# Patient Record
Sex: FEMALE | Race: BLACK OR AFRICAN AMERICAN | NOT HISPANIC OR LATINO | Employment: OTHER | URBAN - METROPOLITAN AREA
[De-identification: names, ages, dates, MRNs, and addresses within clinical notes are randomized per-mention and may not be internally consistent; named-entity substitution may affect disease eponyms.]

---

## 2017-01-03 ENCOUNTER — PATIENT OUTREACH (OUTPATIENT)
Dept: ADMINISTRATIVE | Facility: OTHER | Age: 82
End: 2017-01-03
Payer: MEDICARE

## 2017-01-03 DIAGNOSIS — J45.20 ASTHMA, INTERMITTENT, UNCOMPLICATED: ICD-10-CM

## 2017-01-03 DIAGNOSIS — M51.36 DDD (DEGENERATIVE DISC DISEASE), LUMBAR: ICD-10-CM

## 2017-01-03 DIAGNOSIS — G89.29 CHRONIC BILATERAL LOW BACK PAIN WITHOUT SCIATICA: ICD-10-CM

## 2017-01-03 DIAGNOSIS — M54.50 CHRONIC BILATERAL LOW BACK PAIN WITHOUT SCIATICA: ICD-10-CM

## 2017-01-03 PROCEDURE — 99490 CHRNC CARE MGMT STAFF 1ST 20: CPT | Mod: ,,, | Performed by: INTERNAL MEDICINE

## 2017-01-03 NOTE — PROGRESS NOTES
Patient called to review future appts. She states that she will need to speak with Dr Woo for recommendation for good pulmonologist within [facility] since insurance no longer cover cost to see outside pulmonary.  She also reports increased reflux symptoms. Discussed diet, avoiding heavy meal at end of day, avoid laying down at least 1-2 hours after completion of meal. And request to md to restart Nexium.    She states that she has not had worsening in asthma symptoms but confirmed taking Symbicort as ordered and not controlling her increased shortness of breath and cough    Message to md for update and request Nexium order to pharmacy.  Provided patient with current appt info and  Called Dr. Gillette's office on patient behalf to report symptoms- left message to contact patient to discuss.

## 2017-01-04 ENCOUNTER — TELEPHONE (OUTPATIENT)
Dept: INTERNAL MEDICINE | Facility: CLINIC | Age: 82
End: 2017-01-04

## 2017-01-04 RX ORDER — HYDROGEN PEROXIDE 3 %
20 SOLUTION, NON-ORAL MISCELLANEOUS
Qty: 30 CAPSULE | Refills: 5 | Status: SHIPPED | OUTPATIENT
Start: 2017-01-04 | End: 2017-01-19

## 2017-01-04 NOTE — TELEPHONE ENCOUNTER
----- Message from Anitra Casey RN sent at 1/3/2017  1:57 PM CST -----  Hi Dr. Woo,    Spoke with Ms. Ding today and she advised that is having increased indigestion and reflux symptoms and request to restart Nexium 40mg daily to pharmacy of record.  She also states that due to insurance she will have to change to [facility] and requests your opinion for md choice to manage asthma.     At this time she reports no improvement in her asthma symptoms, denies worsening. Called Dr. Gillette's office on her behalf to report for advice- Left message to call patient.    Sincerely,    Anitra Casey, RN  Outpatient Case Management  [facility]  [phone]

## 2017-01-05 ENCOUNTER — OFFICE VISIT (OUTPATIENT)
Dept: SPINE | Facility: CLINIC | Age: 82
End: 2017-01-05
Attending: PHYSICAL MEDICINE & REHABILITATION
Payer: MEDICARE

## 2017-01-05 VITALS
BODY MASS INDEX: 23.32 KG/M2 | WEIGHT: 140 LBS | HEIGHT: 65 IN | DIASTOLIC BLOOD PRESSURE: 66 MMHG | SYSTOLIC BLOOD PRESSURE: 149 MMHG | HEART RATE: 70 BPM

## 2017-01-05 DIAGNOSIS — M79.642 PAIN IN BOTH HANDS: ICD-10-CM

## 2017-01-05 DIAGNOSIS — M54.50 ACUTE BILATERAL LOW BACK PAIN WITHOUT SCIATICA: ICD-10-CM

## 2017-01-05 DIAGNOSIS — M53.3 SI (SACROILIAC) JOINT DYSFUNCTION: ICD-10-CM

## 2017-01-05 DIAGNOSIS — G89.29 CHRONIC BILATERAL LOW BACK PAIN WITH LEFT-SIDED SCIATICA: Primary | ICD-10-CM

## 2017-01-05 DIAGNOSIS — M51.36 DDD (DEGENERATIVE DISC DISEASE), LUMBAR: ICD-10-CM

## 2017-01-05 DIAGNOSIS — M54.42 CHRONIC BILATERAL LOW BACK PAIN WITH LEFT-SIDED SCIATICA: Primary | ICD-10-CM

## 2017-01-05 DIAGNOSIS — M79.641 PAIN IN BOTH HANDS: ICD-10-CM

## 2017-01-05 PROCEDURE — 99214 OFFICE O/P EST MOD 30 MIN: CPT | Mod: S$PBB,,, | Performed by: PHYSICAL MEDICINE & REHABILITATION

## 2017-01-05 PROCEDURE — 99999 PR PBB SHADOW E&M-EST. PATIENT-LVL V: CPT | Mod: PBBFAC,,, | Performed by: PHYSICAL MEDICINE & REHABILITATION

## 2017-01-05 PROCEDURE — 99215 OFFICE O/P EST HI 40 MIN: CPT | Mod: PBBFAC | Performed by: PHYSICAL MEDICINE & REHABILITATION

## 2017-01-05 RX ORDER — LEVOTHYROXINE SODIUM 50 UG/1
TABLET ORAL
COMMUNITY
Start: 2016-11-29 | End: 2017-03-13 | Stop reason: SDUPTHER

## 2017-01-05 NOTE — PROGRESS NOTES
Subjective:      Patient ID: Debbie Ding is a 84 y.o. female.    Chief Complaint: Hand Pain (bilateral) and Wrist Pain (bilateral)    Referred by: Referral, Self     HPI Comments: Ms Ding is an 85 yo female here for follow up of her low back pain that she has had for years.  She went to the healthy back program and was last seen by me on 11/11/2016 when having a flare of pain we did bilateral iliolumbar injections.  She then continued in therapy.  Today she is having hand and wrist pain.  She feels like her back is doing well.  She missed the last session.  She has not continued the strengthening, but she is continuing the stretches.  She feels like her back is doing great.  We discussed the importance of following up and continuing the strengthening.  There is no low back pain.  She has not had back pain for the last 6 weeks.  She feels like her back pain feels great.  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  The pain is in the thumb.  It is hard to use both hands.  She feels like she is doing well    Past Medical History:    Arthritis                                                     Asthma                                                        Cataract                                                      Depression                                                    Diabetes mellitus                                             Fibromyalgia                                    7/2/2012      Fibromyalgia                                                  GERD (gastroesophageal reflux disease)                        Hypertension                                    7/2/2012      Thoracic or lumbosacral neuritis or radiculiti*               Thyroid disease                                               Ulcer                                                         Past Surgical History:    EYE SURGERY                                                    HYSTERECTOMY                                                    FOOT NEUROMA SURGERY                             1985          CATARACT EXTRACTION                             Bilateral               Review of patient's family history indicates:    Diabetes                       Mother                    Diabetes                       Brother                   Melanoma                       Neg Hx                      Social History    Marital status:              Spouse name:                       Years of education:                 Number of children:               Social History Main Topics    Smoking status: Never Smoker                                                                Alcohol use: No              Drug use: No                Current Outpatient Prescriptions:  albuterol (PROVENTIL HFA/VENTOLIN HFA) 200 puff inhaler, Inhale 2 puffs into the lungs every 4 (four) hours as needed for Wheezing., Disp: 3 Inhaler, Rfl: 3  ammonium lactate (LAC-HYDRIN) 12 % lotion, , Disp: , Rfl:   atorvastatin (LIPITOR) 10 MG tablet, Take 1 tablet (10 mg total) by mouth once daily., Disp: 90 tablet, Rfl: 3  BUDESONIDE/FORMOTEROL FUMARATE (SYMBICORT INHL), Inhale into the lungs., Disp: , Rfl:   cycloSPORINE (RESTASIS) 0.05 % ophthalmic emulsion, , Disp: , Rfl:   desloratadine (CLARINEX) 5 mg tablet, Take 5 mg by mouth once daily., Disp: , Rfl:   desonide (DESOWEN) 0.05 % cream, , Disp: , Rfl:   desoximetasone 0.25 % ointment, APPLY TO THE AFFECTED AREA(S) TWICE A DAY, Disp: , Rfl: 1  donepezil (ARICEPT) 10 MG tablet, Take 1 tablet (10 mg total) by mouth every morning., Disp: 90 tablet, Rfl: 3  DULERA 100-5 mcg/actuation HFAA, , Disp: , Rfl:   DULERA 200-5 mcg/actuation inhaler, 2 puffs 2 (two) times daily., Disp: , Rfl: 3  esomeprazole (NEXIUM) 20 MG capsule, Take 1 capsule (20 mg total) by mouth before breakfast., Disp: 30 capsule, Rfl: 5  ESTRACE 0.01 % (0.1 mg/gram) vaginal cream, , Disp: , Rfl:   estradiol 0.05 mg/24 hr td ptsw (VIVELLE-DOT) 0.05 mg/24 hr, ,  Disp: , Rfl:    fluconazole (DIFLUCAN) 150 MG Tab, TAKE 1 TABLET BY MOUTH EVERY 3 DAYS., Disp: , Rfl: 12  fluocinolone-shower cap 0.01 % Oil, , Disp: , Rfl:   fluocinonide (LIDEX) 0.05 % external solution, USE AS DIRECTED TWICE A DAY EXTERNALLY 1MONTH, Disp: , Rfl: 2  fluocinonide (LIDEX) 0.05 % external solution, AAA scalp qd - bid prn pruritus, Disp: 60 mL, Rfl: 3  fluticasone (FLONASE) 50 mcg/actuation nasal spray, , Disp: , Rfl:   glycopyrrolate (ROBINUL) 2 MG Tab, Take 1 to 1.5 pills po daily as needed for excess sweating, Disp: 150 tablet, Rfl: 0  ketoconazole (NIZORAL) 2 % shampoo, , Disp: , Rfl:   levocetirizine (XYZAL) 5 MG tablet, TAKE 1 TABLET BY MOUTH EVERY EVEINING, Disp: , Rfl: 1  lisinopril (PRINIVIL,ZESTRIL) 5 MG tablet, Take 1 tablet (5 mg total) by mouth once daily., Disp: 90 tablet, Rfl: 3  lisinopril (PRINIVIL,ZESTRIL) 5 MG tablet, TAKE 1 TABLET (5 MG TOTAL) BY MOUTH ONCE DAILY., Disp: , Rfl: 3  meclizine (ANTIVERT) 25 mg tablet, Take 25 mg by mouth 3 (three) times daily as needed., Disp: , Rfl:   MYRBETRIQ 50 mg Tb24, Take 1 tablet by mouth once daily., Disp: , Rfl: 12  neomycin-polymyxin-hydrocortisone (CORTISPORIN) otic solution, , Disp: , Rfl:   olopatadine (PATADAY) 0.2 % Drop, Place 1 drop into both eyes once daily., Disp: , Rfl:   salsalate (DISALCID) 750 MG Tab, Take 750 mg by mouth 2 (two) times daily., Disp: , Rfl:   SYNTHROID 75 mcg tablet, , Disp: , Rfl:   tramadol (ULTRAM) 50 mg tablet, Take 1 tablet (50 mg total) by mouth every 8 (eight) hours as needed for Pain., Disp: 30 tablet, Rfl: 0  trospium (SANCTURA XR) 60 mg Cp24 capsule, Take 1 capsule (60 mg total) by mouth once daily. Take in the morning 1 hour before meal., Disp: 30 capsule, Rfl: 11  pregabalin (LYRICA) 25 MG capsule, Take 1 capsule (25 mg total) by mouth 3 (three) times daily. Pt may increase to 100mg BID, Disp: 90 capsule, Rfl: 5    No current facility-administered medications for this visit.       Review of patient's  allergies indicates:   -- Trazodone -- Other (See Comments)    --  Nightmares/sleep walk   -- Talwin compound     --  Other reaction(s): Hives   -- Talwin (pentazocine lactate) -- Other (See Comments)   -- Bentyl (dicyclomine) -- Anxiety   -- Tessalon (benzonatate) -- Anxiety           Review of Systems   Constitution: Negative for weight gain and weight loss.   Cardiovascular: Negative for chest pain.   Respiratory: Negative for shortness of breath.    Musculoskeletal: Positive for back pain (left leg) and joint pain. Negative for joint swelling.   Gastrointestinal: Negative for abdominal pain and bowel incontinence.   Genitourinary: Negative for bladder incontinence.   Neurological: Negative for numbness.           Objective:          General    Vitals reviewed.  Constitutional: She is oriented to person, place, and time. She appears well-developed and well-nourished.   HENT:   Head: Normocephalic and atraumatic.   Pulmonary/Chest: Effort normal.   Neurological: She is alert and oriented to person, place, and time.   Psychiatric: She has a normal mood and affect. Her behavior is normal. Judgment and thought content normal.     General Musculoskeletal Exam   Gait: normal     Right Ankle/Foot Exam     Tests   Heel Walk: able to perform  Tiptoe Walk: able to perform    Left Ankle/Foot Exam     Tests   Heel Walk: able to perform  Tiptoe Walk: able to perform  Back (L-Spine & T-Spine) / Neck (C-Spine) Exam     Back (L-Spine & T-Spine) Range of Motion   Extension: 10   Flexion: 80     Spinal Sensation   Right Side Sensation  C-Spine Level: normal   L-Spine Level: normal  S-Spine Level: normal  Left Side Sensation  C-Spine Level: normal  L-Spine Level: normal  S-Spine Level: normal    Back (L-Spine & T-Spine) Tests   Right Side Tests  Straight leg raise:      Sitting SLR: > 70 degrees      Left Side Tests  Straight leg raise:     Sitting SLR: > 70 degrees          Other She has no scoliosis .  Spinal Kyphosis:   Absent      Right Hand/Wrist Exam     Pain   Wrist - The patient exhibits pain of the CMC.    Tests     Atrophy   Thenar:  positive      Left Hand/Wrist Exam     Pain   Wrist - The patient exhibits pain of the CMC.    Tests     Atrophy  Thenar:  positive          Muscle Strength   Right Upper Extremity   Biceps: 5/5/5   Deltoid:  5/5  Triceps:  5/5  Wrist Extension: 5/5/5   Finger Flexors:  5/5  Left Upper Extremity  Biceps: 5/5/5   Deltoid:  5/5  Triceps:  5/5  Wrist Extension: 5/5/5   Finger Flexors:  5/5  Right Lower Extremity   Hip Flexion: 5/5   Quadriceps:  5/5   Anterior tibial:  5/5/5  EHL:  5/5  Left Lower Extremity   Hip Flexion: 5/5   Quadriceps:  5/5   Anterior tibial:  5/5/5   EHL:  5/5    Reflexes     Left Side  Biceps:  2+  Triceps:  2+  Brachioradialis:  2+  Quadriceps:  2+  Achilles:  2+  Left García's Sign:  Absent  Babinski Sign:  absent    Right Side   Biceps:  2+  Triceps:  2+  Brachioradialis:  2+  Quadriceps:  2+  Achilles:  2+  Right García's Sign:  absent  Babinski Sign:  absent    Vascular Exam     Right Pulses        Carotid:                  2+    Left Pulses        Carotid:                  2+        Assessment:       Encounter Diagnoses   Name Primary?    Chronic bilateral low back pain with left-sided sciatica Yes    SI (sacroiliac) joint dysfunction     DDD (degenerative disc disease), lumbar     Acute bilateral low back pain without sciatica      Pain in both hands          Plan:       Debbie was seen today for hand pain and wrist pain.    Diagnoses and all orders for this visit:    Chronic bilateral low back pain with left-sided sciatica    SI (sacroiliac) joint dysfunction    DDD (degenerative disc disease), lumbar    Acute bilateral low back pain without sciatica     Pain in both hands  -     Ambulatory consult to Orthopedics       1.  Si joint/iliolumbar  Injection helped her back pain  2.  Continue meds  3.  Continue PT, she missed her last appointment, i encourage her to  reschedule it and then get set up with wellness to continue the strengthening.  She is not having any low back pain  4.  Bilateral first CMC pain, will get her to hand for evaluation  5.   rtc 6 months

## 2017-01-05 NOTE — TELEPHONE ENCOUNTER
Rx for nexium 20mg sent in to see if symptoms controllable on lower dose. Rec pt come to clinic to be seen for urgent visit to evaluate current symptoms and review chronic management.

## 2017-01-05 NOTE — MR AVS SNAPSHOT
[facility]  [address]  [address]  [facility] [address]  Phone: [phone]  Fax: [phone]                  Debbie Ding   2017 10:00 AM   Office Visit    Description:  Female : 10/23/1932   Provider:  Fariba Sterling MD   Department:  [facility]           Reason for Visit     Hand Pain     Wrist Pain           Diagnoses this Visit        Comments    Chronic bilateral low back pain with left-sided sciatica    -  Primary     SI (sacroiliac) joint dysfunction         DDD (degenerative disc disease), lumbar         Acute bilateral low back pain without sciatica         Pain in both hands                To Do List           Future Appointments        Provider Department Dept Phone    2017 2:15 PM Neil Nuñez Jr., MD [facility] [place] [facility] [phone]    2017 8:00 AM Dayo Woo MD [facility] [place] [facility] [phone]    2017 8:00 AM Gilles Dunne MD [facility] [phone]      Goals (5 Years of Data)     Patient/caregiver will accept life style changes to manage and improve GERD prior to discharge from [facility].     Notes - Note created  10/19/2016  3:21 PM by Yvonne Bansal RN    Overall Time to Completion  6 months from .16    Short Term Goals  Patient/caregiver will replace spicy with non spice, decaffeinated for 3 meals per day within 6 months.  Interventions   · Recognize and provide educational material (JESSENIA).   Status  · Partially met          Patient/caregiver will be able to name cardiac, respiratory, pain, purpose of medication and will take medications as ordered by Physician prior to discharge from [facility].     Notes - Note created  10/19/2016  3:28 PM by Yvonne Bansal RN    Overall Time to Completion  6 months from .16    Short Term Goals  Patient/caregiver will utilize a pill box organizer to dispense medications daily within 6 months.  Interventions   · Complete medication  reconciliation.  · Encourage Medication Compliance.   Status  · Partially met    Patient/caregiver will verbalize dosage/route/frequency/indication of cardiac, respiratory, pain within 6 months.  Interventions   · Complete medication reconciliation.  · Encourage Medication Compliance.   Status  · Partially met          Patient/Caregiver will check and record blood pressure daily. If > 140/90 for 3 days, patient/caregiver will know to notify Physician, prior to discharge from [facility].     Notes - Note created  10/19/2016  3:25 PM by Yvonne Bansal RN    Overall Time to Completion  6 months from 10/19/2016    Short Term Goals  Patient/caregiver will measure and record the blood pressure 1 times per day for 6 months.  Interventions   · Assess for availability of working blood pressure cuff in home setting.  · Mail Blood pressure logs for home use.   Status  · Partially met          Patient/caregiver will have an action plan in place to manage and prevent complications of pain prior to discharge from [facility].     Notes - Note created  10/19/2016  3:19 PM by Yvonne Bansal RN    Overall Time to Completion  6 months from 08.01.16    Short Term Goals  Patient/caregiver will verbalize 3 interventions to decrease pain within 6 months.  Interventions   · Recognize and provide educational material (KRAMES).  · Facilitate to referral to Outpatient Rehab.  · Facilitate to referral to Pain Management Specialist.    · Facilitate referral to Healthy [facility]   Status  · Partially met    Patient/caregiver will verbalize 3 noninvasive pain relief measures to help manage the pain within 6 months.  Interventions   · Recognize and provide educational material (KRAMES).   Status  · Partially met          Patient/caregiver will verbalize agreement to attend all scheduled appointments with Physicians prior to discharge from [facility].     Notes - Note created  10/19/2016  3:24 PM by Yvonne Bansal RN    Overall Time to Completion  6 months from  08.01.16    Short Term Goals  Patient/caregiver will establish appointment with Physician within 6 months.  Interventions   · Facilitate to Medical appointments.  · Encourage compliance with preventive screenings.   Status  · Partially met            [facility]     [facility] Nurse Care Line - 24/7 Assistance  Registered nurses in the [facility] provide clinical advisement, health education, appointment booking, and other advisory services.  Call for this free service at [phone].             Medications           Message regarding Medications     Verify the changes and/or additions to your medication regime listed below are the same as discussed with your clinician today.  If any of these changes or additions are incorrect, please notify your healthcare provider.             Verify that the below list of medications is an accurate representation of the medications you are currently taking.  If none reported, the list may be blank. If incorrect, please contact your healthcare provider. Carry this list with you in case of emergency.           Current Medications     albuterol (PROVENTIL HFA/VENTOLIN HFA) 200 puff inhaler Inhale 2 puffs into the lungs every 4 (four) hours as needed for Wheezing.    ammonium lactate (LAC-HYDRIN) 12 % lotion     atorvastatin (LIPITOR) 10 MG tablet Take 1 tablet (10 mg total) by mouth once daily.    BUDESONIDE/FORMOTEROL FUMARATE (SYMBICORT INHL) Inhale into the lungs.    cycloSPORINE (RESTASIS) 0.05 % ophthalmic emulsion     desloratadine (CLARINEX) 5 mg tablet Take 5 mg by mouth once daily.    desonide (DESOWEN) 0.05 % cream     desoximetasone 0.25 % ointment APPLY TO THE AFFECTED AREA(S) TWICE A DAY    donepezil (ARICEPT) 10 MG tablet Take 1 tablet (10 mg total) by mouth every morning.    DULERA 100-5 mcg/actuation HFAA     DULERA 200-5 mcg/actuation inhaler 2 puffs 2 (two) times daily.    esomeprazole (NEXIUM) 20 MG capsule Take 1 capsule (20 mg total) by  "mouth before breakfast.    ESTRACE 0.01 % (0.1 mg/gram) vaginal cream     estradiol 0.05 mg/24 hr td ptsw (VIVELLE-DOT) 0.05 mg/24 hr     fluconazole (DIFLUCAN) 150 MG Tab TAKE 1 TABLET BY MOUTH EVERY 3 DAYS.    fluocinolone-shower cap 0.01 % Oil     fluocinonide (LIDEX) 0.05 % external solution USE AS DIRECTED TWICE A DAY EXTERNALLY 1MONTH    fluocinonide (LIDEX) 0.05 % external solution AAA scalp qd - bid prn pruritus    fluticasone (FLONASE) 50 mcg/actuation nasal spray     glycopyrrolate (ROBINUL) 2 MG Tab Take 1 to 1.5 pills po daily as needed for excess sweating    ketoconazole (NIZORAL) 2 % shampoo     levocetirizine (XYZAL) 5 MG tablet TAKE 1 TABLET BY MOUTH EVERY EVEINING    lisinopril (PRINIVIL,ZESTRIL) 5 MG tablet Take 1 tablet (5 mg total) by mouth once daily.    lisinopril (PRINIVIL,ZESTRIL) 5 MG tablet TAKE 1 TABLET (5 MG TOTAL) BY MOUTH ONCE DAILY.    meclizine (ANTIVERT) 25 mg tablet Take 25 mg by mouth 3 (three) times daily as needed.    MYRBETRIQ 50 mg Tb24 Take 1 tablet by mouth once daily.    neomycin-polymyxin-hydrocortisone (CORTISPORIN) otic solution     olopatadine (PATADAY) 0.2 % Drop Place 1 drop into both eyes once daily.    salsalate (DISALCID) 750 MG Tab Take 750 mg by mouth 2 (two) times daily.    tramadol (ULTRAM) 50 mg tablet Take 1 tablet (50 mg total) by mouth every 8 (eight) hours as needed for Pain.    trospium (SANCTURA XR) 60 mg Cp24 capsule Take 1 capsule (60 mg total) by mouth once daily. Take in the morning 1 hour before meal.    pregabalin (LYRICA) 25 MG capsule Take 1 capsule (25 mg total) by mouth 3 (three) times daily. Pt may increase to 100mg BID    SYNTHROID 50 mcg tablet            Clinical Reference Information           Vital Signs - Last Recorded  Most recent update: 1/5/2017 10:16 AM by Deanne Childress MA    BP Pulse Ht Wt BMI    (!) 149/66 70 5' 5" (1.651 m) 63.5 kg (140 lb) 23.3 kg/m2      Blood Pressure          Most Recent Value    BP  (!)  149/66    "   Allergies as of 1/5/2017     Trazodone    Talwin Compound    Talwin [Pentazocine Lactate]    Bentyl [Dicyclomine]    Tessalon [Benzonatate]      Immunizations Administered on Date of Encounter - 1/5/2017     None      Orders Placed During Today's Visit      Normal Orders This Visit    Ambulatory consult to Orthopedics       [facility] Sign-Up     Activating your [facility] account is as easy as 1-2-3!     1) Visit [email or web address], select Sign Up Now, enter this activation code and your date of birth, then select Next.  [email or web address]  Expires: 2/19/2017 10:43 AM      2) Create a username and password to use when you visit [facility] in the future and select a security question in case you lose your password and select Next.    3) Enter your e-mail address and click Sign Up!    Additional Information  If you have questions, please e-mail [email or web address] or call [phone] to talk to our [facility] staff. Remember, [facility] is NOT to be used for urgent needs. For medical emergencies, dial 911.

## 2017-01-06 DIAGNOSIS — R52 PAIN: Primary | ICD-10-CM

## 2017-01-10 ENCOUNTER — CLINICAL SUPPORT (OUTPATIENT)
Dept: REHABILITATION | Facility: OTHER | Age: 82
End: 2017-01-10
Attending: INTERNAL MEDICINE
Payer: MEDICARE

## 2017-01-10 DIAGNOSIS — R29.898 DECREASED STRENGTH OF TRUNK AND BACK: ICD-10-CM

## 2017-01-10 DIAGNOSIS — M54.50 CHRONIC BILATERAL LOW BACK PAIN WITHOUT SCIATICA: ICD-10-CM

## 2017-01-10 DIAGNOSIS — M51.36 DDD (DEGENERATIVE DISC DISEASE), LUMBAR: Primary | ICD-10-CM

## 2017-01-10 DIAGNOSIS — G89.29 CHRONIC BILATERAL LOW BACK PAIN WITHOUT SCIATICA: ICD-10-CM

## 2017-01-10 PROCEDURE — G8979 MOBILITY GOAL STATUS: HCPCS | Mod: CK | Performed by: PHYSICAL MEDICINE & REHABILITATION

## 2017-01-10 PROCEDURE — G8980 MOBILITY D/C STATUS: HCPCS | Mod: CK | Performed by: PHYSICAL MEDICINE & REHABILITATION

## 2017-01-10 PROCEDURE — 97110 THERAPEUTIC EXERCISES: CPT | Performed by: PHYSICAL MEDICINE & REHABILITATION

## 2017-01-10 NOTE — PLAN OF CARE
Outpatient Physical Therapy Lumbar Visit 15 and discharge note    Patient has attended 15 visits of the [facility] program for aerobic work, med ex isometric testing with dynamic strengthening on med ex equipment for spine, whole body strengthening on med ex equipment, HEP, education.  Patient has completed [facility] Program and is ready to be transitioned into wellness program.  Importance of wellness program, and attending 2/month to maintain strength stressed.  Importance of continuing there ex and body mech and ergonomics stressed.   Patient demonstrates improvement in ability to reduce symptoms, improved posture, improved ROM and improved strength.   Reviewed HEP, and importance of maintaining a healthy spine through continued stretching and performance of HEP, good posture, good ergonomics, good body mech with ADL, leisure, and work.  Patient expressed understanding information given.  Patient plans to attend wellness and is ready to transition to wellness.      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  She states that she is using her ball for exercise at home.  She also began pelvic floor rehabilitation at [facility].  She feels the trigger point injections from Dr Sterling really helped.  Pt encouraged to gently performed HEP if she has any increases in pain throughout the day. She states that she is feeling better overall.  She understands that the exercises help decrease her tightness. Pt requested to omit torso rotation,  biceps and leg press today, stating that she believes the machines may be aggravating her symptoms.  She notices improvement in ability to walk, grocery shop, do ADL, shower, sleep etc.  She has intermittent continued symptoms but much better and feels ready for wellness.  HEP and back care reviewed.  She bought bike and is using it 3-4/week, doing her stretching, indep with lifestyle changes.      VAS location: Low back  VAS: 0/10      1.  Where is your pain the worst? back  2.  Is your pain constant or intermittent? Constant - intermittent at discharge  3. Does bending forward make your typical pain worse? yes  4. Since the start of your back pain, has there been a change in your bowel or bladder? no  5.  What can't you do now that you use to? Walking, standing with HHCs  Improved at discharge        OBJECTIVE EXAMINATION    No environmental, cultural, spiritual, developmental or education needs expressed or noted    POSTURE at eval:  improved    MOVEMENT LOSS at 1/10/17  Flexion full, able to fully flex for ADL and tying shoes  Ext min loss and no pain  Side bend full bilat      Baseline IM Testing Results: 10/4/16  ROM: 9-33  Max Peak Torque: 55  Min Peak Torque: 21  Flex/ext ratio: 2.62:1    Midpoint IM Test Results:  12/1/16  ROM:  6- 36 degrees  Max Peak Torque:  86 ft lbs  Min Peak Torque:  55 ft lbs  Flex/ext ratio:  1.56:1  Average change sum of forces:  99%    Gait good, stable and upright  indep with all transfers   Stairs with ease    Treatment:  Pt was instructed in and performed the following:  Cardiovascular exercise and therapeutic exercise to improve posture, lumbar spine and supporting musculature ROM, strength, and muscular endurance as follows:      [facility] Therapy 1/10/2017   Visit Number 15   VAS Pain Rating 0   Treadmill Time (in min.) 10   Speed 1   Recumbent Bike Seat Pos. -   Time -   Flexion in Lying 10   Lumbar Flexion -   Lumbar Extension -   Lumbar Peak Torque -   Lumbar Weight 44    Repetitions 20   Rating of Perceived Exertion 4   Ice - Z Lie (in min.) 10           Peripheral muscle strengthening which included 1 set of 15-20 repetitions at a slow, controlled 7 second per rep pace focused on strengthening supporting musculature for improved body mechanics and functional mobility.  Pt and therapist focused on proper form during treatment to ensure optimal strengthening of each targeted muscle group.  Machines were utilized including, , leg extension, leg curl, chest press, upright row, tricep and     and hip abd.    HEP  as follows:   DKTC 10x ea set, 2-3 sets/day to reduce c/o pain and improve overall ROM, function. Pt educated on HEP and activity modifications to reduce c/o pain and improve overall function. Pt also educated on use of modalities prn to reduce c/o pain and dysfunction. Patient demo good understanding of the education provided. Patient demo good return demo of skill of exercises.   (patient has handouts and demonstrated and expressed understanding of)    Discharge handouts given  1/10/17  She plans to bike 3-4/week or walk  She has ball and is flexion responder  -reviewed stretching  Reviewed back care    ASSESSMENT       Patient has attended 15 visits of the [facility] program for aerobic work, med ex isometric testing with dynamic strengthening on med ex equipment for spine, whole body strengthening on med ex equipment, HEP, education.  Patient has completed [facility] Program and is ready to be transitioned into wellness program.  Importance of wellness program, and attending 2/month to maintain strength stressed.  Importance of continuing there ex and body mech and ergonomics stressed.   Patient demonstrates improvement in ability to reduce symptoms, improved posture, improved ROM and improved strength.   Reviewed HEP, and importance of maintaining a healthy spine through continued stretching and performance of HEP, good posture, good ergonomics, good body mech with  ADL, leisure, and work.  Patient expressed understanding information given.  Patient plans to attend wellness and is ready to transition to wellness.      Patient retested at visit 10 and reassessed at visit 15   and shows improvement in:  Improved posture, using lumbar roll  Improved lumbar  ROM,  Functionally full and able to dress and perform ADL with greater ease  Improved strength at each test point on lumbar med ex IM test with 99%  average improvement noted with Reduced pain  Noted by patient  Initial outcome tool score 69% limited  and current outcome tool score 41%   indicating reduced pain and improved function        Short term goals: 5 weeks or 10 visits  1.  Pt will demonstrate increased lumbar ROM as measured by med ex by at least 3 degrees from the initial ROM value with improvements noted in functional ROM and ability to perform ADL MET for functional improvement  MET  2.  Pt will demonstrate increased maximum isometric torque value by 5% when compared to the initial value  MET  3.  Pt will tolerate regular 5% increases in dynamic weight loads on all machines  MET  4.  Patient report a reduction in worst pain score by 1-2 points for improved tolerance during work and recreational activitiesMET  5.  Pt able to perform HEP correctly with minimal cueing or supervision for therapist MET  6. Pt will demonstrate improvement in flexion/extension strength ratio compared in initial value  MET    Long term goals: 10 weeks or 20 visits  1. Pt will demonstrate increased lumbar ROM by at least 6 degrees from initial ROM value, resulting in improved ability to perform functional fwd bending while standing and sitting.  MET  2. Pt will demonstrate increased maximum isometric torque value by 10% when compared to the initial value, resulting in improved ability to perform bending, lifting, and carrying activities safely, confidently, and 2/10 pain or less. MET  3. Pt will be able to ambulate community distances  safely, confidently, and 2/10 pain or less.MET  4. Pt to demonstrate ability to independently control and reduce their pain through posture positioning and mechanical movements throughout typical work day.MET  5. Pt able to sleep through the night without waking with c/o pain. MET  6. Pt able to perform household cooking/cleaning ADLS safely, confidently, and 2/10 pain or less.  PROGRESS MADE  7. Pt to be able to perform self care and grooming ADLs safely, confidently, independently, and 2/10 pain or less. MET  8. Pt able to resume their preferred exercise regimen safely, confidently, and 2/10 pain or less.    MET BOUGHT BIKE AND IS USING  9. Pt will be able to ascend/descend 1 flight of stairs reciprocally with use of unilateral handrail for safety, confidently and 2/10 pain or less.    MET        OUTCOMES:  Functional Outcome Measure: FOTO limitation = 69% Disability  G-codes + Modifier + % Impairment: (Mobility):   Initial =  (60% - 80% disability), Goal =  (40% - 60% disability)  Mid Point Visit 10: FOTO limitation= 48% improved from 69%  FOTO visit 15 41% limited with strength 99% improved and function improved met goal of 40-60%      PLAN   Discharge to wellness      Please open encounter, addend and return plan of care if in agreement with plan.  Thank you

## 2017-01-10 NOTE — PROGRESS NOTES
Outpatient Physical Therapy Lumbar Visit 15 and discharge note    Patient has attended 15 visits of the [facility] program for aerobic work, med ex isometric testing with dynamic strengthening on med ex equipment for spine, whole body strengthening on med ex equipment, HEP, education.  Patient has completed [facility] Program and is ready to be transitioned into wellness program.  Importance of wellness program, and attending 2/month to maintain strength stressed.  Importance of continuing there ex and body mech and ergonomics stressed.   Patient demonstrates improvement in ability to reduce symptoms, improved posture, improved ROM and improved strength.   Reviewed HEP, and importance of maintaining a healthy spine through continued stretching and performance of HEP, good posture, good ergonomics, good body mech with ADL, leisure, and work.  Patient expressed understanding information given.  Patient plans to attend wellness and is ready to transition to wellness.      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  She states that she is using her ball for exercise at home.  She also began pelvic floor rehabilitation at [facility].  She feels the trigger point injections from Dr Sterling really helped.  Pt encouraged to gently performed HEP if she has any increases in pain throughout the day. She states that she is feeling better overall.  She understands that the exercises help decrease her tightness. Pt requested to omit torso rotation,  biceps and leg press today, stating that she believes the machines may be aggravating her symptoms.  She notices improvement in ability to walk, grocery shop, do ADL, shower, sleep etc.  She has intermittent continued symptoms but much better and feels ready for wellness.  HEP and back care reviewed.  She bought bike and is using it 3-4/week, doing her stretching, indep with lifestyle changes.      VAS location: Low back  VAS: 0/10      1.  Where is your pain the worst? back  2.  Is your pain constant or intermittent? Constant - intermittent at discharge  3. Does bending forward make your typical pain worse? yes  4. Since the start of your back pain, has there been a change in your bowel or bladder? no  5.  What can't you do now that you use to? Walking, standing with HHCs  Improved at discharge        OBJECTIVE EXAMINATION    No environmental, cultural, spiritual, developmental or education needs expressed or noted    POSTURE at eval:  improved    MOVEMENT LOSS at 1/10/17  Flexion full, able to fully flex for ADL and tying shoes  Ext min loss and no pain  Side bend full bilat      Baseline IM Testing Results: 10/4/16  ROM: 9-33  Max Peak Torque: 55  Min Peak Torque: 21  Flex/ext ratio: 2.62:1    Midpoint IM Test Results:  12/1/16  ROM:  6- 36 degrees  Max Peak Torque:  86 ft lbs  Min Peak Torque:  55 ft lbs  Flex/ext ratio:  1.56:1  Average change sum of forces:  99%    Gait good, stable and upright  indep with all transfers   Stairs with ease    Treatment:  Pt was instructed in and performed the following:  Cardiovascular exercise and therapeutic exercise to improve posture, lumbar spine and supporting musculature ROM, strength, and muscular endurance as follows:      [facility] Therapy 1/10/2017   Visit Number 15   VAS Pain Rating 0   Treadmill Time (in min.) 10   Speed 1   Recumbent Bike Seat Pos. -   Time -   Flexion in Lying 10   Lumbar Flexion -   Lumbar Extension -   Lumbar Peak Torque -   Lumbar Weight 44    Repetitions 20   Rating of Perceived Exertion 4   Ice - Z Lie (in min.) 10           Peripheral muscle strengthening which included 1 set of 15-20 repetitions at a slow, controlled 7 second per rep pace focused on strengthening supporting musculature for improved body mechanics and functional mobility.  Pt and therapist focused on proper form during treatment to ensure optimal strengthening of each targeted muscle group.  Machines were utilized including, , leg extension, leg curl, chest press, upright row, tricep and     and hip abd.    HEP  as follows:   DKTC 10x ea set, 2-3 sets/day to reduce c/o pain and improve overall ROM, function. Pt educated on HEP and activity modifications to reduce c/o pain and improve overall function. Pt also educated on use of modalities prn to reduce c/o pain and dysfunction. Patient demo good understanding of the education provided. Patient demo good return demo of skill of exercises.   (patient has handouts and demonstrated and expressed understanding of)    Discharge handouts given  1/10/17  She plans to bike 3-4/week or walk  She has ball and is flexion responder  -reviewed stretching  Reviewed back care    ASSESSMENT       Patient has attended 15 visits of the [facility] program for aerobic work, med ex isometric testing with dynamic strengthening on med ex equipment for spine, whole body strengthening on med ex equipment, HEP, education.  Patient has completed Healthy Back Program and is ready to be transitioned into wellness program.  Importance of wellness program, and attending 2/month to maintain strength stressed.  Importance of continuing there ex and body mech and ergonomics stressed.   Patient demonstrates improvement in ability to reduce symptoms, improved posture, improved ROM and improved strength.   Reviewed HEP, and importance of maintaining a healthy spine through continued stretching and performance of HEP, good posture, good ergonomics, good body mech with  ADL, leisure, and work.  Patient expressed understanding information given.  Patient plans to attend wellness and is ready to transition to wellness.      Patient retested at visit 10 and reassessed at visit 15   and shows improvement in:  Improved posture, using lumbar roll  Improved lumbar  ROM,  Functionally full and able to dress and perform ADL with greater ease  Improved strength at each test point on lumbar med ex IM test with 99%  average improvement noted with Reduced pain  Noted by patient  Initial outcome tool score 69% limited  and current outcome tool score 41%   indicating reduced pain and improved function        Short term goals: 5 weeks or 10 visits  1.  Pt will demonstrate increased lumbar ROM as measured by med ex by at least 3 degrees from the initial ROM value with improvements noted in functional ROM and ability to perform ADL MET for functional improvement  MET  2.  Pt will demonstrate increased maximum isometric torque value by 5% when compared to the initial value  MET  3.  Pt will tolerate regular 5% increases in dynamic weight loads on all machines  MET  4.  Patient report a reduction in worst pain score by 1-2 points for improved tolerance during work and recreational activitiesMET  5.  Pt able to perform HEP correctly with minimal cueing or supervision for therapist MET  6. Pt will demonstrate improvement in flexion/extension strength ratio compared in initial value  MET    Long term goals: 10 weeks or 20 visits  1. Pt will demonstrate increased lumbar ROM by at least 6 degrees from initial ROM value, resulting in improved ability to perform functional fwd bending while standing and sitting.  MET  2. Pt will demonstrate increased maximum isometric torque value by 10% when compared to the initial value, resulting in improved ability to perform bending, lifting, and carrying activities safely, confidently, and 2/10 pain or less. MET  3. Pt will be able to ambulate community distances  safely, confidently, and 2/10 pain or less.MET  4. Pt to demonstrate ability to independently control and reduce their pain through posture positioning and mechanical movements throughout typical work day.MET  5. Pt able to sleep through the night without waking with c/o pain. MET  6. Pt able to perform household cooking/cleaning ADLS safely, confidently, and 2/10 pain or less.  PROGRESS MADE  7. Pt to be able to perform self care and grooming ADLs safely, confidently, independently, and 2/10 pain or less. MET  8. Pt able to resume their preferred exercise regimen safely, confidently, and 2/10 pain or less.    MET BOUGHT BIKE AND IS USING  9. Pt will be able to ascend/descend 1 flight of stairs reciprocally with use of unilateral handrail for safety, confidently and 2/10 pain or less.    MET        OUTCOMES:  Functional Outcome Measure: FOTO limitation = 69% Disability  G-codes + Modifier + % Impairment: (Mobility):   Initial =  (60% - 80% disability), Goal =  (40% - 60% disability)  Mid Point Visit 10: FOTO limitation= 48% improved from 69%  FOTO visit 15 41% limited with strength 99% improved and function improved met goal of 40-60%      PLAN   Discharge to wellness      Please open encounter, addend and return plan of care if in agreement with plan.  Thank you

## 2017-01-11 ENCOUNTER — TELEPHONE (OUTPATIENT)
Dept: ORTHOPEDICS | Facility: CLINIC | Age: 82
End: 2017-01-11

## 2017-01-11 NOTE — TELEPHONE ENCOUNTER
Notified pt of per Dr Woo request, pt verbalized understanding and states she has an appt on 01/19/17

## 2017-01-13 ENCOUNTER — INITIAL CONSULT (OUTPATIENT)
Dept: ORTHOPEDICS | Facility: CLINIC | Age: 82
End: 2017-01-13
Attending: PHYSICAL MEDICINE & REHABILITATION
Payer: MEDICARE

## 2017-01-13 ENCOUNTER — HOSPITAL ENCOUNTER (OUTPATIENT)
Dept: RADIOLOGY | Facility: OTHER | Age: 82
Discharge: HOME | End: 2017-01-13
Attending: PLASTIC SURGERY
Payer: MEDICARE

## 2017-01-13 VITALS
HEART RATE: 75 BPM | BODY MASS INDEX: 23.32 KG/M2 | SYSTOLIC BLOOD PRESSURE: 155 MMHG | WEIGHT: 140 LBS | RESPIRATION RATE: 18 BRPM | HEIGHT: 65 IN | DIASTOLIC BLOOD PRESSURE: 82 MMHG

## 2017-01-13 DIAGNOSIS — M18.0 PRIMARY OSTEOARTHRITIS OF BOTH FIRST CARPOMETACARPAL JOINTS: Primary | ICD-10-CM

## 2017-01-13 DIAGNOSIS — R52 PAIN: ICD-10-CM

## 2017-01-13 PROCEDURE — 73130 X-RAY EXAM OF HAND: CPT | Mod: 26,50,, | Performed by: RADIOLOGY

## 2017-01-13 PROCEDURE — 20600 DRAIN/INJ JOINT/BURSA W/O US: CPT | Mod: PBBFAC | Performed by: PLASTIC SURGERY

## 2017-01-13 PROCEDURE — 99213 OFFICE O/P EST LOW 20 MIN: CPT | Mod: PBBFAC | Performed by: PLASTIC SURGERY

## 2017-01-13 PROCEDURE — 99203 OFFICE O/P NEW LOW 30 MIN: CPT | Mod: S$PBB,25,, | Performed by: PLASTIC SURGERY

## 2017-01-13 PROCEDURE — 99999 PR PBB SHADOW E&M-EST. PATIENT-LVL III: CPT | Mod: PBBFAC,,, | Performed by: PLASTIC SURGERY

## 2017-01-13 PROCEDURE — 20600 DRAIN/INJ JOINT/BURSA W/O US: CPT | Mod: S$PBB,50,, | Performed by: PLASTIC SURGERY

## 2017-01-13 RX ORDER — LIDOCAINE HYDROCHLORIDE 10 MG/ML
1 INJECTION INFILTRATION; PERINEURAL
Status: COMPLETED | OUTPATIENT
Start: 2017-01-13 | End: 2017-01-13

## 2017-01-13 RX ORDER — TRIAMCINOLONE ACETONIDE 40 MG/ML
80 INJECTION, SUSPENSION INTRA-ARTICULAR; INTRAMUSCULAR
Status: COMPLETED | OUTPATIENT
Start: 2017-01-13 | End: 2017-01-13

## 2017-01-13 RX ADMIN — LIDOCAINE HYDROCHLORIDE 1 ML: 10 INJECTION INFILTRATION; PERINEURAL at 03:01

## 2017-01-13 RX ADMIN — TRIAMCINOLONE ACETONIDE 80 MG: 40 INJECTION, SUSPENSION INTRA-ARTICULAR; INTRAMUSCULAR at 03:01

## 2017-01-13 NOTE — PROGRESS NOTES
HISTORY OF PRESENT ILLNESS:  Mrs. Ding is an 84-year-old right-hand dominant   female who presents today with a several year history of abnormal appearing   thumbs with a recent occurrence of bilateral thumb pain.  The patient states   that she began to notice loss of  as well as pain at the base of her thumbs   with carrying objects or twisting objects such as taking off the lid off of a   jar.  The patient denies any previous history of trauma.  She has noticed that   she has had some deformity in her thumbs over the past several years.  She has   not sought any medical attention.  She denies any numbness or tingling   throughout the hands.  She has no other complaints today.    Past Medical History   Diagnosis Date    Arthritis     Asthma     Cataract     Depression     Diabetes mellitus     Fibromyalgia 7/2/2012    Fibromyalgia     GERD (gastroesophageal reflux disease)     Hypertension 7/2/2012    Thoracic or lumbosacral neuritis or radiculitis, unspecified     Thyroid disease     Ulcer        Past Surgical History   Procedure Laterality Date    Eye surgery      Hysterectomy      Foot neuroma surgery  1985    Cataract extraction Bilateral        Social History     Social History    Marital status:      Spouse name: N/A    Number of children: N/A    Years of education: N/A     Occupational History    Not on file.     Social History Main Topics    Smoking status: Never Smoker    Smokeless tobacco: Not on file    Alcohol use No    Drug use: No    Sexual activity: Not on file     Other Topics Concern    Not on file     Social History Narrative       Current Outpatient Prescriptions on File Prior to Visit   Medication Sig Dispense Refill    albuterol (PROVENTIL HFA/VENTOLIN HFA) 200 puff inhaler Inhale 2 puffs into the lungs every 4 (four) hours as needed for Wheezing. 3 Inhaler 3    ammonium lactate (LAC-HYDRIN) 12 % lotion       atorvastatin (LIPITOR) 10 MG tablet Take 1  tablet (10 mg total) by mouth once daily. 90 tablet 3    BUDESONIDE/FORMOTEROL FUMARATE (SYMBICORT INHL) Inhale into the lungs.      cycloSPORINE (RESTASIS) 0.05 % ophthalmic emulsion       desloratadine (CLARINEX) 5 mg tablet Take 5 mg by mouth once daily.      desonide (DESOWEN) 0.05 % cream       desoximetasone 0.25 % ointment APPLY TO THE AFFECTED AREA(S) TWICE A DAY  1    donepezil (ARICEPT) 10 MG tablet Take 1 tablet (10 mg total) by mouth every morning. 90 tablet 3    DULERA 100-5 mcg/actuation HFAA       DULERA 200-5 mcg/actuation inhaler 2 puffs 2 (two) times daily.  3    esomeprazole (NEXIUM) 20 MG capsule Take 1 capsule (20 mg total) by mouth before breakfast. 30 capsule 5    ESTRACE 0.01 % (0.1 mg/gram) vaginal cream       estradiol 0.05 mg/24 hr td ptsw (VIVELLE-DOT) 0.05 mg/24 hr       fluconazole (DIFLUCAN) 150 MG Tab TAKE 1 TABLET BY MOUTH EVERY 3 DAYS.  12    fluocinolone-shower cap 0.01 % Oil       fluocinonide (LIDEX) 0.05 % external solution USE AS DIRECTED TWICE A DAY EXTERNALLY 1MONTH  2    fluocinonide (LIDEX) 0.05 % external solution AAA scalp qd - bid prn pruritus 60 mL 3    fluticasone (FLONASE) 50 mcg/actuation nasal spray       glycopyrrolate (ROBINUL) 2 MG Tab Take 1 to 1.5 pills po daily as needed for excess sweating 150 tablet 0    ketoconazole (NIZORAL) 2 % shampoo       levocetirizine (XYZAL) 5 MG tablet TAKE 1 TABLET BY MOUTH EVERY EVEINING  1    lisinopril (PRINIVIL,ZESTRIL) 5 MG tablet Take 1 tablet (5 mg total) by mouth once daily. 90 tablet 3    lisinopril (PRINIVIL,ZESTRIL) 5 MG tablet TAKE 1 TABLET (5 MG TOTAL) BY MOUTH ONCE DAILY.  3    meclizine (ANTIVERT) 25 mg tablet Take 25 mg by mouth 3 (three) times daily as needed.      MYRBETRIQ 50 mg Tb24 Take 1 tablet by mouth once daily.  12    neomycin-polymyxin-hydrocortisone (CORTISPORIN) otic solution       olopatadine (PATADAY) 0.2 % Drop Place 1 drop into both eyes once daily.      salsalate  "(DISALCID) 750 MG Tab Take 750 mg by mouth 2 (two) times daily.      SYNTHROID 50 mcg tablet       tramadol (ULTRAM) 50 mg tablet Take 1 tablet (50 mg total) by mouth every 8 (eight) hours as needed for Pain. 30 tablet 0    trospium (SANCTURA XR) 60 mg Cp24 capsule Take 1 capsule (60 mg total) by mouth once daily. Take in the morning 1 hour before meal. 30 capsule 11    pregabalin (LYRICA) 25 MG capsule Take 1 capsule (25 mg total) by mouth 3 (three) times daily. Pt may increase to 100mg BID 90 capsule 5     No current facility-administered medications on file prior to visit.        Review of patient's allergies indicates:   Allergen Reactions    Trazodone Other (See Comments)     Nightmares/sleep walk    Talwin compound      Other reaction(s): Hives    Talwin [pentazocine lactate] Other (See Comments)    Bentyl [dicyclomine] Anxiety    Tessalon [benzonatate] Anxiety       Review of Systems:  Constitutional: no fever or chills  ENT: no nasal congestion or sore throat  Respiratory: no cough or shortness of breath  Cardiovascular: no chest pain or palpitations  Gastrointestinal: no nausea or vomiting  Genitourinary: no hematuria or dysuria  Integument/Breast: no rash or pruritis  Hematologic/Lymphatic: no easy bruising or lymphadenopathy  Musculoskeletal: see HPI  Neurological: no seizures or tremors  Behavioral/Psych: no auditory or visual hallucinations      PHYSICAL EXAM    Vitals:    01/13/17 1403   BP: (!) 155/82   Pulse: 75   Resp: 18   Weight: 63.5 kg (140 lb)   Height: 5' 5" (1.651 m)   PainSc:   7   PainLoc: Hand         PHYSICAL EXAMINATION:  GENERAL:  No acute distress, alert and oriented x3, cooperative, well nourished.  LUNGS:  Nonlabored on room air.  CARDIOVASCULAR:  Distal pulses are intact.  Good capillary refill.  No clubbing,   cyanosis or edema.  EXTREMITIES:  Both hands demonstrate a deformity located at the CMC joint of the   thumb.  The base of the metacarpal of the thumb appears to be " subluxed as well   as adducted.  Both thumbs appear to be hyperextended at the MP joint.  She has   full flexion and extension of the thumb at the IP joint.  She is neurovascularly   intact in the median, radial and ulnar distributions.  There is tenderness to   deep palpation over the basal joint of both thumbs.  There is no evidence of   Tinel's and Durkan's sign over the carpal tunnels.  She has full active range of   motion of digits 2 through 5 of both hands.    RADIOLOGY IMPRESSION:    PA, lateral, and oblique radiographs of both hands were obtained.  The bones are osteoporotic but appear intact.  There is no evidence for acute fracture or bone destruction.  There is hyperextension of the 1st metacarpal phalangeal joints bilaterally.  There are advanced degenerative changes of the 1st carpal metacarpal joints bilaterally.  There is no evidence for dislocation.  Soft tissues are unremarkable.   Impression     Marked osteoporosis.  Advanced degenerative changes involving the 1st carpometacarpal joints bilaterally.  Hyperextension of the 1st metacarpal phalangeal joints bilaterally.           PROCEDURE:  I have explained the risks, benefits, and alternatives of the procedure in detail.  The patient voices understanding and all questions have been answered.  The patient agrees to proceed as planned. After a sterile prep of the skin the bilateral CMC joints of the thumbs were injected from the dorsal approach using a 25 gauge needle with a combination of 1cc 1% plain xylocaine and 40 mg of Kenalog.  The patient is cautioned and immediate relief of pain is secondary to the local anesthetic and will be temporary.  After the anesthetic wears off there may be a increase in pain that may last for a few hours or a few days and they should use ice to help alleviate this flair up of pain.       ASSESSMENT:  Severe primary degenerative osteoarthritis of the carpometacarpal   joints of bilateral thumbs.    PLAN:  Based on  the exam findings, Mrs. Ding has severe basal joint arthritis   with subluxation of the joint in a Z-like deformity due to hyperextension at   the MP joint.  I discussed that this has been a chronic process that has been   going on for many years.  I further discussed that in order to correct the   abnormalities, the patient would require surgery, extensive immobilization and   rehabilitation.  This would likely take 3 months for each hand for a total of 6   months.  I further discussed that the patient may pursue conservative management   at this time consisting of a corticosteroid injection to both joints today to   help alleviate with her pain.  I discussed that this may last several weeks to   several months and it may temporize the patient for an extended period of time.    The patient is not interested in surgery at this time; therefore, she elected   to undergo the corticosteroid injections.  These were given to the patient in   clinic today.  She tolerated the procedure well without any complications.  She   will follow up with me if her symptoms worsen or return.  All questions and   concerns were addressed prior to discharge.      TRISTON/YUNIOR  dd: 01/13/2017 15:26:44 (CST)  td: 01/14/2017 13:16:48 (CST)  Doc ID   #[id number]  Job ID #[id number]    CC:       Dictation Confirmation Code: [id number]  Neil Nuñez Jr. MD  01/13/2017  3:19 PM

## 2017-01-13 NOTE — LETTER
January 13, 2017      Fariba Sterling MD  [address]  [address]  [facility]  [facility] [address]           [facility]  [address]  [address]  [facility] [address]  Phone: [phone]          Patient: Debbie Ding   MR Number: [id number]   YOB: 1932   Date of Visit: 1/13/2017       Dear Dr. Fariba Sterling:    Thank you for referring Debbie Ding to me for evaluation. Attached you will find relevant portions of my assessment and plan of care.    If you have questions, please do not hesitate to call me. I look forward to following Debbie Ding along with you.    Sincerely,    Neil Nuñez Jr., MD    Enclosure  CC:  No Recipients    If you would like to receive this communication electronically, please contact [email or web address] or [phone] to request more information on [email or web address] Link access.    For providers and/or their staff who would like to refer a patient to [facility], please contact us through our one-stop-shop provider referral line, [facility], at [phone].    If you feel you have received this communication in error or would no longer like to receive these types of communications, please e-mail [email or web address]

## 2017-01-19 ENCOUNTER — LAB VISIT (OUTPATIENT)
Dept: LAB | Facility: OTHER | Age: 82
End: 2017-01-19
Attending: INTERNAL MEDICINE
Payer: MEDICARE

## 2017-01-19 ENCOUNTER — OFFICE VISIT (OUTPATIENT)
Dept: INTERNAL MEDICINE | Facility: CLINIC | Age: 82
End: 2017-01-19
Attending: INTERNAL MEDICINE
Payer: MEDICARE

## 2017-01-19 VITALS
HEIGHT: 65 IN | OXYGEN SATURATION: 97 % | SYSTOLIC BLOOD PRESSURE: 152 MMHG | DIASTOLIC BLOOD PRESSURE: 76 MMHG | BODY MASS INDEX: 23.47 KG/M2 | HEART RATE: 68 BPM | WEIGHT: 140.88 LBS

## 2017-01-19 DIAGNOSIS — E03.9 HYPOTHYROIDISM, UNSPECIFIED TYPE: ICD-10-CM

## 2017-01-19 DIAGNOSIS — J45.20 ASTHMA, INTERMITTENT, UNCOMPLICATED: ICD-10-CM

## 2017-01-19 DIAGNOSIS — F32.A ANXIETY AND DEPRESSION: ICD-10-CM

## 2017-01-19 DIAGNOSIS — R73.03 PRE-DIABETES: ICD-10-CM

## 2017-01-19 DIAGNOSIS — R79.89 CREATININE ELEVATION: ICD-10-CM

## 2017-01-19 DIAGNOSIS — I10 ESSENTIAL HYPERTENSION: ICD-10-CM

## 2017-01-19 DIAGNOSIS — E03.9 HYPOTHYROIDISM, UNSPECIFIED TYPE: Primary | ICD-10-CM

## 2017-01-19 DIAGNOSIS — K21.9 GASTROESOPHAGEAL REFLUX DISEASE, ESOPHAGITIS PRESENCE NOT SPECIFIED: ICD-10-CM

## 2017-01-19 DIAGNOSIS — R79.9 ABNORMAL FINDING OF BLOOD CHEMISTRY: ICD-10-CM

## 2017-01-19 DIAGNOSIS — F41.9 ANXIETY AND DEPRESSION: ICD-10-CM

## 2017-01-19 LAB
ANION GAP SERPL CALC-SCNC: 7 MMOL/L
BUN SERPL-MCNC: 20 MG/DL
CALCIUM SERPL-MCNC: 9.5 MG/DL
CHLORIDE SERPL-SCNC: 108 MMOL/L
CO2 SERPL-SCNC: 25 MMOL/L
CREAT SERPL-MCNC: 1.2 MG/DL
EST. GFR  (AFRICAN AMERICAN): 48 ML/MIN/1.73 M^2
EST. GFR  (NON AFRICAN AMERICAN): 41.6 ML/MIN/1.73 M^2
ESTIMATED AVG GLUCOSE: 140 MG/DL
GLUCOSE SERPL-MCNC: 110 MG/DL
HBA1C MFR BLD HPLC: 6.5 %
POTASSIUM SERPL-SCNC: 3.7 MMOL/L
SODIUM SERPL-SCNC: 140 MMOL/L
TSH SERPL DL<=0.005 MIU/L-ACNC: 0.43 UIU/ML

## 2017-01-19 PROCEDURE — 80048 BASIC METABOLIC PNL TOTAL CA: CPT

## 2017-01-19 PROCEDURE — 36415 COLL VENOUS BLD VENIPUNCTURE: CPT

## 2017-01-19 PROCEDURE — 99999 PR PBB SHADOW E&M-EST. PATIENT-LVL III: CPT | Mod: PBBFAC,,, | Performed by: INTERNAL MEDICINE

## 2017-01-19 PROCEDURE — 83036 HEMOGLOBIN GLYCOSYLATED A1C: CPT

## 2017-01-19 PROCEDURE — 99214 OFFICE O/P EST MOD 30 MIN: CPT | Mod: S$PBB,,, | Performed by: INTERNAL MEDICINE

## 2017-01-19 PROCEDURE — 84443 ASSAY THYROID STIM HORMONE: CPT

## 2017-01-19 RX ORDER — ATORVASTATIN CALCIUM 10 MG/1
10 TABLET, FILM COATED ORAL DAILY
Qty: 90 TABLET | Refills: 3 | Status: SHIPPED | OUTPATIENT
Start: 2017-01-19 | End: 2018-01-03 | Stop reason: SDUPTHER

## 2017-01-19 RX ORDER — OMEPRAZOLE 40 MG/1
40 CAPSULE, DELAYED RELEASE ORAL DAILY
Qty: 90 CAPSULE | Refills: 1 | Status: SHIPPED | OUTPATIENT
Start: 2017-01-19 | End: 2017-07-07 | Stop reason: SDUPTHER

## 2017-01-19 RX ORDER — ALBUTEROL SULFATE 90 UG/1
2 AEROSOL, METERED RESPIRATORY (INHALATION) EVERY 4 HOURS PRN
Qty: 3 INHALER | Refills: 3 | Status: SHIPPED | OUTPATIENT
Start: 2017-01-19 | End: 2017-01-20 | Stop reason: SDUPTHER

## 2017-01-19 RX ORDER — LISINOPRIL 10 MG/1
10 TABLET ORAL DAILY
Qty: 90 TABLET | Refills: 3 | Status: SHIPPED | OUTPATIENT
Start: 2017-01-19 | End: 2018-01-03 | Stop reason: SDUPTHER

## 2017-01-19 NOTE — PROGRESS NOTES
"Subjective:       Patient ID: Debbie Ding is a 84 y.o. female.    Chief Complaint: Hypertension    HPI Comments: Here for routine f/u    Chronic fatigue for several years. She reports signficant fatigue. Wakes up tired. Goes to bed tired. She has "no energy to do anything". Symptoms have worsened over the last 4 months, first noticed 7-8 months ago. She does not sleep well through the night. She has a tendicany to sweat and change her clothes. TSH undetectable when last checked but levothyroxine had just been changed by previous provider and plan was to repeat labs in 6-8 weeks. This has not yet bene done. She states her urinary urgency has improved on myrbetriq and only voids once nightly now. She reports she is very depressed as well as very anxious at times and has difficulty keeping calm. She is interested in medication for this. Uses her proair 5-6 times a day. She does not use her dulera inhaler daily because it causes her to be shaky. She did not have these problems on symbicort as well as her respiratory symptoms were well controlled.       Review of Systems   Constitutional: Positive for activity change, diaphoresis and fatigue.       Objective:      Vitals:    01/19/17 0806   BP: (!) 152/76   Pulse: 68   SpO2: 97%   Weight: 63.9 kg (140 lb 14 oz)   Height: 5' 5" (1.651 m)      Physical Exam   Constitutional: She is oriented to person, place, and time. She appears well-developed and well-nourished. No distress.   HENT:   Head: Normocephalic and atraumatic.   Mouth/Throat: Oropharynx is clear and moist. No oropharyngeal exudate.   Eyes: Conjunctivae and EOM are normal. Pupils are equal, round, and reactive to light. No scleral icterus.   Neck: No thyromegaly present.   Cardiovascular: Normal rate, regular rhythm and normal heart sounds.    No murmur heard.  Pulmonary/Chest: Effort normal and breath sounds normal. She has no wheezes. She has no rales.   Abdominal: Soft. She exhibits no distension. There " is no tenderness.   Musculoskeletal: She exhibits no edema or tenderness.   Lymphadenopathy:     She has no cervical adenopathy.   Neurological: She is alert and oriented to person, place, and time.   Skin: Skin is warm and dry.   Psychiatric: She has a normal mood and affect. Her behavior is normal.       Assessment:       1. Hypothyroidism, unspecified type    2. Asthma, intermittent, uncomplicated    3. Pre-diabetes    4. Abnormal finding of blood chemistry     5. Gastroesophageal reflux disease, esophagitis presence not specified    6. Essential hypertension    7. Anxiety and depression        Plan:       Debbie was seen today for hypertension.    Diagnoses and all orders for this visit:    Hypothyroidism, unspecified type  -     TSH; Future    Asthma, intermittent, uncomplicated  -will do PA for symbicort  -     albuterol 90 mcg/actuation inhaler; Inhale 2 puffs into the lungs every 4 (four) hours as needed for Wheezing.    Pre-diabetes  -     Hemoglobin A1c; Future    Abnormal finding of blood chemistry   -     Hemoglobin A1c; Future    Gastroesophageal reflux disease, esophagitis presence not specified  -     omeprazole (PRILOSEC) 40 MG capsule; Take 1 capsule (40 mg total) by mouth once daily.    Essential hypertension  -increase lisinopril to 10mg.     Anxiety and Depression  -will stabilize thyroid Tx first and reassess   -     lisinopril 10 MG tablet; Take 1 tablet (10 mg total) by mouth once daily.  -     atorvastatin (LIPITOR) 10 MG tablet; Take 1 tablet (10 mg total) by mouth once daily.             Side effects of medication(s) were discussed in detail and patient voiced understanding.  Patient will call back for any issues or complications.

## 2017-01-19 NOTE — PATIENT INSTRUCTIONS
Avoid over the counter pain medications such as aspirin, ibuprofen, motrin, Goody's, advil, or prescribed medications such as meloxicam, diclofenac,      Check on dose of omperazole for me  Tips to Control Acid Reflux  To control acid reflux, youll need to make some basic diet and lifestyle changes. The simple steps outlined below may be all youll need to ease discomfort.  Watch what you eat    · Avoid fatty foods and spicy foods.  · Eat fewer acidic foods, such as citrus and tomato-based foods. These can increase symptoms.  · Limit drinking alcohol, caffeine, and fizzy beverages. All increase acid reflux.  · Try limiting chocolate, peppermint, and spearmint. These can worsen acid reflux in some people.  Watch when you eat  · Avoid lying down for 3 hours after eating.  · Do not snack before going to bed.  Raise your head    Raising your head and upper body by 4 to 6 inches helps limit reflux when youre lying down. Put blocks under the head of your bed frame to raise it.  Other changes  · Lose weight, if you need to  · Dont exercise near bedtime  · Avoid tight-fitting clothes  · Limit aspirin and ibuprofen  · Stop smoking   © 4982-4941 "[employer] [address] [place] [address], [place] [address]. All rights reserved. This information is not intended as a substitute for professional medical care. Always follow your healthcare professional's instructions.

## 2017-01-19 NOTE — MR AVS SNAPSHOT
[facility]  [address]  [place] [address]  Phone: [phone]  Fax: [phone]                  Debbie Ding   2017 8:00 AM   Office Visit    Description:  Female : 10/23/1932   Provider:  Dayo Woo MD   Department:  [facility]           Reason for Visit     Hypertension           Diagnoses this Visit        Comments    Hypothyroidism, unspecified type    -  Primary     Asthma, intermittent, uncomplicated         Pre-diabetes         Abnormal finding of blood chemistry                To Do List           Future Appointments        Provider Department Dept Phone    2017 9:00 AM LAB, [facility] [phone]    2017 8:00 AM Gilles Dunne MD [facility] - Rheumatology [phone]    3/21/2017 8:00 AM Dayo Woo MD [facility] [phone]      Goals (5 Years of Data)     Patient/caregiver will accept life style changes to manage and improve GERD prior to discharge from O[facility].     Notes - Note created  10/19/2016  3:21 PM by Yvonne Bansal RN    Overall Time to Completion  6 months from .16    Short Term Goals  Patient/caregiver will replace spicy with non spice, decaffeinated for 3 meals per day within 6 months.  Interventions   · Recognize and provide educational material (KRALESLI).   Status  · Partially met          Patient/caregiver will be able to name cardiac, respiratory, pain, purpose of medication and will take medications as ordered by Physician prior to discharge from OPCM.     Notes - Note created  10/19/2016  3:28 PM by Yvonne Bansal RN    Overall Time to Completion  6 months from .16    Short Term Goals  Patient/caregiver will utilize a pill box organizer to dispense medications daily within 6 months.  Interventions   · Complete medication reconciliation.  · Encourage Medication Compliance.   Status  · Partially met    Patient/caregiver will verbalize  dosage/route/frequency/indication of cardiac, respiratory, pain within 6 months.  Interventions   · Complete medication reconciliation.  · Encourage Medication Compliance.   Status  · Partially met          Patient/Caregiver will check and record blood pressure daily. If > 140/90 for 3 days, patient/caregiver will know to notify Physician, prior to discharge from OPCM.     Notes - Note created  10/19/2016  3:25 PM by Yvonne Bansal RN    Overall Time to Completion  6 months from 10/19/2016    Short Term Goals  Patient/caregiver will measure and record the blood pressure 1 times per day for 6 months.  Interventions   · Assess for availability of working blood pressure cuff in home setting.  · Mail Blood pressure logs for home use.   Status  · Partially met          Patient/caregiver will have an action plan in place to manage and prevent complications of pain prior to discharge from OPCM.     Notes - Note created  10/19/2016  3:19 PM by Yvonne Bansal RN    Overall Time to Completion  6 months from 08.01.16    Short Term Goals  Patient/caregiver will verbalize 3 interventions to decrease pain within 6 months.  Interventions   · Recognize and provide educational material (JESSENIA).  · Facilitate to referral to Outpatient Rehab.  · Facilitate to referral to Pain Management Specialist.    · Facilitate referral to Healthy [facility]   Status  · Partially met    Patient/caregiver will verbalize 3 noninvasive pain relief measures to help manage the pain within 6 months.  Interventions   · Recognize and provide educational material (JESSENIA).   Status  · Partially met          Patient/caregiver will verbalize agreement to attend all scheduled appointments with Physicians prior to discharge from OPCM.     Notes - Note created  10/19/2016  3:24 PM by Yvonne Bansal RN    Overall Time to Completion  6 months from 08.01.16    Short Term Goals  Patient/caregiver will establish appointment with Physician within 6  months.  Interventions   · Facilitate to Medical appointments.  · Encourage compliance with preventive screenings.   Status  · Partially met             These Medications        Disp Refills Start End    omeprazole (PRILOSEC) 40 MG capsule 90 capsule 1 1/19/2017 2/18/2017    Take 1 capsule (40 mg total) by mouth once daily. - Oral    Pharmacy: [address] AT Portal to [facility] Ph #: [phone]       lisinopril 10 MG tablet 90 tablet 3 1/19/2017 1/19/2018    Take 1 tablet (10 mg total) by mouth once daily. - Oral    Pharmacy: [address] AT Portal to [facility] Ph #: [phone]       albuterol 90 mcg/actuation inhaler 3 Inhaler 3 1/19/2017     Inhale 2 puffs into the lungs every 4 (four) hours as needed for Wheezing. - Inhalation    [facility] [address] [facility] Ph #: [phone]       atorvastatin (LIPITOR) 10 MG tablet 90 tablet 3 1/19/2017 1/19/2018    Take 1 tablet (10 mg total) by mouth once daily. - Oral    Pharmacy: [address] AT Portal to [facility] Ph #: [phone]         [facility]     [facility] On Call Nurse Care Line - 24/7 Assistance  Registered nurses in the [facility] provide clinical advisement, health education, appointment booking, and other advisory services.  Call for this free service at [phone].             Medications           Message regarding Medications     Verify the changes and/or additions to your medication regime listed below are the same as discussed with your clinician today.  If any of these changes or additions are incorrect, please notify your healthcare provider.        START taking these NEW medications        Refills    omeprazole  (PRILOSEC) 40 MG capsule 1    Sig: Take 1 capsule (40 mg total) by mouth once daily.    Class: Normal    Route: Oral      CHANGE how you are taking these medications     Start Taking Instead of    lisinopril 10 MG tablet lisinopril (PRINIVIL,ZESTRIL) 5 MG tablet    Dosage:  Take 1 tablet (10 mg total) by mouth once daily. Dosage:  Take 1 tablet (5 mg total) by mouth once daily.    Reason for Change:  Reorder     albuterol 90 mcg/actuation inhaler albuterol (PROVENTIL HFA/VENTOLIN HFA) 200 puff inhaler    Dosage:  Inhale 2 puffs into the lungs every 4 (four) hours as needed for Wheezing. Dosage:  Inhale 2 puffs into the lungs every 4 (four) hours as needed for Wheezing.      STOP taking these medications     neomycin-polymyxin-hydrocortisone (CORTISPORIN) otic solution     fluticasone (FLONASE) 50 mcg/actuation nasal spray     fluconazole (DIFLUCAN) 150 MG Tab TAKE 1 TABLET BY MOUTH EVERY 3 DAYS.    DULERA 200-5 mcg/actuation inhaler 2 puffs 2 (two) times daily.    DULERA 100-5 mcg/actuation HFAA     desoximetasone 0.25 % ointment APPLY TO THE AFFECTED AREA(S) TWICE A DAY    desonide (DESOWEN) 0.05 % cream     trospium (SANCTURA XR) 60 mg Cp24 capsule Take 1 capsule (60 mg total) by mouth once daily. Take in the morning 1 hour before meal.    esomeprazole (NEXIUM) 20 MG capsule Take 1 capsule (20 mg total) by mouth before breakfast.    ammonium lactate (LAC-HYDRIN) 12 % lotion     salsalate (DISALCID) 750 MG Tab Take 750 mg by mouth 2 (two) times daily.           Verify that the below list of medications is an accurate representation of the medications you are currently taking.  If none reported, the list may be blank. If incorrect, please contact your healthcare provider. Carry this list with you in case of emergency.           Current Medications     albuterol 90 mcg/actuation inhaler Inhale 2 puffs into the lungs every 4 (four) hours as needed for Wheezing.    atorvastatin (LIPITOR) 10 MG tablet Take 1 tablet (10  "mg total) by mouth once daily.    BUDESONIDE/FORMOTEROL FUMARATE (SYMBICORT INHL) Inhale into the lungs.    cycloSPORINE (RESTASIS) 0.05 % ophthalmic emulsion     desloratadine (CLARINEX) 5 mg tablet Take 5 mg by mouth once daily.    donepezil (ARICEPT) 10 MG tablet Take 1 tablet (10 mg total) by mouth every morning.    ESTRACE 0.01 % (0.1 mg/gram) vaginal cream     estradiol 0.05 mg/24 hr td ptsw (VIVELLE-DOT) 0.05 mg/24 hr     fluocinolone-shower cap 0.01 % Oil     fluocinonide (LIDEX) 0.05 % external solution USE AS DIRECTED TWICE A DAY EXTERNALLY 1MONTH    glycopyrrolate (ROBINUL) 2 MG Tab Take 1 to 1.5 pills po daily as needed for excess sweating    ketoconazole (NIZORAL) 2 % shampoo     levocetirizine (XYZAL) 5 MG tablet TAKE 1 TABLET BY MOUTH EVERY EVEINING    lisinopril 10 MG tablet Take 1 tablet (10 mg total) by mouth once daily.    meclizine (ANTIVERT) 25 mg tablet Take 25 mg by mouth 3 (three) times daily as needed.    MYRBETRIQ 50 mg Tb24 Take 1 tablet by mouth once daily.    olopatadine (PATADAY) 0.2 % Drop Place 1 drop into both eyes once daily.    SYNTHROID 50 mcg tablet     tramadol (ULTRAM) 50 mg tablet Take 1 tablet (50 mg total) by mouth every 8 (eight) hours as needed for Pain.    omeprazole (PRILOSEC) 40 MG capsule Take 1 capsule (40 mg total) by mouth once daily.    pregabalin (LYRICA) 25 MG capsule Take 1 capsule (25 mg total) by mouth 3 (three) times daily. Pt may increase to 100mg BID           Clinical Reference Information           Vital Signs - Last Recorded  Most recent update: 1/19/2017  8:12 AM by Lilian Tilley MA    BP Pulse Ht Wt SpO2 BMI    (!) 152/76 68 5' 5" (1.651 m) 63.9 kg (140 lb 14 oz) 97% 23.44 kg/m2      Blood Pressure          Most Recent Value    BP  (!)  152/76      Allergies as of 1/19/2017     Trazodone    Talwin Compound    Talwin [Pentazocine Lactate]    Bentyl [Dicyclomine]    Tessalon [Benzonatate]      Immunizations Administered on Date of Encounter - " 1/19/2017     None      Orders Placed During Today's Visit     Future Labs/Procedures Expected by Expires    Hemoglobin A1c  1/19/2017 3/20/2018    TSH  1/19/2017 1/19/2018      [facility] Sign-Up     Activating your [facility] account is as easy as 1-2-3!     1) Visit [email or web address], select Sign Up Now, enter this activation code and your date of birth, then select Next.  [email or web address]  Expires: 2/19/2017 10:43 AM      2) Create a username and password to use when you visit [facility] in the future and select a security question in case you lose your password and select Next.    3) Enter your e-mail address and click Sign Up!    Additional Information  If you have questions, please e-mail [email or web address] or call [phone] to talk to our [facility] staff. Remember, [facility] is NOT to be used for urgent needs. For medical emergencies, dial 911.         Instructions    Avoid over the counter pain medications such as aspirin, ibuprofen, motrin, Goody's, advil, or prescribed medications such as meloxicam, diclofenac,      Check on dose of omperazole for me  Tips to Control Acid Reflux  To control acid reflux, youll need to make some basic diet and lifestyle changes. The simple steps outlined below may be all youll need to ease discomfort.  Watch what you eat    · Avoid fatty foods and spicy foods.  · Eat fewer acidic foods, such as citrus and tomato-based foods. These can increase symptoms.  · Limit drinking alcohol, caffeine, and fizzy beverages. All increase acid reflux.  · Try limiting chocolate, peppermint, and spearmint. These can worsen acid reflux in some people.  Watch when you eat  · Avoid lying down for 3 hours after eating.  · Do not snack before going to bed.  Raise your head    Raising your head and upper body by 4 to 6 inches helps limit reflux when youre lying down. Put blocks under the head of your bed frame to raise it.  Other changes  · Lose weight, if you need to  · Dont exercise near  bedtime  · Avoid tight-fitting clothes  · Limit aspirin and ibuprofen  · Stop smoking   © 6807-1659 [employer]. [address] [place] [address], [place] [address]. All rights reserved. This information is not intended as a substitute for professional medical care. Always follow your healthcare professional's instructions.

## 2017-01-24 ENCOUNTER — OFFICE VISIT (OUTPATIENT)
Dept: RHEUMATOLOGY | Facility: CLINIC | Age: 82
End: 2017-01-24
Payer: MEDICARE

## 2017-01-24 VITALS
HEART RATE: 69 BPM | WEIGHT: 143.13 LBS | DIASTOLIC BLOOD PRESSURE: 75 MMHG | SYSTOLIC BLOOD PRESSURE: 166 MMHG | BODY MASS INDEX: 23.85 KG/M2 | HEIGHT: 65 IN

## 2017-01-24 DIAGNOSIS — G89.29 CHRONIC LEFT-SIDED LOW BACK PAIN WITH LEFT-SIDED SCIATICA: ICD-10-CM

## 2017-01-24 DIAGNOSIS — N18.30 CHRONIC RENAL INSUFFICIENCY, STAGE 3 (MODERATE): ICD-10-CM

## 2017-01-24 DIAGNOSIS — M15.9 PRIMARY OSTEOARTHRITIS INVOLVING MULTIPLE JOINTS: Primary | ICD-10-CM

## 2017-01-24 DIAGNOSIS — M54.42 CHRONIC LEFT-SIDED LOW BACK PAIN WITH LEFT-SIDED SCIATICA: ICD-10-CM

## 2017-01-24 DIAGNOSIS — M79.7 FIBROMYALGIA: ICD-10-CM

## 2017-01-24 PROCEDURE — 99213 OFFICE O/P EST LOW 20 MIN: CPT | Mod: S$PBB,,, | Performed by: INTERNAL MEDICINE

## 2017-01-24 PROCEDURE — 99999 PR PBB SHADOW E&M-EST. PATIENT-LVL III: CPT | Mod: PBBFAC,,, | Performed by: INTERNAL MEDICINE

## 2017-01-24 PROCEDURE — 99213 OFFICE O/P EST LOW 20 MIN: CPT | Mod: PBBFAC | Performed by: INTERNAL MEDICINE

## 2017-01-24 RX ORDER — TRAMADOL HYDROCHLORIDE 50 MG/1
50 TABLET ORAL EVERY 6 HOURS PRN
Qty: 60 TABLET | Refills: 2 | Status: SHIPPED | OUTPATIENT
Start: 2017-01-24 | End: 2017-02-23

## 2017-01-24 RX ORDER — SALSALATE 750 MG
750 TABLET ORAL 2 TIMES DAILY
Qty: 180 TABLET | Refills: 3 | Status: SHIPPED | OUTPATIENT
Start: 2017-01-24 | End: 2017-07-31 | Stop reason: SDUPTHER

## 2017-01-24 ASSESSMENT — ROUTINE ASSESSMENT OF PATIENT INDEX DATA (RAPID3)
FATIGUE SCORE: 10
MDHAQ FUNCTION SCORE: 2.4
PATIENT GLOBAL ASSESSMENT SCORE: 10
PAIN SCORE: 10
AM STIFFNESS SCORE: 1, YES
TOTAL RAPID3 SCORE: 9.33
PSYCHOLOGICAL DISTRESS SCORE: 7.7

## 2017-01-24 NOTE — PROGRESS NOTES
History of present illness: 84-year-old female I have been following for osteoarthritis.  She also has a long history of fibromyalgia.  She has lumbar spinal stenosis.  She has had intolerance of multiple medications.  I saw her last in September.  Since her last visit she has been going to the healthy back clinic and had some trigger point injections.  This has helped.  She also had an injection in her thumb which helped.  She still is complaining of some stiffness in her hands.    Lyrica was making her drowsy and she stopped it.  She is just taking it as needed.  Tramadol has been helping but her prescription ran out.  She remains on Flexeril at bedtime which has helped her some.  She stopped taking salsalate.  Her hands are doing worse since she ran out of the salsalate.    Physical examination was not performed, the entire time was counseling.  Laboratory: Most recent creatinine was 1.2    Assessment: Osteoarthritis and fibromyalgia    Plans:  1.  Salsalate is less nephrotoxic than any other NSAID because it is not a cyclooxygenase inhibitor.  I therefore recommend resuming salsalate 750 mg twice daily.  I will repeat her renal function in one month.  2.  I told her to avoid Lyrica since it makes her drowsy  3.  I refilled her prescription for tramadol  4.  Continue Flexeril at bedtime  5.  Return to see me in 6 months

## 2017-01-26 ENCOUNTER — PATIENT OUTREACH (OUTPATIENT)
Dept: ADMINISTRATIVE | Facility: OTHER | Age: 82
End: 2017-01-26

## 2017-01-26 ENCOUNTER — TELEPHONE (OUTPATIENT)
Dept: INTERNAL MEDICINE | Facility: CLINIC | Age: 82
End: 2017-01-26

## 2017-01-26 NOTE — PROGRESS NOTES
Patient called to discuss concerns regarding her ongoing feelings of fatigue, lack of energy intermittent shortness of breath. In recent hx patient has been having intermittent complaints of increased asthma symptoms, increased shortness of breath and cough. Had some concerns regarding inhaled meds-not covered per insurance and need for steroids. She has not been to pulmonolgist as no longer on her insurance coverage. She states has been in to see PCP and informed that her labs wnl.    Advised patient will discuss ongoing concerns symptoms and request for pulmonologist with pcp.  And her ongoing issues with asthma control to review lung function and meds. Will assist to facilitate appt and provide message to md for additional reccomendations.  Advised will discusss with md and call back.  She agrees to this advice.    Discussed patient's concerns and symptoms with Dr. Woo as well her inability to obtain Symbicort as previously ordered due to insurance denial. Md advised working on obtaining PA for patient and will follow up with patient.    Returned patient's call and advised of the same.

## 2017-01-26 NOTE — TELEPHONE ENCOUNTER
----- Message from Anitra Casey RN sent at 1/26/2017 12:58 PM CST -----  Contact: Patient  Hi Dr. Woo    Already spoke to you regarding note below. Wanted to send it to remind you that patient is waiting to hear back with advice.    Patient called to discuss concerns regarding her ongoing feelings of fatigue, lack of energy intermittent shortness of breath. In recent hx patient has been having intermittent complaints of increased asthma symptoms, increased shortness of breath and cough. Had some concerns regarding inhaled meds-not covered per insurance and need for steroids. She has not been to pulmonolgist as no longer on her insurance coverage. She states has been in to see PCP and informed that her labs wnl.    Advised patient will discuss ongoing concerns symptoms and her request for pulmonologist referral with pcp.  Will assist to facilitate appt and provide message to md for additional reccomendations.  Advised will discusss with md and call back.  She agrees to this advice.    Discussed patient's concerns and symptoms with Dr. Woo as well her inability to obtain Symbicort as previously ordered due to insurance denial. Md advised working on obtaining PA for patient and will follow up with patient.    Returned patient's call and advised of the same.

## 2017-02-07 ENCOUNTER — TELEPHONE (OUTPATIENT)
Dept: INTERNAL MEDICINE | Facility: CLINIC | Age: 82
End: 2017-02-07

## 2017-02-07 ENCOUNTER — PATIENT OUTREACH (OUTPATIENT)
Dept: ADMINISTRATIVE | Facility: OTHER | Age: 82
End: 2017-02-07
Payer: MEDICARE

## 2017-02-07 DIAGNOSIS — J44.9 CHRONIC OBSTRUCTIVE PULMONARY DISEASE, UNSPECIFIED COPD TYPE: Primary | ICD-10-CM

## 2017-02-07 DIAGNOSIS — J45.20 ASTHMA, INTERMITTENT, UNCOMPLICATED: ICD-10-CM

## 2017-02-07 DIAGNOSIS — F33.9 DEPRESSION, RECURRENT: ICD-10-CM

## 2017-02-07 DIAGNOSIS — R29.898 DECREASED STRENGTH OF TRUNK AND BACK: ICD-10-CM

## 2017-02-07 DIAGNOSIS — K21.9 GASTROESOPHAGEAL REFLUX DISEASE WITHOUT ESOPHAGITIS: ICD-10-CM

## 2017-02-07 PROCEDURE — 99490 CHRNC CARE MGMT STAFF 1ST 20: CPT | Mod: ,,, | Performed by: INTERNAL MEDICINE

## 2017-02-07 NOTE — PROGRESS NOTES
Ms Ding phoned to provide update and seek advice.    Patient states that approximately 2 weeks ago she received an unused cannister of Symbicort 160 from good friend. Because she did not have any controller med at time, she took medicine to feel better. States that she later got call to start  Breo, for controller. Took medicine caused extreme vertigo, and can not take it.  She recalls- that she had taken this med in past and this is why she previously stopped medication.    She is very anxious about her increasing debility and increased use of rescue inhaler use to function. Not able to walk to front door without becoming short of breath.    Advised patient will send message to MD for advice.

## 2017-02-07 NOTE — TELEPHONE ENCOUNTER
----- Message from Anitra Casey RN sent at 2/7/2017 12:57 PM CST -----  Contact: Debbie Ch,    Please see note from below:    Ms Ding phoned to provide update and seek advice regarding her continued C/O of Shortness of breath.  Patient states that approximately 2 weeks ago she received an unused cannister of Symbicort 160 from good friend. Because, she did not have any controller med at time, she took medicine to feel better. States that she later advised breo approved per insurance and Directed to start this med as controller. States med caused extreme vertigo, and can not take it.  She recalls- that she had taken this med in past and this is why she previously stopped medication.  States that became short of breath with short walk from bedroom to front door yesterday.  Very anxious regarding increased debility, and extreme fatigue.    I advised patient message forwarded to MD for review of complaints and advice.

## 2017-02-09 RX ORDER — BUDESONIDE AND FORMOTEROL FUMARATE DIHYDRATE 160; 4.5 UG/1; UG/1
2 AEROSOL RESPIRATORY (INHALATION) EVERY 12 HOURS
Qty: 10.2 G | Refills: 11 | Status: SHIPPED | OUTPATIENT
Start: 2017-02-09 | End: 2017-09-06

## 2017-02-13 RX ORDER — FLUTICASONE PROPIONATE AND SALMETEROL 250; 50 UG/1; UG/1
1 POWDER RESPIRATORY (INHALATION) 2 TIMES DAILY
Qty: 60 EACH | Refills: 5 | Status: SHIPPED | OUTPATIENT
Start: 2017-02-13 | End: 2017-09-06

## 2017-02-13 NOTE — TELEPHONE ENCOUNTER
Contacted the pharmacy who states it is not even prompting a PA for the symbicort. Just saying it is not covered due to nonformulary. Only Advair, Brio, and Dulera. Please advise.

## 2017-02-13 NOTE — TELEPHONE ENCOUNTER
Unfortunately patient has had either sub therapeutic response or SE with all alternative medications. Rec resume advair and f/u with pulmonology

## 2017-02-14 DIAGNOSIS — R41.3 MEMORY LOSS: ICD-10-CM

## 2017-02-14 RX ORDER — DONEPEZIL HYDROCHLORIDE 10 MG/1
10 TABLET, FILM COATED ORAL EVERY MORNING
Qty: 90 TABLET | Refills: 3 | Status: SHIPPED | OUTPATIENT
Start: 2017-02-14 | End: 2017-03-09 | Stop reason: SDUPTHER

## 2017-02-14 NOTE — TELEPHONE ENCOUNTER
----- Message from Mackenzie Frost sent at 2/14/2017  8:38 AM CST -----  Contact: Patient  Patient called and said she is going to need a refill of her donepezil (ARICEPT) 10 MG tablet    [facility] - [place], [place] - [address] AT PORTAL TO REGISTERED [facility]

## 2017-02-16 ENCOUNTER — PATIENT OUTREACH (OUTPATIENT)
Dept: ADMINISTRATIVE | Facility: OTHER | Age: 82
End: 2017-02-16

## 2017-02-16 NOTE — PROGRESS NOTES
Patient phoned to confirm her upcoming appt information for pulmonary appt.    Advised her and mailed out appt info for both Pulmonary and neurology appts.     Pulmonary appt Tu 03/07 0800. [facility] 9th floor  Neuro appt: Thur 03/9 @ 0840. [facility] 8th floor    She verbalized her understanding

## 2017-03-07 ENCOUNTER — OFFICE VISIT (OUTPATIENT)
Dept: PULMONOLOGY | Facility: CLINIC | Age: 82
End: 2017-03-07
Payer: MEDICARE

## 2017-03-07 VITALS
HEART RATE: 70 BPM | DIASTOLIC BLOOD PRESSURE: 56 MMHG | BODY MASS INDEX: 22.81 KG/M2 | OXYGEN SATURATION: 98 % | HEIGHT: 65 IN | WEIGHT: 136.88 LBS | SYSTOLIC BLOOD PRESSURE: 100 MMHG | RESPIRATION RATE: 14 BRPM

## 2017-03-07 DIAGNOSIS — M79.7 FIBROMYALGIA: ICD-10-CM

## 2017-03-07 DIAGNOSIS — K21.9 GASTROESOPHAGEAL REFLUX DISEASE WITHOUT ESOPHAGITIS: Primary | ICD-10-CM

## 2017-03-07 DIAGNOSIS — J45.909 CHRONIC ASTHMA, UNSPECIFIED ASTHMA SEVERITY, UNCOMPLICATED: ICD-10-CM

## 2017-03-07 DIAGNOSIS — J31.0 CHRONIC RHINITIS: ICD-10-CM

## 2017-03-07 DIAGNOSIS — M15.9 PRIMARY OSTEOARTHRITIS INVOLVING MULTIPLE JOINTS: ICD-10-CM

## 2017-03-07 DIAGNOSIS — R06.09 DYSPNEA ON EXERTION: ICD-10-CM

## 2017-03-07 PROBLEM — R06.00 DYSPNEA: Status: ACTIVE | Noted: 2017-03-07

## 2017-03-07 PROCEDURE — 99999 PR PBB SHADOW E&M-EST. PATIENT-LVL III: CPT | Mod: PBBFAC,,, | Performed by: INTERNAL MEDICINE

## 2017-03-07 PROCEDURE — 99204 OFFICE O/P NEW MOD 45 MIN: CPT | Mod: S$PBB,,, | Performed by: INTERNAL MEDICINE

## 2017-03-07 PROCEDURE — 99213 OFFICE O/P EST LOW 20 MIN: CPT | Mod: PBBFAC | Performed by: INTERNAL MEDICINE

## 2017-03-07 NOTE — LETTER
March 7, 2017      Dayo Woo MD  [address]  [address]  [facility] [address]           [facility] - Pulmonary Services  [address]  [place] [address]  Phone: [phone]          Patient: Debbie Ding   MR Number: [id number]   YOB: 1932   Date of Visit: 3/7/2017       Dear Dr. Dayo Woo:    Thank you for referring Debbie Ding to me for evaluation. Attached you will find relevant portions of my assessment and plan of care.    If you have questions, please do not hesitate to call me. I look forward to following Debbie Ding along with you.    Sincerely,    MATTHEW Celaya MD    Enclosure  CC:  No Recipients    If you would like to receive this communication electronically, please contact [email or web address] or [phone] to request more information on [facility] Link access.    For providers and/or their staff who would like to refer a patient to [facility], please contact us through our one-stop-shop provider referral line, [facility], at [phone].    If you feel you have received this communication in error or would no longer like to receive these types of communications, please e-mail [email or web address]

## 2017-03-07 NOTE — PROGRESS NOTES
"Subjective:       Patient ID: Debbie Ding is a 84 y.o. female.    Chief Complaint: Shortness of Breath    HPI Ms. Ding is an 84-year-old nonsmoker who comes for evaluation of chronic   shortness of breath.  She describes being out of breath with modest daily   activities.  She has been diagnosed with asthma in the past and has been   maintained on bronchodilator medications.  She estimates that this initial   diagnosis was in 1991, during the time she was evaluated at [facility].    She also has been a suspect for cardiac dysfunction in the past.  Although her   shortness of breath is longstanding, she believes that it has worsened during   the past 6-12 months.  She is not generally aware of wheezing.  She has an   occasional cough, but no regular sputum production.  She does have chronic   rhinitis.    Ms. Ding also has had a great deal of upper GI symptoms   consistent with reflux.  She currently takes omeprazole 40 mg once daily, but   despite this has breakthrough upper GI symptoms.  Finally, she has been given   the diagnosis of fibromyalgia and generalized osteoarthritis.    Ms. Ding has had difficulty with maintenance medications for asthma.  For   many years, she used Advair 500/50, and then this "stopped working."  More   recently, she used Symbicort with success, but unfortunately this is not   currently covered by her insurance.  She had a trial with Dulera, but was unable   to tolerate this due to tremor.  Currently, she is again taking Advair, but at   the lower 250/50 strength.  She also uses a ProAir HFA inhaler, when needed, for   control of day-to-day respiratory symptoms.    Ms. Ding is a lifelong nonsmoker.  Her family history is of note in that she   has one aunt who had chronic obstructive lung disease.  She was a longterm   smoker.  Otherwise, she believes her family history is negative for lung   disease.  She worked in the past as a .  She does not know of " any   important past occupational exposures.    DATA:  I have reviewed images from a chest x-ray and CT study, both done in   December 2014.  The cardiac silhouette does not appear enlarged.  There are no   acute vascular abnormalities.  The CT was a PE protocol scan, which did not show   any evidence for pulmonary emboli.  Incidental note is made of localized streak   in the right middle lobe.  Otherwise, the lungs appear clear.      CB/HN  dd: 03/07/2017 20:24:48 (CST)  td: 03/08/2017 19:00:21 (CST)  Doc ID   #[id number]  Job ID #[id number]    CC:       Review of Systems   Constitutional: Negative for fever and fatigue.   HENT: Positive for postnasal drip and congestion. Negative for sinus pressure and voice change.    Respiratory: Positive for cough, shortness of breath and dyspnea on extertion. Negative for sputum production and wheezing.    Cardiovascular: Negative for chest pain and leg swelling.   Genitourinary: Negative for difficulty urinating.   Musculoskeletal: Positive for arthralgias and myalgias. Negative for back pain.   Skin: Negative for rash.   Gastrointestinal: Positive for acid reflux. Negative for abdominal pain.   Neurological: Negative for dizziness and weakness.   Hematological: Negative for adenopathy.       Objective:      Physical Exam   Constitutional: She is oriented to person, place, and time. She appears well-developed and well-nourished.   HENT:   Head: Normocephalic.   Nose: Nose normal.   Mouth/Throat: No oropharyngeal exudate.   Neck: Normal range of motion. No JVD present. No tracheal deviation present. No thyromegaly present.   Cardiovascular: Normal rate, regular rhythm and normal heart sounds.    No murmur heard.  Pulmonary/Chest: Symmetric chest wall expansion. No stridor. She has no wheezes (upper airway sounds mildly accentuated on inspratory efforts,but no definite stridor). She has no rhonchi. She has no rales. She exhibits no tenderness.   Abdominal: Soft. There is no  tenderness.   Musculoskeletal: She exhibits no edema.   Lymphadenopathy:     She has no cervical adenopathy.   Neurological: She is alert and oriented to person, place, and time.   Skin: Skin is warm and dry. No rash noted. No erythema. Nails show no clubbing.   Psychiatric: She has a normal mood and affect.   Vitals reviewed.    Personal Diagnostic Review    No flowsheet data found.      Assessment:       1. Gastroesophageal reflux disease without esophagitis    2. Chronic asthma, unspecified asthma severity, uncomplicated    3. Dyspnea on exertion    4. Fibromyalgia    5. Primary osteoarthritis involving multiple joints    6. Chronic rhinitis        Outpatient Encounter Prescriptions as of 3/7/2017   Medication Sig Dispense Refill    albuterol 90 mcg/actuation inhaler Inhale 2 puffs into the lungs every 4 (four) hours as needed for Wheezing. 3 Inhaler 11    atorvastatin (LIPITOR) 10 MG tablet Take 1 tablet (10 mg total) by mouth once daily. 90 tablet 3    budesonide-formoterol 160-4.5 mcg (SYMBICORT) 160-4.5 mcg/actuation HFAA Inhale 2 puffs into the lungs every 12 (twelve) hours. Controller 10.2 g 11    cycloSPORINE (RESTASIS) 0.05 % ophthalmic emulsion       desloratadine (CLARINEX) 5 mg tablet Take 5 mg by mouth once daily.      donepezil (ARICEPT) 10 MG tablet Take 1 tablet (10 mg total) by mouth every morning. 90 tablet 3    ESTRACE 0.01 % (0.1 mg/gram) vaginal cream       estradiol 0.05 mg/24 hr td ptsw (VIVELLE-DOT) 0.05 mg/24 hr       fluocinolone-shower cap 0.01 % Oil       fluocinonide (LIDEX) 0.05 % external solution USE AS DIRECTED TWICE A DAY EXTERNALLY 1MONTH  2    fluticasone-salmeterol 250-50 mcg/dose (ADVAIR) 250-50 mcg/dose diskus inhaler Inhale 1 puff into the lungs 2 (two) times daily. Controller 60 each 5    glycopyrrolate (ROBINUL) 2 MG Tab Take 1 to 1.5 pills po daily as needed for excess sweating 150 tablet 0    ketoconazole (NIZORAL) 2 % shampoo       levocetirizine (XYZAL) 5  MG tablet TAKE 1 TABLET BY MOUTH EVERY EVEINING  1    lisinopril 10 MG tablet Take 1 tablet (10 mg total) by mouth once daily. 90 tablet 3    meclizine (ANTIVERT) 25 mg tablet Take 25 mg by mouth 3 (three) times daily as needed.      MYRBETRIQ 50 mg Tb24 Take 1 tablet by mouth once daily.  12    olopatadine (PATADAY) 0.2 % Drop Place 1 drop into both eyes once daily.      omeprazole (PRILOSEC) 40 MG capsule Take 1 capsule (40 mg total) by mouth once daily. 90 capsule 1    salsalate (DISALCID) 750 MG Tab Take 1 tablet (750 mg total) by mouth 2 (two) times daily. 180 tablet 3    SYNTHROID 50 mcg tablet        No facility-administered encounter medications on file as of 3/7/2017.      Orders Placed This Encounter   Procedures    X-Ray Chest PA And Lateral     Standing Status:   Future     Standing Expiration Date:   3/7/2018    CBC auto differential     Standing Status:   Future     Standing Expiration Date:   3/7/2018    Brain natriuretic peptide     Standing Status:   Future     Standing Expiration Date:   3/7/2018    Comprehensive metabolic panel     Standing Status:   Future     Standing Expiration Date:   3/7/2018    2D echo with color flow doppler     Standing Status:   Future     Standing Expiration Date:   3/7/2018    Spirometry with/without bronchodilator     Standing Status:   Future     Standing Expiration Date:   3/7/2018    DLCO-Carbon Monoxide Diffusing Capacity     Standing Status:   Future     Standing Expiration Date:   3/7/2018     Plan:     CBC, BNP, CMP, CXR, Pre/Post Spirometry, DLCO, and 2-D Echo.  Review visit and decide on therapy for resp symptoms.

## 2017-03-07 NOTE — PATIENT INSTRUCTIONS
CBC, BNP, CMP, CXR, Pre/Post Spirometry, DLCO, and 2-D Echo.  Review visit and decide on therapy for resp symptoms.

## 2017-03-07 NOTE — MR AVS SNAPSHOT
[facility]  [address]  [facility] [address]  Phone: [phone]                  Debbie Ding   3/7/2017 8:00 AM   Office Visit    Description:  Female : 10/23/1932   Provider:  MATTHEW Celaya MD   Department:  [facility]           Reason for Visit     Shortness of Breath           Diagnoses this Visit        Comments    Gastroesophageal reflux disease without esophagitis    -  Primary     Chronic asthma, unspecified asthma severity, uncomplicated         Dyspnea on exertion         Fibromyalgia         Primary osteoarthritis involving multiple joints         Chronic rhinitis                To Do List           Future Appointments        Provider Department Dept Phone    3/9/2017 8:40 AM Sridhar Gamble MD [facility] [phone]    3/9/2017 10:00 AM LAB, APPOINTMENT [place] [facility] [phone]    3/9/2017 10:45 AM NOM XROP3 485 LB LIMIT [facility] [phone]    3/15/2017 8:00 AM ECHO, [facility] [phone]    3/15/2017 9:30 AM PULMONARY FUNCTION [facility] [phone]      Goals (5 Years of Data)     Patient/caregiver will accept life style changes to manage and improve GERD prior to discharge from [facility].     Notes - Note created  10/19/2016  3:21 PM by Yvonne Bansal RN    Overall Time to Completion  6 months from 08.01.16    Short Term Goals  Patient/caregiver will replace spicy with non spice, decaffeinated for 3 meals per day within 6 months.  Interventions   · Recognize and provide educational material (JESSENIA).   Status  · Partially met          Patient/caregiver will be able to name cardiac, respiratory, pain, purpose of medication and will take medications as ordered by Physician prior to discharge from [facility].     Notes - Note created  10/19/2016  3:28 PM by Yvonne Bansal RN    Overall Time to Completion  6 months from  08.01.16    Short Term Goals  Patient/caregiver will utilize a pill box organizer to dispense medications daily within 6 months.  Interventions   · Complete medication reconciliation.  · Encourage Medication Compliance.   Status  · Partially met    Patient/caregiver will verbalize dosage/route/frequency/indication of cardiac, respiratory, pain within 6 months.  Interventions   · Complete medication reconciliation.  · Encourage Medication Compliance.   Status  · Partially met          Patient/Caregiver will check and record blood pressure daily. If > 140/90 for 3 days, patient/caregiver will know to notify Physician, prior to discharge from [facility].     Notes - Note created  10/19/2016  3:25 PM by Yvonne Bansal RN    Overall Time to Completion  6 months from 10/19/2016    Short Term Goals  Patient/caregiver will measure and record the blood pressure 1 times per day for 6 months.  Interventions   · Assess for availability of working blood pressure cuff in home setting.  · Mail Blood pressure logs for home use.   Status  · Partially met          Patient/caregiver will have an action plan in place to manage and prevent complications of pain prior to discharge from [facility].     Notes - Note created  10/19/2016  3:19 PM by Yvonne Bansal RN    Overall Time to Completion  6 months from 08.01.16    Short Term Goals  Patient/caregiver will verbalize 3 interventions to decrease pain within 6 months.  Interventions   · Recognize and provide educational material (JESSENIA).  · Facilitate to referral to Outpatient Rehab.  · Facilitate to referral to Pain Management Specialist.    · Facilitate referral to Healthy Back   Status  · Partially met    Patient/caregiver will verbalize 3 noninvasive pain relief measures to help manage the pain within 6 months.  Interventions   · Recognize and provide educational material (JESSENIA).   Status  · Partially met          Patient/caregiver will verbalize agreement to attend all scheduled  appointments with Physicians prior to discharge from [facility].     Notes - Note created  10/19/2016  3:24 PM by Yvonne Bansal RN    Overall Time to Completion  6 months from 08.01.16    Short Term Goals  Patient/caregiver will establish appointment with Physician within 6 months.  Interventions   · Facilitate to Medical appointments.  · Encourage compliance with preventive screenings.   Status  · Partially met            [facility] On Call     [facility] On Call Nurse Care Line - 24/7 Assistance  Registered nurses in the [facility] On Call Center provide clinical advisement, health education, appointment booking, and other advisory services.  Call for this free service at [phone].             Medications           Message regarding Medications     Verify the changes and/or additions to your medication regime listed below are the same as discussed with your clinician today.  If any of these changes or additions are incorrect, please notify your healthcare provider.             Verify that the below list of medications is an accurate representation of the medications you are currently taking.  If none reported, the list may be blank. If incorrect, please contact your healthcare provider. Carry this list with you in case of emergency.           Current Medications     albuterol 90 mcg/actuation inhaler Inhale 2 puffs into the lungs every 4 (four) hours as needed for Wheezing.    atorvastatin (LIPITOR) 10 MG tablet Take 1 tablet (10 mg total) by mouth once daily.    budesonide-formoterol 160-4.5 mcg (SYMBICORT) 160-4.5 mcg/actuation HFAA Inhale 2 puffs into the lungs every 12 (twelve) hours. Controller    cycloSPORINE (RESTASIS) 0.05 % ophthalmic emulsion     desloratadine (CLARINEX) 5 mg tablet Take 5 mg by mouth once daily.    donepezil (ARICEPT) 10 MG tablet Take 1 tablet (10 mg total) by mouth every morning.    ESTRACE 0.01 % (0.1 mg/gram) vaginal cream     estradiol 0.05 mg/24 hr td ptsw (VIVELLE-DOT) 0.05 mg/24 hr      "fluocinolone-shower cap 0.01 % Oil     fluocinonide (LIDEX) 0.05 % external solution USE AS DIRECTED TWICE A DAY EXTERNALLY 1MONTH    fluticasone-salmeterol 250-50 mcg/dose (ADVAIR) 250-50 mcg/dose diskus inhaler Inhale 1 puff into the lungs 2 (two) times daily. Controller    glycopyrrolate (ROBINUL) 2 MG Tab Take 1 to 1.5 pills po daily as needed for excess sweating    ketoconazole (NIZORAL) 2 % shampoo     levocetirizine (XYZAL) 5 MG tablet TAKE 1 TABLET BY MOUTH EVERY EVEINING    lisinopril 10 MG tablet Take 1 tablet (10 mg total) by mouth once daily.    meclizine (ANTIVERT) 25 mg tablet Take 25 mg by mouth 3 (three) times daily as needed.    MYRBETRIQ 50 mg Tb24 Take 1 tablet by mouth once daily.    olopatadine (PATADAY) 0.2 % Drop Place 1 drop into both eyes once daily.    omeprazole (PRILOSEC) 40 MG capsule Take 1 capsule (40 mg total) by mouth once daily.    salsalate (DISALCID) 750 MG Tab Take 1 tablet (750 mg total) by mouth 2 (two) times daily.    SYNTHROID 50 mcg tablet            Clinical Reference Information           Your Vitals Were     BP Pulse Resp Height Weight SpO2    100/56 70 14 5' 5" (1.651 m) 62.1 kg (136 lb 14.5 oz) 98%    BMI                22.78 kg/m2          Blood Pressure          Most Recent Value    BP  (!)  100/56      Allergies as of 3/7/2017     Trazodone    Talwin Compound    Talwin [Pentazocine Lactate]    Transzone    Bentyl [Dicyclomine]    Tessalon [Benzonatate]      Immunizations Administered on Date of Encounter - 3/7/2017     None      Orders Placed During Today's Visit     Future Labs/Procedures Expected by Expires    Brain natriuretic peptide  3/7/2017 3/7/2018    CBC auto differential  3/7/2017 3/7/2018    Comprehensive metabolic panel  3/7/2017 3/7/2018    X-Ray Chest PA And Lateral  3/7/2017 3/7/2018    2D echo with color flow doppler  As directed 3/7/2018    DLCO-Carbon Monoxide Diffusing Capacity  As directed 3/7/2018    Spirometry with/without bronchodilator  As " directed 3/7/2018      [facility] Sign-Up     Activating your [facility] account is as easy as 1-2-3!     1) Visit [email or web address], select Sign Up Now, enter this activation code and your date of birth, then select Next.  [email or web address]  Expires: 4/21/2017  4:55 PM      2) Create a username and password to use when you visit [facility] in the future and select a security question in case you lose your password and select Next.    3) Enter your e-mail address and click Sign Up!    Additional Information  If you have questions, please e-mail [email or web address] or call [phone] to talk to our [facility] staff. Remember, [facility] is NOT to be used for urgent needs. For medical emergencies, dial 911.         Instructions    CBC, BNP, CMP, CXR, Pre/Post Spirometry, DLCO, and 2-D Echo.  Review visit and decide on therapy for resp symptoms.       Language Assistance Services     ATTENTION: Language assistance services are available, free of charge. Please call [phone].      ATENCIÓN: Si habla español, tiene a hills disposición servicios gratuitos de asistencia lingüística. Llame al [phone].     TIFFANIE Ý: N?u b?n nói Ti?ng Vi?t, có các d?ch v? h? tr? ngôn ng? mi?n phí dành cho b?n. G?i s? [phone].         [facility] complies with applicable Federal civil rights laws and does not discriminate on the basis of race, color, national origin, age, disability, or sex.

## 2017-03-08 ENCOUNTER — PATIENT OUTREACH (OUTPATIENT)
Dept: ADMINISTRATIVE | Facility: OTHER | Age: 82
End: 2017-03-08
Payer: MEDICARE

## 2017-03-08 DIAGNOSIS — K21.9 GASTROESOPHAGEAL REFLUX DISEASE, ESOPHAGITIS PRESENCE NOT SPECIFIED: ICD-10-CM

## 2017-03-08 DIAGNOSIS — J45.909 CHRONIC ASTHMA, UNSPECIFIED ASTHMA SEVERITY, UNCOMPLICATED: ICD-10-CM

## 2017-03-08 DIAGNOSIS — I10 ESSENTIAL HYPERTENSION: ICD-10-CM

## 2017-03-08 PROCEDURE — 99490 CHRNC CARE MGMT STAFF 1ST 20: CPT | Mod: ,,, | Performed by: INTERNAL MEDICINE

## 2017-03-08 NOTE — PROGRESS NOTES
Spoke to patient for routine CCM follow up. She reports very happy with pulmonary visit. States feels like physician was caring and seemed thourough. Discussed management of reflux and patient agree taking daily omeprazole and taking mesasures through diet to reduce reflux such as not eating heavy meals close to bedtime, decaf, reduce spicy foods.   States test ordered and follow up scheduled. Requests help to facilitate scheduling. Advised patient of PCP follow up scheduled tomorrow and may have labs done at that time. I advised that echo and pulmonary testing already scheduled. Those tests scheduled next week at main campus. Dates times and locations provided. Brief description of tests and lab work. Patient verbalized her understanding and appreciation.

## 2017-03-09 ENCOUNTER — OFFICE VISIT (OUTPATIENT)
Dept: NEUROLOGY | Facility: CLINIC | Age: 82
End: 2017-03-09
Payer: MEDICARE

## 2017-03-09 ENCOUNTER — HOSPITAL ENCOUNTER (OUTPATIENT)
Dept: RADIOLOGY | Facility: OTHER | Age: 82
Discharge: HOME | End: 2017-03-09
Attending: INTERNAL MEDICINE
Payer: MEDICARE

## 2017-03-09 VITALS
SYSTOLIC BLOOD PRESSURE: 132 MMHG | DIASTOLIC BLOOD PRESSURE: 77 MMHG | WEIGHT: 136.44 LBS | HEART RATE: 68 BPM | BODY MASS INDEX: 22.73 KG/M2 | HEIGHT: 65 IN

## 2017-03-09 DIAGNOSIS — R41.3 MEMORY LOSS: ICD-10-CM

## 2017-03-09 DIAGNOSIS — F33.9 DEPRESSION, RECURRENT: ICD-10-CM

## 2017-03-09 DIAGNOSIS — R06.09 DYSPNEA ON EXERTION: ICD-10-CM

## 2017-03-09 DIAGNOSIS — I10 ESSENTIAL HYPERTENSION: ICD-10-CM

## 2017-03-09 DIAGNOSIS — G31.84 MILD COGNITIVE IMPAIRMENT: Primary | ICD-10-CM

## 2017-03-09 PROCEDURE — 99999 PR PBB SHADOW E&M-EST. PATIENT-LVL III: CPT | Mod: PBBFAC,,, | Performed by: PSYCHIATRY & NEUROLOGY

## 2017-03-09 PROCEDURE — 71020 XR CHEST PA AND LATERAL: CPT | Mod: TC

## 2017-03-09 PROCEDURE — 99214 OFFICE O/P EST MOD 30 MIN: CPT | Mod: S$PBB,,, | Performed by: PSYCHIATRY & NEUROLOGY

## 2017-03-09 PROCEDURE — 71020 XR CHEST PA AND LATERAL: CPT | Mod: 26,,, | Performed by: RADIOLOGY

## 2017-03-09 RX ORDER — DONEPEZIL HYDROCHLORIDE 10 MG/1
10 TABLET, FILM COATED ORAL EVERY MORNING
Qty: 90 TABLET | Refills: 3 | Status: SHIPPED | OUTPATIENT
Start: 2017-03-09 | End: 2017-07-10 | Stop reason: SDUPTHER

## 2017-03-09 NOTE — PATIENT INSTRUCTIONS
Discussed with patient.  Her memory difficulties are minimal and have not significantly progressed over the years.  She continues to be fairly independent.  Contributing factors would include the history of chronic pain, depressive symptoms and occasional sleep difficulties.  She does report improvement with Aricept which will be continued.

## 2017-03-09 NOTE — PROGRESS NOTES
Subjective:       Patient ID: Debbie Ding is a 84 y.o. female.    Chief Complaint:  Memory Loss      History of Present Illness  HPI   This is an 84-year-old -American female who had been seen with complaints of memory difficulties since 2009.  She was last seen by me in June 2016.  She comes to the clinic alone. The patient reports that ever since the death of her  in 2012, she been having a difficult time with retention of recent information and had difficulty keeping track of conversations and if she reads a book she loses track of what she read earlier. However she does report that symptoms have significantly improved since she started the Aricept and she feels that her mind is much clearer.      She lives alone however her daughter lives below her apartment and keeps an eye on her.  She is fairly independent and reports that she has started cooking in without any problems. She is able to take care of her day-to-day needs and personal hygiene and manages her medications and finances.  She is now driving to familiar places without any problems.  She also has a history of headaches and neck pain and has been seen by the headache clinic in September 2016.  An MRI scan of the brain done in 2014 showed evidence of small vessel ischemic changes but was otherwise unremarkable.        Review of Systems  Review of Systems   Constitutional: Negative.    HENT: Positive for postnasal drip. Negative for hearing loss.    Eyes: Negative.  Negative for visual disturbance.   Respiratory: Negative.  Negative for shortness of breath.    Cardiovascular: Negative.  Negative for chest pain and palpitations.   Gastrointestinal: Negative.    Genitourinary: Negative.    Musculoskeletal: Positive for arthralgias, back pain and neck pain. Negative for gait problem.   Skin: Negative.    Neurological: Negative.  Negative for tremors, seizures, syncope, speech difficulty, weakness, numbness and headaches.    Psychiatric/Behavioral: Positive for decreased concentration. Negative for confusion. The patient is nervous/anxious.        Objective:      Neurologic Exam    Physical Exam   Constitutional: She appears well-developed and well-nourished.   HENT:   Head: Normocephalic and atraumatic.   Right Ear: Hearing normal.   Left Ear: Hearing normal.   Eyes:   Fundus examination showed sharp disc margins.  Pupils are equal and reactive with EOM full.   Neck: Normal range of motion. Neck supple. Muscular tenderness (mild paraspinal muscle tenderness predominantly in the upper cervical region) present. Carotid bruit is not present.   Neurological: She is alert. She has normal reflexes. She displays no atrophy (Normal and symmetrical strength). No cranial nerve deficit (Visual fields at bedside testing essentially normal.  No facial asymmetry noted with facial movements and sensory exam being normal/symmetrical.  Corneals/gag reflexes normal.  Tongue & palate movements normal.  Shoulder shrug was normal.) or sensory deficit. She exhibits normal muscle tone. She displays a negative Romberg sign. Coordination and gait normal.   Mental status examination: The patient is oriented to place, person and time. Able to give an adequate history without any difficulty. Recall of recent information was called and immediate recall was fair (2/3 after 1 minute). Attention span and concentration was slightly impaired. Fund of knowledge was fair. Affect was appropriate, mood was slightly anxious. No thought disorder noted. Judgment and insight was normal. Speech was of normal flow and content. Comprehension was unimpaired.    Vitals reviewed.        Assessment:        1. Mild cognitive impairment    2. Depression, recurrent    3. Essential hypertension             Plan:       Discussed with patient.  Her memory difficulties are minimal and have not significantly progressed over the years.  She continues to be fairly independent.  Contributing  factors would include the history of chronic pain, depressive symptoms and occasional sleep difficulties.  She does report improvement with Aricept which will be continued.  Control of vascular risk factors especially hypertension.  Continue present level of activities and follow-up in one year.

## 2017-03-10 ENCOUNTER — PATIENT OUTREACH (OUTPATIENT)
Dept: ADMINISTRATIVE | Facility: OTHER | Age: 82
End: 2017-03-10

## 2017-03-10 NOTE — PROGRESS NOTES
Mrs. Ding called to request refill assistance.    Need refill on synthroid and Glycopryolate to [facility] pharmacy.    Message to PCP.

## 2017-03-13 DIAGNOSIS — R61 HYPERHIDROSIS: ICD-10-CM

## 2017-03-13 RX ORDER — LEVOTHYROXINE SODIUM 50 UG/1
50 TABLET ORAL
Qty: 90 TABLET | Refills: 2 | Status: SHIPPED | OUTPATIENT
Start: 2017-03-13 | End: 2017-03-15 | Stop reason: SDUPTHER

## 2017-03-13 RX ORDER — GLYCOPYRROLATE 2 MG/1
TABLET ORAL
Qty: 150 TABLET | Refills: 0 | OUTPATIENT
Start: 2017-03-13

## 2017-03-13 NOTE — TELEPHONE ENCOUNTER
----- Message from Anitra Casey RN sent at 3/10/2017  3:14 PM CST -----  Hi Dr. Cherry,    MsCalin Ding called to request refill on Synthroid and Glycopyrrolate to [facility] mail order pharmacy.    Thank you,      Addie Casey RN, [facility]

## 2017-03-15 ENCOUNTER — HOSPITAL ENCOUNTER (OUTPATIENT)
Dept: PULMONOLOGY | Facility: CLINIC | Age: 82
Discharge: HOME | End: 2017-03-15
Payer: MEDICARE

## 2017-03-15 ENCOUNTER — TELEPHONE (OUTPATIENT)
Dept: INTERNAL MEDICINE | Facility: CLINIC | Age: 82
End: 2017-03-15

## 2017-03-15 ENCOUNTER — HOSPITAL ENCOUNTER (OUTPATIENT)
Dept: CARDIOLOGY | Facility: CLINIC | Age: 82
Discharge: HOME | End: 2017-03-15
Payer: MEDICARE

## 2017-03-15 DIAGNOSIS — R06.09 DYSPNEA ON EXERTION: ICD-10-CM

## 2017-03-15 DIAGNOSIS — J45.909 CHRONIC ASTHMA, UNSPECIFIED ASTHMA SEVERITY, UNCOMPLICATED: ICD-10-CM

## 2017-03-15 LAB
AORTIC VALVE REGURGITATION: NORMAL
DIASTOLIC DYSFUNCTION: NO
ESTIMATED PA SYSTOLIC PRESSURE: 36
POST FEV1 FVC: 0.8
POST FEV1: 2.22
POST FVC: 2.78
PRE FEV1 FVC: 77
PRE FEV1: 2.11
PRE FVC: 2.75
PREDICTED FEV1 FVC: 75
PREDICTED FEV1: 2.02
PREDICTED FVC: 2.72
RETIRED EF AND QEF - SEE NOTES: 65 (ref 55–65)
TRICUSPID VALVE REGURGITATION: NORMAL

## 2017-03-15 PROCEDURE — 94729 DIFFUSING CAPACITY: CPT | Mod: 26,S$PBB,, | Performed by: INTERNAL MEDICINE

## 2017-03-15 PROCEDURE — 93306 TTE W/DOPPLER COMPLETE: CPT | Mod: 26,S$PBB,, | Performed by: INTERNAL MEDICINE

## 2017-03-15 PROCEDURE — 94060 EVALUATION OF WHEEZING: CPT | Mod: 26,S$PBB,, | Performed by: INTERNAL MEDICINE

## 2017-03-15 RX ORDER — LEVOTHYROXINE SODIUM 50 UG/1
50 TABLET ORAL
Qty: 30 TABLET | Refills: 5 | Status: SHIPPED | OUTPATIENT
Start: 2017-03-15 | End: 2017-03-31 | Stop reason: SDUPTHER

## 2017-03-15 NOTE — TELEPHONE ENCOUNTER
Left pt vm that synthroid was sent to  mail delivery 90 day supply and would she like the 30 day sent to [facility] local or mail, also notified pt that the Glycopyyolate(Robinul) has to come from her Derm

## 2017-03-15 NOTE — TELEPHONE ENCOUNTER
See today's msg by Lilian regarding advice left for pt on v/m. Pt requesting 30 day synthroid sent to [facility], please advise.

## 2017-03-15 NOTE — TELEPHONE ENCOUNTER
----- Message from Anitra Casey RN sent at 3/15/2017  2:17 PM CDT -----  Hi Dr. Woo,    Ms. Ding called back because she states that she did not receive refills as requested below. Since not yet sent now need. 30 day script to [facility] on record and 90 day scripts for both Synthroid and Glycopyrulate to [facility].    Please notify patient if questions or problems.    Thank you  ----- Message -----     From: Anitra Casey RN     Sent: 3/10/2017   3:14 PM       To: Amna Oshea Staff    Hi Dr. Cherry,    Ms. Ding called to request refill on Synthroid and Glycopyrrolate to [facility] mail order pharmacy.    Thank you,      Addie Casey RN, [facility]

## 2017-03-21 ENCOUNTER — OFFICE VISIT (OUTPATIENT)
Dept: INTERNAL MEDICINE | Facility: CLINIC | Age: 82
End: 2017-03-21
Attending: INTERNAL MEDICINE
Payer: MEDICARE

## 2017-03-21 ENCOUNTER — LAB VISIT (OUTPATIENT)
Dept: LAB | Facility: OTHER | Age: 82
End: 2017-03-21
Attending: INTERNAL MEDICINE
Payer: MEDICARE

## 2017-03-21 VITALS
BODY MASS INDEX: 23.07 KG/M2 | SYSTOLIC BLOOD PRESSURE: 120 MMHG | DIASTOLIC BLOOD PRESSURE: 60 MMHG | HEART RATE: 68 BPM | HEIGHT: 65 IN | OXYGEN SATURATION: 96 % | WEIGHT: 138.44 LBS

## 2017-03-21 DIAGNOSIS — R61 HYPERHIDROSIS: ICD-10-CM

## 2017-03-21 DIAGNOSIS — E11.9 DIABETES MELLITUS TYPE 2, DIET-CONTROLLED: ICD-10-CM

## 2017-03-21 DIAGNOSIS — E11.9 DIABETES MELLITUS TYPE 2, DIET-CONTROLLED: Primary | ICD-10-CM

## 2017-03-21 DIAGNOSIS — E03.9 HYPOTHYROIDISM, UNSPECIFIED TYPE: ICD-10-CM

## 2017-03-21 LAB
ESTIMATED AVG GLUCOSE: 131 MG/DL
HBA1C MFR BLD HPLC: 6.2 %
TSH SERPL DL<=0.005 MIU/L-ACNC: 1.34 UIU/ML

## 2017-03-21 PROCEDURE — 83036 HEMOGLOBIN GLYCOSYLATED A1C: CPT

## 2017-03-21 PROCEDURE — 84443 ASSAY THYROID STIM HORMONE: CPT

## 2017-03-21 PROCEDURE — 99214 OFFICE O/P EST MOD 30 MIN: CPT | Mod: S$PBB,,, | Performed by: INTERNAL MEDICINE

## 2017-03-21 PROCEDURE — 36415 COLL VENOUS BLD VENIPUNCTURE: CPT

## 2017-03-21 PROCEDURE — 99999 PR PBB SHADOW E&M-EST. PATIENT-LVL III: CPT | Mod: PBBFAC,,, | Performed by: INTERNAL MEDICINE

## 2017-03-21 RX ORDER — GLYCOPYRROLATE 2 MG/1
TABLET ORAL
Qty: 150 TABLET | Refills: 1 | Status: SHIPPED | OUTPATIENT
Start: 2017-03-21 | End: 2017-06-21 | Stop reason: SDUPTHER

## 2017-03-21 NOTE — MR AVS SNAPSHOT
[facility]  [address]  [place] [address]  Phone: [phone]  Fax: [phone]                  Debbie Ding   3/21/2017 8:00 AM   Office Visit    Description:  Female : 10/23/1932   Provider:  Dayo Woo MD   Department:  [facility]           Reason for Visit     Thyroid Problem           Diagnoses this Visit        Comments    Diabetes mellitus type 2, diet-controlled    -  Primary     Hyperhidrosis         Hypothyroidism, unspecified type                To Do List           Future Appointments        Provider Department Dept Phone    3/21/2017 9:30 AM LAB, SAME DAY [facility] [phone]    2017 9:00 AM Christianity, DIABETES [facility] [phone]    2017 8:00 AM LAB, BAP [facility] [phone]      Goals (5 Years of Data)     Patient/caregiver will accept life style changes to manage and improve GERD prior to discharge from [facility].     Notes - Note edited  3/8/2017  3:18 PM by Anitra Casey RN    Overall Time to Completion  6 months from .16    Short Term Goals  Patient/caregiver will replace spicy with non spice, decaffeinated for 3 meals per day within 6 months.  Interventions   · Recognize and provide educational material (KRALESLI).   Status  · Met          Patient/caregiver will be able to name cardiac, respiratory, pain, purpose of medication and will take medications as ordered by Physician prior to discharge from O[facility].     Notes - Note edited  3/8/2017  3:27 PM by Anitra Casey RN    Overall Time to Completion  6 months from .16    Short Term Goals  Patient/caregiver will utilize a pill box organizer to dispense medications daily within 6 months.  Interventions   · Complete medication reconciliation.  · Encourage Medication Compliance.   Status  · Met    Patient/caregiver will verbalize dosage/route/frequency/indication of cardiac, respiratory,  pain within 6 months.  Interventions   · Complete medication reconciliation.  · Encourage Medication Compliance.   Status  · Partially met          Patient/Caregiver will check and record blood pressure daily. If > 140/90 for 3 days, patient/caregiver will know to notify Physician, prior to discharge from [facility].     Notes - Note created  10/19/2016  3:25 PM by Yvonne Bansal RN    Overall Time to Completion  6 months from 10/19/2016    Short Term Goals  Patient/caregiver will measure and record the blood pressure 1 times per day for 6 months.  Interventions   · Assess for availability of working blood pressure cuff in home setting.  · Mail Blood pressure logs for home use.   Status  · Partially met          Patient/caregiver will have an action plan in place to manage and prevent complications of pain prior to discharge from O[facility].     Notes - Note created  10/19/2016  3:19 PM by Yvonne Bansal RN    Overall Time to Completion  6 months from 08.01.16    Short Term Goals  Patient/caregiver will verbalize 3 interventions to decrease pain within 6 months.  Interventions   · Recognize and provide educational material (JESSENIA).  · Facilitate to referral to Outpatient Rehab.  · Facilitate to referral to Pain Management Specialist.    · Facilitate referral to Healthy Back   Status  · Partially met    Patient/caregiver will verbalize 3 noninvasive pain relief measures to help manage the pain within 6 months.  Interventions   · Recognize and provide educational material (JESSENIA).   Status  · Partially met          Patient/caregiver will verbalize agreement to attend all scheduled appointments with Physicians prior to discharge from O[facility].     Notes - Note edited  3/8/2017  3:26 PM by Anitra Casey RN    Overall Time to Completion  6 months from 08.01.16    Short Term Goals  Patient/caregiver will establish appointment with Physician within 6 months.  Interventions   · Facilitate to Medical appointments.  · Encourage  compliance with preventive screenings.   · Pulmonary evaluation-complete  · Pulmonary work-up -ordered       Status  · Partially met             These Medications        Disp Refills Start End    glycopyrrolate (ROBINUL) 2 MG Tab 150 tablet 1 3/21/2017     Take 1 to 1.5 pills po daily as needed for excess sweating    Pharmacy: [facility] - [place], [place] - [address] AT [address] [facility]  #: [phone]         [facility]     [facility] On Call Nurse Care Line - 24/7 Assistance  Registered nurses in the [facility] On Call Center provide clinical advisement, health education, appointment booking, and other advisory services.  Call for this free service at [phone].             Medications           Message regarding Medications     Verify the changes and/or additions to your medication regime listed below are the same as discussed with your clinician today.  If any of these changes or additions are incorrect, please notify your healthcare provider.             Verify that the below list of medications is an accurate representation of the medications you are currently taking.  If none reported, the list may be blank. If incorrect, please contact your healthcare provider. Carry this list with you in case of emergency.           Current Medications     albuterol 90 mcg/actuation inhaler Inhale 2 puffs into the lungs every 4 (four) hours as needed for Wheezing.    atorvastatin (LIPITOR) 10 MG tablet Take 1 tablet (10 mg total) by mouth once daily.    budesonide-formoterol 160-4.5 mcg (SYMBICORT) 160-4.5 mcg/actuation HFAA Inhale 2 puffs into the lungs every 12 (twelve) hours. Controller    cycloSPORINE (RESTASIS) 0.05 % ophthalmic emulsion     desloratadine (CLARINEX) 5 mg tablet Take 5 mg by mouth once daily.    donepezil (ARICEPT) 10 MG tablet Take 1 tablet (10 mg total) by mouth every morning.    ESTRACE 0.01 % (0.1 mg/gram) vaginal cream     estradiol 0.05  "mg/24 hr td ptsw (VIVELLE-DOT) 0.05 mg/24 hr     fluticasone-salmeterol 250-50 mcg/dose (ADVAIR) 250-50 mcg/dose diskus inhaler Inhale 1 puff into the lungs 2 (two) times daily. Controller    ketoconazole (NIZORAL) 2 % shampoo     lisinopril 10 MG tablet Take 1 tablet (10 mg total) by mouth once daily.    meclizine (ANTIVERT) 25 mg tablet Take 25 mg by mouth 3 (three) times daily as needed.    MYRBETRIQ 50 mg Tb24 Take 1 tablet by mouth once daily.    olopatadine (PATADAY) 0.2 % Drop Place 1 drop into both eyes once daily.    salsalate (DISALCID) 750 MG Tab Take 1 tablet (750 mg total) by mouth 2 (two) times daily.    SYNTHROID 50 mcg tablet Take 1 tablet (50 mcg total) by mouth before breakfast.    fluocinolone-shower cap 0.01 % Oil     fluocinonide (LIDEX) 0.05 % external solution USE AS DIRECTED TWICE A DAY EXTERNALLY 1MONTH    glycopyrrolate (ROBINUL) 2 MG Tab Take 1 to 1.5 pills po daily as needed for excess sweating    levocetirizine (XYZAL) 5 MG tablet TAKE 1 TABLET BY MOUTH EVERY EVEINING    omeprazole (PRILOSEC) 40 MG capsule Take 1 capsule (40 mg total) by mouth once daily.           Clinical Reference Information           Your Vitals Were     BP Pulse Height Weight SpO2 BMI    120/60 68 5' 5" (1.651 m) 62.8 kg (138 lb 7.2 oz) 96% 23.04 kg/m2      Blood Pressure          Most Recent Value    BP  120/60      Allergies as of 3/21/2017     Trazodone    Talwin Compound    Talwin [Pentazocine Lactate]    Transzone    Bentyl [Dicyclomine]    Tessalon [Benzonatate]      Immunizations Administered on Date of Encounter - 3/21/2017     None      Orders Placed During Today's Visit      Normal Orders This Visit    Ambulatory Referral to Diabetes Education     Future Labs/Procedures Expected by Expires    Hemoglobin A1c  3/21/2017 5/20/2018    TSH  3/21/2017 3/21/2018      [facility] Sign-Up     Activating your [facility] account is as easy as 1-2-3!     1) Visit [email or web address], select Sign Up Now, enter this activation " code and your date of birth, then select Next.  [email or web address]  Expires: 4/21/2017  5:55 PM      2) Create a username and password to use when you visit [facility] in the future and select a security question in case you lose your password and select Next.    3) Enter your e-mail address and click Sign Up!    Additional Information  If you have questions, please e-mail [email or web address] or call [phone] to talk to our [facility] staff. Remember, [facility] is NOT to be used for urgent needs. For medical emergencies, dial 911.         Language Assistance Services     ATTENTION: Language assistance services are available, free of charge. Please call [phone].      ATENCIÓN: Si habla nandoañol, tiene a hills disposición servicios gratuitos de asistencia lingüística. Llame al [phone].     CHÚ Ý: N?u b?n nói Ti?ng Vi?t, có các d?ch v? h? tr? ngôn ng? mi?n phí dành cho b?n. G?i s? [phone].         [facility] complies with applicable Federal civil rights laws and does not discriminate on the basis of race, color, national origin, age, disability, or sex.

## 2017-03-21 NOTE — PROGRESS NOTES
"Subjective:       Patient ID: Debbie Ding is a 84 y.o. female.    Chief Complaint: Thyroid Problem    HPI Comments: Here for urgent visit    Breathing improved. Currently on Symbicort that she is getting from a friend. Recently seen by pulmonary. ECHO with PAP 36, no systolic or diastolic dysfunction, valves ultimately normal. No regimen changes at this time.    She continues to endorse profuse sweating controlled by glycopyrrolate.     She continues to endorse sensation of solids sticking in the throat, typically sandwiches and meats. She states she drinks water with meals           Review of Systems    Objective:      Vitals:    03/21/17 0825   BP: 120/60   Pulse: 68   SpO2: 96%   Weight: 62.8 kg (138 lb 7.2 oz)   Height: 5' 5" (1.651 m)      Physical Exam   Constitutional: She is oriented to person, place, and time. She appears well-developed and well-nourished. She does not have a sickly appearance. No distress.   HENT:   Head: Normocephalic and atraumatic.   Eyes: Conjunctivae and EOM are normal. Right eye exhibits no discharge. Left eye exhibits no discharge. No scleral icterus.   Pulmonary/Chest: Effort normal. No respiratory distress.   Abdominal: Normal appearance. She exhibits no distension.   Neurological: She is alert and oriented to person, place, and time.   Skin: Skin is warm and dry. She is not diaphoretic.   Psychiatric: She has a normal mood and affect. Her speech is normal.       Assessment:       1. Diabetes mellitus type 2, diet-controlled    2. Hyperhidrosis    3. Hypothyroidism, unspecified type        Plan:       Debbie was seen today for thyroid problem.    Diagnoses and all orders for this visit:    Diabetes mellitus type 2, diet-controlled  -     Ambulatory Referral to Diabetes Education  -     Hemoglobin A1c; Future    Hyperhidrosis  -     glycopyrrolate (ROBINUL) 2 MG Tab; Take 1 to 1.5 pills po daily as needed for excess sweating  -     TSH; Future    Hypothyroidism, unspecified " type  -     TSH; Future       Side effects of medication(s) were discussed in detail and patient voiced understanding.  Patient will call back for any issues or complications.

## 2017-03-31 ENCOUNTER — OUTPATIENT CASE MANAGEMENT (OUTPATIENT)
Dept: ADMINISTRATIVE | Facility: OTHER | Age: 82
End: 2017-03-31

## 2017-03-31 RX ORDER — LEVOTHYROXINE SODIUM 50 UG/1
50 TABLET ORAL
Qty: 30 TABLET | Refills: 5 | Status: SHIPPED | OUTPATIENT
Start: 2017-03-31 | End: 2017-06-21 | Stop reason: SDUPTHER

## 2017-03-31 NOTE — TELEPHONE ENCOUNTER
Please let patient know her diabetes is controlled and her thyroid levels are normal Refill has been sent in

## 2017-03-31 NOTE — TELEPHONE ENCOUNTER
----- Message from Anitra Casey RN sent at 3/31/2017  3:01 PM CDT -----  Hi Dr. Woo,    Just spoke to Mrs. Ding. She is waiting to hear from you regarding thyroid level. States that she will be out of synthroid next week and will need updated 90 day script to pharmacy if to continue with medication.     Thank you Dr. Woo.    Sincerely,    Anitra Casey, RN  Outpatient Case Management  [facility]  [phone]

## 2017-03-31 NOTE — PROGRESS NOTES
Called patient to provide follow up and to advise of termination of CCM program/case closure.    Spoke to patient and she confirmed pulmonary work up was complete and all tests seem to be wnl. She agrees taking her advair as prescribed but that she continues to have shortness of breath, not relieved with rescue inhaler. Confirms still taking reflux meds every day, been on for a long time. Discussed reflux tips. Advised to discuss these symptoms with GI doctor with next appt  ( scheduled next week)    Reviewed patient recent labs. - she advised not given refill on thyroid meds yet. Will run out next week.- message to pcp  Reviewd diabetic diet- avoidance of free sugars ( drinks, fruit juice etc) Diabetes management guide sent  [employer] info sent.      Patient verbalized her understanding and appreciation for assistance.

## 2017-03-31 NOTE — TELEPHONE ENCOUNTER
----- Message from Anitra Casey RN sent at 3/31/2017  3:01 PM CDT -----  Hi Dr. Woo,    Just spoke to Mrs. Ding. She is waiting to hear from you regarding thyroid level. States that she will be out of synthroid next week and will need updated 90 day script to pharmacy if to continue with medication.     Thank you Dr. Woo.    Sincerely,    Anitra Casey, RN  Outpatient Case Management  [facility]  [phone]

## 2017-04-06 ENCOUNTER — DOCUMENTATION ONLY (OUTPATIENT)
Dept: INTERNAL MEDICINE | Facility: CLINIC | Age: 82
End: 2017-04-06

## 2017-04-06 ENCOUNTER — HOSPITAL ENCOUNTER (OUTPATIENT)
Dept: DIABETES | Facility: OTHER | Age: 82
Discharge: HOME | End: 2017-04-06
Attending: INTERNAL MEDICINE
Payer: MEDICARE

## 2017-04-06 VITALS — HEIGHT: 64 IN | BODY MASS INDEX: 23.37 KG/M2 | WEIGHT: 136.88 LBS

## 2017-04-06 DIAGNOSIS — E11.9 TYPE 2 DIABETES, DIET CONTROLLED: Primary | ICD-10-CM

## 2017-04-06 NOTE — PROGRESS NOTES
04/06/17 0000   Diabetes Education Visit   Diabetes Education Record Assessment/Progress Initial   Diabetes Type   Diabetes Type  Type II   Diabetes History   Diabetes Diagnosis 0-1 year   Nutrition   Meal Planning 3 meals per day;eats out seldom  (drinks juice and water, likes vegetables, eas chicken, fish & ground meat about once a week, fruit cups)   Meal Plan 24 Hour Recall - Breakfast 2 eggs 4 oz of orange juice, usually has 1 egg and 1 slice of toast and coffee with sugar   Meal Plan 24 Hour Recall - Lunch red rice with apple juice and 5 oz of apple juice   Meal Plan 24 Hour Recall - Dinner broiled fish, 1 slice of bread and water   Meal Plan 24 Hour Recall - Snack 1 bag of potato chips with cranberry juice   Exercise    Exercise Type walking  (walks home from doctors appointments, and around neighborhood)   Intensity Low   Current Diabetes Treatment    Current Treatment Diet   Social History   Preferred Learning Method Face to Face   Primary Support Daughter   Educational Level High School   Occupation retired bus drivera and  provider   Smoking Status Never a Smoker   Alcohol Use Never   Barriers to Change   Barriers to Change None   Learning Challenges  None   Readiness to Learn    Readiness to Learn  Acceptance   Cultural Influences   Cultural Influences Yes   If yes, please list:    Diabetes Education Assessment/Progress   Acute Complications (preventing, detecting, and treating acute complications) DC;D;IS;4;W;5  (states gets a weak spell, not sure what it is, ed on causes s/s and tx for hypo and hyperglycemia)   Chronic Complications (preventing, detecting, and treating chronic complications) DC;IS;4;5;W  (ed on ADA SOC, upt on eye exam, dental, flu shot, not sure if upt on foot exam. reviewed A1c results and target A1c, stressed A1c control to limit LT complications of  DM)   Diabetes Disease Process (diabetes disease process and treatment options) DC;IS;4;W;5  (ed on risk  "factors for T2DM, ed on diff b/t preDM and type 2 DM, how diagnosed)   Nutrition (Incorporating nutritional management into one's lifestyle) DC;D;R;IS;4;W;5  (pt eats 3 small meals per day with fruit for snacks, diet high in juice, ed on plate method, limit juice ot 4 oz/day(omit oJ), cont to eat small portions of carbs with lean meats & nonstarchy vegs, stressed limit juice to 4 oz in am)   Physical Activity (incorporating physical activity into one's lifestyle) DC;IS;1;5;W  (pt walks around neighborhood for exercise, ed on benefits and precautions with PA)   Medications (states correct name, dose, onset, peak, duration, side effects & timing of meds) N/A   Monitoring (monitoring blood glucose/other parameters & using results) DC;D;R;IS;4;W;5  (Provided True Metrix meter, ed on SMBG with teach back, pt demoed ability to SMBG, BG was 87 PP, suggested she take BG when she has a "funny feeling" to rule out hypoglycemia, ed on target BG ranges)   Goal Setting and Problem Solving (verbalizes behavior change strategies & sets realistic goals) DC;IS;3;W;5   Behavior Change (developing personal strategies to health & behavior change) DC;IS;2;5   Psychosocial Issues (developing personal srategies to address psychosocial concerns) DC;IS;1;5  (Pt has a very supportive family, she is accecpting of DM diagnosis and eagar to learn)   Goals   Healthy Eating Set  (limit juice to 4 oz with breakfast)   Start Date 04/06/17   Diabetes Care Plan/Intervention   Education Plan/Intervention In F/U DSMT   Diabetes Meal Plan   Restrictions Restricted Carbohydrate;Low Potassium;Low Sodium   Calories 1500   Carbohydrate Per Meal 30-45g   Carbohydrate Per Snack  15-30g   Fat 60-66g   Protein 60-68g   Education Units of Time    Time Spent 60 min     Pt verbalized and demonstrated understanding of education/training.  Provided True Metrix meter and Diabetes Management Guide.  F/U DMST 6/22/17  "

## 2017-06-21 DIAGNOSIS — R61 HYPERHIDROSIS: ICD-10-CM

## 2017-06-21 RX ORDER — LEVOCETIRIZINE DIHYDROCHLORIDE 5 MG/1
TABLET, FILM COATED ORAL
Qty: 90 TABLET | Refills: 1 | Status: SHIPPED | OUTPATIENT
Start: 2017-06-21 | End: 2017-09-06

## 2017-06-21 RX ORDER — GLYCOPYRROLATE 2 MG/1
TABLET ORAL
Qty: 150 TABLET | Refills: 1 | Status: SHIPPED | OUTPATIENT
Start: 2017-06-21 | End: 2017-09-19 | Stop reason: SDUPTHER

## 2017-06-21 RX ORDER — LEVOTHYROXINE SODIUM 50 UG/1
50 TABLET ORAL
Qty: 30 TABLET | Refills: 5 | Status: SHIPPED | OUTPATIENT
Start: 2017-06-21 | End: 2018-03-28

## 2017-06-21 NOTE — TELEPHONE ENCOUNTER
----- Message from Marija Soliz sent at 6/19/2017 10:25 AM CDT -----  Contact: DEJAH ROLDAN [[id number]]  _  1st Request  _  2nd Request  _  3rd Request    Please refill the medication(s) listed below.     Medication #1 glycopyrrolate (ROBINUL) 2 MG Tab     Medication #2 SYNTHROID 50 mcg tablet    Medication #3 levocetirizine (XYZAL) 5 MG tablet     Preferred Pharmacy:  [facility] - [place] - [address] AT [address] [place] [address]  [facility] [place] [address]  Phone: [phone] Fax: [phone]

## 2017-07-07 RX ORDER — OMEPRAZOLE 40 MG/1
CAPSULE, DELAYED RELEASE ORAL
Qty: 90 CAPSULE | Refills: 1 | Status: SHIPPED | OUTPATIENT
Start: 2017-07-07 | End: 2018-01-03 | Stop reason: SDUPTHER

## 2017-07-10 ENCOUNTER — TELEPHONE (OUTPATIENT)
Dept: NEUROLOGY | Facility: CLINIC | Age: 82
End: 2017-07-10

## 2017-07-10 DIAGNOSIS — R41.3 MEMORY LOSS: ICD-10-CM

## 2017-07-10 DIAGNOSIS — G31.84 MILD COGNITIVE IMPAIRMENT: ICD-10-CM

## 2017-07-10 RX ORDER — DONEPEZIL HYDROCHLORIDE 10 MG/1
10 TABLET, FILM COATED ORAL EVERY MORNING
Qty: 90 TABLET | Refills: 3 | Status: SHIPPED | OUTPATIENT
Start: 2017-07-10 | End: 2017-09-27 | Stop reason: SDUPTHER

## 2017-07-10 NOTE — TELEPHONE ENCOUNTER
----- Message from Ines Mac sent at 7/10/2017  8:50 AM CDT -----  Contact: Patient [phone]  Patient is requesting a refill on her donepezil (ARICEPT) 10 MG tablet.        [facility]/pharmacy #[id number] - [facility] [address] [address]  Phone: [phone] Fax: [phone]

## 2017-07-31 ENCOUNTER — TELEPHONE (OUTPATIENT)
Dept: RHEUMATOLOGY | Facility: CLINIC | Age: 82
End: 2017-07-31

## 2017-07-31 ENCOUNTER — OFFICE VISIT (OUTPATIENT)
Dept: PODIATRY | Facility: CLINIC | Age: 82
End: 2017-07-31
Payer: MEDICARE

## 2017-07-31 ENCOUNTER — HOSPITAL ENCOUNTER (OUTPATIENT)
Dept: RADIOLOGY | Facility: HOSPITAL | Age: 82
Discharge: HOME | End: 2017-07-31
Attending: PODIATRIST
Payer: MEDICARE

## 2017-07-31 ENCOUNTER — OFFICE VISIT (OUTPATIENT)
Dept: RHEUMATOLOGY | Facility: CLINIC | Age: 82
End: 2017-07-31
Payer: MEDICARE

## 2017-07-31 VITALS
HEIGHT: 64 IN | DIASTOLIC BLOOD PRESSURE: 76 MMHG | DIASTOLIC BLOOD PRESSURE: 71 MMHG | SYSTOLIC BLOOD PRESSURE: 161 MMHG | HEART RATE: 61 BPM | BODY MASS INDEX: 22.56 KG/M2 | BODY MASS INDEX: 22.56 KG/M2 | SYSTOLIC BLOOD PRESSURE: 161 MMHG | WEIGHT: 132.13 LBS | HEART RATE: 59 BPM | WEIGHT: 132.13 LBS | HEIGHT: 64 IN

## 2017-07-31 DIAGNOSIS — M15.9 PRIMARY OSTEOARTHRITIS INVOLVING MULTIPLE JOINTS: Primary | ICD-10-CM

## 2017-07-31 DIAGNOSIS — M77.42 METATARSALGIA OF LEFT FOOT: ICD-10-CM

## 2017-07-31 DIAGNOSIS — M20.5X9 HALLUX LIMITUS, UNSPECIFIED LATERALITY: ICD-10-CM

## 2017-07-31 DIAGNOSIS — M79.7 FIBROMYALGIA: ICD-10-CM

## 2017-07-31 DIAGNOSIS — Z79.1 NSAID LONG-TERM USE: ICD-10-CM

## 2017-07-31 DIAGNOSIS — R13.11 ORAL PHASE DYSPHAGIA: ICD-10-CM

## 2017-07-31 DIAGNOSIS — B35.1 ONYCHOMYCOSIS DUE TO DERMATOPHYTE: ICD-10-CM

## 2017-07-31 DIAGNOSIS — M20.42 HAMMER TOE OF LEFT FOOT: Primary | ICD-10-CM

## 2017-07-31 DIAGNOSIS — L84 CORN OR CALLUS: ICD-10-CM

## 2017-07-31 DIAGNOSIS — M20.42 HAMMER TOE OF LEFT FOOT: ICD-10-CM

## 2017-07-31 PROCEDURE — 99999 PR PBB SHADOW E&M-EST. PATIENT-LVL III: CPT | Mod: PBBFAC,,, | Performed by: INTERNAL MEDICINE

## 2017-07-31 PROCEDURE — 73630 X-RAY EXAM OF FOOT: CPT | Mod: 26,LT,, | Performed by: RADIOLOGY

## 2017-07-31 PROCEDURE — 99213 OFFICE O/P EST LOW 20 MIN: CPT | Mod: S$PBB,,, | Performed by: INTERNAL MEDICINE

## 2017-07-31 PROCEDURE — 99203 OFFICE O/P NEW LOW 30 MIN: CPT | Mod: S$PBB,25,, | Performed by: PODIATRIST

## 2017-07-31 PROCEDURE — 1125F AMNT PAIN NOTED PAIN PRSNT: CPT | Mod: ,,, | Performed by: INTERNAL MEDICINE

## 2017-07-31 PROCEDURE — 1159F MED LIST DOCD IN RCRD: CPT | Mod: ,,, | Performed by: PODIATRIST

## 2017-07-31 PROCEDURE — 99999 PR PBB SHADOW E&M-EST. PATIENT-LVL V: CPT | Mod: PBBFAC,,, | Performed by: PODIATRIST

## 2017-07-31 PROCEDURE — 73630 X-RAY EXAM OF FOOT: CPT | Mod: TC,LT

## 2017-07-31 PROCEDURE — 99213 OFFICE O/P EST LOW 20 MIN: CPT | Mod: PBBFAC,27 | Performed by: INTERNAL MEDICINE

## 2017-07-31 PROCEDURE — 1125F AMNT PAIN NOTED PAIN PRSNT: CPT | Mod: ,,, | Performed by: PODIATRIST

## 2017-07-31 PROCEDURE — 1159F MED LIST DOCD IN RCRD: CPT | Mod: ,,, | Performed by: INTERNAL MEDICINE

## 2017-07-31 PROCEDURE — 11057 PARNG/CUTG B9 HYPRKR LES >4: CPT | Mod: S$PBB,Q8,, | Performed by: PODIATRIST

## 2017-07-31 RX ORDER — SALSALATE 750 MG
750 TABLET ORAL 3 TIMES DAILY
Qty: 270 TABLET | Refills: 2 | Status: SHIPPED | OUTPATIENT
Start: 2017-07-31 | End: 2018-01-31

## 2017-07-31 RX ORDER — TIZANIDINE 4 MG/1
4 TABLET ORAL NIGHTLY
Qty: 90 TABLET | Refills: 2 | Status: SHIPPED | OUTPATIENT
Start: 2017-07-31 | End: 2017-09-06

## 2017-07-31 RX ORDER — KETOCONAZOLE 20 MG/G
CREAM TOPICAL DAILY
Qty: 30 G | Refills: 6 | Status: ON HOLD | OUTPATIENT
Start: 2017-07-31 | End: 2019-04-30 | Stop reason: HOSPADM

## 2017-07-31 NOTE — PATIENT INSTRUCTIONS
- Pedifix metatarsal pads, Gel ball of foot pad/protector sleeve, Budin splints for 2nd and 3rd toes (PediFix Double-Toe  #P57)    - Gel toe sleeves for corns    (found at [employer], [facility], [facility]....)    - Supportive shoes with stiff or rocker sole (HOKA,  Shape-up, Dansko)    - Full length orthotic brands to consider: spenco, superfeet, sof sole fit series, Powerstep    ([employer], [employer] fitness, [employer], [place] [employer] company, Therapeutic shoes, Goodfeet store, Cantile[employer])    What is Metatarsalgia?  Metatarsalgia is pain in the ball of your foot. It is often caused by wearing shoes with thin soles and high heels. This puts extra pressure on the bones in the ball of the foot. Standing or walking on a hard surface for long periods also puts added pressure on the bones, causing pain. The pain can occur under any of the five metatarsal bones, although it's most common in the second. Bent or twisted toes and bunions can make the problem worse. So can being overweight. Sometimes high arches or arthritis can also cause metatarsalgia.    Inside the ball of your foot  The long bones in the middle of your foot are called the metatarsal bones. Each metatarsal bone ends in the ball of the foot. When you walk, these bones bear the weight of your body as your foot pushes off the ground. If there is more pressure on the end of one bone, it presses on the skin beneath it. This causes pain and inflammation in the ball of the foot (metatarsalgia). A callus (a hard growth of skin) may also form on the ball of the foot.    Symptoms  The most common symptom of metatarsalgia is pain in the ball of the foot. It may feel as if you have a stone in your shoe. The ball of the foot may also become red and inflamed, and a callus may form under the end of the metatarsal bone.   Date Last Reviewed: 9/29/2015  © 6712-4445 [employer]. [address] [place] [address], [place] [address]. All  "rights reserved. This information is not intended as a substitute for professional medical care. Always follow your healthcare professional's instructions.        Treating Metatarsalgia  Metatarsalgia is pain in the "ball of your foot," the area between your arch and your toes. The pain usually starts in one or more of the five bones in this area under the toes (these bones are called the metatarsals). Often, a callus builds up in this area.In most cases, wearing low-heeled, well-cushioned shoes and filing down the callus will relieve the pain. If these steps dont work, your healthcare provider may suggest surgery to remove part of the bone. To take pressure off the ball of your foot and relieve the pain, your healthcare provider may suggest that you do one or more of the following.  Wear shoes with padded soles  Wear shoes with thick padding in the soles. Keep heels low, and make sure the shoe is wide across the ball of your foot and your toes.    Using a metatarsal pad  Put a metatarsal pad in your shoe. This lifts and takes pressure off the painful area. To insert the pad:  · Put a small lipstick nam on the callus.  · Step into the shoe to leave a nam on the insole.  · Peel the backing off the pad. Then put the pad in the shoe just behind the lipstick nam.  Inserts with built-in metatarsal pads may also be available.  Filing the callus  · Soak your foot in warm water for a few minutes to soften the skin.  · Dry the foot.  · Then gently rub the callus with a pumice stone or nail file to remove the hard skin. Stop if bleeding or pain occurs.  · Diabetes should get foot care from healthcare providers familiar with diabetes care.  Date Last Reviewed: 9/29/2015  © 1151-3813 [employer]. [address] [place] [address], [place] [address]. All rights reserved. This information is not intended as a substitute for professional medical care. Always follow your healthcare professional's " instructions.        Treating Mallet, Hammer, and Claw Toes  Definitions  A hammer toe has an abnormal bend in the middle joint of your toe (toe is bent upward at joint).  Mallet toe affects the joint nearest your toenail (toe is bent downward at joint).  Claw toe affects the joint at the ball of your foot (toe is bent upward at joint), as well as both toe joints (toe is bent downward at both joints).  Hammer toe and mallet toe are most likely to occur in the toe next to your big toe.  Causes  The most common cause for all 3 deformities is poorly fitting shoes and tight shoes, especially high heels for women. Trauma and nerve damage from various diseases like diabetes may also cause these deformities.  Treatment  Buying shoes with more room in the toes, filing down corns and calluses, and padding, taping, or strapping the toe most often relieve the pain. Toe stretching and exercises may also be helpful. If these steps dont work, you may need surgery to straighten your toes.  Shoes  Buy low-heeled shoes with plenty of room in the front. This keeps your toes from being jammed against the end of your shoe. It also keeps your shoe from rubbing the tops of your toes.  Corns and calluses    To file down a corn or callus, soak your foot in warm water. This softens the hard skin. Dry your foot. Then gently rub the corn or callus with a pumice stone or nail file.  Pads and splints  If you still have pain, you may need to put a pad or splint on your toe. This helps take pressure off the painful corn or callus.  · For a mallet toe, you can put a gel pad on the toe. This keeps the tip of the toe from rubbing against the bottom of the shoe.  · For a hammer or claw toe, you can put a felt or foam pad over the bent joint. This keeps the toe from rubbing on the top of the shoe.  · For a hammer or claw toe that is still flexible, you can put a splint on the toe. This keeps it straight so it doesn't rub on the top of the  shoe.    Date Last Reviewed: 9/29/2015  © 9681-9049 [employer]. [address] [place] [address], [place] [address]. All rights reserved. This information is not intended as a substitute for professional medical care. Always follow your healthcare professional's instructions.

## 2017-07-31 NOTE — PROGRESS NOTES
History of present illness: 84-year-old female with chronic pain due to osteoarthritis and fibromyalgia.  She was last seen in January.  Her pain is doing worse.  She remains on salsalate twice daily.  It is giving her some relief.  She appears to be tolerating it.  She is also taking Tylenol several times daily.  This gives her some relief.  Tramadol stopped working.  She ran out of her Flexeril and did not get it refilled.  It had help but it made her very drowsy next day.  She had actually responded to Zanaflex in the past but I changed it when it seemed to stop working.  She has been using heat without any response.  She had injections in the first CMC but these did not help.    She also has some swallowing difficulty.  She describes problems with initiating the swallowing mechanism.  She had not seen anybody for this previously.  Food does not seem to get stuck in the esophagus.  In addition she has been having problems with her nerves.    Physical examination:  Musculoskeletal: She has bony hypertrophy of the first CMC bilaterally and Z thumb deformities.  Wrists, elbows, shoulders are unremarkable.  She has tenderness to palpation of the lower lumbar spine, left buttock, and left greater trochanter.  She has decreased range of motion lumbar spine.  She has tenderness in the left knee but no effusion.    Assessment: Osteoarthritis and fibromyalgia    Plans:  1.  Increase salsalate to 750 mg 3 times daily  2.  I told her to take arthritis Tylenol 2 twice daily  3.  I am resuming Zanaflex 4 mg at bedtime in place of the Flexeril  4.  Laboratory studies are obtained  5.  I recommend use of lidocaine patch to her buttock  6.  I have referred her to ENT for her swallowing problem  7.  I recommend calling her primary doctor about her anxiety  8.  Return to see me in 6 months

## 2017-07-31 NOTE — PROGRESS NOTES
Subjective:      Patient ID: Debbie Ding is a 84 y.o. female.    Chief Complaint: Foot Problem (bilateral 3/21/17 PCP Dr. Woo) and Mathieu Lewis is a 84 y.o. female who presents to the podiatry clinic  with complaint of  left foot pain. Onset of the symptoms was several years ago. Precipitating event: none known. Current symptoms include: ability to bear weight, but with some pain, stiffness and worsening symptoms after a period of activity. Aggravating factors: any weight bearing. Symptoms have waxed and waned but are worse overall. Patient has had prior foot problems. Evaluation to date: several community podiatrist. Treatment to date: left foot mortons neuroma removal about 10 yrs ago. She has arch support but not using at this time. She was trimming calluses herself until she was diagnosed pre-diabetic. Patients rates pain 4/10 on pain scale.        Review of Systems   Constitution: Negative for chills, fever, weakness, malaise/fatigue and night sweats.   Cardiovascular: Negative for chest pain, leg swelling, orthopnea and palpitations.   Respiratory: Negative for cough, shortness of breath and wheezing.    Skin: Positive for color change, dry skin, nail changes and suspicious lesions. Negative for itching, poor wound healing and rash.   Musculoskeletal: Positive for back pain, joint pain and myalgias. Negative for arthritis, gout, joint swelling and muscle weakness.   Gastrointestinal: Negative for abdominal pain, constipation and nausea.   Neurological: Positive for paresthesias. Negative for disturbances in coordination, dizziness, focal weakness, numbness and tremors.           Objective:      Physical Exam   Constitutional: She is oriented to person, place, and time. Vital signs are normal. She appears well-developed. She is cooperative. No distress.   Cardiovascular: Intact distal pulses.    Pulses:       Dorsalis pedis pulses are 2+ on the right side, and 2+ on the left side.         Posterior tibial pulses are 2+ on the right side, and 2+ on the left side.   Musculoskeletal:        Right ankle: Normal.        Left ankle: Normal.        Right foot: There is normal range of motion, no bony tenderness, normal capillary refill, no crepitus and no deformity.        Left foot: There is normal range of motion, no bony tenderness, normal capillary refill, no crepitus and no deformity.   Tenderness at plantar 3rd and 4th MTPJ and 4th PIPJ, left. Mild pain at plantar hallux IPJ, bilat.     Rigid hammertoe, left 4th. Flexible hammertoe at remaining lesser toes, bilat.    Decreased, but nonpainful 1st MtPJ DF, bilat.        Neurological: She is alert and oriented to person, place, and time. She has normal strength. No sensory deficit. She exhibits normal muscle tone. Gait normal.   Reflex Scores:       Achilles reflexes are 2+ on the right side and 2+ on the left side.  Negative Tinels sign and Vj's click, bilat.   Skin: Skin is intact. Lesion noted. No ecchymosis noted. No erythema. Nails show no clubbing.   Large callus at plantar 3rd/4th MTPJ, left and plantar hallux IPJ, bilat    Toenails 1-5 bilaterally are elongated, thickened by 2-3 mm, discolored/yellowed, dystrophic, brittle with subungual debris.               Assessment:       Encounter Diagnoses   Name Primary?    Hammer toe of left foot Yes    Metatarsalgia of left foot     Hallux limitus, unspecified laterality     Corn or callus     Onychomycosis due to dermatophyte          Plan:       Debbie was seen today for foot problem and callouses.    Diagnoses and all orders for this visit:    Hammer toe of left foot  -     X-Ray Foot Complete Left; Future    Metatarsalgia of left foot  -     X-Ray Foot Complete Left; Future    Hallux limitus, unspecified laterality    Corn or callus    Onychomycosis due to dermatophyte  -     ketoconazole (NIZORAL) 2 % cream; Apply topically once daily. Toe nails      I counseled the patient on her  conditions, their implications and medical management.    Shoe gear modification, orthotics, metatarsal pad options reviewed.    Metatarsal pads added to shoe and instructions reviewed for addition to inserts.    Xray to rule out metatarsal exostosis, DJD.    Bring arch support to next visit in 2-3 months or sooner, prn.    Discussed all treatment options such as topical, oral, laser treatments vs surgical removal of nail permanent vs non-permanent in detail pertaining to nail fungus. She would like to try topical antifungal.     After cleansing the  area w/ alcohol prep pad the above mentioned hyperkeratosis was trimmed utilizing No 15 scapel, to a smooth base with out incident. Patient tolerated this  well and reported comfort to the area of plantar 3rd/4th MTPJ, left and plantar hallux IPJ, bilat

## 2017-08-01 ENCOUNTER — TELEPHONE (OUTPATIENT)
Dept: RHEUMATOLOGY | Facility: CLINIC | Age: 82
End: 2017-08-01

## 2017-08-08 ENCOUNTER — TELEPHONE (OUTPATIENT)
Dept: RHEUMATOLOGY | Facility: CLINIC | Age: 82
End: 2017-08-08

## 2017-08-08 NOTE — TELEPHONE ENCOUNTER
Pt. Wanted to let Dr Dunne know that she cannot take her Zanaflex because it was gave her bad dreams last night, and  caused her to fall out of her bed. Pt. Reported that her left side of body has been hurting since fall. I verbalized to pt. That she may want to call her PCP to get checked out, or if pain persist go to ER, because she may need x-rays. Pt. Verbalized understanding.

## 2017-08-09 ENCOUNTER — TELEPHONE (OUTPATIENT)
Dept: RHEUMATOLOGY | Facility: CLINIC | Age: 82
End: 2017-08-09

## 2017-08-09 NOTE — TELEPHONE ENCOUNTER
"Verbalized to pt. Per Dr. Dunne "Since she has had a reaction to her last to muscle relaxants I will not start her on a different muscle relaxant at the present time.  Continue her other medications as before. " Pt. Verbalized understanding and reported that since her fall her left foot has become swollen . I verbalized to the pt. That she should try to get an appointment with her PCP or go to ER to have it checked out. Pt. Reported that she has transportation issues at this time, so I advised her to elevate foot, ice it for 20 minutes, a few times today and to wrap it with ace bandage. I also verbalized that she should still call her PCP for advise if it continues to get worse. Pt. Verbalized understanding.  "

## 2017-09-01 DIAGNOSIS — E11.9 TYPE 2 DIABETES MELLITUS WITHOUT COMPLICATION: ICD-10-CM

## 2017-09-01 DIAGNOSIS — R61 HYPERHIDROSIS: ICD-10-CM

## 2017-09-01 RX ORDER — GLYCOPYRROLATE 2 MG/1
TABLET ORAL
Qty: 135 TABLET | Refills: 1 | Status: SHIPPED | OUTPATIENT
Start: 2017-09-01 | End: 2018-08-16 | Stop reason: SDUPTHER

## 2017-09-06 ENCOUNTER — LAB VISIT (OUTPATIENT)
Dept: LAB | Facility: OTHER | Age: 82
End: 2017-09-06
Attending: INTERNAL MEDICINE
Payer: MEDICARE

## 2017-09-06 ENCOUNTER — OFFICE VISIT (OUTPATIENT)
Dept: INTERNAL MEDICINE | Facility: CLINIC | Age: 82
End: 2017-09-06
Attending: INTERNAL MEDICINE
Payer: MEDICARE

## 2017-09-06 VITALS
HEART RATE: 70 BPM | DIASTOLIC BLOOD PRESSURE: 68 MMHG | BODY MASS INDEX: 22.77 KG/M2 | SYSTOLIC BLOOD PRESSURE: 124 MMHG | HEIGHT: 64 IN | WEIGHT: 133.38 LBS

## 2017-09-06 DIAGNOSIS — R61 NIGHT SWEATS: ICD-10-CM

## 2017-09-06 DIAGNOSIS — R61 NIGHT SWEATS: Primary | ICD-10-CM

## 2017-09-06 DIAGNOSIS — R06.09 DOE (DYSPNEA ON EXERTION): ICD-10-CM

## 2017-09-06 DIAGNOSIS — E11.9 TYPE 2 DIABETES MELLITUS WITHOUT COMPLICATION: ICD-10-CM

## 2017-09-06 DIAGNOSIS — F32.A ANXIETY AND DEPRESSION: ICD-10-CM

## 2017-09-06 DIAGNOSIS — J45.909 CHRONIC ASTHMA, UNSPECIFIED ASTHMA SEVERITY, UNCOMPLICATED: ICD-10-CM

## 2017-09-06 DIAGNOSIS — F41.9 ANXIETY AND DEPRESSION: ICD-10-CM

## 2017-09-06 DIAGNOSIS — Z11.4 ENCOUNTER FOR SCREENING FOR HIV: ICD-10-CM

## 2017-09-06 DIAGNOSIS — N18.30 CHRONIC RENAL INSUFFICIENCY, STAGE 3 (MODERATE): ICD-10-CM

## 2017-09-06 PROCEDURE — 1126F AMNT PAIN NOTED NONE PRSNT: CPT | Mod: ,,, | Performed by: INTERNAL MEDICINE

## 2017-09-06 PROCEDURE — 3078F DIAST BP <80 MM HG: CPT | Mod: ,,, | Performed by: INTERNAL MEDICINE

## 2017-09-06 PROCEDURE — 99999 PR PBB SHADOW E&M-EST. PATIENT-LVL III: CPT | Mod: PBBFAC,,, | Performed by: INTERNAL MEDICINE

## 2017-09-06 PROCEDURE — 99213 OFFICE O/P EST LOW 20 MIN: CPT | Mod: PBBFAC | Performed by: INTERNAL MEDICINE

## 2017-09-06 PROCEDURE — 3074F SYST BP LT 130 MM HG: CPT | Mod: ,,, | Performed by: INTERNAL MEDICINE

## 2017-09-06 PROCEDURE — 99214 OFFICE O/P EST MOD 30 MIN: CPT | Mod: S$PBB,,, | Performed by: INTERNAL MEDICINE

## 2017-09-06 PROCEDURE — 1159F MED LIST DOCD IN RCRD: CPT | Mod: ,,, | Performed by: INTERNAL MEDICINE

## 2017-09-06 RX ORDER — MONTELUKAST SODIUM 10 MG/1
10 TABLET ORAL NIGHTLY
Qty: 30 TABLET | Refills: 0 | Status: SHIPPED | OUTPATIENT
Start: 2017-09-06 | End: 2017-10-06

## 2017-09-06 RX ORDER — DULOXETIN HYDROCHLORIDE 20 MG/1
CAPSULE, DELAYED RELEASE ORAL
Qty: 90 CAPSULE | Refills: 1 | Status: SHIPPED | OUTPATIENT
Start: 2017-09-06 | End: 2018-03-26 | Stop reason: SDUPTHER

## 2017-09-06 RX ORDER — FLUTICASONE PROPIONATE AND SALMETEROL 500; 50 UG/1; UG/1
1 POWDER RESPIRATORY (INHALATION) 2 TIMES DAILY
Qty: 180 EACH | Refills: 5 | Status: SHIPPED | OUTPATIENT
Start: 2017-09-06 | End: 2018-09-06

## 2017-09-06 NOTE — PROGRESS NOTES
"Subjective:       Patient ID: Debbie Ding is a 84 y.o. female.    Chief Complaint: Shortness of Breath and Fatigue    Here for follow up    83 yo F PMHx of chronic asthma, hypothyroidism, GERD with dysphagia, HTN, fibromyalgia, MCI, OAB, depression presents with:  Continued SOB and fatigue. She gets short of breath with minimal exertion of 15-20ft, She has been without her advair for past 1-2 months due to being out of er her advair. She was previously on 500/50 so she had been doubling her advair 250/50. She is mostly concerned about her fatigue. She reports her fatigue has been progressively worsening and begins to cry. She goes to bed at 10:30 and is not sure what time she falls asleep and then awakens at 0500. She wakes without any energy. She states she "wakes to sit back down ". She states her "nerves are shot" and "needs to scream". She reports frequent uncontrollable crying spells. She reports continued weight loss despite eating 3 meals a day. She has a Hx of GERD and dysphagia that is currently being worked up by Dr. Finch at [facility]. She states she was sent to a GI MD at [facility] by Dr Finch who then sent her back to Dr. Finch. She has not seen Dr Finch again nor has she had planned MBSS. She is able to eat and drinks despite symptoms. Weight is down 10lbs in 9 months. She had previously reported a several year Hx of sweating that start on her scalp. She was being seen by dermatology and started on glycopyrrolate. She now reports full body night sweats for the past 2 months. She walks on her treadmill twice a day for 10 minutes at a time.            Review of Systems   Constitutional: Positive for activity change, diaphoresis, fatigue and unexpected weight change. Negative for appetite change.   Respiratory: Positive for chest tightness and shortness of breath.    Cardiovascular: Negative for palpitations.   Gastrointestinal: Positive for constipation. Negative for blood in stool and diarrhea. " "  Genitourinary: Positive for frequency.   Musculoskeletal: Positive for arthralgias.   Neurological: Positive for headaches.   Hematological: Negative for adenopathy. Does not bruise/bleed easily.   Psychiatric/Behavioral: Positive for dysphoric mood. The patient is nervous/anxious.        Objective:      Vitals:    09/06/17 1349   BP: 124/68   Pulse: 70   Weight: 60.5 kg (133 lb 6.1 oz)   Height: 5' 4" (1.626 m)      Physical Exam   Constitutional: She is oriented to person, place, and time. She appears well-developed and well-nourished. No distress.   HENT:   Head: Normocephalic and atraumatic.   Mouth/Throat: Oropharynx is clear and moist. No oropharyngeal exudate.   Eyes: Conjunctivae and EOM are normal. Pupils are equal, round, and reactive to light. No scleral icterus.   Neck: No thyromegaly present.   Cardiovascular: Normal rate, regular rhythm and normal heart sounds.    No murmur heard.  Pulmonary/Chest: Effort normal and breath sounds normal. She has no wheezes. She has no rales.   Abdominal: Soft. She exhibits no distension. There is no tenderness.   Musculoskeletal: She exhibits no edema or tenderness.   Lymphadenopathy:        Head (right side): No submandibular, no tonsillar, no posterior auricular and no occipital adenopathy present.        Head (left side): No submandibular, no tonsillar, no preauricular, no posterior auricular and no occipital adenopathy present.     She has no cervical adenopathy.        Right cervical: No deep cervical adenopathy present.       Left cervical: No deep cervical adenopathy present.     She has no axillary adenopathy.        Right: No supraclavicular adenopathy present.        Left: No supraclavicular adenopathy present.   Neurological: She is alert and oriented to person, place, and time.   Skin: Skin is warm and dry.   Psychiatric: She has a normal mood and affect. Her behavior is normal.       Assessment:       1. Night sweats    2. MELISSA (dyspnea on exertion)    3. " Chronic asthma, unspecified asthma severity, uncomplicated    4. Encounter for screening for HIV     5. Anxiety and depression        Plan:       Debbie was seen today for shortness of breath and fatigue.    Diagnoses and all orders for this visit:    Night sweats  -     CBC auto differential; Future  -     TSH; Future  -     T4, free; Future  -     Comprehensive metabolic panel; Future  -     Protein electrophoresis, serum; Future  -     HIV-1 and HIV-2 antibodies; Future  -     Hepatitis B surface antigen; Future  -     Hepatitis C antibody; Future  -     QUANTIFERON GOLD TB; Future    MELISSA (dyspnea on exertion)/Chronic asthma, unspecified asthma severity, uncomplicated  -restart Advair and start Singulair. Once started complete 6 minute walk test.  -     X-Ray Chest PA And Lateral; Future  -     Brain natriuretic peptide; Future  -     Six Minute Walk Test to qualify for Home Oxygen; Future  -     montelukast (SINGULAIR) 10 mg tablet; Take 1 tablet (10 mg total) by mouth every evening.  -     fluticasone-salmeterol 500-50 mcg/dose (ADVAIR) 500-50 mcg/dose DsDv diskus inhaler; Inhale 1 puff into the lungs 2 (two) times daily. Controller    Encounter for screening for HIV   -     HIV-1 and HIV-2 antibodies; Future    Anxiety and depression  -     START duloxetine (CYMBALTA) 20 MG capsule; Take every other day for 2 weeks then take daily.    RTC in 6 weeks           Side effects of medication(s) were discussed in detail and patient voiced understanding.  Patient will call back for any issues or complications.

## 2017-09-07 ENCOUNTER — APPOINTMENT (OUTPATIENT)
Dept: LAB | Facility: OTHER | Age: 82
End: 2017-09-07
Attending: INTERNAL MEDICINE
Payer: MEDICARE

## 2017-09-07 ENCOUNTER — TELEPHONE (OUTPATIENT)
Dept: NEUROLOGY | Facility: CLINIC | Age: 82
End: 2017-09-07

## 2017-09-07 LAB
ALBUMIN SERPL BCP-MCNC: 3.9 G/DL
ALP SERPL-CCNC: 53 U/L
ALT SERPL W/O P-5'-P-CCNC: 7 U/L
ANION GAP SERPL CALC-SCNC: 12 MMOL/L
ANION GAP SERPL CALC-SCNC: 12 MMOL/L
AST SERPL-CCNC: 17 U/L
BASOPHILS # BLD AUTO: 0.02 K/UL
BASOPHILS NFR BLD: 0.5 %
BILIRUB SERPL-MCNC: 1.1 MG/DL
BNP SERPL-MCNC: 49 PG/ML
BUN SERPL-MCNC: 14 MG/DL
BUN SERPL-MCNC: 14 MG/DL
CALCIUM SERPL-MCNC: 10.1 MG/DL
CALCIUM SERPL-MCNC: 10.1 MG/DL
CHLORIDE SERPL-SCNC: 106 MMOL/L
CHLORIDE SERPL-SCNC: 106 MMOL/L
CHOLEST SERPL-MCNC: 249 MG/DL
CHOLEST/HDLC SERPL: 2.6 {RATIO}
CO2 SERPL-SCNC: 23 MMOL/L
CO2 SERPL-SCNC: 23 MMOL/L
CREAT SERPL-MCNC: 1.5 MG/DL
CREAT SERPL-MCNC: 1.5 MG/DL
DIFFERENTIAL METHOD: ABNORMAL
EOSINOPHIL # BLD AUTO: 0.1 K/UL
EOSINOPHIL NFR BLD: 3.5 %
ERYTHROCYTE [DISTWIDTH] IN BLOOD BY AUTOMATED COUNT: 15.1 %
EST. GFR  (AFRICAN AMERICAN): 37 ML/MIN/1.73 M^2
EST. GFR  (AFRICAN AMERICAN): 37 ML/MIN/1.73 M^2
EST. GFR  (NON AFRICAN AMERICAN): 32 ML/MIN/1.73 M^2
EST. GFR  (NON AFRICAN AMERICAN): 32 ML/MIN/1.73 M^2
GLUCOSE SERPL-MCNC: 110 MG/DL
GLUCOSE SERPL-MCNC: 110 MG/DL
HCT VFR BLD AUTO: 38.9 %
HDLC SERPL-MCNC: 95 MG/DL
HDLC SERPL: 38.2 %
HGB BLD-MCNC: 13 G/DL
LDLC SERPL CALC-MCNC: 125.6 MG/DL
LYMPHOCYTES # BLD AUTO: 1.7 K/UL
LYMPHOCYTES NFR BLD: 41.6 %
MCH RBC QN AUTO: 29.7 PG
MCHC RBC AUTO-ENTMCNC: 33.4 G/DL
MCV RBC AUTO: 89 FL
MONOCYTES # BLD AUTO: 0.2 K/UL
MONOCYTES NFR BLD: 6 %
NEUTROPHILS # BLD AUTO: 1.9 K/UL
NEUTROPHILS NFR BLD: 48.4 %
NONHDLC SERPL-MCNC: 154 MG/DL
PLATELET # BLD AUTO: 187 K/UL
PMV BLD AUTO: 11.6 FL
POTASSIUM SERPL-SCNC: 4.4 MMOL/L
POTASSIUM SERPL-SCNC: 4.4 MMOL/L
PROT SERPL-MCNC: 7.7 G/DL
RBC # BLD AUTO: 4.37 M/UL
SODIUM SERPL-SCNC: 141 MMOL/L
SODIUM SERPL-SCNC: 141 MMOL/L
T4 FREE SERPL-MCNC: 1.08 NG/DL
TRIGL SERPL-MCNC: 142 MG/DL
TSH SERPL DL<=0.005 MIU/L-ACNC: 1.97 UIU/ML
WBC # BLD AUTO: 3.97 K/UL

## 2017-09-07 PROCEDURE — 86803 HEPATITIS C AB TEST: CPT

## 2017-09-07 PROCEDURE — 84165 PROTEIN E-PHORESIS SERUM: CPT

## 2017-09-07 PROCEDURE — 84165 PROTEIN E-PHORESIS SERUM: CPT | Mod: 26,,, | Performed by: PATHOLOGY

## 2017-09-07 PROCEDURE — 80061 LIPID PANEL: CPT

## 2017-09-07 PROCEDURE — 87340 HEPATITIS B SURFACE AG IA: CPT

## 2017-09-07 PROCEDURE — 84439 ASSAY OF FREE THYROXINE: CPT

## 2017-09-07 PROCEDURE — 85025 COMPLETE CBC W/AUTO DIFF WBC: CPT

## 2017-09-07 PROCEDURE — 83880 ASSAY OF NATRIURETIC PEPTIDE: CPT

## 2017-09-07 PROCEDURE — 80053 COMPREHEN METABOLIC PANEL: CPT

## 2017-09-07 PROCEDURE — 36415 COLL VENOUS BLD VENIPUNCTURE: CPT

## 2017-09-07 PROCEDURE — 86703 HIV-1/HIV-2 1 RESULT ANTBDY: CPT

## 2017-09-07 PROCEDURE — 84443 ASSAY THYROID STIM HORMONE: CPT

## 2017-09-07 NOTE — TELEPHONE ENCOUNTER
Patient has no showed for appointment today, and left message to contact out office if she would like to reschedule.

## 2017-09-08 LAB
ALBUMIN SERPL ELPH-MCNC: 4.46 G/DL
ALPHA1 GLOB SERPL ELPH-MCNC: 0.29 G/DL
ALPHA2 GLOB SERPL ELPH-MCNC: 0.74 G/DL
B-GLOBULIN SERPL ELPH-MCNC: 0.77 G/DL
GAMMA GLOB SERPL ELPH-MCNC: 1.25 G/DL
HBV SURFACE AG SERPL QL IA: NEGATIVE
HCV AB SERPL QL IA: NEGATIVE
HIV 1+2 AB+HIV1 P24 AG SERPL QL IA: NEGATIVE
PATHOLOGIST INTERPRETATION SPE: NORMAL
PROT SERPL-MCNC: 7.5 G/DL

## 2017-09-11 ENCOUNTER — TELEPHONE (OUTPATIENT)
Dept: INTERNAL MEDICINE | Facility: CLINIC | Age: 82
End: 2017-09-11

## 2017-09-11 NOTE — TELEPHONE ENCOUNTER
Please let patient know all of her recent blood work ws normal aside from her kidneys have taken a small hit. I would like her to hold her lisinopril along with her salsalate as this can cause kidney trouble. It is also important for you to avoid over the counter non-steroidal anti-inflammatory (aka NSAIDS) pain medications such as ibuprofen, naproxen, naprosyn, Advil, Motrin, Aleve, Goody's powder.  It may be simply due to dehydration so I recommend holding meds, hydrating and repeating labs later this week or early next week.

## 2017-09-18 ENCOUNTER — HOSPITAL ENCOUNTER (OUTPATIENT)
Dept: RADIOLOGY | Facility: OTHER | Age: 82
Discharge: HOME | End: 2017-09-18
Attending: INTERNAL MEDICINE
Payer: MEDICARE

## 2017-09-18 DIAGNOSIS — R06.09 DOE (DYSPNEA ON EXERTION): ICD-10-CM

## 2017-09-18 DIAGNOSIS — R61 HYPERHIDROSIS: ICD-10-CM

## 2017-09-18 PROCEDURE — 71020 XR CHEST PA AND LATERAL: CPT | Mod: 26,,, | Performed by: RADIOLOGY

## 2017-09-18 PROCEDURE — 71020 XR CHEST PA AND LATERAL: CPT | Mod: TC

## 2017-09-18 NOTE — TELEPHONE ENCOUNTER
----- Message from Marija Baxter sent at 9/18/2017  8:31 AM CDT -----  Contact: DEJAH ROLDAN [[id number]]  x_  1st Request  _  2nd Request  _  3rd Request        Who: DEJAH ROLDAN [[id number]]    Why: patient states she was sleep walking and fell and hit her head. Patient states it is due to the prescriptions she was prescribed. Patient would like to a call back to discuss further.     What Number to Call Back: [phone]    When to Expect a call back: (Before the end of the day)   -- if call after 3:00 call back will be tomorrow.

## 2017-09-18 NOTE — TELEPHONE ENCOUNTER
Recommend pt be evaluated in clinic if she is having any symptoms at all related to fall. I recommend she continue cymbalta for now as this may be an isolated episode. Notify clinic of reoccurs

## 2017-09-19 RX ORDER — GLYCOPYRROLATE 2 MG/1
TABLET ORAL
Qty: 150 TABLET | Refills: 1 | Status: SHIPPED | OUTPATIENT
Start: 2017-09-19 | End: 2018-03-28

## 2017-09-19 NOTE — TELEPHONE ENCOUNTER
----- Message from Marija Baxter sent at 9/19/2017  8:04 AM CDT -----  Contact: DEJAH ROLDAN [[id number]]  x_  1st Request  _  2nd Request  _  3rd Request        Who: DEJAH ROLDAN [[id number]]    Why: Patient states she would like a call back in regards to getting a refill on Rxglycopyrrolate (ROBINUL) 2 MG Tab sent to [facility]    What Number to Call Back: [phone]    When to Expect a call back: (Before the end of the day)   -- if call after 3:00 call back will be tomorrow.

## 2017-09-27 ENCOUNTER — OFFICE VISIT (OUTPATIENT)
Dept: NEUROLOGY | Facility: CLINIC | Age: 82
End: 2017-09-27
Payer: MEDICARE

## 2017-09-27 VITALS
DIASTOLIC BLOOD PRESSURE: 78 MMHG | HEART RATE: 77 BPM | SYSTOLIC BLOOD PRESSURE: 137 MMHG | WEIGHT: 132.06 LBS | BODY MASS INDEX: 22.55 KG/M2 | HEIGHT: 64 IN

## 2017-09-27 DIAGNOSIS — I10 ESSENTIAL HYPERTENSION: ICD-10-CM

## 2017-09-27 DIAGNOSIS — M79.7 FIBROMYALGIA: ICD-10-CM

## 2017-09-27 DIAGNOSIS — G31.84 MILD COGNITIVE IMPAIRMENT: Primary | ICD-10-CM

## 2017-09-27 DIAGNOSIS — R41.3 MEMORY LOSS: ICD-10-CM

## 2017-09-27 DIAGNOSIS — F33.9 DEPRESSION, RECURRENT: ICD-10-CM

## 2017-09-27 DIAGNOSIS — H81.90 VESTIBULAR DIZZINESS: ICD-10-CM

## 2017-09-27 PROCEDURE — 1126F AMNT PAIN NOTED NONE PRSNT: CPT | Mod: ,,, | Performed by: PSYCHIATRY & NEUROLOGY

## 2017-09-27 PROCEDURE — 3078F DIAST BP <80 MM HG: CPT | Mod: ,,, | Performed by: PSYCHIATRY & NEUROLOGY

## 2017-09-27 PROCEDURE — 99214 OFFICE O/P EST MOD 30 MIN: CPT | Mod: S$PBB,,, | Performed by: PSYCHIATRY & NEUROLOGY

## 2017-09-27 PROCEDURE — 99213 OFFICE O/P EST LOW 20 MIN: CPT | Mod: PBBFAC | Performed by: PSYCHIATRY & NEUROLOGY

## 2017-09-27 PROCEDURE — 3075F SYST BP GE 130 - 139MM HG: CPT | Mod: ,,, | Performed by: PSYCHIATRY & NEUROLOGY

## 2017-09-27 PROCEDURE — 99999 PR PBB SHADOW E&M-EST. PATIENT-LVL III: CPT | Mod: PBBFAC,,, | Performed by: PSYCHIATRY & NEUROLOGY

## 2017-09-27 PROCEDURE — 1159F MED LIST DOCD IN RCRD: CPT | Mod: ,,, | Performed by: PSYCHIATRY & NEUROLOGY

## 2017-09-27 RX ORDER — DONEPEZIL HYDROCHLORIDE 10 MG/1
10 TABLET, FILM COATED ORAL EVERY MORNING
Qty: 90 TABLET | Refills: 3 | Status: SHIPPED | OUTPATIENT
Start: 2017-09-27 | End: 2018-09-27

## 2017-09-27 RX ORDER — OLOPATADINE HYDROCHLORIDE 2 MG/ML
1 SOLUTION/ DROPS OPHTHALMIC DAILY PRN
COMMUNITY
Start: 2017-08-19 | End: 2021-08-10

## 2017-09-27 RX ORDER — MECLIZINE HYDROCHLORIDE 25 MG/1
25 TABLET ORAL 2 TIMES DAILY PRN
Qty: 90 TABLET | Refills: 0 | Status: ON HOLD | OUTPATIENT
Start: 2017-09-27 | End: 2019-04-30 | Stop reason: HOSPADM

## 2017-09-27 NOTE — PATIENT INSTRUCTIONS
Discussed with patient.  Her memory difficulties are minimal and have not significantly progressed over the years.  She continues to be fairly independent.  Contributing factors would include the history of chronic pain, depressive symptoms and occasional sleep difficulties.  She does report improvement with Aricept which will be continued.  Control of vascular risk factors especially hypertension.  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  Continue present level of activities

## 2017-09-27 NOTE — PROGRESS NOTES
Subjective:       Patient ID: Debbie Ding is a 84 y.o. female.    Chief Complaint:  Memory Loss      History of Present Illness  HPI  This is an 84-year-old -American female who had been seen with complaints of memory difficulties since 2009.  She was last seen by me 6 months ago.  She comes to the clinic alone. The patient reports that ever since the death of her  in 2012, she been having a difficult time with retention of recent information and had difficulty keeping track of conversations and if she reads a book she loses track of what she read earlier. However she does report that symptoms have significantly improved since she started the Aricept and she feels that her mind is much clearer.  She has occasional dizziness related to ear problems for which she uses.  She has hearing impairment and is supposed to use hearing aids which she does not use all the time.    She lives alone however her daughter lives below her apartment and keeps an eye on her.  She is fairly independent and needs to drive in the neighborhood without any problems.  In addition she reports that she has no difficulty taking care of her house including cooking and doing the groceries. She is able to take care of her day-to-day needs and personal hygiene and manages her medications and finances.  An MRI scan of the brain done in 2014 showed evidence of small vessel ischemic changes but was otherwise unremarkable.       Review of Systems  Review of Systems   Constitutional: Negative.    HENT: Positive for hearing loss (Uses hearing aids).    Eyes: Negative.  Negative for visual disturbance.   Respiratory: Negative.  Negative for shortness of breath.    Cardiovascular: Negative.  Negative for chest pain and palpitations.   Gastrointestinal: Negative.    Genitourinary: Negative.    Musculoskeletal: Positive for arthralgias, back pain and neck pain. Negative for gait problem.   Skin: Negative.    Neurological: Negative.   Negative for tremors, seizures, syncope, speech difficulty, weakness, numbness and headaches.   Psychiatric/Behavioral: Positive for decreased concentration. Negative for confusion. The patient is nervous/anxious.        Objective:      Neurologic Exam    Physical Exam   Constitutional: She appears well-developed and well-nourished.   HENT:   Head: Normocephalic and atraumatic.   Right Ear: Hearing normal.   Left Ear: Hearing normal.   Eyes:   Fundus examination showed sharp disc margins.  Pupils are equal and reactive with EOM full.   Neck: Normal range of motion. Neck supple. Muscular tenderness (mild paraspinal muscle tenderness predominantly in the upper cervical region) present. Carotid bruit is not present.   Neurological: She is alert. She has normal reflexes. She displays no atrophy (Normal and symmetrical strength). No cranial nerve deficit (Visual fields at bedside testing essentially normal.  No facial asymmetry noted with facial movements and sensory exam being normal/symmetrical.  Corneals/gag reflexes normal.  Tongue & palate movements normal.  Shoulder shrug was normal.) or sensory deficit. She exhibits normal muscle tone. She displays a negative Romberg sign. Coordination and gait normal.   Mental status examination: The patient is oriented to place, person and time. Able to give an adequate history without any difficulty. Recall of recent information was called and immediate recall was fair (2/3 after 1 minute). Attention span and concentration was slightly impaired. Fund of knowledge was fair. Affect was appropriate, mood was slightly anxious. No thought disorder noted. Judgment and insight was normal. Speech was of normal flow and content. Comprehension was unimpaired.    Vitals reviewed.        Assessment:        1. Mild cognitive impairment    2. Essential hypertension    3. Depression, recurrent    4. Fibromyalgia    5. Vestibular dizziness             Plan:       Discussed with patient.  Her  memory difficulties are minimal and have not significantly progressed over the years.  She continues to be fairly independent.  Contributing factors would include the history of chronic pain, depressive symptoms and occasional sleep difficulties.  She does report improvement with Aricept which will be continued.  Control of vascular risk factors especially hypertension.  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  Continue present level of activities and follow-up in 6 months.

## 2017-09-28 ENCOUNTER — HOSPITAL ENCOUNTER (OUTPATIENT)
Dept: PULMONOLOGY | Facility: OTHER | Age: 82
Discharge: HOME | End: 2017-09-28
Attending: INTERNAL MEDICINE
Payer: MEDICARE

## 2017-09-28 VITALS — WEIGHT: 130 LBS | HEIGHT: 65 IN | BODY MASS INDEX: 21.66 KG/M2

## 2017-09-28 DIAGNOSIS — R06.09 DOE (DYSPNEA ON EXERTION): ICD-10-CM

## 2017-09-28 PROCEDURE — 94620 *HC PUL STRESS; SIMPLE (6MIN WALK): CPT

## 2017-09-29 DIAGNOSIS — E11.9 TYPE 2 DIABETES MELLITUS WITHOUT COMPLICATION: ICD-10-CM

## 2017-10-04 ENCOUNTER — OFFICE VISIT (OUTPATIENT)
Dept: OTOLARYNGOLOGY | Facility: CLINIC | Age: 82
End: 2017-10-04
Payer: MEDICARE

## 2017-10-04 VITALS
SYSTOLIC BLOOD PRESSURE: 182 MMHG | HEART RATE: 72 BPM | WEIGHT: 131.63 LBS | BODY MASS INDEX: 21.93 KG/M2 | DIASTOLIC BLOOD PRESSURE: 90 MMHG | HEIGHT: 65 IN | TEMPERATURE: 95 F

## 2017-10-04 DIAGNOSIS — L29.9 ITCHING OF EAR: ICD-10-CM

## 2017-10-04 DIAGNOSIS — R49.0 HOARSENESS: ICD-10-CM

## 2017-10-04 DIAGNOSIS — R09.82 PND (POST-NASAL DRIP): ICD-10-CM

## 2017-10-04 DIAGNOSIS — K22.89 PRESBYESOPHAGUS: ICD-10-CM

## 2017-10-04 DIAGNOSIS — Z97.4 WEARS HEARING AID: ICD-10-CM

## 2017-10-04 DIAGNOSIS — R13.14 PHARYNGOESOPHAGEAL DYSPHAGIA: Primary | ICD-10-CM

## 2017-10-04 DIAGNOSIS — J38.3 POLYPOID DEGENERATION OF VOCAL CORDS: ICD-10-CM

## 2017-10-04 PROCEDURE — 99215 OFFICE O/P EST HI 40 MIN: CPT | Mod: PBBFAC | Performed by: OTOLARYNGOLOGY

## 2017-10-04 PROCEDURE — 99999 PR PBB SHADOW E&M-EST. PATIENT-LVL V: CPT | Mod: PBBFAC,,, | Performed by: OTOLARYNGOLOGY

## 2017-10-04 PROCEDURE — 99203 OFFICE O/P NEW LOW 30 MIN: CPT | Mod: S$PBB,,, | Performed by: OTOLARYNGOLOGY

## 2017-10-04 RX ORDER — IPRATROPIUM BROMIDE 21 UG/1
1-2 SPRAY, METERED NASAL 2 TIMES DAILY
Qty: 30 ML | Refills: 1 | Status: ON HOLD | OUTPATIENT
Start: 2017-10-04 | End: 2019-04-30 | Stop reason: HOSPADM

## 2017-10-04 RX ORDER — FLUTICASONE PROPIONATE AND SALMETEROL 50; 250 UG/1; UG/1
POWDER RESPIRATORY (INHALATION)
COMMUNITY
Start: 2017-09-27 | End: 2020-01-20 | Stop reason: SDUPTHER

## 2017-10-04 NOTE — PROGRESS NOTES
"Subjective:       Patient ID: Debbie Ding is a 84 y.o. female.    Chief Complaint: No chief complaint on file.    HPI: Ms. Ding is an 84 year old AAF with fibromyalgia and memory loss ( Aricept use) who has myriad of ENT related complaints.   Among  them are weight loss probably related to difficulty swallowing solid foods which tend to get hung  in her throat: she points to her mid chest region.   She can no longer swallow Rice or pork chops as she used to.  She can no longer eat nuts or grits; " they gets hung up".  She can swallow water and liquids well.  She drank a cup of water in front of me today without difficulty.  She completed an upper GI endoscopy study 9/21/15 which indicated a small hiatal hernia and a normal esophagus with chronic gastritis.  A single submucosal palate he was found in the stomach i.e. Lipoma.  She is followed by Dr. Mayur ortiz of the gastroenterology service for severe GERD.    A fluoroscopic upper GI with KUB was completed in March 2016 which indicated a small sliding hiatal hernia without GE reflux, presbyesophagus and stomach duodenum and small bowel abnormality.    She indicates postnasal drip and rhinorrhea sx. the mucus is clear most of the time.  She does not utilize nasal sprays presently.   She indiicats liquid draining from her left ear canal due to itching and scratching ;  she can no longer wear her left ear hearing as a result.    She was recently evaluated by the neurologist 9/27/17 for mild cognitive impairment.  The notes indicate her essential hypertension, recurrent depression, fibromyalgia and vestibular dizziness.  Meclizine was prescribed on a when necessary basis.  A CT scan of the head without contrast was completed in June 2016.  No acute intracranial process was identified.  There was no evidence for acute infarction or hemorrhage.  The paranasal sinuses and mastoid air cells were well aerated.  There was prominent supratentorial white matter " hypoattenuation likely related to chronic microvascular ischemic changes.  Dr. Woo evaluated her 9/6/17 for shortness of breath and fatigue.    PMH: Asthma with inhaler use when necessary, thyroid disease, arthritis, hearing loss, vertigo  Family history: High blood pressure, asthma, diabetes  ALLERGIES: Trazodone, Talwin, Bentyl, Tessalon  Habits: Patient denies tobacco or alcohol use  Occupation: Retired ; she used to work in a  for years as well  Review of Systems   Ears: Positive for hearing loss, ear pain, ear pressure, ringing in ear, ear discharge, dizziness and head trauma.    Nose:  Positive for nasal obstruction, loss of smell, postnasal drip and snoring.    Mouth/Throat: Positive for pain swallowing, impaired swallowing, hoarse voice and oral ulcers.   Constitutional: Positive for recent unexplained weight loss and night sweats.    Cardiovascular:  Positive for chest pain.   Respiratory:  Positive for asthma.    Gastrointestinal:  Positive for history of stomach ulcers or pain and acid reflux.   Other:  Positive for kidney problem, bladder problem, arthritis, rash, weakness, depression, anxiety, heat intolerance, cold intolerance and anemia.    her medical problem list includes chronic rhinitis, vestibular dizziness, dyspnea, chronic asthma, GERD, neck pain, lumbar degenerative disc disease, acquired spondylolisthesis, hypertension, recurrent depression, fibromyalgia, mild cognitive impairment, primary osteoarthritis of multiple joints among others        Objective:     Blood pressure 182/90 pulse 72 temperature 95.3 height 5 feet 5 inches weight 131 pounds  Gen.: Alert and oriented lady in no acute distress; she speaks with a week hoarse voice.  A mirror exam of the larynx is performed after Cetacaine application to the soft palate.  The epiglottis appears within normal limits.  I make note of polypoid ( white edematous) vocal cords bilaterally.  There is no evidence of vocal  cord paresis or paralysis.   The patient has an excellent glottic airway.  The arytenoids are not significantly swollen.    Physical Exam   HENT:   Ears:    Neurological: She displays weakness.   Skin: Rash noted.       Assessment:       1. Pharyngoesophageal dysphagia    2. Itching of ear    3. Wears hearing aid    4. Polypoid degeneration of vocal cords    5. Hoarseness    6. PND (post-nasal drip)    7. Presbyesophagus        Plan:     Mirror exam of larynx performed  Trial of Atrovent 0.03% nasl spray may help control drip; 1 sprays @ lateral nostril BID prn  Modified barium swallow study ordered; pt. will be notified about study date  Alter consistency of foodstuffs; soft non chew diet for now  Written Rx for Valisone lotion; 3 drops to left ear BID prn itching  Hearing aid use encouraged  Office endoscopy on return prn  GI re-consultation may be helpful pending course  Nutritional supplementation may help( consult with PCP)

## 2017-10-04 NOTE — PATIENT INSTRUCTIONS
Mirror exam of larynx performed  Trial of Atrovent 0.03% nasl spray may help control drip; 1 sprays @ lateral nostril BID prn  Modified barium swallow study ordered; pt. will be notified about study date  Alter consistency of foodstuffs; soft non chew diet for now  Written Rx for Valisone lotion; 3 drops to left ear BID prn itching  Hearing aid use encouraged  Office endoscopy on return prn  GI re-consultation may be helpful pending course  Nutritional supplementation may help ( consult with PCP)

## 2017-10-04 NOTE — LETTER
October 5, 2017      Gilles Dunne MD  [address]  [facility] [address]           [facility] - Otorhinolaryngology  [address]  [facility] [address]  Phone: [phone]  Fax: [phone]          Patient: Debbie Ding   MR Number: [id number]   YOB: 1932   Date of Visit: 10/4/2017       Dear Dr. Gilles Dunne:    Thank you for referring Debbie Ding to me for evaluation. Attached you will find relevant portions of my assessment and plan of care.    If you have questions, please do not hesitate to call me. I look forward to following Debbie Ding along with you.    Sincerely,    Roberto Griffin III, MD    Enclosure  CC:  No Recipients    If you would like to receive this communication electronically, please contact [email or web address] or [phone] to request more information on [facility] Link access.    For providers and/or their staff who would like to refer a patient to [facility], please contact us through our one-stop-shop provider referral line, [facility], at [phone].    If you feel you have received this communication in error or would no longer like to receive these types of communications, please e-mail [email or web address]

## 2017-10-06 ENCOUNTER — TELEPHONE (OUTPATIENT)
Dept: INTERNAL MEDICINE | Facility: CLINIC | Age: 82
End: 2017-10-06

## 2017-10-06 NOTE — TELEPHONE ENCOUNTER
----- Message from Joshua Carr sent at 10/6/2017 10:16 AM CDT -----  Contact: Pt  X_ 1st Request  _ 2nd Request  _ 3rd Request    Who: DEJAH ROLDAN [[id number]]    Why: Patient would like to speak with doctor or staff in regards to her visit with the ENT    What Number to Call Back: [phone]    When to Expect a call back: (Before the end of the day)  -- if call after 3:00 call back will be tomorrow.

## 2017-10-09 ENCOUNTER — TELEPHONE (OUTPATIENT)
Dept: INTERNAL MEDICINE | Facility: CLINIC | Age: 82
End: 2017-10-09

## 2017-10-09 NOTE — TELEPHONE ENCOUNTER
----- Message from Cara Díaz sent at 10/6/2017 12:05 PM CDT -----  x 1st Request  _ 2nd Request  _ 3rd Request    Who: pt     Why: pt is requesting to speak to nurse in regards to her asthma. Please call and advise.     What Number to Call Back: [phone]     When to Expect a call back: (Before the end of the day)  -- if call after 3:00 call back will be tomorrow.

## 2017-10-09 NOTE — TELEPHONE ENCOUNTER
Spoke with pt regarding her concerns. Pt stated that she would like a different ENT b/c Dr. Soto was rude and kept calling her ' old '.

## 2017-10-11 NOTE — TELEPHONE ENCOUNTER
Informed pt of pcp advice. Pt demonstrated verbal understanding of information and had no further questions or concerns at this time.

## 2017-10-11 NOTE — TELEPHONE ENCOUNTER
Pt is not on oxygen therapy and therefore no medical indication for recommendation to [employer]

## 2017-10-25 ENCOUNTER — LAB VISIT (OUTPATIENT)
Dept: LAB | Facility: OTHER | Age: 82
End: 2017-10-25
Attending: INTERNAL MEDICINE
Payer: MEDICARE

## 2017-10-25 DIAGNOSIS — R10.32 ABDOMINAL PAIN, LEFT LOWER QUADRANT: Primary | ICD-10-CM

## 2017-10-25 DIAGNOSIS — R63.4 LOSS OF WEIGHT: ICD-10-CM

## 2017-10-25 LAB
ANION GAP SERPL CALC-SCNC: 5 MMOL/L
BUN SERPL-MCNC: 14 MG/DL
CALCIUM SERPL-MCNC: 9.2 MG/DL
CHLORIDE SERPL-SCNC: 106 MMOL/L
CO2 SERPL-SCNC: 27 MMOL/L
CREAT SERPL-MCNC: 1.3 MG/DL
EST. GFR  (AFRICAN AMERICAN): 43 ML/MIN/1.73 M^2
EST. GFR  (NON AFRICAN AMERICAN): 37 ML/MIN/1.73 M^2
GLUCOSE SERPL-MCNC: 101 MG/DL
POTASSIUM SERPL-SCNC: 3.8 MMOL/L
SODIUM SERPL-SCNC: 138 MMOL/L

## 2017-10-25 PROCEDURE — 80048 BASIC METABOLIC PNL TOTAL CA: CPT

## 2017-10-25 PROCEDURE — 36415 COLL VENOUS BLD VENIPUNCTURE: CPT

## 2017-10-31 ENCOUNTER — HOSPITAL ENCOUNTER (OUTPATIENT)
Dept: RADIOLOGY | Facility: OTHER | Age: 82
Discharge: HOME | End: 2017-10-31
Attending: INTERNAL MEDICINE
Payer: MEDICARE

## 2017-10-31 DIAGNOSIS — R63.4 WEIGHT LOSS: ICD-10-CM

## 2017-10-31 DIAGNOSIS — R10.32 ABDOMINAL PAIN, LEFT LOWER QUADRANT: ICD-10-CM

## 2017-10-31 DIAGNOSIS — R13.10 DYSPHAGIA: ICD-10-CM

## 2017-10-31 PROCEDURE — 25500020 PHARM REV CODE 255: Performed by: INTERNAL MEDICINE

## 2017-10-31 PROCEDURE — 74177 CT ABD & PELVIS W/CONTRAST: CPT | Mod: 26,,, | Performed by: RADIOLOGY

## 2017-10-31 PROCEDURE — 74177 CT ABD & PELVIS W/CONTRAST: CPT | Mod: TC

## 2017-10-31 RX ADMIN — IOHEXOL 25 ML: 350 INJECTION, SOLUTION INTRAVENOUS at 09:10

## 2017-10-31 RX ADMIN — IOHEXOL 75 ML: 350 INJECTION, SOLUTION INTRAVENOUS at 09:10

## 2017-12-13 RX ORDER — LEVOTHYROXINE SODIUM 50 UG/1
TABLET ORAL
Qty: 90 TABLET | Refills: 2 | Status: SHIPPED | OUTPATIENT
Start: 2017-12-13 | End: 2018-03-28

## 2018-01-02 RX ORDER — PREGABALIN 25 MG/1
25 CAPSULE ORAL 2 TIMES DAILY
Qty: 60 CAPSULE | Refills: 1 | Status: SHIPPED | OUTPATIENT
Start: 2018-01-02 | End: 2018-01-31 | Stop reason: SDUPTHER

## 2018-01-03 RX ORDER — LISINOPRIL 10 MG/1
TABLET ORAL
Qty: 90 TABLET | Refills: 3 | Status: SHIPPED | OUTPATIENT
Start: 2018-01-03 | End: 2018-12-18 | Stop reason: SDUPTHER

## 2018-01-03 RX ORDER — OMEPRAZOLE 40 MG/1
CAPSULE, DELAYED RELEASE ORAL
Qty: 90 CAPSULE | Refills: 3 | Status: SHIPPED | OUTPATIENT
Start: 2018-01-03 | End: 2018-12-18 | Stop reason: SDUPTHER

## 2018-01-03 RX ORDER — ATORVASTATIN CALCIUM 10 MG/1
TABLET, FILM COATED ORAL
Qty: 90 TABLET | Refills: 3 | Status: SHIPPED | OUTPATIENT
Start: 2018-01-03 | End: 2018-12-18 | Stop reason: SDUPTHER

## 2018-01-31 ENCOUNTER — OFFICE VISIT (OUTPATIENT)
Dept: RHEUMATOLOGY | Facility: CLINIC | Age: 83
End: 2018-01-31
Payer: MEDICARE

## 2018-01-31 VITALS
WEIGHT: 130 LBS | DIASTOLIC BLOOD PRESSURE: 79 MMHG | HEART RATE: 63 BPM | BODY MASS INDEX: 21.63 KG/M2 | SYSTOLIC BLOOD PRESSURE: 178 MMHG

## 2018-01-31 DIAGNOSIS — M79.7 FIBROMYALGIA: ICD-10-CM

## 2018-01-31 DIAGNOSIS — M15.9 PRIMARY OSTEOARTHRITIS INVOLVING MULTIPLE JOINTS: Primary | ICD-10-CM

## 2018-01-31 PROCEDURE — 1159F MED LIST DOCD IN RCRD: CPT | Mod: ,,, | Performed by: INTERNAL MEDICINE

## 2018-01-31 PROCEDURE — 1125F AMNT PAIN NOTED PAIN PRSNT: CPT | Mod: ,,, | Performed by: INTERNAL MEDICINE

## 2018-01-31 PROCEDURE — 99213 OFFICE O/P EST LOW 20 MIN: CPT | Mod: PBBFAC | Performed by: INTERNAL MEDICINE

## 2018-01-31 PROCEDURE — 99999 PR PBB SHADOW E&M-EST. PATIENT-LVL III: CPT | Mod: PBBFAC,,, | Performed by: INTERNAL MEDICINE

## 2018-01-31 PROCEDURE — 99213 OFFICE O/P EST LOW 20 MIN: CPT | Mod: S$PBB,,, | Performed by: INTERNAL MEDICINE

## 2018-01-31 RX ORDER — PREGABALIN 50 MG/1
50 CAPSULE ORAL 2 TIMES DAILY
Qty: 60 CAPSULE | Refills: 1 | Status: SHIPPED | OUTPATIENT
Start: 2018-01-31 | End: 2018-03-28

## 2018-01-31 NOTE — PROGRESS NOTES
History of present illness: 85-year-old female I follow for chronic pain due to osteoarthritis and fibromyalgia.  She had previously been on salsalate.  She saw her GI specialist for swallowing difficulty.  He recommended she stop the salsalate.  She has noted no difference in her pain was stopping the salsalate.  She has been taking Tylenol for the pain.  This does help but she takes an excessive amount.  I stopped her Zanaflex because of side effects.  I did not place her back on another muscle relaxant.  She has been having some increasing anxiety.  I had placed her on Lyrica.  I had started her on 25 mg twice daily.  She is now taking 50 mg twice daily.  She tolerates the Lyrica and it has been helping.  She was started on Cymbalta by her primary doctor in September.  She stopped taking it.  She states somebody told her to stop taking it, there is no record of this.  She was evaluated by ENT who found no ENT problems.  That is when she went back to see GI.    Physical examination was not performed, the entire time was counseling.    Assessment: Osteoarthritis and fibromyalgia    Plans:  1.  Increase Lyrica to 50 mg 3 times daily  2.  I recommend she take arthritis Tylenol 2 twice daily.  She can supplement it with 2 more extra strength Tylenol if necessary  3.  Resume Cymbalta  4.  Return to see me in 6 months

## 2018-03-26 ENCOUNTER — OFFICE VISIT (OUTPATIENT)
Dept: PHYSICAL MEDICINE AND REHAB | Facility: CLINIC | Age: 83
End: 2018-03-26
Payer: MEDICARE

## 2018-03-26 VITALS
WEIGHT: 130.13 LBS | HEIGHT: 65 IN | DIASTOLIC BLOOD PRESSURE: 76 MMHG | HEART RATE: 64 BPM | SYSTOLIC BLOOD PRESSURE: 158 MMHG | BODY MASS INDEX: 21.68 KG/M2

## 2018-03-26 DIAGNOSIS — M47.812 DJD (DEGENERATIVE JOINT DISEASE), CERVICAL: ICD-10-CM

## 2018-03-26 DIAGNOSIS — G89.29 CHRONIC MIDLINE LOW BACK PAIN WITH LEFT-SIDED SCIATICA: Primary | ICD-10-CM

## 2018-03-26 DIAGNOSIS — M51.36 DDD (DEGENERATIVE DISC DISEASE), LUMBAR: ICD-10-CM

## 2018-03-26 DIAGNOSIS — M15.9 PRIMARY OSTEOARTHRITIS INVOLVING MULTIPLE JOINTS: ICD-10-CM

## 2018-03-26 DIAGNOSIS — M43.16 SPONDYLOLISTHESIS AT L4-L5 LEVEL: ICD-10-CM

## 2018-03-26 DIAGNOSIS — G47.00 INSOMNIA, UNSPECIFIED TYPE: ICD-10-CM

## 2018-03-26 DIAGNOSIS — G89.29 CHRONIC NECK PAIN: ICD-10-CM

## 2018-03-26 DIAGNOSIS — M79.7 FIBROMYALGIA: ICD-10-CM

## 2018-03-26 DIAGNOSIS — M47.816 LUMBAR FACET ARTHROPATHY: ICD-10-CM

## 2018-03-26 DIAGNOSIS — M54.2 CHRONIC NECK PAIN: ICD-10-CM

## 2018-03-26 DIAGNOSIS — M54.42 CHRONIC MIDLINE LOW BACK PAIN WITH LEFT-SIDED SCIATICA: Primary | ICD-10-CM

## 2018-03-26 PROCEDURE — 99999 PR PBB SHADOW E&M-EST. PATIENT-LVL III: CPT | Mod: PBBFAC,,, | Performed by: PHYSICAL MEDICINE & REHABILITATION

## 2018-03-26 PROCEDURE — 99214 OFFICE O/P EST MOD 30 MIN: CPT | Mod: S$PBB,,, | Performed by: PHYSICAL MEDICINE & REHABILITATION

## 2018-03-26 PROCEDURE — 99213 OFFICE O/P EST LOW 20 MIN: CPT | Mod: PBBFAC | Performed by: PHYSICAL MEDICINE & REHABILITATION

## 2018-03-26 RX ORDER — ACETAMINOPHEN 500 MG
500-1000 TABLET ORAL 3 TIMES DAILY PRN
Refills: 0 | Status: ON HOLD | COMMUNITY
Start: 2018-03-26 | End: 2019-12-04 | Stop reason: CLARIF

## 2018-03-26 RX ORDER — DULOXETIN HYDROCHLORIDE 20 MG/1
20 CAPSULE, DELAYED RELEASE ORAL DAILY
Start: 2018-03-26 | End: 2018-04-19

## 2018-03-26 NOTE — PROGRESS NOTES
Subjective:       Patient ID: Debbie Ding is a 85 y.o. female.    Chief Complaint: No chief complaint on file.    HPI     HISTORY OF PRESENT ILLNESS:  Mrs. Ding is an 85-year-old black female who is   presenting to the Physical Medicine Clinic for neck pain, back pain and   fibromyalgia syndrome.  Her past medical history is significant for   hypertension, diabetes mellitus, GERD, chronic gastritis, asthma, depression,   osteoarthritis, fibromyalgia syndrome (followed up by Rheumatology) and memory   loss.    The patient started complaining of back pain about 20 years ago.  She denies any   preceding trauma.  It has been progressively worse.  She was diagnosed with DJD   of the lumbar spine.  She has been treated conservatively in the past.  Review   of the chart shows that she had an MRI of the lumbar spine done in 2014.  It was   positive for disc extrusion at L4-L5 with facet arthropathy and grade I   anterolisthesis of L4 over L5.  The patient was followed up by the Pain Medicine   Clinic.  She underwent in 05/2014 a right L4 transforaminal AILEEN.  The patient   reports that her pain got worse and she was not interested in getting any more   injections.    Currently, her back pain is a constant stabbing and throbbing sensation in the   lumbar spine and across her back.  She has occasional shooting pain to the left   foot with numbness.  Her pain is aggravated when she wakes up in the morning.    It is better with moving around and stretching.  Her maximum pain is 10/10 and   minimum 3-4/10.  Today, it is 7/10.  The patient denies any significant lower   extremity weakness.  She denies any bowel or bladder incontinence.    She has been complaining of neck pain and upper back pain for many years.  It   has been slowly getting worse.  Review of the chart shows that she had x-rays of   the cervical spine done in 12/2012 and positive for multilevel degenerative   changes with disc narrowing and disc osteophyte  complexes.  The patient has been   treated conservatively.  Her pain is currently a constant aching sensation in   the whole cervical spine and in the upper thoracic spine.  She denies any   radiation to her arms.  It is worse when she wakes up in the morning.  It is   better as the day goes on.  Her maximum pain is 10/10 and minimum 3-4/10.    Today, it is 4/10.  The patient complains of mild left upper extremity weakness.    She denies any upper extremity numbness.  She continues to also complain of   poor sleep/easy fatigue.  Her sleep has been significantly impaired.  She has   not had any Sleep Medicine evaluation, but is interested in getting one.    The patient is currently taking Lyrica 50 mg p.o. twice per day.  She reports   fatigue and sleepiness with higher doses.  She takes over-the-counter Tylenol   500 mg couple of times per day.  She was previously on Cymbalta, but it was   stopped.  She thinks it was discontinued due to kidney impairment; (however,   review of the chart shows that her recent BUN/creatinine were within normal   limits).      MS/HN  dd: 03/26/2018 09:13:38 (CDT)  td: 03/27/2018 05:40:47 (CDT)  Doc ID   #[id number]  Job ID #[id number]    CC:       Review of Systems   Constitutional: Positive for fatigue. Negative for chills and fever.   Eyes: Negative for visual disturbance.   Respiratory: Positive for shortness of breath.    Cardiovascular: Negative for chest pain.   Gastrointestinal: Positive for constipation and nausea. Negative for vomiting.        Acid reflux   Genitourinary: Positive for difficulty urinating.   Musculoskeletal: Positive for arthralgias, back pain and neck pain. Negative for gait problem.   Neurological: Positive for headaches. Negative for dizziness.   Psychiatric/Behavioral: Positive for sleep disturbance. Negative for behavioral problems.       Objective:      Physical Exam   Constitutional: She is oriented to person, place, and time. She appears well-developed and  well-nourished.   HENT:   Head: Normocephalic and atraumatic.   Neck:   Decreased ROM all directions.  Pain at end range.  +ve tenderness C-spine.   Cardiovascular: Normal rate, regular rhythm and normal heart sounds.    Pulmonary/Chest: Effort normal and breath sounds normal.   Abdominal: Soft.   Musculoskeletal:   BUE:  ROM:   RUE: full.   LUE: full.  Strength:    RUE: 4/5 at shoulder abduction, 5- elbow flexion, 5- elbow extension, 5- hand .   LUE: 4/5 at shoulder abduction, 5- elbow flexion, 5- elbow extension, 5- hand .  Sensation to pinprick:   RUE: intact.   LUE: intact.  DTR:    RUE: +1 biceps, +1 triceps.   LUE:  +1 biceps, +1 triceps.  García:   RUE: -ve.   LUE: -ve.    BLE:  ROM:   RLE: full.   LLE: full.  Knee crepitus:   RLE: +ve.   LLE: +ve.   Strength:    RLE: 4/5 at hip flexion, 5- knee extension, 5- ankle DF, 5- PF.   LLE: 4/5 at hip flexion, 5- knee extension, 5- ankle DF, 5- PF.  Sensation to pinprick:     RLE: intact.      LLE: intact.   DTR:     RLE: +1 knee, +1 ankle.    LLE: +1 knee, +1 ankle.  Clonus:    Rt ankle: -ve.    Lt ankle: -ve.  SLR:      RLE: -ve at 60 degrees.      LLE: +ve at 30 degrees.     +ve modertate tenderness over lumbar spine.  +ve diffuse tender points over the upper & lower back, anterior chest wall, hips.  No leg length discrepancy.    Directional Preference:  Spine flexion: 70 degrees , mild pain in back.  Spine extension: 20 degrees, mod pain in back.  Lateral bending: mod pain to Right, mod pain to Left.      Heel walking: WNL.  Toe walking: WNL.  Gait: slow ann.     Neurological: She is alert and oriented to person, place, and time.   Skin: Skin is warm.   Psychiatric: She has a normal mood and affect. Her behavior is normal.   Vitals reviewed.      Assessment:       1. Chronic midline low back pain with left-sided sciatica    2. DDD (degenerative disc disease), lumbar    3. Lumbar facet arthropathy    4. Spondylolisthesis at L4-L5 level (grade 1,  L4/l5)    5. Chronic neck pain    6. DJD (degenerative joint disease), cervical    7. Fibromyalgia    8. Primary osteoarthritis involving multiple joints    9. Insomnia, unspecified type        Plan:       - X-Ray Lumbar Spine Ap Lateral w/Flex Ext; Future  - X-Ray Cervical Spine AP Lat with Flexion  Extension; Future  - Restart DULoxetine (CYMBALTA) 20 MG capsule; Take 1 capsule (20 mg total) by mouth once daily. Take every other day for 2 weeks then take daily.  - Continue Lyrica 50 mg po bid.  - Continue acetaminophen (TYLENOL) 500 MG tablet; Take 1-2 tablets (500-1,000 mg total) by mouth 3 (three) times daily as needed for Pain.  - Referral to Physical terapy may be done after X-ray reports are out.  - Ambulatory consult to Sleep Disorders  - Follow-up in about 2 months (around 5/26/2018).     This was a 45 minute visit, 50% of which was spent educating the patient about the diagnosis and the treatment plan.

## 2018-03-27 ENCOUNTER — TELEPHONE (OUTPATIENT)
Dept: NEUROLOGY | Facility: CLINIC | Age: 83
End: 2018-03-27

## 2018-03-27 NOTE — TELEPHONE ENCOUNTER
Patient no showed for appointment today. LM for her to contact our office to discuss rescheduling.

## 2018-03-28 ENCOUNTER — HOSPITAL ENCOUNTER (OUTPATIENT)
Dept: RADIOLOGY | Facility: HOSPITAL | Age: 83
Discharge: HOME | End: 2018-03-28
Attending: PHYSICAL MEDICINE & REHABILITATION
Payer: MEDICARE

## 2018-03-28 ENCOUNTER — TELEPHONE (OUTPATIENT)
Dept: NEUROLOGY | Facility: CLINIC | Age: 83
End: 2018-03-28

## 2018-03-28 ENCOUNTER — TELEPHONE (OUTPATIENT)
Dept: PHYSICAL MEDICINE AND REHAB | Facility: CLINIC | Age: 83
End: 2018-03-28

## 2018-03-28 ENCOUNTER — OFFICE VISIT (OUTPATIENT)
Dept: NEUROLOGY | Facility: CLINIC | Age: 83
End: 2018-03-28
Payer: MEDICARE

## 2018-03-28 VITALS
HEART RATE: 59 BPM | BODY MASS INDEX: 21.74 KG/M2 | WEIGHT: 130.5 LBS | DIASTOLIC BLOOD PRESSURE: 72 MMHG | SYSTOLIC BLOOD PRESSURE: 125 MMHG | HEIGHT: 65 IN

## 2018-03-28 DIAGNOSIS — G89.29 CHRONIC NECK PAIN: ICD-10-CM

## 2018-03-28 DIAGNOSIS — M54.2 CHRONIC NECK PAIN: ICD-10-CM

## 2018-03-28 DIAGNOSIS — F33.9 DEPRESSION, RECURRENT: ICD-10-CM

## 2018-03-28 DIAGNOSIS — G89.29 CHRONIC MIDLINE LOW BACK PAIN WITH LEFT-SIDED SCIATICA: Primary | ICD-10-CM

## 2018-03-28 DIAGNOSIS — M47.812 DJD (DEGENERATIVE JOINT DISEASE), CERVICAL: ICD-10-CM

## 2018-03-28 DIAGNOSIS — M54.42 CHRONIC MIDLINE LOW BACK PAIN WITH LEFT-SIDED SCIATICA: Primary | ICD-10-CM

## 2018-03-28 DIAGNOSIS — M47.816 LUMBAR FACET ARTHROPATHY: ICD-10-CM

## 2018-03-28 DIAGNOSIS — M15.9 PRIMARY OSTEOARTHRITIS INVOLVING MULTIPLE JOINTS: ICD-10-CM

## 2018-03-28 DIAGNOSIS — M79.7 FIBROMYALGIA: ICD-10-CM

## 2018-03-28 DIAGNOSIS — H81.90 VESTIBULAR DIZZINESS: ICD-10-CM

## 2018-03-28 DIAGNOSIS — M43.16 SPONDYLOLISTHESIS AT L4-L5 LEVEL: ICD-10-CM

## 2018-03-28 DIAGNOSIS — G89.29 CHRONIC MIDLINE LOW BACK PAIN WITH LEFT-SIDED SCIATICA: ICD-10-CM

## 2018-03-28 DIAGNOSIS — M54.42 CHRONIC MIDLINE LOW BACK PAIN WITH LEFT-SIDED SCIATICA: ICD-10-CM

## 2018-03-28 DIAGNOSIS — G31.84 MILD COGNITIVE IMPAIRMENT: Primary | ICD-10-CM

## 2018-03-28 DIAGNOSIS — I10 ESSENTIAL HYPERTENSION: ICD-10-CM

## 2018-03-28 DIAGNOSIS — M51.36 DDD (DEGENERATIVE DISC DISEASE), LUMBAR: ICD-10-CM

## 2018-03-28 PROCEDURE — 72050 X-RAY EXAM NECK SPINE 4/5VWS: CPT | Mod: 26,,, | Performed by: RADIOLOGY

## 2018-03-28 PROCEDURE — 99213 OFFICE O/P EST LOW 20 MIN: CPT | Mod: PBBFAC,25 | Performed by: PSYCHIATRY & NEUROLOGY

## 2018-03-28 PROCEDURE — 72100 X-RAY EXAM L-S SPINE 2/3 VWS: CPT | Mod: 26,,, | Performed by: RADIOLOGY

## 2018-03-28 PROCEDURE — 99214 OFFICE O/P EST MOD 30 MIN: CPT | Mod: S$PBB,,, | Performed by: PSYCHIATRY & NEUROLOGY

## 2018-03-28 PROCEDURE — 72050 X-RAY EXAM NECK SPINE 4/5VWS: CPT | Mod: TC

## 2018-03-28 PROCEDURE — 72120 X-RAY BEND ONLY L-S SPINE: CPT | Mod: 26,,, | Performed by: RADIOLOGY

## 2018-03-28 PROCEDURE — 72120 X-RAY BEND ONLY L-S SPINE: CPT | Mod: TC

## 2018-03-28 PROCEDURE — 99999 PR PBB SHADOW E&M-EST. PATIENT-LVL III: CPT | Mod: PBBFAC,,, | Performed by: PSYCHIATRY & NEUROLOGY

## 2018-03-28 RX ORDER — PREGABALIN 25 MG/1
CAPSULE ORAL
COMMUNITY
Start: 2018-02-03 | End: 2019-03-27

## 2018-03-28 RX ORDER — LEVOTHYROXINE SODIUM 25 UG/1
TABLET ORAL
COMMUNITY
Start: 2018-03-05 | End: 2018-04-18

## 2018-03-28 RX ORDER — DICYCLOMINE HYDROCHLORIDE 10 MG/1
CAPSULE ORAL
COMMUNITY
Start: 2018-03-20 | End: 2019-03-27

## 2018-03-28 RX ORDER — TALC
POWDER (GRAM) TOPICAL ONCE
Status: ON HOLD | COMMUNITY
End: 2019-04-30 | Stop reason: HOSPADM

## 2018-03-28 RX ORDER — AMMONIUM LACTATE 12 G/100G
CREAM TOPICAL
Status: ON HOLD | COMMUNITY
Start: 2017-12-27 | End: 2019-04-30 | Stop reason: HOSPADM

## 2018-03-28 RX ORDER — CITALOPRAM 10 MG/1
10 TABLET ORAL DAILY
Qty: 30 TABLET | Refills: 5 | Status: SHIPPED | OUTPATIENT
Start: 2018-03-28 | End: 2018-04-18 | Stop reason: SDUPTHER

## 2018-03-28 RX ORDER — CITALOPRAM 10 MG/1
10 TABLET ORAL DAILY
Qty: 30 TABLET | Refills: 5 | Status: SHIPPED | OUTPATIENT
Start: 2018-03-28 | End: 2018-03-28 | Stop reason: SDUPTHER

## 2018-03-28 NOTE — TELEPHONE ENCOUNTER
Patient advised that this is sold OTC, and can be obtained at any pharmacy. Patient will check [facility], [facility], and another [facility]. To call back with any further problems.

## 2018-03-28 NOTE — TELEPHONE ENCOUNTER
----- Message from Marija Baxter sent at 3/28/2018 10:43 AM CDT -----  Contact: DEJAH ROLDAN [[id number]]  _  1st Request  _  2nd Request  _  3rd Request        Who: DEJAH ROLDAN [[id number]]    Why: Patient states the pharmacy does not carry magnesium oxide supplementation 250 mg OTC and is asking for the medication to be written and sent to [id number] - [place], [place] - [address]    What Number to Call Back: [phone]    When to Expect a call back: (Before the end of the day)   -- if call after 3:00 call back will be tomorrow.

## 2018-03-28 NOTE — TELEPHONE ENCOUNTER
----- Message from Sofía Baig MA sent at 3/28/2018  2:08 PM CDT -----  Contact: self @ [phone]      ----- Message -----  From: Ghislaine Durham  Sent: 3/28/2018   1:10 PM  To: Jeni HUFF Staff    Pt is calling for her xray results from today and status of physical therapy.  Pls call.

## 2018-03-28 NOTE — PATIENT INSTRUCTIONS
Discussed with patient.  Her memory difficulties are minimal and have not significantly progressed over the years.  She continues to be fairly independent.  Contributing factors would include the history of chronic pain, depressive symptoms and occasional sleep difficulties.  She does report improvement with Aricept which will be continued.  Control of vascular risk factors especially hypertension.  We will add citalopram 10 mg daily for the depressive symptoms and also advised patient to start magnesium oxide supplementation  mg at bedtime daily.  If her memory continues to decline may consider the addition of Namenda in the future. Reviewed immunization history.  Patient counseled about getting her immunization shots and is given a prescription for this.

## 2018-03-28 NOTE — TELEPHONE ENCOUNTER
I called w/o answer.  I left a voice mail that X-rays showed moderate DJD of neck and back.  I sent a referral to Physical therapy at [facility].

## 2018-03-28 NOTE — PROGRESS NOTES
Subjective:       Patient ID: Debbie Ding is a 85 y.o. female.    Chief Complaint:  Memory Loss      History of Present Illness  HPI  This is an 85-year-old -American female who had been seen with complaints of memory difficulties since 2009.  She was last seen by me 6 months ago.  She comes to the clinic alone. The patient reports that ever since the death of her  in 2012, she been having a difficult time with retention of recent information and had difficulty keeping track of conversations and if she reads a book she loses track of what she read earlier. However she does report that symptoms have significantly improved since she started the Aricept and she feels that her mind is much clearer.  She has occasional dizziness related to ear problems for which she uses.  She has hearing impairment and is supposed to use hearing aids which she does not use all the time.  She also has a history of depression reporting the Remeron hasn't helped in the past however she had to stop it because of side effects.  It is to be noted that she is presently on Cymbalta 20 mg daily prescribed by her PMR physician for fibromyalgia.  However this has not helped her depression very much.    She lives alone however her daughter lives below her apartment and keeps an eye on her.  She is fairly independent and needs to drive in the neighborhood without any problems.  In addition she reports that she has no difficulty taking care of her house including cooking and doing the groceries. She is able to take care of her day-to-day needs and personal hygiene and manages her medications and finances.  An MRI scan of the brain done in 2014 showed evidence of small vessel ischemic changes but was otherwise unremarkable.       Review of Systems  Review of Systems   Constitutional: Negative.    HENT: Positive for hearing loss (Uses hearing aids).    Eyes: Negative.  Negative for visual disturbance.   Respiratory: Negative.   Negative for shortness of breath.    Cardiovascular: Negative.  Negative for chest pain and palpitations.   Gastrointestinal: Negative.    Genitourinary: Negative.    Musculoskeletal: Positive for arthralgias, back pain and neck pain. Negative for gait problem.   Skin: Negative.    Neurological: Negative.  Negative for tremors, seizures, syncope, speech difficulty, weakness, numbness and headaches.   Psychiatric/Behavioral: Positive for decreased concentration. Negative for confusion. The patient is nervous/anxious.        Objective:      Neurologic Exam    Physical Exam   Constitutional: She appears well-developed and well-nourished.   HENT:   Head: Normocephalic and atraumatic.   Right Ear: Hearing normal.   Left Ear: Hearing normal.   Eyes:   Fundus examination showed sharp disc margins.  Pupils are equal and reactive with EOM full.   Neck: Normal range of motion. Neck supple. Muscular tenderness (mild paraspinal muscle tenderness predominantly in the upper cervical region) present. Carotid bruit is not present.   Neurological: She is alert. She has normal reflexes. She displays no atrophy (Normal and symmetrical strength). No cranial nerve deficit (Visual fields at bedside testing essentially normal.  No facial asymmetry noted with facial movements and sensory exam being normal/symmetrical.  Corneals/gag reflexes normal.  Tongue & palate movements normal.  Shoulder shrug was normal.) or sensory deficit. She exhibits normal muscle tone. She displays a negative Romberg sign. Coordination and gait normal.   Mental status examination: The patient is oriented to place, person and time. Able to give an adequate history without any difficulty. Recall of recent information was called and immediate recall was fair (2/3 after 1 minute). Attention span and concentration was slightly impaired. Fund of knowledge was fair. Affect was appropriate, mood was slightly anxious. No thought disorder noted. Judgment and insight was  normal. Speech was of normal flow and content. Comprehension was unimpaired.    Vitals reviewed.        Assessment:        1. Mild cognitive impairment    2. Vestibular dizziness    3. Essential hypertension    4. Depression, recurrent    5. DDD (degenerative disc disease), lumbar             Plan:       Discussed with patient.  Her memory difficulties are minimal and have not significantly progressed over the years.  She continues to be fairly independent.  Contributing factors would include the history of chronic pain, depressive symptoms and occasional sleep difficulties.  She does report improvement with Aricept which will be continued.  Control of vascular risk factors especially hypertension.  We will add citalopram 10 mg daily for the depressive symptoms and also advised patient to start magnesium oxide supplementation  mg at bedtime daily.  If her memory continues to decline may consider the addition of Namenda in the future.  Reviewed immunization history.  Patient counseled about getting her immunization shots and is given a prescription for this. Continue present level of activities and follow-up in 6 months.

## 2018-04-02 ENCOUNTER — TELEPHONE (OUTPATIENT)
Dept: NEUROLOGY | Facility: CLINIC | Age: 83
End: 2018-04-02

## 2018-04-02 NOTE — TELEPHONE ENCOUNTER
----- Message from Concetta Moise sent at 4/2/2018 11:19 AM CDT -----  Contact: DEJAH ROLDAN [[id number]]        Name of Who is Calling: DEJAH ROLDAN [[id number]]      What is the request in detail: pt calling to discuss changing medication.. Please advise      Can the clinic reply by [facility]: no      What Number to Call Back [facility] not [facility]: [phone]

## 2018-04-03 ENCOUNTER — TELEPHONE (OUTPATIENT)
Dept: NEUROLOGY | Facility: CLINIC | Age: 83
End: 2018-04-03

## 2018-04-03 NOTE — TELEPHONE ENCOUNTER
Patient does not want to take Citalopram. Makes her to sleepy, and she has stopped it. She would like for you to give her Remeron instead.

## 2018-04-03 NOTE — TELEPHONE ENCOUNTER
----- Message from Denisse Jean-Baptiste sent at 4/3/2018 11:11 AM CDT -----  Contact: Self        Name of Who is Calling:  DEJAH ROLDAN [[id number]]      What is the request in detail: Pt is calling to discuss if  the Remron medication will be sent to the [facility] pharmacy. Pt received a call from [facility] and states her insurance only covers [facility].      Can the clinic reply by [facility]: No      What Number to Call Back if not [facility]: [phone]                                \

## 2018-04-09 ENCOUNTER — TELEPHONE (OUTPATIENT)
Dept: NEUROLOGY | Facility: CLINIC | Age: 83
End: 2018-04-09

## 2018-04-09 RX ORDER — PNEUMOCOCCAL VACCINE POLYVALENT 25; 25; 25; 25; 25; 25; 25; 25; 25; 25; 25; 25; 25; 25; 25; 25; 25; 25; 25; 25; 25; 25; 25 UG/.5ML; UG/.5ML; UG/.5ML; UG/.5ML; UG/.5ML; UG/.5ML; UG/.5ML; UG/.5ML; UG/.5ML; UG/.5ML; UG/.5ML; UG/.5ML; UG/.5ML; UG/.5ML; UG/.5ML; UG/.5ML; UG/.5ML; UG/.5ML; UG/.5ML; UG/.5ML; UG/.5ML; UG/.5ML; UG/.5ML
INJECTION, SOLUTION INTRAMUSCULAR; SUBCUTANEOUS
Status: ON HOLD | COMMUNITY
Start: 2018-03-28 | End: 2019-04-30 | Stop reason: HOSPADM

## 2018-04-09 RX ORDER — TETANUS TOXOID, REDUCED DIPHTHERIA TOXOID AND ACELLULAR PERTUSSIS VACCINE, ADSORBED 5; 2.5; 8; 8; 2.5 [IU]/.5ML; [IU]/.5ML; UG/.5ML; UG/.5ML; UG/.5ML
SUSPENSION INTRAMUSCULAR
Status: ON HOLD | COMMUNITY
Start: 2018-03-28 | End: 2019-04-30 | Stop reason: HOSPADM

## 2018-04-09 NOTE — TELEPHONE ENCOUNTER
Called patient several times.  Finally left message for her to contact me regarding the Remeron especially the dosage.

## 2018-04-18 ENCOUNTER — TELEPHONE (OUTPATIENT)
Dept: NEUROLOGY | Facility: CLINIC | Age: 83
End: 2018-04-18

## 2018-04-18 DIAGNOSIS — F33.9 DEPRESSION, RECURRENT: ICD-10-CM

## 2018-04-18 RX ORDER — LEVOTHYROXINE SODIUM 50 UG/1
TABLET ORAL
COMMUNITY
Start: 2018-04-04 | End: 2018-09-24 | Stop reason: SDUPTHER

## 2018-04-18 RX ORDER — CITALOPRAM 10 MG/1
10 TABLET ORAL DAILY
Qty: 90 TABLET | Refills: 3 | Status: SHIPPED | OUTPATIENT
Start: 2018-04-18 | End: 2018-04-19

## 2018-04-18 NOTE — TELEPHONE ENCOUNTER
----- Message from Gina Mendez sent at 4/18/2018 12:50 PM CDT -----  Contact: fausto            Name of Who is Calling: fausto      What is the request in detail: pt wants to speak to nurse regarding remron prescription. Please call pt      Can the clinic reply by [facility]: no      What Number to [facility] Back [facility] not [facility]: [phone]

## 2018-04-19 ENCOUNTER — TELEPHONE (OUTPATIENT)
Dept: NEUROLOGY | Facility: CLINIC | Age: 83
End: 2018-04-19

## 2018-04-19 RX ORDER — MIRTAZAPINE 15 MG/1
15 TABLET, FILM COATED ORAL NIGHTLY
Qty: 90 TABLET | Refills: 1 | Status: ON HOLD | OUTPATIENT
Start: 2018-04-19 | End: 2019-04-30 | Stop reason: HOSPADM

## 2018-04-19 NOTE — TELEPHONE ENCOUNTER
----- Message from Alexis Celaya sent at 4/19/2018  9:02 AM CDT -----  Contact: Self @ [phone]  Pt declined the doctor's first available on 06/06/18 and asked to been seen earlier. Pt says she needs to see the doctor to discuss problem they spoke about during appt on 09/27/17 (pt would not give me further details about that, and she said the doctor would know). I added to wait list. Pls call if pt can be seen.

## 2018-04-19 NOTE — TELEPHONE ENCOUNTER
LM for patient to return my call as Citalopram has been discontinued with Danny fernandez, and Remeron prescription has been sent in today at 1135 am.

## 2018-04-19 NOTE — TELEPHONE ENCOUNTER
----- Message from [place] Kenny sent at 4/19/2018  1:02 PM CDT -----  Contact: pt @ [phone]  Calling to speak with someone in Dr. Maria's office regarding a medication citalopram (CELEXA) 10 MG tablet (discontinued, patient says Danny told her that they were preparing to ship to her, but says she discussed with Dr. Maria the discontinuing of the medication. Please call.

## 2018-05-02 NOTE — PROGRESS NOTES
[facility]   PATIENT EVALUATION    Name: Debbie Ding  Clinic Number: [id number]    Diagnosis:   Encounter Diagnoses   Name Primary?    Chronic bilateral low back pain without sciatica     Decreased strength of trunk and back     Neck pain     Fibromyalgia Yes     Physician: Kadie Ngo MD  Treatment Orders: PT Eval and Treat    History     Past Medical History:   Diagnosis Date    Arthritis     Asthma     Cataract     Depression     Diabetes mellitus     Fibromyalgia 7/2/2012    Fibromyalgia     GERD (gastroesophageal reflux disease)     Hypertension 7/2/2012    Thoracic or lumbosacral neuritis or radiculitis, unspecified     Thyroid disease     Ulcer      Current Outpatient Prescriptions   Medication Sig    acetaminophen (TYLENOL) 500 MG tablet Take 1-2 tablets (500-1,000 mg total) by mouth 3 (three) times daily as needed for Pain.    ADVAIR DISKUS 250-50 mcg/dose diskus inhaler     albuterol 90 mcg/actuation inhaler Inhale 2 puffs into the lungs every 4 (four) hours as needed for Wheezing.    ammonium lactate 12 % Crea     atorvastatin (LIPITOR) 10 MG tablet TAKE 1 TABLET ONCE DAILY    BOOSTRIX TDAP 2.5-8-5 Lf-mcg-Lf/0.5mL Syrg injection     cycloSPORINE (RESTASIS) 0.05 % ophthalmic emulsion     dicyclomine (BENTYL) 10 MG capsule     donepezil (ARICEPT) 10 MG tablet Take 1 tablet (10 mg total) by mouth every morning.    ESTRACE 0.01 % (0.1 mg/gram) vaginal cream     estradiol 0.05 mg/24 hr td ptsw (VIVELLE-DOT) 0.05 mg/24 hr     fluocinolone-shower cap 0.01 % Oil     fluocinonide (LIDEX) 0.05 % external solution USE AS DIRECTED TWICE A DAY EXTERNALLY 1MONTH    fluticasone-salmeterol 500-50 mcg/dose (ADVAIR) 500-50 mcg/dose DsDv diskus inhaler Inhale 1 puff into the lungs 2 (two) times daily. Controller    FLUZONE HIGH-DOSE 2017-18, PF, 180 mcg/0.5 mL vaccine     glycopyrrolate (ROBINUL) 2 MG Tab TAKE 1 TO 1 AND 1/2 TABLETSDAILY AS  "NEEDED FOR EXCESS SWEATING    ipratropium (ATROVENT) 0.03 % nasal spray 1-2 sprays by Nasal route 2 (two) times daily.    ketoconazole (NIZORAL) 2 % cream Apply topically once daily. Toe nails    ketoconazole (NIZORAL) 2 % shampoo     lisinopril 10 MG tablet TAKE 1 TABLET ONCE DAILY    LYRICA 25 mg capsule     magnesium oxide (MAG-OX) 250 mg Tab Take by mouth once.    meclizine (ANTIVERT) 25 mg tablet Take 1 tablet (25 mg total) by mouth 2 (two) times daily as needed.    mirtazapine (REMERON) 15 MG tablet Take 1 tablet (15 mg total) by mouth every evening.    MYRBETRIQ 50 mg Tb24 Take 1 tablet by mouth once daily.    olopatadine (PATADAY) 0.2 % Drop     omeprazole (PRILOSEC) 40 MG capsule TAKE 1 CAPSULE ONCE DAILY    PNEUMOVAX 23 25 mcg/0.5 mL Syrg     ranitidine (ZANTAC) 300 MG tablet     SYNTHROID 50 mcg tablet     ZOSTAVAX, PF, 19,400 unit/0.65 mL injection      No current facility-administered medications for this visit.      Review of patient's allergies indicates:   Allergen Reactions    Trazodone Other (See Comments)     Nightmares/sleep walk    Talwin compound      Other reaction(s): Hives    Talwin [pentazocine lactate] Other (See Comments)     Hallucinations^    Transzone Other (See Comments)     Sleep Walks and has unnatural dreams    Bentyl [dicyclomine] Anxiety    Tessalon [benzonatate] Anxiety       Precautions: standard    Evaluation Date: 5/3/18  Start Time: 915  Stop Time: 950  Visit # authorized: 1/20  Authorization period: 12/31/18  Plan of care expiration: 7/2/18  MD referral: Dr. Jeni Bejarano of present illness: Pt has fibromyalgia. Her pain began insidiously and is constant. She has been to PT in the past for the same condition.    X-ray lumbar spine 3/26/18: "FINDINGS:  Mild-to-moderate DJD.  Grade 1 L4/L5 spondylolisthesis.  The disc spaces are narrowed between L4 and S1 vertebral segments.  No fracture or bone destruction identified"    X-ray c/s 3/26/18: " ""FINDINGS:  The EGD with bridging osteophytosis.  The disc spaces are significantly narrowed between C3 and the C7 vertebral segments.  No fracture or dislocation.  No bone destruction identified"      Subjective     Debbie Ding states her shoulders and low back are her chief complaints with pain. She reports she has fibromyalgia and "everything hurts." Pt reports difficulty with everything from washing dishes to sleeping, to holding a book. Everything increases her pain. Pt reports she lives alone and has stairs within her home but those are okay. Pt reports she is careful to move when she sleeps because she doesn't want to have more pain; she sleeps with a pillow under her head and low back while on her back. Pt reports a typical day looks like this: she wakes up and pulls her knees into her chest because she is in excruciating pain; then she gets up and washes her face and sits in the chair all day.    Pt reports she has been to PT in the past. She feels like the heat and massage helped but the exercises didn't help at all. When PT informed the patient about aquatic therapy, pt reports "I don't go in the pool."    Pain:  Location: shoulder pain/upper back/neck  Description: Variable  Activities Which Increase Pain: any physical movement with hands/arms; holding a book  Activities Which Decrease Pain: was on Lyrica but stopped because it makes her sleepy  Pain Scale: 7/10 now 10/10 at worst 0/10 at best    Pain:  Location: low back  Description: Variable  Activities Which Increase Pain: I don't know, bad in the morning, knees into chest  Activities Which Decrease Pain: nothing  Pain Scale: 5/10 now 10/10 at worst 0/10 at best    Physical Therapy Goals: to put fibromyalgia and pain in remission      Red flags:  Pt. denies bowel/bladder symptoms (urinary retention/fecal incontinence).   Recent weight loss? 25 pounds 8-9 months   Constant/Night pain that is unchanging with change of position? denies   PMH of CA? " "denies    Vertebral artery symptoms   Confirms dizziness-takes Meclazene for vertigo   Sometimes problems swallowing    Objective     Observation: Pt ambulated into clinic without AD; requires BUE support to stand up and sit down. Pt frail and lacking substantial muscle mass    Posture: FHP, increased thoracic kyphosis    Pt unable/refused to attempt sit to stand without UE support    Shoulder Active/ Passive ROM: (measured in degrees)   WNL B    Upper Extremity Strength: (grading 1-5 out of 5)    Grossly 3+/5 B   *unable to fully assessed secondary to tenderness to palpation    Lumbar ROM: (measured in degrees) NT secondary to pt's complaints of pain    Active/Passive Hip ROM: (measured in degrees)   WFL but not fully assessed secondary to tenderness to palpation      Lower Extremity Strength (graded 0-5 out of 5)   Grossly 3+/5 B   *unable to fully assessed secondary to tenderness to palpation          PT reviewed FOTO scores for Debbie Ding on 5/3/18  FOTO score neck: 25, 75% limited  FOTO score lumbar: 31, 69% limited    Functional Limitations Reports - G Codes  Category: mobility  Tool: FOTO      Current ():  CL  Goal (): CK        TREATMENT:  Therapeutic exercise: Debbie received therapeutic exercises to develop strength and endurance, flexibility for 8 minutes including:     scap squeezes 10x5"  Pelvic tilts x10  Mini squats x8    *Pt unable to perform more exercise secondary to complaints of pain with all of the above exercises    Pt. Education: Instructed pt. regarding: HEP including proper form/technique; body mechanics, and posture re-education. Pt educated in the importance of mobility in decreasing pain and improving functional mobility. Pt educated in the role of the transversus abdominus in LBP and stabilization. Pt demonstrated and verbalized fair understanding of the education provided. Debbie demonstrated good return demonstration of activities.  *Pt with mild cognitive delay and " fibromyalgia, which may impede progress and learning.    HEP2Go Code: [id number]    Assessment   Patient is a 85 y.o. female referred to outpatient physical therapy who presents with a PT diagnosis of fibromyalgia/lbp/neck pain demonstrating joint dysfunction and functional limitation as described below. Level of complexity is moderate;  based on patient's past medical history including the below co-morbidities and personal factors; functional limitations, and clinical presentation directly impacting his/her plan of care. Pt demonstrates fair rehab potential. Pt will benefit from physcial therapy services in order to maximize pain free functional mobility. The following goals were discussed with the patient and patient is in agreement with them as to be addressed in the treatment plan. Pt was given a HEP consisting of scap squeezes, mini squats, and pelvic tilts. Pt verbally understood the instructions as they were given and demonstrated proper form and technique during therapy. Pt was advised to perform these exercises free of pain, and to stop performing them if pain occurs.       History  Co-morbidities and personal factors that may impact the plan of care Examination  Body Structures and Functions, activity limitations and participation restrictions that may impact the plan of care Clinical Presentation   Decision Making/ Complexity Score   Co-morbidities:    diabetes mellitus, GERD, chronic gastritis, asthma,   osteoarthritis, fibromyalgia syndrome, and memory   loss.            Personal Factors:   Depression, age Body Regions: neck, low back, shoulders    Body Systems: musculoskeletal      Activity limitations: washing dishes, standing, sitting, sleeping      Participation Restrictions: exercise, chores, recreational activities       evolving   moderate     Medical necessity is demonstrated by the following IMPAIRMENTS/PROBLEM LIST:   1) Pain limiting function   2) Postural dysfunction   3) Longus  colli/suboccipital tightness   4) Upper trapezius/levator scapula tightness   5) Longus colli/scapula stabilizer/core/lumbar/hip weakness    6) Decreased UT/levator scapula/scalenes soft tissue extensibility   7) Decreased BUE/BLE strength   8) Lack of HEP   9) Fibromyalgia    GOALS:   Short Term Goals: 3 weeks  1. Patient will be proficient and compliant with HEP.  2. Pt will be able to hold up a book without complaints of pain.  3. Pt will be able to perform 5 minutes of chores without complaints of pain.    Long Term Goals: 6 weeks  1. Pt will be able to perform 10 minutes of chores without complaints of pain.  2. Pt will be able to tolerate full PT tx session without an increase in pain.  3. Pt will be </= 56% limited in mobility according to neck FOTO.  4. Pt will be </= 51% limited in mobility according to lumbar FOTO.    Plan     Pt will be treated by physical therapy 1-3 times a week for 6 weeks for pt. education, HEP, therapeutic exercises, neuromuscular re-education, joint/soft tissue mobilizations, and modalities prn to achieve established goals. Debbie may at times be seen by a PTA as part of the Rehab Team.     I certify the need for these services furnished under this plan of treatment and while under my care.  ______________________________ Physician/Referring Practitioner  Date of Signature      Rivka Perdue, PT, DPT

## 2018-05-03 ENCOUNTER — CLINICAL SUPPORT (OUTPATIENT)
Dept: REHABILITATION | Facility: HOSPITAL | Age: 83
End: 2018-05-03
Attending: PHYSICAL MEDICINE & REHABILITATION
Payer: MEDICARE

## 2018-05-03 DIAGNOSIS — M79.7 FIBROMYALGIA: Primary | ICD-10-CM

## 2018-05-03 DIAGNOSIS — G89.29 CHRONIC BILATERAL LOW BACK PAIN WITHOUT SCIATICA: ICD-10-CM

## 2018-05-03 DIAGNOSIS — M54.50 CHRONIC BILATERAL LOW BACK PAIN WITHOUT SCIATICA: ICD-10-CM

## 2018-05-03 DIAGNOSIS — M54.2 NECK PAIN: ICD-10-CM

## 2018-05-03 DIAGNOSIS — R29.898 DECREASED STRENGTH OF TRUNK AND BACK: ICD-10-CM

## 2018-05-03 PROCEDURE — 97110 THERAPEUTIC EXERCISES: CPT

## 2018-05-03 PROCEDURE — G8979 MOBILITY GOAL STATUS: HCPCS | Mod: CK

## 2018-05-03 PROCEDURE — 97162 PT EVAL MOD COMPLEX 30 MIN: CPT

## 2018-05-03 PROCEDURE — G8978 MOBILITY CURRENT STATUS: HCPCS | Mod: CL

## 2018-05-03 NOTE — PLAN OF CARE
[facility]   PATIENT EVALUATION    Name: Debbie Ding  Clinic Number: [id number]    Diagnosis:   Encounter Diagnoses   Name Primary?    Chronic bilateral low back pain without sciatica     Decreased strength of trunk and back     Neck pain     Fibromyalgia Yes     Physician: Kadie Ngo MD  Treatment Orders: PT Eval and Treat    History     Past Medical History:   Diagnosis Date    Arthritis     Asthma     Cataract     Depression     Diabetes mellitus     Fibromyalgia 7/2/2012    Fibromyalgia     GERD (gastroesophageal reflux disease)     Hypertension 7/2/2012    Thoracic or lumbosacral neuritis or radiculitis, unspecified     Thyroid disease     Ulcer      Current Outpatient Prescriptions   Medication Sig    acetaminophen (TYLENOL) 500 MG tablet Take 1-2 tablets (500-1,000 mg total) by mouth 3 (three) times daily as needed for Pain.    ADVAIR DISKUS 250-50 mcg/dose diskus inhaler     albuterol 90 mcg/actuation inhaler Inhale 2 puffs into the lungs every 4 (four) hours as needed for Wheezing.    ammonium lactate 12 % Crea     atorvastatin (LIPITOR) 10 MG tablet TAKE 1 TABLET ONCE DAILY    BOOSTRIX TDAP 2.5-8-5 Lf-mcg-Lf/0.5mL Syrg injection     cycloSPORINE (RESTASIS) 0.05 % ophthalmic emulsion     dicyclomine (BENTYL) 10 MG capsule     donepezil (ARICEPT) 10 MG tablet Take 1 tablet (10 mg total) by mouth every morning.    ESTRACE 0.01 % (0.1 mg/gram) vaginal cream     estradiol 0.05 mg/24 hr td ptsw (VIVELLE-DOT) 0.05 mg/24 hr     fluocinolone-shower cap 0.01 % Oil     fluocinonide (LIDEX) 0.05 % external solution USE AS DIRECTED TWICE A DAY EXTERNALLY 1MONTH    fluticasone-salmeterol 500-50 mcg/dose (ADVAIR) 500-50 mcg/dose DsDv diskus inhaler Inhale 1 puff into the lungs 2 (two) times daily. Controller    FLUZONE HIGH-DOSE 2017-18, PF, 180 mcg/0.5 mL vaccine     glycopyrrolate (ROBINUL) 2 MG Tab TAKE 1 TO 1 AND 1/2 TABLETSDAILY AS  "NEEDED FOR EXCESS SWEATING    ipratropium (ATROVENT) 0.03 % nasal spray 1-2 sprays by Nasal route 2 (two) times daily.    ketoconazole (NIZORAL) 2 % cream Apply topically once daily. Toe nails    ketoconazole (NIZORAL) 2 % shampoo     lisinopril 10 MG tablet TAKE 1 TABLET ONCE DAILY    LYRICA 25 mg capsule     magnesium oxide (MAG-OX) 250 mg Tab Take by mouth once.    meclizine (ANTIVERT) 25 mg tablet Take 1 tablet (25 mg total) by mouth 2 (two) times daily as needed.    mirtazapine (REMERON) 15 MG tablet Take 1 tablet (15 mg total) by mouth every evening.    MYRBETRIQ 50 mg Tb24 Take 1 tablet by mouth once daily.    olopatadine (PATADAY) 0.2 % Drop     omeprazole (PRILOSEC) 40 MG capsule TAKE 1 CAPSULE ONCE DAILY    PNEUMOVAX 23 25 mcg/0.5 mL Syrg     ranitidine (ZANTAC) 300 MG tablet     SYNTHROID 50 mcg tablet     ZOSTAVAX, PF, 19,400 unit/0.65 mL injection      No current facility-administered medications for this visit.      Review of patient's allergies indicates:   Allergen Reactions    Trazodone Other (See Comments)     Nightmares/sleep walk    Talwin compound      Other reaction(s): Hives    Talwin [pentazocine lactate] Other (See Comments)     Hallucinations^    Transzone Other (See Comments)     Sleep Walks and has unnatural dreams    Bentyl [dicyclomine] Anxiety    Tessalon [benzonatate] Anxiety       Precautions: standard    Evaluation Date: 5/3/18  Start Time: 915  Stop Time: 950  Visit # authorized: 1/20  Authorization period: 12/31/18  Plan of care expiration: 7/2/18  MD referral: Dr. Jeni Bejarano of present illness: Pt has fibromyalgia. Her pain began insidiously and is constant. She has been to PT in the past for the same condition.    X-ray lumbar spine 3/26/18: "FINDINGS:  Mild-to-moderate DJD.  Grade 1 L4/L5 spondylolisthesis.  The disc spaces are narrowed between L4 and S1 vertebral segments.  No fracture or bone destruction identified"    X-ray c/s 3/26/18: " ""FINDINGS:  The EGD with bridging osteophytosis.  The disc spaces are significantly narrowed between C3 and the C7 vertebral segments.  No fracture or dislocation.  No bone destruction identified"      Subjective     Debbie Ding states her shoulders and low back are her chief complaints with pain. She reports she has fibromyalgia and "everything hurts." Pt reports difficulty with everything from washing dishes to sleeping, to holding a book. Everything increases her pain. Pt reports she lives alone and has stairs within her home but those are okay. Pt reports she is careful to move when she sleeps because she doesn't want to have more pain; she sleeps with a pillow under her head and low back while on her back. Pt reports a typical day looks like this: she wakes up and pulls her knees into her chest because she is in excruciating pain; then she gets up and washes her face and sits in the chair all day.    Pt reports she has been to PT in the past. She feels like the heat and massage helped but the exercises didn't help at all. When PT informed the patient about aquatic therapy, pt reports "I don't go in the pool."    Pain:  Location: shoulder pain/upper back/neck  Description: Variable  Activities Which Increase Pain: any physical movement with hands/arms; holding a book  Activities Which Decrease Pain: was on Lyrica but stopped because it makes her sleepy  Pain Scale: 7/10 now 10/10 at worst 0/10 at best    Pain:  Location: low back  Description: Variable  Activities Which Increase Pain: I don't know, bad in the morning, knees into chest  Activities Which Decrease Pain: nothing  Pain Scale: 5/10 now 10/10 at worst 0/10 at best    Physical Therapy Goals: to put fibromyalgia and pain in remission      Red flags:  Pt. denies bowel/bladder symptoms (urinary retention/fecal incontinence).   Recent weight loss? 25 pounds 8-9 months   Constant/Night pain that is unchanging with change of position? denies   PMH of CA? " "denies    Vertebral artery symptoms   Confirms dizziness-takes Meclazene for vertigo   Sometimes problems swallowing    Objective     Observation: Pt ambulated into clinic without AD; requires BUE support to stand up and sit down. Pt frail and lacking substantial muscle mass    Posture: FHP, increased thoracic kyphosis    Pt unable/refused to attempt sit to stand without UE support    Shoulder Active/ Passive ROM: (measured in degrees)   WNL B    Upper Extremity Strength: (grading 1-5 out of 5)    Grossly 3+/5 B   *unable to fully assessed secondary to tenderness to palpation    Lumbar ROM: (measured in degrees) NT secondary to pt's complaints of pain    Active/Passive Hip ROM: (measured in degrees)   WFL but not fully assessed secondary to tenderness to palpation      Lower Extremity Strength (graded 0-5 out of 5)   Grossly 3+/5 B   *unable to fully assessed secondary to tenderness to palpation          PT reviewed FOTO scores for Debbie Ding on 5/3/18  FOTO score neck: 25, 75% limited  FOTO score lumbar: 31, 69% limited    Functional Limitations Reports - G Codes  Category: mobility  Tool: FOTO      Current ():  CL  Goal (): CK        TREATMENT:  Therapeutic exercise: Debbie received therapeutic exercises to develop strength and endurance, flexibility for 8 minutes including:     scap squeezes 10x5"  Pelvic tilts x10  Mini squats x8    *Pt unable to perform more exercise secondary to complaints of pain with all of the above exercises    Pt. Education: Instructed pt. regarding: HEP including proper form/technique; body mechanics, and posture re-education. Pt educated in the importance of mobility in decreasing pain and improving functional mobility. Pt educated in the role of the transversus abdominus in LBP and stabilization. Pt demonstrated and verbalized fair understanding of the education provided. Debbie demonstrated good return demonstration of activities.  *Pt with mild cognitive delay and " fibromyalgia, which may impede progress and learning.    HEP2Go Code: [id number]    Assessment   Patient is a 85 y.o. female referred to outpatient physical therapy who presents with a PT diagnosis of fibromyalgia/lbp/neck pain demonstrating joint dysfunction and functional limitation as described below. Level of complexity is moderate;  based on patient's past medical history including the below co-morbidities and personal factors; functional limitations, and clinical presentation directly impacting his/her plan of care. Pt demonstrates fair rehab potential. Pt will benefit from physcial therapy services in order to maximize pain free functional mobility. The following goals were discussed with the patient and patient is in agreement with them as to be addressed in the treatment plan. Pt was given a HEP consisting of scap squeezes, mini squats, and pelvic tilts. Pt verbally understood the instructions as they were given and demonstrated proper form and technique during therapy. Pt was advised to perform these exercises free of pain, and to stop performing them if pain occurs.       History  Co-morbidities and personal factors that may impact the plan of care Examination  Body Structures and Functions, activity limitations and participation restrictions that may impact the plan of care Clinical Presentation   Decision Making/ Complexity Score   Co-morbidities:    diabetes mellitus, GERD, chronic gastritis, asthma,   osteoarthritis, fibromyalgia syndrome, and memory   loss.            Personal Factors:   Depression, age Body Regions: neck, low back, shoulders    Body Systems: musculoskeletal      Activity limitations: washing dishes, standing, sitting, sleeping      Participation Restrictions: exercise, chores, recreational activities       evolving   moderate     Medical necessity is demonstrated by the following IMPAIRMENTS/PROBLEM LIST:   1) Pain limiting function   2) Postural dysfunction   3) Longus  colli/suboccipital tightness   4) Upper trapezius/levator scapula tightness   5) Longus colli/scapula stabilizer/core/lumbar/hip weakness    6) Decreased UT/levator scapula/scalenes soft tissue extensibility   7) Decreased BUE/BLE strength   8) Lack of HEP   9) Fibromyalgia    GOALS:   Short Term Goals: 3 weeks  1. Patient will be proficient and compliant with HEP.  2. Pt will be able to hold up a book without complaints of pain.  3. Pt will be able to perform 5 minutes of chores without complaints of pain.    Long Term Goals: 6 weeks  1. Pt will be able to perform 10 minutes of chores without complaints of pain.  2. Pt will be able to tolerate full PT tx session without an increase in pain.  3. Pt will be </= 56% limited in mobility according to neck FOTO.  4. Pt will be </= 51% limited in mobility according to lumbar FOTO.    Plan     Pt will be treated by physical therapy 1-3 times a week for 6 weeks for pt. education, HEP, therapeutic exercises, neuromuscular re-education, joint/soft tissue mobilizations, and modalities prn to achieve established goals. Debbie may at times be seen by a PTA as part of the Rehab Team.       Rivka Perdue, PT, DPT      I certify the need for these services furnished under this plan of treatment and while under my care.    Kadie Ngo MD    Physician/Referring Practitioner     05/03/2018  Date of Signature

## 2018-05-17 DIAGNOSIS — R41.3 MEMORY LOSS: ICD-10-CM

## 2018-05-18 RX ORDER — DONEPEZIL HYDROCHLORIDE 10 MG/1
TABLET, FILM COATED ORAL
Qty: 90 TABLET | Refills: 3 | Status: SHIPPED | OUTPATIENT
Start: 2018-05-18 | End: 2019-04-22 | Stop reason: SDUPTHER

## 2018-07-30 ENCOUNTER — OFFICE VISIT (OUTPATIENT)
Dept: RHEUMATOLOGY | Facility: CLINIC | Age: 83
End: 2018-07-30
Payer: MEDICARE

## 2018-07-30 VITALS
DIASTOLIC BLOOD PRESSURE: 87 MMHG | BODY MASS INDEX: 21.97 KG/M2 | HEART RATE: 69 BPM | WEIGHT: 132 LBS | SYSTOLIC BLOOD PRESSURE: 160 MMHG

## 2018-07-30 DIAGNOSIS — M15.9 PRIMARY OSTEOARTHRITIS INVOLVING MULTIPLE JOINTS: ICD-10-CM

## 2018-07-30 DIAGNOSIS — M79.7 FIBROMYALGIA: Primary | ICD-10-CM

## 2018-07-30 PROCEDURE — 99213 OFFICE O/P EST LOW 20 MIN: CPT | Mod: PBBFAC | Performed by: INTERNAL MEDICINE

## 2018-07-30 PROCEDURE — 99999 PR PBB SHADOW E&M-EST. PATIENT-LVL III: CPT | Mod: PBBFAC,,, | Performed by: INTERNAL MEDICINE

## 2018-07-30 PROCEDURE — 99213 OFFICE O/P EST LOW 20 MIN: CPT | Mod: S$PBB,,, | Performed by: INTERNAL MEDICINE

## 2018-07-30 NOTE — PROGRESS NOTES
History of present illness:  85-year-old female I have been following since 2012.  She has a history of chronic pain dating back to the 1980s.  She has been diagnosed in the past as having osteoarthritis, especially of the spine, and fibromyalgia.  She has been tried on multiple medications in the past but had stopped most of them because of side effects.  She was last seen in January.  She was on Lyrica and Cymbalta at that time.  She was also taking Tylenol.  Since then she has stopped all her medications.  She states most of the medications made her drowsy.  Overall her pain is feeling better.  She does still have some sciatic pain.  She had used heat in the past with some response.  She has not been using it recently.  She was seen by physical therapy 1 time.  She actually felt worst after the session a did not go back.  She has tried several topical medications.  Most recently, they have not helped.  She has had no other recent medical problems.    Physical examination was not performed, the entire time was counseling.    Assessment:  Osteoarthritis and fibromyalgia    Plans:  As long as her disease is stable off medications there is no need to resume medications at this time.  I did recommend she start using heat again to the back.  I did not give her regular return appointment but would be happy to see her in follow-up if necessary.

## 2018-08-16 DIAGNOSIS — R61 HYPERHIDROSIS: ICD-10-CM

## 2018-08-16 RX ORDER — GLYCOPYRROLATE 2 MG/1
TABLET ORAL
Qty: 135 TABLET | Refills: 1 | Status: SHIPPED | OUTPATIENT
Start: 2018-08-16 | End: 2019-01-15 | Stop reason: SDUPTHER

## 2018-08-28 ENCOUNTER — DOCUMENTATION ONLY (OUTPATIENT)
Dept: REHABILITATION | Facility: HOSPITAL | Age: 83
End: 2018-08-28

## 2018-08-28 NOTE — PROGRESS NOTES
Name: Debbie Ding  Referring Provider: Dr. Ngo  PT Order: PT evaluate and treat  Clinical #: [id number]  Discharge Summary Date: 8/28/2018  Diagnosis: fibromyalgia, LBP, neck pain    Patient was seen for 1 OP PT visit on 5/3/18. Treatment included: evaluation, HEP, pt education, ther ex, and stretching. PT unable to fully assess goal achievement as pt did not return for follow up sessions/did not reschedule follow up visits. This patient is discharged from OP PT Services.      Rivka Perdue, PT, DPT

## 2018-09-24 DIAGNOSIS — M79.7 FIBROMYALGIA: Primary | ICD-10-CM

## 2018-09-24 RX ORDER — LEVOTHYROXINE SODIUM 50 UG/1
50 TABLET ORAL
Qty: 30 TABLET | Refills: 6 | Status: SHIPPED | OUTPATIENT
Start: 2018-09-24 | End: 2019-06-18

## 2018-09-24 NOTE — TELEPHONE ENCOUNTER
----- Message from Gina Mendez sent at 9/24/2018  4:02 PM CDT -----  Contact: [facility] [phone]  Can the clinic reply in [facility]: no      Please refill the medication(s) listed below. The patient can be reached at this phone number (____) once it is called into the pharmacy.      Medication #1SYNTHROID 50 mcg tablet - duplicate call pharmacy. Ref [id number]      Medication #2      Preferred Pharmacy:

## 2018-12-18 RX ORDER — LISINOPRIL 10 MG/1
TABLET ORAL
Qty: 90 TABLET | Refills: 3 | Status: SHIPPED | OUTPATIENT
Start: 2018-12-18 | End: 2019-03-07

## 2018-12-18 RX ORDER — OMEPRAZOLE 40 MG/1
CAPSULE, DELAYED RELEASE ORAL
Qty: 90 CAPSULE | Refills: 3 | Status: SHIPPED | OUTPATIENT
Start: 2018-12-18 | End: 2019-03-07 | Stop reason: SDUPTHER

## 2018-12-18 RX ORDER — ATORVASTATIN CALCIUM 10 MG/1
TABLET, FILM COATED ORAL
Qty: 90 TABLET | Refills: 3 | Status: SHIPPED | OUTPATIENT
Start: 2018-12-18 | End: 2019-03-27

## 2019-01-15 DIAGNOSIS — R61 HYPERHIDROSIS: ICD-10-CM

## 2019-01-15 RX ORDER — GLYCOPYRROLATE 2 MG/1
TABLET ORAL
Qty: 135 TABLET | Refills: 1 | Status: ON HOLD | OUTPATIENT
Start: 2019-01-15 | End: 2019-04-30 | Stop reason: HOSPADM

## 2019-01-31 ENCOUNTER — EXTERNAL CHRONIC CARE MANAGEMENT (OUTPATIENT)
Dept: PRIMARY CARE CLINIC | Facility: CLINIC | Age: 84
End: 2019-01-31
Payer: MEDICARE

## 2019-01-31 PROCEDURE — 99490 CHRNC CARE MGMT STAFF 1ST 20: CPT | Mod: S$PBB,,, | Performed by: INTERNAL MEDICINE

## 2019-01-31 PROCEDURE — 99490 PR CHRONIC CARE MGMT, 1ST 20 MIN: ICD-10-PCS | Mod: S$PBB,,, | Performed by: INTERNAL MEDICINE

## 2019-01-31 PROCEDURE — 99490 CHRNC CARE MGMT STAFF 1ST 20: CPT | Mod: PBBFAC | Performed by: INTERNAL MEDICINE

## 2019-02-26 ENCOUNTER — HOSPITAL ENCOUNTER (OUTPATIENT)
Dept: RADIOLOGY | Facility: HOSPITAL | Age: 84
Discharge: HOME | End: 2019-02-26
Attending: PHYSICAL MEDICINE & REHABILITATION
Payer: MEDICARE

## 2019-02-26 ENCOUNTER — OFFICE VISIT (OUTPATIENT)
Dept: PHYSICAL MEDICINE AND REHAB | Facility: CLINIC | Age: 84
End: 2019-02-26
Payer: MEDICARE

## 2019-02-26 VITALS
SYSTOLIC BLOOD PRESSURE: 195 MMHG | HEART RATE: 73 BPM | WEIGHT: 155.56 LBS | HEIGHT: 65 IN | DIASTOLIC BLOOD PRESSURE: 94 MMHG | BODY MASS INDEX: 25.92 KG/M2

## 2019-02-26 DIAGNOSIS — M25.562 ACUTE PAIN OF LEFT KNEE: ICD-10-CM

## 2019-02-26 DIAGNOSIS — G89.29 CHRONIC MIDLINE LOW BACK PAIN WITH LEFT-SIDED SCIATICA: Primary | ICD-10-CM

## 2019-02-26 DIAGNOSIS — M54.2 CHRONIC NECK PAIN: ICD-10-CM

## 2019-02-26 DIAGNOSIS — G89.29 CHRONIC NECK PAIN: ICD-10-CM

## 2019-02-26 DIAGNOSIS — M15.9 PRIMARY OSTEOARTHRITIS INVOLVING MULTIPLE JOINTS: ICD-10-CM

## 2019-02-26 DIAGNOSIS — M79.7 FIBROMYALGIA: ICD-10-CM

## 2019-02-26 DIAGNOSIS — M54.42 CHRONIC MIDLINE LOW BACK PAIN WITH LEFT-SIDED SCIATICA: Primary | ICD-10-CM

## 2019-02-26 DIAGNOSIS — M51.36 DDD (DEGENERATIVE DISC DISEASE), LUMBAR: ICD-10-CM

## 2019-02-26 DIAGNOSIS — M47.816 LUMBAR FACET ARTHROPATHY: ICD-10-CM

## 2019-02-26 DIAGNOSIS — M43.16 SPONDYLOLISTHESIS AT L4-L5 LEVEL: ICD-10-CM

## 2019-02-26 DIAGNOSIS — M47.22 OSTEOARTHRITIS OF SPINE WITH RADICULOPATHY, CERVICAL REGION: ICD-10-CM

## 2019-02-26 PROCEDURE — 99999 PR PBB SHADOW E&M-EST. PATIENT-LVL III: ICD-10-PCS | Mod: PBBFAC,,, | Performed by: PHYSICAL MEDICINE & REHABILITATION

## 2019-02-26 PROCEDURE — 99214 OFFICE O/P EST MOD 30 MIN: CPT | Mod: S$PBB,,, | Performed by: PHYSICAL MEDICINE & REHABILITATION

## 2019-02-26 PROCEDURE — 99214 PR OFFICE/OUTPT VISIT, EST, LEVL IV, 30-39 MIN: ICD-10-PCS | Mod: S$PBB,,, | Performed by: PHYSICAL MEDICINE & REHABILITATION

## 2019-02-26 PROCEDURE — 99213 OFFICE O/P EST LOW 20 MIN: CPT | Mod: PBBFAC,25 | Performed by: PHYSICAL MEDICINE & REHABILITATION

## 2019-02-26 PROCEDURE — 73562 X-RAY EXAM OF KNEE 3: CPT | Mod: 26,LT,, | Performed by: RADIOLOGY

## 2019-02-26 PROCEDURE — 73562 XR KNEE ORTHO LEFT: ICD-10-PCS | Mod: 26,LT,, | Performed by: RADIOLOGY

## 2019-02-26 PROCEDURE — 73562 X-RAY EXAM OF KNEE 3: CPT | Mod: TC,LT

## 2019-02-26 PROCEDURE — 73560 X-RAY EXAM OF KNEE 1 OR 2: CPT | Mod: TC,LT

## 2019-02-26 PROCEDURE — 73560 X-RAY EXAM OF KNEE 1 OR 2: CPT | Mod: 26,XS,LT, | Performed by: RADIOLOGY

## 2019-02-26 PROCEDURE — 73560 XR KNEE ORTHO LEFT: ICD-10-PCS | Mod: 26,XS,LT, | Performed by: RADIOLOGY

## 2019-02-26 PROCEDURE — 99999 PR PBB SHADOW E&M-EST. PATIENT-LVL III: CPT | Mod: PBBFAC,,, | Performed by: PHYSICAL MEDICINE & REHABILITATION

## 2019-02-26 RX ORDER — DULOXETIN HYDROCHLORIDE 30 MG/1
30 CAPSULE, DELAYED RELEASE ORAL DAILY
Qty: 90 CAPSULE | Refills: 1 | Status: SHIPPED | OUTPATIENT
Start: 2019-02-26 | End: 2019-06-18

## 2019-02-26 NOTE — PROGRESS NOTES
"Subjective:       Patient ID: Debbie Ding is a 86 y.o. female.    Chief Complaint: No chief complaint on file.    HPI     HISTORY OF PRESENT ILLNESS:  Ms. Ding is an 86-year-old black female with   HTN, DM, GERD, chronic gastritis, asthma, depression, OA, fibromyalgia syndrome   and memory impairment.  She presented to the Physical Medicine Clinic on   03/26/2018 for chronic low back pain, chronic neck pain and generalized aching.    Repeat x-rays of the lumbar spine and cervical spine were ordered.  She was   started on Cymbalta and maintained on Lyrica and p.r.n. Tylenol.    The patient was lost to follow up.  She is coming today to the clinic to   reestablish care.  Her low back pain has been stable.  It is a constant aching   pain in the lumbar spine and across her back.  She has occasional sensations of   "scratching and itching" in the lumbar region.  The pain radiates to the left   foot with numbness and tingling.  Her maximum pain is 7-8/10 and minimum 3-4/10.    Today, it is 7-8/10.  The patient complains of bilateral lower extremity   weakness.  She has chronic bladder leakage.  She denies any bowel incontinence.    Her neck pain has been stable.  It is an intermittent aching pain in the   cervical spine.  She has infrequent radiation to the right hand.  Her maximum   pain is 7-8/10 and minimum 1-2/10.  Today, it is 1-2/10.  The patient complains   of mild right upper extremity weakness.    She has been complaining for the last three weeks of left knee pain following a   minor fall.  It is mild-to-moderate pain aggravated by movement.    She is currently taking Tylenol 500 mg p.r.n., but infrequently.  She was on   Lyrica, but it was stopped by Rheumatology.  She has been out of Cymbalta for a   few months.      MS/HN  dd: 02/26/2019 12:15:42 (CST)  td: 02/27/2019 06:27:58 (CST)  Doc ID   #[id number]  Job ID #[id number]    CC:           Review of Systems   Constitutional: Positive for fatigue. Negative " for chills and fever.   Eyes: Negative for visual disturbance.   Respiratory: Negative for shortness of breath.    Cardiovascular: Negative for chest pain.   Gastrointestinal: Positive for constipation. Negative for blood in stool, nausea and vomiting.        Acid reflux   Genitourinary: Positive for difficulty urinating.   Musculoskeletal: Positive for arthralgias, back pain and neck pain. Negative for gait problem.   Neurological: Positive for headaches. Negative for dizziness.   Psychiatric/Behavioral: Negative for behavioral problems and sleep disturbance.       Objective:      Physical Exam   Constitutional: She is oriented to person, place, and time. She appears well-developed and well-nourished.   HENT:   Head: Normocephalic and atraumatic.   Neck:   Decreased ROM all directions.  Pain at end range.  +ve tenderness C-spine.   Abdominal: Soft.   Musculoskeletal:   BUE:  ROM:   RUE: full.   LUE: full.  Strength:    RUE: 4/5 at shoulder abduction, 5- elbow flexion, 5- elbow extension, 5- hand .   LUE: 4/5 at shoulder abduction, 5- elbow flexion, 5- elbow extension, 5- hand .  Sensation to pinprick:   RUE: intact.   LUE: intact.      BLE:  ROM:   RLE: full.   LLE: full. Pain in knee with passive knee movement.  Knee crepitus:   RLE: +ve.   LLE: +ve.   Strength:    RLE: 4/5 at hip flexion, 5- knee extension, 5- ankle DF, 5- PF.   LLE: 4-/5 at hip flexion, 5- knee extension, 4 ankle DF, 4 PF.  Sensation to pinprick:     RLE: intact.      LLE: intact.       +ve modertate tenderness over lumbar spine.  +ve diffuse tender points over the upper & lower back, anterior chest wall, hips.    Gait: slow ann.     Neurological: She is alert and oriented to person, place, and time.   Skin: Skin is warm.   Psychiatric: Her behavior is normal.   Vitals reviewed.      Assessment:       1. Chronic midline low back pain with left-sided sciatica    2. DDD (degenerative disc disease), lumbar    3. Lumbar facet arthropathy     4. Spondylolisthesis at L4-L5 level (grade 1, L4/l5)    5. Chronic neck pain    6. Osteoarthritis of spine with radiculopathy, cervical region    7. Fibromyalgia    8. Primary osteoarthritis involving multiple joints        Plan:         - X-ray Knee Ortho Left; Future  - Start DULoxetine (CYMBALTA) 30 MG capsule; Take 1 capsule (30 mg total) by mouth once daily.  - Start Tylenol 500 mg, 1-2 po tid prn for mild pain.  - Ambulatory Referral to Physical/Occupational Therapy  - Follow-up in about 3 months (around 5/26/2019).    This was a 25 minute visit, more than 50% of which was spent counseling the patient about the diagnosis and the treatment plan.

## 2019-03-07 ENCOUNTER — HOSPITAL ENCOUNTER (EMERGENCY)
Facility: OTHER | Age: 84
Discharge: HOME | End: 2019-03-07
Attending: EMERGENCY MEDICINE
Payer: MEDICARE

## 2019-03-07 VITALS
OXYGEN SATURATION: 98 % | HEART RATE: 60 BPM | SYSTOLIC BLOOD PRESSURE: 198 MMHG | TEMPERATURE: 99 F | BODY MASS INDEX: 23.32 KG/M2 | DIASTOLIC BLOOD PRESSURE: 84 MMHG | WEIGHT: 140 LBS | HEIGHT: 65 IN | RESPIRATION RATE: 14 BRPM

## 2019-03-07 DIAGNOSIS — I10 HYPERTENSION, UNSPECIFIED TYPE: ICD-10-CM

## 2019-03-07 DIAGNOSIS — R07.9 CHEST PAIN: ICD-10-CM

## 2019-03-07 DIAGNOSIS — R07.89 ATYPICAL CHEST PAIN: Primary | ICD-10-CM

## 2019-03-07 DIAGNOSIS — N18.9 CHRONIC RENAL IMPAIRMENT, UNSPECIFIED CKD STAGE: ICD-10-CM

## 2019-03-07 DIAGNOSIS — R10.13 EPIGASTRIC ABDOMINAL PAIN: ICD-10-CM

## 2019-03-07 DIAGNOSIS — K21.9 GASTROESOPHAGEAL REFLUX DISEASE, ESOPHAGITIS PRESENCE NOT SPECIFIED: ICD-10-CM

## 2019-03-07 LAB
ALBUMIN SERPL BCP-MCNC: 3.6 G/DL
ALP SERPL-CCNC: 51 U/L
ALT SERPL W/O P-5'-P-CCNC: 15 U/L
ANION GAP SERPL CALC-SCNC: 10 MMOL/L
ANISOCYTOSIS BLD QL SMEAR: SLIGHT
AST SERPL-CCNC: 22 U/L
BASOPHILS NFR BLD: 1 %
BILIRUB SERPL-MCNC: 0.8 MG/DL
BNP SERPL-MCNC: 47 PG/ML
BUN SERPL-MCNC: 25 MG/DL
CALCIUM SERPL-MCNC: 9.9 MG/DL
CHLORIDE SERPL-SCNC: 111 MMOL/L
CO2 SERPL-SCNC: 22 MMOL/L
CREAT SERPL-MCNC: 1.6 MG/DL
DIFFERENTIAL METHOD: ABNORMAL
EOSINOPHIL NFR BLD: 0 %
ERYTHROCYTE [DISTWIDTH] IN BLOOD BY AUTOMATED COUNT: 16.2 %
EST. GFR  (AFRICAN AMERICAN): 33 ML/MIN/1.73 M^2
EST. GFR  (NON AFRICAN AMERICAN): 29 ML/MIN/1.73 M^2
GLUCOSE SERPL-MCNC: 89 MG/DL
HCT VFR BLD AUTO: 40.8 %
HGB BLD-MCNC: 14 G/DL
LIPASE SERPL-CCNC: 5 U/L
LYMPHOCYTES NFR BLD: 25 %
MCH RBC QN AUTO: 30.7 PG
MCHC RBC AUTO-ENTMCNC: 34.3 G/DL
MCV RBC AUTO: 90 FL
MONOCYTES NFR BLD: 1 %
NEUTROPHILS NFR BLD: 72 %
NEUTS BAND NFR BLD MANUAL: 1 %
PLATELET # BLD AUTO: 175 K/UL
PLATELET BLD QL SMEAR: ABNORMAL
PMV BLD AUTO: 10.2 FL
POLYCHROMASIA BLD QL SMEAR: ABNORMAL
POTASSIUM SERPL-SCNC: 4.3 MMOL/L
PROT SERPL-MCNC: 7.8 G/DL
RBC # BLD AUTO: 4.56 M/UL
SODIUM SERPL-SCNC: 143 MMOL/L
TROPONIN I SERPL DL<=0.01 NG/ML-MCNC: 0.02 NG/ML
WBC # BLD AUTO: 9.32 K/UL

## 2019-03-07 PROCEDURE — 25000003 PHARM REV CODE 250: Performed by: EMERGENCY MEDICINE

## 2019-03-07 PROCEDURE — 83690 ASSAY OF LIPASE: CPT

## 2019-03-07 PROCEDURE — 80053 COMPREHEN METABOLIC PANEL: CPT

## 2019-03-07 PROCEDURE — 93010 ELECTROCARDIOGRAM REPORT: CPT | Mod: ,,, | Performed by: INTERNAL MEDICINE

## 2019-03-07 PROCEDURE — 84484 ASSAY OF TROPONIN QUANT: CPT

## 2019-03-07 PROCEDURE — 83880 ASSAY OF NATRIURETIC PEPTIDE: CPT

## 2019-03-07 PROCEDURE — 93005 ELECTROCARDIOGRAM TRACING: CPT

## 2019-03-07 PROCEDURE — 85027 COMPLETE CBC AUTOMATED: CPT

## 2019-03-07 PROCEDURE — 99283 EMERGENCY DEPT VISIT LOW MDM: CPT

## 2019-03-07 PROCEDURE — 85007 BL SMEAR W/DIFF WBC COUNT: CPT

## 2019-03-07 PROCEDURE — 93010 EKG 12-LEAD: ICD-10-PCS | Mod: ,,, | Performed by: INTERNAL MEDICINE

## 2019-03-07 RX ORDER — AMLODIPINE AND OLMESARTAN MEDOXOMIL 5; 20 MG/1; MG/1
1 TABLET ORAL DAILY
Qty: 30 TABLET | Refills: 0 | Status: ON HOLD | OUTPATIENT
Start: 2019-03-07 | End: 2019-04-30 | Stop reason: HOSPADM

## 2019-03-07 RX ORDER — ASPIRIN 325 MG
325 TABLET ORAL
Status: COMPLETED | OUTPATIENT
Start: 2019-03-07 | End: 2019-03-07

## 2019-03-07 RX ORDER — OMEPRAZOLE 40 MG/1
40 CAPSULE, DELAYED RELEASE ORAL DAILY
Qty: 30 CAPSULE | Refills: 0 | Status: SHIPPED | OUTPATIENT
Start: 2019-03-07 | End: 2019-07-22

## 2019-03-07 RX ADMIN — ASPIRIN 325 MG ORAL TABLET 325 MG: 325 PILL ORAL at 11:03

## 2019-03-07 NOTE — ED NOTES
Appearance: Pt awake, alert & oriented to person, place. Pt in no acute distress at present time. Pt is clean and well groomed with clothes appropriately fastened.   Skin: Skin warm, dry & intact. Color consistent with ethnicity. Mucous membranes moist. No breakdown or brusing noted.   Musculoskeletal: Patient moving all extremities well, no obvious swelling or deformities noted.   Respiratory: Respirations spontaneous, even, and non-labored. Visible chest rise noted. Airway is open and patent. No accessory muscle use noted. Anterior and posterior lung sounds are clear.   Neurologic: Sensation is intact. Speech is clear and appropriate. Eyes open spontaneously, behavior appropriate to situation, follows commands, facial expression symmetrical, bilateral hand grasp equal and even, purposeful motor response noted.  Cardiac: All peripheral pulses present. No Bilateral lower extremity edema. Cap refill is <3 seconds. Pt denies SOB, dizziness, blurred vision, weakness or fatigue at this time. Pt reporting midsternal CP x several months, non reproducible, denies radiation of pain.   Abdomen: Pt denies active abd pains, cramping or discomfort, No N/V/D at this time.   : Pt reports no dysuria or hematuria.

## 2019-03-07 NOTE — ED NOTES
"Pt to ED with family, family and pt reporting pt with mid sternal CP x several months. Reporting "I take tums and it goes away for a little bit then it comes right back." Denies N/V/D, SOB. Pt awake, alert, oriented to person and place which is pts baseline. Respirations even and unlabored. No acute distress noted. Awaiting further orders. Pt updated on POC. Bed is locked and in lowest position with side rails up x2. Call bell within reach and pt oriented to use of call bell. Pt placed on continuous cardiac monitoring, continuous pulse ox, and continuous BP cuff. Will continue to monitor.     "

## 2019-03-07 NOTE — ED NOTES
MD aware or BP of 197/83. Pt awake, alert, oriented per baseline, person, place. Respirations even and unlabored. No acute distress noted. No new orders at this time.

## 2019-03-07 NOTE — ED PROVIDER NOTES
"Encounter Date: 3/7/2019    SCRIBE #1 NOTE: I, Debora Tom, am scribing for, and in the presence of, Dr. Mancini.       History     Chief Complaint   Patient presents with    Chest Pain     Pt reports mid sternal chest pain for the last several months. Pt states she came to the ED today because her children made her.      Time seen by provider: 11:22 AM    This is a 86 y.o. female who presents with complaint of chest pain for months. She states pain is "almost constant" and occurs daily. She reports that pain is unchanged with deep inspiration or certain foods, but exacerbated with ambulation. She reports associated SOB. She reports some relief with Tums, stating that it is effective "after a long time." Pt does report "a lot" of acid reflux. Pt states that she only take omeprazole and cymbalta daily, but family members at bedside believe she is taking more than two medications. She reports no HTN or DM. She denies any hx of smoking and does not drink. Pt has no PCP at the moment, but her granddaughter is working on having her see Dr. Sincere vincent. Pt reports no fever, chills, diaphoresis, N/V, abdominal pain, leg swelling, palpitations, cough, back pain, HA, dizziness, weakness, or urinary sx.      The history is provided by the patient and a relative (daughter and granddaughter).     Review of patient's allergies indicates:   Allergen Reactions    Trazodone Other (See Comments)     Nightmares/sleep walk    Talwin compound      Other reaction(s): Hives    Talwin [pentazocine lactate] Other (See Comments)     Hallucinations^    Transzone Other (See Comments)     Sleep Walks and has unnatural dreams    Bentyl [dicyclomine] Anxiety    Tessalon [benzonatate] Anxiety     Past Medical History:   Diagnosis Date    Arthritis     Asthma     Cataract     Depression     Diabetes mellitus     Fibromyalgia 7/2/2012    Fibromyalgia     GERD (gastroesophageal reflux disease)     Hypertension 7/2/2012    " Thoracic or lumbosacral neuritis or radiculitis, unspecified     Thyroid disease     Ulcer      Past Surgical History:   Procedure Laterality Date    CATARACT EXTRACTION Bilateral     AILEEN-TRANSFORAMINAL Right 5/13/2014    Performed [facility] Robb [facility] at [facility] PAIN MGT    ESOPHAGOGASTRODUODENOSCOPY (EGD) W/Endomicroscopy N/A 9/21/2015    Performed by Eddie Martinez MD at [facility] ENDO    EYE SURGERY      FOOT NEUROMA SURGERY  1985    HYSTERECTOMY       Family History   Problem Relation Age of Onset    Diabetes Mother     Diabetes Brother     Emphysema Maternal Aunt     Melanoma Neg Hx     Asthma Neg Hx      Social History     Tobacco Use    Smoking status: Never Smoker    Smokeless tobacco: Never Used   Substance Use Topics    Alcohol use: No    Drug use: No     Review of Systems   Constitutional: Negative for chills, diaphoresis and fever.   HENT: Negative for congestion, rhinorrhea and sore throat.    Respiratory: Positive for shortness of breath. Negative for cough.    Cardiovascular: Positive for chest pain. Negative for palpitations and leg swelling.   Gastrointestinal: Negative for abdominal pain, diarrhea, nausea and vomiting.   Endocrine: Negative for polyuria.   Genitourinary: Negative for decreased urine volume and dysuria.   Musculoskeletal: Negative for back pain.   Skin: Negative for rash.   Allergic/Immunologic: Negative for immunocompromised state.   Neurological: Negative for dizziness, weakness, light-headedness and headaches.   Hematological: Does not bruise/bleed easily.   Psychiatric/Behavioral: Negative for confusion.       Physical Exam     Initial Vitals [03/07/19 1040]   BP Pulse Resp Temp SpO2   (!) 212/91 66 18 98.6 °F (37 °C) 99 %      MAP       --         Physical Exam    Nursing note and vitals reviewed.  Constitutional: She appears well-developed and well-nourished. No distress.   Frail, elderly female.   HENT:   Head: Normocephalic and atraumatic.   Right Ear:  External ear normal.   Left Ear: External ear normal.   Eyes: Right eye exhibits no discharge. Left eye exhibits no discharge.   Neck: Normal range of motion. Neck supple.   Cardiovascular: Normal rate, regular rhythm and normal heart sounds.   Pulmonary/Chest: Breath sounds normal. No respiratory distress.   Abdominal: Soft. Bowel sounds are normal. She exhibits no distension. There is tenderness (minimal) in the epigastric area. There is no rebound and no guarding.   Musculoskeletal: Normal range of motion.   Reproducible tenderness along the lower sternal border.   Lymphadenopathy:     She has no cervical adenopathy.   Neurological: She is alert and oriented to person, place, and time. She has normal strength. No cranial nerve deficit or sensory deficit.   Skin: Skin is warm and dry. No rash noted. No erythema.   Psychiatric: She has a normal mood and affect. Her behavior is normal.         ED Course   Procedures  Labs Reviewed   CBC W/ AUTO DIFFERENTIAL - Abnormal; Notable for the following components:       Result Value    RDW 16.2 (*)     Mono% 1.0 (*)     All other components within normal limits   COMPREHENSIVE METABOLIC PANEL - Abnormal; Notable for the following components:    Chloride 111 (*)     CO2 22 (*)     BUN, Bld 25 (*)     Creatinine 1.6 (*)     Alkaline Phosphatase 51 (*)     eGFR if  33 (*)     eGFR if non  29 (*)     All other components within normal limits   TROPONIN I   B-TYPE NATRIURETIC PEPTIDE   LIPASE   LIPASE     EKG Readings: (Independently Interpreted)   NSR at a rate of 70. Diffusely inverted T waves. Non specific ST changes. no acute ischemia. No old EKG available for comparison.     ECG Results          EKG 12-lead (Final result)  Result time 03/09/19 16:47:29    Final result by Interface, Lab In [facility] (03/09/19 16:47:29)                 Narrative:    Test Reason : R07.9,    Vent. Rate : 068 BPM     Atrial Rate : 068 BPM     P-R Int : 118 ms           QRS Dur : 070 ms      QT Int : 388 ms       P-R-T Axes : 069 069 239 degrees     QTc Int : 412 ms    Normal sinus rhythm  Possible Left atrial enlargement  Right ventricular conduction delay  T wave abnormality, consider inferolateral ischemia  Abnormal ECG    Confirmed by Magdy Davis MD ([id number]) on 3/9/2019 4:47:19 PM    Referred By: KIKE   SELF           Confirmed By:Magdy Davis MD                             EKG 12-LEAD (Final result)  Result time 03/08/19 07:37:44               EKG 12-LEAD (Final result)  Result time 03/08/19 07:37:44                 Medical Decision Making:   Independently Interpreted Test(s):   I have ordered and independently interpreted EKG Reading(s) - see prior notes  Clinical Tests:   Lab Tests: Ordered and Reviewed  Medical Tests: Ordered and Reviewed            Scribe Attestation:   Scribe #1: I performed the above scribed service and the documentation accurately describes the services I performed. I attest to the accuracy of the note.    Attending Attestation:           Physician Attestation for Scribe:  Physician Attestation Statement for Scribe #1: I, Dr. Mancini, reviewed documentation, as scribed by Debora Tom in my presence, and it is both accurate and complete.           Elderly female, history of hypertension, diabetes but no known cardiac disease, presents due to chest pain which has been present for months, daily.  Somewhat worse with ambulation.  Also reports GERD like symptoms. She is accompanied by her daughter, granddaughter.  She presents today as a just found out about the chest pain today and insisted that she come.  It is also become apparent that the patient is quite confused about which medicines she is taking, many of them she is taking etc the family has scheduled an appointment with a new primary care physician is the patient has not seen 1 in quite some time they will bring medicine with them to that visit.  On exam patient is  well-appearing.  EKG is unremarkable. Systolic hypertension but vital signs otherwise unremarkable. Laboratory workup shows negative troponin.  Elevated BUN and creatinine however chart review shows prior renal insufficiency.   At this time I do not think there is acute cardiac etiology for the chest pain.  Return precautions discussed.  Encouraged follow-up with primary care, especially if symptoms persist.               Clinical Impression:     1. Atypical chest pain    2. Chest pain    3. Epigastric abdominal pain    4. Gastroesophageal reflux disease, esophagitis presence not specified    5. Hypertension, unspecified type    6. Chronic renal impairment, unspecified CKD stage                                 Sridhar Mancini II, MD  03/10/19 [id number]

## 2019-03-25 ENCOUNTER — TELEPHONE (OUTPATIENT)
Dept: NEUROLOGY | Facility: CLINIC | Age: 84
End: 2019-03-25

## 2019-03-25 NOTE — TELEPHONE ENCOUNTER
----- Message from Lorrie Dupont sent at 3/25/2019 11:30 AM CDT -----  Contact: Pt's daughter Kiersten Valle [phone]  Pt's daughter is requesting a call back from the nurse to discuss if there is a way to coordinate her mother's treatment and medication.  Kiersten had called looking for a geriatrics specialist as her mom has dementia and they are receiving prescriptions for things they are not certain are interacting with other medications.  They are also having medications filled for things she is no longer taking and they are trying to find a way to coordinate all the various treatment recommendations.    Normally the other sister Ezequiel would be calling but she is at work.  Kiersten may be reached at [phone].    Thank you.  LC

## 2019-03-25 NOTE — TELEPHONE ENCOUNTER
Appointment scheduled for March 27 th to see , and will discuss all this at the time of her visit.

## 2019-03-27 ENCOUNTER — OFFICE VISIT (OUTPATIENT)
Dept: NEUROLOGY | Facility: CLINIC | Age: 84
End: 2019-03-27
Payer: MEDICARE

## 2019-03-27 VITALS
HEART RATE: 78 BPM | DIASTOLIC BLOOD PRESSURE: 73 MMHG | HEIGHT: 65 IN | WEIGHT: 156.75 LBS | SYSTOLIC BLOOD PRESSURE: 139 MMHG | BODY MASS INDEX: 26.12 KG/M2

## 2019-03-27 DIAGNOSIS — F33.9 DEPRESSION, RECURRENT: ICD-10-CM

## 2019-03-27 DIAGNOSIS — I10 ESSENTIAL HYPERTENSION: ICD-10-CM

## 2019-03-27 DIAGNOSIS — M51.36 DDD (DEGENERATIVE DISC DISEASE), LUMBAR: ICD-10-CM

## 2019-03-27 DIAGNOSIS — H81.90 VESTIBULAR DIZZINESS: ICD-10-CM

## 2019-03-27 DIAGNOSIS — G31.84 MILD COGNITIVE IMPAIRMENT: Primary | ICD-10-CM

## 2019-03-27 PROBLEM — Z78.0 ASYMPTOMATIC MENOPAUSAL STATE: Status: ACTIVE | Noted: 2019-03-06

## 2019-03-27 PROBLEM — J30.9 ALLERGIC RHINITIS: Status: ACTIVE | Noted: 2017-03-07

## 2019-03-27 PROBLEM — E03.9 HYPOTHYROIDISM, ADULT: Status: ACTIVE | Noted: 2019-03-06

## 2019-03-27 PROBLEM — N39.3 URINARY, INCONTINENCE, STRESS FEMALE: Status: ACTIVE | Noted: 2018-04-25

## 2019-03-27 PROBLEM — K27.9 PUD (PEPTIC ULCER DISEASE): Status: ACTIVE | Noted: 2018-04-25

## 2019-03-27 PROBLEM — F03.90 DEMENTIA: Status: ACTIVE | Noted: 2018-04-25

## 2019-03-27 PROBLEM — M79.7 FIBROMYALGIA: Status: ACTIVE | Noted: 2018-04-25

## 2019-03-27 PROBLEM — H91.90 HEARING LOSS: Status: ACTIVE | Noted: 2018-04-25

## 2019-03-27 PROBLEM — Z96.1 PSEUDOPHAKIA OF BOTH EYES: Status: ACTIVE | Noted: 2018-04-25

## 2019-03-27 PROCEDURE — 99213 OFFICE O/P EST LOW 20 MIN: CPT | Mod: PBBFAC | Performed by: PSYCHIATRY & NEUROLOGY

## 2019-03-27 PROCEDURE — 99214 OFFICE O/P EST MOD 30 MIN: CPT | Mod: S$PBB,,, | Performed by: PSYCHIATRY & NEUROLOGY

## 2019-03-27 PROCEDURE — 99999 PR PBB SHADOW E&M-EST. PATIENT-LVL III: CPT | Mod: PBBFAC,,, | Performed by: PSYCHIATRY & NEUROLOGY

## 2019-03-27 PROCEDURE — 99214 PR OFFICE/OUTPT VISIT, EST, LEVL IV, 30-39 MIN: ICD-10-PCS | Mod: S$PBB,,, | Performed by: PSYCHIATRY & NEUROLOGY

## 2019-03-27 PROCEDURE — 99999 PR PBB SHADOW E&M-EST. PATIENT-LVL III: ICD-10-PCS | Mod: PBBFAC,,, | Performed by: PSYCHIATRY & NEUROLOGY

## 2019-03-27 RX ORDER — DOXEPIN HYDROCHLORIDE 10 MG/1
10 CAPSULE ORAL
Status: ON HOLD | COMMUNITY
End: 2019-04-30 | Stop reason: HOSPADM

## 2019-03-27 RX ORDER — FLUTICASONE PROPIONATE 50 MCG
1 SPRAY, SUSPENSION (ML) NASAL
Status: ON HOLD | COMMUNITY
End: 2019-04-30 | Stop reason: HOSPADM

## 2019-03-27 RX ORDER — METOPROLOL SUCCINATE 25 MG/1
25 TABLET, EXTENDED RELEASE ORAL
Status: ON HOLD | COMMUNITY
End: 2019-04-30 | Stop reason: HOSPADM

## 2019-03-27 RX ORDER — MELOXICAM 15 MG/1
15 TABLET ORAL
COMMUNITY
End: 2019-03-27

## 2019-03-27 RX ORDER — TIZANIDINE 2 MG/1
2 TABLET ORAL DAILY PRN
Status: ON HOLD | COMMUNITY
End: 2019-04-30 | Stop reason: HOSPADM

## 2019-03-27 RX ORDER — AZELASTINE 1 MG/ML
SPRAY, METERED NASAL
COMMUNITY
Start: 2018-03-20 | End: 2019-03-27

## 2019-03-27 RX ORDER — ASPIRIN 81 MG/1
81 TABLET ORAL
COMMUNITY
End: 2019-06-18

## 2019-03-27 RX ORDER — MEMANTINE HYDROCHLORIDE 5 MG/1
5 TABLET ORAL EVERY MORNING
Qty: 30 TABLET | Refills: 5 | Status: SHIPPED | OUTPATIENT
Start: 2019-03-27 | End: 2020-07-08

## 2019-03-27 RX ORDER — TRIAMCINOLONE ACETONIDE 1 MG/G
OINTMENT TOPICAL
Status: ON HOLD | COMMUNITY
End: 2019-04-30 | Stop reason: HOSPADM

## 2019-03-27 RX ORDER — HYOSCYAMINE SULFATE 0.12 MG/1
TABLET SUBLINGUAL
Refills: 1 | Status: ON HOLD | COMMUNITY
Start: 2019-03-20 | End: 2019-04-30 | Stop reason: HOSPADM

## 2019-03-27 RX ORDER — LOSARTAN POTASSIUM 50 MG/1
50 TABLET ORAL
Status: ON HOLD | COMMUNITY
End: 2019-04-30 | Stop reason: HOSPADM

## 2019-03-27 RX ORDER — LIOTHYRONINE SODIUM 5 UG/1
TABLET ORAL
Status: ON HOLD | COMMUNITY
Start: 2019-02-02 | End: 2019-04-30 | Stop reason: HOSPADM

## 2019-03-27 RX ORDER — TIOTROPIUM BROMIDE 18 UG/1
18 CAPSULE ORAL; RESPIRATORY (INHALATION)
COMMUNITY
End: 2019-07-22

## 2019-03-27 RX ORDER — PANTOPRAZOLE SODIUM 40 MG/1
TABLET, DELAYED RELEASE ORAL
Status: ON HOLD | COMMUNITY
Start: 2019-01-30 | End: 2019-04-30 | Stop reason: HOSPADM

## 2019-03-27 RX ORDER — LISINOPRIL 10 MG/1
TABLET ORAL
Status: ON HOLD | COMMUNITY
Start: 2019-03-20 | End: 2019-04-30 | Stop reason: HOSPADM

## 2019-03-27 RX ORDER — CITALOPRAM 10 MG/1
TABLET ORAL
Status: ON HOLD | COMMUNITY
Start: 2018-04-15 | End: 2019-04-30 | Stop reason: HOSPADM

## 2019-03-27 RX ORDER — AMLODIPINE BESYLATE 5 MG/1
5 TABLET ORAL
COMMUNITY
Start: 2018-05-30 | End: 2019-03-27

## 2019-03-27 NOTE — PATIENT INSTRUCTIONS
Discussed with patient.  There has been a gradual progression in her memory difficulties primarily with retaining recent information.  She continues to be fairly independent.  Contributing factors would include the history of chronic pain, depressive symptoms and occasional sleep difficulties.  Control of vascular risk factors especially hypertension.  Continue Aricept and add Namenda 5 mg daily in the morning.

## 2019-03-27 NOTE — PROGRESS NOTES
Subjective:       Patient ID: Debbie Ding is a 86 y.o. female.    Chief Complaint:  Memory Loss      History of Present Illness  HPI  This is an 86-year-old -American female who had been seen with complaints of memory difficulties since 2009.  She was last seen by me 1 year ago.  She comes to the clinic accompanied by family member. The patient reports that ever since the death of her  in 2012, she been having a difficult time with retention of recent information and had difficulty keeping track of conversations and if she reads a book she loses track of what she read earlier.  She was started Aricept which had initially improved however family members stated there has been a gradual decline in her ability to retaining recent information and tends to repeat herself or ask the same questions.  She has hearing impairment and is supposed to use hearing aids which she does not use all the time.  She also has a history of depression and is presently on Cymbalta 20 mg daily prescribed by her PMR physician for fibromyalgia.  However this has not helped her depression very much.    She lives alone however her daughter lives below her apartment and keeps an eye on her.  She does not drive.  In addition she reports that she has no difficulty taking care of her house including cooking and doing the groceries. She is able to take care of her day-to-day needs and personal hygiene and manages her medications and finances.  An MRI scan of the brain done in 2014 showed evidence of small vessel ischemic changes but was otherwise unremarkable.       Review of Systems  Review of Systems   Constitutional: Negative.    HENT: Positive for hearing loss (Uses hearing aids).    Eyes: Negative.  Negative for visual disturbance.   Respiratory: Negative.  Negative for shortness of breath.    Cardiovascular: Negative.  Negative for chest pain and palpitations.   Gastrointestinal: Negative.    Genitourinary: Negative.     Musculoskeletal: Positive for arthralgias, back pain and neck pain. Negative for gait problem.   Skin: Negative.    Neurological: Negative.  Negative for tremors, seizures, syncope, speech difficulty, weakness, numbness and headaches.   Psychiatric/Behavioral: Positive for decreased concentration. Negative for confusion. The patient is nervous/anxious.        Objective:      Neurologic Exam    Physical Exam   Constitutional: She appears well-developed and well-nourished.   HENT:   Head: Normocephalic and atraumatic.   Right Ear: Hearing normal.   Left Ear: Hearing normal.   Eyes:   Fundus examination showed sharp disc margins.  Pupils are equal and reactive with EOM full.   Neck: Normal range of motion. Neck supple. Muscular tenderness (mild paraspinal muscle tenderness predominantly in the upper cervical region) present. Carotid bruit is not present.   Neurological: She is alert. She has normal reflexes. She displays no atrophy (Normal and symmetrical strength). No cranial nerve deficit (Visual fields at bedside testing essentially normal.  No facial asymmetry noted with facial movements and sensory exam being normal/symmetrical.  Corneals/gag reflexes normal.  Tongue & palate movements normal.  Shoulder shrug was normal.) or sensory deficit. She exhibits normal muscle tone. She displays a negative Romberg sign. Coordination and gait normal.   Mental status examination: The patient is oriented to place, person and time. Able to give an adequate history without any difficulty. Recall of recent information was called and immediate recall was fair (2/3 after 1 minute). Attention span and concentration was slightly impaired. Fund of knowledge was fair. Affect was appropriate, mood was slightly anxious. No thought disorder noted. Judgment and insight was normal. Speech was of normal flow and content. Comprehension was unimpaired.    Vitals reviewed.        Assessment:        1. Mild cognitive impairment    2.  Vestibular dizziness    3. DDD (degenerative disc disease), lumbar    4. Depression, recurrent    5. Essential hypertension             Plan:       Discussed with patient.  There has been a gradual progression in her memory difficulties primarily with retaining recent information.  She continues to be fairly independent.  Contributing factors would include the history of chronic pain, depressive symptoms and occasional sleep difficulties.  Control of vascular risk factors especially hypertension.  Continue Aricept and add Namenda 5 mg daily in the morning.  Continue present level of activities and follow-up in 6 months.

## 2019-03-31 ENCOUNTER — EXTERNAL CHRONIC CARE MANAGEMENT (OUTPATIENT)
Dept: PRIMARY CARE CLINIC | Facility: CLINIC | Age: 84
End: 2019-03-31
Payer: MEDICARE

## 2019-03-31 PROCEDURE — 99490 CHRNC CARE MGMT STAFF 1ST 20: CPT | Mod: PBBFAC | Performed by: INTERNAL MEDICINE

## 2019-03-31 PROCEDURE — 99490 PR CHRONIC CARE MGMT, 1ST 20 MIN: ICD-10-PCS | Mod: S$PBB,,, | Performed by: INTERNAL MEDICINE

## 2019-03-31 PROCEDURE — 99490 CHRNC CARE MGMT STAFF 1ST 20: CPT | Mod: S$PBB,,, | Performed by: INTERNAL MEDICINE

## 2019-04-02 ENCOUNTER — CLINICAL SUPPORT (OUTPATIENT)
Dept: REHABILITATION | Facility: OTHER | Age: 84
End: 2019-04-02
Payer: MEDICARE

## 2019-04-02 DIAGNOSIS — M25.562 ACUTE PAIN OF BOTH KNEES: ICD-10-CM

## 2019-04-02 DIAGNOSIS — M25.561 ACUTE PAIN OF BOTH KNEES: ICD-10-CM

## 2019-04-02 DIAGNOSIS — Z74.09 IMPAIRED FUNCTIONAL MOBILITY, BALANCE, GAIT, AND ENDURANCE: ICD-10-CM

## 2019-04-02 PROCEDURE — 97161 PT EVAL LOW COMPLEX 20 MIN: CPT | Mod: PN | Performed by: PHYSICAL THERAPIST

## 2019-04-02 NOTE — PLAN OF CARE
[facility]  Physical Therapy Initial Evaluation    Name: Debbie Ding  Clinic Number: [id number]    Therapy Diagnosis:   1. Impaired functional mobility, balance, gait, and endurance     2. Acute pain of both knees       Physician: Kadie Ngo MD    Physician Orders: PT Eval and Treat neck, back, knees 2 x's wk/ 6 wks  Medical Diagnosis from Referral: M54.42,G89.29 (ICD-10-CM) - Chronic midline low back pain with left-sided sciatica M51.36 (ICD-10-CM) - DDD (degenerative disc disease), lumbar M47.816 (ICD-10-CM) - Lumbar facet arthropathy M43.16 (ICD-10-CM) - Spondylolisthesis at L4-L5 level M54.2,G89.29 (ICD-10-CM) - Chronic neck pain M47.22 (ICD-10-CM) - Osteoarthritis of spine with radiculopathy, cervical region M79.7 (ICD-10-CM) - Fibromyalgia M15.0 (ICD-10-CM) - Primary osteoarthritis involving multiple joints M25.562 (ICD-10-CM) - Acute pain of left knee   Evaluation Date: 4/2/2019  Authorization Period Expiration: 2/26/2020  Plan of Care Expiration: 5/31/2019  Visit # / Visits authorized: 1/ 1    Time In: 0830am (30 min late for appointment)  Time Out: 0900am  Total Billable Time: 30 minutes    Precautions: Standard and Fall    Subjective   Date of onset: 2 months  History of current condition - Debbie reports: having therapy a few times in the past with no benefit. She denies much pain in her neck or back. She was told to come back to therapy after a fall 2 months ago. She fell directly onto her knees and having some pain there, but improving over time. She is here today with her daughter and son-in law. Her daughter encouraged her to come to today's visit.      Medical History:   Past Medical History:   Diagnosis Date    Arthritis     Asthma     Cataract     Depression     Diabetes mellitus     Fibromyalgia 7/2/2012    Fibromyalgia     GERD (gastroesophageal reflux disease)     Hypertension 7/2/2012    Thoracic or lumbosacral neuritis or radiculitis,  unspecified     Thyroid disease     Ulcer        Surgical History:   Debbie Ding  has a past surgical history that includes Eye surgery; Hysterectomy; Foot neuroma surgery (1985); and Cataract extraction (Bilateral).    Medications:   Debbie has a current medication list which includes the following prescription(s): acetaminophen, advair diskus, albuterol, amlodipine-olmesartan, ammonium lactate, aspirin, boostrix tdap, citalopram, donepezil, doxepin, duloxetine, estrace, estradiol 0.05 mg/24 hr td ptsw, fluocinolone and shower cap, fluocinonide, fluticasone, fluzone high-dose 2017-18 (pf), glycopyrrolate, hyoscyamine, ipratropium, ketoconazole, liothyronine, lisinopril, losartan, magnesium oxide, meclizine, memantine, metoprolol succinate, mirtazapine, myrbetriq, olopatadine, omeprazole, pantoprazole, pneumovax 23, ranitidine, synthroid, tiotropium, tizanidine, triamcinolone acetonide 0.1%, and zostavax (pf).    Allergies:   Review of patient's allergies indicates:   Allergen Reactions    Trazodone Other (See Comments)     Nightmares/sleep walk  Other reaction(s): Other (See Comments)  Nightmares/sleep walk    Melatonin      Other reaction(s): Other (See Comments)  Sleep Walks and has unnatural dreams    Talwin compound      Other reaction(s): Hives    Talwin [pentazocine lactate] Other (See Comments)     Hallucinations^    Transzone Other (See Comments)     Sleep Walks and has unnatural dreams    Bentyl [dicyclomine] Anxiety    Tessalon [benzonatate] Anxiety        Imaging, bone scan films:   Knee: Mild DJD.  No fracture or dislocation.  No bone destruction identified     Cervical spine: The EGD with bridging osteophytosis.  The disc spaces are significantly narrowed between C3 and the C7 vertebral segments.  No fracture or dislocation.  No bone destruction identified     Lumbar: Mild-to-moderate DJD.  Grade 1 L4/L5 spondylolisthesis.  The disc spaces are narrowed between L4 and S1 vertebral  segments.  No fracture or bone destruction identified       Prior Therapy: yes, [facility] and at [facility]  Social History: few steps to enter single family home with railing. lives with their family (daughter and grandson)   Prior Level of Function: independent, makes her own bed, can prep her meals  Current Level of Function: Extreme difficulty with carrying and lifting    Pain:  Current 5/10, worst 10/10, best 0/10   Location: B anterior-medial knees, central low back    Description: Aching and Tight  Aggravating Factors: Touching and Walking  Easing Factors: rest    Pts goals: Pt would like to have better balance    Objective     Observation: calm and pleasant mood    Posture:  Genu valgum B    Cervical Spine Active ROM, measured in degrees with inclinometer, * Indicates pain with movement    Flexion: 50  Extension: 40  Left Side Bend: 20  Right Side Bend:20    Lumbar Spine Active ROM, measured in percentage of normal, * Indicates pain with movement    Flexion: 75%  Extension:0%  Left Side Bend:50%  Right Side Bend:75%    R Knee AROM: WFL  L Knee AROM: WFL     MMT:    Left  Right    Shoulder:       Flexion:   4-/5  4-/5   Abduction:   4/5  4/5  External Rotation:  4-/5  4-/5    Internal Rotation:  4/5  4/5    Elbow:  Flexion:    4+/5  4+/5    MMT    Left  Right    Hip:  Flexion    4-/5   4-/5  Abduction   4/5   3+/5  External Rotation  4-/5   4-/5  Internal rotation  4/5   4/5    Knee:  Flexion    4/5   4/5  Extension   4/5   4/5    Ankle:  Dorsiflexion   4/5   4/5    Special Tests:    -Tu sec  -Sit to stand : 3 x 30 sec  -tandem: unable to perform  -staggered stance: 8 sec L, 3 sec R  -Romberg: negative    Palpation: TTP medial joint line B knees    Sensation: BLE light touch grossly intact    Flexibility:    SLR: R = 60* degrees ; L = 70 degrees      CMS Impairment/Limitation/Restriction for FOTO neck Survey    Therapist reviewed FOTO scores for Debbie Ding on 2019.   FOTO documents entered into EP[employer] -  see Media section.    Limitation Score: 46%  Category: Mobility    Current : CK = at least 40% but < 60% impaired, limited or restricted  Goal: CJ = at least 20% but < 40% impaired, limited or restricted  Discharge: na         TREATMENT     Home Exercises and Patient Education Provided: deferred 2/2 patient tardiness     Education provided:   - Educated pt on evaluation findings, the role of PT, and set goals    Written Home Exercises Provided: no.    Assessment   Debbie is a 86 y.o. female referred to outpatient Physical Therapy with a medical diagnosis of chronic neck, back pain, fibromyalgia, and OA B knees. Pt presents with main complaint balance impairment with recent fall. Pt demonstrating decreased static/dynamic balance, slow gait speed, and BLE weakness. Pt is at high risk for falls as noted by recent TUG.     Pt prognosis is Good.   Pt will benefit from skilled outpatient Physical Therapy to address the deficits stated above and in the chart below, provide pt/family education, and to maximize pt's level of independence.     Plan of care discussed with patient: Yes  Pt's spiritual, cultural and educational needs considered and patient is agreeable to the plan of care and goals as stated below:     Anticipated Barriers for therapy: none    Medical Necessity is demonstrated by the following  History  Co-morbidities and personal factors that may impact the plan of care Co-morbidities:   advanced age and fibromyalgia    Personal Factors:   no deficits     moderate   Examination  Body Structures and Functions, activity limitations and participation restrictions that may impact the plan of care Body Regions:   neck  back  lower extremities  upper extremities  trunk    Body Systems:    ROM  strength  balance  gait  transfers  motor control  motor learning    Participation Restrictions:   Lifting, carrying    Activity limitations:   Learning and applying knowledge  no deficits    General Tasks and Commands  no  deficits    Communication  no deficits    Mobility  lifting and carrying objects  walking    Self care  no deficits    Domestic Life  doing house work (cleaning house, washing dishes, laundry)    Interactions/Relationships  no deficits    Life Areas  no deficits    Community and Social Life  no deficits         high   Clinical Presentation stable and uncomplicated low   Decision Making/ Complexity Score: low     Goals:  Short Term Goals: 6 weeks   1. Patient to have improved functional strength in LE's as noted by sit to stand x 5 or greater in 30 sec  2. Patient to increase gait speed as noted by TUG 20 sec or less for decreased risk for falls    Long Term Goals: 12 weeks   1. Patient to be independent with home exercise program for improved self management of condition  2. Patient to have decreased subjective report of disability as noted by a score of 40% or less on the FOTO Neck questionnaire   3. Patient to have improved static standing balance as noted by 20 sec or greater staggered stance      Plan   Plan of care Certification: 4/2/2019 to 5/31/2019.    Outpatient Physical Therapy 1 times weekly for 8 weeks to include the following interventions: Gait Training, Manual Therapy, Moist Heat/ Ice, Neuromuscular Re-ed, Patient Education, Therapeutic Activites and Therapeutic Exercise.     Karma Mason, PT

## 2019-04-11 ENCOUNTER — CLINICAL SUPPORT (OUTPATIENT)
Dept: REHABILITATION | Facility: OTHER | Age: 84
End: 2019-04-11
Payer: MEDICARE

## 2019-04-11 DIAGNOSIS — M25.562 ACUTE PAIN OF BOTH KNEES: ICD-10-CM

## 2019-04-11 DIAGNOSIS — Z74.09 IMPAIRED FUNCTIONAL MOBILITY, BALANCE, GAIT, AND ENDURANCE: ICD-10-CM

## 2019-04-11 DIAGNOSIS — M25.561 ACUTE PAIN OF BOTH KNEES: ICD-10-CM

## 2019-04-11 PROCEDURE — 97530 THERAPEUTIC ACTIVITIES: CPT | Mod: PN | Performed by: PHYSICAL THERAPIST

## 2019-04-11 PROCEDURE — 97110 THERAPEUTIC EXERCISES: CPT | Mod: PN | Performed by: PHYSICAL THERAPIST

## 2019-04-11 NOTE — PROGRESS NOTES
Physical Therapy Daily Treatment Note     Name: Debbie Ding  Clinic Number: [id number]    Therapy Diagnosis:   Encounter Diagnoses   Name Primary?    Impaired functional mobility, balance, gait, and endurance     Acute pain of both knees      Physician: Kadie Ngo MD    Visit Date: 4/11/2019    Physician Orders: PT Eval and Treat neck, back, knees 2 x's wk/ 6 wks  Medical Diagnosis from Referral: M54.42,G89.29 (ICD-10-CM) - Chronic midline low back pain with left-sided sciatica M51.36 (ICD-10-CM) - DDD (degenerative disc disease), lumbar M47.816 (ICD-10-CM) - Lumbar facet arthropathy M43.16 (ICD-10-CM) - Spondylolisthesis at L4-L5 level M54.2,G89.29 (ICD-10-CM) - Chronic neck pain M47.22 (ICD-10-CM) - Osteoarthritis of spine with radiculopathy, cervical region M79.7 (ICD-10-CM) - Fibromyalgia M15.0 (ICD-10-CM) - Primary osteoarthritis involving multiple joints M25.562 (ICD-10-CM) - Acute pain of left knee   Evaluation Date: 4/2/2019  Authorization Period Expiration: 2/26/2020  Plan of Care Expiration: 5/31/2019  Visit # / Visits authorized: 1/ 1    Time In: 0850am  Time Out: 0930am  Total Billable Time: 40 minutes    Precautions: Standard and Fall    Subjective     Pt reports: Pt presents to clinic with her daughter. She reports having a lift chair at home and can no longer get out of the kitchen table or porch chairs. She eats her meals and sleeps in the chair  She was compliant with home exercise program.  Response to previous treatment: none  Functional change: none    Pain: 4/10  Location: left knee      Objective     O2: 95%    Debbie received therapeutic exercises to develop strength, endurance and core stabilization for 25 minutes including:    Seated:  -hip add ball x 10  -clams OTB x 10  -LAQ x 10  -Heel raises x 20    Supine glute sets x 10    Debbie participated in dynamic functional therapeutic activities to improve functional performance for 5  minutes, including:  Supine<>sit min A  Sit  to stand high mat x 5    Debbie participated in gait training to improve functional mobility and safety for 10  minutes, including:  Gait training RW 3 trials, 20 ft, 30 ft, 50 ft SBA to Min A; VC's for heel strike, posture, and RW management    Home Exercises Provided and Patient Education Provided     Education provided:   - HEP and proper foot wear for fall prevention     Written Home Exercises Provided: yes.  Exercises were reviewed and Debbie was able to demonstrate them prior to the end of the session.  Debbie demonstrated good  understanding of the education provided.     See EMR under Patient Instructions for exercises provided 4/11/2019.    Assessment     Ms Lewis tolerated treatment well with fair endurance. Pt required seated rest break walking from waiting room to first available table in gym. Pt with good participation in today's treatment session and appears motivated to improve condition    Debbie is progressing well towards her goals.   Pt prognosis is Good.     Pt will continue to benefit from skilled outpatient physical therapy to address the deficits listed in the problem list box on initial evaluation, provide pt/family education and to maximize pt's level of independence in the home and community environment.     Pt's spiritual, cultural and educational needs considered and pt agreeable to plan of care and goals.     Anticipated barriers to physical therapy: none    Goals:   Short Term Goals: 6 weeks   1. Patient to have improved functional strength in LE's as noted by sit to stand x 5 or greater in 30 sec  2. Patient to increase gait speed as noted by TUG 20 sec or less for decreased risk for falls     Long Term Goals: 12 weeks   1. Patient to be independent with home exercise program for improved self management of condition  2. Patient to have decreased subjective report of disability as noted by a score of 40% or less on the FOTO Neck questionnaire   3. Patient to have improved static  standing balance as noted by 20 sec or greater staggered stance         Plan     Plan to progress gait training and standing balance as tolerated     Karma Mason, PT

## 2019-04-16 ENCOUNTER — CLINICAL SUPPORT (OUTPATIENT)
Dept: REHABILITATION | Facility: OTHER | Age: 84
End: 2019-04-16
Payer: MEDICARE

## 2019-04-16 DIAGNOSIS — Z74.09 IMPAIRED FUNCTIONAL MOBILITY, BALANCE, GAIT, AND ENDURANCE: ICD-10-CM

## 2019-04-16 DIAGNOSIS — M25.562 ACUTE PAIN OF BOTH KNEES: ICD-10-CM

## 2019-04-16 DIAGNOSIS — M25.561 ACUTE PAIN OF BOTH KNEES: ICD-10-CM

## 2019-04-16 PROCEDURE — 97112 NEUROMUSCULAR REEDUCATION: CPT | Mod: PN | Performed by: PHYSICAL THERAPIST

## 2019-04-16 PROCEDURE — 97110 THERAPEUTIC EXERCISES: CPT | Mod: PN | Performed by: PHYSICAL THERAPIST

## 2019-04-16 NOTE — PROGRESS NOTES
Physical Therapy Daily Treatment Note     Name: Debbie Ding  Clinic Number: [id number]    Therapy Diagnosis:   Encounter Diagnoses   Name Primary?    Impaired functional mobility, balance, gait, and endurance     Acute pain of both knees      Physician: Kadie Ngo MD    Visit Date: 4/16/2019    Physician Orders: PT Eval and Treat neck, back, knees 2 x's wk/ 6 wks  Medical Diagnosis from Referral: M54.42,G89.29 (ICD-10-CM) - Chronic midline low back pain with left-sided sciatica M51.36 (ICD-10-CM) - DDD (degenerative disc disease), lumbar M47.816 (ICD-10-CM) - Lumbar facet arthropathy M43.16 (ICD-10-CM) - Spondylolisthesis at L4-L5 level M54.2,G89.29 (ICD-10-CM) - Chronic neck pain M47.22 (ICD-10-CM) - Osteoarthritis of spine with radiculopathy, cervical region M79.7 (ICD-10-CM) - Fibromyalgia M15.0 (ICD-10-CM) - Primary osteoarthritis involving multiple joints M25.562 (ICD-10-CM) - Acute pain of left knee   Evaluation Date: 4/2/2019  Authorization Period Expiration: 2/26/2020  Plan of Care Expiration: 5/31/2019  Visit # / Visits authorized: 3/20    Time In: 1:00PM  Time Out: 1:40PM  Total Billable Time: 25 minutes    Precautions: Standard and Fall    Subjective     Pt reports: Pt's daughter reports that she is doing better at home with walking and doing the exercises  She was compliant with home exercise program.  Response to previous treatment: none  Functional change: none    Pain: 4/10  Location: left knee      Objective     O2: 95%    Debbie received therapeutic exercises to develop strength, endurance and core stabilization for 30 minutes including:    Seated:  -hip add ball x 10  -clams OTB x 10  -LAQ x 10  -Heel raises x 20  -glute sets x 10  -hamstring curls OTB x 20  -Standing hip abduction x 10 ea    Debbie participated in dynamic functional therapeutic activities to improve functional performance for 10  minutes, including:  Supine<>sit min A- NT  Sit to stand chair with foam pad x 5  Alt  arm with stepping x 10 ea  Lateral stepping with arms x 10 ea  Gait training RW 40 ft x 2 SBA  VC's for heel strike, posture    Home Exercises Provided and Patient Education Provided     Education provided:   - HEP and proper foot wear for fall prevention     Written Home Exercises Provided: yes.  Exercises were reviewed and Debbie was able to demonstrate them prior to the end of the session.  Debbie demonstrated good  understanding of the education provided.     See EMR under Patient Instructions for exercises provided 4/11/2019.    Assessment     Ms Debbie progressing with improved independence ambulating without AD. Good tolerance to progression of standing balance and LE strengthening without provocation of pain    Debbie is progressing well towards her goals.   Pt prognosis is Good.     Pt will continue to benefit from skilled outpatient physical therapy to address the deficits listed in the problem list box on initial evaluation, provide pt/family education and to maximize pt's level of independence in the home and community environment.     Pt's spiritual, cultural and educational needs considered and pt agreeable to plan of care and goals.     Anticipated barriers to physical therapy: none    Goals:   Short Term Goals: 6 weeks   1. Patient to have improved functional strength in LE's as noted by sit to stand x 5 or greater in 30 sec  2. Patient to increase gait speed as noted by TUG 20 sec or less for decreased risk for falls     Long Term Goals: 12 weeks   1. Patient to be independent with home exercise program for improved self management of condition  2. Patient to have decreased subjective report of disability as noted by a score of 40% or less on the FOTO Neck questionnaire   3. Patient to have improved static standing balance as noted by 20 sec or greater staggered stance         Plan     Plan to progress gait training and standing balance as tolerated     Karma Mason, PT

## 2019-04-22 ENCOUNTER — CLINICAL SUPPORT (OUTPATIENT)
Dept: REHABILITATION | Facility: OTHER | Age: 84
End: 2019-04-22
Payer: MEDICARE

## 2019-04-22 DIAGNOSIS — R41.3 MEMORY LOSS: ICD-10-CM

## 2019-04-22 DIAGNOSIS — M25.562 ACUTE PAIN OF BOTH KNEES: ICD-10-CM

## 2019-04-22 DIAGNOSIS — M25.561 ACUTE PAIN OF BOTH KNEES: ICD-10-CM

## 2019-04-22 DIAGNOSIS — Z74.09 IMPAIRED FUNCTIONAL MOBILITY, BALANCE, GAIT, AND ENDURANCE: ICD-10-CM

## 2019-04-22 PROCEDURE — 97112 NEUROMUSCULAR REEDUCATION: CPT | Mod: PN | Performed by: PHYSICAL THERAPIST

## 2019-04-22 PROCEDURE — 97110 THERAPEUTIC EXERCISES: CPT | Mod: PN | Performed by: PHYSICAL THERAPIST

## 2019-04-22 RX ORDER — DONEPEZIL HYDROCHLORIDE 10 MG/1
TABLET, FILM COATED ORAL
Qty: 90 TABLET | Refills: 3 | Status: SHIPPED | OUTPATIENT
Start: 2019-04-22 | End: 2020-07-08 | Stop reason: SDUPTHER

## 2019-04-22 NOTE — PROGRESS NOTES
Physical Therapy Daily Treatment Note     Name: Debbie Ding  Clinic Number: [id number]    Therapy Diagnosis:   Encounter Diagnoses   Name Primary?    Impaired functional mobility, balance, gait, and endurance     Acute pain of both knees      Physician: Kadie Ngo MD    Visit Date: 4/22/2019    Physician Orders: PT Eval and Treat neck, back, knees 2 x's wk/ 6 wks  Medical Diagnosis from Referral: M54.42,G89.29 (ICD-10-CM) - Chronic midline low back pain with left-sided sciatica M51.36 (ICD-10-CM) - DDD (degenerative disc disease), lumbar M47.816 (ICD-10-CM) - Lumbar facet arthropathy M43.16 (ICD-10-CM) - Spondylolisthesis at L4-L5 level M54.2,G89.29 (ICD-10-CM) - Chronic neck pain M47.22 (ICD-10-CM) - Osteoarthritis of spine with radiculopathy, cervical region M79.7 (ICD-10-CM) - Fibromyalgia M15.0 (ICD-10-CM) - Primary osteoarthritis involving multiple joints M25.562 (ICD-10-CM) - Acute pain of left knee   Evaluation Date: 4/2/2019  Authorization Period Expiration: 2/26/2020  Plan of Care Expiration: 5/31/2019  Visit # / Visits authorized: 4/20    Time In: 9:00AM  Time Out: 9:40am  Total Billable Time: 25 minutes    Precautions: Standard and Fall    Subjective     Pt reports: Pt reports her Left leg is hurting more today with standing/ walking. She reports getting winded walking to her front door and difficulty getting up from the swing on the porch  She was compliant with home exercise program.  Response to previous treatment: none  Functional change: none    Pain: 4/10  Location: left leg (thigh to calf)     Objective     O2:98%    Debbie received therapeutic exercises to develop strength, endurance and core stabilization for 30 minutes including:    Seated:  -hip add ball x 10  -clams OTB x 10  -LAQ x 10  -Heel raises x 20  -glute sets x 10  -hamstring curls OTB x 20  -Standing hip abduction x 10 ea    Debbie participated in dynamic functional therapeutic activities to improve functional  performance for 10  minutes, including:  Supine<>sit min A- NT  Sit to stand chair with foam pad x 5  Alt arm with stepping x 10 ea  Lateral stepping with arms x 10 ea  Gait training RW 40 ft x 2 SBA  VC's for heel strike, posture    Home Exercises Provided and Patient Education Provided     Education provided:   - HEP and proper foot wear for fall prevention     Written Home Exercises Provided: yes.  Exercises were reviewed and Debbie was able to demonstrate them prior to the end of the session.  Debbie demonstrated good  understanding of the education provided.     See EMR under Patient Instructions for exercises provided 4/11/2019.    Assessment     Mild antalgic gait L and unsteadiness requiring SBA in clinic. Decreased heel strike and shuffled gait pattern. Recommended shoes with back and good sole for fall prevention. Pt with good tolerance to weight bearing exercises without exacerbation of symptoms.     Debbie is progressing well towards her goals.   Pt prognosis is Good.     Pt will continue to benefit from skilled outpatient physical therapy to address the deficits listed in the problem list box on initial evaluation, provide pt/family education and to maximize pt's level of independence in the home and community environment.     Pt's spiritual, cultural and educational needs considered and pt agreeable to plan of care and goals.     Anticipated barriers to physical therapy: none    Goals:   Short Term Goals: 6 weeks   1. Patient to have improved functional strength in LE's as noted by sit to stand x 5 or greater in 30 sec  2. Patient to increase gait speed as noted by TUG 20 sec or less for decreased risk for falls     Long Term Goals: 12 weeks   1. Patient to be independent with home exercise program for improved self management of condition  2. Patient to have decreased subjective report of disability as noted by a score of 40% or less on the FOTO Neck questionnaire   3. Patient to have improved static  standing balance as noted by 20 sec or greater staggered stance         Plan     Plan to progress gait training and standing balance as tolerated     Karma Mason, PT

## 2019-04-24 ENCOUNTER — PES CALL (OUTPATIENT)
Dept: ADMINISTRATIVE | Facility: CLINIC | Age: 84
End: 2019-04-24

## 2019-04-27 ENCOUNTER — HOSPITAL ENCOUNTER (INPATIENT)
Facility: OTHER | Age: 84
LOS: 5 days | Discharge: NURSING, REHAB OR LONG-TERM CARE | DRG: 176 | End: 2019-05-02
Attending: EMERGENCY MEDICINE | Admitting: EMERGENCY MEDICINE
Payer: MEDICARE

## 2019-04-27 DIAGNOSIS — I26.99 BILATERAL PULMONARY EMBOLISM: Primary | ICD-10-CM

## 2019-04-27 DIAGNOSIS — R79.89 ELEVATED LACTIC ACID LEVEL: ICD-10-CM

## 2019-04-27 DIAGNOSIS — R09.02 HYPOXIA: ICD-10-CM

## 2019-04-27 DIAGNOSIS — I26.99 PULMONARY EMBOLISM: ICD-10-CM

## 2019-04-27 DIAGNOSIS — N28.9 RENAL INSUFFICIENCY: ICD-10-CM

## 2019-04-27 DIAGNOSIS — R00.0 TACHYCARDIA: ICD-10-CM

## 2019-04-27 DIAGNOSIS — E87.6 HYPOKALEMIA: ICD-10-CM

## 2019-04-27 DIAGNOSIS — R31.9 HEMATURIA, UNSPECIFIED TYPE: ICD-10-CM

## 2019-04-27 DIAGNOSIS — R80.9 PROTEINURIA, UNSPECIFIED TYPE: ICD-10-CM

## 2019-04-27 LAB
ALBUMIN SERPL BCP-MCNC: 3.4 G/DL (ref 3.5–5.2)
ALP SERPL-CCNC: 84 U/L (ref 55–135)
ALT SERPL W/O P-5'-P-CCNC: 37 U/L (ref 10–44)
ANION GAP SERPL CALC-SCNC: 15 MMOL/L (ref 8–16)
AST SERPL-CCNC: 53 U/L (ref 10–40)
B-OH-BUTYR BLD STRIP-SCNC: 0.4 MMOL/L (ref 0–0.5)
BACTERIA #/AREA URNS HPF: ABNORMAL /HPF
BASOPHILS # BLD AUTO: 0.02 K/UL (ref 0–0.2)
BASOPHILS NFR BLD: 0.2 % (ref 0–1.9)
BILIRUB SERPL-MCNC: 1 MG/DL (ref 0.1–1)
BILIRUB UR QL STRIP: NEGATIVE
BNP SERPL-MCNC: 42 PG/ML (ref 0–99)
BUN SERPL-MCNC: 15 MG/DL (ref 8–23)
CALCIUM SERPL-MCNC: 9.7 MG/DL (ref 8.7–10.5)
CHLORIDE SERPL-SCNC: 111 MMOL/L (ref 95–110)
CLARITY UR: CLEAR
CO2 SERPL-SCNC: 16 MMOL/L (ref 23–29)
COLOR UR: YELLOW
CREAT SERPL-MCNC: 1.5 MG/DL (ref 0.5–1.4)
D DIMER PPP IA.FEU-MCNC: 18.59 MG/L FEU
DIFFERENTIAL METHOD: ABNORMAL
EOSINOPHIL # BLD AUTO: 0.1 K/UL (ref 0–0.5)
EOSINOPHIL NFR BLD: 1.3 % (ref 0–8)
ERYTHROCYTE [DISTWIDTH] IN BLOOD BY AUTOMATED COUNT: 14.8 % (ref 11.5–14.5)
EST. GFR  (AFRICAN AMERICAN): 36 ML/MIN/1.73 M^2
EST. GFR  (NON AFRICAN AMERICAN): 31 ML/MIN/1.73 M^2
GLUCOSE SERPL-MCNC: 212 MG/DL (ref 70–110)
GLUCOSE UR QL STRIP: ABNORMAL
GRAN CASTS #/AREA URNS LPF: 1 /LPF
HCT VFR BLD AUTO: 38.4 % (ref 37–48.5)
HGB BLD-MCNC: 12.7 G/DL (ref 12–16)
HGB UR QL STRIP: ABNORMAL
HYALINE CASTS #/AREA URNS LPF: 1 /LPF
INFLUENZA A, MOLECULAR: NEGATIVE
INFLUENZA B, MOLECULAR: NEGATIVE
INR PPP: 0.9 (ref 0.8–1.2)
KETONES UR QL STRIP: NEGATIVE
LACTATE SERPL-SCNC: 2.3 MMOL/L (ref 0.5–2.2)
LACTATE SERPL-SCNC: 3.1 MMOL/L (ref 0.5–2.2)
LACTATE SERPL-SCNC: 5.3 MMOL/L (ref 0.5–2.2)
LEUKOCYTE ESTERASE UR QL STRIP: NEGATIVE
LYMPHOCYTES # BLD AUTO: 6.4 K/UL (ref 1–4.8)
LYMPHOCYTES NFR BLD: 57.8 % (ref 18–48)
MCH RBC QN AUTO: 30.1 PG (ref 27–31)
MCHC RBC AUTO-ENTMCNC: 33.1 G/DL (ref 32–36)
MCV RBC AUTO: 91 FL (ref 82–98)
MICROSCOPIC COMMENT: ABNORMAL
MONOCYTES # BLD AUTO: 0.8 K/UL (ref 0.3–1)
MONOCYTES NFR BLD: 7.1 % (ref 4–15)
NEUTROPHILS # BLD AUTO: 3.6 K/UL (ref 1.8–7.7)
NEUTROPHILS NFR BLD: 32.9 % (ref 38–73)
NITRITE UR QL STRIP: NEGATIVE
PH UR STRIP: 6 [PH] (ref 5–8)
PLATELET # BLD AUTO: 197 K/UL (ref 150–350)
PMV BLD AUTO: 10.5 FL (ref 9.2–12.9)
POCT GLUCOSE: 108 MG/DL (ref 70–110)
POCT GLUCOSE: 210 MG/DL (ref 70–110)
POTASSIUM SERPL-SCNC: 3.3 MMOL/L (ref 3.5–5.1)
PROT SERPL-MCNC: 7.9 G/DL (ref 6–8.4)
PROT UR QL STRIP: ABNORMAL
PROTHROMBIN TIME: 10.5 SEC (ref 9–12.5)
RBC # BLD AUTO: 4.22 M/UL (ref 4–5.4)
RBC #/AREA URNS HPF: 7 /HPF (ref 0–4)
SODIUM SERPL-SCNC: 142 MMOL/L (ref 136–145)
SP GR UR STRIP: 1.02 (ref 1–1.03)
SPECIMEN SOURCE: NORMAL
SQUAMOUS #/AREA URNS HPF: 1 /HPF
TROPONIN I SERPL DL<=0.01 NG/ML-MCNC: 0.01 NG/ML (ref 0–0.03)
URN SPEC COLLECT METH UR: ABNORMAL
UROBILINOGEN UR STRIP-ACNC: ABNORMAL EU/DL
WBC # BLD AUTO: 11.04 K/UL (ref 3.9–12.7)
WBC #/AREA URNS HPF: 5 /HPF (ref 0–5)

## 2019-04-27 PROCEDURE — 99900035 HC TECH TIME PER 15 MIN (STAT)

## 2019-04-27 PROCEDURE — 96365 THER/PROPH/DIAG IV INF INIT: CPT

## 2019-04-27 PROCEDURE — 82010 KETONE BODYS QUAN: CPT

## 2019-04-27 PROCEDURE — 93010 EKG 12-LEAD: ICD-10-PCS | Mod: ,,, | Performed by: INTERNAL MEDICINE

## 2019-04-27 PROCEDURE — 83880 ASSAY OF NATRIURETIC PEPTIDE: CPT

## 2019-04-27 PROCEDURE — 96372 THER/PROPH/DIAG INJ SC/IM: CPT | Mod: 59

## 2019-04-27 PROCEDURE — 84484 ASSAY OF TROPONIN QUANT: CPT

## 2019-04-27 PROCEDURE — 25500020 PHARM REV CODE 255: Performed by: EMERGENCY MEDICINE

## 2019-04-27 PROCEDURE — 93005 ELECTROCARDIOGRAM TRACING: CPT

## 2019-04-27 PROCEDURE — 94761 N-INVAS EAR/PLS OXIMETRY MLT: CPT

## 2019-04-27 PROCEDURE — 83605 ASSAY OF LACTIC ACID: CPT | Mod: 91

## 2019-04-27 PROCEDURE — 85379 FIBRIN DEGRADATION QUANT: CPT

## 2019-04-27 PROCEDURE — 85610 PROTHROMBIN TIME: CPT

## 2019-04-27 PROCEDURE — 96366 THER/PROPH/DIAG IV INF ADDON: CPT

## 2019-04-27 PROCEDURE — 82962 GLUCOSE BLOOD TEST: CPT

## 2019-04-27 PROCEDURE — 80053 COMPREHEN METABOLIC PANEL: CPT

## 2019-04-27 PROCEDURE — 63600175 PHARM REV CODE 636 W HCPCS: Performed by: EMERGENCY MEDICINE

## 2019-04-27 PROCEDURE — 87502 INFLUENZA DNA AMP PROBE: CPT

## 2019-04-27 PROCEDURE — 25000003 PHARM REV CODE 250: Performed by: EMERGENCY MEDICINE

## 2019-04-27 PROCEDURE — 83605 ASSAY OF LACTIC ACID: CPT

## 2019-04-27 PROCEDURE — 99291 CRITICAL CARE FIRST HOUR: CPT | Mod: 25

## 2019-04-27 PROCEDURE — 99223 PR INITIAL HOSPITAL CARE,LEVL III: ICD-10-PCS | Mod: AI,,, | Performed by: NURSE PRACTITIONER

## 2019-04-27 PROCEDURE — 96367 TX/PROPH/DG ADDL SEQ IV INF: CPT

## 2019-04-27 PROCEDURE — 85025 COMPLETE CBC W/AUTO DIFF WBC: CPT

## 2019-04-27 PROCEDURE — 99292 CRITICAL CARE ADDL 30 MIN: CPT | Mod: 25

## 2019-04-27 PROCEDURE — 20000000 HC ICU ROOM

## 2019-04-27 PROCEDURE — 27000221 HC OXYGEN, UP TO 24 HOURS

## 2019-04-27 PROCEDURE — 99223 1ST HOSP IP/OBS HIGH 75: CPT | Mod: AI,,, | Performed by: NURSE PRACTITIONER

## 2019-04-27 PROCEDURE — 36415 COLL VENOUS BLD VENIPUNCTURE: CPT

## 2019-04-27 PROCEDURE — 87040 BLOOD CULTURE FOR BACTERIA: CPT | Mod: 59

## 2019-04-27 PROCEDURE — 81000 URINALYSIS NONAUTO W/SCOPE: CPT

## 2019-04-27 PROCEDURE — 93010 ELECTROCARDIOGRAM REPORT: CPT | Mod: ,,, | Performed by: INTERNAL MEDICINE

## 2019-04-27 PROCEDURE — 25000003 PHARM REV CODE 250: Performed by: NURSE PRACTITIONER

## 2019-04-27 RX ORDER — HYDROGEN PEROXIDE 3 %
SOLUTION, NON-ORAL MISCELLANEOUS
Status: ON HOLD | COMMUNITY
Start: 2019-04-23 | End: 2019-04-30 | Stop reason: HOSPADM

## 2019-04-27 RX ORDER — SODIUM CHLORIDE 0.9 % (FLUSH) 0.9 %
10 SYRINGE (ML) INJECTION
Status: DISCONTINUED | OUTPATIENT
Start: 2019-04-27 | End: 2019-05-02 | Stop reason: HOSPADM

## 2019-04-27 RX ORDER — TIOTROPIUM BROMIDE 18 UG/1
1 CAPSULE ORAL; RESPIRATORY (INHALATION) DAILY
Status: DISCONTINUED | OUTPATIENT
Start: 2019-04-28 | End: 2019-05-02 | Stop reason: HOSPADM

## 2019-04-27 RX ORDER — ENOXAPARIN SODIUM 100 MG/ML
1 INJECTION SUBCUTANEOUS EVERY 24 HOURS
Status: DISCONTINUED | OUTPATIENT
Start: 2019-04-27 | End: 2019-04-27

## 2019-04-27 RX ORDER — IPRATROPIUM BROMIDE AND ALBUTEROL SULFATE 2.5; .5 MG/3ML; MG/3ML
3 SOLUTION RESPIRATORY (INHALATION) EVERY 4 HOURS PRN
Status: DISCONTINUED | OUTPATIENT
Start: 2019-04-27 | End: 2019-05-02 | Stop reason: HOSPADM

## 2019-04-27 RX ORDER — INSULIN ASPART 100 [IU]/ML
0-5 INJECTION, SOLUTION INTRAVENOUS; SUBCUTANEOUS EVERY 6 HOURS PRN
Status: DISCONTINUED | OUTPATIENT
Start: 2019-04-27 | End: 2019-04-28

## 2019-04-27 RX ORDER — FLUTICASONE FUROATE AND VILANTEROL 100; 25 UG/1; UG/1
1 POWDER RESPIRATORY (INHALATION) DAILY
Status: DISCONTINUED | OUTPATIENT
Start: 2019-04-28 | End: 2019-05-02 | Stop reason: HOSPADM

## 2019-04-27 RX ORDER — DONEPEZIL HYDROCHLORIDE 5 MG/1
10 TABLET, FILM COATED ORAL DAILY
Status: DISCONTINUED | OUTPATIENT
Start: 2019-04-28 | End: 2019-05-02 | Stop reason: HOSPADM

## 2019-04-27 RX ORDER — AMLODIPINE BESYLATE 10 MG/1
TABLET ORAL
Status: ON HOLD | COMMUNITY
Start: 2019-04-23 | End: 2019-04-30 | Stop reason: SDUPTHER

## 2019-04-27 RX ORDER — MEMANTINE HYDROCHLORIDE 5 MG/1
5 TABLET ORAL EVERY MORNING
Status: DISCONTINUED | OUTPATIENT
Start: 2019-04-28 | End: 2019-04-29

## 2019-04-27 RX ORDER — ASPIRIN 81 MG/1
81 TABLET ORAL DAILY
Status: DISCONTINUED | OUTPATIENT
Start: 2019-04-28 | End: 2019-05-02 | Stop reason: HOSPADM

## 2019-04-27 RX ORDER — GLUCAGON 1 MG
1 KIT INJECTION
Status: DISCONTINUED | OUTPATIENT
Start: 2019-04-27 | End: 2019-04-29

## 2019-04-27 RX ORDER — ONDANSETRON 8 MG/1
8 TABLET, ORALLY DISINTEGRATING ORAL EVERY 8 HOURS PRN
Status: DISCONTINUED | OUTPATIENT
Start: 2019-04-27 | End: 2019-05-02 | Stop reason: HOSPADM

## 2019-04-27 RX ORDER — LEVOTHYROXINE SODIUM 50 UG/1
50 TABLET ORAL
Status: DISCONTINUED | OUTPATIENT
Start: 2019-04-28 | End: 2019-05-02 | Stop reason: HOSPADM

## 2019-04-27 RX ORDER — AMLODIPINE BESYLATE 5 MG/1
10 TABLET ORAL DAILY
Status: DISCONTINUED | OUTPATIENT
Start: 2019-04-28 | End: 2019-05-02 | Stop reason: HOSPADM

## 2019-04-27 RX ORDER — ACETAMINOPHEN 325 MG/1
650 TABLET ORAL EVERY 4 HOURS PRN
Status: DISCONTINUED | OUTPATIENT
Start: 2019-04-27 | End: 2019-05-02 | Stop reason: HOSPADM

## 2019-04-27 RX ORDER — ENOXAPARIN SODIUM 100 MG/ML
1 INJECTION SUBCUTANEOUS
Status: COMPLETED | OUTPATIENT
Start: 2019-04-27 | End: 2019-04-27

## 2019-04-27 RX ORDER — ENOXAPARIN SODIUM 100 MG/ML
1 INJECTION SUBCUTANEOUS EVERY 24 HOURS
Status: DISCONTINUED | OUTPATIENT
Start: 2019-04-28 | End: 2019-04-28

## 2019-04-27 RX ORDER — VANCOMYCIN HCL IN 5 % DEXTROSE 1G/250ML
15 PLASTIC BAG, INJECTION (ML) INTRAVENOUS
Status: COMPLETED | OUTPATIENT
Start: 2019-04-27 | End: 2019-04-27

## 2019-04-27 RX ORDER — PANTOPRAZOLE SODIUM 40 MG/1
40 TABLET, DELAYED RELEASE ORAL DAILY
Status: DISCONTINUED | OUTPATIENT
Start: 2019-04-28 | End: 2019-05-02 | Stop reason: HOSPADM

## 2019-04-27 RX ADMIN — IOHEXOL 75 ML: 350 INJECTION, SOLUTION INTRAVENOUS at 05:04

## 2019-04-27 RX ADMIN — ENOXAPARIN SODIUM 70 MG: 100 INJECTION SUBCUTANEOUS at 06:04

## 2019-04-27 RX ADMIN — PIPERACILLIN AND TAZOBACTAM 4.5 G: 4; .5 INJECTION, POWDER, LYOPHILIZED, FOR SOLUTION INTRAVENOUS; PARENTERAL at 03:04

## 2019-04-27 RX ADMIN — ACETAMINOPHEN 650 MG: 325 TABLET ORAL at 09:04

## 2019-04-27 RX ADMIN — VANCOMYCIN HYDROCHLORIDE 1000 MG: 1 INJECTION, POWDER, LYOPHILIZED, FOR SOLUTION INTRAVENOUS at 04:04

## 2019-04-27 RX ADMIN — SODIUM CHLORIDE 2178 ML: 0.9 INJECTION, SOLUTION INTRAVENOUS at 03:04

## 2019-04-27 NOTE — ED NOTES
Pt assisted to stand bedside to change soiled brief. Pt became tachypneic with labored breathing and c/o worsening SOB. Pt assisted back to stretcher with clean brief and chux pad in place, placed in high Theodore's with neb mask in place at 6L/min, SpO2 at 94% and above at rest with O2 in place. BBS remain CTA, pt slightly tachycardic at . Bed locked and in lowest position, side rails x 2, call light in reach. MD notified of pt's status.

## 2019-04-27 NOTE — HPI
The patient is a 86 y.o. female, with history of HTN and asthma, who presents to the ED via EMS with a sudden onset of shortness of breath that began today. Per EMS, patient had increased work of breathing and expiratory wheezes. Patient did not complain of fever, chills, congestion, nausea, vomiting, or diarrhea. Patient's CBG was 257. Patient initial room air was in the mid to upper 80's.  On workup, d dimer was found elevated and CT showed multiple embolis

## 2019-04-27 NOTE — ED TRIAGE NOTES
Pt presents to ED via EMS with c/o SOB for approx 2 hours PTA. Pt denies CP, cough, N/V, dizziness, fever, chills. Reports Hx of asthma. Other PMHx DM, HTN, hypothyroidism.

## 2019-04-27 NOTE — ED PROVIDER NOTES
Encounter Date: 4/27/2019    SCRIBE #1 NOTE: IRani, am scribing for, and in the presence of, Dr. Briceno.       History     Chief Complaint   Patient presents with    Shortness of Breath     pt with sob x one day.      Time seen by provider: 2:43 PM    This is a 86 y.o. female, with history of HTN, DM, and asthma, who presents to the ED via EMS with a sudden onset of shortness of breath that began today. Per EMS, patient had increased work of breathing and expiratory wheezes. Patient did not complain of fever, chills, congestion, nausea, vomiting, or diarrhea. Patient's CBG was 257. Patient initial room air was in the mid to upper 80's.    The history is provided by the patient and the EMS personnel.     Review of patient's allergies indicates:   Allergen Reactions    Trazodone Other (See Comments)     Nightmares/sleep walk  Other reaction(s): Other (See Comments)  Nightmares/sleep walk    Melatonin      Other reaction(s): Other (See Comments)  Sleep Walks and has unnatural dreams    Talwin compound      Other reaction(s): Hives    Talwin [pentazocine lactate] Other (See Comments)     Hallucinations^    Transzone Other (See Comments)     Sleep Walks and has unnatural dreams    Bentyl [dicyclomine] Anxiety    Tessalon [benzonatate] Anxiety     Past Medical History:   Diagnosis Date    Arthritis     Asthma     Bilateral pulmonary embolism 4/27/2019    Cataract     Depression     Diabetes mellitus     Fibromyalgia 7/2/2012    Fibromyalgia     GERD (gastroesophageal reflux disease)     Hypertension 7/2/2012    Thoracic or lumbosacral neuritis or radiculitis, unspecified     Thyroid disease     Ulcer      Past Surgical History:   Procedure Laterality Date    CATARACT EXTRACTION Bilateral     AILEEN-TRANSFORAMINAL Right 5/13/2014    Performed by [facility] Robb [facility] at [facility]    ESOPHAGOGASTRODUODENOSCOPY (EGD) W/Endomicroscopy N/A 9/21/2015    Performed by Eddie Martinez MD at [facility]  ENDO    EYE SURGERY      FOOT NEUROMA SURGERY  1985    HYSTERECTOMY       Family History   Problem Relation Age of Onset    Diabetes Mother     Diabetes Brother     Emphysema Maternal Aunt     Melanoma Neg Hx     Asthma Neg Hx      Social History     Tobacco Use    Smoking status: Never Smoker    Smokeless tobacco: Never Used   Substance Use Topics    Alcohol use: No    Drug use: No     Review of Systems   Constitutional: Negative for chills and fever.   HENT: Negative for congestion and sore throat.    Respiratory: Positive for shortness of breath and wheezing.    Cardiovascular: Negative for chest pain.   Gastrointestinal: Negative for diarrhea, nausea and vomiting.   Genitourinary: Negative for dysuria.   Musculoskeletal: Negative for back pain.   Skin: Negative for rash.   Neurological: Negative for weakness.   Hematological: Does not bruise/bleed easily.       Physical Exam     Initial Vitals [04/27/19 1442]   BP Pulse Resp Temp SpO2   136/75 (!) 123 18 96.2 °F (35.7 °C) 97 %      MAP       --         Physical Exam    Nursing note and vitals reviewed.  Constitutional: She appears well-developed and well-nourished. She is diaphoretic. No distress.   HENT:   Head: Normocephalic and atraumatic.   Eyes: Conjunctivae and EOM are normal. Pupils are equal, round, and reactive to light. No scleral icterus.   Neck: Normal range of motion. Neck supple.   Cardiovascular: Normal rate, regular rhythm and normal heart sounds. Exam reveals no gallop and no friction rub.    No murmur heard.  Pulmonary/Chest: Breath sounds normal. She is in respiratory distress. She has no wheezes. She has no rhonchi. She has no rales.   Mild respiratory distress.   Abdominal: Soft. Bowel sounds are normal. She exhibits no distension. There is no tenderness. There is no rebound and no guarding.   Musculoskeletal: Normal range of motion. She exhibits no edema or tenderness.   Lymphadenopathy:     She has no cervical adenopathy.    Neurological: She is alert and oriented to person, place, and time.   Skin: No rash noted. No erythema. There is pallor.   Skin cool.   Psychiatric: She has a normal mood and affect. Her behavior is normal. Judgment and thought content normal.         ED Course   Critical Care  Date/Time: 4/27/2019 6:35 PM  Performed by: Kyle Lux MD  Authorized by: Kyle Lux MD   Direct patient critical care time: 45 minutes  Additional history critical care time: 10 minutes  Ordering / reviewing critical care time: 12 minutes  Documentation critical care time: 10 minutes  Consulting other physicians critical care time: 5 minutes  Total critical care time (exclusive of procedural time) : 82 minutes  Critical care time was exclusive of separately billable procedures and treating other patients and teaching time.  Critical care was necessary to treat or prevent imminent or life-threatening deterioration of the following conditions: respiratory failure (Bilateral pulmonary emboli).  Critical care was time spent personally by me on the following activities: blood draw for specimens, development of treatment plan with patient or surrogate, discussions with consultants, interpretation of cardiac output measurements, evaluation of patient's response to treatment, examination of patient, obtaining history from patient or surrogate, ordering and performing treatments and interventions, ordering and review of laboratory studies, ordering and review of radiographic studies, pulse oximetry, re-evaluation of patient's condition and review of old charts.        Labs Reviewed   CBC W/ AUTO DIFFERENTIAL - Abnormal; Notable for the following components:       Result Value    RDW 14.8 (*)     Lymph # 6.4 (*)     Gran% 32.9 (*)     Lymph% 57.8 (*)     All other components within normal limits   COMPREHENSIVE METABOLIC PANEL - Abnormal; Notable for the following components:    Potassium 3.3 (*)     Chloride 111 (*)     CO2 16 (*)      Glucose 212 (*)     Creatinine 1.5 (*)     Albumin 3.4 (*)     AST 53 (*)     eGFR if  36 (*)     eGFR if non  31 (*)     All other components within normal limits   LACTIC ACID, PLASMA - Abnormal; Notable for the following components:    Lactate (Lactic Acid) 5.3 (*)     All other components within normal limits    Narrative:     LA critical result(s) called and verbal readback obtained from Jl Hill RN , 04/27/2019 16:10   URINALYSIS, REFLEX TO URINE CULTURE - Abnormal; Notable for the following components:    Protein, UA 2+ (*)     Glucose, UA Trace (*)     Occult Blood UA 1+ (*)     Urobilinogen, UA 2.0-3.0 (*)     All other components within normal limits    Narrative:     Preferred Collection Type->Urine, Clean Catch   D DIMER, QUANTITATIVE - Abnormal; Notable for the following components:    D-Dimer 18.59 (*)     All other components within normal limits   URINALYSIS MICROSCOPIC - Abnormal; Notable for the following components:    RBC, UA 7 (*)     Granular Casts, UA 1 (*)     All other components within normal limits    Narrative:     Preferred Collection Type->Urine, Clean Catch   POCT GLUCOSE - Abnormal; Notable for the following components:    POCT Glucose 210 (*)     All other components within normal limits   INFLUENZA A & B BY MOLECULAR   CULTURE, BLOOD   CULTURE, BLOOD   PROTIME-INR   B-TYPE NATRIURETIC PEPTIDE   TROPONIN I   BETA - HYDROXYBUTYRATE, SERUM   LACTIC ACID, PLASMA     EKG Readings: (Independently Interpreted)   Initial Reading: No STEMI.   Sinus tachycardia. Rate of 105 bpm.       Imaging Results          CTA Chest Non-Coronary (PE Study) (Final result)  Result time 04/27/19 17:52:08    Final result by Mj Ojeda MD (04/27/19 17:52:08)                 Impression:      Extensive bilateral pulmonary thromboemboli with bowing of the interventricular septum suggestive for right heart strain.    COMMUNICATION  This critical result was  discovered/received on 4/27/2019 at 17:44.    The critical information above was relayed directly by Mj Ojeda MD by telephone to Dr. Kyle Lux on 4/27/2019 at 17:44.      Electronically signed by: Mj Ojeda MD  Date:    04/27/2019  Time:    17:52             Narrative:    EXAMINATION:  CTA CHEST NON CORONARY    CLINICAL HISTORY:  Chest pain, acute, PE suspected, intermed prob, positive D-dimer;    TECHNIQUE:  Low dose axial images, sagittal and coronal reformations were obtained from the thoracic inlet to the lung bases following the IV administration of 75 mL of Omnipaque 350.  Contrast timing was optimized to evaluate the pulmonary arteries.  MIP images were performed.    COMPARISON:  None    FINDINGS:  Structures at the base of the neck are unremarkable.  Aorta is non-aneurysmal.  Extensive bilateral pulmonary thromboemboli are seen involving the bilateral right and left main pulmonary arteries and lobar branches.  No evidence of saddle embolus.  Heart is normal in size without pericardial effusion.  There is evidence suggestive for right heart strain with leftward bowing of the ventricular septum.  There is no evidence of mediastinal, axillary, or hilar lymph node enlargement.  The esophagus is unremarkable along its course.    The trachea and bronchi are patent.  The lungs are symmetrically expanded.  Mild plate atelectasis seen within the lingula and the right middle lobe.  Lungs are otherwise clear with no evidence of mass, consolidation, or pleural effusion.  No evidence of pulmonary hemorrhage or infarction.    The visualized abdominal structures are unremarkable.  Osseous structures and extrathoracic soft tissues are unremarkable.                               X-Ray Chest AP Portable (Final result)  Result time 04/27/19 15:46:43    Final result by Alfredo Mota MD (04/27/19 15:46:43)                 Impression:      No radiographic acute intrathoracic process seen on this single  view.      Electronically signed by: Alfredo Mota MD  Date:    04/27/2019  Time:    15:46             Narrative:    EXAMINATION:  XR CHEST AP PORTABLE    CLINICAL HISTORY:  Sepsis;    TECHNIQUE:  Single frontal view of the chest was performed.    COMPARISON:  Chest radiograph 09/18/2017 and CT thorax 12/17/2014    FINDINGS:  Monitoring leads overlie the chest.  Patient is slightly rotated.    Cardiomediastinal silhouette is midline and within normal limits.  Symmetric appearing nipple shadows project over both lung bases.  The lungs are otherwise well expanded without focal consolidation, pleural effusion or pneumothorax.  No acute osseous process seen.  PA and lateral views can be obtained.                              X-Rays:   Independently Interpreted Readings:   Chest X-Ray: No enlarged cardiac silhouette. Atelectasis versus pulmonary edema.     Medical Decision Making:   Independently Interpreted Test(s):   I have ordered and independently interpreted X-rays - see prior notes.  I have ordered and independently interpreted EKG Reading(s) - see prior notes  Clinical Tests:   Lab Tests: Reviewed and Ordered  Radiological Study: Ordered and Reviewed  Medical Tests: Ordered and Reviewed  ED Management:  5:51 PM: Discussed case with Jeffrey Vaz NP. Will admit patient to Dr. Bansal.            Scribe Attestation:   Scribe #1: I performed the above scribed service and the documentation accurately describes the services I performed. I attest to the accuracy of the note.    Attending Attestation:           Physician Attestation for Scribe:  Physician Attestation Statement for Scribe #1: I, Dr. Briceno, reviewed documentation, as scribed by Rani Villagran in my presence, and it is both accurate and complete.         Attending ED Notes:   Emergent and critical evaluation of 86-year-old female who presents to the emergency room with acute onset of shortness of breath. Patient is afebrile and tachycardic.  Urinary  analysis reveals 2+ protein trace regular 1+ blood.  Influenza screen is negative. Patient has no elevation white blood cell count.  H&H within normal limits. On CMP potassium is 3.3 chloride 111, CO2 16, blood glucose of 212, creatinine 1.5.  No clinical or diagnostic evidence of DKA.  Beta hydroxybutyrate is within normal limits. No anion gap.  BNP is 42.  D-dimer is 18.59.  CTA of the chest PE study reveals extensive bilateral pulmonary thromboemboli with right heart strain.  The patient is extensively counseled on her diagnosis and treatment including all diagnostic, laboratory and physical exam findings.  The patient is admitted in critical condition.             Clinical Impression:     1. Bilateral pulmonary embolism    2. Tachycardia    3. Hypoxia    4. Renal insufficiency    5. Hypokalemia    6. Elevated lactic acid level    7. Proteinuria, unspecified type    8. Hematuria, unspecified type                                   Kyle Lux MD  04/27/19 [id number]

## 2019-04-28 PROBLEM — E87.6 HYPOKALEMIA: Status: ACTIVE | Noted: 2019-04-28

## 2019-04-28 PROBLEM — E87.20 LACTIC ACIDOSIS: Status: ACTIVE | Noted: 2019-04-28

## 2019-04-28 LAB
ALBUMIN SERPL BCP-MCNC: 3.1 G/DL (ref 3.5–5.2)
ALP SERPL-CCNC: 81 U/L (ref 55–135)
ALT SERPL W/O P-5'-P-CCNC: 47 U/L (ref 10–44)
ANION GAP SERPL CALC-SCNC: 11 MMOL/L (ref 8–16)
AST SERPL-CCNC: 50 U/L (ref 10–40)
BASOPHILS # BLD AUTO: 0.01 K/UL (ref 0–0.2)
BASOPHILS NFR BLD: 0.1 % (ref 0–1.9)
BILIRUB SERPL-MCNC: 0.9 MG/DL (ref 0.1–1)
BUN SERPL-MCNC: 9 MG/DL (ref 8–23)
CALCIUM SERPL-MCNC: 8.7 MG/DL (ref 8.7–10.5)
CHLORIDE SERPL-SCNC: 111 MMOL/L (ref 95–110)
CO2 SERPL-SCNC: 19 MMOL/L (ref 23–29)
CREAT SERPL-MCNC: 1.1 MG/DL (ref 0.5–1.4)
DIFFERENTIAL METHOD: ABNORMAL
EOSINOPHIL # BLD AUTO: 0 K/UL (ref 0–0.5)
EOSINOPHIL NFR BLD: 0.4 % (ref 0–8)
ERYTHROCYTE [DISTWIDTH] IN BLOOD BY AUTOMATED COUNT: 14.8 % (ref 11.5–14.5)
EST. GFR  (AFRICAN AMERICAN): 53 ML/MIN/1.73 M^2
EST. GFR  (NON AFRICAN AMERICAN): 46 ML/MIN/1.73 M^2
GLUCOSE SERPL-MCNC: 99 MG/DL (ref 70–110)
HCT VFR BLD AUTO: 36 % (ref 37–48.5)
HGB BLD-MCNC: 11.8 G/DL (ref 12–16)
LACTATE SERPL-SCNC: 1.5 MMOL/L (ref 0.5–2.2)
LYMPHOCYTES # BLD AUTO: 2.5 K/UL (ref 1–4.8)
LYMPHOCYTES NFR BLD: 34.4 % (ref 18–48)
MAGNESIUM SERPL-MCNC: 1.9 MG/DL (ref 1.6–2.6)
MCH RBC QN AUTO: 29.9 PG (ref 27–31)
MCHC RBC AUTO-ENTMCNC: 32.8 G/DL (ref 32–36)
MCV RBC AUTO: 91 FL (ref 82–98)
MONOCYTES # BLD AUTO: 0.8 K/UL (ref 0.3–1)
MONOCYTES NFR BLD: 10.8 % (ref 4–15)
NEUTROPHILS # BLD AUTO: 3.9 K/UL (ref 1.8–7.7)
NEUTROPHILS NFR BLD: 54.3 % (ref 38–73)
PHOSPHATE SERPL-MCNC: 2.7 MG/DL (ref 2.7–4.5)
PLATELET # BLD AUTO: 185 K/UL (ref 150–350)
PLATELET BLD QL SMEAR: ABNORMAL
PMV BLD AUTO: 10.4 FL (ref 9.2–12.9)
POCT GLUCOSE: 125 MG/DL (ref 70–110)
POCT GLUCOSE: 134 MG/DL (ref 70–110)
POCT GLUCOSE: 135 MG/DL (ref 70–110)
POCT GLUCOSE: 139 MG/DL (ref 70–110)
POTASSIUM SERPL-SCNC: 3.5 MMOL/L (ref 3.5–5.1)
PROT SERPL-MCNC: 6.6 G/DL (ref 6–8.4)
RBC # BLD AUTO: 3.94 M/UL (ref 4–5.4)
SODIUM SERPL-SCNC: 141 MMOL/L (ref 136–145)
T4 FREE SERPL-MCNC: 0.88 NG/DL (ref 0.71–1.51)
TSH SERPL DL<=0.005 MIU/L-ACNC: 6.58 UIU/ML (ref 0.4–4)
WBC # BLD AUTO: 7.13 K/UL (ref 3.9–12.7)

## 2019-04-28 PROCEDURE — 27000221 HC OXYGEN, UP TO 24 HOURS

## 2019-04-28 PROCEDURE — 36415 COLL VENOUS BLD VENIPUNCTURE: CPT

## 2019-04-28 PROCEDURE — 25000003 PHARM REV CODE 250: Performed by: NURSE PRACTITIONER

## 2019-04-28 PROCEDURE — 99291 CRITICAL CARE FIRST HOUR: CPT | Mod: ,,, | Performed by: HOSPITALIST

## 2019-04-28 PROCEDURE — 83605 ASSAY OF LACTIC ACID: CPT

## 2019-04-28 PROCEDURE — 94761 N-INVAS EAR/PLS OXIMETRY MLT: CPT

## 2019-04-28 PROCEDURE — 84443 ASSAY THYROID STIM HORMONE: CPT

## 2019-04-28 PROCEDURE — 99291 PR CRITICAL CARE, E/M 30-74 MINUTES: ICD-10-PCS | Mod: ,,, | Performed by: HOSPITALIST

## 2019-04-28 PROCEDURE — 25000242 PHARM REV CODE 250 ALT 637 W/ HCPCS: Performed by: NURSE PRACTITIONER

## 2019-04-28 PROCEDURE — 63600175 PHARM REV CODE 636 W HCPCS: Performed by: HOSPITALIST

## 2019-04-28 PROCEDURE — 94640 AIRWAY INHALATION TREATMENT: CPT

## 2019-04-28 PROCEDURE — 84100 ASSAY OF PHOSPHORUS: CPT

## 2019-04-28 PROCEDURE — 84439 ASSAY OF FREE THYROXINE: CPT

## 2019-04-28 PROCEDURE — 20000000 HC ICU ROOM

## 2019-04-28 PROCEDURE — 80053 COMPREHEN METABOLIC PANEL: CPT

## 2019-04-28 PROCEDURE — 85025 COMPLETE CBC W/AUTO DIFF WBC: CPT

## 2019-04-28 PROCEDURE — 83735 ASSAY OF MAGNESIUM: CPT

## 2019-04-28 RX ORDER — ENOXAPARIN SODIUM 100 MG/ML
1 INJECTION SUBCUTANEOUS
Status: DISCONTINUED | OUTPATIENT
Start: 2019-04-28 | End: 2019-04-29

## 2019-04-28 RX ORDER — INSULIN ASPART 100 [IU]/ML
0-5 INJECTION, SOLUTION INTRAVENOUS; SUBCUTANEOUS
Status: DISCONTINUED | OUTPATIENT
Start: 2019-04-28 | End: 2019-05-02 | Stop reason: HOSPADM

## 2019-04-28 RX ADMIN — ENOXAPARIN SODIUM 70 MG: 100 INJECTION SUBCUTANEOUS at 09:04

## 2019-04-28 RX ADMIN — ASPIRIN 81 MG: 81 TABLET, COATED ORAL at 09:04

## 2019-04-28 RX ADMIN — DONEPEZIL HYDROCHLORIDE 10 MG: 5 TABLET, FILM COATED ORAL at 09:04

## 2019-04-28 RX ADMIN — PANTOPRAZOLE SODIUM 40 MG: 40 TABLET, DELAYED RELEASE ORAL at 09:04

## 2019-04-28 RX ADMIN — MEMANTINE HYDROCHLORIDE 5 MG: 5 TABLET ORAL at 06:04

## 2019-04-28 RX ADMIN — FLUTICASONE FUROATE AND VILANTEROL TRIFENATATE 1 PUFF: 100; 25 POWDER RESPIRATORY (INHALATION) at 08:04

## 2019-04-28 RX ADMIN — TIOTROPIUM BROMIDE 18 MCG: 18 CAPSULE ORAL; RESPIRATORY (INHALATION) at 08:04

## 2019-04-28 RX ADMIN — AMLODIPINE BESYLATE 10 MG: 5 TABLET ORAL at 09:04

## 2019-04-28 RX ADMIN — LEVOTHYROXINE SODIUM 50 MCG: 25 TABLET ORAL at 06:04

## 2019-04-28 RX ADMIN — ENOXAPARIN SODIUM 70 MG: 100 INJECTION SUBCUTANEOUS at 10:04

## 2019-04-28 NOTE — PROGRESS NOTES
[facility] Hospital Medicine  Progress Note    Patient Name: Debbie Ding  MRN: [id number]  Patient Class: IP- Inpatient   Admission Date: 4/27/2019  Length of Stay: 1 days  Attending Physician: Ismael Hussein MD  Primary Care Provider: Dayo Woo MD        Subjective:     Principal Problem:Bilateral pulmonary embolism    HPI:  The patient is a 86 y.o. female, with history of HTN and asthma, who presents to the ED via EMS with a sudden onset of shortness of breath that began today. Per EMS, patient had increased work of breathing and expiratory wheezes. Patient did not complain of fever, chills, congestion, nausea, vomiting, or diarrhea. Patient's CBG was 257. Patient initial room air was in the mid to upper 80's.  On workup, d dimer was found elevated and CT showed multiple embolis        Hospital Course:  No notes on file    Interval History: No acute events overnight.  Notes recent fall with mild injury to left leg.  Still with shortness of breath.  She is [employer] and would not want blood products if required.  Daughter and grand-daughter at bedside and all questions answered.    Review of Systems   Constitutional: Negative for activity change, chills, diaphoresis and fatigue.   HENT: Negative for congestion, drooling and hearing loss.    Eyes: Negative for discharge.   Respiratory: Positive for shortness of breath. Negative for apnea, cough, chest tightness and wheezing.    Cardiovascular: Negative for palpitations and leg swelling.   Gastrointestinal: Negative for abdominal distention, abdominal pain, constipation and diarrhea.   Musculoskeletal: Negative for arthralgias and gait problem.   Skin: Negative for rash.   Neurological: Negative for seizures, light-headedness and numbness.   Hematological: Negative for adenopathy.   Psychiatric/Behavioral: Negative for agitation and behavioral problems.     Objective:     Vital Signs (Most Recent):  Temp: 98.4 °F (36.9 °C)  (04/28/19 0700)  Pulse: 88 (04/28/19 0900)  Resp: 19 (04/28/19 0900)  BP: 129/62 (04/28/19 0900)  SpO2: 98 % (04/28/19 0900) Vital Signs (24h Range):  Temp:  [96.2 °F (35.7 °C)-99.3 °F (37.4 °C)] 98.4 °F (36.9 °C)  Pulse:  [] 88  Resp:  [14-36] 19  SpO2:  [94 %-100 %] 98 %  BP: ()/() 129/62     Weight: 69.8 kg (153 lb 14.1 oz)  Body mass index is 25.61 kg/m².    Intake/Output Summary (Last 24 hours) at 4/28/2019 1125  Last data filed at 4/28/2019 0900  Gross per 24 hour   Intake 2528 ml   Output 1150 ml   Net 1378 ml      Physical Exam   Constitutional: She is oriented to person, place, and time. She appears well-developed and well-nourished.   HENT:   Head: Normocephalic and atraumatic.   Eyes: Pupils are equal, round, and reactive to light. EOM are normal.   Neck: Normal range of motion. Neck supple.   Cardiovascular: Normal rate, regular rhythm and normal heart sounds.   Pulmonary/Chest: Effort normal and breath sounds normal. No respiratory distress.   Abdominal: Soft. Bowel sounds are normal. She exhibits no distension. There is no tenderness.   Musculoskeletal: Normal range of motion. She exhibits no edema.   Diameter of legs L slightly greater than R.  No tenderness or erythema.   Neurological: She is oriented to person, place, and time. No cranial nerve deficit. Coordination normal.   Skin: Skin is warm and dry.   Psychiatric: She has a normal mood and affect. Her behavior is normal.   Vitals reviewed.      Significant Labs: All pertinent labs within the past 24 hours have been reviewed.    Significant Imaging: I have reviewed and interpreted all pertinent imaging results/findings within the past 24 hours.    Assessment/Plan:      * Bilateral pulmonary embolism  -Mrs. Ding is admitted to inpatient status in our ICU with submassive pulmonary embolism  -CTA shows extensive bilateral pulmonary thromboemboli with bowing of the interventricular septum suggestive for right heart  strain.  -Suspect due to left leg dvt from recent trauma.  Will order doppler us of ble to evaluate for this.  -Troponin negative.  Pulmonary embolism severity index is 116.  -Still sob but not in distress.  Denies chest pain.  -No absolute contraindications to interventional treatments, but given age and inability to treat potential bleeding with blood products due to her Confucianism, we will attempt medical management only at this time.  If she is not improved tomorrow we will revisit this.  Family is in agreement with plan.  -Order echo to further assess right heart strain and right pressures  -Renal function improved so will change to full dose lovenox 1mg/kgq12.  -Continue ICU care today.    Hypokalemia  -K normal today  -Repeat BMP and Mag in AM.      Lactic acidosis  -Likely due to PE.    -No fever or leukocytosis.  Doubt infection  -Repeat in AM.      Hypothyroidism, adult  -TSH ok  -Continue synthroid.    Mild cognitive impairment  -She is oriented x3  -At baseline.    -Continue Namenda/Aricept      Essential hypertension  -Stable.  -Continue amlodipine, patient has not been taking other meds recently          VTE Risk Mitigation (From admission, onward)        Ordered     enoxaparin injection 70 mg  Every 12 hours (non-standard times)      04/28/19 0917     IP VTE HIGH RISK PATIENT  Once      04/27/19 1943        30 min cc time.      Ismael Hussein MD  Department of Hospital Medicine   [facility]

## 2019-04-28 NOTE — ED NOTES
Pt and family updated on POC and results of tests - pt to be admitted to ICU. Pt and family verbalized understanding and all questions answered at this time.

## 2019-04-28 NOTE — ASSESSMENT & PLAN NOTE
CTA- Extensive bilateral pulmonary thromboemboli with bowing of the interventricular septum suggestive for right heart strain.    D-Dimer- 18.59    Lovenox 1mg/kg Q24  Oxygen

## 2019-04-28 NOTE — PROGRESS NOTES
EICU contacted. Hx of DM but no current accu check orders or sliding scale. Two Lactic acids elevated but trending down. Discussed wtih eICU further Lactics.

## 2019-04-28 NOTE — SUBJECTIVE & OBJECTIVE
Interval History: No acute events overnight.  Notes recent fall with mild injury to left leg.  Still with shortness of breath.  She is [employer] and would not want blood products if required.  Daughter and grand-daughter at bedside and all questions answered.    Review of Systems   Constitutional: Negative for activity change, chills, diaphoresis and fatigue.   HENT: Negative for congestion, drooling and hearing loss.    Eyes: Negative for discharge.   Respiratory: Positive for shortness of breath. Negative for apnea, cough, chest tightness and wheezing.    Cardiovascular: Negative for palpitations and leg swelling.   Gastrointestinal: Negative for abdominal distention, abdominal pain, constipation and diarrhea.   Musculoskeletal: Negative for arthralgias and gait problem.   Skin: Negative for rash.   Neurological: Negative for seizures, light-headedness and numbness.   Hematological: Negative for adenopathy.   Psychiatric/Behavioral: Negative for agitation and behavioral problems.     Objective:     Vital Signs (Most Recent):  Temp: 98.4 °F (36.9 °C) (04/28/19 0700)  Pulse: 88 (04/28/19 0900)  Resp: 19 (04/28/19 0900)  BP: 129/62 (04/28/19 0900)  SpO2: 98 % (04/28/19 0900) Vital Signs (24h Range):  Temp:  [96.2 °F (35.7 °C)-99.3 °F (37.4 °C)] 98.4 °F (36.9 °C)  Pulse:  [] 88  Resp:  [14-36] 19  SpO2:  [94 %-100 %] 98 %  BP: ()/() 129/62     Weight: 69.8 kg (153 lb 14.1 oz)  Body mass index is 25.61 kg/m².    Intake/Output Summary (Last 24 hours) at 4/28/2019 1125  Last data filed at 4/28/2019 0900  Gross per 24 hour   Intake 2528 ml   Output 1150 ml   Net 1378 ml      Physical Exam   Constitutional: She is oriented to person, place, and time. She appears well-developed and well-nourished.   HENT:   Head: Normocephalic and atraumatic.   Eyes: Pupils are equal, round, and reactive to light. EOM are normal.   Neck: Normal range of motion. Neck supple.   Cardiovascular: Normal rate, regular  rhythm and normal heart sounds.   Pulmonary/Chest: Effort normal and breath sounds normal. No respiratory distress.   Abdominal: Soft. Bowel sounds are normal. She exhibits no distension. There is no tenderness.   Musculoskeletal: Normal range of motion. She exhibits no edema.   Diameter of legs L slightly greater than R.  No tenderness or erythema.   Neurological: She is oriented to person, place, and time. No cranial nerve deficit. Coordination normal.   Skin: Skin is warm and dry.   Psychiatric: She has a normal mood and affect. Her behavior is normal.   Vitals reviewed.      Significant Labs: All pertinent labs within the past 24 hours have been reviewed.    Significant Imaging: I have reviewed and interpreted all pertinent imaging results/findings within the past 24 hours.

## 2019-04-28 NOTE — PLAN OF CARE
Problem: Adult Inpatient Plan of Care  Goal: Plan of Care Review  Outcome: Ongoing (interventions implemented as appropriate)  Patient in no apparent distress. Sat's 94  % on 3 lpm. PRN treatments not needed . Will continue to monitor.

## 2019-04-28 NOTE — ASSESSMENT & PLAN NOTE
-Mrs. Ding is admitted to inpatient status in our ICU with submassive pulmonary embolism  -CTA shows extensive bilateral pulmonary thromboemboli with bowing of the interventricular septum suggestive for right heart strain.  -Suspect due to left leg dvt from recent trauma.  Will order doppler us of ble to evaluate for this.  -Troponin negative.  Pulmonary embolism severity index is 116.  -Still sob but not in distress.  Denies chest pain.  -No absolute contraindications to interventional treatments, but given age and inability to treat potential bleeding with blood products due to her [employer], we will attempt medical management only at this time.  If she is not improved tomorrow we will revisit this.  Family is in agreement with plan.  -Order echo to further assess right heart strain and right pressures  -Renal function improved so will change to full dose lovenox 1mg/kgq12.  -Continue ICU care today.

## 2019-04-28 NOTE — ED NOTES
Unable to obtain enough blood with IV placement for repeat lactic. Phlebotomy called for blood draw.

## 2019-04-28 NOTE — ASSESSMENT & PLAN NOTE
Hypertensive currently    Continue amlodipine, patient has not been taking other meds recently

## 2019-04-28 NOTE — EICU
Judit banegas admit eICU note:    86 yr old female with hx of Asthma, Depression, GERD, Hypothyroidism, DM, HTN, on aricept admitted for hemodynamically stable bilateral PE.  On Full dose Lovenox.    Data:  CTPA: Rt heart strain, Bilateral extensive PE.  K 3.3, Creat 1.5 ( base line 1.5).  LA trending down to 3.1 from 5.3  Troponin 0.007, BNP low at 42. .wbc 11.  Flu neg . UA nitrite/LE neg. EF normal in 2017.     Camera:  Resting on nasal o2, sats 94%.  150/78, HR 93.      A/P:  1. Hemodynamically stable Bilateral PE.  Rt heart strain/elevated troponin with normal BNP.  - continue Lovenox 1 mg/kg BID.  - watch MAP, and for any hypoxemia    2. DM  - Placed on SSI q 6 hr prn.  Goal GLUC < 180    3. Hypothyroidism  - on Levothyroxine.     4. HTN  - on Norvasc.  Watch BP    5. Trending down LA.  Do not see any evidence ofPNA or UTI.  Could be from PE.  Get LA again.  De escalate abx in AM or stop if wbc resolves.  Has cultures pending.    6. On tiotropium , memantine/aricept for COPD/Cognitive impairment.

## 2019-04-28 NOTE — PLAN OF CARE
"Met with patient at bedside to complete discharge planning assessment. Patient's PCP is Dr Woo but she reports "I haven't seen him in awhile." Pharmacy [facility] choice [facility] [address] [facility] - epic updated. Patient's daughter will provide transportation home. Patient denied any anticipated DC needs      04/28/19 1218   Discharge Assessment   Assessment Type Discharge Planning Assessment   Confirmed/corrected address and phone number on facesheet? Yes   Assessment information obtained from? Patient   Communicated expected length of stay with patient/caregiver no   Prior to hospitilization cognitive status: Alert/Oriented   Prior to hospitalization functional status: Independent   Current cognitive status: Alert/Oriented   Current Functional Status: Independent   Lives With alone   Able to Return to Prior Arrangements yes   Is patient able to care for self after discharge? Yes   Patient's perception of discharge disposition home or selfcare   Readmission Within the Last 30 Days no previous admission in last 30 days   Patient currently being followed by outpatient case management? No   Patient currently receives home health services? No   Patient currently receives any other outside agency services? No   Equipment Currently Used at Home none   Do you have any problems affording any of your prescribed medications? No   Is the patient taking medications as prescribed? yes   Does the patient have transportation home? Yes   Transportation Anticipated family or friend will provide   Does the patient receive services at the [facility]? No   Discharge Plan A Home   DME Needed Upon Discharge  none   Patient/Family in Agreement with Plan yes     "

## 2019-04-28 NOTE — PLAN OF CARE
Problem: Adult Inpatient Plan of Care  Goal: Plan of Care Review  Outcome: Ongoing (interventions implemented as appropriate)  Pt remains on 2LNC. MDI's given and tolerated well. Pt remains stable.

## 2019-04-28 NOTE — SUBJECTIVE & OBJECTIVE
Past Medical History:   Diagnosis Date    Arthritis     Asthma     Bilateral pulmonary embolism 4/27/2019    Cataract     Depression     Diabetes mellitus     Fibromyalgia 7/2/2012    Fibromyalgia     GERD (gastroesophageal reflux disease)     Hypertension 7/2/2012    Thoracic or lumbosacral neuritis or radiculitis, unspecified     Thyroid disease     Ulcer        Past Surgical History:   Procedure Laterality Date    CATARACT EXTRACTION Bilateral     AILEEN-TRANSFORAMINAL Right 5/13/2014    Performed by [facility]b [facility] at [facility] PA[facility]GT    ESOPHAGOGASTRODUODENOSCOPY (EGD) W/Endomicroscopy N/A 9/21/2015    [facility] Eddie [facility] ENDO    EYE SURGERY      FOOT NEUROMA SURGERY  1985    HYSTERECTOMY         Review of patient's allergies indicates:   Allergen Reactions    Trazodone Other (See Comments)     Nightmares/sleep walk  Other reaction(s): Other (See Comments)  Nightmares/sleep walk    Melatonin      Other reaction(s): Other (See Comments)  Sleep Walks and has unnatural dreams    Talwin compound      Other reaction(s): Hives    Talwin [pentazocine lactate] Other (See Comments)     Hallucinations^    Transzone Other (See Comments)     Sleep Walks and has unnatural dreams    Bentyl [dicyclomine] Anxiety    Tessalon [benzonatate] Anxiety       No current facility-administered medications on file prior to encounter.      Current Outpatient Medications on File Prior to Encounter   Medication Sig    albuterol 90 mcg/actuation inhaler Inhale 2 puffs into the lungs every 4 (four) hours as needed for Wheezing.    amLODIPine (NORVASC) 10 MG tablet     donepezil (ARICEPT) 10 MG tablet TAKE 1 TABLET ONCE DAILY INTHE MORNING    esomeprazole (NEXIUM) 20 MG capsule     hyoscyamine (LEVSIN/SL) 0.125 mg Subl DISSOLVE 1 TABLET UNDER TONGUE EVERY SIX HOURS AS NEEDED FOR PAIN    memantine (NAMENDA) 5 MG Tab Take 1 tablet (5 mg total) by mouth every morning.    MYRBETRIQ 50 mg Tb24  Take 1 tablet by mouth once daily.    acetaminophen (TYLENOL) 500 MG tablet Take 1-2 tablets (500-1,000 mg total) by mouth 3 (three) times daily as needed for Pain.    ADVAIR DISKUS 250-50 mcg/dose diskus inhaler     amlodipine-olmesartan (ERMA) 5-20 mg per tablet Take 1 tablet by mouth once daily.    ammonium lactate 12 % Crea     aspirin (ECOTRIN) 81 MG EC tablet Take 81 mg by mouth.    BOOSTRIX TDAP 2.5-8-5 Lf-mcg-Lf/0.5mL Syrg injection     citalopram (CELEXA) 10 MG tablet     doxepin (SINEQUAN) 10 MG capsule Take 10 mg by mouth.    DULoxetine (CYMBALTA) 30 MG capsule Take 1 capsule (30 mg total) by mouth once daily.    ESTRACE 0.01 % (0.1 mg/gram) vaginal cream     estradiol 0.05 mg/24 hr td ptsw (VIVELLE-DOT) 0.05 mg/24 hr     fluocinolone-shower cap 0.01 % Oil     fluocinonide (LIDEX) 0.05 % external solution USE AS DIRECTED TWICE A DAY EXTERNALLY 1MONTH    fluticasone (FLONASE) 50 mcg/actuation nasal spray 1 spray by Nasal route.    FLUZONE HIGH-DOSE 2017-18, PF, 180 mcg/0.5 mL vaccine     glycopyrrolate (ROBINUL) 2 MG Tab TAKE 1 TO 1 AND 1/2 TABLETSDAILY AS NEEDED FOR EXCESS SWEATING    ipratropium (ATROVENT) 0.03 % nasal spray 1-2 sprays by Nasal route 2 (two) times daily.    ketoconazole (NIZORAL) 2 % cream Apply topically once daily. Toe nails    liothyronine (CYTOMEL) 5 MCG Tab     lisinopril 10 MG tablet     losartan (COZAAR) 50 MG tablet Take 50 mg by mouth.    magnesium oxide (MAG-OX) 250 mg Tab Take by mouth once.    meclizine (ANTIVERT) 25 mg tablet Take 1 tablet (25 mg total) by mouth 2 (two) times daily as needed.    metoprolol succinate (TOPROL-XL) 25 MG 24 hr tablet Take 25 mg by mouth.    mirtazapine (REMERON) 15 MG tablet Take 1 tablet (15 mg total) by mouth every evening.    olopatadine (PATADAY) 0.2 % Drop     omeprazole (PRILOSEC) 40 MG capsule Take 1 capsule (40 mg total) by mouth once daily.    pantoprazole (PROTONIX) 40 MG tablet     PNEUMOVAX 23 25 mcg/0.5  mL Syrg     ranitidine (ZANTAC) 300 MG tablet     SYNTHROID 50 mcg tablet Take 1 tablet (50 mcg total) by mouth before breakfast.    tiotropium (SPIRIVA) 18 mcg inhalation capsule Inhale 18 mcg into the lungs.    tiZANidine (ZANAFLEX) 2 MG tablet Take 2 mg by mouth daily as needed.    triamcinolone acetonide 0.1% (KENALOG) 0.1 % ointment triamcinolone acetonide 0.1 % topical ointment    ZOSTAVAX, PF, 19,400 unit/0.65 mL injection      Family History     Problem Relation (Age of Onset)    Diabetes Mother, Brother    Emphysema Maternal Aunt        Tobacco Use    Smoking status: Never Smoker    Smokeless tobacco: Never Used   Substance and Sexual Activity    Alcohol use: No    Drug use: No    Sexual activity: Not on file     Review of Systems   Constitutional: Positive for activity change, appetite change and fatigue. Negative for fever.   HENT: Negative for congestion, ear pain, rhinorrhea and sinus pressure.    Eyes: Negative for pain and discharge.   Respiratory: Positive for cough, chest tightness and shortness of breath. Negative for wheezing.    Cardiovascular: Negative for chest pain and leg swelling.   Gastrointestinal: Negative for abdominal distention, abdominal pain, diarrhea, nausea and vomiting.   Endocrine: Negative for cold intolerance and heat intolerance.   Genitourinary: Negative for difficulty urinating, flank pain, frequency, hematuria and urgency.   Musculoskeletal: Positive for arthralgias and myalgias. Negative for joint swelling.   Allergic/Immunologic: Negative for environmental allergies and food allergies.   Neurological: Positive for dizziness, weakness and light-headedness. Negative for headaches.   Hematological: Does not bruise/bleed easily.   Psychiatric/Behavioral: Negative for agitation, behavioral problems and decreased concentration.     Objective:     Vital Signs (Most Recent):  Temp: 97.3 °F (36.3 °C) (04/27/19 1709)  Pulse: 106 (04/27/19 2004)  Resp: (!) 25 (04/27/19  2004)  BP: (!) 161/72 (04/27/19 2004)  SpO2: (!) 94 % (04/27/19 2004) Vital Signs (24h Range):  Temp:  [96.2 °F (35.7 °C)-99.3 °F (37.4 °C)] 97.3 °F (36.3 °C)  Pulse:  [] 106  Resp:  [18-36] 25  SpO2:  [94 %-100 %] 94 %  BP: (133-161)/(61-75) 161/72     Weight: 72.6 kg (160 lb)  Body mass index is 26.63 kg/m².    Physical Exam   Constitutional: She appears well-developed. She appears lethargic.   HENT:   Head: Normocephalic.   Eyes: Conjunctivae are normal. Right eye exhibits no discharge. Left eye exhibits no discharge.   Neck: Normal range of motion. Neck supple.   Cardiovascular: Regular rhythm, normal heart sounds and intact distal pulses. Tachycardia present.   Pulses:       Radial pulses are 2+ on the right side, and 2+ on the left side.   Pulmonary/Chest: Effort normal. No respiratory distress. She has decreased breath sounds in the right lower field.   Abdominal: Soft. She exhibits no distension. Bowel sounds are decreased. There is no tenderness.   Musculoskeletal: Normal range of motion.   Neurological: She appears lethargic. GCS eye subscore is 4. GCS verbal subscore is 5. GCS motor subscore is 6.   Skin: Skin is warm and dry.   Psychiatric: She has a normal mood and affect. Her speech is normal and behavior is normal.           Significant Labs:   CBC:   Recent Labs   Lab 04/27/19  1459   WBC 11.04   HGB 12.7   HCT 38.4        CMP:   Recent Labs   Lab 04/27/19  1459      K 3.3*   *   CO2 16*   *   BUN 15   CREATININE 1.5*   CALCIUM 9.7   PROT 7.9   ALBUMIN 3.4*   BILITOT 1.0   ALKPHOS 84   AST 53*   ALT 37   ANIONGAP 15   EGFRNONAA 31*       Significant Imaging: I have reviewed all pertinent imaging results/findings within the past 24 hours.

## 2019-04-28 NOTE — H&P
[facility] Hospital Medicine  History & Physical    Patient Name: Debbie Ding  MRN: [id number]  Admission Date: 4/27/2019  Attending Physician: Ismael Hussein MD   Primary Care Provider: Dayo Woo MD         Patient information was obtained from patient, past medical records and ER records.     Subjective:     Principal Problem:Bilateral pulmonary embolism    Chief Complaint:   Chief Complaint   Patient presents with    Shortness of Breath     pt with sob x one day.         HPI: The patient is a 86 y.o. female, with history of HTN and asthma, who presents to the ED via EMS with a sudden onset of shortness of breath that began today. Per EMS, patient had increased work of breathing and expiratory wheezes. Patient did not complain of fever, chills, congestion, nausea, vomiting, or diarrhea. Patient's CBG was 257. Patient initial room air was in the mid to upper 80's.  On workup, d dimer was found elevated and CT showed multiple embolis        Past Medical History:   Diagnosis Date    Arthritis     Asthma     Bilateral pulmonary embolism 4/27/2019    Cataract     Depression     Diabetes mellitus     Fibromyalgia 7/2/2012    Fibromyalgia     GERD (gastroesophageal reflux disease)     Hypertension 7/2/2012    Thoracic or lumbosacral neuritis or radiculitis, unspecified     Thyroid disease     Ulcer        Past Surgical History:   Procedure Laterality Date    CATARACT EXTRACTION Bilateral     AILEEN-TRANSFORAMINAL Right 5/13/2014    [facility] PA[facility] M[facility]    ESOPHAGOGASTRODUODENOSCOPY (EGD) W/Endomicroscopy N/A 9/21/2015    Performed by Eddie Martinez MD at [facility] ENDO    EYE SURGERY      FOOT NEUROMA SURGERY  1985    HYSTERECTOMY         Review of patient's allergies indicates:   Allergen Reactions    Trazodone Other (See Comments)     Nightmares/sleep walk  Other reaction(s): Other (See Comments)  Nightmares/sleep walk    Melatonin       Other reaction(s): Other (See Comments)  Sleep Walks and has unnatural dreams    Talwin compound      Other reaction(s): Hives    Talwin [pentazocine lactate] Other (See Comments)     Hallucinations^    Transzone Other (See Comments)     Sleep Walks and has unnatural dreams    Bentyl [dicyclomine] Anxiety    Tessalon [benzonatate] Anxiety       No current facility-administered medications on file prior to encounter.      Current Outpatient Medications on File Prior to Encounter   Medication Sig    albuterol 90 mcg/actuation inhaler Inhale 2 puffs into the lungs every 4 (four) hours as needed for Wheezing.    amLODIPine (NORVASC) 10 MG tablet     donepezil (ARICEPT) 10 MG tablet TAKE 1 TABLET ONCE DAILY INTHE MORNING    esomeprazole (NEXIUM) 20 MG capsule     hyoscyamine (LEVSIN/SL) 0.125 mg Subl DISSOLVE 1 TABLET UNDER TONGUE EVERY SIX HOURS AS NEEDED FOR PAIN    memantine (NAMENDA) 5 MG Tab Take 1 tablet (5 mg total) by mouth every morning.    MYRBETRIQ 50 mg Tb24 Take 1 tablet by mouth once daily.    acetaminophen (TYLENOL) 500 MG tablet Take 1-2 tablets (500-1,000 mg total) by mouth 3 (three) times daily as needed for Pain.    ADVAIR DISKUS 250-50 mcg/dose diskus inhaler     amlodipine-olmesartan (ERMA) 5-20 mg per tablet Take 1 tablet by mouth once daily.    ammonium lactate 12 % Crea     aspirin (ECOTRIN) 81 MG EC tablet Take 81 mg by mouth.    BOOSTRIX TDAP 2.5-8-5 Lf-mcg-Lf/0.5mL Syrg injection     citalopram (CELEXA) 10 MG tablet     doxepin (SINEQUAN) 10 MG capsule Take 10 mg by mouth.    DULoxetine (CYMBALTA) 30 MG capsule Take 1 capsule (30 mg total) by mouth once daily.    ESTRACE 0.01 % (0.1 mg/gram) vaginal cream     estradiol 0.05 mg/24 hr td ptsw (VIVELLE-DOT) 0.05 mg/24 hr     fluocinolone-shower cap 0.01 % Oil     fluocinonide (LIDEX) 0.05 % external solution USE AS DIRECTED TWICE A DAY EXTERNALLY 1MONTH    fluticasone (FLONASE) 50 mcg/actuation nasal spray 1 spray by  Nasal route.    FLUZONE HIGH-DOSE 2017-18, PF, 180 mcg/0.5 mL vaccine     glycopyrrolate (ROBINUL) 2 MG Tab TAKE 1 TO 1 AND 1/2 TABLETSDAILY AS NEEDED FOR EXCESS SWEATING    ipratropium (ATROVENT) 0.03 % nasal spray 1-2 sprays by Nasal route 2 (two) times daily.    ketoconazole (NIZORAL) 2 % cream Apply topically once daily. Toe nails    liothyronine (CYTOMEL) 5 MCG Tab     lisinopril 10 MG tablet     losartan (COZAAR) 50 MG tablet Take 50 mg by mouth.    magnesium oxide (MAG-OX) 250 mg Tab Take by mouth once.    meclizine (ANTIVERT) 25 mg tablet Take 1 tablet (25 mg total) by mouth 2 (two) times daily as needed.    metoprolol succinate (TOPROL-XL) 25 MG 24 hr tablet Take 25 mg by mouth.    mirtazapine (REMERON) 15 MG tablet Take 1 tablet (15 mg total) by mouth every evening.    olopatadine (PATADAY) 0.2 % Drop     omeprazole (PRILOSEC) 40 MG capsule Take 1 capsule (40 mg total) by mouth once daily.    pantoprazole (PROTONIX) 40 MG tablet     PNEUMOVAX 23 25 mcg/0.5 mL Syrg     ranitidine (ZANTAC) 300 MG tablet     SYNTHROID 50 mcg tablet Take 1 tablet (50 mcg total) by mouth before breakfast.    tiotropium (SPIRIVA) 18 mcg inhalation capsule Inhale 18 mcg into the lungs.    tiZANidine (ZANAFLEX) 2 MG tablet Take 2 mg by mouth daily as needed.    triamcinolone acetonide 0.1% (KENALOG) 0.1 % ointment triamcinolone acetonide 0.1 % topical ointment    ZOSTAVAX, PF, 19,400 unit/0.65 mL injection      Family History     Problem Relation (Age of Onset)    Diabetes Mother, Brother    Emphysema Maternal Aunt        Tobacco Use    Smoking status: Never Smoker    Smokeless tobacco: Never Used   Substance and Sexual Activity    Alcohol use: No    Drug use: No    Sexual activity: Not on file     Review of Systems   Constitutional: Positive for activity change, appetite change and fatigue. Negative for fever.   HENT: Negative for congestion, ear pain, rhinorrhea and sinus pressure.    Eyes: Negative for  pain and discharge.   Respiratory: Positive for cough, chest tightness and shortness of breath. Negative for wheezing.    Cardiovascular: Negative for chest pain and leg swelling.   Gastrointestinal: Negative for abdominal distention, abdominal pain, diarrhea, nausea and vomiting.   Endocrine: Negative for cold intolerance and heat intolerance.   Genitourinary: Negative for difficulty urinating, flank pain, frequency, hematuria and urgency.   Musculoskeletal: Positive for arthralgias and myalgias. Negative for joint swelling.   Allergic/Immunologic: Negative for environmental allergies and food allergies.   Neurological: Positive for dizziness, weakness and light-headedness. Negative for headaches.   Hematological: Does not bruise/bleed easily.   Psychiatric/Behavioral: Negative for agitation, behavioral problems and decreased concentration.     Objective:     Vital Signs (Most Recent):  Temp: 97.3 °F (36.3 °C) (04/27/19 1709)  Pulse: 106 (04/27/19 2004)  Resp: (!) 25 (04/27/19 2004)  BP: (!) 161/72 (04/27/19 2004)  SpO2: (!) 94 % (04/27/19 2004) Vital Signs (24h Range):  Temp:  [96.2 °F (35.7 °C)-99.3 °F (37.4 °C)] 97.3 °F (36.3 °C)  Pulse:  [] 106  Resp:  [18-36] 25  SpO2:  [94 %-100 %] 94 %  BP: (133-161)/(61-75) 161/72     Weight: 72.6 kg (160 lb)  Body mass index is 26.63 kg/m².    Physical Exam   Constitutional: She appears well-developed. She appears lethargic.   HENT:   Head: Normocephalic.   Eyes: Conjunctivae are normal. Right eye exhibits no discharge. Left eye exhibits no discharge.   Neck: Normal range of motion. Neck supple.   Cardiovascular: Regular rhythm, normal heart sounds and intact distal pulses. Tachycardia present.   Pulses:       Radial pulses are 2+ on the right side, and 2+ on the left side.   Pulmonary/Chest: Effort normal. No respiratory distress. She has decreased breath sounds in the right lower field.   Abdominal: Soft. She exhibits no distension. Bowel sounds are decreased.  There is no tenderness.   Musculoskeletal: Normal range of motion.   Neurological: She appears lethargic. GCS eye subscore is 4. GCS verbal subscore is 5. GCS motor subscore is 6.   Skin: Skin is warm and dry.   Psychiatric: She has a normal mood and affect. Her speech is normal and behavior is normal.           Significant Labs:   CBC:   Recent Labs   Lab 04/27/19  1459   WBC 11.04   HGB 12.7   HCT 38.4        CMP:   Recent Labs   Lab 04/27/19  1459      K 3.3*   *   CO2 16*   *   BUN 15   CREATININE 1.5*   CALCIUM 9.7   PROT 7.9   ALBUMIN 3.4*   BILITOT 1.0   ALKPHOS 84   AST 53*   ALT 37   ANIONGAP 15   EGFRNONAA 31*       Significant Imaging: I have reviewed all pertinent imaging results/findings within the past 24 hours.    Assessment/Plan:     * Bilateral pulmonary embolism  CTA- Extensive bilateral pulmonary thromboemboli with bowing of the interventricular septum suggestive for right heart strain.    D-Dimer- 18.59    Lovenox 1mg/kg Q24  Oxygen       Hypothyroidism, adult  TSH pending    Patient has been noncompliant with thyroid meds    Mild cognitive impairment  At baseline    Continue Namenda/Aricept      Essential hypertension  Hypertensive currently    Continue amlodipine, patient has not been taking other meds recently          VTE Risk Mitigation (From admission, onward)        Ordered     enoxaparin injection 70 mg  Daily      04/27/19 1905     IP VTE HIGH RISK PATIENT  Once      04/27/19 1943             Devin Vaz NP  Department of Hospital Medicine   [facility]

## 2019-04-28 NOTE — PROGRESS NOTES
Rested comfortably on 2L NC c VSS. Urinary incontinence; purewick in place. Blood glucose WDL. Enoxaparin 70 mg q 24.

## 2019-04-29 PROBLEM — R53.81 DEBILITY: Status: ACTIVE | Noted: 2019-04-29

## 2019-04-29 LAB
ALBUMIN SERPL BCP-MCNC: 2.9 G/DL (ref 3.5–5.2)
ALP SERPL-CCNC: 75 U/L (ref 55–135)
ALT SERPL W/O P-5'-P-CCNC: 39 U/L (ref 10–44)
ANION GAP SERPL CALC-SCNC: 11 MMOL/L (ref 8–16)
AORTIC ROOT ANNULUS: 2.58 CM
AORTIC VALVE CUSP SEPERATION: 1.47 CM
ASCENDING AORTA: 2.49 CM
AST SERPL-CCNC: 34 U/L (ref 10–40)
AV INDEX (PROSTH): 1.19
AV MEAN GRADIENT: 2.82 MMHG
AV PEAK GRADIENT: 6.25 MMHG
AV VALVE AREA: 3.3 CM2
AV VELOCITY RATIO: 0.92
BASOPHILS # BLD AUTO: 0.04 K/UL (ref 0–0.2)
BASOPHILS NFR BLD: 0.5 % (ref 0–1.9)
BILIRUB SERPL-MCNC: 1 MG/DL (ref 0.1–1)
BSA FOR ECHO PROCEDURE: 1.75 M2
BUN SERPL-MCNC: 13 MG/DL (ref 8–23)
CALCIUM SERPL-MCNC: 9.1 MG/DL (ref 8.7–10.5)
CHLORIDE SERPL-SCNC: 111 MMOL/L (ref 95–110)
CO2 SERPL-SCNC: 18 MMOL/L (ref 23–29)
CREAT SERPL-MCNC: 1.3 MG/DL (ref 0.5–1.4)
CV ECHO LV RWT: 0.61 CM
DIFFERENTIAL METHOD: ABNORMAL
DOP CALC AO PEAK VEL: 1.25 M/S
DOP CALC AO VTI: 18.82 CM
DOP CALC LVOT AREA: 2.77 CM2
DOP CALC LVOT DIAMETER: 1.88 CM
DOP CALC LVOT PEAK VEL: 1.15 M/S
DOP CALC LVOT STROKE VOLUME: 62.09 CM3
DOP CALCLVOT PEAK VEL VTI: 22.38 CM
E WAVE DECELERATION TIME: 218.23 MSEC
E/A RATIO: 0.6
ECHO LV POSTERIOR WALL: 1.06 CM (ref 0.6–1.1)
EOSINOPHIL # BLD AUTO: 0.1 K/UL (ref 0–0.5)
EOSINOPHIL NFR BLD: 1.4 % (ref 0–8)
ERYTHROCYTE [DISTWIDTH] IN BLOOD BY AUTOMATED COUNT: 14.4 % (ref 11.5–14.5)
EST. GFR  (AFRICAN AMERICAN): 43 ML/MIN/1.73 M^2
EST. GFR  (NON AFRICAN AMERICAN): 37 ML/MIN/1.73 M^2
FRACTIONAL SHORTENING: 40 % (ref 28–44)
GLUCOSE SERPL-MCNC: 123 MG/DL (ref 70–110)
HCT VFR BLD AUTO: 36.6 % (ref 37–48.5)
HGB BLD-MCNC: 12.6 G/DL (ref 12–16)
INTERVENTRICULAR SEPTUM: 1.13 CM (ref 0.6–1.1)
IVRT: 0.07 MSEC
LA MAJOR: 4.42 CM
LA WIDTH: 2.62 CM
LACTATE SERPL-SCNC: 1.2 MMOL/L (ref 0.5–2.2)
LEFT ATRIUM SIZE: 2.64 CM
LEFT INTERNAL DIMENSION IN SYSTOLE: 2.07 CM (ref 2.1–4)
LEFT VENTRICLE DIASTOLIC VOLUME INDEX: 28.81 ML/M2
LEFT VENTRICLE DIASTOLIC VOLUME: 50 ML
LEFT VENTRICLE MASS INDEX: 67.2 G/M2
LEFT VENTRICLE SYSTOLIC VOLUME INDEX: 8 ML/M2
LEFT VENTRICLE SYSTOLIC VOLUME: 13.86 ML
LEFT VENTRICULAR INTERNAL DIMENSION IN DIASTOLE: 3.47 CM (ref 3.5–6)
LEFT VENTRICULAR MASS: 116.7 G
LV LATERAL E/E' RATIO: 6.88
LYMPHOCYTES # BLD AUTO: 2.4 K/UL (ref 1–4.8)
LYMPHOCYTES NFR BLD: 32.2 % (ref 18–48)
MAGNESIUM SERPL-MCNC: 2.2 MG/DL (ref 1.6–2.6)
MCH RBC QN AUTO: 30.5 PG (ref 27–31)
MCHC RBC AUTO-ENTMCNC: 34.4 G/DL (ref 32–36)
MCV RBC AUTO: 89 FL (ref 82–98)
MONOCYTES # BLD AUTO: 0.4 K/UL (ref 0.3–1)
MONOCYTES NFR BLD: 5.9 % (ref 4–15)
MV PEAK A VEL: 0.91 M/S
MV PEAK E VEL: 0.55 M/S
NEUTROPHILS # BLD AUTO: 4.4 K/UL (ref 1.8–7.7)
NEUTROPHILS NFR BLD: 59.7 % (ref 38–73)
PISA TR MAX VEL: 2.77 M/S
PLATELET # BLD AUTO: 215 K/UL (ref 150–350)
PMV BLD AUTO: 10.1 FL (ref 9.2–12.9)
POCT GLUCOSE: 135 MG/DL (ref 70–110)
POTASSIUM SERPL-SCNC: 3.3 MMOL/L (ref 3.5–5.1)
PROT SERPL-MCNC: 6.9 G/DL (ref 6–8.4)
PV PEAK VELOCITY: 0.68 CM/S
RA MAJOR: 4.29 CM
RA PRESSURE: 3 MMHG
RA WIDTH: 4.02 CM
RBC # BLD AUTO: 4.13 M/UL (ref 4–5.4)
RIGHT VENTRICULAR END-DIASTOLIC DIMENSION: 3.47 CM
RV TISSUE DOPPLER FREE WALL SYSTOLIC VELOCITY 1 (APICAL 4 CHAMBER VIEW): 9.21 M/S
SINUS: 2.7 CM
SODIUM SERPL-SCNC: 140 MMOL/L (ref 136–145)
STJ: 2.38 CM
TDI LATERAL: 0.08
TR MAX PG: 30.69 MMHG
TV REST PULMONARY ARTERY PRESSURE: 34 MMHG
WBC # BLD AUTO: 7.32 K/UL (ref 3.9–12.7)

## 2019-04-29 PROCEDURE — 97162 PT EVAL MOD COMPLEX 30 MIN: CPT

## 2019-04-29 PROCEDURE — 25000003 PHARM REV CODE 250: Performed by: HOSPITALIST

## 2019-04-29 PROCEDURE — 83735 ASSAY OF MAGNESIUM: CPT

## 2019-04-29 PROCEDURE — 99900035 HC TECH TIME PER 15 MIN (STAT)

## 2019-04-29 PROCEDURE — 97530 THERAPEUTIC ACTIVITIES: CPT

## 2019-04-29 PROCEDURE — 99233 SBSQ HOSP IP/OBS HIGH 50: CPT | Mod: ,,, | Performed by: HOSPITALIST

## 2019-04-29 PROCEDURE — 99233 PR SUBSEQUENT HOSPITAL CARE,LEVL III: ICD-10-PCS | Mod: ,,, | Performed by: HOSPITALIST

## 2019-04-29 PROCEDURE — 11000001 HC ACUTE MED/SURG PRIVATE ROOM

## 2019-04-29 PROCEDURE — 83605 ASSAY OF LACTIC ACID: CPT

## 2019-04-29 PROCEDURE — 25000003 PHARM REV CODE 250: Performed by: NURSE PRACTITIONER

## 2019-04-29 PROCEDURE — 94761 N-INVAS EAR/PLS OXIMETRY MLT: CPT

## 2019-04-29 PROCEDURE — 97166 OT EVAL MOD COMPLEX 45 MIN: CPT

## 2019-04-29 PROCEDURE — 80053 COMPREHEN METABOLIC PANEL: CPT

## 2019-04-29 PROCEDURE — 36415 COLL VENOUS BLD VENIPUNCTURE: CPT

## 2019-04-29 PROCEDURE — 97116 GAIT TRAINING THERAPY: CPT

## 2019-04-29 PROCEDURE — 85025 COMPLETE CBC W/AUTO DIFF WBC: CPT

## 2019-04-29 RX ORDER — MEMANTINE HYDROCHLORIDE 5 MG/1
5 TABLET ORAL DAILY
Status: DISCONTINUED | OUTPATIENT
Start: 2019-04-29 | End: 2019-05-02 | Stop reason: HOSPADM

## 2019-04-29 RX ORDER — POTASSIUM CHLORIDE 750 MG/1
30 TABLET, EXTENDED RELEASE ORAL ONCE
Status: COMPLETED | OUTPATIENT
Start: 2019-04-29 | End: 2019-04-29

## 2019-04-29 RX ORDER — BISACODYL 5 MG
5 TABLET, DELAYED RELEASE (ENTERIC COATED) ORAL DAILY PRN
Status: DISCONTINUED | OUTPATIENT
Start: 2019-04-29 | End: 2019-05-02 | Stop reason: HOSPADM

## 2019-04-29 RX ORDER — DOCUSATE SODIUM 50 MG/5ML
100 LIQUID ORAL 2 TIMES DAILY
Status: DISCONTINUED | OUTPATIENT
Start: 2019-04-29 | End: 2019-05-02

## 2019-04-29 RX ORDER — ENOXAPARIN SODIUM 100 MG/ML
1 INJECTION SUBCUTANEOUS
Status: DISCONTINUED | OUTPATIENT
Start: 2019-04-29 | End: 2019-04-29

## 2019-04-29 RX ADMIN — LEVOTHYROXINE SODIUM 50 MCG: 25 TABLET ORAL at 05:04

## 2019-04-29 RX ADMIN — DOCUSATE SODIUM 100 MG: 50 LIQUID ORAL at 09:04

## 2019-04-29 RX ADMIN — POTASSIUM CHLORIDE 30 MEQ: 750 TABLET, EXTENDED RELEASE ORAL at 08:04

## 2019-04-29 RX ADMIN — TIOTROPIUM BROMIDE 18 MCG: 18 CAPSULE ORAL; RESPIRATORY (INHALATION) at 09:04

## 2019-04-29 RX ADMIN — FLUTICASONE FUROATE AND VILANTEROL TRIFENATATE 1 PUFF: 100; 25 POWDER RESPIRATORY (INHALATION) at 09:04

## 2019-04-29 RX ADMIN — APIXABAN 10 MG: 2.5 TABLET, FILM COATED ORAL at 10:04

## 2019-04-29 RX ADMIN — DONEPEZIL HYDROCHLORIDE 10 MG: 5 TABLET, FILM COATED ORAL at 08:04

## 2019-04-29 RX ADMIN — DOCUSATE SODIUM 100 MG: 50 LIQUID ORAL at 08:04

## 2019-04-29 RX ADMIN — PANTOPRAZOLE SODIUM 40 MG: 40 TABLET, DELAYED RELEASE ORAL at 08:04

## 2019-04-29 RX ADMIN — ASPIRIN 81 MG: 81 TABLET, COATED ORAL at 08:04

## 2019-04-29 RX ADMIN — APIXABAN 10 MG: 2.5 TABLET, FILM COATED ORAL at 09:04

## 2019-04-29 RX ADMIN — MEMANTINE HYDROCHLORIDE 5 MG: 5 TABLET ORAL at 08:04

## 2019-04-29 RX ADMIN — AMLODIPINE BESYLATE 10 MG: 5 TABLET ORAL at 08:04

## 2019-04-29 NOTE — PT/OT/SLP EVAL
Physical Therapy Evaluation and Treatment    Patient Name:  Debbie Ding   MRN:  [id number]    Recommendations:     Discharge Recommendations:  nursing facility, skilled(vs home with 24/7 physical assistance with HH PT/OT)   Discharge Equipment Recommendations: (TBD at next level of care; if d/c to home will require W/C and RW)   Barriers to discharge: Inaccessible home and Decreased caregiver support    Assessment:     Debbie Ding is a 86 y.o. female admitted with a medical diagnosis of Bilateral pulmonary embolism. Pmhx significant for HTN, asthma, DM, fibromyalgia, spinal stenosis, and OA multiple joints. She presents with the following impairments/functional limitations:  weakness, impaired endurance, impaired self care skills, impaired functional mobilty, gait instability, impaired balance, impaired cognition, decreased lower extremity function. Due to the above impairments, the patient is limited in her ability to modified independently ambulate, climb stairs, and participate in her chosen activities.    Patient evaluated by PT and goals established. Patient required increased assistance for all functional mobility, especially notable with ambulation. Pt ambulated 1x8 ft and 1x2 ft with RW and min-modA and had significantly decreased gait and endurance, requiring extended seated rest breaks after each gait bout. SpO2 remained 93-96% during session with HR increasing from 89 BPM at rest to 96 BPM during ambulation. Patient required increased assistance to stand from chairs but pt's family reports she has a lift chair at home. PT will continue to follow and progress as tolerated.     Recommendation for d/c to SNF to maximize pt's independence with functional mobility and reduce risk for falls. If patient d/c to home, she will require W/C and RW and 24/7 assistance with HH PT/OT.    Rehab Prognosis: Good; patient would benefit from acute skilled PT services to address these deficits and reach maximum level  of function.    Recent Surgery: * No surgery found *      Plan:     During this hospitalization, patient to be seen 6 x/week to address the identified rehab impairments via gait training, therapeutic activities, therapeutic exercises, neuromuscular re-education and progress toward the following goals:    · Plan of Care Expires:  19    Subjective     Chief Complaint: Patient complains of feeling tired after limited ambulation  Patient/Family Comments/goals: To get stronger and go home  Pain/Comfort:  · Pain Rating 1: 0/10  · Pain Rating Post-Intervention 1: 0/10    Patients cultural, spiritual, Advent conflicts given the current situation: yes(Zoroastrian)    Living Environment:  Patient resides on the second floor of a house above her daughter. There are 11 steps with R HR to the second floor. She uses a lift chair/recliner that she sleeps in.    Prior to admission, patients level of function was modified independent for ambulation with furniture cruising in the house and requires assistance for sit<>stand (had been attending OP PT to become (I) with sit<>stand). She ascends/descends her stairs sideways for (B) UE support on HR. She relies on her family for assistance with IADLs. Equipment used at home: none.  DME owned (not currently used): single point cane.  Upon discharge, patient will have assistance from her family PRN.    Objective:     Communicated with DONALD Marroquin prior to session.  Patient found HOB elevated with telemetry, peripheral IV, PureWick  upon PT entry to room. Patient agreeable to evaluation.    General Precautions: Standard, fall   Orthopedic Precautions:N/A   Braces: N/A     Patient donned yellow socks and gait belt for OOB mobility.    Exams:  · Cognition:   · Patient is oriented to person, , place, general date.  · Pt follows approximately 80% of one step commands.    · Mood: Pleasant and cooperative.  · Musculoskeletal:  · Posture:    · Rounded shoulders  · Forward  head  · LE ROM/Strength:   · R ROM: WFL  · L ROM: WFL  · R Strength:   · Hip flexion: 3+/5  · Knee extension: 4/5  · Dorsiflexion: 4/5   · L Strength:   · Hip flexion: 3+/5  · Knee extension: 4/5  · Dorsiflexion: 4/5   · Neuromuscular:  · Sensation: Intact to light touch bilateral LEs.  · Tone/Reflexes: No impairments identified with functional mobility. No formal testing performed.   · Coordination:  · Heel to shin: Impaired  · Toe tapping: Impaired  · Balance:   · Sitting: Fair  · Standing: Poor, requires BUE support with hand held assist or on RW  · Visual-vestibular: No impairments identified with functional mobility. No formal testing performed.  · Integument: Visible skin intact  · Cardiopulmonary:  · Vital signs: On room air  · Pre (sitting EOB): HR 89 SpO2 95%  · During (gait): HR up to 96 SpO2 93-96%  · Edema: None noted      Functional Mobility:  · Bed Mobility:     · Supine to Sit: contact guard assistance  · Sit to Supine: minimum assistance  · Transfers:     · Sit to Stand:  moderate assistance from bed surface and maximal assistance from low chair with armrests with rolling walker  · Gait: 1x8 and 1x2 ft with RW and min-modA.   · Pt with sig decreased stride length bilaterally, increased double limb support time, and maintians forward flexion of trunk.   · Pt reports becoming exhausted after 8 ft and unable to return to bed, requiring chair brought to her to sit and have extended rest break.  · Pt and family reports gait sig different from PTA      Therapeutic Activities and Exercises:   Supine<>sit, sit<>stand, gait    PT educated patient and family re: PT plan of care, role of PT, safety with OOB mobility, use of RW, transfer technique, gait, safety with OOB mobility.  Pt and family verbalize understanding.      AM-PAC 6 CLICK MOBILITY  Total Score:14     Patient left HOB elevated with all lines intact, call button in reach, DONALD Marroquin notified and family present. DONALD Marroquin informed of pt unable to  ambulate to bathroom at this time. Bed alarm not on 2/2 foot of bed not working.    GOALS:   Multidisciplinary Problems     Physical Therapy Goals        Problem: Physical Therapy Goal    Goal Priority Disciplines Outcome Goal Variances Interventions   Physical Therapy Goal     PT, PT/OT      Description:  Goals to be met by: 19    Patient will increase functional independence with mobility by performin. Sit<>stand with CGA with RW from bed surface.  2. Gait x 50 feet with RW with supervision.  3. Ascend/descend 11 step(s) with least restrictive assistive device and R HR (sideways).   4. Supine<>sit with supervision without use of hospital bed features.                      History:     Past Medical History:   Diagnosis Date    Arthritis     Asthma     Bilateral pulmonary embolism 2019    Cataract     Depression     Diabetes mellitus     Fibromyalgia 2012    Fibromyalgia     GERD (gastroesophageal reflux disease)     Hypertension 2012    Thoracic or lumbosacral neuritis or radiculitis, unspecified     Thyroid disease     Ulcer        Past Surgical History:   Procedure Laterality Date    CATARACT EXTRACTION Bilateral     AILEEN-TRANSFORAMINAL Right 2014    [facility]formed [facility]    ESOPHAGOGASTRODUODENOSCOPY (EGD) W/Endomicroscopy N/A 2015    [facility] Eddie [facility] ENDO    EYE SURGERY      FOOT NEUROMA SURGERY  1985    HYSTERECTOMY         Time Tracking:     PT Received On: 19  PT Start Time: 1638     PT Stop Time: 1718  PT Total Time (min): 40 min     Co-evaluation with OT.    Billable Minutes: Evaluation 20 and Gait Training 10      Maia Souza, PT  2019

## 2019-04-29 NOTE — SUBJECTIVE & OBJECTIVE
Interval History: No acute events overnight.  States she is breathing more easily, but still not at baseline and has not ambulated.  Chest pain improved.    Review of Systems   Constitutional: Negative for activity change, chills, diaphoresis and fatigue.   HENT: Negative for congestion, drooling and hearing loss.    Eyes: Negative for discharge.   Respiratory: Positive for shortness of breath. Negative for apnea, cough, chest tightness and wheezing.    Cardiovascular: Negative for palpitations and leg swelling.   Gastrointestinal: Negative for abdominal distention, abdominal pain, constipation and diarrhea.   Musculoskeletal: Negative for arthralgias and gait problem.   Skin: Negative for rash.   Neurological: Negative for seizures, light-headedness and numbness.   Hematological: Negative for adenopathy.   Psychiatric/Behavioral: Negative for agitation and behavioral problems.     Objective:     Vital Signs (Most Recent):  Temp: 96.7 °F (35.9 °C) (04/29/19 1053)  Pulse: 88 (04/29/19 1053)  Resp: 20 (04/29/19 1053)  BP: 137/68 (04/29/19 1053)  SpO2: 97 % (04/29/19 1053) Vital Signs (24h Range):  Temp:  [96.7 °F (35.9 °C)-99.1 °F (37.3 °C)] 96.7 °F (35.9 °C)  Pulse:  [78-88] 88  Resp:  [13-23] 20  SpO2:  [92 %-100 %] 97 %  BP: (119-161)/(56-97) 137/68     Weight: 67.1 kg (147 lb 14.9 oz)  Body mass index is 24.62 kg/m².    Intake/Output Summary (Last 24 hours) at 4/29/2019 1154  Last data filed at 4/29/2019 1030  Gross per 24 hour   Intake 980 ml   Output 1050 ml   Net -70 ml      Physical Exam   Constitutional: She is oriented to person, place, and time. She appears well-developed and well-nourished.   HENT:   Head: Normocephalic and atraumatic.   Eyes: Pupils are equal, round, and reactive to light. EOM are normal.   Neck: Normal range of motion. Neck supple.   Cardiovascular: Normal rate, regular rhythm and normal heart sounds.   Pulmonary/Chest: Effort normal and breath sounds normal. No respiratory distress.    Abdominal: Soft. Bowel sounds are normal. She exhibits no distension. There is no tenderness.   Musculoskeletal: Normal range of motion. She exhibits no edema.   Diameter of legs L slightly greater than R.  Right medial upper leg has some mild fullness to it not seen in left leg.  No tenderness or erythema.   Neurological: She is oriented to person, place, and time. No cranial nerve deficit. Coordination normal.   Skin: Skin is warm and dry.   Psychiatric: She has a normal mood and affect. Her behavior is normal.   Vitals reviewed.      Significant Labs: All pertinent labs within the past 24 hours have been reviewed.    Significant Imaging: I have reviewed and interpreted all pertinent imaging results/findings within the past 24 hours.

## 2019-04-29 NOTE — ASSESSMENT & PLAN NOTE
-Mrs. Ding is admitted to inpatient status in our ICU with submassive pulmonary embolism  -CTA shows extensive bilateral pulmonary thromboemboli with bowing of the interventricular septum suggestive for right heart strain.  -Suspect due to left leg dvt from recent trauma.  Will order doppler us of ble to evaluate for this.  -Troponin negative.  Pulmonary embolism severity index is 116.  -Still sob but not in distress and this is improving.    -No absolute contraindications to interventional treatments, but given age and inability to treat potential bleeding with blood products due to her [employer], we will attempt medical management only at this time.  She is improved today so will transition to eliquis.  She has not ambulated yet.  If severe sob with ambulation will still consider evaluation for interventional treatment.  -Await echo to further assess right heart strain and right pressures  -Step down to the floor today.

## 2019-04-29 NOTE — PLAN OF CARE
Problem: Physical Therapy Goal  Goal: Physical Therapy Goal  Goals to be met by: 19    Patient will increase functional independence with mobility by performin. Sit<>stand with CGA with RW from bed surface.  2. Gait x 50 feet with RW with supervision.  3. Ascend/descend 11 step(s) with least restrictive assistive device and R HR (sideways).   4. Supine<>sit with supervision without use of hospital bed features.    Patient evaluated by PT and goals established. Patient required increased assistance for all functional mobility, especially notable with ambulation. Pt ambulated 1x8 ft and 1x2 ft with RW and min-modA and had significantly decreased gait and endurance, requiring extended seated rest breaks after each gait bout. SpO2 remained 93-96% during session with HR increasing from 89 BPM at rest to 96 BPM during ambulation. For sit<>stand, pt required modA with RW from bed surface and maxA from low chair surface. PT will continue to follow and progress as tolerated. Recommendation for d/c to SNF to maximize pt's independence with functional mobility and reduce risk for falls. Please see progress note for detailed plan of care and recommendations.

## 2019-04-29 NOTE — PROGRESS NOTES
[facility] Hospital Medicine  Progress Note    Patient Name: Debbie Ding  MRN: [id number]  Patient Class: IP- Inpatient   Admission Date: 4/27/2019  Length of Stay: 2 days  Attending Physician: Ismael Hussein MD  Primary Care Provider: Dayo Woo MD        Subjective:     Principal Problem:Bilateral pulmonary embolism    HPI:  The patient is a 86 y.o. female, with history of HTN and asthma, who presents to the ED via EMS with a sudden onset of shortness of breath that began today. Per EMS, patient had increased work of breathing and expiratory wheezes. Patient did not complain of fever, chills, congestion, nausea, vomiting, or diarrhea. Patient's CBG was 257. Patient initial room air was in the mid to upper 80's.  On workup, d dimer was found elevated and CT showed multiple embolis        Hospital Course:  No notes on file    Interval History: No acute events overnight.  States she is breathing more easily, but still not at baseline and has not ambulated.  Chest pain improved.    Review of Systems   Constitutional: Negative for activity change, chills, diaphoresis and fatigue.   HENT: Negative for congestion, drooling and hearing loss.    Eyes: Negative for discharge.   Respiratory: Positive for shortness of breath. Negative for apnea, cough, chest tightness and wheezing.    Cardiovascular: Negative for palpitations and leg swelling.   Gastrointestinal: Negative for abdominal distention, abdominal pain, constipation and diarrhea.   Musculoskeletal: Negative for arthralgias and gait problem.   Skin: Negative for rash.   Neurological: Negative for seizures, light-headedness and numbness.   Hematological: Negative for adenopathy.   Psychiatric/Behavioral: Negative for agitation and behavioral problems.     Objective:     Vital Signs (Most Recent):  Temp: 96.7 °F (35.9 °C) (04/29/19 1053)  Pulse: 88 (04/29/19 1053)  Resp: 20 (04/29/19 1053)  BP: 137/68 (04/29/19 1053)  SpO2: 97 %  (04/29/19 1053) Vital Signs (24h Range):  Temp:  [96.7 °F (35.9 °C)-99.1 °F (37.3 °C)] 96.7 °F (35.9 °C)  Pulse:  [78-88] 88  Resp:  [13-23] 20  SpO2:  [92 %-100 %] 97 %  BP: (119-161)/(56-97) 137/68     Weight: 67.1 kg (147 lb 14.9 oz)  Body mass index is 24.62 kg/m².    Intake/Output Summary (Last 24 hours) at 4/29/2019 1154  Last data filed at 4/29/2019 1030  Gross per 24 hour   Intake 980 ml   Output 1050 ml   Net -70 ml      Physical Exam   Constitutional: She is oriented to person, place, and time. She appears well-developed and well-nourished.   HENT:   Head: Normocephalic and atraumatic.   Eyes: Pupils are equal, round, and reactive to light. EOM are normal.   Neck: Normal range of motion. Neck supple.   Cardiovascular: Normal rate, regular rhythm and normal heart sounds.   Pulmonary/Chest: Effort normal and breath sounds normal. No respiratory distress.   Abdominal: Soft. Bowel sounds are normal. She exhibits no distension. There is no tenderness.   Musculoskeletal: Normal range of motion. She exhibits no edema.   Diameter of legs L slightly greater than R.  Right medial upper leg has some mild fullness to it not seen in left leg.  No tenderness or erythema.   Neurological: She is oriented to person, place, and time. No cranial nerve deficit. Coordination normal.   Skin: Skin is warm and dry.   Psychiatric: She has a normal mood and affect. Her behavior is normal.   Vitals reviewed.      Significant Labs: All pertinent labs within the past 24 hours have been reviewed.    Significant Imaging: I have reviewed and interpreted all pertinent imaging results/findings within the past 24 hours.    Assessment/Plan:      * Bilateral pulmonary embolism  -Mrs. Ding is admitted to inpatient status in our ICU with submassive pulmonary embolism  -CTA shows extensive bilateral pulmonary thromboemboli with bowing of the interventricular septum suggestive for right heart strain.  -Suspect due to left leg dvt from recent  trauma.  Will order doppler us of ble to evaluate for this.  -Troponin negative.  Pulmonary embolism severity index is 116.  -Still sob but not in distress and this is improving.    -No absolute contraindications to interventional treatments, but given age and inability to treat potential bleeding with blood products due to her [employer], we will attempt medical management only at this time.  She is improved today so will transition to eliquis.  She has not ambulated yet.  If severe sob with ambulation will still consider evaluation for interventional treatment.  -Await echo to further assess right heart strain and right pressures  -Step down to the floor today.    Debility  -Consult PT and OT      Hypokalemia  -Replace K today  -Repeat BMP and Mag in AM.      Lactic acidosis  -Likely due to PE.    -No fever or leukocytosis.  Doubt infection  -Lactic acid normal now.      Hypothyroidism, adult  -TSH ok  -Continue synthroid.    Mild cognitive impairment  -She is oriented x3  -At baseline.    -Continue Namenda/Aricept      Essential hypertension  -Stable.  -Continue amlodipine, patient has not been taking other meds recently          VTE Risk Mitigation (From admission, onward)        Ordered     apixaban tablet 5 mg  2 times daily      04/29/19 0935     apixaban tablet 10 mg  2 times daily      04/29/19 0935     IP VTE HIGH RISK PATIENT  Once      04/27/19 1943              Ismael Hussein MD  Department of Hospital Medicine   [facility]

## 2019-04-29 NOTE — PLAN OF CARE
Problem: Adult Inpatient Plan of Care  Goal: Plan of Care Review  Outcome: Ongoing (interventions implemented as appropriate)  No changes in respiratory status, will continue to monitor.

## 2019-04-30 ENCOUNTER — EXTERNAL CHRONIC CARE MANAGEMENT (OUTPATIENT)
Dept: PRIMARY CARE CLINIC | Facility: CLINIC | Age: 84
End: 2019-04-30
Payer: MEDICARE

## 2019-04-30 LAB
ALBUMIN SERPL BCP-MCNC: 2.9 G/DL (ref 3.5–5.2)
ALP SERPL-CCNC: 68 U/L (ref 55–135)
ALT SERPL W/O P-5'-P-CCNC: 30 U/L (ref 10–44)
ANION GAP SERPL CALC-SCNC: 7 MMOL/L (ref 8–16)
AST SERPL-CCNC: 26 U/L (ref 10–40)
BASOPHILS # BLD AUTO: 0.01 K/UL (ref 0–0.2)
BASOPHILS NFR BLD: 0.2 % (ref 0–1.9)
BILIRUB SERPL-MCNC: 1.1 MG/DL (ref 0.1–1)
BUN SERPL-MCNC: 18 MG/DL (ref 8–23)
CALCIUM SERPL-MCNC: 8.9 MG/DL (ref 8.7–10.5)
CHLORIDE SERPL-SCNC: 111 MMOL/L (ref 95–110)
CO2 SERPL-SCNC: 21 MMOL/L (ref 23–29)
CREAT SERPL-MCNC: 1.4 MG/DL (ref 0.5–1.4)
DIFFERENTIAL METHOD: ABNORMAL
EOSINOPHIL # BLD AUTO: 0.1 K/UL (ref 0–0.5)
EOSINOPHIL NFR BLD: 1.7 % (ref 0–8)
ERYTHROCYTE [DISTWIDTH] IN BLOOD BY AUTOMATED COUNT: 14.3 % (ref 11.5–14.5)
EST. GFR  (AFRICAN AMERICAN): 39 ML/MIN/1.73 M^2
EST. GFR  (NON AFRICAN AMERICAN): 34 ML/MIN/1.73 M^2
GLUCOSE SERPL-MCNC: 113 MG/DL (ref 70–110)
HCT VFR BLD AUTO: 36.5 % (ref 37–48.5)
HGB BLD-MCNC: 12.3 G/DL (ref 12–16)
LYMPHOCYTES # BLD AUTO: 2 K/UL (ref 1–4.8)
LYMPHOCYTES NFR BLD: 34.6 % (ref 18–48)
MAGNESIUM SERPL-MCNC: 2 MG/DL (ref 1.6–2.6)
MCH RBC QN AUTO: 29.6 PG (ref 27–31)
MCHC RBC AUTO-ENTMCNC: 33.7 G/DL (ref 32–36)
MCV RBC AUTO: 88 FL (ref 82–98)
MONOCYTES # BLD AUTO: 0.4 K/UL (ref 0.3–1)
MONOCYTES NFR BLD: 6.8 % (ref 4–15)
NEUTROPHILS # BLD AUTO: 3.3 K/UL (ref 1.8–7.7)
NEUTROPHILS NFR BLD: 56.5 % (ref 38–73)
PLATELET # BLD AUTO: 214 K/UL (ref 150–350)
PMV BLD AUTO: 10.1 FL (ref 9.2–12.9)
POTASSIUM SERPL-SCNC: 3.3 MMOL/L (ref 3.5–5.1)
PROT SERPL-MCNC: 6.5 G/DL (ref 6–8.4)
RBC # BLD AUTO: 4.15 M/UL (ref 4–5.4)
SODIUM SERPL-SCNC: 139 MMOL/L (ref 136–145)
WBC # BLD AUTO: 5.86 K/UL (ref 3.9–12.7)

## 2019-04-30 PROCEDURE — 63600175 PHARM REV CODE 636 W HCPCS: Performed by: HOSPITALIST

## 2019-04-30 PROCEDURE — 99490 CHRNC CARE MGMT STAFF 1ST 20: CPT | Mod: S$PBB,,, | Performed by: INTERNAL MEDICINE

## 2019-04-30 PROCEDURE — 99900035 HC TECH TIME PER 15 MIN (STAT)

## 2019-04-30 PROCEDURE — 99490 PR CHRONIC CARE MGMT, 1ST 20 MIN: ICD-10-PCS | Mod: S$PBB,,, | Performed by: INTERNAL MEDICINE

## 2019-04-30 PROCEDURE — 85025 COMPLETE CBC W/AUTO DIFF WBC: CPT

## 2019-04-30 PROCEDURE — 99232 PR SUBSEQUENT HOSPITAL CARE,LEVL II: ICD-10-PCS | Mod: ,,, | Performed by: HOSPITALIST

## 2019-04-30 PROCEDURE — 25000003 PHARM REV CODE 250: Performed by: HOSPITALIST

## 2019-04-30 PROCEDURE — 99490 CHRNC CARE MGMT STAFF 1ST 20: CPT | Mod: PBBFAC | Performed by: INTERNAL MEDICINE

## 2019-04-30 PROCEDURE — 11000001 HC ACUTE MED/SURG PRIVATE ROOM

## 2019-04-30 PROCEDURE — 80053 COMPREHEN METABOLIC PANEL: CPT

## 2019-04-30 PROCEDURE — 86580 TB INTRADERMAL TEST: CPT | Performed by: HOSPITALIST

## 2019-04-30 PROCEDURE — 94640 AIRWAY INHALATION TREATMENT: CPT

## 2019-04-30 PROCEDURE — 83735 ASSAY OF MAGNESIUM: CPT

## 2019-04-30 PROCEDURE — 36415 COLL VENOUS BLD VENIPUNCTURE: CPT

## 2019-04-30 PROCEDURE — 99232 SBSQ HOSP IP/OBS MODERATE 35: CPT | Mod: ,,, | Performed by: HOSPITALIST

## 2019-04-30 PROCEDURE — 94761 N-INVAS EAR/PLS OXIMETRY MLT: CPT

## 2019-04-30 RX ORDER — POTASSIUM CHLORIDE 750 MG/1
30 TABLET, EXTENDED RELEASE ORAL ONCE
Status: COMPLETED | OUTPATIENT
Start: 2019-04-30 | End: 2019-04-30

## 2019-04-30 RX ORDER — AMLODIPINE BESYLATE 10 MG/1
10 TABLET ORAL DAILY
Qty: 30 TABLET | Refills: 1
Start: 2019-04-30 | End: 2019-06-18

## 2019-04-30 RX ORDER — IPRATROPIUM BROMIDE AND ALBUTEROL SULFATE 2.5; .5 MG/3ML; MG/3ML
3 SOLUTION RESPIRATORY (INHALATION) EVERY 4 HOURS PRN
Qty: 1 BOX | Refills: 0 | Status: SHIPPED | OUTPATIENT
Start: 2019-04-30 | End: 2020-01-20 | Stop reason: SDUPTHER

## 2019-04-30 RX ADMIN — TIOTROPIUM BROMIDE 18 MCG: 18 CAPSULE ORAL; RESPIRATORY (INHALATION) at 08:04

## 2019-04-30 RX ADMIN — APIXABAN 10 MG: 2.5 TABLET, FILM COATED ORAL at 09:04

## 2019-04-30 RX ADMIN — DOCUSATE SODIUM 100 MG: 50 LIQUID ORAL at 09:04

## 2019-04-30 RX ADMIN — FLUTICASONE FUROATE AND VILANTEROL TRIFENATATE 1 PUFF: 100; 25 POWDER RESPIRATORY (INHALATION) at 08:04

## 2019-04-30 RX ADMIN — POTASSIUM CHLORIDE 30 MEQ: 750 TABLET, EXTENDED RELEASE ORAL at 09:04

## 2019-04-30 RX ADMIN — MEMANTINE HYDROCHLORIDE 5 MG: 5 TABLET ORAL at 09:04

## 2019-04-30 RX ADMIN — ASPIRIN 81 MG: 81 TABLET, COATED ORAL at 09:04

## 2019-04-30 RX ADMIN — LEVOTHYROXINE SODIUM 50 MCG: 25 TABLET ORAL at 06:04

## 2019-04-30 RX ADMIN — DONEPEZIL HYDROCHLORIDE 10 MG: 5 TABLET, FILM COATED ORAL at 09:04

## 2019-04-30 RX ADMIN — PANTOPRAZOLE SODIUM 40 MG: 40 TABLET, DELAYED RELEASE ORAL at 09:04

## 2019-04-30 RX ADMIN — TUBERCULIN PURIFIED PROTEIN DERIVATIVE 5 UNITS: 5 INJECTION, SOLUTION INTRADERMAL at 01:04

## 2019-04-30 RX ADMIN — AMLODIPINE BESYLATE 10 MG: 5 TABLET ORAL at 09:04

## 2019-04-30 NOTE — PT/OT/SLP EVAL
Occupational Therapy   Evaluation and Treatment    Name: Debbie Ding  MRN: [id number]  Admitting Diagnosis:  Bilateral pulmonary embolism      Recommendations:     Discharge Recommendations: nursing facility, skilled, other (see comments)(versus  OT/PT with 24/7 supervision/assist from family)  Discharge Equipment Recommendations:  other (see comments)(defer to next level of care)  Barriers to discharge:  Other (Comment), Decreased caregiver support(current level of function; daughter and granddaughter work)    Assessment:   Initial OT eval/treat complete.  O2 sats remained 93% and greater though requiring prolonged seated rest break after short household ambulation task with deep breathing due to decreased endurance.  BUE strength grossly 3+/5 with generalized weakness noted and light touch and FM coordination intact.  MIN A for bed mobility and short household ambulation; able to don/doff socks seated EOB with SBA via cross leg tech.  Has hand held shower head and lift chair; defer additional DME needs to next level of care.  Recommend post acute therapy in SNF setting.  To benefit from continued acute care OT services to increase independence in self-care/functional transfers.  OT to follow.      Debbie Ding is a 86 y.o. female with a medical diagnosis of Bilateral pulmonary embolism.  She presents with below deficits decreasing independence in self-care/functional transfers. Performance deficits affecting function: weakness, impaired endurance, impaired self care skills, gait instability, impaired functional mobilty, impaired balance, impaired cognition, decreased lower extremity function, decreased upper extremity function, decreased safety awareness.      Rehab Prognosis: Good; patient would benefit from acute skilled OT services to address these deficits and reach maximum level of function.       Plan:     Patient to be seen 5 x/week to address the above listed problems via therapeutic activities,  self-care/home management, therapeutic exercises  · Plan of Care Expires: 19  · Plan of Care Reviewed with: patient, grandchild(nataliia), daughter, family    Subjective     Chief Complaint: No c/o pain.    Patient/Family Comments/goals: Daughter and granddaughter reports that Pt. Typically does not know  and reports that they both work during the day time.      Occupational Profile:  Lives alone in [facility] on 2nd level and daughter living downstairs; has 11 LUIGI home.  Walk-in shower with 2-inch threshold and handheld shower head and jacuzzi tub with walk-in being preferred use; standard toilet.  Pt. And family reports Pt. Being incontinent wearing adult brief.  Independent with ADL, functional transfers, household ambulation.  Daughters grocery shop and cook breakfast and dinner while granddaughter typically brings lunch.  Daughter also brings Pt. To MD appts.    Equipment Used at Home:  other (see comments)(handheld shower head; also has lift chair)  Assistance upon Discharge: Pt. Has family to assist when not at work; both daughters and granddaughter work full-time during the day.      Pain/Comfort:  · Pain Rating 1: 0/10  · Pain Rating Post-Intervention 1: 0/10    Patients cultural, spiritual, Scientology conflicts given the current situation: yes(Restorationism)    Objective:     Communicated with: Nursing prior to session.  Patient found HOB elevated with telemetry, peripheral IV, PureWick upon OT entry to room.    General Precautions: Standard, fall   Orthopedic Precautions:N/A   Braces: N/A     Occupational Performance:    Bed Mobility:    · Patient completed Supine to Sit with contact guard assistance  · Patient completed Sit to Supine with minimum assistance with LLE    Functional Mobility/Transfers:  · Patient completed Sit <> Stand Transfer with minimum assistance  with  rolling walker and verbal cues for safe hand placement  · Functional Mobility: Ambulated very short household distance with RW and  MIN A bed<>bathroom entry with seated rest break X 2 and verbal cues for deep breathing     Activities of Daily Living:  · Lower Body Dressing: stand by assistance to don/doff socks seated EOB via cross leg tech    Cognitive/Visual Perceptual:  Cognitive/Psychosocial Skills:  -       Oriented to: Person and knows  and that the month is April; unable to state month, year, or location though family states this is typical   -       Follows Commands/attention:Follows one-step commands and Follows two-step commands though requiring increased time  -       Communication: weak voice though able to make basic needs known and appropriately answers questions  -       Memory: Deficits noted  -       Safety awareness/insight to disability: impaired   -       Mood/Affect/Coping skills/emotional control: Cooperative  Visual/Perceptual:  -grossly intact    Physical Exam:  Postural examination/scapula alignment: -       Rounded shoulders  -       Forward head  -       Posterior pelvic tilt  Skin integrity: Visible skin intact  Edema:  None noted to BUE  Sensation: -       Intact  light/touch and denies numbness/tingling  Dominant hand: -       Right  Upper Extremity Range of Motion:  -       Right Upper Extremity: WFL  -       Left Upper Extremity: WFL  Upper Extremity Strength: -       Right Upper Extremity: 3+/5 gross   -       Left Upper Extremity: 3+/5 gross    Strength: -       Right Upper Extremity: WFL  -       Left Upper Extremity: WFL  Fine Motor Coordination: -       Intact  Left hand thumb/finger opposition skills and Right hand thumb/finger opposition skills    AMPAC 6 Click ADL:  AMPAC Total Score: 18    Treatment & Education:  Educated on role of OT, POC, functional transfer/ADL safety, and energy conservation-pursed lip breathing/self-pacing.  Education:    Patient left HOB elevated with all lines intact, call button in reach, nursing notified and daughter and granddaughter present    GOALS:    Multidisciplinary Problems     Occupational Therapy Goals        Problem: Occupational Therapy Goal    Goal Priority Disciplines Outcome Interventions   Occupational Therapy Goal     OT, PT/OT Ongoing (interventions implemented as appropriate)    Description:  Goals to be met by: 5/13/19     Patient will increase functional independence with ADLs by performing:    UE Dressing with Stand-by Assistance.  LE Dressing with Supervision.  Grooming while standing at sink with Stand-by Assistance and seated rest breaks as needed.  Toileting from bedside commode with Stand-by Assistance for hygiene and clothing management.   Toilet transfer to bedside commode with Stand-by Assistance.                      History:     Past Medical History:   Diagnosis Date    Arthritis     Asthma     Bilateral pulmonary embolism 4/27/2019    Cataract     Depression     Diabetes mellitus     Fibromyalgia 7/2/2012    Fibromyalgia     GERD (gastroesophageal reflux disease)     Hypertension 7/2/2012    Thoracic or lumbosacral neuritis or radiculitis, unspecified     Thyroid disease     Ulcer        Past Surgical History:   Procedure Laterality Date    CATARACT EXTRACTION Bilateral     AILEEN-TRANSFORAMINAL Right 5/13/2014    [facility]    ESOPHAGOGASTRODUODENOSCOPY (EGD) W/Endomicroscopy N/A 9/21/2015    [facility] Eddie [facility] ENDO    EYE SURGERY      FOOT NEUROMA SURGERY  1985    HYSTERECTOMY         Time Tracking:     OT Date of Treatment: 04/29/19  OT Start Time: 1637  OT Stop Time: 1718  OT Total Time (min): 41 min (overlap with PT)    Billable Minutes:Evaluation 15  Therapeutic Activity 10    Ligia Regan OT  4/29/2019

## 2019-04-30 NOTE — PLAN OF CARE
Problem: Adult Inpatient Plan of Care  Goal: Plan of Care Review  Outcome: Ongoing (interventions implemented as appropriate)  Pt had uneventful night. Denies any c/o of shortness of breath.  Safety maintained.  Plan of care reviewed with patient and verbalizes understanding.  Saline lock benign.. Pure wick in place.  telemonitor on with sinus rhythm.  poc continues.

## 2019-04-30 NOTE — NURSING
Patient complaining of right side, mid/lower abdominal pain that started yesterday but had gotten worse today.  Patient denies nausea, cramping, or constipation.  Dr. Hussein notified, X-ray ordered.

## 2019-04-30 NOTE — ASSESSMENT & PLAN NOTE
-Mrs. Ding is admitted to inpatient status in our ICU with submassive pulmonary embolism  -CTA shows extensive bilateral pulmonary thromboemboli with bowing of the interventricular septum suggestive for right heart strain.  -Suspect due to left leg dvt from recent trauma.  Will order doppler us of ble to evaluate for this.  -Troponin negative.  Pulmonary embolism severity index is 116.  -Echo shows normal EF and normal right heart pressures.  -No absolute contraindications to interventional treatments, but given age and inability to treat potential bleeding with blood products due to her Mormon, we pursued medical management as a first option.  She has done well with this, so do not believe we will need to pursue mechanical intervention.  -Transitioned to eliquis and to the floor on 4/29/19

## 2019-04-30 NOTE — PLAN OF CARE
Problem: Adult Inpatient Plan of Care  Goal: Plan of Care Review  Outcome: Ongoing (interventions implemented as appropriate)  Plan of care reviewed with patient.  VSS.  Purewick in place.  Frequent weight shifts encouraged.  Purposeful rounding completed, call bell within reach, no needs at this time.  Waiting on x-ray for complaints af abdominal pain.  Will continue to monitor.

## 2019-04-30 NOTE — PLAN OF CARE
Problem: Occupational Therapy Goal  Goal: Occupational Therapy Goal  Goals to be met by: 5/13/19     Patient will increase functional independence with ADLs by performing:    UE Dressing with Stand-by Assistance.  LE Dressing with Supervision.  Grooming while standing at sink with Stand-by Assistance and seated rest breaks as needed.  Toileting from bedside commode with Stand-by Assistance for hygiene and clothing management.   Toilet transfer to bedside commode with Stand-by Assistance.    Outcome: Ongoing (interventions implemented as appropriate)  Initial OT eval/treat complete.  O2 sats remained 93% and greater though requiring prolonged seated rest break after short household ambulation task with deep breathing due to decreased endurance.  BUE strength grossly 3+/5 with generalized weakness noted and light touch and FM coordination intact.  MIN A for bed mobility and short household ambulation; able to don/doff socks seated EOB with SBA via cross leg tech.  Has hand held shower head and lift chair; defer additional DME needs to next level of care.  Recommend post acute therapy in SNF setting.  To benefit from continued acute care OT services to increase independence in self-care/functional transfers.  OT to follow.

## 2019-04-30 NOTE — PLAN OF CARE
[facility]   Department of Hospital Medicine  [address] [place] [id number]  [phone] (phone)  [phone] (fax)      Facility Transfer Orders                        05/02/2019    Debbie Ding    Admit to: SNF    Diagnoses:  Active Hospital Problems    Diagnosis  POA    *Bilateral pulmonary embolism [I26.99]  Yes    Debility [R53.81]  Yes    Lactic acidosis [E87.2]  Yes    Hypokalemia [E87.6]  Yes    Hypothyroidism, adult [E03.9]  Yes    Essential hypertension [I10]  Yes    Mild cognitive impairment [G31.84]  Yes      Resolved Hospital Problems   No resolved problems to display.       Allergies:  Review of patient's allergies indicates:   Allergen Reactions    Trazodone Other (See Comments)     Nightmares/sleep walk  Other reaction(s): Other (See Comments)  Nightmares/sleep walk    Melatonin      Other reaction(s): Other (See Comments)  Sleep Walks and has unnatural dreams    Talwin compound      Other reaction(s): Hives    Talwin [pentazocine lactate] Other (See Comments)     Hallucinations^    Transzone Other (See Comments)     Sleep Walks and has unnatural dreams    Bentyl [dicyclomine] Anxiety    Tessalon [benzonatate] Anxiety       Vitals:       Every shift (Skilled Nursing patients)    Diet: Cardiac   Supplement:  1 can every three times a day with meals                         Type:  House        Activity:    - Up in a chair each morning as tolerated   - Ambulate with assistance to bathroom    Nursing Precautions:     - Fall precautions   - Decubitus precautions:   - Pressure reducing foam mattress   - Turn patient every two hours. Use wedge pillows to anchor patient    CONSULTS:      PT to evaluate and treat - three times a week   OT to evaluate and treat - three times a week   Nutrition to evaluate and recommend diet    Medications:   Debbie Ding   Home Medication Instructions GLADYS:[id number]    Printed on:04/30/19 8249   Medication  Information                      acetaminophen (TYLENOL) 500 MG tablet  Take 1 tablet (500mg total) by mouth 3 (three) times daily as needed for Pain level 4-6.             ADVAIR DISKUS 250-50 mcg/dose diskus inhaler  1 puff inhaled q 12hours             albuterol 90 mcg/actuation inhaler  Inhale 2 puffs into the lungs every 4 (four) hours as needed for Wheezing.             albuterol-ipratropium (DUO-NEB) 2.5 mg-0.5 mg/3 mL nebulizer solution  Take 3 mLs by nebulization every 4 (four) hours as needed for Wheezing or Shortness of Breath. Rescue             amLODIPine (NORVASC) 10 MG tablet  Take 1 tablet (10 mg total) by mouth once daily.             apixaban (ELIQUIS) 5 mg Tab  Take 2 tablets (10 mg total) by mouth 2 (two) times daily until 5/5/19 then take 1 tablet po by mouth 2 (two) times daily.                        aspirin (ECOTRIN) 81 MG EC tablet  Take 81 mg by mouth.             donepezil (ARICEPT) 10 MG tablet  TAKE 1 TABLET ONCE DAILY INTHE MORNING             DULoxetine (CYMBALTA) 30 MG capsule  Take 1 capsule (30 mg total) by mouth once daily.             memantine (NAMENDA) 5 MG Tab  Take 1 tablet (5 mg total) by mouth every morning.             MYRBETRIQ 50 mg Tb24  Take 1 tablet by mouth once daily.             olopatadine (PATADAY) 0.2 % Drop  One drop each eye daily             omeprazole (PRILOSEC) 40 MG capsule  Take 1 capsule (40 mg total) by mouth once daily.             SYNTHROID 50 mcg tablet  Take 1 tablet (50 mcg total) by mouth before breakfast.             tiotropium (SPIRIVA) 18 mcg inhalation capsule  Inhale 18 mcg into the lungs.                 _________________________________  Dr. Marker  04/30/2019

## 2019-04-30 NOTE — PLAN OF CARE
Problem: Adult Inpatient Plan of Care  Goal: Plan of Care Review  Outcome: Ongoing (interventions implemented as appropriate)  Pt in no distress on room air, PRN tx not required.

## 2019-04-30 NOTE — PLAN OF CARE
Spoke with pt and daughter,Elvira Mullen (c [phone] /  [phone]) at bedside.      MAVIS is daughter, Benjie Farr [phone].  She wants pt to go home. However pt lives on 2nd floor above daughter and pt and other daughters agree it is unsafe for pt to go home with home health.     Elvira and another daughter, Madina Gilliam [phone], are agreeable to pt going to SNF.  However their choices will only be 5 star facilities: [facility], [facility], [facility] or [facility].  After long discussion, they are not willing to consider any other locations.  Referrals sent to these facilities in [facility].    PPD order placed.  PASSR completed.  Locet called in, 142 received and added to [facility].    Advise PT and OT to continue with evaluations and make recommendations for needs at home as it seems this family will not come to agreement or be reasonable with locations that have open beds.     1600 Spoke with Corina at [facility]. Signed SNF order sent, she will review and cost out pt medications.    CM to continue to follow.       04/30/19 1253   Discharge Reassessment   Assessment Type Discharge Planning Reassessment   Provided patient/caregiver education on the expected discharge date and the discharge plan Yes   Do you have any problems affording any of your prescribed medications? No   Discharge Plan A Skilled Nursing Facility   Discharge Plan B Home Health   Patient choice form signed by patient/caregiver N/A   Anticipated Discharge Disposition SNF   Can the patient answer the patient profile reliably? Yes, cognitively intact   How does the patient rate their overall health at the present time? Fair   Describe the patient's ability to walk at the present time. Minor restrictions or changes   How often would a person be available to care for the patient? Often   Number of comorbid conditions (as recorded on the chart) Four   During the past month, has the patient often been bothered by feeling down, depressed  or hopeless? No   During the past month, has the patient often been bothered by little interest or pleasure in doing things? No   Post-Acute Status   Post-Acute Authorization Placement   Post-Acute Placement Status Referrals Sent

## 2019-04-30 NOTE — PROGRESS NOTES
[facility] Hospital Medicine  Progress Note    Patient Name: Debbie Ding  MRN: [id number]  Patient Class: IP- Inpatient   Admission Date: 4/27/2019  Length of Stay: 3 days  Attending Physician: Ismael Hussein MD  Primary Care Provider: Dayo Woo MD        Subjective:     Principal Problem:Bilateral pulmonary embolism    HPI:  The patient is a 86 y.o. female, with history of HTN and asthma, who presents to the ED via EMS with a sudden onset of shortness of breath that began today. Per EMS, patient had increased work of breathing and expiratory wheezes. Patient did not complain of fever, chills, congestion, nausea, vomiting, or diarrhea. Patient's CBG was 257. Patient initial room air was in the mid to upper 80's.  On workup, d dimer was found elevated and CT showed multiple embolis        Hospital Course:  No notes on file    Interval History: No acute events overnight.  States she is breathing more easily and is stable on room air.  Chest pain improved.  Family at bedside and all questions answered.    Review of Systems   Constitutional: Negative for activity change, chills, diaphoresis and fatigue.   HENT: Negative for congestion, drooling and hearing loss.    Eyes: Negative for discharge.   Respiratory: Positive for shortness of breath. Negative for apnea, cough, chest tightness and wheezing.    Cardiovascular: Negative for palpitations and leg swelling.   Gastrointestinal: Negative for abdominal distention, abdominal pain, constipation and diarrhea.   Musculoskeletal: Negative for arthralgias and gait problem.   Skin: Negative for rash.   Neurological: Negative for seizures, light-headedness and numbness.   Hematological: Negative for adenopathy.   Psychiatric/Behavioral: Negative for agitation and behavioral problems.     Objective:     Vital Signs (Most Recent):  Temp: 97.8 °F (36.6 °C) (04/30/19 1139)  Pulse: 82 (04/30/19 1200)  Resp: 18 (04/30/19 1139)  BP: 136/65 (04/30/19  1139)  SpO2: 97 % (04/30/19 1139) Vital Signs (24h Range):  Temp:  [96.5 °F (35.8 °C)-98.6 °F (37 °C)] 97.8 °F (36.6 °C)  Pulse:  [76-88] 82  Resp:  [16-20] 18  SpO2:  [95 %-97 %] 97 %  BP: (128-151)/(61-82) 136/65     Weight: 67.1 kg (147 lb 14.9 oz)  Body mass index is 24.62 kg/m².    Intake/Output Summary (Last 24 hours) at 4/30/2019 1358  Last data filed at 4/30/2019 1300  Gross per 24 hour   Intake 1210 ml   Output 700 ml   Net 510 ml      Physical Exam   Constitutional: She is oriented to person, place, and time. She appears well-developed and well-nourished.   HENT:   Head: Normocephalic and atraumatic.   Eyes: Pupils are equal, round, and reactive to light. EOM are normal.   Neck: Normal range of motion. Neck supple.   Cardiovascular: Normal rate, regular rhythm and normal heart sounds.   Pulmonary/Chest: Effort normal and breath sounds normal. No respiratory distress.   Abdominal: Soft. Bowel sounds are normal. She exhibits no distension. There is no tenderness.   Musculoskeletal: Normal range of motion. She exhibits no edema.   Diameter of legs L slightly greater than R.  Right medial upper leg has some mild fullness to it not seen in left leg.  No tenderness or erythema.   Neurological: She is oriented to person, place, and time. No cranial nerve deficit. Coordination normal.   Skin: Skin is warm and dry.   Psychiatric: She has a normal mood and affect. Her behavior is normal.   Vitals reviewed.      Significant Labs: All pertinent labs within the past 24 hours have been reviewed.    Significant Imaging: I have reviewed and interpreted all pertinent imaging results/findings within the past 24 hours.    Assessment/Plan:      * Bilateral pulmonary embolism  -Mrs. Ding is admitted to inpatient status in our ICU with submassive pulmonary embolism  -CTA shows extensive bilateral pulmonary thromboemboli with bowing of the interventricular septum suggestive for right heart strain.  -Suspect due to left leg  dvt from recent trauma.  Will order doppler us of ble to evaluate for this.  -Troponin negative.  Pulmonary embolism severity index is 116.  -Echo shows normal EF and normal right heart pressures.  -No absolute contraindications to interventional treatments, but given age and inability to treat potential bleeding with blood products due to her [employer], we pursued medical management as a first option.  She has done well with this, so do not believe we will need to pursue mechanical intervention.  -Transitioned to eliquis and to the floor on 4/29/19     Debility  -Continue PT and OT  -Will require SNF placement at discharge.        Hypokalemia  -Replace K today  -Repeat BMP and Mag in AM.      Lactic acidosis  -Likely due to PE.    -No fever or leukocytosis.  Doubt infection  -Lactic acid normal now.      Hypothyroidism, adult  -TSH ok  -Continue synthroid.    Mild cognitive impairment  -She is oriented x3  -At baseline.    -Continue Namenda/Aricept      Essential hypertension  -Stable.  -Continue amlodipine, patient has not been taking other meds recently          VTE Risk Mitigation (From admission, onward)        Ordered     apixaban tablet 5 mg  2 times daily      04/29/19 0935     apixaban tablet 10 mg  2 times daily      04/29/19 0935     IP VTE HIGH RISK PATIENT  Once      04/27/19 1943              Ismael Hussein MD  Department of Hospital Medicine   [facility]

## 2019-04-30 NOTE — PLAN OF CARE
Problem: Adult Inpatient Plan of Care  Goal: Plan of Care Review  Outcome: Ongoing (interventions implemented as appropriate)  Plan of care reviewed with patient.  Cardiac monitoring maintained.  Purewick in place.  Purposeful rounding completed, call bell within reach, no needs at this time.  Will continue to monitor.

## 2019-04-30 NOTE — SUBJECTIVE & OBJECTIVE
Interval History: No acute events overnight.  States she is breathing more easily and is stable on room air.  Chest pain improved.  Family at bedside and all questions answered.    Review of Systems   Constitutional: Negative for activity change, chills, diaphoresis and fatigue.   HENT: Negative for congestion, drooling and hearing loss.    Eyes: Negative for discharge.   Respiratory: Positive for shortness of breath. Negative for apnea, cough, chest tightness and wheezing.    Cardiovascular: Negative for palpitations and leg swelling.   Gastrointestinal: Negative for abdominal distention, abdominal pain, constipation and diarrhea.   Musculoskeletal: Negative for arthralgias and gait problem.   Skin: Negative for rash.   Neurological: Negative for seizures, light-headedness and numbness.   Hematological: Negative for adenopathy.   Psychiatric/Behavioral: Negative for agitation and behavioral problems.     Objective:     Vital Signs (Most Recent):  Temp: 97.8 °F (36.6 °C) (04/30/19 1139)  Pulse: 82 (04/30/19 1200)  Resp: 18 (04/30/19 1139)  BP: 136/65 (04/30/19 1139)  SpO2: 97 % (04/30/19 1139) Vital Signs (24h Range):  Temp:  [96.5 °F (35.8 °C)-98.6 °F (37 °C)] 97.8 °F (36.6 °C)  Pulse:  [76-88] 82  Resp:  [16-20] 18  SpO2:  [95 %-97 %] 97 %  BP: (128-151)/(61-82) 136/65     Weight: 67.1 kg (147 lb 14.9 oz)  Body mass index is 24.62 kg/m².    Intake/Output Summary (Last 24 hours) at 4/30/2019 1358  Last data filed at 4/30/2019 1300  Gross per 24 hour   Intake 1210 ml   Output 700 ml   Net 510 ml      Physical Exam   Constitutional: She is oriented to person, place, and time. She appears well-developed and well-nourished.   HENT:   Head: Normocephalic and atraumatic.   Eyes: Pupils are equal, round, and reactive to light. EOM are normal.   Neck: Normal range of motion. Neck supple.   Cardiovascular: Normal rate, regular rhythm and normal heart sounds.   Pulmonary/Chest: Effort normal and breath sounds normal. No  respiratory distress.   Abdominal: Soft. Bowel sounds are normal. She exhibits no distension. There is no tenderness.   Musculoskeletal: Normal range of motion. She exhibits no edema.   Diameter of legs L slightly greater than R.  Right medial upper leg has some mild fullness to it not seen in left leg.  No tenderness or erythema.   Neurological: She is oriented to person, place, and time. No cranial nerve deficit. Coordination normal.   Skin: Skin is warm and dry.   Psychiatric: She has a normal mood and affect. Her behavior is normal.   Vitals reviewed.      Significant Labs: All pertinent labs within the past 24 hours have been reviewed.    Significant Imaging: I have reviewed and interpreted all pertinent imaging results/findings within the past 24 hours.

## 2019-05-01 LAB
ALBUMIN SERPL BCP-MCNC: 3 G/DL (ref 3.5–5.2)
ALP SERPL-CCNC: 72 U/L (ref 55–135)
ALT SERPL W/O P-5'-P-CCNC: 35 U/L (ref 10–44)
ANION GAP SERPL CALC-SCNC: 8 MMOL/L (ref 8–16)
AST SERPL-CCNC: 33 U/L (ref 10–40)
BASOPHILS # BLD AUTO: ABNORMAL K/UL (ref 0–0.2)
BASOPHILS NFR BLD: 1 % (ref 0–1.9)
BILIRUB SERPL-MCNC: 0.8 MG/DL (ref 0.1–1)
BUN SERPL-MCNC: 18 MG/DL (ref 8–23)
CALCIUM SERPL-MCNC: 8.9 MG/DL (ref 8.7–10.5)
CHLORIDE SERPL-SCNC: 112 MMOL/L (ref 95–110)
CO2 SERPL-SCNC: 19 MMOL/L (ref 23–29)
CREAT SERPL-MCNC: 1.4 MG/DL (ref 0.5–1.4)
DIFFERENTIAL METHOD: ABNORMAL
EOSINOPHIL # BLD AUTO: ABNORMAL K/UL (ref 0–0.5)
EOSINOPHIL NFR BLD: 2 % (ref 0–8)
ERYTHROCYTE [DISTWIDTH] IN BLOOD BY AUTOMATED COUNT: 14.2 % (ref 11.5–14.5)
EST. GFR  (AFRICAN AMERICAN): 39 ML/MIN/1.73 M^2
EST. GFR  (NON AFRICAN AMERICAN): 34 ML/MIN/1.73 M^2
GIANT PLATELETS BLD QL SMEAR: PRESENT
GLUCOSE SERPL-MCNC: 117 MG/DL (ref 70–110)
HCT VFR BLD AUTO: 35.1 % (ref 37–48.5)
HGB BLD-MCNC: 11.9 G/DL (ref 12–16)
LYMPHOCYTES # BLD AUTO: ABNORMAL K/UL (ref 1–4.8)
LYMPHOCYTES NFR BLD: 32 % (ref 18–48)
MAGNESIUM SERPL-MCNC: 2.1 MG/DL (ref 1.6–2.6)
MCH RBC QN AUTO: 29.9 PG (ref 27–31)
MCHC RBC AUTO-ENTMCNC: 33.9 G/DL (ref 32–36)
MCV RBC AUTO: 88 FL (ref 82–98)
MONOCYTES # BLD AUTO: ABNORMAL K/UL (ref 0.3–1)
MONOCYTES NFR BLD: 10 % (ref 4–15)
NEUTROPHILS NFR BLD: 55 % (ref 38–73)
PLATELET # BLD AUTO: 215 K/UL (ref 150–350)
PLATELET BLD QL SMEAR: ABNORMAL
PMV BLD AUTO: 10.5 FL (ref 9.2–12.9)
POTASSIUM SERPL-SCNC: 3.7 MMOL/L (ref 3.5–5.1)
PROT SERPL-MCNC: 6.6 G/DL (ref 6–8.4)
RBC # BLD AUTO: 3.98 M/UL (ref 4–5.4)
SODIUM SERPL-SCNC: 139 MMOL/L (ref 136–145)
WBC # BLD AUTO: 6.57 K/UL (ref 3.9–12.7)

## 2019-05-01 PROCEDURE — 80053 COMPREHEN METABOLIC PANEL: CPT

## 2019-05-01 PROCEDURE — 99900035 HC TECH TIME PER 15 MIN (STAT)

## 2019-05-01 PROCEDURE — 99232 PR SUBSEQUENT HOSPITAL CARE,LEVL II: ICD-10-PCS | Mod: ,,, | Performed by: HOSPITALIST

## 2019-05-01 PROCEDURE — 94640 AIRWAY INHALATION TREATMENT: CPT

## 2019-05-01 PROCEDURE — 97110 THERAPEUTIC EXERCISES: CPT

## 2019-05-01 PROCEDURE — 97116 GAIT TRAINING THERAPY: CPT

## 2019-05-01 PROCEDURE — 11000001 HC ACUTE MED/SURG PRIVATE ROOM

## 2019-05-01 PROCEDURE — 85027 COMPLETE CBC AUTOMATED: CPT

## 2019-05-01 PROCEDURE — 25000003 PHARM REV CODE 250: Performed by: HOSPITALIST

## 2019-05-01 PROCEDURE — 85007 BL SMEAR W/DIFF WBC COUNT: CPT

## 2019-05-01 PROCEDURE — 94761 N-INVAS EAR/PLS OXIMETRY MLT: CPT

## 2019-05-01 PROCEDURE — 36415 COLL VENOUS BLD VENIPUNCTURE: CPT

## 2019-05-01 PROCEDURE — 99232 SBSQ HOSP IP/OBS MODERATE 35: CPT | Mod: ,,, | Performed by: HOSPITALIST

## 2019-05-01 PROCEDURE — 83735 ASSAY OF MAGNESIUM: CPT

## 2019-05-01 RX ADMIN — APIXABAN 10 MG: 2.5 TABLET, FILM COATED ORAL at 08:05

## 2019-05-01 RX ADMIN — PANTOPRAZOLE SODIUM 40 MG: 40 TABLET, DELAYED RELEASE ORAL at 08:05

## 2019-05-01 RX ADMIN — AMLODIPINE BESYLATE 10 MG: 5 TABLET ORAL at 08:05

## 2019-05-01 RX ADMIN — MEMANTINE HYDROCHLORIDE 5 MG: 5 TABLET ORAL at 08:05

## 2019-05-01 RX ADMIN — ASPIRIN 81 MG: 81 TABLET, COATED ORAL at 08:05

## 2019-05-01 RX ADMIN — LEVOTHYROXINE SODIUM 50 MCG: 25 TABLET ORAL at 06:05

## 2019-05-01 RX ADMIN — TIOTROPIUM BROMIDE 18 MCG: 18 CAPSULE ORAL; RESPIRATORY (INHALATION) at 08:05

## 2019-05-01 RX ADMIN — FLUTICASONE FUROATE AND VILANTEROL TRIFENATATE 1 PUFF: 100; 25 POWDER RESPIRATORY (INHALATION) at 08:05

## 2019-05-01 RX ADMIN — DONEPEZIL HYDROCHLORIDE 10 MG: 5 TABLET, FILM COATED ORAL at 08:05

## 2019-05-01 RX ADMIN — DOCUSATE SODIUM 100 MG: 50 LIQUID ORAL at 08:05

## 2019-05-01 NOTE — SUBJECTIVE & OBJECTIVE
Interval History: No acute events overnight.  Breathing is stable, but not at baseline.  Denies chest pain.  Multiple conversations with her daughters who are not in agreement with each other on discharge plans.  Patient is able to make her own decisions and states she wishes to go to SNF.    Review of Systems   Constitutional: Negative for activity change, chills, diaphoresis and fatigue.   HENT: Negative for congestion, drooling and hearing loss.    Eyes: Negative for discharge.   Respiratory: Positive for shortness of breath. Negative for apnea, cough, chest tightness and wheezing.    Cardiovascular: Negative for palpitations and leg swelling.   Gastrointestinal: Negative for abdominal distention, abdominal pain, constipation and diarrhea.   Musculoskeletal: Negative for arthralgias and gait problem.   Skin: Negative for rash.   Neurological: Negative for seizures, light-headedness and numbness.   Hematological: Negative for adenopathy.   Psychiatric/Behavioral: Negative for agitation and behavioral problems.     Objective:     Vital Signs (Most Recent):  Temp: 98.2 °F (36.8 °C) (05/01/19 1136)  Pulse: 83 (05/01/19 1136)  Resp: 20 (05/01/19 1136)  BP: 131/64 (05/01/19 1136)  SpO2: 96 % (05/01/19 1136) Vital Signs (24h Range):  Temp:  [98.1 °F (36.7 °C)-99 °F (37.2 °C)] 98.2 °F (36.8 °C)  Pulse:  [72-87] 83  Resp:  [14-20] 20  SpO2:  [95 %-98 %] 96 %  BP: (131-153)/(64-78) 131/64     Weight: 67.1 kg (147 lb 14.9 oz)  Body mass index is 24.62 kg/m².    Intake/Output Summary (Last 24 hours) at 5/1/2019 1243  Last data filed at 5/1/2019 0500  Gross per 24 hour   Intake 725 ml   Output 950 ml   Net -225 ml      Physical Exam   Constitutional: She is oriented to person, place, and time. She appears well-developed and well-nourished.   HENT:   Head: Normocephalic and atraumatic.   Eyes: Pupils are equal, round, and reactive to light. EOM are normal.   Neck: Normal range of motion. Neck supple.   Cardiovascular: Normal  rate, regular rhythm and normal heart sounds.   Pulmonary/Chest: Effort normal and breath sounds normal. No respiratory distress.   Abdominal: Soft. Bowel sounds are normal. She exhibits no distension. There is no tenderness.   Musculoskeletal: Normal range of motion. She exhibits no edema.   Diameter of legs L slightly greater than R.  Right medial upper leg has some mild fullness to it not seen in left leg.  No tenderness or erythema.   Neurological: She is oriented to person, place, and time. No cranial nerve deficit. Coordination normal.   Skin: Skin is warm and dry.   Psychiatric: She has a normal mood and affect. Her behavior is normal.   Vitals reviewed.      Significant Labs: All pertinent labs within the past 24 hours have been reviewed.    Significant Imaging: I have reviewed and interpreted all pertinent imaging results/findings within the past 24 hours.

## 2019-05-01 NOTE — PT/OT/SLP PROGRESS
Physical Therapy Treatment    Patient Name:  Debbie Ding   MRN:  [id number]    Recommendations:     Discharge Recommendations:  nursing facility, skilled, other (see comments)(vs HH with 24 hr supervision/assist from family)   Discharge Equipment Recommendations: walker, standard   Barriers to discharge: inaccessible home & decreased caregiver support.    Assessment:     Debbie Ding is a 86 y.o. female admitted with a medical diagnosis of Bilateral pulmonary embolism.  She presents with the following impairments/functional limitations:  weakness, impaired endurance, impaired functional mobilty, impaired balance, decreased coordination, gait instability and issues with SPO2 during exertion. Pt performed all interventions on room air today with SPO2 monitored throughout.    SPO2:  Supine- 95%  Seated- 95%  After gait- 97%  During LE TE in bedside chair- 98%    Rehab Prognosis: Good; patient would benefit from acute skilled PT services to address these deficits and reach maximum level of function.    Recent Surgery: * No surgery found *      Plan:     During this hospitalization, patient to be seen 6 x/week to address the identified rehab impairments via gait training, therapeutic activities, therapeutic exercises, neuromuscular re-education and progress toward the following goals:    · Plan of Care Expires:  05/20/19    Subjective     Chief Complaint: fatigue with exertion  Patient/Family Comments/goals: improved safety & strength. Granddaughter states family just assisted pt with completing supine TE prior to therapist coming in room.    Pain/Comfort:  · Pain Rating 1: 0/10  · Pain Rating Post-Intervention 1: 0/10      Objective:     Communicated with nurse Liann prior to session.  Patient found HOB elevated with PureWick, peripheral IV, telemetry upon PT entry to room.     General Precautions: Standard, fall   Orthopedic Precautions:N/A   Braces: N/A     Functional Mobility:  · Bed mobility: Supine to sit  Pavan at trunk  · Transfers: Pavan for lifting/lowering assist with VCs for technique  · Gait: 15ft with RW & Pavan for RW management & heel strike cues. Pt presents with decreased step length bilaterally & slight shuffling gait. Fwd flexed posture.      AM-PAC 6 CLICK MOBILITY  Turning over in bed (including adjusting bedclothes, sheets and blankets)?: 3  Sitting down on and standing up from a chair with arms (e.g., wheelchair, bedside commode, etc.): 3  Moving from lying on back to sitting on the side of the bed?: 3  Moving to and from a bed to a chair (including a wheelchair)?: 3  Need to walk in hospital room?: 3  Climbing 3-5 steps with a railing?: 3  Basic Mobility Total Score: 18       Therapeutic Activities and Exercises:   Seated TE:  LAQ x10 B LE  Hip flexiion x10 B LE    Patient left up in chair with all lines intact, call button in reach, nurse liann notified and daughter & granddaughters present..    GOALS:   Multidisciplinary Problems     Physical Therapy Goals        Problem: Physical Therapy Goal    Goal Priority Disciplines Outcome Goal Variances Interventions   Physical Therapy Goal     PT, PT/OT Ongoing (interventions implemented as appropriate)     Description:  Goals to be met by: 19    Patient will increase functional independence with mobility by performin. Sit<>stand with CGA with RW from bed surface.  2. Gait x 50 feet with RW with supervision.  3. Ascend/descend 11 step(s) with least restrictive assistive device and R HR (sideways).   4. Supine<>sit with supervision without use of hospital bed features.                      Time Tracking:     PT Received On: 19  PT Start Time: 1430     PT Stop Time: 1455  PT Total Time (min): 25 min     Billable Minutes: Gait Training 15 and Therapeutic Exercise 10    Treatment Type: Treatment  PT/PTA: PTA     PTA Visit Number: 1     Kristy Bernardo PTA  2019

## 2019-05-01 NOTE — PLAN OF CARE
"1510 Spoke with Corina at [facility] [phone].  She still has not heard from DON and pt is too "risky" for her to accept without DON approval, awaiting their medical acceptance.    Updated pt and daughter, Madina at bedside.  They would prefer to wait for [facility].  State [place] is too far to go for SNF, request CM to sent to [facility] and [facility].      [facility]      Madina and pt state if [facility], [facility] and [facility] deny, then they will go to [facility] in [place] tomorrow.     MD updated. Discharge on hold today.    CM to continue to follow.     "

## 2019-05-01 NOTE — PLAN OF CARE
Problem: Adult Inpatient Plan of Care  Goal: Plan of Care Review  Outcome: Ongoing (interventions implemented as appropriate)  Pt received on room air with adequate saturation. Inhalers given, tolerated well.

## 2019-05-01 NOTE — PLAN OF CARE
Problem: Adult Inpatient Plan of Care  Goal: Plan of Care Review  Outcome: Ongoing (interventions implemented as appropriate)  Pt resting in bed. NAD, VSS on RA, AOx4. Pt not complaining of pain. No SOB noted. Ambulatory with walker and assist. Purewick in place for the night. Changed and patent today. Pt worked with PT today. Sat in chair for 2hrs. Heart monitor in place. POC reviewed with pt. Family at bedside. Bed low and locked. Personal items and call light within reach. Will continue to monitor.

## 2019-05-01 NOTE — ASSESSMENT & PLAN NOTE
-Mrs. Ding is admitted to inpatient status in our ICU with submassive pulmonary embolism  -CTA shows extensive bilateral pulmonary thromboemboli with bowing of the interventricular septum suggestive for right heart strain.  -Suspect due to left leg dvt from recent trauma.  Will order doppler us of ble to evaluate for this.  -Troponin negative.  Pulmonary embolism severity index is 116.  -Echo shows normal EF and normal right heart pressures.  -No absolute contraindications to interventional treatments, but given age and inability to treat potential bleeding with blood products due to her [facility], we pursued medical management as a first option.  She has done well with this, so do not believe we will need to pursue mechanical intervention.  -Transitioned to eliquis and to the floor on 4/29/19   -Medically stable for discharge today on eliquis to SNF.

## 2019-05-01 NOTE — PLAN OF CARE
Problem: Adult Inpatient Plan of Care  Goal: Plan of Care Review  Outcome: Ongoing (interventions implemented as appropriate)  Pt turns and positions self.  Safety intact.  Pt has pur wick in place.  Nad noted .  Denies any sob this shift.  Safety maintained.  Saline locks benign.  POC and goals continues and pt v/o understanding.

## 2019-05-01 NOTE — PLAN OF CARE
Problem: Physical Therapy Goal  Goal: Physical Therapy Goal  Goals to be met by: 19    Patient will increase functional independence with mobility by performin. Sit<>stand with CGA with RW from bed surface.  2. Gait x 50 feet with RW with supervision.  3. Ascend/descend 11 step(s) with least restrictive assistive device and R HR (sideways).   4. Supine<>sit with supervision without use of hospital bed features.     Outcome: Ongoing (interventions implemented as appropriate)  Pt progressing toward goals- pt ambulated 15ft in room on room air with RW & Pavan for RW management. Pt performed LE TE in bedside chair.

## 2019-05-01 NOTE — PLAN OF CARE
Spoke with pt daughter, Elvira [phone].    States pt and family have agreed:   [facility] ([facility], spoke with LAWRENCE Martinez).     Second choice is [facility] (spoke with Corina [phone]). They are still reviewing case, have not medically accepted pt yet.    Third choice is [facility] ([phone]). Daughter states because this is a five star facility, if they accept today and [facility] does not,  they are willing to go to [facility].  Message sent to [facility] in [employer].     0864  Spoke with Corina at [facility] [phone]. She is still awaiting determination by their DON. Will keep us posted.     CM to continue to follow.   Pt is straight medicare, no auth necessary.   Expect pt discharge today.

## 2019-05-01 NOTE — ASSESSMENT & PLAN NOTE
-She is oriented x3 and does not appear to have dementia.  -At baseline.    -Continue Namenda/Aricept

## 2019-05-01 NOTE — PROGRESS NOTES
[facility] Hospital Medicine  Progress Note    Patient Name: Debbie Ding  MRN: [id number]  Patient Class: IP- Inpatient   Admission Date: 4/27/2019  Length of Stay: 4 days  Attending Physician: Ismael Hussein MD  Primary Care Provider: Dayo Woo MD        Subjective:     Principal Problem:Bilateral pulmonary embolism    HPI:  The patient is a 86 y.o. female, with history of HTN and asthma, who presents to the ED via EMS with a sudden onset of shortness of breath that began today. Per EMS, patient had increased work of breathing and expiratory wheezes. Patient did not complain of fever, chills, congestion, nausea, vomiting, or diarrhea. Patient's CBG was 257. Patient initial room air was in the mid to upper 80's.  On workup, d dimer was found elevated and CT showed multiple embolis        Hospital Course:  No notes on file    Interval History: No acute events overnight.  Breathing is stable, but not at baseline.  Denies chest pain.  Multiple conversations with her daughters who are not in agreement with each other on discharge plans.  Patient is able to make her own decisions and states she wishes to go to SNF.    Review of Systems   Constitutional: Negative for activity change, chills, diaphoresis and fatigue.   HENT: Negative for congestion, drooling and hearing loss.    Eyes: Negative for discharge.   Respiratory: Positive for shortness of breath. Negative for apnea, cough, chest tightness and wheezing.    Cardiovascular: Negative for palpitations and leg swelling.   Gastrointestinal: Negative for abdominal distention, abdominal pain, constipation and diarrhea.   Musculoskeletal: Negative for arthralgias and gait problem.   Skin: Negative for rash.   Neurological: Negative for seizures, light-headedness and numbness.   Hematological: Negative for adenopathy.   Psychiatric/Behavioral: Negative for agitation and behavioral problems.     Objective:     Vital Signs (Most  Recent):  Temp: 98.2 °F (36.8 °C) (05/01/19 1136)  Pulse: 83 (05/01/19 1136)  Resp: 20 (05/01/19 1136)  BP: 131/64 (05/01/19 1136)  SpO2: 96 % (05/01/19 1136) Vital Signs (24h Range):  Temp:  [98.1 °F (36.7 °C)-99 °F (37.2 °C)] 98.2 °F (36.8 °C)  Pulse:  [72-87] 83  Resp:  [14-20] 20  SpO2:  [95 %-98 %] 96 %  BP: (131-153)/(64-78) 131/64     Weight: 67.1 kg (147 lb 14.9 oz)  Body mass index is 24.62 kg/m².    Intake/Output Summary (Last 24 hours) at 5/1/2019 1243  Last data filed at 5/1/2019 0500  Gross per 24 hour   Intake 725 ml   Output 950 ml   Net -225 ml      Physical Exam   Constitutional: She is oriented to person, place, and time. She appears well-developed and well-nourished.   HENT:   Head: Normocephalic and atraumatic.   Eyes: Pupils are equal, round, and reactive to light. EOM are normal.   Neck: Normal range of motion. Neck supple.   Cardiovascular: Normal rate, regular rhythm and normal heart sounds.   Pulmonary/Chest: Effort normal and breath sounds normal. No respiratory distress.   Abdominal: Soft. Bowel sounds are normal. She exhibits no distension. There is no tenderness.   Musculoskeletal: Normal range of motion. She exhibits no edema.   Diameter of legs L slightly greater than R.  Right medial upper leg has some mild fullness to it not seen in left leg.  No tenderness or erythema.   Neurological: She is oriented to person, place, and time. No cranial nerve deficit. Coordination normal.   Skin: Skin is warm and dry.   Psychiatric: She has a normal mood and affect. Her behavior is normal.   Vitals reviewed.      Significant Labs: All pertinent labs within the past 24 hours have been reviewed.    Significant Imaging: I have reviewed and interpreted all pertinent imaging results/findings within the past 24 hours.    Assessment/Plan:      * Bilateral pulmonary embolism  -Mrs. Ding is admitted to inpatient status in our ICU with submassive pulmonary embolism  -CTA shows extensive bilateral  pulmonary thromboemboli with bowing of the interventricular septum suggestive for right heart strain.  -Suspect due to left leg dvt from recent trauma.  Will order doppler us of ble to evaluate for this.  -Troponin negative.  Pulmonary embolism severity index is 116.  -Echo shows normal EF and normal right heart pressures.  -No absolute contraindications to interventional treatments, but given age and inability to treat potential bleeding with blood products due to her [facility], we pursued medical management as a first option.  She has done well with this, so do not believe we will need to pursue mechanical intervention.  -Transitioned to eliquis and to the floor on 4/29/19   -Medically stable for discharge today on eliquis to SNF.    Debility  -Continue PT and OT  -Will require SNF placement at discharge which patient states she desires.      Hypokalemia  -K normal today    Lactic acidosis  -Likely due to PE.    -No fever or leukocytosis.  Doubt infection  -Lactic acid normal now.      Hypothyroidism, adult  -TSH ok  -Continue synthroid.    Mild cognitive impairment  -She is oriented x3 and does not appear to have dementia.  -At baseline.    -Continue Namenda/Aricept      Essential hypertension  -Stable.  -Continue amlodipine, patient has not been taking other meds recently          VTE Risk Mitigation (From admission, onward)        Ordered     apixaban tablet 5 mg  2 times daily      04/29/19 0935     apixaban tablet 10 mg  2 times daily      04/29/19 0935     IP VTE HIGH RISK PATIENT  Once      04/27/19 1943              Ismael Hussein MD  Department of Hospital Medicine   [facility]

## 2019-05-02 VITALS
OXYGEN SATURATION: 98 % | HEART RATE: 86 BPM | BODY MASS INDEX: 24.65 KG/M2 | DIASTOLIC BLOOD PRESSURE: 60 MMHG | RESPIRATION RATE: 20 BRPM | HEIGHT: 65 IN | TEMPERATURE: 99 F | WEIGHT: 147.94 LBS | SYSTOLIC BLOOD PRESSURE: 130 MMHG

## 2019-05-02 LAB
ALBUMIN SERPL BCP-MCNC: 2.8 G/DL (ref 3.5–5.2)
ALP SERPL-CCNC: 69 U/L (ref 55–135)
ALT SERPL W/O P-5'-P-CCNC: 35 U/L (ref 10–44)
ANION GAP SERPL CALC-SCNC: 8 MMOL/L (ref 8–16)
AST SERPL-CCNC: 29 U/L (ref 10–40)
BACTERIA BLD CULT: NORMAL
BACTERIA BLD CULT: NORMAL
BASOPHILS # BLD AUTO: 0.03 K/UL (ref 0–0.2)
BASOPHILS NFR BLD: 0.5 % (ref 0–1.9)
BILIRUB SERPL-MCNC: 0.8 MG/DL (ref 0.1–1)
BUN SERPL-MCNC: 19 MG/DL (ref 8–23)
CALCIUM SERPL-MCNC: 9.4 MG/DL (ref 8.7–10.5)
CHLORIDE SERPL-SCNC: 113 MMOL/L (ref 95–110)
CO2 SERPL-SCNC: 18 MMOL/L (ref 23–29)
CREAT SERPL-MCNC: 1.4 MG/DL (ref 0.5–1.4)
DIFFERENTIAL METHOD: ABNORMAL
EOSINOPHIL # BLD AUTO: 0.1 K/UL (ref 0–0.5)
EOSINOPHIL NFR BLD: 2.3 % (ref 0–8)
ERYTHROCYTE [DISTWIDTH] IN BLOOD BY AUTOMATED COUNT: 14.2 % (ref 11.5–14.5)
EST. GFR  (AFRICAN AMERICAN): 39 ML/MIN/1.73 M^2
EST. GFR  (NON AFRICAN AMERICAN): 34 ML/MIN/1.73 M^2
GLUCOSE SERPL-MCNC: 114 MG/DL (ref 70–110)
HCT VFR BLD AUTO: 34.9 % (ref 37–48.5)
HGB BLD-MCNC: 11.8 G/DL (ref 12–16)
LYMPHOCYTES # BLD AUTO: 2.3 K/UL (ref 1–4.8)
LYMPHOCYTES NFR BLD: 37.9 % (ref 18–48)
MAGNESIUM SERPL-MCNC: 2.2 MG/DL (ref 1.6–2.6)
MCH RBC QN AUTO: 30 PG (ref 27–31)
MCHC RBC AUTO-ENTMCNC: 33.8 G/DL (ref 32–36)
MCV RBC AUTO: 89 FL (ref 82–98)
MONOCYTES # BLD AUTO: 0.4 K/UL (ref 0.3–1)
MONOCYTES NFR BLD: 6.2 % (ref 4–15)
NEUTROPHILS # BLD AUTO: 3.2 K/UL (ref 1.8–7.7)
NEUTROPHILS NFR BLD: 52.8 % (ref 38–73)
PLATELET # BLD AUTO: 234 K/UL (ref 150–350)
PMV BLD AUTO: 10.5 FL (ref 9.2–12.9)
POTASSIUM SERPL-SCNC: 3.7 MMOL/L (ref 3.5–5.1)
PROT SERPL-MCNC: 6.6 G/DL (ref 6–8.4)
RBC # BLD AUTO: 3.93 M/UL (ref 4–5.4)
SODIUM SERPL-SCNC: 139 MMOL/L (ref 136–145)
TB INDURATION 48 - 72 HR READ: 0 MM
WBC # BLD AUTO: 6.12 K/UL (ref 3.9–12.7)

## 2019-05-02 PROCEDURE — 25000003 PHARM REV CODE 250: Performed by: HOSPITALIST

## 2019-05-02 PROCEDURE — 99900035 HC TECH TIME PER 15 MIN (STAT)

## 2019-05-02 PROCEDURE — 97116 GAIT TRAINING THERAPY: CPT

## 2019-05-02 PROCEDURE — 94640 AIRWAY INHALATION TREATMENT: CPT

## 2019-05-02 PROCEDURE — 83735 ASSAY OF MAGNESIUM: CPT

## 2019-05-02 PROCEDURE — 85025 COMPLETE CBC W/AUTO DIFF WBC: CPT

## 2019-05-02 PROCEDURE — 99239 PR HOSPITAL DISCHARGE DAY,>30 MIN: ICD-10-PCS | Mod: ,,, | Performed by: HOSPITALIST

## 2019-05-02 PROCEDURE — 94761 N-INVAS EAR/PLS OXIMETRY MLT: CPT

## 2019-05-02 PROCEDURE — 97110 THERAPEUTIC EXERCISES: CPT

## 2019-05-02 PROCEDURE — 80053 COMPREHEN METABOLIC PANEL: CPT

## 2019-05-02 PROCEDURE — 99239 HOSP IP/OBS DSCHRG MGMT >30: CPT | Mod: ,,, | Performed by: HOSPITALIST

## 2019-05-02 PROCEDURE — 36415 COLL VENOUS BLD VENIPUNCTURE: CPT

## 2019-05-02 RX ORDER — DOCUSATE SODIUM 100 MG/1
100 CAPSULE, LIQUID FILLED ORAL 2 TIMES DAILY
Status: DISCONTINUED | OUTPATIENT
Start: 2019-05-02 | End: 2019-05-02 | Stop reason: HOSPADM

## 2019-05-02 RX ADMIN — MEMANTINE HYDROCHLORIDE 5 MG: 5 TABLET ORAL at 09:05

## 2019-05-02 RX ADMIN — APIXABAN 10 MG: 2.5 TABLET, FILM COATED ORAL at 09:05

## 2019-05-02 RX ADMIN — TIOTROPIUM BROMIDE 18 MCG: 18 CAPSULE ORAL; RESPIRATORY (INHALATION) at 08:05

## 2019-05-02 RX ADMIN — FLUTICASONE FUROATE AND VILANTEROL TRIFENATATE 1 PUFF: 100; 25 POWDER RESPIRATORY (INHALATION) at 08:05

## 2019-05-02 RX ADMIN — AMLODIPINE BESYLATE 10 MG: 5 TABLET ORAL at 09:05

## 2019-05-02 RX ADMIN — LEVOTHYROXINE SODIUM 50 MCG: 25 TABLET ORAL at 05:05

## 2019-05-02 RX ADMIN — DOCUSATE SODIUM 100 MG: 50 LIQUID ORAL at 09:05

## 2019-05-02 RX ADMIN — DONEPEZIL HYDROCHLORIDE 10 MG: 5 TABLET, FILM COATED ORAL at 09:05

## 2019-05-02 RX ADMIN — PANTOPRAZOLE SODIUM 40 MG: 40 TABLET, DELAYED RELEASE ORAL at 09:05

## 2019-05-02 RX ADMIN — ASPIRIN 81 MG: 81 TABLET, COATED ORAL at 09:05

## 2019-05-02 NOTE — PROGRESS NOTES
Report called to DONALD Murray at [facility], [phone].  IVs removed.  Telemetry box returned.

## 2019-05-02 NOTE — PT/OT/SLP PROGRESS
Physical Therapy Treatment    Patient Name:  Debbie Ding   MRN:  [id number]    Recommendations:     Discharge Recommendations:  nursing facility, skilled   Discharge Equipment Recommendations: walker, standard   Barriers to discharge: Decreased caregiver support    Assessment:     Debbie Ding is a 86 y.o. female admitted with a medical diagnosis of Bilateral pulmonary embolism.  She presents with the following impairments/functional limitations:  weakness, impaired functional mobilty, impaired coordination, impaired endurance, gait instability, impaired balance .    Rehab Prognosis: Good; patient would benefit from acute skilled PT services to address these deficits and reach maximum level of function.    Recent Surgery: * No surgery found *      Plan:     During this hospitalization, patient to be seen 6 x/week to address the identified rehab impairments via gait training, therapeutic activities, therapeutic exercises, neuromuscular re-education and progress toward the following goals:    · Plan of Care Expires:  05/20/19    Subjective     Chief Complaint: weakness  Patient/Family Comments/goals: for pt to get stronger for d/c  Pain/Comfort:  · Pain Rating 1: 0/10  · Pain Rating Post-Intervention 1: 0/10      Objective:     Communicated with nurse  prior to session.  Patient found supine in bed with peripheral IV, PureWick, telemetry upon PT entry to room.     General Precautions: Standard, fall   Orthopedic Precautions:N/A   Braces: N/A     Functional Mobility:  · Bed mobility: supine to sit CGA  · Transfers: sit<>stand with RW CGA  · Gait: 20ft RW, CGA for steadying, VCs for RW management.      AM-PAC 6 CLICK MOBILITY  Turning over in bed (including adjusting bedclothes, sheets and blankets)?: 3  Sitting down on and standing up from a chair with arms (e.g., wheelchair, bedside commode, etc.): 3  Moving from lying on back to sitting on the side of the bed?: 3  Moving to and from a bed to a chair (including  a wheelchair)?: 3  Need to walk in hospital room?: 3  Climbing 3-5 steps with a railing?: 3  Basic Mobility Total Score: 18       Therapeutic Activities and Exercises:   Seated in bedside chair:  LAQ, HF & AP x10 B LE    Patient left up in chair with call button in reach, all lines intact, & daughter present..    GOALS:   Multidisciplinary Problems     Physical Therapy Goals        Problem: Physical Therapy Goal    Goal Priority Disciplines Outcome Goal Variances Interventions   Physical Therapy Goal     PT, PT/OT Ongoing (interventions implemented as appropriate)     Description:  Goals to be met by: 19    Patient will increase functional independence with mobility by performin. Sit<>stand with CGA with RW from bed surface.  2. Gait x 50 feet with RW with supervision.  3. Ascend/descend 11 step(s) with least restrictive assistive device and R HR (sideways).   4. Supine<>sit with supervision without use of hospital bed features.                      Time Tracking:     PT Received On: 19  PT Start Time: 1130     PT Stop Time: 1153  PT Total Time (min): 23 min     Billable Minutes: Gait Training 15 and Therapeutic Exercise 8    Treatment Type: Treatment  PT/PTA: PTA     PTA Visit Number: 2     Kristy Bernardo PTA  2019

## 2019-05-02 NOTE — PLAN OF CARE
Plan of care reviewed with patient, medications explained. Pt currently resting comfortably in bed and appears to be sleeping in between care with Pure Wick external catheter in place for urinary incontinence. No complaints of pain at this time, VSS, bed in lowest, locked position and call light in reach. Hourly rounding performed, will continue to monitor.

## 2019-05-02 NOTE — PLAN OF CARE
[phone] Spoke with Corina, still awaiting determination by DAYANARA Curiel [phone] Spoke with Phuong, she will request acceptance by RN, Marija.  Awaiting their callback.    [facility] [phone].  VM left.    Pt is accepted to [facility], however family is concerned about distance to [facility] and would prefer [facility] or [facility].      ADRIAN to continue to follow.  Pt ready for DC today.

## 2019-05-02 NOTE — ASSESSMENT & PLAN NOTE
-Mrs. Ding is admitted to inpatient status in our ICU with submassive pulmonary embolism  -CTA shows extensive bilateral pulmonary thromboemboli with bowing of the interventricular septum suggestive for right heart strain.  -Suspect due to left leg dvt from recent trauma.  Will order doppler us of ble to evaluate for this.  -Troponin negative.  Pulmonary embolism severity index is 116.  -Echo shows normal EF and normal right heart pressures.  -No absolute contraindications to interventional treatments, but given age and inability to treat potential bleeding with blood products due to her Nondenominational, we pursued medical management as a first option.  She has done well with this, so do not believe we will need to pursue mechanical intervention.  -Transitioned to eliquis and to the floor on 4/29/19   -Medically stable for discharge today on eliquis to SNF.

## 2019-05-02 NOTE — DISCHARGE SUMMARY
[facility] Hospital Medicine  Discharge Summary      Patient Name: Debbie Ding  MRN: [id number]  Admission Date: 4/27/2019  Hospital Length of Stay: 5 days  Discharge Date and Time:  05/02/2019 12:47 PM  Attending Physician: Jory Cyr MD   Discharging Provider: Jory Cyr MD  Primary Care Provider: Dayo Woo MD      HPI:   The patient is a 86 y.o. female, with history of HTN and asthma, who presents to the ED via EMS with a sudden onset of shortness of breath that began today. Per EMS, patient had increased work of breathing and expiratory wheezes. Patient did not complain of fever, chills, congestion, nausea, vomiting, or diarrhea. Patient's CBG was 257. Patient initial room air was in the mid to upper 80's.  On workup, d dimer was found elevated and CT showed multiple embolis      Consults:   Consults (From admission, onward)        Status Ordering Provider     Inpatient consult to SNF  Once     Provider:  (Not yet assigned)    Acknowledged JORY CYR        Hospital Course By Problem:     * Bilateral pulmonary embolism  -Mrs. Ding is admitted to inpatient status in our ICU with submassive pulmonary embolism  -CTA shows extensive bilateral pulmonary thromboemboli with bowing of the interventricular septum suggestive for right heart strain.  -Suspect due to left leg dvt from recent trauma.  Will order doppler us of ble to evaluate for this.  -Troponin negative.  Pulmonary embolism severity index is 116.  -Echo shows normal EF and normal right heart pressures.  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  She has done well with this, so do not believe we will need to pursue mechanical intervention.  -Transitioned to eliquis and to the floor on 4/29/19   -Medically stable for discharge today on eliquis to  SNF.    Hypokalemia  -K normal today    Hypothyroidism, adult  -TSH ok  -Continue synthroid.    Essential hypertension  -Stable.  -Continue amlodipine, patient has not been taking other meds recently          Final Active Diagnoses:    Diagnosis Date Noted POA    PRINCIPAL PROBLEM:  Bilateral pulmonary embolism [I26.99] 04/27/2019 Yes    Debility [R53.81] 04/29/2019 Yes    Lactic acidosis [E87.2] 04/28/2019 Yes    Hypokalemia [E87.6] 04/28/2019 Yes    Hypothyroidism, adult [E03.9] 03/06/2019 Yes    Essential hypertension [I10] 07/02/2012 Yes    Mild cognitive impairment [G31.84] 06/24/2012 Yes      Problems Resolved During this Admission:       Discharged Condition: good    Disposition: Skilled Nursing Facility    Follow Up:  Follow-up Information     Dayo Woo MD In 1 week.    Specialty:  Internal Medicine  Contact information:  [address]  [facility] [address]  [phone]                 Patient Instructions:      Diet Cardiac     Notify your health care provider if you experience any of the following:  increased confusion or weakness     Notify your health care provider if you experience any of the following:  persistent dizziness, light-headedness, or visual disturbances     Notify your health care provider if you experience any of the following:  worsening rash     Notify your health care provider if you experience any of the following:  severe uncontrolled pain     Notify your health care provider if you experience any of the following:  persistent nausea and vomiting or diarrhea     Notify your health care provider if you experience any of the following:  temperature >100.4     Notify your health care provider if you experience any of the following:  severe persistent headache     Notify your health care provider if you experience any of the following:  difficulty breathing or increased cough     Activity as tolerated       Significant Diagnostic Studies: Labs:   BMP:    Recent Labs   Lab 05/01/19  0449 05/02/19  0456   * 114*    139   K 3.7 3.7   * 113*   CO2 19* 18*   BUN 18 19   CREATININE 1.4 1.4   CALCIUM 8.9 9.4   MG 2.1 2.2   , CMP   Recent Labs   Lab 05/01/19 0449 05/02/19  0456    139   K 3.7 3.7   * 113*   CO2 19* 18*   * 114*   BUN 18 19   CREATININE 1.4 1.4   CALCIUM 8.9 9.4   PROT 6.6 6.6   ALBUMIN 3.0* 2.8*   BILITOT 0.8 0.8   ALKPHOS 72 69   AST 33 29   ALT 35 35   ANIONGAP 8 8   ESTGFRAFRICA 39* 39*   EGFRNONAA 34* 34*   , CBC   Recent Labs   Lab 05/01/19 0449 05/02/19  0456   WBC 6.57 6.12   HGB 11.9* 11.8*   HCT 35.1* 34.9*    234    and INR   Lab Results   Component Value Date    INR 0.9 04/27/2019    INR 1.0 12/17/2014       Pending Diagnostic Studies:     None         Medications:  Reconciled Home Medications:      Medication List      START taking these medications    albuterol-ipratropium 2.5 mg-0.5 mg/3 mL nebulizer solution  Commonly known as:  DUO-NEB  Take 3 mLs by nebulization every 4 (four) hours as needed for Wheezing or Shortness of Breath. Rescue     * apixaban 5 mg Tab  Commonly known as:  ELIQUIS  Take 2 tablets (10 mg total) by mouth 2 (two) times daily. Last dose on 5/5/19 for 5 days     * apixaban 5 mg Tab  Commonly known as:  ELIQUIS  Take 1 tablet (5 mg total) by mouth 2 (two) times daily.  Start taking on:  5/6/2019         * This list has 2 medication(s) that are the same as other medications prescribed for you. Read the directions carefully, and ask your doctor or other care provider to review them with you.            CHANGE how you take these medications    amLODIPine 10 MG tablet  Commonly known as:  NORVASC  Take 1 tablet (10 mg total) by mouth once daily.  What changed:    · how much to take  · how to take this  · when to take this        CONTINUE taking these medications    acetaminophen 500 MG tablet  Commonly known as:  TYLENOL  Take 1-2 tablets (500-1,000 mg total) by mouth 3 (three)  times daily as needed for Pain.     ADVAIR DISKUS 250-50 mcg/dose diskus inhaler  Generic drug:  fluticasone-salmeterol 250-50 mcg/dose     albuterol 90 mcg/actuation inhaler  Commonly known as:  PROVENTIL/VENTOLIN HFA  Inhale 2 puffs into the lungs every 4 (four) hours as needed for Wheezing.     aspirin 81 MG EC tablet  Commonly known as:  ECOTRIN  Take 81 mg by mouth.     donepezil 10 MG tablet  Commonly known as:  ARICEPT  TAKE 1 TABLET ONCE DAILY INTHE MORNING     DULoxetine 30 MG capsule  Commonly known as:  CYMBALTA  Take 1 capsule (30 mg total) by mouth once daily.     memantine 5 MG Tab  Commonly known as:  NAMENDA  Take 1 tablet (5 mg total) by mouth every morning.     MYRBETRIQ 50 mg Tb24  Generic drug:  mirabegron  Take 1 tablet by mouth once daily.     olopatadine 0.2 % Drop  Commonly known as:  PATADAY     omeprazole 40 MG capsule  Commonly known as:  PRILOSEC  Take 1 capsule (40 mg total) by mouth once daily.     SYNTHROID 50 MCG tablet  Generic drug:  levothyroxine  Take 1 tablet (50 mcg total) by mouth before breakfast.     tiotropium 18 mcg inhalation capsule  Commonly known as:  SPIRIVA  Inhale 18 mcg into the lungs.        STOP taking these medications    amlodipine-olmesartan 5-20 mg per tablet  Commonly known as:  ERMA     ammonium lactate 12 % Crea     BOOSTRIX TDAP 2.5-8-5 Lf-mcg-Lf/0.5mL Syrg injection  Generic drug:  diphth,pertus(acell),tetanus     citalopram 10 MG tablet  Commonly known as:  CELEXA     doxepin 10 MG capsule  Commonly known as:  SINEQUAN     esomeprazole 20 MG capsule  Commonly known as:  NEXIUM     ESTRACE 0.01 % (0.1 mg/gram) vaginal cream  Generic drug:  estradiol     estradiol 0.05 mg/24 hr td ptsw 0.05 mg/24 hr  Commonly known as:  VIVELLE-DOT     fluocinolone and shower cap 0.01 % Oil     fluocinonide 0.05 % external solution  Commonly known as:  LIDEX     fluticasone propionate 50 mcg/actuation nasal spray  Commonly known as:  FLONASE     FLUZONE HIGH-DOSE 2017-18  (PF) 180 mcg/0.5 mL vaccine  Generic drug:  influenza     glycopyrrolate 2 MG Tab  Commonly known as:  ROBINUL     hyoscyamine 0.125 mg Subl  Commonly known as:  LEVSIN/SL     ipratropium 0.03 % nasal spray  Commonly known as:  ATROVENT     ketoconazole 2 % cream  Commonly known as:  NIZORAL     liothyronine 5 MCG Tab  Commonly known as:  CYTOMEL     lisinopril 10 MG tablet     losartan 50 MG tablet  Commonly known as:  COZAAR     magnesium oxide 250 mg magnesium Tab  Commonly known as:  MAG-OX     meclizine 25 mg tablet  Commonly known as:  ANTIVERT     metoprolol succinate 25 MG 24 hr tablet  Commonly known as:  TOPROL-XL     mirtazapine 15 MG tablet  Commonly known as:  REMERON     pantoprazole 40 MG tablet  Commonly known as:  PROTONIX     PNEUMOVAX 23 25 mcg/0.5 mL  Generic drug:  pneumococcal 23-nino ps vaccine     ranitidine 300 MG tablet  Commonly known as:  ZANTAC     tiZANidine 2 MG tablet  Commonly known as:  ZANAFLEX     triamcinolone acetonide 0.1% 0.1 % ointment  Commonly known as:  KENALOG     ZOSTAVAX (PF) 19,400 unit/0.65 mL injection  Generic drug:  zoster vaccine live (PF)            Indwelling Lines/Drains at time of discharge:   Lines/Drains/Airways     Drain            Female External Urinary Catheter 04/27/19 1759 4 days                Time spent on the discharge of patient: 35 minutes  Patient was seen and examined on the date of discharge and determined to be suitable for discharge.         Ismael Hussein MD  Department of Hospital Medicine  [facility]

## 2019-05-02 NOTE — PLAN OF CARE
Problem: Adult Inpatient Plan of Care  Goal: Plan of Care Review  Outcome: Ongoing (interventions implemented as appropriate)  Patient on room air SpO2 97% with clear BS,Sprivia and Breo given tolerated well.

## 2019-05-02 NOTE — PLAN OF CARE
Problem: Physical Therapy Goal  Goal: Physical Therapy Goal  Goals to be met by: 19    Patient will increase functional independence with mobility by performin. Sit<>stand with CGA with RW from bed surface.  2. Gait x 50 feet with RW with supervision.  3. Ascend/descend 11 step(s) with least restrictive assistive device and R HR (sideways).   4. Supine<>sit with supervision without use of hospital bed features.     Outcome: Ongoing (interventions implemented as appropriate)  Pt progressing toward goals. Pt performed bed mobility with CGA & sit<>stand transfer with RW CGA. Pt ambulated 20ft in room with CGA.

## 2019-05-02 NOTE — PLAN OF CARE
Pt and family agree to go to [facility].  Phuong at [facility] came and signed paperwork with family.    Pt going to room 104, Bedside RN to call report to [phone].  ADT30 request placed.  [facility] van to arrive to pickup pt by 1700.  Packet provided to RN.     No further DC needs from CM perspective.       05/02/19 1619   Final Note   Assessment Type Final Discharge Note   Anticipated Discharge Disposition SNF   Hospital Follow Up  Appt(s) scheduled? Yes   Discharge plans and expectations educations in teach back method with documentation complete? Yes   Right Care Referral Info   Post Acute Recommendation SNF / Sub-Acute Rehab

## 2019-05-02 NOTE — PLAN OF CARE
Problem: Adult Inpatient Plan of Care  Goal: Plan of Care Review  Outcome: Ongoing (interventions implemented as appropriate)  Pt on room air, SpO2 97%. No respiratory distress noted.

## 2019-05-03 NOTE — PT/OT/SLP DISCHARGE
Physical Therapy Discharge Summary    Name: Debbie Ding  MRN: [id number]   Principal Problem: Bilateral pulmonary embolism     Patient Discharged from acute Physical Therapy on 19.  Please refer to prior PT noted date on 19 for functional status.     Assessment:     Patient has not met goals.    Objective:     GOALS:   Multidisciplinary Problems     Physical Therapy Goals        Problem: Physical Therapy Goal    Goal Priority Disciplines Outcome Goal Variances Interventions   Physical Therapy Goal     PT, PT/OT Ongoing (interventions implemented as appropriate)     Description:  Goals to be met by: 19    Patient will increase functional independence with mobility by performin. Sit<>stand with CGA with RW from bed surface.  2. Gait x 50 feet with RW with supervision.  3. Ascend/descend 11 step(s) with least restrictive assistive device and R HR (sideways).   4. Supine<>sit with supervision without use of hospital bed features.                      Reasons for Discontinuation of Therapy Services  Transfer to alternate level of care.      Plan:     Patient Discharged to: Skilled Nursing Facility.    PT of record not available for documentation.      Kiana Garland, PT  5/3/2019

## 2019-05-31 ENCOUNTER — EXTERNAL CHRONIC CARE MANAGEMENT (OUTPATIENT)
Dept: PRIMARY CARE CLINIC | Facility: CLINIC | Age: 84
End: 2019-05-31
Payer: MEDICARE

## 2019-05-31 PROCEDURE — 99490 CHRNC CARE MGMT STAFF 1ST 20: CPT | Mod: PBBFAC | Performed by: INTERNAL MEDICINE

## 2019-05-31 PROCEDURE — 99490 CHRNC CARE MGMT STAFF 1ST 20: CPT | Mod: S$PBB,,, | Performed by: INTERNAL MEDICINE

## 2019-05-31 PROCEDURE — 99490 PR CHRONIC CARE MGMT, 1ST 20 MIN: ICD-10-PCS | Mod: S$PBB,,, | Performed by: INTERNAL MEDICINE

## 2019-06-11 ENCOUNTER — HOSPITAL ENCOUNTER (EMERGENCY)
Facility: OTHER | Age: 84
Discharge: HOME | End: 2019-06-11
Attending: EMERGENCY MEDICINE
Payer: MEDICARE

## 2019-06-11 VITALS
DIASTOLIC BLOOD PRESSURE: 77 MMHG | BODY MASS INDEX: 24.83 KG/M2 | TEMPERATURE: 98 F | OXYGEN SATURATION: 98 % | RESPIRATION RATE: 18 BRPM | WEIGHT: 149 LBS | HEART RATE: 84 BPM | HEIGHT: 65 IN | SYSTOLIC BLOOD PRESSURE: 186 MMHG

## 2019-06-11 DIAGNOSIS — N39.0 URINARY TRACT INFECTION WITHOUT HEMATURIA, SITE UNSPECIFIED: Primary | ICD-10-CM

## 2019-06-11 LAB
ABO + RH BLD: NORMAL
ALBUMIN SERPL BCP-MCNC: 3.8 G/DL (ref 3.5–5.2)
ALP SERPL-CCNC: 57 U/L (ref 55–135)
ALT SERPL W/O P-5'-P-CCNC: 15 U/L (ref 10–44)
ANION GAP SERPL CALC-SCNC: 13 MMOL/L (ref 8–16)
AST SERPL-CCNC: 21 U/L (ref 10–40)
BACTERIA #/AREA URNS HPF: ABNORMAL /HPF
BASOPHILS # BLD AUTO: 0.02 K/UL (ref 0–0.2)
BASOPHILS NFR BLD: 0.3 % (ref 0–1.9)
BILIRUB SERPL-MCNC: 0.4 MG/DL (ref 0.1–1)
BILIRUB UR QL STRIP: NEGATIVE
BLD GP AB SCN CELLS X3 SERPL QL: NORMAL
BUN SERPL-MCNC: 17 MG/DL (ref 8–23)
CALCIUM SERPL-MCNC: 9.8 MG/DL (ref 8.7–10.5)
CHLORIDE SERPL-SCNC: 106 MMOL/L (ref 95–110)
CLARITY UR: ABNORMAL
CO2 SERPL-SCNC: 22 MMOL/L (ref 23–29)
COLOR UR: YELLOW
CREAT SERPL-MCNC: 1.2 MG/DL (ref 0.5–1.4)
DIFFERENTIAL METHOD: ABNORMAL
EOSINOPHIL # BLD AUTO: 0.1 K/UL (ref 0–0.5)
EOSINOPHIL NFR BLD: 1.4 % (ref 0–8)
ERYTHROCYTE [DISTWIDTH] IN BLOOD BY AUTOMATED COUNT: 14.4 % (ref 11.5–14.5)
EST. GFR  (AFRICAN AMERICAN): 47 ML/MIN/1.73 M^2
EST. GFR  (NON AFRICAN AMERICAN): 41 ML/MIN/1.73 M^2
GLUCOSE SERPL-MCNC: 186 MG/DL (ref 70–110)
GLUCOSE UR QL STRIP: NEGATIVE
HCT VFR BLD AUTO: 35 % (ref 37–48.5)
HGB BLD-MCNC: 11.6 G/DL (ref 12–16)
HGB UR QL STRIP: ABNORMAL
KETONES UR QL STRIP: NEGATIVE
LEUKOCYTE ESTERASE UR QL STRIP: ABNORMAL
LYMPHOCYTES # BLD AUTO: 1.8 K/UL (ref 1–4.8)
LYMPHOCYTES NFR BLD: 30.6 % (ref 18–48)
MCH RBC QN AUTO: 29.6 PG (ref 27–31)
MCHC RBC AUTO-ENTMCNC: 33.1 G/DL (ref 32–36)
MCV RBC AUTO: 89 FL (ref 82–98)
MICROSCOPIC COMMENT: ABNORMAL
MONOCYTES # BLD AUTO: 0.5 K/UL (ref 0.3–1)
MONOCYTES NFR BLD: 7.8 % (ref 4–15)
NEUTROPHILS # BLD AUTO: 3.4 K/UL (ref 1.8–7.7)
NEUTROPHILS NFR BLD: 59.7 % (ref 38–73)
NITRITE UR QL STRIP: POSITIVE
PH UR STRIP: 6 [PH] (ref 5–8)
PLATELET # BLD AUTO: 269 K/UL (ref 150–350)
PMV BLD AUTO: 10.5 FL (ref 9.2–12.9)
POTASSIUM SERPL-SCNC: 3.3 MMOL/L (ref 3.5–5.1)
PROT SERPL-MCNC: 7.4 G/DL (ref 6–8.4)
PROT UR QL STRIP: ABNORMAL
RBC # BLD AUTO: 3.92 M/UL (ref 4–5.4)
RBC #/AREA URNS HPF: 1 /HPF (ref 0–4)
SODIUM SERPL-SCNC: 141 MMOL/L (ref 136–145)
SP GR UR STRIP: >=1.03 (ref 1–1.03)
SQUAMOUS #/AREA URNS HPF: 50 /HPF
URN SPEC COLLECT METH UR: ABNORMAL
UROBILINOGEN UR STRIP-ACNC: 1 EU/DL
WBC # BLD AUTO: 5.75 K/UL (ref 3.9–12.7)
WBC #/AREA URNS HPF: 24 /HPF (ref 0–5)

## 2019-06-11 PROCEDURE — 87086 URINE CULTURE/COLONY COUNT: CPT

## 2019-06-11 PROCEDURE — 85025 COMPLETE CBC W/AUTO DIFF WBC: CPT

## 2019-06-11 PROCEDURE — 87088 URINE BACTERIA CULTURE: CPT

## 2019-06-11 PROCEDURE — 81000 URINALYSIS NONAUTO W/SCOPE: CPT

## 2019-06-11 PROCEDURE — 86901 BLOOD TYPING SEROLOGIC RH(D): CPT

## 2019-06-11 PROCEDURE — 99283 EMERGENCY DEPT VISIT LOW MDM: CPT

## 2019-06-11 PROCEDURE — 87186 SC STD MICRODIL/AGAR DIL: CPT

## 2019-06-11 PROCEDURE — 87077 CULTURE AEROBIC IDENTIFY: CPT

## 2019-06-11 PROCEDURE — 80053 COMPREHEN METABOLIC PANEL: CPT

## 2019-06-11 RX ORDER — CEPHALEXIN 500 MG/1
500 CAPSULE ORAL 4 TIMES DAILY
Qty: 12 CAPSULE | Refills: 0 | Status: SHIPPED | OUTPATIENT
Start: 2019-06-11 | End: 2019-06-14

## 2019-06-11 NOTE — ED TRIAGE NOTES
"Pt presents to ED with c/o dark red blood in stool today. Pt states she is not sure how long this has been ongoing because "I have a black toilet at home and I'm staying with my daughter and she has a white toilet." Pt also reports fatigue and dizziness with exertion. Pt on Eliquis for recent hospitalization in April/May of this year for bilateral PE. Pt denies bleeding anywhere else, denies bruising. Denies abdominal pain, hematuria, dysuria, N/V/D. Pt semi-Fowlers in stretcher, AAOx4, calm, cooperative, responding appropriately to questions, speaking in full sentences, respirations even and unlabored, skin warm and dry, pt appears in NAD.  "

## 2019-06-11 NOTE — ED PROVIDER NOTES
"Encounter Date: 6/11/2019    SCRIBE #1 NOTE: I, Debora Tom, am scribing for, and in the presence of, Dr. Mancini.       History     Chief Complaint   Patient presents with    Rectal Bleeding     pt  with blood on stool this am . pt on blood thiners.      Time seen by provider: 3:36 PM    This is a 86 y.o. female who presents with complaint of blood in stool this morning. She states that it was hard stool with bright red blood. Pt is unsure if this has been ongoing for a longer time, she states that she has a black toilet at home but noticed when using her daughter's white toilet. Pt states that she has fatigue and dizziness with activity for the past few weeks, she states "I go sit down before I pass out." Pt was admitted in this ED for PE and left DVT from 4/27 to 5/2, she states that was started on anticoagulant at that time. Pt denies any other bleeding, no gum bleeding, epistaxis, or hematuria. She denies bruising, abdominal pain, chest pain, and SOB. Pt denies any known hemorrhoids. She had colonoscopy 15-20 years ago, normal at that time. She does not smoke, drink, or use illicit drugs. Pt states that she is Tenriism, declines blood transfusion if needed. Her PCP is at [facility].    The history is provided by the patient and a relative.     Review of patient's allergies indicates:   Allergen Reactions    Trazodone Other (See Comments)     Nightmares/sleep walk  Other reaction(s): Other (See Comments)  Nightmares/sleep walk    Melatonin      Other reaction(s): Other (See Comments)  Sleep Walks and has unnatural dreams    Talwin compound      Other reaction(s): Hives    Talwin [pentazocine lactate] Other (See Comments)     Hallucinations^    Transzone Other (See Comments)     Sleep Walks and has unnatural dreams    Bentyl [dicyclomine] Anxiety    Tessalon [benzonatate] Anxiety     Past Medical History:   Diagnosis Date    Arthritis     Asthma     Bilateral pulmonary embolism " 4/27/2019    Cataract     Depression     Diabetes mellitus     Fibromyalgia 7/2/2012    Fibromyalgia     GERD (gastroesophageal reflux disease)     Hypertension 7/2/2012    Thoracic or lumbosacral neuritis or radiculitis, unspecified     Thyroid disease     Ulcer      Past Surgical History:   Procedure Laterality Date    CATARACT EXTRACTION Bilateral     AILEEN-TRANSFORAMINAL Right 5/13/2014    Performed by Saranya Robb MD at [facility] PAIN MGT    ESOPHAGOGASTRODUODENOSCOPY (EGD) W/Endomicroscopy N/A 9/21/2015    [facility] Eddie Martinez [facility] ENDO    EYE SURGERY      FOOT NEUROMA SURGERY  1985    HYSTERECTOMY       Family History   Problem Relation Age of Onset    Diabetes Mother     Diabetes Brother     Emphysema Maternal Aunt     Melanoma Neg Hx     Asthma Neg Hx      Social History     Tobacco Use    Smoking status: Never Smoker    Smokeless tobacco: Never Used   Substance Use Topics    Alcohol use: No    Drug use: No     Review of Systems   Constitutional: Positive for fatigue. Negative for chills and fever.   HENT: Negative for congestion, nosebleeds, rhinorrhea and sore throat.    Eyes: Negative for visual disturbance.   Respiratory: Negative for cough and shortness of breath.    Cardiovascular: Negative for chest pain.   Gastrointestinal: Positive for blood in stool. Negative for abdominal pain, diarrhea, nausea and vomiting.   Endocrine: Negative for polyuria.   Genitourinary: Negative for decreased urine volume, dysuria and hematuria.   Musculoskeletal: Negative for back pain.   Skin: Negative for rash.   Allergic/Immunologic: Negative for immunocompromised state.   Neurological: Positive for dizziness. Negative for syncope, weakness, numbness and headaches.        Positive for near-syncope sensation.   Hematological: Does not bruise/bleed easily.   Psychiatric/Behavioral: Negative for confusion.       Physical Exam     Initial Vitals [06/11/19 1518]   BP Pulse Resp Temp  SpO2   (!) 187/65 89 18 98.1 °F (36.7 °C) 97 %      MAP       --         Physical Exam    Nursing note and vitals reviewed.  Constitutional: She appears well-developed and well-nourished. She is not diaphoretic. No distress.   HENT:   Head: Normocephalic and atraumatic.   Right Ear: External ear normal.   Left Ear: External ear normal.   Eyes: Right eye exhibits no discharge. Left eye exhibits no discharge.   No pallor or icterus.   Neck: Normal range of motion. Neck supple.   Cardiovascular: Normal rate, regular rhythm and normal heart sounds. Exam reveals no gallop and no friction rub.    No murmur heard.  Pulmonary/Chest: Breath sounds normal. No respiratory distress. She has no wheezes. She has no rhonchi. She has no rales.   Abdominal: Soft. Bowel sounds are normal. She exhibits no distension. There is no tenderness. There is no rebound and no guarding.   Genitourinary:   Genitourinary Comments: Rectal exam reveals: No hemorrhoids, fissures, or perirectal masses. Brown stool, guaiac negative.   Musculoskeletal: Normal range of motion. She exhibits no edema or tenderness.   Lymphadenopathy:     She has no cervical adenopathy.   Neurological: She is alert and oriented to person, place, and time. She has normal strength. No sensory deficit.   Skin: Skin is warm and dry. No rash noted. No erythema.   Psychiatric: She has a normal mood and affect. Her behavior is normal.         ED Course   Procedures  Labs Reviewed   CBC W/ AUTO DIFFERENTIAL - Abnormal; Notable for the following components:       Result Value    RBC 3.92 (*)     Hemoglobin 11.6 (*)     Hematocrit 35.0 (*)     All other components within normal limits   COMPREHENSIVE METABOLIC PANEL - Abnormal; Notable for the following components:    Potassium 3.3 (*)     CO2 22 (*)     Glucose 186 (*)     eGFR if  47 (*)     eGFR if non  41 (*)     All other components within normal limits   URINALYSIS, REFLEX TO URINE CULTURE -  Abnormal; Notable for the following components:    Appearance, UA Hazy (*)     Specific Gravity, UA >=1.030 (*)     Protein, UA Trace (*)     Occult Blood UA 1+ (*)     Nitrite, UA Positive (*)     Leukocytes, UA Trace (*)     All other components within normal limits    Narrative:     Preferred Collection Type->Urine, Clean Catch   URINALYSIS MICROSCOPIC - Abnormal; Notable for the following components:    WBC, UA 24 (*)     Bacteria Moderate (*)     All other components within normal limits    Narrative:     Preferred Collection Type->Urine, Clean Catch   CULTURE, URINE   TYPE & SCREEN          Imaging Results    None          Medical Decision Making:   Clinical Tests:   Lab Tests: Ordered and Reviewed            Scribe Attestation:   Scribe #1: I performed the above scribed service and the documentation accurately describes the services I performed. I attest to the accuracy of the note.    Attending Attestation:           Physician Attestation for Scribe:  Physician Attestation Statement for Scribe #1: I, Dr. Mancini, reviewed documentation, as scribed by Debora Tom in my presence, and it is both accurate and complete.          Patient presents with concern for blood in her stool.  Reports she was at her daughter's house, and had a solid stool which appeared to have a lot of maroon-colored blood in it.  She reports that she is not sure how long she may have had blood in her stool because she normally goes to the bathroom in a black colored toilet, however her daughter's toilet is white.  She is on blood thinners due to recent pulmonary embolism.  She is not having any other bleeding or unusual bruising.  Of note she is a Faith and reports that she does not want blood transfusions.  Fortunately she is hemodynamically stable, her hemoglobin is at baseline.  Rectal exam shows brown stool, no evidence of gross blood and guaiac is negative. I doubt that the large amount of maroon-colored blood that  she describes would clear so quickly, so I am somewhat dubious that this represented a lower GI bleed.  Urinalysis was obtained to ensure that this was not gross hematuria.  It shows possible UTI , with white blood cells, bacteria and positive nitrite albeit with numerous squamous cells.  Will place on a 3 day course of antibiotics.  Discussed return precautions.  Encouraged to continue monitor stool.    She reports that she has had a prior colonoscopy greater than 10 years ago which was unremarkable.           Clinical Impression:     1. Urinary tract infection without hematuria, site unspecified                                 Sridhar Mancini II, MD  06/11/19 6434

## 2019-06-12 DIAGNOSIS — I26.99 PULMONARY EMBOLISM, BILATERAL: Primary | ICD-10-CM

## 2019-06-14 LAB — BACTERIA UR CULT: NORMAL

## 2019-06-18 ENCOUNTER — HOSPITAL ENCOUNTER (OUTPATIENT)
Dept: RADIOLOGY | Facility: OTHER | Age: 84
Discharge: HOME | End: 2019-06-18
Attending: PSYCHIATRY & NEUROLOGY
Payer: MEDICARE

## 2019-06-18 ENCOUNTER — OFFICE VISIT (OUTPATIENT)
Dept: NEUROLOGY | Facility: CLINIC | Age: 84
End: 2019-06-18
Payer: MEDICARE

## 2019-06-18 VITALS
HEART RATE: 90 BPM | WEIGHT: 148.13 LBS | BODY MASS INDEX: 24.68 KG/M2 | DIASTOLIC BLOOD PRESSURE: 78 MMHG | SYSTOLIC BLOOD PRESSURE: 139 MMHG | HEIGHT: 65 IN

## 2019-06-18 DIAGNOSIS — H91.90 HEARING LOSS, UNSPECIFIED HEARING LOSS TYPE, UNSPECIFIED LATERALITY: ICD-10-CM

## 2019-06-18 DIAGNOSIS — G31.84 MILD COGNITIVE IMPAIRMENT: ICD-10-CM

## 2019-06-18 DIAGNOSIS — G44.52 NEW DAILY PERSISTENT HEADACHE: ICD-10-CM

## 2019-06-18 DIAGNOSIS — G31.84 MILD COGNITIVE IMPAIRMENT: Primary | ICD-10-CM

## 2019-06-18 DIAGNOSIS — I10 ESSENTIAL HYPERTENSION: ICD-10-CM

## 2019-06-18 DIAGNOSIS — M51.36 DDD (DEGENERATIVE DISC DISEASE), LUMBAR: ICD-10-CM

## 2019-06-18 DIAGNOSIS — H81.90 VESTIBULAR DIZZINESS: ICD-10-CM

## 2019-06-18 DIAGNOSIS — F33.9 DEPRESSION, RECURRENT: ICD-10-CM

## 2019-06-18 DIAGNOSIS — I26.99 BILATERAL PULMONARY EMBOLISM: ICD-10-CM

## 2019-06-18 PROCEDURE — 99999 PR PBB SHADOW E&M-EST. PATIENT-LVL III: CPT | Mod: PBBFAC,,, | Performed by: PSYCHIATRY & NEUROLOGY

## 2019-06-18 PROCEDURE — 99214 PR OFFICE/OUTPT VISIT, EST, LEVL IV, 30-39 MIN: ICD-10-PCS | Mod: S$PBB,,, | Performed by: PSYCHIATRY & NEUROLOGY

## 2019-06-18 PROCEDURE — 70450 CT HEAD/BRAIN W/O DYE: CPT | Mod: TC

## 2019-06-18 PROCEDURE — 70450 CT HEAD WITHOUT CONTRAST: ICD-10-PCS | Mod: 26,,, | Performed by: RADIOLOGY

## 2019-06-18 PROCEDURE — 99213 OFFICE O/P EST LOW 20 MIN: CPT | Mod: PBBFAC,25 | Performed by: PSYCHIATRY & NEUROLOGY

## 2019-06-18 PROCEDURE — 70450 CT HEAD/BRAIN W/O DYE: CPT | Mod: 26,,, | Performed by: RADIOLOGY

## 2019-06-18 PROCEDURE — 99999 PR PBB SHADOW E&M-EST. PATIENT-LVL III: ICD-10-PCS | Mod: PBBFAC,,, | Performed by: PSYCHIATRY & NEUROLOGY

## 2019-06-18 PROCEDURE — 99214 OFFICE O/P EST MOD 30 MIN: CPT | Mod: S$PBB,,, | Performed by: PSYCHIATRY & NEUROLOGY

## 2019-06-18 RX ORDER — LEVOTHYROXINE SODIUM 75 UG/1
TABLET ORAL
COMMUNITY
Start: 2019-05-21 | End: 2019-10-09

## 2019-06-18 RX ORDER — ESTRADIOL 0.05 MG/D
FILM, EXTENDED RELEASE TRANSDERMAL
COMMUNITY
Start: 2019-05-27 | End: 2019-07-28

## 2019-06-18 RX ORDER — AMLODIPINE BESYLATE 5 MG/1
TABLET ORAL
COMMUNITY
Start: 2019-05-21 | End: 2019-07-22

## 2019-06-18 RX ORDER — TALC
1 POWDER (GRAM) TOPICAL DAILY
Refills: 0 | COMMUNITY
Start: 2019-06-18 | End: 2019-12-03

## 2019-06-18 NOTE — PROGRESS NOTES
Subjective:       Patient ID: Debbie Ding is a 86 y.o. female.    Chief Complaint:  Memory Loss      History of Present Illness  HPI  This is an 86-year-old -American female who had been seen with complaints of memory difficulties since 2009.  She was last seen by me 1 year ago.  She comes to the clinic accompanied by family member. The patient reports that ever since the death of her  in 2012, she been having a difficult time with retention of recent information and had difficulty keeping track of conversations and if she reads a book she loses track of what she read earlier.  She was started Aricept which had initially improved however family members stated there has been a gradual decline in her ability to retaining recent information and tends to repeat herself or ask the same questions.  She has hearing impairment and is supposed to use hearing aids which she does not use them all the time.  She was recently admitted to the hospital for pulmonary embolus related to DVT.  She was subsequently in rehab and has gradually recovered all to almost a usual baseline.  She now ambulates without assistance.  She has history of asthma and does report that her pulmonary problems as increased after the pulmonary embolus.  She is presently on Eliquis.    She lives alone however her daughter lives below her apartment and keeps an eye on her.  She does not drive.  She no longer does the.. She is able to take care of her day-to-day needs and personal hygiene and manages her medications and finances.  An MRI scan of the brain done in 2014 showed evidence of small vessel ischemic changes but was otherwise unremarkable.  She also notes that since admission to the hospital she has had generalized pressure-like headache that fluctuations in nature.  It is not associated with any visual or other focal neurological symptoms.  She only takes Tylenol as needed for the headache she cannot take NSAIDs or aspirin as she  is on Eliquis.       Review of Systems  Review of Systems   Constitutional: Negative.    HENT: Positive for hearing loss (Uses hearing aids).    Eyes: Negative.  Negative for visual disturbance.   Respiratory: Negative.  Negative for shortness of breath.    Cardiovascular: Negative.  Negative for chest pain and palpitations.   Gastrointestinal: Negative.    Genitourinary: Negative.    Musculoskeletal: Positive for arthralgias, back pain and neck pain. Negative for gait problem.   Skin: Negative.    Neurological: Positive for headaches. Negative for tremors, seizures, syncope, speech difficulty, weakness and numbness.   Psychiatric/Behavioral: Positive for decreased concentration. Negative for confusion. The patient is nervous/anxious.        Objective:      Neurologic Exam    Physical Exam   Constitutional: She appears well-developed and well-nourished.   HENT:   Head: Normocephalic and atraumatic.   Right Ear: Hearing normal.   Left Ear: Hearing normal.   Eyes:   Fundus examination showed sharp disc margins.  Pupils are equal and reactive with EOM full.   Neck: Normal range of motion. Neck supple. Muscular tenderness (mild paraspinal muscle tenderness predominantly in the upper cervical region) present. Carotid bruit is not present.   Neurological: She is alert. She has normal reflexes. She displays no atrophy (Normal and symmetrical strength). No cranial nerve deficit (Visual fields at bedside testing essentially normal.  No facial asymmetry noted with facial movements and sensory exam being normal/symmetrical.  Corneals/gag reflexes normal.  Tongue & palate movements normal.  Shoulder shrug was normal.) or sensory deficit. She exhibits normal muscle tone. She displays a negative Romberg sign. Coordination and gait normal.   Mental status examination: The patient is oriented to place, person and time. Able to give an adequate history without any difficulty. Recall of recent information was called and immediate  recall was fair (2/3 after 1 minute). Attention span and concentration was slightly impaired. Fund of knowledge was fair. Affect was appropriate, mood was slightly anxious. No thought disorder noted. Judgment and insight was normal. Speech was of normal flow and content. Comprehension was unimpaired.    Vitals reviewed.        Assessment:        1. Mild cognitive impairment    2. Vestibular dizziness    3. Essential hypertension    4. Depression, recurrent    5. DDD (degenerative disc disease), lumbar    6. Hearing loss, unspecified hearing loss type, unspecified laterality    7. Bilateral pulmonary embolism    8. New daily persistent headache             Plan:       Discussed with patient.  Noted recent history of pulmonary embolus related to DVT.  In addition she has been having daily headaches since then which may be related to the medication effect or increase in pulmonary problems with associated mild hypoxemia.  Will get a CT scan of the head, noncontrast.  Advised OTC magnesium oxide 250 mg at bedtime daily for headache prophylaxis.  She will follow up in 3 months..

## 2019-06-20 ENCOUNTER — HOSPITAL ENCOUNTER (EMERGENCY)
Facility: OTHER | Age: 84
Discharge: HOME | End: 2019-06-20
Attending: EMERGENCY MEDICINE
Payer: MEDICARE

## 2019-06-20 ENCOUNTER — TELEPHONE (OUTPATIENT)
Dept: NEUROLOGY | Facility: CLINIC | Age: 84
End: 2019-06-20

## 2019-06-20 VITALS
OXYGEN SATURATION: 100 % | WEIGHT: 149 LBS | BODY MASS INDEX: 24.83 KG/M2 | RESPIRATION RATE: 18 BRPM | HEART RATE: 90 BPM | DIASTOLIC BLOOD PRESSURE: 76 MMHG | TEMPERATURE: 98 F | HEIGHT: 65 IN | SYSTOLIC BLOOD PRESSURE: 179 MMHG

## 2019-06-20 DIAGNOSIS — I82.5Z1 CHRONIC DEEP VEIN THROMBOSIS (DVT) OF DISTAL VEIN OF RIGHT LOWER EXTREMITY: ICD-10-CM

## 2019-06-20 DIAGNOSIS — M79.89 LEG SWELLING: ICD-10-CM

## 2019-06-20 DIAGNOSIS — M71.21 BAKER CYST, RIGHT: Primary | ICD-10-CM

## 2019-06-20 LAB
ALBUMIN SERPL BCP-MCNC: 4.2 G/DL (ref 3.5–5.2)
ALP SERPL-CCNC: 56 U/L (ref 55–135)
ALT SERPL W/O P-5'-P-CCNC: 19 U/L (ref 10–44)
ANION GAP SERPL CALC-SCNC: 9 MMOL/L (ref 8–16)
APTT BLDCRRT: 32 SEC (ref 21–32)
AST SERPL-CCNC: 28 U/L (ref 10–40)
BILIRUB SERPL-MCNC: 0.8 MG/DL (ref 0.1–1)
BUN SERPL-MCNC: 16 MG/DL (ref 8–23)
CALCIUM SERPL-MCNC: 10.6 MG/DL (ref 8.7–10.5)
CHLORIDE SERPL-SCNC: 103 MMOL/L (ref 95–110)
CO2 SERPL-SCNC: 29 MMOL/L (ref 23–29)
CREAT SERPL-MCNC: 1.3 MG/DL (ref 0.5–1.4)
EST. GFR  (AFRICAN AMERICAN): 43 ML/MIN/1.73 M^2
EST. GFR  (NON AFRICAN AMERICAN): 37 ML/MIN/1.73 M^2
GLUCOSE SERPL-MCNC: 120 MG/DL (ref 70–110)
INR PPP: 0.9 (ref 0.8–1.2)
POTASSIUM SERPL-SCNC: 3.6 MMOL/L (ref 3.5–5.1)
PROT SERPL-MCNC: 8 G/DL (ref 6–8.4)
PROTHROMBIN TIME: 10.5 SEC (ref 9–12.5)
SODIUM SERPL-SCNC: 141 MMOL/L (ref 136–145)

## 2019-06-20 PROCEDURE — 36000 PLACE NEEDLE IN VEIN: CPT

## 2019-06-20 PROCEDURE — 99284 EMERGENCY DEPT VISIT MOD MDM: CPT | Mod: 25

## 2019-06-20 PROCEDURE — 85610 PROTHROMBIN TIME: CPT

## 2019-06-20 PROCEDURE — 85730 THROMBOPLASTIN TIME PARTIAL: CPT

## 2019-06-20 PROCEDURE — 80053 COMPREHEN METABOLIC PANEL: CPT

## 2019-06-20 NOTE — TELEPHONE ENCOUNTER
Patient is going to check for MagOxide 250 mg at [facility] or [facility], if they don't have this dose can she take the 400 mg?

## 2019-06-20 NOTE — ED PROVIDER NOTES
Encounter Date: 6/20/2019       History     Chief Complaint   Patient presents with    Leg Pain     hhn sent her in for r/o dvt     Patient is a 86-year-old female with asthma, hypertension, bilateral pulmonary emboli, diabetes and arthritis who presents to the ED with leg pain. Patient reports right knee swelling and calf pain for the past week.  She states the swelling and pain is worsening.  She states it is worse with ambulation.  She denies injury. Patient was evaluated by home health nurse this morning who recommended she come to the emergency department to rule out DVT.  Patient denies numbness or weakness this leg.  She is currently anticoagulated on Eliquis which she began taking on 04/29 (she has been compliant with this and never discontinued medication).  She has history of right lower extremity DVT.  She denies chest pain or shortness of breath.  Of note, patient was evaluated by Dr. Tre curiel for possible rectal bleeding.  Patient had ED visit on 06/11 that revealed stable H&H and negative Hemoccult testing.       The history is provided by the patient.     Review of patient's allergies indicates:   Allergen Reactions    Trazodone Other (See Comments)     Nightmares/sleep walk  Other reaction(s): Other (See Comments)  Nightmares/sleep walk    Melatonin      Other reaction(s): Other (See Comments)  Sleep Walks and has unnatural dreams    Talwin compound      Other reaction(s): Hives    Talwin [pentazocine lactate] Other (See Comments)     Hallucinations^    Transzone Other (See Comments)     Sleep Walks and has unnatural dreams    Bentyl [dicyclomine] Anxiety    Tessalon [benzonatate] Anxiety     Past Medical History:   Diagnosis Date    Arthritis     Asthma     Bilateral pulmonary embolism 4/27/2019    Cataract     Depression     Diabetes mellitus     Fibromyalgia 7/2/2012    Fibromyalgia     GERD (gastroesophageal reflux disease)     Hypertension 7/2/2012    Thoracic or  lumbosacral neuritis or radiculitis, unspecified     Thyroid disease     Ulcer      Past Surgical History:   Procedure Laterality Date    CATARACT EXTRACTION Bilateral     AILEEN-TRANSFORAMINAL Right 5/13/2014    [facility]    ESOPHAGOGASTRODUODENOSCOPY (EGD) W/Endomicroscopy N/A 9/21/2015    [facility] Eddie [facility]    EYE SURGERY      FOOT NEUROMA SURGERY  1985    HYSTERECTOMY       Family History   Problem Relation Age of Onset    Diabetes Mother     Diabetes Brother     Emphysema Maternal Aunt     Melanoma Neg Hx     Asthma Neg Hx      Social History     Tobacco Use    Smoking status: Never Smoker    Smokeless tobacco: Never Used   Substance Use Topics    Alcohol use: No    Drug use: No     Review of Systems   Constitutional: Negative for chills and fever.   Respiratory: Negative for shortness of breath.    Cardiovascular: Positive for leg swelling. Negative for chest pain.   Gastrointestinal: Negative for abdominal pain, nausea and vomiting.   Musculoskeletal: Positive for arthralgias and myalgias.   Skin: Negative for color change, rash and wound.   Neurological: Negative for weakness and numbness.       Physical Exam     Initial Vitals [06/20/19 1210]   BP Pulse Resp Temp SpO2   (!) 140/66 91 18 98.2 °F (36.8 °C) 98 %      MAP       --         Physical Exam    Vitals reviewed.  Constitutional: She is cooperative. No distress.   HENT:   Head: Normocephalic and atraumatic.   Eyes: EOM are normal. Pupils are equal, round, and reactive to light.   Neck: Normal range of motion. Neck supple.   Cardiovascular: Normal rate, regular rhythm and intact distal pulses.   Pulses:       Dorsalis pedis pulses are 2+ on the right side, and 2+ on the left side.   Pulmonary/Chest: She has no rhonchi. She has no rales.   Scant, expiratory wheezing to all lung fields   Musculoskeletal:   Right lower extremity appears slightly more edematous than the left.   "Diffuse edema noted to the right knee with palpable cyst/or cord to posterior right knee.  No pitting edema to right lower extremity.  Positive Homans sign to right lower extremity diffuse tenderness to right calf with palpation.   Neurological: She is alert and oriented to person, place, and time. GCS eye subscore is 4. GCS verbal subscore is 5. GCS motor subscore is 6.   Strength 5/5 to bilateral lower extremities   Skin: Skin is warm and dry. Capillary refill takes less than 2 seconds. No rash noted.   Psychiatric: She has a normal mood and affect. Her behavior is normal.         ED Course   Procedures  Labs Reviewed   COMPREHENSIVE METABOLIC PANEL - Abnormal; Notable for the following components:       Result Value    Glucose 120 (*)     Calcium 10.6 (*)     eGFR if  43 (*)     eGFR if non  37 (*)     All other components within normal limits   APTT   PROTIME-INR          Imaging Results    None          Medical Decision Making:   Initial Assessment:   Urgent evaluation of a 86 y.o. female presenting to the emergency department complaining of progressive right lower extremity pain and swelling. Patient is afebrile, nontoxic appearing and hemodynamically stable.  Patient does have slight edema noted to right lower extremity that is greater than the left.  No pitting edema. She does have callus tenderness, posterior knee tenderness and positive Homans sign.  She has history of DVT to this leg but is worried that her symptoms or worsening after being on anticoagulant therapy for approximately 2 months.  Bilateral lower extremity ultrasound obtained 2 months ago revealed "Near occlusive to occlusive thrombus within the distal right femoral vein extending to the popliteal and peroneal veins in this patient with known pulmonary thromboemboli".   ED Management:  Ultrasound of veins of bilateral lower extremities a positive for thrombus seen within the right popliteal and peroneal veins.  " Thrombus that was previously seen in the femoral vein is gone.  This is reassuring.  Patient also has a complex 4.7 cm Baker's cyst on the right lower extremity.  This is likely contributing patient swelling and pain.  CBC performed by Dr. Finch today reveals stable anemia, H&H was 11.9 and 36.  Case was discussed with attending and nurse practitioner hospitalist who agrees no further intervention is needed at this time.  Patient is encouraged to follow up with her PCP for follow-up regarding DVT and PE.  She does have pulmonology appointment in 6 days.  Patient had no other complaints today and was stable at discharge.  Other:   I have discussed this case with another health care provider.                      Clinical Impression:     1. Baker cyst, right    2. Leg swelling    3. 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

## 2019-06-20 NOTE — ED NOTES
Pt presents to ED via family with c/o possible DVT. Pt sent from [facility] due to increase in R leg swelling and pain. PMH of DVT, PE. R posterior leg appears swollen, warm to touch. Pt ambulatory to ED room. PT denies SOB, CP, numbness/tingling.     Two patient identifiers have been checked and are correct.      Appearance: Pt awake, alert & oriented to person, place & time. Pt in no acute distress at present time. Pt is clean and well groomed with clothes appropriately fastened.   Skin: SEE HPI.   Musculoskeletal: SEE HPI.   Respiratory: Respirations spontaneous, even, and non-labored. Visible chest rise noted. Airway is open and patent. No accessory muscle use noted.   Neurologic: Sensation is intact. Speech is clear and appropriate. Eyes open spontaneously, behavior appropriate to situation, follows commands, facial expression symmetrical, bilateral hand grasp equal and even, purposeful motor response noted.  Cardiac: All peripheral pulses present. No Bilateral lower extremity edema. Cap refill is <3 seconds.  Abdomen: Abdomen soft, non-tender to palpation.   : Pt reports no dysuria or hematuria.

## 2019-06-20 NOTE — TELEPHONE ENCOUNTER
----- Message from Shanti Barnett sent at 6/20/2019  9:44 AM CDT -----  Contact: DEJAH ROLDAN [[id number]]  Name of Who is Calling: DEJAH ROLDAN [[id number]]      What is the request in detail: Pt is requesting a call back from clinical team in regards to Medication.    Please contact to further discuss and advise.       Can the clinic reply by [facility]:       What Number to Call Back if not [facility]: [phone]

## 2019-06-28 ENCOUNTER — TELEPHONE (OUTPATIENT)
Dept: NEUROLOGY | Facility: CLINIC | Age: 84
End: 2019-06-28

## 2019-06-28 NOTE — TELEPHONE ENCOUNTER
Patient advised as per  to increase MAG- mg to take twice daily for headaches. Patient states she is not sleeping well due to the headaches. Advised to contact our office if this should not help, but to give it a couple of weeks.

## 2019-06-28 NOTE — TELEPHONE ENCOUNTER
----- Message from Cristy Orellana sent at 6/28/2019  9:07 AM CDT -----  Contact: DEJAH ROLDAN   Name of Who is Calling: DEJAH ROLDAN       What is the request in detail:Patient is requesting a call back she states the magnesium oxide (MAG-OX) 250 mg magnesium Tab medication is not working and she would like to discuss this issue       Can the clinic reply by [facility]: no      What Number to Call Back if not in [facility]: [phone]

## 2019-06-30 ENCOUNTER — EXTERNAL CHRONIC CARE MANAGEMENT (OUTPATIENT)
Dept: PRIMARY CARE CLINIC | Facility: CLINIC | Age: 84
End: 2019-06-30
Payer: MEDICARE

## 2019-06-30 PROCEDURE — 99490 CHRNC CARE MGMT STAFF 1ST 20: CPT | Mod: S$PBB,,, | Performed by: INTERNAL MEDICINE

## 2019-06-30 PROCEDURE — 99490 CHRNC CARE MGMT STAFF 1ST 20: CPT | Mod: PBBFAC | Performed by: INTERNAL MEDICINE

## 2019-06-30 PROCEDURE — 99490 PR CHRONIC CARE MGMT, 1ST 20 MIN: ICD-10-PCS | Mod: S$PBB,,, | Performed by: INTERNAL MEDICINE

## 2019-07-08 ENCOUNTER — TELEPHONE (OUTPATIENT)
Dept: DERMATOLOGY | Facility: CLINIC | Age: 84
End: 2019-07-08

## 2019-07-08 ENCOUNTER — HOSPITAL ENCOUNTER (OUTPATIENT)
Dept: PULMONOLOGY | Facility: CLINIC | Age: 84
Discharge: HOME | End: 2019-07-08
Payer: MEDICARE

## 2019-07-08 ENCOUNTER — OFFICE VISIT (OUTPATIENT)
Dept: PULMONOLOGY | Facility: CLINIC | Age: 84
End: 2019-07-08
Payer: MEDICARE

## 2019-07-08 VITALS — HEIGHT: 65 IN | BODY MASS INDEX: 24.12 KG/M2 | WEIGHT: 144.81 LBS

## 2019-07-08 VITALS
HEIGHT: 65 IN | BODY MASS INDEX: 24.06 KG/M2 | HEART RATE: 75 BPM | DIASTOLIC BLOOD PRESSURE: 72 MMHG | SYSTOLIC BLOOD PRESSURE: 140 MMHG | WEIGHT: 144.38 LBS | OXYGEN SATURATION: 96 %

## 2019-07-08 DIAGNOSIS — R06.09 DOE (DYSPNEA ON EXERTION): Primary | ICD-10-CM

## 2019-07-08 DIAGNOSIS — I26.99 BILATERAL PULMONARY EMBOLISM: ICD-10-CM

## 2019-07-08 DIAGNOSIS — R06.09 DOE (DYSPNEA ON EXERTION): ICD-10-CM

## 2019-07-08 DIAGNOSIS — J45.909 CHRONIC ASTHMA WITHOUT COMPLICATION, UNSPECIFIED ASTHMA SEVERITY, UNSPECIFIED WHETHER PERSISTENT: ICD-10-CM

## 2019-07-08 LAB
POST FEV1 FVC: 0.77
POST FEV1: 2.02
POST FVC: 2.61
PRE FEV1 FVC: 75
PRE FEV1: 1.91
PRE FVC: 2.56
PREDICTED FEV1 FVC: 74
PREDICTED FEV1: 1.98
PREDICTED FVC: 2.69

## 2019-07-08 PROCEDURE — 94060 EVALUATION OF WHEEZING: CPT | Mod: 26,S$PBB,, | Performed by: INTERNAL MEDICINE

## 2019-07-08 PROCEDURE — 99214 PR OFFICE/OUTPT VISIT, EST, LEVL IV, 30-39 MIN: ICD-10-PCS | Mod: S$PBB,,, | Performed by: NURSE PRACTITIONER

## 2019-07-08 PROCEDURE — 94618 PULMONARY STRESS TESTING: CPT | Mod: PBBFAC | Performed by: INTERNAL MEDICINE

## 2019-07-08 PROCEDURE — 94729 PR C02/MEMBANE DIFFUSE CAPACITY: ICD-10-PCS | Mod: 26,S$PBB,, | Performed by: INTERNAL MEDICINE

## 2019-07-08 PROCEDURE — 94060 PR EVAL OF BRONCHOSPASM: ICD-10-PCS | Mod: 26,S$PBB,, | Performed by: INTERNAL MEDICINE

## 2019-07-08 PROCEDURE — 94618 PULMONARY STRESS TESTING: ICD-10-PCS | Mod: 26,S$PBB,, | Performed by: INTERNAL MEDICINE

## 2019-07-08 PROCEDURE — 94060 EVALUATION OF WHEEZING: CPT | Mod: PBBFAC | Performed by: INTERNAL MEDICINE

## 2019-07-08 PROCEDURE — 99213 OFFICE O/P EST LOW 20 MIN: CPT | Mod: PBBFAC,25 | Performed by: NURSE PRACTITIONER

## 2019-07-08 PROCEDURE — 99999 PR PBB SHADOW E&M-EST. PATIENT-LVL III: CPT | Mod: PBBFAC,,, | Performed by: NURSE PRACTITIONER

## 2019-07-08 PROCEDURE — 94729 DIFFUSING CAPACITY: CPT | Mod: PBBFAC | Performed by: INTERNAL MEDICINE

## 2019-07-08 PROCEDURE — 99214 OFFICE O/P EST MOD 30 MIN: CPT | Mod: S$PBB,,, | Performed by: NURSE PRACTITIONER

## 2019-07-08 PROCEDURE — 94618 PULMONARY STRESS TESTING: CPT | Mod: 26,S$PBB,, | Performed by: INTERNAL MEDICINE

## 2019-07-08 PROCEDURE — 99999 PR PBB SHADOW E&M-EST. PATIENT-LVL III: ICD-10-PCS | Mod: PBBFAC,,, | Performed by: NURSE PRACTITIONER

## 2019-07-08 PROCEDURE — 94729 DIFFUSING CAPACITY: CPT | Mod: 26,S$PBB,, | Performed by: INTERNAL MEDICINE

## 2019-07-08 RX ORDER — POLYETHYLENE GLYCOL-3350 AND ELECTROLYTES WITH FLAVOR PACK 240; 5.84; 2.98; 6.72; 22.72 G/278.26G; G/278.26G; G/278.26G; G/278.26G; G/278.26G
POWDER, FOR SOLUTION ORAL
Refills: 0 | COMMUNITY
Start: 2019-06-20 | End: 2019-07-22

## 2019-07-08 NOTE — ASSESSMENT & PLAN NOTE
Recent venous doppler US showed resolution of lower leg clot.  Patient continues to have some SOB.  Unsure if this is secondary to suboptimal dosing of Advair or to residual clot.  If patient does not improve with increased use of Advair will complete Echo to assess for CTEPH. Encouraged to use albuterol for SOB- she has not tried it to see if symptoms would resolve.

## 2019-07-08 NOTE — PROCEDURES
Debbie Ding is a 86 y.o.  female patient, who presents for a 6 minute walk test ordered by TOMI Davis.  The diagnosis is Shortness of Breath.  The patient's BMI is 24.2 kg/m2.  Predicted distance (lower limit of normal) is 225.61 meters.      Test Results:    The test was completed with stops.  The patient stopped 1 times for a total of 42 seconds.  The total time walked was 318 seconds.  During walking, the patient reported:  Dyspnea, Leg pain, Lightheadedness. The patient used no assistive devices  during testing.     07/08/2019---------Distance: 145.39 meters (477 feet)     O2 Sat % Supplemental Oxygen Heart Rate Blood Pressure Yudith Scale   Pre-exercise  (Resting) 98 % Room Air 82 bpm 173/73 mmHg 1   During Exercise 95 % Room Air 85 bpm 160/72 mmHg 4   Post-exercise  (Recovery) 95 % Room Air  82 bpm   mmHg       Recovery Time: 87 seconds    Performing nurse/tech: Kenny PITTS      PREVIOUS STUDY:   The patient has not had a previous study.      CLINICAL INTERPRETATION:  Six minute walk distance is 145.39 meters (477 feet) with somewhat heavy dyspnea.  During exercise, there was significant desaturation while breathing room air.  Both blood pressure and heart rate remained stable with walking.  Hypertension was present prior to exercise.  The patient reported non-pulmonary symptoms during exercise.  Severe exercise impairment is likely due to cardiovascular causes.  The patient did complete the study, walking 318 seconds of the 360 second test.  No previous study performed.  Based upon age and body mass index, exercise capacity is less than predicted.

## 2019-07-08 NOTE — ASSESSMENT & PLAN NOTE
Will prescribe nebulizer machine.  Instructed to use Advair BID.  Hold Spiriva.  PFTs show no obstruction or diffusion impairment.

## 2019-07-08 NOTE — TELEPHONE ENCOUNTER
----- Message from Phuong Stephen sent at 7/8/2019 11:55 AM CDT -----  Contact: Daughter Madina [phone]  She said she wants to make an appt with the doctor again because she is having excessive sweating. Please call the daughter back to discuss if this is where she needs to come. She states you had treated her a few years ago. Please advise.

## 2019-07-08 NOTE — PROGRESS NOTES
Subjective:       Patient ID: Debbie Ding is a 86 y.o. female.    Chief Complaint: No chief complaint on file.    HPI:   Debbie Ding is a 86 y.o. female who presents with diagnosed with asthma in 1980. Last albuterol use a few months ago, last neb use was in hospital, has no nebs.  Took a medication about 5 years ago for sweating but she can't take it due to her blood thinner.  She is perspiring and urinating constantly and is worried about dehydration. Greatest complaints today are excessive sweating and increased urinary frequency.  Her urologist is at [facility].  Doing PT and OT regularly, two days a week.        Review of Systems   Constitutional: Negative for fever, chills, activity change, fatigue and night sweats.   HENT: Positive for postnasal drip. Negative for rhinorrhea, trouble swallowing and congestion.    Eyes: Negative for itching.   Respiratory: Positive for cough (started 10 days ago, dry and deep hacking), shortness of breath and dyspnea on extertion. Negative for hemoptysis, sputum production, choking, chest tightness, wheezing and use of rescue inhaler.    Cardiovascular: Negative for chest pain, palpitations and leg swelling.   Genitourinary: Negative for difficulty urinating.   Endocrine: Negative for cold intolerance and heat intolerance.    Musculoskeletal: Negative for arthralgias.   Skin: Negative for rash.   Gastrointestinal: Positive for acid reflux. Negative for nausea and vomiting.   Neurological: Positive for headaches (worse when lying on right side). Negative for dizziness and light-headedness.   Hematological: Negative for adenopathy.   Psychiatric/Behavioral: Positive for sleep disturbance.         Social History     Tobacco Use    Smoking status: Never Smoker    Smokeless tobacco: Never Used   Substance Use Topics    Alcohol use: No       Review of patient's allergies indicates:   Allergen Reactions    Trazodone Other (See Comments)     Nightmares/sleep walk  Other  reaction(s): Other (See Comments)  Nightmares/sleep walk    Melatonin      Other reaction(s): Other (See Comments)  Sleep Walks and has unnatural dreams    Talwin compound      Other reaction(s): Hives    Talwin [pentazocine lactate] Other (See Comments)     Hallucinations^    Transzone Other (See Comments)     Sleep Walks and has unnatural dreams    Bentyl [dicyclomine] Anxiety    Tessalon [benzonatate] Anxiety     Past Medical History:   Diagnosis Date    Arthritis     Asthma     Bilateral pulmonary embolism 4/27/2019    Cataract     Depression     Diabetes mellitus     Fibromyalgia 7/2/2012    Fibromyalgia     GERD (gastroesophageal reflux disease)     Hypertension 7/2/2012    Thoracic or lumbosacral neuritis or radiculitis, unspecified     Thyroid disease     Ulcer      Past Surgical History:   Procedure Laterality Date    CATARACT EXTRACTION Bilateral     AILEEN-TRANSFORAMINAL Right 5/13/2014    Performed by Saranya Robb MD at [facility] PAIN MGT    ESOPHAGOGASTRODUODENOSCOPY (EGD) W/Endomicroscopy N/A 9/21/2015    Performed by Eddie Martinez MD at [facility] ENDO    EYE SURGERY      FOOT NEUROMA SURGERY  1985    HYSTERECTOMY       Current Outpatient Medications on File Prior to Visit   Medication Sig    acetaminophen (TYLENOL) 500 MG tablet Take 1-2 tablets (500-1,000 mg total) by mouth 3 (three) times daily as needed for Pain.    ADVAIR DISKUS 250-50 mcg/dose diskus inhaler     albuterol 90 mcg/actuation inhaler Inhale 2 puffs into the lungs every 4 (four) hours as needed for Wheezing.    albuterol-ipratropium (DUO-NEB) 2.5 mg-0.5 mg/3 mL nebulizer solution Take 3 mLs by nebulization every 4 (four) hours as needed for Wheezing or Shortness of Breath. Rescue    amLODIPine (NORVASC) 5 MG tablet     apixaban (ELIQUIS) 5 mg Tab Take 1 tablet (5 mg total) by mouth 2 (two) times daily.    apixaban (ELIQUIS) 5 mg Tab Take by mouth.    donepezil (ARICEPT) 10 MG tablet TAKE 1 TABLET ONCE DAILY  INTHE MORNING    estradiol 0.05 mg/24 hr td ptsw (VIVELLE-DOT) 0.05 mg/24 hr     GAVILYTE-C 240-22.72-6.72 -5.84 gram SolR TAKE AS DIRECTED    levothyroxine (SYNTHROID) 75 MCG tablet     magnesium oxide (MAG-OX) 250 mg magnesium Tab Take 1 tablet (250 mg total) by mouth once daily.    memantine (NAMENDA) 5 MG Tab Take 1 tablet (5 mg total) by mouth every morning.    olopatadine (PATADAY) 0.2 % Drop     tiotropium (SPIRIVA) 18 mcg inhalation capsule Inhale 18 mcg into the lungs.    omeprazole (PRILOSEC) 40 MG capsule Take 1 capsule (40 mg total) by mouth once daily.     No current facility-administered medications on file prior to visit.        Objective:      Vitals:    07/08/19 1040   BP: (!) 140/72   Pulse: 75     Physical Exam   Constitutional: She is oriented to person, place, and time. She appears well-developed and well-nourished. No distress.   HENT:   Head: Normocephalic.   Neck: Normal range of motion. Neck supple.   Cardiovascular: Normal rate and regular rhythm.   No murmur heard.  Pulmonary/Chest: Normal expansion, symmetric chest wall expansion, effort normal and breath sounds normal. No respiratory distress. She has no decreased breath sounds. She has no wheezes. She has no rhonchi. She has no rales.   Abdominal: Soft. She exhibits no distension. There is no hepatosplenomegaly. There is no tenderness.   Musculoskeletal: Normal range of motion. She exhibits no edema.   Lymphadenopathy:     She has no cervical adenopathy.   Neurological: She is alert and oriented to person, place, and time. Gait normal.   Skin: Skin is warm and dry. No cyanosis. Nails show no clubbing.   Psychiatric: She has a normal mood and affect.   Vitals reviewed.    Personal Diagnostic Review    PFTs personally reviewed and discussed with patient  Imaging personally reviewed with patient        Assessment:     Problem List Items Addressed This Visit        Pulmonary    Chronic asthma    Overview     On Advair 250 once daily  "and Spiriva.  Has nebs but no machine.  Last used rescue "months" ago         Current Assessment & Plan     Will prescribe nebulizer machine.  Instructed to use Advair BID.  Hold Spiriva.  PFTs show no obstruction or diffusion impairment.              Hematology    Bilateral pulmonary embolism    Overview     On Eliquis daily         Current Assessment & Plan     Recent venous doppler US showed resolution of lower leg clot.  Patient continues to have some SOB.  Unsure if this is secondary to suboptimal dosing of Advair or to residual clot.  If patient does not improve with increased use of Advair will complete Echo to assess for CTEPH. Encouraged to use albuterol for SOB- she has not tried it to see if symptoms would resolve.             Other    MELISSA (dyspnea on exertion) - Primary    Relevant Orders    Spirometry with/without bronchodilator (Completed)    DLCO-Carbon Monoxide Diffusing Capacity (Completed)    Stress test, pulmonary (Completed)    NEBULIZER KIT (SUPPLIES) FOR HOME USE    NEBULIZER FOR HOME USE          Patient did not take meds this AM- BP a bit high. Discussed with daughter who has scheduled a PCP appt.  Usually good BP control when meds are taken.   "

## 2019-07-08 NOTE — LETTER
July 8, 2019      Guerda Lim, [facility]  [address] [facility] [address]           [facility]  [address]  [place] [address]  Phone: [phone]          Patient: Debbie Ding   MR Number: [id number]   YOB: 1932   Date of Visit: 7/8/2019       Dear Guerda Lim:    Thank you for referring Debbie Ding to me for evaluation. Attached you will find relevant portions of my assessment and plan of care.    If you have questions, please do not hesitate to call me. I look forward to following Debbie Ding along with you.    Sincerely,    Heather Davis, DNP    Enclosure  CC:  No Recipients    If you would like to receive this communication electronically, please contact [email or web address] or [phone] to request more information on [facility] Link access.    For providers and/or their staff who would like to refer a patient to [facility], please contact us through our one-stop-shop provider referral line, [facility] , at [phone].    If you feel you have received this communication in error or would no longer like to receive these types of communications, please e-mail [email or web address]

## 2019-07-08 NOTE — TELEPHONE ENCOUNTER
Called patient to schedule an appointment with Dermatology, per message. No answer. Message was left asking patient to call the office back for an appointment.      Spine appears normal, range of motion is not limited, no muscle or joint tenderness

## 2019-07-22 ENCOUNTER — OFFICE VISIT (OUTPATIENT)
Dept: INTERNAL MEDICINE | Facility: CLINIC | Age: 84
End: 2019-07-22
Payer: MEDICARE

## 2019-07-22 ENCOUNTER — LAB VISIT (OUTPATIENT)
Dept: LAB | Facility: OTHER | Age: 84
End: 2019-07-22
Attending: FAMILY MEDICINE
Payer: MEDICARE

## 2019-07-22 VITALS
DIASTOLIC BLOOD PRESSURE: 66 MMHG | WEIGHT: 148.56 LBS | HEIGHT: 65 IN | SYSTOLIC BLOOD PRESSURE: 104 MMHG | BODY MASS INDEX: 24.75 KG/M2 | HEART RATE: 74 BPM | OXYGEN SATURATION: 98 %

## 2019-07-22 DIAGNOSIS — R09.82 POST-NASAL DRIP: ICD-10-CM

## 2019-07-22 DIAGNOSIS — E03.9 HYPOTHYROIDISM, ADULT: ICD-10-CM

## 2019-07-22 DIAGNOSIS — G31.84 MILD COGNITIVE IMPAIRMENT: ICD-10-CM

## 2019-07-22 DIAGNOSIS — R35.1 NOCTURIA: ICD-10-CM

## 2019-07-22 DIAGNOSIS — I10 ESSENTIAL HYPERTENSION: Primary | ICD-10-CM

## 2019-07-22 DIAGNOSIS — Z12.31 ENCOUNTER FOR SCREENING MAMMOGRAM FOR BREAST CANCER: ICD-10-CM

## 2019-07-22 DIAGNOSIS — M79.7 FIBROMYALGIA: ICD-10-CM

## 2019-07-22 DIAGNOSIS — Z86.711 HISTORY OF PULMONARY EMBOLISM: ICD-10-CM

## 2019-07-22 DIAGNOSIS — J45.909 CHRONIC ASTHMA WITHOUT COMPLICATION, UNSPECIFIED ASTHMA SEVERITY, UNSPECIFIED WHETHER PERSISTENT: ICD-10-CM

## 2019-07-22 DIAGNOSIS — R61 HYPERHIDROSIS: ICD-10-CM

## 2019-07-22 DIAGNOSIS — R73.03 PREDIABETES: ICD-10-CM

## 2019-07-22 LAB
T4 FREE SERPL-MCNC: 0.67 NG/DL (ref 0.71–1.51)
TSH SERPL DL<=0.005 MIU/L-ACNC: 19.1 UIU/ML (ref 0.4–4)

## 2019-07-22 PROCEDURE — 36415 COLL VENOUS BLD VENIPUNCTURE: CPT

## 2019-07-22 PROCEDURE — 99214 OFFICE O/P EST MOD 30 MIN: CPT | Mod: PBBFAC | Performed by: FAMILY MEDICINE

## 2019-07-22 PROCEDURE — 99999 PR PBB SHADOW E&M-EST. PATIENT-LVL IV: ICD-10-PCS | Mod: PBBFAC,,, | Performed by: FAMILY MEDICINE

## 2019-07-22 PROCEDURE — 84439 ASSAY OF FREE THYROXINE: CPT

## 2019-07-22 PROCEDURE — 84443 ASSAY THYROID STIM HORMONE: CPT

## 2019-07-22 PROCEDURE — 99214 OFFICE O/P EST MOD 30 MIN: CPT | Mod: S$PBB,,, | Performed by: FAMILY MEDICINE

## 2019-07-22 PROCEDURE — 99214 PR OFFICE/OUTPT VISIT, EST, LEVL IV, 30-39 MIN: ICD-10-PCS | Mod: S$PBB,,, | Performed by: FAMILY MEDICINE

## 2019-07-22 PROCEDURE — 99999 PR PBB SHADOW E&M-EST. PATIENT-LVL IV: CPT | Mod: PBBFAC,,, | Performed by: FAMILY MEDICINE

## 2019-07-22 RX ORDER — FLUTICASONE PROPIONATE 50 MCG
2 SPRAY, SUSPENSION (ML) NASAL DAILY
Qty: 16 G | Refills: 1 | Status: SHIPPED | OUTPATIENT
Start: 2019-07-22 | End: 2020-02-26 | Stop reason: ALTCHOICE

## 2019-07-22 RX ORDER — GLYCOPYRROLATE 2 MG/1
1 TABLET ORAL 3 TIMES DAILY
Qty: 90 TABLET | Refills: 0 | Status: CANCELLED | OUTPATIENT
Start: 2019-07-22 | End: 2019-08-21

## 2019-07-22 RX ORDER — GLYCOPYRROLATE 2 MG/1
TABLET ORAL
Qty: 135 TABLET | Refills: 0 | Status: SHIPPED | OUTPATIENT
Start: 2019-07-22 | End: 2019-11-08 | Stop reason: SDUPTHER

## 2019-07-22 RX ORDER — HYDROGEN PEROXIDE 3 %
20 SOLUTION, NON-ORAL MISCELLANEOUS
COMMUNITY
End: 2019-09-03 | Stop reason: SDUPTHER

## 2019-07-22 RX ORDER — AMLODIPINE BESYLATE 10 MG/1
TABLET ORAL
COMMUNITY
Start: 2019-07-12 | End: 2019-09-03 | Stop reason: SDUPTHER

## 2019-07-22 RX ORDER — DULOXETIN HYDROCHLORIDE 30 MG/1
30 CAPSULE, DELAYED RELEASE ORAL DAILY
COMMUNITY
End: 2019-11-01 | Stop reason: SDUPTHER

## 2019-07-22 NOTE — PROGRESS NOTES
Subjective:       Patient ID: Debbie Ding is a 86 y.o. female.    Chief Complaint: Establish Care    HPI  This patient is new to me.   Debbie Ding is a 86 y.o. year old female with HTN, asthma, Hx of bilateral PE, mild cognitive impairment, hypothyroid, prediabetes, fibromyalgia who presents today to establish care.    Reports nocturia. Gets up 6-7x/night. Doesn't feel like she empties a full bladder. Began in last 6 months. Saw urology at [facility] and was started on meds (Myrbetriq)    HTN - compliant with amlodipine 10 mg daily.    Asthma - follows with pulm. Takes Advair daily. Albuterol PRN.     Fibromyalgia - compliant with Cymbalta.     Has post nasal drip and nausea.     Prediabetes -   Lab Results   Component Value Date    HGBA1C 6.2 03/21/2017     Complains of excessive sweating. Used to take glycopyrrolate, which helped.     Hx of submassive PE - diagnosed April 2019. Found to have DVT of right leg. Has been compliant with Eliquis. Of note, patient is currently using estrogen patches. Follows with pulm.     Hypothyroid - compliant with levothyroxine 75 mcg daily. Prefers Synthroid. Says she takes her medication with other medications.   Lab Results   Component Value Date    TSH  6.577(H) 04/28/2019     She has Hx of right baker cyst    Saw neurology recently and was told she has MCI. Started on donepezil and memantine.     OB/GYN History   G*P*  Patient is post-menopausal.    Health Maintenance  Pap smear: s/p hysterectomy  Mammogram: 2 years ago - normal   Colon Cancer Screening: colonoscopy 2019 - normal (outside facility)  DEXA: 6/5/18 - normal   Hepatitis C screening: n/a  Flu vaccine:   Tetanus vaccine:  PNA vaccine:   Shingles vaccine:     I personally reviewed Past Medical History, Past Surgical History, Social History, and Family History    Review of Systems   Constitutional: Negative for chills, fatigue, fever and unexpected weight change.   HENT: Negative for congestion, hearing loss,  "rhinorrhea and sore throat.    Eyes: Negative for visual disturbance.   Respiratory: Negative for cough, shortness of breath and wheezing.         + MELISSA   Cardiovascular: Negative for chest pain, palpitations and leg swelling.   Gastrointestinal: Negative for abdominal pain, constipation, diarrhea, nausea and vomiting.   Genitourinary: Negative for dysuria, frequency, menstrual problem and urgency.   Musculoskeletal: Negative for arthralgias and myalgias.   Skin: Negative for rash.   Neurological: Negative for dizziness, syncope and headaches.   Psychiatric/Behavioral: Negative for dysphoric mood and sleep disturbance. The patient is not nervous/anxious.        Objective:      Vitals:    07/22/19 1402   BP: 104/66   Pulse: 74   SpO2: 98%   Weight: 67.4 kg (148 lb 9.4 oz)   Height: 5' 5" (1.651 m)     Physical Exam   Constitutional: She is oriented to person, place, and time. She appears well-developed and well-nourished. No distress.   HENT:   Head: Normocephalic and atraumatic.   Eyes: Conjunctivae and EOM are normal.   Neck: Normal range of motion.   Cardiovascular: Normal rate, regular rhythm and normal heart sounds.   No murmur heard.  Pulmonary/Chest: Effort normal and breath sounds normal. No respiratory distress.   Abdominal: Soft. Bowel sounds are normal. There is no tenderness.   Neurological: She is alert and oriented to person, place, and time.   Skin: Skin is warm and dry. No rash noted.   Psychiatric: She has a normal mood and affect. Her behavior is normal. Thought content normal.   Nursing note and vitals reviewed.      Assessment:       1. Essential hypertension    2. Chronic asthma without complication, unspecified asthma severity, unspecified whether persistent    3. Prediabetes    4. History of pulmonary embolism    5. Nocturia    6. Hypothyroidism, adult    7. Post-nasal drip    8. Hyperhidrosis    9. Fibromyalgia    10. Mild cognitive impairment    11. Encounter for screening mammogram for " breast cancer        Plan:   Debbie was seen today for establish care.    Diagnoses and all orders for this visit:    Essential hypertension  - Controlled. Continue current medication regimen.   -     Comprehensive metabolic panel; Future    Chronic asthma without complication, unspecified asthma severity, unspecified whether persistent  - Continue current regimen. F/u with pulm    Prediabetes  -     Hemoglobin A1c; Future  -     Comprehensive metabolic panel; Future    History of pulmonary embolism   -Continue Eliquis.     Nocturia  -     Ambulatory Referral to Urology    Hypothyroidism, adult  - Discussed proper medication administration. Will check level now.   -     TSH; Future    Post-nasal drip  -     fluticasone propionate (FLONASE) 50 mcg/actuation nasal spray; 2 sprays (100 mcg total) by Each Nare route once daily.    Hyperhidrosis  -     glycopyrrolate (ROBINUL) 2 MG Tab; Take 1.5 tablets daily for sweating    Fibromyalgia  - Controlled. Continue current medication regimen.     Mild cognitive impairment  - Controlled. Continue current medication regimen.     Encounter for screening mammogram for breast cancer  -     Mammo Digital Screening Bilat w/ Alfonso; Future

## 2019-07-23 ENCOUNTER — TELEPHONE (OUTPATIENT)
Dept: INTERNAL MEDICINE | Facility: CLINIC | Age: 84
End: 2019-07-23

## 2019-07-23 DIAGNOSIS — E03.9 HYPOTHYROIDISM, ADULT: Primary | ICD-10-CM

## 2019-07-23 NOTE — TELEPHONE ENCOUNTER
Please call and inform that patient's thyroid is very abnormal and shows that it is under active. This is likely due to her accidentally taking her Synthroid with her other medications. Please remind patient it is very important to take Synthroid by itself first thing in the morning with only a glass of water. Wait at least 30 mins before taking other meds or eating.    We will recheck her thyroid in 2 months after she has made those changes..    Thanks!

## 2019-07-24 NOTE — TELEPHONE ENCOUNTER
Called pt and informed her that her thyroid is very abnormal and shows that it is under active. This is likely due to her accidentally taking her Synthroid with her other medications. Please remind patient it is very important to take Synthroid by itself first thing in the morning with only a glass of water. Wait at least 30 mins before taking other meds or eating.     We will recheck her thyroid in 2 months after she has made those changes.  pt showed verbal understanding

## 2019-07-28 PROBLEM — G44.52 NEW DAILY PERSISTENT HEADACHE: Status: RESOLVED | Noted: 2019-06-18 | Resolved: 2019-07-28

## 2019-07-28 PROBLEM — R73.03 PREDIABETES: Status: ACTIVE | Noted: 2019-07-28

## 2019-07-28 PROBLEM — R61 HYPERHIDROSIS: Status: ACTIVE | Noted: 2019-07-28

## 2019-07-31 ENCOUNTER — EXTERNAL CHRONIC CARE MANAGEMENT (OUTPATIENT)
Dept: PRIMARY CARE CLINIC | Facility: CLINIC | Age: 84
End: 2019-07-31
Payer: MEDICARE

## 2019-07-31 PROCEDURE — 99490 PR CHRONIC CARE MGMT, 1ST 20 MIN: ICD-10-PCS | Mod: S$PBB,,, | Performed by: INTERNAL MEDICINE

## 2019-07-31 PROCEDURE — 99490 CHRNC CARE MGMT STAFF 1ST 20: CPT | Mod: S$PBB,,, | Performed by: INTERNAL MEDICINE

## 2019-07-31 PROCEDURE — 99490 CHRNC CARE MGMT STAFF 1ST 20: CPT | Mod: PBBFAC | Performed by: INTERNAL MEDICINE

## 2019-08-09 ENCOUNTER — OFFICE VISIT (OUTPATIENT)
Dept: DERMATOLOGY | Facility: CLINIC | Age: 84
End: 2019-08-09
Payer: MEDICARE

## 2019-08-09 DIAGNOSIS — R61 HYPERHIDROSIS: Primary | ICD-10-CM

## 2019-08-09 PROCEDURE — 99999 PR PBB SHADOW E&M-EST. PATIENT-LVL II: CPT | Mod: PBBFAC,,, | Performed by: DERMATOLOGY

## 2019-08-09 PROCEDURE — 99213 OFFICE O/P EST LOW 20 MIN: CPT | Mod: S$PBB,,, | Performed by: DERMATOLOGY

## 2019-08-09 PROCEDURE — 99212 OFFICE O/P EST SF 10 MIN: CPT | Mod: PBBFAC | Performed by: DERMATOLOGY

## 2019-08-09 PROCEDURE — 99999 PR PBB SHADOW E&M-EST. PATIENT-LVL II: ICD-10-PCS | Mod: PBBFAC,,, | Performed by: DERMATOLOGY

## 2019-08-09 PROCEDURE — 99213 PR OFFICE/OUTPT VISIT, EST, LEVL III, 20-29 MIN: ICD-10-PCS | Mod: S$PBB,,, | Performed by: DERMATOLOGY

## 2019-08-09 NOTE — PROGRESS NOTES
Subjective:       Patient ID:  Debbie Ding is a 86 y.o. female who presents for   Chief Complaint   Patient presents with    Excessive Sweating     scalp     Hospitalized 2/2019 for blood clots to legs and lungs. PCP requests check for interactions and contraindications.    Excessive Sweating  - Follow-up  Symptom course: improving  Currently using: robinul 3mg daily (1.5 pills) prescribed by PCP.  Affected locations: scalp  SIGNS AND SYMPTOMS F/U: sweating excessive wears cloth cap to catch sweat.  Severity F/U: 7.5/10.        Review of Systems   Constitutional: Negative for fever, chills and weight loss.   HENT:        Mouth dryness   Eyes:        Eye dryness- uses saline eye drops    Gastrointestinal: Positive for constipation (on stool softners).   Genitourinary:        Urinary incontience     Musculoskeletal: Negative for muscle weakness.   Skin: Negative for itching and rash.        + excessive sweating to scalp   Neurological: Negative for focal weakness and numbness.   Endocrine: Positive for cold intolerance.   Allergic/Immunologic: Negative for environmental allergies.        Objective:    Physical Exam   Constitutional: She appears well-developed and well-nourished. No distress.   Neurological: She is alert and oriented to person, place, and time. She is not disoriented.   Psychiatric: She has a normal mood and affect.   Skin:   Areas Examined (abnormalities noted in diagram):   Scalp / Hair Palpated and Inspected  Head / Face Inspection Performed  Gland Inspection Performed              Diagram Legend     Erythematous scaling macule/papule c/w actinic keratosis       Vascular papule c/w angioma      Pigmented verrucoid papule/plaque c/w seborrheic keratosis      Yellow umbilicated papule c/w sebaceous hyperplasia      Irregularly shaped tan macule c/w lentigo     1-2 mm smooth white papules consistent with Milia      Movable subcutaneous cyst with punctum c/w epidermal inclusion cyst       Subcutaneous movable cyst c/w pilar cyst      Firm pink to brown papule c/w dermatofibroma      Pedunculated fleshy papule(s) c/w skin tag(s)      Evenly pigmented macule c/w junctional nevus     Mildly variegated pigmented, slightly irregular-bordered macule c/w mildly atypical nevus      Flesh colored to evenly pigmented papule c/w intradermal nevus       Pink pearly papule/plaque c/w basal cell carcinoma      Erythematous hyperkeratotic cursted plaque c/w SCC      Surgical scar with no sign of skin cancer recurrence      Open and closed comedones      Inflammatory papules and pustules      Verrucoid papule consistent consistent with wart     Erythematous eczematous patches and plaques     Dystrophic onycholytic nail with subungual debris c/w onychomycosis     Umbilicated papule    Erythematous-base heme-crusted tan verrucoid plaque consistent with inflamed seborrheic keratosis     Erythematous Silvery Scaling Plaque c/w Psoriasis     See annotation      Assessment / Plan:        Hyperhidrosis  -stable, well controlled with Robinul 3mg daily. Okay to continue. Pt concerned as recently started eloquis. There are no drug drug interactions b/t eloquis and robinul.   - continue eye drops for eye dryness.  -f/u with PCP             No follow-ups on file.

## 2019-08-16 ENCOUNTER — PATIENT OUTREACH (OUTPATIENT)
Dept: ADMINISTRATIVE | Facility: OTHER | Age: 84
End: 2019-08-16

## 2019-08-16 NOTE — TELEPHONE ENCOUNTER
----- Message from Cherie Kemp sent at 8/16/2019  2:25 PM CDT -----  Contact: DEJAH ROLDAN [[id number]]                                                     MEDICATION  REFILL  REQUEST      Please refill the medication(s) listed below. Please call the patient when the prescription(s) is ready for  at this phone number   [phone]       Medication #1   apixaban (ELIQUIS) 5 mg Tab  /  90 DAY SUPPLY          Preferred Pharmacy: [facility]/pharmacy #[id number] - [place] - [facility] [address]    [phone] (Phone)  [phone] (Fax)

## 2019-08-19 ENCOUNTER — OFFICE VISIT (OUTPATIENT)
Dept: UROLOGY | Facility: CLINIC | Age: 84
End: 2019-08-19
Attending: UROLOGY
Payer: MEDICARE

## 2019-08-19 VITALS
DIASTOLIC BLOOD PRESSURE: 68 MMHG | BODY MASS INDEX: 24.75 KG/M2 | SYSTOLIC BLOOD PRESSURE: 132 MMHG | HEIGHT: 65 IN | HEART RATE: 72 BPM | WEIGHT: 148.56 LBS

## 2019-08-19 DIAGNOSIS — R35.1 NOCTURIA: Primary | ICD-10-CM

## 2019-08-19 PROCEDURE — 81002 POCT URINE DIPSTICK WITHOUT MICROSCOPE: ICD-10-PCS | Mod: S$GLB,,, | Performed by: UROLOGY

## 2019-08-19 PROCEDURE — 99203 PR OFFICE/OUTPT VISIT, NEW, LEVL III, 30-44 MIN: ICD-10-PCS | Mod: 25,S$GLB,, | Performed by: UROLOGY

## 2019-08-19 PROCEDURE — 99203 OFFICE O/P NEW LOW 30 MIN: CPT | Mod: 25,S$GLB,, | Performed by: UROLOGY

## 2019-08-19 PROCEDURE — 81002 URINALYSIS NONAUTO W/O SCOPE: CPT | Mod: S$GLB,,, | Performed by: UROLOGY

## 2019-08-19 NOTE — PROGRESS NOTES
"Subjective:      Debbie Ding is a 86 y.o. female who was referred by Lyla Mathew, [place] for evaluation of urinary frequency.      Her primary complaint is nocturia.  She states that in the daytime she does not have problems with urinary frequency or urgency.  She will get up several times during the night to urinate however, sometimes every 15-20 minutes.  She is on Myrbetriq.  She had a positive urine culture in June that was treated without any change in symptoms.  She denies any dysuria or urinary incontinence.    The following portions of the patient's history were reviewed and updated as appropriate: allergies, current medications, past family history, past medical history, past social history, past surgical history and problem list.    Review of Systems  Constitutional: no fever or chills  ENT: no nasal congestion or sore throat  Respiratory: no cough or shortness of breath  Cardiovascular: no chest pain or palpitations  Gastrointestinal: no nausea or vomiting, tolerating diet  Genitourinary: as per HPI  Hematologic/Lymphatic: no easy bruising or lymphadenopathy  Musculoskeletal: no arthralgias or myalgias  Neurological: no seizures or tremors  Behavioral/Psych: no auditory or visual hallucinations     Objective:   Vital Signs:/68 (BP Location: Left arm, Patient Position: Sitting, BP Method: Large (Automatic))   Pulse 72   Ht 5' 5" (1.651 m)   Wt 67.4 kg (148 lb 9.4 oz)   BMI 24.73 kg/m²     Physical Exam   General: alert and oriented, no acute distress  Head: normocephalic, atraumatic  Neck: supple, no lymphadenopathy, normal ROM, no masses  Respiratory: Symmetric expansion, non-labored breathing  Cardiovascular: regular rate and rhythm, nomal pulses, no peripheral edema  Skin: normal coloration and turgor, no rashes, no suspicious skin lesions noted  Neuro: alert and oriented x3, no gross deficits  Psych: normal judgment and insight, normal mood/affect and non-anxious    Lab Review "   Urinalysis demonstrates positive for nitrites, leukocytes, red blood cells  Lab Results   Component Value Date    WBC 6.38 06/20/2019    HGB 11.9 (L) 06/20/2019    HCT 36.0 (L) 06/20/2019    MCV 89 06/20/2019     06/20/2019     Lab Results   Component Value Date    CREATININE 1.3 06/20/2019    BUN 16 06/20/2019       Assessment:     1. Nocturia        Plan:   1. Suspect nocturnal polyuria based on history  2. Will complete 3 day voiding diary  3. Continue myrbetriq for now  4. Defer urine culture as she otherwise is asymptomatic  5. FU 2 weeks

## 2019-08-19 NOTE — LETTER
August 19, 2019      Lyla Mathew MD  [address]  [address]  [facility] [address]           [facility] ([facility] [address]  [address] [facility] [address]  [facility] [address]  Phone: [phone]  Fax: [phone]          Patient: Debbie Ding   MR Number: [id number]   YOB: 1932   Date of Visit: 8/19/2019       Dear Dr. Lyla Mathew:    Thank you for referring Debbie Ding to me for evaluation. Attached you will find relevant portions of my assessment and plan of care.    If you have questions, please do not hesitate to call me. I look forward to following Debbie Ding along with you.    Sincerely,    Patel Fraga MD    Enclosure  CC:  No Recipients    If you would like to receive this communication electronically, please contact [email or web address] or [phone] to request more information on [facility] Link access.    For providers and/or their staff who would like to refer a patient to [facility], please contact us through our one-stop-shop provider referral line, [facility], at [phone].    If you feel you have received this communication in error or would no longer like to receive these types of communications, please e-mail [email or web address]

## 2019-08-21 LAB
BILIRUB SERPL-MCNC: ABNORMAL MG/DL
BLOOD URINE, POC: 50
COLOR, POC UA: YELLOW
GLUCOSE UR QL STRIP: ABNORMAL
KETONES UR QL STRIP: ABNORMAL
LEUKOCYTE ESTERASE URINE, POC: ABNORMAL
NITRITE, POC UA: ABNORMAL
PH, POC UA: 5
PROTEIN, POC: ABNORMAL
SPECIFIC GRAVITY, POC UA: 1.02
UROBILINOGEN, POC UA: ABNORMAL

## 2019-08-31 ENCOUNTER — EXTERNAL CHRONIC CARE MANAGEMENT (OUTPATIENT)
Dept: PRIMARY CARE CLINIC | Facility: CLINIC | Age: 84
End: 2019-08-31
Payer: MEDICARE

## 2019-08-31 PROCEDURE — 99490 CHRNC CARE MGMT STAFF 1ST 20: CPT | Mod: PBBFAC | Performed by: INTERNAL MEDICINE

## 2019-08-31 PROCEDURE — 99490 CHRNC CARE MGMT STAFF 1ST 20: CPT | Mod: S$PBB,,, | Performed by: INTERNAL MEDICINE

## 2019-08-31 PROCEDURE — 99490 PR CHRONIC CARE MGMT, 1ST 20 MIN: ICD-10-PCS | Mod: S$PBB,,, | Performed by: INTERNAL MEDICINE

## 2019-09-03 ENCOUNTER — PATIENT OUTREACH (OUTPATIENT)
Dept: ADMINISTRATIVE | Facility: OTHER | Age: 84
End: 2019-09-03

## 2019-09-03 DIAGNOSIS — K21.9 GASTROESOPHAGEAL REFLUX DISEASE, ESOPHAGITIS PRESENCE NOT SPECIFIED: Primary | ICD-10-CM

## 2019-09-03 DIAGNOSIS — I10 ESSENTIAL HYPERTENSION: ICD-10-CM

## 2019-09-03 RX ORDER — AMLODIPINE BESYLATE 10 MG/1
10 TABLET ORAL DAILY
Qty: 90 TABLET | Refills: 1 | Status: SHIPPED | OUTPATIENT
Start: 2019-09-03 | End: 2019-09-03 | Stop reason: SDUPTHER

## 2019-09-03 RX ORDER — HYDROGEN PEROXIDE 3 %
20 SOLUTION, NON-ORAL MISCELLANEOUS
Qty: 90 CAPSULE | Refills: 1 | Status: SHIPPED | OUTPATIENT
Start: 2019-09-03 | End: 2019-09-03 | Stop reason: SDUPTHER

## 2019-09-03 NOTE — TELEPHONE ENCOUNTER
Daughter states the [employer] people said it has to be 90 day supply sent to [facility] local pharmacy in order for insurance to pay and once that is up they can then mail the next refill  msg routed to MD

## 2019-09-03 NOTE — TELEPHONE ENCOUNTER
----- Message from Amanda Anaya MA sent at 9/3/2019  3:57 PM CDT -----  Contact: Meeta Painter      ----- Message -----  From: Brittany Escalona  Sent: 9/3/2019   2:59 PM  To: Priyanka VIDALES Staff    Type:  Patient Returning Call    Who Called: Pt Meeta Painter    Does the patient know what this is regarding?:Dr Mathew was suppose send her medication to [facility] mail order. [facility] Does not have amlodipine 10mg, Esomprazole 20mg on file but the Pt is out. [facility] nam states insurance won't pay unless it's 90 days supply.    Would the patient rather a call back or a response via [facility]? Call back    Best Call Back Number:[phone]

## 2019-09-03 NOTE — TELEPHONE ENCOUNTER
Please call and inform that I apologize for the delay in getting her medications to her. I just sent the amlodipine and esomeprazole to her mail order pharmacy.  Would she like for me to send those medications to a local pharmacy so she can fill a short supply today?    thanks

## 2019-09-04 RX ORDER — HYDROGEN PEROXIDE 3 %
20 SOLUTION, NON-ORAL MISCELLANEOUS
Qty: 90 CAPSULE | Refills: 1 | Status: SHIPPED | OUTPATIENT
Start: 2019-09-04 | End: 2020-03-23 | Stop reason: SDUPTHER

## 2019-09-04 RX ORDER — AMLODIPINE BESYLATE 10 MG/1
10 TABLET ORAL DAILY
Qty: 90 TABLET | Refills: 1 | Status: SHIPPED | OUTPATIENT
Start: 2019-09-04 | End: 2020-05-07

## 2019-09-05 ENCOUNTER — OFFICE VISIT (OUTPATIENT)
Dept: UROLOGY | Facility: CLINIC | Age: 84
End: 2019-09-05
Attending: UROLOGY
Payer: MEDICARE

## 2019-09-05 VITALS
SYSTOLIC BLOOD PRESSURE: 124 MMHG | HEIGHT: 65 IN | HEART RATE: 70 BPM | BODY MASS INDEX: 24.66 KG/M2 | WEIGHT: 148 LBS | DIASTOLIC BLOOD PRESSURE: 67 MMHG

## 2019-09-05 DIAGNOSIS — R35.1 NOCTURIA: Primary | ICD-10-CM

## 2019-09-05 PROCEDURE — 99213 PR OFFICE/OUTPT VISIT, EST, LEVL III, 20-29 MIN: ICD-10-PCS | Mod: S$GLB,,, | Performed by: UROLOGY

## 2019-09-05 PROCEDURE — 99213 OFFICE O/P EST LOW 20 MIN: CPT | Mod: S$GLB,,, | Performed by: UROLOGY

## 2019-09-05 RX ORDER — ESTRADIOL 0.05 MG/D
FILM, EXTENDED RELEASE TRANSDERMAL
COMMUNITY
Start: 2019-08-19 | End: 2020-02-26 | Stop reason: ALTCHOICE

## 2019-09-05 RX ORDER — SOLIFENACIN SUCCINATE 5 MG/1
5 TABLET, FILM COATED ORAL DAILY
Qty: 90 TABLET | Refills: 11 | Status: SHIPPED | OUTPATIENT
Start: 2019-09-05 | End: 2020-02-26 | Stop reason: ALTCHOICE

## 2019-09-05 NOTE — PROGRESS NOTES
"Subjective:      Debbie Ding is a 86 y.o. female who returns today regarding her noturia.    Completed voiding diary, however did not fully track UOP so unsure what portion of UOP occurs while sleeping. Her primary c/o remains frequency at night with urgency and UUI. She remains on myrbetriq 50mg daily.    The following portions of the patient's history were reviewed and updated as appropriate: allergies, current medications, past family history, past medical history, past social history, past surgical history and problem list.    Review of Systems  A comprehensive multipoint review of systems was negative except as otherwise stated in the HPI.     Objective:   Vitals: /67 (BP Location: Left arm, Patient Position: Sitting, BP Method: Large (Automatic))   Pulse 70   Ht 5' 5" (1.651 m)   Wt 67.1 kg (148 lb)   BMI 24.63 kg/m²     Physical Exam   General: alert and oriented, no acute distress  Respiratory: Symmetric expansion, non-labored breathing  Neuro: no gross deficits  Psych: normal judgment and insight, normal mood/affect and non-anxious    Lab Review   Lab Results   Component Value Date    WBC 6.38 06/20/2019    HGB 11.9 (L) 06/20/2019    HCT 36.0 (L) 06/20/2019    MCV 89 06/20/2019     06/20/2019     Lab Results   Component Value Date    CREATININE 1.3 06/20/2019    BUN 16 06/20/2019       Assessment and Plan:   1. Nocturia  -- Will defer DDAVP for now since voiding diary incomplete  -- Continue myrbetriq  -- Trial vesicare  -- FU 3 mos  "

## 2019-09-18 NOTE — TELEPHONE ENCOUNTER
----- Message from Amanda Anaya MA sent at 9/17/2019  3:55 PM CDT -----  Contact: Self      ----- Message -----  From: Anna Garcia  Sent: 9/17/2019   3:45 PM  To: Priyanka VIDALES Staff    Can the clinic reply in [facility]: n       Please refill the medication(s) listed below. Please call the patient when the prescription(s) is ready for  at this phone number  [phone]      Medication #1 mirabegron (MYRBETRIQ) 50 mg Tb24    Medication #2       Preferred Pharmacy:  [facility] - [place], [place] - [address] AT PORTAL TO [facility]

## 2019-09-28 ENCOUNTER — TELEPHONE (OUTPATIENT)
Dept: INTERNAL MEDICINE | Facility: CLINIC | Age: 84
End: 2019-09-28

## 2019-09-28 NOTE — TELEPHONE ENCOUNTER
Please contact patient and inform that she is due to have lab work done.    Please make sure the CMP, Hba1c, and TSH are scheduled for the same day.     Thanks!

## 2019-09-30 ENCOUNTER — EXTERNAL CHRONIC CARE MANAGEMENT (OUTPATIENT)
Dept: PRIMARY CARE CLINIC | Facility: CLINIC | Age: 84
End: 2019-09-30
Payer: MEDICARE

## 2019-09-30 PROCEDURE — 99490 CHRNC CARE MGMT STAFF 1ST 20: CPT | Mod: PBBFAC | Performed by: INTERNAL MEDICINE

## 2019-09-30 PROCEDURE — 99490 CHRNC CARE MGMT STAFF 1ST 20: CPT | Mod: S$PBB,,, | Performed by: INTERNAL MEDICINE

## 2019-09-30 PROCEDURE — 99490 PR CHRONIC CARE MGMT, 1ST 20 MIN: ICD-10-PCS | Mod: S$PBB,,, | Performed by: INTERNAL MEDICINE

## 2019-10-08 ENCOUNTER — TELEPHONE (OUTPATIENT)
Dept: INTERNAL MEDICINE | Facility: CLINIC | Age: 84
End: 2019-10-08

## 2019-10-08 NOTE — PROGRESS NOTES
"Subjective:       Patient ID: Debbie Ding is a 86 y.o. female.    Chief Complaint: Follow-up HTN, MCI, prediabetes    HPI  Debbie Ding is a 86 y.o. year old female with HTN, CKD 3, GERD, asthma, Hx of bilateral PE, mild cognitive impairment, hypothyroid, prediabetes, fibromyalgia who presents today to follow-up HTN, MCI, prediabetes.    Patient's daughter is present today and assists with history.     Patient says she is having trouble walking. Says her feet are "dragging". No falls.   Has neuropathy of the feet and has pain.     Takes Nexium for GERD. Helps somewhat.     Seeing urology here at [facility]. Vesicare was added. Having incontinence during the day and night.     HTN - compliant with amlodipine 10 mg daily.    CKD 3  BMP  Lab Results   Component Value Date     10/09/2019    K 4.0 10/09/2019     10/09/2019    CO2 24 10/09/2019    BUN 21 10/09/2019    CREATININE 1.5 (H) 10/09/2019    CALCIUM 10.0 10/09/2019    ANIONGAP 9 10/09/2019    ESTGFRAFRICA 36 (A) 10/09/2019    EGFRNONAA 31 (A) 10/09/2019     Asthma - follows with pulm. Takes Advair daily. Albuterol PRN.     Complains of pain with inspiration recently. Also mentions cough. Denies fever, mucous production.     Fibromyalgia - compliant with Cymbalta.     Has post nasal drip and nausea.     Prediabetes -   Lab Results   Component Value Date    HGBA1C 6.2 03/21/2017     Complains of excessive sweating. Used to take glycopyrrolate, which helped.     Hx of submassive PE - diagnosed April 2019. Found to have DVT of right leg. Has been compliant with Eliquis. Of note, patient is currently using estrogen patches. Follows with pulm.     Hypothyroid - compliant with levothyroxine 50 mcg daily. Prefers Synthroid. Says she takes her medication with other medications.   Lab Results   Component Value Date    TSH  6.577(H) 04/28/2019     She has Hx of right baker cyst    Saw neurology recently and was told she has MCI. Started on donepezil and " "memantine.     OB/GYN History   G*P*  Patient is post-menopausal.    Health Maintenance  Pap smear: s/p hysterectomy  Mammogram: 2 years ago - normal   Colon Cancer Screening: colonoscopy 2019 - normal (outside facility)  DEXA: 6/5/18 - normal   Hepatitis C screening: n/a  Flu vaccine: 9/5/19 per patient   Tetanus vaccine: due  PNA vaccine: due for PNA 23  Shingles vaccine: due    I personally reviewed Past Medical History, Past Surgical History, Social History, and Family History    Review of Systems   Constitutional: Negative for chills, fatigue and fever.   HENT: Negative for congestion, hearing loss and rhinorrhea.    Eyes: Negative for visual disturbance.   Respiratory: Negative for cough, shortness of breath and wheezing.    Cardiovascular: Negative for chest pain.   Gastrointestinal: Negative for abdominal pain, constipation, diarrhea, nausea and vomiting.   Genitourinary:        Urinary incontinence    Musculoskeletal:        Trouble with ambulation    Skin: Negative for rash.   Neurological: Positive for numbness. Negative for dizziness, syncope and headaches.   Psychiatric/Behavioral: Negative for dysphoric mood and sleep disturbance. The patient is not nervous/anxious.        Objective:      Vitals:    10/09/19 1043   BP: (!) 102/56   Pulse: 67   SpO2: 98%   Weight: 65.7 kg (144 lb 13.5 oz)   Height: 5' 5" (1.651 m)     Physical Exam   Constitutional: She is oriented to person, place, and time. She appears well-developed and well-nourished. No distress.   HENT:   Head: Normocephalic and atraumatic.   Eyes: Conjunctivae and EOM are normal.   Neck: Normal range of motion.   Cardiovascular: Normal rate, regular rhythm and normal heart sounds.   No murmur heard.  Pulmonary/Chest: Effort normal and breath sounds normal. No respiratory distress.   Abdominal: Soft. Bowel sounds are normal. There is tenderness in the epigastric area.   Musculoskeletal:   Weakness with hip flexion bilaterally. Knee flexion and " extension strength wnl bilaterally    Neurological: She is alert and oriented to person, place, and time.   Skin: Skin is warm and dry. No rash noted.   Psychiatric: She has a normal mood and affect. Her behavior is normal. Thought content normal.   Nursing note and vitals reviewed.      Assessment:       1. Leg weakness, bilateral    2. Peripheral polyneuropathy    3. Mild cognitive impairment    4. Inspiratory pain    5. Essential hypertension    6. Prediabetes    7. Hypothyroidism, adult    8. CKD (chronic kidney disease) stage 3, GFR 30-59 ml/min    9. History of pulmonary embolus (PE)        Plan:     Leg weakness, bilateral  -     Ambulatory Referral to Physical/Occupational Therapy    Peripheral polyneuropathy  -     Ambulatory Referral to Podiatry    Mild cognitive impairment  Continue Aricept and Namenda   -     Ambulatory Referral to Neurology    Inspiratory pain  -     X-Ray Chest PA And Lateral; Future; Expected date: 10/09/2019    Essential hypertension  Continue amlodipine 10 mg daily    Prediabetes  HbA1c ordered.     Hypothyroidism, adult  TSH ordered to eval.     CKD (chronic kidney disease) stage 3, GFR 30-59 ml/min  Noted. Will monitor.     History of pulmonary embolus (PE)  Continue Eliquis

## 2019-10-08 NOTE — TELEPHONE ENCOUNTER
----- Message from Geovanna Collado sent at 10/8/2019  9:08 AM CDT -----  Contact: Daughter   Pt's daughter/so in law are part of the care eco system research study. During monthly visit we started talking about pt and how she would benefit greatly benefit from the program as well. PT was set up for a baseline visit but refused to come in. Pt's daughter thought maybe if you would bring the care ecosystem up at her next appoint pt may be more accepting and see it would beneficial.     Care Eco-  We work with dementia pts or pts with memory issues and their caregivers providing support and resources. Allowing the cg to concentrate more so on the care of the pt. In return we hope to help provide the best possible care for the pt along with decreasing caregiver burden and decreasing the utilization of ED visits and hospitalizations.    If you have any questions please let me know!

## 2019-10-08 NOTE — TELEPHONE ENCOUNTER
----- Message from Geovanna Collado sent at 10/8/2019  9:42 AM CDT -----  Contact: Daughter   The first visit is the one and only that the pt is  required to participate in. They come in for an office visit and it is just me asking some question getting to know them. We do a MMSE and EPHRAIM-7 with pt and that's about it, the rest of the questions are aimed for the cg.     After the baseline visit it is just checking in with the cg monthly. Pt doesn't have to do anything after coming into the first appt.    ----- Message -----  From: Lyla Mathew MD  Sent: 10/8/2019   9:37 AM CDT  To: Geovanna Merrill,    Yes, I can certainly bring it up at her appointment.  If the patient asks, what exactly does that patient have to do or what is her role at these visits?    Thanks,    Lyla Mathew   ----- Message -----  From: Geovanna Collado  Sent: 10/8/2019   9:08 AM CDT  To: Lyla Mathew MD    Pt's daughter/so in law are part of the care eco system research study. During monthly visit we started talking about pt and how she would benefit greatly benefit from the program as well. PT was set up for a baseline visit but refused to come in. Pt's daughter thought maybe if you would bring the care ecosystem up at her next appoint pt may be more accepting and see it would beneficial.     Care Eco-  We work with dementia pts or pts with memory issues and their caregivers providing support and resources. Allowing the cg to concentrate more so on the care of the pt. In return we hope to help provide the best possible care for the pt along with decreasing caregiver burden and decreasing the utilization of ED visits and hospitalizations.    If you have any questions please let me know!

## 2019-10-09 ENCOUNTER — OFFICE VISIT (OUTPATIENT)
Dept: INTERNAL MEDICINE | Facility: CLINIC | Age: 84
End: 2019-10-09
Payer: MEDICARE

## 2019-10-09 ENCOUNTER — TELEPHONE (OUTPATIENT)
Dept: INTERNAL MEDICINE | Facility: CLINIC | Age: 84
End: 2019-10-09

## 2019-10-09 ENCOUNTER — HOSPITAL ENCOUNTER (OUTPATIENT)
Dept: RADIOLOGY | Facility: OTHER | Age: 84
Discharge: HOME | End: 2019-10-09
Attending: FAMILY MEDICINE
Payer: MEDICARE

## 2019-10-09 VITALS
HEIGHT: 65 IN | BODY MASS INDEX: 24.12 KG/M2 | DIASTOLIC BLOOD PRESSURE: 56 MMHG | OXYGEN SATURATION: 98 % | HEART RATE: 67 BPM | SYSTOLIC BLOOD PRESSURE: 102 MMHG | WEIGHT: 144.81 LBS

## 2019-10-09 DIAGNOSIS — R29.898 LEG WEAKNESS, BILATERAL: Primary | ICD-10-CM

## 2019-10-09 DIAGNOSIS — G62.9 PERIPHERAL POLYNEUROPATHY: ICD-10-CM

## 2019-10-09 DIAGNOSIS — R07.1 INSPIRATORY PAIN: ICD-10-CM

## 2019-10-09 DIAGNOSIS — N18.30 CKD (CHRONIC KIDNEY DISEASE) STAGE 3, GFR 30-59 ML/MIN: ICD-10-CM

## 2019-10-09 DIAGNOSIS — E03.9 HYPOTHYROIDISM, ADULT: ICD-10-CM

## 2019-10-09 DIAGNOSIS — I10 ESSENTIAL HYPERTENSION: ICD-10-CM

## 2019-10-09 DIAGNOSIS — Z86.711 HISTORY OF PULMONARY EMBOLUS (PE): ICD-10-CM

## 2019-10-09 DIAGNOSIS — R73.03 PREDIABETES: ICD-10-CM

## 2019-10-09 DIAGNOSIS — J18.9 COMMUNITY ACQUIRED PNEUMONIA OF RIGHT UPPER LOBE OF LUNG: Primary | ICD-10-CM

## 2019-10-09 DIAGNOSIS — E11.9 TYPE 2 DIABETES MELLITUS WITHOUT COMPLICATION, WITHOUT LONG-TERM CURRENT USE OF INSULIN: ICD-10-CM

## 2019-10-09 DIAGNOSIS — G31.84 MILD COGNITIVE IMPAIRMENT: ICD-10-CM

## 2019-10-09 PROCEDURE — 99999 PR PBB SHADOW E&M-EST. PATIENT-LVL V: ICD-10-PCS | Mod: PBBFAC,,, | Performed by: FAMILY MEDICINE

## 2019-10-09 PROCEDURE — 71046 X-RAY EXAM CHEST 2 VIEWS: CPT | Mod: 26,,, | Performed by: RADIOLOGY

## 2019-10-09 PROCEDURE — 71046 X-RAY EXAM CHEST 2 VIEWS: CPT | Mod: TC,FY

## 2019-10-09 PROCEDURE — 99215 OFFICE O/P EST HI 40 MIN: CPT | Mod: S$PBB,,, | Performed by: FAMILY MEDICINE

## 2019-10-09 PROCEDURE — 71046 XR CHEST PA AND LATERAL: ICD-10-PCS | Mod: 26,,, | Performed by: RADIOLOGY

## 2019-10-09 PROCEDURE — 99215 OFFICE O/P EST HI 40 MIN: CPT | Mod: PBBFAC,25 | Performed by: FAMILY MEDICINE

## 2019-10-09 PROCEDURE — 99215 PR OFFICE/OUTPT VISIT, EST, LEVL V, 40-54 MIN: ICD-10-PCS | Mod: S$PBB,,, | Performed by: FAMILY MEDICINE

## 2019-10-09 PROCEDURE — 99999 PR PBB SHADOW E&M-EST. PATIENT-LVL V: CPT | Mod: PBBFAC,,, | Performed by: FAMILY MEDICINE

## 2019-10-09 RX ORDER — CEFUROXIME AXETIL 500 MG/1
500 TABLET ORAL 2 TIMES DAILY
Qty: 14 TABLET | Refills: 0 | Status: SHIPPED | OUTPATIENT
Start: 2019-10-09 | End: 2019-10-16

## 2019-10-09 RX ORDER — LEVOTHYROXINE SODIUM 75 UG/1
75 TABLET ORAL DAILY
Qty: 30 TABLET | Refills: 1 | Status: SHIPPED | OUTPATIENT
Start: 2019-10-09 | End: 2019-11-08 | Stop reason: SDUPTHER

## 2019-10-09 RX ORDER — DOXYCYCLINE HYCLATE 100 MG
100 TABLET ORAL 2 TIMES DAILY
Qty: 14 TABLET | Refills: 0 | Status: SHIPPED | OUTPATIENT
Start: 2019-10-09 | End: 2019-10-16

## 2019-10-09 NOTE — TELEPHONE ENCOUNTER
Spoke with patient's daughter about abnormal chest x-ray.  Will treat as community-acquired pneumonia and repeat chest x-ray in about 6 weeks.    Daughter reports that patient is taking Synthroid 50 mcg daily correctly.  Will increase to 75 mcg due to elevated TSH.    Also discussed patient technically meets criteria for diabetes diagnosis.  She will help the patient work on diabetic diet.

## 2019-10-09 NOTE — PATIENT INSTRUCTIONS
"GERD (Adult)    The esophagus is a tube that carries food from the mouth to the stomach. A valve at the lower end of the esophagus prevents stomach acid from flowing upward. When this valve doesn't work properly, stomach contents may repeatedly flow back up (reflux) into the esophagus. This is called gastroesophageal reflux disease (GERD). GERD can irritate the esophagus. It can cause problems with swallowing or breathing. In severe cases, GERD can cause recurrent pneumonia or other serious problems.  Symptoms of reflux include burning, pressure or sharp pain in the upper abdomen or mid to lower chest. The pain can spread to the neck, back, or shoulder. There may be belching, an acid taste in the back of the throat, chronic cough, or sore throat or hoarseness. GERD symptoms often occur during the day after a big meal. They can also occur at night when lying down.   Home care  Lifestyle changes can help reduce symptoms. If needed, medicines may be prescribed. Symptoms often improve with treatment, but if treatment is stopped, the symptoms often return after a few months. So most persons with GERD will need to continue treatment.  Lifestyle changes  · Limit or avoid fatty, fried, and spicy foods, as well as coffee, chocolate, mint, and foods with high acid content such as tomatoes and citrus fruit and juices (orange, grapefruit, lemon).  · Dont eat large meals, especially at night. Frequent, smaller meals are best. Do not lie down right after eating. And dont eat anything 3 hours before going to bed.  · Avoid drinking alcohol and smoking. As much as possible, stay away from second hand smoke.  · If you are overweight, losing weight will reduce symptoms.   · Avoid wearing tight clothing around your stomach area.  · If your symptoms occur during sleep, use a foam wedge to elevate your upper body (not just your head.) Or, place 4" blocks under the head of your bed.  Medicines  If needed, medicines can help relieve the " symptoms of GERD and prevent damage to the esophagus. Discuss a medicine plan with your healthcare provider. This may include one or more of the following medicines:  · Antacids to help neutralize the normal acids in your stomach.  · Acid blockers (H2 blockers) to decrease acid production.  · Acid inhibitors (PPIs) to decrease acid production in a different way than the blockers. They may work better, but can take a little longer to take effect.  Take an antacid 30-60 minutes after eating and at bedtime, but not at the same time as an acid blocker.  Try not to take medicines such as ibuprofen and aspirin. If you are taking aspirin for your heart or other medical reasons, talk to your healthcare provider about stopping it.  Follow-up care  Follow up with your healthcare provider or as advised by our staff.  When to seek medical advice  Call your healthcare provider if any of the following occur:  · Stomach pain gets worse or moves to the lower right abdomen (appendix area)  · Chest pain appears or gets worse, or spreads to the back, neck, shoulder, or arm  · Frequent vomiting (cant keep down liquids)  · Blood in the stool or vomit (red or black in color)  · Feeling weak or dizzy  · Fever of 100.4ºF (38ºC) or higher, or as directed by your healthcare provider  Date Last Reviewed: 6/23/2015 © 2000-2017 [employer]. [address] [place] [address], [place] [address]. All rights reserved. This information is not intended as a substitute for professional medical care. Always follow your healthcare professional's instructions.        Lifestyle Changes for Controlling GERD  When you have GERD, stomach acid feels as if its backing up toward your mouth. Whether or not you take medicine to control your GERD, your symptoms can often be improved with lifestyle changes. Talk to your healthcare provider about the following suggestions. These suggestions may help you get relief from your symptoms.      Raise your  head  Reflux is more likely to strike when youre lying down flat, because stomach fluid can flow backward more easily. Raising the head of your bed 4 to 6 inches can help. To do this:  · Slide blocks or books under the legs at the head of your bed. Or, place a wedge under the mattress. Many foam stores can make a suitable wedge for you. The wedge should run from your waist to the top of your head.  · Dont just prop your head on several pillows. This increases pressure on your stomach. It can make GERD worse.  Watch your eating habits  Certain foods may increase the acid in your stomach or relax the lower esophageal sphincter. This makes GERD more likely. Its best to avoid the following if they cause you symptoms:  · Coffee, tea, and carbonated drinks (with and without caffeine)  · Fatty, fried, or spicy food  · Mint, chocolate, onions, and tomatoes  · Peppermint  · Any other foods that seem to irritate your stomach or cause you pain  Relieve the pressure  Tips include the following:  · Eat smaller meals, even if you have to eat more often.  · Dont lie down right after you eat. Wait a few hours for your stomach to empty.  · Avoid tight belts and tight-fitting clothes.  · Lose excess weight.  Tobacco and alcohol  Avoid smoking tobacco and drinking alcohol. They can make GERD symptoms worse.  Date Last Reviewed: 7/1/2016  © 7541-2937 [employer]. [address] [place] [address], [place] [address]. All rights reserved. This information is not intended as a substitute for professional medical care. Always follow your healthcare professional's instructions.

## 2019-10-09 NOTE — TELEPHONE ENCOUNTER
Please assist patient with setting up repeat chest xray in about 6 weeks. Also, please inform that she will need to do repeat lab at work at time in 6 weeks as well.     Thanks!

## 2019-10-20 PROBLEM — Z86.711 HISTORY OF PULMONARY EMBOLUS (PE): Status: ACTIVE | Noted: 2019-10-20

## 2019-10-20 PROBLEM — I26.99 BILATERAL PULMONARY EMBOLISM: Status: RESOLVED | Noted: 2019-04-27 | Resolved: 2019-10-20

## 2019-10-21 ENCOUNTER — PATIENT OUTREACH (OUTPATIENT)
Dept: ADMINISTRATIVE | Facility: OTHER | Age: 84
End: 2019-10-21

## 2019-10-31 ENCOUNTER — EXTERNAL CHRONIC CARE MANAGEMENT (OUTPATIENT)
Dept: PRIMARY CARE CLINIC | Facility: CLINIC | Age: 84
End: 2019-10-31
Payer: MEDICARE

## 2019-10-31 PROCEDURE — 99490 CHRNC CARE MGMT STAFF 1ST 20: CPT | Mod: S$PBB,,, | Performed by: INTERNAL MEDICINE

## 2019-10-31 PROCEDURE — 99490 PR CHRONIC CARE MGMT, 1ST 20 MIN: ICD-10-PCS | Mod: S$PBB,,, | Performed by: INTERNAL MEDICINE

## 2019-10-31 PROCEDURE — 99490 CHRNC CARE MGMT STAFF 1ST 20: CPT | Mod: PBBFAC | Performed by: INTERNAL MEDICINE

## 2019-11-01 RX ORDER — DULOXETIN HYDROCHLORIDE 30 MG/1
30 CAPSULE, DELAYED RELEASE ORAL DAILY
Qty: 90 CAPSULE | Refills: 1 | Status: SHIPPED | OUTPATIENT
Start: 2019-11-01 | End: 2020-05-07

## 2019-11-01 RX ORDER — DULOXETIN HYDROCHLORIDE 30 MG/1
CAPSULE, DELAYED RELEASE ORAL
Qty: 90 CAPSULE | Refills: 1 | Status: SHIPPED | OUTPATIENT
Start: 2019-11-01 | End: 2020-02-26 | Stop reason: SDUPTHER

## 2019-11-01 NOTE — TELEPHONE ENCOUNTER
----- Message from Audra Wright sent at 11/1/2019  3:09 PM CDT -----  Contact: Pt   Can the clinic reply in [facility]: No     Please refill the medication(s) listed below. The patient can be reached at this phone number once it is called into the pharmacy.    Medication #1DULoxetine (CYMBALTA) 30 MG capsule// Pt is requesting a rush orders she needs a new prescription .........    Medication #2    Preferred Pharmacy:[facility] - [place], [place] - [address] AT PORTAL TO [facility] SIT[facility]

## 2019-11-03 ENCOUNTER — PATIENT OUTREACH (OUTPATIENT)
Dept: ADMINISTRATIVE | Facility: OTHER | Age: 84
End: 2019-11-03

## 2019-11-08 DIAGNOSIS — R61 HYPERHIDROSIS: ICD-10-CM

## 2019-11-08 DIAGNOSIS — E03.9 HYPOTHYROIDISM, ADULT: ICD-10-CM

## 2019-11-08 RX ORDER — GLYCOPYRROLATE 2 MG/1
TABLET ORAL
Qty: 135 TABLET | Refills: 1 | Status: SHIPPED | OUTPATIENT
Start: 2019-11-08 | End: 2019-11-08 | Stop reason: SDUPTHER

## 2019-11-08 RX ORDER — LEVOTHYROXINE SODIUM 75 UG/1
75 TABLET ORAL DAILY
Qty: 30 TABLET | Refills: 1 | Status: SHIPPED | OUTPATIENT
Start: 2019-11-08 | End: 2019-12-10

## 2019-11-08 RX ORDER — GLYCOPYRROLATE 2 MG/1
TABLET ORAL
Qty: 135 TABLET | Refills: 1 | Status: SHIPPED | OUTPATIENT
Start: 2019-11-08 | End: 2020-05-07

## 2019-11-08 NOTE — TELEPHONE ENCOUNTER
----- Message from Flor Kim sent at 11/8/2019  4:13 PM CST -----  Contact: DEJAH ROLDAN [[id number]]  Can the clinic reply in [facility]: N       Please refill the medication(s) listed below. Please call the patient when the prescription(s) is ready for  at this phone number [phone]       Medication #1 glycopyrrolate (ROBINUL) 2 MG Tab    Medication #2 levothyroxine (SYNTHROID) 75 MCG tablet      Preferred Pharmacy: [facility] mail order service

## 2019-11-09 ENCOUNTER — PATIENT OUTREACH (OUTPATIENT)
Dept: ADMINISTRATIVE | Facility: OTHER | Age: 84
End: 2019-11-09

## 2019-11-11 ENCOUNTER — OFFICE VISIT (OUTPATIENT)
Dept: PODIATRY | Facility: CLINIC | Age: 84
End: 2019-11-11
Payer: MEDICARE

## 2019-11-11 VITALS
SYSTOLIC BLOOD PRESSURE: 104 MMHG | BODY MASS INDEX: 24.12 KG/M2 | HEIGHT: 65 IN | WEIGHT: 144.81 LBS | DIASTOLIC BLOOD PRESSURE: 60 MMHG

## 2019-11-11 DIAGNOSIS — R20.2 PARESTHESIA: Primary | ICD-10-CM

## 2019-11-11 DIAGNOSIS — M79.671 PAIN IN BOTH FEET: ICD-10-CM

## 2019-11-11 DIAGNOSIS — M79.672 PAIN IN BOTH FEET: ICD-10-CM

## 2019-11-11 PROCEDURE — 99214 OFFICE O/P EST MOD 30 MIN: CPT | Mod: S$PBB,,, | Performed by: PODIATRIST

## 2019-11-11 PROCEDURE — 99214 PR OFFICE/OUTPT VISIT, EST, LEVL IV, 30-39 MIN: ICD-10-PCS | Mod: S$PBB,,, | Performed by: PODIATRIST

## 2019-11-11 PROCEDURE — 99999 PR PBB SHADOW E&M-EST. PATIENT-LVL III: ICD-10-PCS | Mod: PBBFAC,,, | Performed by: PODIATRIST

## 2019-11-11 PROCEDURE — 99999 PR PBB SHADOW E&M-EST. PATIENT-LVL III: CPT | Mod: PBBFAC,,, | Performed by: PODIATRIST

## 2019-11-11 PROCEDURE — 99213 OFFICE O/P EST LOW 20 MIN: CPT | Mod: PBBFAC,PO | Performed by: PODIATRIST

## 2019-11-11 RX ORDER — LIDOCAINE 40 MG/G
CREAM TOPICAL
Qty: 45 G | Refills: 3 | COMMUNITY
Start: 2019-11-11 | End: 2021-08-10

## 2019-11-11 NOTE — LETTER
November 13, 2019      Lyla Mathew MD  [address]  [address]  [place] [address]           [facility]  [address] [place] [address]  Phone: [phone]          Patient: Debbie Ding   MR Number: [id number]   YOB: 1932   Date of Visit: 11/11/2019       Dear Dr. Lyla Mathew:    Thank you for referring Debbie Ding to me for evaluation. Attached you will find relevant portions of my assessment and plan of care.    If you have questions, please do not hesitate to call me. I look forward to following Debbie Ding along with you.    Sincerely,    Junie Friedman DPM    Enclosure  CC:  No Recipients    If you would like to receive this communication electronically, please contact [email or web address] or [phone] to request more information on [employer] Link access.    For providers and/or their staff who would like to refer a patient to [facility], please contact us through our one-stop-shop provider referral line, [facility], at [phone].    If you feel you have received this communication in error or would no longer like to receive these types of communications, please e-mail [email or web address]

## 2019-11-13 NOTE — PROGRESS NOTES
Subjective:      Patient ID: Debbie Ding is a 87 y.o. female.    Chief Complaint: Foot Pain (ov 10/09/19 Dr Mathew PCP)    Debbie is a 87 y.o. female who presents to the podiatry clinic  with complaint of  bilateral foot pain. Onset of the symptoms was several weeks ago. Precipitating event: none known. Current symptoms include: ability to bear weight, but with some pain. Aggravating factors: any weight bearing. Symptoms have progressed to a point and plateaued. Patient has had prior foot problems. Evaluation to date: none. Treatment to date: none. Patients rates pain 5/10 on pain scale.        Review of Systems   Constitution: Negative for chills, diaphoresis and fever.   Cardiovascular: Negative for claudication, cyanosis, leg swelling and syncope.   Respiratory: Negative for cough and shortness of breath.    Skin: Positive for suspicious lesions. Negative for color change and nail changes.   Musculoskeletal: Positive for joint pain and joint swelling. Negative for falls, muscle cramps and muscle weakness.   Gastrointestinal: Negative for diarrhea, nausea and vomiting.   Neurological: Negative for disturbances in coordination, numbness, paresthesias, sensory change, tremors and weakness.   Psychiatric/Behavioral: Negative for altered mental status.           Objective:      Physical Exam   Constitutional: She appears well-developed. She is cooperative.   Oriented to time, place, and person.   Cardiovascular:   DP and PT pulses are palpable bilaterally. 3 sec capillary refill time and toes and feet are warm to touch proximally .  There is  hair growth on the feet and toes b/l. There is no edema b/l. No spider veins or varicosities present b/l.      Musculoskeletal:   Equinus noted b/l ankles with < 10 deg DF noted. MMT 5/5 in DF/PF/Inv/Ev resistance with no reproduction of pain in any direction. Passive range of motion of ankle and pedal joints is painless b/l.     Feet:   Right Foot:   Skin Integrity:  Negative for callus or dry skin.   Left Foot:   Skin Integrity: Negative for callus or dry skin.   Lymphadenopathy:   Negative lymphadenopathy bilateral popliteal fossa and tarsal tunnel.   Neurological: She is alert.   Light touch, proprioception, and sharp/dull sensation are all intact bilaterally. Protective threshold with the [place]-Wienstein monofilament is intact bilaterally.  Subjective paresthesias with no clearly identifiable source or trigger.      Skin:   No open lesions, lacerations or wounds noted.Interdigital spaces clean, dry and intact b/l. No erythema noted to b/l foot.  Nails normal color and trophic qualities.     Psychiatric: She has a normal mood and affect.             Assessment:       Encounter Diagnoses   Name Primary?    Paresthesia Yes    Pain in both feet          Plan:       Debbie was seen today for foot pain.    Diagnoses and all orders for this visit:    Paresthesia    Pain in both feet    Other orders  -     lidocaine (LMX) 4 % cream; Apply topically as needed.      I counseled the patient on her conditions, their implications and medical management.    Discussed conservative treatment with shoes of adequate dimensions, material, and style to alleviate symptoms and delay or prevent surgical intervention.    Rx Lidocaine l to be applied to affected area up to 3-4 x daily as needed for pain    Discussed inserts recommend [employer]enco orthotics.     RTC PRN

## 2019-11-15 ENCOUNTER — PATIENT OUTREACH (OUTPATIENT)
Dept: ADMINISTRATIVE | Facility: OTHER | Age: 84
End: 2019-11-15

## 2019-11-18 ENCOUNTER — OFFICE VISIT (OUTPATIENT)
Dept: UROLOGY | Facility: CLINIC | Age: 84
End: 2019-11-18
Attending: UROLOGY
Payer: MEDICARE

## 2019-11-18 VITALS
WEIGHT: 144 LBS | DIASTOLIC BLOOD PRESSURE: 66 MMHG | HEART RATE: 79 BPM | SYSTOLIC BLOOD PRESSURE: 141 MMHG | BODY MASS INDEX: 23.99 KG/M2 | HEIGHT: 65 IN

## 2019-11-18 DIAGNOSIS — N32.81 OAB (OVERACTIVE BLADDER): ICD-10-CM

## 2019-11-18 DIAGNOSIS — R35.1 NOCTURIA: Primary | ICD-10-CM

## 2019-11-18 DIAGNOSIS — N39.41 URGENCY INCONTINENCE: ICD-10-CM

## 2019-11-18 LAB
BILIRUB SERPL-MCNC: ABNORMAL MG/DL
BLOOD URINE, POC: 50
COLOR, POC UA: YELLOW
GLUCOSE UR QL STRIP: ABNORMAL
KETONES UR QL STRIP: ABNORMAL
LEUKOCYTE ESTERASE URINE, POC: ABNORMAL
NITRITE, POC UA: ABNORMAL
PH, POC UA: 5
POC RESIDUAL URINE VOLUME: 12 ML (ref 0–100)
PROTEIN, POC: ABNORMAL
SPECIFIC GRAVITY, POC UA: 1.02
UROBILINOGEN, POC UA: ABNORMAL

## 2019-11-18 PROCEDURE — 51798 POCT BLADDER SCAN: ICD-10-PCS | Mod: S$GLB,,, | Performed by: UROLOGY

## 2019-11-18 PROCEDURE — 99214 PR OFFICE/OUTPT VISIT, EST, LEVL IV, 30-39 MIN: ICD-10-PCS | Mod: 25,S$GLB,, | Performed by: UROLOGY

## 2019-11-18 PROCEDURE — 51798 US URINE CAPACITY MEASURE: CPT | Mod: S$GLB,,, | Performed by: UROLOGY

## 2019-11-18 PROCEDURE — 87086 URINE CULTURE/COLONY COUNT: CPT

## 2019-11-18 PROCEDURE — 87077 CULTURE AEROBIC IDENTIFY: CPT

## 2019-11-18 PROCEDURE — 81002 POCT URINE DIPSTICK WITHOUT MICROSCOPE: ICD-10-PCS | Mod: S$GLB,,, | Performed by: UROLOGY

## 2019-11-18 PROCEDURE — 99214 OFFICE O/P EST MOD 30 MIN: CPT | Mod: 25,S$GLB,, | Performed by: UROLOGY

## 2019-11-18 PROCEDURE — 87088 URINE BACTERIA CULTURE: CPT

## 2019-11-18 PROCEDURE — 81002 URINALYSIS NONAUTO W/O SCOPE: CPT | Mod: S$GLB,,, | Performed by: UROLOGY

## 2019-11-18 PROCEDURE — 87186 SC STD MICRODIL/AGAR DIL: CPT

## 2019-11-18 NOTE — PROGRESS NOTES
"Subjective:      Debbie Ding is a 87 y.o. female who returns today regarding her noturia.    She remains on myrbetriq 50mg and is also now taking vesicare 10mg. Despite this, she continues to have very bothersome urgency, frequency, and urge incontinence, especially at night. Generally she does not think the medications have made any difference.    We previously attempted to collect a voiding diary, however she only partially completed and no significant information could be gathered.    The following portions of the patient's history were reviewed and updated as appropriate: allergies, current medications, past family history, past medical history, past social history, past surgical history and problem list.    Review of Systems  A comprehensive multipoint review of systems was negative except as otherwise stated in the HPI.     Objective:   Vitals: BP (!) 141/66 (BP Location: Right arm, Patient Position: Sitting)   Pulse 79   Ht 5' 5" (1.651 m)   Wt 65.3 kg (144 lb)   BMI 23.96 kg/m²     Physical Exam   General: alert and oriented, no acute distress  Respiratory: Symmetric expansion, non-labored breathing  Neuro: no gross deficits  Psych: normal judgment and insight, normal mood/affect and non-anxious    Lab Review   Lab Results   Component Value Date    WBC 6.38 06/20/2019    HGB 11.9 (L) 06/20/2019    HCT 36.0 (L) 06/20/2019    MCV 89 06/20/2019     06/20/2019     Lab Results   Component Value Date    CREATININE 1.5 (H) 10/09/2019    BUN 21 10/09/2019       Assessment and Plan:   1. OAB  2. Urge incontinence  3. Nocturia  -- Continue myrbetriq and vesicare  -- Discussed additional possible treatments for OAB, which I suspect is primary etiology, including botox. She is open to this possibility. Will refer to Dr. Rankin.  -- Urine culture today; treat as indicated  "

## 2019-11-21 ENCOUNTER — HOSPITAL ENCOUNTER (OUTPATIENT)
Dept: RADIOLOGY | Facility: OTHER | Age: 84
Discharge: HOME | End: 2019-11-21
Attending: FAMILY MEDICINE
Payer: MEDICARE

## 2019-11-21 ENCOUNTER — PATIENT OUTREACH (OUTPATIENT)
Dept: ADMINISTRATIVE | Facility: OTHER | Age: 84
End: 2019-11-21

## 2019-11-21 DIAGNOSIS — J18.9 COMMUNITY ACQUIRED PNEUMONIA OF RIGHT UPPER LOBE OF LUNG: ICD-10-CM

## 2019-11-21 LAB — BACTERIA UR CULT: ABNORMAL

## 2019-11-21 PROCEDURE — 71046 X-RAY EXAM CHEST 2 VIEWS: CPT | Mod: 26,,, | Performed by: RADIOLOGY

## 2019-11-21 PROCEDURE — 71046 X-RAY EXAM CHEST 2 VIEWS: CPT | Mod: TC,FY

## 2019-11-21 PROCEDURE — 71046 XR CHEST PA AND LATERAL: ICD-10-PCS | Mod: 26,,, | Performed by: RADIOLOGY

## 2019-11-22 ENCOUNTER — TELEPHONE (OUTPATIENT)
Dept: INTERNAL MEDICINE | Facility: CLINIC | Age: 84
End: 2019-11-22

## 2019-11-22 ENCOUNTER — OFFICE VISIT (OUTPATIENT)
Dept: UROLOGY | Facility: CLINIC | Age: 84
End: 2019-11-22
Payer: MEDICARE

## 2019-11-22 VITALS — DIASTOLIC BLOOD PRESSURE: 61 MMHG | HEART RATE: 81 BPM | SYSTOLIC BLOOD PRESSURE: 131 MMHG

## 2019-11-22 DIAGNOSIS — N39.41 URGENCY INCONTINENCE: Primary | ICD-10-CM

## 2019-11-22 PROCEDURE — 1159F PR MEDICATION LIST DOCUMENTED IN MEDICAL RECORD: ICD-10-PCS | Mod: S$GLB,,, | Performed by: UROLOGY

## 2019-11-22 PROCEDURE — 87077 CULTURE AEROBIC IDENTIFY: CPT

## 2019-11-22 PROCEDURE — 99214 PR OFFICE/OUTPT VISIT, EST, LEVL IV, 30-39 MIN: ICD-10-PCS | Mod: S$GLB,,, | Performed by: UROLOGY

## 2019-11-22 PROCEDURE — 99214 OFFICE O/P EST MOD 30 MIN: CPT | Mod: S$GLB,,, | Performed by: UROLOGY

## 2019-11-22 PROCEDURE — 87088 URINE BACTERIA CULTURE: CPT

## 2019-11-22 PROCEDURE — 87086 URINE CULTURE/COLONY COUNT: CPT

## 2019-11-22 PROCEDURE — 87186 SC STD MICRODIL/AGAR DIL: CPT

## 2019-11-22 PROCEDURE — 1159F MED LIST DOCD IN RCRD: CPT | Mod: S$GLB,,, | Performed by: UROLOGY

## 2019-11-22 RX ORDER — SULFAMETHOXAZOLE AND TRIMETHOPRIM 800; 160 MG/1; MG/1
1 TABLET ORAL 2 TIMES DAILY
Qty: 10 TABLET | Refills: 0 | Status: SHIPPED | OUTPATIENT
Start: 2019-11-22 | End: 2019-11-22 | Stop reason: SDUPTHER

## 2019-11-22 RX ORDER — SULFAMETHOXAZOLE AND TRIMETHOPRIM 800; 160 MG/1; MG/1
1 TABLET ORAL 2 TIMES DAILY
Qty: 10 TABLET | Refills: 0 | Status: SHIPPED | OUTPATIENT
Start: 2019-11-22 | End: 2019-11-27

## 2019-11-22 NOTE — PROGRESS NOTES
[facility]      New Overactive Bladder (OAB) Note    11/22/2019    Referred by:  Patel Fraga MD    HPI: Debbie Ding is a very pleasant 87 y.o. female who [facility] not [facility] our department referred for evaluation of urinary incontinence of several years duration. She reports bother is associated with urinary daytime frequency (15x daily), without nocturia (0-1x per night) and with urgency that results in urinary incontinence 7 x daily. She reports no stress urinary incontinence associated with exertion. She requires daily pads (7 pads/day).  She has decreased overall fluid intake.  She reports urinary incontinence is equally bothersome during the day and night. OABq-SF - 34 HRQOL - 66 IIQ-7 - 15 VINOD-6 - 13.    She reports symptoms of irritative voiding including frequency, incontinence and urgency. She denies symptoms of obstructive voiding including decreased stream, hesitancy, intermittency, post void dribbling and sense of incomplete emptying. Bladder scan PVR was 0 mL. Her history includes no notation of urolithiasis, hematuria, prior pelvic surgery, previous prolapse or incontinence procedures or neurological symptoms/diagnoses. She denies all other prior pelvic surgeries or neurologic diagnoses. She does not report symptoms suggestive of advanced POP. She reports a history of constipation. She denies a history suggestive of glaucoma.  She reports a history of hypertension that is controlled with medications. Her medical history is significant for atrial fibrillation and history of bilateral PE last year. She is maintained on Eliquis.     She reports initially having a satisfactory response to Myrbetriq 50 mg daily.  Last year, around the same time as her fall, and hospitalization, she noted increasing incontinence episodes.  She is uncertain whether this is secondary to increased frequency and urgency for the considerable decrease in her  mobility over the last year.  Regardless, even with the addition of VESIcare and combined pharmacologic therapy, she continues to have profound daily urgency incontinence.    Previous incontinence therapies:   Behavioral Therapy: (fluid/dietary modification) -  provided no benefit   Medication: (VESIcare (provided some but insufficient benefit)   Myrbetriq (initially provided benefit but no longer satisfactory.)     A review of 10+ systems was conducted with pertinent positive and negative findings documented in HPI with all other systems reviewed and negative.    Past medical, family, surgical and social history reviewed as documented in chart with pertinent positive medical, family, surgical and social history detailed in HPI.    Exam Findings:    Const: no acute distress, conversant and alert  Eyes: anicteric, extraocular muscles intact  ENMT: normocephalic, Nl oral membranes  Cardio: no cyanosis, nl cap refill  Pulm: no tachypnea; no resp distress  Abd: soft, no tenderness  Musc: no laceration, no tenderness  Neuro: alert; oriented x 3  Skin: warm, dry; no petichiae  Psych: no anxiety; normal speech     Assessment/Plan:    Overactive Bladder with Urgency Incontinence (new, addt'l workup): Her history is suggestive of Overactive Bladder (OAB) with predominantly Urgency Urinary Incontinence (UUI).  She has failed to see adequate response with multiple pharmacologic trials, including current Wiley on combined pharmacologic therapy.  Her worsening incontinence likely come from a combination of worsening underlying symptoms combined with a significant decrease in mobility.    Her reported symptoms today are relatively severe and therefore, I believe it would be appropriate to consider alternatives to pharmacologic therapy in addition to behavioral therapies.  We had a long discussion today regarding her options including continued pharmacologic therapy, sacral neuromodulation, posterior tibial neuromodulation, and  intra detrusor botulinum toxin.  We discussed the risks and benefits of each of these.  It is unclear how safely she could come off of Eliquis to facilitate sacral neuromodulation.  It would be difficult for her to routinely visit the office as necessary for posterior tibial neuromodulation, in its current form.  She is interested in intra detrusor botulinum toxin.  I would be comfortable performing this without discontinuing Eliquis if it is deemed too risky for her to temporarily discontinue this medication.  She would prefer that Botox be injected under mild sedation in the OR, and this is certainly reasonable request.  Urine was sent for culture and sensitivity today and we would plan to treat even asymptomatic bacteriuria in anticipation of Botox injections.  We discussed the specific risks and benefits of Botox including the need for repeat injection on average every 6-9 months in the approximately 3% risk of urinary retention requiring intermittent catheterization.       Clinical tests reviewed/ordered today: urinalysis (11/22/2019) U/S for post void residual (11/22/2019)   Radiology ordered/reviewed: none   Medical tests ordered/reviewed: CBC   Radiology independently reviewed: none   Previous records: reviewed today and showing, in summary - Patient has history of PE and atrial fibrillation as detailed in HPI.

## 2019-11-22 NOTE — LETTER
November 22, 2019      Patel Fraga MD  [address] [facility] [address] [address]           [facility] [address] [address]  Phone: [phone]  Fax: [phone]          Patient: Debbie Ding   MR Number: [id number]   YOB: 1932   Date of Visit: 11/22/2019       Dear Dr. Patel Fraga:    Thank you for referring Debbie Ding to me for evaluation. Attached you will find relevant portions of my assessment and plan of care.    If you have questions, please do not hesitate to call me. I look forward to following Debbie Ding along with you.    Sincerely,    Diogo Rankin MD    Enclosure  CC:  No Recipients    If you would like to receive this communication electronically, please contact [email or web address] or [phone] to request more information on [employer] access.    For providers and/or their staff who would like to refer a patient to [facility], please contact us through our one-stop-shop provider referral line, [facility], at [phone].    If you feel you have received this communication in error or would no longer like to receive these types of communications, please e-mail [email or web address]

## 2019-11-22 NOTE — TELEPHONE ENCOUNTER
Please call patient and ask if she is coughing or having any pain when taking deep breaths. Her chest xray did show improvement from the last time.   Please remind patient to keep her upcoming appointment with me in December so that we can discuss her xray and her lab work.      Thanks!

## 2019-11-22 NOTE — H&P (VIEW-ONLY)
[facility]      New Overactive Bladder (OAB) Note    11/22/2019    Referred by:  Patel Fraga MD    HPI: Debbie Ding is a very pleasant 87 y.o. female who [facility] referred for evaluation of urinary incontinence of several years duration. She reports bother is associated with urinary daytime frequency (15x daily), without nocturia (0-1x per night) and with urgency that results in urinary incontinence 7 x daily. She reports no stress urinary incontinence associated with exertion. She requires daily pads (7 pads/day).  She has decreased overall fluid intake.  She reports urinary incontinence is equally bothersome during the day and night. OABq-SF - 34 HRQOL - 66 IIQ-7 - 15 VINOD-6 - 13.    She reports symptoms of irritative voiding including frequency, incontinence and urgency. She denies symptoms of obstructive voiding including decreased stream, hesitancy, intermittency, post void dribbling and sense of incomplete emptying. Bladder scan PVR was 0 mL. Her history includes no notation of urolithiasis, hematuria, prior pelvic surgery, previous prolapse or incontinence procedures or neurological symptoms/diagnoses. She denies all other prior pelvic surgeries or neurologic diagnoses. She does not report symptoms suggestive of advanced POP. She reports a history of constipation. She denies a history suggestive of glaucoma.  She reports a history of hypertension that is controlled with medications. Her medical history is significant for atrial fibrillation and history of bilateral PE last year. She is maintained on Eliquis.     She reports initially having a satisfactory response to Myrbetriq 50 mg daily.  Last year, around the same time as her fall, and hospitalization, she noted increasing incontinence episodes.  She is uncertain whether this is secondary to increased frequency and urgency for the considerable decrease in her  mobility over the last year.  Regardless, even with the addition of VESIcare and combined pharmacologic therapy, she continues to have profound daily urgency incontinence.    Previous incontinence therapies:   Behavioral Therapy: (fluid/dietary modification) -  provided no benefit   Medication: (VESIcare (provided some but insufficient benefit)   Myrbetriq (initially provided benefit but no longer satisfactory.)     A review of 10+ systems was conducted with pertinent positive and negative findings documented in HPI with all other systems reviewed and negative.    Past medical, family, surgical and social history reviewed as documented in chart with pertinent positive medical, family, surgical and social history detailed in HPI.    Exam Findings:    Const: no acute distress, conversant and alert  Eyes: anicteric, extraocular muscles intact  ENMT: normocephalic, Nl oral membranes  Cardio: no cyanosis, nl cap refill  Pulm: no tachypnea; no resp distress  Abd: soft, no tenderness  Musc: no laceration, no tenderness  Neuro: alert; oriented x 3  Skin: warm, dry; no petichiae  Psych: no anxiety; normal speech     Assessment/Plan:    Overactive Bladder with Urgency Incontinence (new, addt'l workup): Her history is suggestive of Overactive Bladder (OAB) with predominantly Urgency Urinary Incontinence (UUI).  She has failed to see adequate response with multiple pharmacologic trials, including current Wiley on combined pharmacologic therapy.  Her worsening incontinence likely come from a combination of worsening underlying symptoms combined with a significant decrease in mobility.    Her reported symptoms today are relatively severe and therefore, I believe it would be appropriate to consider alternatives to pharmacologic therapy in addition to behavioral therapies.  We had a long discussion today regarding her options including continued pharmacologic therapy, sacral neuromodulation, posterior tibial neuromodulation, and  intra detrusor botulinum toxin.  We discussed the risks and benefits of each of these.  It is unclear how safely she could come off of Eliquis to facilitate sacral neuromodulation.  It would be difficult for her to routinely visit the office as necessary for posterior tibial neuromodulation, in its current form.  She is interested in intra detrusor botulinum toxin.  I would be comfortable performing this without discontinuing Eliquis if it is deemed too risky for her to temporarily discontinue this medication.  She would prefer that Botox be injected under mild sedation in the OR, and this is certainly reasonable request.  Urine was sent for culture and sensitivity today and we would plan to treat even asymptomatic bacteriuria in anticipation of Botox injections.  We discussed the specific risks and benefits of Botox including the need for repeat injection on average every 6-9 months in the approximately 3% risk of urinary retention requiring intermittent catheterization.       Clinical tests reviewed/ordered today: urinalysis (11/22/2019) U/S for post void residual (11/22/2019)   Radiology ordered/reviewed: none   Medical tests ordered/reviewed: CBC   Radiology independently reviewed: none   Previous records: reviewed today and showing, in summary - Patient has history of PE and atrial fibrillation as detailed in HPI.

## 2019-11-22 NOTE — TELEPHONE ENCOUNTER
Spoke to the Pt and she is not coughing or have any pain when taking deep breaths.  Pt states she will come on 12/10 to her appt

## 2019-11-25 LAB — BACTERIA UR CULT: ABNORMAL

## 2019-11-26 ENCOUNTER — TELEPHONE (OUTPATIENT)
Dept: UROLOGY | Facility: CLINIC | Age: 84
End: 2019-11-26

## 2019-11-26 DIAGNOSIS — N39.41 URGENCY INCONTINENCE: Primary | ICD-10-CM

## 2019-11-26 NOTE — TELEPHONE ENCOUNTER
Spoke with the pt to schedule her procedure, she agreed on 12-4. I advised her that I would be mailing out her procedure instructions/location to the address we have on file. She verbally understood.

## 2019-11-30 ENCOUNTER — EXTERNAL CHRONIC CARE MANAGEMENT (OUTPATIENT)
Dept: PRIMARY CARE CLINIC | Facility: CLINIC | Age: 84
End: 2019-11-30
Payer: MEDICARE

## 2019-11-30 PROCEDURE — 99490 PR CHRONIC CARE MGMT, 1ST 20 MIN: ICD-10-PCS | Mod: S$PBB,,, | Performed by: INTERNAL MEDICINE

## 2019-11-30 PROCEDURE — 99490 CHRNC CARE MGMT STAFF 1ST 20: CPT | Mod: PBBFAC | Performed by: INTERNAL MEDICINE

## 2019-11-30 PROCEDURE — 99490 CHRNC CARE MGMT STAFF 1ST 20: CPT | Mod: S$PBB,,, | Performed by: INTERNAL MEDICINE

## 2019-12-03 ENCOUNTER — TELEPHONE (OUTPATIENT)
Dept: UROLOGY | Facility: CLINIC | Age: 84
End: 2019-12-03

## 2019-12-03 PROBLEM — N32.81 OVERACTIVE BLADDER: Status: ACTIVE | Noted: 2019-12-03

## 2019-12-03 NOTE — TELEPHONE ENCOUNTER
----- Message from Kirsty Rivera RN sent at 12/2/2019  4:26 PM CST -----  Pt is scheduled for cystoscopy with botox injection by Dr. Rankin  12/4. Med card reads on Eliquis. Need instructions please to sweetie.      CHRISTELLE Rivera RN BC  Pre-op anesthesia

## 2019-12-03 NOTE — TELEPHONE ENCOUNTER
----- Message from Marin Rogers sent at 12/3/2019 12:49 PM CST -----  Contact: Komal from pre op  Please give Komal a call back at ext [phone] regarding pt who has not stopped eliquis .

## 2019-12-03 NOTE — PRE-PROCEDURE INSTRUCTIONS
Message from Tiny German LPN sent at 12/3/2019 12:38 PM CST -----  June,  Dr. Rankin said he will do the procedure with her on Eliquis.

## 2019-12-03 NOTE — TELEPHONE ENCOUNTER
Called pt and spoke with daughter vita to confirm arrival time of 230pm for procedure on 12-4-19. Gave pt NPO instructions and gave pt opportunity to ask questions. Pt daughter verbalized understanding.

## 2019-12-03 NOTE — PRE-PROCEDURE INSTRUCTIONS
PREOP INSTRUCTIONS:No solid food ,milk or milk products for 8 hours prior to procedure.Clear liquids are allowed up to 2 hours before procedure.Clear liquids are:water,apple juice,pedialyte,gatorade,& jello.Shower instructions as well as directions to the [facility] were given.Patient's daughter encouraged to have patient wear loose fitting,comfortable clothing.Medication instructions for pm prior to and am of procedure reviewed with patient's daughter and she stated an understanding.A call was placed to 's office regarding instructions for the patient's Eliquis which has not been held.Patient's daughter Madina stated that she will accompany her mother to the hospital tomorrow.    Patient's mother  denies patient having any side effects or issues with anesthesia or sedation.    Patient's daughter does not know arrival time.Explained that this information comes from the surgeon's office and if they haven't heard from them by 2 or 3 pm to call the office.Patient's mother stated an understanding.

## 2019-12-03 NOTE — TELEPHONE ENCOUNTER
----- Message from Tiny German LPN sent at 12/3/2019 12:38 PM CST -----  June,  Dr. Rankin said he will do the procedure with her on Eliquis.  ----- Message -----  From: Neli Cain MA  Sent: 12/3/2019   8:22 AM CST  To: Tiny German LPN, Diogo Rankin MD    Do you want me to reschedule this patient? I'm in the process of calling the patients to give the arrival time for tomorrow.  ----- Message -----  From: Kirsty Rivera RN  Sent: 12/2/2019   4:26 PM CST  To: Lyla Mathew MD, Neli Cain MA, #    Pt is scheduled for cystoscopy with botox injection by Dr. Rankin  12/4. Med card reads on Eliquis. Need instructions please to marianne Rivera RN BC  Pre-op anesthesia

## 2019-12-04 ENCOUNTER — ANESTHESIA EVENT (OUTPATIENT)
Dept: SURGERY | Facility: HOSPITAL | Age: 84
End: 2019-12-04
Payer: MEDICARE

## 2019-12-04 ENCOUNTER — ANESTHESIA (OUTPATIENT)
Dept: SURGERY | Facility: HOSPITAL | Age: 84
End: 2019-12-04
Payer: MEDICARE

## 2019-12-04 ENCOUNTER — HOSPITAL ENCOUNTER (OUTPATIENT)
Facility: HOSPITAL | Age: 84
Discharge: HOME | End: 2019-12-04
Attending: UROLOGY | Admitting: UROLOGY
Payer: MEDICARE

## 2019-12-04 DIAGNOSIS — R32 URINARY INCONTINENCE, UNSPECIFIED TYPE: Primary | ICD-10-CM

## 2019-12-04 DIAGNOSIS — N32.81 OVERACTIVE BLADDER: ICD-10-CM

## 2019-12-04 LAB — POCT GLUCOSE: 88 MG/DL (ref 70–110)

## 2019-12-04 PROCEDURE — 63600175 PHARM REV CODE 636 W HCPCS: Performed by: STUDENT IN AN ORGANIZED HEALTH CARE EDUCATION/TRAINING PROGRAM

## 2019-12-04 PROCEDURE — 37000008 HC ANESTHESIA 1ST 15 MINUTES: Performed by: UROLOGY

## 2019-12-04 PROCEDURE — D9220A PRA ANESTHESIA: Mod: CRNA,,, | Performed by: NURSE ANESTHETIST, CERTIFIED REGISTERED

## 2019-12-04 PROCEDURE — 52287 PR CYSTOURETHROSCOPY WITH INJ FOR CHEMODENERVATION: ICD-10-PCS | Mod: ,,, | Performed by: UROLOGY

## 2019-12-04 PROCEDURE — 71000015 HC POSTOP RECOV 1ST HR: Performed by: UROLOGY

## 2019-12-04 PROCEDURE — 37000009 HC ANESTHESIA EA ADD 15 MINS: Performed by: UROLOGY

## 2019-12-04 PROCEDURE — 52287 CYSTOSCOPY CHEMODENERVATION: CPT | Mod: ,,, | Performed by: UROLOGY

## 2019-12-04 PROCEDURE — D9220A PRA ANESTHESIA: Mod: ANES,,, | Performed by: ANESTHESIOLOGY

## 2019-12-04 PROCEDURE — D9220A PRA ANESTHESIA: ICD-10-PCS | Mod: CRNA,,, | Performed by: NURSE ANESTHETIST, CERTIFIED REGISTERED

## 2019-12-04 PROCEDURE — 36000707: Performed by: UROLOGY

## 2019-12-04 PROCEDURE — 63600175 PHARM REV CODE 636 W HCPCS: Mod: JG | Performed by: UROLOGY

## 2019-12-04 PROCEDURE — 00910 ANES TRANSURETHRAL PX NOS: CPT | Performed by: UROLOGY

## 2019-12-04 PROCEDURE — D9220A PRA ANESTHESIA: ICD-10-PCS | Mod: ANES,,, | Performed by: ANESTHESIOLOGY

## 2019-12-04 PROCEDURE — 25000003 PHARM REV CODE 250: Performed by: UROLOGY

## 2019-12-04 PROCEDURE — 71000044 HC DOSC ROUTINE RECOVERY FIRST HOUR: Performed by: UROLOGY

## 2019-12-04 PROCEDURE — 63600175 PHARM REV CODE 636 W HCPCS: Performed by: NURSE ANESTHETIST, CERTIFIED REGISTERED

## 2019-12-04 PROCEDURE — 36000706: Performed by: UROLOGY

## 2019-12-04 PROCEDURE — 82962 GLUCOSE BLOOD TEST: CPT | Performed by: UROLOGY

## 2019-12-04 RX ORDER — LIDOCAINE HCL/PF 100 MG/5ML
SYRINGE (ML) INTRAVENOUS
Status: DISCONTINUED | OUTPATIENT
Start: 2019-12-04 | End: 2019-12-04

## 2019-12-04 RX ORDER — CEPHALEXIN 500 MG/1
500 CAPSULE ORAL EVERY 8 HOURS
Qty: 6 CAPSULE | Refills: 0 | Status: SHIPPED | OUTPATIENT
Start: 2019-12-04 | End: 2019-12-06

## 2019-12-04 RX ORDER — PROPOFOL 10 MG/ML
VIAL (ML) INTRAVENOUS CONTINUOUS PRN
Status: DISCONTINUED | OUTPATIENT
Start: 2019-12-04 | End: 2019-12-04

## 2019-12-04 RX ORDER — PROPOFOL 10 MG/ML
VIAL (ML) INTRAVENOUS
Status: DISCONTINUED | OUTPATIENT
Start: 2019-12-04 | End: 2019-12-04

## 2019-12-04 RX ORDER — SODIUM CHLORIDE 9 MG/ML
INJECTION, SOLUTION INTRAVENOUS CONTINUOUS PRN
Status: DISCONTINUED | OUTPATIENT
Start: 2019-12-04 | End: 2019-12-04

## 2019-12-04 RX ORDER — LIDOCAINE HYDROCHLORIDE 20 MG/ML
JELLY TOPICAL
Status: DISCONTINUED | OUTPATIENT
Start: 2019-12-04 | End: 2019-12-04 | Stop reason: HOSPADM

## 2019-12-04 RX ORDER — CEFAZOLIN SODIUM 1 G/3ML
2 INJECTION, POWDER, FOR SOLUTION INTRAMUSCULAR; INTRAVENOUS
Status: COMPLETED | OUTPATIENT
Start: 2019-12-04 | End: 2019-12-04

## 2019-12-04 RX ORDER — ONDANSETRON 2 MG/ML
4 INJECTION INTRAMUSCULAR; INTRAVENOUS DAILY PRN
Status: DISCONTINUED | OUTPATIENT
Start: 2019-12-04 | End: 2019-12-04 | Stop reason: HOSPADM

## 2019-12-04 RX ORDER — SODIUM CHLORIDE 0.9 % (FLUSH) 0.9 %
10 SYRINGE (ML) INJECTION
Status: DISCONTINUED | OUTPATIENT
Start: 2019-12-04 | End: 2019-12-04 | Stop reason: HOSPADM

## 2019-12-04 RX ORDER — FENTANYL CITRATE 50 UG/ML
25 INJECTION, SOLUTION INTRAMUSCULAR; INTRAVENOUS EVERY 5 MIN PRN
Status: DISCONTINUED | OUTPATIENT
Start: 2019-12-04 | End: 2019-12-04 | Stop reason: HOSPADM

## 2019-12-04 RX ORDER — HYDROCODONE BITARTRATE AND ACETAMINOPHEN 5; 325 MG/1; MG/1
1 TABLET ORAL EVERY 4 HOURS PRN
Status: DISCONTINUED | OUTPATIENT
Start: 2019-12-04 | End: 2019-12-04 | Stop reason: HOSPADM

## 2019-12-04 RX ORDER — TRAMADOL HYDROCHLORIDE 50 MG/1
50 TABLET ORAL EVERY 6 HOURS PRN
Qty: 5 TABLET | Refills: 0 | Status: SHIPPED | OUTPATIENT
Start: 2019-12-04 | End: 2019-12-14

## 2019-12-04 RX ADMIN — CEFAZOLIN 2 G: 330 INJECTION, POWDER, FOR SOLUTION INTRAMUSCULAR; INTRAVENOUS at 04:12

## 2019-12-04 RX ADMIN — PROPOFOL 30 MG: 10 INJECTION, EMULSION INTRAVENOUS at 04:12

## 2019-12-04 RX ADMIN — SODIUM CHLORIDE: 0.9 INJECTION, SOLUTION INTRAVENOUS at 04:12

## 2019-12-04 RX ADMIN — LIDOCAINE HYDROCHLORIDE 50 MG: 20 INJECTION, SOLUTION INTRAVENOUS at 04:12

## 2019-12-04 RX ADMIN — PROPOFOL 70 MCG/KG/MIN: 10 INJECTION, EMULSION INTRAVENOUS at 04:12

## 2019-12-04 NOTE — PRE-PROCEDURE INSTRUCTIONS
From: Lyla Mathew MD   Sent: 12/3/2019   4:13 PM CST   To: KENTRELL Barraganlo,     I would recommend the patient stop Eliquis 24-48 hours prior to the procedure and she can restart once hemostasis is achieved.     Thanks!   ----- Message -----   From: Tiny German LPN   Sent: 12/3/2019  12:18 PM CST   To: Kirsty Rivera RN, Lyla Mathew MD, *     Do you know when she took it last?   ----- Message -----   From: Kirsty Rivera RN   Sent: 12/2/2019   4:26 PM CST   To: Lyla Mathew MD, Neli Cain MA, *     Pt is scheduled for cystoscopy with botox injection by Dr. Rankin  12/4. Med card reads on Eliquis. Need instructions please to marianne Rivera RN BC   Pre-op anesthesia

## 2019-12-04 NOTE — ANESTHESIA POSTPROCEDURE EVALUATION
Anesthesia Post Evaluation    Patient: Debbie Ding    Procedure(s) Performed: Procedure(s) (LRB):  CYSTOSCOPY,WITH BOTULINUM TOXIN INJECTION 100units (N/A)    Final Anesthesia Type: general    Patient location during evaluation: PACU  Patient participation: Yes- Able to Participate  Level of consciousness: awake and alert  Post-procedure vital signs: reviewed and stable  Pain management: adequate  Airway patency: patent    PONV status at discharge: No PONV  Anesthetic complications: no      Cardiovascular status: blood pressure returned to baseline  Respiratory status: unassisted  Hydration status: euvolemic  Follow-up not needed.          Vitals Value Taken Time   /73 12/4/2019  5:18 PM   Temp 36.6 °C (97.9 °F) 12/4/2019  4:41 PM   Pulse 77 12/4/2019  5:27 PM   Resp 22 12/4/2019  5:27 PM   SpO2 100 % 12/4/2019  5:27 PM   Vitals shown include unvalidated device data.      No case tracking events are documented in the log.      Pain/Mellissa Score: No data recorded

## 2019-12-04 NOTE — OP NOTE
Cystoscopy Operative Note    Preoperative Diagnosis:   Refractory Overactive Bladder    Postoperative Diagnosis:  Refractory Overactive Bladder    Procedure:  1. Cystoscopy with injection of Botulinum Toxin (100 units)    Attending Surgeon: Diogo Rankin MD    Anesthesia: Monitored Anesthesia Care    EBL: <5 mL    Complications: None    Findings: Normal bladder and urethra    Drains: None    Reason for procedure: Debbie Ding is a very pleasant 87 y.o. female who presented with a history of refractory urgency incontinence and was scheduled for cystoscopy for evaluation and management.    Procedure in Detail:  Informed consent was obtained by explaining all risks and benefits of the procedure to the patient in detail.  After informed consent, the patient was brought to the suite where Monitored Anesthesia Carewas administered prior to initiation of the invasive procedure. Appropriate perioperative antibiotics were given within 30 minutes of beginning the procedure. A formal timeout was performed prior to the procedure. The patient was gently placed in lithotomy position with all pressure points padded. Bilateral sequential compression devices were applied and activated. The patient was prepped and draped in standard fashion.    She required gentle, sequential urethral dilation to 22 Fr to pass a 19-Malay rigid cystoscope was passed per urethra into the bladder. Inspection of the bladder demonstrated a normal bladder. However, her urethral caliber was about 16 Fr.  We then injected 100 units of onabotulinum toxin A (Botox) using a dedicated needle (InstyBook) set to a depth of 2 mm. A total of 20 injections were used throughout the bladder including the bladder base and trigone.     She tolerated the procedure well and was transferred in stable condition to the recovery room. Note, she would tolerate office Botox poorly.     We will plan to see her back in 2-4 weeks for continued evaluation.

## 2019-12-04 NOTE — INTERVAL H&P NOTE
"The patient has been examined and the H&P has been reviewed:    I concur with the findings and no changes have occurred since H&P was written.   Urine culture from 11/22 growing pan-sensitive Escheria angus.     Anesthesia/Surgery risks, benefits and alternative options discussed and understood by patient/family.          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  Has nebs but no machine.  Last used rescue "months" ago        Resolved Hospital Problems   No resolved problems to display.     "

## 2019-12-04 NOTE — PLAN OF CARE
Discharge instructions reviewed with pt and family. Understanding verbalized. No complaints of pain reported. Pt able to tolerate po intake and urinate prior to discharge. Bedside delivery of medications occurred. To be transported to car by PCT.

## 2019-12-04 NOTE — DISCHARGE INSTRUCTIONS
Cystoscopy    Cystoscopy is a procedure that lets your doctor look directly inside your urethra and bladder. It can be used to:  · Help diagnose a problem with your urethra, bladder, or kidneys.  · Take a sample (biopsy) of bladder or urethral tissue.  · Treat certain problems (such as removing kidney stones).  · Place a stent to bypass an obstruction.  · Take special X-rays of the kidneys.  Based on the findings, your doctor may recommend other tests or treatments.  What is a cystoscope?  A cystoscope is a telescope-like instrument that contains lenses and fiberoptics (small glass wires that make bright light). The cystoscope may be straight and rigid, or flexible to bend around curves in the urethra. The doctor may look directly into the cystoscope, or project the image onto a monitor.  Getting ready  · Ask your doctor if you should stop taking any medicines before the procedure.  · Ask whether you should avoid eating or drinking anything after midnight before the procedure.  · Follow any other instructions your doctor gives you.  Tell your doctor before the exam if you:  · Take any medicines, such as aspirin or blood thinners  · Have allergies to any medicines  · Are pregnant   The procedure  Cystoscopy is done in the doctors office, surgery center, or hospital. The doctor and a nurse are present during the procedure. It takes only a few minutes, longer if a biopsy, X-ray, or treatment needs to be done.  During the procedure:  · You lie on an exam table on your back, knees bent and legs apart. You are covered with a drape.  · Your urethra and the area around it are washed. Anesthetic jelly may be applied to numb the urethra. Other pain medicine is usually not needed. In some cases, you may be offered a mild sedative to help you relax. If a more extensive procedure is to be done, such as a biopsy or kidney stone removal, general anesthesia may be needed.  · The cystoscope is inserted. A sterile fluid is put  into the bladder to expand it. You may feel pressure from this fluid.  · When the procedure is done, the cystoscope is removed.  After the procedure  If you had a sedative, general anesthesia, or spinal anesthesia, you must have someone drive you home. Once youre home:  · Drink plenty of fluids.  · You may have burning or light bleeding when you urinate--this is normal.  · Medicines may be prescribed to ease any discomfort or prevent infection. Take these as directed.  · Call your doctor if you have heavy bleeding or blood clots, burning that lasts more than a day, a fever over 100°F  (38° C), or trouble urinating.  Date Last Reviewed: 1/1/2017 © 2000-2017 [employer] [address] [place] [address], [place] [address]. All rights reserved. This information is not intended as a substitute for professional medical care. Always follow your healthcare professional's instructions.        OnabotulinumtoxinA injection (Medical Use)  What is this medicine?  ONABOTULINUMTOXINA (o na CARISA you bee num tox in eh) is a neuro-muscular blocker. This medicine is used to treat crossed eyes, eyelid spasms, severe neck muscle spasms, ankle and toe muscle spasms, and elbow, wrist, and finger muscle spasms. It is also used to treat excessive underarm sweating, to prevent chronic migraine headaches, and to treat loss of bladder control due to neurologic conditions such as multiple sclerosis or spinal cord injury.  How should I use this medicine?  This medicine is for injection into a muscle. It is given by a health care professional in a hospital or clinic setting.  Talk to your pediatrician regarding the use of this medicine in children. While this drug may be prescribed for children as young as 12 years old for selected conditions, precautions do apply.  What side effects may I notice from receiving this medicine?  Side effects that you should report to your doctor or health care professional as soon as possible:  · allergic  reactions like skin rash, itching or hives, swelling of the face, lips, or tongue  · breathing problems  · changes in vision  · chest pain or tightness  · eye irritation, pain  · fast, irregular heartbeat  · infection  · numbness  · speech problems  · swallowing problems  · unusual weakness  Side effects that usually do not require medical attention (report to your doctor or health care professional if they continue or are bothersome):  · bruising or pain at site where injected  · drooping eyelid  · dry eyes or mouth  · headache  · muscles aches, pains  · sensitivity to light  · tearing  What may interact with this medicine?  · aminoglycoside antibiotics like gentamicin, neomycin, tobramycin  · muscle relaxants  · other botulinum toxin injections  What if I miss a dose?  This does not apply.  Where should I keep my medicine?  This drug is given in a hospital or clinic and will not be stored at home.  What should I tell my health care provider before I take this medicine?  They need to know if you have any of these conditions:  · breathing problems  · cerebral palsy spasms  · difficulty urinating  · heart problems  · history of surgery where this medicine is going to be used  · infection at the site where this medicine is going to be used  · myasthenia gravis or other neurologic disease  · nerve or muscle disease  · surgery plans  · take medicines that treat or prevent blood clots  · thyroid problems  · an unusual or allergic reaction to botulinum toxin, albumin, other medicines, foods, dyes, or preservatives  · pregnant or trying to get pregnant  · breast-feeding  What should I watch for while using this medicine?  Visit your doctor for regular check ups.  This medicine will cause weakness in the muscle where it is injected. Tell your doctor if you feel unusually weak in other muscles. Get medical help right away if you have problems with breathing, swallowing, or talking.  This medicine might make your eyelids  droop or make you see blurry or double. If you have weak muscles or trouble seeing do not drive a car, use machinery, or do other dangerous activities.  This medicine contains albumin from human blood. It may be possible to pass an infection in this medicine, but no cases have been reported. Talk to your doctor about the risks and benefits of this medicine.  If your activities have been limited by your condition, go back to your regular routine slowly after treatment with this medicine.  NOTE:This sheet is a summary. It may not cover all possible information. If you have questions about this medicine, talk to your doctor, pharmacist, or health care provider. Copyright© 2017 [employer]

## 2019-12-04 NOTE — TRANSFER OF CARE
"Anesthesia Transfer of Care Note    Patient: Debbie Ding    Procedure(s) Performed: Procedure(s) (LRB):  CYSTOSCOPY,WITH BOTULINUM TOXIN INJECTION 100units (N/A)    Patient location: PACU    Anesthesia Type: general    Transport from OR: Transported from OR on 100% O2 by closed face mask with adequate spontaneous ventilation    Post pain: adequate analgesia    Post assessment: no apparent anesthetic complications and tolerated procedure well    Post vital signs: stable    Level of consciousness: awake    Nausea/Vomiting: no nausea/vomiting    Complications: none    Transfer of care protocol was followed      Last vitals:   Visit Vitals  /62   Pulse 72   Temp 36.6 °C (97.9 °F) (Skin)   Resp 16   Ht 5' 5" (1.651 m)   Wt 63.5 kg (140 lb)   SpO2 100%   Breastfeeding? No   BMI 23.30 kg/m²     "

## 2019-12-04 NOTE — ANESTHESIA PREPROCEDURE EVALUATION
12/04/2019  Debbie Ding is a 87 y.o., female for cystoscopy with botox injection for urgency incontinence.    Anesthesia Evaluation    I have reviewed the Patient Summary Reports.    I have reviewed the Nursing Notes.   I have reviewed the Medications.     Review of Systems  Anesthesia Hx:  No problems with previous Anesthesia  Denies Family Hx of Anesthesia complications.    Cardiovascular:   Exercise tolerance: good Hypertension MELISSA    Pulmonary:   Asthma Shortness of breath    Renal/:   Chronic Renal Disease    Hepatic/GI:   PUD, GERD    Musculoskeletal:   Arthritis     Neurological:   Neuromuscular Disease, Fibromyalgia   Endocrine:   Diabetes Hypothyroidism    Psych:   Psychiatric History depression        Past Medical History:   Diagnosis Date    Arthritis     Asthma     Bilateral pulmonary embolism 4/27/2019    Cataract     Depression     Diabetes mellitus     Fibromyalgia 7/2/2012    Fibromyalgia     GERD (gastroesophageal reflux disease)     Hypertension 7/2/2012    Thoracic or lumbosacral neuritis or radiculitis, unspecified     Thyroid disease     Ulcer      Past Surgical History:   Procedure Laterality Date    CATARACT EXTRACTION Bilateral     FOOT NEUROMA SURGERY  1985    HYSTERECTOMY       Patient Active Problem List   Diagnosis    Essential hypertension    Incontinence of urine    Depression, recurrent    Mild cognitive impairment    Fibromyalgia    Primary osteoarthritis involving multiple joints    Spondylosis without myelopathy    Spinal stenosis, lumbar region, without neurogenic claudication    Acquired spondylolisthesis    Thoracic or lumbosacral neuritis or radiculitis, unspecified    DDD (degenerative disc disease), lumbar    Neck pain    Atrophy of nasal turbinates    GERD (gastroesophageal reflux disease)    Chronic bilateral low back pain without  sciatica    Decreased strength of trunk and back    Chronic asthma    MELISSA (dyspnea on exertion)    Allergic rhinitis    Vestibular dizziness    Asymptomatic menopausal state    Hearing loss    Hypothyroidism, adult    Pseudophakia of both eyes    PUD (peptic ulcer disease)    Urinary, incontinence, stress female    Impaired functional mobility, balance, gait, and endurance    Acute pain of both knees    Lactic acidosis    Hypokalemia    Debility    Hyperhidrosis    Prediabetes    CKD (chronic kidney disease) stage 3, GFR 30-59 ml/min    History of pulmonary embolus (PE)    Overactive bladder         Physical Exam  General:  Well nourished    Airway/Jaw/Neck:  Airway Findings: Mouth Opening: Normal General Airway Assessment: Adult  Mallampati: II  TM Distance: Normal, at least 6 cm  Jaw/Neck Findings:  Neck ROM: Normal ROM  Neck Findings:     Eyes/Ears/Nose:  Eyes/Ears/Nose Findings:    Dental:  Dental Findings: In tact   Chest/Lungs:  Chest/Lungs Findings: Normal Respiratory Rate     Heart/Vascular:  Heart Findings: Rate: Normal        Mental Status:  Mental Status Findings:  Cooperative, Alert and Oriented       Wt Readings from Last 3 Encounters:   11/18/19 65.3 kg (144 lb)   11/11/19 65.7 kg (144 lb 13.5 oz)   10/09/19 65.7 kg (144 lb 13.5 oz)     Temp Readings from Last 3 Encounters:   06/20/19 36.7 °C (98 °F) (Oral)   06/11/19 36.8 °C (98.2 °F) (Oral)   05/02/19 37.1 °C (98.8 °F) (Oral)     BP Readings from Last 3 Encounters:   11/22/19 131/61   11/18/19 (!) 141/66   11/11/19 104/60     Pulse Readings from Last 3 Encounters:   11/22/19 81   11/18/19 79   10/09/19 67     Lab Results   Component Value Date    WBC 6.38 06/20/2019    HGB 11.9 (L) 06/20/2019    HCT 36.0 (L) 06/20/2019    MCV 89 06/20/2019     06/20/2019         Chemistry        Component Value Date/Time     11/21/2019 0923    K 3.7 11/21/2019 0923     11/21/2019 0923    CO2 22 (L) 11/21/2019 0923    BUN 16  11/21/2019 0923    CREATININE 1.4 11/21/2019 0923    GLU 99 11/21/2019 0923        Component Value Date/Time    CALCIUM 10.0 11/21/2019 0923    ALKPHOS 61 11/21/2019 0923    AST 16 11/21/2019 0923    ALT 12 11/21/2019 0923    BILITOT 1.0 11/21/2019 0923    ESTGFRAFRICA 39 (A) 11/21/2019 0923    EGFRNONAA 34 (A) 11/21/2019 0923        Results for orders placed or performed during the hospital encounter of 04/27/19   EKG 12-lead    Collection Time: 04/27/19  3:04 PM    Narrative    Test Reason : R00.0,    Vent. Rate : 105 BPM     Atrial Rate : 109 BPM     P-R Int : 128 ms          QRS Dur : 086 ms      QT Int : 306 ms       P-R-T Axes : 074 057 -67 degrees     QTc Int : 405 ms    Sinus tachycardia  Right ventricular conduction delay  ST and T wave abnormality, consider inferior ischemia  Abnormal ECG    Confirmed by Poppy Nelson MD ([id number]) on 4/29/2019 7:11:16 PM    Referred By: [email or web address]   SELF           Confirmed By:Poppy Nelson MD      Echo 2019:  Conclusion     · Concentric left ventricular remodeling.  · Normal left ventricular systolic function. The estimated ejection fraction is%  · Grade I (mild) left ventricular diastolic dysfunction consistent with impaired relaxation.  · No wall motion abnormalities.  · Normal left atrial pressure.  · Normal right ventricular systolic function.  · No pulmonary hypertension present.  · Normal central venous pressure (3 mm Hg).  · The estimated PA systolic pressure is 34 mm Hg           Anesthesia Plan  Type of Anesthesia, risks & benefits discussed:  Anesthesia Type:  general  Patient's Preference:   Intra-op Monitoring Plan: standard ASA monitors  Intra-op Monitoring Plan Comments:   Post Op Pain Control Plan: per primary service following discharge from PACU  Post Op Pain Control Plan Comments:   Induction:   IV  Beta Blocker:         Informed Consent: Patient understands risks and agrees with Anesthesia plan.  Questions answered. Anesthesia consent signed with  patient.  ASA Score: 3     Day of Surgery Review of History & Physical:    H&P update referred to the surgeon.         Ready For Surgery From Anesthesia Perspective.

## 2019-12-04 NOTE — DISCHARGE SUMMARY
"[facility]  Discharge Note  Short Stay    Admit Date: 12/4/2019    Discharge Date and Time: 12/04/2019 4:43 PM      Attending Physician: Diogo Rankin MD     Discharge Provider: Clyde Cowart    Diagnoses:  Active Hospital Problems    Diagnosis  POA    *Overactive bladder [N32.81]  Yes    History of pulmonary embolus (PE) [Z86.711]  Yes    CKD (chronic kidney disease) stage 3, GFR 30-59 ml/min [N18.3]  Yes    Prediabetes [R73.03]  Yes    Debility [R53.81]  Yes    Hypothyroidism, adult [E03.9]  Yes    Fibromyalgia [M79.7]  Yes    Urinary, incontinence, stress female [N39.3]  Yes    PUD (peptic ulcer disease) [K27.9]  Yes    GERD (gastroesophageal reflux disease) [K21.9]  Yes    DDD (degenerative disc disease), lumbar [M51.36]  Yes    Primary osteoarthritis involving multiple joints [M15.0]  Yes    Essential hypertension [I10]  Yes    Depression, recurrent [F33.9]  Yes     Overview:   Previously in mental health facility after death of her   Previously seeing psychiatry       Incontinence of urine [R32]  Yes    Mild cognitive impairment [G31.84]  Yes    Chronic asthma [J45.909]  Yes     On Advair 250 once daily and Spiriva.  Has nebs but no machine.  Last used rescue "months" ago        Resolved Hospital Problems   No resolved problems to display.       Discharged Condition: good    Hospital Course: Patient was admitted for bladder botox injections, cystoscopy, urethral dilation and tolerated the procedure well with no complications. The patient was discharged home in good condition on the same day.       Final Diagnoses: Same as principal problem.    Disposition: Home or Self Care    Follow up/Patient Instructions:    Medications:  Reconciled Home Medications:   Current Discharge Medication List      START taking these medications    Details   cephALEXin (KEFLEX) 500 MG capsule Take 1 capsule (500 mg total) by mouth every 8 (eight) hours for 2 days  Qty: 6 capsule, Refills: 0 "      traMADol (ULTRAM) 50 mg tablet Take 1 tablet (50 mg total) by mouth every 6 (six) hours as needed for Pain.  Qty: 5 tablet, Refills: 0         CONTINUE these medications which have NOT CHANGED    Details   ADVAIR DISKUS 250-50 mcg/dose diskus inhaler       albuterol 90 mcg/actuation inhaler Inhale 2 puffs into the lungs every 4 (four) hours as needed for Wheezing.  Qty: 3 Inhaler, Refills: 11    Associated Diagnoses: Asthma, intermittent, uncomplicated      amLODIPine (NORVASC) 10 MG tablet Take 1 tablet (10 mg total) by mouth once daily.  Qty: 90 tablet, Refills: 1    Associated Diagnoses: Essential hypertension      !! apixaban (ELIQUIS) 2.5 mg Tab Take by mouth.      donepezil (ARICEPT) 10 MG tablet TAKE 1 TABLET ONCE DAILY INTHE MORNING  Qty: 90 tablet, Refills: 3    Associated Diagnoses: Memory loss      !! DULoxetine (CYMBALTA) 30 MG capsule Take 1 capsule (30 mg total) by mouth once daily.  Qty: 90 capsule, Refills: 1      esomeprazole (NEXIUM) 20 MG capsule Take 1 capsule (20 mg total) by mouth before breakfast.  Qty: 90 capsule, Refills: 1    Associated Diagnoses: Gastroesophageal reflux disease, esophagitis presence not specified      estradiol 0.05 mg/24 hr td ptsw (VIVELLE-DOT) 0.05 mg/24 hr       fluticasone propionate (FLONASE) 50 mcg/actuation nasal spray 2 sprays (100 mcg total) by Each Nare route once daily.  Qty: 16 g, Refills: 1    Associated Diagnoses: Post-nasal drip      glycopyrrolate (ROBINUL) 2 MG Tab TAKE 1 AND 1/2 TABLETS     DAILY FOR SWEATING  Qty: 135 tablet, Refills: 1    Associated Diagnoses: Hyperhidrosis      levothyroxine (SYNTHROID) 75 MCG tablet Take 1 tablet (75 mcg total) by mouth once daily.  Qty: 30 tablet, Refills: 1    Comments: Brand name Synthroid necessary  Associated Diagnoses: Hypothyroidism, adult      memantine (NAMENDA) 5 MG Tab Take 1 tablet (5 mg total) by mouth every morning.  Qty: 30 tablet, Refills: 5    Associated Diagnoses: Mild cognitive impairment       mirabegron (MYRBETRIQ) 50 mg Tb24 Take 1 tablet (50 mg total) by mouth once daily.  Qty: 90 tablet, Refills: 1      olopatadine (PATADAY) 0.2 % Drop       solifenacin (VESICARE) 5 MG tablet Take 1 tablet (5 mg total) by mouth once daily.  Qty: 90 tablet, Refills: 11      albuterol-ipratropium (DUO-NEB) 2.5 mg-0.5 mg/3 mL nebulizer solution Take 3 mLs by nebulization every 4 (four) hours as needed for Wheezing or Shortness of Breath. Rescue  Qty: 1 Box, Refills: 0      !! apixaban (ELIQUIS) 5 mg Tab Take 1 tablet (5 mg total) by mouth 2 (two) times daily.  Qty: 180 tablet, Refills: 1      !! DULoxetine (CYMBALTA) 30 MG capsule TAKE 1 CAPSULE ONCE DAILY  Qty: 90 capsule, Refills: 1      lidocaine (LMX) 4 % cream Apply topically as needed.  Qty: 45 g, Refills: 3       !! - Potential duplicate medications found. Please discuss with provider.        Discharge Procedure Orders   Diet general     Call MD for:  temperature >100.4     Call MD for:  persistent nausea and vomiting     Call MD for:  severe uncontrolled pain     Call MD for:  difficulty breathing, headache or visual disturbances     Call MD for:  hives     Call MD for:  persistent dizziness or light-headedness     Call MD for:  extreme fatigue     Activity as tolerated     Follow-up Information     Diogo Rankin MD In 4 weeks.    Specialty:  Urology  Contact information:  [address]  [facility] [id number] [phone]

## 2019-12-05 VITALS
OXYGEN SATURATION: 96 % | RESPIRATION RATE: 20 BRPM | BODY MASS INDEX: 23.32 KG/M2 | SYSTOLIC BLOOD PRESSURE: 164 MMHG | WEIGHT: 140 LBS | HEART RATE: 76 BPM | DIASTOLIC BLOOD PRESSURE: 78 MMHG | HEIGHT: 65 IN | TEMPERATURE: 98 F

## 2019-12-09 NOTE — PROGRESS NOTES
Subjective:       Patient ID: Debbie Ding is a 87 y.o. female.    Chief Complaint:  Trouble with sleep    HPI  Debbie Ding is a 87 y.o. year old female with HTN, CKD 3, GERD, asthma, Hx of bilateral PE, mild cognitive impairment, hypothyroid, prediabetes, fibromyalgia who presents today to complaining of sleep trouble.    Patient's daughter is present today and assists with history.     Patient complains of trouble falling asleep at night.  Her daughter reports that she sleeps many times during the day.  The patient denies sleeping during the day.  Patient is quite sedentary and not involved in any activities during the day.  She declines having any hobbies.    Takes Nexium for GERD. Helps somewhat.     Seeing urology here at [facility]. Vesicare was added. Having incontinence during the day and night.     HTN - compliant with amlodipine 10 mg daily.    CKD 3  BMP  Lab Results   Component Value Date     11/21/2019    K 3.7 11/21/2019     11/21/2019    CO2 22 (L) 11/21/2019    BUN 16 11/21/2019    CREATININE 1.4 11/21/2019    CALCIUM 10.0 11/21/2019    ANIONGAP 12 11/21/2019    ESTGFRAFRICA 39 (A) 11/21/2019    EGFRNONAA 34 (A) 11/21/2019     Asthma - follows with pulm. Takes Advair daily. Albuterol PRN.     Fibromyalgia - compliant with Cymbalta.     Prediabetes -    Lab Results   Component Value Date    HGBA1C 6.6 (H) 10/09/2019     Complains of excessive sweating. Used to take glycopyrrolate, which helped.     Hx of submassive PE - diagnosed April 2019. Found to have DVT of right leg. Has been compliant with Eliquis. Of note, patient is currently using estrogen patches. Follows with pulm.     Hypothyroid - compliant with levothyroxine 75 mcg daily. Prefers Synthroid.   Lab Results   Component Value Date    TSH 4.459 (H) 11/21/2019     She has Hx of right baker cyst    Saw neurology recently and was told she has MCI. Started on donepezil and memantine.     OB/GYN History   G*P*  Patient is  "post-menopausal.    Health Maintenance  Pap smear: s/p hysterectomy  Mammogram: 2 years ago - normal   Colon Cancer Screening: colonoscopy 2019 - normal (outside facility)  DEXA: 6/5/18 - normal   Hepatitis C screening: n/a  Flu vaccine: UTD   Tetanus vaccine: UTD  PNA vaccine: UTD  Shingles vaccine: due    I personally reviewed Past Medical History, Past Surgical History, Social History, and Family History    Review of Systems   Constitutional: Negative for chills, fatigue and fever.   HENT: Negative for congestion, hearing loss and rhinorrhea.    Eyes: Negative for visual disturbance.   Respiratory: Negative for cough, shortness of breath and wheezing.    Cardiovascular: Negative for chest pain.   Gastrointestinal: Negative for abdominal pain, constipation, diarrhea, nausea and vomiting.   Skin: Negative for rash.   Neurological: Negative for dizziness, syncope and headaches.   Psychiatric/Behavioral: Positive for sleep disturbance. Negative for dysphoric mood. The patient is not nervous/anxious.        Objective:      Vitals:    12/10/19 0945 12/10/19 1034   BP: (!) 157/77 130/60   Pulse: 75    SpO2: 98%    Weight: 65.8 kg (145 lb 1 oz)    Height: 5' 5" (1.651 m)      Physical Exam   Constitutional: She is oriented to person, place, and time. She appears well-developed and well-nourished. No distress.   HENT:   Head: Normocephalic and atraumatic.   Eyes: Conjunctivae and EOM are normal.   Neck: Normal range of motion.   Cardiovascular: Normal rate, regular rhythm and normal heart sounds.   No murmur heard.  Pulmonary/Chest: Effort normal and breath sounds normal. No respiratory distress.   Neurological: She is alert and oriented to person, place, and time.   Skin: Skin is warm and dry. No rash noted.   Psychiatric: She has a normal mood and affect. Her behavior is normal. Thought content normal.   Nursing note and vitals reviewed.      Assessment:       1. Sleep trouble    2. Essential hypertension    3. " Hypothyroidism, adult    4. Prediabetes    5. CKD (chronic kidney disease) stage 3, GFR 30-59 ml/min    6. History of pulmonary embolus (PE)        Plan:     Sleep trouble  Discussed sleep hygiene with patient and her daughter.  Advised patient to stay busy during the day and find hobbies to occupy her.  Discussed trying not to nap as much as this is likely making her not tired at bedtime.  Also discussed being exposed to sunlight during the daytime, such as sitting on the porch or by a bright window.  Also recommended that she try melatonin at bedtime.    Essential hypertension  Controlled. Continue current medication regimen.     Hypothyroidism, adult  -     levothyroxine (SYNTHROID) 88 MCG tablet; Take 1 tablet (88 mcg total) by mouth once daily.  Dispense: 90 tablet; Refill: 0  -     TSH; Future; Expected date: 12/10/2019    Prediabetes  -     Hemoglobin A1c; Future; Expected date: 12/10/2019  -     Comprehensive metabolic panel; Future; Expected date: 12/10/2019    CKD (chronic kidney disease) stage 3, GFR 30-59 ml/min  -     Comprehensive metabolic panel; Future; Expected date: 12/10/2019  -     CBC auto differential; Future; Expected date: 12/10/2019    History of pulmonary embolus (PE)  Continue Eliquis.

## 2019-12-10 ENCOUNTER — OFFICE VISIT (OUTPATIENT)
Dept: INTERNAL MEDICINE | Facility: CLINIC | Age: 84
End: 2019-12-10
Payer: MEDICARE

## 2019-12-10 VITALS
BODY MASS INDEX: 24.17 KG/M2 | OXYGEN SATURATION: 98 % | DIASTOLIC BLOOD PRESSURE: 60 MMHG | HEART RATE: 75 BPM | WEIGHT: 145.06 LBS | HEIGHT: 65 IN | SYSTOLIC BLOOD PRESSURE: 130 MMHG

## 2019-12-10 DIAGNOSIS — E03.9 HYPOTHYROIDISM, ADULT: ICD-10-CM

## 2019-12-10 DIAGNOSIS — R73.03 PREDIABETES: ICD-10-CM

## 2019-12-10 DIAGNOSIS — N18.30 CKD (CHRONIC KIDNEY DISEASE) STAGE 3, GFR 30-59 ML/MIN: ICD-10-CM

## 2019-12-10 DIAGNOSIS — I10 ESSENTIAL HYPERTENSION: ICD-10-CM

## 2019-12-10 DIAGNOSIS — Z86.711 HISTORY OF PULMONARY EMBOLUS (PE): ICD-10-CM

## 2019-12-10 DIAGNOSIS — G47.9 SLEEP TROUBLE: Primary | ICD-10-CM

## 2019-12-10 PROCEDURE — 99214 OFFICE O/P EST MOD 30 MIN: CPT | Mod: S$PBB,,, | Performed by: FAMILY MEDICINE

## 2019-12-10 PROCEDURE — 1159F MED LIST DOCD IN RCRD: CPT | Mod: ,,, | Performed by: FAMILY MEDICINE

## 2019-12-10 PROCEDURE — 99213 OFFICE O/P EST LOW 20 MIN: CPT | Mod: PBBFAC | Performed by: FAMILY MEDICINE

## 2019-12-10 PROCEDURE — 99999 PR PBB SHADOW E&M-EST. PATIENT-LVL III: CPT | Mod: PBBFAC,,, | Performed by: FAMILY MEDICINE

## 2019-12-10 PROCEDURE — 1126F AMNT PAIN NOTED NONE PRSNT: CPT | Mod: ,,, | Performed by: FAMILY MEDICINE

## 2019-12-10 PROCEDURE — 1126F PR PAIN SEVERITY QUANTIFIED, NO PAIN PRESENT: ICD-10-PCS | Mod: ,,, | Performed by: FAMILY MEDICINE

## 2019-12-10 PROCEDURE — 1159F PR MEDICATION LIST DOCUMENTED IN MEDICAL RECORD: ICD-10-PCS | Mod: ,,, | Performed by: FAMILY MEDICINE

## 2019-12-10 PROCEDURE — 99999 PR PBB SHADOW E&M-EST. PATIENT-LVL III: ICD-10-PCS | Mod: PBBFAC,,, | Performed by: FAMILY MEDICINE

## 2019-12-10 PROCEDURE — 99214 PR OFFICE/OUTPT VISIT, EST, LEVL IV, 30-39 MIN: ICD-10-PCS | Mod: S$PBB,,, | Performed by: FAMILY MEDICINE

## 2019-12-10 RX ORDER — LEVOTHYROXINE SODIUM 88 UG/1
88 TABLET ORAL DAILY
Qty: 90 TABLET | Refills: 0 | Status: SHIPPED | OUTPATIENT
Start: 2019-12-10 | End: 2020-04-10

## 2019-12-17 ENCOUNTER — TELEPHONE (OUTPATIENT)
Dept: UROLOGY | Facility: CLINIC | Age: 84
End: 2019-12-17

## 2019-12-20 ENCOUNTER — OFFICE VISIT (OUTPATIENT)
Dept: UROLOGY | Facility: CLINIC | Age: 84
End: 2019-12-20
Payer: MEDICARE

## 2019-12-20 VITALS — DIASTOLIC BLOOD PRESSURE: 65 MMHG | SYSTOLIC BLOOD PRESSURE: 146 MMHG | HEART RATE: 85 BPM

## 2019-12-20 DIAGNOSIS — N39.41 URGENCY INCONTINENCE: Primary | ICD-10-CM

## 2019-12-20 PROCEDURE — 1159F PR MEDICATION LIST DOCUMENTED IN MEDICAL RECORD: ICD-10-PCS | Mod: S$GLB,,, | Performed by: UROLOGY

## 2019-12-20 PROCEDURE — 99213 OFFICE O/P EST LOW 20 MIN: CPT | Mod: S$GLB,,, | Performed by: UROLOGY

## 2019-12-20 PROCEDURE — 1159F MED LIST DOCD IN RCRD: CPT | Mod: S$GLB,,, | Performed by: UROLOGY

## 2019-12-20 PROCEDURE — 99213 PR OFFICE/OUTPT VISIT, EST, LEVL III, 20-29 MIN: ICD-10-PCS | Mod: S$GLB,,, | Performed by: UROLOGY

## 2019-12-20 NOTE — PROGRESS NOTES
[facility]      Overactive Bladder Return Note    12/20/2019    Patient Name: Debbie Ding  Referred by:No ref. provider found    Previous Therapies     Medication: (VESIcare (provided some but insufficient benefit)   Myrbetriq (provided some but insufficient benefit)    Botox -  resulted in urinary retention and nighttime incontinence     Summary of OAB Findings:    Last Visit  Today   Treatment: solifenacin (VESIcare) 5 mg mirabegron (Myrbetriq) 50 mg Treatment: Botox (100 units) - 2 weeks ago   Side Effects: none Side Effects: none   Daytime Frequency: 5-6x daily Daytime Frequency: 2-3x daily   Noturia: nonex Nocturia: nonex   UUI Episodes: 7 UUI Episodes: 1   Daily Pads: 7/day Daily Pads: none/day   Voiding Difficulty: no Voiding Difficulty: no   PVR: 0ml (scan) PVR: 200 mL (cath)     A review of 10+ systems was conducted with pertinent positive and negative findings documented in HPI with all other systems reviewed and negative.    Past medical, family, surgical and social history reviewed as documented in chart with pertinent positive medical, family, surgical and social history detailed in HPI.    Const: no acute distress, conversant and alert  Eyes: anicteric, extraocular muscles intact  ENMT: normocephalic, Nl oral membranes  Cardio: no cyanosis, nl cap refill  Pulm: no tachypnea; no resp distress  Abd: soft, no tenderness  Musc: no laceration, no tenderness  Neuro: alert; oriented x 3  Skin: warm, dry; no petichiae  Psych: no anxiety; normal speech     Assessment/Plan:    Overactive Bladder with Urgency Incontinence (estab, worsening): Her history is suggestive of Overactive Bladder (OAB) with predominantly Urgency Urinary Incontinence (UUI). She reports no voiding difficulty but PVR is elevated today. Will not plan on beginning CISC but I suspect that retention may be playing a role in her nocturnal incontinence (she has little day incontinence and only reports voiding 2-3x). Will  have her void on a schedule of every 2-3 hours and see if this improves.     1. Return in 2 weeks to recheck flow rate and PVR.

## 2019-12-30 NOTE — TELEPHONE ENCOUNTER
Pt wont get medication threw mail order until Friday need medication sent to local pharmacy until then

## 2019-12-30 NOTE — TELEPHONE ENCOUNTER
MD Priyanka Richardson Staff 57 minutes ago (5:02 PM)      Refilled short course until her mail order kicks in. Be sure that she is careful of the dose since this is 5 mg twice a day instead of 2.5 mg x 2 tabs (5mg) twice a day.      Notified patient's daughter, Madina. The patient verbalized understanding and had no further questions or concerns.

## 2019-12-30 NOTE — TELEPHONE ENCOUNTER
----- Message from Marija Soliz sent at 12/30/2019  1:05 PM CST -----  Contact: DEJAH ROLDAN [[id number]]  Name of Who is Calling: DEJAH ROLDAN [[id number]]      What is the request in detail: patient would like to speak with doctor states she is out of apixaban (ELIQUIS) 5 mg Tab medication and [facility] pharmacy will take a couple of days to deliver. Patient would like to know if it is okay to be without medication for a couple of days. Please call to discuss     Can the clinic reply by [facility]: no    What [facility]: [phone]

## 2019-12-31 ENCOUNTER — TELEPHONE (OUTPATIENT)
Dept: RHEUMATOLOGY | Facility: CLINIC | Age: 84
End: 2019-12-31

## 2019-12-31 ENCOUNTER — EXTERNAL CHRONIC CARE MANAGEMENT (OUTPATIENT)
Dept: PRIMARY CARE CLINIC | Facility: CLINIC | Age: 84
End: 2019-12-31
Payer: MEDICARE

## 2019-12-31 PROCEDURE — 99490 CHRNC CARE MGMT STAFF 1ST 20: CPT | Mod: S$PBB,,, | Performed by: INTERNAL MEDICINE

## 2019-12-31 PROCEDURE — 99490 PR CHRONIC CARE MGMT, 1ST 20 MIN: ICD-10-PCS | Mod: S$PBB,,, | Performed by: INTERNAL MEDICINE

## 2019-12-31 PROCEDURE — 99490 CHRNC CARE MGMT STAFF 1ST 20: CPT | Mod: PBBFAC | Performed by: INTERNAL MEDICINE

## 2020-01-06 ENCOUNTER — TELEPHONE (OUTPATIENT)
Dept: UROLOGY | Facility: CLINIC | Age: 85
End: 2020-01-06

## 2020-01-06 NOTE — TELEPHONE ENCOUNTER
----- Message from Fela López MA sent at 1/3/2020  2:51 PM CST -----  Zuleika    Please call patient to reschedule missed appointment with Dr. Rankin

## 2020-01-20 DIAGNOSIS — J45.20 ASTHMA, INTERMITTENT, UNCOMPLICATED: ICD-10-CM

## 2020-01-20 RX ORDER — IPRATROPIUM BROMIDE AND ALBUTEROL SULFATE 2.5; .5 MG/3ML; MG/3ML
3 SOLUTION RESPIRATORY (INHALATION) EVERY 4 HOURS PRN
Qty: 1 BOX | Refills: 5 | Status: SHIPPED | OUTPATIENT
Start: 2020-01-20 | End: 2020-03-26

## 2020-01-20 RX ORDER — ALBUTEROL SULFATE 90 UG/1
2 AEROSOL, METERED RESPIRATORY (INHALATION) EVERY 4 HOURS PRN
Qty: 18 G | Refills: 5 | Status: SHIPPED | OUTPATIENT
Start: 2020-01-20 | End: 2020-04-02 | Stop reason: ALTCHOICE

## 2020-01-20 RX ORDER — FLUTICASONE PROPIONATE AND SALMETEROL 50; 250 UG/1; UG/1
1 POWDER RESPIRATORY (INHALATION) 2 TIMES DAILY
Qty: 60 EACH | Refills: 5 | Status: SHIPPED | OUTPATIENT
Start: 2020-01-20 | End: 2020-04-02 | Stop reason: DRUGHIGH

## 2020-01-20 NOTE — TELEPHONE ENCOUNTER
----- Message from Sebastien Arriaga sent at 1/20/2020  3:40 PM CST -----  Contact: DEJAH ROLDAN [[id number]]  Can the clinic reply in [facility]:     Please refill the medication(s) listed below. The patient can be reached at this phone number once it is called into the pharmacy.    Medication #1   ADVAIR DISKUS 250-50 mcg/dose diskus inhaler    Medication #2   albuterol 90 mcg/actuation inhaler    Medication #3    albuterol-ipratropium (DUO-NEB) 2.5 mg-0.5 mg/3 mL nebulizer solution  Preferred Pharmacy: [facility] - [place], [place] - [address] AT PORTAL TO [facility]

## 2020-01-27 ENCOUNTER — TELEPHONE (OUTPATIENT)
Dept: INTERNAL MEDICINE | Facility: CLINIC | Age: 85
End: 2020-01-27

## 2020-01-27 NOTE — TELEPHONE ENCOUNTER
Patient wants to know what medication she can take for extreme tiredness. States she is having chronic fatigue. Please advise

## 2020-01-27 NOTE — TELEPHONE ENCOUNTER
Please advise the patient that I recommend she come in to see me for an evaluation of her extreme tiredness.  Please help her make an appointment.    thanks

## 2020-01-27 NOTE — TELEPHONE ENCOUNTER
----- Message from Joshua Carr sent at 1/27/2020 12:12 PM CST -----  Contact: DEJAH ROLDAN [[id number]]  Name of Who is Calling: DEJAH ROLDAN [[id number]]      What is the request in detail: Would like to speak with staff in regards to what medication she can take for extreme tiredness or a physician that can help her. States she is having chronic fatigue. Please advise      Can the clinic reply by [facility]: no      What Number to [facility] Back [facility]: [phone]

## 2020-01-28 NOTE — PROGRESS NOTES
Subjective:       Patient ID: Debbie Ding is a 87 y.o. female.    Chief Complaint: fatigue    HPI  Debbie Ding is a 87 y.o. year old female with HTN, CKD 3, GERD, asthma, Hx of bilateral PE, mild cognitive impairment, hypothyroid, prediabetes, fibromyalgia who presents today to complaining of fatigue.     Patient's daughter is present today and assists with history.     Patient complains of feeling fatigued for 6 months or more. She does not sleep well at night. She lays down to go to bed around 10 pm and gets out of bed at 630 am. She reports not sleeping much during that time. Daughter reports that she lays around in bed most of the day. Patient says she does that because she feels tired and does not want to do anything. She mentions feeling a little depressed as well.     Also mentions worsening shortness of breath and cough that began 2 months ago. Denies wheezing. She reports needing to use albuterol throughout the day now. Compliant with Advair daily.     Takes Nexium for GERD. Helps somewhat.     Seeing urology here at [facility]. Vesicare was added. Having incontinence during the day and night.     HTN - compliant with amlodipine 10 mg daily.    CKD 3  BMP  Lab Results   Component Value Date     01/29/2020    K 3.8 01/29/2020     01/29/2020    CO2 23 01/29/2020    BUN 12 01/29/2020    CREATININE 1.2 01/29/2020    CALCIUM 10.0 01/29/2020    ANIONGAP 12 01/29/2020    ESTGFRAFRICA 47 (A) 01/29/2020    EGFRNONAA 41 (A) 01/29/2020     Fibromyalgia - compliant with Cymbalta.     Prediabetes -    Lab Results   Component Value Date    HGBA1C 6.4 (H) 01/29/2020     Complains of excessive sweating. Used to take glycopyrrolate, which helped.     Hx of submassive PE - diagnosed April 2019. Found to have DVT of right leg. Has been compliant with Eliquis. Of note, patient is currently using estrogen patches. Follows with pulm.     Hypothyroid - compliant with levothyroxine 75 mcg daily. Prefers  "Synthroid.   Lab Results   Component Value Date    TSH 0.513 01/29/2020     She has Hx of right baker cyst    Saw neurology recently and was told she has MCI. Started on donepezil and memantine.     OB/GYN History   G*P*  Patient is post-menopausal.    Health Maintenance  Pap smear: s/p hysterectomy  Mammogram: 2 years ago - normal   Colon Cancer Screening: colonoscopy 2019 - normal (outside facility)  DEXA: 6/5/18 - normal   Hepatitis C screening: n/a  Flu vaccine: UTD   Tetanus vaccine: UTD  PNA vaccine: UTD  Shingles vaccine: due    I personally reviewed Past Medical History, Past Surgical History, Social History, and Family History    Review of Systems   Constitutional: Positive for fatigue. Negative for chills and fever.   HENT: Positive for postnasal drip. Negative for congestion, hearing loss and rhinorrhea.    Eyes: Negative for visual disturbance.   Respiratory: Positive for cough and shortness of breath. Negative for wheezing.    Cardiovascular: Negative for chest pain.   Gastrointestinal: Negative for abdominal pain, constipation, diarrhea, nausea and vomiting.   Genitourinary: Negative for dysuria and hematuria.   Skin: Negative for rash.   Neurological: Negative for dizziness, syncope and headaches.   Psychiatric/Behavioral: Negative for dysphoric mood and sleep disturbance. The patient is not nervous/anxious.        Objective:      Vitals:    01/29/20 1028   BP: 100/70   Pulse: 88   SpO2: 99%   Weight: 65.8 kg (145 lb 1 oz)   Height: 5' 5" (1.651 m)     Physical Exam   Constitutional: She is oriented to person, place, and time. She appears well-developed and well-nourished. No distress.   HENT:   Head: Normocephalic and atraumatic.   Eyes: Conjunctivae and EOM are normal.   Neck: Normal range of motion.   Cardiovascular: Normal rate, regular rhythm and normal heart sounds.   No murmur heard.  Pulmonary/Chest: Effort normal and breath sounds normal. No respiratory distress.   Abdominal: Soft. Bowel " sounds are normal. There is no tenderness.   Neurological: She is alert and oriented to person, place, and time.   Skin: Skin is warm and dry. No rash noted.   Psychiatric: She has a normal mood and affect. Her behavior is normal. Thought content normal.   Nursing note and vitals reviewed.      Assessment:       1. Cough    2. SOB (shortness of breath)    3. Fatigue, unspecified type    4. Essential hypertension    5. Prediabetes    6. Hypothyroidism, adult    7. Other long term (current) drug therapy     8. Normocytic anemia    9. Post-nasal drip        Plan:     Cough  Chest xray ordered to eval. RTC if worsening or not improving.  -     X-Ray Chest PA And Lateral; Future; Expected date: 01/29/2020  -     Brain natriuretic peptide; Future; Expected date: 01/29/2020    SOB (shortness of breath)  -     X-Ray Chest PA And Lateral; Future; Expected date: 01/29/2020  -     Brain natriuretic peptide; Future; Expected date: 01/29/2020  -     D dimer, quantitative; Future; Expected date: 01/29/2020    Fatigue, unspecified type  -     POCT URINE DIPSTICK WITHOUT MICROSCOPE  -     Vitamin D; Future; Expected date: 01/29/2020  -     Vitamin B12; Future; Expected date: 01/29/2020    Essential hypertension  -     POCT URINE DIPSTICK WITHOUT MICROSCOPE  -     CBC auto differential; Future; Expected date: 01/29/2020  -     Comprehensive metabolic panel; Future; Expected date: 01/29/2020  -     Lipid panel; Future; Expected date: 01/29/2020  -     TSH; Future; Expected date: 01/29/2020  -     Vitamin D; Future; Expected date: 01/29/2020  -     Vitamin B12; Future; Expected date: 01/29/2020  -     Hemoglobin A1c; Future; Expected date: 01/29/2020    Prediabetes  -     Comprehensive metabolic panel; Future; Expected date: 01/29/2020  -     Hemoglobin A1c; Future; Expected date: 01/29/2020    Hypothyroidism, adult  -     TSH; Future; Expected date: 01/29/2020    Other long term (current) drug therapy   -     Vitamin D; Future;  Expected date: 01/29/2020  -     Vitamin B12; Future; Expected date: 01/29/2020    Normocytic anemia  -     Ferritin; Future; Expected date: 01/29/2020  -     Iron and TIBC; Future; Expected date: 01/29/2020  -     Reticulocytes; Future; Expected date: 01/29/2020  -     Haptoglobin; Future; Expected date: 01/29/2020    Post-nasal drip  -     cetirizine (ZYRTEC) 10 MG tablet; Take 1 tablet (10 mg total) by mouth once daily. (Patient not taking: Reported on 2/7/2020)  Dispense: 90 tablet; Refill: 1    I personally reviewed the patient's medical record, including physician notes, imaging, and labs that were available to me today.

## 2020-01-29 ENCOUNTER — OFFICE VISIT (OUTPATIENT)
Dept: INTERNAL MEDICINE | Facility: CLINIC | Age: 85
End: 2020-01-29
Payer: MEDICARE

## 2020-01-29 ENCOUNTER — TELEPHONE (OUTPATIENT)
Dept: INTERNAL MEDICINE | Facility: CLINIC | Age: 85
End: 2020-01-29

## 2020-01-29 ENCOUNTER — HOSPITAL ENCOUNTER (OUTPATIENT)
Dept: RADIOLOGY | Facility: OTHER | Age: 85
Discharge: HOME | End: 2020-01-29
Attending: FAMILY MEDICINE
Payer: MEDICARE

## 2020-01-29 VITALS
OXYGEN SATURATION: 99 % | HEIGHT: 65 IN | DIASTOLIC BLOOD PRESSURE: 70 MMHG | SYSTOLIC BLOOD PRESSURE: 100 MMHG | HEART RATE: 88 BPM | BODY MASS INDEX: 24.17 KG/M2 | WEIGHT: 145.06 LBS

## 2020-01-29 DIAGNOSIS — D64.9 NORMOCYTIC ANEMIA: ICD-10-CM

## 2020-01-29 DIAGNOSIS — R05.9 COUGH: Primary | ICD-10-CM

## 2020-01-29 DIAGNOSIS — R06.02 SOB (SHORTNESS OF BREATH): ICD-10-CM

## 2020-01-29 DIAGNOSIS — E03.9 HYPOTHYROIDISM, ADULT: ICD-10-CM

## 2020-01-29 DIAGNOSIS — J45.901 CHRONIC ASTHMA WITH ACUTE EXACERBATION, UNSPECIFIED ASTHMA SEVERITY, UNSPECIFIED WHETHER PERSISTENT: Primary | ICD-10-CM

## 2020-01-29 DIAGNOSIS — R09.82 POST-NASAL DRIP: ICD-10-CM

## 2020-01-29 DIAGNOSIS — Z79.899 OTHER LONG TERM (CURRENT) DRUG THERAPY: ICD-10-CM

## 2020-01-29 DIAGNOSIS — R73.03 PREDIABETES: ICD-10-CM

## 2020-01-29 DIAGNOSIS — N30.00 ACUTE CYSTITIS WITHOUT HEMATURIA: ICD-10-CM

## 2020-01-29 DIAGNOSIS — R05.9 COUGH: ICD-10-CM

## 2020-01-29 DIAGNOSIS — I10 ESSENTIAL HYPERTENSION: ICD-10-CM

## 2020-01-29 DIAGNOSIS — R53.83 FATIGUE, UNSPECIFIED TYPE: ICD-10-CM

## 2020-01-29 LAB
BILIRUB SERPL-MCNC: ABNORMAL MG/DL
BLOOD URINE, POC: ABNORMAL
COLOR, POC UA: YELLOW
GLUCOSE UR QL STRIP: NORMAL
KETONES UR QL STRIP: ABNORMAL
LEUKOCYTE ESTERASE URINE, POC: ABNORMAL
NITRITE, POC UA: ABNORMAL
PH, POC UA: 5
PROTEIN, POC: ABNORMAL
SPECIFIC GRAVITY, POC UA: 1.02
UROBILINOGEN, POC UA: NORMAL

## 2020-01-29 PROCEDURE — 71046 X-RAY EXAM CHEST 2 VIEWS: CPT | Mod: 26,,, | Performed by: RADIOLOGY

## 2020-01-29 PROCEDURE — 99214 PR OFFICE/OUTPT VISIT, EST, LEVL IV, 30-39 MIN: ICD-10-PCS | Mod: S$PBB,,, | Performed by: FAMILY MEDICINE

## 2020-01-29 PROCEDURE — 71046 X-RAY EXAM CHEST 2 VIEWS: CPT | Mod: TC,FY

## 2020-01-29 PROCEDURE — 99213 OFFICE O/P EST LOW 20 MIN: CPT | Mod: PBBFAC,25 | Performed by: FAMILY MEDICINE

## 2020-01-29 PROCEDURE — 99999 PR PBB SHADOW E&M-EST. PATIENT-LVL III: CPT | Mod: PBBFAC,,, | Performed by: FAMILY MEDICINE

## 2020-01-29 PROCEDURE — 81002 URINALYSIS NONAUTO W/O SCOPE: CPT | Mod: PBBFAC | Performed by: FAMILY MEDICINE

## 2020-01-29 PROCEDURE — 99999 PR PBB SHADOW E&M-EST. PATIENT-LVL III: ICD-10-PCS | Mod: PBBFAC,,, | Performed by: FAMILY MEDICINE

## 2020-01-29 PROCEDURE — 71046 XR CHEST PA AND LATERAL: ICD-10-PCS | Mod: 26,,, | Performed by: RADIOLOGY

## 2020-01-29 PROCEDURE — 99214 OFFICE O/P EST MOD 30 MIN: CPT | Mod: S$PBB,,, | Performed by: FAMILY MEDICINE

## 2020-01-29 RX ORDER — AMOXICILLIN AND CLAVULANATE POTASSIUM 875; 125 MG/1; MG/1
1 TABLET, FILM COATED ORAL EVERY 12 HOURS
Qty: 14 TABLET | Refills: 0 | Status: SHIPPED | OUTPATIENT
Start: 2020-01-29 | End: 2020-02-05

## 2020-01-29 RX ORDER — CETIRIZINE HYDROCHLORIDE 10 MG/1
10 TABLET ORAL DAILY
Qty: 90 TABLET | Refills: 1 | Status: SHIPPED | OUTPATIENT
Start: 2020-01-29 | End: 2020-02-26 | Stop reason: ALTCHOICE

## 2020-01-29 RX ORDER — PREDNISONE 20 MG/1
20 TABLET ORAL 2 TIMES DAILY
Qty: 10 TABLET | Refills: 0 | Status: SHIPPED | OUTPATIENT
Start: 2020-01-29 | End: 2020-02-03

## 2020-01-29 NOTE — TELEPHONE ENCOUNTER
Please call patient and inform that her chest x-ray came back normal.  There is no sign that she has pneumonia.  Because of her trouble breathing and her asthma I recommend that treat this with a course of prednisone.  I have sent this to her pharmacy ([facility]) already.    Also it appears that she has a urinary tract infection.  I recommend that we start any antibiotic for this.  This has also been sent to her pharmacy.    Please also let her know that I am waiting on her lab results to come back.    thanks

## 2020-01-30 NOTE — TELEPHONE ENCOUNTER
Called and informed pt daughter  her chest x-ray came back normal.  There is no sign that she has pneumonia.  Because of her trouble breathing and her asthma I recommend that treat this with a course of prednisone.  I have sent this to her pharmacy ([facility]) already.     Also it appears that she has a urinary tract infection.  I recommend that we start any antibiotic for this.  This has also been sent to her pharmacy.     Please also let her know that I am waiting on her lab results to come back.     pt showed verbal understanding

## 2020-01-31 ENCOUNTER — EXTERNAL CHRONIC CARE MANAGEMENT (OUTPATIENT)
Dept: PRIMARY CARE CLINIC | Facility: CLINIC | Age: 85
End: 2020-01-31
Payer: MEDICARE

## 2020-01-31 PROCEDURE — 99490 PR CHRONIC CARE MGMT, 1ST 20 MIN: ICD-10-PCS | Mod: S$PBB,,, | Performed by: INTERNAL MEDICINE

## 2020-01-31 PROCEDURE — 99490 CHRNC CARE MGMT STAFF 1ST 20: CPT | Mod: S$PBB,,, | Performed by: INTERNAL MEDICINE

## 2020-01-31 PROCEDURE — 99490 CHRNC CARE MGMT STAFF 1ST 20: CPT | Mod: PBBFAC | Performed by: INTERNAL MEDICINE

## 2020-02-03 ENCOUNTER — TELEPHONE (OUTPATIENT)
Dept: INTERNAL MEDICINE | Facility: CLINIC | Age: 85
End: 2020-02-03

## 2020-02-03 NOTE — TELEPHONE ENCOUNTER
----- Message from Bernadette Kim, Patient Care Assistant sent at 2/3/2020  3:00 PM CST -----  Contact: (Ene) Daughter  Name of Who is Calling: daughter    What is the request in detail:Requesting a call back in regards of lab orders.  Please contact to further discuss and advise      Can the clinic reply by [facility]: No    What Number to Call Back [facility] not [facility]   [phone]

## 2020-02-05 ENCOUNTER — TELEPHONE (OUTPATIENT)
Dept: INTERNAL MEDICINE | Facility: CLINIC | Age: 85
End: 2020-02-05

## 2020-02-05 DIAGNOSIS — E11.9 TYPE 2 DIABETES MELLITUS WITHOUT COMPLICATION, WITHOUT LONG-TERM CURRENT USE OF INSULIN: ICD-10-CM

## 2020-02-05 PROBLEM — R73.03 PREDIABETES: Status: RESOLVED | Noted: 2019-07-28 | Resolved: 2020-02-05

## 2020-02-05 NOTE — TELEPHONE ENCOUNTER
Please call patient and inform that overall her lab came back normal.    Please ask the patient if she was able to finish the 2 medications that I sent to her pharmacy last week.  One was for her breathing and one was for a urinary infection.  Is she feeling any better now?    thanks

## 2020-02-05 NOTE — TELEPHONE ENCOUNTER
sonia for pt to call office back in regards of     Hello, this is Amanda calling from Dr.Voithofer arechiga at [facility]. I just wanted to let you know that overall her lab came back normal.     Please ask the patient if she was able to finish the 2 medications that I sent to her pharmacy last week.  One was for her breathing and one was for a urinary infection.  Is she feeling any better now?

## 2020-02-07 ENCOUNTER — OFFICE VISIT (OUTPATIENT)
Dept: RHEUMATOLOGY | Facility: CLINIC | Age: 85
End: 2020-02-07
Payer: MEDICARE

## 2020-02-07 VITALS
HEART RATE: 83 BPM | HEIGHT: 65 IN | BODY MASS INDEX: 24.91 KG/M2 | WEIGHT: 149.5 LBS | TEMPERATURE: 98 F | DIASTOLIC BLOOD PRESSURE: 61 MMHG | SYSTOLIC BLOOD PRESSURE: 142 MMHG

## 2020-02-07 DIAGNOSIS — M79.7 FIBROMYALGIA: ICD-10-CM

## 2020-02-07 DIAGNOSIS — M15.9 PRIMARY OSTEOARTHRITIS INVOLVING MULTIPLE JOINTS: Primary | ICD-10-CM

## 2020-02-07 PROCEDURE — 99213 OFFICE O/P EST LOW 20 MIN: CPT | Mod: PBBFAC | Performed by: INTERNAL MEDICINE

## 2020-02-07 PROCEDURE — 99999 PR PBB SHADOW E&M-EST. PATIENT-LVL III: CPT | Mod: PBBFAC,,, | Performed by: INTERNAL MEDICINE

## 2020-02-07 PROCEDURE — 99213 PR OFFICE/OUTPT VISIT, EST, LEVL III, 20-29 MIN: ICD-10-PCS | Mod: S$PBB,,, | Performed by: INTERNAL MEDICINE

## 2020-02-07 PROCEDURE — 99213 OFFICE O/P EST LOW 20 MIN: CPT | Mod: S$PBB,,, | Performed by: INTERNAL MEDICINE

## 2020-02-07 PROCEDURE — 99999 PR PBB SHADOW E&M-EST. PATIENT-LVL III: ICD-10-PCS | Mod: PBBFAC,,, | Performed by: INTERNAL MEDICINE

## 2020-02-07 RX ORDER — PREGABALIN 25 MG/1
25 CAPSULE ORAL 2 TIMES DAILY
Qty: 60 CAPSULE | Refills: 5 | Status: SHIPPED | OUTPATIENT
Start: 2020-02-07 | End: 2020-05-29 | Stop reason: SDUPTHER

## 2020-02-07 ASSESSMENT — ROUTINE ASSESSMENT OF PATIENT INDEX DATA (RAPID3)
PSYCHOLOGICAL DISTRESS SCORE: 9.9
TOTAL RAPID3 SCORE: 8.89
MDHAQ FUNCTION SCORE: 2
PAIN SCORE: 10
PATIENT GLOBAL ASSESSMENT SCORE: 10
FATIGUE SCORE: 10
AM STIFFNESS SCORE: 1, YES

## 2020-02-07 NOTE — PROGRESS NOTES
History of present illness:  85-year-old female I have been following since 2012.  She has a history of chronic pain dating back to the 1980s.  She has been diagnosed in the past as having osteoarthritis, especially of the spine, and fibromyalgia.  She has been tried on multiple medications in the past but had stopped most of them because of side effects.  I had her on Lyrica and Cymbalta.  When I saw her in July, 2018 she told me she had stopped all her medications.  She did not notice any increased pain so I did not put her back on any medications.    She subsequently developed a pulmonary embolus.  She is now on Eliquis.  She was seen by Dr. Ngo 1 year ago who put her back on Cymbalta.  She was having increased aching.  He also referred her to physical therapy.  She states this made her pain worse and she stopped going.    Her aching has been getting worse.  She also has some burning in her feet.  She has had some pain in the legs.  She went back to the emergency room and had an ultrasound that did not show further clots.  She does not like taking her medications because the pills get stuck.  She has not been using heat or ice.  She has not been back to see neurology recently.    Physical examination:  Musculoskeletal:  Shoulders, elbows, wrists are unremarkable.  She has bony hypertrophy of the 1st CMC with tenderness.  She has no swelling in the hands.  Lumbar spine has good range of motion without pain on range of motion.  She has tenderness of the knees, ankles, and feet but no synovitis.  She has good range of motion of these joints.    Assessment:  Osteoarthritis and fibromyalgia    Plans:  1.  Continue Cymbalta 30 mg daily  2.  I placed her back on Lyrica but starting at 25 mg twice daily  3.  Return to see me in 6 months

## 2020-02-12 ENCOUNTER — TELEPHONE (OUTPATIENT)
Dept: NEUROLOGY | Facility: CLINIC | Age: 85
End: 2020-02-12

## 2020-02-12 NOTE — TELEPHONE ENCOUNTER
----- Message from Kiana Camarena sent at 2/12/2020 10:56 AM CST -----  Contact: Mei (daughter)  Name of Who is Calling : Mei (daughter)    Mei (daughter) is requesting a call from staff in regards to scheduling  .....Please contact to further discuss and advise.    Can the clinic reply by [facility] : No    What Number to Call Back :  [phone]

## 2020-02-26 ENCOUNTER — HOSPITAL ENCOUNTER (EMERGENCY)
Facility: OTHER | Age: 85
Discharge: HOME | End: 2020-02-26
Attending: EMERGENCY MEDICINE
Payer: MEDICARE

## 2020-02-26 VITALS
HEART RATE: 90 BPM | RESPIRATION RATE: 18 BRPM | BODY MASS INDEX: 24.83 KG/M2 | DIASTOLIC BLOOD PRESSURE: 62 MMHG | TEMPERATURE: 99 F | WEIGHT: 149 LBS | OXYGEN SATURATION: 97 % | HEIGHT: 65 IN | SYSTOLIC BLOOD PRESSURE: 136 MMHG

## 2020-02-26 DIAGNOSIS — M54.2 NECK PAIN: ICD-10-CM

## 2020-02-26 DIAGNOSIS — M70.21 OLECRANON BURSITIS OF RIGHT ELBOW: Primary | ICD-10-CM

## 2020-02-26 PROCEDURE — 99283 EMERGENCY DEPT VISIT LOW MDM: CPT

## 2020-02-26 RX ORDER — IBUPROFEN 400 MG/1
400 TABLET ORAL EVERY 6 HOURS PRN
Qty: 20 TABLET | Refills: 0 | Status: SHIPPED | OUTPATIENT
Start: 2020-02-26 | End: 2020-08-03

## 2020-02-26 NOTE — ED TRIAGE NOTES
+right elbow pain/swelling from fall last week. Pt reports hitting head but denies loc. Mild swelling/bruising noted. Pt currently on blood thinners. Pt answering questions approprietly

## 2020-02-26 NOTE — ED TRIAGE NOTES
Ground level fall last week, did not seek medical treatment. Noticed painless mass to right elbow today.

## 2020-02-26 NOTE — ED PROVIDER NOTES
Encounter Date: 2/26/2020    SCRIBE #1 NOTE: I, Rivka Baron, am scribing for, and in the presence of, Dr. Colby.       History     Chief Complaint   Patient presents with    Fall     +fall last week. swelling noted to right elbow. pt on blood thinners. pt denies loc      Time seen by provider: 1:22 PM    This is a 87 y.o. female who presents with complaint of swelling to her right elbow after fall from bed approximately 2 weeks ago. She reports mild pain to the area. She also complains of posterior head pain and neck pain since fall. She states that her head, neck, and shoulders broke her fall, though she felt at baseline shortly after fall. She reports frequent falls out of bed during sleep. She has no other complaints at the time.    The history is provided by the patient and a relative.     Review of patient's allergies indicates:   Allergen Reactions    Melatonin      Other reaction(s): Other (See Comments)  Sleep Walks and has unnatural dreams    Talwin compound Hives    Talwin [pentazocine lactate] Hallucinations    Trazodone Other (See Comments)     Nightmares/sleep walk      Bentyl [dicyclomine] Anxiety    Tessalon [benzonatate] Anxiety     Past Medical History:   Diagnosis Date    Arthritis     Asthma     Bilateral pulmonary embolism 4/27/2019    Cataract     Depression     Diabetes mellitus     Fibromyalgia 7/2/2012    Fibromyalgia     GERD (gastroesophageal reflux disease)     Hypertension 7/2/2012    Thoracic or lumbosacral neuritis or radiculitis, unspecified     Thyroid disease     Ulcer      Past Surgical History:   Procedure Laterality Date    CATARACT EXTRACTION Bilateral     FOOT NEUROMA SURGERY  1985    HYSTERECTOMY       Family History   Problem Relation Age of Onset    Diabetes Mother     Diabetes Brother     Emphysema Maternal Aunt     Melanoma Neg Hx     Asthma Neg Hx      Social History     Tobacco Use    Smoking status: Never Smoker    Smokeless tobacco:  Never Used   Substance Use Topics    Alcohol use: No    Drug use: No     Review of Systems   Constitutional: Negative for chills and diaphoresis.   HENT: Negative for congestion.    Eyes: Negative for discharge.   Respiratory: Negative for shortness of breath.    Cardiovascular: Negative for chest pain.   Musculoskeletal: Positive for neck pain.        Positive for right elbow swelling.   Skin: Negative for rash and wound.   Neurological: Positive for headaches. Negative for dizziness.   Hematological: Does not bruise/bleed easily.   Psychiatric/Behavioral: Negative for confusion.   All other systems reviewed and are negative.      Physical Exam     Initial Vitals [02/26/20 1218]   BP Pulse Resp Temp SpO2   136/62 90 18 98.6 °F (37 °C) 97 %      MAP       --         Physical Exam    Nursing note and vitals reviewed.  Constitutional: She appears well-developed and well-nourished.  Non-toxic appearance. She does not have a sickly appearance. No distress.   HENT:   Head: Normocephalic and atraumatic.   Nose: Nose normal.   Mouth/Throat: Oropharynx is clear and moist.   Eyes: Conjunctivae, EOM and lids are normal. Pupils are equal, round, and reactive to light. Right eye exhibits no nystagmus. Left eye exhibits no nystagmus.   Neck: Trachea normal, normal range of motion and phonation normal. Neck supple. No stridor present.   Cardiovascular: Normal rate, regular rhythm, normal heart sounds and normal pulses. Exam reveals no gallop and no friction rub.    No murmur heard.  Pulmonary/Chest: Breath sounds normal. No respiratory distress. She has no wheezes. She has no rhonchi. She has no rales.   Abdominal: Normal appearance.   Musculoskeletal:   Back: Right paraspinal cervical tenderness to palpation.  RUE: Swelling over the olecranon. Non-tender.   Neurological: She is alert and oriented to person, place, and time. She has normal strength. She displays a negative Romberg sign. GCS score is 15. GCS eye subscore is 4.  GCS verbal subscore is 5. GCS motor subscore is 6.   Skin: Skin is warm, dry and intact. Capillary refill takes less than 2 seconds. No rash noted. No pallor.   Psychiatric: Her speech is normal and behavior is normal.         ED Course   Procedures  Labs Reviewed - No data to display          Medical Decision Making:   History:   Old Medical Records: I decided to obtain old medical records.  Initial Assessment:   87-year-old female presents with fall after couple weeks.  Regarding right elbow.  To be swollen bursa.  Not significantly tender to palpation. No signs of infection.  Recommended wrapping and ice.  Also complained of some neck pain.  Is not midline.  Not concerned of fracture.  I suspect paraspinal spasm.  No further workup indicated.  Do not feel any imaging or blood work is indicated at this time.    Patient discharged home in stable condition. Diagnosis and treatment plan explained to patient and/or family member who is at bedside. I have answered all questions and the patient is satisfied with the plan of care. The patient demonstrates understanding of the care plan. This is the extent to the patients complaints today here in the emergency department.            Scribe Attestation:   Scribe #1: I performed the above scribed service and the documentation accurately describes the services I performed. I attest to the accuracy of the note.    Attending Attestation:           Physician Attestation for Scribe:  Physician Attestation Statement for Scribe #1: I, Dr. Colby, reviewed documentation, as scribed by Rivka Baron in my presence, and it is both accurate and complete.                               Clinical Impression:     1. Olecranon bursitis of right elbow    2. Neck pain                ED Disposition Condition    Discharge Stable        ED Prescriptions     Medication Sig Dispense Start Date End Date Auth. Provider    ibuprofen (ADVIL,MOTRIN) 400 MG tablet Take 1 tablet (400 mg total) by  mouth every 6 (six) hours as needed. 20 tablet 2/26/2020  Maurizio Colby, DO        Follow-up Information     Follow up With Specialties Details Why Contact Info    Lyla Mathew MD Internal Medicine Schedule an appointment as soon as possible for a visit   [address] [facility] [address]  [facility] [address]  [phone]                                       Maurizio Colby DO  02/26/20 1700

## 2020-02-29 ENCOUNTER — EXTERNAL CHRONIC CARE MANAGEMENT (OUTPATIENT)
Dept: PRIMARY CARE CLINIC | Facility: CLINIC | Age: 85
End: 2020-02-29
Payer: MEDICARE

## 2020-02-29 PROCEDURE — G2058 CCM ADD 20MIN: HCPCS | Mod: PBBFAC,25,27 | Performed by: INTERNAL MEDICINE

## 2020-02-29 PROCEDURE — G2058 CCM ADD 20MIN: HCPCS | Mod: S$PBB,,, | Performed by: INTERNAL MEDICINE

## 2020-02-29 PROCEDURE — 99490 PR CHRONIC CARE MGMT, 1ST 20 MIN: ICD-10-PCS | Mod: S$PBB,,, | Performed by: INTERNAL MEDICINE

## 2020-02-29 PROCEDURE — 99490 CHRNC CARE MGMT STAFF 1ST 20: CPT | Mod: S$PBB,,, | Performed by: INTERNAL MEDICINE

## 2020-02-29 PROCEDURE — G2058 PR CHRON CARE MGMT, EA ADDTL 20 MINS: ICD-10-PCS | Mod: S$PBB,,, | Performed by: INTERNAL MEDICINE

## 2020-02-29 PROCEDURE — 99490 CHRNC CARE MGMT STAFF 1ST 20: CPT | Mod: PBBFAC,25,27 | Performed by: INTERNAL MEDICINE

## 2020-03-02 ENCOUNTER — TELEPHONE (OUTPATIENT)
Dept: INTERNAL MEDICINE | Facility: CLINIC | Age: 85
End: 2020-03-02

## 2020-03-02 DIAGNOSIS — Z86.711 HISTORY OF PULMONARY EMBOLUS (PE): Primary | ICD-10-CM

## 2020-03-02 NOTE — TELEPHONE ENCOUNTER
----- Message from Indira Cain sent at 3/2/2020  8:05 AM CST -----  Contact: Self   Type: Patient Call Back    Who called: Self     What is the request in detail: patient says she need to be seen from a ED visit . Where she fell and hit her head she has a bad headache. Please call     Can the clinic reply by [facility]? No     Would the patient rather a call back or a response via [facility]?  Call     Best call back number:[phone]

## 2020-03-02 NOTE — TELEPHONE ENCOUNTER
Informed pt daughter the earliest  has for a hospital f/u was 3/24/2020. Informed pt daughter if she would like pt to be seen earlier than 3/24 she can be seen by another provider in the office. Daughter refused and stated pt will take 3/24/2020 with

## 2020-03-02 NOTE — TELEPHONE ENCOUNTER
----- Message from Audra Wright sent at 3/2/2020  2:27 PM CST -----  Contact: Pt   Name of Who is Calling: DEJAH ROLDAN [[id number]]    What is the request in detail: Patient is requesting a call back regards to medication The pt is out of her blood thinners and the Pharmacy she use mail order wouldn't have them to her until 7 more days ..........  Please contact to further discuss and advise      Can the clinic reply by [facility]: No     What [facility]:  [phone]

## 2020-03-21 DIAGNOSIS — Z86.711 HISTORY OF PULMONARY EMBOLUS (PE): ICD-10-CM

## 2020-03-21 RX ORDER — APIXABAN 5 MG/1
TABLET, FILM COATED ORAL
Qty: 60 TABLET | Refills: 11 | Status: SHIPPED | OUTPATIENT
Start: 2020-03-21 | End: 2020-09-28

## 2020-03-23 ENCOUNTER — TELEPHONE (OUTPATIENT)
Dept: INTERNAL MEDICINE | Facility: CLINIC | Age: 85
End: 2020-03-23

## 2020-03-23 DIAGNOSIS — K21.9 GASTROESOPHAGEAL REFLUX DISEASE, ESOPHAGITIS PRESENCE NOT SPECIFIED: ICD-10-CM

## 2020-03-24 RX ORDER — HYDROGEN PEROXIDE 3 %
20 SOLUTION, NON-ORAL MISCELLANEOUS
Qty: 90 CAPSULE | Refills: 1 | Status: SHIPPED | OUTPATIENT
Start: 2020-03-24 | End: 2021-04-28 | Stop reason: SDUPTHER

## 2020-03-26 ENCOUNTER — TELEPHONE (OUTPATIENT)
Dept: UROLOGY | Facility: CLINIC | Age: 85
End: 2020-03-26

## 2020-03-26 DIAGNOSIS — J30.9 ALLERGIC RHINITIS, UNSPECIFIED SEASONALITY, UNSPECIFIED TRIGGER: ICD-10-CM

## 2020-03-26 DIAGNOSIS — J45.901 EXACERBATION OF ASTHMA, UNSPECIFIED ASTHMA SEVERITY, UNSPECIFIED WHETHER PERSISTENT: Primary | ICD-10-CM

## 2020-03-26 RX ORDER — FLUTICASONE PROPIONATE 50 MCG
1 SPRAY, SUSPENSION (ML) NASAL DAILY
Qty: 16 G | Refills: 1 | Status: SHIPPED | OUTPATIENT
Start: 2020-03-26 | End: 2020-04-17

## 2020-03-26 RX ORDER — PREDNISONE 20 MG/1
20 TABLET ORAL 2 TIMES DAILY
Qty: 14 TABLET | Refills: 0 | Status: SHIPPED | OUTPATIENT
Start: 2020-03-26 | End: 2020-04-02

## 2020-03-26 NOTE — TELEPHONE ENCOUNTER
"Spoke with patient on the phone regarding her symptoms.  She complains of chest congestion, feels like there "cold" in her chest, coughing (she reports like an asthma flare-up), and shortness of breath.  Of note, she is speaking in full sentences and is not in respiratory distress.  Patient feels very confident that this is an asthma flare up.  Denies fever, chills, body aches, headache, sore throat.  She does mention associated itchy nose and watery eyes.  She has been homebound and out of the public for several weeks to month.  Her daughter lives with her, but she has been working at home.  I discussed with her the concern about possible COVID 19.  Patient has no fever or signs of systemic illness.  Her symptoms appear to be related to an asthma flare up, likely due to the high pollen counts currently in the area as a trigger (based on the associated watery eyes and itchy nose).  I am. nervous to treat with prednisone due to risk of worsening COVID 19 illness if she does have that.  However, without fever or any other signs of systemic illness and the fact that patient has been isolated at home for several weeks to months, it appears to be less likely that she is suffering from COVID 19.  Will treat with prednisone.  Patient also advised to continue albuterol as needed and Advair.  Will also use Flonase for her rhinitis symptoms.  Advised patient to keep a close eye on her symptoms.  If she develops fever, worsening shortness of breath or any new symptoms she was advised to contact our office or seek emergency care if needed.  She expressed understanding.      "

## 2020-03-26 NOTE — TELEPHONE ENCOUNTER
Patient c/o chest and nasal congestion, itchy eyes/nose and MELISSA for several days. Patient says it is worse today and that she believes it is her asthma. Please advise.     ----- Message from Mayda Pichardo sent at 3/26/2020  1:14 PM CDT -----  Contact: pt  Name of Who is Calling: pt    What is the request in detail: states she is very congested and is needing a Rx called in to her pharmacy. Pt states she is having trouble with her asthma. Please contact to further discuss and advise      Can the clinic reply by [facility]: no    What [facility]: [phone]

## 2020-03-31 ENCOUNTER — EXTERNAL CHRONIC CARE MANAGEMENT (OUTPATIENT)
Dept: PRIMARY CARE CLINIC | Facility: CLINIC | Age: 85
End: 2020-03-31
Payer: MEDICARE

## 2020-03-31 PROCEDURE — 99490 CHRNC CARE MGMT STAFF 1ST 20: CPT | Mod: PBBFAC | Performed by: INTERNAL MEDICINE

## 2020-03-31 PROCEDURE — 99490 PR CHRONIC CARE MGMT, 1ST 20 MIN: ICD-10-PCS | Mod: S$PBB,,, | Performed by: INTERNAL MEDICINE

## 2020-03-31 PROCEDURE — 99490 CHRNC CARE MGMT STAFF 1ST 20: CPT | Mod: S$PBB,,, | Performed by: INTERNAL MEDICINE

## 2020-04-01 ENCOUNTER — TELEPHONE (OUTPATIENT)
Dept: DERMATOLOGY | Facility: CLINIC | Age: 85
End: 2020-04-01

## 2020-04-02 ENCOUNTER — OFFICE VISIT (OUTPATIENT)
Dept: INTERNAL MEDICINE | Facility: CLINIC | Age: 85
End: 2020-04-02
Attending: INTERNAL MEDICINE
Payer: MEDICARE

## 2020-04-02 ENCOUNTER — TELEPHONE (OUTPATIENT)
Dept: INTERNAL MEDICINE | Facility: CLINIC | Age: 85
End: 2020-04-02

## 2020-04-02 DIAGNOSIS — Z87.09 HISTORY OF ASTHMA: ICD-10-CM

## 2020-04-02 DIAGNOSIS — R06.02 SOB (SHORTNESS OF BREATH): ICD-10-CM

## 2020-04-02 DIAGNOSIS — F41.8 SITUATIONAL ANXIETY: Primary | ICD-10-CM

## 2020-04-02 PROCEDURE — 99213 PR OFFICE/OUTPT VISIT, EST, LEVL III, 20-29 MIN: ICD-10-PCS | Mod: 95,,, | Performed by: INTERNAL MEDICINE

## 2020-04-02 PROCEDURE — 99213 OFFICE O/P EST LOW 20 MIN: CPT | Mod: 95,,, | Performed by: INTERNAL MEDICINE

## 2020-04-02 PROCEDURE — G0463 HOSPITAL OUTPT CLINIC VISIT: HCPCS

## 2020-04-02 PROCEDURE — 99211 OFF/OP EST MAY X REQ PHY/QHP: CPT

## 2020-04-02 RX ORDER — FLUTICASONE PROPIONATE AND SALMETEROL 500; 50 UG/1; UG/1
1 POWDER RESPIRATORY (INHALATION) 2 TIMES DAILY
Qty: 60 EACH | Refills: 11 | Status: SHIPPED | OUTPATIENT
Start: 2020-04-02 | End: 2022-12-08

## 2020-04-02 RX ORDER — LEVALBUTEROL TARTRATE 45 UG/1
2 AEROSOL, METERED ORAL EVERY 8 HOURS PRN
Qty: 1 INHALER | Refills: 12 | Status: SHIPPED | OUTPATIENT
Start: 2020-04-02 | End: 2020-12-24

## 2020-04-02 NOTE — TELEPHONE ENCOUNTER
Spoke with pt and daughter, patient is having a hard time breathing but patients daughter is saying that she is having a panic attack. no fever or any symptoms.  She is jittery from albuterol. Her daughter just turned the TV off that was going 24/7 about COVID.    ----- Message from Mayda Pichardo sent at 4/2/2020 11:53 AM CDT -----  Contact: pt  Name of Who is Calling: pt    What is the request in detail: states the medication that she was given to her last week is not helping her issue. Pt states the medication is causing her to be very jittery and causing her breathing issues. Please call number listed at bottom. Please contact to further discuss and advise      Can the clinic reply by [facility]: no    What Number to [facility] Back [facility]: [phone]

## 2020-04-02 NOTE — PROGRESS NOTES
The patient location is:  Patient Home   The chief complaint leading to consultation is:  COVID-19 Concerns    Subjective:          Debbie Ding is a 87 y.o.  female who presents for COVID-19 Concerns  .  Pt concerned about covid.  Has has history of mild intermittent asthma treated with daily advair, pt states she usually takes her albuterol in the morning   Perfumes, dust are triggers.  Feels she needs the inhaler more often now.  Daughter feels this may be anxiety.  REports pt has been awtching the new all day. Pt denies cough, fever, chills. No myalgias. No anosmia or loss of taste. Family states that she has been socially isolated and not in contact with anyone outside the home.  Has history of cognitive decline, taking aricept. PT states that her daughter is not there in the mornings when she takes her albuterol. Says this is normal for her to take this every morning.  C/o of feeling jittery and nervous.     Review of Systems   Respiratory: Positive for shortness of breath. Negative for cough, chest tightness and stridor.    Cardiovascular: Negative for chest pain and palpitations.   Gastrointestinal: Negative for abdominal pain and nausea.   Genitourinary: Negative for dysuria and frequency.   Skin: Negative for rash and wound.   Neurological: Negative for weakness and light-headedness.   Psychiatric/Behavioral: Negative for dysphoric mood. The patient is nervous/anxious.        Patient Active Problem List   Diagnosis    Essential hypertension    Incontinence of urine    Depression, recurrent    Mild cognitive impairment    Fibromyalgia    Primary osteoarthritis involving multiple joints    Spondylosis without myelopathy    Spinal stenosis, lumbar region, without neurogenic claudication    Acquired spondylolisthesis    Thoracic or lumbosacral neuritis or radiculitis, unspecified    DDD (degenerative disc disease), lumbar    Neck pain    Atrophy of nasal turbinates    GERD (gastroesophageal  reflux disease)    Chronic bilateral low back pain without sciatica    Decreased strength of trunk and back    Chronic asthma    MELISSA (dyspnea on exertion)    Allergic rhinitis    Vestibular dizziness    Asymptomatic menopausal state    Hearing loss    Hypothyroidism, adult    Pseudophakia of both eyes    PUD (peptic ulcer disease)    Urinary, incontinence, stress female    Impaired functional mobility, balance, gait, and endurance    Acute pain of both knees    Lactic acidosis    Hypokalemia    Debility    Hyperhidrosis    CKD (chronic kidney disease) stage 3, GFR 30-59 ml/min    History of pulmonary embolus (PE)    Overactive bladder    Type 2 diabetes mellitus without complication, without long-term current use of insulin       Past Medical History:   Diagnosis Date    Arthritis     Asthma     Bilateral pulmonary embolism 4/27/2019    Cataract     Depression     Diabetes mellitus     Fibromyalgia 7/2/2012    Fibromyalgia     GERD (gastroesophageal reflux disease)     Hypertension 7/2/2012    Thoracic or lumbosacral neuritis or radiculitis, unspecified     Thyroid disease     Ulcer        Past Surgical History:   Procedure Laterality Date    CATARACT EXTRACTION Bilateral     FOOT NEUROMA SURGERY  1985    HYSTERECTOMY         Family History   Problem Relation Age of Onset    Diabetes Mother     Diabetes Brother     Emphysema Maternal Aunt     Melanoma Neg Hx     Asthma Neg Hx        Social History     Tobacco Use    Smoking status: Never Smoker    Smokeless tobacco: Never Used   Substance Use Topics    Alcohol use: No    Drug use: No       Objective:   There were no vitals taken for this visit.     Physical Exam   Constitutional: She is oriented to person, place, and time. She appears well-developed and well-nourished. No distress.   HENT:   Head: Normocephalic and atraumatic.   Eyes: Right eye exhibits no discharge. Left eye exhibits no discharge. No scleral icterus.    Pulmonary/Chest: Effort normal. No respiratory distress.   Neurological: She is alert and oriented to person, place, and time.   Skin: She is not diaphoretic. No erythema. No pallor.   Psychiatric: Her behavior is normal. Thought content normal.   anxious         Prior labs reviewed  Assessment/Plan:        Diagnoses and all orders for this visit:    Situational anxiety  -    Change albuterol to  levalbuterol (XOPENEX HFA) 45 mcg/actuation inhaler; Inhale 2 puffs into the lungs every 8 (eight) hours as needed for Wheezing.  -     Ambulatory referral/consult to Outpatient Case Management  Reassurrance: will place orders for screening tests if worsens    SOB (shortness of breath)  -     COVID-19 Respiratory Check; Future  -     levalbuterol (XOPENEX HFA) 45 mcg/actuation inhaler; Inhale 2 puffs into the lungs every 8 (eight) hours as needed for Wheezing.  -     Ambulatory referral/consult to Outpatient Case Management   Increase advair dosing and encourage compliance. Goal xopenex needed less than once a week  History of asthma  -     COVID-19 Respiratory Check; Future  -     levalbuterol (XOPENEX HFA) 45 mcg/actuation inhaler; Inhale 2 puffs into the lungs every 8 (eight) hours as needed for Wheezing.  -     Ambulatory referral/consult to Outpatient Case Management  -     fluticasone-salmeterol diskus inhaler 500-50 mcg; Inhale 1 puff into the lungs 2 (two) times daily. Controller           Visit type: Virtual visit with synchronous audio and video    Total time spent with patient: 20minutes    Each patient to whom he or she provides medical services by telemedicine is:  (1) informed of the relationship between the physician and patient and the respective role of any other health care provider with respect to management of the patient; and (2) notified that he or she may decline to receive medical services by telemedicine and may withdraw from such care at any time.  Medication List with Changes/Refills   New  Medications    FLUTICASONE-SALMETEROL DISKUS INHALER 500-50 MCG    Inhale 1 puff into the lungs 2 (two) times daily. Controller    LEVALBUTEROL (XOPENEX HFA) 45 MCG/ACTUATION INHALER    Inhale 2 puffs into the lungs every 8 (eight) hours as needed for Wheezing.   Current Medications    AMLODIPINE (NORVASC) 10 MG TABLET    Take 1 tablet (10 mg total) by mouth once daily.    DONEPEZIL (ARICEPT) 10 MG TABLET    TAKE 1 TABLET ONCE DAILY INTHE MORNING    DULOXETINE (CYMBALTA) 30 MG CAPSULE    Take 1 capsule (30 mg total) by mouth once daily.    ELIQUIS 5 MG TAB    TAKE 1 TABLET TWICE A DAY    ESOMEPRAZOLE (NEXIUM) 20 MG CAPSULE    Take 1 capsule (20 mg total) by mouth before breakfast.    FLUTICASONE PROPIONATE (FLONASE) 50 MCG/ACTUATION NASAL SPRAY    1 spray (50 mcg total) by Each Nostril route once daily.    GLYCOPYRROLATE (ROBINUL) 2 MG TAB    TAKE 1 AND 1/2 TABLETS     DAILY FOR SWEATING    IBUPROFEN (ADVIL,MOTRIN) 400 MG TABLET    Take 1 tablet (400 mg total) by mouth every 6 (six) hours as needed.    LEVOTHYROXINE (SYNTHROID) 88 MCG TABLET    Take 1 tablet (88 mcg total) by mouth once daily.    LIDOCAINE (LMX) 4 % CREAM    Apply topically as needed.    MEMANTINE (NAMENDA) 5 MG TAB    Take 1 tablet (5 mg total) by mouth every morning.    OLOPATADINE (PATADAY) 0.2 % DROP        PREGABALIN (LYRICA) 25 MG CAPSULE    Take 1 capsule (25 mg total) by mouth 2 (two) times daily.   Discontinued Medications    ADVAIR DISKUS 250-50 MCG/DOSE DISKUS INHALER    Inhale 1 puff into the lungs 2 (two) times daily.    ALBUTEROL (PROVENTIL/VENTOLIN HFA) 90 MCG/ACTUATION INHALER    Inhale 2 puffs into the lungs every 4 (four) hours as needed for Wheezing.    PREDNISONE (DELTASONE) 20 MG TABLET    Take 1 tablet (20 mg total) by mouth 2 (two) times daily. for 7 days

## 2020-04-02 NOTE — TELEPHONE ENCOUNTER
Pt evaluated via phone recently by her pcp.  Deemed low risk fo rcovid exposure and symptoms more consistent with asthma attack.  Please schedule UC video visit with me as soon as they can do it- ok to override my blocked time. Let me know what is decided or if you can't reach them.

## 2020-04-03 ENCOUNTER — OUTPATIENT CASE MANAGEMENT (OUTPATIENT)
Dept: ADMINISTRATIVE | Facility: OTHER | Age: 85
End: 2020-04-03

## 2020-04-03 NOTE — LETTER
April 6, 2020             Dear Mrs. Ding,    Welcome to [facility] Complex Care Management [facility].  It was a pleasure talking with you today.  My name is Jelly Keenan, and I look forward to being your Care Manager.  My goal is to help you function at the healthiest and highest level possible.  You can contact me directly at [phone] or [phone].    As an [facility] patient, some of the services we may be able to provide include:      Development of an individualized care plan with a Registered Nurse    Connection with a    Connection with available resources and services     Coordinate communication among your care team members    Provide coaching and education    Help you understand your doctors treatment plan   Help you obtain information about your insurance coverage.     All services provided by [facility] Complex Care Managers and other care team members are coordinated with and communicated to your primary care team.      As part of your enrollment, you will be receiving education materials and more information about these services in your [facility] account, by phone or through the mail.  If you do not wish to participate or receive information, please contact our office at [phone].      Sincerely,        Jelly Keenan, RN  [facility]   Out-patient RN Complex Care Manager

## 2020-04-04 ENCOUNTER — TELEPHONE (OUTPATIENT)
Dept: INTERNAL MEDICINE | Facility: CLINIC | Age: 85
End: 2020-04-04

## 2020-04-04 NOTE — TELEPHONE ENCOUNTER
Spoke to pt regarding scheduling respiratory check. She is not having SOB, states the other day was having some anxiety but she feels fine now without SOB. Will postpone respiratory drive check but told pt if SOB develops we can schedule her at that time. Verbalized understanding.

## 2020-04-06 ENCOUNTER — PATIENT MESSAGE (OUTPATIENT)
Dept: ADMINISTRATIVE | Facility: OTHER | Age: 85
End: 2020-04-06

## 2020-04-06 NOTE — PATIENT INSTRUCTIONS
Controlling High Blood Pressure  High blood pressure (hypertension) is often called the silent killer. This is because many people who have it dont know it. High blood pressure is defined as 140/90 mm Hg or higher. Know your blood pressure and remember to check it regularly. Doing so can save your life. Here are some things you can do to help control your blood pressure.    Choose heart-healthy foods  · Select low-salt, low-fat foods. Limit sodium intake to 2,400 mg per day or the amount suggested by your healthcare provider.  · Limit canned, dried, cured, packaged, and fast foods. These can contain a lot of salt.  · Eat 8 to 10 servings of fruits and vegetables every day.  · Choose lean meats, fish, or chicken.  · Eat whole-grain pasta, brown rice, and beans.  · Eat 2 to 3 servings of low-fat or fat-free dairy products.  · Ask your doctor about the DASH eating plan. This plan helps reduce blood pressure.  · When you go to a restaurant, ask that your meal be prepared with no added salt.  Maintain a healthy weight  · Ask your healthcare provider how many calories to eat a day. Then stick to that number.  · Ask your healthcare provider what weight range is healthiest for you. If you are overweight, a weight loss of only 3% to 5% of your body weight can help lower blood pressure. Generally, a good weight loss goal is to lose 10% of your body weight in a year.  · Limit snacks and sweets.  · Get regular exercise.  Get up and get active  · Choose activities you enjoy. Find ones you can do with friends or family. This includes bicycling, dancing, walking, and jogging.  · Park farther away from building entrances.  · Use stairs instead of the elevator.  · When you can, walk or bike instead of driving.  · Merigold leaves, garden, or do household repairs.  · Be active at a moderate to vigorous level of physical activity for at least 40 minutes for a minimum of 3 to 4 days a week.   Manage stress  · Make time to relax and enjoy  life. Find time to laugh.  · Communicate your concerns with your loved ones and your healthcare provider.  · Visit with family and friends, and keep up with hobbies.  Limit alcohol and quit smoking  · Men should have no more than 2 drinks per day.  · Women should have no more than 1 drink per day.  · Talk with your healthcare provider about quitting smoking. Smoking significantly increases your risk for heart disease and stroke. Ask your healthcare provider about community smoking cessation programs and other options.  Medicines  If lifestyle changes arent enough, your healthcare provider may prescribe high blood pressure medicine. Take all medicines as prescribed. If you have any questions about your medicines, ask your healthcare provider before stopping or changing them.   Date Last Reviewed: 4/27/2016 © 2000-2017 [employer]. [address] [place] [address], [place] [address]. All rights reserved. This information is not intended as a substitute for professional medical care. Always follow your healthcare professional's instructions.        Eating Heart-Healthy Foods  Eating has a big impact on your heart health. In fact, eating healthier can improve several of your heart risks at once. For instance, it helps you manage weight, cholesterol, and blood pressure. Here are ideas to help you make heart-healthy changes without giving up all the foods and flavors you love.  Getting started  · Talk with your health care provider about eating plans, such as the DASH or Mediterranean diet. You may also be referred to a dietitian.  · Change a few things at a time. Give yourself time to get used to a few eating changes before adding more.  · Work to create a tasty, healthy eating plan that you can stick to for the rest of your life.    Goals for healthy eating  Below are some tips to improve your eating habits:  · Limit saturated fats and trans fats. Saturated fats raise your levels of cholesterol, so keep these fats  to a minimum. They are found in foods such as fatty meats, whole milk, cheese, and palm and coconut oils. Avoid trans fats because they lower good cholesterol as well as raise bad cholesterol. Trans fats are most often found in processed foods.  · Reduce sodium (salt) intake. Eating too much salt may increase your blood pressure. Limit your sodium intake to 2,300 milligrams (mg) per day, or less if your health care provider recommends it. Dining out less often and eating fewer processed foods are two great ways to decrease the amount of salt you consume.  · Managing calories. A calorie is a unit of energy. Your body burns calories for fuel, but if you eat more calories than your body burns, the extras are stored as fat. Your health care provider can help you create a diet plan to manage your calories. This will likely include eating healthier foods as well as exercising regularly. To help you track your progress, keep a diary to record what you eat and how often you exercise.  Choose the right foods  Aim to make these foods staples of your diet. If you have diabetes, you may have different recommendations than what is listed here:  · Fruits and vegetable provide plenty of nutrients without a lot of calories. At meals, fill half your plate with these foods. Split the other half of your plate between whole grains and lean protein.  · Whole grains are high in fiber and rich in vitamins and nutrients. Good choices include whole-wheat bread, pasta, and brown rice.  · Lean proteins give you nutrition with less fat. Good choices include fish, skinless chicken, and beans.  · Low-fat or nonfat dairy provides nutrients without a lot of fat. Try low-fat or nonfat milk, cheese, or yogurt.  · Healthy fats can be good for you in small amounts. These are unsaturated fats, such as olive oil, nuts, and fish. Try to have at least 2 servings per week of fatty fish such as salmon, sardines, mackerel, rainbow trout, and albacore tuna.  These contain omega-3 fatty acids, which are good for your heart. Flaxseed is another source of a heart-healthy fat.  More on heart healthy eating    Read food labels  Healthy eating starts at the grocery store. Be sure to pay attention to food labels on packaged foods. Look for products that are high in fiber and protein, and low in saturated fat, cholesterol, and sodium. Avoid products that contain trans fat. And pay close attention to serving size. For instance, if you plan to eat two servings, double all the numbers on the label.  Prepare food right  A key part of healthy cooking is cutting down on added fat and salt. Look on the internet for lower-fat, lower-sodium recipes. Also, try these tips:  · Remove fat from meat and skin from poultry before cooking.  · Skim fat from the surface of soups and sauces.  · Broil, boil, bake, steam, grill, and microwave food without added fats.  · Choose ingredients that spice up your food without adding calories, fat, or sodium. Try these items: horseradish, hot sauce, lemon, mustard, nonfat salad dressings, and vinegar. For salt-free herbs and spices, try basil, cilantro, cinnamon, pepper, and rosemary.  Date Last Reviewed: 6/25/2015  © 8339-5926 [employer]. [address] [place] [address], [place] [address]. All rights reserved. This information is not intended as a substitute for professional medical care. Always follow your healthcare professional's instructions.        Low-Salt Choices  Eating salt (sodium) can make your body retain too much water. Excess water makes your heart work harder. Canned, packaged, and frozen foods are easy to prepare, but they are often high in sodium. Here are some ideas for low-salt foods you can easily prepare yourself.    For breakfast  · Fruit or 100% fruit juice  · Whole-wheat bread or an English muffin. Compare sodium content on labels.  · Low-fat milk or yogurt  · Unsalted eggs  · Shredded wheat  · Corn tortillas  · Unsalted  steamed rice  · Regular (not instant) hot cereal, made without salt  Stay away from:  · Sausage, diego, and ham  · Flour tortillas  · Packaged muffins, pancakes, and biscuits  · Instant hot cereals  · Cottage cheese  For lunch and dinner  · Fresh fish, chicken, turkey, or meat--baked, broiled, or roasted without salt  · Dry beans, cooked without salt  · Tofu, stir-fried without salt  · Unsalted fresh fruit and vegetables, or frozen or canned fruit and vegetables with no added salt  Stay away from:  · Lunch or deli meat that is cured or smoked  · Cheese  · Tomato juice and catsup  · Canned vegetables, soups, and fish not labeled as no-salt-added or reduced sodium  · Packaged gravies and sauces  · Olives, pickles, and relish  · Bottled salad dressings  For snacks and desserts  · Yogurt  · Unsalted, air popped popcorn  · Unsalted nuts or seeds  Stay away from:  · Pies and cakes  · Packaged dessert mixes  · Pizza  · Canned and packaged puddings  · Pretzels, chips, crackers, and nuts--unless the label says unsalted  Date Last Reviewed: 6/17/2015  © 5350-5554 [employer]. [address] [place] [address], [place] [address]. All rights reserved. This information is not intended as a substitute for professional medical care. Always follow your healthcare professional's instructions.

## 2020-04-06 NOTE — PROGRESS NOTES
Outpatient Care Management  Initial Patient Assessment    Patient: Debbie Ding  MRN: [id number]  Date of Service: 04/03/2020  Completed by: Jelly Keenan RN  Referral Date: 04/02/2020  Program: Case Management (High Risk)    Reason for Visit   Patient presents with    OPCM Chart Review     4/3/20    OPCM RN First Assessment Attempt     4/3/20    OPCM Enrollment Call     4/3/20    Initial Assessment     4/3/20    OPCM Welcome Letter     4/3/20    PHQ-9     4/3/20    Plan Of Care     4/3/20       Brief Summary:    Assessment Documentation     OPCM Initial Assessment    Involvement of Care  Do I have permission to speak with other family members about your care?:  Yes (Comment: Eezquiel Treviño Daughter [phone] )  Assessment completed by:  Patient  Patient Reported Insurance  Verified current insurance plan:  Medicare  Current Health Status  Patient Health Rating  Compared to other people your age, how would you rate your health?:  Fair  Patient Reported Labs & Vitals  Any patient reported labs and/or vitals?:  No  Social Determinants  Advanced Care Planning  Do you have any of the following?:  Living will  If yes, do we have a copy?:  No  If no, would the patient like Advance Directive resources?:  N/A  Advanced Care Planning resources provided?:  No  Is Advanced Care Planning an area of need?:  No  Support  Caregiver presence?:  Yes  Name of primary caregiver:  EZEQUIEL TREVIÑO DAUGHTER  Primary caregiver phone number:  [phone]  Primary caregiver relationship:  son/daughter  Present activity level:  not limited in any of these ways  Who takes you to your medical appointments?:  son/daughter  Is the caregiver supporting a need?:  Yes  Does the current primary caregiver meet the patient's needs?:  Yes  Housing  Living arrangements:  children  Number of people in home:  3  Type of residence:  single family home  Own or rent?:  own  Permanent residence?:  yes  Does the patient's residence have any of the  following?:  more than 1 story (Comment: patient lives on 1st floor)  Is housing an area of need?:  no  Access to [employer]  Does the patient have access to any of the following devices or technologies?:  none  Clinical Assessment  Medication Adherence  How does the patient obtain their medications?:  mail order, family picks up  How many days a week do you miss medications?:  never  Do you use a pill box or medication chart to help you manage your medications?:  pill box  Do you sometimes have difficulty refilling your medications?:  no  Medication reconciliation completed?:  Yes  Is medication adherence an area of need?:  No  *Active medication list was reviewed and reconciled with patient and/or caregiver:    Nutritional Status  Diet:  Regular  Change in appetite?:  no  Recent changes in weight?:  no  Dentition:  wears dentures  Is nutrition an area of need?:  no  Labs  Do you have regular lab work to monitor your medications?:  yes  Type of lab work:  A1c  Where do you get your lab work done?:  [facility]  Is lab adherence an area of need?:  no  PHQ Depression Screen  Does patient's PHQ Depression Screening indicate a barrier to meeting self-care needs?:  No  Cognitive/Behavioral Health  Alert and oriented?:  yes  Difficulty thinking?:  no  Requires prompting?:  no  Requires assistance for routine expression?:  no  Is Cognitive/Behavioral health an area of need?:  no  Culture/[employer]  Does patient have cultural or Presybeterian beliefs that may impact ability to access healthcare?:  no  Communication  Language preference:  English   needed?:  no  Hearing problems?:  yes  Hearing assistance:  hearing aid (Comment: hearing aid is broken)  Decreased vision?:  yes  Legally blind?:  no  Vision assistance:  glasses  Is Communication an area of need?:  no  Health Literacy  Preferred learning method:  face to face, video, reading materials  How often do you need to have someone help you read  instructions, pamphlets, or other written material from your doctor or pharmacy?:  never  Is there a Health Literacy need?:  no  Activities of Daily Living  Ambulation:  independent  Dressing:  independent  Bathing:  independent  Transfers:  independent  Toileting:  independent  Feeding:  independent  Cleaning home/chores:  assistance required  Telephone use:  independent  Shopping/attending doctors' appointments:  dependent  Paying bills:  independent  Taking meds:  assistance required  Climbing stairs:  independent  Fall Risk  Patient mobility status:  Ambulatory  Number of falls in the past 12 months:  1 (Comment: patient passed out due to PE, hit head and R side, went to ER, no major injury)  Fall risk?:  Yes  Equipment/Current Services  Equipment/supplies used in home:  cane, walker, wheelchair (Comment: patient owns DME, but does not use on a regular basis)  Current services:  n/a  Is Equipment/Supplies/Services an area of need?:  no  Community & Government Programs  Support:  none  Government suppot assistance:  N/A  [facility]:  N/A  Community Resource Assessment  Based on the assessment of needs:  No community resources needed at this time  Completion of Initial Assessment  Is the Initial Assessment Complete at this time?:  yes         Problem List and History     Patient Active Problem List   Diagnosis    Essential hypertension    Incontinence of urine    Depression, recurrent    Mild cognitive impairment    Fibromyalgia    Primary osteoarthritis involving multiple joints    Spondylosis without myelopathy    Spinal stenosis, lumbar region, without neurogenic claudication    Acquired spondylolisthesis    Thoracic or lumbosacral neuritis or radiculitis, unspecified    DDD (degenerative disc disease), lumbar    Neck pain    Atrophy of nasal turbinates    GERD (gastroesophageal reflux disease)    Chronic bilateral low back pain without sciatica    Decreased strength  of trunk and back    Chronic asthma    MELISSA (dyspnea on exertion)    Allergic rhinitis    Vestibular dizziness    Asymptomatic menopausal state    Hearing loss    Hypothyroidism, adult    Pseudophakia of both eyes    PUD (peptic ulcer disease)    Urinary, incontinence, stress female    Impaired functional mobility, balance, gait, and endurance    Acute pain of both knees    Lactic acidosis    Hypokalemia    Debility    Hyperhidrosis    CKD (chronic kidney disease) stage 3, GFR 30-59 ml/min    History of pulmonary embolus (PE)    Overactive bladder    Type 2 diabetes mellitus without complication, without long-term current use of insulin       Reviewed Active Problem List with patient and/or Caregiver. The following were identified as areas of need: Hypertension    Medical History:  Reviewed medical history with patient and/or caregiver    Social History:  Reviewed social history with patient and/or caregiver    Complex Care Plan    Care plan was discussed and completed today with input from patient and/or caregiver.    Goals    None         Patient Instructions     Instructions were provided via the [facility] patient resources and are available for the patient to view on the patient portal, if active.      Follow up in about 2 weeks (around 4/17/2020) for OPCM.    Todays OPCM Self-Management Care Plan was developed with the patients/caregivers input and was based on identified barriers from todays assessment.  Goals were written today with the patient/caregiver and the patient has agreed to work towards these goals to improve his/her overall well-being. Patient verbalized understanding of the care plan, goals, and all of today's instructions. Encouraged patient/caregiver to communicate with his/her physician and health care team about health conditions and the treatment plan.  Provided my contact information today and encouraged patient/caregiver to call me with any questions as needed.

## 2020-04-07 ENCOUNTER — TELEPHONE (OUTPATIENT)
Dept: DERMATOLOGY | Facility: CLINIC | Age: 85
End: 2020-04-07

## 2020-04-07 NOTE — TELEPHONE ENCOUNTER
Spoke with pt and explained to her that we have to cancel her appointment for 4/20 due to COVID-19. I offered pt virtual visit and asked if she wanted to wait for a later date. Pt stated she is fine with scheduling for a later date. I let the pt know that a letter will be mailed to her. Pt verbalized understanding.

## 2020-04-10 DIAGNOSIS — E03.9 HYPOTHYROIDISM, ADULT: ICD-10-CM

## 2020-04-10 RX ORDER — LEVOTHYROXINE SODIUM 88 UG/1
TABLET ORAL
Qty: 90 TABLET | Refills: 1 | Status: SHIPPED | OUTPATIENT
Start: 2020-04-10 | End: 2020-11-04

## 2020-04-17 DIAGNOSIS — J30.9 ALLERGIC RHINITIS, UNSPECIFIED SEASONALITY, UNSPECIFIED TRIGGER: ICD-10-CM

## 2020-04-17 RX ORDER — FLUTICASONE PROPIONATE 50 MCG
SPRAY, SUSPENSION (ML) NASAL
Qty: 16 ML | Refills: 1 | Status: SHIPPED | OUTPATIENT
Start: 2020-04-17 | End: 2020-05-17

## 2020-04-24 ENCOUNTER — TELEPHONE (OUTPATIENT)
Dept: INTERNAL MEDICINE | Facility: CLINIC | Age: 85
End: 2020-04-24

## 2020-04-24 NOTE — TELEPHONE ENCOUNTER
----- Message from Flor Kim sent at 4/24/2020 11:11 AM CDT -----  Contact: Eliana([address])  Name of Who is Calling: Eliana([address])      What is the request in detail: Eliana([address]) is calling to speak to staff in regards to requesting clinical notes on the pt .... Please call to further assist .       Can the clinic reply by [facility]: N       What Number to Call Back if not in [facility]: [phone]

## 2020-04-27 ENCOUNTER — TELEPHONE (OUTPATIENT)
Dept: INTERNAL MEDICINE | Facility: CLINIC | Age: 85
End: 2020-04-27

## 2020-04-27 ENCOUNTER — OUTPATIENT CASE MANAGEMENT (OUTPATIENT)
Dept: ADMINISTRATIVE | Facility: OTHER | Age: 85
End: 2020-04-27

## 2020-04-27 NOTE — TELEPHONE ENCOUNTER
----- Message from Marija Soliz sent at 4/27/2020 12:23 PM CDT -----  Contact: DEJAH ROLDAN [[id number]]  Name of Who is Calling: Eliana([address])        What is the request in detail: Eliana([address]) is calling to speak to staff in regards to requesting clinical notes on the pt .... Please call to further assist .         Can the clinic reply by [facility]: N         What Number to Call Back [facility] not [facility]: [phone]

## 2020-04-29 ENCOUNTER — TELEPHONE (OUTPATIENT)
Dept: INTERNAL MEDICINE | Facility: CLINIC | Age: 85
End: 2020-04-29

## 2020-04-29 NOTE — TELEPHONE ENCOUNTER
----- Message from Marija Soliz sent at 4/29/2020 10:46 AM CDT -----  Contact: Eliana([address])  Name of Who is Calling: Eliana([address])        What is the request in detail: Eliana([address]) is calling to speak to staff in regards to requesting clinical notes on the pt ....clinical notes have not been received. Supervisor fax number is [phone]. Please call to further assist .         Can the clinic reply by [facility]: N         What Number to Call Back if not in [facility]: [phone]

## 2020-04-30 ENCOUNTER — TELEPHONE (OUTPATIENT)
Dept: INTERNAL MEDICINE | Facility: CLINIC | Age: 85
End: 2020-04-30

## 2020-04-30 NOTE — TELEPHONE ENCOUNTER
Informed pharmacy didn't see paper work in the faxes. Stated that they will refax paper to office again.

## 2020-04-30 NOTE — TELEPHONE ENCOUNTER
----- Message from Lilian Gamez sent at 4/30/2020  1:53 PM CDT -----  Contact: Rivka    Name of Who is Calling: Rivka     What is the request in detail: [facility] (albuterol sulfate inhalation solution  form was faxed over just needs to be signed, dated and medication list showing albuterol (PROVENTIL/VENTOLIN  Fax :[phone] contact to further discuss and advise    Can the clinic reply by [facility]: No    What Number to Call Back if not in [facility]: [phone]

## 2020-05-01 ENCOUNTER — TELEPHONE (OUTPATIENT)
Dept: INTERNAL MEDICINE | Facility: CLINIC | Age: 85
End: 2020-05-01

## 2020-05-01 NOTE — TELEPHONE ENCOUNTER
----- Message from Kristine Reggie sent at 5/1/2020 11:36 AM CDT -----  Contact: [facility] [phone]  Type: Patient Call Back    Who called: [facility]    What is the request in detail: Calling to check the status of a refill request for the albuterol-ipratropium (DUO-NEB) 2.5 mg-0.5 mg/3 mL nebulizer solution, which was sent on 04-29-20 and than again on yesterday. Please call.     Would the patient rather a call back or a response via [facility]? Call back    Best call back number: [phone]

## 2020-05-05 ENCOUNTER — TELEPHONE (OUTPATIENT)
Dept: INTERNAL MEDICINE | Facility: CLINIC | Age: 85
End: 2020-05-05

## 2020-05-05 NOTE — TELEPHONE ENCOUNTER
----- Message from Flor Kim sent at 5/5/2020  3:44 PM CDT -----  Contact: Vinicio([facility])  Name of Who is Calling: Vinicio([facility])      What is the request in detail: Vinicio([facility])is calling to speak to staff in regards to the status of the fax for a pt script .... Please call to further assist .       Can the clinic reply by [facility]: N      What Number to Call Back if not in [facility]: [phone]

## 2020-05-07 DIAGNOSIS — R61 HYPERHIDROSIS: ICD-10-CM

## 2020-05-07 DIAGNOSIS — I10 ESSENTIAL HYPERTENSION: ICD-10-CM

## 2020-05-07 RX ORDER — DULOXETIN HYDROCHLORIDE 30 MG/1
CAPSULE, DELAYED RELEASE ORAL
Qty: 90 CAPSULE | Refills: 1 | Status: SHIPPED | OUTPATIENT
Start: 2020-05-07 | End: 2020-05-29 | Stop reason: SDUPTHER

## 2020-05-07 RX ORDER — GLYCOPYRROLATE 2 MG/1
TABLET ORAL
Qty: 135 TABLET | Refills: 1 | Status: SHIPPED | OUTPATIENT
Start: 2020-05-07 | End: 2020-06-02

## 2020-05-07 RX ORDER — AMLODIPINE BESYLATE 10 MG/1
TABLET ORAL
Qty: 90 TABLET | Refills: 1 | Status: SHIPPED | OUTPATIENT
Start: 2020-05-07 | End: 2020-10-22

## 2020-05-08 RX ORDER — ALBUTEROL SULFATE 0.83 MG/ML
2.5 SOLUTION RESPIRATORY (INHALATION) EVERY 6 HOURS PRN
COMMUNITY
End: 2023-12-20 | Stop reason: SDUPTHER

## 2020-05-08 NOTE — TELEPHONE ENCOUNTER
----- Message from Jennifer Mich sent at 5/8/2020  9:02 AM CDT -----  Contact: Darrell ([facility])  Name of Who is Calling: Darrell ([facility])      What is the request in detail: Would like to speak to staff in regards to wanting an update on the medication list that was supposed to be faxed over to their office with the albuterol-ipratropium (DUO-NEB) 2.5 mg-0.5 mg/3 mL nebulizer solution listed. Please advise.       Can the clinic reply by [facility]: No      What Number to Call Back [facility] not [facility]: [phone]                                     non-distended

## 2020-05-08 NOTE — TELEPHONE ENCOUNTER
Current medication sheet and nebulizer form from [address] with provider signature faxed to [phone]

## 2020-05-15 ENCOUNTER — TELEPHONE (OUTPATIENT)
Dept: INTERNAL MEDICINE | Facility: CLINIC | Age: 85
End: 2020-05-15

## 2020-05-15 NOTE — TELEPHONE ENCOUNTER
----- Message from Carlyn Pfeiffer MA sent at 5/7/2020 12:17 PM CDT -----  Contact: Eliana ([facility])  Can u fax her her medication list  ----- Message -----  From: Joshua Carr  Sent: 5/7/2020  11:29 AM CDT  To: Priyanka VIDALES Staff    Name of Who is Calling: Eliana ([facility])      What is the request in detail: Would like to speak with staff in regards to obtaining an updated Medication list so show the albuletorl for the nebulaizer. Please advise, fax [phone]      Can the clinic reply by [facility]: no      What Number to Call Back [facility] not [facility]: [phone]

## 2020-05-17 DIAGNOSIS — J30.9 ALLERGIC RHINITIS, UNSPECIFIED SEASONALITY, UNSPECIFIED TRIGGER: ICD-10-CM

## 2020-05-17 RX ORDER — FLUTICASONE PROPIONATE 50 MCG
SPRAY, SUSPENSION (ML) NASAL
Qty: 16 ML | Refills: 1 | Status: SHIPPED | OUTPATIENT
Start: 2020-05-17 | End: 2020-06-02 | Stop reason: SDUPTHER

## 2020-05-26 ENCOUNTER — TELEPHONE (OUTPATIENT)
Dept: DERMATOLOGY | Facility: CLINIC | Age: 85
End: 2020-05-26

## 2020-05-26 NOTE — TELEPHONE ENCOUNTER
----- Message from Marin Rogers sent at 5/26/2020  8:34 AM CDT -----  Pt would like a call back for advice about why her medication for excessive sweating stopped working for her. Please give pt a call back at [phone].

## 2020-05-26 NOTE — TELEPHONE ENCOUNTER
Spoke with patient, notified her that Dr. Monk will talk to her about her medication at her upcoming appointment.  She verbalized understanding.

## 2020-05-29 ENCOUNTER — TELEPHONE (OUTPATIENT)
Dept: RHEUMATOLOGY | Facility: CLINIC | Age: 85
End: 2020-05-29

## 2020-05-29 RX ORDER — PREGABALIN 25 MG/1
25 CAPSULE ORAL 2 TIMES DAILY
Qty: 60 CAPSULE | Refills: 5 | Status: SHIPPED | OUTPATIENT
Start: 2020-05-29 | End: 2020-08-25

## 2020-05-29 RX ORDER — DULOXETIN HYDROCHLORIDE 30 MG/1
30 CAPSULE, DELAYED RELEASE ORAL DAILY
Qty: 90 CAPSULE | Refills: 1 | Status: SHIPPED | OUTPATIENT
Start: 2020-05-29 | End: 2020-12-24

## 2020-05-29 NOTE — TELEPHONE ENCOUNTER
Talked to her daughter.  As far as they know, she has been getting her medication through [facility].  I told them to check and see if she has been getting my medications before I call in any new medications.

## 2020-05-29 NOTE — TELEPHONE ENCOUNTER
Spoke to pt she needed  Refill on 3 meds and didn't know the names of the meds and I sent it into

## 2020-05-29 NOTE — TELEPHONE ENCOUNTER
----- Message from Sharon Cosby MA sent at 5/29/2020  9:05 AM CDT -----  Contact: self  Spoke with her and she said she doesn't know the names of the meds but she [facility] [address]   ----- Message -----  From: Sherlyn Alejandra  Sent: 5/29/2020   8:25 AM CDT  To: Uzair HUFF Staff    Pt is asking that the last three medications she was given [id number] [place] [place] - [address] [phone] (Phone)  [phone] (Fax)    Pt states the pharmacy is not sending the script through the mail anymore. Please call back to further discuss. Pt does not know the name of the medication.    Contact Info [phone] (home)

## 2020-06-02 ENCOUNTER — OFFICE VISIT (OUTPATIENT)
Dept: DERMATOLOGY | Facility: CLINIC | Age: 85
End: 2020-06-02
Payer: MEDICARE

## 2020-06-02 DIAGNOSIS — R61 HYPERHIDROSIS: Primary | ICD-10-CM

## 2020-06-02 DIAGNOSIS — L29.9 PRURITUS: ICD-10-CM

## 2020-06-02 DIAGNOSIS — J30.9 ALLERGIC RHINITIS, UNSPECIFIED SEASONALITY, UNSPECIFIED TRIGGER: ICD-10-CM

## 2020-06-02 PROCEDURE — 99214 PR OFFICE/OUTPT VISIT, EST, LEVL IV, 30-39 MIN: ICD-10-PCS | Mod: S$PBB,,, | Performed by: DERMATOLOGY

## 2020-06-02 PROCEDURE — 99213 OFFICE O/P EST LOW 20 MIN: CPT | Mod: PBBFAC | Performed by: DERMATOLOGY

## 2020-06-02 PROCEDURE — 99999 PR PBB SHADOW E&M-EST. PATIENT-LVL III: CPT | Mod: PBBFAC,,, | Performed by: DERMATOLOGY

## 2020-06-02 PROCEDURE — 99999 PR PBB SHADOW E&M-EST. PATIENT-LVL III: ICD-10-PCS | Mod: PBBFAC,,, | Performed by: DERMATOLOGY

## 2020-06-02 PROCEDURE — 99214 OFFICE O/P EST MOD 30 MIN: CPT | Mod: S$PBB,,, | Performed by: DERMATOLOGY

## 2020-06-02 RX ORDER — CLOBETASOL PROPIONATE 0.46 MG/ML
SOLUTION TOPICAL
Qty: 50 ML | Refills: 3 | Status: ON HOLD | OUTPATIENT
Start: 2020-06-02 | End: 2024-01-03

## 2020-06-02 RX ORDER — FLUTICASONE PROPIONATE 50 MCG
SPRAY, SUSPENSION (ML) NASAL
Qty: 16 ML | Refills: 1 | Status: SHIPPED | OUTPATIENT
Start: 2020-06-02 | End: 2020-06-22

## 2020-06-02 RX ORDER — GLYCOPYRROLATE 2 MG/1
TABLET ORAL
Qty: 180 TABLET | Refills: 1 | Status: SHIPPED | OUTPATIENT
Start: 2020-06-02 | End: 2021-03-15

## 2020-06-02 NOTE — Clinical Note
Pt presents to me for new onset (4 weeks) severe diffuse itching. PE normal. Have recommended good skin care to replenish moisture and help alleviate the dryness that may be leading to itching. Her labs and cxr from 1/29/20 all normal. Consider repeating should itching persist. JAM

## 2020-06-02 NOTE — PROGRESS NOTES
Subjective:       Patient ID:  Debbie Ding is a 87 y.o. female who presents for   Chief Complaint   Patient presents with    Rash     right and left hand, right and left arm, neck, right and left leg    Excessive Sweating     Follow up     Had problems with eczema in past -- 5 years ago seen by derm and treated with dove soap and vanicream and dermasmoothe oil and am lactin lotion and sal acid shampoo    C/o itching severe all over - arms and legs and hands and neck. Using otc hc.    No moisturizers    Rash     Excessive Sweating  - Follow-up  Symptom course: worsening  Currently using: robinul 3mg qday (per PCP) - used to help but not helping.  Affected locations: scalp and neck  Signs / symptoms: itching        Review of Systems   Respiratory:        H/o asthma     Skin: Positive for itching and rash.        H/o eczema 5 years ago     Hematologic/Lymphatic: Bruises/bleeds easily (on eliquis).        Objective:    Physical Exam   Constitutional: She appears well-developed and well-nourished. No distress.   Neurological: She is alert and oriented to person, place, and time. She is not disoriented.   Psychiatric: She has a normal mood and affect.   Skin:   Areas Examined (abnormalities noted in diagram):   Scalp / Hair Palpated and Inspected  Head / Face Inspection Performed  Neck Inspection Performed  Chest / Axilla Inspection Performed  Abdomen Inspection Performed  Genitals / Buttocks / Groin Inspection Performed  Back Inspection Performed  RUE Inspected  LUE Inspection Performed  RLE Inspected  LLE Inspection Performed  Nails and Digits Inspection Performed              Diagram Legend     Erythematous scaling macule/papule c/w actinic keratosis       Vascular papule c/w angioma      Pigmented verrucoid papule/plaque c/w seborrheic keratosis      Yellow umbilicated papule c/w sebaceous hyperplasia      Irregularly shaped tan macule c/w lentigo     1-2 mm smooth white papules consistent with Milia       Movable subcutaneous cyst with punctum c/w epidermal inclusion cyst      Subcutaneous movable cyst c/w pilar cyst      Firm pink to brown papule c/w dermatofibroma      Pedunculated fleshy papule(s) c/w skin tag(s)      Evenly pigmented macule c/w junctional nevus     Mildly variegated pigmented, slightly irregular-bordered macule c/w mildly atypical nevus      Flesh colored to evenly pigmented papule c/w intradermal nevus       Pink pearly papule/plaque c/w basal cell carcinoma      Erythematous hyperkeratotic cursted plaque c/w SCC      Surgical scar with no sign of skin cancer recurrence      Open and closed comedones      Inflammatory papules and pustules      Verrucoid papule consistent consistent with wart     Erythematous eczematous patches and plaques     Dystrophic onycholytic nail with subungual debris c/w onychomycosis     Umbilicated papule    Erythematous-base heme-crusted tan verrucoid plaque consistent with inflamed seborrheic keratosis     Erythematous Silvery Scaling Plaque c/w Psoriasis     See annotation      Assessment / Plan:        Hyperhidrosis  Sent rx for robinul - increase to 2 pills (4mg qday)    Pruritus  -     clobetasoL (TEMOVATE) 0.05 % external solution; Pt to mix in 1 jar of cerave cream and apply to affected areas bid  Dispense: 50 mL; Refill: 3    Good skin care regimen discussed including limiting to one bath or shower/day, using lukewarm water with mild soap and moisturizing cream to skin 1 - 2x/day. Brochure was provided and reviewed with patient.  Recommend apply Sarna lotion or cerave anti itch to skin as often as needed. May store in refrigerator for additional cooling properties.             No follow-ups on file.

## 2020-06-02 NOTE — PATIENT INSTRUCTIONS
XEROSIS (DRY SKIN)        1. Definition    Xerosis is the term for dry skin.  We all have a natural oil coating over our skin produced by the skin oil glands.  If this oil is removed, the skin becomes dry which can lead to cracking, which can lead to inflammation.  Xerosis is usually a long-term problem that recurs often, especially in the winter.    2. Cause     Long hot baths or showers can remove our natural oil and lead to xerosis.  One should never take more than one bath or shower a day and for no longer than ten minutes.   Use of harsh soaps such as Zest, Dial, and Ivory can worsen and cause xerosis.   Cold winter weather worsens xerosis because the amount of moisture contained in cold air is much less than the amount of moisture in warm air.    3. Treatment     Treatment is intended to restore the natural oil to your skin.  Keep the skin lubricated.     Do not take more than one bath or shower a day.  Use lukewarm water, not hot.  Hot water dries out the skin.     Use a gentle moisturizing soap such as Cetaphil soap, Oil of Olay, Dove, Basis, Ivory moisture care, Restoraderm cleanser.     When toweling dry, dont rub.  Blot the skin so there is still some water left on the skin.  You should apply a moisturizing cream to all of the skin such as Cerave cream, Cetaphil cream, Restoraderm or Eucerin Original Formula cream.   Alpha hydroxyacid lotions, i.e., AmLactin, also work very well for preventing dry skin, but may burn when used on inflamed or reddened skin.     If you like to swim during the winter months, you should not use soap when getting out of the pool.  When you have finished swimming, rinse off the chlorine with cool to warm water.  If this will be the only shower of the day, then you may use Cetaphil or another mild soap to cleanse your skin.  After the shower, apply a moisturizing cream to all of the skin as above.        [address] [facility] [place] [address]/ [phone]  [phone] FAX/ [email or web address]    Recommend apply Sarna lotion or cerave anti itch to skin as often as needed. May store in refrigerator for additional cooling properties.

## 2020-06-03 ENCOUNTER — TELEPHONE (OUTPATIENT)
Dept: DERMATOLOGY | Facility: CLINIC | Age: 85
End: 2020-06-03

## 2020-06-03 NOTE — TELEPHONE ENCOUNTER
----- Message from Kady Cross sent at 6/3/2020  8:08 AM CDT -----  Contact: Patient  Reason: Patient states that the Cerave made her itch worse. Head is tender from scratching all night        Contact: [phone]

## 2020-06-03 NOTE — TELEPHONE ENCOUNTER
Spoke with patient, she reports that her itching is not any better.  She used the cerave last night but it did not help much.  Explained that she is to use the clobetasol with the cerave like we talked about yesterday.  Patient did not get the clobetasol yet but will call to get it.  She will also put the cerave itch cream in the refrigerator to get the cream cool to help with the itch as well.

## 2020-06-04 ENCOUNTER — TELEPHONE (OUTPATIENT)
Dept: INTERNAL MEDICINE | Facility: CLINIC | Age: 85
End: 2020-06-04

## 2020-06-04 NOTE — TELEPHONE ENCOUNTER
Informed pt daughter and patient of provider message below. Pt daughter verbalized understanding with no further questions

## 2020-06-04 NOTE — TELEPHONE ENCOUNTER
----- Message from Lilian Marinelli sent at 6/4/2020 11:43 AM CDT -----  Contact: Ezequiel-daughter  Type: Patient Call Back    Who called:Ezequiel    What is the request in detail:Ezequiel called to discuss patient's permission to drive. Please call to advise    Can the clinic reply by [facility]?    Would the patient rather a call back or a response via [facility]? Call    Best call back number:[phone]

## 2020-06-04 NOTE — TELEPHONE ENCOUNTER
Please call patient's family member and inform that I do not recall ever telling the patient that she should drive.  Due to her not driving in a couple years, her mild dementia, and the hand shaking I recommend that she NOT drive.    Thanks!

## 2020-06-04 NOTE — TELEPHONE ENCOUNTER
Spoke to patient's daughter Ezequiel and she wants to know if it's stay that her mother drive. She's concern about her driving because her hand shakes and she haven't drove in two years. She stated that the patient told her that you(Dr. Mathew) said she can drive. Please advise

## 2020-06-06 ENCOUNTER — HOSPITAL ENCOUNTER (EMERGENCY)
Facility: OTHER | Age: 85
Discharge: HOME | End: 2020-06-06
Attending: EMERGENCY MEDICINE
Payer: MEDICARE

## 2020-06-06 VITALS
BODY MASS INDEX: 21.66 KG/M2 | RESPIRATION RATE: 18 BRPM | TEMPERATURE: 98 F | WEIGHT: 130 LBS | OXYGEN SATURATION: 99 % | DIASTOLIC BLOOD PRESSURE: 72 MMHG | SYSTOLIC BLOOD PRESSURE: 181 MMHG | HEART RATE: 75 BPM | HEIGHT: 65 IN

## 2020-06-06 DIAGNOSIS — L30.9 DERMATITIS: ICD-10-CM

## 2020-06-06 DIAGNOSIS — T78.40XA ALLERGIC REACTION, INITIAL ENCOUNTER: Primary | ICD-10-CM

## 2020-06-06 PROCEDURE — 99284 EMERGENCY DEPT VISIT MOD MDM: CPT | Mod: 25

## 2020-06-06 PROCEDURE — 96372 THER/PROPH/DIAG INJ SC/IM: CPT

## 2020-06-06 PROCEDURE — 25000003 PHARM REV CODE 250: Performed by: EMERGENCY MEDICINE

## 2020-06-06 PROCEDURE — 63600175 PHARM REV CODE 636 W HCPCS: Performed by: EMERGENCY MEDICINE

## 2020-06-06 RX ORDER — DIPHENHYDRAMINE HCL 25 MG
25 CAPSULE ORAL
Status: COMPLETED | OUTPATIENT
Start: 2020-06-06 | End: 2020-06-06

## 2020-06-06 RX ORDER — DEXAMETHASONE SODIUM PHOSPHATE 4 MG/ML
4 INJECTION, SOLUTION INTRA-ARTICULAR; INTRALESIONAL; INTRAMUSCULAR; INTRAVENOUS; SOFT TISSUE
Status: COMPLETED | OUTPATIENT
Start: 2020-06-06 | End: 2020-06-06

## 2020-06-06 RX ORDER — HYDROXYZINE PAMOATE 25 MG/1
25 CAPSULE ORAL EVERY 6 HOURS PRN
Qty: 25 CAPSULE | Refills: 0 | Status: ON HOLD | OUTPATIENT
Start: 2020-06-06 | End: 2021-10-08

## 2020-06-06 RX ORDER — PREDNISONE 20 MG/1
60 TABLET ORAL DAILY
Qty: 15 TABLET | Refills: 0 | Status: SHIPPED | OUTPATIENT
Start: 2020-06-06 | End: 2020-06-11

## 2020-06-06 RX ADMIN — DIPHENHYDRAMINE HYDROCHLORIDE 25 MG: 25 CAPSULE ORAL at 10:06

## 2020-06-06 RX ADMIN — DEXAMETHASONE SODIUM PHOSPHATE 4 MG: 4 INJECTION, SOLUTION INTRAMUSCULAR; INTRAVENOUS at 10:06

## 2020-06-06 NOTE — ED PROVIDER NOTES
"Encounter Date: 6/6/2020    SCRIBE #1 NOTE: I, Manisha Dowell, am scribing for, and in the presence of, Dr. Heredia.       History     Chief Complaint   Patient presents with    Itching     pt reports generalized itching Tuesday. pt reports she went to the dermatologist on Tuesday and said that they told her " nothing was wrong with me". pt reports taking benadryl with no relief. pt denies fever      Time seen by provider: 9:44 AM    This is a 87 y.o. female who presents with complaint of asthma, arthritis, fibromyalgia presents complaining of diffuse pruritic urticarial rash since Tuesday.  Patient denies any shortness of breath, feeling lightheaded or weak, difficulty swallowing, abdominal pain or chest pain.  Patient denies any new soaps or chemicals.      The history is provided by the patient.     Review of patient's allergies indicates:   Allergen Reactions    Melatonin      Other reaction(s): Other (See Comments)  Sleep Walks and has unnatural dreams    Talwin compound Hives    Talwin [pentazocine lactate] Hallucinations    Trazodone Other (See Comments)     Nightmares/sleep walk      Bentyl [dicyclomine] Anxiety    Tessalon [benzonatate] Anxiety     Past Medical History:   Diagnosis Date    Arthritis     Asthma     Bilateral pulmonary embolism 4/27/2019    Cataract     Depression     Diabetes mellitus     Fibromyalgia 7/2/2012    Fibromyalgia     GERD (gastroesophageal reflux disease)     Hypertension 7/2/2012    Thoracic or lumbosacral neuritis or radiculitis, unspecified     Thyroid disease     Ulcer      Past Surgical History:   Procedure Laterality Date    CATARACT EXTRACTION Bilateral     FOOT NEUROMA SURGERY  1985    HYSTERECTOMY       Family History   Problem Relation Age of Onset    Diabetes Mother     Diabetes Brother     Emphysema Maternal Aunt     Melanoma Neg Hx     Asthma Neg Hx      Social History     Tobacco Use    Smoking status: Never Smoker    Smokeless " tobacco: Never Used   Substance Use Topics    Alcohol use: No    Drug use: No     Review of Systems   Constitutional: Negative for fever.   HENT: Negative for sore throat.    Respiratory: Negative for shortness of breath.    Cardiovascular: Negative for chest pain.   Gastrointestinal: Negative for nausea.   Genitourinary: Negative for dysuria.   Musculoskeletal: Negative for back pain.   Skin: Negative for rash.   Neurological: Negative for weakness.   Hematological: Does not bruise/bleed easily.   All other systems reviewed and are negative.      Physical Exam     Initial Vitals [06/06/20 0936]   BP Pulse Resp Temp SpO2   (!) 181/72 75 18 98.2 °F (36.8 °C) 99 %      MAP       --         Physical Exam    Nursing note and vitals reviewed.  Constitutional: She appears well-developed and well-nourished. She is not diaphoretic. No distress.   HENT:   Head: Normocephalic and atraumatic.   Nose: Nose normal.   Mouth/Throat: Oropharynx is clear and moist. No oropharyngeal exudate.   Eyes: Conjunctivae and EOM are normal. Pupils are equal, round, and reactive to light.   Neck: Normal range of motion. Neck supple. No JVD present.   Cardiovascular: Normal rate, regular rhythm, normal heart sounds and intact distal pulses. Exam reveals no gallop and no friction rub.    No murmur heard.  Pulmonary/Chest: Breath sounds normal. No respiratory distress. She has no wheezes. She has no rhonchi. She has no rales. She exhibits no tenderness.   Musculoskeletal: Normal range of motion.   Neurological: She is alert and oriented to person, place, and time. She has normal strength.   Skin: Skin is warm. Capillary refill takes less than 2 seconds. Rash noted.   Urticarial rash across bilateral forearms, upper arms, left leg   Psychiatric: She has a normal mood and affect. Thought content normal.         ED Course   Procedures  Labs Reviewed - No data to display       Imaging Results    None          Medical Decision Making:   History:    Old Medical Records: I decided to obtain old medical records.  Differential Diagnosis:   Kawasaki's, chicken pox, measles, rubella, necrotizing. fasciitis, meningitis,  erythema multiforme, Dylan Berry Syndrom, Pemphigus Vulgaris, Toxic Shock Syndrom, Staph Scalded Skin Syndrome, Lyme disease, secondary syphilis, scabies, Disseminated GC, Impetigo, Erysipelas, Candida, Tinea, Herpes zoster, Atopic dermatitis, Psoriasis, Contact dermatitis, Basal Cell Carcinoma, Squamos Cell Carcinoma, Melanoma, Erythema nodosum, Pityriasis, RMSF    ED Management:  Patient's symptoms have improved only urgency department.  Given the spread of the rash, is urticarial in nature, no clear cause of the allergy evidence, impending tropical storm, I feel it is prudent to give the patient steroid medication however follow-up with her PCP.            Scribe Attestation:   Scribe #1: I performed the above scribed service and the documentation accurately describes the services I performed. I attest to the accuracy of the note.    Attending Attestation:           Physician Attestation for Scribe:  Physician Attestation Statement for Scribe #1: I, Dr. Heredia, reviewed documentation, as scribed by Manisha Dowell in my presence, and it is both accurate and complete.                 ED Course as of Jun 06 1426   Sat Jun 06, 2020   1047 Patient reports that her itching has improved.  Emphasized to the patient that until the cause of her itching is found, I recommended she avoid shellfish and nuts.  I instructed her to go home and identify any detergents soaps or other chemicals that might have perfumes or fragrances in on and to make sure 6 which is about 4 hypoallergenic or non scented versions   [MA]      ED Course User Index  [MA] Rene Heredia MD                Clinical Impression:     1. Allergic reaction, initial encounter    2. Dermatitis                ED Disposition Condition    Discharge Stable        ED Prescriptions      Medication Sig Dispense Start Date End Date Auth. Provider    hydrOXYzine pamoate (VISTARIL) 25 MG Cap Take 1 capsule (25 mg total) by mouth every 6 (six) hours as needed (Anxiety). 25 capsule 6/6/2020  Rene Heredia MD    predniSONE (DELTASONE) 20 MG tablet Take 3 tablets (60 mg total) by mouth once daily. for 5 days 15 tablet 6/6/2020 6/11/2020 Rene Heredia MD        Follow-up Information     Follow up With Specialties Details Why Contact Info    Lyla Mathew MD Internal Medicine Schedule an appointment as soon as possible for a visit in 3 days For follow-up and re-evaluation [address]  [address]  [place] [address]  [phone]      [facility]  As needed, for any new or worsening symptoms [address] [id number] [phone]                                     Rene Heredia MD  06/06/20 0200

## 2020-06-08 NOTE — PROGRESS NOTES
Outpatient Care Management  Plan of Care Follow Up Visit    Patient: Debbie Ding  MRN: [id number]  Date of Service: 04/27/2020  Completed by: Jelly Keenan RN  Referral Date: 04/02/2020  Program: Case Management (High Risk)    Reason for Visit   Patient presents with    OPCM RN First Follow-Up Attempt     4/27/20    OPCM RN Second Follow-Up Attempt     6/1/20    Update Plan Of Care     6/8/20       Brief Summary: Contacted patient to complete follow-up call for OPCM.  Patient states that she is doing okay. She reports that she is still having a rash but its not as bad as a few days ago. She is also having acid reflux today but she just took medication to help with it. She reports that her shortness of breath and mobility is better. She has not obtained a BP machine for home use yet. She states that she will try to get one soon. Educated patient on controlling BP and importance of checking daily and keeping a log of results. Patient verbalized understanding. Reminded patient of upcoming appointments, verbalized awareness. Discussed completing follow up call with patient, who is in agreement with call in 2 weeks.        Patient Summary     Involvement of Care:  Do I have permission to speak with other family members about your care? Yes  Ezequiel (daughter) [phone]      Patient Reported Labs & Vitals:  1.  Any Patient Reported Labs & Vitals? No    2.  Patient Reported Blood Pressure:     3.  Patient Reported Pulse:     4.  Patient Reported Weight (Kg):     5.  Patient Reported Blood Glucose (mg/dl):       Medical History:  Reviewed medical history with patient and/or caregiver    Social History:  Reviewed social history with patient and/or caregiver    Clinical Assessment     Reviewed and provided basic information on available community resources for mental health, transportation, wellness resources, and palliative care programs with patient and/or caregiver.    Complex Care Plan     Care plan was discussed  and completed today with input from patient and/or caregiver.    Goals    None         Patient Instructions     Instructions were provided via the [facility] patient resources and are available for the patient to view on the patient portal.    Follow up in about 8 weeks (around 6/22/2020) for OPCM.      Todays OPCM Self-Management Care Plan was developed with the patients/caregivers input and was based on identified barriers from todays assessment.  Goals were written today with the patient/caregiver and the patient has agreed to work towards these goals to improve his/her overall well-being. Patient verbalized understanding of the care plan, goals, and all of today's instructions. Encouraged patient/caregiver to communicate with his/her physician and health care team about health conditions and the treatment plan.  Provided my contact information today and encouraged patient/caregiver to call me with any questions as needed.

## 2020-06-09 ENCOUNTER — TELEPHONE (OUTPATIENT)
Dept: DERMATOLOGY | Facility: CLINIC | Age: 85
End: 2020-06-09

## 2020-06-09 NOTE — TELEPHONE ENCOUNTER
----- Message from Angelica Carlitos sent at 6/9/2020  8:03 AM CDT -----  Contact: catalina Karimi pt- pt is calling to follow up on a prescription that was called in pt was told by her pharmacy they don't make it any more that something else needs to be called in. Pt is waiting on the doctor to call [facility] order [facility] to call in another prescription. Pt is still itching all over and is miserable itching pt is broken out with a rash all over. Can you please call pt at [phone].    TANIA

## 2020-06-09 NOTE — TELEPHONE ENCOUNTER
Spoke with patient and notified her that her mail order pharmacy has already charged her for the clobetasol and that she would have to call them to cancel it.  She verbalized understanding and will call them.  Notified her that I called in the clobetasol per Dr. Monk to her local [facility] and they will get it ready for her.

## 2020-06-09 NOTE — TELEPHONE ENCOUNTER
----- Message from Kim Ruiz sent at 6/9/2020  2:23 PM CDT -----  Contact: [phone]  Calling in regards to getting Rx clobetasoL (TEMOVATE) 0.05 % external solution called in to the correct pharmacy. Medication was sent to mail order [facility] in error, please correct. Patient stated that she has called numerous times with no results for the past three weeks. Please call    [facility]/pharmacy #[id number] - [place], [place] - [address]  [address] [address]  Phone: [phone] Fax: [phone]

## 2020-06-09 NOTE — TELEPHONE ENCOUNTER
Spoke with Wiley at [facility], they have the Clobetasol and will get it ready for patient.  Patient notified.

## 2020-06-10 ENCOUNTER — TELEPHONE (OUTPATIENT)
Dept: INTERNAL MEDICINE | Facility: CLINIC | Age: 85
End: 2020-06-10

## 2020-06-10 DIAGNOSIS — G31.84 MILD COGNITIVE IMPAIRMENT: Primary | ICD-10-CM

## 2020-06-10 NOTE — TELEPHONE ENCOUNTER
----- Message from Gina Mendez sent at 6/10/2020 12:00 PM CDT -----  Contact: pt's daughter marilin [phone]  Type: Patient Call Back    Who called:pt is calling for referral for gastro for upper gi. Call     What is the request in detail:    Can the clinic reply by [facility]?    Would the patient rather a call back or a response via [facility]? call    Best call back number:.pt's daughter marilin [phone]    Additional Information:

## 2020-06-11 NOTE — TELEPHONE ENCOUNTER
[facility] said the patient surgery can't be scheduled until she is cleared by the doctor. Informed the patient that Dr. Mathew will return to the office next week and can review her form.     Notified patient that referral was faxed to geriatrics, and that neurology can also help with memory issues.

## 2020-06-11 NOTE — TELEPHONE ENCOUNTER
Patient quyenther stated that her mother may need to see a geriatric doctor. Not sure which diagnosis to pend. Please advise

## 2020-06-11 NOTE — TELEPHONE ENCOUNTER
----- Message from Michael Ramírez sent at 6/11/2020 11:42 AM CDT -----  Contact: Madina(daughter) [phone]  Type:  Patient Requesting Referral    Who Called:Debbie     Referral to What Specialty:geriatric     Reason for Referral: patient's daughter called and stated a geriatric specialist maybe more suitable for the patient as she is forgetting a lot of things. The patient is scheduled with a neurologist in the future    Does the patient want the referral with a specific physician?:geriatric     [facility]     Would the patient rather a call back or a response via [facility]? Call back     Best Call Back Number:[phone]

## 2020-06-11 NOTE — TELEPHONE ENCOUNTER
Can you find out from [facility] when is her procedure?  Dr. Mathew will return to the office next week and can review her form.    Ordered referral to geriatrics.  Neurology can also help with memory issues.  Please assist the patient with scheduling.  Thank you!

## 2020-06-15 ENCOUNTER — TELEPHONE (OUTPATIENT)
Dept: INTERNAL MEDICINE | Facility: CLINIC | Age: 85
End: 2020-06-15

## 2020-06-15 ENCOUNTER — TELEPHONE (OUTPATIENT)
Dept: RHEUMATOLOGY | Facility: CLINIC | Age: 85
End: 2020-06-15

## 2020-06-15 NOTE — TELEPHONE ENCOUNTER
----- Message from Sharon Cosby MA sent at 6/15/2020 11:13 AM CDT -----  Regarding: FW: NO RELIEF  Contact: SELF    ----- Message -----  From: Ofe Brasher  Sent: 6/15/2020  10:02 AM CDT  To: [facility] Staff  Subject: NO RELIEF                                        Pt states she is not getting any relief w/ the pregabalin (LYRICA) 25 MG capsule as if the dosage need to be up or is there something else that will help ask for a call    Contact info  [phone]

## 2020-06-15 NOTE — TELEPHONE ENCOUNTER
----- Message from Miriam Suarez sent at 6/15/2020  1:03 PM CDT -----  Regarding: Refferal  Who called: Madina (daughter)    What is the request in detail: Patient's daughter is requesting a call back. She would like to remind the staff a referral for the pt is supposed to be sent to Dr. Finch in geriatrics.   Please advise.    Can the clinic reply by [facility]? No    Best call back number: [phone]    Additional Information: N/A

## 2020-06-15 NOTE — TELEPHONE ENCOUNTER
Pt daughter will give the office a call back with the number she will like for GI referral to be faxed to.

## 2020-06-16 ENCOUNTER — TELEPHONE (OUTPATIENT)
Dept: INTERNAL MEDICINE | Facility: CLINIC | Age: 85
End: 2020-06-16

## 2020-06-16 DIAGNOSIS — R10.9 ABDOMINAL PAIN, UNSPECIFIED ABDOMINAL LOCATION: Primary | ICD-10-CM

## 2020-06-16 NOTE — TELEPHONE ENCOUNTER
----- Message from Liv Gabriel sent at 6/16/2020  4:34 PM CDT -----  Regarding: Fax  Shama from [facility] called because no  Signature was on the Clearance document that was faxed     Please resend

## 2020-06-16 NOTE — TELEPHONE ENCOUNTER
----- Message from Jenniferjarod RonquilloMich sent at 6/16/2020  2:09 PM CDT -----  Name of Who is Calling: Madina (Daughter)      What is the request in detail: Wanted to have the referral for GI faxed over to Dr. Jose Finch, Fax: [phone]. Patient also wanted to know if she can get a clearance to stop taking her blood thinners a few days before so she can take a particular test for the GI office. Please advise.       Can the clinic reply by [facility]: No      What Number to Call Back if not in [facility]: [phone]

## 2020-06-16 NOTE — TELEPHONE ENCOUNTER
Please fax referral to [facility].    Please also let the patient know that she is cleared to stop her Eliquis 3 days before her procedure.  I will fax over a clearance form to the gastroenterologist.    Thanks!

## 2020-06-19 DIAGNOSIS — R61 HYPERHIDROSIS: Primary | ICD-10-CM

## 2020-06-20 RX ORDER — TRIAMCINOLONE ACETONIDE 1 MG/G
CREAM TOPICAL
Qty: 454 G | Refills: 3 | Status: SHIPPED | OUTPATIENT
Start: 2020-06-20 | End: 2021-08-10

## 2020-06-23 ENCOUNTER — TELEPHONE (OUTPATIENT)
Dept: DERMATOLOGY | Facility: CLINIC | Age: 85
End: 2020-06-23

## 2020-06-23 NOTE — TELEPHONE ENCOUNTER
Spoke with patient, notified her that Dr. Monk sent her a new RX yesterday.  She verbalized understanding and thanked me.

## 2020-06-23 NOTE — TELEPHONE ENCOUNTER
----- Message from Angelica Urbina sent at 6/23/2020  8:19 AM CDT -----  Regarding: pt  JAM - pt Type:  Needs Medical Advice    Who Called: pt   Symptoms (please be specific): pt is calling to speak with the nurse pt has been itching all over a medication was given to her and isn't working pt needs something else to be called in  How long has patient had these symptoms:  pt has been having this for a month   Pharmacy name and phone #: [facility] pharmacy phone number phone number is in chart   Would the patient rather a call back or a response via [facility]? Call back   Best Call Back Number: can you please call pt at [phone]  Additional Information: none    TANIA

## 2020-06-25 ENCOUNTER — OUTPATIENT CASE MANAGEMENT (OUTPATIENT)
Dept: ADMINISTRATIVE | Facility: OTHER | Age: 85
End: 2020-06-25

## 2020-06-25 NOTE — LETTER
July 9, 2020    Debbie Moonblanc  [address] [place] [address]             [facility]  [address] [place] [address] Dear Ms. Ding,    I work with [facility] Outpatient Case Management Department. I have been unsuccessful at reaching you to follow-up to see how you have been doing. Please call me back at your earliest convenience to discuss your health care needs.      I can be reached at [phone] from 8:00AM to 4:30 PM on Monday thru Friday. [facility] is a program offered through [facility] where a nurse is available 24/7 to answer questions or provide medical advice, their number is [phone].    Thanks,      Jelly Keenan, RN Case Manager  Outpatient Case Management  [phone]

## 2020-07-08 ENCOUNTER — OFFICE VISIT (OUTPATIENT)
Dept: NEUROLOGY | Facility: CLINIC | Age: 85
End: 2020-07-08
Payer: MEDICARE

## 2020-07-08 VITALS
DIASTOLIC BLOOD PRESSURE: 78 MMHG | HEIGHT: 65 IN | HEART RATE: 74 BPM | WEIGHT: 140 LBS | SYSTOLIC BLOOD PRESSURE: 166 MMHG | BODY MASS INDEX: 23.32 KG/M2
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
## 2020-07-08 DIAGNOSIS — R41.3 MEMORY LOSS: ICD-10-CM

## 2020-07-08 DIAGNOSIS — Z51.81 MEDICATION MONITORING ENCOUNTER: ICD-10-CM

## 2020-07-08 DIAGNOSIS — G31.84 MILD COGNITIVE IMPAIRMENT: Primary | ICD-10-CM

## 2020-07-08 PROCEDURE — 99999 PR PBB SHADOW E&M-EST. PATIENT-LVL III: CPT | Mod: PBBFAC,,, | Performed by: PSYCHIATRY & NEUROLOGY

## 2020-07-08 PROCEDURE — 99213 PR OFFICE/OUTPT VISIT, EST, LEVL III, 20-29 MIN: ICD-10-PCS | Mod: S$PBB,,, | Performed by: PSYCHIATRY & NEUROLOGY

## 2020-07-08 PROCEDURE — 99213 OFFICE O/P EST LOW 20 MIN: CPT | Mod: S$PBB,,, | Performed by: PSYCHIATRY & NEUROLOGY

## 2020-07-08 PROCEDURE — 99213 OFFICE O/P EST LOW 20 MIN: CPT | Mod: PBBFAC | Performed by: PSYCHIATRY & NEUROLOGY

## 2020-07-08 PROCEDURE — 99999 PR PBB SHADOW E&M-EST. PATIENT-LVL III: ICD-10-PCS | Mod: PBBFAC,,, | Performed by: PSYCHIATRY & NEUROLOGY

## 2020-07-08 RX ORDER — DONEPEZIL HYDROCHLORIDE 10 MG/1
TABLET, FILM COATED ORAL
Qty: 90 TABLET | Refills: 3 | Status: SHIPPED | OUTPATIENT
Start: 2020-07-08 | End: 2021-06-21

## 2020-07-08 RX ORDER — MEMANTINE HYDROCHLORIDE 10 MG/1
10 TABLET ORAL DAILY
Qty: 90 TABLET | Refills: 3 | Status: ON HOLD | OUTPATIENT
Start: 2020-07-08 | End: 2021-10-04 | Stop reason: CLARIF

## 2020-07-08 NOTE — PROGRESS NOTES
"[facility] - NEUROLOGY EPILEPSY  [facility], [place]    Date: July 8, 2020   Patient Name: Debbie Ding   MRN: [id number]   PCP: Lyla Mathew  Referring Provider: Self, Aaareferral    Assessment:      This is Debbie Ding, 87 y.o. female with DVT/PE on eliquis, CKD, and worsening memory difficulty likely due MCI versus early Alzheimer/Vascular dementia; some prior prolonged grief reaction noted on previous visits.       Plan:      -  Referral to neuropsychology  -  Continue donepezil 10mg/d and namenda 10mg/d           I discussed side effects of the medications. I asked the patient to stop the medication if She notices serious adverse effects as we discussed and to seek immediate medical attention at an ER.     Diogo Guzman MD  [facility]   Department of Neurology    Subjective:        HPI:   Ms. Debbie Ding is a 87 y.o. female who presents with a chief complaint of memory loss, presents alone    -  Patient continues to live in her own apartment with daughter a floor below.  Family has taken over her finances due to increasing forgetfulness although she remains largely independent in ADLs and continues to do some cooking, she has gotten some assistance with cleaning which is due more to mobility.    -  Notes gradually increasing difficulty with short term memory citing difficulty keeping track of reading (avid bible reader), and recent conversation  -  Occasional evening headaches most consistent with tension headaches, relief from OTC analgesia  -  Denies difficulty with sleep or mood, regular phone visits with family  -  One fall this past January when getting out of bed    Per [place] 6/2019:  "-American female who had been seen with complaints of memory difficulties since 2009.  She was last seen by me 1 year ago.  She comes to the clinic accompanied by family member. The patient reports that ever since the death of her  in " "2012, she been having a difficult time with retention of recent information and had difficulty keeping track of conversations and if she reads a book she loses track of what she read earlier.  She was started Aricept which had initially improved however family members stated there has been a gradual decline in her ability to retaining recent information and tends to repeat herself or ask the same questions.  She has hearing impairment and is supposed to use hearing aids which she does not use them all the time.  She was recently admitted to the hospital for pulmonary embolus related to DVT.  She was subsequently in rehab and has gradually recovered all to almost a usual baseline.  She now ambulates without assistance.  She has history of asthma and does report that her pulmonary problems as increased after the pulmonary embolus.  She is presently on Eliquis.     She lives alone however her daughter lives below her apartment and keeps an eye on her.  She does not drive.  She no longer does the. She is able to take care of her day-to-day needs and personal hygiene and manages her medications and finances.  An MRI scan of the brain done in 2014 showed evidence of small vessel ischemic changes but was otherwise unremarkable.  She also notes that since admission to the hospital she has had generalized pressure-like headache that fluctuations in nature.  It is not associated with any visual or other focal neurological symptoms.  She only takes Tylenol as needed for the headache she cannot take NSAIDs or aspirin as she is on Eliquis."    PAST MEDICAL HISTORY:  Past Medical History:   Diagnosis Date    Arthritis     Asthma     Bilateral pulmonary embolism 4/27/2019    Cataract     Depression     Diabetes mellitus     Fibromyalgia 7/2/2012    Fibromyalgia     GERD (gastroesophageal reflux disease)     Hypertension 7/2/2012    Thoracic or lumbosacral neuritis or radiculitis, unspecified     Thyroid disease     " Ulcer        PAST SURGICAL HISTORY:  Past Surgical History:   Procedure Laterality Date    CATARACT EXTRACTION Bilateral     FOOT NEUROMA SURGERY  1985    HYSTERECTOMY         CURRENT MEDS:  Current Outpatient Medications   Medication Sig Dispense Refill    albuterol (PROVENTIL) 2.5 mg /3 mL (0.083 %) nebulizer solution Take 2.5 mg by nebulization every 6 (six) hours as needed. Rescue      amLODIPine (NORVASC) 10 MG tablet TAKE 1 TABLET ONCE DAILY 90 tablet 1    clobetasoL (TEMOVATE) 0.05 % external solution Pt to mix in 1 jar of cerave cream and apply to affected areas bid 50 mL 3    donepezil (ARICEPT) 10 MG tablet TAKE 1 TABLET ONCE DAILY INTHE MORNING 90 tablet 3    DULoxetine (CYMBALTA) 30 MG capsule Take 1 capsule (30 mg total) by mouth once daily. 90 capsule 1    ELIQUIS 5 mg Tab TAKE 1 TABLET TWICE A DAY 60 tablet 11    esomeprazole (NEXIUM) 20 MG capsule Take 1 capsule (20 mg total) by mouth before breakfast. 90 capsule 1    fluticasone propionate (FLONASE) 50 mcg/actuation nasal spray SPRAY ONCE IN EACH NOSTRIL ONCE DAILY 16 mL 1    fluticasone-salmeterol diskus inhaler 500-50 mcg Inhale 1 puff into the lungs 2 (two) times daily. Controller 60 each 11    glycopyrrolate (ROBINUL) 2 MG Tab Increase to 2 pill qday as tolerated 180 tablet 1    hydrOXYzine pamoate (VISTARIL) 25 MG Cap Take 1 capsule (25 mg total) by mouth every 6 (six) hours as needed (Anxiety). 25 capsule 0    ibuprofen (ADVIL,MOTRIN) 400 MG tablet Take 1 tablet (400 mg total) by mouth every 6 (six) hours as needed. 20 tablet 0    levalbuterol (XOPENEX HFA) 45 mcg/actuation inhaler Inhale 2 puffs into the lungs every 8 (eight) hours as needed for Wheezing. 1 Inhaler 12    lidocaine (LMX) 4 % cream Apply topically as needed. 45 g 3    memantine (NAMENDA) 5 MG Tab Take 1 tablet (5 mg total) by mouth every morning. 30 tablet 5    olopatadine (PATADAY) 0.2 % Drop       pregabalin (LYRICA) 25 MG capsule Take 1 capsule (25 mg  "total) by mouth 2 (two) times daily. 60 capsule 5    SYNTHROID 88 mcg tablet TAKE 1 TABLET ONCE DAILY 90 tablet 1    triamcinolone acetonide 0.1% (KENALOG) 0.1 % cream AAA bid 454 g 3     No current facility-administered medications for this visit.        ALLERGIES:  Review of patient's allergies indicates:   Allergen Reactions    Melatonin      Other reaction(s): Other (See Comments)  Sleep Walks and has unnatural dreams    Talwin compound Hives    Talwin [pentazocine lactate] Hallucinations    Trazodone Other (See Comments)     Nightmares/sleep walk      Bentyl [dicyclomine] Anxiety    Tessalon [benzonatate] Anxiety       FAMILY HISTORY:  Family History   Problem Relation Age of Onset    Diabetes Mother     Diabetes Brother     Emphysema Maternal Aunt     Melanoma Neg Hx     Asthma Neg Hx        SOCIAL HISTORY:  Social History     Tobacco Use    Smoking status: Never Smoker    Smokeless tobacco: Never Used   Substance Use Topics    Alcohol use: No    Drug use: No       Review of Systems:  12 review of systems is negative except for the symptoms mentioned in HPI.        Objective:     Vitals:    07/08/20 0848   BP: (!) 166/78   Pulse: 74   Weight: 63.5 kg (139 lb 15.9 oz)   Height: 5' 5" (1.651 m)       General: NAD, well nourished   Eyes: no tearing, discharge, no erythema   ENT: moist mucous membranes of the oral cavity, nares patent    Neck: Supple, full range of motion  Cardiovascular: Warm and well perfused, pulses equal and symmetrical  Lungs: Normal work of breathing, normal chest wall excursions  Skin: No rash, lesions, or breakdown on exposed skin  Psychiatry: Mood and affect are appropriate   Abdomen: soft, non tender, non distended  Extremeties: No cyanosis, clubbing or edema.    Neurological   MENTAL STATUS: MMSE 27/30   CRANIAL NERVES: Visual fields intact. PERRL. EOMI. Facial sensation intact. Face symmetrical. Hearing grossly intact. Full shoulder shrug bilaterally. Tongue protrudes " midline   SENSORY: Sensation is intact to light touch throughout.  Negative Romberg.   MOTOR: Normal bulk and tone. No pronator drift.     CEREBELLAR/COORDINATION/GAIT: Gait stooped and mildly unsteady. Finger to nose intact. Normal rapid alternating movements.

## 2020-07-09 NOTE — PROGRESS NOTES
Outpatient Care Management  Plan of Care Follow Up Visit    Patient: Debbie Ding  MRN: [id number]  Date of Service: 06/25/2020  Completed by: Jelly Keenan RN  Referral Date: 04/02/2020  Program: Case Management (High Risk)    Reason for Visit   Patient presents with    OPCM RN First Follow-Up Attempt     6/26/20    OPCM RN Second Follow-Up Attempt     7/1/20    OPCM RN Third Follow-Up Attempt     7/9/20    Case Closure     7/9/20       Brief Summary: Attempted to reach patient to complete follow-up call for OPCM.  Letter has been sent through the patient portal with OPCM contact information in case any needs arise in the future. Closing case at this time.     Patient Summary     Involvement of Care:  Do I have permission to speak with other family members about your care?       Patient Reported Labs & Vitals:  1.  Any Patient Reported Labs & Vitals?     2.  Patient Reported Blood Pressure:     3.  Patient Reported Pulse:     4.  Patient Reported Weight (Kg):     5.  Patient Reported Blood Glucose (mg/dl):       Medical and social history was reviewed with patient and/or caregiver.     Clinical Assessment     Reviewed and provided basic information on available community resources for mental health, transportation, wellness resources, and palliative care programs with patient and/or caregiver.     Complex Care Plan     Care plan was discussed and completed today with input from patient and/or caregiver.    Patient Instructions     Instructions were provided via the [facility] patient resources and are available for the patient to view on the patient portal.        No follow-ups on file.    [facility] OPCM Self-Management Care Plan was developed with the patients/caregivers input and was based on identified barriers from todays assessment.  Goals were written today with the patient/caregiver and the patient has agreed to work towards these goals to improve his/her overall well-being. Patient verbalized  understanding of the care plan, goals, and all of today's instructions. Encouraged patient/caregiver to communicate with his/her physician and health care team about health conditions and the treatment plan.  Provided my contact information today and encouraged patient/caregiver to call me with any questions as needed.

## 2020-07-13 ENCOUNTER — TELEPHONE (OUTPATIENT)
Dept: NEUROLOGY | Facility: CLINIC | Age: 85
End: 2020-07-13
Payer: MEDICARE

## 2020-07-13 ENCOUNTER — TELEPHONE (OUTPATIENT)
Dept: NEUROLOGY | Facility: CLINIC | Age: 85
End: 2020-07-13

## 2020-07-13 NOTE — TELEPHONE ENCOUNTER
----- Message from Jose Dow sent at 7/13/2020  4:35 PM CDT -----  Contact: Patient @ [phone]  Patient calling to get a medication update, pls call to discuss       [facility] - [facility] [place] - [address] AT [address] [place] [address] [place] [address]  Phone: [phone] Fax: [phone]

## 2020-07-17 DIAGNOSIS — Z71.89 COMPLEX CARE COORDINATION: ICD-10-CM

## 2020-07-31 ENCOUNTER — TELEPHONE (OUTPATIENT)
Dept: INTERNAL MEDICINE | Facility: CLINIC | Age: 85
End: 2020-07-31

## 2020-07-31 ENCOUNTER — EXTERNAL CHRONIC CARE MANAGEMENT (OUTPATIENT)
Dept: PRIMARY CARE CLINIC | Facility: CLINIC | Age: 85
End: 2020-07-31
Payer: MEDICARE

## 2020-07-31 PROCEDURE — 99490 CHRNC CARE MGMT STAFF 1ST 20: CPT | Mod: S$PBB,,, | Performed by: INTERNAL MEDICINE

## 2020-07-31 PROCEDURE — 99490 CHRNC CARE MGMT STAFF 1ST 20: CPT | Mod: PBBFAC | Performed by: INTERNAL MEDICINE

## 2020-07-31 PROCEDURE — 99490 PR CHRONIC CARE MGMT, 1ST 20 MIN: ICD-10-PCS | Mod: S$PBB,,, | Performed by: INTERNAL MEDICINE

## 2020-08-03 ENCOUNTER — OFFICE VISIT (OUTPATIENT)
Dept: RHEUMATOLOGY | Facility: CLINIC | Age: 85
End: 2020-08-03
Payer: MEDICARE

## 2020-08-03 ENCOUNTER — TELEPHONE (OUTPATIENT)
Dept: RHEUMATOLOGY | Facility: CLINIC | Age: 85
End: 2020-08-03

## 2020-08-03 VITALS
HEIGHT: 65 IN | WEIGHT: 145.5 LBS | DIASTOLIC BLOOD PRESSURE: 63 MMHG | BODY MASS INDEX: 24.24 KG/M2 | TEMPERATURE: 99 F | SYSTOLIC BLOOD PRESSURE: 130 MMHG | HEART RATE: 69 BPM

## 2020-08-03 DIAGNOSIS — M79.7 FIBROMYALGIA: ICD-10-CM

## 2020-08-03 DIAGNOSIS — M15.9 PRIMARY OSTEOARTHRITIS INVOLVING MULTIPLE JOINTS: Primary | ICD-10-CM

## 2020-08-03 PROCEDURE — 99999 PR PBB SHADOW E&M-EST. PATIENT-LVL IV: ICD-10-PCS | Mod: PBBFAC,,, | Performed by: INTERNAL MEDICINE

## 2020-08-03 PROCEDURE — 99214 OFFICE O/P EST MOD 30 MIN: CPT | Mod: PBBFAC | Performed by: INTERNAL MEDICINE

## 2020-08-03 PROCEDURE — 99213 OFFICE O/P EST LOW 20 MIN: CPT | Mod: S$PBB,,, | Performed by: INTERNAL MEDICINE

## 2020-08-03 PROCEDURE — 99213 PR OFFICE/OUTPT VISIT, EST, LEVL III, 20-29 MIN: ICD-10-PCS | Mod: S$PBB,,, | Performed by: INTERNAL MEDICINE

## 2020-08-03 PROCEDURE — 99999 PR PBB SHADOW E&M-EST. PATIENT-LVL IV: CPT | Mod: PBBFAC,,, | Performed by: INTERNAL MEDICINE

## 2020-08-03 RX ORDER — ALBUTEROL SULFATE 90 UG/1
AEROSOL, METERED RESPIRATORY (INHALATION)
COMMUNITY
Start: 2020-07-24 | End: 2023-04-18

## 2020-08-03 RX ORDER — MEGESTROL ACETATE 20 MG/1
TABLET ORAL
COMMUNITY
Start: 2020-07-27 | End: 2020-12-24

## 2020-08-03 RX ORDER — FLUOCINOLONE ACETONIDE 0.11 MG/ML
OIL AURICULAR (OTIC)
COMMUNITY
Start: 2020-07-16 | End: 2020-10-30 | Stop reason: CLARIF

## 2020-08-03 RX ORDER — LORATADINE 10 MG/1
10 TABLET ORAL DAILY
COMMUNITY
Start: 2020-07-24 | End: 2021-08-10

## 2020-08-03 RX ORDER — BENZONATATE 100 MG/1
CAPSULE ORAL
COMMUNITY
Start: 2020-07-24 | End: 2021-06-06

## 2020-08-03 ASSESSMENT — ROUTINE ASSESSMENT OF PATIENT INDEX DATA (RAPID3)
PAIN SCORE: 6.5
AM STIFFNESS SCORE: 1, YES
TOTAL RAPID3 SCORE: 7.16
WHEN YOU AWAKENED IN THE MORNING OVER THE LAST WEEK, PLEASE INDICATE THE AMOUNT OF TIME IT TAKES UNTIL YOU ARE AS LIMBER AS YOU WILL BE FOR THE DAY: 10 MINUTES
PATIENT GLOBAL ASSESSMENT SCORE: 5
FATIGUE SCORE: 10
PSYCHOLOGICAL DISTRESS SCORE: 6.6
MDHAQ FUNCTION SCORE: 3

## 2020-08-03 NOTE — PROGRESS NOTES
History of present illness:  87-year-old female I have been following since 2012.  She has a history of chronic pain dating back to the 1980s.  She has been diagnosed in the past as having osteoarthritis, especially of the spine, and fibromyalgia.  She has been tried on multiple medications in the past but had stopped most of them because of side effects.  she was last seen in February.  I continued her on Cymbalta 30 mg daily.  I also placed her back on Lyrica 25 mg twice daily.  She comes back now for follow-up.    At 1st she tells me she is fatigued with the Lyrica she then tells me she is taking it twice daily.  I had actually told her to increase it to 2 twice daily but apparently she is not taking it that way.  She is also taking Cymbalta in the morning.  She does not think Cymbalta is helping as much as it had previously.  She did fall out of bed after her last appointment.  She complains of increased pain in the neck since then.  She has some difficulty in using her hands.  She has been taking Tylenol with some relief.  Heat has been helping.  She has tried Biofreeze but this has not helped.    Physical examination:  Musculoskeletal:  She is diffusely tender to palpation both articular nonarticular areas.  She does have marked bony hypertrophy of the right 1st CMC but other joints look okay.    Assessment:  Chronic pain syndrome    Plans:  1.  I increased her Cymbalta 60 mg in the morning.  If this helps, I will change her to the 60 mg size  2.  I changed her Lyrica to 50 mg at bedtime.  If this helps, I will change her to 50 mg size  3.  She may take Tylenol up to 3 times daily  4.  Use heat to the neck  5.  Return in 6 months

## 2020-08-03 NOTE — PROGRESS NOTES
NEUROBEHAVIORAL STATUS EXAMINATION - CONFIDENTIAL  ASSESSMENT CLINIC    REFERRAL SOURCE: Diogo Guzman MD  MEDICAL NECESSITY:  Evaluate cognitive functioning in the setting of reported decline  DATE CONDUCTED: 2020  BILLIN - 60 minutes, [id number] - 120 minutes, [id number] - 68 minutes  PRESENT AT VISIT: Patient and Lurice    SUMMARY/TREATMENT PLAN   Ms. Ding is a 87 year old female with onset of functional difficulties following her 's death in . She seemed to have complicated bereavement and was psychiatrically hospitalized for medication adjustments, although her daughters feel this was ill advised as she was hospitalized for 6 months and was overmedicated upon discharge. Her functioning hasn't been the same since, characterized by apathy, sadness, and reduced motivation. While complicated bereavement and depression are part of her clinical picture, Ms. Ding and her daughters also report cognitive changes and REM sleep behavior disorder for at least the past several years. Ms. Ding also reported experiencing passage and presence hallucinations. Neuropsychological test results revealed global cognitive decline at the level to warrant a diagnosis of dementia, consistent with the fact that her daughters currently manage all complex activities of daily living. When considering etiology, Ms. Ding does not present with the pattern of forgetting seen in Alzheimer's disease. While Dr. 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 Cerebrovascular disease is another likely etiology, suggesting this is a mixed dementia. Several recommendations are offered.     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          Current Assessment & Plan     Level of Care: Appropriately managed. Continue to provide support in all complex activities of daily living.     Resources: www.lbda.org/ and www.alz.org    Optimize Brain Health: Maintain a heart healthy diet and manage vascular risk factors. Stay physically active.     Follow-up: Virtual visit in assessment clinic in 3 months. In-person visit in 6 months.             Psychiatric    Mild episode of recurrent major depressive disorder    Overview     Overview:   Previously in mental health facility after death of her   Previously seeing psychiatry          Current Assessment & Plan     Mental Health Treatment: Several options to consider, including consulting with geriatric psychiatry to optimize medication, behavioral activation, and counseling.             Cardiac/Vascular    Essential hypertension       Endocrine    Type 2 diabetes mellitus without complication, without long-term current use of insulin         Alfredo Quezada Psy.D., ABPP  Board Certified in Clinical Neuropsychology  Department of Neurology    HISTORY OF PRESENT ILLNESS: Ms. Debbie Ding is a right handed 87 y.o. female with 9 years of education who was referred for dementia work-up. Two of her daughters accompanied her to this appointment (Benjie over the phone and Madina in person), noting cognitive and functional since her 's death in 2009. They feel that changes in functioning were initially due to grief, but then a psychiatrist suggested that she be hospitalized to optimize her medications. She was subsequently hospitalized for 6 months, with her daughters stating that she was overmedicated at discharge. They feel that she has never been the same since that time while her cognition and functioning continue to worsen. She was very sedentary for years (seemingly due to complicated bereavement), which contributed to DVT  "and subsequent pulmonary embolus in 2019. She required extensive rehabilitation due to significant mobility issues after the event. Her walking and strength have improved, but have not completely returned to baseline. She has required more family support over the past year due to physical and cognitive limitations.     Regarding cognition, her daughters denied angi forgetting/amnesia, but do find she is forgetful. They think she has a "selective memory" to some degree. Ms. Ding also endorsed cognitive difficulties and spontaneously recalled seeing Dr. Gamble for years prior to his unexpected passing in 2019. She described easily losing her train of thought, especially in conversation over the phone. She will get up and sit back down several times due to forgetting what she planned to do. She described difficulties with reading retention, which is part of the reason she no longer crochets as she can't retain the patterns that she reads.     DAILY FUNCTIONING:  SUPPORT  Primary Caregiver/Support: Daughter.   Living Situation: Continues to live in her own apartment with daughter (Benjie) a floor below.    BASIC ADLS:  Feeding: independent, she still eats, but not as much as she used to.   Dressing: independent  Bathing: independent  Toileting: independent    IADLS:  Safety Risks:    Falls: Endorsed, most recently in 2/2020. She has a walker, but doesn't feel that she needs it.   Level of Physical Activity: minimal.   Appointment Management: Daughter coordinates her appointments.   Medication Compliance: dependent. Daughter has been managing since the hospitalization, filling a pillbox and managing refills. Ms. Ding stated that she managing prior to that time. She reported strong compliance.   Financial Management: dependent. Family has taken over her finances due to increasing forgetfulness. She manages her insurance payments, but all are set to auto debit.   Cooking: independent. Her daughter does most of the " "cooking, but she will prepare dishes if needed. She burned some bread in the oven recently.   Driving: She completely stopped driving after the hospitalization in 2019, but her daughters noted that she was not driving much prior to then.     Neuropsychiatric Symptoms:  Hallucinations: Endorsed, she sometimes sees a shadow right next to her when in the kitchen or out of the corner of her eye. She doesn't see bugs or movement on the ground. When she is in the kitchen she has presence hallucinations. No visual misperceptions. She sometimes wakes up and see a shadow walking past.   Delusional/Paranoid Thinking: Denied  Apathy: Endorsed, which her daughter attributed to depression/grief. In fact, she has been fairly inactive since her 's death in 2009. Her daughters feel that she tends to make excuses to not engage in many activities, stating that she has a "pity party." This is especially the case since the 2019 hospitalization as she will comment that she is out of breath easily.   Irritability/Agitation: Endorsed. Very argumentative when her daughters told her that she could not return to driving.   Depression/Labile Mood: Endorsed , she wishes she had more independence, such as the ability to get in her car and drive to the grocery store whenever she wants.   Suicidal Ideation: Denied   Anxiety: Denied    MEDICAL HISTORY: Ms. Ding  has a past medical history of Arthritis, Asthma, Bilateral pulmonary embolism (4/27/2019), Cataract, Depression, Diabetes mellitus, Fibromyalgia (7/2/2012), Fibromyalgia, GERD (gastroesophageal reflux disease), Hypertension (7/2/2012), Thoracic or lumbosacral neuritis or radiculitis, unspecified, Thyroid disease, and Ulcer. Poor sleep initiation and maintenance. She used to take Lyrica, with helped her fall asleep. Her rheumatologist recently increased her Lyrica, but she hadn't done it yet since she didn't want to oversleep for a drs appointments. She frequently wakes up due to " "an overactive bladder. She stated that she does not sleep during the day even though she feels sleepy. In contrast, her daughters noted that she can't sleep during the night because she sleeps excessively during the day, stating they have a camera in the house to check on her. Her daughters also described REM sleep behavior disorder for upwards of 8-9 years. Her daughter even stated that she called the police the first time she hear her mother yelling since it was so loud and aggressive. She recently stayed with Madina who was also surprised at how loud she yells and how aggressively she moves when sleeping. She sleeps in a recliner every night and has mostly avoided the bed since her  . She also has a fear of falling out of bed, but she has fallen out of the chair as well. She does not drink alcohol.     NEUROIMAGIN2019 Head CT: "There is no midline shift, hydrocephalus or mass effect.  Age-appropriate generalized cerebral volume loss and moderate chronic microvascular ischemic changes stable from prior.  No evidence of acute intracranial hemorrhage or acute major vascular territory infarct.  No abnormal extra-axial fluid collections.  No evidence of a space-occupying mass.  Calcifications within the basal ganglia and dentate nuclei.  Calvarium is intact without evidence of fracture.  Visualized paranasal sinuses and mastoid air cells are clear."    CURRENT MEDICATIONS:    Current Outpatient Medications:     albuterol (PROVENTIL) 2.5 mg /3 mL (0.083 %) nebulizer solution, Take 2.5 mg by nebulization every 6 (six) hours as needed. Rescue, Disp: , Rfl:     albuterol (PROVENTIL/VENTOLIN HFA) 90 mcg/actuation inhaler, INHALE 2 PUFFS BY MOUTH EVERY 6 HRS AS NEEDED, Disp: , Rfl:     amLODIPine (NORVASC) 10 MG tablet, TAKE 1 TABLET ONCE DAILY, Disp: 90 tablet, Rfl: 1    benzonatate (TESSALON) 100 MG capsule, , Disp: , Rfl:     clobetasoL (TEMOVATE) 0.05 % external solution, Pt to mix in 1 jar of " cerave cream and apply to affected areas bid, Disp: 50 mL, Rfl: 3    donepeziL (ARICEPT) 10 MG tablet, TAKE 1 TABLET ONCE DAILY INTHE MORNING, Disp: 90 tablet, Rfl: 3    DULoxetine (CYMBALTA) 30 MG capsule, Take 1 capsule (30 mg total) by mouth once daily., Disp: 90 capsule, Rfl: 1    ELIQUIS 5 mg Tab, TAKE 1 TABLET TWICE A DAY, Disp: 60 tablet, Rfl: 11    esomeprazole (NEXIUM) 20 MG capsule, Take 1 capsule (20 mg total) by mouth before breakfast., Disp: 90 capsule, Rfl: 1    fluocinolone acetonide oiL 0.01 % Drop, INSTILL 5 DROPS INTO AFFECTED EAR TWICE A DAY OTIC 7 DAY(S), Disp: , Rfl:     fluticasone propionate (FLONASE) 50 mcg/actuation nasal spray, SPRAY ONCE IN EACH NOSTRIL ONCE DAILY, Disp: 16 mL, Rfl: 1    fluticasone-salmeterol diskus inhaler 500-50 mcg, Inhale 1 puff into the lungs 2 (two) times daily. Controller, Disp: 60 each, Rfl: 11    glycopyrrolate (ROBINUL) 2 MG Tab, Increase to 2 pill qday as tolerated, Disp: 180 tablet, Rfl: 1    hydrOXYzine pamoate (VISTARIL) 25 MG Cap, Take 1 capsule (25 mg total) by mouth every 6 (six) hours as needed (Anxiety)., Disp: 25 capsule, Rfl: 0    levalbuterol (XOPENEX HFA) 45 mcg/actuation inhaler, Inhale 2 puffs into the lungs every 8 (eight) hours as needed for Wheezing., Disp: 1 Inhaler, Rfl: 12    lidocaine (LMX) 4 % cream, Apply topically as needed., Disp: 45 g, Rfl: 3    loratadine (CLARITIN) 10 mg tablet, Take 10 mg by mouth once daily., Disp: , Rfl:     megestroL (MEGACE) 20 MG Tab, , Disp: , Rfl:     memantine (NAMENDA) 10 MG Tab, Take 1 tablet (10 mg total) by mouth once daily., Disp: 90 tablet, Rfl: 3    olopatadine (PATADAY) 0.2 % Drop, , Disp: , Rfl:     pregabalin (LYRICA) 25 MG capsule, Take 1 capsule (25 mg total) by mouth 2 (two) times daily., Disp: 60 capsule, Rfl: 5    SYNTHROID 88 mcg tablet, TAKE 1 TABLET ONCE DAILY, Disp: 90 tablet, Rfl: 1    triamcinolone acetonide 0.1% (KENALOG) 0.1 % cream, AAA bid, Disp: 454 g, Rfl: 3  "    MENTAL STATUS AND OBSERVATIONS:  APPEARANCE: Casually dressed and adequate grooming/hygiene.   ALERTNESS/ORIENTATION: Attentive and alert.   GAIT/MOTOR: Unremarkable  SENSORY: Corrective lenses.   SPEECH/LANGUAGE: Normal in rate, rhythm, tone, and volume. No significant word finding difficulty noted. Expressive and receptive language were grossly intact.   STATED MOOD/AFFECT: Mood was euthymic.   INTERPERSONAL BEHAVIOR: Rapport was quickly and easily established   THOUGHT PROCESSES: Thoughts seemed logical and goal-directed. She demonstrated intact episodic memory for recent events.     TEST RESULTS:  Ms. Ding had a delayed response latency during testing, taking several seconds to initiate during every test, which was consistent in her exceptionally low performance on a test of processing speed. She was fully oriented to time. She correctly stated the city she was in, but said "hospital" rather than [facility] for location. She demonstrated intact episodic memory for recent events.     Ms. Ding scored 11/30 on the MoCA. She encoded 3/5 words after 2 trials, freely recalling 0 words following a brief delay. She recalled 1 word with categorical cueing and correctly identified 1/4 words with multiple choice cueing. Poor number placement in her drawing of a clock face. She did not correctly set the clock hands. She was unable to provide any correct responses on a serial 7 subtraction task.     A composite score of Ms. Ding's overall cognitive abilities was in the exceptionally low range. Overall encoding of a supraspan word list was exceptionally low as she could not retain any words during the first two learning trials. She encoded 2 and 3 words on the last two learning trials. She did not freely recall any words following a long delay. Recognition was exceptionally low as she correctly identified only 3/10 words with 4 false positive errors. Overall encoding of a short story was exceptionally low as she " again did not encode any details after the first trial. She encoded 4 details after the second trial. She retained 2 story details following a long delay and her overall recall was below average. She did not recall any details from a previously copied figure.     Below average performance on a test of line orientation/visual acuity. Copy of a complex figure was distorted and indicative of at least a mild to moderate degree of visuospatial dysfunction.     Below average performance on tests of semantic verbal fluency and confrontation naming. Letter verbal fluency was below expectation.     Mild level of depression on a self-report questionnaire. She endorsed cognitive changes and feeling as though her life is empty.     APPENDIX  TESTS ADMINISTERED:  Clinical Interview and Review of Records, [place] Cognitive Assessment (MoCA), Repeatable Battery for the Assessment of Neuropsychological Status (RBANS, Form A), Denis Complex Figure Test, and the Geriatric Depression Scale (GDS).       Raw Score Standardized Score Percentile/CP   RBANS Effort Index Fail -    COGNITIVE SCREENING Raw Score Standardized Score Percentile/CP   MoCA 11 - -   Orientation - Place 1/2 - -   Orientation - Date 4/4 - -   RBANS      Immediate Memory - 49 0.1   VS/Construction - 72 3   Language - 68 2   Attention - 60 0.4   Delayed Memory - 44 0.1   Total Scale - 51 0.1   Subtests      List Learning 5 1 0   Story Memory 4 3 1   Figure Copy 15 6 9   Line Orientation 11 - 3-9   Naming 8 - 3-9   Fluency 9 4 2   Digit Span 6 5 5   Coding 16 3 1   List Recall 0 - 3-9   List Recognition 9 - <2   Story Recall 2 4 2   Figure Recall 0 1 0   Story Recognition   -    Figure Recognition  - -   LANGUAGE FUNCTIONING Raw Score Standardized Score Percentile/CP   RBANS Naming 8 - 3-9   RBANS Semantic Fluency 9 4 2   VISUOSPATIAL FUNCTIONING Raw Score Standardized Score Percentile/CP   RBANS Line Orientation  11 - 3-9   RBANS Figure Copy 15 6 9   RCFT Copy 19 - <1    RCFT Time to Copy 357 - >16   LEARNING & MEMORY Raw Score Standardized Score Percentile/CP   RBANS      Immediate Memory - 49 0.1   Delayed Memory - 44 0.1   List Learning 5 1 0   List Recall 0 - 3-9   List Recognition 9 - <2   Story Memory 4 3 1   Story Recall 2 4 2   Story Recognition   -    Figure Recall 0 1 0.1   Figure Recognition  - -   ATTENTION/WORKING MEMORY Raw Score Standardized Score Percentile/CP   RBANS Digit Span  6 5 5   MENTAL PROCESSING SPEED Raw Score Standardized Score Percentile/CP   RBANS Coding 16 3 1   MOOD & PERSONALITY Raw Score Standardized Score Percentile/CP   GDS-30 9 - -

## 2020-08-04 ENCOUNTER — TELEPHONE (OUTPATIENT)
Dept: NEUROLOGY | Facility: CLINIC | Age: 85
End: 2020-08-04

## 2020-08-05 ENCOUNTER — INITIAL CONSULT (OUTPATIENT)
Dept: NEUROLOGY | Facility: CLINIC | Age: 85
End: 2020-08-05
Payer: MEDICARE

## 2020-08-05 ENCOUNTER — OUTPATIENT CASE MANAGEMENT (OUTPATIENT)
Dept: NEUROLOGY | Facility: CLINIC | Age: 85
End: 2020-08-05

## 2020-08-05 DIAGNOSIS — G31.84 MILD COGNITIVE IMPAIRMENT: ICD-10-CM

## 2020-08-05 DIAGNOSIS — G47.52 RBD (REM BEHAVIORAL DISORDER): ICD-10-CM

## 2020-08-05 DIAGNOSIS — F33.0 MILD EPISODE OF RECURRENT MAJOR DEPRESSIVE DISORDER: ICD-10-CM

## 2020-08-05 DIAGNOSIS — E11.9 TYPE 2 DIABETES MELLITUS WITHOUT COMPLICATION, WITHOUT LONG-TERM CURRENT USE OF INSULIN: ICD-10-CM

## 2020-08-05 DIAGNOSIS — I10 ESSENTIAL HYPERTENSION: ICD-10-CM

## 2020-08-05 DIAGNOSIS — F01.518 MIXED CORTICAL AND SUBCORTICAL VASCULAR DEMENTIA WITH BEHAVIORAL DISTURBANCE: Primary | ICD-10-CM

## 2020-08-05 PROCEDURE — 96139 PSYCL/NRPSYC TST TECH EA: CPT | Mod: ,,, | Performed by: PSYCHIATRY & NEUROLOGY

## 2020-08-05 PROCEDURE — 96116 NUBHVL XM PHYS/QHP 1ST HR: CPT | Mod: S$PBB,,, | Performed by: PSYCHIATRY & NEUROLOGY

## 2020-08-05 PROCEDURE — 99212 OFFICE O/P EST SF 10 MIN: CPT | Mod: PBBFAC

## 2020-08-05 PROCEDURE — 96133 PR NEUROPSYCHOLOGIC TEST EVAL SVCS, EA ADDTL HR: ICD-10-PCS | Mod: ,,, | Performed by: PSYCHIATRY & NEUROLOGY

## 2020-08-05 PROCEDURE — 99215 PR OFFICE/OUTPT VISIT, EST, LEVL V, 40-54 MIN: ICD-10-PCS | Mod: S$PBB,,, | Performed by: NURSE PRACTITIONER

## 2020-08-05 PROCEDURE — 99999 PR PBB SHADOW E&M-EST. PATIENT-LVL II: ICD-10-PCS | Mod: PBBFAC,,,

## 2020-08-05 PROCEDURE — 96138 PSYCL/NRPSYC TECH 1ST: CPT | Mod: ,,, | Performed by: PSYCHIATRY & NEUROLOGY

## 2020-08-05 PROCEDURE — 99215 OFFICE O/P EST HI 40 MIN: CPT | Mod: S$PBB,,, | Performed by: NURSE PRACTITIONER

## 2020-08-05 PROCEDURE — 99999 PR PBB SHADOW E&M-EST. PATIENT-LVL II: CPT | Mod: PBBFAC,,,

## 2020-08-05 PROCEDURE — 96116 NUBHVL XM PHYS/QHP 1ST HR: CPT | Mod: PBBFAC | Performed by: PSYCHIATRY & NEUROLOGY

## 2020-08-05 PROCEDURE — 96138 PR PSYCH/NEUROPSYCH TEST ADMIN/SCORING, BY TECH, 2+ TESTS, 1ST 30 MIN: ICD-10-PCS | Mod: ,,, | Performed by: PSYCHIATRY & NEUROLOGY

## 2020-08-05 PROCEDURE — 99499 NO LOS: ICD-10-PCS | Mod: S$PBB,,, | Performed by: PSYCHIATRY & NEUROLOGY

## 2020-08-05 PROCEDURE — 96132 NRPSYC TST EVAL PHYS/QHP 1ST: CPT | Mod: ,,, | Performed by: PSYCHIATRY & NEUROLOGY

## 2020-08-05 PROCEDURE — 96116 PR NEUROBEHAVIORAL STATUS EXAM BY PSYCH/PHYS: ICD-10-PCS | Mod: S$PBB,,, | Performed by: PSYCHIATRY & NEUROLOGY

## 2020-08-05 PROCEDURE — 96132 PR NEUROPSYCHOLOGIC TEST EVAL SVCS, 1ST HR: ICD-10-PCS | Mod: ,,, | Performed by: PSYCHIATRY & NEUROLOGY

## 2020-08-05 PROCEDURE — 99499 UNLISTED E&M SERVICE: CPT | Mod: S$PBB,,, | Performed by: PSYCHIATRY & NEUROLOGY

## 2020-08-05 PROCEDURE — 96133 NRPSYC TST EVAL PHYS/QHP EA: CPT | Mod: ,,, | Performed by: PSYCHIATRY & NEUROLOGY

## 2020-08-05 PROCEDURE — 96139 PR PSYCH/NEUROPSYCH TEST ADMIN/SCORING, BY TECH, 2+ TESTS, EA ADDTL 30 MIN: ICD-10-PCS | Mod: ,,, | Performed by: PSYCHIATRY & NEUROLOGY

## 2020-08-05 NOTE — PROGRESS NOTES
Name: Debbie Ding  MRN: [id number]   CSN: [id number]      Date: 2020      Referring physician:  Diogo Guzman MD  [address] [place] [address]    Subjective:      Chief Complaint: memory loss      History of Present Illness (HPI):    Debbie Ding is a 87 y.o. RH  female with DM 2,HTN, HLD depression hx of PE, CKD,  who presents for initial evaluation in the ..[facility] for memory loss.and is accompanied by daughter.  Prev followed by Dr. Gamble for memory complaints as early as  with last visit with him . Most recently, she has seen Dr. Guzman.     Ms. Ding reports the following:  Has noted changes in memory. Used to do a lot of crocheting but does not pick it up anymore becausocorro she cant read the instructions to even follow patterns. With anything she reads, she will read one or two lines and then cant remember when what she just read. She recalls even telling Dr. Gamble that (does not recall his name but references the doc that  that I used to see ofr this).    Sees things - more like shadows but some look like people. Waking up in middle of night and sees people.       Daughter provides the following (interviewed separately)  Screaming in sleep for 10 years. Not getting worse.   They are not aware that she has illusions or hallucinations.    Lot of grief after   . Was in fact hospitalized for months after he passed.      Feel her mom remembers what she wants or needs to remember.    Grandson went to see her . He talked to her about her childhood and various things in her life. They could not believe how much she remembered and all the details.        Review of Systems   Constitutional: Negative for appetite change.   HENT: Positive for hearing loss (has aid but can't wear beacuse of allergy in ear) and trouble swallowing (sometimes ). Negative for drooling.    Eyes: Positive for visual disturbance (can't see wo glasses).  "  Gastrointestinal: Positive for constipation.   Genitourinary:        Trouble falling asleep after urinates    Musculoskeletal: Positive for arthralgias.   Neurological: Negative for tremors.   Psychiatric/Behavioral: Positive for dysphoric mood ("probably because feel locked down, even before the virus") and sleep disturbance (not good- trouble falling asleep). The patient is nervous/anxious (sometimes).        Past Medical History: The patient  has a past medical history of Arthritis, Asthma, Bilateral pulmonary embolism (4/27/2019), Cataract, Depression, Diabetes mellitus, Fibromyalgia (7/2/2012), Fibromyalgia, GERD (gastroesophageal reflux disease), Hypertension (7/2/2012), Thoracic or lumbosacral neuritis or radiculitis, unspecified, Thyroid disease, and Ulcer.    Social History: The patient  reports that she has never smoked. She has never used smokeless tobacco. She reports that she does not drink alcohol or use drugs.    Family History: Their family history includes Diabetes in her brother and mother; Emphysema in her maternal aunt.    Allergies: Melatonin, Talwin compound, Talwin [pentazocine lactate], Trazodone, Bentyl [dicyclomine], and Tessalon [benzonatate]     Meds:   Current Outpatient Medications on File Prior to Visit   Medication Sig Dispense Refill    albuterol (PROVENTIL) 2.5 mg /3 mL (0.083 %) nebulizer solution Take 2.5 mg by nebulization every 6 (six) hours as needed. Rescue      albuterol (PROVENTIL/VENTOLIN HFA) 90 mcg/actuation inhaler INHALE 2 PUFFS BY MOUTH EVERY 6 HRS AS NEEDED      amLODIPine (NORVASC) 10 MG tablet TAKE 1 TABLET ONCE DAILY 90 tablet 1    benzonatate (TESSALON) 100 MG capsule       clobetasoL (TEMOVATE) 0.05 % external solution Pt to mix in 1 jar of cerave cream and apply to affected areas bid 50 mL 3    donepeziL (ARICEPT) 10 MG tablet TAKE 1 TABLET ONCE DAILY INTHE MORNING 90 tablet 3    DULoxetine (CYMBALTA) 30 MG capsule Take 1 capsule (30 mg total) by mouth " once daily. 90 capsule 1    ELIQUIS 5 mg Tab TAKE 1 TABLET TWICE A DAY 60 tablet 11    esomeprazole (NEXIUM) 20 MG capsule Take 1 capsule (20 mg total) by mouth before breakfast. 90 capsule 1    fluocinolone acetonide oiL 0.01 % Drop INSTILL 5 DROPS INTO AFFECTED EAR TWICE A DAY OTIC 7 DAY(S)      fluticasone propionate (FLONASE) 50 mcg/actuation nasal spray SPRAY ONCE IN EACH NOSTRIL ONCE DAILY 16 mL 1    fluticasone-salmeterol diskus inhaler 500-50 mcg Inhale 1 puff into the lungs 2 (two) times daily. Controller 60 each 11    glycopyrrolate (ROBINUL) 2 MG Tab Increase to 2 pill qday as tolerated 180 tablet 1    hydrOXYzine pamoate (VISTARIL) 25 MG Cap Take 1 capsule (25 mg total) by mouth every 6 (six) hours as needed (Anxiety). 25 capsule 0    levalbuterol (XOPENEX HFA) 45 mcg/actuation inhaler Inhale 2 puffs into the lungs every 8 (eight) hours as needed for Wheezing. 1 Inhaler 12    lidocaine (LMX) 4 % cream Apply topically as needed. 45 g 3    loratadine (CLARITIN) 10 mg tablet Take 10 mg by mouth once daily.      megestroL (MEGACE) 20 MG Tab       memantine (NAMENDA) 10 MG Tab Take 1 tablet (10 mg total) by mouth once daily. 90 tablet 3    olopatadine (PATADAY) 0.2 % Drop       pregabalin (LYRICA) 25 MG capsule Take 1 capsule (25 mg total) by mouth 2 (two) times daily. 60 capsule 5    SYNTHROID 88 mcg tablet TAKE 1 TABLET ONCE DAILY 90 tablet 1    triamcinolone acetonide 0.1% (KENALOG) 0.1 % cream AAA bid 454 g 3     No current facility-administered medications on file prior to visit.        Objective:     Physical Exam:      Cardiovascular  Radial pulses 2+ and symmetric, no LE edema bilaterally   ..Awake, alert, pleasant and cooperative.   Recites mos of year backwards to June then stops, does not invoke tremor.   RR equal and unlabored.   Face symmetric.   EOMI.  Hearing intact to finger rub and conversation.  Power testing 4/5 BUE and BLE, except L iliopsoas 5/5 and L hamstring 5/5.     Pushes self to stand.       * Specialized movement exam  No hypophonic speech.    Mask on (pandemic), normal blink reflex.    No cogwheel rigidity.    ANGELICA in hands and feet slowed but no decrementing movements (arthritic hands).    No tremor with rest, posture, kinesis, or intention.    No other dystonia, chorea, athetosis, myoclonus, or tics.    Bit slowed pace and slt antalgic but steady. Has slip on slippers on, not sure if this changes gait at all.. Note is also made of left shoulder elevation on ambulation.            Laboratory Results:  No visits with results within 3 Month(s) from this visit.   Latest known visit with results is:   Lab Visit on 01/29/2020   Component Date Value Ref Range Status    WBC 01/29/2020 7.76  3.90 - 12.70 K/uL Final    RBC 01/29/2020 4.40  4.00 - 5.40 M/uL Final    Hemoglobin 01/29/2020 12.7  12.0 - 16.0 g/dL Final    Hematocrit 01/29/2020 39.0  37.0 - 48.5 % Final    Mean Corpuscular Volume 01/29/2020 89  82 - 98 fL Final    Mean Corpuscular Hemoglobin 01/29/2020 28.9  27.0 - 31.0 pg Final    Mean Corpuscular Hemoglobin Conc 01/29/2020 32.6  32.0 - 36.0 g/dL Final    RDW 01/29/2020 14.6* 11.5 - 14.5 % Final    Platelets 01/29/2020 264  150 - 350 K/uL Final    MPV 01/29/2020 10.9  9.2 - 12.9 fL Final    Immature Granulocytes 01/29/2020 0.4  0.0 - 0.5 % Final    Gran # (ANC) 01/29/2020 4.4  1.8 - 7.7 K/uL Final    Immature Grans (Abs) 01/29/2020 0.03  0.00 - 0.04 K/uL Final    Lymph # 01/29/2020 2.7  1.0 - 4.8 K/uL Final    Mono # 01/29/2020 0.6  0.3 - 1.0 K/uL Final    Eos # 01/29/2020 0.1  0.0 - 0.5 K/uL Final    Baso # 01/29/2020 0.03  0.00 - 0.20 K/uL Final    nRBC 01/29/2020 0  0 /100 WBC Final    Gran% 01/29/2020 56.5  38.0 - 73.0 % Final    Lymph% 01/29/2020 34.1  18.0 - 48.0 % Final    Mono% 01/29/2020 7.2  4.0 - 15.0 % Final    Eosinophil% 01/29/2020 1.4  0.0 - 8.0 % Final    Basophil% 01/29/2020 0.4  0.0 - 1.9 % Final    Differential Method  01/29/2020 Automated   Final    Sodium 01/29/2020 142  136 - 145 mmol/L Final    Potassium 01/29/2020 3.8  3.5 - 5.1 mmol/L Final    Chloride 01/29/2020 107  95 - 110 mmol/L Final    CO2 01/29/2020 23  23 - 29 mmol/L Final    Glucose 01/29/2020 125* 70 - 110 mg/dL Final    BUN, Bld 01/29/2020 12  8 - 23 mg/dL Final    Creatinine 01/29/2020 1.2  0.5 - 1.4 mg/dL Final    Calcium 01/29/2020 10.0  8.7 - 10.5 mg/dL Final    Total Protein 01/29/2020 7.7  6.0 - 8.4 g/dL Final    Albumin 01/29/2020 3.9  3.5 - 5.2 g/dL Final    Total Bilirubin 01/29/2020 0.6  0.1 - 1.0 mg/dL Final    Alkaline Phosphatase 01/29/2020 65  55 - 135 U/L Final    AST 01/29/2020 20  10 - 40 U/L Final    ALT 01/29/2020 15  10 - 44 U/L Final    Anion Gap 01/29/2020 12  8 - 16 mmol/L Final    eGFR if African American 01/29/2020 47* >60 mL/min/1.73 m^2 Final    eGFR if non African American 01/29/2020 41* >60 mL/min/1.73 m^2 Final    Cholesterol 01/29/2020 214* 120 - 199 mg/dL Final    Triglycerides 01/29/2020 125  30 - 150 mg/dL Final    HDL 01/29/2020 81* 40 - 75 mg/dL Final    LDL Cholesterol 01/29/2020 108.0  63.0 - 159.0 mg/dL Final    Hdl/Cholesterol Ratio 01/29/2020 37.9  20.0 - 50.0 % Final    Total Cholesterol/HDL Ratio 01/29/2020 2.6  2.0 - 5.0 Final    Non-HDL Cholesterol 01/29/2020 133  mg/dL Final    TSH 01/29/2020 0.513  0.400 - 4.000 uIU/mL Final    Vit D, 25-Hydroxy 01/29/2020 34  30 - 96 ng/mL Final    Vitamin B-12 01/29/2020 584  210 - 950 pg/mL Final    Hemoglobin A1C 01/29/2020 6.4* 4.0 - 5.6 % Final    Estimated Avg Glucose 01/29/2020 137* 68 - 131 mg/dL Final    BNP 01/29/2020 32  0 - 99 pg/mL Final    Ferritin 01/29/2020 15* 20.0 - 300.0 ng/mL Final    Iron 01/29/2020 65  30 - 160 ug/dL Final    Transferrin 01/29/2020 294  200 - 375 mg/dL Final    TIBC 01/29/2020 435  250 - 450 ug/dL Final    Saturated Iron 01/29/2020 15* 20 - 50 % Final    Retic 01/29/2020 1.5  0.5 - 2.5 % Final    Haptoglobin  01/29/2020 195  30 - 250 mg/dL Final    D-Dimer 01/29/2020 0.40  <0.50 mg/L FEU Final       Imaging:   Independent review of images:                         FINDINGS:  There is no midline shift, hydrocephalus or mass effect.  Age-appropriate generalized cerebral volume loss and moderate chronic microvascular ischemic changes stable from prior.  No evidence of acute intracranial hemorrhage or acute major vascular territory infarct.  No abnormal extra-axial fluid collections.  No evidence of a space-occupying mass.  Calcifications within the basal ganglia and dentate nuclei.  Calvarium is intact without evidence of fracture.  Visualized paranasal sinuses and mastoid air cells are clear.     Impression:     No acute intracranial abnormality and no significant detrimental change from prior exam.        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   Orders:  -     Ambulatory consult to Neuropsychology        Medical Decision Making:    There is no evidence of pdism on exam today. Given her hallucinations & RBD,  DLB is included in the differentials.      Recommend Melatonin 5 mg (bilayer tab if can find) one hour before bedtime to promote sleep onset as well as RBD.    Also stressed the importance of vascular RF control.     Follow up 3-6 months in memory clinic.     I spent 50 minutes face-to-face with the patient and daughter with >50% of the time spent with counseling and education regarding:  - results of data, diagnosis, and recommendations stated above  - the  prognosis of a dementia, RBD  - risks and benefits of melatonin   - importance of diet and exercise    Kassi Rodriguez, TOMI, NP-C  Division of Movement and Memory Disorders  [facility]  [phone]

## 2020-08-05 NOTE — PATIENT INSTRUCTIONS
Recommend Melatonin 5 mg (bilayer tablet) if you can find it. This has immediate and extended release components. It may help you fall asleep and stay asleep. More importantly, it may help with the active dreams you have.       To promote good brain health, it is important to control vascular risk factors (high blood pressure, high cholesterol, diabetes).  Recommend taking all medicines as prescribed.   Recommend eating a healthy diet (ex Mediterranean Diet).  Recommend exercise.

## 2020-08-06 NOTE — ASSESSMENT & PLAN NOTE
Level of Care: Appropriately managed. Continue to provide support in all complex activities of daily living.     Resources: www.lbda.org/ and www.alz.org    Optimize Brain Health: Maintain a heart healthy diet and manage vascular risk factors. Stay physically active.     Follow-up: Virtual visit in assessment clinic in 3 months. In-person visit in 6 months.

## 2020-08-06 NOTE — ASSESSMENT & PLAN NOTE
Mental Health Treatment: Several options to consider, including consulting with geriatric psychiatry to optimize medication, behavioral activation, and counseling.

## 2020-08-10 ENCOUNTER — TELEPHONE (OUTPATIENT)
Dept: NEUROLOGY | Facility: CLINIC | Age: 85
End: 2020-08-10

## 2020-08-10 NOTE — TELEPHONE ENCOUNTER
NEUROPSYCHOLOGICAL EVALUATION - CONFIDENTIAL  FEEDBACK NOTE    On 8/10/2020, I provided Ms. Debbie Ding's daughter, Ezequiel, the neuropsychological evaluation results. Please see the full report for a comprehensive overview of the findings. I provided the number for geriatric psychiatry to focus on mood. I also provided my number to call if needed prior to 3 month follow-up. Recommended focusing on behavioral activation/physical activity to help avoid further frailty and falls. Also recommended pushing hydration.     Alfredo Quezada Psy.D., ABPP  Board Certified in Clinical Neuropsychology  [facility] - Department of Neurology

## 2020-08-11 NOTE — PROGRESS NOTES
Social Work, Neuropsychology Clinic:    Met briefly during Assessment Clinic with daughter Madina, with daughter Ezequiel on speakerphone, to address possible needs and questions about patient's diagnosis and symptoms. Madina reported family provides any necessary care to patient, but they had some concerns about boredom and inactivity. We brainstormed for activities that patient might be willing to engage in. Will e-mail daughter with basic info and resources.  Plan: Follow up in one month.

## 2020-08-14 ENCOUNTER — TELEPHONE (OUTPATIENT)
Dept: NEUROLOGY | Facility: CLINIC | Age: 85
End: 2020-08-14

## 2020-08-14 ENCOUNTER — OUTPATIENT CASE MANAGEMENT (OUTPATIENT)
Dept: NEUROLOGY | Facility: CLINIC | Age: 85
End: 2020-08-14

## 2020-08-14 NOTE — TELEPHONE ENCOUNTER
----- Message from Jose Dow sent at 8/14/2020  9:54 AM CDT -----  Contact: Pt @[phone]  Patient requesting a return call regarding from Kassi Rodriguez only about the suggestive OTC medication is not working, pls call to discuss further

## 2020-08-17 ENCOUNTER — TELEPHONE (OUTPATIENT)
Dept: RHEUMATOLOGY | Facility: CLINIC | Age: 85
End: 2020-08-17

## 2020-08-17 NOTE — PROGRESS NOTES
Social Work, Neuropsychology Clinic:    Emailed daughter Ezequiel the following: dementia caregivers support group flier, activity handouts, Family Caregiver [place] site, [employer] site, websites for activities and mental stimulation.

## 2020-08-17 NOTE — TELEPHONE ENCOUNTER
Spoke to patient relayed information per dr ponce about increasing the lyrica to 75 mg a day she still wants to speak wtth dr ponce

## 2020-08-17 NOTE — TELEPHONE ENCOUNTER
----- Message from Sharon Cosby MA sent at 8/17/2020  9:21 AM CDT -----  Patient states the lyrica isn't helping at all   ----- Message -----  From: Olga Gomez  Sent: 8/17/2020   8:57 AM CDT  To: [facility] Staff    Pt would like to receive a call back regarding her medication. Pt states her medication is not helping. Please contact the pt to advise.    Contact info [phone]

## 2020-08-17 NOTE — TELEPHONE ENCOUNTER
Called twice no answer will try again soon to relay message below   Increase LYRICA dosage to 3 DAILY

## 2020-08-19 ENCOUNTER — TELEPHONE (OUTPATIENT)
Dept: NEUROLOGY | Facility: CLINIC | Age: 85
End: 2020-08-19

## 2020-08-19 NOTE — TELEPHONE ENCOUNTER
Correct contact number is [phone]. Spoke with pt and is asking why the NP that she saw in psychiatry on 8/5 did not address her issues? Instructed to call psychiatry and ask to speak with the NP to have these questions answered. Phone number given to pt for psych. No further questions asked.

## 2020-08-19 NOTE — TELEPHONE ENCOUNTER
----- Message from Diogo Guzman MD sent at 8/19/2020  9:56 AM CDT -----  Regarding: RE: Call Back Request  Contact: Patient- [phone]  Brie,  Could you give her a call?  CV  ----- Message -----  From: Kacy Ding  Sent: 8/19/2020   9:42 AM CDT  To: Alfredo Quezada, Ovi, Diogo Guzman MD  Subject: Call Back Request                                Pt is requesting a call from Dr. Guzman. She didn't say why... She originally requested a call back from Dr. Quezada but called back asking for Dr. Guzman instead.

## 2020-08-24 ENCOUNTER — TELEPHONE (OUTPATIENT)
Dept: RHEUMATOLOGY | Facility: CLINIC | Age: 85
End: 2020-08-24

## 2020-08-24 NOTE — TELEPHONE ENCOUNTER
Pt called stating she was in pain dr ponce advised she increase the lyrica to three daily but patient states he told her that already last week.

## 2020-08-25 ENCOUNTER — TELEPHONE (OUTPATIENT)
Dept: RHEUMATOLOGY | Facility: CLINIC | Age: 85
End: 2020-08-25

## 2020-08-25 RX ORDER — NORTRIPTYLINE HYDROCHLORIDE 25 MG/1
25 CAPSULE ORAL NIGHTLY
Qty: 30 CAPSULE | Refills: 11 | Status: SHIPPED | OUTPATIENT
Start: 2020-08-25 | End: 2020-12-04

## 2020-08-25 NOTE — TELEPHONE ENCOUNTER
----- Message from Gilles Dunne MD sent at 8/25/2020  8:46 AM CDT -----  Contact: pt  Increase to 4 at bedtime.  If that does not help, I will change her to a different medication.  ----- Message -----  From: Sharon Cosby MA  Sent: 8/24/2020   4:05 PM CDT  To: Gilles Dunne MD    Spoke to patient she said last week you advised her to take 3 at bedtime so she's already taking 3   ----- Message -----  From: Gilles Dunne MD  Sent: 8/24/2020   3:40 PM CDT  To: Sharon Cosby MA    She is supposed to be taking 2 at bedtime.  If she is, tell her to increase it  ----- Message -----  From: Sharon Cosby MA  Sent: 8/24/2020   9:13 AM CDT  To: Gilles Dunne MD      ----- Message -----  From: Marin Moya  Sent: 8/24/2020   9:11 AM CDT  To: [facility] Staff    Please call pt at [phone]    Patient stated that the pregabalin (LYRICA) 25 MG capsule is not working    Patient is having severe pain and not sleeping at night    Thank you

## 2020-08-25 NOTE — TELEPHONE ENCOUNTER
She has had no response to Lyrica.  She is sure this is because it is generic rather than brand name.  I will change her to Pamelor 25 mg at bedtime.  She is asking about Remeron but I told her I do not prescribe it.  The last prescription she got was for her neurologist, Dr. Gamble.

## 2020-08-25 NOTE — TELEPHONE ENCOUNTER
Spoke to pt to relay below she insisted on speaking with dr ponce so I transferred the call to him

## 2020-08-25 NOTE — TELEPHONE ENCOUNTER
----- Message from Sharon Cosby MA sent at 8/24/2020  4:05 PM CDT -----  Contact: pt  Spoke to patient she said last week you advised her to take 3 at bedtime so she's already taking 3   ----- Message -----  From: Gilles Dunne MD  Sent: 8/24/2020   3:40 PM CDT  To: Sharon Cosby MA    She is supposed to be taking 2 at bedtime.  If she is, tell her to increase it  ----- Message -----  From: Sharon Cosby MA  Sent: 8/24/2020   9:13 AM CDT  To: Gilles Dunne MD      ----- Message -----  From: Marin Moya  Sent: 8/24/2020   9:11 AM CDT  To: [facility] Staff    Please call pt at [phone]    Patient stated that the pregabalin (LYRICA) 25 MG capsule is not working    Patient is having severe pain and not sleeping at night    Thank you

## 2020-08-31 ENCOUNTER — EXTERNAL CHRONIC CARE MANAGEMENT (OUTPATIENT)
Dept: PRIMARY CARE CLINIC | Facility: CLINIC | Age: 85
End: 2020-08-31
Payer: MEDICARE

## 2020-08-31 PROCEDURE — 99490 PR CHRONIC CARE MGMT, 1ST 20 MIN: ICD-10-PCS | Mod: S$PBB,,, | Performed by: INTERNAL MEDICINE

## 2020-08-31 PROCEDURE — 99490 CHRNC CARE MGMT STAFF 1ST 20: CPT | Mod: S$PBB,,, | Performed by: INTERNAL MEDICINE

## 2020-08-31 PROCEDURE — 99490 CHRNC CARE MGMT STAFF 1ST 20: CPT | Mod: PBBFAC | Performed by: INTERNAL MEDICINE

## 2020-09-03 ENCOUNTER — PATIENT OUTREACH (OUTPATIENT)
Dept: ADMINISTRATIVE | Facility: HOSPITAL | Age: 85
End: 2020-09-03

## 2020-09-03 ENCOUNTER — PATIENT OUTREACH (OUTPATIENT)
Dept: ADMINISTRATIVE | Facility: OTHER | Age: 85
End: 2020-09-03

## 2020-09-03 NOTE — PROGRESS NOTES
Chart reviewed.   Immunizations: Triggered Imm Registry     Orders placed: n/a  Upcoming appts to satisfy VINICIUS topics: n/a

## 2020-09-04 ENCOUNTER — OFFICE VISIT (OUTPATIENT)
Dept: UROLOGY | Facility: CLINIC | Age: 85
End: 2020-09-04
Payer: MEDICARE

## 2020-09-04 DIAGNOSIS — R35.1 NOCTURIA: Primary | ICD-10-CM

## 2020-09-04 PROCEDURE — 99213 PR OFFICE/OUTPT VISIT, EST, LEVL III, 20-29 MIN: ICD-10-PCS | Mod: S$GLB,,, | Performed by: UROLOGY

## 2020-09-04 PROCEDURE — 99213 OFFICE O/P EST LOW 20 MIN: CPT | Mod: S$GLB,,, | Performed by: UROLOGY

## 2020-09-04 NOTE — PROGRESS NOTES
[facility]      Overactive Bladder Return Note    9/4/2020    Patient Name: Debbie Ding  Referred by:No ref. provider found    Previous Therapies     Medication: (VESIcare (provided some but insufficient benefit)   Myrbetriq (provided some but insufficient benefit)    Botox -  resulted in urinary retention and nighttime incontinence     Summary of OAB Findings:    Last Visit  Today   Treatment: Botox (100 units) - 9 month ago Treatment: No therapy   Side Effects: none Side Effects: none   Daytime Frequency: 2-3x daily Daytime Frequency: 2-3x daily   Noturia: nonex Nocturia: 3-4x   UUI Episodes: 1 UUI Episodes: 2   Daily Pads: none/day Daily Pads: none/day   Voiding Difficulty: no Voiding Difficulty: no   PVR: 200ml (cath) PVR: 28 mL (scan)     Today she reports no difficulty during the day. He symptoms are mostly at night with occasional UUI. She does not take a diuretic.    A review of 10+ systems was conducted with pertinent positive and negative findings documented in HPI with all other systems reviewed and negative.    Past medical, family, surgical and social history reviewed as documented in chart with pertinent positive medical, family, surgical and social history detailed in HPI.    Const: no acute distress, conversant and alert  Eyes: anicteric, extraocular muscles intact  ENMT: normocephalic, Nl oral membranes  Cardio: no cyanosis, nl cap refill  Pulm: no tachypnea; no resp distress  Abd: soft, no tenderness  Musc: no laceration, no tenderness  Neuro: alert; oriented x 3  Skin: warm, dry; no petichiae  Psych: no anxiety; normal speech     Assessment/Plan:    Nocturia (estab, worsening): Her symptoms of OAB are relatively stable. She is urinating more freely and PVR today is considerably decreased. Of note, she now reports isolated nocturia without daytime frequency. Rather than proceeding to repeat Botox at this time (particularly with her history of retention) I would like for  her to keep a volume-based diary. I suspect that she has nocturnal polyuria. She has no LE edema appreciated today. She takes no diuretic. We will check her nocturnal UOP. Would view her evening fluid intake. Would consider an afternoon diuretic as well as a sleep study if she does have nocturnal polyuria.

## 2020-09-10 ENCOUNTER — TELEPHONE (OUTPATIENT)
Dept: RHEUMATOLOGY | Facility: CLINIC | Age: 85
End: 2020-09-10

## 2020-09-10 RX ORDER — PREGABALIN 50 MG/1
50 CAPSULE ORAL DAILY
Qty: 30 CAPSULE | Refills: 5 | Status: SHIPPED | OUTPATIENT
Start: 2020-09-10 | End: 2020-11-30 | Stop reason: SINTOL

## 2020-09-10 NOTE — TELEPHONE ENCOUNTER
----- Message from Sharon Cosby MA sent at 9/9/2020  3:48 PM CDT -----  Called her she said she wanted to speak to you directly   ----- Message -----  From: Olga Gomez  Sent: 9/9/2020   3:19 PM CDT  To: [facility] Staff    Pt would like to receive a call back regarding her medication.    Contact info [phone]

## 2020-09-10 NOTE — TELEPHONE ENCOUNTER
She is still not sleeping with Pamelor.  I told her to increase it to 2 at bedtime.  I will also put her back on brand-name Lyrica since she feels that her insurance company will pay for the brand-name and the brand-name works better than the generic.

## 2020-09-14 ENCOUNTER — OUTPATIENT CASE MANAGEMENT (OUTPATIENT)
Dept: NEUROLOGY | Facility: CLINIC | Age: 85
End: 2020-09-14

## 2020-09-16 ENCOUNTER — TELEPHONE (OUTPATIENT)
Dept: RHEUMATOLOGY | Facility: CLINIC | Age: 85
End: 2020-09-16

## 2020-09-16 NOTE — TELEPHONE ENCOUNTER
----- Message from Sharon Cosby MA sent at 9/16/2020  9:15 AM CDT -----    ----- Message -----  From: Olga Gomez  Sent: 9/16/2020   9:13 AM CDT  To: [facility] Staff    Pt would like to receive a call back regarding her medication. Pt states the medication is not working. Please contact the pt to advise    Contact info [phone]

## 2020-09-17 ENCOUNTER — TELEPHONE (OUTPATIENT)
Dept: NEUROLOGY | Facility: CLINIC | Age: 85
End: 2020-09-17

## 2020-09-17 NOTE — TELEPHONE ENCOUNTER
Pt stating that her memory issues, that she is very concerned about, has not been address with the neuropsych provider. Will send message to provider to reach out to pt to address issue.

## 2020-09-17 NOTE — TELEPHONE ENCOUNTER
----- Message from Angelica Jean-Baptiste sent at 9/17/2020  1:46 PM CDT -----  Pt calling to to state the Dr. He referred to her didn't address the issue. Asking for a call back.    Contact info [phone]

## 2020-09-17 NOTE — TELEPHONE ENCOUNTER
----- Message from Jose Dow sent at 9/17/2020  9:48 AM CDT -----  Contact: Pt @ [phone]  Patient requesting a return call to the OTC medication that's causing headaches,pls call

## 2020-09-17 NOTE — TELEPHONE ENCOUNTER
"Spoke with MsCalin Timur, she states "Melatonin caused her to have severe headaches, has trouble remembering things, did not address the issues she was being seen for, will like to speak directly with Kassi". MsCalin Timur informed a message will be forward to Kassi for review.   "

## 2020-09-18 ENCOUNTER — OFFICE VISIT (OUTPATIENT)
Dept: UROLOGY | Facility: CLINIC | Age: 85
End: 2020-09-18
Payer: MEDICARE

## 2020-09-18 VITALS — HEART RATE: 78 BPM | SYSTOLIC BLOOD PRESSURE: 148 MMHG | DIASTOLIC BLOOD PRESSURE: 69 MMHG

## 2020-09-18 DIAGNOSIS — R35.1 NOCTURIA: ICD-10-CM

## 2020-09-18 DIAGNOSIS — N32.81 OAB (OVERACTIVE BLADDER): Primary | ICD-10-CM

## 2020-09-18 PROCEDURE — 99214 PR OFFICE/OUTPT VISIT, EST, LEVL IV, 30-39 MIN: ICD-10-PCS | Mod: S$GLB,,, | Performed by: UROLOGY

## 2020-09-18 PROCEDURE — 99214 OFFICE O/P EST MOD 30 MIN: CPT | Mod: S$GLB,,, | Performed by: UROLOGY

## 2020-09-18 NOTE — PROGRESS NOTES
[facility]        Overactive Bladder Return Note     9/18/20     Patient Name: Debbie Ding  Referred by:No ref. provider found     Previous Therapies     · Medication: (VESIcare (provided some but insufficient benefit)  · Myrbetriq (provided some but insufficient benefit)   · Botox -  Improvement but not resolution of symptoms; last injection 3/2020     Summary of OAB Findings:     Last Visit  Today   Treatment: Botox (100 units) Treatment: Botox 100 units (9 months)   Side Effects: none Side Effects: none   Daytime Frequency: 2-3x daily Daytime Frequency: 6 x daily   Noturia: nonex Nocturia: 3-4x   UUI Episodes: 1 UUI Episodes: 2   Daily Pads: none/day Daily Pads: 2/day   Voiding Difficulty: no Voiding Difficulty: no   PVR: 200ml (cath) PVR: 23 mL (scan)      Today she reports no difficulty during the day. Her nocturia and UUI have returned to baseline levels. We did review a repeat diary and she needs to decrease PM fluid intake. She has a slight nocturnal polyuria that appears associated with this.      A review of 10+ systems was conducted with pertinent positive and negative findings documented in HPI with all other systems reviewed and negative.     Past medical, family, surgical and social history reviewed as documented in chart with pertinent positive medical, family, surgical and social history detailed in HPI.     Const: no acute distress, conversant and alert  Eyes: anicteric, extraocular muscles intact  ENMT: normocephalic, Nl oral membranes  Cardio: no cyanosis, nl cap refill  Pulm: no tachypnea; no resp distress  Abd: soft, no tenderness  Musc: no laceration, no tenderness  Neuro: alert; oriented x 3  Skin: warm, dry; no petichiae  Psych: no anxiety; normal speech      Assessment/Plan:     Nocturia (estab, worsening): Her symptoms of OAB are beginning to recur 9 months after last Botox injection. She is urinating more freely and PVR today is considerably decreased. It is  appropriate to repeat Botox now (100 units, not 150 units as I had first considered) but she also need to decrease her PM fluid intake. After review of her chart after visit, although she denies voiding difficulty after last injection, my notes certainly indicate this and I would not want to increase above 100 units.

## 2020-09-21 ENCOUNTER — IMMUNIZATION (OUTPATIENT)
Dept: PHARMACY | Facility: CLINIC | Age: 85
End: 2020-09-21
Payer: MEDICARE

## 2020-09-28 ENCOUNTER — OFFICE VISIT (OUTPATIENT)
Dept: CARDIOLOGY | Facility: CLINIC | Age: 85
End: 2020-09-28
Attending: INTERNAL MEDICINE
Payer: MEDICARE

## 2020-09-28 VITALS
HEART RATE: 67 BPM | BODY MASS INDEX: 24.77 KG/M2 | DIASTOLIC BLOOD PRESSURE: 62 MMHG | WEIGHT: 148.69 LBS | SYSTOLIC BLOOD PRESSURE: 144 MMHG | HEIGHT: 65 IN

## 2020-09-28 DIAGNOSIS — F01.518 MIXED CORTICAL AND SUBCORTICAL VASCULAR DEMENTIA WITH BEHAVIORAL DISTURBANCE: ICD-10-CM

## 2020-09-28 DIAGNOSIS — J45.901 CHRONIC ASTHMA WITH ACUTE EXACERBATION, UNSPECIFIED ASTHMA SEVERITY, UNSPECIFIED WHETHER PERSISTENT: ICD-10-CM

## 2020-09-28 DIAGNOSIS — Z86.711 HISTORY OF PULMONARY EMBOLISM: ICD-10-CM

## 2020-09-28 DIAGNOSIS — N18.30 CKD (CHRONIC KIDNEY DISEASE) STAGE 3, GFR 30-59 ML/MIN: ICD-10-CM

## 2020-09-28 DIAGNOSIS — E03.9 HYPOTHYROIDISM, ADULT: ICD-10-CM

## 2020-09-28 DIAGNOSIS — Z86.718 HISTORY OF DEEP VENOUS THROMBOSIS: ICD-10-CM

## 2020-09-28 DIAGNOSIS — E78.00 HYPERCHOLESTEROLEMIA: ICD-10-CM

## 2020-09-28 DIAGNOSIS — E11.9 TYPE 2 DIABETES MELLITUS WITHOUT COMPLICATION, WITHOUT LONG-TERM CURRENT USE OF INSULIN: ICD-10-CM

## 2020-09-28 DIAGNOSIS — I10 ESSENTIAL HYPERTENSION: ICD-10-CM

## 2020-09-28 DIAGNOSIS — Z79.01 CHRONIC ANTICOAGULATION: ICD-10-CM

## 2020-09-28 PROCEDURE — 93005 ELECTROCARDIOGRAM TRACING: CPT

## 2020-09-28 PROCEDURE — 93010 ELECTROCARDIOGRAM REPORT: CPT | Mod: 76,,, | Performed by: INTERNAL MEDICINE

## 2020-09-28 PROCEDURE — 99999 PR PBB SHADOW E&M-EST. PATIENT-LVL IV: ICD-10-PCS | Mod: PBBFAC,,, | Performed by: INTERNAL MEDICINE

## 2020-09-28 PROCEDURE — 99999 PR PBB SHADOW E&M-EST. PATIENT-LVL IV: CPT | Mod: PBBFAC,,, | Performed by: INTERNAL MEDICINE

## 2020-09-28 PROCEDURE — 93005 ELECTROCARDIOGRAM TRACING: CPT | Mod: PBBFAC | Performed by: INTERNAL MEDICINE

## 2020-09-28 PROCEDURE — 99205 PR OFFICE/OUTPT VISIT, NEW, LEVL V, 60-74 MIN: ICD-10-PCS | Mod: S$PBB,25,, | Performed by: INTERNAL MEDICINE

## 2020-09-28 PROCEDURE — 93010 ELECTROCARDIOGRAM REPORT: CPT | Mod: S$PBB,,, | Performed by: INTERNAL MEDICINE

## 2020-09-28 PROCEDURE — 93010 EKG 12-LEAD: ICD-10-PCS | Mod: 76,,, | Performed by: INTERNAL MEDICINE

## 2020-09-28 PROCEDURE — 99205 OFFICE O/P NEW HI 60 MIN: CPT | Mod: S$PBB,25,, | Performed by: INTERNAL MEDICINE

## 2020-09-28 PROCEDURE — 99214 OFFICE O/P EST MOD 30 MIN: CPT | Mod: PBBFAC,25 | Performed by: INTERNAL MEDICINE

## 2020-09-28 NOTE — PROGRESS NOTES
Subjective:     Debbie Ding is a 87 y.o. female hypertension, hypercholesterolemia and diabetes mellitus type 2. She has a healthy weight. In 4/2020 she presented with extensive pulmonary emboli. She was diagnosed with a deep venous thrombosis of her lower extremity. It appears that the DVT and PE were unprovoked. She began apixiban and has been kept anticoagulated since. In addition she has hypothyroidism, chronic kidney disease, stage 3, asthma and some cognitive issues. No exertional chest pain. No palpitations or weak spells. No bleeding.        Hypertension  This is a chronic problem. The current episode started more than 1 year ago. The problem is controlled. Pertinent negatives include no anxiety, blurred vision, chest pain, headaches, malaise/fatigue, neck pain, orthopnea, palpitations, peripheral edema, PND, shortness of breath or sweats. Identifiable causes of hypertension include chronic renal disease.   Hyperlipidemia  Exacerbating diseases include chronic renal disease, diabetes and hypothyroidism. She has no history of liver disease, obesity or nephrotic syndrome. Pertinent negatives include no chest pain, focal sensory loss, focal weakness, leg pain, myalgias or shortness of breath.       Review of Systems   Constitution: Negative for chills, fever and malaise/fatigue.   HENT: Negative for nosebleeds.    Eyes: Negative for blurred vision, double vision, vision loss in left eye and vision loss in right eye.   Cardiovascular: Negative for chest pain, claudication, dyspnea on exertion, irregular heartbeat, leg swelling, near-syncope, orthopnea, palpitations, paroxysmal nocturnal dyspnea and syncope.   Respiratory: Negative for cough, hemoptysis, shortness of breath and wheezing.    Endocrine: Negative for cold intolerance and heat intolerance.   Hematologic/Lymphatic: Negative for bleeding problem. Does not bruise/bleed easily.   Skin: Negative for color change and rash.   Musculoskeletal: Negative  for back pain, falls, muscle weakness, myalgias and neck pain.   Gastrointestinal: Negative for heartburn, hematemesis, hematochezia, hemorrhoids, jaundice, melena, nausea and vomiting.   Genitourinary: Negative for dysuria and hematuria.   Neurological: Negative for dizziness, focal weakness, headaches, light-headedness, loss of balance, numbness, vertigo and weakness.   Psychiatric/Behavioral: Positive for memory loss. Negative for altered mental status and depression. The patient is not nervous/anxious.    Allergic/Immunologic: Negative for hives and persistent infections.       Current Outpatient Medications on File Prior to Visit   Medication Sig Dispense Refill    albuterol (PROVENTIL) 2.5 mg /3 mL (0.083 %) nebulizer solution Take 2.5 mg by nebulization every 6 (six) hours as needed. Rescue      albuterol (PROVENTIL/VENTOLIN HFA) 90 mcg/actuation inhaler INHALE 2 PUFFS BY MOUTH EVERY 6 HRS AS NEEDED      amLODIPine (NORVASC) 10 MG tablet TAKE 1 TABLET ONCE DAILY 90 tablet 1    benzonatate (TESSALON) 100 MG capsule       clobetasoL (TEMOVATE) 0.05 % external solution Pt to mix in 1 jar of cerave cream and apply to affected areas bid 50 mL 3    donepeziL (ARICEPT) 10 MG tablet TAKE 1 TABLET ONCE DAILY INTHE MORNING 90 tablet 3    DULoxetine (CYMBALTA) 30 MG capsule Take 1 capsule (30 mg total) by mouth once daily. 90 capsule 1    ELIQUIS 5 mg Tab TAKE 1 TABLET TWICE A DAY 60 tablet 11    esomeprazole (NEXIUM) 20 MG capsule Take 1 capsule (20 mg total) by mouth before breakfast. 90 capsule 1    fluticasone-salmeterol diskus inhaler 500-50 mcg Inhale 1 puff into the lungs 2 (two) times daily. Controller 60 each 11    glycopyrrolate (ROBINUL) 2 MG Tab Increase to 2 pill qday as tolerated 180 tablet 1    loratadine (CLARITIN) 10 mg tablet Take 10 mg by mouth once daily.      megestroL (MEGACE) 20 MG Tab       memantine (NAMENDA) 10 MG Tab Take 1 tablet (10 mg total) by mouth once daily. 90 tablet 3  "   nortriptyline (PAMELOR) 25 MG capsule Take 1 capsule (25 mg total) by mouth every evening. 30 capsule 11    olopatadine (PATADAY) 0.2 % Drop       pregabalin (LYRICA) 50 MG capsule Take 1 capsule (50 mg total) by mouth once daily. 30 capsule 5    SYNTHROID 88 mcg tablet TAKE 1 TABLET ONCE DAILY 90 tablet 1    triamcinolone acetonide 0.1% (KENALOG) 0.1 % cream AAA bid 454 g 3    fluocinolone acetonide oiL 0.01 % Drop INSTILL 5 DROPS INTO AFFECTED EAR TWICE A DAY OTIC 7 DAY(S)      fluticasone propionate (FLONASE) 50 mcg/actuation nasal spray SPRAY ONCE IN EACH NOSTRIL ONCE DAILY (Patient not taking: Reported on 9/28/2020) 16 mL 1    hydrOXYzine pamoate (VISTARIL) 25 MG Cap Take 1 capsule (25 mg total) by mouth every 6 (six) hours as needed (Anxiety). (Patient not taking: Reported on 9/28/2020) 25 capsule 0    levalbuterol (XOPENEX HFA) 45 mcg/actuation inhaler Inhale 2 puffs into the lungs every 8 (eight) hours as needed for Wheezing. 1 Inhaler 12    lidocaine (LMX) 4 % cream Apply topically as needed. 45 g 3     No current facility-administered medications on file prior to visit.        BP (!) 144/62 (BP Location: Left arm, Patient Position: Sitting, BP Method: Medium (Automatic))   Pulse 67   Ht 5' 5" (1.651 m)   Wt 67.4 kg (148 lb 11.2 oz)   BMI 24.74 kg/m²       Objective:     Physical Exam   Constitutional: She is oriented to person, place, and time. She appears well-developed and well-nourished.  Non-toxic appearance. No distress.   HENT:   Head: Normocephalic and atraumatic.   Nose: Nose normal.   Eyes: Right eye exhibits no discharge. Left eye exhibits no discharge. Right conjunctiva is not injected. Left conjunctiva is not injected. Right pupil is round. Left pupil is round. Pupils are equal.   Neck: Neck supple. No JVD present. Carotid bruit is not present. No thyromegaly present.   Cardiovascular: Normal rate, regular rhythm, S1 normal and S2 normal.  No extrasystoles are present. PMI is " not displaced. Exam reveals gallop and S4.   No murmur heard.  Pulses:       Radial pulses are 2+ on the right side and 2+ on the left side.        Femoral pulses are 2+ on the right side and 2+ on the left side.       Dorsalis pedis pulses are 2+ on the right side and 2+ on the left side.        Posterior tibial pulses are 2+ on the right side and 2+ on the left side.   Pulmonary/Chest: Effort normal and breath sounds normal.   Abdominal: Soft. Normal appearance. There is no hepatosplenomegaly. There is no abdominal tenderness.   Musculoskeletal:      Right ankle: She exhibits no swelling, no ecchymosis and no deformity.      Left ankle: She exhibits no swelling, no ecchymosis and no deformity.   Lymphadenopathy:        Head (right side): No submandibular adenopathy present.        Head (left side): No submandibular adenopathy present.     She has no cervical adenopathy.   Neurological: She is alert and oriented to person, place, and time. She is not disoriented. No cranial nerve deficit.   Skin: Skin is warm, dry and intact. No rash noted. She is not diaphoretic. No cyanosis. Nails show no clubbing.   Psychiatric: She has a normal mood and affect. Her speech is normal and behavior is normal. Judgment and thought content normal. Cognition and memory are normal.       Assessment:     1. History of pulmonary embolism    2. History of deep venous thrombosis    3. Chronic anticoagulation    4. Essential hypertension    5. Hypercholesterolemia    6. Type 2 diabetes mellitus without complication, without long-term current use of insulin    7. CKD (chronic kidney disease) stage 3, GFR 30-59 ml/min    8. Hypothyroidism, adult    9. 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 Chronic asthma with acute exacerbation, unspecified asthma severity, unspecified whether persistent        Plan:     1. History of Pulmonary Embolism   4/27/2019: CTA: Extensive bilateral pulmonary  thromboemboli.   Appears unprovoked.   Lifelong anticoagulation.    2. History of Deep Venous Trombosis of Lower Extremity   4/2019: DVT.   Appears unprovoked.   Lifelong anticoagulation.    3. Chronic Anticoagulation   3/2019: Began apixiban after unprovoked PE and DVT.   On apixiban 5 mg Q12.   Lifelong anticoagulation.   9/28/2020: Reduce apixiban 5 mg Q12 to apixiban 2.5 mg Q12.   No aspirin or NSAID.    4. Hypertension   2019: Diagnosed.   On amlodipine 10 mg Q24.   Appears well controlled.    5. Hypercholesterolemia   1/29/2020: Diagnosed.   1/29/2020: Chol 214. HDL 81. . .   Favorable lipid panel.    6. Diabetes Mellitus, Type 2   2019: Diagnosed. Complications: CKD3. Medications: Diet.    7. Chronic Kidney Disease, Stage 3   1/29/2020: BUN/crea 12/1.2. CrCl 47.    8. Hypothyroidism   2000: Diagnosed.   On levothyroxine 88 mcg Q24.    9. Cognitive Dysfunction   9/2020: Appears mild for age.    10. Asthma   2000: Diagnosed.    11. Primary Care   In transition.    F/u 4 months.    Poppy Nelson M.D.

## 2020-09-30 ENCOUNTER — TELEPHONE (OUTPATIENT)
Dept: CARDIOLOGY | Facility: CLINIC | Age: 85
End: 2020-09-30

## 2020-09-30 ENCOUNTER — EXTERNAL CHRONIC CARE MANAGEMENT (OUTPATIENT)
Dept: PRIMARY CARE CLINIC | Facility: CLINIC | Age: 85
End: 2020-09-30
Payer: MEDICARE

## 2020-09-30 PROCEDURE — 99490 PR CHRONIC CARE MGMT, 1ST 20 MIN: ICD-10-PCS | Mod: S$PBB,,, | Performed by: INTERNAL MEDICINE

## 2020-09-30 PROCEDURE — G2058 PR CHRON CARE MGMT, EA ADDTL 20 MINS: ICD-10-PCS | Mod: S$PBB,,, | Performed by: INTERNAL MEDICINE

## 2020-09-30 PROCEDURE — G2058 CCM ADD 20MIN: HCPCS | Mod: PBBFAC | Performed by: INTERNAL MEDICINE

## 2020-09-30 PROCEDURE — 99490 CHRNC CARE MGMT STAFF 1ST 20: CPT | Mod: S$PBB,,, | Performed by: INTERNAL MEDICINE

## 2020-09-30 PROCEDURE — G2058 CCM ADD 20MIN: HCPCS | Mod: S$PBB,,, | Performed by: INTERNAL MEDICINE

## 2020-09-30 PROCEDURE — 99490 CHRNC CARE MGMT STAFF 1ST 20: CPT | Mod: PBBFAC,25 | Performed by: INTERNAL MEDICINE

## 2020-09-30 NOTE — TELEPHONE ENCOUNTER
"Patient remembered...earlier this year she fell down getting out of bed and about a month later developed "blood clots".  "

## 2020-10-09 DIAGNOSIS — I10 ESSENTIAL HYPERTENSION: ICD-10-CM

## 2020-10-09 NOTE — LETTER
October 23, 2020    Debbie Ding  [address] [place] [address]             [facility] [address]  [place] [address]  Phone: [phone]  Fax: [phone] Debbie Merrill!    Your refill has been approved. Dr. Lyla Mathew is no longer practicing with [facility]. Please let us know if you are interested in establishing care with another provider and we can assist you with making an appointment. The physicians who are currently accepting new patients are as follows:    [facility] ([facility], [address] [place] [id number]):  - Pancho Engle M.D.  - Clyde Carr M.D. (Monday and Friday at [facility]; Tuesday through Thursday at [facility])  - Komal Canchola M.D.  - Markel Rhoades M.D.  - Dayo Woo M.D.    [facility] ([address] [place] [address]):  - Kady Bundy M.D.  - Mahi Lombardi M.D.  - Timi Samuels M.D.  - Ligia Black M.D.  - Peña Beauchamp M.D. (Monday and Friday only at [facility])    [facility] ([address] [place] [address]):  - Angelica Gibson M.D.  - Valencia Smart M.D.  - Peña Jama M.D.  - HERNAN García M.D.  - Kyle James M.D.  - Sahil Cronin M.D.    We look forward to further assisting you. Please let us know if you have any additional questions or concerns.    Thank you!  [facility]

## 2020-10-13 ENCOUNTER — OUTPATIENT CASE MANAGEMENT (OUTPATIENT)
Dept: NEUROLOGY | Facility: CLINIC | Age: 85
End: 2020-10-13

## 2020-10-18 NOTE — PROGRESS NOTES
Social Work - Neuropsychology Clinic:    Phoned caregiver, daughter Madina, to assess updated care management needs. Dtr reported that, since patient's visit to clinic on 8/5/2020, she has improved significantly. She has been getting up and walking around, getting on her Ipad and Zooming with friends, wearing make-up and got new wigs, reading, cooking, etc. She is much more lively. Daughter is very pleased with patient's current condition. No needs are identified at the time. I encouraged dtr to contact me if needs arise, and will transfer case to prn status.

## 2020-10-22 RX ORDER — AMLODIPINE BESYLATE 10 MG/1
TABLET ORAL
Qty: 90 TABLET | Refills: 1 | Status: SHIPPED | OUTPATIENT
Start: 2020-10-22 | End: 2021-01-28 | Stop reason: SDUPTHER

## 2020-10-27 ENCOUNTER — TELEPHONE (OUTPATIENT)
Dept: UROLOGY | Facility: CLINIC | Age: 85
End: 2020-10-27

## 2020-10-27 DIAGNOSIS — R32 URINARY INCONTINENCE: ICD-10-CM

## 2020-10-27 DIAGNOSIS — N39.41 URGENCY INCONTINENCE: Primary | ICD-10-CM

## 2020-10-31 ENCOUNTER — EXTERNAL CHRONIC CARE MANAGEMENT (OUTPATIENT)
Dept: PRIMARY CARE CLINIC | Facility: CLINIC | Age: 85
End: 2020-10-31
Payer: MEDICARE

## 2020-10-31 PROCEDURE — G2058 CCM ADD 20MIN: HCPCS | Mod: PBBFAC | Performed by: INTERNAL MEDICINE

## 2020-10-31 PROCEDURE — G2058 CCM ADD 20MIN: HCPCS | Mod: S$PBB,,, | Performed by: INTERNAL MEDICINE

## 2020-10-31 PROCEDURE — 99490 CHRNC CARE MGMT STAFF 1ST 20: CPT | Mod: PBBFAC | Performed by: INTERNAL MEDICINE

## 2020-10-31 PROCEDURE — 99490 PR CHRONIC CARE MGMT, 1ST 20 MIN: ICD-10-PCS | Mod: S$PBB,,, | Performed by: INTERNAL MEDICINE

## 2020-10-31 PROCEDURE — 99490 CHRNC CARE MGMT STAFF 1ST 20: CPT | Mod: S$PBB,,, | Performed by: INTERNAL MEDICINE

## 2020-10-31 PROCEDURE — G2058 PR CHRON CARE MGMT, EA ADDTL 20 MINS: ICD-10-PCS | Mod: S$PBB,,, | Performed by: INTERNAL MEDICINE

## 2020-11-02 ENCOUNTER — TELEPHONE (OUTPATIENT)
Dept: CARDIOLOGY | Facility: CLINIC | Age: 85
End: 2020-11-02

## 2020-11-02 NOTE — TELEPHONE ENCOUNTER
Patient notified and verbalized understanding.    ----- Message from Poppy Nelson MD sent at 11/2/2020  8:21 AM CST -----  Hold apixiban for 48 hours.    Poppy Nelson M.D.  ----- Message -----  From: Kirsty Rivera RN  Sent: 10/30/2020   3:32 PM CST  To: Poppy Nelson MD, Leslie Mcwilliams Staff    Pt. Is scheduled for cystoscopy, with botulinum toxin injection by Dr. Rankin 11/11  Request Eliquis instructions please

## 2020-11-03 ENCOUNTER — TELEPHONE (OUTPATIENT)
Dept: UROLOGY | Facility: CLINIC | Age: 85
End: 2020-11-03

## 2020-11-03 NOTE — TELEPHONE ENCOUNTER
Spoke to the patient and she verbally understood.    Isacc  ----- Message from Diogo Rankin MD sent at 11/3/2020  2:33 PM CST -----  Regarding: RE: wants a different covid test  Nope. That is not how they are done.   ----- Message -----  From: Shiloh Sánchez MA  Sent: 11/3/2020   2:27 PM CST  To: Tiny German LPN, Diogo Rankin MD  Subject: FW: wants a different covid test                   ----- Message -----  From: Neli Cain MA  Sent: 11/3/2020   2:19 PM CST  To: Massiel Lind Staff  Subject: wants a different covid test                     Ms Ding called and was wondering if she could have a different covid test. The swab irritates her nose and she would like to know if she could get done it by mouth?

## 2020-11-04 ENCOUNTER — LAB VISIT (OUTPATIENT)
Dept: LAB | Facility: OTHER | Age: 85
End: 2020-11-04
Attending: FAMILY MEDICINE
Payer: MEDICARE

## 2020-11-04 ENCOUNTER — OFFICE VISIT (OUTPATIENT)
Dept: INTERNAL MEDICINE | Facility: CLINIC | Age: 85
End: 2020-11-04
Attending: FAMILY MEDICINE
Payer: MEDICARE

## 2020-11-04 VITALS
HEART RATE: 65 BPM | HEIGHT: 65 IN | OXYGEN SATURATION: 99 % | WEIGHT: 144.19 LBS | BODY MASS INDEX: 24.02 KG/M2 | SYSTOLIC BLOOD PRESSURE: 122 MMHG | DIASTOLIC BLOOD PRESSURE: 58 MMHG

## 2020-11-04 DIAGNOSIS — E11.9 TYPE 2 DIABETES MELLITUS WITHOUT COMPLICATION, WITHOUT LONG-TERM CURRENT USE OF INSULIN: ICD-10-CM

## 2020-11-04 DIAGNOSIS — N32.81 OVERACTIVE BLADDER: ICD-10-CM

## 2020-11-04 DIAGNOSIS — Z86.711 HISTORY OF PULMONARY EMBOLISM: ICD-10-CM

## 2020-11-04 DIAGNOSIS — E03.9 HYPOTHYROIDISM, ADULT: ICD-10-CM

## 2020-11-04 DIAGNOSIS — R53.83 FATIGUE, UNSPECIFIED TYPE: ICD-10-CM

## 2020-11-04 DIAGNOSIS — I10 ESSENTIAL HYPERTENSION: Primary | ICD-10-CM

## 2020-11-04 DIAGNOSIS — I10 ESSENTIAL HYPERTENSION: ICD-10-CM

## 2020-11-04 DIAGNOSIS — N18.31 STAGE 3A CHRONIC KIDNEY DISEASE: ICD-10-CM

## 2020-11-04 LAB
ALBUMIN SERPL BCP-MCNC: 4 G/DL (ref 3.5–5.2)
ALP SERPL-CCNC: 81 U/L (ref 55–135)
ALT SERPL W/O P-5'-P-CCNC: 11 U/L (ref 10–44)
ANION GAP SERPL CALC-SCNC: 12 MMOL/L (ref 8–16)
AST SERPL-CCNC: 16 U/L (ref 10–40)
BASOPHILS # BLD AUTO: 0.03 K/UL (ref 0–0.2)
BASOPHILS NFR BLD: 0.5 % (ref 0–1.9)
BILIRUB SERPL-MCNC: 0.9 MG/DL (ref 0.1–1)
BUN SERPL-MCNC: 15 MG/DL (ref 8–23)
CALCIUM SERPL-MCNC: 10 MG/DL (ref 8.7–10.5)
CHLORIDE SERPL-SCNC: 107 MMOL/L (ref 95–110)
CO2 SERPL-SCNC: 22 MMOL/L (ref 23–29)
CREAT SERPL-MCNC: 1.4 MG/DL (ref 0.5–1.4)
DIFFERENTIAL METHOD: NORMAL
EOSINOPHIL # BLD AUTO: 0.3 K/UL (ref 0–0.5)
EOSINOPHIL NFR BLD: 4.7 % (ref 0–8)
ERYTHROCYTE [DISTWIDTH] IN BLOOD BY AUTOMATED COUNT: 13.9 % (ref 11.5–14.5)
EST. GFR  (AFRICAN AMERICAN): 39 ML/MIN/1.73 M^2
EST. GFR  (NON AFRICAN AMERICAN): 34 ML/MIN/1.73 M^2
GLUCOSE SERPL-MCNC: 97 MG/DL (ref 70–110)
HCT VFR BLD AUTO: 38.9 % (ref 37–48.5)
HGB BLD-MCNC: 12.8 G/DL (ref 12–16)
IMM GRANULOCYTES # BLD AUTO: 0.02 K/UL (ref 0–0.04)
IMM GRANULOCYTES NFR BLD AUTO: 0.3 % (ref 0–0.5)
LYMPHOCYTES # BLD AUTO: 2.5 K/UL (ref 1–4.8)
LYMPHOCYTES NFR BLD: 39.5 % (ref 18–48)
MCH RBC QN AUTO: 28.8 PG (ref 27–31)
MCHC RBC AUTO-ENTMCNC: 32.9 G/DL (ref 32–36)
MCV RBC AUTO: 88 FL (ref 82–98)
MONOCYTES # BLD AUTO: 0.6 K/UL (ref 0.3–1)
MONOCYTES NFR BLD: 9.5 % (ref 4–15)
NEUTROPHILS # BLD AUTO: 2.8 K/UL (ref 1.8–7.7)
NEUTROPHILS NFR BLD: 45.5 % (ref 38–73)
NRBC BLD-RTO: 0 /100 WBC
PLATELET # BLD AUTO: 238 K/UL (ref 150–350)
PMV BLD AUTO: 11.8 FL (ref 9.2–12.9)
POTASSIUM SERPL-SCNC: 4.1 MMOL/L (ref 3.5–5.1)
PROT SERPL-MCNC: 7.6 G/DL (ref 6–8.4)
RBC # BLD AUTO: 4.44 M/UL (ref 4–5.4)
SODIUM SERPL-SCNC: 141 MMOL/L (ref 136–145)
T4 FREE SERPL-MCNC: 1.55 NG/DL (ref 0.71–1.51)
TSH SERPL DL<=0.005 MIU/L-ACNC: 0.01 UIU/ML (ref 0.4–4)
WBC # BLD AUTO: 6.21 K/UL (ref 3.9–12.7)

## 2020-11-04 PROCEDURE — 99999 PR PBB SHADOW E&M-EST. PATIENT-LVL V: CPT | Mod: PBBFAC,,, | Performed by: FAMILY MEDICINE

## 2020-11-04 PROCEDURE — 84439 ASSAY OF FREE THYROXINE: CPT

## 2020-11-04 PROCEDURE — 85025 COMPLETE CBC W/AUTO DIFF WBC: CPT

## 2020-11-04 PROCEDURE — 99214 OFFICE O/P EST MOD 30 MIN: CPT | Mod: S$PBB,,, | Performed by: FAMILY MEDICINE

## 2020-11-04 PROCEDURE — 99214 PR OFFICE/OUTPT VISIT, EST, LEVL IV, 30-39 MIN: ICD-10-PCS | Mod: S$PBB,,, | Performed by: FAMILY MEDICINE

## 2020-11-04 PROCEDURE — 36415 COLL VENOUS BLD VENIPUNCTURE: CPT

## 2020-11-04 PROCEDURE — 84443 ASSAY THYROID STIM HORMONE: CPT

## 2020-11-04 PROCEDURE — 99999 PR PBB SHADOW E&M-EST. PATIENT-LVL V: ICD-10-PCS | Mod: PBBFAC,,, | Performed by: FAMILY MEDICINE

## 2020-11-04 PROCEDURE — 99215 OFFICE O/P EST HI 40 MIN: CPT | Mod: PBBFAC | Performed by: FAMILY MEDICINE

## 2020-11-04 PROCEDURE — 80053 COMPREHEN METABOLIC PANEL: CPT

## 2020-11-04 RX ORDER — LEVOTHYROXINE SODIUM 75 UG/1
75 TABLET ORAL DAILY
Qty: 90 TABLET | Refills: 1 | Status: SHIPPED | OUTPATIENT
Start: 2020-11-04 | End: 2021-01-18 | Stop reason: SDUPTHER

## 2020-11-04 NOTE — PROGRESS NOTES
"CHIEF COMPLAINT:  Preop visit    HISTORY OF PRESENT ILLNESS: The patient is a chronically ill 88-year-old black female.  She is in to get cleared for cystoscopy with injection of Botox in a week.  She has had this done before without problems.  She has an extensive medical history.  She had unprovoked bilateral pulmonary emboli about 18 months ago.  She is on lifelong apixaban.    She takes oral medication for her diabetes.    The patient has a history of stable hypertension on current medications.  Patient denies chest pain or shortness of breath today.    She has chronic fatigue.  This has been worked up numerous times.  The patient would also like to get some basic blood work done.    REVIEW OF SYSTEMS:  GENERAL: No fever, chills, weight loss.  SKIN: No rashes, itching or changes in color or texture of skin.  HEAD: No headaches or recent head trauma.  EYES: Visual acuity fine. No photophobia, ocular pain or diplopia.  EARS: Denies ear pain, discharge.  NOSE: No loss of smell, no epistaxis or postnasal drip.  MOUTH & THROAT: No hoarseness or change in voice. No excessive gum bleeding.  NODES: Denies swollen glands.  CHEST: Denies MELISSA, cyanosis, wheezing, cough and sputum production.  CARDIOVASCULAR: Denies chest pain, PND, orthopnea or reduced exercise tolerance.  ABDOMEN: Appetite fine. No weight loss. Denies diarrhea, abdominal pain, hematemesis or blood in stool.  URINARY: No flank pain, dysuria or hematuria.  PERIPHERAL VASCULAR: No claudication or cyanosis.  MUSCULOSKELETAL:  She has chronic pain.  NEUROLOGIC: No history of seizures, paralysis, alteration of gait or coordination.    SOCIAL HISTORY: The patient does not smoke.  The patient consumes alcohol socially.  The patient is single.    PHYSICAL EXAMINATION:   Blood pressure (!) 122/58, pulse 65, height 5' 5" (1.651 m), weight 65.4 kg (144 lb 2.9 oz), SpO2 99 %.    APPEARANCE: Well nourished, well developed, in no acute distress.    HEAD: Normocephalic, " atraumatic.  EYES: PERRL. EOMI.  Conjunctivae without injection and  anicteric  NOSE: Mucosa pink. Airway clear.  MOUTH & THROAT: No tonsillar enlargement. No pharyngeal erythema or exudate. No stridor.  NECK: Supple.   NODES: No cervical, axillary or inguinal lymph node enlargement.  CHEST: Lungs clear to auscultation.  No retractions are noted.  No rales or rhonchi are present.  CARDIOVASCULAR: Normal S1, S2. No rubs, murmurs or gallops.  ABDOMEN: Bowel sounds normal. Not distended. Soft. No tenderness or masses.  No ascites is noted.  MUSCULOSKELETAL:  There is no clubbing, cyanosis, or edema of the extremities x4.  There is full range of motion of the lumbar spine.  There is full range of motion of the extremities x4.  There is no deformity noted.    NEUROLOGIC:       Normal speech development.      Hearing normal.      Antalgic gait.      Motor and sensory exams grossly normal.  PSYCHIATRIC: Patient is alert and oriented x3.  Thought processes are all normal.  There is no homicidality.  There is no suicidality.  There is no evidence of psychosis.    LABORATORY/RADIOLOGY:   Chart reviewed.  We will update blood work today.    ASSESSMENT:   Overactive bladder  Anticoagulated due to unprovoked bilateral pulmonary emboli  Hypertension   Type 2 diabetes  Hypothyroidism    PLAN:  On review of her lab today her TSH is essentially 0 and her T4 is elevated.  We will adjust her Synthroid  She will hold her apixaban for 48 hr prior to surgery patient is scheduled for new PCP  Cleared for repeat cysto w/botox at minimal risk for perioperative complications

## 2020-11-05 ENCOUNTER — TELEPHONE (OUTPATIENT)
Dept: UROLOGY | Facility: CLINIC | Age: 85
End: 2020-11-05

## 2020-11-05 ENCOUNTER — TELEPHONE (OUTPATIENT)
Dept: INTERNAL MEDICINE | Facility: CLINIC | Age: 85
End: 2020-11-05

## 2020-11-05 NOTE — TELEPHONE ENCOUNTER
----- Message from Markel Rhoades MD sent at 11/4/2020  3:29 PM CST -----  Blood work looks good except Synthroid dose is a little high.  We will send in a lower dose.  No other changes in medications.  Followup as scheduled with new PCP.

## 2020-11-05 NOTE — TELEPHONE ENCOUNTER
----- Message from Diogo Rankin MD sent at 11/3/2020  2:33 PM CST -----  Regarding: RE: wants a different covid test  Nope. That is not how they are done.   ----- Message -----  From: Shiloh Sánchez MA  Sent: 11/3/2020   2:27 PM CST  To: Tiny German LPN, Diogo Rankin MD  Subject: FW: wants a different covid test                   ----- Message -----  From: Neli Cain MA  Sent: 11/3/2020   2:19 PM CST  To: Massiel Lind Staff  Subject: wants a different covid test                     Ms Ding called and was wondering if she could have a different covid test. The swab irritates her nose and she would like to know if she could get done it by mouth?

## 2020-11-06 NOTE — TELEPHONE ENCOUNTER
Spoke to Ms. Ding and informed her that per Dr. Rhoades Blood work looks good except Synthroid dose is a little high.  We will send in a lower dose.  No other changes in medications.  Followup as scheduled with new PCP.  Patient states understanding with no further questions or concerns.

## 2020-11-09 ENCOUNTER — TELEPHONE (OUTPATIENT)
Dept: UROLOGY | Facility: CLINIC | Age: 85
End: 2020-11-09

## 2020-11-09 ENCOUNTER — HOSPITAL ENCOUNTER (OUTPATIENT)
Dept: RADIOLOGY | Facility: OTHER | Age: 85
Discharge: HOME | End: 2020-11-09
Attending: INTERNAL MEDICINE
Payer: MEDICARE

## 2020-11-09 DIAGNOSIS — R10.32 ABDOMINAL PAIN, LEFT LOWER QUADRANT: ICD-10-CM

## 2020-11-09 DIAGNOSIS — R10.31 ABDOMINAL PAIN, RIGHT LOWER QUADRANT: ICD-10-CM

## 2020-11-09 PROCEDURE — A9698 NON-RAD CONTRAST MATERIALNOC: HCPCS | Performed by: INTERNAL MEDICINE

## 2020-11-09 PROCEDURE — 74177 CT ABDOMEN PELVIS WITH CONTRAST: ICD-10-PCS | Mod: 26,,, | Performed by: RADIOLOGY

## 2020-11-09 PROCEDURE — 74177 CT ABD & PELVIS W/CONTRAST: CPT | Mod: 26,,, | Performed by: RADIOLOGY

## 2020-11-09 PROCEDURE — 74177 CT ABD & PELVIS W/CONTRAST: CPT | Mod: TC

## 2020-11-09 PROCEDURE — 25500020 PHARM REV CODE 255: Performed by: INTERNAL MEDICINE

## 2020-11-09 RX ADMIN — IOHEXOL 1000 ML: 9 SOLUTION ORAL at 12:11

## 2020-11-09 RX ADMIN — IOHEXOL 75 ML: 350 INJECTION, SOLUTION INTRAVENOUS at 01:11

## 2020-11-09 NOTE — TELEPHONE ENCOUNTER
Pt called to put her procedure on hold, she would rather wait until her daughter comes back from out of town to reschedule.

## 2020-11-16 ENCOUNTER — TELEPHONE (OUTPATIENT)
Dept: NEUROLOGY | Facility: CLINIC | Age: 85
End: 2020-11-16
Payer: MEDICARE

## 2020-11-30 ENCOUNTER — TELEPHONE (OUTPATIENT)
Dept: RHEUMATOLOGY | Facility: CLINIC | Age: 85
End: 2020-11-30

## 2020-11-30 ENCOUNTER — EXTERNAL CHRONIC CARE MANAGEMENT (OUTPATIENT)
Dept: PRIMARY CARE CLINIC | Facility: CLINIC | Age: 85
End: 2020-11-30
Payer: MEDICARE

## 2020-11-30 DIAGNOSIS — G89.29 CHRONIC LEFT-SIDED LOW BACK PAIN WITH LEFT-SIDED SCIATICA: ICD-10-CM

## 2020-11-30 DIAGNOSIS — M54.42 CHRONIC LEFT-SIDED LOW BACK PAIN WITH LEFT-SIDED SCIATICA: ICD-10-CM

## 2020-11-30 DIAGNOSIS — M79.7 FIBROMYALGIA: Primary | ICD-10-CM

## 2020-11-30 PROCEDURE — 99490 CHRNC CARE MGMT STAFF 1ST 20: CPT | Mod: S$PBB,,, | Performed by: INTERNAL MEDICINE

## 2020-11-30 PROCEDURE — 99490 CHRNC CARE MGMT STAFF 1ST 20: CPT | Mod: PBBFAC | Performed by: INTERNAL MEDICINE

## 2020-11-30 PROCEDURE — 99490 PR CHRONIC CARE MGMT, 1ST 20 MIN: ICD-10-PCS | Mod: S$PBB,,, | Performed by: INTERNAL MEDICINE

## 2020-11-30 PROCEDURE — G2058 CCM ADD 20MIN: HCPCS | Mod: S$PBB,,, | Performed by: INTERNAL MEDICINE

## 2020-11-30 PROCEDURE — G2058 PR CHRON CARE MGMT, EA ADDTL 20 MINS: ICD-10-PCS | Mod: S$PBB,,, | Performed by: INTERNAL MEDICINE

## 2020-11-30 PROCEDURE — G2058 CCM ADD 20MIN: HCPCS | Mod: PBBFAC | Performed by: INTERNAL MEDICINE

## 2020-11-30 RX ORDER — FAMOTIDINE 20 MG/1
20 TABLET, FILM COATED ORAL 2 TIMES DAILY
Status: ON HOLD | COMMUNITY
Start: 2020-11-18 | End: 2021-10-08

## 2020-11-30 RX ORDER — ESTRADIOL 0.1 MG/G
CREAM VAGINAL
COMMUNITY
Start: 2020-11-20 | End: 2021-08-10

## 2020-11-30 RX ORDER — ESTRADIOL 0.05 MG/D
FILM, EXTENDED RELEASE TRANSDERMAL
Status: ON HOLD | COMMUNITY
Start: 2020-10-12 | End: 2021-10-05

## 2020-11-30 RX ORDER — TRAMADOL HYDROCHLORIDE 50 MG/1
50 TABLET ORAL 2 TIMES DAILY PRN
Qty: 60 TABLET | Refills: 2 | Status: SHIPPED | OUTPATIENT
Start: 2020-11-30 | End: 2020-12-04 | Stop reason: SINTOL

## 2020-11-30 RX ORDER — MECLIZINE HYDROCHLORIDE 25 MG/1
TABLET ORAL
COMMUNITY
Start: 2020-11-20 | End: 2022-07-18 | Stop reason: SDUPTHER

## 2020-11-30 RX ORDER — CYCLOSPORINE 0.5 MG/ML
EMULSION OPHTHALMIC
COMMUNITY
Start: 2020-10-12 | End: 2023-12-29 | Stop reason: ALTCHOICE

## 2020-11-30 NOTE — TELEPHONE ENCOUNTER
----- Message from Gilles Dunne MD sent at 11/30/2020  2:09 PM CST -----  Regarding: FW: Call back  Can you take care of this?  ----- Message -----  From: Sharon Cosby MA  Sent: 11/30/2020   8:27 AM CST  To: Gilles Dunne MD  Subject: FW: Call back                                      ----- Message -----  From: Jakob Ramirez  Sent: 11/30/2020   8:09 AM CST  To: Uzair HUFF Staff  Subject: Call back                                            The Pt would like a call back about the Rx Ultram that she is taking and would like some advice please.      Phone # [phone]

## 2020-11-30 NOTE — TELEPHONE ENCOUNTER
She had gotten tramadol in past  Recent Rx here cost $230 for Ultram    Hx trazodone causing hallucinations  Hx gabapentin 300 mg TID that she stopped after started tramadol  Hx pregabalin gave her funny, funny feeling    She is willing to try tramadol generic after I explained that it is not trazodone or pregabalin

## 2020-12-04 ENCOUNTER — TELEPHONE (OUTPATIENT)
Dept: RHEUMATOLOGY | Facility: CLINIC | Age: 85
End: 2020-12-04

## 2020-12-04 DIAGNOSIS — M79.7 FIBROMYALGIA: Primary | ICD-10-CM

## 2020-12-04 DIAGNOSIS — M15.9 PRIMARY OSTEOARTHRITIS INVOLVING MULTIPLE JOINTS: ICD-10-CM

## 2020-12-04 RX ORDER — GABAPENTIN 100 MG/1
100 CAPSULE ORAL NIGHTLY
Qty: 30 CAPSULE | Refills: 2 | Status: SHIPPED | OUTPATIENT
Start: 2020-12-04 | End: 2021-06-09

## 2020-12-04 NOTE — TELEPHONE ENCOUNTER
She got tramadol; took it BID and then slept harder and seems to make her have dreams (nightmares and sleepwalking) and things and she does not want to take it  One of her doctors gave her gabapentin  Used to go to Dr Arturo Mar who prescribed trazodone   Tylenol does not relieve pain  She wants to try gabapentin  I will give her stephanie 100 mg to try hs and then uptitrate if tolerated  WD

## 2020-12-04 NOTE — TELEPHONE ENCOUNTER
----- Message from Sharon Cosby MA sent at 12/4/2020 11:48 AM CST -----  Contact: self    ----- Message -----  From: Nghia Yuen  Sent: 12/4/2020   8:19 AM CST  To: Arvind OWEN Staff    Pt would like to speak with  to explain to him why she can't take the   traMADoL (ULTRAM) 50 mg tablet    Please Call    Contact  [phone]

## 2020-12-21 ENCOUNTER — TELEPHONE (OUTPATIENT)
Dept: CARDIOLOGY | Facility: CLINIC | Age: 85
End: 2020-12-21

## 2020-12-21 NOTE — TELEPHONE ENCOUNTER
Spoke with Geno at [facility] to confirmed that patient is taking Eliquis 2.5mg tablet BID.    ----- Message from Joshua Carr sent at 12/21/2020  3:09 PM CST -----  Regarding: Medication  Contact: Raimundo ([facility])  Name of Who is Calling: Raimundo ([facility])      What is the request in detail: Patient PCP (which is no longer with [facility]) has a refill on file for apixaban (ELIQUIS) 5 mg Tab (1 pill 2 times a day) and Cardio placed a refill for apixaban (ELIQUIS) 2.5 mg Tab (1 pill 2 times a day). Would like to know which prescription to fill and follow. Please advise, refill placed on hold.      Can the clinic reply by [facility]: no      What [facility]: [phone] option 2  ref # [id number]

## 2020-12-24 ENCOUNTER — OFFICE VISIT (OUTPATIENT)
Dept: INTERNAL MEDICINE | Facility: CLINIC | Age: 85
End: 2020-12-24
Payer: MEDICARE

## 2020-12-24 ENCOUNTER — HOSPITAL ENCOUNTER (OUTPATIENT)
Dept: RADIOLOGY | Facility: HOSPITAL | Age: 85
Discharge: HOME | End: 2020-12-24
Attending: INTERNAL MEDICINE
Payer: MEDICARE

## 2020-12-24 ENCOUNTER — PATIENT OUTREACH (OUTPATIENT)
Dept: ADMINISTRATIVE | Facility: OTHER | Age: 85
End: 2020-12-24

## 2020-12-24 VITALS
DIASTOLIC BLOOD PRESSURE: 50 MMHG | BODY MASS INDEX: 24.06 KG/M2 | WEIGHT: 144.38 LBS | SYSTOLIC BLOOD PRESSURE: 130 MMHG | HEIGHT: 65 IN | HEART RATE: 62 BPM | OXYGEN SATURATION: 98 %

## 2020-12-24 DIAGNOSIS — Z86.711 HISTORY OF PULMONARY EMBOLISM: ICD-10-CM

## 2020-12-24 DIAGNOSIS — M54.2 CHRONIC NECK PAIN: ICD-10-CM

## 2020-12-24 DIAGNOSIS — M79.7 FIBROMYALGIA: ICD-10-CM

## 2020-12-24 DIAGNOSIS — E03.9 HYPOTHYROIDISM, ADULT: ICD-10-CM

## 2020-12-24 DIAGNOSIS — F01.518 MIXED CORTICAL AND SUBCORTICAL VASCULAR DEMENTIA WITH BEHAVIORAL DISTURBANCE: ICD-10-CM

## 2020-12-24 DIAGNOSIS — R73.03 PREDIABETES: ICD-10-CM

## 2020-12-24 DIAGNOSIS — E78.00 HYPERCHOLESTEROLEMIA: ICD-10-CM

## 2020-12-24 DIAGNOSIS — M25.569 KNEE PAIN, UNSPECIFIED CHRONICITY, UNSPECIFIED LATERALITY: ICD-10-CM

## 2020-12-24 DIAGNOSIS — F51.3 SLEEP WALKING: Primary | ICD-10-CM

## 2020-12-24 DIAGNOSIS — G89.29 CHRONIC NECK PAIN: ICD-10-CM

## 2020-12-24 DIAGNOSIS — E05.80 IATROGENIC HYPERTHYROIDISM: ICD-10-CM

## 2020-12-24 DIAGNOSIS — G47.52 DREAM ENACTMENT BEHAVIOR: ICD-10-CM

## 2020-12-24 DIAGNOSIS — N18.32 STAGE 3B CHRONIC KIDNEY DISEASE: ICD-10-CM

## 2020-12-24 DIAGNOSIS — I10 ESSENTIAL HYPERTENSION: ICD-10-CM

## 2020-12-24 LAB
ALBUMIN/CREAT UR: 7.6 UG/MG (ref 0–30)
CREAT UR-MCNC: 170 MG/DL (ref 15–325)
MICROALBUMIN UR DL<=1MG/L-MCNC: 13 UG/ML

## 2020-12-24 PROCEDURE — 99215 PR OFFICE/OUTPT VISIT, EST, LEVL V, 40-54 MIN: ICD-10-PCS | Mod: S$PBB,,, | Performed by: INTERNAL MEDICINE

## 2020-12-24 PROCEDURE — 99999 PR PBB SHADOW E&M-EST. PATIENT-LVL V: CPT | Mod: PBBFAC,,, | Performed by: INTERNAL MEDICINE

## 2020-12-24 PROCEDURE — 99215 OFFICE O/P EST HI 40 MIN: CPT | Mod: PBBFAC,25 | Performed by: INTERNAL MEDICINE

## 2020-12-24 PROCEDURE — 99999 PR PBB SHADOW E&M-EST. PATIENT-LVL V: ICD-10-PCS | Mod: PBBFAC,,, | Performed by: INTERNAL MEDICINE

## 2020-12-24 PROCEDURE — 72040 XR CERVICAL SPINE AP LATERAL: ICD-10-PCS | Mod: 26,,, | Performed by: RADIOLOGY

## 2020-12-24 PROCEDURE — 99215 OFFICE O/P EST HI 40 MIN: CPT | Mod: S$PBB,,, | Performed by: INTERNAL MEDICINE

## 2020-12-24 PROCEDURE — 82043 UR ALBUMIN QUANTITATIVE: CPT

## 2020-12-24 PROCEDURE — 72040 X-RAY EXAM NECK SPINE 2-3 VW: CPT | Mod: TC

## 2020-12-24 PROCEDURE — 72040 X-RAY EXAM NECK SPINE 2-3 VW: CPT | Mod: 26,,, | Performed by: RADIOLOGY

## 2020-12-24 RX ORDER — MIRTAZAPINE 7.5 MG/1
7.5 TABLET, FILM COATED ORAL NIGHTLY
Qty: 90 TABLET | Refills: 0 | Status: SHIPPED | OUTPATIENT
Start: 2020-12-24 | End: 2021-03-02

## 2020-12-24 NOTE — PROGRESS NOTES
"Subjective:       Patient ID: Debbie Ding is a 88 y.o. female.    Chief Complaint: Establish Care, Knee Pain, Low-back Pain, and Neck Pain    HPI   She is transferring care from Dr Mathew.  She has many medical problems as listed below.  Recent cardiology note reviewed.    Feb 10 fell out of bed, hitting back, head, neck.   Pain was severe.  She fell from her bed, which she has done repeatedly.  She has vivid dreams which she acts out - sleepwalks. Ends dream with falling down.   So, she sleeps restrained in lift chair.  She feels sleep deprived.  This sleep disturbance is chronic.  She believes trazodone led to sleep walking, which persisted after its discontinuation.    Also with h/o dementia, subjectively improved with aricept and namenda.    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 Neuro psych testing 8/20 :Lewy body dementia suspected.     She subsequently saw a neurologist at [facility].  Those records are unavailable.    Also with neck and knee pain.  Difficult to walk.     Daily neck pain.  Applies Diandra marquez.    Fibromyalgia, followed by Dr Dunne.    Taking gabapentin 100 mg tid.    GERD controlled with nexium.    Asthma, stable with albuterol, advair.    C/o depression, which she attributes to pain, and also anorexia.  She took remeron in past.  Not clear why she stopped.  Cymbalta,  prescribed in past, but she is not taking.    Hypothyroidism.  TSh suppressed in November, and dose reduced.    Past Medical History:   Diagnosis Date    Allergic rhinitis     Anticoagulant long-term use     Arthritis     Asthma     Bilateral pulmonary embolism 4/27/2019    Cataract     Chronic anticoagulation 9/28/2020    Depression     Diabetes mellitus     Fibromyalgia 7/2/2012    Fibromyalgia     GERD (gastroesophageal reflux disease)     " Hypercholesterolemia 9/28/2020    Hypercholesterolemia 9/28/2020    Hypertension 7/2/2012    Thoracic or lumbosacral neuritis or radiculitis, unspecified     Thyroid disease     Ulcer        Review of Systems   Constitutional: Positive for fatigue. Negative for fever and unexpected weight change.   HENT: Negative for nasal congestion and postnasal drip.    Respiratory: Negative for chest tightness, shortness of breath and wheezing.    Cardiovascular: Negative for chest pain and leg swelling.   Gastrointestinal: Negative for abdominal pain, anal bleeding, constipation, diarrhea, nausea and vomiting.   Genitourinary: Negative for dysuria and urgency.   Musculoskeletal: Positive for back pain and neck pain.   Integumentary:  Negative for rash.   Neurological: Negative for headaches.   Psychiatric/Behavioral: Positive for dysphoric mood and sleep disturbance. The patient is not nervous/anxious.          Objective:      Physical Exam  Constitutional:       Appearance: She is well-developed.   Eyes:      General: No scleral icterus.  Neck:      Thyroid: No thyromegaly.      Vascular: No JVD.   Cardiovascular:      Rate and Rhythm: Normal rate and regular rhythm.      Heart sounds: Normal heart sounds.   Pulmonary:      Effort: Pulmonary effort is normal. No respiratory distress.      Breath sounds: Normal breath sounds. No wheezing or rales.   Abdominal:      Palpations: Abdomen is soft. There is no mass.      Tenderness: There is no abdominal tenderness.   Neurological:      Mental Status: She is alert and oriented to person, place, and time.   Psychiatric:         Mood and Affect: Mood normal.         Behavior: Behavior normal.      Comments: No obvious memory impairment during our encounter         Results for orders placed or performed in visit on 12/24/20   Microalbumin/Creatinine Ratio, Urine   Result Value Ref Range    Microalbumin, Urine 13.0 ug/mL    Creatinine, Urine 170.0 15.0 - 325.0 mg/dL     Microalb/Creat Ratio 7.6 0.0 - 30.0 ug/mg       Assessment:       1. Sleep walking    2. Dream enactment behavior    3. Knee pain, unspecified chronicity, unspecified laterality    4. Chronic neck pain    5. Hypothyroidism, adult    6. Essential hypertension    7. Stage 3b chronic kidney disease    8. Fibromyalgia    9. History of pulmonary embolism    10. 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 Prediabetes    12. Hypercholesterolemia    13. Iatrogenic hyperthyroidism        Plan:       .Debbie was seen today for establish care, knee pain, low-back pain and neck pain.    Diagnoses and all orders for this visit:    Sleep walking  -     Ambulatory referral/consult to Sleep Disorders; Future    Dream enactment behavior  -     Ambulatory referral/consult to Sleep Disorders; Future    Knee pain, unspecified chronicity, unspecified laterality  -     Ambulatory referral/consult to Orthopedics; Future    Chronic neck pain  -     X-Ray Cervical Spine AP And Lateral; Future  -     Ambulatory referral/consult to Physical/Occupational Therapy; Future    Hypothyroidism, adult  -     TSH; Future    Essential hypertension    Stage 3b chronic kidney disease    Fibromyalgia    History of pulmonary embolism    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         Review in few weeks  Request outside neuro records.

## 2020-12-25 NOTE — PROGRESS NOTES
Health Maintenance Due   Topic Date Due    Shingles Vaccine (1 of 2) 10/23/1982    Foot Exam  07/31/2018    Urine Microalbumin  04/02/2019    Hemoglobin A1c  07/29/2020     Updates were requested from care everywhere.  Chart was reviewed for overdue Proactive [facility] Encounters (VINICIUS) topics (CRS, Breast Cancer Screening, Eye exam)  Health Maintenance has been updated.  LINKS immunization registry triggered.  Immunizations were reconciled.      
1.99

## 2020-12-28 ENCOUNTER — TELEPHONE (OUTPATIENT)
Dept: INTERNAL MEDICINE | Facility: CLINIC | Age: 85
End: 2020-12-28

## 2020-12-28 ENCOUNTER — OFFICE VISIT (OUTPATIENT)
Dept: SLEEP MEDICINE | Facility: CLINIC | Age: 85
End: 2020-12-28
Payer: MEDICARE

## 2020-12-28 VITALS
DIASTOLIC BLOOD PRESSURE: 65 MMHG | BODY MASS INDEX: 24.02 KG/M2 | SYSTOLIC BLOOD PRESSURE: 128 MMHG | WEIGHT: 144.19 LBS | HEART RATE: 71 BPM | HEIGHT: 65 IN

## 2020-12-28 DIAGNOSIS — E78.00 HYPERCHOLESTEROLEMIA: Primary | ICD-10-CM

## 2020-12-28 DIAGNOSIS — K21.9 GASTROESOPHAGEAL REFLUX DISEASE, UNSPECIFIED WHETHER ESOPHAGITIS PRESENT: ICD-10-CM

## 2020-12-28 DIAGNOSIS — M79.7 FIBROMYALGIA: ICD-10-CM

## 2020-12-28 DIAGNOSIS — F51.3 SLEEP WALKING: ICD-10-CM

## 2020-12-28 DIAGNOSIS — G47.52 DREAM ENACTMENT BEHAVIOR: ICD-10-CM

## 2020-12-28 DIAGNOSIS — E03.9 HYPOTHYROIDISM, ADULT: ICD-10-CM

## 2020-12-28 DIAGNOSIS — E11.9 TYPE 2 DIABETES MELLITUS WITHOUT COMPLICATION, WITHOUT LONG-TERM CURRENT USE OF INSULIN: ICD-10-CM

## 2020-12-28 PROCEDURE — 99203 OFFICE O/P NEW LOW 30 MIN: CPT | Mod: S$PBB,,, | Performed by: INTERNAL MEDICINE

## 2020-12-28 PROCEDURE — 99203 PR OFFICE/OUTPT VISIT, NEW, LEVL III, 30-44 MIN: ICD-10-PCS | Mod: S$PBB,,, | Performed by: INTERNAL MEDICINE

## 2020-12-28 PROCEDURE — 99215 OFFICE O/P EST HI 40 MIN: CPT | Mod: PBBFAC | Performed by: INTERNAL MEDICINE

## 2020-12-28 PROCEDURE — 99999 PR PBB SHADOW E&M-EST. PATIENT-LVL V: CPT | Mod: PBBFAC,,, | Performed by: INTERNAL MEDICINE

## 2020-12-28 PROCEDURE — 99999 PR PBB SHADOW E&M-EST. PATIENT-LVL V: ICD-10-PCS | Mod: PBBFAC,,, | Performed by: INTERNAL MEDICINE

## 2020-12-28 NOTE — TELEPHONE ENCOUNTER
----- Message from Carolyn Mejia MD sent at 12/28/2020  1:46 PM CST -----  pls call - neck xray shows osteoarthritis only.  Urine normal.   Continue with PT for neck, as we discussed.

## 2020-12-28 NOTE — PROGRESS NOTES
Subjective:       Patient ID: Debbie Ding is a 88 y.o. female.    Chief Complaint: Sleeping Problem    I had the pleasure of seeing Debbie Ding today, who is a 88 y.o. female that presents with dream enactment behavior.    Debbie Ding does not have a CDL.    Debbie Ding is not a shift worker.    Debbie Ding presents with acting out dreams that has been going on for 7 years   She has history of multiple falls.   Symptoms started after placed on trazodone in 2013.   She now sleeps tied to a lift chair.      Bedtime when working ranges from NA to NA.   When not working, bedtime ranges from 2230 to 2300.   Sleep latency ranges from 10 to 15 minutes.     Average number of awakenings is 2-3 and return to sleep is quick.   Wake up time when working is NA to NA.   When not working, wake up time is 0530 to 0530.   Patient does not feel rested upon awakening.    Debbie Ding consumes approximately 0-1 beverages with caffeine daily.   An average of 0 beverages with alcohol are consumed    Medications taken for sleep currently: just prescribed mirtazapine  Previous medications taken: trazodone     Debbie Ding does experience daytime sleepiness.   Naps are taken about 0 times weekly, usually lasting NA to NA minutes.  Debbie currently does operate an automobile.  Debbie Ding does not experience drowsiness when driving.   Patient does not doze off when sedentary.   Debbie Ding does not have auxiliary symptoms of narcolepsy including sleep onset paralysis, hypnagogic hallucinations, sleep attacks and cataplexy    ESS 2    Debbie Ding has a history of snoring.   Snoring is described as unknown.   Apneic episodes have not been noticed during sleep.   A witness to sleep is not present.   The patient awakens with mouth dryness.      Debbie Ding does not have symptoms of Restless Legs Syndrome. Nocturnal leg movements have not been noticed.   The patient does not experience sleep  related leg cramps.   There is a history of parasomnia including sleep walking, medication induced walking/eating and dream enactment behavior.      Current Outpatient Medications:     albuterol (PROVENTIL) 2.5 mg /3 mL (0.083 %) nebulizer solution, Take 2.5 mg by nebulization every 6 (six) hours as needed. Rescue, Disp: , Rfl:     albuterol (PROVENTIL/VENTOLIN HFA) 90 mcg/actuation inhaler, INHALE 2 PUFFS BY MOUTH EVERY 6 HRS AS NEEDED, Disp: , Rfl:     amLODIPine (NORVASC) 10 MG tablet, TAKE 1 TABLET ONCE DAILY, Disp: 90 tablet, Rfl: 1    apixaban (ELIQUIS) 2.5 mg Tab, Take 1 tablet (2.5 mg total) by mouth 2 (two) times daily., Disp: 180 tablet, Rfl: 3    benzonatate (TESSALON) 100 MG capsule, , Disp: , Rfl:     clobetasoL (TEMOVATE) 0.05 % external solution, Pt to mix in 1 jar of cerave cream and apply to affected areas bid, Disp: 50 mL, Rfl: 3    donepeziL (ARICEPT) 10 MG tablet, TAKE 1 TABLET ONCE DAILY INTHE MORNING, Disp: 90 tablet, Rfl: 3    esomeprazole (NEXIUM) 20 MG capsule, Take 1 capsule (20 mg total) by mouth before breakfast., Disp: 90 capsule, Rfl: 1    estradioL (ESTRACE) 0.01 % (0.1 mg/gram) vaginal cream, , Disp: , Rfl:     estradiol 0.05 mg/24 hr td ptsw (VIVELLE-DOT) 0.05 mg/24 hr, , Disp: , Rfl:     famotidine (PEPCID) 20 MG tablet, Take 20 mg by mouth 2 (two) times daily., Disp: , Rfl:     fluticasone propionate (FLONASE) 50 mcg/actuation nasal spray, SPRAY ONCE IN EACH NOSTRIL ONCE DAILY, Disp: 16 mL, Rfl: 11    fluticasone-salmeterol diskus inhaler 500-50 mcg, Inhale 1 puff into the lungs 2 (two) times daily. Controller, Disp: 60 each, Rfl: 11    gabapentin (NEURONTIN) 100 MG capsule, Take 1 capsule (100 mg total) by mouth every evening., Disp: 30 capsule, Rfl: 2    glycopyrrolate (ROBINUL) 2 MG Tab, Increase to 2 pill qday as tolerated, Disp: 180 tablet, Rfl: 1    hydrOXYzine pamoate (VISTARIL) 25 MG Cap, Take 1 capsule (25 mg total) by mouth every 6 (six) hours as needed  (Anxiety)., Disp: 25 capsule, Rfl: 0    levothyroxine (SYNTHROID) 75 MCG tablet, Take 1 tablet (75 mcg total) by mouth once daily., Disp: 90 tablet, Rfl: 1    lidocaine (LMX) 4 % cream, Apply topically as needed., Disp: 45 g, Rfl: 3    loratadine (CLARITIN) 10 mg tablet, Take 10 mg by mouth once daily., Disp: , Rfl:     meclizine (ANTIVERT) 25 mg tablet, , Disp: , Rfl:     memantine (NAMENDA) 10 MG Tab, Take 1 tablet (10 mg total) by mouth once daily., Disp: 90 tablet, Rfl: 3    mirtazapine (REMERON) 7.5 MG Tab, Take 1 tablet (7.5 mg total) by mouth every evening., Disp: 90 tablet, Rfl: 0    olopatadine (PATADAY) 0.2 % Drop, Place 1 drop into both eyes daily as needed. , Disp: , Rfl:     RESTASIS 0.05 % ophthalmic emulsion, , Disp: , Rfl:     triamcinolone acetonide 0.1% (KENALOG) 0.1 % cream, AAA bid, Disp: 454 g, Rfl: 3    UNABLE TO FIND, Smart watch with related carl for sleep disturbances, Disp: 1 Units, Rfl: 0     Review of patient's allergies indicates:   Allergen Reactions    Melatonin      Other reaction(s): Other (See Comments)  Sleep Walks and has unnatural dreams    Talwin compound Hives    Talwin [pentazocine lactate] Hallucinations    Trazodone Other (See Comments)     Nightmares/sleep walk      Bentyl [dicyclomine] Anxiety    Tessalon [benzonatate] Anxiety     ? Patient is taking tessalon         Past Medical History:   Diagnosis Date    Allergic rhinitis     Anticoagulant long-term use     Arthritis     Asthma     Bilateral pulmonary embolism 4/27/2019    Cataract     Chronic anticoagulation 9/28/2020    Depression     Diabetes mellitus     Fibromyalgia 7/2/2012    Fibromyalgia     GERD (gastroesophageal reflux disease)     Hypercholesterolemia 9/28/2020    Hypercholesterolemia 9/28/2020    Hypertension 7/2/2012    Thoracic or lumbosacral neuritis or radiculitis, unspecified     Thyroid disease     Ulcer        Past Surgical History:   Procedure Laterality Date     CATARACT EXTRACTION Bilateral     FOOT NEUROMA SURGERY  1985    HYSTERECTOMY         Family History   Problem Relation Age of Onset    Diabetes Mother     Diabetes Brother     Emphysema Maternal Aunt     Melanoma Neg Hx     Asthma Neg Hx        Social History     Socioeconomic History    Marital status:      Spouse name: Not on file    Number of children: Not on file    Years of education: Not on file    Highest education level: Not on file   Occupational History     Comment:  - school    Social Needs    Financial resource strain: Not hard at all    Food insecurity     Worry: Never true     Inability: Never true    Transportation needs     Medical: No     Non-medical: No   Tobacco Use    Smoking status: Never Smoker    Smokeless tobacco: Never Used   Substance and Sexual Activity    Alcohol use: No    Drug use: No    Sexual activity: Not on file   Lifestyle    Physical activity     Days per week: Not on file     Minutes per session: Not on file    Stress: Not on file   Relationships    Social connections     Talks on phone: Not on file     Gets together: Not on file     Attends Mormon service: Not on file     Active member of club or organization: Not on file     Attends meetings of clubs or organizations: Not on file     Relationship status: Not on file   Other Topics Concern    Are you pregnant or think you may be? Not Asked    Breast-feeding Not Asked   Social History Narrative    Lives with daughter, Benjie.        Other daughter, Madina, drives her around.        She lives independently, except for driving.          Stretches in bed.  Walks in neighborhood, and in house several times a day.           Old medical records.    Vitals:    12/28/20 1434   BP: 128/65   Pulse: 71              The patient was given open opportunity to ask questions and/or express concerns about treatment plan.   All questions/concerns were discussed.   Driving precautions were provided.      Two patient identifiers used prior to evaluation.    Thank you for referring Debbie Ding for evaluation.             Past Medical History:   Diagnosis Date    Allergic rhinitis     Anticoagulant long-term use     Arthritis     Asthma     Bilateral pulmonary embolism 4/27/2019    Cataract     Chronic anticoagulation 9/28/2020    Depression     Diabetes mellitus     Fibromyalgia 7/2/2012    Fibromyalgia     GERD (gastroesophageal reflux disease)     Hypercholesterolemia 9/28/2020    Hypercholesterolemia 9/28/2020    Hypertension 7/2/2012    Thoracic or lumbosacral neuritis or radiculitis, unspecified     Thyroid disease     Ulcer      Past Surgical History:   Procedure Laterality Date    CATARACT EXTRACTION Bilateral     FOOT NEUROMA SURGERY  1985    HYSTERECTOMY       Family History   Problem Relation Age of Onset    Diabetes Mother     Diabetes Brother     Emphysema Maternal Aunt     Melanoma Neg Hx     Asthma Neg Hx      Social History     Socioeconomic History    Marital status:      Spouse name: Not on file    Number of children: Not on file    Years of education: Not on file    Highest education level: Not on file   Occupational History     Comment:  - school    Social Needs    Financial resource strain: Not hard at all    Food insecurity     Worry: Never true     Inability: Never true    Transportation needs     Medical: No     Non-medical: No   Tobacco Use    Smoking status: Never Smoker    Smokeless tobacco: Never Used   Substance and Sexual Activity    Alcohol use: No    Drug use: No    Sexual activity: Not on file   Lifestyle    Physical activity     Days per week: Not on file     Minutes per session: Not on file    Stress: Not on file   Relationships    Social connections     Talks on phone: Not on file     Gets together: Not on file     Attends Gnosticist service: Not on file     Active member of club or organization: Not on file     Attends  meetings of clubs or organizations: Not on file     Relationship status: Not on file   Other Topics Concern    Are you pregnant or think you may be? Not Asked    Breast-feeding Not Asked   Social History Narrative    Lives with daughter, Benjie.        Other daughter, Madina, drives her around.        She lives independently, except for driving.          Stretches in bed.  Walks in neighborhood, and in house several times a day.       Current Outpatient Medications   Medication Sig Dispense Refill    albuterol (PROVENTIL) 2.5 mg /3 mL (0.083 %) nebulizer solution Take 2.5 mg by nebulization every 6 (six) hours as needed. Rescue      albuterol (PROVENTIL/VENTOLIN HFA) 90 mcg/actuation inhaler INHALE 2 PUFFS BY MOUTH EVERY 6 HRS AS NEEDED      amLODIPine (NORVASC) 10 MG tablet TAKE 1 TABLET ONCE DAILY 90 tablet 1    apixaban (ELIQUIS) 2.5 mg Tab Take 1 tablet (2.5 mg total) by mouth 2 (two) times daily. 180 tablet 3    benzonatate (TESSALON) 100 MG capsule       clobetasoL (TEMOVATE) 0.05 % external solution Pt to mix in 1 jar of cerave cream and apply to affected areas bid 50 mL 3    donepeziL (ARICEPT) 10 MG tablet TAKE 1 TABLET ONCE DAILY INTHE MORNING 90 tablet 3    esomeprazole (NEXIUM) 20 MG capsule Take 1 capsule (20 mg total) by mouth before breakfast. 90 capsule 1    estradioL (ESTRACE) 0.01 % (0.1 mg/gram) vaginal cream       estradiol 0.05 mg/24 hr td ptsw (VIVELLE-DOT) 0.05 mg/24 hr       famotidine (PEPCID) 20 MG tablet Take 20 mg by mouth 2 (two) times daily.      fluticasone propionate (FLONASE) 50 mcg/actuation nasal spray SPRAY ONCE IN EACH NOSTRIL ONCE DAILY 16 mL 11    fluticasone-salmeterol diskus inhaler 500-50 mcg Inhale 1 puff into the lungs 2 (two) times daily. Controller 60 each 11    gabapentin (NEURONTIN) 100 MG capsule Take 1 capsule (100 mg total) by mouth every evening. 30 capsule 2    glycopyrrolate (ROBINUL) 2 MG Tab Increase to 2 pill qday as tolerated 180 tablet 1     "hydrOXYzine pamoate (VISTARIL) 25 MG Cap Take 1 capsule (25 mg total) by mouth every 6 (six) hours as needed (Anxiety). 25 capsule 0    levothyroxine (SYNTHROID) 75 MCG tablet Take 1 tablet (75 mcg total) by mouth once daily. 90 tablet 1    lidocaine (LMX) 4 % cream Apply topically as needed. 45 g 3    loratadine (CLARITIN) 10 mg tablet Take 10 mg by mouth once daily.      meclizine (ANTIVERT) 25 mg tablet       memantine (NAMENDA) 10 MG Tab Take 1 tablet (10 mg total) by mouth once daily. 90 tablet 3    mirtazapine (REMERON) 7.5 MG Tab Take 1 tablet (7.5 mg total) by mouth every evening. 90 tablet 0    olopatadine (PATADAY) 0.2 % Drop Place 1 drop into both eyes daily as needed.       RESTASIS 0.05 % ophthalmic emulsion       triamcinolone acetonide 0.1% (KENALOG) 0.1 % cream AAA bid 454 g 3    UNABLE TO FIND Smart watch with related carl for sleep disturbances 1 Units 0     No current facility-administered medications for this visit.      Review of patient's allergies indicates:   Allergen Reactions    Melatonin      Other reaction(s): Other (See Comments)  Sleep Walks and has unnatural dreams    Talwin compound Hives    Talwin [pentazocine lactate] Hallucinations    Trazodone Other (See Comments)     Nightmares/sleep walk      Bentyl [dicyclomine] Anxiety    Tessalon [benzonatate] Anxiety     ? Patient is taking tessalon       Review of Systems    Objective:      Vitals:    12/28/20 1434   BP: 128/65   Pulse: 71   Weight: 65.4 kg (144 lb 2.9 oz)   Height: 5' 5" (1.651 m)     Physical Exam    Lab Review:   BMP:   Lab Results   Component Value Date    GLU 97 11/04/2020     11/04/2020    K 4.1 11/04/2020     11/04/2020    CO2 22 (L) 11/04/2020    BUN 15 11/04/2020    CREATININE 1.4 11/04/2020    CALCIUM 10.0 11/04/2020     Diagnostics Review: Echo: Reviewed     Assessment:       1. Hypercholesterolemia    2. Sleep walking    3. Dream enactment behavior    4. Type 2 diabetes mellitus " without complication, without long-term current use of insulin    5. Hypothyroidism, adult    6. Fibromyalgia    7. Gastroesophageal reflux disease, unspecified whether esophagitis present        Plan:       Due to listed symptoms, a polysomnogram with RBD montage is recommended and ordered.   Description of procedure given to patient.   If significant Obstructive Sleep Apnea (JASE) is found during the initial portion of the study, therapy will be initiated with nasal Continuous Positive Airway Pressure (CPAP).   Goals of therapy were discussed, alternative treatments listed and patient agrees to this form of therapy if indicated.   The pathophysiology of JASE was discussed.   The effects of JASE on patient's co-morbid conditions and the increased morbidity and/or mortality associated with this condition were reviewed.   The patient was given open opportunity to ask questions and/or express concerns about treatment plan.   All questions/concerns were discussed.   Driving precautions were provided.   Discussed potential worsening of symptoms with mirtazapine.   Reviewed environmental precautions  Thank you for referring Debbie Ding for evaluation.       31-minute visit. >50% spent counseling patient and coordination of care.

## 2020-12-28 NOTE — LETTER
December 28, 2020      Carolyn Mejia MD  [address]  [facility] [address]           [id number]  [address]  [facility] [address]  Phone: [phone]          Patient: Debbie Ding   MR Number: [id number]   YOB: 1932   Date of Visit: 12/28/2020       Dear Dr. Carolyn Mejia:    Thank you for referring Debbie Ding to me for evaluation. Attached you will find relevant portions of my assessment and plan of care.    If you have questions, please do not hesitate to call me. I look forward to following Debbie Ding along with you.    Sincerely,    Guerda Florian MD    Enclosure  CC:  No Recipients    If you would like to receive this communication electronically, please contact [email or web address] or [phone] to request more information on [address] Link access.    For providers and/or their staff who would like to refer a patient to [facility], please contact us through our one-stop-shop provider referral line, [facility], at [phone].    If you feel you have received this communication in error or would no longer like to receive these types of communications, please e-mail [email or web address]

## 2020-12-29 ENCOUNTER — TELEPHONE (OUTPATIENT)
Dept: SLEEP MEDICINE | Facility: OTHER | Age: 85
End: 2020-12-29

## 2020-12-30 ENCOUNTER — HOSPITAL ENCOUNTER (OUTPATIENT)
Dept: SLEEP MEDICINE | Facility: OTHER | Age: 85
Discharge: HOME | End: 2020-12-30
Attending: INTERNAL MEDICINE
Payer: MEDICARE

## 2020-12-30 DIAGNOSIS — E11.9 TYPE 2 DIABETES MELLITUS WITHOUT COMPLICATION, WITHOUT LONG-TERM CURRENT USE OF INSULIN: ICD-10-CM

## 2020-12-30 DIAGNOSIS — E03.9 HYPOTHYROIDISM, ADULT: ICD-10-CM

## 2020-12-30 DIAGNOSIS — K21.9 GASTROESOPHAGEAL REFLUX DISEASE, UNSPECIFIED WHETHER ESOPHAGITIS PRESENT: ICD-10-CM

## 2020-12-30 DIAGNOSIS — M79.7 FIBROMYALGIA: ICD-10-CM

## 2020-12-30 DIAGNOSIS — G47.33 OSA (OBSTRUCTIVE SLEEP APNEA): Primary | ICD-10-CM

## 2020-12-30 DIAGNOSIS — E78.00 HYPERCHOLESTEROLEMIA: ICD-10-CM

## 2020-12-30 DIAGNOSIS — F51.3 SLEEP WALKING: ICD-10-CM

## 2020-12-30 DIAGNOSIS — G47.52 DREAM ENACTMENT BEHAVIOR: ICD-10-CM

## 2020-12-30 PROCEDURE — 95810 POLYSOM 6/> YRS 4/> PARAM: CPT

## 2020-12-30 PROCEDURE — 95810 POLYSOM 6/> YRS 4/> PARAM: CPT | Mod: 26,,, | Performed by: INTERNAL MEDICINE

## 2020-12-30 PROCEDURE — 95810 PR POLYSOMNOGRAPHY, 4 OR MORE: ICD-10-PCS | Mod: 26,,, | Performed by: INTERNAL MEDICINE

## 2020-12-31 ENCOUNTER — EXTERNAL CHRONIC CARE MANAGEMENT (OUTPATIENT)
Dept: PRIMARY CARE CLINIC | Facility: CLINIC | Age: 85
End: 2020-12-31
Payer: MEDICARE

## 2020-12-31 PROCEDURE — G2058 CCM ADD 20MIN: HCPCS | Mod: PBBFAC,25,27 | Performed by: INTERNAL MEDICINE

## 2020-12-31 PROCEDURE — 99490 CHRNC CARE MGMT STAFF 1ST 20: CPT | Mod: PBBFAC | Performed by: INTERNAL MEDICINE

## 2020-12-31 PROCEDURE — G2058 CCM ADD 20MIN: HCPCS | Mod: S$PBB,,, | Performed by: INTERNAL MEDICINE

## 2020-12-31 PROCEDURE — G2058 PR CHRON CARE MGMT, EA ADDTL 20 MINS: ICD-10-PCS | Mod: S$PBB,,, | Performed by: INTERNAL MEDICINE

## 2020-12-31 PROCEDURE — 99490 CHRNC CARE MGMT STAFF 1ST 20: CPT | Mod: S$PBB,,, | Performed by: INTERNAL MEDICINE

## 2020-12-31 PROCEDURE — 99490 PR CHRONIC CARE MGMT, 1ST 20 MIN: ICD-10-PCS | Mod: S$PBB,,, | Performed by: INTERNAL MEDICINE

## 2021-01-04 ENCOUNTER — TELEPHONE (OUTPATIENT)
Dept: ORTHOPEDICS | Facility: CLINIC | Age: 86
End: 2021-01-04

## 2021-01-04 ENCOUNTER — TELEPHONE (OUTPATIENT)
Dept: RHEUMATOLOGY | Facility: CLINIC | Age: 86
End: 2021-01-04

## 2021-01-04 DIAGNOSIS — G47.33 OSA (OBSTRUCTIVE SLEEP APNEA): Primary | ICD-10-CM

## 2021-01-11 ENCOUNTER — LAB VISIT (OUTPATIENT)
Dept: LAB | Facility: HOSPITAL | Age: 86
End: 2021-01-11
Attending: INTERNAL MEDICINE
Payer: MEDICARE

## 2021-01-11 DIAGNOSIS — E78.00 HYPERCHOLESTEROLEMIA: ICD-10-CM

## 2021-01-11 DIAGNOSIS — E03.9 HYPOTHYROIDISM, ADULT: ICD-10-CM

## 2021-01-11 DIAGNOSIS — R73.03 PREDIABETES: ICD-10-CM

## 2021-01-11 LAB
CHOLEST SERPL-MCNC: 208 MG/DL (ref 120–199)
CHOLEST/HDLC SERPL: 2.7 {RATIO} (ref 2–5)
ESTIMATED AVG GLUCOSE: 143 MG/DL (ref 68–131)
HBA1C MFR BLD HPLC: 6.6 % (ref 4–5.6)
HDLC SERPL-MCNC: 77 MG/DL (ref 40–75)
HDLC SERPL: 37 % (ref 20–50)
LDLC SERPL CALC-MCNC: 110.2 MG/DL (ref 63–159)
NONHDLC SERPL-MCNC: 131 MG/DL
T4 FREE SERPL-MCNC: 1.24 NG/DL (ref 0.71–1.51)
TRIGL SERPL-MCNC: 104 MG/DL (ref 30–150)
TSH SERPL DL<=0.005 MIU/L-ACNC: 0.08 UIU/ML (ref 0.4–4)

## 2021-01-11 PROCEDURE — 84439 ASSAY OF FREE THYROXINE: CPT

## 2021-01-11 PROCEDURE — 36415 COLL VENOUS BLD VENIPUNCTURE: CPT

## 2021-01-11 PROCEDURE — 80061 LIPID PANEL: CPT

## 2021-01-11 PROCEDURE — 84443 ASSAY THYROID STIM HORMONE: CPT

## 2021-01-11 PROCEDURE — 83036 HEMOGLOBIN GLYCOSYLATED A1C: CPT

## 2021-01-13 ENCOUNTER — HOSPITAL ENCOUNTER (OUTPATIENT)
Dept: RADIOLOGY | Facility: HOSPITAL | Age: 86
Discharge: HOME | End: 2021-01-13
Attending: PHYSICIAN ASSISTANT
Payer: MEDICARE

## 2021-01-13 ENCOUNTER — OFFICE VISIT (OUTPATIENT)
Dept: ORTHOPEDICS | Facility: CLINIC | Age: 86
End: 2021-01-13
Payer: MEDICARE

## 2021-01-13 VITALS
SYSTOLIC BLOOD PRESSURE: 96 MMHG | HEIGHT: 65 IN | DIASTOLIC BLOOD PRESSURE: 57 MMHG | WEIGHT: 143.31 LBS | HEART RATE: 67 BPM | BODY MASS INDEX: 23.88 KG/M2

## 2021-01-13 DIAGNOSIS — G89.29 CHRONIC PAIN OF BOTH KNEES: ICD-10-CM

## 2021-01-13 DIAGNOSIS — M25.562 CHRONIC PAIN OF BOTH KNEES: ICD-10-CM

## 2021-01-13 DIAGNOSIS — M25.569 KNEE PAIN, UNSPECIFIED CHRONICITY, UNSPECIFIED LATERALITY: ICD-10-CM

## 2021-01-13 DIAGNOSIS — M17.0 PRIMARY OSTEOARTHRITIS OF BOTH KNEES: Primary | ICD-10-CM

## 2021-01-13 DIAGNOSIS — M25.561 CHRONIC PAIN OF BOTH KNEES: ICD-10-CM

## 2021-01-13 PROCEDURE — 99999 PR PBB SHADOW E&M-EST. PATIENT-LVL V: CPT | Mod: PBBFAC,,, | Performed by: PHYSICIAN ASSISTANT

## 2021-01-13 PROCEDURE — 73562 X-RAY EXAM OF KNEE 3: CPT | Mod: 26,50,, | Performed by: RADIOLOGY

## 2021-01-13 PROCEDURE — 73562 XR KNEE ORTHO BILAT: ICD-10-PCS | Mod: 26,50,, | Performed by: RADIOLOGY

## 2021-01-13 PROCEDURE — 99999 PR PBB SHADOW E&M-EST. PATIENT-LVL V: ICD-10-PCS | Mod: PBBFAC,,, | Performed by: PHYSICIAN ASSISTANT

## 2021-01-13 PROCEDURE — 99203 OFFICE O/P NEW LOW 30 MIN: CPT | Mod: S$PBB,,, | Performed by: PHYSICIAN ASSISTANT

## 2021-01-13 PROCEDURE — 99203 PR OFFICE/OUTPT VISIT, NEW, LEVL III, 30-44 MIN: ICD-10-PCS | Mod: S$PBB,,, | Performed by: PHYSICIAN ASSISTANT

## 2021-01-13 PROCEDURE — 99215 OFFICE O/P EST HI 40 MIN: CPT | Mod: PBBFAC,25 | Performed by: PHYSICIAN ASSISTANT

## 2021-01-13 PROCEDURE — 73562 X-RAY EXAM OF KNEE 3: CPT | Mod: TC,50

## 2021-01-14 ENCOUNTER — CLINICAL SUPPORT (OUTPATIENT)
Dept: REHABILITATION | Facility: OTHER | Age: 86
End: 2021-01-14
Attending: INTERNAL MEDICINE
Payer: MEDICARE

## 2021-01-14 DIAGNOSIS — M54.2 CHRONIC NECK PAIN: ICD-10-CM

## 2021-01-14 DIAGNOSIS — G89.29 CHRONIC NECK PAIN: ICD-10-CM

## 2021-01-14 PROCEDURE — 97161 PT EVAL LOW COMPLEX 20 MIN: CPT | Mod: PN

## 2021-01-14 PROCEDURE — 97110 THERAPEUTIC EXERCISES: CPT | Mod: PN

## 2021-01-14 PROCEDURE — 97140 MANUAL THERAPY 1/> REGIONS: CPT | Mod: PN

## 2021-01-18 DIAGNOSIS — E03.9 HYPOTHYROIDISM, ADULT: ICD-10-CM

## 2021-01-18 RX ORDER — LEVOTHYROXINE SODIUM 50 UG/1
50 TABLET ORAL DAILY
Qty: 90 TABLET | Refills: 0 | Status: SHIPPED | OUTPATIENT
Start: 2021-01-18 | End: 2021-03-03 | Stop reason: SDUPTHER

## 2021-01-19 ENCOUNTER — CLINICAL SUPPORT (OUTPATIENT)
Dept: REHABILITATION | Facility: OTHER | Age: 86
End: 2021-01-19
Attending: INTERNAL MEDICINE
Payer: MEDICARE

## 2021-01-19 DIAGNOSIS — M54.2 CHRONIC NECK PAIN: ICD-10-CM

## 2021-01-19 DIAGNOSIS — G89.29 CHRONIC NECK PAIN: ICD-10-CM

## 2021-01-19 PROCEDURE — 97110 THERAPEUTIC EXERCISES: CPT | Mod: PN,CQ

## 2021-01-21 ENCOUNTER — TELEPHONE (OUTPATIENT)
Dept: INTERNAL MEDICINE | Facility: CLINIC | Age: 86
End: 2021-01-21

## 2021-01-22 ENCOUNTER — OFFICE VISIT (OUTPATIENT)
Dept: ORTHOPEDICS | Facility: CLINIC | Age: 86
End: 2021-01-22
Payer: MEDICARE

## 2021-01-22 VITALS — HEART RATE: 82 BPM | DIASTOLIC BLOOD PRESSURE: 77 MMHG | SYSTOLIC BLOOD PRESSURE: 133 MMHG

## 2021-01-22 DIAGNOSIS — M17.0 PRIMARY OSTEOARTHRITIS OF BOTH KNEES: Primary | ICD-10-CM

## 2021-01-22 PROCEDURE — 99999 PR PBB SHADOW E&M-EST. PATIENT-LVL III: ICD-10-PCS | Mod: PBBFAC,,, | Performed by: PHYSICIAN ASSISTANT

## 2021-01-22 PROCEDURE — 20610 PR DRAIN/INJECT LARGE JOINT/BURSA: ICD-10-PCS | Mod: 50,S$PBB,, | Performed by: PHYSICIAN ASSISTANT

## 2021-01-22 PROCEDURE — 99213 OFFICE O/P EST LOW 20 MIN: CPT | Mod: PBBFAC,25 | Performed by: PHYSICIAN ASSISTANT

## 2021-01-22 PROCEDURE — 20610 DRAIN/INJ JOINT/BURSA W/O US: CPT | Mod: 50,PBBFAC | Performed by: PHYSICIAN ASSISTANT

## 2021-01-22 PROCEDURE — 99999 PR PBB SHADOW E&M-EST. PATIENT-LVL III: CPT | Mod: PBBFAC,,, | Performed by: PHYSICIAN ASSISTANT

## 2021-01-22 PROCEDURE — 99499 NO LOS: ICD-10-PCS | Mod: S$PBB,,, | Performed by: PHYSICIAN ASSISTANT

## 2021-01-22 PROCEDURE — 99499 UNLISTED E&M SERVICE: CPT | Mod: S$PBB,,, | Performed by: PHYSICIAN ASSISTANT

## 2021-01-22 PROCEDURE — 20610 DRAIN/INJ JOINT/BURSA W/O US: CPT | Mod: 50,S$PBB,, | Performed by: PHYSICIAN ASSISTANT

## 2021-01-22 RX ADMIN — Medication 40 MG: at 01:01

## 2021-01-26 ENCOUNTER — CLINICAL SUPPORT (OUTPATIENT)
Dept: REHABILITATION | Facility: OTHER | Age: 86
End: 2021-01-26
Attending: INTERNAL MEDICINE
Payer: MEDICARE

## 2021-01-26 DIAGNOSIS — G89.29 CHRONIC NECK PAIN: ICD-10-CM

## 2021-01-26 DIAGNOSIS — M54.2 CHRONIC NECK PAIN: ICD-10-CM

## 2021-01-26 PROCEDURE — 97140 MANUAL THERAPY 1/> REGIONS: CPT | Mod: PN

## 2021-01-26 PROCEDURE — 97110 THERAPEUTIC EXERCISES: CPT | Mod: PN

## 2021-01-28 ENCOUNTER — OFFICE VISIT (OUTPATIENT)
Dept: CARDIOLOGY | Facility: CLINIC | Age: 86
End: 2021-01-28
Attending: INTERNAL MEDICINE
Payer: MEDICARE

## 2021-01-28 VITALS
HEIGHT: 65 IN | WEIGHT: 143.31 LBS | DIASTOLIC BLOOD PRESSURE: 71 MMHG | HEART RATE: 66 BPM | BODY MASS INDEX: 23.88 KG/M2 | SYSTOLIC BLOOD PRESSURE: 129 MMHG

## 2021-01-28 DIAGNOSIS — Z86.711 HISTORY OF PULMONARY EMBOLISM: ICD-10-CM

## 2021-01-28 DIAGNOSIS — N18.31 STAGE 3A CHRONIC KIDNEY DISEASE: ICD-10-CM

## 2021-01-28 DIAGNOSIS — J45.909 CHRONIC ASTHMA WITHOUT COMPLICATION, UNSPECIFIED ASTHMA SEVERITY, UNSPECIFIED WHETHER PERSISTENT: ICD-10-CM

## 2021-01-28 DIAGNOSIS — Z79.01 CHRONIC ANTICOAGULATION: ICD-10-CM

## 2021-01-28 DIAGNOSIS — E03.4 HYPOTHYROIDISM DUE TO ACQUIRED ATROPHY OF THYROID: ICD-10-CM

## 2021-01-28 DIAGNOSIS — F01.518 MIXED CORTICAL AND SUBCORTICAL VASCULAR DEMENTIA WITH BEHAVIORAL DISTURBANCE: ICD-10-CM

## 2021-01-28 DIAGNOSIS — I10 ESSENTIAL HYPERTENSION: ICD-10-CM

## 2021-01-28 DIAGNOSIS — E78.00 HYPERCHOLESTEROLEMIA: ICD-10-CM

## 2021-01-28 DIAGNOSIS — N18.31 TYPE 2 DIABETES MELLITUS WITH STAGE 3A CHRONIC KIDNEY DISEASE, WITHOUT LONG-TERM CURRENT USE OF INSULIN: ICD-10-CM

## 2021-01-28 DIAGNOSIS — Z86.718 HISTORY OF DEEP VENOUS THROMBOSIS: ICD-10-CM

## 2021-01-28 DIAGNOSIS — E11.22 TYPE 2 DIABETES MELLITUS WITH STAGE 3A CHRONIC KIDNEY DISEASE, WITHOUT LONG-TERM CURRENT USE OF INSULIN: ICD-10-CM

## 2021-01-28 PROCEDURE — 99999 PR PBB SHADOW E&M-EST. PATIENT-LVL IV: CPT | Mod: PBBFAC,,, | Performed by: INTERNAL MEDICINE

## 2021-01-28 PROCEDURE — 99999 PR PBB SHADOW E&M-EST. PATIENT-LVL IV: ICD-10-PCS | Mod: PBBFAC,,, | Performed by: INTERNAL MEDICINE

## 2021-01-28 PROCEDURE — 99214 PR OFFICE/OUTPT VISIT, EST, LEVL IV, 30-39 MIN: ICD-10-PCS | Mod: S$PBB,,, | Performed by: INTERNAL MEDICINE

## 2021-01-28 PROCEDURE — 99214 OFFICE O/P EST MOD 30 MIN: CPT | Mod: S$PBB,,, | Performed by: INTERNAL MEDICINE

## 2021-01-28 PROCEDURE — 99214 OFFICE O/P EST MOD 30 MIN: CPT | Mod: PBBFAC | Performed by: INTERNAL MEDICINE

## 2021-01-28 RX ORDER — AMLODIPINE BESYLATE 10 MG/1
10 TABLET ORAL DAILY
Qty: 90 TABLET | Refills: 3 | Status: SHIPPED | OUTPATIENT
Start: 2021-01-28 | End: 2022-04-23

## 2021-01-29 ENCOUNTER — CLINICAL SUPPORT (OUTPATIENT)
Dept: REHABILITATION | Facility: OTHER | Age: 86
End: 2021-01-29
Payer: MEDICARE

## 2021-01-29 ENCOUNTER — OFFICE VISIT (OUTPATIENT)
Dept: ORTHOPEDICS | Facility: CLINIC | Age: 86
End: 2021-01-29
Payer: MEDICARE

## 2021-01-29 VITALS — DIASTOLIC BLOOD PRESSURE: 66 MMHG | HEART RATE: 69 BPM | SYSTOLIC BLOOD PRESSURE: 129 MMHG

## 2021-01-29 DIAGNOSIS — M17.0 PRIMARY OSTEOARTHRITIS OF BOTH KNEES: Primary | ICD-10-CM

## 2021-01-29 DIAGNOSIS — G89.29 CHRONIC NECK PAIN: ICD-10-CM

## 2021-01-29 DIAGNOSIS — M54.2 CHRONIC NECK PAIN: ICD-10-CM

## 2021-01-29 PROCEDURE — 20610 PR DRAIN/INJECT LARGE JOINT/BURSA: ICD-10-PCS | Mod: 50,S$PBB,, | Performed by: PHYSICIAN ASSISTANT

## 2021-01-29 PROCEDURE — 20610 DRAIN/INJ JOINT/BURSA W/O US: CPT | Mod: 50,PBBFAC | Performed by: PHYSICIAN ASSISTANT

## 2021-01-29 PROCEDURE — 99999 PR PBB SHADOW E&M-EST. PATIENT-LVL IV: ICD-10-PCS | Mod: PBBFAC,,, | Performed by: PHYSICIAN ASSISTANT

## 2021-01-29 PROCEDURE — 99499 UNLISTED E&M SERVICE: CPT | Mod: S$PBB,,, | Performed by: PHYSICIAN ASSISTANT

## 2021-01-29 PROCEDURE — 99999 PR PBB SHADOW E&M-EST. PATIENT-LVL IV: CPT | Mod: PBBFAC,,, | Performed by: PHYSICIAN ASSISTANT

## 2021-01-29 PROCEDURE — 99499 NO LOS: ICD-10-PCS | Mod: S$PBB,,, | Performed by: PHYSICIAN ASSISTANT

## 2021-01-29 PROCEDURE — 20610 DRAIN/INJ JOINT/BURSA W/O US: CPT | Mod: 50,S$PBB,, | Performed by: PHYSICIAN ASSISTANT

## 2021-01-29 PROCEDURE — 99214 OFFICE O/P EST MOD 30 MIN: CPT | Mod: PBBFAC,25 | Performed by: PHYSICIAN ASSISTANT

## 2021-01-29 PROCEDURE — 97110 THERAPEUTIC EXERCISES: CPT | Mod: PN,CQ

## 2021-01-29 RX ADMIN — Medication 40 MG: at 11:01

## 2021-01-31 ENCOUNTER — EXTERNAL CHRONIC CARE MANAGEMENT (OUTPATIENT)
Dept: PRIMARY CARE CLINIC | Facility: CLINIC | Age: 86
End: 2021-01-31
Payer: MEDICARE

## 2021-01-31 PROCEDURE — 99490 CHRNC CARE MGMT STAFF 1ST 20: CPT | Mod: S$PBB,,, | Performed by: INTERNAL MEDICINE

## 2021-01-31 PROCEDURE — 99490 CHRNC CARE MGMT STAFF 1ST 20: CPT | Mod: PBBFAC | Performed by: INTERNAL MEDICINE

## 2021-01-31 PROCEDURE — 99490 PR CHRONIC CARE MGMT, 1ST 20 MIN: ICD-10-PCS | Mod: S$PBB,,, | Performed by: INTERNAL MEDICINE

## 2021-02-02 ENCOUNTER — CLINICAL SUPPORT (OUTPATIENT)
Dept: REHABILITATION | Facility: OTHER | Age: 86
End: 2021-02-02
Attending: INTERNAL MEDICINE
Payer: MEDICARE

## 2021-02-02 DIAGNOSIS — G89.29 CHRONIC NECK PAIN: ICD-10-CM

## 2021-02-02 DIAGNOSIS — M54.2 CHRONIC NECK PAIN: ICD-10-CM

## 2021-02-02 PROCEDURE — 97110 THERAPEUTIC EXERCISES: CPT | Mod: PN,CQ

## 2021-02-02 PROCEDURE — 97140 MANUAL THERAPY 1/> REGIONS: CPT | Mod: PN,CQ

## 2021-02-04 ENCOUNTER — CLINICAL SUPPORT (OUTPATIENT)
Dept: REHABILITATION | Facility: OTHER | Age: 86
End: 2021-02-04
Attending: INTERNAL MEDICINE
Payer: MEDICARE

## 2021-02-04 DIAGNOSIS — G89.29 CHRONIC NECK PAIN: Primary | ICD-10-CM

## 2021-02-04 DIAGNOSIS — M54.2 CHRONIC NECK PAIN: Primary | ICD-10-CM

## 2021-02-04 PROCEDURE — 97530 THERAPEUTIC ACTIVITIES: CPT | Mod: PN

## 2021-02-04 PROCEDURE — 97110 THERAPEUTIC EXERCISES: CPT | Mod: PN

## 2021-02-05 ENCOUNTER — OFFICE VISIT (OUTPATIENT)
Dept: ORTHOPEDICS | Facility: CLINIC | Age: 86
End: 2021-02-05
Payer: MEDICARE

## 2021-02-05 VITALS — SYSTOLIC BLOOD PRESSURE: 126 MMHG | DIASTOLIC BLOOD PRESSURE: 70 MMHG | HEART RATE: 76 BPM

## 2021-02-05 DIAGNOSIS — M17.0 PRIMARY OSTEOARTHRITIS OF BOTH KNEES: Primary | ICD-10-CM

## 2021-02-05 PROCEDURE — 20610 PR DRAIN/INJECT LARGE JOINT/BURSA: ICD-10-PCS | Mod: 50,S$PBB,, | Performed by: PHYSICIAN ASSISTANT

## 2021-02-05 PROCEDURE — 20610 DRAIN/INJ JOINT/BURSA W/O US: CPT | Mod: 50,PBBFAC | Performed by: PHYSICIAN ASSISTANT

## 2021-02-05 PROCEDURE — 99214 OFFICE O/P EST MOD 30 MIN: CPT | Mod: PBBFAC | Performed by: PHYSICIAN ASSISTANT

## 2021-02-05 PROCEDURE — 99999 PR PBB SHADOW E&M-EST. PATIENT-LVL IV: CPT | Mod: PBBFAC,,, | Performed by: PHYSICIAN ASSISTANT

## 2021-02-05 PROCEDURE — 99499 UNLISTED E&M SERVICE: CPT | Mod: S$PBB,,, | Performed by: PHYSICIAN ASSISTANT

## 2021-02-05 PROCEDURE — 99499 NO LOS: ICD-10-PCS | Mod: S$PBB,,, | Performed by: PHYSICIAN ASSISTANT

## 2021-02-05 PROCEDURE — 20610 DRAIN/INJ JOINT/BURSA W/O US: CPT | Mod: 50,S$PBB,, | Performed by: PHYSICIAN ASSISTANT

## 2021-02-05 PROCEDURE — 99999 PR PBB SHADOW E&M-EST. PATIENT-LVL IV: ICD-10-PCS | Mod: PBBFAC,,, | Performed by: PHYSICIAN ASSISTANT

## 2021-02-05 RX ADMIN — Medication 40 MG: at 02:02

## 2021-02-09 ENCOUNTER — TELEPHONE (OUTPATIENT)
Dept: ORTHOPEDICS | Facility: CLINIC | Age: 86
End: 2021-02-09

## 2021-02-11 ENCOUNTER — CLINICAL SUPPORT (OUTPATIENT)
Dept: REHABILITATION | Facility: OTHER | Age: 86
End: 2021-02-11
Attending: INTERNAL MEDICINE
Payer: MEDICARE

## 2021-02-11 DIAGNOSIS — M54.2 CHRONIC NECK PAIN: Primary | ICD-10-CM

## 2021-02-11 DIAGNOSIS — G89.29 CHRONIC NECK PAIN: Primary | ICD-10-CM

## 2021-02-11 PROCEDURE — 97110 THERAPEUTIC EXERCISES: CPT | Mod: PN

## 2021-02-11 PROCEDURE — 97530 THERAPEUTIC ACTIVITIES: CPT | Mod: PN

## 2021-02-23 ENCOUNTER — PATIENT MESSAGE (OUTPATIENT)
Dept: RHEUMATOLOGY | Facility: CLINIC | Age: 86
End: 2021-02-23

## 2021-02-28 ENCOUNTER — EXTERNAL CHRONIC CARE MANAGEMENT (OUTPATIENT)
Dept: PRIMARY CARE CLINIC | Facility: CLINIC | Age: 86
End: 2021-02-28
Payer: MEDICARE

## 2021-02-28 PROCEDURE — 99490 CHRNC CARE MGMT STAFF 1ST 20: CPT | Mod: S$PBB,,, | Performed by: INTERNAL MEDICINE

## 2021-02-28 PROCEDURE — 99490 CHRNC CARE MGMT STAFF 1ST 20: CPT | Mod: PBBFAC | Performed by: INTERNAL MEDICINE

## 2021-02-28 PROCEDURE — 99490 PR CHRONIC CARE MGMT, 1ST 20 MIN: ICD-10-PCS | Mod: S$PBB,,, | Performed by: INTERNAL MEDICINE

## 2021-03-02 ENCOUNTER — LAB VISIT (OUTPATIENT)
Dept: LAB | Facility: HOSPITAL | Age: 86
End: 2021-03-02
Attending: INTERNAL MEDICINE
Payer: MEDICARE

## 2021-03-02 ENCOUNTER — OFFICE VISIT (OUTPATIENT)
Dept: INTERNAL MEDICINE | Facility: CLINIC | Age: 86
End: 2021-03-02
Payer: MEDICARE

## 2021-03-02 VITALS
OXYGEN SATURATION: 98 % | HEART RATE: 68 BPM | BODY MASS INDEX: 23.52 KG/M2 | DIASTOLIC BLOOD PRESSURE: 50 MMHG | WEIGHT: 141.13 LBS | SYSTOLIC BLOOD PRESSURE: 114 MMHG | HEIGHT: 65 IN

## 2021-03-02 DIAGNOSIS — G47.9 SLEEP DISORDER: Primary | ICD-10-CM

## 2021-03-02 DIAGNOSIS — E11.22 TYPE 2 DIABETES MELLITUS WITH STAGE 3A CHRONIC KIDNEY DISEASE, WITHOUT LONG-TERM CURRENT USE OF INSULIN: ICD-10-CM

## 2021-03-02 DIAGNOSIS — J41.1 BRONCHITIS, CHRONIC, MUCOPURULENT: ICD-10-CM

## 2021-03-02 DIAGNOSIS — R53.83 FATIGUE, UNSPECIFIED TYPE: ICD-10-CM

## 2021-03-02 DIAGNOSIS — F33.41 RECURRENT MAJOR DEPRESSIVE DISORDER, IN PARTIAL REMISSION: ICD-10-CM

## 2021-03-02 DIAGNOSIS — N18.31 TYPE 2 DIABETES MELLITUS WITH STAGE 3A CHRONIC KIDNEY DISEASE, WITHOUT LONG-TERM CURRENT USE OF INSULIN: ICD-10-CM

## 2021-03-02 DIAGNOSIS — Z86.711 HISTORY OF PULMONARY EMBOLISM: ICD-10-CM

## 2021-03-02 DIAGNOSIS — F51.3 SLEEP WALKING: ICD-10-CM

## 2021-03-02 DIAGNOSIS — M46.96 UNSPECIFIED INFLAMMATORY SPONDYLOPATHY, LUMBAR REGION: ICD-10-CM

## 2021-03-02 DIAGNOSIS — E03.4 HYPOTHYROIDISM DUE TO ACQUIRED ATROPHY OF THYROID: ICD-10-CM

## 2021-03-02 DIAGNOSIS — J45.909 CHRONIC ASTHMA WITHOUT COMPLICATION, UNSPECIFIED ASTHMA SEVERITY, UNSPECIFIED WHETHER PERSISTENT: ICD-10-CM

## 2021-03-02 DIAGNOSIS — F33.0 MILD EPISODE OF RECURRENT MAJOR DEPRESSIVE DISORDER: ICD-10-CM

## 2021-03-02 DIAGNOSIS — E03.9 HYPOTHYROIDISM, ADULT: ICD-10-CM

## 2021-03-02 PROCEDURE — 84443 ASSAY THYROID STIM HORMONE: CPT

## 2021-03-02 PROCEDURE — 36415 COLL VENOUS BLD VENIPUNCTURE: CPT

## 2021-03-02 PROCEDURE — 99999 PR PBB SHADOW E&M-EST. PATIENT-LVL II: CPT | Mod: PBBFAC,,, | Performed by: INTERNAL MEDICINE

## 2021-03-02 PROCEDURE — 99212 OFFICE O/P EST SF 10 MIN: CPT | Mod: PBBFAC | Performed by: INTERNAL MEDICINE

## 2021-03-02 PROCEDURE — 99999 PR PBB SHADOW E&M-EST. PATIENT-LVL II: ICD-10-PCS | Mod: PBBFAC,,, | Performed by: INTERNAL MEDICINE

## 2021-03-02 PROCEDURE — 99214 OFFICE O/P EST MOD 30 MIN: CPT | Mod: S$PBB,,, | Performed by: INTERNAL MEDICINE

## 2021-03-02 PROCEDURE — 99214 PR OFFICE/OUTPT VISIT, EST, LEVL IV, 30-39 MIN: ICD-10-PCS | Mod: S$PBB,,, | Performed by: INTERNAL MEDICINE

## 2021-03-02 RX ORDER — ESCITALOPRAM OXALATE 10 MG/1
TABLET ORAL
Qty: 45 TABLET | Refills: 0 | Status: SHIPPED | OUTPATIENT
Start: 2021-03-02 | End: 2021-04-13

## 2021-03-03 ENCOUNTER — OFFICE VISIT (OUTPATIENT)
Dept: RHEUMATOLOGY | Facility: CLINIC | Age: 86
End: 2021-03-03
Payer: MEDICARE

## 2021-03-03 ENCOUNTER — OUTPATIENT CASE MANAGEMENT (OUTPATIENT)
Dept: ADMINISTRATIVE | Facility: OTHER | Age: 86
End: 2021-03-03

## 2021-03-03 ENCOUNTER — LAB VISIT (OUTPATIENT)
Dept: LAB | Facility: HOSPITAL | Age: 86
End: 2021-03-03
Attending: INTERNAL MEDICINE
Payer: MEDICARE

## 2021-03-03 ENCOUNTER — TELEPHONE (OUTPATIENT)
Dept: INTERNAL MEDICINE | Facility: CLINIC | Age: 86
End: 2021-03-03

## 2021-03-03 VITALS
TEMPERATURE: 99 F | SYSTOLIC BLOOD PRESSURE: 111 MMHG | DIASTOLIC BLOOD PRESSURE: 62 MMHG | HEART RATE: 60 BPM | HEIGHT: 65 IN | BODY MASS INDEX: 24.79 KG/M2 | WEIGHT: 148.81 LBS

## 2021-03-03 DIAGNOSIS — E03.9 HYPOTHYROIDISM, ADULT: ICD-10-CM

## 2021-03-03 DIAGNOSIS — M79.7 FIBROMYALGIA: Primary | ICD-10-CM

## 2021-03-03 DIAGNOSIS — M79.7 FIBROMYALGIA: ICD-10-CM

## 2021-03-03 DIAGNOSIS — M15.9 PRIMARY OSTEOARTHRITIS INVOLVING MULTIPLE JOINTS: ICD-10-CM

## 2021-03-03 LAB
CK SERPL-CCNC: 100 U/L (ref 20–180)
CRP SERPL-MCNC: 0.9 MG/L (ref 0–8.2)
ERYTHROCYTE [SEDIMENTATION RATE] IN BLOOD BY WESTERGREN METHOD: 21 MM/HR (ref 0–36)
TSH SERPL DL<=0.005 MIU/L-ACNC: 0.72 UIU/ML (ref 0.4–4)

## 2021-03-03 PROCEDURE — 85652 RBC SED RATE AUTOMATED: CPT | Performed by: INTERNAL MEDICINE

## 2021-03-03 PROCEDURE — 82085 ASSAY OF ALDOLASE: CPT | Performed by: INTERNAL MEDICINE

## 2021-03-03 PROCEDURE — 99215 OFFICE O/P EST HI 40 MIN: CPT | Mod: PBBFAC | Performed by: INTERNAL MEDICINE

## 2021-03-03 PROCEDURE — 82550 ASSAY OF CK (CPK): CPT | Performed by: INTERNAL MEDICINE

## 2021-03-03 PROCEDURE — 99213 PR OFFICE/OUTPT VISIT, EST, LEVL III, 20-29 MIN: ICD-10-PCS | Mod: S$PBB,,, | Performed by: INTERNAL MEDICINE

## 2021-03-03 PROCEDURE — 86140 C-REACTIVE PROTEIN: CPT | Performed by: INTERNAL MEDICINE

## 2021-03-03 PROCEDURE — 36415 COLL VENOUS BLD VENIPUNCTURE: CPT | Performed by: INTERNAL MEDICINE

## 2021-03-03 PROCEDURE — 99213 OFFICE O/P EST LOW 20 MIN: CPT | Mod: S$PBB,,, | Performed by: INTERNAL MEDICINE

## 2021-03-03 PROCEDURE — 99999 PR PBB SHADOW E&M-EST. PATIENT-LVL V: ICD-10-PCS | Mod: PBBFAC,,, | Performed by: INTERNAL MEDICINE

## 2021-03-03 PROCEDURE — 99999 PR PBB SHADOW E&M-EST. PATIENT-LVL V: CPT | Mod: PBBFAC,,, | Performed by: INTERNAL MEDICINE

## 2021-03-03 RX ORDER — PANTOPRAZOLE SODIUM 40 MG/1
40 TABLET, DELAYED RELEASE ORAL 2 TIMES DAILY
Status: ON HOLD | COMMUNITY
Start: 2021-01-06 | End: 2021-10-05

## 2021-03-03 RX ORDER — LEVOTHYROXINE SODIUM 50 UG/1
50 TABLET ORAL DAILY
Qty: 90 TABLET | Refills: 3 | Status: SHIPPED | OUTPATIENT
Start: 2021-03-03 | End: 2021-05-27 | Stop reason: SDUPTHER

## 2021-03-04 ENCOUNTER — PATIENT MESSAGE (OUTPATIENT)
Dept: ADMINISTRATIVE | Facility: OTHER | Age: 86
End: 2021-03-04

## 2021-03-04 ENCOUNTER — CLINICAL SUPPORT (OUTPATIENT)
Dept: REHABILITATION | Facility: OTHER | Age: 86
End: 2021-03-04
Attending: INTERNAL MEDICINE
Payer: MEDICARE

## 2021-03-04 DIAGNOSIS — G89.29 CHRONIC NECK PAIN: Primary | ICD-10-CM

## 2021-03-04 DIAGNOSIS — M54.2 CHRONIC NECK PAIN: Primary | ICD-10-CM

## 2021-03-04 PROCEDURE — 97110 THERAPEUTIC EXERCISES: CPT | Mod: PN

## 2021-03-04 PROCEDURE — 97530 THERAPEUTIC ACTIVITIES: CPT | Mod: PN

## 2021-03-04 PROCEDURE — 97140 MANUAL THERAPY 1/> REGIONS: CPT | Mod: PN

## 2021-03-05 ENCOUNTER — TELEPHONE (OUTPATIENT)
Dept: RHEUMATOLOGY | Facility: CLINIC | Age: 86
End: 2021-03-05

## 2021-03-05 DIAGNOSIS — G89.29 CHRONIC LEFT-SIDED LOW BACK PAIN WITH LEFT-SIDED SCIATICA: Primary | ICD-10-CM

## 2021-03-05 DIAGNOSIS — M54.42 CHRONIC LEFT-SIDED LOW BACK PAIN WITH LEFT-SIDED SCIATICA: Primary | ICD-10-CM

## 2021-03-07 LAB — ALDOLASE SERPL-CCNC: 4.1 U/L (ref 1.2–7.6)

## 2021-03-09 ENCOUNTER — CLINICAL SUPPORT (OUTPATIENT)
Dept: REHABILITATION | Facility: OTHER | Age: 86
End: 2021-03-09
Attending: INTERNAL MEDICINE
Payer: MEDICARE

## 2021-03-09 ENCOUNTER — PATIENT OUTREACH (OUTPATIENT)
Dept: ADMINISTRATIVE | Facility: OTHER | Age: 86
End: 2021-03-09

## 2021-03-09 ENCOUNTER — TELEPHONE (OUTPATIENT)
Dept: ORTHOPEDICS | Facility: CLINIC | Age: 86
End: 2021-03-09

## 2021-03-09 ENCOUNTER — TELEPHONE (OUTPATIENT)
Dept: RHEUMATOLOGY | Facility: CLINIC | Age: 86
End: 2021-03-09

## 2021-03-09 DIAGNOSIS — M54.2 CHRONIC NECK PAIN: ICD-10-CM

## 2021-03-09 DIAGNOSIS — G89.29 CHRONIC NECK PAIN: ICD-10-CM

## 2021-03-09 PROCEDURE — 97140 MANUAL THERAPY 1/> REGIONS: CPT | Mod: PN

## 2021-03-09 PROCEDURE — 97110 THERAPEUTIC EXERCISES: CPT | Mod: PN

## 2021-03-09 PROCEDURE — 97530 THERAPEUTIC ACTIVITIES: CPT | Mod: PN

## 2021-03-10 ENCOUNTER — OFFICE VISIT (OUTPATIENT)
Dept: ORTHOPEDICS | Facility: CLINIC | Age: 86
End: 2021-03-10
Payer: MEDICARE

## 2021-03-10 VITALS
DIASTOLIC BLOOD PRESSURE: 57 MMHG | HEIGHT: 65 IN | BODY MASS INDEX: 23.81 KG/M2 | TEMPERATURE: 98 F | HEART RATE: 69 BPM | WEIGHT: 142.88 LBS | SYSTOLIC BLOOD PRESSURE: 114 MMHG

## 2021-03-10 DIAGNOSIS — M25.561 ACUTE PAIN OF RIGHT KNEE: ICD-10-CM

## 2021-03-10 DIAGNOSIS — M17.0 PRIMARY OSTEOARTHRITIS OF BOTH KNEES: Primary | ICD-10-CM

## 2021-03-10 PROCEDURE — 99999 PR PBB SHADOW E&M-EST. PATIENT-LVL V: ICD-10-PCS | Mod: PBBFAC,,, | Performed by: PHYSICIAN ASSISTANT

## 2021-03-10 PROCEDURE — 99213 OFFICE O/P EST LOW 20 MIN: CPT | Mod: S$PBB,,, | Performed by: PHYSICIAN ASSISTANT

## 2021-03-10 PROCEDURE — 99215 OFFICE O/P EST HI 40 MIN: CPT | Mod: PBBFAC | Performed by: PHYSICIAN ASSISTANT

## 2021-03-10 PROCEDURE — 99999 PR PBB SHADOW E&M-EST. PATIENT-LVL V: CPT | Mod: PBBFAC,,, | Performed by: PHYSICIAN ASSISTANT

## 2021-03-10 PROCEDURE — 99213 PR OFFICE/OUTPT VISIT, EST, LEVL III, 20-29 MIN: ICD-10-PCS | Mod: S$PBB,,, | Performed by: PHYSICIAN ASSISTANT

## 2021-03-11 ENCOUNTER — TELEPHONE (OUTPATIENT)
Dept: DERMATOLOGY | Facility: CLINIC | Age: 86
End: 2021-03-11

## 2021-03-11 ENCOUNTER — CLINICAL SUPPORT (OUTPATIENT)
Dept: REHABILITATION | Facility: OTHER | Age: 86
End: 2021-03-11
Attending: INTERNAL MEDICINE
Payer: MEDICARE

## 2021-03-11 DIAGNOSIS — G89.29 CHRONIC NECK PAIN: Primary | ICD-10-CM

## 2021-03-11 DIAGNOSIS — M54.2 CHRONIC NECK PAIN: Primary | ICD-10-CM

## 2021-03-11 PROCEDURE — 97110 THERAPEUTIC EXERCISES: CPT | Mod: PN

## 2021-03-11 PROCEDURE — 97140 MANUAL THERAPY 1/> REGIONS: CPT | Mod: PN

## 2021-03-16 ENCOUNTER — CLINICAL SUPPORT (OUTPATIENT)
Dept: REHABILITATION | Facility: OTHER | Age: 86
End: 2021-03-16
Attending: INTERNAL MEDICINE
Payer: MEDICARE

## 2021-03-16 DIAGNOSIS — M54.2 CHRONIC NECK PAIN: Primary | ICD-10-CM

## 2021-03-16 DIAGNOSIS — G89.29 CHRONIC NECK PAIN: Primary | ICD-10-CM

## 2021-03-16 PROCEDURE — 97140 MANUAL THERAPY 1/> REGIONS: CPT | Mod: PN

## 2021-03-16 PROCEDURE — 97530 THERAPEUTIC ACTIVITIES: CPT | Mod: PN

## 2021-03-16 PROCEDURE — 97110 THERAPEUTIC EXERCISES: CPT | Mod: PN

## 2021-03-17 ENCOUNTER — TELEPHONE (OUTPATIENT)
Dept: RHEUMATOLOGY | Facility: CLINIC | Age: 86
End: 2021-03-17

## 2021-03-17 DIAGNOSIS — M79.7 FIBROMYALGIA: Primary | ICD-10-CM

## 2021-03-18 ENCOUNTER — CLINICAL SUPPORT (OUTPATIENT)
Dept: REHABILITATION | Facility: OTHER | Age: 86
End: 2021-03-18
Attending: INTERNAL MEDICINE
Payer: MEDICARE

## 2021-03-18 ENCOUNTER — TELEPHONE (OUTPATIENT)
Dept: RHEUMATOLOGY | Facility: CLINIC | Age: 86
End: 2021-03-18

## 2021-03-18 ENCOUNTER — TELEPHONE (OUTPATIENT)
Dept: DERMATOLOGY | Facility: CLINIC | Age: 86
End: 2021-03-18

## 2021-03-18 DIAGNOSIS — M54.2 CHRONIC NECK PAIN: ICD-10-CM

## 2021-03-18 DIAGNOSIS — G89.29 CHRONIC NECK PAIN: ICD-10-CM

## 2021-03-18 PROCEDURE — 97530 THERAPEUTIC ACTIVITIES: CPT | Mod: PN,CQ

## 2021-03-18 PROCEDURE — 97110 THERAPEUTIC EXERCISES: CPT | Mod: PN,CQ

## 2021-03-19 ENCOUNTER — PES CALL (OUTPATIENT)
Dept: ADMINISTRATIVE | Facility: CLINIC | Age: 86
End: 2021-03-19

## 2021-03-19 ENCOUNTER — DOCUMENTATION ONLY (OUTPATIENT)
Dept: REHABILITATION | Facility: OTHER | Age: 86
End: 2021-03-19

## 2021-03-19 ENCOUNTER — TELEPHONE (OUTPATIENT)
Dept: CARDIOLOGY | Facility: CLINIC | Age: 86
End: 2021-03-19

## 2021-03-23 ENCOUNTER — HOSPITAL ENCOUNTER (OUTPATIENT)
Dept: RADIOLOGY | Facility: HOSPITAL | Age: 86
Discharge: HOME | End: 2021-03-23
Attending: ORTHOPAEDIC SURGERY
Payer: MEDICARE

## 2021-03-23 ENCOUNTER — OFFICE VISIT (OUTPATIENT)
Dept: ORTHOPEDICS | Facility: CLINIC | Age: 86
End: 2021-03-23
Payer: MEDICARE

## 2021-03-23 VITALS — BODY MASS INDEX: 24.12 KG/M2 | WEIGHT: 144.75 LBS | HEIGHT: 65 IN

## 2021-03-23 DIAGNOSIS — G95.9 MYELOPATHY: ICD-10-CM

## 2021-03-23 DIAGNOSIS — M54.16 LUMBAR RADICULOPATHY: Primary | ICD-10-CM

## 2021-03-23 DIAGNOSIS — M51.36 DDD (DEGENERATIVE DISC DISEASE), LUMBAR: ICD-10-CM

## 2021-03-23 PROCEDURE — 72100 XR LUMBAR SPINE AP AND LAT WITH FLEX/EXT: ICD-10-PCS | Mod: 26,,, | Performed by: RADIOLOGY

## 2021-03-23 PROCEDURE — 99999 PR PBB SHADOW E&M-EST. PATIENT-LVL V: CPT | Mod: PBBFAC,,, | Performed by: ORTHOPAEDIC SURGERY

## 2021-03-23 PROCEDURE — 99999 PR PBB SHADOW E&M-EST. PATIENT-LVL V: ICD-10-PCS | Mod: PBBFAC,,, | Performed by: ORTHOPAEDIC SURGERY

## 2021-03-23 PROCEDURE — 99214 PR OFFICE/OUTPT VISIT, EST, LEVL IV, 30-39 MIN: ICD-10-PCS | Mod: S$PBB,,, | Performed by: ORTHOPAEDIC SURGERY

## 2021-03-23 PROCEDURE — 72120 X-RAY BEND ONLY L-S SPINE: CPT | Mod: 26,,, | Performed by: RADIOLOGY

## 2021-03-23 PROCEDURE — 99214 OFFICE O/P EST MOD 30 MIN: CPT | Mod: S$PBB,,, | Performed by: ORTHOPAEDIC SURGERY

## 2021-03-23 PROCEDURE — 72120 XR LUMBAR SPINE AP AND LAT WITH FLEX/EXT: ICD-10-PCS | Mod: 26,,, | Performed by: RADIOLOGY

## 2021-03-23 PROCEDURE — 99215 OFFICE O/P EST HI 40 MIN: CPT | Mod: PBBFAC,25 | Performed by: ORTHOPAEDIC SURGERY

## 2021-03-23 PROCEDURE — 72100 X-RAY EXAM L-S SPINE 2/3 VWS: CPT | Mod: TC

## 2021-03-23 PROCEDURE — 72100 X-RAY EXAM L-S SPINE 2/3 VWS: CPT | Mod: 26,,, | Performed by: RADIOLOGY

## 2021-03-29 ENCOUNTER — TELEPHONE (OUTPATIENT)
Dept: UROLOGY | Facility: CLINIC | Age: 86
End: 2021-03-29

## 2021-03-29 ENCOUNTER — OUTPATIENT CASE MANAGEMENT (OUTPATIENT)
Dept: ADMINISTRATIVE | Facility: OTHER | Age: 86
End: 2021-03-29

## 2021-03-30 ENCOUNTER — CLINICAL SUPPORT (OUTPATIENT)
Dept: REHABILITATION | Facility: OTHER | Age: 86
End: 2021-03-30
Attending: INTERNAL MEDICINE
Payer: MEDICARE

## 2021-03-30 DIAGNOSIS — G89.29 CHRONIC NECK PAIN: ICD-10-CM

## 2021-03-30 DIAGNOSIS — M54.2 CHRONIC NECK PAIN: ICD-10-CM

## 2021-03-30 PROCEDURE — 97530 THERAPEUTIC ACTIVITIES: CPT | Mod: PN,CQ

## 2021-03-30 PROCEDURE — 97110 THERAPEUTIC EXERCISES: CPT | Mod: PN,CQ

## 2021-03-31 ENCOUNTER — EXTERNAL CHRONIC CARE MANAGEMENT (OUTPATIENT)
Dept: PRIMARY CARE CLINIC | Facility: CLINIC | Age: 86
End: 2021-03-31
Payer: MEDICARE

## 2021-03-31 PROCEDURE — 99439 CHRNC CARE MGMT STAF EA ADDL: CPT | Mod: S$PBB,,, | Performed by: INTERNAL MEDICINE

## 2021-03-31 PROCEDURE — 99490 PR CHRONIC CARE MGMT, 1ST 20 MIN: ICD-10-PCS | Mod: S$PBB,,, | Performed by: INTERNAL MEDICINE

## 2021-03-31 PROCEDURE — 99439 CHRNC CARE MGMT STAF EA ADDL: CPT | Mod: PBBFAC,25,27 | Performed by: INTERNAL MEDICINE

## 2021-03-31 PROCEDURE — 99490 CHRNC CARE MGMT STAFF 1ST 20: CPT | Mod: PBBFAC | Performed by: INTERNAL MEDICINE

## 2021-03-31 PROCEDURE — 99439 PR CHRONIC CARE MGMT, EA ADDTL 20 MIN: ICD-10-PCS | Mod: S$PBB,,, | Performed by: INTERNAL MEDICINE

## 2021-03-31 PROCEDURE — 99490 CHRNC CARE MGMT STAFF 1ST 20: CPT | Mod: S$PBB,,, | Performed by: INTERNAL MEDICINE

## 2021-04-01 ENCOUNTER — CLINICAL SUPPORT (OUTPATIENT)
Dept: REHABILITATION | Facility: OTHER | Age: 86
End: 2021-04-01
Attending: INTERNAL MEDICINE
Payer: MEDICARE

## 2021-04-01 DIAGNOSIS — G89.29 CHRONIC NECK PAIN: Primary | ICD-10-CM

## 2021-04-01 DIAGNOSIS — M54.50 LUMBAR PAIN: ICD-10-CM

## 2021-04-01 DIAGNOSIS — M54.2 CHRONIC NECK PAIN: Primary | ICD-10-CM

## 2021-04-01 DIAGNOSIS — M54.16 LUMBAR RADICULOPATHY: ICD-10-CM

## 2021-04-01 DIAGNOSIS — M62.81 MUSCLE WEAKNESS: ICD-10-CM

## 2021-04-01 DIAGNOSIS — R26.89 POOR BALANCE: ICD-10-CM

## 2021-04-01 PROCEDURE — 97164 PT RE-EVAL EST PLAN CARE: CPT | Mod: PN

## 2021-04-01 PROCEDURE — 97530 THERAPEUTIC ACTIVITIES: CPT | Mod: PN

## 2021-04-04 ENCOUNTER — TELEPHONE (OUTPATIENT)
Dept: INTERNAL MEDICINE | Facility: CLINIC | Age: 86
End: 2021-04-04

## 2021-04-05 ENCOUNTER — HOSPITAL ENCOUNTER (OUTPATIENT)
Dept: RADIOLOGY | Facility: HOSPITAL | Age: 86
Discharge: HOME | End: 2021-04-05
Attending: ORTHOPAEDIC SURGERY
Payer: MEDICARE

## 2021-04-05 ENCOUNTER — OFFICE VISIT (OUTPATIENT)
Dept: ORTHOPEDICS | Facility: CLINIC | Age: 86
End: 2021-04-05
Payer: MEDICARE

## 2021-04-05 VITALS — WEIGHT: 137.13 LBS | HEIGHT: 65 IN | BODY MASS INDEX: 22.85 KG/M2

## 2021-04-05 DIAGNOSIS — G95.9 MYELOPATHY: ICD-10-CM

## 2021-04-05 DIAGNOSIS — R52 PAIN: ICD-10-CM

## 2021-04-05 DIAGNOSIS — M54.16 LUMBAR RADICULOPATHY: ICD-10-CM

## 2021-04-05 DIAGNOSIS — M48.02 CERVICAL STENOSIS OF SPINAL CANAL: Primary | ICD-10-CM

## 2021-04-05 PROCEDURE — 72148 MRI LUMBAR SPINE W/O DYE: CPT | Mod: 26,,, | Performed by: RADIOLOGY

## 2021-04-05 PROCEDURE — 72141 MRI CERVICAL SPINE WITHOUT CONTRAST: ICD-10-PCS | Mod: 26,,, | Performed by: RADIOLOGY

## 2021-04-05 PROCEDURE — 72040 X-RAY EXAM NECK SPINE 2-3 VW: CPT | Mod: 26,,, | Performed by: RADIOLOGY

## 2021-04-05 PROCEDURE — 72040 X-RAY EXAM NECK SPINE 2-3 VW: CPT | Mod: TC

## 2021-04-05 PROCEDURE — 72148 MRI LUMBAR SPINE W/O DYE: CPT | Mod: TC

## 2021-04-05 PROCEDURE — 72148 MRI LUMBAR SPINE WITHOUT CONTRAST: ICD-10-PCS | Mod: 26,,, | Performed by: RADIOLOGY

## 2021-04-05 PROCEDURE — 99999 PR PBB SHADOW E&M-EST. PATIENT-LVL II: CPT | Mod: PBBFAC,,, | Performed by: ORTHOPAEDIC SURGERY

## 2021-04-05 PROCEDURE — 99999 PR PBB SHADOW E&M-EST. PATIENT-LVL II: ICD-10-PCS | Mod: PBBFAC,,, | Performed by: ORTHOPAEDIC SURGERY

## 2021-04-05 PROCEDURE — 72141 MRI NECK SPINE W/O DYE: CPT | Mod: 26,,, | Performed by: RADIOLOGY

## 2021-04-05 PROCEDURE — 72040 XR CERVICAL SPINE FLEXION  AND EXTENSION ONLY: ICD-10-PCS | Mod: 26,,, | Performed by: RADIOLOGY

## 2021-04-05 PROCEDURE — 99213 PR OFFICE/OUTPT VISIT, EST, LEVL III, 20-29 MIN: ICD-10-PCS | Mod: S$PBB,,, | Performed by: ORTHOPAEDIC SURGERY

## 2021-04-05 PROCEDURE — 72070 X-RAY EXAM THORAC SPINE 2VWS: CPT | Mod: TC

## 2021-04-05 PROCEDURE — 72070 X-RAY EXAM THORAC SPINE 2VWS: CPT | Mod: 26,,, | Performed by: RADIOLOGY

## 2021-04-05 PROCEDURE — 99212 OFFICE O/P EST SF 10 MIN: CPT | Mod: PBBFAC,25 | Performed by: ORTHOPAEDIC SURGERY

## 2021-04-05 PROCEDURE — 72070 XR THORACIC SPINE AP LATERAL: ICD-10-PCS | Mod: 26,,, | Performed by: RADIOLOGY

## 2021-04-05 PROCEDURE — 72141 MRI NECK SPINE W/O DYE: CPT | Mod: TC

## 2021-04-05 PROCEDURE — 99213 OFFICE O/P EST LOW 20 MIN: CPT | Mod: S$PBB,,, | Performed by: ORTHOPAEDIC SURGERY

## 2021-04-06 ENCOUNTER — TELEPHONE (OUTPATIENT)
Dept: UROLOGY | Facility: CLINIC | Age: 86
End: 2021-04-06

## 2021-04-06 ENCOUNTER — OFFICE VISIT (OUTPATIENT)
Dept: DERMATOLOGY | Facility: CLINIC | Age: 86
End: 2021-04-06
Payer: MEDICARE

## 2021-04-06 DIAGNOSIS — L98.9 DISEASE OF SKIN AND SUBCUTANEOUS TISSUE: Primary | ICD-10-CM

## 2021-04-06 DIAGNOSIS — L29.9 PRURITUS: ICD-10-CM

## 2021-04-06 PROCEDURE — 88305 TISSUE EXAM BY PATHOLOGIST: ICD-10-PCS | Mod: 26,,, | Performed by: PATHOLOGY

## 2021-04-06 PROCEDURE — 11104 PR PUNCH BIOPSY, SKIN, SINGLE LESION: ICD-10-PCS | Mod: S$PBB,,, | Performed by: DERMATOLOGY

## 2021-04-06 PROCEDURE — 99212 OFFICE O/P EST SF 10 MIN: CPT | Mod: PBBFAC,25 | Performed by: DERMATOLOGY

## 2021-04-06 PROCEDURE — 99214 PR OFFICE/OUTPT VISIT, EST, LEVL IV, 30-39 MIN: ICD-10-PCS | Mod: 25,S$PBB,, | Performed by: DERMATOLOGY

## 2021-04-06 PROCEDURE — 99999 PR PBB SHADOW E&M-EST. PATIENT-LVL II: CPT | Mod: PBBFAC,,, | Performed by: DERMATOLOGY

## 2021-04-06 PROCEDURE — 88305 TISSUE EXAM BY PATHOLOGIST: CPT | Performed by: PATHOLOGY

## 2021-04-06 PROCEDURE — 88305 TISSUE EXAM BY PATHOLOGIST: CPT | Mod: 26,,, | Performed by: PATHOLOGY

## 2021-04-06 PROCEDURE — 99214 OFFICE O/P EST MOD 30 MIN: CPT | Mod: 25,S$PBB,, | Performed by: DERMATOLOGY

## 2021-04-06 PROCEDURE — 11104 PUNCH BX SKIN SINGLE LESION: CPT | Mod: S$PBB,,, | Performed by: DERMATOLOGY

## 2021-04-06 PROCEDURE — 99999 PR PBB SHADOW E&M-EST. PATIENT-LVL II: ICD-10-PCS | Mod: PBBFAC,,, | Performed by: DERMATOLOGY

## 2021-04-06 PROCEDURE — 11104 PUNCH BX SKIN SINGLE LESION: CPT | Mod: PBBFAC | Performed by: DERMATOLOGY

## 2021-04-07 ENCOUNTER — TELEPHONE (OUTPATIENT)
Dept: INTERNAL MEDICINE | Facility: CLINIC | Age: 86
End: 2021-04-07

## 2021-04-08 NOTE — NURSING TRANSFER
Nursing Transfer Note      4/29/2019     Transfer To: Rm [id number]    Transfer via wheelchair    Transfer with cardiac monitoring    Transported by RN    Medicines sent: Yes    Chart send with patient: Yes    Notified: daughter    Patient reassessed at: 1030 4/29/19    Upon arrival to floor: cardiac monitor applied, patient oriented to room, call bell in reach and bed in lowest position    VSS, room air, no c/o SOB. Transported via wheelchair. No acute distress noted.    cyanocobalamin 1000 mcg oral tablet: 1 tab(s) orally once a day  folic acid 1 mg oral tablet: 1 tab(s) orally once a day  Prenatal Multivitamins with Folic Acid 1 mg oral capsule: 1 cap(s) orally once a day  thiamine 100 mg oral tablet: 1 tab(s) orally once a day   cyanocobalamin 1000 mcg oral tablet: 1 tab(s) orally once a day  folic acid 1 mg oral tablet: 1 tab(s) orally once a day  melatonin 3 mg oral tablet: 1 tab(s) orally once a day (at bedtime)  Prenatal Multivitamins with Folic Acid 1 mg oral capsule: 1 cap(s) orally once a day  sertraline 50 mg oral tablet: 1 tab(s) orally once a day   thiamine 100 mg oral tablet: 1 tab(s) orally once a day

## 2021-04-09 ENCOUNTER — DOCUMENTATION ONLY (OUTPATIENT)
Dept: REHABILITATION | Facility: OTHER | Age: 86
End: 2021-04-09

## 2021-04-12 LAB
FINAL PATHOLOGIC DIAGNOSIS: NORMAL
GROSS: NORMAL
Lab: NORMAL
MICROSCOPIC EXAM: NORMAL

## 2021-04-13 ENCOUNTER — OFFICE VISIT (OUTPATIENT)
Dept: INTERNAL MEDICINE | Facility: CLINIC | Age: 86
End: 2021-04-13
Payer: MEDICARE

## 2021-04-13 ENCOUNTER — TELEPHONE (OUTPATIENT)
Dept: DERMATOLOGY | Facility: CLINIC | Age: 86
End: 2021-04-13

## 2021-04-13 VITALS
OXYGEN SATURATION: 98 % | HEIGHT: 65 IN | DIASTOLIC BLOOD PRESSURE: 80 MMHG | WEIGHT: 137.38 LBS | RESPIRATION RATE: 16 BRPM | BODY MASS INDEX: 22.89 KG/M2 | SYSTOLIC BLOOD PRESSURE: 122 MMHG | HEART RATE: 87 BPM

## 2021-04-13 DIAGNOSIS — F43.21 ADJUSTMENT DISORDER WITH DEPRESSED MOOD: ICD-10-CM

## 2021-04-13 DIAGNOSIS — L30.9 ECZEMA, UNSPECIFIED TYPE: Primary | ICD-10-CM

## 2021-04-13 DIAGNOSIS — E03.4 HYPOTHYROIDISM DUE TO ACQUIRED ATROPHY OF THYROID: ICD-10-CM

## 2021-04-13 DIAGNOSIS — G47.9 SLEEP DISTURBANCE: ICD-10-CM

## 2021-04-13 PROCEDURE — 99999 PR PBB SHADOW E&M-EST. PATIENT-LVL V: ICD-10-PCS | Mod: PBBFAC,,, | Performed by: INTERNAL MEDICINE

## 2021-04-13 PROCEDURE — 99214 PR OFFICE/OUTPT VISIT, EST, LEVL IV, 30-39 MIN: ICD-10-PCS | Mod: S$PBB,,, | Performed by: INTERNAL MEDICINE

## 2021-04-13 PROCEDURE — 99215 OFFICE O/P EST HI 40 MIN: CPT | Mod: PBBFAC | Performed by: INTERNAL MEDICINE

## 2021-04-13 PROCEDURE — 99214 OFFICE O/P EST MOD 30 MIN: CPT | Mod: S$PBB,,, | Performed by: INTERNAL MEDICINE

## 2021-04-13 PROCEDURE — 99999 PR PBB SHADOW E&M-EST. PATIENT-LVL V: CPT | Mod: PBBFAC,,, | Performed by: INTERNAL MEDICINE

## 2021-04-13 RX ORDER — MIRTAZAPINE 7.5 MG/1
7.5 TABLET, FILM COATED ORAL NIGHTLY
Qty: 90 TABLET | Refills: 3 | Status: SHIPPED | OUTPATIENT
Start: 2021-04-13 | End: 2021-06-09 | Stop reason: SDUPTHER

## 2021-04-14 ENCOUNTER — PATIENT MESSAGE (OUTPATIENT)
Dept: ORTHOPEDICS | Facility: CLINIC | Age: 86
End: 2021-04-14

## 2021-04-14 ENCOUNTER — TELEPHONE (OUTPATIENT)
Dept: DERMATOLOGY | Facility: CLINIC | Age: 86
End: 2021-04-14

## 2021-04-16 ENCOUNTER — TELEPHONE (OUTPATIENT)
Dept: DERMATOLOGY | Facility: CLINIC | Age: 86
End: 2021-04-16

## 2021-04-16 ENCOUNTER — PATIENT OUTREACH (OUTPATIENT)
Dept: ADMINISTRATIVE | Facility: OTHER | Age: 86
End: 2021-04-16

## 2021-04-20 ENCOUNTER — TELEPHONE (OUTPATIENT)
Dept: INTERNAL MEDICINE | Facility: CLINIC | Age: 86
End: 2021-04-20

## 2021-04-20 ENCOUNTER — OFFICE VISIT (OUTPATIENT)
Dept: DERMATOLOGY | Facility: CLINIC | Age: 86
End: 2021-04-20
Payer: MEDICARE

## 2021-04-20 ENCOUNTER — PATIENT MESSAGE (OUTPATIENT)
Dept: REHABILITATION | Facility: OTHER | Age: 86
End: 2021-04-20

## 2021-04-20 DIAGNOSIS — L29.9 PRURITUS: Primary | ICD-10-CM

## 2021-04-20 DIAGNOSIS — L85.3 XEROSIS CUTIS: ICD-10-CM

## 2021-04-20 PROCEDURE — 99214 OFFICE O/P EST MOD 30 MIN: CPT | Mod: S$PBB,,, | Performed by: DERMATOLOGY

## 2021-04-20 PROCEDURE — 99999 PR PBB SHADOW E&M-EST. PATIENT-LVL II: CPT | Mod: PBBFAC,,, | Performed by: DERMATOLOGY

## 2021-04-20 PROCEDURE — 99214 PR OFFICE/OUTPT VISIT, EST, LEVL IV, 30-39 MIN: ICD-10-PCS | Mod: S$PBB,,, | Performed by: DERMATOLOGY

## 2021-04-20 PROCEDURE — 99999 PR PBB SHADOW E&M-EST. PATIENT-LVL II: ICD-10-PCS | Mod: PBBFAC,,, | Performed by: DERMATOLOGY

## 2021-04-20 PROCEDURE — 99212 OFFICE O/P EST SF 10 MIN: CPT | Mod: PBBFAC | Performed by: DERMATOLOGY

## 2021-04-20 RX ORDER — DOXEPIN HYDROCHLORIDE 10 MG/1
CAPSULE ORAL
Qty: 90 CAPSULE | Refills: 0 | Status: SHIPPED | OUTPATIENT
Start: 2021-04-20 | End: 2021-09-28

## 2021-04-20 RX ORDER — KETOCONAZOLE 20 MG/ML
SHAMPOO, SUSPENSION TOPICAL
Qty: 120 ML | Refills: 5 | Status: SHIPPED | OUTPATIENT
Start: 2021-04-20 | End: 2022-12-21 | Stop reason: SDUPTHER

## 2021-04-20 RX ORDER — FLUOCINONIDE TOPICAL SOLUTION USP, 0.05% 0.5 MG/ML
SOLUTION TOPICAL
Qty: 60 ML | Refills: 3 | Status: SHIPPED | OUTPATIENT
Start: 2021-04-20

## 2021-04-21 ENCOUNTER — TELEPHONE (OUTPATIENT)
Dept: INTERNAL MEDICINE | Facility: CLINIC | Age: 86
End: 2021-04-21

## 2021-04-27 ENCOUNTER — TELEPHONE (OUTPATIENT)
Dept: INTERNAL MEDICINE | Facility: CLINIC | Age: 86
End: 2021-04-27

## 2021-04-28 ENCOUNTER — PATIENT MESSAGE (OUTPATIENT)
Dept: ADMINISTRATIVE | Facility: OTHER | Age: 86
End: 2021-04-28

## 2021-04-28 ENCOUNTER — PATIENT MESSAGE (OUTPATIENT)
Dept: REHABILITATION | Facility: OTHER | Age: 86
End: 2021-04-28

## 2021-04-28 DIAGNOSIS — K21.9 GASTROESOPHAGEAL REFLUX DISEASE: ICD-10-CM

## 2021-04-28 RX ORDER — HYDROGEN PEROXIDE 3 %
20 SOLUTION, NON-ORAL MISCELLANEOUS
Qty: 90 CAPSULE | Refills: 1 | Status: SHIPPED | OUTPATIENT
Start: 2021-04-28 | End: 2021-12-01 | Stop reason: SDUPTHER

## 2021-04-30 ENCOUNTER — EXTERNAL CHRONIC CARE MANAGEMENT (OUTPATIENT)
Dept: PRIMARY CARE CLINIC | Facility: CLINIC | Age: 86
End: 2021-04-30
Payer: MEDICARE

## 2021-04-30 PROCEDURE — 99490 PR CHRONIC CARE MGMT, 1ST 20 MIN: ICD-10-PCS | Mod: S$PBB,,, | Performed by: INTERNAL MEDICINE

## 2021-04-30 PROCEDURE — 99490 CHRNC CARE MGMT STAFF 1ST 20: CPT | Mod: S$PBB,,, | Performed by: INTERNAL MEDICINE

## 2021-04-30 PROCEDURE — 99490 CHRNC CARE MGMT STAFF 1ST 20: CPT | Mod: PBBFAC | Performed by: INTERNAL MEDICINE

## 2021-05-14 ENCOUNTER — HOSPITAL ENCOUNTER (OUTPATIENT)
Dept: RADIOLOGY | Facility: OTHER | Age: 86
Discharge: HOME | End: 2021-05-14
Attending: INTERNAL MEDICINE
Payer: MEDICARE

## 2021-05-14 DIAGNOSIS — R13.10 DYSPHAGIA: ICD-10-CM

## 2021-05-14 DIAGNOSIS — K21.9 ACID REFLUX: ICD-10-CM

## 2021-05-14 PROCEDURE — 74220 X-RAY XM ESOPHAGUS 1CNTRST: CPT | Mod: 26,,, | Performed by: RADIOLOGY

## 2021-05-14 PROCEDURE — 25500020 PHARM REV CODE 255: Performed by: INTERNAL MEDICINE

## 2021-05-14 PROCEDURE — 74220 FL ESOPHAGRAM COMPLETE: ICD-10-PCS | Mod: 26,,, | Performed by: RADIOLOGY

## 2021-05-14 PROCEDURE — A9698 NON-RAD CONTRAST MATERIALNOC: HCPCS | Performed by: INTERNAL MEDICINE

## 2021-05-14 PROCEDURE — 74220 X-RAY XM ESOPHAGUS 1CNTRST: CPT | Mod: TC

## 2021-05-14 RX ADMIN — BARIUM SULFATE 200 ML: 0.6 SUSPENSION ORAL at 10:05

## 2021-05-17 ENCOUNTER — PES CALL (OUTPATIENT)
Dept: ADMINISTRATIVE | Facility: CLINIC | Age: 86
End: 2021-05-17

## 2021-05-25 DIAGNOSIS — E03.9 HYPOTHYROIDISM, ADULT: ICD-10-CM

## 2021-05-25 RX ORDER — LEVOTHYROXINE SODIUM 50 UG/1
50 TABLET ORAL DAILY
Qty: 90 TABLET | Refills: 3 | Status: CANCELLED | OUTPATIENT
Start: 2021-05-25

## 2021-05-27 ENCOUNTER — TELEPHONE (OUTPATIENT)
Dept: INTERNAL MEDICINE | Facility: CLINIC | Age: 86
End: 2021-05-27

## 2021-05-27 DIAGNOSIS — E03.9 HYPOTHYROIDISM, ADULT: ICD-10-CM

## 2021-05-27 RX ORDER — LEVOTHYROXINE SODIUM 50 UG/1
50 TABLET ORAL DAILY
Qty: 90 TABLET | Refills: 3 | OUTPATIENT
Start: 2021-05-27

## 2021-05-27 RX ORDER — LEVOTHYROXINE SODIUM 50 UG/1
50 TABLET ORAL DAILY
Qty: 90 TABLET | Refills: 3 | Status: SHIPPED | OUTPATIENT
Start: 2021-05-27 | End: 2022-04-04 | Stop reason: SDUPTHER

## 2021-05-28 ENCOUNTER — TELEPHONE (OUTPATIENT)
Dept: INTERNAL MEDICINE | Facility: CLINIC | Age: 86
End: 2021-05-28

## 2021-05-28 ENCOUNTER — TELEPHONE (OUTPATIENT)
Dept: CARDIOLOGY | Facility: CLINIC | Age: 86
End: 2021-05-28

## 2021-05-31 ENCOUNTER — EXTERNAL CHRONIC CARE MANAGEMENT (OUTPATIENT)
Dept: PRIMARY CARE CLINIC | Facility: CLINIC | Age: 86
End: 2021-05-31
Payer: MEDICARE

## 2021-05-31 PROCEDURE — 99490 PR CHRONIC CARE MGMT, 1ST 20 MIN: ICD-10-PCS | Mod: S$PBB,,, | Performed by: INTERNAL MEDICINE

## 2021-05-31 PROCEDURE — 99490 CHRNC CARE MGMT STAFF 1ST 20: CPT | Mod: PBBFAC | Performed by: INTERNAL MEDICINE

## 2021-05-31 PROCEDURE — 99490 CHRNC CARE MGMT STAFF 1ST 20: CPT | Mod: S$PBB,,, | Performed by: INTERNAL MEDICINE

## 2021-06-02 ENCOUNTER — TELEPHONE (OUTPATIENT)
Dept: PULMONOLOGY | Facility: CLINIC | Age: 86
End: 2021-06-02

## 2021-06-02 DIAGNOSIS — J45.909 CHRONIC ASTHMA WITHOUT COMPLICATION, UNSPECIFIED ASTHMA SEVERITY, UNSPECIFIED WHETHER PERSISTENT: Primary | ICD-10-CM

## 2021-06-04 ENCOUNTER — LAB VISIT (OUTPATIENT)
Dept: LAB | Facility: HOSPITAL | Age: 86
End: 2021-06-04
Payer: MEDICARE

## 2021-06-04 ENCOUNTER — OFFICE VISIT (OUTPATIENT)
Dept: ORTHOPEDICS | Facility: CLINIC | Age: 86
End: 2021-06-04
Payer: MEDICARE

## 2021-06-04 VITALS
SYSTOLIC BLOOD PRESSURE: 114 MMHG | DIASTOLIC BLOOD PRESSURE: 65 MMHG | HEIGHT: 65 IN | HEART RATE: 69 BPM | BODY MASS INDEX: 23.52 KG/M2 | WEIGHT: 141.19 LBS

## 2021-06-04 DIAGNOSIS — R20.2 PARESTHESIA OF BOTH HANDS: Primary | ICD-10-CM

## 2021-06-04 DIAGNOSIS — R20.2 PARESTHESIA OF BOTH FEET: ICD-10-CM

## 2021-06-04 DIAGNOSIS — R20.2 PARESTHESIA OF BOTH HANDS: ICD-10-CM

## 2021-06-04 LAB
ALBUMIN SERPL BCP-MCNC: 4 G/DL (ref 3.5–5.2)
ALP SERPL-CCNC: 93 U/L (ref 55–135)
ALT SERPL W/O P-5'-P-CCNC: 12 U/L (ref 10–44)
ANION GAP SERPL CALC-SCNC: 12 MMOL/L (ref 8–16)
AST SERPL-CCNC: 21 U/L (ref 10–40)
BILIRUB SERPL-MCNC: 0.6 MG/DL (ref 0.1–1)
BUN SERPL-MCNC: 22 MG/DL (ref 8–23)
CALCIUM SERPL-MCNC: 10 MG/DL (ref 8.7–10.5)
CHLORIDE SERPL-SCNC: 105 MMOL/L (ref 95–110)
CO2 SERPL-SCNC: 24 MMOL/L (ref 23–29)
CREAT SERPL-MCNC: 1.6 MG/DL (ref 0.5–1.4)
EST. GFR  (AFRICAN AMERICAN): 32.9 ML/MIN/1.73 M^2
EST. GFR  (NON AFRICAN AMERICAN): 28.6 ML/MIN/1.73 M^2
GLUCOSE SERPL-MCNC: 106 MG/DL (ref 70–110)
MAGNESIUM SERPL-MCNC: 2.3 MG/DL (ref 1.6–2.6)
POTASSIUM SERPL-SCNC: 4.3 MMOL/L (ref 3.5–5.1)
PROT SERPL-MCNC: 7.9 G/DL (ref 6–8.4)
SODIUM SERPL-SCNC: 141 MMOL/L (ref 136–145)

## 2021-06-04 PROCEDURE — 99213 PR OFFICE/OUTPT VISIT, EST, LEVL III, 20-29 MIN: ICD-10-PCS | Mod: S$PBB,,, | Performed by: PHYSICIAN ASSISTANT

## 2021-06-04 PROCEDURE — 36415 COLL VENOUS BLD VENIPUNCTURE: CPT | Performed by: PHYSICIAN ASSISTANT

## 2021-06-04 PROCEDURE — 99215 OFFICE O/P EST HI 40 MIN: CPT | Mod: PBBFAC | Performed by: PHYSICIAN ASSISTANT

## 2021-06-04 PROCEDURE — 80053 COMPREHEN METABOLIC PANEL: CPT | Performed by: PHYSICIAN ASSISTANT

## 2021-06-04 PROCEDURE — 99213 OFFICE O/P EST LOW 20 MIN: CPT | Mod: S$PBB,,, | Performed by: PHYSICIAN ASSISTANT

## 2021-06-04 PROCEDURE — 99999 PR PBB SHADOW E&M-EST. PATIENT-LVL V: CPT | Mod: PBBFAC,,, | Performed by: PHYSICIAN ASSISTANT

## 2021-06-04 PROCEDURE — 99999 PR PBB SHADOW E&M-EST. PATIENT-LVL V: ICD-10-PCS | Mod: PBBFAC,,, | Performed by: PHYSICIAN ASSISTANT

## 2021-06-04 PROCEDURE — 83735 ASSAY OF MAGNESIUM: CPT | Performed by: PHYSICIAN ASSISTANT

## 2021-06-06 RX ORDER — BENZONATATE 100 MG/1
CAPSULE ORAL
Qty: 90 CAPSULE | Refills: 1 | Status: SHIPPED | OUTPATIENT
Start: 2021-06-06 | End: 2021-08-10

## 2021-06-08 ENCOUNTER — TELEPHONE (OUTPATIENT)
Dept: INTERNAL MEDICINE | Facility: CLINIC | Age: 86
End: 2021-06-08

## 2021-06-09 ENCOUNTER — LAB VISIT (OUTPATIENT)
Dept: LAB | Facility: HOSPITAL | Age: 86
End: 2021-06-09
Attending: INTERNAL MEDICINE
Payer: MEDICARE

## 2021-06-09 ENCOUNTER — OFFICE VISIT (OUTPATIENT)
Dept: INTERNAL MEDICINE | Facility: CLINIC | Age: 86
End: 2021-06-09
Payer: MEDICARE

## 2021-06-09 VITALS
WEIGHT: 136 LBS | HEIGHT: 65 IN | BODY MASS INDEX: 22.66 KG/M2 | SYSTOLIC BLOOD PRESSURE: 114 MMHG | OXYGEN SATURATION: 98 % | HEART RATE: 76 BPM | DIASTOLIC BLOOD PRESSURE: 58 MMHG

## 2021-06-09 DIAGNOSIS — N18.31 TYPE 2 DIABETES MELLITUS WITH STAGE 3A CHRONIC KIDNEY DISEASE, WITHOUT LONG-TERM CURRENT USE OF INSULIN: ICD-10-CM

## 2021-06-09 DIAGNOSIS — E11.22 TYPE 2 DIABETES MELLITUS WITH STAGE 3A CHRONIC KIDNEY DISEASE, WITHOUT LONG-TERM CURRENT USE OF INSULIN: ICD-10-CM

## 2021-06-09 DIAGNOSIS — R20.2 PARESTHESIAS: ICD-10-CM

## 2021-06-09 DIAGNOSIS — F33.41 RECURRENT MAJOR DEPRESSIVE DISORDER, IN PARTIAL REMISSION: ICD-10-CM

## 2021-06-09 DIAGNOSIS — E03.4 HYPOTHYROIDISM DUE TO ACQUIRED ATROPHY OF THYROID: ICD-10-CM

## 2021-06-09 DIAGNOSIS — R20.2 PARESTHESIAS: Primary | ICD-10-CM

## 2021-06-09 DIAGNOSIS — M79.7 FIBROMYALGIA: ICD-10-CM

## 2021-06-09 LAB
ESTIMATED AVG GLUCOSE: 143 MG/DL (ref 68–131)
HBA1C MFR BLD: 6.6 % (ref 4–5.6)
VIT B12 SERPL-MCNC: 1272 PG/ML (ref 210–950)

## 2021-06-09 PROCEDURE — 99214 OFFICE O/P EST MOD 30 MIN: CPT | Mod: S$PBB,,, | Performed by: INTERNAL MEDICINE

## 2021-06-09 PROCEDURE — 99214 OFFICE O/P EST MOD 30 MIN: CPT | Mod: PBBFAC | Performed by: INTERNAL MEDICINE

## 2021-06-09 PROCEDURE — 84165 PATHOLOGIST INTERPRETATION SPE: ICD-10-PCS | Mod: 26,,, | Performed by: PATHOLOGY

## 2021-06-09 PROCEDURE — 99999 PR PBB SHADOW E&M-EST. PATIENT-LVL IV: ICD-10-PCS | Mod: PBBFAC,,, | Performed by: INTERNAL MEDICINE

## 2021-06-09 PROCEDURE — 84165 PROTEIN E-PHORESIS SERUM: CPT | Performed by: INTERNAL MEDICINE

## 2021-06-09 PROCEDURE — 83036 HEMOGLOBIN GLYCOSYLATED A1C: CPT | Performed by: INTERNAL MEDICINE

## 2021-06-09 PROCEDURE — 36415 COLL VENOUS BLD VENIPUNCTURE: CPT | Performed by: INTERNAL MEDICINE

## 2021-06-09 PROCEDURE — 84165 PROTEIN E-PHORESIS SERUM: CPT | Mod: 26,,, | Performed by: PATHOLOGY

## 2021-06-09 PROCEDURE — 82607 VITAMIN B-12: CPT | Performed by: INTERNAL MEDICINE

## 2021-06-09 PROCEDURE — 99214 PR OFFICE/OUTPT VISIT, EST, LEVL IV, 30-39 MIN: ICD-10-PCS | Mod: S$PBB,,, | Performed by: INTERNAL MEDICINE

## 2021-06-09 PROCEDURE — 99999 PR PBB SHADOW E&M-EST. PATIENT-LVL IV: CPT | Mod: PBBFAC,,, | Performed by: INTERNAL MEDICINE

## 2021-06-09 RX ORDER — PREGABALIN 25 MG/1
CAPSULE ORAL
Qty: 180 CAPSULE | Refills: 0 | Status: SHIPPED | OUTPATIENT
Start: 2021-06-09 | End: 2021-08-10 | Stop reason: SDUPTHER

## 2021-06-09 RX ORDER — MIRTAZAPINE 15 MG/1
15 TABLET, FILM COATED ORAL NIGHTLY
Qty: 90 TABLET | Refills: 3 | Status: SHIPPED | OUTPATIENT
Start: 2021-06-09 | End: 2022-05-23

## 2021-06-10 LAB
ALBUMIN SERPL ELPH-MCNC: 4.12 G/DL (ref 3.35–5.55)
ALPHA1 GLOB SERPL ELPH-MCNC: 0.28 G/DL (ref 0.17–0.41)
ALPHA2 GLOB SERPL ELPH-MCNC: 0.79 G/DL (ref 0.43–0.99)
B-GLOBULIN SERPL ELPH-MCNC: 0.93 G/DL (ref 0.5–1.1)
GAMMA GLOB SERPL ELPH-MCNC: 1.18 G/DL (ref 0.67–1.58)
PATHOLOGIST INTERPRETATION SPE: NORMAL
PROT SERPL-MCNC: 7.3 G/DL (ref 6–8.4)

## 2021-06-22 ENCOUNTER — TELEPHONE (OUTPATIENT)
Dept: INTERNAL MEDICINE | Facility: CLINIC | Age: 86
End: 2021-06-22

## 2021-06-22 ENCOUNTER — HOSPITAL ENCOUNTER (OUTPATIENT)
Dept: RADIOLOGY | Facility: HOSPITAL | Age: 86
Discharge: HOME | End: 2021-06-22
Attending: NURSE PRACTITIONER
Payer: MEDICARE

## 2021-06-22 ENCOUNTER — OFFICE VISIT (OUTPATIENT)
Dept: PULMONOLOGY | Facility: CLINIC | Age: 86
End: 2021-06-22
Payer: MEDICARE

## 2021-06-22 VITALS — OXYGEN SATURATION: 99 % | HEIGHT: 65 IN | BODY MASS INDEX: 23.36 KG/M2 | HEART RATE: 69 BPM | WEIGHT: 140.19 LBS

## 2021-06-22 DIAGNOSIS — J45.909 CHRONIC ASTHMA WITHOUT COMPLICATION, UNSPECIFIED ASTHMA SEVERITY, UNSPECIFIED WHETHER PERSISTENT: ICD-10-CM

## 2021-06-22 DIAGNOSIS — R06.09 DOE (DYSPNEA ON EXERTION): Primary | ICD-10-CM

## 2021-06-22 PROCEDURE — 99212 OFFICE O/P EST SF 10 MIN: CPT | Mod: PBBFAC,25 | Performed by: INTERNAL MEDICINE

## 2021-06-22 PROCEDURE — 71046 X-RAY EXAM CHEST 2 VIEWS: CPT | Mod: TC,FY

## 2021-06-22 PROCEDURE — 71046 X-RAY EXAM CHEST 2 VIEWS: CPT | Mod: 26,,, | Performed by: RADIOLOGY

## 2021-06-22 PROCEDURE — 71046 XR CHEST PA AND LATERAL: ICD-10-PCS | Mod: 26,,, | Performed by: RADIOLOGY

## 2021-06-22 PROCEDURE — 99213 PR OFFICE/OUTPT VISIT, EST, LEVL III, 20-29 MIN: ICD-10-PCS | Mod: S$PBB,,, | Performed by: INTERNAL MEDICINE

## 2021-06-22 PROCEDURE — 99999 PR PBB SHADOW E&M-EST. PATIENT-LVL II: CPT | Mod: PBBFAC,,, | Performed by: INTERNAL MEDICINE

## 2021-06-22 PROCEDURE — 99213 OFFICE O/P EST LOW 20 MIN: CPT | Mod: S$PBB,,, | Performed by: INTERNAL MEDICINE

## 2021-06-22 PROCEDURE — 99999 PR PBB SHADOW E&M-EST. PATIENT-LVL II: ICD-10-PCS | Mod: PBBFAC,,, | Performed by: INTERNAL MEDICINE

## 2021-06-24 ENCOUNTER — OUTPATIENT CASE MANAGEMENT (OUTPATIENT)
Dept: ADMINISTRATIVE | Facility: OTHER | Age: 86
End: 2021-06-24

## 2021-06-30 ENCOUNTER — EXTERNAL CHRONIC CARE MANAGEMENT (OUTPATIENT)
Dept: PRIMARY CARE CLINIC | Facility: CLINIC | Age: 86
End: 2021-06-30
Payer: MEDICARE

## 2021-06-30 PROCEDURE — 99487 CPLX CHRNC CARE 1ST 60 MIN: CPT | Mod: PBBFAC | Performed by: INTERNAL MEDICINE

## 2021-06-30 PROCEDURE — 99489 CPLX CHRNC CARE EA ADDL 30: CPT | Mod: PBBFAC | Performed by: INTERNAL MEDICINE

## 2021-06-30 PROCEDURE — 99487 CPLX CHRNC CARE 1ST 60 MIN: CPT | Mod: S$PBB,,, | Performed by: INTERNAL MEDICINE

## 2021-06-30 PROCEDURE — 99489 PR COMPLX CHRON CARE MGMT, EA ADDTL 30 MIN, PER MONTH: ICD-10-PCS | Mod: S$PBB,,, | Performed by: INTERNAL MEDICINE

## 2021-06-30 PROCEDURE — 99489 CPLX CHRNC CARE EA ADDL 30: CPT | Mod: S$PBB,,, | Performed by: INTERNAL MEDICINE

## 2021-06-30 PROCEDURE — 99487 PR COMPLX CHRON CARE MGMT, 1ST HR, PER MONTH: ICD-10-PCS | Mod: S$PBB,,, | Performed by: INTERNAL MEDICINE

## 2021-07-07 ENCOUNTER — PES CALL (OUTPATIENT)
Dept: ADMINISTRATIVE | Facility: CLINIC | Age: 86
End: 2021-07-07

## 2021-07-12 ENCOUNTER — TELEPHONE (OUTPATIENT)
Dept: CARDIOLOGY | Facility: CLINIC | Age: 86
End: 2021-07-12

## 2021-07-12 ENCOUNTER — TELEPHONE (OUTPATIENT)
Dept: INTERNAL MEDICINE | Facility: CLINIC | Age: 86
End: 2021-07-12

## 2021-07-14 ENCOUNTER — TELEPHONE (OUTPATIENT)
Dept: INTERNAL MEDICINE | Facility: CLINIC | Age: 86
End: 2021-07-14

## 2021-07-14 ENCOUNTER — TELEPHONE (OUTPATIENT)
Dept: CARDIOLOGY | Facility: CLINIC | Age: 86
End: 2021-07-14

## 2021-07-15 ENCOUNTER — TELEPHONE (OUTPATIENT)
Dept: CARDIOLOGY | Facility: CLINIC | Age: 86
End: 2021-07-15

## 2021-07-30 ENCOUNTER — TELEPHONE (OUTPATIENT)
Dept: INTERNAL MEDICINE | Facility: CLINIC | Age: 86
End: 2021-07-30

## 2021-07-31 ENCOUNTER — EXTERNAL CHRONIC CARE MANAGEMENT (OUTPATIENT)
Dept: PRIMARY CARE CLINIC | Facility: CLINIC | Age: 86
End: 2021-07-31
Payer: MEDICARE

## 2021-07-31 PROCEDURE — 99490 CHRNC CARE MGMT STAFF 1ST 20: CPT | Mod: PBBFAC | Performed by: INTERNAL MEDICINE

## 2021-07-31 PROCEDURE — 99490 CHRNC CARE MGMT STAFF 1ST 20: CPT | Mod: S$PBB,,, | Performed by: INTERNAL MEDICINE

## 2021-07-31 PROCEDURE — 99490 PR CHRONIC CARE MGMT, 1ST 20 MIN: ICD-10-PCS | Mod: S$PBB,,, | Performed by: INTERNAL MEDICINE

## 2021-08-06 ENCOUNTER — OFFICE VISIT (OUTPATIENT)
Dept: ORTHOPEDICS | Facility: CLINIC | Age: 86
End: 2021-08-06
Payer: MEDICARE

## 2021-08-06 VITALS
SYSTOLIC BLOOD PRESSURE: 140 MMHG | HEART RATE: 67 BPM | DIASTOLIC BLOOD PRESSURE: 62 MMHG | HEIGHT: 65 IN | WEIGHT: 142.75 LBS | BODY MASS INDEX: 23.78 KG/M2

## 2021-08-06 DIAGNOSIS — M17.11 PRIMARY OSTEOARTHRITIS OF RIGHT KNEE: Primary | ICD-10-CM

## 2021-08-06 PROCEDURE — 99214 OFFICE O/P EST MOD 30 MIN: CPT | Mod: PBBFAC | Performed by: PHYSICIAN ASSISTANT

## 2021-08-06 PROCEDURE — 20610 DRAIN/INJ JOINT/BURSA W/O US: CPT | Mod: PBBFAC | Performed by: PHYSICIAN ASSISTANT

## 2021-08-06 PROCEDURE — 99213 OFFICE O/P EST LOW 20 MIN: CPT | Mod: S$PBB,25,, | Performed by: PHYSICIAN ASSISTANT

## 2021-08-06 PROCEDURE — 99213 PR OFFICE/OUTPT VISIT, EST, LEVL III, 20-29 MIN: ICD-10-PCS | Mod: S$PBB,25,, | Performed by: PHYSICIAN ASSISTANT

## 2021-08-06 PROCEDURE — 99999 PR PBB SHADOW E&M-EST. PATIENT-LVL IV: ICD-10-PCS | Mod: PBBFAC,,, | Performed by: PHYSICIAN ASSISTANT

## 2021-08-06 PROCEDURE — 99999 PR PBB SHADOW E&M-EST. PATIENT-LVL IV: CPT | Mod: PBBFAC,,, | Performed by: PHYSICIAN ASSISTANT

## 2021-08-06 PROCEDURE — 20610 PR DRAIN/INJECT LARGE JOINT/BURSA: ICD-10-PCS | Mod: S$PBB,RT,, | Performed by: PHYSICIAN ASSISTANT

## 2021-08-06 PROCEDURE — 20610 DRAIN/INJ JOINT/BURSA W/O US: CPT | Mod: S$PBB,RT,, | Performed by: PHYSICIAN ASSISTANT

## 2021-08-06 RX ORDER — TRIAMCINOLONE ACETONIDE 40 MG/ML
40 INJECTION, SUSPENSION INTRA-ARTICULAR; INTRAMUSCULAR
Status: COMPLETED | OUTPATIENT
Start: 2021-08-06 | End: 2021-08-06

## 2021-08-06 RX ADMIN — TRIAMCINOLONE ACETONIDE 40 MG: 40 INJECTION, SUSPENSION INTRA-ARTICULAR; INTRAMUSCULAR at 11:08

## 2021-08-10 ENCOUNTER — OFFICE VISIT (OUTPATIENT)
Dept: INTERNAL MEDICINE | Facility: CLINIC | Age: 86
End: 2021-08-10
Payer: MEDICARE

## 2021-08-10 VITALS
HEIGHT: 65 IN | WEIGHT: 139.31 LBS | BODY MASS INDEX: 23.21 KG/M2 | HEART RATE: 62 BPM | OXYGEN SATURATION: 99 % | SYSTOLIC BLOOD PRESSURE: 110 MMHG | DIASTOLIC BLOOD PRESSURE: 60 MMHG

## 2021-08-10 DIAGNOSIS — Z86.711 HISTORY OF PULMONARY EMBOLISM: Primary | ICD-10-CM

## 2021-08-10 DIAGNOSIS — M79.7 FIBROMYALGIA: ICD-10-CM

## 2021-08-10 DIAGNOSIS — I10 HYPERTENSION, ESSENTIAL: ICD-10-CM

## 2021-08-10 DIAGNOSIS — K21.9 GASTROESOPHAGEAL REFLUX DISEASE WITHOUT ESOPHAGITIS: ICD-10-CM

## 2021-08-10 DIAGNOSIS — E03.9 HYPOTHYROIDISM, UNSPECIFIED TYPE: ICD-10-CM

## 2021-08-10 DIAGNOSIS — Z86.718 HISTORY OF DEEP VENOUS THROMBOSIS: ICD-10-CM

## 2021-08-10 PROCEDURE — 99214 PR OFFICE/OUTPT VISIT, EST, LEVL IV, 30-39 MIN: ICD-10-PCS | Mod: S$PBB,,, | Performed by: PHYSICIAN ASSISTANT

## 2021-08-10 PROCEDURE — 99999 PR PBB SHADOW E&M-EST. PATIENT-LVL V: ICD-10-PCS | Mod: PBBFAC,,, | Performed by: PHYSICIAN ASSISTANT

## 2021-08-10 PROCEDURE — 99215 OFFICE O/P EST HI 40 MIN: CPT | Mod: PBBFAC | Performed by: PHYSICIAN ASSISTANT

## 2021-08-10 PROCEDURE — 99214 OFFICE O/P EST MOD 30 MIN: CPT | Mod: S$PBB,,, | Performed by: PHYSICIAN ASSISTANT

## 2021-08-10 PROCEDURE — 99999 PR PBB SHADOW E&M-EST. PATIENT-LVL V: CPT | Mod: PBBFAC,,, | Performed by: PHYSICIAN ASSISTANT

## 2021-08-10 RX ORDER — PREGABALIN 25 MG/1
CAPSULE ORAL
Qty: 180 CAPSULE | Refills: 0 | Status: ON HOLD | OUTPATIENT
Start: 2021-08-10 | End: 2021-10-05

## 2021-08-16 ENCOUNTER — TELEPHONE (OUTPATIENT)
Dept: INTERNAL MEDICINE | Facility: CLINIC | Age: 86
End: 2021-08-16

## 2021-08-24 ENCOUNTER — TELEPHONE (OUTPATIENT)
Dept: CARDIOLOGY | Facility: CLINIC | Age: 86
End: 2021-08-24

## 2021-08-26 ENCOUNTER — PATIENT OUTREACH (OUTPATIENT)
Dept: ADMINISTRATIVE | Facility: OTHER | Age: 86
End: 2021-08-26

## 2021-08-31 ENCOUNTER — EXTERNAL CHRONIC CARE MANAGEMENT (OUTPATIENT)
Dept: PRIMARY CARE CLINIC | Facility: CLINIC | Age: 86
End: 2021-08-31
Payer: MEDICARE

## 2021-08-31 PROCEDURE — 99490 CHRNC CARE MGMT STAFF 1ST 20: CPT | Mod: PBBFAC | Performed by: INTERNAL MEDICINE

## 2021-08-31 PROCEDURE — 99490 PR CHRONIC CARE MGMT, 1ST 20 MIN: ICD-10-PCS | Mod: S$PBB,,, | Performed by: INTERNAL MEDICINE

## 2021-08-31 PROCEDURE — 99490 CHRNC CARE MGMT STAFF 1ST 20: CPT | Mod: S$PBB,,, | Performed by: INTERNAL MEDICINE

## 2021-09-07 ENCOUNTER — TELEPHONE (OUTPATIENT)
Dept: ORTHOPEDICS | Facility: CLINIC | Age: 86
End: 2021-09-07

## 2021-09-08 DIAGNOSIS — M17.0 PRIMARY OSTEOARTHRITIS OF BOTH KNEES: Primary | ICD-10-CM

## 2021-09-15 ENCOUNTER — OFFICE VISIT (OUTPATIENT)
Dept: ORTHOPEDICS | Facility: CLINIC | Age: 86
End: 2021-09-15
Payer: MEDICARE

## 2021-09-15 VITALS — DIASTOLIC BLOOD PRESSURE: 64 MMHG | HEART RATE: 59 BPM | SYSTOLIC BLOOD PRESSURE: 114 MMHG

## 2021-09-15 DIAGNOSIS — M17.0 PRIMARY OSTEOARTHRITIS OF BOTH KNEES: Primary | ICD-10-CM

## 2021-09-15 PROCEDURE — 99999 PR PBB SHADOW E&M-EST. PATIENT-LVL IV: CPT | Mod: PBBFAC,,, | Performed by: PHYSICIAN ASSISTANT

## 2021-09-15 PROCEDURE — 99999 PR PBB SHADOW E&M-EST. PATIENT-LVL IV: ICD-10-PCS | Mod: PBBFAC,,, | Performed by: PHYSICIAN ASSISTANT

## 2021-09-15 PROCEDURE — 20610 DRAIN/INJ JOINT/BURSA W/O US: CPT | Mod: 50,S$PBB,, | Performed by: PHYSICIAN ASSISTANT

## 2021-09-15 PROCEDURE — 99499 UNLISTED E&M SERVICE: CPT | Mod: S$PBB,,, | Performed by: PHYSICIAN ASSISTANT

## 2021-09-15 PROCEDURE — 99499 NO LOS: ICD-10-PCS | Mod: S$PBB,,, | Performed by: PHYSICIAN ASSISTANT

## 2021-09-15 PROCEDURE — 20610 PR DRAIN/INJECT LARGE JOINT/BURSA: ICD-10-PCS | Mod: 50,S$PBB,, | Performed by: PHYSICIAN ASSISTANT

## 2021-09-15 PROCEDURE — 20610 DRAIN/INJ JOINT/BURSA W/O US: CPT | Mod: 50,PBBFAC | Performed by: PHYSICIAN ASSISTANT

## 2021-09-15 PROCEDURE — 99214 OFFICE O/P EST MOD 30 MIN: CPT | Mod: PBBFAC | Performed by: PHYSICIAN ASSISTANT

## 2021-09-15 RX ADMIN — Medication 40 MG: at 10:09

## 2021-09-22 ENCOUNTER — OFFICE VISIT (OUTPATIENT)
Dept: ORTHOPEDICS | Facility: CLINIC | Age: 86
End: 2021-09-22
Payer: MEDICARE

## 2021-09-22 VITALS — DIASTOLIC BLOOD PRESSURE: 63 MMHG | HEART RATE: 64 BPM | SYSTOLIC BLOOD PRESSURE: 123 MMHG

## 2021-09-22 DIAGNOSIS — M17.0 PRIMARY OSTEOARTHRITIS OF BOTH KNEES: Primary | ICD-10-CM

## 2021-09-22 PROCEDURE — 99499 UNLISTED E&M SERVICE: CPT | Mod: S$PBB,,, | Performed by: PHYSICIAN ASSISTANT

## 2021-09-22 PROCEDURE — 20610 DRAIN/INJ JOINT/BURSA W/O US: CPT | Mod: 50,PBBFAC | Performed by: PHYSICIAN ASSISTANT

## 2021-09-22 PROCEDURE — 99999 PR PBB SHADOW E&M-EST. PATIENT-LVL III: ICD-10-PCS | Mod: PBBFAC,,, | Performed by: PHYSICIAN ASSISTANT

## 2021-09-22 PROCEDURE — 99499 NO LOS: ICD-10-PCS | Mod: S$PBB,,, | Performed by: PHYSICIAN ASSISTANT

## 2021-09-22 PROCEDURE — 99213 OFFICE O/P EST LOW 20 MIN: CPT | Mod: PBBFAC,25 | Performed by: PHYSICIAN ASSISTANT

## 2021-09-22 PROCEDURE — 20610 PR DRAIN/INJECT LARGE JOINT/BURSA: ICD-10-PCS | Mod: 50,S$PBB,, | Performed by: PHYSICIAN ASSISTANT

## 2021-09-22 PROCEDURE — 20610 DRAIN/INJ JOINT/BURSA W/O US: CPT | Mod: 50,S$PBB,, | Performed by: PHYSICIAN ASSISTANT

## 2021-09-22 PROCEDURE — 99999 PR PBB SHADOW E&M-EST. PATIENT-LVL III: CPT | Mod: PBBFAC,,, | Performed by: PHYSICIAN ASSISTANT

## 2021-09-22 RX ADMIN — Medication 40 MG: at 09:09

## 2021-09-28 RX ORDER — ESCITALOPRAM OXALATE 10 MG/1
TABLET ORAL
Qty: 45 TABLET | Refills: 3 | Status: SHIPPED | OUTPATIENT
Start: 2021-09-28 | End: 2022-04-04

## 2021-09-28 RX ORDER — DOXEPIN HYDROCHLORIDE 10 MG/1
CAPSULE ORAL
Qty: 90 CAPSULE | Refills: 3 | Status: SHIPPED | OUTPATIENT
Start: 2021-09-28 | End: 2022-09-12

## 2021-09-29 ENCOUNTER — OFFICE VISIT (OUTPATIENT)
Dept: ORTHOPEDICS | Facility: CLINIC | Age: 86
End: 2021-09-29
Payer: MEDICARE

## 2021-09-29 VITALS — HEART RATE: 68 BPM | SYSTOLIC BLOOD PRESSURE: 123 MMHG | DIASTOLIC BLOOD PRESSURE: 64 MMHG

## 2021-09-29 DIAGNOSIS — M17.0 PRIMARY OSTEOARTHRITIS OF BOTH KNEES: Primary | ICD-10-CM

## 2021-09-29 PROCEDURE — 99999 PR PBB SHADOW E&M-EST. PATIENT-LVL IV: ICD-10-PCS | Mod: PBBFAC,,, | Performed by: PHYSICIAN ASSISTANT

## 2021-09-29 PROCEDURE — 99999 PR PBB SHADOW E&M-EST. PATIENT-LVL IV: CPT | Mod: PBBFAC,,, | Performed by: PHYSICIAN ASSISTANT

## 2021-09-29 PROCEDURE — 99499 UNLISTED E&M SERVICE: CPT | Mod: S$PBB,,, | Performed by: PHYSICIAN ASSISTANT

## 2021-09-29 PROCEDURE — 99499 NO LOS: ICD-10-PCS | Mod: S$PBB,,, | Performed by: PHYSICIAN ASSISTANT

## 2021-09-29 PROCEDURE — 20610 DRAIN/INJ JOINT/BURSA W/O US: CPT | Mod: 50,PBBFAC | Performed by: PHYSICIAN ASSISTANT

## 2021-09-29 PROCEDURE — 20610 DRAIN/INJ JOINT/BURSA W/O US: CPT | Mod: 50,S$PBB,, | Performed by: PHYSICIAN ASSISTANT

## 2021-09-29 PROCEDURE — 20610 PR DRAIN/INJECT LARGE JOINT/BURSA: ICD-10-PCS | Mod: 50,S$PBB,, | Performed by: PHYSICIAN ASSISTANT

## 2021-09-29 PROCEDURE — 99214 OFFICE O/P EST MOD 30 MIN: CPT | Mod: PBBFAC,25 | Performed by: PHYSICIAN ASSISTANT

## 2021-09-29 RX ADMIN — Medication 40 MG: at 10:09

## 2021-09-30 ENCOUNTER — EXTERNAL CHRONIC CARE MANAGEMENT (OUTPATIENT)
Dept: PRIMARY CARE CLINIC | Facility: CLINIC | Age: 86
End: 2021-09-30
Payer: MEDICARE

## 2021-09-30 PROCEDURE — 99490 PR CHRONIC CARE MGMT, 1ST 20 MIN: ICD-10-PCS | Mod: S$PBB,,, | Performed by: INTERNAL MEDICINE

## 2021-09-30 PROCEDURE — 99490 CHRNC CARE MGMT STAFF 1ST 20: CPT | Mod: PBBFAC | Performed by: INTERNAL MEDICINE

## 2021-09-30 PROCEDURE — 99490 CHRNC CARE MGMT STAFF 1ST 20: CPT | Mod: S$PBB,,, | Performed by: INTERNAL MEDICINE

## 2021-10-01 ENCOUNTER — TELEPHONE (OUTPATIENT)
Dept: ORTHOPEDICS | Facility: CLINIC | Age: 86
End: 2021-10-01

## 2021-10-04 ENCOUNTER — HOSPITAL ENCOUNTER (INPATIENT)
Facility: OTHER | Age: 86
LOS: 2 days | Discharge: HOME HEALTH | DRG: 202 | End: 2021-10-08
Attending: EMERGENCY MEDICINE | Admitting: INTERNAL MEDICINE
Payer: MEDICARE

## 2021-10-04 DIAGNOSIS — J96.01 ACUTE RESPIRATORY FAILURE WITH HYPOXIA: ICD-10-CM

## 2021-10-04 DIAGNOSIS — J45.901 EXACERBATION OF ASTHMA, UNSPECIFIED ASTHMA SEVERITY, UNSPECIFIED WHETHER PERSISTENT: ICD-10-CM

## 2021-10-04 DIAGNOSIS — R26.89 POOR BALANCE: ICD-10-CM

## 2021-10-04 DIAGNOSIS — G47.33 OSA (OBSTRUCTIVE SLEEP APNEA): ICD-10-CM

## 2021-10-04 DIAGNOSIS — R09.02 OXYGEN DESATURATION: ICD-10-CM

## 2021-10-04 DIAGNOSIS — R09.02 HYPOXIA: ICD-10-CM

## 2021-10-04 DIAGNOSIS — J45.901 ASTHMA EXACERBATION: ICD-10-CM

## 2021-10-04 DIAGNOSIS — J18.9 PNEUMONIA DUE TO INFECTIOUS ORGANISM, UNSPECIFIED LATERALITY, UNSPECIFIED PART OF LUNG: ICD-10-CM

## 2021-10-04 DIAGNOSIS — R06.02 SHORTNESS OF BREATH: Primary | ICD-10-CM

## 2021-10-04 LAB
ALBUMIN SERPL BCP-MCNC: 3.7 G/DL (ref 3.5–5.2)
ALP SERPL-CCNC: 82 U/L (ref 55–135)
ALT SERPL W/O P-5'-P-CCNC: 8 U/L (ref 10–44)
ANION GAP SERPL CALC-SCNC: 16 MMOL/L (ref 8–16)
AST SERPL-CCNC: 18 U/L (ref 10–40)
BASOPHILS # BLD AUTO: 0.04 K/UL (ref 0–0.2)
BASOPHILS NFR BLD: 0.3 % (ref 0–1.9)
BILIRUB SERPL-MCNC: 1.1 MG/DL (ref 0.1–1)
BNP SERPL-MCNC: 36 PG/ML (ref 0–99)
BUN SERPL-MCNC: 17 MG/DL (ref 8–23)
CALCIUM SERPL-MCNC: 9.8 MG/DL (ref 8.7–10.5)
CHLORIDE SERPL-SCNC: 103 MMOL/L (ref 95–110)
CO2 SERPL-SCNC: 21 MMOL/L (ref 23–29)
CREAT SERPL-MCNC: 1.6 MG/DL (ref 0.5–1.4)
CTP QC/QA: YES
CTP QC/QA: YES
DIFFERENTIAL METHOD: ABNORMAL
EOSINOPHIL # BLD AUTO: 0 K/UL (ref 0–0.5)
EOSINOPHIL NFR BLD: 0.1 % (ref 0–8)
ERYTHROCYTE [DISTWIDTH] IN BLOOD BY AUTOMATED COUNT: 16.2 % (ref 11.5–14.5)
EST. GFR  (AFRICAN AMERICAN): 33 ML/MIN/1.73 M^2
EST. GFR  (NON AFRICAN AMERICAN): 29 ML/MIN/1.73 M^2
GLUCOSE SERPL-MCNC: 160 MG/DL (ref 70–110)
HCT VFR BLD AUTO: 36.1 % (ref 37–48.5)
HGB BLD-MCNC: 11.8 G/DL (ref 12–16)
IMM GRANULOCYTES # BLD AUTO: 0.06 K/UL (ref 0–0.04)
IMM GRANULOCYTES NFR BLD AUTO: 0.5 % (ref 0–0.5)
LYMPHOCYTES # BLD AUTO: 0.8 K/UL (ref 1–4.8)
LYMPHOCYTES NFR BLD: 7.2 % (ref 18–48)
MCH RBC QN AUTO: 29 PG (ref 27–31)
MCHC RBC AUTO-ENTMCNC: 32.7 G/DL (ref 32–36)
MCV RBC AUTO: 89 FL (ref 82–98)
MONOCYTES # BLD AUTO: 0.5 K/UL (ref 0.3–1)
MONOCYTES NFR BLD: 4.5 % (ref 4–15)
NEUTROPHILS # BLD AUTO: 10.1 K/UL (ref 1.8–7.7)
NEUTROPHILS NFR BLD: 87.4 % (ref 38–73)
NRBC BLD-RTO: 0 /100 WBC
PLATELET # BLD AUTO: 186 K/UL (ref 150–450)
PMV BLD AUTO: 11.5 FL (ref 9.2–12.9)
POC MOLECULAR INFLUENZA A AGN: NEGATIVE
POC MOLECULAR INFLUENZA B AGN: NEGATIVE
POCT GLUCOSE: 187 MG/DL (ref 70–110)
POTASSIUM SERPL-SCNC: 3.5 MMOL/L (ref 3.5–5.1)
PROCALCITONIN SERPL IA-MCNC: 0.19 NG/ML
PROT SERPL-MCNC: 7.9 G/DL (ref 6–8.4)
RBC # BLD AUTO: 4.07 M/UL (ref 4–5.4)
SARS-COV-2 RDRP RESP QL NAA+PROBE: NEGATIVE
SODIUM SERPL-SCNC: 140 MMOL/L (ref 136–145)
TROPONIN I SERPL DL<=0.01 NG/ML-MCNC: <0.006 NG/ML (ref 0–0.03)
WBC # BLD AUTO: 11.58 K/UL (ref 3.9–12.7)

## 2021-10-04 PROCEDURE — 80053 COMPREHEN METABOLIC PANEL: CPT | Performed by: NURSE PRACTITIONER

## 2021-10-04 PROCEDURE — 99900035 HC TECH TIME PER 15 MIN (STAT)

## 2021-10-04 PROCEDURE — 63600175 PHARM REV CODE 636 W HCPCS: Performed by: PHYSICIAN ASSISTANT

## 2021-10-04 PROCEDURE — 27000190 HC CPAP FULL FACE MASK W/VALVE

## 2021-10-04 PROCEDURE — 84484 ASSAY OF TROPONIN QUANT: CPT | Performed by: NURSE PRACTITIONER

## 2021-10-04 PROCEDURE — G0378 HOSPITAL OBSERVATION PER HR: HCPCS

## 2021-10-04 PROCEDURE — 25000242 PHARM REV CODE 250 ALT 637 W/ HCPCS: Performed by: NURSE PRACTITIONER

## 2021-10-04 PROCEDURE — 94640 AIRWAY INHALATION TREATMENT: CPT

## 2021-10-04 PROCEDURE — 99285 EMERGENCY DEPT VISIT HI MDM: CPT | Mod: 25

## 2021-10-04 PROCEDURE — 25000242 PHARM REV CODE 250 ALT 637 W/ HCPCS: Performed by: PHYSICIAN ASSISTANT

## 2021-10-04 PROCEDURE — 83880 ASSAY OF NATRIURETIC PEPTIDE: CPT | Performed by: NURSE PRACTITIONER

## 2021-10-04 PROCEDURE — 93010 ELECTROCARDIOGRAM REPORT: CPT | Mod: ,,, | Performed by: INTERNAL MEDICINE

## 2021-10-04 PROCEDURE — 63600175 PHARM REV CODE 636 W HCPCS: Performed by: NURSE PRACTITIONER

## 2021-10-04 PROCEDURE — 94761 N-INVAS EAR/PLS OXIMETRY MLT: CPT

## 2021-10-04 PROCEDURE — 93005 ELECTROCARDIOGRAM TRACING: CPT

## 2021-10-04 PROCEDURE — 85025 COMPLETE CBC W/AUTO DIFF WBC: CPT | Performed by: NURSE PRACTITIONER

## 2021-10-04 PROCEDURE — 36415 COLL VENOUS BLD VENIPUNCTURE: CPT | Performed by: PHYSICIAN ASSISTANT

## 2021-10-04 PROCEDURE — 27000221 HC OXYGEN, UP TO 24 HOURS

## 2021-10-04 PROCEDURE — 93010 EKG 12-LEAD: ICD-10-PCS | Mod: ,,, | Performed by: INTERNAL MEDICINE

## 2021-10-04 PROCEDURE — 96374 THER/PROPH/DIAG INJ IV PUSH: CPT

## 2021-10-04 PROCEDURE — 84145 PROCALCITONIN (PCT): CPT | Performed by: PHYSICIAN ASSISTANT

## 2021-10-04 PROCEDURE — 25000003 PHARM REV CODE 250: Performed by: NURSE PRACTITIONER

## 2021-10-04 PROCEDURE — U0002 COVID-19 LAB TEST NON-CDC: HCPCS | Performed by: EMERGENCY MEDICINE

## 2021-10-04 PROCEDURE — 96375 TX/PRO/DX INJ NEW DRUG ADDON: CPT

## 2021-10-04 PROCEDURE — 87040 BLOOD CULTURE FOR BACTERIA: CPT | Mod: 59 | Performed by: NURSE PRACTITIONER

## 2021-10-04 PROCEDURE — 94660 CPAP INITIATION&MGMT: CPT

## 2021-10-04 PROCEDURE — 83036 HEMOGLOBIN GLYCOSYLATED A1C: CPT | Performed by: PHYSICIAN ASSISTANT

## 2021-10-04 PROCEDURE — 99220 PR INITIAL OBSERVATION CARE,LEVL III: ICD-10-PCS | Mod: ,,, | Performed by: PHYSICIAN ASSISTANT

## 2021-10-04 PROCEDURE — 99220 PR INITIAL OBSERVATION CARE,LEVL III: CPT | Mod: ,,, | Performed by: PHYSICIAN ASSISTANT

## 2021-10-04 PROCEDURE — 25000003 PHARM REV CODE 250: Performed by: PHYSICIAN ASSISTANT

## 2021-10-04 RX ORDER — ALBUTEROL SULFATE 2.5 MG/.5ML
2.5 SOLUTION RESPIRATORY (INHALATION)
Status: COMPLETED | OUTPATIENT
Start: 2021-10-04 | End: 2021-10-04

## 2021-10-04 RX ORDER — SODIUM CHLORIDE 0.9 % (FLUSH) 0.9 %
10 SYRINGE (ML) INJECTION EVERY 8 HOURS
Status: DISCONTINUED | OUTPATIENT
Start: 2021-10-04 | End: 2021-10-08 | Stop reason: HOSPADM

## 2021-10-04 RX ORDER — AMOXICILLIN 250 MG
1 CAPSULE ORAL 2 TIMES DAILY PRN
Status: DISCONTINUED | OUTPATIENT
Start: 2021-10-04 | End: 2021-10-08 | Stop reason: HOSPADM

## 2021-10-04 RX ORDER — IPRATROPIUM BROMIDE AND ALBUTEROL SULFATE 2.5; .5 MG/3ML; MG/3ML
3 SOLUTION RESPIRATORY (INHALATION) EVERY 4 HOURS
Status: DISCONTINUED | OUTPATIENT
Start: 2021-10-04 | End: 2021-10-07

## 2021-10-04 RX ORDER — PREDNISONE 20 MG/1
40 TABLET ORAL DAILY
Status: DISCONTINUED | OUTPATIENT
Start: 2021-10-05 | End: 2021-10-08 | Stop reason: HOSPADM

## 2021-10-04 RX ORDER — IBUPROFEN 200 MG
24 TABLET ORAL
Status: DISCONTINUED | OUTPATIENT
Start: 2021-10-04 | End: 2021-10-08 | Stop reason: HOSPADM

## 2021-10-04 RX ORDER — GLUCAGON 1 MG
1 KIT INJECTION
Status: DISCONTINUED | OUTPATIENT
Start: 2021-10-04 | End: 2021-10-08 | Stop reason: HOSPADM

## 2021-10-04 RX ORDER — INSULIN ASPART 100 [IU]/ML
0-5 INJECTION, SOLUTION INTRAVENOUS; SUBCUTANEOUS
Status: DISCONTINUED | OUTPATIENT
Start: 2021-10-04 | End: 2021-10-08 | Stop reason: HOSPADM

## 2021-10-04 RX ORDER — DEXAMETHASONE SODIUM PHOSPHATE 4 MG/ML
4 INJECTION, SOLUTION INTRA-ARTICULAR; INTRALESIONAL; INTRAMUSCULAR; INTRAVENOUS; SOFT TISSUE
Status: COMPLETED | OUTPATIENT
Start: 2021-10-04 | End: 2021-10-04

## 2021-10-04 RX ORDER — SODIUM CHLORIDE 9 MG/ML
INJECTION, SOLUTION INTRAVENOUS CONTINUOUS
Status: ACTIVE | OUTPATIENT
Start: 2021-10-04 | End: 2021-10-05

## 2021-10-04 RX ORDER — HEPARIN SODIUM 5000 [USP'U]/ML
5000 INJECTION, SOLUTION INTRAVENOUS; SUBCUTANEOUS EVERY 8 HOURS
Status: DISCONTINUED | OUTPATIENT
Start: 2021-10-04 | End: 2021-10-05

## 2021-10-04 RX ORDER — ACETAMINOPHEN 325 MG/1
650 TABLET ORAL EVERY 4 HOURS PRN
Status: DISCONTINUED | OUTPATIENT
Start: 2021-10-04 | End: 2021-10-08 | Stop reason: HOSPADM

## 2021-10-04 RX ORDER — ACETAMINOPHEN 325 MG/1
650 TABLET ORAL
Status: COMPLETED | OUTPATIENT
Start: 2021-10-04 | End: 2021-10-04

## 2021-10-04 RX ORDER — IBUPROFEN 200 MG
16 TABLET ORAL
Status: DISCONTINUED | OUTPATIENT
Start: 2021-10-04 | End: 2021-10-08 | Stop reason: HOSPADM

## 2021-10-04 RX ORDER — IPRATROPIUM BROMIDE AND ALBUTEROL SULFATE 2.5; .5 MG/3ML; MG/3ML
3 SOLUTION RESPIRATORY (INHALATION) EVERY 4 HOURS PRN
Status: DISCONTINUED | OUTPATIENT
Start: 2021-10-04 | End: 2021-10-07

## 2021-10-04 RX ADMIN — CEFTRIAXONE 1 G: 1 INJECTION, POWDER, FOR SOLUTION INTRAMUSCULAR; INTRAVENOUS at 07:10

## 2021-10-04 RX ADMIN — ACETAMINOPHEN 650 MG: 325 TABLET ORAL at 01:10

## 2021-10-04 RX ADMIN — ALBUTEROL SULFATE 2.5 MG: 2.5 SOLUTION RESPIRATORY (INHALATION) at 03:10

## 2021-10-04 RX ADMIN — HEPARIN SODIUM 5000 UNITS: 5000 INJECTION INTRAVENOUS; SUBCUTANEOUS at 09:10

## 2021-10-04 RX ADMIN — DEXAMETHASONE SODIUM PHOSPHATE 4 MG: 4 INJECTION, SOLUTION INTRAMUSCULAR; INTRAVENOUS at 03:10

## 2021-10-04 RX ADMIN — SODIUM CHLORIDE: 0.9 INJECTION, SOLUTION INTRAVENOUS at 08:10

## 2021-10-04 RX ADMIN — IPRATROPIUM BROMIDE AND ALBUTEROL SULFATE 3 ML: .5; 3 SOLUTION RESPIRATORY (INHALATION) at 08:10

## 2021-10-05 LAB
ANION GAP SERPL CALC-SCNC: 13 MMOL/L (ref 8–16)
BASOPHILS # BLD AUTO: 0.01 K/UL (ref 0–0.2)
BASOPHILS NFR BLD: 0.1 % (ref 0–1.9)
BUN SERPL-MCNC: 19 MG/DL (ref 8–23)
CALCIUM SERPL-MCNC: 9.4 MG/DL (ref 8.7–10.5)
CHLORIDE SERPL-SCNC: 108 MMOL/L (ref 95–110)
CO2 SERPL-SCNC: 20 MMOL/L (ref 23–29)
CREAT SERPL-MCNC: 1.4 MG/DL (ref 0.5–1.4)
DIFFERENTIAL METHOD: ABNORMAL
EOSINOPHIL # BLD AUTO: 0 K/UL (ref 0–0.5)
EOSINOPHIL NFR BLD: 0 % (ref 0–8)
ERYTHROCYTE [DISTWIDTH] IN BLOOD BY AUTOMATED COUNT: 16.3 % (ref 11.5–14.5)
EST. GFR  (AFRICAN AMERICAN): 39 ML/MIN/1.73 M^2
EST. GFR  (NON AFRICAN AMERICAN): 34 ML/MIN/1.73 M^2
ESTIMATED AVG GLUCOSE: 137 MG/DL (ref 68–131)
GLUCOSE SERPL-MCNC: 176 MG/DL (ref 70–110)
HBA1C MFR BLD: 6.4 % (ref 4–5.6)
HCT VFR BLD AUTO: 32.3 % (ref 37–48.5)
HGB BLD-MCNC: 10.7 G/DL (ref 12–16)
IMM GRANULOCYTES # BLD AUTO: 0.04 K/UL (ref 0–0.04)
IMM GRANULOCYTES NFR BLD AUTO: 0.4 % (ref 0–0.5)
LYMPHOCYTES # BLD AUTO: 0.6 K/UL (ref 1–4.8)
LYMPHOCYTES NFR BLD: 6.3 % (ref 18–48)
MAGNESIUM SERPL-MCNC: 2.1 MG/DL (ref 1.6–2.6)
MCH RBC QN AUTO: 28.8 PG (ref 27–31)
MCHC RBC AUTO-ENTMCNC: 33.1 G/DL (ref 32–36)
MCV RBC AUTO: 87 FL (ref 82–98)
MONOCYTES # BLD AUTO: 0.4 K/UL (ref 0.3–1)
MONOCYTES NFR BLD: 3.4 % (ref 4–15)
NEUTROPHILS # BLD AUTO: 9.1 K/UL (ref 1.8–7.7)
NEUTROPHILS NFR BLD: 89.8 % (ref 38–73)
NRBC BLD-RTO: 0 /100 WBC
PLATELET # BLD AUTO: 162 K/UL (ref 150–450)
PMV BLD AUTO: 11.9 FL (ref 9.2–12.9)
POCT GLUCOSE: 136 MG/DL (ref 70–110)
POTASSIUM SERPL-SCNC: 3.9 MMOL/L (ref 3.5–5.1)
RBC # BLD AUTO: 3.72 M/UL (ref 4–5.4)
SODIUM SERPL-SCNC: 141 MMOL/L (ref 136–145)
WBC # BLD AUTO: 10.18 K/UL (ref 3.9–12.7)

## 2021-10-05 PROCEDURE — 25000003 PHARM REV CODE 250: Performed by: PHYSICIAN ASSISTANT

## 2021-10-05 PROCEDURE — 97165 OT EVAL LOW COMPLEX 30 MIN: CPT

## 2021-10-05 PROCEDURE — 94640 AIRWAY INHALATION TREATMENT: CPT

## 2021-10-05 PROCEDURE — 63600175 PHARM REV CODE 636 W HCPCS: Performed by: PHYSICIAN ASSISTANT

## 2021-10-05 PROCEDURE — 87147 CULTURE TYPE IMMUNOLOGIC: CPT | Performed by: PHYSICIAN ASSISTANT

## 2021-10-05 PROCEDURE — 80048 BASIC METABOLIC PNL TOTAL CA: CPT | Performed by: PHYSICIAN ASSISTANT

## 2021-10-05 PROCEDURE — 36415 COLL VENOUS BLD VENIPUNCTURE: CPT | Performed by: PHYSICIAN ASSISTANT

## 2021-10-05 PROCEDURE — A4216 STERILE WATER/SALINE, 10 ML: HCPCS | Performed by: PHYSICIAN ASSISTANT

## 2021-10-05 PROCEDURE — 99900035 HC TECH TIME PER 15 MIN (STAT)

## 2021-10-05 PROCEDURE — 87070 CULTURE OTHR SPECIMN AEROBIC: CPT | Performed by: PHYSICIAN ASSISTANT

## 2021-10-05 PROCEDURE — 83735 ASSAY OF MAGNESIUM: CPT | Performed by: PHYSICIAN ASSISTANT

## 2021-10-05 PROCEDURE — 97161 PT EVAL LOW COMPLEX 20 MIN: CPT

## 2021-10-05 PROCEDURE — 85025 COMPLETE CBC W/AUTO DIFF WBC: CPT | Performed by: PHYSICIAN ASSISTANT

## 2021-10-05 PROCEDURE — 94660 CPAP INITIATION&MGMT: CPT

## 2021-10-05 PROCEDURE — 99226 PR SUBSEQUENT OBSERVATION CARE,LEVEL III: ICD-10-PCS | Mod: ,,, | Performed by: PHYSICIAN ASSISTANT

## 2021-10-05 PROCEDURE — 94799 UNLISTED PULMONARY SVC/PX: CPT

## 2021-10-05 PROCEDURE — 99226 PR SUBSEQUENT OBSERVATION CARE,LEVEL III: CPT | Mod: ,,, | Performed by: PHYSICIAN ASSISTANT

## 2021-10-05 PROCEDURE — 25000242 PHARM REV CODE 250 ALT 637 W/ HCPCS: Performed by: PHYSICIAN ASSISTANT

## 2021-10-05 PROCEDURE — G0378 HOSPITAL OBSERVATION PER HR: HCPCS

## 2021-10-05 PROCEDURE — 94761 N-INVAS EAR/PLS OXIMETRY MLT: CPT

## 2021-10-05 PROCEDURE — 87205 SMEAR GRAM STAIN: CPT | Performed by: PHYSICIAN ASSISTANT

## 2021-10-05 PROCEDURE — 27000221 HC OXYGEN, UP TO 24 HOURS

## 2021-10-05 RX ORDER — HYDROXYZINE PAMOATE 25 MG/1
25 CAPSULE ORAL EVERY 6 HOURS PRN
Status: DISCONTINUED | OUTPATIENT
Start: 2021-10-05 | End: 2021-10-08 | Stop reason: HOSPADM

## 2021-10-05 RX ORDER — GABAPENTIN 100 MG/1
CAPSULE ORAL
COMMUNITY
Start: 2021-09-14 | End: 2022-04-04

## 2021-10-05 RX ORDER — DONEPEZIL HYDROCHLORIDE 5 MG/1
10 TABLET, FILM COATED ORAL DAILY
Status: DISCONTINUED | OUTPATIENT
Start: 2021-10-05 | End: 2021-10-08 | Stop reason: HOSPADM

## 2021-10-05 RX ORDER — LEVOTHYROXINE SODIUM 50 UG/1
50 TABLET ORAL
Status: DISCONTINUED | OUTPATIENT
Start: 2021-10-05 | End: 2021-10-08 | Stop reason: HOSPADM

## 2021-10-05 RX ORDER — GUAIFENESIN 100 MG/5ML
200 SOLUTION ORAL EVERY 4 HOURS PRN
Status: DISCONTINUED | OUTPATIENT
Start: 2021-10-05 | End: 2021-10-08 | Stop reason: HOSPADM

## 2021-10-05 RX ORDER — MIRTAZAPINE 15 MG/1
15 TABLET, FILM COATED ORAL NIGHTLY PRN
Status: DISCONTINUED | OUTPATIENT
Start: 2021-10-05 | End: 2021-10-08 | Stop reason: HOSPADM

## 2021-10-05 RX ORDER — AMLODIPINE BESYLATE 5 MG/1
10 TABLET ORAL DAILY
Status: DISCONTINUED | OUTPATIENT
Start: 2021-10-06 | End: 2021-10-08 | Stop reason: HOSPADM

## 2021-10-05 RX ORDER — FAMOTIDINE 20 MG/1
20 TABLET, FILM COATED ORAL DAILY
Status: DISCONTINUED | OUTPATIENT
Start: 2021-10-05 | End: 2021-10-08 | Stop reason: HOSPADM

## 2021-10-05 RX ORDER — PANTOPRAZOLE SODIUM 40 MG/1
40 TABLET, DELAYED RELEASE ORAL DAILY
Refills: 1 | Status: DISCONTINUED | OUTPATIENT
Start: 2021-10-05 | End: 2021-10-08 | Stop reason: HOSPADM

## 2021-10-05 RX ORDER — ESCITALOPRAM OXALATE 5 MG/1
5 TABLET ORAL DAILY
Status: DISCONTINUED | OUTPATIENT
Start: 2021-10-05 | End: 2021-10-08 | Stop reason: HOSPADM

## 2021-10-05 RX ADMIN — HEPARIN SODIUM 5000 UNITS: 5000 INJECTION INTRAVENOUS; SUBCUTANEOUS at 02:10

## 2021-10-05 RX ADMIN — IPRATROPIUM BROMIDE AND ALBUTEROL SULFATE 3 ML: .5; 3 SOLUTION RESPIRATORY (INHALATION) at 07:10

## 2021-10-05 RX ADMIN — ESCITALOPRAM OXALATE 5 MG: 5 TABLET, FILM COATED ORAL at 03:10

## 2021-10-05 RX ADMIN — IPRATROPIUM BROMIDE AND ALBUTEROL SULFATE 3 ML: .5; 3 SOLUTION RESPIRATORY (INHALATION) at 03:10

## 2021-10-05 RX ADMIN — IPRATROPIUM BROMIDE AND ALBUTEROL SULFATE 3 ML: .5; 3 SOLUTION RESPIRATORY (INHALATION) at 12:10

## 2021-10-05 RX ADMIN — IPRATROPIUM BROMIDE AND ALBUTEROL SULFATE 3 ML: .5; 3 SOLUTION RESPIRATORY (INHALATION) at 11:10

## 2021-10-05 RX ADMIN — FAMOTIDINE 20 MG: 20 TABLET, FILM COATED ORAL at 03:10

## 2021-10-05 RX ADMIN — PANTOPRAZOLE SODIUM 40 MG: 40 TABLET, DELAYED RELEASE ORAL at 03:10

## 2021-10-05 RX ADMIN — Medication 10 ML: at 02:10

## 2021-10-05 RX ADMIN — PREDNISONE 40 MG: 20 TABLET ORAL at 08:10

## 2021-10-05 RX ADMIN — Medication 10 ML: at 06:10

## 2021-10-05 RX ADMIN — LEVOTHYROXINE SODIUM 50 MCG: 0.05 TABLET ORAL at 02:10

## 2021-10-05 RX ADMIN — DONEPEZIL HYDROCHLORIDE 10 MG: 5 TABLET, FILM COATED ORAL at 03:10

## 2021-10-05 RX ADMIN — APIXABAN 2.5 MG: 2.5 TABLET, FILM COATED ORAL at 08:10

## 2021-10-05 RX ADMIN — GUAIFENESIN 200 MG: 100 SOLUTION ORAL at 01:10

## 2021-10-05 RX ADMIN — Medication 10 ML: at 11:10

## 2021-10-05 RX ADMIN — IPRATROPIUM BROMIDE AND ALBUTEROL SULFATE 3 ML: .5; 3 SOLUTION RESPIRATORY (INHALATION) at 04:10

## 2021-10-05 RX ADMIN — HEPARIN SODIUM 5000 UNITS: 5000 INJECTION INTRAVENOUS; SUBCUTANEOUS at 05:10

## 2021-10-06 PROBLEM — J96.01 ACUTE RESPIRATORY FAILURE WITH HYPOXIA: Status: ACTIVE | Noted: 2021-10-06

## 2021-10-06 PROBLEM — R09.02 HYPOXIA: Status: ACTIVE | Noted: 2021-10-06

## 2021-10-06 LAB
ANION GAP SERPL CALC-SCNC: 9 MMOL/L (ref 8–16)
BASOPHILS # BLD AUTO: 0.03 K/UL (ref 0–0.2)
BASOPHILS NFR BLD: 0.2 % (ref 0–1.9)
BUN SERPL-MCNC: 14 MG/DL (ref 8–23)
CALCIUM SERPL-MCNC: 9.5 MG/DL (ref 8.7–10.5)
CHLORIDE SERPL-SCNC: 109 MMOL/L (ref 95–110)
CO2 SERPL-SCNC: 25 MMOL/L (ref 23–29)
CREAT SERPL-MCNC: 1 MG/DL (ref 0.5–1.4)
DIFFERENTIAL METHOD: ABNORMAL
EOSINOPHIL # BLD AUTO: 0 K/UL (ref 0–0.5)
EOSINOPHIL NFR BLD: 0.1 % (ref 0–8)
ERYTHROCYTE [DISTWIDTH] IN BLOOD BY AUTOMATED COUNT: 16.3 % (ref 11.5–14.5)
EST. GFR  (AFRICAN AMERICAN): 58 ML/MIN/1.73 M^2
EST. GFR  (NON AFRICAN AMERICAN): 50 ML/MIN/1.73 M^2
GLUCOSE SERPL-MCNC: 114 MG/DL (ref 70–110)
HCT VFR BLD AUTO: 33.9 % (ref 37–48.5)
HGB BLD-MCNC: 11.2 G/DL (ref 12–16)
IMM GRANULOCYTES # BLD AUTO: 0.08 K/UL (ref 0–0.04)
IMM GRANULOCYTES NFR BLD AUTO: 0.5 % (ref 0–0.5)
LYMPHOCYTES # BLD AUTO: 1.6 K/UL (ref 1–4.8)
LYMPHOCYTES NFR BLD: 10.8 % (ref 18–48)
MAGNESIUM SERPL-MCNC: 2.2 MG/DL (ref 1.6–2.6)
MCH RBC QN AUTO: 29 PG (ref 27–31)
MCHC RBC AUTO-ENTMCNC: 33 G/DL (ref 32–36)
MCV RBC AUTO: 88 FL (ref 82–98)
MONOCYTES # BLD AUTO: 1 K/UL (ref 0.3–1)
MONOCYTES NFR BLD: 6.8 % (ref 4–15)
NEUTROPHILS # BLD AUTO: 11.9 K/UL (ref 1.8–7.7)
NEUTROPHILS NFR BLD: 81.6 % (ref 38–73)
NRBC BLD-RTO: 0 /100 WBC
PLATELET # BLD AUTO: 183 K/UL (ref 150–450)
PMV BLD AUTO: 12.7 FL (ref 9.2–12.9)
POCT GLUCOSE: 105 MG/DL (ref 70–110)
POCT GLUCOSE: 141 MG/DL (ref 70–110)
POCT GLUCOSE: 151 MG/DL (ref 70–110)
POCT GLUCOSE: 151 MG/DL (ref 70–110)
POCT GLUCOSE: 153 MG/DL (ref 70–110)
POCT GLUCOSE: 182 MG/DL (ref 70–110)
POCT GLUCOSE: 99 MG/DL (ref 70–110)
POTASSIUM SERPL-SCNC: 3.7 MMOL/L (ref 3.5–5.1)
RBC # BLD AUTO: 3.86 M/UL (ref 4–5.4)
SODIUM SERPL-SCNC: 143 MMOL/L (ref 136–145)
WBC # BLD AUTO: 14.6 K/UL (ref 3.9–12.7)

## 2021-10-06 PROCEDURE — 25000003 PHARM REV CODE 250: Performed by: PHYSICIAN ASSISTANT

## 2021-10-06 PROCEDURE — 11000001 HC ACUTE MED/SURG PRIVATE ROOM

## 2021-10-06 PROCEDURE — 94799 UNLISTED PULMONARY SVC/PX: CPT

## 2021-10-06 PROCEDURE — 36415 COLL VENOUS BLD VENIPUNCTURE: CPT | Performed by: PHYSICIAN ASSISTANT

## 2021-10-06 PROCEDURE — 99900035 HC TECH TIME PER 15 MIN (STAT)

## 2021-10-06 PROCEDURE — A4216 STERILE WATER/SALINE, 10 ML: HCPCS | Performed by: PHYSICIAN ASSISTANT

## 2021-10-06 PROCEDURE — 63700000 PHARM REV CODE 250 ALT 637 W/O HCPCS: Performed by: PHYSICIAN ASSISTANT

## 2021-10-06 PROCEDURE — 80048 BASIC METABOLIC PNL TOTAL CA: CPT | Performed by: PHYSICIAN ASSISTANT

## 2021-10-06 PROCEDURE — 25000242 PHARM REV CODE 250 ALT 637 W/ HCPCS: Performed by: PHYSICIAN ASSISTANT

## 2021-10-06 PROCEDURE — 94761 N-INVAS EAR/PLS OXIMETRY MLT: CPT

## 2021-10-06 PROCEDURE — 99233 SBSQ HOSP IP/OBS HIGH 50: CPT | Mod: ,,, | Performed by: PHYSICIAN ASSISTANT

## 2021-10-06 PROCEDURE — 94640 AIRWAY INHALATION TREATMENT: CPT

## 2021-10-06 PROCEDURE — 99233 PR SUBSEQUENT HOSPITAL CARE,LEVL III: ICD-10-PCS | Mod: ,,, | Performed by: PHYSICIAN ASSISTANT

## 2021-10-06 PROCEDURE — 63600175 PHARM REV CODE 636 W HCPCS: Performed by: PHYSICIAN ASSISTANT

## 2021-10-06 PROCEDURE — 85025 COMPLETE CBC W/AUTO DIFF WBC: CPT | Performed by: PHYSICIAN ASSISTANT

## 2021-10-06 PROCEDURE — 83735 ASSAY OF MAGNESIUM: CPT | Performed by: PHYSICIAN ASSISTANT

## 2021-10-06 PROCEDURE — 94660 CPAP INITIATION&MGMT: CPT

## 2021-10-06 PROCEDURE — 27000221 HC OXYGEN, UP TO 24 HOURS

## 2021-10-06 RX ORDER — AZITHROMYCIN 250 MG/1
500 TABLET, FILM COATED ORAL DAILY
Status: DISCONTINUED | OUTPATIENT
Start: 2021-10-06 | End: 2021-10-06

## 2021-10-06 RX ORDER — AZITHROMYCIN 250 MG/1
500 TABLET, FILM COATED ORAL DAILY
Status: COMPLETED | OUTPATIENT
Start: 2021-10-06 | End: 2021-10-06

## 2021-10-06 RX ORDER — AZITHROMYCIN 250 MG/1
250 TABLET, FILM COATED ORAL DAILY
Status: DISCONTINUED | OUTPATIENT
Start: 2021-10-07 | End: 2021-10-07

## 2021-10-06 RX ADMIN — PANTOPRAZOLE SODIUM 40 MG: 40 TABLET, DELAYED RELEASE ORAL at 09:10

## 2021-10-06 RX ADMIN — FAMOTIDINE 20 MG: 20 TABLET, FILM COATED ORAL at 09:10

## 2021-10-06 RX ADMIN — IPRATROPIUM BROMIDE AND ALBUTEROL SULFATE 3 ML: .5; 3 SOLUTION RESPIRATORY (INHALATION) at 07:10

## 2021-10-06 RX ADMIN — AMLODIPINE BESYLATE 10 MG: 5 TABLET ORAL at 09:10

## 2021-10-06 RX ADMIN — DONEPEZIL HYDROCHLORIDE 10 MG: 5 TABLET, FILM COATED ORAL at 09:10

## 2021-10-06 RX ADMIN — APIXABAN 2.5 MG: 2.5 TABLET, FILM COATED ORAL at 08:10

## 2021-10-06 RX ADMIN — GUAIFENESIN 200 MG: 100 SOLUTION ORAL at 08:10

## 2021-10-06 RX ADMIN — Medication 10 ML: at 10:10

## 2021-10-06 RX ADMIN — IPRATROPIUM BROMIDE AND ALBUTEROL SULFATE 3 ML: .5; 3 SOLUTION RESPIRATORY (INHALATION) at 11:10

## 2021-10-06 RX ADMIN — FLUTICASONE FUROATE 1 PUFF: 100 POWDER RESPIRATORY (INHALATION) at 07:10

## 2021-10-06 RX ADMIN — METHYLPREDNISOLONE SODIUM SUCCINATE 60 MG: 40 INJECTION, POWDER, FOR SOLUTION INTRAMUSCULAR; INTRAVENOUS at 12:10

## 2021-10-06 RX ADMIN — IPRATROPIUM BROMIDE AND ALBUTEROL SULFATE 3 ML: .5; 3 SOLUTION RESPIRATORY (INHALATION) at 03:10

## 2021-10-06 RX ADMIN — AZITHROMYCIN MONOHYDRATE 500 MG: 250 TABLET ORAL at 12:10

## 2021-10-06 RX ADMIN — LEVOTHYROXINE SODIUM 50 MCG: 0.05 TABLET ORAL at 06:10

## 2021-10-06 RX ADMIN — GUAIFENESIN 200 MG: 100 SOLUTION ORAL at 12:10

## 2021-10-06 RX ADMIN — Medication 10 ML: at 02:10

## 2021-10-06 RX ADMIN — ESCITALOPRAM OXALATE 5 MG: 5 TABLET, FILM COATED ORAL at 09:10

## 2021-10-06 RX ADMIN — Medication 10 ML: at 06:10

## 2021-10-06 RX ADMIN — APIXABAN 2.5 MG: 2.5 TABLET, FILM COATED ORAL at 09:10

## 2021-10-06 RX ADMIN — PREDNISONE 40 MG: 20 TABLET ORAL at 09:10

## 2021-10-06 RX ADMIN — METHYLPREDNISOLONE SODIUM SUCCINATE 60 MG: 40 INJECTION, POWDER, FOR SOLUTION INTRAMUSCULAR; INTRAVENOUS at 08:10

## 2021-10-07 ENCOUNTER — PATIENT OUTREACH (OUTPATIENT)
Dept: ADMINISTRATIVE | Facility: OTHER | Age: 86
End: 2021-10-07

## 2021-10-07 PROBLEM — J18.9 PNEUMONIA DUE TO INFECTIOUS ORGANISM: Status: RESOLVED | Noted: 2021-10-04 | Resolved: 2021-10-07

## 2021-10-07 PROBLEM — R09.02 OXYGEN DESATURATION: Status: RESOLVED | Noted: 2021-10-04 | Resolved: 2021-10-07

## 2021-10-07 PROBLEM — E87.20 LACTIC ACIDOSIS: Status: RESOLVED | Noted: 2019-04-28 | Resolved: 2021-10-07

## 2021-10-07 LAB
ANION GAP SERPL CALC-SCNC: 11 MMOL/L (ref 8–16)
BASOPHILS # BLD AUTO: 0.01 K/UL (ref 0–0.2)
BASOPHILS NFR BLD: 0.2 % (ref 0–1.9)
BUN SERPL-MCNC: 21 MG/DL (ref 8–23)
CALCIUM SERPL-MCNC: 9.3 MG/DL (ref 8.7–10.5)
CHLORIDE SERPL-SCNC: 105 MMOL/L (ref 95–110)
CO2 SERPL-SCNC: 22 MMOL/L (ref 23–29)
CREAT SERPL-MCNC: 1.2 MG/DL (ref 0.5–1.4)
DIFFERENTIAL METHOD: ABNORMAL
EOSINOPHIL # BLD AUTO: 0 K/UL (ref 0–0.5)
EOSINOPHIL NFR BLD: 0 % (ref 0–8)
ERYTHROCYTE [DISTWIDTH] IN BLOOD BY AUTOMATED COUNT: 15.8 % (ref 11.5–14.5)
EST. GFR  (AFRICAN AMERICAN): 47 ML/MIN/1.73 M^2
EST. GFR  (NON AFRICAN AMERICAN): 40 ML/MIN/1.73 M^2
GLUCOSE SERPL-MCNC: 156 MG/DL (ref 70–110)
HCT VFR BLD AUTO: 34.5 % (ref 37–48.5)
HGB BLD-MCNC: 11.5 G/DL (ref 12–16)
IMM GRANULOCYTES # BLD AUTO: 0.04 K/UL (ref 0–0.04)
IMM GRANULOCYTES NFR BLD AUTO: 0.6 % (ref 0–0.5)
LYMPHOCYTES # BLD AUTO: 0.6 K/UL (ref 1–4.8)
LYMPHOCYTES NFR BLD: 10.2 % (ref 18–48)
MAGNESIUM SERPL-MCNC: 2.3 MG/DL (ref 1.6–2.6)
MCH RBC QN AUTO: 29 PG (ref 27–31)
MCHC RBC AUTO-ENTMCNC: 33.3 G/DL (ref 32–36)
MCV RBC AUTO: 87 FL (ref 82–98)
MONOCYTES # BLD AUTO: 0.1 K/UL (ref 0.3–1)
MONOCYTES NFR BLD: 1.8 % (ref 4–15)
NEUTROPHILS # BLD AUTO: 5.4 K/UL (ref 1.8–7.7)
NEUTROPHILS NFR BLD: 87.2 % (ref 38–73)
NRBC BLD-RTO: 0 /100 WBC
PLATELET # BLD AUTO: 194 K/UL (ref 150–450)
PMV BLD AUTO: 12.3 FL (ref 9.2–12.9)
POCT GLUCOSE: 152 MG/DL (ref 70–110)
POCT GLUCOSE: 162 MG/DL (ref 70–110)
POCT GLUCOSE: 163 MG/DL (ref 70–110)
POCT GLUCOSE: 195 MG/DL (ref 70–110)
POTASSIUM SERPL-SCNC: 4 MMOL/L (ref 3.5–5.1)
RBC # BLD AUTO: 3.97 M/UL (ref 4–5.4)
SODIUM SERPL-SCNC: 138 MMOL/L (ref 136–145)
WBC # BLD AUTO: 6.18 K/UL (ref 3.9–12.7)

## 2021-10-07 PROCEDURE — 63600175 PHARM REV CODE 636 W HCPCS: Performed by: PHYSICIAN ASSISTANT

## 2021-10-07 PROCEDURE — 99233 SBSQ HOSP IP/OBS HIGH 50: CPT | Mod: ,,, | Performed by: PHYSICIAN ASSISTANT

## 2021-10-07 PROCEDURE — 99233 PR SUBSEQUENT HOSPITAL CARE,LEVL III: ICD-10-PCS | Mod: ,,, | Performed by: PHYSICIAN ASSISTANT

## 2021-10-07 PROCEDURE — 99900035 HC TECH TIME PER 15 MIN (STAT)

## 2021-10-07 PROCEDURE — 94761 N-INVAS EAR/PLS OXIMETRY MLT: CPT

## 2021-10-07 PROCEDURE — 11000001 HC ACUTE MED/SURG PRIVATE ROOM

## 2021-10-07 PROCEDURE — 27000221 HC OXYGEN, UP TO 24 HOURS

## 2021-10-07 PROCEDURE — 63700000 PHARM REV CODE 250 ALT 637 W/O HCPCS: Performed by: PHYSICIAN ASSISTANT

## 2021-10-07 PROCEDURE — 83735 ASSAY OF MAGNESIUM: CPT | Performed by: PHYSICIAN ASSISTANT

## 2021-10-07 PROCEDURE — 94640 AIRWAY INHALATION TREATMENT: CPT

## 2021-10-07 PROCEDURE — 36415 COLL VENOUS BLD VENIPUNCTURE: CPT | Performed by: PHYSICIAN ASSISTANT

## 2021-10-07 PROCEDURE — 25000003 PHARM REV CODE 250: Performed by: PHYSICIAN ASSISTANT

## 2021-10-07 PROCEDURE — 25000242 PHARM REV CODE 250 ALT 637 W/ HCPCS: Performed by: PHYSICIAN ASSISTANT

## 2021-10-07 PROCEDURE — 94660 CPAP INITIATION&MGMT: CPT

## 2021-10-07 PROCEDURE — 80048 BASIC METABOLIC PNL TOTAL CA: CPT | Performed by: PHYSICIAN ASSISTANT

## 2021-10-07 PROCEDURE — A4216 STERILE WATER/SALINE, 10 ML: HCPCS | Performed by: PHYSICIAN ASSISTANT

## 2021-10-07 PROCEDURE — 94799 UNLISTED PULMONARY SVC/PX: CPT

## 2021-10-07 PROCEDURE — 85025 COMPLETE CBC W/AUTO DIFF WBC: CPT | Performed by: PHYSICIAN ASSISTANT

## 2021-10-07 RX ORDER — IPRATROPIUM BROMIDE AND ALBUTEROL SULFATE 2.5; .5 MG/3ML; MG/3ML
3 SOLUTION RESPIRATORY (INHALATION)
Status: DISCONTINUED | OUTPATIENT
Start: 2021-10-07 | End: 2021-10-08 | Stop reason: HOSPADM

## 2021-10-07 RX ORDER — AZITHROMYCIN 250 MG/1
250 TABLET, FILM COATED ORAL DAILY
Status: DISCONTINUED | OUTPATIENT
Start: 2021-10-08 | End: 2021-10-08 | Stop reason: HOSPADM

## 2021-10-07 RX ORDER — BENZONATATE 100 MG/1
100 CAPSULE ORAL 3 TIMES DAILY PRN
Status: DISCONTINUED | OUTPATIENT
Start: 2021-10-07 | End: 2021-10-08 | Stop reason: HOSPADM

## 2021-10-07 RX ADMIN — AMLODIPINE BESYLATE 10 MG: 5 TABLET ORAL at 09:10

## 2021-10-07 RX ADMIN — FLUTICASONE FUROATE 1 PUFF: 100 POWDER RESPIRATORY (INHALATION) at 07:10

## 2021-10-07 RX ADMIN — BENZONATATE 100 MG: 100 CAPSULE ORAL at 10:10

## 2021-10-07 RX ADMIN — IPRATROPIUM BROMIDE AND ALBUTEROL SULFATE 3 ML: .5; 3 SOLUTION RESPIRATORY (INHALATION) at 12:10

## 2021-10-07 RX ADMIN — PREDNISONE 40 MG: 20 TABLET ORAL at 09:10

## 2021-10-07 RX ADMIN — IPRATROPIUM BROMIDE AND ALBUTEROL SULFATE 3 ML: .5; 3 SOLUTION RESPIRATORY (INHALATION) at 11:10

## 2021-10-07 RX ADMIN — DONEPEZIL HYDROCHLORIDE 10 MG: 5 TABLET, FILM COATED ORAL at 09:10

## 2021-10-07 RX ADMIN — ESCITALOPRAM OXALATE 5 MG: 5 TABLET, FILM COATED ORAL at 09:10

## 2021-10-07 RX ADMIN — Medication 10 ML: at 05:10

## 2021-10-07 RX ADMIN — IPRATROPIUM BROMIDE AND ALBUTEROL SULFATE 3 ML: .5; 3 SOLUTION RESPIRATORY (INHALATION) at 08:10

## 2021-10-07 RX ADMIN — AZITHROMYCIN MONOHYDRATE 250 MG: 250 TABLET ORAL at 09:10

## 2021-10-07 RX ADMIN — APIXABAN 2.5 MG: 2.5 TABLET, FILM COATED ORAL at 09:10

## 2021-10-07 RX ADMIN — IPRATROPIUM BROMIDE AND ALBUTEROL SULFATE 3 ML: .5; 3 SOLUTION RESPIRATORY (INHALATION) at 07:10

## 2021-10-07 RX ADMIN — LEVOTHYROXINE SODIUM 50 MCG: 0.05 TABLET ORAL at 05:10

## 2021-10-07 RX ADMIN — IPRATROPIUM BROMIDE AND ALBUTEROL SULFATE 3 ML: .5; 3 SOLUTION RESPIRATORY (INHALATION) at 03:10

## 2021-10-07 RX ADMIN — IPRATROPIUM BROMIDE AND ALBUTEROL SULFATE 3 ML: .5; 3 SOLUTION RESPIRATORY (INHALATION) at 04:10

## 2021-10-07 RX ADMIN — PANTOPRAZOLE SODIUM 40 MG: 40 TABLET, DELAYED RELEASE ORAL at 09:10

## 2021-10-07 RX ADMIN — FAMOTIDINE 20 MG: 20 TABLET, FILM COATED ORAL at 09:10

## 2021-10-08 VITALS
HEART RATE: 82 BPM | TEMPERATURE: 98 F | HEIGHT: 65 IN | WEIGHT: 145.81 LBS | OXYGEN SATURATION: 94 % | BODY MASS INDEX: 24.29 KG/M2 | RESPIRATION RATE: 16 BRPM | SYSTOLIC BLOOD PRESSURE: 126 MMHG | DIASTOLIC BLOOD PRESSURE: 60 MMHG

## 2021-10-08 PROBLEM — R09.02 HYPOXIA: Status: RESOLVED | Noted: 2021-10-06 | Resolved: 2021-10-08

## 2021-10-08 PROBLEM — J96.01 ACUTE RESPIRATORY FAILURE WITH HYPOXIA: Status: RESOLVED | Noted: 2021-10-06 | Resolved: 2021-10-08

## 2021-10-08 PROBLEM — R26.89 POOR BALANCE: Status: RESOLVED | Noted: 2021-04-01 | Resolved: 2021-10-08

## 2021-10-08 LAB
ANION GAP SERPL CALC-SCNC: 11 MMOL/L (ref 8–16)
BACTERIA SPEC AEROBE CULT: ABNORMAL
BACTERIA SPEC AEROBE CULT: ABNORMAL
BASOPHILS # BLD AUTO: 0.01 K/UL (ref 0–0.2)
BASOPHILS NFR BLD: 0.1 % (ref 0–1.9)
BUN SERPL-MCNC: 22 MG/DL (ref 8–23)
CALCIUM SERPL-MCNC: 9.3 MG/DL (ref 8.7–10.5)
CHLORIDE SERPL-SCNC: 106 MMOL/L (ref 95–110)
CO2 SERPL-SCNC: 24 MMOL/L (ref 23–29)
CREAT SERPL-MCNC: 1.1 MG/DL (ref 0.5–1.4)
DIFFERENTIAL METHOD: ABNORMAL
EOSINOPHIL # BLD AUTO: 0 K/UL (ref 0–0.5)
EOSINOPHIL NFR BLD: 0 % (ref 0–8)
ERYTHROCYTE [DISTWIDTH] IN BLOOD BY AUTOMATED COUNT: 15.9 % (ref 11.5–14.5)
EST. GFR  (AFRICAN AMERICAN): 52 ML/MIN/1.73 M^2
EST. GFR  (NON AFRICAN AMERICAN): 45 ML/MIN/1.73 M^2
GLUCOSE SERPL-MCNC: 110 MG/DL (ref 70–110)
GRAM STN SPEC: ABNORMAL
HCT VFR BLD AUTO: 35.8 % (ref 37–48.5)
HGB BLD-MCNC: 11.9 G/DL (ref 12–16)
IMM GRANULOCYTES # BLD AUTO: 0.05 K/UL (ref 0–0.04)
IMM GRANULOCYTES NFR BLD AUTO: 0.5 % (ref 0–0.5)
LYMPHOCYTES # BLD AUTO: 1.8 K/UL (ref 1–4.8)
LYMPHOCYTES NFR BLD: 17.1 % (ref 18–48)
MAGNESIUM SERPL-MCNC: 2.6 MG/DL (ref 1.6–2.6)
MCH RBC QN AUTO: 28.8 PG (ref 27–31)
MCHC RBC AUTO-ENTMCNC: 33.2 G/DL (ref 32–36)
MCV RBC AUTO: 87 FL (ref 82–98)
MONOCYTES # BLD AUTO: 0.9 K/UL (ref 0.3–1)
MONOCYTES NFR BLD: 8.4 % (ref 4–15)
NEUTROPHILS # BLD AUTO: 7.8 K/UL (ref 1.8–7.7)
NEUTROPHILS NFR BLD: 73.9 % (ref 38–73)
NRBC BLD-RTO: 0 /100 WBC
PLATELET # BLD AUTO: 206 K/UL (ref 150–450)
PMV BLD AUTO: 12.2 FL (ref 9.2–12.9)
POCT GLUCOSE: 137 MG/DL (ref 70–110)
POCT GLUCOSE: 95 MG/DL (ref 70–110)
POTASSIUM SERPL-SCNC: 3.9 MMOL/L (ref 3.5–5.1)
RBC # BLD AUTO: 4.13 M/UL (ref 4–5.4)
SODIUM SERPL-SCNC: 141 MMOL/L (ref 136–145)
WBC # BLD AUTO: 10.52 K/UL (ref 3.9–12.7)

## 2021-10-08 PROCEDURE — 63600175 PHARM REV CODE 636 W HCPCS: Performed by: PHYSICIAN ASSISTANT

## 2021-10-08 PROCEDURE — 99239 HOSP IP/OBS DSCHRG MGMT >30: CPT | Mod: ,,, | Performed by: PHYSICIAN ASSISTANT

## 2021-10-08 PROCEDURE — 36415 COLL VENOUS BLD VENIPUNCTURE: CPT | Performed by: PHYSICIAN ASSISTANT

## 2021-10-08 PROCEDURE — 99239 PR HOSPITAL DISCHARGE DAY,>30 MIN: ICD-10-PCS | Mod: ,,, | Performed by: PHYSICIAN ASSISTANT

## 2021-10-08 PROCEDURE — 27000221 HC OXYGEN, UP TO 24 HOURS

## 2021-10-08 PROCEDURE — 25000003 PHARM REV CODE 250: Performed by: PHYSICIAN ASSISTANT

## 2021-10-08 PROCEDURE — 25000242 PHARM REV CODE 250 ALT 637 W/ HCPCS: Performed by: PHYSICIAN ASSISTANT

## 2021-10-08 PROCEDURE — 94761 N-INVAS EAR/PLS OXIMETRY MLT: CPT

## 2021-10-08 PROCEDURE — 85025 COMPLETE CBC W/AUTO DIFF WBC: CPT | Performed by: PHYSICIAN ASSISTANT

## 2021-10-08 PROCEDURE — 83735 ASSAY OF MAGNESIUM: CPT | Performed by: PHYSICIAN ASSISTANT

## 2021-10-08 PROCEDURE — 94660 CPAP INITIATION&MGMT: CPT

## 2021-10-08 PROCEDURE — 94640 AIRWAY INHALATION TREATMENT: CPT

## 2021-10-08 PROCEDURE — 99900035 HC TECH TIME PER 15 MIN (STAT)

## 2021-10-08 PROCEDURE — 63700000 PHARM REV CODE 250 ALT 637 W/O HCPCS: Performed by: PHYSICIAN ASSISTANT

## 2021-10-08 PROCEDURE — 80048 BASIC METABOLIC PNL TOTAL CA: CPT | Performed by: PHYSICIAN ASSISTANT

## 2021-10-08 PROCEDURE — 94760 N-INVAS EAR/PLS OXIMETRY 1: CPT

## 2021-10-08 PROCEDURE — 94618 PULMONARY STRESS TESTING: CPT

## 2021-10-08 RX ORDER — AZITHROMYCIN 250 MG/1
250 TABLET, FILM COATED ORAL DAILY
Qty: 3 TABLET | Refills: 0 | Status: SHIPPED | OUTPATIENT
Start: 2021-10-09 | End: 2021-10-12

## 2021-10-08 RX ORDER — BENZONATATE 100 MG/1
100 CAPSULE ORAL 3 TIMES DAILY PRN
Qty: 30 CAPSULE | Refills: 0 | Status: SHIPPED | OUTPATIENT
Start: 2021-10-08 | End: 2021-10-18

## 2021-10-08 RX ORDER — PREDNISONE 20 MG/1
40 TABLET ORAL DAILY
Qty: 2 TABLET | Refills: 0 | Status: SHIPPED | OUTPATIENT
Start: 2021-10-09 | End: 2021-10-10

## 2021-10-08 RX ADMIN — PREDNISONE 40 MG: 20 TABLET ORAL at 08:10

## 2021-10-08 RX ADMIN — ESCITALOPRAM OXALATE 5 MG: 5 TABLET, FILM COATED ORAL at 08:10

## 2021-10-08 RX ADMIN — APIXABAN 2.5 MG: 2.5 TABLET, FILM COATED ORAL at 08:10

## 2021-10-08 RX ADMIN — BENZONATATE 100 MG: 100 CAPSULE ORAL at 04:10

## 2021-10-08 RX ADMIN — FLUTICASONE FUROATE 2 PUFF: 100 POWDER RESPIRATORY (INHALATION) at 09:10

## 2021-10-08 RX ADMIN — LEVOTHYROXINE SODIUM 50 MCG: 0.05 TABLET ORAL at 06:10

## 2021-10-08 RX ADMIN — AZITHROMYCIN MONOHYDRATE 250 MG: 250 TABLET ORAL at 08:10

## 2021-10-08 RX ADMIN — IPRATROPIUM BROMIDE AND ALBUTEROL SULFATE 3 ML: .5; 3 SOLUTION RESPIRATORY (INHALATION) at 07:10

## 2021-10-08 RX ADMIN — IPRATROPIUM BROMIDE AND ALBUTEROL SULFATE 3 ML: .5; 3 SOLUTION RESPIRATORY (INHALATION) at 04:10

## 2021-10-08 RX ADMIN — FAMOTIDINE 20 MG: 20 TABLET, FILM COATED ORAL at 08:10

## 2021-10-08 RX ADMIN — AMLODIPINE BESYLATE 10 MG: 5 TABLET ORAL at 08:10

## 2021-10-08 RX ADMIN — IPRATROPIUM BROMIDE AND ALBUTEROL SULFATE 3 ML: .5; 3 SOLUTION RESPIRATORY (INHALATION) at 11:10

## 2021-10-08 RX ADMIN — DONEPEZIL HYDROCHLORIDE 10 MG: 5 TABLET, FILM COATED ORAL at 08:10

## 2021-10-09 LAB
BACTERIA BLD CULT: NORMAL
BACTERIA BLD CULT: NORMAL

## 2021-10-11 ENCOUNTER — PATIENT OUTREACH (OUTPATIENT)
Dept: ADMINISTRATIVE | Facility: OTHER | Age: 86
End: 2021-10-11

## 2021-10-12 ENCOUNTER — PATIENT OUTREACH (OUTPATIENT)
Dept: ADMINISTRATIVE | Facility: OTHER | Age: 86
End: 2021-10-12

## 2021-10-18 ENCOUNTER — TELEPHONE (OUTPATIENT)
Dept: INTERNAL MEDICINE | Facility: CLINIC | Age: 86
End: 2021-10-18

## 2021-10-18 ENCOUNTER — OFFICE VISIT (OUTPATIENT)
Dept: INTERNAL MEDICINE | Facility: CLINIC | Age: 86
End: 2021-10-18
Payer: MEDICARE

## 2021-10-18 VITALS
HEIGHT: 65 IN | WEIGHT: 134 LBS | BODY MASS INDEX: 22.33 KG/M2 | HEART RATE: 89 BPM | SYSTOLIC BLOOD PRESSURE: 122 MMHG | DIASTOLIC BLOOD PRESSURE: 58 MMHG | OXYGEN SATURATION: 100 %

## 2021-10-18 DIAGNOSIS — J45.21 INTERMITTENT ASTHMA WITH ACUTE EXACERBATION, UNSPECIFIED ASTHMA SEVERITY: ICD-10-CM

## 2021-10-18 DIAGNOSIS — M17.9 OSTEOARTHRITIS OF KNEE, UNSPECIFIED LATERALITY, UNSPECIFIED OSTEOARTHRITIS TYPE: ICD-10-CM

## 2021-10-18 DIAGNOSIS — R06.00 DYSPNEA, UNSPECIFIED TYPE: Primary | ICD-10-CM

## 2021-10-18 DIAGNOSIS — M79.7 FIBROMYALGIA: ICD-10-CM

## 2021-10-18 PROCEDURE — 99214 OFFICE O/P EST MOD 30 MIN: CPT | Mod: PBBFAC | Performed by: INTERNAL MEDICINE

## 2021-10-18 PROCEDURE — 99214 OFFICE O/P EST MOD 30 MIN: CPT | Mod: S$PBB,,, | Performed by: INTERNAL MEDICINE

## 2021-10-18 PROCEDURE — 99214 PR OFFICE/OUTPT VISIT, EST, LEVL IV, 30-39 MIN: ICD-10-PCS | Mod: S$PBB,,, | Performed by: INTERNAL MEDICINE

## 2021-10-18 PROCEDURE — 99999 PR PBB SHADOW E&M-EST. PATIENT-LVL IV: CPT | Mod: PBBFAC,,, | Performed by: INTERNAL MEDICINE

## 2021-10-18 PROCEDURE — 94640 AIRWAY INHALATION TREATMENT: CPT | Mod: PBBFAC

## 2021-10-18 PROCEDURE — 99999 PR PBB SHADOW E&M-EST. PATIENT-LVL IV: ICD-10-PCS | Mod: PBBFAC,,, | Performed by: INTERNAL MEDICINE

## 2021-10-18 RX ORDER — PREDNISONE 20 MG/1
TABLET ORAL
Qty: 18 TABLET | Refills: 0 | Status: SHIPPED | OUTPATIENT
Start: 2021-10-18 | End: 2021-10-18 | Stop reason: SDUPTHER

## 2021-10-18 RX ORDER — PREDNISONE 20 MG/1
TABLET ORAL
Qty: 18 TABLET | Refills: 0 | Status: SHIPPED | OUTPATIENT
Start: 2021-10-18 | End: 2022-01-10

## 2021-10-18 RX ORDER — IPRATROPIUM BROMIDE AND ALBUTEROL SULFATE 2.5; .5 MG/3ML; MG/3ML
3 SOLUTION RESPIRATORY (INHALATION)
Status: COMPLETED | OUTPATIENT
Start: 2021-10-18 | End: 2021-10-18

## 2021-10-18 RX ORDER — MONTELUKAST SODIUM 10 MG/1
10 TABLET ORAL NIGHTLY
Qty: 30 TABLET | Refills: 1 | Status: SHIPPED | OUTPATIENT
Start: 2021-10-18 | End: 2021-11-17

## 2021-10-18 RX ADMIN — IPRATROPIUM BROMIDE AND ALBUTEROL SULFATE 3 ML: 2.5; .5 SOLUTION RESPIRATORY (INHALATION) at 03:10

## 2021-10-19 ENCOUNTER — OFFICE VISIT (OUTPATIENT)
Dept: HOME HEALTH SERVICES | Facility: CLINIC | Age: 86
End: 2021-10-19
Payer: MEDICARE

## 2021-10-19 VITALS
TEMPERATURE: 98 F | OXYGEN SATURATION: 99 % | HEART RATE: 81 BPM | WEIGHT: 132 LBS | HEIGHT: 66 IN | BODY MASS INDEX: 21.21 KG/M2 | DIASTOLIC BLOOD PRESSURE: 90 MMHG | SYSTOLIC BLOOD PRESSURE: 146 MMHG | RESPIRATION RATE: 20 BRPM

## 2021-10-19 DIAGNOSIS — Z79.01 CHRONIC ANTICOAGULATION: ICD-10-CM

## 2021-10-19 DIAGNOSIS — J96.01 ACUTE RESPIRATORY FAILURE WITH HYPOXIA: ICD-10-CM

## 2021-10-19 DIAGNOSIS — M46.96 UNSPECIFIED INFLAMMATORY SPONDYLOPATHY, LUMBAR REGION: ICD-10-CM

## 2021-10-19 DIAGNOSIS — F33.0 MILD EPISODE OF RECURRENT MAJOR DEPRESSIVE DISORDER: ICD-10-CM

## 2021-10-19 DIAGNOSIS — N18.31 TYPE 2 DIABETES MELLITUS WITH STAGE 3A CHRONIC KIDNEY DISEASE, WITHOUT LONG-TERM CURRENT USE OF INSULIN: ICD-10-CM

## 2021-10-19 DIAGNOSIS — M48.061 SPINAL STENOSIS, LUMBAR REGION, WITHOUT NEUROGENIC CLAUDICATION: ICD-10-CM

## 2021-10-19 DIAGNOSIS — E11.22 TYPE 2 DIABETES MELLITUS WITH STAGE 3A CHRONIC KIDNEY DISEASE, WITHOUT LONG-TERM CURRENT USE OF INSULIN: ICD-10-CM

## 2021-10-19 DIAGNOSIS — N18.31 STAGE 3A CHRONIC KIDNEY DISEASE: ICD-10-CM

## 2021-10-19 DIAGNOSIS — E78.00 HYPERCHOLESTEROLEMIA: ICD-10-CM

## 2021-10-19 DIAGNOSIS — Z74.09 IMPAIRED FUNCTIONAL MOBILITY, BALANCE, GAIT, AND ENDURANCE: ICD-10-CM

## 2021-10-19 DIAGNOSIS — J45.909 CHRONIC ASTHMA WITHOUT COMPLICATION, UNSPECIFIED ASTHMA SEVERITY, UNSPECIFIED WHETHER PERSISTENT: ICD-10-CM

## 2021-10-19 DIAGNOSIS — R26.9 ABNORMALITY OF GAIT AND MOBILITY: ICD-10-CM

## 2021-10-19 DIAGNOSIS — K21.9 GASTROESOPHAGEAL REFLUX DISEASE WITHOUT ESOPHAGITIS: ICD-10-CM

## 2021-10-19 DIAGNOSIS — M51.36 DDD (DEGENERATIVE DISC DISEASE), LUMBAR: ICD-10-CM

## 2021-10-19 DIAGNOSIS — M15.9 PRIMARY OSTEOARTHRITIS INVOLVING MULTIPLE JOINTS: ICD-10-CM

## 2021-10-19 DIAGNOSIS — F03.90 DEMENTIA WITHOUT BEHAVIORAL DISTURBANCE, UNSPECIFIED DEMENTIA TYPE: ICD-10-CM

## 2021-10-19 DIAGNOSIS — Z74.09 OTHER REDUCED MOBILITY: ICD-10-CM

## 2021-10-19 DIAGNOSIS — G47.33 OSA (OBSTRUCTIVE SLEEP APNEA): ICD-10-CM

## 2021-10-19 DIAGNOSIS — H91.90 HEARING LOSS, UNSPECIFIED HEARING LOSS TYPE, UNSPECIFIED LATERALITY: ICD-10-CM

## 2021-10-19 DIAGNOSIS — N39.3 URINARY, INCONTINENCE, STRESS FEMALE: ICD-10-CM

## 2021-10-19 DIAGNOSIS — Z00.00 ENCOUNTER FOR PREVENTIVE HEALTH EXAMINATION: Primary | ICD-10-CM

## 2021-10-19 DIAGNOSIS — I10 ESSENTIAL HYPERTENSION: ICD-10-CM

## 2021-10-19 PROCEDURE — G0439 PPPS, SUBSEQ VISIT: HCPCS | Mod: S$GLB,,, | Performed by: NURSE PRACTITIONER

## 2021-10-19 PROCEDURE — G0439 PR MEDICARE ANNUAL WELLNESS SUBSEQUENT VISIT: ICD-10-PCS | Mod: S$GLB,,, | Performed by: NURSE PRACTITIONER

## 2021-10-19 RX ORDER — GLYCOPYRROLATE 2 MG/1
1 TABLET ORAL 2 TIMES DAILY
COMMUNITY
End: 2022-01-22 | Stop reason: SDUPTHER

## 2021-10-20 PROBLEM — F03.90 UNSPECIFIED DEMENTIA WITHOUT BEHAVIORAL DISTURBANCE: Status: ACTIVE | Noted: 2021-10-20

## 2021-10-20 PROBLEM — J96.01 ACUTE RESPIRATORY FAILURE WITH HYPOXIA: Status: ACTIVE | Noted: 2021-10-20

## 2021-10-21 ENCOUNTER — EXTERNAL HOME HEALTH (OUTPATIENT)
Dept: HOME HEALTH SERVICES | Facility: HOSPITAL | Age: 86
End: 2021-10-21
Payer: MEDICARE

## 2021-10-25 ENCOUNTER — TELEPHONE (OUTPATIENT)
Dept: INTERNAL MEDICINE | Facility: CLINIC | Age: 86
End: 2021-10-25
Payer: MEDICARE

## 2021-10-25 ENCOUNTER — TELEPHONE (OUTPATIENT)
Dept: CARDIOLOGY | Facility: CLINIC | Age: 86
End: 2021-10-25
Payer: MEDICARE

## 2021-10-25 DIAGNOSIS — J45.21 INTERMITTENT ASTHMA WITH ACUTE EXACERBATION, UNSPECIFIED ASTHMA SEVERITY: Primary | ICD-10-CM

## 2021-10-26 ENCOUNTER — PATIENT MESSAGE (OUTPATIENT)
Dept: INTERNAL MEDICINE | Facility: CLINIC | Age: 86
End: 2021-10-26
Payer: MEDICARE

## 2021-10-28 ENCOUNTER — TELEPHONE (OUTPATIENT)
Dept: INTERNAL MEDICINE | Facility: CLINIC | Age: 86
End: 2021-10-28
Payer: MEDICARE

## 2021-10-31 ENCOUNTER — EXTERNAL CHRONIC CARE MANAGEMENT (OUTPATIENT)
Dept: PRIMARY CARE CLINIC | Facility: CLINIC | Age: 86
End: 2021-10-31
Payer: MEDICARE

## 2021-10-31 PROCEDURE — 99490 CHRNC CARE MGMT STAFF 1ST 20: CPT | Mod: S$PBB,,, | Performed by: INTERNAL MEDICINE

## 2021-10-31 PROCEDURE — 99490 CHRNC CARE MGMT STAFF 1ST 20: CPT | Mod: PBBFAC | Performed by: INTERNAL MEDICINE

## 2021-10-31 PROCEDURE — 99490 PR CHRONIC CARE MGMT, 1ST 20 MIN: ICD-10-PCS | Mod: S$PBB,,, | Performed by: INTERNAL MEDICINE

## 2021-11-03 ENCOUNTER — DOCUMENT SCAN (OUTPATIENT)
Dept: HOME HEALTH SERVICES | Facility: HOSPITAL | Age: 86
End: 2021-11-03
Payer: MEDICARE

## 2021-11-04 ENCOUNTER — TELEPHONE (OUTPATIENT)
Dept: INTERNAL MEDICINE | Facility: CLINIC | Age: 86
End: 2021-11-04
Payer: MEDICARE

## 2021-11-22 ENCOUNTER — TELEPHONE (OUTPATIENT)
Dept: INTERNAL MEDICINE | Facility: CLINIC | Age: 86
End: 2021-11-22
Payer: MEDICARE

## 2021-11-30 ENCOUNTER — EXTERNAL CHRONIC CARE MANAGEMENT (OUTPATIENT)
Dept: PRIMARY CARE CLINIC | Facility: CLINIC | Age: 86
End: 2021-11-30
Payer: MEDICARE

## 2021-11-30 PROCEDURE — 99490 CHRNC CARE MGMT STAFF 1ST 20: CPT | Mod: S$PBB,,, | Performed by: INTERNAL MEDICINE

## 2021-11-30 PROCEDURE — 99490 CHRNC CARE MGMT STAFF 1ST 20: CPT | Mod: PBBFAC | Performed by: INTERNAL MEDICINE

## 2021-11-30 PROCEDURE — 99490 PR CHRONIC CARE MGMT, 1ST 20 MIN: ICD-10-PCS | Mod: S$PBB,,, | Performed by: INTERNAL MEDICINE

## 2021-12-01 ENCOUNTER — OFFICE VISIT (OUTPATIENT)
Dept: INTERNAL MEDICINE | Facility: CLINIC | Age: 86
End: 2021-12-01
Payer: MEDICARE

## 2021-12-01 VITALS
OXYGEN SATURATION: 98 % | SYSTOLIC BLOOD PRESSURE: 114 MMHG | BODY MASS INDEX: 21.16 KG/M2 | DIASTOLIC BLOOD PRESSURE: 62 MMHG | HEART RATE: 86 BPM | HEIGHT: 65 IN | WEIGHT: 127 LBS

## 2021-12-01 DIAGNOSIS — Z12.31 ENCOUNTER FOR SCREENING MAMMOGRAM FOR MALIGNANT NEOPLASM OF BREAST: ICD-10-CM

## 2021-12-01 DIAGNOSIS — K22.2 ESOPHAGEAL RING: ICD-10-CM

## 2021-12-01 DIAGNOSIS — F43.21 GRIEF REACTION: ICD-10-CM

## 2021-12-01 DIAGNOSIS — K21.9 GASTROESOPHAGEAL REFLUX DISEASE WITHOUT ESOPHAGITIS: ICD-10-CM

## 2021-12-01 DIAGNOSIS — K22.4 ESOPHAGEAL DYSMOTILITY: ICD-10-CM

## 2021-12-01 DIAGNOSIS — J45.21 INTERMITTENT ASTHMA WITH ACUTE EXACERBATION, UNSPECIFIED ASTHMA SEVERITY: Primary | ICD-10-CM

## 2021-12-01 DIAGNOSIS — K21.9 GASTROESOPHAGEAL REFLUX DISEASE, UNSPECIFIED WHETHER ESOPHAGITIS PRESENT: ICD-10-CM

## 2021-12-01 PROBLEM — M25.561 ACUTE PAIN OF BOTH KNEES: Status: RESOLVED | Noted: 2019-04-02 | Resolved: 2021-12-01

## 2021-12-01 PROBLEM — Z78.0 ASYMPTOMATIC MENOPAUSAL STATE: Status: RESOLVED | Noted: 2019-03-06 | Resolved: 2021-12-01

## 2021-12-01 PROBLEM — M25.562 ACUTE PAIN OF BOTH KNEES: Status: RESOLVED | Noted: 2019-04-02 | Resolved: 2021-12-01

## 2021-12-01 PROBLEM — F03.90 UNSPECIFIED DEMENTIA WITHOUT BEHAVIORAL DISTURBANCE: Status: RESOLVED | Noted: 2021-10-20 | Resolved: 2021-12-01

## 2021-12-01 PROBLEM — M54.50 LUMBAR PAIN: Status: RESOLVED | Noted: 2021-04-01 | Resolved: 2021-12-01

## 2021-12-01 PROBLEM — J96.01 ACUTE RESPIRATORY FAILURE WITH HYPOXIA: Status: RESOLVED | Noted: 2021-10-20 | Resolved: 2021-12-01

## 2021-12-01 PROCEDURE — 99999 PR PBB SHADOW E&M-EST. PATIENT-LVL V: CPT | Mod: PBBFAC,,, | Performed by: INTERNAL MEDICINE

## 2021-12-01 PROCEDURE — 99999 PR PBB SHADOW E&M-EST. PATIENT-LVL V: ICD-10-PCS | Mod: PBBFAC,,, | Performed by: INTERNAL MEDICINE

## 2021-12-01 PROCEDURE — 99214 OFFICE O/P EST MOD 30 MIN: CPT | Mod: S$PBB,,, | Performed by: INTERNAL MEDICINE

## 2021-12-01 PROCEDURE — 99215 OFFICE O/P EST HI 40 MIN: CPT | Mod: PBBFAC | Performed by: INTERNAL MEDICINE

## 2021-12-01 PROCEDURE — 99214 PR OFFICE/OUTPT VISIT, EST, LEVL IV, 30-39 MIN: ICD-10-PCS | Mod: S$PBB,,, | Performed by: INTERNAL MEDICINE

## 2021-12-01 RX ORDER — LORAZEPAM 0.5 MG/1
0.5 TABLET ORAL EVERY 12 HOURS PRN
Qty: 20 TABLET | Refills: 0 | Status: SHIPPED | OUTPATIENT
Start: 2021-12-01 | End: 2022-04-04

## 2021-12-01 RX ORDER — ESOMEPRAZOLE MAGNESIUM 40 MG/1
40 CAPSULE, DELAYED RELEASE ORAL
Qty: 90 CAPSULE | Refills: 3 | Status: SHIPPED | OUTPATIENT
Start: 2021-12-01 | End: 2022-04-04

## 2021-12-08 ENCOUNTER — TELEPHONE (OUTPATIENT)
Dept: INTERNAL MEDICINE | Facility: CLINIC | Age: 86
End: 2021-12-08
Payer: MEDICARE

## 2021-12-08 DIAGNOSIS — M79.7 FIBROMYALGIA: Primary | ICD-10-CM

## 2021-12-09 ENCOUNTER — TELEPHONE (OUTPATIENT)
Dept: ORTHOPEDICS | Facility: CLINIC | Age: 86
End: 2021-12-09
Payer: MEDICARE

## 2021-12-09 PROCEDURE — G0179 PR HOME HEALTH MD RECERTIFICATION: ICD-10-PCS | Mod: ,,, | Performed by: INTERNAL MEDICINE

## 2021-12-09 PROCEDURE — G0179 MD RECERTIFICATION HHA PT: HCPCS | Mod: ,,, | Performed by: INTERNAL MEDICINE

## 2021-12-10 ENCOUNTER — OFFICE VISIT (OUTPATIENT)
Dept: ORTHOPEDICS | Facility: CLINIC | Age: 86
End: 2021-12-10
Payer: MEDICARE

## 2021-12-10 VITALS — SYSTOLIC BLOOD PRESSURE: 119 MMHG | HEART RATE: 81 BPM | DIASTOLIC BLOOD PRESSURE: 71 MMHG

## 2021-12-10 DIAGNOSIS — M17.11 PRIMARY OSTEOARTHRITIS OF RIGHT KNEE: ICD-10-CM

## 2021-12-10 DIAGNOSIS — M79.662 PAIN OF LEFT CALF: Primary | ICD-10-CM

## 2021-12-10 PROCEDURE — 20610 PR DRAIN/INJECT LARGE JOINT/BURSA: ICD-10-PCS | Mod: S$PBB,RT,, | Performed by: PHYSICIAN ASSISTANT

## 2021-12-10 PROCEDURE — 99999 PR PBB SHADOW E&M-EST. PATIENT-LVL III: ICD-10-PCS | Mod: PBBFAC,,, | Performed by: PHYSICIAN ASSISTANT

## 2021-12-10 PROCEDURE — 99214 PR OFFICE/OUTPT VISIT, EST, LEVL IV, 30-39 MIN: ICD-10-PCS | Mod: S$PBB,25,, | Performed by: PHYSICIAN ASSISTANT

## 2021-12-10 PROCEDURE — 99999 PR PBB SHADOW E&M-EST. PATIENT-LVL III: CPT | Mod: PBBFAC,,, | Performed by: PHYSICIAN ASSISTANT

## 2021-12-10 PROCEDURE — 99214 OFFICE O/P EST MOD 30 MIN: CPT | Mod: S$PBB,25,, | Performed by: PHYSICIAN ASSISTANT

## 2021-12-10 PROCEDURE — 20610 DRAIN/INJ JOINT/BURSA W/O US: CPT | Mod: PBBFAC | Performed by: PHYSICIAN ASSISTANT

## 2021-12-10 PROCEDURE — 20610 DRAIN/INJ JOINT/BURSA W/O US: CPT | Mod: S$PBB,RT,, | Performed by: PHYSICIAN ASSISTANT

## 2021-12-10 PROCEDURE — 99213 OFFICE O/P EST LOW 20 MIN: CPT | Mod: PBBFAC,25 | Performed by: PHYSICIAN ASSISTANT

## 2021-12-10 RX ORDER — BETAMETHASONE SODIUM PHOSPHATE AND BETAMETHASONE ACETATE 3; 3 MG/ML; MG/ML
6 INJECTION, SUSPENSION INTRA-ARTICULAR; INTRALESIONAL; INTRAMUSCULAR; SOFT TISSUE
Status: COMPLETED | OUTPATIENT
Start: 2021-12-10 | End: 2021-12-10

## 2021-12-10 RX ADMIN — BETAMETHASONE SODIUM PHOSPHATE AND BETAMETHASONE ACETATE 6 MG: 3; 3 INJECTION, SUSPENSION INTRA-ARTICULAR; INTRALESIONAL; INTRAMUSCULAR at 10:12

## 2021-12-14 ENCOUNTER — HOSPITAL ENCOUNTER (OUTPATIENT)
Dept: RADIOLOGY | Facility: OTHER | Age: 86
Discharge: HOME | End: 2021-12-14
Attending: PHYSICIAN ASSISTANT
Payer: MEDICARE

## 2021-12-14 DIAGNOSIS — M79.662 PAIN OF LEFT CALF: ICD-10-CM

## 2021-12-14 PROCEDURE — 93971 EXTREMITY STUDY: CPT | Mod: 26,LT,, | Performed by: RADIOLOGY

## 2021-12-14 PROCEDURE — 93971 EXTREMITY STUDY: CPT | Mod: TC,LT

## 2021-12-14 PROCEDURE — 93971 US LOWER EXTREMITY VEINS LEFT: ICD-10-PCS | Mod: 26,LT,, | Performed by: RADIOLOGY

## 2021-12-21 ENCOUNTER — OFFICE VISIT (OUTPATIENT)
Dept: CARDIOLOGY | Facility: CLINIC | Age: 86
End: 2021-12-21
Attending: INTERNAL MEDICINE
Payer: MEDICARE

## 2021-12-21 VITALS
HEART RATE: 82 BPM | HEIGHT: 65 IN | WEIGHT: 128.31 LBS | BODY MASS INDEX: 21.38 KG/M2 | SYSTOLIC BLOOD PRESSURE: 100 MMHG | DIASTOLIC BLOOD PRESSURE: 56 MMHG | OXYGEN SATURATION: 98 %

## 2021-12-21 DIAGNOSIS — Z79.01 CHRONIC ANTICOAGULATION: ICD-10-CM

## 2021-12-21 DIAGNOSIS — Z86.718 HISTORY OF DEEP VENOUS THROMBOSIS: ICD-10-CM

## 2021-12-21 DIAGNOSIS — E11.22 TYPE 2 DIABETES MELLITUS WITH STAGE 3A CHRONIC KIDNEY DISEASE, WITHOUT LONG-TERM CURRENT USE OF INSULIN: ICD-10-CM

## 2021-12-21 DIAGNOSIS — F01.518 MIXED CORTICAL AND SUBCORTICAL VASCULAR DEMENTIA WITH BEHAVIORAL DISTURBANCE: ICD-10-CM

## 2021-12-21 DIAGNOSIS — N18.31 STAGE 3A CHRONIC KIDNEY DISEASE: ICD-10-CM

## 2021-12-21 DIAGNOSIS — I10 PRIMARY HYPERTENSION: ICD-10-CM

## 2021-12-21 DIAGNOSIS — E03.9 HYPOTHYROIDISM, UNSPECIFIED TYPE: ICD-10-CM

## 2021-12-21 DIAGNOSIS — Z86.711 HISTORY OF PULMONARY EMBOLISM: ICD-10-CM

## 2021-12-21 DIAGNOSIS — J45.909 CHRONIC ASTHMA WITHOUT COMPLICATION, UNSPECIFIED ASTHMA SEVERITY, UNSPECIFIED WHETHER PERSISTENT: ICD-10-CM

## 2021-12-21 DIAGNOSIS — N18.31 TYPE 2 DIABETES MELLITUS WITH STAGE 3A CHRONIC KIDNEY DISEASE, WITHOUT LONG-TERM CURRENT USE OF INSULIN: ICD-10-CM

## 2021-12-21 DIAGNOSIS — E78.00 HYPERCHOLESTEROLEMIA: ICD-10-CM

## 2021-12-21 PROCEDURE — 99214 OFFICE O/P EST MOD 30 MIN: CPT | Mod: S$PBB,,, | Performed by: INTERNAL MEDICINE

## 2021-12-21 PROCEDURE — 99999 PR PBB SHADOW E&M-EST. PATIENT-LVL III: ICD-10-PCS | Mod: PBBFAC,,, | Performed by: INTERNAL MEDICINE

## 2021-12-21 PROCEDURE — 99214 PR OFFICE/OUTPT VISIT, EST, LEVL IV, 30-39 MIN: ICD-10-PCS | Mod: S$PBB,,, | Performed by: INTERNAL MEDICINE

## 2021-12-21 PROCEDURE — 99213 OFFICE O/P EST LOW 20 MIN: CPT | Mod: PBBFAC | Performed by: INTERNAL MEDICINE

## 2021-12-21 PROCEDURE — 99999 PR PBB SHADOW E&M-EST. PATIENT-LVL III: CPT | Mod: PBBFAC,,, | Performed by: INTERNAL MEDICINE

## 2021-12-27 ENCOUNTER — TELEPHONE (OUTPATIENT)
Dept: RHEUMATOLOGY | Facility: CLINIC | Age: 86
End: 2021-12-27
Payer: MEDICARE

## 2021-12-27 ENCOUNTER — TELEPHONE (OUTPATIENT)
Dept: INTERNAL MEDICINE | Facility: CLINIC | Age: 86
End: 2021-12-27
Payer: MEDICARE

## 2021-12-30 ENCOUNTER — PATIENT OUTREACH (OUTPATIENT)
Dept: HOME HEALTH SERVICES | Facility: HOSPITAL | Age: 86
End: 2021-12-30
Payer: MEDICARE

## 2021-12-30 ENCOUNTER — EXTERNAL HOME HEALTH (OUTPATIENT)
Dept: HOME HEALTH SERVICES | Facility: HOSPITAL | Age: 86
End: 2021-12-30
Payer: MEDICARE

## 2021-12-31 ENCOUNTER — EXTERNAL CHRONIC CARE MANAGEMENT (OUTPATIENT)
Dept: PRIMARY CARE CLINIC | Facility: CLINIC | Age: 86
End: 2021-12-31
Payer: MEDICARE

## 2021-12-31 PROCEDURE — 99490 CHRNC CARE MGMT STAFF 1ST 20: CPT | Mod: PBBFAC | Performed by: INTERNAL MEDICINE

## 2021-12-31 PROCEDURE — 99490 CHRNC CARE MGMT STAFF 1ST 20: CPT | Mod: S$PBB,,, | Performed by: INTERNAL MEDICINE

## 2021-12-31 PROCEDURE — 99490 PR CHRONIC CARE MGMT, 1ST 20 MIN: ICD-10-PCS | Mod: S$PBB,,, | Performed by: INTERNAL MEDICINE

## 2022-01-04 ENCOUNTER — HOSPITAL ENCOUNTER (OUTPATIENT)
Dept: RADIOLOGY | Facility: HOSPITAL | Age: 87
Discharge: HOME | End: 2022-01-04
Attending: INTERNAL MEDICINE
Payer: MEDICARE

## 2022-01-04 VITALS — WEIGHT: 128 LBS | BODY MASS INDEX: 21.33 KG/M2 | HEIGHT: 65 IN

## 2022-01-04 DIAGNOSIS — Z12.31 ENCOUNTER FOR SCREENING MAMMOGRAM FOR MALIGNANT NEOPLASM OF BREAST: ICD-10-CM

## 2022-01-04 PROCEDURE — 77067 SCR MAMMO BI INCL CAD: CPT | Mod: TC

## 2022-01-04 PROCEDURE — 77063 BREAST TOMOSYNTHESIS BI: CPT | Mod: 26,,, | Performed by: RADIOLOGY

## 2022-01-04 PROCEDURE — 77067 SCR MAMMO BI INCL CAD: CPT | Mod: 26,,, | Performed by: RADIOLOGY

## 2022-01-04 PROCEDURE — 77063 BREAST TOMOSYNTHESIS BI: CPT | Mod: TC

## 2022-01-04 PROCEDURE — 77067 MAMMO DIGITAL SCREENING BILAT WITH TOMO: ICD-10-PCS | Mod: 26,,, | Performed by: RADIOLOGY

## 2022-01-04 PROCEDURE — 77063 MAMMO DIGITAL SCREENING BILAT WITH TOMO: ICD-10-PCS | Mod: 26,,, | Performed by: RADIOLOGY

## 2022-01-10 ENCOUNTER — OFFICE VISIT (OUTPATIENT)
Dept: INTERNAL MEDICINE | Facility: CLINIC | Age: 87
End: 2022-01-10
Payer: MEDICARE

## 2022-01-10 VITALS
WEIGHT: 124.69 LBS | HEIGHT: 65 IN | OXYGEN SATURATION: 98 % | BODY MASS INDEX: 20.78 KG/M2 | DIASTOLIC BLOOD PRESSURE: 64 MMHG | SYSTOLIC BLOOD PRESSURE: 130 MMHG | HEART RATE: 79 BPM

## 2022-01-10 DIAGNOSIS — K21.9 GASTROESOPHAGEAL REFLUX DISEASE WITHOUT ESOPHAGITIS: ICD-10-CM

## 2022-01-10 DIAGNOSIS — I10 HYPERTENSION, UNSPECIFIED TYPE: ICD-10-CM

## 2022-01-10 DIAGNOSIS — Z86.711 HISTORY OF PULMONARY EMBOLISM: ICD-10-CM

## 2022-01-10 DIAGNOSIS — K40.90 RIGHT INGUINAL HERNIA: Primary | ICD-10-CM

## 2022-01-10 DIAGNOSIS — J45.909 CHRONIC ASTHMA WITHOUT COMPLICATION, UNSPECIFIED ASTHMA SEVERITY, UNSPECIFIED WHETHER PERSISTENT: ICD-10-CM

## 2022-01-10 DIAGNOSIS — R63.4 WEIGHT LOSS: ICD-10-CM

## 2022-01-10 DIAGNOSIS — E03.9 HYPOTHYROIDISM, UNSPECIFIED TYPE: ICD-10-CM

## 2022-01-10 PROCEDURE — 99999 PR PBB SHADOW E&M-EST. PATIENT-LVL V: CPT | Mod: PBBFAC,,, | Performed by: PHYSICIAN ASSISTANT

## 2022-01-10 PROCEDURE — 99215 OFFICE O/P EST HI 40 MIN: CPT | Mod: S$PBB,,, | Performed by: PHYSICIAN ASSISTANT

## 2022-01-10 PROCEDURE — 99215 PR OFFICE/OUTPT VISIT, EST, LEVL V, 40-54 MIN: ICD-10-PCS | Mod: S$PBB,,, | Performed by: PHYSICIAN ASSISTANT

## 2022-01-10 PROCEDURE — 99999 PR PBB SHADOW E&M-EST. PATIENT-LVL V: ICD-10-PCS | Mod: PBBFAC,,, | Performed by: PHYSICIAN ASSISTANT

## 2022-01-10 PROCEDURE — 99215 OFFICE O/P EST HI 40 MIN: CPT | Mod: PBBFAC | Performed by: PHYSICIAN ASSISTANT

## 2022-01-10 NOTE — PROGRESS NOTES
Subjective:       Patient ID: Debbie Ding is a 89 y.o. female.        Chief Complaint: hernia    Debbie Ding is an established patient of Carolyn Mejia MD here today for follow up visit.     She has a bulge and soreness in right groin, described as a pulling but not really too much pain, unsure how long it has been there, possibly for a few months but she's not sure, saw general surgery at [facility] who said to return if bothersome, more bothersome of late    Remeron for weight, inc up to 15 mg, initially gaining weight, but now losing again, just no appetite and not eating much at all, struggles to eat once daily, was drinking ensure regularly but has not been of late, baseline weight around 140#  Wt Readings from Last 4 Encounters:  01/10/22 : 56.5 kg (124 lb 10.7 oz)  01/04/22 : 58.1 kg (128 lb)  12/21/21 : 58.2 kg (128 lb 4.9 oz)  12/01/21 : 57.6 kg (127 lb)    Anxiety, grief - lorazepam prn, lexapro 10 mg daily, feels mood has been fine    Amlodipine for hypertension     Hypothyroidism tx with synthroid     H/o left shoulder pain that comes/goes     Nexium for GERD, also with esophageal dysmotility, h/o esophageal ring, EGD ordered 12/2021, GI f/u is tomorrow     Asthma is followed by Dr. Suazo and controlled with advair, albuterol prn     She is on eliquis for h/o PE/DVT          Review of Systems   Constitutional: Positive for appetite change and unexpected weight change. Negative for chills, diaphoresis, fatigue and fever.   HENT: Negative for congestion and sore throat.    Eyes: Negative for visual disturbance.   Respiratory: Negative for cough, chest tightness and shortness of breath.    Cardiovascular: Negative for chest pain, palpitations and leg swelling.   Gastrointestinal: Negative for abdominal pain, blood in stool, constipation, diarrhea, nausea and vomiting.   Genitourinary: Negative for dysuria, frequency, hematuria and urgency.   Musculoskeletal: Negative for arthralgias and back pain.    Skin: Negative for rash.   Neurological: Negative for dizziness, syncope, weakness and headaches.   Psychiatric/Behavioral: Negative for dysphoric mood and sleep disturbance. The patient is not nervous/anxious.        Objective:      Physical Exam  Vitals and nursing note reviewed.   Constitutional:       Appearance: Normal appearance. She is well-developed and well-nourished.   HENT:      Head: Normocephalic.      Right Ear: External ear normal.      Left Ear: External ear normal.      Mouth/Throat:      Mouth: Oropharynx is clear and moist.   Eyes:      Pupils: Pupils are equal, round, and reactive to light.   Cardiovascular:      Rate and Rhythm: Normal rate and regular rhythm.      Heart sounds: Normal heart sounds. No murmur heard.  No friction rub. No gallop.    Pulmonary:      Effort: Pulmonary effort is normal. No respiratory distress.      Breath sounds: Normal breath sounds.   Abdominal:      Palpations: Abdomen is soft.      Tenderness: There is no abdominal tenderness. There is no CVA tenderness.   Musculoskeletal:         General: No edema.   Skin:     General: Skin is warm and dry.          Neurological:      Mental Status: She is alert.   Psychiatric:         Mood and Affect: Mood and affect normal.         Assessment:       1. Right inguinal hernia    2. Weight loss    3. Chronic asthma without complication, unspecified asthma severity, unspecified whether persistent    4. Hypertension, unspecified type    5. History of pulmonary embolism    6. Hypothyroidism, unspecified type    7. Gastroesophageal reflux disease without esophagitis        Plan:       Debbie was seen today for hernia.    Diagnoses and all orders for this visit:    Right inguinal hernia  -     Ambulatory referral/consult to General Surgery; Future    Weight loss  -     CT Chest Abdomen Pelvis W W/O Contrast (XPD); Future  -     Creatinine, Serum; Future    Chronic asthma without complication, unspecified asthma severity,  "unspecified whether persistent - followed by Dr. Suazo    Hypertension, unspecified type - stable and controlled  BP Readings from Last 5 Encounters:   01/10/22 130/64   12/21/21 (!) 100/56   12/10/21 119/71   12/01/21 114/62   10/19/21 (!) 146/90      History of pulmonary embolism - taking eliquis    Hypothyroidism, unspecified type - TSH normal 4/2021    Gastroesophageal reflux disease without esophagitis - seeing GI tomorrow    >45 minutes spent on patient encounter  Discussed with Dr. Mejia and will order CT chest/abdomen/pelvis to eval weight loss - nurse will call patient to inform and schedule    Pt has been given instructions populated from [employer] database and has verbalized understanding of the after visit summary and information contained wherein.    Follow up with a primary care provider. May go to ER for acute shortness of breath, lightheadedness, fever, or any other emergent complaints or changes in condition.    "This note will be shared with the patient"    Future Appointments   Date Time Provider Department Center   1/11/2022 11:00 AM Rebeca Meza MD [facility] GASTRO [facility]   1/18/2022  9:15 AM Dallin Garcia MD [facility] GENSUR [facility]   1/28/2022  8:00 AM Gilles Dunne MD [facility] RHEUM [facility]   3/7/2022  9:00 AM Mandy Winchester PA-C [facility] PCW   3/22/2022 12:15 PM Poppy Nelson MD [id number]                 "

## 2022-01-10 NOTE — PATIENT INSTRUCTIONS
Patient Education       Groin Hernia Discharge Instructions   About this topic   The belly wall covers the center of your body. It keeps your stomach and other body organs in place. Sometimes, your belly wall becomes weak. If this happens in the area near the top of your leg, you may get a groin hernia. The area between your belly and your upper leg is your groin. Part of an organ may bulge or swell through the weak spot in the groin and get stuck. A hernia repair surgery fixes this weakness in the groin. Then, your organs stay in place. You may have this on one side of your body or on both sides.     What care is needed at home?   · Ask your doctor what you need to do when you go home. Make sure you ask questions if you do not understand what the doctor says. This way you will know what you need to do.  · Make sure to treat allergies, long-term hard stools, or cough. This may help lower the chance of hernia recurrence.  · Do not wear tight clothing over your hernia. Ask your doctor about wearing a binder to help keep your hernia in place.  What follow-up care is needed?   Your doctor may ask you to make visits to the office to check on your progress. Be sure to keep these visits.  What drugs may be needed?   The doctor may order drugs to:  · Help with pain  · Fight an infection  · Soften stool  Will physical activity be limited?   You may need to rest for a while. You should not do physical activity that makes your health problem worse. If you run, work out, or play sports, you may not be able to do those things until your health problem gets better. Avoid activities that cause you to strain, like heavy lifting. Talk to your doctor about the right amount of activity for you.  What changes to diet are needed?   · Eat foods high in fiber. These include grains, bran, cereals, and fresh fruits and vegetables. Your doctor can talk with you about changes in your diet.  · Drink 8 to 10 glasses of water each day.  What  problems could happen?   · Infection  · Scarring  · Injury to nearby organs  · Bowel blockage or obstruction  What can be done to prevent this health problem?   · Keep a healthy weight. Lose weight if you are overweight.  · Avoid smoking. Ask for help if you are having problems quitting.  · Avoid straining when having a bowel movement.  When do I need to call the doctor?   · Signs of infection. These include a fever of 100.4°F (38°C) or higher, chills.  · Groin pain gets worse all of a sudden  · Severe abdominal pain  · Throwing up or not able to pass stool  · Trouble passing urine  Teach Back: Helping You Understand   The Teach Back Method helps you understand the information we are giving you. After you talk with the staff, tell them in your own words what you learned. This helps to make sure the staff has described each thing clearly. It also helps to explain things that may have been confusing. Before going home, make sure you can do these:  · I can tell you about my condition.  · I can tell you what activities are best for me.  · I can tell you what I will do if I have a fever, chills, or pain.  Where can I learn more?   [employer]  https://www.facs.org/~/media/files/education/patient%20ed/groin_hernia.ashx   NHS Choices  https://www.nhs.uk/conditions/inguinal-hernia-repair/what-happens/   Last Reviewed Date   2021-03-22  Consumer Information Use and Disclaimer   This information is not specific medical advice and does not replace information you receive from your health care provider. This is only a brief summary of general information. It does NOT include all information about conditions, illnesses, injuries, tests, procedures, treatments, therapies, discharge instructions or life-style choices that may apply to you. You must talk with your health care provider for complete information about your health and treatment options. This information should not be used to decide whether or not to  accept your health care providers advice, instructions or recommendations. Only your health care provider has the knowledge and training to provide advice that is right for you.  Copyright   Copyright © 2021 [employer] and its affiliates and/or licensors. All rights reserved.

## 2022-01-11 ENCOUNTER — TELEPHONE (OUTPATIENT)
Dept: INTERNAL MEDICINE | Facility: CLINIC | Age: 87
End: 2022-01-11
Payer: MEDICARE

## 2022-01-11 ENCOUNTER — TELEPHONE (OUTPATIENT)
Dept: ENDOSCOPY | Facility: HOSPITAL | Age: 87
End: 2022-01-11
Payer: MEDICARE

## 2022-01-11 ENCOUNTER — OFFICE VISIT (OUTPATIENT)
Dept: GASTROENTEROLOGY | Facility: CLINIC | Age: 87
End: 2022-01-11
Payer: MEDICARE

## 2022-01-11 VITALS — WEIGHT: 122.94 LBS | OXYGEN SATURATION: 99 % | HEART RATE: 79 BPM | BODY MASS INDEX: 20.48 KG/M2 | HEIGHT: 65 IN

## 2022-01-11 DIAGNOSIS — R14.0 ABDOMINAL BLOATING: Primary | ICD-10-CM

## 2022-01-11 DIAGNOSIS — R13.10 DYSPHAGIA, UNSPECIFIED TYPE: ICD-10-CM

## 2022-01-11 DIAGNOSIS — K21.9 GASTROESOPHAGEAL REFLUX DISEASE WITHOUT ESOPHAGITIS: ICD-10-CM

## 2022-01-11 PROCEDURE — 99204 OFFICE O/P NEW MOD 45 MIN: CPT | Mod: S$PBB,,, | Performed by: INTERNAL MEDICINE

## 2022-01-11 PROCEDURE — 99204 PR OFFICE/OUTPT VISIT, NEW, LEVL IV, 45-59 MIN: ICD-10-PCS | Mod: S$PBB,,, | Performed by: INTERNAL MEDICINE

## 2022-01-11 PROCEDURE — 99213 OFFICE O/P EST LOW 20 MIN: CPT | Mod: PBBFAC | Performed by: INTERNAL MEDICINE

## 2022-01-11 PROCEDURE — 99999 PR PBB SHADOW E&M-EST. PATIENT-LVL III: CPT | Mod: PBBFAC,,, | Performed by: INTERNAL MEDICINE

## 2022-01-11 PROCEDURE — 99999 PR PBB SHADOW E&M-EST. PATIENT-LVL III: ICD-10-PCS | Mod: PBBFAC,,, | Performed by: INTERNAL MEDICINE

## 2022-01-11 RX ORDER — ESOMEPRAZOLE MAGNESIUM 40 MG/1
40 CAPSULE, DELAYED RELEASE ORAL 2 TIMES DAILY
Qty: 60 CAPSULE | Refills: 11 | Status: SHIPPED | OUTPATIENT
Start: 2022-01-11 | End: 2022-02-01 | Stop reason: SDUPTHER

## 2022-01-11 NOTE — TELEPHONE ENCOUNTER
My Open Encounters     MD Simona Nevarez 10 minutes ago (4:31 PM)         Yes, with very low risk.     Poppy Nelson M.D.    Message text       Simona Cain routed conversation to Poppy Nelson MD 1 hour ago (2:47 PM)      You routed conversation to Poppy Nelson MD; Leslie Mcwilliams Staff 2 hours ago (2:37 PM)     You 2 hours ago (2:37 PM)     TB       Pt has order for EGD.Can we hold eliquis x2 days per protocol?Please advise.         Documentation

## 2022-01-11 NOTE — TELEPHONE ENCOUNTER
Please call patient  Let her know I discussed with Dr. Mejia  Given continued weight loss, will check CT scan of chest/abdomen/pelvis  Please let her know and schedule  Thanks

## 2022-01-11 NOTE — PROGRESS NOTES
[facility] Note    Referral from Jose Finch MD    CC: dysphagia    HPI 89 y.o. female with past medical history of asthma, arthritis, fibromyalgia, pulmonary embolus in 2019 on Eliquis who presents with chronic, daily, solid and liquid food dysphagia with associated chest pain.    The patient feels like when she eats food goes down without an issue but later she will feel the food rise back up in her chest and sit there.  When this occurs she feels chest pain and shortness of breath.  This occurs daily with each meal.  She does not eat anything without sprinkling it with baking soda.  If she does not her food will not go down.    She denies regurgitation.    She does have GERD.  Her main symptom is acid reflux.  Currently she is taking esomeprazole 40mg daily.  She feels that this has helped her symptoms 50%.    She has abdominal bloating after every meal.  No nausea or vomiting.    She has constipation but it is well controlled by eating prunes.  She has a good BM daily with this regimen.    She has had significant weight loss.  She is scheduled for a CT scan to further evaluate this.    Last night she ate meat loaf and she still feels the sensation that something is in her chest.  She did tolerate liquids today without issue and will attempt food after this visit.  If she has further issue and feels that food is stuck she will present to the ED.      She has had a previous EGD with dilation of her Schatzki's ring and she reports improvement in her dysphagia following dilation.  Over time her symptoms did return.  She had an esophageal manometry study that showed incomplete bolus clearance on impedance but was a normal esophageal manometry study otherwise.          Past Medical History  Past Medical History:   Diagnosis Date    Allergic rhinitis     Anticoagulant long-term use     Arthritis     Asthma     Bilateral pulmonary embolism 4/27/2019    Cataract     Chronic anticoagulation 9/28/2020  "   Depression     Diabetes mellitus     Fibromyalgia 7/2/2012    Fibromyalgia     GERD (gastroesophageal reflux disease)     Hypercholesterolemia 9/28/2020    Hypercholesterolemia 9/28/2020    Hypertension 7/2/2012    Thoracic or lumbosacral neuritis or radiculitis, unspecified     Thyroid disease     Ulcer      Past Surgical History  Foot surgery  Partial hysterectomy  Cataract surgery    No pertinent family history of colon cancer    Blood thinners:  Yes, Eliquis for PE    Review of Systems  General ROS: negative for chills, fever .  Positive for weight loss  Cardiovascular ROS: no chest pain or dyspnea on exertion  Gastrointestinal ROS: positive for dysphagia, chest pain, no regurgitation     Physical Examination  Pulse 79   Ht 5' 5" (1.651 m)   Wt 55.7 kg (122 lb 14.5 oz)   SpO2 99%   BMI 20.45 kg/m²   General appearance: alert, cooperative, no distress  HENT: Normocephalic, atraumatic, neck symmetrical, no nasal discharge   Lungs: clear to auscultation bilaterally, no dullness to percussion bilaterally  Heart: regular rate and rhythm without rub; no displacement of the PMI   Abdomen: soft, non-tender; bowel sounds normoactive; no organomegaly  Extremities: extremities symmetric; no clubbing, cyanosis, or edema  Neurologic: Alert and oriented X 3, normal strength, normal coordination and gait    Labs:  Lab Results   Component Value Date    WBC 10.52 10/08/2021    HGB 11.9 (L) 10/08/2021    HCT 35.8 (L) 10/08/2021    MCV 87 10/08/2021     10/08/2021         CMP  Sodium   Date Value Ref Range Status   10/08/2021 141 136 - 145 mmol/L Final     Potassium   Date Value Ref Range Status   10/08/2021 3.9 3.5 - 5.1 mmol/L Final     Chloride   Date Value Ref Range Status   10/08/2021 106 95 - 110 mmol/L Final     CO2   Date Value Ref Range Status   10/08/2021 24 23 - 29 mmol/L Final     Glucose   Date Value Ref Range Status   10/08/2021 110 70 - 110 mg/dL Final     BUN   Date Value Ref Range Status "   10/08/2021 22 8 - 23 mg/dL Final     Creatinine   Date Value Ref Range Status   10/08/2021 1.1 0.5 - 1.4 mg/dL Final     Calcium   Date Value Ref Range Status   10/08/2021 9.3 8.7 - 10.5 mg/dL Final     Total Protein   Date Value Ref Range Status   10/04/2021 7.9 6.0 - 8.4 g/dL Final     Albumin   Date Value Ref Range Status   10/04/2021 3.7 3.5 - 5.2 g/dL Final     Total Bilirubin   Date Value Ref Range Status   10/04/2021 1.1 (H) 0.1 - 1.0 mg/dL Final     Comment:     For infants and newborns, interpretation of results should be based  on gestational age, weight and in agreement with clinical  observations.    Premature Infant recommended reference ranges:  Up to 24 hours.............<8.0 mg/dL  Up to 48 hours............<12.0 mg/dL  3-5 days..................<15.0 mg/dL  6-29 days.................<15.0 mg/dL       Alkaline Phosphatase   Date Value Ref Range Status   10/04/2021 82 55 - 135 U/L Final     AST   Date Value Ref Range Status   10/04/2021 18 10 - 40 U/L Final     ALT   Date Value Ref Range Status   10/04/2021 8 (L) 10 - 44 U/L Final     Anion Gap   Date Value Ref Range Status   10/08/2021 11 8 - 16 mmol/L Final     eGFR if    Date Value Ref Range Status   10/08/2021 52 (A) >60 mL/min/1.73 m^2 Final     eGFR if non    Date Value Ref Range Status   10/08/2021 45 (A) >60 mL/min/1.73 m^2 Final     Comment:     Calculation used to obtain the estimated glomerular filtration  rate (eGFR) is the CKD-EPI equation.          Assessment:   The patient is an 88 yo female with past medical history of a Schatzki's ring with previous dilation with improvement in her symptoms, normal esophageal manometry but there was incomplete bolus clearance who presents with dysphagia, chest pain, GERD, abdominal bloating and weight loss.  Currently she has a poor appetite and is only eating one meal per day.    Plan:  -Schedule EGD with plans for esophageal dilation.    -Can consider a TCA for  possible esophageal sensitivity in the future  -Increase Esomeprazole to 40mg BID for GERD  -Schedule gastric emptying study to evaluate her abdominal bloating and decreased appetite.    Rebeca Meza MD

## 2022-01-12 DIAGNOSIS — Z01.818 PRE-OP TESTING: ICD-10-CM

## 2022-01-14 ENCOUNTER — HOSPITAL ENCOUNTER (OUTPATIENT)
Dept: RADIOLOGY | Facility: OTHER | Age: 87
Discharge: HOME | End: 2022-01-14
Attending: PHYSICIAN ASSISTANT
Payer: MEDICARE

## 2022-01-14 DIAGNOSIS — R63.4 WEIGHT LOSS: ICD-10-CM

## 2022-01-14 PROCEDURE — 71260 CT THORAX DX C+: CPT | Mod: TC

## 2022-01-14 PROCEDURE — A9698 NON-RAD CONTRAST MATERIALNOC: HCPCS | Performed by: PHYSICIAN ASSISTANT

## 2022-01-14 PROCEDURE — 74177 CT CHEST ABDOMEN PELVIS WITH CONTRAST (XPD): ICD-10-PCS | Mod: 26,,, | Performed by: RADIOLOGY

## 2022-01-14 PROCEDURE — 25500020 PHARM REV CODE 255: Performed by: PHYSICIAN ASSISTANT

## 2022-01-14 PROCEDURE — 71260 CT THORAX DX C+: CPT | Mod: 26,,, | Performed by: RADIOLOGY

## 2022-01-14 PROCEDURE — 71260 CT CHEST ABDOMEN PELVIS WITH CONTRAST (XPD): ICD-10-PCS | Mod: 26,,, | Performed by: RADIOLOGY

## 2022-01-14 PROCEDURE — 74177 CT ABD & PELVIS W/CONTRAST: CPT | Mod: 26,,, | Performed by: RADIOLOGY

## 2022-01-14 PROCEDURE — 74177 CT ABD & PELVIS W/CONTRAST: CPT | Mod: TC

## 2022-01-14 RX ADMIN — IOHEXOL 75 ML: 350 INJECTION, SOLUTION INTRAVENOUS at 10:01

## 2022-01-14 RX ADMIN — IOHEXOL 1000 ML: 9 SOLUTION ORAL at 09:01

## 2022-01-18 ENCOUNTER — OFFICE VISIT (OUTPATIENT)
Dept: SURGERY | Facility: CLINIC | Age: 87
End: 2022-01-18
Payer: MEDICARE

## 2022-01-18 VITALS
SYSTOLIC BLOOD PRESSURE: 139 MMHG | WEIGHT: 125.69 LBS | OXYGEN SATURATION: 99 % | DIASTOLIC BLOOD PRESSURE: 64 MMHG | HEIGHT: 65 IN | TEMPERATURE: 98 F | BODY MASS INDEX: 20.94 KG/M2 | HEART RATE: 86 BPM

## 2022-01-18 DIAGNOSIS — K40.90 RIGHT INGUINAL HERNIA: ICD-10-CM

## 2022-01-18 PROCEDURE — 99214 OFFICE O/P EST MOD 30 MIN: CPT | Mod: PBBFAC | Performed by: SURGERY

## 2022-01-18 PROCEDURE — 99203 OFFICE O/P NEW LOW 30 MIN: CPT | Mod: S$PBB,,, | Performed by: SURGERY

## 2022-01-18 PROCEDURE — 99203 PR OFFICE/OUTPT VISIT, NEW, LEVL III, 30-44 MIN: ICD-10-PCS | Mod: S$PBB,,, | Performed by: SURGERY

## 2022-01-18 PROCEDURE — 99999 PR PBB SHADOW E&M-EST. PATIENT-LVL IV: ICD-10-PCS | Mod: PBBFAC,,, | Performed by: SURGERY

## 2022-01-18 PROCEDURE — 99999 PR PBB SHADOW E&M-EST. PATIENT-LVL IV: CPT | Mod: PBBFAC,,, | Performed by: SURGERY

## 2022-01-18 NOTE — PROGRESS NOTES
History & Physical  General Surgery    SUBJECTIVE:     History of Present Illness:  Patient is a 89 y.o. female presents for evaluation of right inguinal hernia.  Has been present for many years.  Was previously evaluated by a surgeon who recommended observation.  She recently had a fall and developed bilateral pelvic and groin pain.  She has been seeing physical therapy for management of this as well as pain and other musculoskeletal areas.  Her primary care provider recommended she be evaluated for inguinal hernia repair as possible cause.  Denies nausea, vomiting, constipation, or other obstructive symptoms.  She has no pain or discomfort to palpation of the hernia, only a sensation to urinate.  Describes pain located higher than the inguinal area bilaterally on the abdomen.        Chief Complaint   Patient presents with    Consult       Review of patient's allergies indicates:   Allergen Reactions    Melatonin      Other reaction(s): Other (See Comments)  Sleep Walks and has unnatural dreams    Talwin compound Hives    Talwin [pentazocine lactate] Hallucinations    Trazodone Other (See Comments)     Nightmares/sleep walk      Bentyl [dicyclomine] Anxiety       Current Outpatient Medications   Medication Sig Dispense Refill    albuterol (PROVENTIL) 2.5 mg /3 mL (0.083 %) nebulizer solution Take 2.5 mg by nebulization every 6 (six) hours as needed. Rescue      albuterol (PROVENTIL/VENTOLIN HFA) 90 mcg/actuation inhaler INHALE 2 PUFFS BY MOUTH EVERY 6 HRS AS NEEDED      amLODIPine (NORVASC) 10 MG tablet Take 1 tablet (10 mg total) by mouth once daily. 90 tablet 3    apixaban (ELIQUIS) 2.5 mg Tab Take 1 tablet (2.5 mg total) by mouth 2 (two) times daily. 180 tablet 3    clobetasoL (TEMOVATE) 0.05 % external solution Pt to mix in 1 jar of cerave cream and apply to affected areas bid 50 mL 3    donepeziL (ARICEPT) 10 MG tablet TAKE 1 TABLET EVERY MORNING.  NO FURTHER REFILL WITHOUT APPOINTMENT 90 tablet 1     doxepin (SINEQUAN) 10 MG capsule TAKE 1 CAPSULE AT BEDTIME  FOR ITCHING (Patient taking differently: TAKE 1 CAPSULE AT BEDTIME  FOR ITCHING) 90 capsule 3    EScitalopram oxalate (LEXAPRO) 10 MG tablet TAKE 1/2 TABLET DAILY 45 tablet 3    esomeprazole (NEXIUM) 40 MG capsule Take 1 capsule (40 mg total) by mouth before breakfast. 90 capsule 3    esomeprazole (NEXIUM) 40 MG capsule Take 1 capsule (40 mg total) by mouth 2 (two) times daily. 60 capsule 11    fluocinonide (LIDEX) 0.05 % external solution AAA scalp qday - bid prn pruritus 60 mL 3    fluticasone-salmeterol diskus inhaler 500-50 mcg Inhale 1 puff into the lungs 2 (two) times daily. Controller 60 each 11    gabapentin (NEURONTIN) 100 MG capsule Take by mouth.      glycopyrrolate (ROBINUL) 2 MG Tab Take 1 mg by mouth 2 (two) times daily.      ketoconazole (NIZORAL) 2 % shampoo Wash hair with medicated shampoo at least 2x/week - let sit on scalp at least 5 minutes prior to rinsing 120 mL 5    levothyroxine (SYNTHROID) 50 MCG tablet Take 1 tablet (50 mcg total) by mouth once daily. 90 tablet 3    LORazepam (ATIVAN) 0.5 MG tablet Take 1 tablet (0.5 mg total) by mouth every 12 (twelve) hours as needed for Anxiety. 20 tablet 0    meclizine (ANTIVERT) 25 mg tablet       mirtazapine (REMERON) 15 MG tablet Take 1 tablet (15 mg total) by mouth every evening. 90 tablet 3    RESTASIS 0.05 % ophthalmic emulsion        No current facility-administered medications for this visit.       Past Medical History:   Diagnosis Date    Allergic rhinitis     Anticoagulant long-term use     Arthritis     Asthma     Bilateral pulmonary embolism 4/27/2019    Cataract     Chronic anticoagulation 9/28/2020    Depression     Diabetes mellitus     Fibromyalgia 7/2/2012    Fibromyalgia     GERD (gastroesophageal reflux disease)     Hypercholesterolemia 9/28/2020    Hypercholesterolemia 9/28/2020    Hypertension 7/2/2012    Thoracic or lumbosacral neuritis or  "radiculitis, unspecified     Thyroid disease     Ulcer      Past Surgical History:   Procedure Laterality Date    CATARACT EXTRACTION Bilateral     FOOT NEUROMA SURGERY  1985    HYSTERECTOMY       Family History   Problem Relation Age of Onset    Diabetes Mother     Diabetes Brother     Emphysema Maternal Aunt     Melanoma Neg Hx     Asthma Neg Hx     Colon cancer Neg Hx     Esophageal cancer Neg Hx      Social History     Tobacco Use    Smoking status: Never Smoker    Smokeless tobacco: Never Used   Substance Use Topics    Alcohol use: No    Drug use: No        Review of Systems:  Review of Systems   Constitutional: Negative for chills and fever.   Respiratory: Negative for cough and shortness of breath.    Cardiovascular: Negative for chest pain and palpitations.   Gastrointestinal: Positive for abdominal pain (bilat lower abd/pelvic). Negative for nausea and vomiting.   Genitourinary: Negative for dysuria and hematuria.   Musculoskeletal: Negative for back pain and gait problem.   Skin: Negative for color change, rash and wound.   Neurological: Negative for weakness and headaches.   Hematological: Negative for adenopathy. Does not bruise/bleed easily.   Psychiatric/Behavioral: Negative for agitation and confusion.       OBJECTIVE:     Vital Signs (Most Recent)  Temp: 98.2 °F (36.8 °C) (01/18/22 0917)  Pulse: 86 (01/18/22 0917)  BP: 139/64 (01/18/22 0917)  SpO2: 99 % (01/18/22 0917)  5' 5" (1.651 m)  57 kg (125 lb 10.6 oz)     Physical Exam:  Physical Exam  Constitutional:       General: She is not in acute distress.     Appearance: Normal appearance. She is not ill-appearing.   HENT:      Head: Normocephalic and atraumatic.   Eyes:      General: No scleral icterus.     Pupils: Pupils are equal, round, and reactive to light.   Neck:      Trachea: No tracheal deviation.   Cardiovascular:      Rate and Rhythm: Normal rate and regular rhythm.   Pulmonary:      Effort: Pulmonary effort is normal. No " respiratory distress.      Breath sounds: No stridor.   Abdominal:      General: There is no distension.      Palpations: Abdomen is soft.      Tenderness: There is no abdominal tenderness.      Hernia: A hernia is present. Hernia is present in the right inguinal area (non-tender, easily reducible). There is no hernia in the left inguinal area.   Musculoskeletal:         General: No deformity or signs of injury.      Cervical back: No edema or erythema.   Skin:     General: Skin is warm and dry.      Coloration: Skin is not jaundiced.   Neurological:      General: No focal deficit present.      Mental Status: She is alert and oriented to person, place, and time.   Psychiatric:         Mood and Affect: Mood normal.         Behavior: Behavior normal.         ASSESSMENT/PLAN:     89-year-old with bilateral lower abdominal/inguinal pain after fall.  Has a known, long-standing right inguinal hernia.    PLAN:Plan   Based on the bilateral symptomatology and no tenderness with palpation of the hernia, my concern is that the hernia is not causing the issues.  Given the pain however, I am willing to perform right inguinal hernia repair.  Had discussion with the patient regarding further conservative management versus surgery.  She would like to continue to watch for now, which is also reasonable.  Educated on the signs and symptoms of obstruction and incarceration and encouraged to call if they developed.  She voiced understanding.  Return to clinic as needed.    Dallin Garcia MD  Acute Care Surgery  [facility] Department of Surgery

## 2022-01-19 ENCOUNTER — LAB VISIT (OUTPATIENT)
Dept: PRIMARY CARE CLINIC | Facility: CLINIC | Age: 87
End: 2022-01-19
Payer: MEDICARE

## 2022-01-19 DIAGNOSIS — Z01.818 PRE-OP TESTING: ICD-10-CM

## 2022-01-19 LAB
SARS-COV-2 RNA RESP QL NAA+PROBE: NOT DETECTED
SARS-COV-2- CYCLE NUMBER: NORMAL

## 2022-01-19 PROCEDURE — U0003 INFECTIOUS AGENT DETECTION BY NUCLEIC ACID (DNA OR RNA); SEVERE ACUTE RESPIRATORY SYNDROME CORONAVIRUS 2 (SARS-COV-2) (CORONAVIRUS DISEASE [COVID-19]), AMPLIFIED PROBE TECHNIQUE, MAKING USE OF HIGH THROUGHPUT TECHNOLOGIES AS DESCRIBED BY CMS-2020-01-R: HCPCS | Performed by: FAMILY MEDICINE

## 2022-01-19 PROCEDURE — U0005 INFEC AGEN DETEC AMPLI PROBE: HCPCS | Performed by: FAMILY MEDICINE

## 2022-01-21 ENCOUNTER — TELEPHONE (OUTPATIENT)
Dept: DERMATOLOGY | Facility: CLINIC | Age: 87
End: 2022-01-21
Payer: MEDICARE

## 2022-01-21 NOTE — TELEPHONE ENCOUNTER
Spoke with patient, notified her that per Dr. Monk patient would need to be seen before she can send RX.  Notified her that her NP [place] filled it for her in October.  She will call her to have it refilled.

## 2022-01-21 NOTE — TELEPHONE ENCOUNTER
----- Message from Jakob Ramirez sent at 1/21/2022 12:46 PM CST -----  Regarding: Pt is very upset about her Rx    The Pt states that she has been trying to get her refill for Rx-glycopyrrolate (ROBINUL) 2 MG Tab.    The Pt states that she should never be without her Rx and she has been out of it for one week now and is very upset.    Pharmacy-[facility] - [place], [place] - [facility] [address] AT [facility]   Phone: [phone]  Fax:  [phone]    The Pt would like a call back after you order her Rx please.     # [phone]

## 2022-01-22 NOTE — TELEPHONE ENCOUNTER
----- Message from Fe Jeffrey sent at 1/21/2022  3:33 PM CST -----  Contact: [phone]  Requesting an RX refill or new RX.  Is this a refill or new RX: refill  RX name and strength (copy/paste from chart):  glycopyrrolate (ROBINUL) 2 MG Tab  Is this a 30 day or 90 day RX: 90  Patient advised that in the future they can use their [facility] account to request a refill?:   Patient advised that RX refills can take up to 72 hours?: yes   Pharmacy name and phone # (copy/paste from chart):  [facility] [place] [address]  [address]  [place] [address]  Phone: [phone] Fax: [phone]  Comments: Pt states she has been out of this Rx for a week.

## 2022-01-23 RX ORDER — GLYCOPYRROLATE 1 MG/1
1 TABLET ORAL 2 TIMES DAILY
Qty: 60 TABLET | Refills: 5 | Status: SHIPPED | OUTPATIENT
Start: 2022-01-23 | End: 2022-02-01 | Stop reason: SDUPTHER

## 2022-01-27 ENCOUNTER — PATIENT OUTREACH (OUTPATIENT)
Dept: ADMINISTRATIVE | Facility: OTHER | Age: 87
End: 2022-01-27
Payer: MEDICARE

## 2022-01-28 ENCOUNTER — OFFICE VISIT (OUTPATIENT)
Dept: RHEUMATOLOGY | Facility: CLINIC | Age: 87
End: 2022-01-28
Payer: MEDICARE

## 2022-01-28 VITALS
SYSTOLIC BLOOD PRESSURE: 126 MMHG | DIASTOLIC BLOOD PRESSURE: 69 MMHG | WEIGHT: 124.13 LBS | BODY MASS INDEX: 20.65 KG/M2 | HEART RATE: 75 BPM

## 2022-01-28 DIAGNOSIS — M79.7 FIBROMYALGIA: ICD-10-CM

## 2022-01-28 DIAGNOSIS — M15.9 PRIMARY OSTEOARTHRITIS INVOLVING MULTIPLE JOINTS: Primary | ICD-10-CM

## 2022-01-28 PROCEDURE — 99213 PR OFFICE/OUTPT VISIT, EST, LEVL III, 20-29 MIN: ICD-10-PCS | Mod: S$PBB,,, | Performed by: INTERNAL MEDICINE

## 2022-01-28 PROCEDURE — 99999 PR PBB SHADOW E&M-EST. PATIENT-LVL V: CPT | Mod: PBBFAC,,, | Performed by: INTERNAL MEDICINE

## 2022-01-28 PROCEDURE — 99999 PR PBB SHADOW E&M-EST. PATIENT-LVL V: ICD-10-PCS | Mod: PBBFAC,,, | Performed by: INTERNAL MEDICINE

## 2022-01-28 PROCEDURE — 99213 OFFICE O/P EST LOW 20 MIN: CPT | Mod: S$PBB,,, | Performed by: INTERNAL MEDICINE

## 2022-01-28 PROCEDURE — 99215 OFFICE O/P EST HI 40 MIN: CPT | Mod: PBBFAC | Performed by: INTERNAL MEDICINE

## 2022-01-28 RX ORDER — PREGABALIN 50 MG/1
50 CAPSULE ORAL NIGHTLY
Qty: 90 CAPSULE | Refills: 1 | Status: SHIPPED | OUTPATIENT
Start: 2022-01-28 | End: 2022-04-04

## 2022-01-28 ASSESSMENT — ROUTINE ASSESSMENT OF PATIENT INDEX DATA (RAPID3)
TOTAL RAPID3 SCORE: 8.22
FATIGUE SCORE: 10
AM STIFFNESS SCORE: 1, YES
MDHAQ FUNCTION SCORE: 1.4
PAIN SCORE: 10
PSYCHOLOGICAL DISTRESS SCORE: 5.5
PATIENT GLOBAL ASSESSMENT SCORE: 10

## 2022-01-28 NOTE — PROGRESS NOTES
History of present illness:  89-year-old female I have been following since 2012.  She has a history of chronic pain dating back to the 1980s.  She has been diagnosed in the past as having osteoarthritis, especially of the spine, and fibromyalgia.  She has been tried on multiple medications in the past but had stopped most of them because of side effects.    She was last seen 1 year ago.  She was hospitalized in October for asthma exacerbation.  She has also developed a swallowing problem.  She is scheduled for an EGD in March.  This has affected her voice.  She also has an enlargement of her right inguinal hernia.  She is considering surgical repair.    She complains of some increased aching.  This is especially bad in the left posterior shoulder and also the lower legs.  She has occasional locking in her fingers.  She had done well in the past with physical therapy.  She did get the TENS which has given her some relief.  She is taking gabapentin only at night.  It makes her dizzy during the day.  She is also only taking Tylenol once a day.  She had an injection in the right knee in December, which helped.  She thinks she did better with Lyrica the gabapentin but it had not been covered by her insurance company    Physical examination:  Musculoskeletal:  She has decreased range of motion of the cervical spine with some pain on range of motion.  She has full range of motion of the shoulders without pain on range of motion.  Elbows and wrists are unremarkable.  She has bony hypertrophy of the 1st CMC bilaterally.  She has no synovitis in the hands.  Lumbar spine has some decreased range of motion but no pain on range of motion.  Knees have no effusion.  She has tenderness of both lower legs.    Assessment:  1. Fibromyalgia  2. Osteoarthritis    Plans:  1. Increase gabapentin to 200 mg at bedtime  2. I did put in a prescription for Lyrica 50 mg at bedtime.  If she does get coverage for her insurance company, she  will discontinue the gabapentin  3. Use heat to the involved areas  4. I have referred her for physical therapy  5. Continue Tylenol as before  6. Return in 6 months

## 2022-01-31 ENCOUNTER — EXTERNAL CHRONIC CARE MANAGEMENT (OUTPATIENT)
Dept: PRIMARY CARE CLINIC | Facility: CLINIC | Age: 87
End: 2022-01-31
Payer: MEDICARE

## 2022-01-31 ENCOUNTER — TELEPHONE (OUTPATIENT)
Dept: GASTROENTEROLOGY | Facility: CLINIC | Age: 87
End: 2022-01-31
Payer: MEDICARE

## 2022-01-31 DIAGNOSIS — R49.0 HOARSENESS: Primary | ICD-10-CM

## 2022-01-31 DIAGNOSIS — Z01.818 PRE-OP TESTING: Primary | ICD-10-CM

## 2022-01-31 PROCEDURE — 99490 CHRNC CARE MGMT STAFF 1ST 20: CPT | Mod: S$PBB,,, | Performed by: INTERNAL MEDICINE

## 2022-01-31 PROCEDURE — 99490 CHRNC CARE MGMT STAFF 1ST 20: CPT | Mod: PBBFAC | Performed by: INTERNAL MEDICINE

## 2022-01-31 PROCEDURE — 99490 PR CHRONIC CARE MGMT, 1ST 20 MIN: ICD-10-PCS | Mod: S$PBB,,, | Performed by: INTERNAL MEDICINE

## 2022-01-31 NOTE — TELEPHONE ENCOUNTER
MA attempted to scheduled recommended appointments. A message was sent to our ENT department to contact patient to schedule a sooner appointment than 5/4.     A message was left for the NM department to return a rasheed to our office to schedule patient's GES test.

## 2022-01-31 NOTE — TELEPHONE ENCOUNTER
MA returned patient's call.     Mrs. Ding is calling to inform Dr. Meza she is experiencing hoarseness, a scratchy sore throat and shortness of breath is getting worse.     Patient missed her scheduled procedure and it is rescheduled for 3/2. Mrs. Ding is requesting a call from Dr. Meza.

## 2022-01-31 NOTE — TELEPHONE ENCOUNTER
----- Message from Eliana Smart, Patient Care Assistant sent at 1/31/2022  8:31 AM CST -----  Regarding: call back  Contact: Pt  Pt is requesting a call back from staff.        Pt @ [phone]

## 2022-02-01 ENCOUNTER — OFFICE VISIT (OUTPATIENT)
Dept: INTERNAL MEDICINE | Facility: CLINIC | Age: 87
End: 2022-02-01
Payer: MEDICARE

## 2022-02-01 VITALS
OXYGEN SATURATION: 98 % | HEART RATE: 85 BPM | WEIGHT: 123 LBS | HEIGHT: 65 IN | BODY MASS INDEX: 20.49 KG/M2 | DIASTOLIC BLOOD PRESSURE: 58 MMHG | SYSTOLIC BLOOD PRESSURE: 122 MMHG

## 2022-02-01 DIAGNOSIS — R49.8: ICD-10-CM

## 2022-02-01 DIAGNOSIS — L98.9 SCALP LESION: ICD-10-CM

## 2022-02-01 DIAGNOSIS — K41.90: ICD-10-CM

## 2022-02-01 DIAGNOSIS — B37.0 THRUSH: Primary | ICD-10-CM

## 2022-02-01 PROCEDURE — 99214 OFFICE O/P EST MOD 30 MIN: CPT | Mod: S$PBB,,, | Performed by: INTERNAL MEDICINE

## 2022-02-01 PROCEDURE — 99999 PR PBB SHADOW E&M-EST. PATIENT-LVL V: ICD-10-PCS | Mod: PBBFAC,,, | Performed by: INTERNAL MEDICINE

## 2022-02-01 PROCEDURE — 99999 PR PBB SHADOW E&M-EST. PATIENT-LVL V: CPT | Mod: PBBFAC,,, | Performed by: INTERNAL MEDICINE

## 2022-02-01 PROCEDURE — 99215 OFFICE O/P EST HI 40 MIN: CPT | Mod: PBBFAC | Performed by: INTERNAL MEDICINE

## 2022-02-01 PROCEDURE — 99214 PR OFFICE/OUTPT VISIT, EST, LEVL IV, 30-39 MIN: ICD-10-PCS | Mod: S$PBB,,, | Performed by: INTERNAL MEDICINE

## 2022-02-01 RX ORDER — GLYCOPYRROLATE 2 MG/1
2 TABLET ORAL 2 TIMES DAILY
Qty: 180 TABLET | Refills: 3 | Status: SHIPPED | OUTPATIENT
Start: 2022-02-01 | End: 2022-04-28 | Stop reason: SDUPTHER

## 2022-02-01 RX ORDER — FLUCONAZOLE 200 MG/1
TABLET ORAL
Qty: 2 TABLET | Refills: 0 | Status: SHIPPED | OUTPATIENT
Start: 2022-02-01 | End: 2022-03-23

## 2022-02-01 NOTE — TELEPHONE ENCOUNTER
MA spoke with patient. Mrs. Ding is aware her GES is scheduled on 2/3 and instructions reviewed with patient.

## 2022-02-01 NOTE — PROGRESS NOTES
"Subjective:       Patient ID: Debbie Ding is a 89 y.o. female.    Chief Complaint: YEAST INFECTION IN MOUTH    HPI   Dr Suazo diagnosed thrush, and she completed 2 bottles of presumed Nystatin swish and swallow, but oral burning persists and taste affected.         1/13 Cardiology note reviewed.       V/Q scan ordered.    12/7 Pulm note reviewed;  " Shortness of breath multifactorial. Cardiac versus asthma versus deconditioning "    GI note 1/11 reviewed.  To have gastric emptying study and EGD.    Voice is quite high pitched, more so than in past.  Never evaluated.    Due to wt loss had CTs - all ok except for r femoral hernia., which is asymptomatic.  Wt is 123, down from 128 in December.    C/o r knee pain.   Would like to f/u with Ortho.    C/o painful lesion of medial anterior scalp.      Review of Systems   Constitutional: Negative for fever and unexpected weight change.   HENT: Negative for nasal congestion and postnasal drip.    Respiratory: Negative for chest tightness, shortness of breath and wheezing.    Cardiovascular: Negative for chest pain and leg swelling.   Gastrointestinal: Negative for abdominal pain, anal bleeding, constipation, diarrhea, nausea and vomiting.   Genitourinary: Negative for dysuria and urgency.   Integumentary:  Negative for rash.   Neurological: Negative for headaches.   Psychiatric/Behavioral: Negative for dysphoric mood and sleep disturbance. The patient is not nervous/anxious.          Objective:      Physical Exam  Constitutional:       Appearance: Normal appearance. She is not ill-appearing or diaphoretic.   HENT:      Head:      Comments: abnormally high pitched voice     Mouth/Throat:      Comments: Scatter small areas of white plaque L buccal mucosa, palate.  Skin:     Comments: Tender, small raised lesion central anterior scalp   Neurological:      Mental Status: She is alert.   Psychiatric:         Mood and Affect: Mood normal.         Behavior: Behavior normal.    "      Thought Content: Thought content normal.         Assessment:       Problem List Items Addressed This Visit    None     Visit Diagnoses     Thrush    -  Primary    High pitched voice        Relevant Orders    Ambulatory referral/consult to ENT    Scalp lesion        Relevant Orders    Ambulatory referral/consult to Dermatology    Non-recurrent femoral hernia without obstruction or gangrene, unspecified laterality              Plan:       Debbie was seen today for yeast infection in mouth.    Diagnoses and all orders for this visit:    Thrush    High pitched voice  -     Ambulatory referral/consult to ENT; Future    Scalp lesion  -     Ambulatory referral/consult to Dermatology; Future    Non-recurrent femoral hernia without obstruction or gangrene, unspecified laterality    Other orders  -     fluconazole (DIFLUCAN) 200 MG Tab; Take 1 tablet by mouth every day  x 2 days for thrush  -     glycopyrrolate (ROBINUL) 2 MG Tab; Take 1 tablet (2 mg total) by mouth 2 (two) times daily.           Do NOT take lexapro or aricept for 2 days that you take Diflucan.

## 2022-02-03 ENCOUNTER — HOSPITAL ENCOUNTER (OUTPATIENT)
Dept: RADIOLOGY | Facility: HOSPITAL | Age: 87
Discharge: HOME | End: 2022-02-03
Attending: INTERNAL MEDICINE
Payer: MEDICARE

## 2022-02-03 DIAGNOSIS — R14.0 ABDOMINAL BLOATING: ICD-10-CM

## 2022-02-03 PROCEDURE — 78264 GASTRIC EMPTYING IMG STUDY: CPT | Mod: 26,,, | Performed by: RADIOLOGY

## 2022-02-03 PROCEDURE — 78264 NM GASTRIC EMPTYING: ICD-10-PCS | Mod: 26,,, | Performed by: RADIOLOGY

## 2022-02-03 PROCEDURE — A9541 TC99M SULFUR COLLOID: HCPCS

## 2022-02-04 ENCOUNTER — TELEPHONE (OUTPATIENT)
Dept: ENDOSCOPY | Facility: HOSPITAL | Age: 87
End: 2022-02-04
Payer: MEDICARE

## 2022-02-04 ENCOUNTER — TELEPHONE (OUTPATIENT)
Dept: GASTROENTEROLOGY | Facility: CLINIC | Age: 87
End: 2022-02-04
Payer: MEDICARE

## 2022-02-04 DIAGNOSIS — K31.84 GASTROPARESIS: Primary | ICD-10-CM

## 2022-02-04 NOTE — TELEPHONE ENCOUNTER
----- Message from Rebeca Meza MD sent at 2/4/2022 12:38 PM CST -----  Regarding: [id number].  [place] only for gastroparesis  Can you have this patient see Ms. Yap to review a gastroparesis diet?    Thanks!    Rebeca

## 2022-02-04 NOTE — TELEPHONE ENCOUNTER
[facility]    Gastric emptying study results discussed with the patient and show gastroparesis.  She does not wish to take Reglan due to the potential  Side effects and erythromycin interacts with her donepezil and es citalopram.  I will have her see our GI dietician to discuss a gastroparesis diet.  I will request that she eat only a full liquid diet leading up to her EGD.    Rebeca Meza MD

## 2022-02-08 ENCOUNTER — TELEPHONE (OUTPATIENT)
Dept: ORTHOPEDICS | Facility: CLINIC | Age: 87
End: 2022-02-08
Payer: MEDICARE

## 2022-02-08 NOTE — TELEPHONE ENCOUNTER
Spoke with patient regarding magdy hand an wrist an finger pain , I stated to patient she will need an appointment  in the hand clinic.Appointment was scheduled for patient

## 2022-02-11 ENCOUNTER — TELEPHONE (OUTPATIENT)
Dept: DERMATOLOGY | Facility: CLINIC | Age: 87
End: 2022-02-11
Payer: MEDICARE

## 2022-02-11 NOTE — TELEPHONE ENCOUNTER
Called patient to schedule an appointment with Dermatology, per message. No answer. Message was left asking patient to call the office back for an sooner appointment.

## 2022-02-14 ENCOUNTER — TELEPHONE (OUTPATIENT)
Dept: ORTHOPEDICS | Facility: CLINIC | Age: 87
End: 2022-02-14
Payer: MEDICARE

## 2022-02-14 DIAGNOSIS — R52 PAIN: Primary | ICD-10-CM

## 2022-02-17 ENCOUNTER — TELEPHONE (OUTPATIENT)
Dept: ORTHOPEDICS | Facility: CLINIC | Age: 87
End: 2022-02-17
Payer: MEDICARE

## 2022-02-17 NOTE — TELEPHONE ENCOUNTER
Spoke with pt and reminded her of her appointment and x-ray. Pt voiced understanding and call ended.

## 2022-02-18 ENCOUNTER — OFFICE VISIT (OUTPATIENT)
Dept: ORTHOPEDICS | Facility: CLINIC | Age: 87
End: 2022-02-18
Payer: MEDICARE

## 2022-02-18 ENCOUNTER — HOSPITAL ENCOUNTER (OUTPATIENT)
Dept: RADIOLOGY | Facility: OTHER | Age: 87
Discharge: HOME | End: 2022-02-18
Attending: PLASTIC SURGERY
Payer: MEDICARE

## 2022-02-18 VITALS — HEIGHT: 65 IN | BODY MASS INDEX: 20.49 KG/M2 | WEIGHT: 123 LBS

## 2022-02-18 DIAGNOSIS — R52 PAIN: ICD-10-CM

## 2022-02-18 DIAGNOSIS — G56.03 BILATERAL CARPAL TUNNEL SYNDROME: Primary | ICD-10-CM

## 2022-02-18 PROCEDURE — 73130 X-RAY EXAM OF HAND: CPT | Mod: 26,50,, | Performed by: RADIOLOGY

## 2022-02-18 PROCEDURE — 99999 PR PBB SHADOW E&M-EST. PATIENT-LVL III: ICD-10-PCS | Mod: PBBFAC,,, | Performed by: PLASTIC SURGERY

## 2022-02-18 PROCEDURE — 73130 X-RAY EXAM OF HAND: CPT | Mod: TC,50,FY

## 2022-02-18 PROCEDURE — 99203 PR OFFICE/OUTPT VISIT, NEW, LEVL III, 30-44 MIN: ICD-10-PCS | Mod: S$PBB,,, | Performed by: PLASTIC SURGERY

## 2022-02-18 PROCEDURE — 99203 OFFICE O/P NEW LOW 30 MIN: CPT | Mod: S$PBB,,, | Performed by: PLASTIC SURGERY

## 2022-02-18 PROCEDURE — 99213 OFFICE O/P EST LOW 20 MIN: CPT | Mod: PBBFAC | Performed by: PLASTIC SURGERY

## 2022-02-18 PROCEDURE — 99999 PR PBB SHADOW E&M-EST. PATIENT-LVL III: CPT | Mod: PBBFAC,,, | Performed by: PLASTIC SURGERY

## 2022-02-18 PROCEDURE — 73130 XR HAND COMPLETE 3 VIEWS BILATERAL: ICD-10-PCS | Mod: 26,50,, | Performed by: RADIOLOGY

## 2022-02-18 NOTE — PROGRESS NOTES
Chief Complaint: Pain of the Right Hand      HPI:    Debbie Ding is a right hand dominant 89 y.o. female presenting today for  Bilateral hand pain with locking digits as well as feelings of numbness and tingling within the index and middle digits of both hands.  She was previously seen in 2017 with complaints of bilateral thumb pain.  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  She presents today with complaints that she is experiencing locking of the index and middle digits of both hands as well as decreased sensation and pain.  She denies any previous history of trauma.    Past Medical History:   Diagnosis Date    Allergic rhinitis     Anticoagulant long-term use     Arthritis     Asthma     Bilateral pulmonary embolism 4/27/2019    Cataract     Chronic anticoagulation 9/28/2020    Depression     Diabetes mellitus     Fibromyalgia 7/2/2012    Fibromyalgia     GERD (gastroesophageal reflux disease)     Hypercholesterolemia 9/28/2020    Hypercholesterolemia 9/28/2020    Hypertension 7/2/2012    Thoracic or lumbosacral neuritis or radiculitis, unspecified     Thyroid disease     Ulcer        Past Surgical History:   Procedure Laterality Date    CATARACT EXTRACTION Bilateral     FOOT NEUROMA SURGERY  1985    HYSTERECTOMY          Family History   Problem Relation Age of Onset    Diabetes Mother     Diabetes Brother     Emphysema Maternal Aunt     Melanoma Neg Hx     Asthma Neg Hx     Colon cancer Neg Hx     Esophageal cancer Neg Hx        Social History     Socioeconomic History    Marital status:    Occupational History     Comment:  - school    Tobacco Use    Smoking status: Never Smoker    Smokeless tobacco: Never Used   Substance and Sexual Activity    Alcohol use: No    Drug use: No    Sexual activity: Not Currently   Social History Narrative    Lives with daughter, Benjie.        Other daughter, Madina,  [place], [place]   Phone Number: [phone]             08/23/19 8:44 AM     As a result of outreach to the external facility it was discovered that the patient did not have the requested CRC: Colonoscopy completed/given  We have spoke with the external facility and the patient did not have the requested service(s)  This message will now be closed  Thank you   Tori Gordilloing    Previous Messages      ----- Message -----   From: Zurdo Waterman MA   Sent: 8/21/2019   1:38 PM EDT   To: [facility]   Subject: [facility]                                     I have reviewed both our old [address] and need your assistance in locating Colorectal Cancer (CRC) Screening  Per Patient, testing was done by Dr Aurelio Weston in [facility] 2010 Thank you  drives her around.        She lives independently, except for driving.          Stretches in bed.  Walks in neighborhood, and in house several times a day.     Social Determinants of Health     Financial Resource Strain: Low Risk     Difficulty of Paying Living Expenses: Not hard at all   Food Insecurity: No Food Insecurity    Worried About Running Out of Food in the Last Year: Never true    Ran Out of Food in the Last Year: Never true   Transportation Needs: No Transportation Needs    Lack of Transportation (Medical): No    Lack of Transportation (Non-Medical): No   Physical Activity: Insufficiently Active    Days of Exercise per Week: 3 days    Minutes of Exercise per Session: 20 min   Stress: No Stress Concern Present    Feeling of Stress : Not at all   Social Connections: Moderately Isolated    Frequency of Communication with Friends and Family: More than three times a week    Frequency of Social Gatherings with Friends and Family: More than three times a week    Attends Scientologist Services: More than 4 times per year    Active Member of Clubs or Organizations: No    Attends Club or Organization Meetings: Never    Marital Status:    Housing Stability: Low Risk     Unable to Pay for Housing in the Last Year: No    Number of Places Lived in the Last Year: 1    Unstable Housing in the Last Year: No       Review of patient's allergies indicates:   Allergen Reactions    Melatonin      Other reaction(s): Other (See Comments)  Sleep Walks and has unnatural dreams    Talwin compound Hives    Talwin [pentazocine lactate] Hallucinations    Trazodone Other (See Comments)     Nightmares/sleep walk      Bentyl [dicyclomine] Anxiety         Current Outpatient Medications:     albuterol (PROVENTIL) 2.5 mg /3 mL (0.083 %) nebulizer solution, Take 2.5 mg by nebulization every 6 (six) hours as needed. Rescue, Disp: , Rfl:     albuterol (PROVENTIL/VENTOLIN HFA) 90 mcg/actuation inhaler, INHALE 2 PUFFS  BY MOUTH EVERY 6 HRS AS NEEDED, Disp: , Rfl:     amLODIPine (NORVASC) 10 MG tablet, Take 1 tablet (10 mg total) by mouth once daily., Disp: 90 tablet, Rfl: 3    apixaban (ELIQUIS) 2.5 mg Tab, Take 1 tablet (2.5 mg total) by mouth 2 (two) times daily., Disp: 180 tablet, Rfl: 3    clobetasoL (TEMOVATE) 0.05 % external solution, Pt to mix in 1 jar of cerave cream and apply to affected areas bid, Disp: 50 mL, Rfl: 3    donepeziL (ARICEPT) 10 MG tablet, TAKE 1 TABLET EVERY MORNING.  NO FURTHER REFILL WITHOUT APPOINTMENT (Patient taking differently: TAKE 1 TABLET EVERY MORNING.  NO FURTHER REFILL WITHOUT APPOINTMENT), Disp: 90 tablet, Rfl: 1    doxepin (SINEQUAN) 10 MG capsule, TAKE 1 CAPSULE AT BEDTIME  FOR ITCHING (Patient taking differently: TAKE 1 CAPSULE AT BEDTIME  FOR ITCHING), Disp: 90 capsule, Rfl: 3    EScitalopram oxalate (LEXAPRO) 10 MG tablet, TAKE 1/2 TABLET DAILY, Disp: 45 tablet, Rfl: 3    esomeprazole (NEXIUM) 40 MG capsule, Take 1 capsule (40 mg total) by mouth before breakfast., Disp: 90 capsule, Rfl: 3    flash glucose scanning reader (FREESTYLE ALEXSANDRA 14 DAY READER) Misc, 1 Device by Misc.(Non-Drug; Combo Route) route 2 (two) times a day., Disp: 1 each, Rfl: 11    flash glucose sensor (FREESTYLE ALEXSANDRA 14 DAY SENSOR) Kit, 1 Device by Misc.(Non-Drug; Combo Route) route 2 (two) times a day., Disp: 1 kit, Rfl: 11    fluconazole (DIFLUCAN) 200 MG Tab, Take 1 tablet by mouth every day for 2 days for thrush (Patient taking differently: Take 1 tablet by mouth every day for 2 days for thrush), Disp: 2 tablet, Rfl: 0    fluocinonide (LIDEX) 0.05 % external solution, AAA scalp qday - bid prn pruritus, Disp: 60 mL, Rfl: 3    gabapentin (NEURONTIN) 100 MG capsule, Take by mouth., Disp: , Rfl:     glycopyrrolate (ROBINUL) 2 MG Tab, Take 1 tablet (2 mg total) by mouth 2 (two) times daily., Disp: 180 tablet, Rfl: 3    ketoconazole (NIZORAL) 2 % shampoo, Wash hair with medicated shampoo at least 2x/week -  "let sit on scalp at least 5 minutes prior to rinsing, Disp: 120 mL, Rfl: 5    levothyroxine (SYNTHROID) 50 MCG tablet, Take 1 tablet (50 mcg total) by mouth once daily., Disp: 90 tablet, Rfl: 3    meclizine (ANTIVERT) 25 mg tablet, , Disp: , Rfl:     mirtazapine (REMERON) 15 MG tablet, Take 1 tablet (15 mg total) by mouth every evening., Disp: 90 tablet, Rfl: 3    pregabalin (LYRICA) 50 MG capsule, Take 1 capsule (50 mg total) by mouth nightly., Disp: 90 capsule, Rfl: 1    RESTASIS 0.05 % ophthalmic emulsion, , Disp: , Rfl:     fluticasone-salmeterol diskus inhaler 500-50 mcg, Inhale 1 puff into the lungs 2 (two) times daily. Controller (Patient not taking: Reported on 2/1/2022), Disp: 60 each, Rfl: 11    LORazepam (ATIVAN) 0.5 MG tablet, Take 1 tablet (0.5 mg total) by mouth every 12 (twelve) hours as needed for Anxiety., Disp: 20 tablet, Rfl: 0        Review of Systems:  Constitutional: no fever or chills  ENT: no nasal congestion or sore throat  Respiratory: no cough or shortness of breath  Cardiovascular: no chest pain or palpitations  Gastrointestinal: no nausea or vomiting, PUD, GERD, NSAID intolerance  Genitourinary: no hematuria or dysuria  Integument/Breast: no rash or pruritis  Hematologic/Lymphatic: no easy bruising or lymphadenopathy  Musculoskeletal: see HPI  Neurological: no seizures or tremors  Behavioral/Psych: no auditory or visual hallucinations      Objective:      PHYSICAL EXAM:  Vitals:    02/18/22 0844   Weight: 55.8 kg (123 lb)   Height: 5' 5" (1.651 m)   PainSc:   8     General Appearance: WDWN, NAD  Neuro/Psych: Mood & affect appropriate  Lungs: Respirations equal and unlabored.   CV: No apparent CV dysfunction, distal pulses intact, RRR  Skin: Intact throughout  Extremities:   Right Hand Exam     Tenderness   Right hand tenderness location: Tenderness to palpation over the A1 pulley at the base of the index and middle digits.    Tests   Phalens sign: positive  Tinel's sign (median " nerve): positive    Comments:    No triggering of digits 2 through 5 with flexion extension of the IP joints.   severe Z deformity of the thumb consistent with chronic CMC joint arthritis      Left Hand Exam     Tenderness   Left hand tenderness location: Tenderness to palpation over the A1 pulley at the base of the index middle and ring digits.     Tests   Phalens sign: positive  Tinel's sign (median nerve): positive    Comments:    No triggering of digits 2 through 5 with flexion extension of the IP joints severe Z deformity of the thumb consistent with chronic CMC joint arthritis              DIAGNOSTICS/IMAGING:    Radiologist's Impression:     EXAMINATION:  XR HAND COMPLETE 3 VIEWS BILATERAL     CLINICAL HISTORY:  . Pain, unspecified     TECHNIQUE:  PA, lateral, and oblique views of both hands were performed.     COMPARISON:  01/13/2017     FINDINGS:  No acute fracture or dislocation seen.  No significant soft tissue edema or radiopaque retained foreign body.     Osteophyte formation, subchondral sclerosis, subchondral cyst formation, and severe narrowing 1st CMC joint bilaterally.  Subchondral sclerosis and narrowing STT joint bilaterally.  Right thumb is hyper extended at the MCP joint..     Impression:     No acute osseous abnormality seen.  Advanced osteoarthritis STT and 1st CMC joints both hands.    Comments: I have personnaly reviewed the imaging and I agree with the above radiologist's report.          Assessment:     Encounter Diagnosis   Name Primary?    Bilateral carpal tunnel syndrome Yes        Plan/Discussion:   Debbie was seen today for pain.    Diagnoses and all orders for this visit:    Bilateral carpal tunnel syndrome       based on her physical exam the patient is a demonstrates underlying bilateral carpal tunnel syndrome with stage I trigger digits of multiple digits of both hands.  I discussed the pathophysiology progression treatment of these conditions in detail.  Due to the degree of  Kameron's found on the physical exam she was offered a corticosteroid injection to the carpal tunnel of each wrist I believe that this would help alleviate her carpal tunnel symptoms as well as possibly help with the developing trigger digits.  Patient was reluctant to undergo the injections today due the fact that she recently had scalp injections for lesion.  We discussed potential splinting particularly at night of the wrist to maintain a neutral position.  She wished to try this and the braces were provided to her today in clinic.  She was advised to return to clinic if she would like to trial the corticosteroid injections.  She understood this and all questions concerns were addressed.

## 2022-02-23 ENCOUNTER — TELEPHONE (OUTPATIENT)
Dept: INTERNAL MEDICINE | Facility: CLINIC | Age: 87
End: 2022-02-23
Payer: MEDICARE

## 2022-02-23 NOTE — TELEPHONE ENCOUNTER
----- Message from Lenora Villagran sent at 2/23/2022  2:37 PM CST -----  Contact: Josephine with [facility]  fax # [phone]  Josephine called in regards to getting last labs results faxed over to there office please advise

## 2022-02-25 ENCOUNTER — LAB VISIT (OUTPATIENT)
Dept: PRIMARY CARE CLINIC | Facility: CLINIC | Age: 87
End: 2022-02-25
Payer: MEDICARE

## 2022-02-25 DIAGNOSIS — Z01.818 PRE-OP TESTING: ICD-10-CM

## 2022-02-25 LAB
SARS-COV-2 RNA RESP QL NAA+PROBE: NOT DETECTED
SARS-COV-2- CYCLE NUMBER: NORMAL

## 2022-02-25 PROCEDURE — U0003 INFECTIOUS AGENT DETECTION BY NUCLEIC ACID (DNA OR RNA); SEVERE ACUTE RESPIRATORY SYNDROME CORONAVIRUS 2 (SARS-COV-2) (CORONAVIRUS DISEASE [COVID-19]), AMPLIFIED PROBE TECHNIQUE, MAKING USE OF HIGH THROUGHPUT TECHNOLOGIES AS DESCRIBED BY CMS-2020-01-R: HCPCS | Performed by: INTERNAL MEDICINE

## 2022-02-25 PROCEDURE — U0005 INFEC AGEN DETEC AMPLI PROBE: HCPCS | Performed by: INTERNAL MEDICINE

## 2022-02-26 ENCOUNTER — NURSE TRIAGE (OUTPATIENT)
Dept: ADMINISTRATIVE | Facility: CLINIC | Age: 87
End: 2022-02-26
Payer: MEDICARE

## 2022-02-26 NOTE — TELEPHONE ENCOUNTER
Reason for Disposition   Health Information question, no triage required and triager able to answer question    Additional Information   Negative: [1] Caller is not with the adult (patient) AND [2] reporting urgent symptoms   Negative: Lab result questions   Negative: Medication questions   Negative: Caller can't be reached by phone   Negative: Caller has already spoken to PCP or another triager   Negative: RN needs further essential information from caller in order to complete triage   Negative: Requesting regular office appointment   Negative: [1] Caller requesting NON-URGENT health information AND [2] PCP's office is the best resource    Protocols used: INFORMATION ONLY CALL - NO TRIAGE-A-  daughter called re diet instructions for EGD Monday. spoke with dr harley IRENE after midnight on Sunday. full liquid diet today and hattie.  Family notified. Call back with questions.

## 2022-02-28 ENCOUNTER — EXTERNAL CHRONIC CARE MANAGEMENT (OUTPATIENT)
Dept: PRIMARY CARE CLINIC | Facility: CLINIC | Age: 87
End: 2022-02-28
Payer: MEDICARE

## 2022-02-28 DIAGNOSIS — R47.02 DYSPHASIA: Primary | ICD-10-CM

## 2022-02-28 DIAGNOSIS — R13.10 DYSPHAGIA, UNSPECIFIED TYPE: ICD-10-CM

## 2022-02-28 PROCEDURE — 99490 CHRNC CARE MGMT STAFF 1ST 20: CPT | Mod: PBBFAC | Performed by: INTERNAL MEDICINE

## 2022-02-28 PROCEDURE — 99490 CHRNC CARE MGMT STAFF 1ST 20: CPT | Mod: S$PBB,,, | Performed by: INTERNAL MEDICINE

## 2022-02-28 PROCEDURE — 99490 PR CHRONIC CARE MGMT, 1ST 20 MIN: ICD-10-PCS | Mod: S$PBB,,, | Performed by: INTERNAL MEDICINE

## 2022-03-07 ENCOUNTER — ANESTHESIA EVENT (OUTPATIENT)
Dept: ENDOSCOPY | Facility: HOSPITAL | Age: 87
End: 2022-03-07
Payer: MEDICARE

## 2022-03-08 ENCOUNTER — ANESTHESIA (OUTPATIENT)
Dept: ENDOSCOPY | Facility: HOSPITAL | Age: 87
End: 2022-03-08
Payer: MEDICARE

## 2022-03-08 ENCOUNTER — HOSPITAL ENCOUNTER (OUTPATIENT)
Facility: HOSPITAL | Age: 87
Discharge: HOME | End: 2022-03-08
Attending: INTERNAL MEDICINE | Admitting: INTERNAL MEDICINE
Payer: MEDICARE

## 2022-03-08 VITALS
DIASTOLIC BLOOD PRESSURE: 81 MMHG | SYSTOLIC BLOOD PRESSURE: 170 MMHG | HEART RATE: 62 BPM | OXYGEN SATURATION: 100 % | TEMPERATURE: 98 F | RESPIRATION RATE: 15 BRPM

## 2022-03-08 DIAGNOSIS — R13.10 DYSPHAGIA: ICD-10-CM

## 2022-03-08 LAB
CTP QC/QA: YES
SARS-COV-2 AG RESP QL IA.RAPID: NEGATIVE

## 2022-03-08 PROCEDURE — 43249 ESOPH EGD DILATION <30 MM: CPT | Performed by: INTERNAL MEDICINE

## 2022-03-08 PROCEDURE — 43249 PR EGD, FLEX, W/BALL DILATION, < 30MM: ICD-10-PCS | Mod: ,,, | Performed by: INTERNAL MEDICINE

## 2022-03-08 PROCEDURE — 37000009 HC ANESTHESIA EA ADD 15 MINS: Performed by: INTERNAL MEDICINE

## 2022-03-08 PROCEDURE — C1769 GUIDE WIRE: HCPCS | Performed by: INTERNAL MEDICINE

## 2022-03-08 PROCEDURE — 88305 TISSUE EXAM BY PATHOLOGIST: CPT | Mod: 26,,, | Performed by: PATHOLOGY

## 2022-03-08 PROCEDURE — 27201012 HC FORCEPS, HOT/COLD, DISP: Performed by: INTERNAL MEDICINE

## 2022-03-08 PROCEDURE — 43249 ESOPH EGD DILATION <30 MM: CPT | Mod: ,,, | Performed by: INTERNAL MEDICINE

## 2022-03-08 PROCEDURE — 88305 TISSUE EXAM BY PATHOLOGIST: ICD-10-PCS | Mod: 26,,, | Performed by: PATHOLOGY

## 2022-03-08 PROCEDURE — 88342 IMHCHEM/IMCYTCHM 1ST ANTB: CPT | Performed by: PATHOLOGY

## 2022-03-08 PROCEDURE — D9220A PRA ANESTHESIA: ICD-10-PCS | Mod: CRNA,,, | Performed by: STUDENT IN AN ORGANIZED HEALTH CARE EDUCATION/TRAINING PROGRAM

## 2022-03-08 PROCEDURE — 27200942: Performed by: INTERNAL MEDICINE

## 2022-03-08 PROCEDURE — 88342 CHG IMMUNOCYTOCHEMISTRY: ICD-10-PCS | Mod: 26,,, | Performed by: PATHOLOGY

## 2022-03-08 PROCEDURE — 88305 TISSUE EXAM BY PATHOLOGIST: CPT | Mod: 59 | Performed by: PATHOLOGY

## 2022-03-08 PROCEDURE — D9220A PRA ANESTHESIA: Mod: ANES,,, | Performed by: ANESTHESIOLOGY

## 2022-03-08 PROCEDURE — D9220A PRA ANESTHESIA: Mod: CRNA,,, | Performed by: STUDENT IN AN ORGANIZED HEALTH CARE EDUCATION/TRAINING PROGRAM

## 2022-03-08 PROCEDURE — 43239 EGD BIOPSY SINGLE/MULTIPLE: CPT | Mod: 59,,, | Performed by: INTERNAL MEDICINE

## 2022-03-08 PROCEDURE — 63600175 PHARM REV CODE 636 W HCPCS: Performed by: STUDENT IN AN ORGANIZED HEALTH CARE EDUCATION/TRAINING PROGRAM

## 2022-03-08 PROCEDURE — 25000242 PHARM REV CODE 250 ALT 637 W/ HCPCS: Performed by: STUDENT IN AN ORGANIZED HEALTH CARE EDUCATION/TRAINING PROGRAM

## 2022-03-08 PROCEDURE — 25000003 PHARM REV CODE 250: Performed by: STUDENT IN AN ORGANIZED HEALTH CARE EDUCATION/TRAINING PROGRAM

## 2022-03-08 PROCEDURE — 37000008 HC ANESTHESIA 1ST 15 MINUTES: Performed by: INTERNAL MEDICINE

## 2022-03-08 PROCEDURE — 43239 EGD BIOPSY SINGLE/MULTIPLE: CPT | Performed by: INTERNAL MEDICINE

## 2022-03-08 PROCEDURE — D9220A PRA ANESTHESIA: ICD-10-PCS | Mod: ANES,,, | Performed by: ANESTHESIOLOGY

## 2022-03-08 PROCEDURE — 88342 IMHCHEM/IMCYTCHM 1ST ANTB: CPT | Mod: 26,,, | Performed by: PATHOLOGY

## 2022-03-08 PROCEDURE — 43239 PR EGD, FLEX, W/BIOPSY, SGL/MULTI: ICD-10-PCS | Mod: 59,,, | Performed by: INTERNAL MEDICINE

## 2022-03-08 RX ORDER — ALBUTEROL SULFATE 90 UG/1
AEROSOL, METERED RESPIRATORY (INHALATION)
Status: COMPLETED
Start: 2022-03-08 | End: 2022-03-08

## 2022-03-08 RX ORDER — LIDOCAINE HYDROCHLORIDE 20 MG/ML
INJECTION, SOLUTION EPIDURAL; INFILTRATION; INTRACAUDAL; PERINEURAL
Status: DISCONTINUED
Start: 2022-03-08 | End: 2022-03-08 | Stop reason: HOSPADM

## 2022-03-08 RX ORDER — LIDOCAINE HYDROCHLORIDE 10 MG/ML
1 INJECTION, SOLUTION EPIDURAL; INFILTRATION; INTRACAUDAL; PERINEURAL ONCE
Status: DISCONTINUED | OUTPATIENT
Start: 2022-03-08 | End: 2022-03-08 | Stop reason: HOSPADM

## 2022-03-08 RX ORDER — ALBUTEROL SULFATE 90 UG/1
AEROSOL, METERED RESPIRATORY (INHALATION)
Status: DISCONTINUED | OUTPATIENT
Start: 2022-03-08 | End: 2022-03-08

## 2022-03-08 RX ORDER — LIDOCAINE HYDROCHLORIDE 20 MG/ML
INJECTION INTRAVENOUS
Status: DISCONTINUED | OUTPATIENT
Start: 2022-03-08 | End: 2022-03-08

## 2022-03-08 RX ORDER — PHENYLEPHRINE HYDROCHLORIDE 10 MG/ML
INJECTION INTRAVENOUS
Status: DISCONTINUED | OUTPATIENT
Start: 2022-03-08 | End: 2022-03-08

## 2022-03-08 RX ORDER — PROPOFOL 10 MG/ML
VIAL (ML) INTRAVENOUS
Status: DISCONTINUED | OUTPATIENT
Start: 2022-03-08 | End: 2022-03-08

## 2022-03-08 RX ORDER — SODIUM CHLORIDE 9 MG/ML
INJECTION, SOLUTION INTRAVENOUS CONTINUOUS
Status: DISCONTINUED | OUTPATIENT
Start: 2022-03-08 | End: 2022-03-08 | Stop reason: HOSPADM

## 2022-03-08 RX ORDER — PHENYLEPHRINE HCL IN 0.9% NACL 1 MG/10 ML
SYRINGE (ML) INTRAVENOUS
Status: DISCONTINUED
Start: 2022-03-08 | End: 2022-03-08 | Stop reason: HOSPADM

## 2022-03-08 RX ORDER — PROPOFOL 10 MG/ML
INJECTION, EMULSION INTRAVENOUS
Status: DISCONTINUED
Start: 2022-03-08 | End: 2022-03-08 | Stop reason: HOSPADM

## 2022-03-08 RX ADMIN — PROPOFOL 20 MG: 10 INJECTION, EMULSION INTRAVENOUS at 12:03

## 2022-03-08 RX ADMIN — ALBUTEROL SULFATE 2 PUFF: 90 AEROSOL, METERED RESPIRATORY (INHALATION) at 12:03

## 2022-03-08 RX ADMIN — PROPOFOL 40 MG: 10 INJECTION, EMULSION INTRAVENOUS at 12:03

## 2022-03-08 RX ADMIN — PROPOFOL 10 MG: 10 INJECTION, EMULSION INTRAVENOUS at 12:03

## 2022-03-08 RX ADMIN — PROPOFOL 30 MG: 10 INJECTION, EMULSION INTRAVENOUS at 12:03

## 2022-03-08 RX ADMIN — PROPOFOL 50 MG: 10 INJECTION, EMULSION INTRAVENOUS at 12:03

## 2022-03-08 RX ADMIN — PHENYLEPHRINE HYDROCHLORIDE 100 MCG: 10 INJECTION INTRAVENOUS at 12:03

## 2022-03-08 RX ADMIN — SODIUM CHLORIDE: 0.9 INJECTION, SOLUTION INTRAVENOUS at 12:03

## 2022-03-08 RX ADMIN — LIDOCAINE HYDROCHLORIDE 100 MG: 20 INJECTION, SOLUTION INTRAVENOUS at 12:03

## 2022-03-08 NOTE — OR NURSING
Dr Meza came in and discussed to the patient and her daughter the procedure findings and plan, and BRAVO.

## 2022-03-08 NOTE — PROVATION PATIENT INSTRUCTIONS
Discharge Summary/Instructions after an Endoscopic Procedure  Patient Name: Debbie Ding  Patient MRN: [id number]  Patient YOB: 1932  Tuesday, March 8, 2022  Rebeca Meza MD  Dear patient,  As a result of recent federal legislation (The Federal Cures Act), you may   receive lab or pathology results from your procedure in your [facility]   account before your physician is able to contact you. Your physician or   their representative will relay the results to you with their   recommendations at their soonest availability.  Thank you,  RESTRICTIONS:  During your procedure today, you received medications for sedation.  These   medications may affect your judgment, balance and coordination.  Therefore,   for 24 hours, you have the following restrictions:   - DO NOT drive a car, operate machinery, make legal/financial decisions,   sign important papers or drink alcohol.    ACTIVITY:  Today: no heavy lifting, straining or running due to procedural   sedation/anesthesia.  The following day: return to full activity including work.  DIET:  Eat and drink normally unless instructed otherwise.     TREATMENT FOR COMMON SIDE EFFECTS:  - Mild abdominal pain, nausea, belching, bloating or excessive gas:  rest,   eat lightly and use a heating pad.  - Sore Throat: treat with throat lozenges and/or gargle with warm salt   water.  - Because air was used during the procedure, expelling large amounts of air   from your rectum or belching is normal.  - If a bowel prep was taken, you may not have a bowel movement for 1-3 days.    This is normal.  SYMPTOMS TO WATCH FOR AND REPORT TO YOUR PHYSICIAN:  1. Abdominal pain or bloating, other than gas cramps.  2. Chest pain.  3. Back pain.  4. Signs of infection such as: chills or fever occurring within 24 hours   after the procedure.  5. Rectal bleeding, which would show as bright red, maroon, or black stools.   (A tablespoon of blood from the rectum is not serious, especially  if   hemorrhoids are present.)  6. Vomiting.  7. Weakness or dizziness.  GO DIRECTLY TO THE NEAREST EMERGENCY ROOM IF YOU HAVE ANY OF THE FOLLOWING:      Difficulty breathing              Chills and/or fever over 101 F   Persistent vomiting and/or vomiting blood   Severe abdominal pain   Severe chest pain   Black, tarry stools   Bleeding- more than one tablespoon   Any other symptom or condition that you feel may need urgent attention  Your doctor recommends these additional instructions:  If any biopsies were taken, your doctors clinic will contact you in 1 to 2   weeks with any results.  - Discharge patient to home.   - Resume previous diet.   - Continue present medications.   - Await pathology results.   - Bravo study is planned for 96 hours off of all acid lowering medications.     For questions, problems or results please call your physician - Rebeca Meza MD at Work:  [phone].  [facility] can be reached at [phone]     IF A COMPLICATION OR EMERGENCY SITUATION ARISES AND YOU ARE UNABLE TO REACH   YOUR PHYSICIAN - GO DIRECTLY TO THE EMERGENCY ROOM.  Rebeca Meza MD  3/8/2022 1:01:19 PM  This report has been verified and signed electronically.  Dear patient,  As a result of recent federal legislation (The Federal Cures Act), you may   receive lab or pathology results from your procedure in your [facility]   account before your physician is able to contact you. Your physician or   their representative will relay the results to you with their   recommendations at their soonest availability.  Thank you,  PROVATION

## 2022-03-08 NOTE — H&P
Short Stay Endoscopy History and Physical    PCP - Caroyln Mejia MD     Procedure - EGD with Bravo and possible dilation  ASA - per anesthesia  Mallampati - per anesthesia  History of Anesthesia problems - no  Family history Anesthesia problems -  no   Plan of anesthesia - General    HPI:  This is a 89 y.o. female here for evaluation of : Dysphagia and GERD        ROS:  Constitutional: No fevers, chills, No weight loss  CV: No chest pain  Pulm: No cough, No shortness of breath  Ophtho: No vision changes  GI: see HPI  Derm: No rash    Medical History:  has a past medical history of Allergic rhinitis, Anticoagulant long-term use, Arthritis, Asthma, Bilateral pulmonary embolism (4/27/2019), Cataract, Chronic anticoagulation (9/28/2020), Depression, Diabetes mellitus, Fibromyalgia (7/2/2012), Fibromyalgia, GERD (gastroesophageal reflux disease), Hypercholesterolemia (9/28/2020), Hypercholesterolemia (9/28/2020), Hypertension (7/2/2012), Thoracic or lumbosacral neuritis or radiculitis, unspecified, Thyroid disease, and Ulcer.    Surgical History:  has a past surgical history that includes Hysterectomy; Foot neuroma surgery (1985); and Cataract extraction (Bilateral).    Family History: family history includes Diabetes in her brother and mother; Emphysema in her maternal aunt.. Otherwise no colon cancer, inflammatory bowel disease, or GI malignancies.    Social History:  reports that she has never smoked. She has never used smokeless tobacco. She reports that she does not drink alcohol and does not use drugs.    Review of patient's allergies indicates:   Allergen Reactions    Melatonin      Other reaction(s): Other (See Comments)  Sleep Walks and has unnatural dreams    Talwin compound Hives    Talwin [pentazocine lactate] Hallucinations    Trazodone Other (See Comments)     Nightmares/sleep walk      Bentyl [dicyclomine] Anxiety       Medications:   Medications Prior to Admission   Medication Sig Dispense  Refill Last Dose    albuterol (PROVENTIL) 2.5 mg /3 mL (0.083 %) nebulizer solution Take 2.5 mg by nebulization every 6 (six) hours as needed. Rescue       albuterol (PROVENTIL/VENTOLIN HFA) 90 mcg/actuation inhaler INHALE 2 PUFFS BY MOUTH EVERY 6 HRS AS NEEDED       amLODIPine (NORVASC) 10 MG tablet Take 1 tablet (10 mg total) by mouth once daily. 90 tablet 3     apixaban (ELIQUIS) 2.5 mg Tab Take 1 tablet (2.5 mg total) by mouth 2 (two) times daily. 180 tablet 3     clobetasoL (TEMOVATE) 0.05 % external solution Pt to mix in 1 jar of cerave cream and apply to affected areas bid 50 mL 3     donepeziL (ARICEPT) 10 MG tablet TAKE 1 TABLET EVERY MORNING.  NO FURTHER REFILL WITHOUT APPOINTMENT (Patient taking differently: TAKE 1 TABLET EVERY MORNING.  NO FURTHER REFILL WITHOUT APPOINTMENT) 90 tablet 1     doxepin (SINEQUAN) 10 MG capsule TAKE 1 CAPSULE AT BEDTIME  FOR ITCHING (Patient taking differently: TAKE 1 CAPSULE AT BEDTIME  FOR ITCHING) 90 capsule 3     EScitalopram oxalate (LEXAPRO) 10 MG tablet TAKE 1/2 TABLET DAILY 45 tablet 3     esomeprazole (NEXIUM) 40 MG capsule Take 1 capsule (40 mg total) by mouth before breakfast. 90 capsule 3     flash glucose scanning reader (FREESTYLE ALEXSANDRA 14 DAY READER) Misc 1 Device by Misc.(Non-Drug; Combo Route) route 2 (two) times a day. 1 each 11     flash glucose sensor (FREESTYLE ALEXSANDRA 14 DAY SENSOR) Kit 1 Device by Misc.(Non-Drug; Combo Route) route 2 (two) times a day. 1 kit 11     fluconazole (DIFLUCAN) 200 MG Tab Take 1 tablet by mouth every day for 2 days for thrush (Patient taking differently: Take 1 tablet by mouth every day for 2 days for thrush) 2 tablet 0     fluocinonide (LIDEX) 0.05 % external solution AAA scalp qday - bid prn pruritus 60 mL 3     fluticasone-salmeterol diskus inhaler 500-50 mcg Inhale 1 puff into the lungs 2 (two) times daily. Controller (Patient not taking: Reported on 2/1/2022) 60 each 11     gabapentin (NEURONTIN) 100 MG  capsule Take by mouth.       glycopyrrolate (ROBINUL) 2 MG Tab Take 1 tablet (2 mg total) by mouth 2 (two) times daily. 180 tablet 3     ketoconazole (NIZORAL) 2 % shampoo Wash hair with medicated shampoo at least 2x/week - let sit on scalp at least 5 minutes prior to rinsing 120 mL 5     levothyroxine (SYNTHROID) 50 MCG tablet Take 1 tablet (50 mcg total) by mouth once daily. 90 tablet 3     LORazepam (ATIVAN) 0.5 MG tablet Take 1 tablet (0.5 mg total) by mouth every 12 (twelve) hours as needed for Anxiety. 20 tablet 0     meclizine (ANTIVERT) 25 mg tablet        mirtazapine (REMERON) 15 MG tablet Take 1 tablet (15 mg total) by mouth every evening. 90 tablet 3     pregabalin (LYRICA) 50 MG capsule Take 1 capsule (50 mg total) by mouth nightly. 90 capsule 1     RESTASIS 0.05 % ophthalmic emulsion           Physical Exam:    Vital Signs: There were no vitals filed for this visit.    Gen: NAD, lying comfortably  HENT: NCAT, oropharynx clear  Eyes: anicteric sclerae, EOMI grossly  Neck: supple, no visible masses/goiter  Cardiac: RRR  Lungs: non-labored breathing  Abd: soft, NT/ND, normoactive BS  Ext: no LE edema, warm, well perfused  Skin: skin intact on exposed body parts, no visible rashes, lesions  Neuro: A&Ox4, neuro exam grossly intact, moves all extremities  Psych: appropriate mood, affect      Labs:  Lab Results   Component Value Date    WBC 10.52 10/08/2021    HGB 11.9 (L) 10/08/2021    HCT 35.8 (L) 10/08/2021     10/08/2021    CHOL 208 (H) 01/11/2021    TRIG 104 01/11/2021    HDL 77 (H) 01/11/2021    ALT 8 (L) 10/04/2021    AST 18 10/04/2021     10/08/2021    K 3.9 10/08/2021     10/08/2021    CREATININE 1.3 01/14/2022    BUN 22 10/08/2021    CO2 24 10/08/2021    TSH 0.721 03/02/2021    INR 0.9 06/20/2019    HGBA1C 6.4 (H) 10/04/2021       Plan:  EGD with Bravo and possible dilation for GERD and dysphagia.    I have explained the risks and benefits of endoscopy procedures to the  patient including but not limited to bleeding, perforation, infection, and death.  The patient was asked if they understand and allowed to ask any further questions to their satisfaction.      Rebeca Meza MD

## 2022-03-08 NOTE — TRANSFER OF CARE
Anesthesia Transfer of Care Note    Patient: Debbie Ding    Procedure(s) Performed: Procedure(s) (LRB):  EGD (ESOPHAGOGASTRODUODENOSCOPY) with dilation-either [facility] with Dr. Meza (N/A)  PH MONITORING, ESOPHAGUS, WIRELESS, (OFF REFLUX MEDS) 96 hour (N/A)    Patient location: GI    Anesthesia Type: general    Transport from OR: Transported from OR on room air with adequate spontaneous ventilation    Post pain: adequate analgesia    Post assessment: no apparent anesthetic complications and tolerated procedure well    Post vital signs: stable    Level of consciousness: sedated and responds to stimulation    Nausea/Vomiting: no nausea/vomiting    Complications: none    Transfer of care protocol was followed      Last vitals:   Visit Vitals  BP (!) 140/65 (Patient Position: Lying)   Pulse 65   Temp 36.4 °C (97.5 °F) (Oral)   Resp 15   SpO2 95%   Breastfeeding No

## 2022-03-08 NOTE — ANESTHESIA POSTPROCEDURE EVALUATION
Anesthesia Post Evaluation    Patient: Debbie Ding    Procedure(s) Performed: Procedure(s) (LRB):  EGD (ESOPHAGOGASTRODUODENOSCOPY) with dilation-either [facility] with Dr. Meza (N/A)  PH MONITORING, ESOPHAGUS, WIRELESS, (OFF REFLUX MEDS) 96 hour (N/A)    Final Anesthesia Type: general      Patient location during evaluation: GI PACU  Patient participation: Yes- Able to Participate  Level of consciousness: awake and alert  Post-procedure vital signs: reviewed and stable  Pain management: adequate  Airway patency: patent    PONV status at discharge: No PONV  Anesthetic complications: no      Cardiovascular status: blood pressure returned to baseline  Respiratory status: unassisted and spontaneous ventilation  Hydration status: euvolemic  Follow-up not needed.          Vitals Value Taken Time   /81 03/08/22 1327   Temp 36.4 °C (97.5 °F) 03/08/22 1257   Pulse 62 03/08/22 1327   Resp 15 03/08/22 1327   SpO2 100 % 03/08/22 1327         Event Time   Out of Recovery 13:40:00         Pain/Mellissa Score: Mellissa Score: 10 (3/8/2022  1:27 PM)

## 2022-03-08 NOTE — ANESTHESIA PREPROCEDURE EVALUATION
03/08/2022  Debbie Ding is a 89 y.o., female.  Pre-operative evaluation for Procedure(s) (LRB):  EGD (ESOPHAGOGASTRODUODENOSCOPY) with dilation-[facility] with Dr. 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    Fibromyalgia    Primary osteoarthritis involving multiple joints    Spondylosis without myelopathy    Spinal stenosis, lumbar region, without neurogenic claudication    Acquired spondylolisthesis    Thoracic or lumbosacral neuritis or radiculitis, unspecified    DDD (degenerative disc disease), lumbar    Neck pain    Atrophy of nasal turbinates    GERD (gastroesophageal reflux disease)    Chronic bilateral low back pain without sciatica    Decreased strength of trunk and back    Chronic asthma    Shortness of breath    Allergic rhinitis    Vestibular dizziness    Hearing loss    Hypothyroidism    Pseudophakia of both eyes    PUD (peptic ulcer disease)    Urinary, incontinence, stress female    Impaired functional mobility, balance, gait, and endurance    Hypokalemia    Debility    Hyperhidrosis    Chronic kidney disease, stage III (moderate)    History of pulmonary embolism    Overactive bladder    Diabetes mellitus, type 2    History of deep venous thrombosis    Chronic anticoagulation    Hypercholesterolemia    Sleep walking    JASE (obstructive sleep apnea)    Chronic neck pain    Unspecified inflammatory spondylopathy, lumbar region    Asthma exacerbation       Review of patient's allergies indicates:   Allergen Reactions    Melatonin      Other  reaction(s): Other (See Comments)  Sleep Walks and has unnatural dreams    Talwin compound Hives    Talwin [pentazocine lactate] Hallucinations    Trazodone Other (See Comments)     Nightmares/sleep walk      Bentyl [dicyclomine] Anxiety       No current facility-administered medications on file prior to encounter.     Current Outpatient Medications on File Prior to Encounter   Medication Sig Dispense Refill    albuterol (PROVENTIL) 2.5 mg /3 mL (0.083 %) nebulizer solution Take 2.5 mg by nebulization every 6 (six) hours as needed. Rescue      albuterol (PROVENTIL/VENTOLIN HFA) 90 mcg/actuation inhaler INHALE 2 PUFFS BY MOUTH EVERY 6 HRS AS NEEDED      amLODIPine (NORVASC) 10 MG tablet Take 1 tablet (10 mg total) by mouth once daily. 90 tablet 3    apixaban (ELIQUIS) 2.5 mg Tab Take 1 tablet (2.5 mg total) by mouth 2 (two) times daily. 180 tablet 3    clobetasoL (TEMOVATE) 0.05 % external solution Pt to mix in 1 jar of cerave cream and apply to affected areas bid 50 mL 3    donepeziL (ARICEPT) 10 MG tablet TAKE 1 TABLET EVERY MORNING.  NO FURTHER REFILL WITHOUT APPOINTMENT (Patient taking differently: TAKE 1 TABLET EVERY MORNING.  NO FURTHER REFILL WITHOUT APPOINTMENT) 90 tablet 1    doxepin (SINEQUAN) 10 MG capsule TAKE 1 CAPSULE AT BEDTIME  FOR ITCHING (Patient taking differently: TAKE 1 CAPSULE AT BEDTIME  FOR ITCHING) 90 capsule 3    EScitalopram oxalate (LEXAPRO) 10 MG tablet TAKE 1/2 TABLET DAILY 45 tablet 3    esomeprazole (NEXIUM) 40 MG capsule Take 1 capsule (40 mg total) by mouth before breakfast. 90 capsule 3    flash glucose scanning reader (FREESTYLE AELXSANDRA 14 DAY READER) Misc 1 Device by Misc.(Non-Drug; Combo Route) route 2 (two) times a day. 1 each 11    flash glucose sensor (FREESTYLE ALEXSANDRA 14 DAY SENSOR) Kit 1 Device by Misc.(Non-Drug; Combo Route) route 2 (two) times a day. 1 kit 11    fluconazole (DIFLUCAN) 200 MG Tab Take 1 tablet by mouth every day for 2 days for thrush (Patient  taking differently: Take 1 tablet by mouth every day for 2 days for thrush) 2 tablet 0    fluocinonide (LIDEX) 0.05 % external solution AAA scalp qday - bid prn pruritus 60 mL 3    fluticasone-salmeterol diskus inhaler 500-50 mcg Inhale 1 puff into the lungs 2 (two) times daily. Controller (Patient not taking: Reported on 2/1/2022) 60 each 11    gabapentin (NEURONTIN) 100 MG capsule Take by mouth.      glycopyrrolate (ROBINUL) 2 MG Tab Take 1 tablet (2 mg total) by mouth 2 (two) times daily. 180 tablet 3    ketoconazole (NIZORAL) 2 % shampoo Wash hair with medicated shampoo at least 2x/week - let sit on scalp at least 5 minutes prior to rinsing 120 mL 5    levothyroxine (SYNTHROID) 50 MCG tablet Take 1 tablet (50 mcg total) by mouth once daily. 90 tablet 3    LORazepam (ATIVAN) 0.5 MG tablet Take 1 tablet (0.5 mg total) by mouth every 12 (twelve) hours as needed for Anxiety. 20 tablet 0    meclizine (ANTIVERT) 25 mg tablet       mirtazapine (REMERON) 15 MG tablet Take 1 tablet (15 mg total) by mouth every evening. 90 tablet 3    pregabalin (LYRICA) 50 MG capsule Take 1 capsule (50 mg total) by mouth nightly. 90 capsule 1    RESTASIS 0.05 % ophthalmic emulsion          Past Surgical History:   Procedure Laterality Date    CATARACT EXTRACTION Bilateral     FOOT NEUROMA SURGERY  1985    HYSTERECTOMY         Social History     Socioeconomic History    Marital status:    Occupational History     Comment:  - school    Tobacco Use    Smoking status: Never Smoker    Smokeless tobacco: Never Used   Substance and Sexual Activity    Alcohol use: No    Drug use: No    Sexual activity: Not Currently   Social History Narrative    Lives with daughter, Benjie.        Other daughter, Madina, drives her around.        She lives independently, except for driving.          Stretches in bed.  Walks in neighborhood, and in house several times a day.     Social Determinants of Health     Financial  Resource Strain: Low Risk     Difficulty of Paying Living Expenses: Not hard at all   Food Insecurity: No Food Insecurity    Worried About Running Out of Food in the Last Year: Never true    Ran Out of Food in the Last Year: Never true   Transportation Needs: No Transportation Needs    Lack of Transportation (Medical): No    Lack of Transportation (Non-Medical): No   Physical Activity: Insufficiently Active    Days of Exercise per Week: 3 days    Minutes of Exercise per Session: 20 min   Stress: No Stress Concern Present    Feeling of Stress : Not at all   Social Connections: Moderately Isolated    Frequency of Communication with Friends and Family: More than three times a week    Frequency of Social Gatherings with Friends and Family: More than three times a week    Attends [employer] Services: More than 4 times per year    Active Member of Clubs or Organizations: No    Attends Club or Organization Meetings: Never    Marital Status:    Housing Stability: Low Risk     Unable to Pay for Housing in the Last Year: No    Number of Places Lived in the Last Year: 1    Unstable Housing in the Last Year: No         Lab Results   Component Value Date    WBC 10.52 10/08/2021    HGB 11.9 (L) 10/08/2021    HCT 35.8 (L) 10/08/2021    MCV 87 10/08/2021     10/08/2021       CMP  Sodium   Date Value Ref Range Status   10/08/2021 141 136 - 145 mmol/L Final     Potassium   Date Value Ref Range Status   10/08/2021 3.9 3.5 - 5.1 mmol/L Final     Chloride   Date Value Ref Range Status   10/08/2021 106 95 - 110 mmol/L Final     CO2   Date Value Ref Range Status   10/08/2021 24 23 - 29 mmol/L Final     Glucose   Date Value Ref Range Status   10/08/2021 110 70 - 110 mg/dL Final     BUN   Date Value Ref Range Status   10/08/2021 22 8 - 23 mg/dL Final     Creatinine   Date Value Ref Range Status   01/14/2022 1.3 0.5 - 1.4 mg/dL Final     Calcium   Date Value Ref Range Status   10/08/2021 9.3 8.7 - 10.5 mg/dL  Final     Total Protein   Date Value Ref Range Status   10/04/2021 7.9 6.0 - 8.4 g/dL Final     Albumin   Date Value Ref Range Status   10/04/2021 3.7 3.5 - 5.2 g/dL Final     Total Bilirubin   Date Value Ref Range Status   10/04/2021 1.1 (H) 0.1 - 1.0 mg/dL Final     Comment:     For infants and newborns, interpretation of results should be based  on gestational age, weight and in agreement with clinical  observations.    Premature Infant recommended reference ranges:  Up to 24 hours.............<8.0 mg/dL  Up to 48 hours............<12.0 mg/dL  3-5 days..................<15.0 mg/dL  6-29 days.................<15.0 mg/dL       Alkaline Phosphatase   Date Value Ref Range Status   10/04/2021 82 55 - 135 U/L Final     AST   Date Value Ref Range Status   10/04/2021 18 10 - 40 U/L Final     ALT   Date Value Ref Range Status   10/04/2021 8 (L) 10 - 44 U/L Final     Anion Gap   Date Value Ref Range Status   10/08/2021 11 8 - 16 mmol/L Final     eGFR if    Date Value Ref Range Status   01/14/2022 42 (A) >60 mL/min/1.73 m^2 Final     eGFR if non    Date Value Ref Range Status   01/14/2022 36 (A) >60 mL/min/1.73 m^2 Final     Comment:     Calculation used to obtain the estimated glomerular filtration  rate (eGFR) is the CKD-EPI equation.            Diagnostic Studies:      EKG:  Vent. Rate : 111 BPM     Atrial Rate : 111 BPM      P-R Int : 126 ms          QRS Dur : 106 ms       QT Int : 380 ms       P-R-T Axes : 076 085 054 degrees      QTc Int : 516 ms     Sinus tachycardia   Incomplete right bundle branch block   Nonspecific ST and T wave abnormality   Abnormal ECG     Confirmed by Poppy Nelson MD ([id number]) on 10/5/2021 4:42:32 PM     2D Echo:  4/2019  · Concentric left ventricular remodeling.  · Normal left ventricular systolic function. The estimated ejection fraction is 40%  · Grade I (mild) left ventricular diastolic dysfunction consistent with impaired relaxation.  · No wall motion  abnormalities.  · Normal left atrial pressure.  · Normal right ventricular systolic function.  · No pulmonary hypertension present.  · Normal central venous pressure (3 mm Hg).  · The estimated PA systolic pressure is 34 mm Hg      Results for orders placed or performed during the hospital encounter of 03/15/17   2D echo with color flow doppler   Result Value Ref Range    EF + QEF 65 55 - 65    Diastolic Dysfunction No     Aortic Valve Regurgitation MILD     Est. PA Systolic Pressure 36     Tricuspid Valve Regurgitation MILD            Pre-op Assessment    I have reviewed the Patient Summary Reports.     I have reviewed the Nursing Notes. I have reviewed the NPO Status.   I have reviewed the Medications.     Review of Systems  Anesthesia Hx:  No problems with previous Anesthesia  History of prior surgery of interest to airway management or planning:  Denies Personal Hx of Anesthesia complications.   Social:  Non-Smoker    Hematology/Oncology:  Hematology Normal   Oncology Normal     EENT/Dental:EENT/Dental Normal   Cardiovascular:   Exercise tolerance: poor Hypertension hyperlipidemia ECG has been reviewed. PE hx on Eliquis   Pulmonary:   Asthma Shortness of breath Sleep Apnea    Education provided regarding risk of obstructive sleep apnea     Renal/:   Chronic Renal Disease    Hepatic/GI:   PUD, GERD    Musculoskeletal:   Arthritis     Neurological:   Neuromuscular Disease,    Endocrine:   Diabetes, type 2 Hypothyroidism    Psych:   Psychiatric History          Physical Exam  General: Well nourished    Airway:  Mallampati: III   Mouth Opening: Normal  TM Distance: Normal  Tongue: Normal  Neck ROM: Normal ROM    Dental:  Dentures        Anesthesia Plan  Type of Anesthesia, risks & benefits discussed:    Anesthesia Type: Gen Natural Airway, MAC  Intra-op Monitoring Plan: Standard ASA Monitors  Post Op Pain Control Plan: multimodal analgesia  Induction:  IV  Informed Consent: Informed consent signed with the Patient  and all parties understand the risks and agree with anesthesia plan.  All questions answered.   ASA Score: 3  Day of Surgery Review of History & Physical: H&P Update referred to the surgeon/provider.  Anesthesia Plan Notes: Albuterol preop    Ready For Surgery From Anesthesia Perspective.     .

## 2022-03-08 NOTE — PLAN OF CARE
Patient alert and oriented, steady gait; discharge criteria met, instructions given to the patient and her daughter. Patient denies any pain nor complaints; has all her valuables/belongings.

## 2022-03-11 LAB
FINAL PATHOLOGIC DIAGNOSIS: NORMAL
GROSS: NORMAL
Lab: NORMAL
SUPPLEMENTAL DIAGNOSIS: NORMAL

## 2022-03-16 ENCOUNTER — TELEPHONE (OUTPATIENT)
Dept: GASTROENTEROLOGY | Facility: CLINIC | Age: 87
End: 2022-03-16
Payer: MEDICARE

## 2022-03-16 PROCEDURE — 91035 PR GERD TST W/ MUCOS PH ELECTROD: ICD-10-PCS | Mod: 26,59,, | Performed by: INTERNAL MEDICINE

## 2022-03-16 PROCEDURE — 91035 G-ESOPH REFLX TST W/ELECTROD: CPT | Mod: 26,59,, | Performed by: INTERNAL MEDICINE

## 2022-03-16 NOTE — TELEPHONE ENCOUNTER
MA returned patient's call.     Mrs. Ding is calling for test results. She would also like to know if the procedure should have helped with her breathing? She is still experiencing shortness of breath.

## 2022-03-16 NOTE — TELEPHONE ENCOUNTER
----- Message from Sandra Mullen sent at 3/16/2022  9:37 AM CDT -----  Regarding: Call back request  Contact: Pt  Pt requesting call back in regards to her procedure.    Pt @ [phone]

## 2022-03-16 NOTE — [employer]ATION PATIENT INSTRUCTIONS
Discharge Summary/Instructions after an Endoscopic Procedure  Patient Name: Debbie Ding  Patient MRN: [id number]  Patient YOB: 1932  Wednesday, March 16, 2022  Rebeca Meza MD  Dear patient,  As a result of recent federal legislation (The Federal Cures Act), you may   receive lab or pathology results from your procedure in your [facility]   account before your physician is able to contact you. Your physician or   their representative will relay the results to you with their   recommendations at their soonest availability.  Thank you,  RESTRICTIONS:  During your procedure today, you received medications for sedation.  These   medications may affect your judgment, balance and coordination.  Therefore,   for 24 hours, you have the following restrictions:   - DO NOT drive a car, operate machinery, make legal/financial decisions,   sign important papers or drink alcohol.    ACTIVITY:  Today: no heavy lifting, straining or running due to procedural   sedation/anesthesia.  The following day: return to full activity including work.  DIET:  Eat and drink normally unless instructed otherwise.     TREATMENT FOR COMMON SIDE EFFECTS:  - Mild abdominal pain, nausea, belching, bloating or excessive gas:  rest,   eat lightly and use a heating pad.  - Sore Throat: treat with throat lozenges and/or gargle with warm salt   water.  - Because air was used during the procedure, expelling large amounts of air   from your rectum or belching is normal.  - If a bowel prep was taken, you may not have a bowel movement for 1-3 days.    This is normal.  SYMPTOMS TO WATCH FOR AND REPORT TO YOUR PHYSICIAN:  1. Abdominal pain or bloating, other than gas cramps.  2. Chest pain.  3. Back pain.  4. Signs of infection such as: chills or fever occurring within 24 hours   after the procedure.  5. Rectal bleeding, which would show as bright red, maroon, or black stools.   (A tablespoon of blood from the rectum is not serious, especially  if   hemorrhoids are present.)  6. Vomiting.  7. Weakness or dizziness.  GO DIRECTLY TO THE NEAREST EMERGENCY ROOM IF YOU HAVE ANY OF THE FOLLOWING:      Difficulty breathing              Chills and/or fever over 101 F   Persistent vomiting and/or vomiting blood   Severe abdominal pain   Severe chest pain   Black, tarry stools   Bleeding- more than one tablespoon   Any other symptom or condition that you feel may need urgent attention  Your doctor recommends these additional instructions:  If any biopsies were taken, your doctors clinic will contact you in 1 to 2   weeks with any results.  Follow up in GI clinic.  For questions, problems or results please call your physician - Rebeca Meza MD at Work:  [phone].  [facility] can be reached at [phone]     IF A COMPLICATION OR EMERGENCY SITUATION ARISES AND YOU ARE UNABLE TO REACH   YOUR PHYSICIAN - GO DIRECTLY TO THE EMERGENCY ROOM.  Rebeca Meza MD  3/16/2022 4:04:16 PM  This report has been verified and signed electronically.  Dear patient,  As a result of recent federal legislation (The Federal Cures Act), you may   receive lab or pathology results from your procedure in your [facility]   account before your physician is able to contact you. Your physician or   their representative will relay the results to you with their   recommendations at their soonest availability.  Thank you,  PROVATION

## 2022-03-17 ENCOUNTER — TELEPHONE (OUTPATIENT)
Dept: INTERNAL MEDICINE | Facility: CLINIC | Age: 87
End: 2022-03-17
Payer: MEDICARE

## 2022-03-17 ENCOUNTER — TELEPHONE (OUTPATIENT)
Dept: GASTROENTEROLOGY | Facility: CLINIC | Age: 87
End: 2022-03-17
Payer: MEDICARE

## 2022-03-17 NOTE — TELEPHONE ENCOUNTER
Pt states that she is loosing weight at a rapid rate. Pt wants to know if she is a diabetic or if she has any form of caner. Pt would like pcp to order test asap.

## 2022-03-17 NOTE — TELEPHONE ENCOUNTER
----- Message from Kirsty Mullen sent at 3/17/2022 10:25 AM CDT -----  Regarding: speak with doctor  Contact: patient  [phone]    please call patient ASAP very upset that no one has reached out to her with results from procedure said someone need to call her ASAP thanks.

## 2022-03-17 NOTE — TELEPHONE ENCOUNTER
----- Message from Phuong Patel sent at 3/17/2022 12:43 PM CDT -----  Contact: [place] [phone]  Patient would like to get medical advice.  Symptoms (please be specific):  loosing weight at rapid speed  How long have you had these symptoms: for a while and gotten worse  Would you like a call back, or a response through your [facility] portal?:   call back  Pharmacy name and phone # (copy from chart):    Comments:

## 2022-03-17 NOTE — TELEPHONE ENCOUNTER
[facility]    Discussed with Ms. Ding that her Bravo study was positive.  She feels that some of her symptoms are improved with Nexium 40mg BID.  Unfortunately her SOB, cough and hoarseness are not better.  She has upcoming follow up with Dr. Suazo with Pulmonary and Dr. Ridley with ENT to further evaluate these symptoms.    Rebeca Meza MD

## 2022-03-18 NOTE — TELEPHONE ENCOUNTER
We did complete eval for wt loss with cat scans, and there was no evidence for cancer.    If wt loss is progressive she should make appt with me or Mandy

## 2022-03-23 ENCOUNTER — TELEPHONE (OUTPATIENT)
Dept: INTERNAL MEDICINE | Facility: CLINIC | Age: 87
End: 2022-03-23

## 2022-03-23 ENCOUNTER — OFFICE VISIT (OUTPATIENT)
Dept: INTERNAL MEDICINE | Facility: CLINIC | Age: 87
End: 2022-03-23
Payer: MEDICARE

## 2022-03-23 VITALS
DIASTOLIC BLOOD PRESSURE: 60 MMHG | HEART RATE: 67 BPM | BODY MASS INDEX: 20.08 KG/M2 | HEIGHT: 65 IN | WEIGHT: 120.5 LBS | SYSTOLIC BLOOD PRESSURE: 114 MMHG | OXYGEN SATURATION: 97 %

## 2022-03-23 DIAGNOSIS — E11.9 CONTROLLED TYPE 2 DIABETES MELLITUS WITHOUT COMPLICATION, WITHOUT LONG-TERM CURRENT USE OF INSULIN: ICD-10-CM

## 2022-03-23 DIAGNOSIS — K31.84 GASTROPARESIS: Primary | ICD-10-CM

## 2022-03-23 DIAGNOSIS — K21.9 GASTROESOPHAGEAL REFLUX DISEASE WITHOUT ESOPHAGITIS: ICD-10-CM

## 2022-03-23 DIAGNOSIS — I10 HYPERTENSION, UNSPECIFIED TYPE: ICD-10-CM

## 2022-03-23 DIAGNOSIS — Z86.711 HISTORY OF PULMONARY EMBOLISM: ICD-10-CM

## 2022-03-23 DIAGNOSIS — E03.9 HYPOTHYROIDISM, UNSPECIFIED TYPE: ICD-10-CM

## 2022-03-23 PROCEDURE — 99999 PR PBB SHADOW E&M-EST. PATIENT-LVL V: CPT | Mod: PBBFAC,,, | Performed by: PHYSICIAN ASSISTANT

## 2022-03-23 PROCEDURE — 99215 PR OFFICE/OUTPT VISIT, EST, LEVL V, 40-54 MIN: ICD-10-PCS | Mod: S$PBB,,, | Performed by: PHYSICIAN ASSISTANT

## 2022-03-23 PROCEDURE — 99999 PR PBB SHADOW E&M-EST. PATIENT-LVL V: ICD-10-PCS | Mod: PBBFAC,,, | Performed by: PHYSICIAN ASSISTANT

## 2022-03-23 PROCEDURE — 99215 OFFICE O/P EST HI 40 MIN: CPT | Mod: PBBFAC | Performed by: PHYSICIAN ASSISTANT

## 2022-03-23 PROCEDURE — 99215 OFFICE O/P EST HI 40 MIN: CPT | Mod: S$PBB,,, | Performed by: PHYSICIAN ASSISTANT

## 2022-03-23 RX ORDER — METOCLOPRAMIDE 5 MG/1
5 TABLET ORAL 2 TIMES DAILY
Qty: 60 TABLET | Refills: 3 | Status: SHIPPED | OUTPATIENT
Start: 2022-03-23 | End: 2022-04-04

## 2022-03-23 NOTE — PROGRESS NOTES
Subjective:       Patient ID: Debbie Ding is a 89 y.o. female.        Chief Complaint: Weight Loss    Debbie Ding is an established patient of Carolyn Mejia MD here today for urgent care visit.    Weight loss since 10/2021, about 20# total, additional 4# over past month, lowest weight is today, 120#  Continues to have little appetite, gets full easily    24 hour recall  1/2 cup grits  1 slice diego, 1 egg  6 plain crackers with cheese    Work up so far  CT chest/abdomen/pelvis 1/2022 with nothing to explain weight loss  Saw GI and gastric emptying study confirmed gastropearesis 2/3/22, she did not want to take reglan due to concern for side effects, so referred to GI dietician  EGD 3/2022 with bravo pH positive, so continued on nexium 40 mg BID    Started on remeron and titrated to 15 mg to help with weight gain around 6/2021, initially helped to regain some weight, cannot take megace due to h/o DVT/PE    Baseline weight around 140#    TSH normal 4/2021, colonoscopy 6/2019, mammogram 1/2022    Past medical history -   1.  Anxiety, grief - lorazepam prn, lexapro 10 mg daily, feels mood has been fine    2.  Amlodipine for hypertension     3.  Hypothyroidism tx with synthroid      4.  H/o left shoulder pain that comes/goes     5.  Asthma is followed by Dr. Suazo and controlled with advair, albuterol prn     6.  She is on eliquis for h/o PE/DVT          Review of Systems   Constitutional: Positive for appetite change and unexpected weight change. Negative for chills, diaphoresis, fatigue and fever.   HENT: Negative for congestion and sore throat.    Eyes: Negative for visual disturbance.   Respiratory: Negative for cough, chest tightness and shortness of breath.    Cardiovascular: Negative for chest pain, palpitations and leg swelling.   Gastrointestinal: Negative for abdominal pain, blood in stool, constipation, diarrhea, nausea and vomiting.   Genitourinary: Negative for dysuria, frequency, hematuria and  urgency.   Musculoskeletal: Negative for arthralgias and back pain.   Skin: Negative for rash.   Neurological: Negative for dizziness, syncope, weakness and headaches.   Psychiatric/Behavioral: Negative for dysphoric mood and sleep disturbance. The patient is not nervous/anxious.        Objective:      Physical Exam  Vitals and nursing note reviewed.   Constitutional:       Appearance: Normal appearance. She is well-developed.   HENT:      Head: Normocephalic.      Right Ear: External ear normal.      Left Ear: External ear normal.   Eyes:      Pupils: Pupils are equal, round, and reactive to light.   Cardiovascular:      Rate and Rhythm: Normal rate and regular rhythm.      Heart sounds: Normal heart sounds. No murmur heard.    No friction rub. No gallop.   Pulmonary:      Effort: Pulmonary effort is normal. No respiratory distress.      Breath sounds: Normal breath sounds.   Abdominal:      Palpations: Abdomen is soft.      Tenderness: There is no abdominal tenderness.   Skin:     General: Skin is warm and dry.   Neurological:      Mental Status: She is alert.         Assessment:       1. Gastroparesis    2. Controlled type 2 diabetes mellitus without complication, without long-term current use of insulin    3. Hypertension, unspecified type    4. History of pulmonary embolism    5. Hypothyroidism, unspecified type    6. Gastroesophageal reflux disease without esophagitis        Plan:       Debbie was seen today for weight loss.    Diagnoses and all orders for this visit:    Gastroparesis  -     START: metoclopramide HCl (REGLAN) 5 MG tablet; Take 1 tablet (5 mg total) by mouth 2 (two) times a day.    Controlled type 2 diabetes mellitus without complication, without long-term current use of insulin  -     Hemoglobin A1C; Future    Hypertension, unspecified type  -     CBC Auto Differential; Future  -     Comprehensive Metabolic Panel; Future  -     Lipid Panel; Future    History of pulmonary  "embolism    Hypothyroidism, unspecified type  -     TSH; Future    Gastroesophageal reflux disease without esophagitis - continue nexium 40 mg BID    Discussed reglan at length with patient and she agrees to short trial, possible side effects reviewed  Given ongoing weight loss, no appetite, and gastroparesis will try reglan and have her f/u in 4 weeks  Labs prior to f/u in 4 weeks    >45 minutes spent with patient today    Pt has been given instructions populated from [employer] database and has verbalized understanding of the after visit summary and information contained wherein.    Follow up with a primary care provider. May go to ER for acute shortness of breath, lightheadedness, fever, or any other emergent complaints or changes in condition.    "This note will be shared with the patient"    Future Appointments   Date Time Provider Department Center   4/6/2022  3:15 PM Arturo Teresa DPM [facility] POD [facility]   4/20/2022 10:45 AM Ruthie Grijalva MD [facility] DERM [facility]   5/2/2022  8:30 AM CHELSEA BradfordC [facility] IM [facility] PCW   5/4/2022  1:00 PM Devin Ridley MD [facility] VOI [facility]                 "

## 2022-03-31 ENCOUNTER — EXTERNAL CHRONIC CARE MANAGEMENT (OUTPATIENT)
Dept: PRIMARY CARE CLINIC | Facility: CLINIC | Age: 87
End: 2022-03-31
Payer: MEDICARE

## 2022-03-31 ENCOUNTER — TELEPHONE (OUTPATIENT)
Dept: GASTROENTEROLOGY | Facility: CLINIC | Age: 87
End: 2022-03-31
Payer: MEDICARE

## 2022-03-31 PROCEDURE — 99490 PR CHRONIC CARE MGMT, 1ST 20 MIN: ICD-10-PCS | Mod: S$PBB,,, | Performed by: INTERNAL MEDICINE

## 2022-03-31 PROCEDURE — 99490 CHRNC CARE MGMT STAFF 1ST 20: CPT | Mod: PBBFAC | Performed by: INTERNAL MEDICINE

## 2022-03-31 PROCEDURE — 99490 CHRNC CARE MGMT STAFF 1ST 20: CPT | Mod: S$PBB,,, | Performed by: INTERNAL MEDICINE

## 2022-03-31 NOTE — TELEPHONE ENCOUNTER
----- Message from Lyssa Quintero sent at 3/31/2022  8:50 AM CDT -----  Contact: @[phone]  Pt requesting a call back regarding wanting to know she is experiencing a cough since her procedure was done a few weeks back.  Please call to discuss further.

## 2022-04-01 ENCOUNTER — TELEPHONE (OUTPATIENT)
Dept: INTERNAL MEDICINE | Facility: CLINIC | Age: 87
End: 2022-04-01
Payer: MEDICARE

## 2022-04-01 ENCOUNTER — OFFICE VISIT (OUTPATIENT)
Dept: ORTHOPEDICS | Facility: CLINIC | Age: 87
End: 2022-04-01
Payer: MEDICARE

## 2022-04-01 VITALS
DIASTOLIC BLOOD PRESSURE: 69 MMHG | HEIGHT: 65 IN | WEIGHT: 119.69 LBS | HEART RATE: 65 BPM | SYSTOLIC BLOOD PRESSURE: 146 MMHG | BODY MASS INDEX: 19.94 KG/M2

## 2022-04-01 DIAGNOSIS — M17.11 PRIMARY OSTEOARTHRITIS OF RIGHT KNEE: Primary | ICD-10-CM

## 2022-04-01 DIAGNOSIS — M25.461 EFFUSION, RIGHT KNEE: ICD-10-CM

## 2022-04-01 PROCEDURE — 99213 PR OFFICE/OUTPT VISIT, EST, LEVL III, 20-29 MIN: ICD-10-PCS | Mod: S$PBB,25,, | Performed by: PHYSICIAN ASSISTANT

## 2022-04-01 PROCEDURE — 20610 PR DRAIN/INJECT LARGE JOINT/BURSA: ICD-10-PCS | Mod: S$PBB,RT,, | Performed by: PHYSICIAN ASSISTANT

## 2022-04-01 PROCEDURE — 99214 OFFICE O/P EST MOD 30 MIN: CPT | Mod: PBBFAC,25 | Performed by: PHYSICIAN ASSISTANT

## 2022-04-01 PROCEDURE — 99999 PR PBB SHADOW E&M-EST. PATIENT-LVL IV: ICD-10-PCS | Mod: PBBFAC,,, | Performed by: PHYSICIAN ASSISTANT

## 2022-04-01 PROCEDURE — 20610 DRAIN/INJ JOINT/BURSA W/O US: CPT | Mod: PBBFAC,RT | Performed by: PHYSICIAN ASSISTANT

## 2022-04-01 PROCEDURE — 20610 DRAIN/INJ JOINT/BURSA W/O US: CPT | Mod: S$PBB,RT,, | Performed by: PHYSICIAN ASSISTANT

## 2022-04-01 PROCEDURE — 99999 PR PBB SHADOW E&M-EST. PATIENT-LVL IV: CPT | Mod: PBBFAC,,, | Performed by: PHYSICIAN ASSISTANT

## 2022-04-01 PROCEDURE — 99213 OFFICE O/P EST LOW 20 MIN: CPT | Mod: S$PBB,25,, | Performed by: PHYSICIAN ASSISTANT

## 2022-04-01 RX ORDER — BETAMETHASONE SODIUM PHOSPHATE AND BETAMETHASONE ACETATE 3; 3 MG/ML; MG/ML
6 INJECTION, SUSPENSION INTRA-ARTICULAR; INTRALESIONAL; INTRAMUSCULAR; SOFT TISSUE
Status: COMPLETED | OUTPATIENT
Start: 2022-04-01 | End: 2022-04-01

## 2022-04-01 RX ADMIN — BETAMETHASONE SODIUM PHOSPHATE AND BETAMETHASONE ACETATE 6 MG: 3; 3 INJECTION, SUSPENSION INTRA-ARTICULAR; INTRALESIONAL; INTRAMUSCULAR at 10:04

## 2022-04-01 NOTE — PROGRESS NOTES
SUBJECTIVE:     Chief Complaint & History of Present Illness:  Debbie Ding is a Established patient 89 y.o. female who is seen here today with a complaint of    Chief Complaint   Patient presents with    Right Knee - Pain    Left Knee - Pain    .  She has patient well-known to me was last seen treated the clinic for this condition 12/10/2029 that time she had undergone cortisone injection of the right knee with very good results.  She has begun to have return of soreness pain in the area difficulty with start-up pain in periods of prolonged standing and ambulation he does have some intermittent episodes of significant swelling.   On a scale of 1-10, with 10 being worst pain imaginable, he rates this pain as 4 on good days and 8 on bad days.  she describes the pain as sore and achy.    Review of patient's allergies indicates:   Allergen Reactions    Melatonin      Other reaction(s): Other (See Comments)  Sleep Walks and has unnatural dreams    Talwin compound Hives    Talwin [pentazocine lactate] Hallucinations    Trazodone Other (See Comments)     Nightmares/sleep walk      Bentyl [dicyclomine] Anxiety         Current Outpatient Medications   Medication Sig Dispense Refill    albuterol (PROVENTIL) 2.5 mg /3 mL (0.083 %) nebulizer solution Take 2.5 mg by nebulization every 6 (six) hours as needed. Rescue      albuterol (PROVENTIL/VENTOLIN HFA) 90 mcg/actuation inhaler INHALE 2 PUFFS BY MOUTH EVERY 6 HRS AS NEEDED      amLODIPine (NORVASC) 10 MG tablet Take 1 tablet (10 mg total) by mouth once daily. 90 tablet 3    apixaban (ELIQUIS) 2.5 mg Tab Take 1 tablet (2.5 mg total) by mouth 2 (two) times daily. 180 tablet 3    clobetasoL (TEMOVATE) 0.05 % external solution Pt to mix in 1 jar of cerave cream and apply to affected areas bid 50 mL 3    donepeziL (ARICEPT) 10 MG tablet TAKE 1 TABLET EVERY MORNING.  NO FURTHER REFILL WITHOUT APPOINTMENT (Patient taking differently: TAKE 1 TABLET EVERY MORNING.   NO FURTHER REFILL WITHOUT APPOINTMENT) 90 tablet 1    doxepin (SINEQUAN) 10 MG capsule TAKE 1 CAPSULE AT BEDTIME  FOR ITCHING (Patient taking differently: TAKE 1 CAPSULE AT BEDTIME  FOR ITCHING) 90 capsule 3    EScitalopram oxalate (LEXAPRO) 10 MG tablet TAKE 1/2 TABLET DAILY 45 tablet 3    esomeprazole (NEXIUM) 40 MG capsule Take 1 capsule (40 mg total) by mouth before breakfast. 90 capsule 3    flash glucose scanning reader (FREESTYLE ALEXSANDRA 14 DAY READER) Misc 1 Device by Misc.(Non-Drug; Combo Route) route 2 (two) times a day. 1 each 11    flash glucose sensor (FREESTYLE ALEXSANDRA 14 DAY SENSOR) Kit 1 Device by Misc.(Non-Drug; Combo Route) route 2 (two) times a day. 1 kit 11    fluocinonide (LIDEX) 0.05 % external solution AAA scalp qday - bid prn pruritus 60 mL 3    gabapentin (NEURONTIN) 100 MG capsule Take by mouth.      glycopyrrolate (ROBINUL) 2 MG Tab Take 1 tablet (2 mg total) by mouth 2 (two) times daily. 180 tablet 3    ketoconazole (NIZORAL) 2 % shampoo Wash hair with medicated shampoo at least 2x/week - let sit on scalp at least 5 minutes prior to rinsing 120 mL 5    levothyroxine (SYNTHROID) 50 MCG tablet Take 1 tablet (50 mcg total) by mouth once daily. 90 tablet 3    meclizine (ANTIVERT) 25 mg tablet       metoclopramide HCl (REGLAN) 5 MG tablet Take 1 tablet (5 mg total) by mouth 2 (two) times a day. 60 tablet 3    mirtazapine (REMERON) 15 MG tablet Take 1 tablet (15 mg total) by mouth every evening. 90 tablet 3    pregabalin (LYRICA) 50 MG capsule Take 1 capsule (50 mg total) by mouth nightly. 90 capsule 1    RESTASIS 0.05 % ophthalmic emulsion       fluticasone-salmeterol diskus inhaler 500-50 mcg Inhale 1 puff into the lungs 2 (two) times daily. Controller (Patient not taking: Reported on 2/1/2022) 60 each 11    LORazepam (ATIVAN) 0.5 MG tablet Take 1 tablet (0.5 mg total) by mouth every 12 (twelve) hours as needed for Anxiety. 20 tablet 0     No current facility-administered  medications for this visit.       Past Medical History:   Diagnosis Date    Allergic rhinitis     Anticoagulant long-term use     Arthritis     Asthma     Bilateral pulmonary embolism 4/27/2019    Cataract     Chronic anticoagulation 9/28/2020    Depression     Diabetes mellitus     Fibromyalgia 7/2/2012    Fibromyalgia     GERD (gastroesophageal reflux disease)     Hypercholesterolemia 9/28/2020    Hypercholesterolemia 9/28/2020    Hypertension 7/2/2012    Thoracic or lumbosacral neuritis or radiculitis, unspecified     Thyroid disease     Ulcer        Past Surgical History:   Procedure Laterality Date    CATARACT EXTRACTION Bilateral     ESOPHAGOGASTRODUODENOSCOPY N/A 3/8/2022    Procedure: EGD (ESOPHAGOGASTRODUODENOSCOPY) with dilation-either hospital ok with Dr. Meza;  Surgeon: Rebeca Meza MD;  Location: [facility];  Service: Endoscopy;  Laterality: N/A;  1/11-eliquis hold ok see te-tb    ESOPHAGOGASTRODUODENOSCOPY N/A 2/28/2022    Procedure: EGD (ESOPHAGOGASTRODUODENOSCOPY) with dilation-either hospital ok with Dr. Meza;  Surgeon: Rebeca Meza MD;  Location: [address]);  Service: Endoscopy;  Laterality: N/A;  1/11-eliquis hold ok see te-tb  COVID [facility] on 2/25/22 at [facility]  2/22-confirmed appt arrival time with pt-Kpvt    FOOT NEUROMA SURGERY  1985    HYSTERECTOMY         Vital Signs (Most Recent)  Vitals:    04/01/22 0829   BP: (!) 146/69   Pulse: 65           Review of Systems:  ROS:  Constitutional: no fever or chills  Eyes: no visual changes  ENT: no nasal congestion or sore throat, Positive  vestibular dizziness  Respiratory: no cough or shortness of breath, Positive for asthma  Cardiovascular: no chest pain or palpitations  Gastrointestinal: no nausea or vomiting, tolerating diet, Positive for GERD, peptic ulcer disease  Genitourinary: no hematuria or dysuria, Positive CKD stage 3,  Integument/Breast: no rash or pruritis  Hematologic/Lymphatic: no easy  "bruising or lymphadenopathy, Positive long-term anticoagulation, history DVT, history of PE  Musculoskeletal: no arthralgias or myalgias, Positive chronic low back pain, fibromyalgia, osteoarthritis of multiple joints  Neurological: no seizures or tremors, Degenerative disc disease lumbar spine, history of memory loss, spinal stenosis without neurological claudication  Behavioral/Psych: no auditory or visual hallucinations, Positive for depression,  Endocrine: no heat or cold intolerance, Positive diabetes type 2, thyroid disease hypothyroidism              OBJECTIVE:     PHYSICAL EXAM:  Height: 5' 5" (165.1 cm) Weight: 54.3 kg (119 lb 11.4 oz), General Appearance: Well nourished, well developed, in no acute distress.  Neurological: Mood & affect are normal.    right  Knee Exam:  Knee Range of Motion:5-100 degrees flexion   Effusion:yes  Condition of skin:intact  Location of tenderness:Medial joint line and globally   Strength:limited by pain and 5 of 5  Stability:  Lachman: stable, LCL: stable, MCL: stable, PCL: stable and posteromedial (dial): stable  Varus /Valgus stress:  normal  Chris:   negative/negative    left  Knee Exam:  Knee Range of Motion:0-120 degrees flexion   Effusion:none  Condition of skin:intact  Location of tenderness:Medial joint line   Strength:limited by pain and 5 of 5  Stability:  Lachman: stable, LCL: stable, MCL: stable, PCL: stable and posteromedial (dial): stable  Varus /Valgus stress:  normal  Chris:   negative/negative      Hip Examination:  normal    RADIOGRAPHS:  X-rays from previous visit reviewed by me today demonstrate mild arthritic changes throughout both knees with significant medial joint space narrowing early sclerotic changes and osteophytic spurring primarily medial compartment no evidence of fracture dislocation or other bony abnormalities       ASSESSMENT/PLAN:       ICD-10-CM ICD-9-CM   1. Primary osteoarthritis of right knee  M17.11 715.16   2. Effusion, right " knee  M25.461 719.06       Plan: We discussed with the patient at length all the different treatment options available for  the knee including anti-inflammatories, acetaminophen, rest, ice, knee strengthening exercise, occasional cortisone injections for temporary relief, Viscosupplimentation injections, arthroscopic debridement osteotomy, and finally knee arthroplasty.   Proceed with aspiration repeat cortisone injection of the right knee     The injection site was identified and the skin was prepared with a betadine solution. The   right  knee was aspirated 40 cc of normal-appearing fluid was withdrawn the knee was then injected with 1 ml of Celestone and 5 ml Lidocaine under sterile technique. Debbie Ding tolerated the procedure well, she was advised to rest the knee today, ice and elevation. she did receive immediate relief of the pain in and about his knee she was told this would be short lived and is secondary to the lidocaine. she may have an increase in his discomfort tonight followed by steady improvement over the next several days. I may take 1-3 weeks following the injection to get the full benefit of the medication.  I will see her back in 4-6 months. Sooner if he has any problems or concerns.    Debbie Ding was advised to monitor her blood sugars closely over the next several days. The steroid may cause a rise in them. If her glucose levels rise to a point she is uncomfortable or he is unable to control them is is to contact his PCP or go immediately to the emergency department.

## 2022-04-01 NOTE — TELEPHONE ENCOUNTER
Spoke to pt and her daughter Madina and explained the lab set for the 4/12 could be rescheduled and it has been reset for 04/02.  Pt will keep ov appt for 04/04 w/ provider.  However, while she was at an appointment today there was some discussion about a hernia and her daughter has concerns. Pt had fluid removed from a kneed today.        ----- Message from Shira Martino sent at 4/1/2022  1:10 PM CDT -----  Contact: Debbie metzger [phone]  Patient would like to get medical advice.  Symptoms (please be specific):    How long have you had these symptoms:   Would you like a call back, or a response through your [facility] portal?:call back  Pharmacy name and phone # (copy from chart):    Comments:   Pt is requesting a call back from the nurse regarding her appt because she stated that was supposed to have an appt on 4/12 for Dr Mejia not 4/4

## 2022-04-02 ENCOUNTER — LAB VISIT (OUTPATIENT)
Dept: LAB | Facility: HOSPITAL | Age: 87
End: 2022-04-02
Payer: MEDICARE

## 2022-04-02 DIAGNOSIS — E03.9 HYPOTHYROIDISM, UNSPECIFIED TYPE: ICD-10-CM

## 2022-04-02 DIAGNOSIS — I10 HYPERTENSION, UNSPECIFIED TYPE: ICD-10-CM

## 2022-04-02 DIAGNOSIS — E11.9 CONTROLLED TYPE 2 DIABETES MELLITUS WITHOUT COMPLICATION, WITHOUT LONG-TERM CURRENT USE OF INSULIN: ICD-10-CM

## 2022-04-02 LAB
ALBUMIN SERPL BCP-MCNC: 3.8 G/DL (ref 3.5–5.2)
ALP SERPL-CCNC: 57 U/L (ref 55–135)
ALT SERPL W/O P-5'-P-CCNC: 16 U/L (ref 10–44)
ANION GAP SERPL CALC-SCNC: 10 MMOL/L (ref 8–16)
AST SERPL-CCNC: 23 U/L (ref 10–40)
BASOPHILS # BLD AUTO: 0.01 K/UL (ref 0–0.2)
BASOPHILS NFR BLD: 0.2 % (ref 0–1.9)
BILIRUB SERPL-MCNC: 0.5 MG/DL (ref 0.1–1)
BUN SERPL-MCNC: 18 MG/DL (ref 8–23)
CALCIUM SERPL-MCNC: 10.2 MG/DL (ref 8.7–10.5)
CHLORIDE SERPL-SCNC: 105 MMOL/L (ref 95–110)
CHOLEST SERPL-MCNC: 277 MG/DL (ref 120–199)
CHOLEST/HDLC SERPL: 2.5 {RATIO} (ref 2–5)
CO2 SERPL-SCNC: 24 MMOL/L (ref 23–29)
CREAT SERPL-MCNC: 1.1 MG/DL (ref 0.5–1.4)
DIFFERENTIAL METHOD: ABNORMAL
EOSINOPHIL # BLD AUTO: 0 K/UL (ref 0–0.5)
EOSINOPHIL NFR BLD: 0 % (ref 0–8)
ERYTHROCYTE [DISTWIDTH] IN BLOOD BY AUTOMATED COUNT: 17.5 % (ref 11.5–14.5)
EST. GFR  (AFRICAN AMERICAN): 51.4 ML/MIN/1.73 M^2
EST. GFR  (NON AFRICAN AMERICAN): 44.6 ML/MIN/1.73 M^2
ESTIMATED AVG GLUCOSE: 128 MG/DL (ref 68–131)
GLUCOSE SERPL-MCNC: 129 MG/DL (ref 70–110)
HBA1C MFR BLD: 6.1 % (ref 4–5.6)
HCT VFR BLD AUTO: 34.6 % (ref 37–48.5)
HDLC SERPL-MCNC: 111 MG/DL (ref 40–75)
HDLC SERPL: 40.1 % (ref 20–50)
HGB BLD-MCNC: 11.4 G/DL (ref 12–16)
IMM GRANULOCYTES # BLD AUTO: 0.04 K/UL (ref 0–0.04)
IMM GRANULOCYTES NFR BLD AUTO: 0.7 % (ref 0–0.5)
LDLC SERPL CALC-MCNC: 155.8 MG/DL (ref 63–159)
LYMPHOCYTES # BLD AUTO: 1.3 K/UL (ref 1–4.8)
LYMPHOCYTES NFR BLD: 22.1 % (ref 18–48)
MCH RBC QN AUTO: 29 PG (ref 27–31)
MCHC RBC AUTO-ENTMCNC: 32.9 G/DL (ref 32–36)
MCV RBC AUTO: 88 FL (ref 82–98)
MONOCYTES # BLD AUTO: 0.3 K/UL (ref 0.3–1)
MONOCYTES NFR BLD: 5.4 % (ref 4–15)
NEUTROPHILS # BLD AUTO: 4.2 K/UL (ref 1.8–7.7)
NEUTROPHILS NFR BLD: 71.6 % (ref 38–73)
NONHDLC SERPL-MCNC: 166 MG/DL
NRBC BLD-RTO: 0 /100 WBC
PLATELET # BLD AUTO: 221 K/UL (ref 150–450)
PMV BLD AUTO: 11.9 FL (ref 9.2–12.9)
POTASSIUM SERPL-SCNC: 4.3 MMOL/L (ref 3.5–5.1)
PROT SERPL-MCNC: 7.3 G/DL (ref 6–8.4)
RBC # BLD AUTO: 3.93 M/UL (ref 4–5.4)
SODIUM SERPL-SCNC: 139 MMOL/L (ref 136–145)
T4 FREE SERPL-MCNC: 1.03 NG/DL (ref 0.71–1.51)
TRIGL SERPL-MCNC: 51 MG/DL (ref 30–150)
TSH SERPL DL<=0.005 MIU/L-ACNC: 0.36 UIU/ML (ref 0.4–4)
WBC # BLD AUTO: 5.92 K/UL (ref 3.9–12.7)

## 2022-04-02 PROCEDURE — 83036 HEMOGLOBIN GLYCOSYLATED A1C: CPT | Performed by: PHYSICIAN ASSISTANT

## 2022-04-02 PROCEDURE — 80061 LIPID PANEL: CPT | Performed by: PHYSICIAN ASSISTANT

## 2022-04-02 PROCEDURE — 80053 COMPREHEN METABOLIC PANEL: CPT | Performed by: PHYSICIAN ASSISTANT

## 2022-04-02 PROCEDURE — 85025 COMPLETE CBC W/AUTO DIFF WBC: CPT | Performed by: PHYSICIAN ASSISTANT

## 2022-04-02 PROCEDURE — 36415 COLL VENOUS BLD VENIPUNCTURE: CPT | Performed by: PHYSICIAN ASSISTANT

## 2022-04-02 PROCEDURE — 84439 ASSAY OF FREE THYROXINE: CPT | Performed by: PHYSICIAN ASSISTANT

## 2022-04-02 PROCEDURE — 84443 ASSAY THYROID STIM HORMONE: CPT | Performed by: PHYSICIAN ASSISTANT

## 2022-04-04 ENCOUNTER — OFFICE VISIT (OUTPATIENT)
Dept: INTERNAL MEDICINE | Facility: CLINIC | Age: 87
End: 2022-04-04
Payer: MEDICARE

## 2022-04-04 VITALS
WEIGHT: 119 LBS | HEART RATE: 77 BPM | HEIGHT: 65 IN | BODY MASS INDEX: 19.83 KG/M2 | SYSTOLIC BLOOD PRESSURE: 110 MMHG | OXYGEN SATURATION: 97 % | DIASTOLIC BLOOD PRESSURE: 82 MMHG

## 2022-04-04 DIAGNOSIS — J41.1 BRONCHITIS, CHRONIC, MUCOPURULENT: ICD-10-CM

## 2022-04-04 DIAGNOSIS — F01.518 MIXED CORTICAL AND SUBCORTICAL VASCULAR DEMENTIA WITH BEHAVIORAL DISTURBANCE: ICD-10-CM

## 2022-04-04 DIAGNOSIS — N18.31 STAGE 3A CHRONIC KIDNEY DISEASE: ICD-10-CM

## 2022-04-04 DIAGNOSIS — N18.31 TYPE 2 DIABETES MELLITUS WITH STAGE 3A CHRONIC KIDNEY DISEASE, WITHOUT LONG-TERM CURRENT USE OF INSULIN: ICD-10-CM

## 2022-04-04 DIAGNOSIS — E05.80 IATROGENIC HYPERTHYROIDISM: ICD-10-CM

## 2022-04-04 DIAGNOSIS — K31.84 GASTROPARESIS: Primary | ICD-10-CM

## 2022-04-04 DIAGNOSIS — F33.0 MILD EPISODE OF RECURRENT MAJOR DEPRESSIVE DISORDER: ICD-10-CM

## 2022-04-04 DIAGNOSIS — E11.22 TYPE 2 DIABETES MELLITUS WITH STAGE 3A CHRONIC KIDNEY DISEASE, WITHOUT LONG-TERM CURRENT USE OF INSULIN: ICD-10-CM

## 2022-04-04 DIAGNOSIS — E03.9 HYPOTHYROIDISM, ADULT: ICD-10-CM

## 2022-04-04 DIAGNOSIS — M46.96 UNSPECIFIED INFLAMMATORY SPONDYLOPATHY, LUMBAR REGION: ICD-10-CM

## 2022-04-04 DIAGNOSIS — E78.00 HYPERCHOLESTEROLEMIA: ICD-10-CM

## 2022-04-04 PROCEDURE — 99999 PR PBB SHADOW E&M-EST. PATIENT-LVL III: CPT | Mod: PBBFAC,,, | Performed by: INTERNAL MEDICINE

## 2022-04-04 PROCEDURE — 99214 OFFICE O/P EST MOD 30 MIN: CPT | Mod: S$PBB,,, | Performed by: INTERNAL MEDICINE

## 2022-04-04 PROCEDURE — 99999 PR PBB SHADOW E&M-EST. PATIENT-LVL III: ICD-10-PCS | Mod: PBBFAC,,, | Performed by: INTERNAL MEDICINE

## 2022-04-04 PROCEDURE — 99214 PR OFFICE/OUTPT VISIT, EST, LEVL IV, 30-39 MIN: ICD-10-PCS | Mod: S$PBB,,, | Performed by: INTERNAL MEDICINE

## 2022-04-04 PROCEDURE — 99213 OFFICE O/P EST LOW 20 MIN: CPT | Mod: PBBFAC | Performed by: INTERNAL MEDICINE

## 2022-04-04 RX ORDER — PANTOPRAZOLE SODIUM 40 MG/1
40 TABLET, DELAYED RELEASE ORAL DAILY
Qty: 90 TABLET | Refills: 3
Start: 2022-04-04 | End: 2022-06-07 | Stop reason: SDUPTHER

## 2022-04-04 RX ORDER — LEVOTHYROXINE SODIUM 25 UG/1
25 TABLET ORAL DAILY
Qty: 90 TABLET | Refills: 0 | Status: SHIPPED | OUTPATIENT
Start: 2022-04-04 | End: 2022-06-07 | Stop reason: SDUPTHER

## 2022-04-04 RX ORDER — CYPROHEPTADINE HYDROCHLORIDE 4 MG/1
4 TABLET ORAL 3 TIMES DAILY PRN
Qty: 90 TABLET | Refills: 1 | Status: SHIPPED | OUTPATIENT
Start: 2022-04-04 | End: 2022-06-09 | Stop reason: SDUPTHER

## 2022-04-04 RX ORDER — LEVOTHYROXINE SODIUM 25 UG/1
50 TABLET ORAL DAILY
Qty: 90 TABLET | Refills: 0 | Status: SHIPPED | OUTPATIENT
Start: 2022-04-04 | End: 2022-04-04 | Stop reason: SDUPTHER

## 2022-04-04 NOTE — PATIENT INSTRUCTIONS
Decrease synthroid to 25 mcg daily (cut 50 mcg tab in half til gone).    Change Ensure to Boost (less sugar).

## 2022-04-04 NOTE — PROGRESS NOTES
Subjective:       Patient ID: Debbie Ding is a 89 y.o. female.    Chief Complaint: Weight Loss    HPI   I interviewed pt 3/23 with Mandy Winchester.  She never started Metoclopramide, as daughter has facial myoclonus, occurring after taking metoclopramide.   We have concluded that gastroparesis is the cause of her wt loss.    She requests periactin for help in gaining wt.  Wt today 119.    After last visit, she changed eating patterns.  Now having more substantial breakfast and lunch, with a lighter dinner.  She is forcing herself to eat and c/o getting full fast.    She would like me to review all meds to see if necessary.    She is taking pantoprazole, NOT nexium.  INsurance not covering lyrica.  She was prescribed gabapenitn, but stopped due to fatigue.     [facility] ortho note reviewed.  Effusion drained from knee and he feels better.      ENT appt upcoming - apparently referred from GI to evaluate dysphagia and high pitched voice.    MDD controlled. Both lexapro and mirtzapine are on list, but only taking the latter.    Review of Systems   Constitutional: Positive for unexpected weight change. Negative for fever.   HENT: Negative for nasal congestion and postnasal drip.    Respiratory: Negative for chest tightness, shortness of breath and wheezing.    Cardiovascular: Negative for chest pain and leg swelling.   Gastrointestinal: Negative for abdominal pain, anal bleeding, constipation, diarrhea, nausea and vomiting.   Genitourinary: Negative for dysuria and urgency.   Integumentary:  Negative for rash.   Neurological: Negative for headaches.   Psychiatric/Behavioral: Negative for dysphoric mood and sleep disturbance. The patient is not nervous/anxious.          Objective:      Physical Exam  Constitutional:       Appearance: Normal appearance. She is not ill-appearing.   Abdominal:      General: There is no distension.      Palpations: There is no mass.      Tenderness: There is no abdominal tenderness. There is  no rebound.   Neurological:      Mental Status: She is alert.   Psychiatric:         Mood and Affect: Mood normal.         Behavior: Behavior normal.         Thought Content: Thought content normal.         Results for orders placed or performed in visit on 04/02/22   CBC Auto Differential   Result Value Ref Range    WBC 5.92 3.90 - 12.70 K/uL    RBC 3.93 (L) 4.00 - 5.40 M/uL    Hemoglobin 11.4 (L) 12.0 - 16.0 g/dL    Hematocrit 34.6 (L) 37.0 - 48.5 %    MCV 88 82 - 98 fL    MCH 29.0 27.0 - 31.0 pg    MCHC 32.9 32.0 - 36.0 g/dL    RDW 17.5 (H) 11.5 - 14.5 %    Platelets 221 150 - 450 K/uL    MPV 11.9 9.2 - 12.9 fL    Immature Granulocytes 0.7 (H) 0.0 - 0.5 %    Gran # (ANC) 4.2 1.8 - 7.7 K/uL    Immature Grans (Abs) 0.04 0.00 - 0.04 K/uL    Lymph # 1.3 1.0 - 4.8 K/uL    Mono # 0.3 0.3 - 1.0 K/uL    Eos # 0.0 0.0 - 0.5 K/uL    Baso # 0.01 0.00 - 0.20 K/uL    nRBC 0 0 /100 WBC    Gran % 71.6 38.0 - 73.0 %    Lymph % 22.1 18.0 - 48.0 %    Mono % 5.4 4.0 - 15.0 %    Eosinophil % 0.0 0.0 - 8.0 %    Basophil % 0.2 0.0 - 1.9 %    Differential Method Automated    Comprehensive Metabolic Panel   Result Value Ref Range    Sodium 139 136 - 145 mmol/L    Potassium 4.3 3.5 - 5.1 mmol/L    Chloride 105 95 - 110 mmol/L    CO2 24 23 - 29 mmol/L    Glucose 129 (H) 70 - 110 mg/dL    BUN 18 8 - 23 mg/dL    Creatinine 1.1 0.5 - 1.4 mg/dL    Calcium 10.2 8.7 - 10.5 mg/dL    Total Protein 7.3 6.0 - 8.4 g/dL    Albumin 3.8 3.5 - 5.2 g/dL    Total Bilirubin 0.5 0.1 - 1.0 mg/dL    Alkaline Phosphatase 57 55 - 135 U/L    AST 23 10 - 40 U/L    ALT 16 10 - 44 U/L    Anion Gap 10 8 - 16 mmol/L    eGFR if African American 51.4 (A) >60 mL/min/1.73 m^2    eGFR if non  44.6 (A) >60 mL/min/1.73 m^2   Hemoglobin A1C   Result Value Ref Range    Hemoglobin A1C 6.1 (H) 4.0 - 5.6 %    Estimated Avg Glucose 128 68 - 131 mg/dL   TSH   Result Value Ref Range    TSH 0.364 (L) 0.400 - 4.000 uIU/mL   Lipid Panel   Result Value Ref Range     Cholesterol 277 (H) 120 - 199 mg/dL    Triglycerides 51 30 - 150 mg/dL     (H) 40 - 75 mg/dL    LDL Cholesterol 155.8 63.0 - 159.0 mg/dL    HDL/Cholesterol Ratio 40.1 20.0 - 50.0 %    Total Cholesterol/HDL Ratio 2.5 2.0 - 5.0    Non-HDL Cholesterol 166 mg/dL   T4, Free   Result Value Ref Range    Free T4 1.03 0.71 - 1.51 ng/dL     *Note: Due to a large number of results and/or encounters for the requested time period, some results have not been displayed. A complete set of results can be found in Results Review.     Assessment:       Problem List Items Addressed This Visit     Unspecified inflammatory spondylopathy, lumbar region    Type 2 diabetes mellitus with stage 3a chronic kidney disease, without long-term current use of insulin     Diet controlled for years.           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  Mild CI only.           Mild episode of recurrent major depressive disorder     Controlled with mirtazapine and lexapro.  Denies depression currently.           Hypercholesterolemia    Chronic kidney disease, stage III (moderate)     STable by lab review.           Bronchitis, chronic, mucopurulent     Managed by DR Suazo at [facility].             Other Visit Diagnoses     Gastroparesis    -  Primary    Hypothyroidism, adult        Relevant Medications    levothyroxine (SYNTHROID) 25 MCG tablet    Iatrogenic hyperthyroidism        Relevant Orders    TSH          Plan:       Debbie was seen today for weight loss.    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  -     levothyroxine (SYNTHROID) 25 MCG tablet; Take 1 tablet (25 mcg total) by mouth once daily.    Iatrogenic hyperthyroidism  -     TSH; Future    Hypercholesterolemia    Other orders  -     cyproheptadine (PERIACTIN) 4 mg tablet; Take 1 tablet (4 mg total) by mouth 3 (three) times daily as needed (appetite).  -     pantoprazole (PROTONIX) 40 MG tablet; Take 1 tablet (40 mg total) by mouth once daily.         decr synthroid.  tsh 2 mo.  Start Periactin.  LExapro, nexium, gabapentin, reglan, lyrca. removed from med list, as not taking  PRotonix added.  Keep appt Mandy next month.  F/U GI  Priority is wt gain, but reduce fried foods, red meat.  Follow lipids.    31 min spent with pt

## 2022-04-17 DIAGNOSIS — J38.5 LARYNGEAL SPASM: ICD-10-CM

## 2022-04-17 DIAGNOSIS — R49.0 DYSPHONIA: Primary | ICD-10-CM

## 2022-04-21 ENCOUNTER — TELEPHONE (OUTPATIENT)
Dept: ORTHOPEDICS | Facility: CLINIC | Age: 87
End: 2022-04-21
Payer: MEDICARE

## 2022-04-21 NOTE — TELEPHONE ENCOUNTER
----- Message from Chey Walter sent at 4/21/2022  8:36 AM CDT -----      Patient request call back to discuss the injections in her knee    Patient can be contacted @# [phone]

## 2022-04-23 RX ORDER — MIRTAZAPINE 7.5 MG/1
TABLET, FILM COATED ORAL
Qty: 90 TABLET | Refills: 3 | OUTPATIENT
Start: 2022-04-23

## 2022-04-23 NOTE — TELEPHONE ENCOUNTER
No new care gaps identified.  Powered by [employer]. Reference number: [id number].   4/23/2022 1:09:47 AM CDT

## 2022-04-24 NOTE — TELEPHONE ENCOUNTER
Quick DC. Inappropriate Request    Refill Authorization Note   Debbie Ding  is requesting a refill authorization.  Brief Assessment and Rationale for Refill:  Quick Discontinue  Medication Therapy Plan:       Medication Reconciliation Completed:  No      Comments:     Note composed:9:30 PM 04/23/2022

## 2022-04-28 ENCOUNTER — OFFICE VISIT (OUTPATIENT)
Dept: GASTROENTEROLOGY | Facility: CLINIC | Age: 87
End: 2022-04-28
Payer: MEDICARE

## 2022-04-28 VITALS
WEIGHT: 125.69 LBS | DIASTOLIC BLOOD PRESSURE: 64 MMHG | HEART RATE: 81 BPM | SYSTOLIC BLOOD PRESSURE: 124 MMHG | BODY MASS INDEX: 20.94 KG/M2 | HEIGHT: 65 IN

## 2022-04-28 DIAGNOSIS — K21.9 GASTROESOPHAGEAL REFLUX DISEASE, UNSPECIFIED WHETHER ESOPHAGITIS PRESENT: Primary | ICD-10-CM

## 2022-04-28 PROCEDURE — 99999 PR PBB SHADOW E&M-EST. PATIENT-LVL IV: ICD-10-PCS | Mod: PBBFAC,,, | Performed by: INTERNAL MEDICINE

## 2022-04-28 PROCEDURE — 99213 PR OFFICE/OUTPT VISIT, EST, LEVL III, 20-29 MIN: ICD-10-PCS | Mod: S$PBB,,, | Performed by: INTERNAL MEDICINE

## 2022-04-28 PROCEDURE — 99214 OFFICE O/P EST MOD 30 MIN: CPT | Mod: PBBFAC | Performed by: INTERNAL MEDICINE

## 2022-04-28 PROCEDURE — 99213 OFFICE O/P EST LOW 20 MIN: CPT | Mod: S$PBB,,, | Performed by: INTERNAL MEDICINE

## 2022-04-28 PROCEDURE — 99999 PR PBB SHADOW E&M-EST. PATIENT-LVL IV: CPT | Mod: PBBFAC,,, | Performed by: INTERNAL MEDICINE

## 2022-04-28 RX ORDER — GLYCOPYRROLATE 2 MG/1
2 TABLET ORAL 2 TIMES DAILY
Qty: 180 TABLET | Refills: 3 | Status: SHIPPED | OUTPATIENT
Start: 2022-04-28 | End: 2022-07-18 | Stop reason: SDUPTHER

## 2022-04-28 NOTE — PROGRESS NOTES
"[facility]    CC: acid reflux    HPI 89 y.o. female who presents for follow up of chronic, intermittent, burning acid reflux without nausea and vomiting.  She is not sure which PPI she is currently taking-Nexium BID which I prescribed or Protonix daily which Dr. Mejia recently prescribed but she feels that her reflux symptoms are improved.    She reports her dysphagia is not currently bothering her.      She continues to have bloating after meals.  She missed her appt with the GI dietician.    She saw Dr. Suazo recently.  She reports that he feels that her PE is the likely cause of her SOB.    She has an upcoming appointment with Dr. Ridley about her vocal changes.        Past Medical History  Past Medical History:   Diagnosis Date    Allergic rhinitis     Anticoagulant long-term use     Arthritis     Asthma     Bilateral pulmonary embolism 4/27/2019    Cataract     Chronic anticoagulation 9/28/2020    Depression     Diabetes mellitus     Fibromyalgia 7/2/2012    Fibromyalgia     GERD (gastroesophageal reflux disease)     Hypercholesterolemia 9/28/2020    Hypercholesterolemia 9/28/2020    Hypertension 7/2/2012    Thoracic or lumbosacral neuritis or radiculitis, unspecified     Thyroid disease     Ulcer          Review of Systems  General ROS: negative for chills, fever or generalized weakness  Cardiovascular ROS: no chest pain or dyspnea on exertion  Gastrointestinal ROS: no abdominal pain, change in bowel habits, or black/ bloody stools    Physical Examination  /64   Pulse 81   Ht 5' 5" (1.651 m)   Wt 57 kg (125 lb 10.6 oz)   BMI 20.91 kg/m²   General appearance: alert, cooperative, no distress  HENT: Normocephalic, atraumatic, neck symmetrical, no nasal discharge   Lungs: clear to auscultation bilaterally, no dullness to percussion bilaterally  Heart: regular rate and rhythm without rub; no displacement of the PMI   Abdomen: soft, non-tender; bowel sounds " normoactive; no organomegaly  Extremities: extremities symmetric; no clubbing, cyanosis, or edema  Neurologic: Alert and oriented X 3, normal strength, normal coordination and gait    Labs:  Lab Results   Component Value Date    WBC 5.92 04/02/2022    HGB 11.4 (L) 04/02/2022    HCT 34.6 (L) 04/02/2022    MCV 88 04/02/2022     04/02/2022         CMP  Sodium   Date Value Ref Range Status   04/02/2022 139 136 - 145 mmol/L Final     Potassium   Date Value Ref Range Status   04/02/2022 4.3 3.5 - 5.1 mmol/L Final     Chloride   Date Value Ref Range Status   04/02/2022 105 95 - 110 mmol/L Final     CO2   Date Value Ref Range Status   04/02/2022 24 23 - 29 mmol/L Final     Glucose   Date Value Ref Range Status   04/02/2022 129 (H) 70 - 110 mg/dL Final     BUN   Date Value Ref Range Status   04/02/2022 18 8 - 23 mg/dL Final     Creatinine   Date Value Ref Range Status   04/02/2022 1.1 0.5 - 1.4 mg/dL Final     Calcium   Date Value Ref Range Status   04/02/2022 10.2 8.7 - 10.5 mg/dL Final     Total Protein   Date Value Ref Range Status   04/02/2022 7.3 6.0 - 8.4 g/dL Final     Albumin   Date Value Ref Range Status   04/02/2022 3.8 3.5 - 5.2 g/dL Final     Total Bilirubin   Date Value Ref Range Status   04/02/2022 0.5 0.1 - 1.0 mg/dL Final     Comment:     For infants and newborns, interpretation of results should be based  on gestational age, weight and in agreement with clinical  observations.    Premature Infant recommended reference ranges:  Up to 24 hours.............<8.0 mg/dL  Up to 48 hours............<12.0 mg/dL  3-5 days..................<15.0 mg/dL  6-29 days.................<15.0 mg/dL       Alkaline Phosphatase   Date Value Ref Range Status   04/02/2022 57 55 - 135 U/L Final     AST   Date Value Ref Range Status   04/02/2022 23 10 - 40 U/L Final     ALT   Date Value Ref Range Status   04/02/2022 16 10 - 44 U/L Final     Anion Gap   Date Value Ref Range Status   04/02/2022 10 8 - 16 mmol/L Final     eGFR if     Date Value Ref Range Status   04/02/2022 51.4 (A) >60 mL/min/1.73 m^2 Final     eGFR if non    Date Value Ref Range Status   04/02/2022 44.6 (A) >60 mL/min/1.73 m^2 Final     Comment:     Calculation used to obtain the estimated glomerular filtration  rate (eGFR) is the CKD-EPI equation.            Assessment:   The patient is an 89 yo female with past medical history of PE, asthma, fibromyalgia,  GERD proven with [facility], gastroparesis with the main symptom of abdominal bloating, and chronic possibly functional dysphagia who presents for follow up.  Overall she has had symptom improvement regarding her GERD and dysphagia.  She continues to have abdominal bloating and hoarseness.      Plan:  -Follow up with our GI dietician to discuss a gastroparesis diet.  -Follow up with Dr. Ridley scheduled for May 4 due to her hoarseness and vocal changes.  -Continue PPI for her GERD proven with [facility].    Rebeca Meza MD

## 2022-04-30 ENCOUNTER — EXTERNAL CHRONIC CARE MANAGEMENT (OUTPATIENT)
Dept: PRIMARY CARE CLINIC | Facility: CLINIC | Age: 87
End: 2022-04-30
Payer: MEDICARE

## 2022-04-30 PROCEDURE — 99490 CHRNC CARE MGMT STAFF 1ST 20: CPT | Mod: PBBFAC | Performed by: INTERNAL MEDICINE

## 2022-04-30 PROCEDURE — 99439 CHRNC CARE MGMT STAF EA ADDL: CPT | Mod: S$PBB,,, | Performed by: INTERNAL MEDICINE

## 2022-04-30 PROCEDURE — 99439 CHRNC CARE MGMT STAF EA ADDL: CPT | Mod: PBBFAC,27 | Performed by: INTERNAL MEDICINE

## 2022-04-30 PROCEDURE — 99490 CHRNC CARE MGMT STAFF 1ST 20: CPT | Mod: S$PBB,,, | Performed by: INTERNAL MEDICINE

## 2022-04-30 PROCEDURE — 99490 PR CHRONIC CARE MGMT, 1ST 20 MIN: ICD-10-PCS | Mod: S$PBB,,, | Performed by: INTERNAL MEDICINE

## 2022-04-30 PROCEDURE — 99439 PR CHRONIC CARE MGMT, EA ADDTL 20 MIN: ICD-10-PCS | Mod: S$PBB,,, | Performed by: INTERNAL MEDICINE

## 2022-05-03 ENCOUNTER — HOSPITAL ENCOUNTER (OUTPATIENT)
Dept: RADIOLOGY | Facility: HOSPITAL | Age: 87
Discharge: HOME | End: 2022-05-03
Attending: PHYSICIAN ASSISTANT
Payer: MEDICARE

## 2022-05-03 ENCOUNTER — OFFICE VISIT (OUTPATIENT)
Dept: ORTHOPEDICS | Facility: CLINIC | Age: 87
End: 2022-05-03
Payer: MEDICARE

## 2022-05-03 ENCOUNTER — NUTRITION (OUTPATIENT)
Dept: GASTROENTEROLOGY | Facility: CLINIC | Age: 87
End: 2022-05-03

## 2022-05-03 VITALS
WEIGHT: 125.69 LBS | HEART RATE: 84 BPM | WEIGHT: 125.88 LBS | DIASTOLIC BLOOD PRESSURE: 71 MMHG | HEIGHT: 65 IN | SYSTOLIC BLOOD PRESSURE: 134 MMHG | BODY MASS INDEX: 20.94 KG/M2 | BODY MASS INDEX: 20.95 KG/M2

## 2022-05-03 DIAGNOSIS — R13.10 DYSPHAGIA, UNSPECIFIED TYPE: Primary | ICD-10-CM

## 2022-05-03 DIAGNOSIS — M54.6 THORACIC SPINE PAIN: Primary | ICD-10-CM

## 2022-05-03 DIAGNOSIS — K31.84 GASTROPARESIS: ICD-10-CM

## 2022-05-03 DIAGNOSIS — M25.512 LEFT SHOULDER PAIN, UNSPECIFIED CHRONICITY: ICD-10-CM

## 2022-05-03 PROCEDURE — 99213 PR OFFICE/OUTPT VISIT, EST, LEVL III, 20-29 MIN: ICD-10-PCS | Mod: S$PBB,,, | Performed by: PHYSICIAN ASSISTANT

## 2022-05-03 PROCEDURE — 99999 PR PBB SHADOW E&M-EST. PATIENT-LVL II: CPT | Mod: PBBFAC,,,

## 2022-05-03 PROCEDURE — 73030 X-RAY EXAM OF SHOULDER: CPT | Mod: TC,LT

## 2022-05-03 PROCEDURE — 99213 OFFICE O/P EST LOW 20 MIN: CPT | Mod: S$PBB,,, | Performed by: PHYSICIAN ASSISTANT

## 2022-05-03 PROCEDURE — 99214 OFFICE O/P EST MOD 30 MIN: CPT | Mod: PBBFAC | Performed by: PHYSICIAN ASSISTANT

## 2022-05-03 PROCEDURE — 99999 PR PBB SHADOW E&M-EST. PATIENT-LVL IV: CPT | Mod: PBBFAC,,, | Performed by: PHYSICIAN ASSISTANT

## 2022-05-03 PROCEDURE — 99999 PR PBB SHADOW E&M-EST. PATIENT-LVL IV: ICD-10-PCS | Mod: PBBFAC,,, | Performed by: PHYSICIAN ASSISTANT

## 2022-05-03 PROCEDURE — 73030 X-RAY EXAM OF SHOULDER: CPT | Mod: 26,LT,, | Performed by: RADIOLOGY

## 2022-05-03 PROCEDURE — 99999 PR PBB SHADOW E&M-EST. PATIENT-LVL II: ICD-10-PCS | Mod: PBBFAC,,,

## 2022-05-03 PROCEDURE — 73030 XR SHOULDER COMPLETE 2 OR MORE VIEWS LEFT: ICD-10-PCS | Mod: 26,LT,, | Performed by: RADIOLOGY

## 2022-05-03 NOTE — PROGRESS NOTES
GI NUTRITION REFERRAL     Referring professional: Dr. Rebeca Meza  Reason for visit Gastroparesis Diet education / weight loss due to early satiety /Hyperlipidemia/Dysphagia      Age: 89 y.o.  Sex: female  Past Medical History:   Diagnosis Date    Allergic rhinitis     Anticoagulant long-term use     Arthritis     Asthma     Bilateral pulmonary embolism 4/27/2019    Cataract     Chronic anticoagulation 9/28/2020    Depression     Diabetes mellitus     Fibromyalgia 7/2/2012    Fibromyalgia     GERD (gastroesophageal reflux disease)     Hypercholesterolemia 9/28/2020    Hypercholesterolemia 9/28/2020    Hypertension 7/2/2012    Thoracic or lumbosacral neuritis or radiculitis, unspecified     Thyroid disease     Ulcer      Past Surgical History:   Procedure Laterality Date    CATARACT EXTRACTION Bilateral     ESOPHAGOGASTRODUODENOSCOPY N/A 3/8/2022    Procedure: EGD (ESOPHAGOGASTRODUODENOSCOPY) with dilation-[facility] with Dr. Meza;  Surgeon: Rebeca Meza MD;  Location: [facility];  Service: Endoscopy;  Laterality: N/A;  1/11-eliquis hold ok see te-tb    ESOPHAGOGASTRODUODENOSCOPY N/A 2/28/2022    Procedure: EGD (ESOPHAGOGASTRODUODENOSCOPY) with dilation-[facility] with Dr. Meza;  Surgeon: Rebeca Meza MD;  Location: [address]);  Service: Endoscopy;  Laterality: N/A;  1/11-eliquis hold ok see te-tb  COVID test on 2/25/22 at [facility]  2/22-confirmed appt arrival time with [id number]    FOOT NEUROMA SURGERY  1985    HYSTERECTOMY       Pertinent Medications:   Current Outpatient Medications on File Prior to Visit   Medication Sig Dispense Refill    albuterol (PROVENTIL) 2.5 mg /3 mL (0.083 %) nebulizer solution Take 2.5 mg by nebulization every 6 (six) hours as needed. Rescue      albuterol (PROVENTIL/VENTOLIN HFA) 90 mcg/actuation inhaler INHALE 2 PUFFS BY MOUTH EVERY 6 HRS AS NEEDED      amLODIPine (NORVASC) 10 MG tablet TAKE 1 TABLET ONCE DAILY 90  tablet 3    apixaban (ELIQUIS) 2.5 mg Tab Take 1 tablet (2.5 mg total) by mouth 2 (two) times daily. 180 tablet 3    clobetasoL (TEMOVATE) 0.05 % external solution Pt to mix in 1 jar of cerave cream and apply to affected areas bid 50 mL 3    cyproheptadine (PERIACTIN) 4 mg tablet Take 1 tablet (4 mg total) by mouth 3 (three) times daily as needed (appetite). 90 tablet 1    donepeziL (ARICEPT) 10 MG tablet TAKE 1 TABLET EVERY MORNING.  NO FURTHER REFILL WITHOUT APPOINTMENT (Patient taking differently: TAKE 1 TABLET EVERY MORNING.  NO FURTHER REFILL WITHOUT APPOINTMENT) 90 tablet 1    doxepin (SINEQUAN) 10 MG capsule TAKE 1 CAPSULE AT BEDTIME  FOR ITCHING (Patient taking differently: TAKE 1 CAPSULE AT BEDTIME  FOR ITCHING) 90 capsule 3    flash glucose scanning reader (FREESTYLE ALEXSANDRA 14 DAY READER) Misc 1 Device by Misc.(Non-Drug; Combo Route) route 2 (two) times a day. 1 each 11    flash glucose sensor (FREESTYLE ALEXSANDRA 14 DAY SENSOR) Kit 1 Device by Misc.(Non-Drug; Combo Route) route 2 (two) times a day. 1 kit 11    fluocinonide (LIDEX) 0.05 % external solution AAA scalp qday - bid prn pruritus 60 mL 3    fluticasone-salmeterol diskus inhaler 500-50 mcg Inhale 1 puff into the lungs 2 (two) times daily. Controller (Patient not taking: Reported on 2/1/2022) 60 each 11    glycopyrrolate (ROBINUL) 2 MG Tab Take 1 tablet (2 mg total) by mouth 2 (two) times daily. 180 tablet 3    ketoconazole (NIZORAL) 2 % shampoo Wash hair with medicated shampoo at least 2x/week - let sit on scalp at least 5 minutes prior to rinsing 120 mL 5    levothyroxine (SYNTHROID) 25 MCG tablet Take 1 tablet (25 mcg total) by mouth once daily. 90 tablet 0    meclizine (ANTIVERT) 25 mg tablet       mirtazapine (REMERON) 15 MG tablet Take 1 tablet (15 mg total) by mouth every evening. 90 tablet 3    pantoprazole (PROTONIX) 40 MG tablet Take 1 tablet (40 mg total) by mouth once daily. 90 tablet 3    RESTASIS 0.05 % ophthalmic emulsion         No current facility-administered medications on file prior to visit.       Vitamins/Supplements/Herbs: Used Boost Glucose control - in the past - not currently   Labs  Lab Results   Component Value Date     (H) 04/02/2022    K 4.3 04/02/2022    PHOS 2.7 04/28/2019    MG 2.6 10/08/2021    CHOL 277 (H) 04/02/2022     (H) 04/02/2022    TRIG 51 04/02/2022    ALBUMIN 3.8 04/02/2022    HGBA1C 6.1 (H) 04/02/2022    CALCIUM 10.2 04/02/2022     Current Outpatient Medications   Medication Sig    albuterol (PROVENTIL) 2.5 mg /3 mL (0.083 %) nebulizer solution Take 2.5 mg by nebulization every 6 (six) hours as needed. Rescue    albuterol (PROVENTIL/VENTOLIN HFA) 90 mcg/actuation inhaler INHALE 2 PUFFS BY MOUTH EVERY 6 HRS AS NEEDED    amLODIPine (NORVASC) 10 MG tablet TAKE 1 TABLET ONCE DAILY    apixaban (ELIQUIS) 2.5 mg Tab Take 1 tablet (2.5 mg total) by mouth 2 (two) times daily.    clobetasoL (TEMOVATE) 0.05 % external solution Pt to mix in 1 jar of cerave cream and apply to affected areas bid    cyproheptadine (PERIACTIN) 4 mg tablet Take 1 tablet (4 mg total) by mouth 3 (three) times daily as needed (appetite).    donepeziL (ARICEPT) 10 MG tablet TAKE 1 TABLET EVERY MORNING.  NO FURTHER REFILL WITHOUT APPOINTMENT (Patient taking differently: TAKE 1 TABLET EVERY MORNING.  NO FURTHER REFILL WITHOUT APPOINTMENT)    doxepin (SINEQUAN) 10 MG capsule TAKE 1 CAPSULE AT BEDTIME  FOR ITCHING (Patient taking differently: TAKE 1 CAPSULE AT BEDTIME  FOR ITCHING)    flash glucose scanning reader (FREESTYLE ALEXSANDRA 14 DAY READER) Misc 1 Device by Misc.(Non-Drug; Combo Route) route 2 (two) times a day.    flash glucose sensor (FREESTYLE ALEXSANDRA 14 DAY SENSOR) Kit 1 Device by Misc.(Non-Drug; Combo Route) route 2 (two) times a day.    fluocinonide (LIDEX) 0.05 % external solution AAA scalp qday - bid prn pruritus    fluticasone-salmeterol diskus inhaler 500-50 mcg Inhale 1 puff into the lungs 2 (two) times  daily. Controller (Patient not taking: Reported on 2/1/2022)    glycopyrrolate (ROBINUL) 2 MG Tab Take 1 tablet (2 mg total) by mouth 2 (two) times daily.    ketoconazole (NIZORAL) 2 % shampoo Wash hair with medicated shampoo at least 2x/week - let sit on scalp at least 5 minutes prior to rinsing    levothyroxine (SYNTHROID) 25 MCG tablet Take 1 tablet (25 mcg total) by mouth once daily.    meclizine (ANTIVERT) 25 mg tablet     mirtazapine (REMERON) 15 MG tablet Take 1 tablet (15 mg total) by mouth every evening.    pantoprazole (PROTONIX) 40 MG tablet Take 1 tablet (40 mg total) by mouth once daily.    RESTASIS 0.05 % ophthalmic emulsion      No current facility-administered medications for this visit.     No components found for: VITD  Glucose   Date Value Ref Range Status   04/02/2022 129 (H) 70 - 110 mg/dL Final   10/08/2021 110 70 - 110 mg/dL Final     BUN   Date Value Ref Range Status   04/02/2022 18 8 - 23 mg/dL Final   10/08/2021 22 8 - 23 mg/dL Final     Creatinine   Date Value Ref Range Status   04/02/2022 1.1 0.5 - 1.4 mg/dL Final   01/14/2022 1.3 0.5 - 1.4 mg/dL Final     Sodium   Date Value Ref Range Status   04/02/2022 139 136 - 145 mmol/L Final   10/08/2021 141 136 - 145 mmol/L Final     Potassium   Date Value Ref Range Status   04/02/2022 4.3 3.5 - 5.1 mmol/L Final   10/08/2021 3.9 3.5 - 5.1 mmol/L Final     Phosphorus   Date Value Ref Range Status   04/28/2019 2.7 2.7 - 4.5 mg/dL Final     Calcium   Date Value Ref Range Status   04/02/2022 10.2 8.7 - 10.5 mg/dL Final   10/08/2021 9.3 8.7 - 10.5 mg/dL Final     No results found for: PREALBUMIN  Total Protein   Date Value Ref Range Status   04/02/2022 7.3 6.0 - 8.4 g/dL Final   10/04/2021 7.9 6.0 - 8.4 g/dL Final     Cholesterol   Date Value Ref Range Status   04/02/2022 277 (H) 120 - 199 mg/dL Final     Comment:     The National Cholesterol Education Program (NCEP) has set the  following guidelines (reference ranges) for  Cholesterol:  Optimal.....................<200 mg/dL  Borderline High.............200-239 mg/dL  High........................> or = 240 mg/dL     01/11/2021 208 (H) 120 - 199 mg/dL Final     Comment:     The National Cholesterol Education Program (NCEP) has set the  following guidelines (reference ranges) for Cholesterol:  Optimal.....................<200 mg/dL  Borderline High.............200-239 mg/dL  High........................> or = 240 mg/dL       Hemoglobin A1C   Date Value Ref Range Status   04/02/2022 6.1 (H) 4.0 - 5.6 % Final     Comment:     ADA Screening Guidelines:  5.7-6.4%  Consistent with prediabetes  >or=6.5%  Consistent with diabetes    High levels of fetal hemoglobin interfere with the HbA1C  assay. Heterozygous hemoglobin variants (HbS, HgC, etc)do  not significantly interfere with this assay.   However, presence of multiple variants may affect accuracy.     10/04/2021 6.4 (H) 4.0 - 5.6 % Final     Comment:     ADA Screening Guidelines:  5.7-6.4%  Consistent with prediabetes  >or=6.5%  Consistent with diabetes    High levels of fetal hemoglobin interfere with the HbA1C  assay. Heterozygous hemoglobin variants (HbS, HgC, etc)do  not significantly interfere with this assay.   However, presence of multiple variants may affect accuracy.       Hemoglobin   Date Value Ref Range Status   04/02/2022 11.4 (L) 12.0 - 16.0 g/dL Final   10/08/2021 11.9 (L) 12.0 - 16.0 g/dL Final     Hematocrit   Date Value Ref Range Status   04/02/2022 34.6 (L) 37.0 - 48.5 % Final   10/08/2021 35.8 (L) 37.0 - 48.5 % Final     Iron   Date Value Ref Range Status   01/29/2020 65 30 - 160 ug/dL Final     No components found for: FROLATE  Vit D, 25-Hydroxy   Date Value Ref Range Status   01/29/2020 34 30 - 96 ng/mL Final     Comment:     Vitamin D deficiency.........<10 ng/mL                              Vitamin D insufficiency......10-29 ng/mL       Vitamin D sufficiency........> or equal to 30 ng/mL  Vitamin D  toxicity............>100 ng/mL     05/10/2012 48 30 - 100 ng/mL Final     Comment:     Vitamin D, 25-Hydroxy:   Vitamin D deficiency <10 ng/mL   Vitamin D insufficiency 10-30 ng/mL   Vitamin D sufficiency >30 ng/mL   Vitamin D potential toxicity >100 ng/mL      WBC   Date Value Ref Range Status   04/02/2022 5.92 3.90 - 12.70 K/uL Final   10/08/2021 10.52 3.90 - 12.70 K/uL Final     WBC   Date Value Ref Range Status   04/02/2022 5.92 3.90 - 12.70 K/uL Final   10/08/2021 10.52 3.90 - 12.70 K/uL Final     Current Outpatient Medications   Medication Sig    albuterol (PROVENTIL) 2.5 mg /3 mL (0.083 %) nebulizer solution Take 2.5 mg by nebulization every 6 (six) hours as needed. Rescue    albuterol (PROVENTIL/VENTOLIN HFA) 90 mcg/actuation inhaler INHALE 2 PUFFS BY MOUTH EVERY 6 HRS AS NEEDED    amLODIPine (NORVASC) 10 MG tablet TAKE 1 TABLET ONCE DAILY    apixaban (ELIQUIS) 2.5 mg Tab Take 1 tablet (2.5 mg total) by mouth 2 (two) times daily.    clobetasoL (TEMOVATE) 0.05 % external solution Pt to mix in 1 jar of cerave cream and apply to affected areas bid    cyproheptadine (PERIACTIN) 4 mg tablet Take 1 tablet (4 mg total) by mouth 3 (three) times daily as needed (appetite).    donepeziL (ARICEPT) 10 MG tablet TAKE 1 TABLET EVERY MORNING.  NO FURTHER REFILL WITHOUT APPOINTMENT (Patient taking differently: TAKE 1 TABLET EVERY MORNING.  NO FURTHER REFILL WITHOUT APPOINTMENT)    doxepin (SINEQUAN) 10 MG capsule TAKE 1 CAPSULE AT BEDTIME  FOR ITCHING (Patient taking differently: TAKE 1 CAPSULE AT BEDTIME  FOR ITCHING)    flash glucose scanning reader (FREESTYLE ALEXSANDRA 14 DAY READER) Misc 1 Device by Misc.(Non-Drug; Combo Route) route 2 (two) times a day.    flash glucose sensor (FREESTYLE ALEXSANDRA 14 DAY SENSOR) Kit 1 Device by Misc.(Non-Drug; Combo Route) route 2 (two) times a day.    fluocinonide (LIDEX) 0.05 % external solution AAA scalp qday - bid prn pruritus    fluticasone-salmeterol diskus inhaler 500-50 mcg  "Inhale 1 puff into the lungs 2 (two) times daily. Controller (Patient not taking: Reported on 2/1/2022)    glycopyrrolate (ROBINUL) 2 MG Tab Take 1 tablet (2 mg total) by mouth 2 (two) times daily.    ketoconazole (NIZORAL) 2 % shampoo Wash hair with medicated shampoo at least 2x/week - let sit on scalp at least 5 minutes prior to rinsing    levothyroxine (SYNTHROID) 25 MCG tablet Take 1 tablet (25 mcg total) by mouth once daily.    meclizine (ANTIVERT) 25 mg tablet     mirtazapine (REMERON) 15 MG tablet Take 1 tablet (15 mg total) by mouth every evening.    pantoprazole (PROTONIX) 40 MG tablet Take 1 tablet (40 mg total) by mouth once daily.    RESTASIS 0.05 % ophthalmic emulsion      No current facility-administered medications for this visit.     Lab Results   Component Value Date    TSH 0.364 (L) 04/02/2022     Lab Results   Component Value Date    FERRITIN 15 (L) 01/29/2020     @RESUFAST(NA,K,CL,CO2,GLU,BUN,CREATININE,  Lab Results   Component Value Date    CRP 0.9 03/03/2021     Lab Results   Component Value Date     (H) 04/02/2022    K 4.3 04/02/2022    PHOS 2.7 04/28/2019    MG 2.6 10/08/2021    CHOL 277 (H) 04/02/2022     (H) 04/02/2022    TRIG 51 04/02/2022    ALBUMIN 3.8 04/02/2022    HGBA1C 6.1 (H) 04/02/2022    CALCIUM 10.2 04/02/2022     Food intolerances or Allergies   Review of patient's allergies indicates:   Allergen Reactions    Melatonin      Other reaction(s): Other (See Comments)  Sleep Walks and has unnatural dreams    Talwin compound Hives    Talwin [pentazocine lactate] Hallucinations    Trazodone Other (See Comments)     Nightmares/sleep walk      Bentyl [dicyclomine] Anxiety       Ht Readings from Last 3 Encounters:   04/28/22 5' 5" (1.651 m)   04/04/22 5' 5" (1.651 m)   04/01/22 5' 5" (1.651 m)     Wt Readings from Last 12 Encounters:   05/03/22 57.1 kg (125 lb 14.1 oz)   04/28/22 57 kg (125 lb 10.6 oz)   04/04/22 54 kg (119 lb)   04/01/22 54.3 kg (119 lb 11.4 " oz)   03/23/22 54.6 kg (120 lb 7.7 oz)   02/18/22 55.8 kg (123 lb)   02/01/22 55.8 kg (123 lb)   01/28/22 56.3 kg (124 lb 1.9 oz)   01/18/22 57 kg (125 lb 10.6 oz)   01/11/22 55.7 kg (122 lb 14.5 oz)   01/10/22 56.5 kg (124 lb 10.7 oz)   01/04/22 58.1 kg (128 lb)      BMI: Body mass index is 20.95 kg/m².     Weight status: 10/5/21 145.1# >10/19/21 132#    Eating Behaviors: Inadequate moisture in foods ( dry bread, meat, rice)  and Consuming largest meal later in the day contributing to Reflux and or delayed gastric emptying     Dysphagia with thin liquids , low moisture foods, mixed textures - choking   Nutrition History   Uses sugar -free items to control BG ( sf Syrup on pancakes , BOOST glucose control)   Meal patterns: 3 meals daily  Breakfast: grits and eggs   Lunch: yogurt with whipped cream   Dinner: beans and rice, greens   Dining Out : rare ; today purchased from cafeteria downstairs : breakfast sausage, [place]go, grits, potatoes and eggs   Bowel Function :   Constipation lifelong   Meal preparation/shopping: self    Nutrition beverages: milk , meal replacement , drinks juices, drinks caffeine   Smoking/alcohol:  Social History     Tobacco Use    Smoking status: Never Smoker    Smokeless tobacco: Never Used   Substance Use Topics    Alcohol use: No       Nutrient Requirements :    Calories Weight gain, 1400 calories  Protein 0.8 gm-1 gram protein/kg  Protein (gm):  45-60 grams   Fluid : 1400cc @25 cc/kg     Nutrition Diagnosis:   Altered GI function related to Gastroparesis  and Esophageal dysmotility /dysphagia   As evidenced by 1/22 GES study and 5/21 esophagram     Motivation to change: high    MNT Recommendations/Interventions/Goals:Hyperlipidemia, Gastroparesis Diet education  and Esophageal Motility Diet Education     Discussion with patient included: Adequate moisture added to foods through use of FF gravy , addition of liquids to foods in cooking , sips of liquid between bites, crock pot cooking ,  soups, Consider liquid meal replacement , Liquids empty quicken than solids - use during flare or at end of day to minimize food sitting in stomach overnight, Low fiber /soluble fiber choices , Use low fiber grains/ cooked fruits and vegetables without seeds and skins  and Use of crock pot/pressure cooker /instapot/immersion  to break down food fibers and add moisture to food enhancing digestibility     Comprehensiongood   Patient demonstrated understanding: stated foods with low moisture which are difficult to swallow without added moisture or lubrication ( gravy, sauces , soaking food ) needed to facilitate swallow , stated liquids empty quicker than solid foods and best tolerated later in the day instead of solids ; high fiber foods - raw vegetables and fruit, skins, seeds , legumes cause delay in emptying , stated understanding of methods to make foods more easily digestible - cooking crock pot or slow cooker: mechanical modification - removal of skins and seeds, chopping,pureeing with  immersion  ( smoothies, bisque soups)  and stated understanding of need for adequate liquid in diet to hydrate fiber for relief of constipation     Nutrition follow-up:  written materials provided: in session , will refer to GI  for follow up   Counseling time: 1 Hour

## 2022-05-03 NOTE — PROGRESS NOTES
SUBJECTIVE:     Chief Complaint & History of Present Illness:  Debbie Ding is a  Established  patient 89 y.o. female who is seen here today with a complaint of left shoulder pain  .  She has patient well-known to me was last seen treated the clinic 04/01/2022 for her arthritic knees from which she is doing very well.  Her concerns today revolve around soreness and pain at the posterior aspect of the left shoulder and scapular region.  She relates she has pain primarily in the trapezius with radiation down the midportion of the scapula to the thoracic spine.  It is only noticeable when she sits in an upright position for an extended.  She has to posteriorly rotate her arm to relieve the discomfort.  She has had no trauma or injury to the area she notices no pain with resisted movement she has no decrease in range of motion or strength  On a scale of 1-10, with 10 being worst pain imaginable, he rates this pain as 0 on good days and 3 on bad days.  she describes the pain as sore.    Review of patient's allergies indicates:   Allergen Reactions    Melatonin      Other reaction(s): Other (See Comments)  Sleep Walks and has unnatural dreams    Talwin compound Hives    Talwin [pentazocine lactate] Hallucinations    Trazodone Other (See Comments)     Nightmares/sleep walk      Bentyl [dicyclomine] Anxiety         Current Outpatient Medications   Medication Sig Dispense Refill    albuterol (PROVENTIL) 2.5 mg /3 mL (0.083 %) nebulizer solution Take 2.5 mg by nebulization every 6 (six) hours as needed. Rescue      albuterol (PROVENTIL/VENTOLIN HFA) 90 mcg/actuation inhaler INHALE 2 PUFFS BY MOUTH EVERY 6 HRS AS NEEDED      amLODIPine (NORVASC) 10 MG tablet TAKE 1 TABLET ONCE DAILY 90 tablet 3    apixaban (ELIQUIS) 2.5 mg Tab Take 1 tablet (2.5 mg total) by mouth 2 (two) times daily. 180 tablet 3    clobetasoL (TEMOVATE) 0.05 % external solution Pt to mix in 1 jar of cerave cream and apply to affected areas  bid 50 mL 3    cyproheptadine (PERIACTIN) 4 mg tablet Take 1 tablet (4 mg total) by mouth 3 (three) times daily as needed (appetite). 90 tablet 1    donepeziL (ARICEPT) 10 MG tablet TAKE 1 TABLET EVERY MORNING.  NO FURTHER REFILL WITHOUT APPOINTMENT (Patient taking differently: TAKE 1 TABLET EVERY MORNING.  NO FURTHER REFILL WITHOUT APPOINTMENT) 90 tablet 1    doxepin (SINEQUAN) 10 MG capsule TAKE 1 CAPSULE AT BEDTIME  FOR ITCHING (Patient taking differently: TAKE 1 CAPSULE AT BEDTIME  FOR ITCHING) 90 capsule 3    flash glucose scanning reader (FREESTYLE ALEXSANDRA 14 DAY READER) Misc 1 Device by Misc.(Non-Drug; Combo Route) route 2 (two) times a day. 1 each 11    flash glucose sensor (FREESTYLE ALEXSANDRA 14 DAY SENSOR) Kit 1 Device by Misc.(Non-Drug; Combo Route) route 2 (two) times a day. 1 kit 11    fluocinonide (LIDEX) 0.05 % external solution AAA scalp qday - bid prn pruritus 60 mL 3    glycopyrrolate (ROBINUL) 2 MG Tab Take 1 tablet (2 mg total) by mouth 2 (two) times daily. 180 tablet 3    ketoconazole (NIZORAL) 2 % shampoo Wash hair with medicated shampoo at least 2x/week - let sit on scalp at least 5 minutes prior to rinsing 120 mL 5    levothyroxine (SYNTHROID) 25 MCG tablet Take 1 tablet (25 mcg total) by mouth once daily. 90 tablet 0    meclizine (ANTIVERT) 25 mg tablet       mirtazapine (REMERON) 15 MG tablet Take 1 tablet (15 mg total) by mouth every evening. 90 tablet 3    pantoprazole (PROTONIX) 40 MG tablet Take 1 tablet (40 mg total) by mouth once daily. 90 tablet 3    RESTASIS 0.05 % ophthalmic emulsion       fluticasone-salmeterol diskus inhaler 500-50 mcg Inhale 1 puff into the lungs 2 (two) times daily. Controller (Patient not taking: Reported on 2/1/2022) 60 each 11     No current facility-administered medications for this visit.       Past Medical History:   Diagnosis Date    Allergic rhinitis     Anticoagulant long-term use     Arthritis     Asthma     Bilateral pulmonary embolism  4/27/2019    Cataract     Chronic anticoagulation 9/28/2020    Depression     Diabetes mellitus     Fibromyalgia 7/2/2012    Fibromyalgia     GERD (gastroesophageal reflux disease)     Hypercholesterolemia 9/28/2020    Hypercholesterolemia 9/28/2020    Hypertension 7/2/2012    Thoracic or lumbosacral neuritis or radiculitis, unspecified     Thyroid disease     Ulcer        Past Surgical History:   Procedure Laterality Date    CATARACT EXTRACTION Bilateral     ESOPHAGOGASTRODUODENOSCOPY N/A 3/8/2022    Procedure: EGD (ESOPHAGOGASTRODUODENOSCOPY) with dilation-either hospital ok with Dr. Meza;  Surgeon: Rebeca Meza MD;  Location: [facility];  Service: Endoscopy;  Laterality: N/A;  1/11-eliquis hold ok see te-tb    ESOPHAGOGASTRODUODENOSCOPY N/A 2/28/2022    Procedure: EGD (ESOPHAGOGASTRODUODENOSCOPY) with dilation-either [facility] with Dr. Meza;  Surgeon: Rebeca Meza MD;  Location: [address]);  Service: Endoscopy;  Laterality: N/A;  1/11-eliquis [facility] ok see te-tb  COVID test on 2/25/22 at [facility]  2/22-confirmed appt arrival time with pt-Kpvt    FOOT NEUROMA SURGERY  1985    HYSTERECTOMY         Vital Signs (Most Recent)  Vitals:    05/03/22 0928   BP: 134/71   Pulse: 84       Review of Systems:  ROS:  Constitutional: no fever or chills  Eyes: no visual changes  ENT: no nasal congestion or sore throat, Positive  vestibular dizziness  Respiratory: no cough or shortness of breath, Positive for asthma  Cardiovascular: no chest pain or palpitations  Gastrointestinal: no nausea or vomiting, tolerating diet, Positive for GERD, peptic ulcer disease  Genitourinary: no hematuria or dysuria, Positive CKD stage 3,  Integument/Breast: no rash or pruritis  Hematologic/Lymphatic: no easy bruising or lymphadenopathy, Positive long-term anticoagulation, history DVT, history of PE  Musculoskeletal: no arthralgias or myalgias, Positive chronic low back pain, fibromyalgia, osteoarthritis  "of multiple joints  Neurological: no seizures or tremors, Degenerative disc disease lumbar spine, history of memory loss, spinal stenosis without neurological claudication  Behavioral/Psych: no auditory or visual hallucinations, Positive for depression,  Endocrine: no heat or cold intolerance, Positive diabetes type 2, thyroid disease hypothyroidism        OBJECTIVE:     PHYSICAL EXAM:  Height: 5' 5" (165.1 cm) Weight: 57 kg (125 lb 10.6 oz), General Appearance: Well nourished, well developed, in no acute distress.  Neurological: Mood & affect are normal.  Shoulder exam: left  Tenderness: scapula, trapezius muscle  ROM: forward flexion 180/180, extension 45/45, full abduction 180/180, abduction-glenohumeral 90/90, external rotation 50/50  Shoulder Strength: biceps 5/5, triceps 5/5, abduction 5/5, adduction 5/5, external rotation 5/5 with shoulder at side, flexion 5/5, and extension 5/5  negative for tenderness about the glenohumeral joint, negative for tenderness over the acromioclavicular joint and negative for impingement sign  Stability tests: anterior apprehension test negative and posterior apprehension test negative  Special Tests:Cross-chest abduction: negative and [place]'s test: negative     Mild tenderness to palpation of the paraspinal muscles surrounding T3-T4 T5 on the left no palpable muscle spasm no decrease in range of motion or strength                RADIOGRAPHS:  X-rays the shoulder taken today films reviewed by me demonstrate no evidence of fracture dislocation joint spaces well maintained with only mild glenohumeral joint space narrowing.    X-rays of the thoracic spine from last year demonstrate mild scoliotic curvature at T1-T2 otherwise normal spine    ASSESSMENT/PLAN:       ICD-10-CM ICD-9-CM   1. Thoracic spine pain  M54.6 724.1   2. Left shoulder pain, unspecified chronicity  M25.512 719.41       Plan: We discussed with the patient at length all the different treatment options available " for her left shoulder and back.  Will consult Physical therapy for stretching range of motion consideration of dry needling follow-up in 4 weeks sooner symptoms dictate if symptoms not resolve may consider MRI of the thoracic spine

## 2022-05-04 ENCOUNTER — OFFICE VISIT (OUTPATIENT)
Dept: OTOLARYNGOLOGY | Facility: CLINIC | Age: 87
End: 2022-05-04
Payer: MEDICARE

## 2022-05-04 ENCOUNTER — OFFICE VISIT (OUTPATIENT)
Dept: INTERNAL MEDICINE | Facility: CLINIC | Age: 87
End: 2022-05-04
Payer: MEDICARE

## 2022-05-04 VITALS — TEMPERATURE: 98 F | HEART RATE: 76 BPM | DIASTOLIC BLOOD PRESSURE: 63 MMHG | SYSTOLIC BLOOD PRESSURE: 120 MMHG

## 2022-05-04 VITALS
OXYGEN SATURATION: 98 % | HEART RATE: 71 BPM | DIASTOLIC BLOOD PRESSURE: 60 MMHG | BODY MASS INDEX: 21.34 KG/M2 | SYSTOLIC BLOOD PRESSURE: 122 MMHG | HEIGHT: 65 IN | WEIGHT: 128.06 LBS

## 2022-05-04 DIAGNOSIS — R49.0 HOARSENESS: ICD-10-CM

## 2022-05-04 DIAGNOSIS — J38.5 LARYNGEAL SPASM: ICD-10-CM

## 2022-05-04 DIAGNOSIS — N18.31 STAGE 3A CHRONIC KIDNEY DISEASE: ICD-10-CM

## 2022-05-04 DIAGNOSIS — I10 HYPERTENSION, UNSPECIFIED TYPE: Primary | ICD-10-CM

## 2022-05-04 DIAGNOSIS — Z86.718 HISTORY OF DEEP VENOUS THROMBOSIS: ICD-10-CM

## 2022-05-04 DIAGNOSIS — Z79.01 CHRONIC ANTICOAGULATION: ICD-10-CM

## 2022-05-04 DIAGNOSIS — K21.9 GASTROESOPHAGEAL REFLUX DISEASE WITHOUT ESOPHAGITIS: ICD-10-CM

## 2022-05-04 DIAGNOSIS — K31.84 GASTROPARESIS: ICD-10-CM

## 2022-05-04 DIAGNOSIS — J38.3 VOCAL FOLD ATROPHY: ICD-10-CM

## 2022-05-04 DIAGNOSIS — E03.9 HYPOTHYROIDISM, UNSPECIFIED TYPE: ICD-10-CM

## 2022-05-04 DIAGNOSIS — R49.8: ICD-10-CM

## 2022-05-04 DIAGNOSIS — R49.0 DYSPHONIA: Primary | ICD-10-CM

## 2022-05-04 DIAGNOSIS — Z86.711 HISTORY OF PULMONARY EMBOLISM: ICD-10-CM

## 2022-05-04 PROCEDURE — 31579 PR LARYNGOSCOPY, FLEX/RIGID TELESCOPIC, W/STROBOSCOPY: ICD-10-PCS | Mod: S$PBB,,, | Performed by: OTOLARYNGOLOGY

## 2022-05-04 PROCEDURE — 99999 PR PBB SHADOW E&M-EST. PATIENT-LVL V: ICD-10-PCS | Mod: PBBFAC,,, | Performed by: OTOLARYNGOLOGY

## 2022-05-04 PROCEDURE — 99999 PR PBB SHADOW E&M-EST. PATIENT-LVL IV: CPT | Mod: PBBFAC,,, | Performed by: PHYSICIAN ASSISTANT

## 2022-05-04 PROCEDURE — 99214 OFFICE O/P EST MOD 30 MIN: CPT | Mod: PBBFAC,27,25 | Performed by: PHYSICIAN ASSISTANT

## 2022-05-04 PROCEDURE — 99999 PR PBB SHADOW E&M-EST. PATIENT-LVL IV: ICD-10-PCS | Mod: PBBFAC,,, | Performed by: PHYSICIAN ASSISTANT

## 2022-05-04 PROCEDURE — 99214 OFFICE O/P EST MOD 30 MIN: CPT | Mod: S$PBB,,, | Performed by: PHYSICIAN ASSISTANT

## 2022-05-04 PROCEDURE — 31579 LARYNGOSCOPY TELESCOPIC: CPT | Mod: PBBFAC | Performed by: OTOLARYNGOLOGY

## 2022-05-04 PROCEDURE — 99204 OFFICE O/P NEW MOD 45 MIN: CPT | Mod: 25,S$PBB,, | Performed by: OTOLARYNGOLOGY

## 2022-05-04 PROCEDURE — 99214 PR OFFICE/OUTPT VISIT, EST, LEVL IV, 30-39 MIN: ICD-10-PCS | Mod: S$PBB,,, | Performed by: PHYSICIAN ASSISTANT

## 2022-05-04 PROCEDURE — 31579 LARYNGOSCOPY TELESCOPIC: CPT | Mod: S$PBB,,, | Performed by: OTOLARYNGOLOGY

## 2022-05-04 PROCEDURE — 99204 PR OFFICE/OUTPT VISIT, NEW, LEVL IV, 45-59 MIN: ICD-10-PCS | Mod: 25,S$PBB,, | Performed by: OTOLARYNGOLOGY

## 2022-05-04 PROCEDURE — 99999 PR PBB SHADOW E&M-EST. PATIENT-LVL V: CPT | Mod: PBBFAC,,, | Performed by: OTOLARYNGOLOGY

## 2022-05-04 PROCEDURE — 99215 OFFICE O/P EST HI 40 MIN: CPT | Mod: PBBFAC,25 | Performed by: OTOLARYNGOLOGY

## 2022-05-04 NOTE — PATIENT INSTRUCTIONS
WHAT TO EXPECT FROM VOICE THERAPY    Purpose  The purpose of voice therapy is to help you find a better, more efficient way to use your voice or to reduce symptoms such as coughing, throat clearing, or difficulty breathing.  Depending on your symptoms, you may learn how to produce clearer voice quality, how to reduce fatigue or pain associated with speaking, how to take care of your voice, and how to eliminate chronic coughing or throat clearing.      Process: Evaluation  First, you may go through some initial testing.  In most cases, a videostroboscopy will be performed in order to allow your speech pathologist and your physician to look at your vocal cords to aid in deciding if you would benefit from voice therapy.  Next, you may work with the speech pathologist to assess the current capabilities of your voice.  Following evaluation, your speech pathologist will design a therapeutic plan to improve your voice as well as other symptoms that may bother you.  At the time of evaluation, your speech pathologist may provide you with exercises to try at home.      Process: Therapy  Most patients benefit from 2-8 sessions over 1-3 months.  Voice therapy involves changing the behavior of your vocal cords and speaking habits, so it is very important that you attend your appointments and do home practices as instructed by your speech pathologist.  Home practice and participation in therapy are critical to meeting your desired voice goals, so of course, we are able to work with you to schedule appointments that are convenient for you.

## 2022-05-04 NOTE — PROGRESS NOTES
[facility]  Department of Otorhinolaryngology and Communication Sciences    Debbie Ding is a 89 y.o. female who [facility] to the [facility] for consultation at the kind request of Dr. Carolyn Mejia for further management of hoarseness.     She complains of a hoarse voice, beginning acutely during an asthma attack in October. She lost her voice entirely until March. Since then it has improved maybe to about 85% of normal but it is at times growly/gravelly. . Duration is almost 7 months. Time course is constant. Symptoms are now stable. Exacerbating factors include voice use. She denies any alleviating factors. She denies any associated symptoms. She uses advair BID.     EGD/Bravo 3/8/2022  - 2 cm hiatal hernia.                          - Non-obstructing Schatzki ring. Dilated without                          disruption of the ring. Disrupted with biopsy                          forceps.   Positive Haq Study for GERD      Past Medical History  She has a past medical history of Allergic rhinitis, Anticoagulant long-term use, Arthritis, Asthma, Bilateral pulmonary embolism, Cataract, Chronic anticoagulation, Depression, Diabetes mellitus, Fibromyalgia, Fibromyalgia, GERD (gastroesophageal reflux disease), Hypercholesterolemia, Hypercholesterolemia, Hypertension, Thoracic or lumbosacral neuritis or radiculitis, unspecified, Thyroid disease, and Ulcer.    Past Surgical History  She has a past surgical history that includes Hysterectomy; Foot neuroma surgery (1985); Cataract extraction (Bilateral); Esophagogastroduodenoscopy (N/A, 3/8/2022); and Esophagogastroduodenoscopy (N/A, 2/28/2022).    Family History  Her family history includes Diabetes in her brother and mother; Emphysema in her maternal aunt.    Social History  She reports that she has never smoked. She has never used smokeless tobacco. She reports that she does not drink alcohol and does not use drugs.    Allergies  She is allergic to  melatonin, talwin compound, talwin [pentazocine lactate], trazodone, and bentyl [dicyclomine].    Medications  She has a current medication list which includes the following prescription(s): albuterol, albuterol, amlodipine, apixaban, clobetasol, cyproheptadine, donepezil, doxepin, freestyle caryn 14 day reader, freestyle caryn 14 day sensor, fluocinonide, glycopyrrolate, ketoconazole, levothyroxine, meclizine, mirtazapine, pantoprazole, restasis, and fluticasone-salmeterol 500-50 mcg/dose.    Review of Systems   Constitutional: Negative for fever.   HENT: Negative for sore throat.    Eyes: Negative for visual disturbance.   Respiratory: Negative for wheezing.    Cardiovascular: Negative for chest pain.   Gastrointestinal: Negative for nausea.   Musculoskeletal: Negative for arthralgias.   Skin: Negative for rash.   Neurological: Negative for tremors.   Hematological: Does not bruise/bleed easily.   Psychiatric/Behavioral: The patient is not nervous/anxious.           Objective:     /63   Pulse 76   Temp 98 °F (36.7 °C)      Physical Exam    Constitutional: comfortable, well dressed  Psychiatric: appropriate affect  Respiratory: comfortably breathing, symmetric chest rise, no stridor  Voice: mild-to-severe variable aperiodic roughness; poor flexibility  Cardiovascular: upper extremities non-edematous  Lymphatic: no cervical lymphadenopathy  Neurologic: alert and oriented to time, place, person, and situation; cranial nerves 3-12 grossly intact  Head: normocephalic  Eyes: conjunctivae and sclerae clear  Ears: normal pinnae, normal external auditory canals, tympanic membranes intact  Nose: mucosa pink and noncongested, no masses, no mucopurulence, no polyps  Oral cavity / oropharynx: no mucosal lesions  Neck: soft, full range of motion, laryngotracheal complex palpable with appropriate landmarks, larynx elevates on swallowing  Indirect laryngoscopy: limited due to gag    Procedure  Flexible Laryngeal  Videostroboscopy (86000): Laryngeal videostroboscopy is indicated to assess laryngeal vibratory biomechanics and vocal fold oscillation, which cannot be assessed with a plain light examination. This was carried out transnasally with a distal chip videoendoscope. After verbal consent was obtained, the patient was positioned and the nose was topically decongested with 1% phenylephrine and topically anesthetized with 4% lidocaine. The endoscope was passed through the most patent nasal cavity and positioned to image the nasopharynx, larynx, and hypopharynx in detail. The following features were examined: nasopharyngeal, laryngeal, hypopharyngeal masses; velopharyngeal strength, closure, and symmetry of motion; vocal fold range and symmetry of motion; laryngeal mucosal edema, erythema, inflammation, and hydration; salivary pooling; and gross laryngeal sensation. During phonation, the vocal folds were assessed for glottal closure; mucosal wave; vocal fold lesions; vibratory periodicity, amplitude, and phase symmetry; and vertical height match. The equipment was removed. The patient tolerated the procedure well without complication. All findings were normal except:  - bilateral vocal fold atrophy, mild-moderate  - posterior/LCA-predominant closure  - truncated phonatory segment with small amplitudes and variable aperiodic vibration  - phonatory tremor on sustained vowels, involving intrinsic larynx and supraglottis  - scattered pharyngeal leukoplakia      Assessment:     Debbie Ding is a 89 y.o. 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 I note findings of vocal fold atrophy, tremor, and muscle tension dysphonia.       Plan:        I had a discussion with the patient regarding her condition and the further workup and management options.      SLP voice evaluation and subsequent therapy sessions are medically necessary for restoration of laryngeal function. We will arrange for this to occur here at the [facility] in the coming  weeks.    She will follow up with me in 6-8 week(s). Depending on her progress of lack thereof, we might consider additional intervention.    All questions were answered, and the patient is in agreement with the above.     Devin Ridley M.D.  [facility]  Department of Otorhinolaryngology and Communication Sciences

## 2022-05-05 PROBLEM — K31.84 GASTROPARESIS: Status: ACTIVE | Noted: 2022-05-05

## 2022-05-05 NOTE — PROGRESS NOTES
Subjective:       Patient ID: Debbie Ding is a 89 y.o. female.        Chief Complaint: Follow-up    Debbie Ding is an established patient of Carolyn Mejia MD here today for follow up visit.    She has finally gained some weight!    She saw Dr. Mejia 1 month ago, who reduced her synthroid from 50 mcg --> 25 mcg with repeat TSH scheduled next month.  She also added periactin.  In the interim, she also saw the GI dietician who was immensely helpful in reviewing gastroparesis diet.  She feels all have helped.  She has gone from 119# --> 128#.  She feels much better at this weight.  She is eating more and appetite is better overall.      Recall she had extensive evaluation for weight loss ultimately due to gastroparesis -  CT chest/abdomen/pelvis 1/2022 with nothing to explain weight loss  Saw GI and gastric emptying study confirmed gastropearesis 2/3/22, she did not want to take reglan due to concern for side effects  EGD 3/2022 with bravo pH positive, so continued on PPI indefinitely      Started on remeron and titrated to 15 mg to help with weight gain around 6/2021, initially helped to regain some weight, cannot take megace due to h/o DVT/PE     Baseline weight around 140#     TSH normal 4/2021, colonoscopy 6/2019, mammogram 1/2022     Past medical history -   1.  Anxiety, grief - lorazepam prn, no longer taking lexparo     2.  HTN tx with amlodipine with /60 today     3.  Hypothyroidism tx with synthroid (dose reduced 50 --> 25 mcg 4/2022)     4.  H/o left shoulder pain that comes/goes     5.  Asthma is followed by Dr. Suazo and controlled with advair, albuterol prn     6.  She is on eliquis for h/o PE/DVT          Review of Systems   Constitutional: Negative for chills, diaphoresis, fatigue and fever.   HENT: Negative for congestion and sore throat.    Eyes: Negative for visual disturbance.   Respiratory: Negative for cough, chest tightness and shortness of breath.    Cardiovascular: Negative  for chest pain, palpitations and leg swelling.   Gastrointestinal: Negative for abdominal pain, blood in stool, constipation, diarrhea, nausea and vomiting.   Genitourinary: Negative for dysuria, frequency, hematuria and urgency.   Musculoskeletal: Negative for arthralgias and back pain.   Skin: Negative for rash.   Neurological: Negative for dizziness, syncope, weakness and headaches.   Psychiatric/Behavioral: Negative for dysphoric mood and sleep disturbance. The patient is not nervous/anxious.        Objective:      Physical Exam  Vitals and nursing note reviewed.   Constitutional:       Appearance: Normal appearance. She is well-developed.   HENT:      Head: Normocephalic.      Right Ear: External ear normal.      Left Ear: External ear normal.   Eyes:      Pupils: Pupils are equal, round, and reactive to light.   Cardiovascular:      Rate and Rhythm: Normal rate and regular rhythm.      Heart sounds: Normal heart sounds. No murmur heard.    No friction rub. No gallop.   Pulmonary:      Effort: Pulmonary effort is normal. No respiratory distress.      Breath sounds: Normal breath sounds.   Abdominal:      Palpations: Abdomen is soft.      Tenderness: There is no abdominal tenderness.   Skin:     General: Skin is warm and dry.   Neurological:      Mental Status: She is alert.         Assessment:       1. Hypertension, unspecified type    2. Stage 3a chronic kidney disease    3. History of pulmonary embolism    4. History of deep venous thrombosis    5. Chronic anticoagulation    6. Gastroesophageal reflux disease without esophagitis    7. Hypothyroidism, unspecified type    8. Gastroparesis        Plan:       Debbie was seen today for follow-up.    Diagnoses and all orders for this visit:    Hypertension, unspecified type - stable and controlled  BP Readings from Last 5 Encounters:   05/04/22 122/60   05/04/22 120/63   05/03/22 134/71   04/28/22 124/64   04/04/22 110/82      Stage 3a chronic kidney  "disease    History of pulmonary embolism - on eliquis    History of deep venous thrombosis    Chronic anticoagulation    Gastroesophageal reflux disease without esophagitis - taking protonix, to continue, EGD with bravo pH probe positive    Hypothyroidism, unspecified type - synthroid reduced, repeat TSH scheduled    Gastroparesis - appetite better with gastroparesis diet    Periactin helping with appetite    Pt has been given instructions populated from [employer] database and has verbalized understanding of the after visit summary and information contained wherein.    Follow up with a primary care provider. May go to ER for acute shortness of breath, lightheadedness, fever, or any other emergent complaints or changes in condition.    "This note will be shared with the patient"    Future Appointments   Date Time Provider Department Center   5/10/2022  8:00 AM Guerda Jackson CCC-SLP [facility] JUANJO Grier   5/17/2022 10:45 AM Arturo Teresa DPM [facility] POD [facility]   5/31/2022  9:00 AM MARAH Jameson ORTHO [facility]   6/4/2022  8:00 AM LAB, APPOINTMENT DEWAYNE QUARLES [facility] LAB IM [facility] Wu PCW                 "

## 2022-05-09 DIAGNOSIS — Z79.01 CHRONIC ANTICOAGULATION: ICD-10-CM

## 2022-05-09 DIAGNOSIS — Z86.711 HISTORY OF PULMONARY EMBOLISM: ICD-10-CM

## 2022-05-09 DIAGNOSIS — Z86.718 HISTORY OF DEEP VENOUS THROMBOSIS: ICD-10-CM

## 2022-05-09 NOTE — PROGRESS NOTES
Referring provider: Dr. Devin Ridley  Reason for visit:  Behavioral and qualitative analysis of voice and resonance (CPT 42981)      Subjective / History    Debbie Ding is a 89 y.o. female referred for voice evaluation (CPT 26246) by Dr. Ridley.  She presents with complaints hoarse voice, beginning acutely during an asthma attack in October. She lost her voice entirely until March. Since then it has improved. She states quality that is most bothersome is growling/gravelly sound. When this occurs, she will then feel short winded and begin to stutter. This happens early into a conversation. She uses advair BID. She has had oral thrush. Her daughter and grandson live with her, 3 great grandchildren often come over. She gets lots of voice use through the day. Medication and problem lists were reviewed.     Swallow: has to take breaths before swallowing foods, sometimes coughs with liquids but this is inconsistent  Breathing: SOB mostly with talking  Smoking: never smoked  Reflux: no  Water: 20 oz/day     Stroboscopy findings per Dr. 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, involving intrinsic larynx and supraglottis  - scattered pharyngeal leukoplakia    Past Medical History:   Diagnosis Date    Allergic rhinitis     Anticoagulant long-term use     Arthritis     Asthma     Bilateral pulmonary embolism 4/27/2019    Cataract     Chronic anticoagulation 9/28/2020    Depression     Diabetes mellitus     Fibromyalgia 7/2/2012    Fibromyalgia     GERD (gastroesophageal reflux disease)     Hypercholesterolemia 9/28/2020    Hypercholesterolemia 9/28/2020    Hypertension 7/2/2012    Thoracic or lumbosacral neuritis or radiculitis, unspecified     Thyroid disease     Ulcer      Current  Outpatient Medications on File Prior to Visit   Medication Sig Dispense Refill    albuterol (PROVENTIL) 2.5 mg /3 mL (0.083 %) nebulizer solution Take 2.5 mg by nebulization every 6 (six) hours as needed. Rescue      albuterol (PROVENTIL/VENTOLIN HFA) 90 mcg/actuation inhaler INHALE 2 PUFFS BY MOUTH EVERY 6 HRS AS NEEDED      amLODIPine (NORVASC) 10 MG tablet TAKE 1 TABLET ONCE DAILY 90 tablet 3    apixaban (ELIQUIS) 2.5 mg Tab Take 1 tablet (2.5 mg total) by mouth 2 (two) times daily. 180 tablet 3    clobetasoL (TEMOVATE) 0.05 % external solution Pt to mix in 1 jar of cerave cream and apply to affected areas bid 50 mL 3    cyproheptadine (PERIACTIN) 4 mg tablet Take 1 tablet (4 mg total) by mouth 3 (three) times daily as needed (appetite). 90 tablet 1    donepeziL (ARICEPT) 10 MG tablet TAKE 1 TABLET EVERY MORNING.  NO FURTHER REFILL WITHOUT APPOINTMENT (Patient taking differently: TAKE 1 TABLET EVERY MORNING.  NO FURTHER REFILL WITHOUT APPOINTMENT) 90 tablet 1    doxepin (SINEQUAN) 10 MG capsule TAKE 1 CAPSULE AT BEDTIME  FOR ITCHING (Patient taking differently: TAKE 1 CAPSULE AT BEDTIME  FOR ITCHING) 90 capsule 3    flash glucose scanning reader (FREESTYLE ALEXSANDRA 14 DAY READER) Misc 1 Device by Misc.(Non-Drug; Combo Route) route 2 (two) times a day. 1 each 11    flash glucose sensor (FREESTYLE ALEXSANDRA 14 DAY SENSOR) Kit 1 Device by Misc.(Non-Drug; Combo Route) route 2 (two) times a day. 1 kit 11    fluocinonide (LIDEX) 0.05 % external solution AAA scalp qday - bid prn pruritus 60 mL 3    fluticasone-salmeterol diskus inhaler 500-50 mcg Inhale 1 puff into the lungs 2 (two) times daily. Controller (Patient not taking: Reported on 2/1/2022) 60 each 11    glycopyrrolate (ROBINUL) 2 MG Tab Take 1 tablet (2 mg total) by mouth 2 (two) times daily. 180 tablet 3    ketoconazole (NIZORAL) 2 % shampoo Wash hair with medicated shampoo at least 2x/week - let sit on scalp at least 5 minutes prior to rinsing 120 mL 5     levothyroxine (SYNTHROID) 25 MCG tablet Take 1 tablet (25 mcg total) by mouth once daily. 90 tablet 0    meclizine (ANTIVERT) 25 mg tablet       mirtazapine (REMERON) 15 MG tablet Take 1 tablet (15 mg total) by mouth every evening. 90 tablet 3    pantoprazole (PROTONIX) 40 MG tablet Take 1 tablet (40 mg total) by mouth once daily. 90 tablet 3    RESTASIS 0.05 % ophthalmic emulsion       [DISCONTINUED] apixaban (ELIQUIS) 2.5 mg Tab Take 1 tablet (2.5 mg total) by mouth 2 (two) times daily. 180 tablet 3     No current facility-administered medications on file prior to visit.       Objective    Perceptual/behavioral assessment  -Quality: strained, short/pulse mode phonation and tremulous  -Volume: decreased projection  -Pitch: low F0  -Flexibility: diminished  -Habitual respiratory pattern: chest/clavicular      Voice Handicap Index-10   0 = Never 1 = Almost Never 2 = Sometimes 3 = Almost Always 4 = Always   My voice makes it difficult for people to hear me. 0 1 2 3 4   People have difficulty understanding me in a noisy room. 0 1 2 3 4   My voice difficulties restrict personal and social life. 0 1 2 3 4   I feel left out of conversations because of my voice. 0 1 2 3 4   My voice problem causes me to lose income. 0 1 2 3 4   I feel as though I have to strain to produce voice. 0 1 2 3 4   The clarity of my voice is unpredictable. 0 1 2 3 4   My voice problem upsets me. 0 1 2 3 4   My voice makes me feel handicapped. 0 1 2 3 4   People ask, Whats wrong with your voice? 0 1 2 3 4  Score: 21      Clinical Evaluation/Treatment: This is a well developed, well nourished female who presents in no acute distress. The patient is fully oriented, affect is WNL. Oral mechanism examination reveals articulators to have range, speed and coordination of movement WFL for speech and swallow tasks. Voice today is moderately dysphonic at times with low pitch, gravelly quality, squeeze and inefficient breath support. Mean phonation  time is 7 seconds, when cued to go longer, she was able to attain approximately 9 seconds.       Education / Stimulability Trials  Reviewed findings of laryngoscopy with videostroboscopy as they pertain to her voice disorder as well as rationale for therapy. Discussed and trained importance of vocal hygiene including: hydration, steam and gargle following inhaler use, reducing caffeine/drying agents and reducing throat clearing, coughing, other phonotraumatic behaviors. Patient was stimulable for improved voice using SOVT exercises with straw phonation, straw with cup bubbles and /u/ vowel. Improvements in forward resonance with clearer vocal quality noted on sustained notes, lesser so with glides, but still noted improvement. She was able to establish optimal pitch at higher F0, focus placed on finding this pitch in effort to train carryover to speech. Some carryover into short words and phrases following cup bubbles. Following voice-therapy training, patient endorses improvements in vocal quality with the exercises. Written instructions for Home Exercise program provided. Encouraged practicing exercises several times daily in isolation and into short phrases to solidify muscle memory patterns and reduce extralaryngeal tension during speech tasks.  She was agreeable to suggestions.     Functional goals  Short Term Goals: (4 weeks)   1. Patient will complete SOVT exercises and/or resonant-focused exercises 3-5x daily to improve vocal cord function.     Initiated   2. Patient will improve the quality, efficiency, and ease of phonation through resonant and/or airflow exercises from the syllable to conversation level with 90% accuracy.     Initiated   3.  Patient will discriminate between easy and strained phonation with 90% accuracy.     Initiated   4. Pt will recall and utilize vocal hygiene strategies ind'ly.  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  Prognosis for continued improvement is good.     Recommendations / POC    -Recommend 6-8 sessions of voice therapy over 12 weeks with a speech-language pathologist to optimize glottal postures for improved vocal function, vocal efficiency, and ease of phonation  -Initiate laryngeal hygiene and voice exercises as trained and discussed in session  -f/u scheduled for 1 week  -Contact clinician with any further questions

## 2022-05-10 ENCOUNTER — CLINICAL SUPPORT (OUTPATIENT)
Dept: SPEECH THERAPY | Facility: HOSPITAL | Age: 87
End: 2022-05-10
Attending: OTOLARYNGOLOGY
Payer: MEDICARE

## 2022-05-10 DIAGNOSIS — J38.3 VOCAL FOLD ATROPHY: ICD-10-CM

## 2022-05-10 DIAGNOSIS — J38.5 LARYNGEAL SPASM: ICD-10-CM

## 2022-05-10 DIAGNOSIS — R49.0 DYSPHONIA: ICD-10-CM

## 2022-05-10 PROCEDURE — 92524 BEHAVRAL QUALIT ANALYS VOICE: CPT | Mod: GN

## 2022-05-11 ENCOUNTER — TELEPHONE (OUTPATIENT)
Dept: CARDIOLOGY | Facility: CLINIC | Age: 87
End: 2022-05-11
Payer: MEDICARE

## 2022-05-11 NOTE — TELEPHONE ENCOUNTER
Patient notified request for PA has been submitted to insurance company.        ----- Message from Simona Cain sent at 5/11/2022  9:19 AM CDT -----  Regarding: FW: Patient call back    ----- Message -----  From: Miriam Suarez  Sent: 5/11/2022   8:43 AM CDT  To: Leslie Mcwilliams Staff  Subject: Patient call back                                Who called:DEJAH ROLDAN [[id number]]    What is the request in detail: Patient is requesting a call back. Pt would like to know if her paperwork was com pelted for her PA for apixaban (ELIQUIS) 2.5 mg Tab  Please advise.    Can the clinic reply by [facility]? No    Best call back number: [phone]    Additional Information: N/A

## 2022-05-16 DIAGNOSIS — N18.30 TYPE 2 DIABETES MELLITUS WITH STAGE 3 CHRONIC KIDNEY DISEASE, WITHOUT LONG-TERM CURRENT USE OF INSULIN, UNSPECIFIED WHETHER STAGE 3A OR 3B CKD: ICD-10-CM

## 2022-05-16 DIAGNOSIS — E11.22 TYPE 2 DIABETES MELLITUS WITH STAGE 3 CHRONIC KIDNEY DISEASE, WITHOUT LONG-TERM CURRENT USE OF INSULIN, UNSPECIFIED WHETHER STAGE 3A OR 3B CKD: ICD-10-CM

## 2022-05-16 NOTE — TELEPHONE ENCOUNTER
----- Message from Shelby Cosby sent at 5/16/2022  8:38 AM CDT -----  Contact: Pt Home [phone]  Patient would like a call back in regards to her wanting to know if you have ordered her a Blood Glucose Monitor? If so, the patient would like for you to tell her how to work the device.

## 2022-05-17 ENCOUNTER — OFFICE VISIT (OUTPATIENT)
Dept: PODIATRY | Facility: CLINIC | Age: 87
End: 2022-05-17
Payer: MEDICARE

## 2022-05-17 VITALS
HEART RATE: 73 BPM | SYSTOLIC BLOOD PRESSURE: 115 MMHG | BODY MASS INDEX: 21.34 KG/M2 | DIASTOLIC BLOOD PRESSURE: 57 MMHG | HEIGHT: 65 IN | WEIGHT: 128.06 LBS

## 2022-05-17 DIAGNOSIS — Z79.01 CHRONIC ANTICOAGULATION: ICD-10-CM

## 2022-05-17 DIAGNOSIS — Z86.718 HISTORY OF DEEP VENOUS THROMBOSIS: ICD-10-CM

## 2022-05-17 DIAGNOSIS — N18.31 TYPE 2 DIABETES MELLITUS WITH STAGE 3A CHRONIC KIDNEY DISEASE, WITHOUT LONG-TERM CURRENT USE OF INSULIN: Primary | ICD-10-CM

## 2022-05-17 DIAGNOSIS — B35.1 ONYCHOMYCOSIS DUE TO DERMATOPHYTE: ICD-10-CM

## 2022-05-17 DIAGNOSIS — E11.22 TYPE 2 DIABETES MELLITUS WITH STAGE 3A CHRONIC KIDNEY DISEASE, WITHOUT LONG-TERM CURRENT USE OF INSULIN: Primary | ICD-10-CM

## 2022-05-17 DIAGNOSIS — Z86.711 HISTORY OF PULMONARY EMBOLISM: ICD-10-CM

## 2022-05-17 DIAGNOSIS — L84 CORN OR CALLUS: ICD-10-CM

## 2022-05-17 PROCEDURE — 11056 PARNG/CUTG B9 HYPRKR LES 2-4: CPT | Mod: Q9,S$PBB,, | Performed by: PODIATRIST

## 2022-05-17 PROCEDURE — 11721 DEBRIDE NAIL 6 OR MORE: CPT | Mod: PBBFAC | Performed by: PODIATRIST

## 2022-05-17 PROCEDURE — 99999 PR PBB SHADOW E&M-EST. PATIENT-LVL III: ICD-10-PCS | Mod: PBBFAC,,, | Performed by: PODIATRIST

## 2022-05-17 PROCEDURE — 11721 PR DEBRIDEMENT OF NAILS, 6 OR MORE: ICD-10-PCS | Mod: 59,Q9,S$PBB, | Performed by: PODIATRIST

## 2022-05-17 PROCEDURE — 11056 PARNG/CUTG B9 HYPRKR LES 2-4: CPT | Mod: Q9,PBBFAC | Performed by: PODIATRIST

## 2022-05-17 PROCEDURE — 11721 DEBRIDE NAIL 6 OR MORE: CPT | Mod: 59,Q9,S$PBB, | Performed by: PODIATRIST

## 2022-05-17 PROCEDURE — 99214 OFFICE O/P EST MOD 30 MIN: CPT | Mod: 25,S$PBB,, | Performed by: PODIATRIST

## 2022-05-17 PROCEDURE — 99999 PR PBB SHADOW E&M-EST. PATIENT-LVL III: CPT | Mod: PBBFAC,,, | Performed by: PODIATRIST

## 2022-05-17 PROCEDURE — 99214 PR OFFICE/OUTPT VISIT, EST, LEVL IV, 30-39 MIN: ICD-10-PCS | Mod: 25,S$PBB,, | Performed by: PODIATRIST

## 2022-05-17 PROCEDURE — 11056 PR TRIM BENIGN HYPERKERATOTIC SKIN LESION,2-4: ICD-10-PCS | Mod: Q9,S$PBB,, | Performed by: PODIATRIST

## 2022-05-17 PROCEDURE — 99213 OFFICE O/P EST LOW 20 MIN: CPT | Mod: PBBFAC | Performed by: PODIATRIST

## 2022-05-17 RX ORDER — KETOCONAZOLE 20 MG/G
CREAM TOPICAL DAILY
Qty: 60 G | Refills: 2 | Status: ON HOLD | OUTPATIENT
Start: 2022-05-17 | End: 2024-01-03

## 2022-05-17 NOTE — TELEPHONE ENCOUNTER
----- Message from Douglas Feliciano MA sent at 5/16/2022  4:20 PM CDT -----    ----- Message -----  From: Roberto Pinto  Sent: 5/16/2022   8:11 AM CDT  To: Leslie Mcwilliams Staff    Name of Who is Calling: DEJAH ROLDAN [[id number]]           What is the request in detail: Patient is requesting a call back.  She is requesting a letter to her insurance company stating that she can only take ELIQUIS.  She states that the insurance company said the letter received wasn't specific enough. Please assist.           Can the clinic reply by [facility]: NO           What Number to Call Back if not in [facility]: [phone]

## 2022-05-18 ENCOUNTER — OFFICE VISIT (OUTPATIENT)
Dept: ORTHOPEDICS | Facility: CLINIC | Age: 87
End: 2022-05-18
Payer: MEDICARE

## 2022-05-18 ENCOUNTER — PATIENT OUTREACH (OUTPATIENT)
Dept: ADMINISTRATIVE | Facility: OTHER | Age: 87
End: 2022-05-18
Payer: MEDICARE

## 2022-05-18 VITALS — BODY MASS INDEX: 21.34 KG/M2 | WEIGHT: 128.06 LBS | HEIGHT: 65 IN

## 2022-05-18 DIAGNOSIS — Z86.711 HISTORY OF PULMONARY EMBOLISM: ICD-10-CM

## 2022-05-18 DIAGNOSIS — G56.03 BILATERAL CARPAL TUNNEL SYNDROME: Primary | ICD-10-CM

## 2022-05-18 DIAGNOSIS — Z86.718 HISTORY OF DEEP VENOUS THROMBOSIS: Primary | ICD-10-CM

## 2022-05-18 PROCEDURE — 20526 PR INJECT CARPAL TUNNEL: ICD-10-PCS | Mod: 50,S$PBB,, | Performed by: PLASTIC SURGERY

## 2022-05-18 PROCEDURE — 99999 PR PBB SHADOW E&M-EST. PATIENT-LVL III: ICD-10-PCS | Mod: PBBFAC,,, | Performed by: PLASTIC SURGERY

## 2022-05-18 PROCEDURE — 99213 OFFICE O/P EST LOW 20 MIN: CPT | Mod: PBBFAC,25 | Performed by: PLASTIC SURGERY

## 2022-05-18 PROCEDURE — 20526 THER INJECTION CARP TUNNEL: CPT | Mod: 50,PBBFAC | Performed by: PLASTIC SURGERY

## 2022-05-18 PROCEDURE — 99212 PR OFFICE/OUTPT VISIT, EST, LEVL II, 10-19 MIN: ICD-10-PCS | Mod: 25,S$PBB,, | Performed by: PLASTIC SURGERY

## 2022-05-18 PROCEDURE — 20526 THER INJECTION CARP TUNNEL: CPT | Mod: 50,S$PBB,, | Performed by: PLASTIC SURGERY

## 2022-05-18 PROCEDURE — 99999 PR PBB SHADOW E&M-EST. PATIENT-LVL III: CPT | Mod: PBBFAC,,, | Performed by: PLASTIC SURGERY

## 2022-05-18 PROCEDURE — 99212 OFFICE O/P EST SF 10 MIN: CPT | Mod: 25,S$PBB,, | Performed by: PLASTIC SURGERY

## 2022-05-18 RX ORDER — FLASH GLUCOSE SENSOR
1 KIT MISCELLANEOUS 2 TIMES DAILY
Qty: 1 KIT | Refills: 11 | Status: SHIPPED | OUTPATIENT
Start: 2022-05-18 | End: 2022-05-20 | Stop reason: SDUPTHER

## 2022-05-18 RX ORDER — FLASH GLUCOSE SCANNING READER
1 EACH MISCELLANEOUS 2 TIMES DAILY
Qty: 1 EACH | Refills: 11 | Status: SHIPPED | OUTPATIENT
Start: 2022-05-18 | End: 2022-05-20 | Stop reason: SDUPTHER

## 2022-05-18 RX ORDER — TRIAMCINOLONE ACETONIDE 40 MG/ML
80 INJECTION, SUSPENSION INTRA-ARTICULAR; INTRAMUSCULAR
Status: COMPLETED | OUTPATIENT
Start: 2022-05-18 | End: 2022-05-18

## 2022-05-18 RX ADMIN — TRIAMCINOLONE ACETONIDE 80 MG: 40 INJECTION, SUSPENSION INTRA-ARTICULAR; INTRAMUSCULAR at 09:05

## 2022-05-18 NOTE — TELEPHONE ENCOUNTER
I sent Freestyle caryn in Jan.  Was it approved?  If not, is another type preferred.  She should see diabetes educ to learn how to use.

## 2022-05-18 NOTE — TELEPHONE ENCOUNTER
No new care gaps identified.  [facility] Embedded Care Gaps. Reference number: [id number]. 5/18/2022   3:41:10 PM CDT

## 2022-05-18 NOTE — PROGRESS NOTES
Subjective:      Patient ID: Debbie Ding is a 89 y.o. female.    Chief Complaint: Pain of the Left Hand and Pain of the Right Hand    Debbie Ding returns to clinic today with bilateral hand pain and numbness and tingling both hands.  She also complains of occasional triggering of the right index finger.  She states she is ready to discuss steroid injections as she declined injections at her last visit in February.  She denies any other changes in medical history or complaints.      Review of Systems   All other systems reviewed and are negative.        Objective:            General    Vitals reviewed.  Constitutional: She is oriented to person, place, and time. She appears well-nourished. No distress.   Neurological: She is alert and oriented to person, place, and time.             Right Hand/Wrist Exam     Inspection   Effusion: Hand -  absent  Deformity: Hand -  deformity    Pain   Hand - The patient exhibits pain of the index MCP.    Tenderness   The patient is tender to palpation of the more area.    Tests   Tinel's sign (median nerve): positive  Carpal Tunnel Compression Test: positive        Left Hand/Wrist Exam     Inspection   Effusion: Hand -  absent  Deformity: Hand -  present    Tests   Tinel's sign (median nerve): positive  Carpal Tunnel Compression Test: positive              I have explained the risks, benefits, and alternatives of the procedure in detail.  The patient voices understanding and all questions have been answered.  The patient agrees to proceed as planned. After a sterile prep of the skin the bilateral carpal tunnel is injected from the volar approach using a 25 gauge needle with a combination of 1cc 1% plain xylocaine and 40 mg of Kenalog each.  The patient is cautioned and immediate relief of pain is secondary to the local anesthetic and will be temporary.  After the anesthetic wears off there may be a increase in pain that may last for a few hours or a few days and they  should use ice to help alleviate this flair up of pain.           Assessment:       Encounter Diagnosis   Name Primary?    Bilateral carpal tunnel syndrome Yes          Plan:       Debbie was seen today for pain and pain.    Diagnoses and all orders for this visit:    Bilateral carpal tunnel syndrome    Other orders  -     triamcinolone acetonide injection 80 mg        She presents today with continued symptoms of bilateral carpal tunnel syndrome.  I discussed the benefits of a corticosteroid injection to each carpal tunnel as this will likely alleviate her symptoms for weeks to months.  Patient wished to pursue the injections today.  Please see the procedure note above.  She will follow up with me in 6-8 weeks if her symptoms fail to improve worsen.

## 2022-05-18 NOTE — TELEPHONE ENCOUNTER
"Called and spoke to the pt regarding her glucometer. She would like to see if she can have the "stickless" glucometer. Pt states that she is really tired of having to be stuck often.   "

## 2022-05-18 NOTE — LETTER
AUTHORIZATION FOR RELEASE OF   CONFIDENTIAL INFORMATION    Dear Noah Muñoz MD,    We are seeing Debbie Ding, date of birth 10/23/1932, in the clinic at [facility]. Carolyn Mejia MD is the patient's PCP. Debbie Ding has an outstanding lab/procedure at the time we reviewed her chart. In order to help keep her health information updated, she has authorized us to request the following medical record(s):        (  )  MAMMOGRAM                                      (  )  COLONOSCOPY      (  )  PAP SMEAR                                          (  )  OUTSIDE LAB RESULTS     (  )  DEXA SCAN                                          ( x )  DIABETIC EYE EXAM-            (  )  FOOT EXAM                                          (  )  ENTIRE RECORD     (  )  OUTSIDE IMMUNIZATIONS                 (  )  _______________         Please fax records to [facility], Nona K Epstein, MD, [phone]     If you have any questions, please contact Henny Varun at [phone].           Patient Name: Debbie Ding  : 10/23/1932  Patient Phone #: [phone]

## 2022-05-18 NOTE — PROGRESS NOTES
Care Everywhere: updated  Immunization: updated  Health Maintenance: updated  Media Review: review for outside eye exam report   Legacy Review:   DIS:  Order placed:   Upcoming appts:  EFAX: sent to Dr. Muñoz office to possibly obtain updated eye exam report   Task Tickets:  Referrals:

## 2022-05-18 NOTE — TELEPHONE ENCOUNTER
Called and informed the pt that the Freestyle meter is stickless.     Can you please resend the Rx for that meter to the pts pharmacy, [facility]?

## 2022-05-19 ENCOUNTER — TELEPHONE (OUTPATIENT)
Dept: CARDIOLOGY | Facility: CLINIC | Age: 87
End: 2022-05-19
Payer: MEDICARE

## 2022-05-19 ENCOUNTER — TELEPHONE (OUTPATIENT)
Dept: INTERNAL MEDICINE | Facility: CLINIC | Age: 87
End: 2022-05-19
Payer: MEDICARE

## 2022-05-19 RX ORDER — ESOMEPRAZOLE MAGNESIUM 40 MG/1
40 CAPSULE, DELAYED RELEASE ORAL 2 TIMES DAILY
COMMUNITY
Start: 2022-04-14 | End: 2022-05-19 | Stop reason: SDUPTHER

## 2022-05-19 RX ORDER — ESOMEPRAZOLE MAGNESIUM 40 MG/1
40 CAPSULE, DELAYED RELEASE ORAL 2 TIMES DAILY
Qty: 60 CAPSULE | Refills: 11 | Status: SHIPPED | OUTPATIENT
Start: 2022-05-19 | End: 2022-06-07

## 2022-05-19 NOTE — TELEPHONE ENCOUNTER
----- Message from Amparo Tillman sent at 5/19/2022  8:05 AM CDT -----  Type: RX Refill Request    Who Called: self     Have you contacted your pharmacy: no     Refill or New Rx: refill    RX Name and Strength: esomeprazole (NEXIUM) 40 MG capsule     Preferred Pharmacy with phone number: [facility] - [place], [place] - [address] AT [address] [facility]   Phone:  [phone]  Fax:  [phone]    Local or Mail Order: Mail    Would the patient rather a call back or a response via [facility]?  call    Best Call Back Number: .[phone]

## 2022-05-19 NOTE — TELEPHONE ENCOUNTER
Notified Anali Aguirre changed to Xarelto due to insurance denial.  A prescription for Xarelto was sent to patient's mail order pharmacy; patient declined a short supply to be sent to local pharmacy.         ----- Message from Lamin Link sent at 5/19/2022  9:05 AM CDT -----  Name of Caller:Anali        Pharmacy Name:[facility]        Prescription Name:apixaban (ELIQUIS) 2.5 mg Tab         What do they need to clarify?:Prior authorization         Best Call Back Number:Phone:  [phone]  Fax:  [phone]            Additional Information:

## 2022-05-19 NOTE — TELEPHONE ENCOUNTER
----- Message from Ghislaine Felton sent at 5/19/2022 11:44 AM CDT -----  Regarding: order  Contact: patient [phone]  Please reseed order for [employer].  Patient does not carry diabetic supplies.  Please call and advise.      [facility] - [place], [place] - [address] AT [address] to [facility]  [facility]  [facility] [address]  Phone: [phone] Fax: [phone]

## 2022-05-20 DIAGNOSIS — N18.30 TYPE 2 DIABETES MELLITUS WITH STAGE 3 CHRONIC KIDNEY DISEASE, WITHOUT LONG-TERM CURRENT USE OF INSULIN, UNSPECIFIED WHETHER STAGE 3A OR 3B CKD: ICD-10-CM

## 2022-05-20 DIAGNOSIS — E11.22 TYPE 2 DIABETES MELLITUS WITH STAGE 3 CHRONIC KIDNEY DISEASE, WITHOUT LONG-TERM CURRENT USE OF INSULIN, UNSPECIFIED WHETHER STAGE 3A OR 3B CKD: ICD-10-CM

## 2022-05-20 RX ORDER — FLASH GLUCOSE SCANNING READER
1 EACH MISCELLANEOUS 2 TIMES DAILY
Qty: 1 EACH | Refills: 11 | Status: SHIPPED | OUTPATIENT
Start: 2022-05-20 | End: 2023-10-30

## 2022-05-20 RX ORDER — FLASH GLUCOSE SENSOR
1 KIT MISCELLANEOUS 2 TIMES DAILY
Qty: 1 KIT | Refills: 11 | Status: SHIPPED | OUTPATIENT
Start: 2022-05-20 | End: 2023-10-30

## 2022-05-20 NOTE — TELEPHONE ENCOUNTER
----- Message from Geno Terrazas sent at 5/20/2022 10:13 AM CDT -----  Contact: DEJAH ROLDAN [[id number]]@ [phone]  Please send her Rx for flash glucose sensor (FREESTYLE ALEXSANDRA 14 DAY SENSOR) Kit  TO [facility] Phone: [phone] Fax: [phone]   and NOT to [facility], they do not j carlos diabetic supplies.

## 2022-05-20 NOTE — TELEPHONE ENCOUNTER
No new care gaps identified.  [facility] Embedded Care Gaps. Reference number: [id number]. 5/20/2022   10:22:20 AM CDT

## 2022-05-20 NOTE — TELEPHONE ENCOUNTER
Refill Authorization Note   Debbie Ding  is requesting a refill authorization.  Brief Assessment and Rationale for Refill:  Approve     Medication Therapy Plan:  Refill sent to alternate pharmacy per patient request    Medication Reconciliation Completed: No   Comments:     No Care Gaps recommended.     Note composed:10:25 AM 05/20/2022

## 2022-05-20 NOTE — TELEPHONE ENCOUNTER
Pt glucose supplies was filled on 05/18/2022 and [facility]. Per pt [facility] does not carry supplies and she is requesting to have sent to mail order [facility] pharmacy. It is a couple days pass original order date.    Refill Request.

## 2022-05-20 NOTE — TELEPHONE ENCOUNTER
----- Message from Geno Terrazas sent at 5/20/2022 10:21 AM CDT -----  Contact: DEJAH ROLDAN [[id number]] @ [phone]  Also the  flash glucose sensor (FREESTYLE ALEXSANDRA 14 DAY SENSOR) Kit. I sent a message about two min ago.[phone]

## 2022-05-21 NOTE — PROGRESS NOTES
Physical Therapy    Facility/Department: [facility] 5SE REHAB  Weekly Progress Note    NAME: Taran Hunt  : 1957  MRN: [id number]    Date of Service: 2020     Evaluating Therapist: Celeste Badillo PT, DPT    ROOM: 4220-Y  DIAGNOSIS: R AKA  PRECAUTIONS: falls, R AKA  HPI: Pt has hx AKA in 2018. Pt admitted to [facility] due to residual limb infection, underwent R Leg I&D 2020,   R Thigh Wound Dressing Change, Debridement, Removal of Muscle 2020,   Excisional debridement of the infected necrotic wound right groin and right thigh 2020. [facility] Social:  Pt lives with alone in a 1 floor plan 1 steps without rails to enter.  Pt reports having HH aide present x 3 hrs daily to assist with homemaking/ADLs  Prior to admission: Pt ambulated short distances with [employer] and R prosthetic, reports having WC and performing majority of ADLs/activities in home at [facility] level     Initial Evaluation  20 Comments Short Term Goals Long Term Goals    Bed Mobility  Rolling: Makenzie  Supine to sit: Makenzie  Sit to supine: Makenzie  Scooting: Makenzie Rolling: cgA  Supine to sit: cgA  Sit to supine: Makenzie  Scooting: cgA  SBA Modified Independent   Transfers Sit to stand: ModA from raised EOM  Stand to sit: ModA  Stand pivot: ModA with Bariatric [employer]    Sliding board transfer Makenzie from Juan Jose Schwab table Sit to stand: cgA  Stand to sit: cgA  Stand pivot: cg/Makenzie with Bariatric [employer]     Sliding board: sba One arm pushing from mat, one arm on walker leading to smoother transitioning     Slide board- A with SB placement SBA Modified Independent   Ambulation    TBA 7' with bariatric ww Makenzie Short distance for transfers 10 feet with bariatric WW with SBA 50 feet with Bariatric WW with Supervision   Wheel Chair Mobility 150 feet with BUE with ' with bilateral UE sba/sup       Car Transfers TBA TBA  Makenzie SBA   Stair negotiation: ascended and descended TBA TBA  1 step with 2 rails with Makenzie 2 steps with 2 Subjective:      Patient ID: Debbie Ding is a 89 y.o. female.    Chief Complaint: Foot Pain, itcting (Venkat foot pain /itchy feet/burning), and burning    Debbie is a 89 y.o. female who presents to the podiatry clinic  with complaint of  bilateral foot pain. Onset of the symptoms was several weeks ago. Precipitating event: none known. Current symptoms include: ability to bear weight, but with some pain. Aggravating factors: any weight bearing. Symptoms have progressed to a point and plateaued. Patient has had prior foot problems. Evaluation to date: none. Treatment to date: none. Patients rates pain 5/10 on pain scale.  We did addition, she has been having issues with a rash to both feet with itchiness.        Review of Systems   Constitutional: Negative for chills, diaphoresis and fever.   Cardiovascular: Negative for claudication, cyanosis, leg swelling and syncope.   Respiratory: Negative for cough and shortness of breath.    Skin: Positive for suspicious lesions. Negative for color change and nail changes.   Musculoskeletal: Positive for joint pain and joint swelling. Negative for falls, muscle cramps and muscle weakness.   Gastrointestinal: Negative for diarrhea, nausea and vomiting.   Neurological: Negative for disturbances in coordination, numbness, paresthesias, sensory change, tremors and weakness.   Psychiatric/Behavioral: Negative for altered mental status.           Objective:      Physical Exam  Vitals reviewed.   Constitutional:       Appearance: She is well-developed.      Comments: Oriented to time, place, and person.   HENT:      Head: Normocephalic and atraumatic.   Cardiovascular:      Pulses:           Dorsalis pedis pulses are 1+ on the right side and 1+ on the left side.        Posterior tibial pulses are 1+ on the right side and 1+ on the left side.      Comments: DP and PT pulses are palpable bilaterally. 3 sec capillary refill time and toes and feet are warm to touch proximally .  There  is  hair growth on the feet and toes b/l. There is no edema b/l. No spider veins or varicosities present b/l.     Pulmonary:      Effort: Pulmonary effort is normal.   Musculoskeletal:         General: Normal range of motion.      Comments: Equinus noted b/l ankles with < 10 deg DF noted. MMT 5/5 in DF/PF/Inv/Ev resistance with no reproduction of pain in any direction. Passive range of motion of ankle and pedal joints is painless b/l.     Feet:      Right foot:      Skin integrity: No callus or dry skin.      Left foot:      Skin integrity: No callus or dry skin.   Lymphadenopathy:      Comments: Negative lymphadenopathy bilateral popliteal fossa and tarsal tunnel.   Skin:     General: Skin is warm and dry.      Capillary Refill: Capillary refill takes 2 to 3 seconds.      Coloration: Skin is pale.      Comments: No open lesions, lacerations or wounds noted.Interdigital spaces clean, dry and intact b/l. No erythema noted to b/l foot.  Long, thickened, discolored  toenails 1-5 with subungual debris  Scaling dryness in a moccasin distribution is noted to the bilateral lower extremities with associated erythema.         Neurological:      Mental Status: She is alert and oriented to person, place, and time.      Sensory: Sensory deficit present.      Motor: Atrophy present.      Deep Tendon Reflexes: Reflexes abnormal.      Reflex Scores:       Patellar reflexes are 1+ on the right side and 1+ on the left side.       Achilles reflexes are 1+ on the right side and 1+ on the left side.     Comments: Light touch, proprioception, and sharp/dull sensation are all intact bilaterally. Protective threshold with the Parthenon-Wienstein monofilament is intact bilaterally.  Subjective paresthesias with no clearly identifiable source or trigger.      Psychiatric:         Behavior: Behavior normal. Behavior is cooperative.               Assessment:       Encounter Diagnoses   Name Primary?    Type 2 diabetes mellitus with stage 3a  chronic kidney disease, without long-term current use of insulin Yes    Corn or callus     Onychomycosis due to dermatophyte          Plan:       Debbie was seen today for foot pain, itcting and burning.    Diagnoses and all orders for this visit:    Type 2 diabetes mellitus with stage 3a chronic kidney disease, without long-term current use of insulin    Corn or callus    Onychomycosis due to dermatophyte    Other orders  -     ketoconazole (NIZORAL) 2 % cream; Apply topically once daily.      I counseled the patient on her conditions, their implications and medical management.    Routine Foot Care    Performed by:  Arturo Teresa. DPM  Authorized by:  Patient     Consent Done?:  Yes (Verbal)     Nail Care Type:  Debride  Location(s): All  (Left 1st Toe, Left 3rd Toe, Left 2nd Toe, Left 4th Toe, Left 5th Toe, Right 1st Toe, Right 2nd Toe, Right 3rd Toe, Right 4th Toe and Right 5th Toe)  Patient tolerance:  Patient tolerated the procedure well with no immediate complications     With patient's permission, the toenails mentioned above were aggressively reduced and debrided using a nail nipper, removing all offending nail and debris. The patient will continue to monitor the areas daily, inspect the feet, wear protective shoe gear when ambulatory, and moisturizer to maintain skin integrity.      Callus Care Type: Debride    With patient's permission, the calluses/hyperkeratotic lesions mentioned above were aggressively reduced and debrided using a number 15 blade. The patient will continue to monitor the areas daily, inspect the feet, wear protective shoe gear when ambulatory, and moisturizer to maintain skin integrity.     Shoe inspection. Diabetic Foot Education. Patient reminded of the importance of good nutrition and blood sugar control to help prevent podiatric complications of diabetes. Patient instructed on proper foot hygeine. We discussed wearing proper shoe gear, daily foot inspections and Diabetic foot  education in detail.    Return to clinic in 3-6 months or sooner if problems arise

## 2022-05-23 RX ORDER — MIRTAZAPINE 15 MG/1
TABLET, FILM COATED ORAL
Qty: 90 TABLET | Refills: 3 | Status: SHIPPED | OUTPATIENT
Start: 2022-05-23 | End: 2022-12-13 | Stop reason: SDUPTHER

## 2022-05-23 NOTE — TELEPHONE ENCOUNTER
No new care gaps identified.  [facility] Embedded Care Gaps. Reference number: [id number]. 5/22/2022   7:14:27 PM CDT

## 2022-05-27 ENCOUNTER — HOSPITAL ENCOUNTER (EMERGENCY)
Facility: OTHER | Age: 87
Discharge: HOME | End: 2022-05-27
Attending: EMERGENCY MEDICINE
Payer: MEDICARE

## 2022-05-27 VITALS
HEART RATE: 79 BPM | HEIGHT: 65 IN | BODY MASS INDEX: 20.99 KG/M2 | OXYGEN SATURATION: 96 % | SYSTOLIC BLOOD PRESSURE: 167 MMHG | TEMPERATURE: 99 F | RESPIRATION RATE: 16 BRPM | WEIGHT: 126 LBS | DIASTOLIC BLOOD PRESSURE: 75 MMHG

## 2022-05-27 DIAGNOSIS — Z87.09 HISTORY OF ASTHMA: ICD-10-CM

## 2022-05-27 DIAGNOSIS — R05.9 COUGH: ICD-10-CM

## 2022-05-27 DIAGNOSIS — J06.9 VIRAL UPPER RESPIRATORY ILLNESS: ICD-10-CM

## 2022-05-27 DIAGNOSIS — U07.1 COVID-19 VIRUS INFECTION: Primary | ICD-10-CM

## 2022-05-27 LAB
CTP QC/QA: YES
SARS-COV-2 RDRP RESP QL NAA+PROBE: POSITIVE

## 2022-05-27 PROCEDURE — 25000242 PHARM REV CODE 250 ALT 637 W/ HCPCS: Performed by: EMERGENCY MEDICINE

## 2022-05-27 PROCEDURE — U0002 COVID-19 LAB TEST NON-CDC: HCPCS | Performed by: EMERGENCY MEDICINE

## 2022-05-27 PROCEDURE — 99283 EMERGENCY DEPT VISIT LOW MDM: CPT | Mod: 25

## 2022-05-27 RX ORDER — ALBUTEROL SULFATE 90 UG/1
2 AEROSOL, METERED RESPIRATORY (INHALATION) EVERY 8 HOURS
Status: DISCONTINUED | OUTPATIENT
Start: 2022-05-27 | End: 2022-05-27

## 2022-05-27 RX ORDER — BENZONATATE 100 MG/1
100 CAPSULE ORAL 3 TIMES DAILY PRN
Qty: 20 CAPSULE | Refills: 0 | OUTPATIENT
Start: 2022-05-27 | End: 2022-06-01

## 2022-05-27 RX ORDER — ALBUTEROL SULFATE 90 UG/1
2 AEROSOL, METERED RESPIRATORY (INHALATION) EVERY 8 HOURS
Status: DISCONTINUED | OUTPATIENT
Start: 2022-05-27 | End: 2022-05-27 | Stop reason: HOSPADM

## 2022-05-27 RX ADMIN — ALBUTEROL SULFATE 2 PUFF: 90 AEROSOL, METERED RESPIRATORY (INHALATION) at 10:05

## 2022-05-27 NOTE — ED NOTES
Ambulated patient up and down hallway with pulse oximeter. Reading remain constant of 96 to 97% on room air while ambulating . Denies any dizziness, S.O.B or light headed episodes.

## 2022-05-27 NOTE — ED PROVIDER NOTES
Source of History:  Patient    Chief complaint:  Cough (Cough and runny nose onset Saturday. Pt has had 3 covid vaccines.Has coughed so much she's having pain in inguinal hernia when she coughs. ) and Abdominal Pain (Inguinal hernia pain with cough)      HPI:  Debbie Ding is a 89 y.o. female presenting with runny nose, nasal congestion and postnasal drip which started last Saturday.  Today is the 7th day of her symptoms.  She is not running fever.  She is complaining of frequent coughing which now is causing increased pain in her longstanding right inguinal hernia.  She has a history of asthma, is diligent about taking her Advair twice a day, but has not felt short of breath and has not needed to use her albuterol rescue inhaler at all during this illness.  She does not feel short of breath when ambulating.  Complains of mildly decreased appetite but no vomiting or diarrhea.  No dysuria.  No other acute complaints.  She is vaccinated for COVID.  She reports she does have a pulse oximeter at home.  No sick contacts with COVID that she is aware of.    This is the extent to the patients complaints today here in the emergency department.    ROS: As per HPI and below:  General: No fever.  No chills.  Eyes: No visual changes.  ENT: No sore throat. No ear pain.  Otherwise, as per HPI  Head: No headache.    Respiratory:  As per HPI  Cardiovascular: No chest pain.  Abdomen: No abdominal pain.  No nausea or vomiting.  Genito-Urinary: No abnormal urination.  Neurologic: No focal weakness.  No numbness.  MSK: no back pain.  Integument: No rashes or lesions.  Hematologic: No easy bruising.  Endocrine: No excessive thirst or urination.      Review of patient's allergies indicates:   Allergen Reactions    Melatonin      Other reaction(s): Other (See Comments)  Sleep Walks and has unnatural dreams    Talwin compound Hives    Talwin [pentazocine lactate] Hallucinations    Trazodone Other (See Comments)      "Nightmares/sleep walk      Bentyl [dicyclomine] Anxiety       PMH:  As per HPI and below:  Past Medical History:   Diagnosis Date    Allergic rhinitis     Anticoagulant long-term use     Arthritis     Asthma     Bilateral pulmonary embolism 4/27/2019    Cataract     Chronic anticoagulation 9/28/2020    Depression     Diabetes mellitus     Fibromyalgia 7/2/2012    Fibromyalgia     GERD (gastroesophageal reflux disease)     Hypercholesterolemia 9/28/2020    Hypercholesterolemia 9/28/2020    Hypertension 7/2/2012    Thoracic or lumbosacral neuritis or radiculitis, unspecified     Thyroid disease     Ulcer      Past Surgical History:   Procedure Laterality Date    CATARACT EXTRACTION Bilateral     ESOPHAGOGASTRODUODENOSCOPY N/A 3/8/2022    Procedure: EGD (ESOPHAGOGASTRODUODENOSCOPY) with dilation-either hospital ok with Dr. Meza;  Surgeon: Rebeca Meza MD;  Location: [facility];  Service: Endoscopy;  Laterality: N/A;  1/11-eliquis hold ok see te-tb    ESOPHAGOGASTRODUODENOSCOPY N/A 2/28/2022    Procedure: EGD (ESOPHAGOGASTRODUODENOSCOPY) with dilation-either [facility] ok with Dr. Meza;  Surgeon: Rebeca Meza MD;  Location: [facility] ([address] [place] [address]);  Service: Endoscopy;  Laterality: N/A;  1/11-eliquis hold ok see te-tb  COVID test on 2/25/22 at [facility]  2/22-confirmed appt arrival time with pt-Kpvt    FOOT NEUROMA SURGERY  1985    HYSTERECTOMY         Social History     Tobacco Use    Smoking status: Never Smoker    Smokeless tobacco: Never Used   Substance Use Topics    Alcohol use: No    Drug use: No       Physical Exam:    BP (S) (!) 167/75 (BP Location: Right arm, Patient Position: Sitting) Comment: Patient denies taking BP meds today.  Pulse 79   Temp 98.5 °F (36.9 °C) (Oral)   Resp 16   Ht 5' 5" (1.651 m)   Wt 57.2 kg (126 lb)   SpO2 96%   BMI 20.97 kg/m²   Nursing note and vital signs reviewed.  Appearance:  Thin elderly female, good health for age.  No cough during " exam.  No acute distress.  Eyes: No conjunctival injection.  No pallor or icterus  ENT: Oropharynx clear.  Moist mucous membranes.  No tonsillar exudate.  Chest/ Respiratory: Clear to auscultation bilaterally.  Good air movement.  No wheezes.  No rhonchi. No rales. No accessory muscle use.  Speaks in full sentences  Cardiovascular: Regular rate and rhythm.  No murmurs. No gallops. No rubs.  Abdomen: Soft.  Not distended.  Nontender.  No guarding.  No rebound. Non-peritoneal.  :  Large 5 x 3 cm right inguinal hernia which is initially protruding but is soft, easily reducible, only mild tenderness.  Musculoskeletal: Good range of motion all joints.  No deformities.    Neck supple.  No meningismus.  No lymphadenopathy.  No JVD.  Skin: No rashes seen.  Good turgor.  No abrasions.  No ecchymoses.  Neurologic: Motor intact.  Sensation intact.  Cerebellar intact.  Cranial nerves intact.  Mental Status:  Alert and oriented x 3.  Appropriate, conversant.    Labs that have been ordered have been independently reviewed and interpreted by myself.    Two view chest x-ray, independently interpreted by myself, shows no focal infiltrate or effusion.  No pneumothorax.  No pulmonary edema.    MDM/ Differential Dx:    89 y.o. female with symptoms of URI for the past week.  She has a history of asthma, however it is very well controlled, she has not been using her inhaler...  On exam she is very well appearing.  In no distress.  Initially had borderline low oxygen saturation, but no wheezes and good air exchange.  Given dose of her albuterol inhaler, oxygenation improved and and is able to ambulate without desaturation.  Duration of symptoms preclude use of Paxlovid, but given risk factors and age will refer for antibody infusion.  Discussed symptomatic  Encouraged follow-up with primary care, especially if symptoms persist.  Return precautions discussed for the interim.                   Diagnostic Impression:    1. COVID-19 virus  infection    2. Cough    3. Viral upper respiratory illness    4. History of asthma         ED Disposition Condition    Discharge Stable          ED Prescriptions     Medication Sig Dispense Start Date End Date Auth. Provider    benzonatate (TESSALON) 100 MG capsule Take 1 capsule (100 mg total) by mouth 3 (three) times daily as needed for Cough. 20 capsule 5/27/2022 6/6/2022 Sridhar Mancini II, MD        Follow-up Information     Follow up With Specialties Details Why Contact Info    Carolyn Mejia MD Internal Medicine In 3 days  [address]  [place] [address]  [phone]             Sridhar Mancini II, MD  05/28/22 0395

## 2022-05-27 NOTE — ED TRIAGE NOTES
Pt reports a cough for the past two days. She denies coughing anything up but states that when she does cough, her hernia is protruding.

## 2022-05-30 ENCOUNTER — INFUSION (OUTPATIENT)
Dept: INFECTIOUS DISEASES | Facility: HOSPITAL | Age: 87
End: 2022-05-30
Attending: EMERGENCY MEDICINE
Payer: MEDICARE

## 2022-05-30 VITALS
HEART RATE: 86 BPM | SYSTOLIC BLOOD PRESSURE: 150 MMHG | HEIGHT: 65 IN | TEMPERATURE: 98 F | DIASTOLIC BLOOD PRESSURE: 64 MMHG | BODY MASS INDEX: 20.83 KG/M2 | OXYGEN SATURATION: 98 % | WEIGHT: 125 LBS | RESPIRATION RATE: 14 BRPM

## 2022-05-30 DIAGNOSIS — U07.1 COVID-19 VIRUS INFECTION: ICD-10-CM

## 2022-05-30 DIAGNOSIS — U07.1 COVID-19: Primary | ICD-10-CM

## 2022-05-30 PROCEDURE — M0222 HC IV INJECTION, BEBTELOVIMAB, INCL POST ADMIN MONIT: HCPCS | Performed by: INTERNAL MEDICINE

## 2022-05-30 PROCEDURE — 63600175 PHARM REV CODE 636 W HCPCS: Performed by: INTERNAL MEDICINE

## 2022-05-30 RX ORDER — DIPHENHYDRAMINE HYDROCHLORIDE 50 MG/ML
25 INJECTION INTRAMUSCULAR; INTRAVENOUS
Status: ACTIVE | OUTPATIENT
Start: 2022-05-30 | End: 2022-05-31

## 2022-05-30 RX ORDER — ALBUTEROL SULFATE 90 UG/1
2 AEROSOL, METERED RESPIRATORY (INHALATION)
Status: ACTIVE | OUTPATIENT
Start: 2022-05-30 | End: 2022-06-02

## 2022-05-30 RX ORDER — EPINEPHRINE 0.3 MG/.3ML
0.3 INJECTION SUBCUTANEOUS
Status: ACTIVE | OUTPATIENT
Start: 2022-05-30 | End: 2022-06-02

## 2022-05-30 RX ORDER — BEBTELOVIMAB 87.5 MG/ML
175 INJECTION, SOLUTION INTRAVENOUS
Status: COMPLETED | OUTPATIENT
Start: 2022-05-30 | End: 2022-05-30

## 2022-05-30 RX ORDER — ONDANSETRON 4 MG/1
4 TABLET, ORALLY DISINTEGRATING ORAL
Status: ACTIVE | OUTPATIENT
Start: 2022-05-30 | End: 2022-05-31

## 2022-05-30 RX ORDER — ACETAMINOPHEN 325 MG/1
650 TABLET ORAL
Status: ACTIVE | OUTPATIENT
Start: 2022-05-30 | End: 2022-05-31

## 2022-05-30 RX ADMIN — BEBTELOVIMAB 175 MG: 87.5 INJECTION, SOLUTION INTRAVENOUS at 09:05

## 2022-05-30 NOTE — PROGRESS NOTES
Patient arrives for Bebtelovimab IV Injection. Ambulatory. Pt AAox3. No distress noted. RR even and unlabored.     Symptoms and onset date:  05/27/22 cough     Tested COVID + on 05/27/22

## 2022-05-30 NOTE — PROGRESS NOTES
Patient remains with no signs of complications noted. Patient received Bebtelovimab IV Injection according to FDA recommendations and [facility] SOP without complications noted and left with mask in place. Drug fact sheet provided. Pt discharged home. Ambulatory. Remains AAox3. No distress noted. RR even and unlabored.

## 2022-05-30 NOTE — PROGRESS NOTES
Bebtelovimab IV Injection administered. Pt tolerated injection well. No S/S of injection reaction noted at present time. One hour observation started.     Patient received injection at 0900.

## 2022-05-31 ENCOUNTER — EXTERNAL CHRONIC CARE MANAGEMENT (OUTPATIENT)
Dept: PRIMARY CARE CLINIC | Facility: CLINIC | Age: 87
End: 2022-05-31
Payer: MEDICARE

## 2022-05-31 PROCEDURE — 99439 CHRNC CARE MGMT STAF EA ADDL: CPT | Mod: S$PBB,,, | Performed by: INTERNAL MEDICINE

## 2022-05-31 PROCEDURE — 99490 CHRNC CARE MGMT STAFF 1ST 20: CPT | Mod: PBBFAC | Performed by: INTERNAL MEDICINE

## 2022-05-31 PROCEDURE — 99439 CHRNC CARE MGMT STAF EA ADDL: CPT | Mod: PBBFAC,27 | Performed by: INTERNAL MEDICINE

## 2022-05-31 PROCEDURE — 99490 PR CHRONIC CARE MGMT, 1ST 20 MIN: ICD-10-PCS | Mod: S$PBB,,, | Performed by: INTERNAL MEDICINE

## 2022-05-31 PROCEDURE — 99439 PR CHRONIC CARE MGMT, EA ADDTL 20 MIN: ICD-10-PCS | Mod: S$PBB,,, | Performed by: INTERNAL MEDICINE

## 2022-05-31 PROCEDURE — 99490 CHRNC CARE MGMT STAFF 1ST 20: CPT | Mod: S$PBB,,, | Performed by: INTERNAL MEDICINE

## 2022-06-01 ENCOUNTER — HOSPITAL ENCOUNTER (EMERGENCY)
Facility: OTHER | Age: 87
Discharge: HOME | End: 2022-06-01
Attending: EMERGENCY MEDICINE
Payer: MEDICARE

## 2022-06-01 VITALS
RESPIRATION RATE: 18 BRPM | HEIGHT: 65 IN | HEART RATE: 70 BPM | SYSTOLIC BLOOD PRESSURE: 160 MMHG | WEIGHT: 126 LBS | TEMPERATURE: 99 F | OXYGEN SATURATION: 99 % | BODY MASS INDEX: 20.99 KG/M2 | DIASTOLIC BLOOD PRESSURE: 70 MMHG

## 2022-06-01 DIAGNOSIS — R05.9 COUGH: ICD-10-CM

## 2022-06-01 DIAGNOSIS — U07.1 COVID: Primary | ICD-10-CM

## 2022-06-01 PROCEDURE — 99283 EMERGENCY DEPT VISIT LOW MDM: CPT | Mod: 25

## 2022-06-01 RX ORDER — PROMETHAZINE HYDROCHLORIDE AND DEXTROMETHORPHAN HYDROBROMIDE 6.25; 15 MG/5ML; MG/5ML
5 SYRUP ORAL EVERY 8 HOURS PRN
Qty: 105 ML | Refills: 0 | Status: SHIPPED | OUTPATIENT
Start: 2022-06-01 | End: 2022-06-08

## 2022-06-01 NOTE — ED TRIAGE NOTES
Pt presents to ED c/o sore throat, cough, and sinus congestion onset x2 weeks. Pt was seen on 5/27 for same symptoms and was Covid +. States that she was prescribed Tessalon, which she has been taking with no improvement of cough. Pt also had Bebtelovimab IV infusion on 5/30. Denies fever, SOB, body aches, chills.

## 2022-06-01 NOTE — ED PROVIDER NOTES
Encounter Date: 6/1/2022       History     Chief Complaint   Patient presents with    COVID-19 Concerns    Sore Throat    Cough     Pt c.o sinus congestion, sore throat and cough onset 2 weeks ago.  Pt denies fever.  AAO x 3 nadn skin w.d.  pt tested positive for covid on 5-     An 89-year-old female who presents for continuation of cough, diagnosed with COVID 5/27/22 s/p Bebtelovimab  infusion 5/30/22; pertinent PMH asthma, history PE on anticoagulation, DM 2, fibromyalgia, HTN, HLD.  Patient reports continuation of dry cough, worse at night, despite Tessalon Rx.  Denies any shortness of breath, chest pain, leg swelling, hemoptysis, fever.  She wonders if she still has COVID.  Albuterol at home helps.  The patients available PMH, PSH, Social History, medications, allergies, and triage vital signs were reviewed just prior to their medical evaluation.  A ten point review of systems was completed and is negative except as documented above.  Patient denies any other acute medical complaint.    Please be advised this text was dictated with [email or web address] software and may contain errors due to translation.           Review of patient's allergies indicates:   Allergen Reactions    Melatonin      Other reaction(s): Other (See Comments)  Sleep Walks and has unnatural dreams    Talwin compound Hives    Talwin [pentazocine lactate] Hallucinations    Trazodone Other (See Comments)     Nightmares/sleep walk      Bentyl [dicyclomine] Anxiety     Past Medical History:   Diagnosis Date    Allergic rhinitis     Anticoagulant long-term use     Arthritis     Asthma     Bilateral pulmonary embolism 4/27/2019    Cataract     Chronic anticoagulation 9/28/2020    Depression     Diabetes mellitus     Fibromyalgia 7/2/2012    Fibromyalgia     GERD (gastroesophageal reflux disease)     Hypercholesterolemia 9/28/2020    Hypercholesterolemia 9/28/2020    Hypertension 7/2/2012    Thoracic or lumbosacral neuritis or  radiculitis, unspecified     Thyroid disease     Ulcer      Past Surgical History:   Procedure Laterality Date    CATARACT EXTRACTION Bilateral     ESOPHAGOGASTRODUODENOSCOPY N/A 3/8/2022    Procedure: EGD (ESOPHAGOGASTRODUODENOSCOPY) with dilation-either hospital ok with Dr. Meza;  Surgeon: Rebeca Meza MD;  Location: [facility] ENDO;  Service: Endoscopy;  Laterality: N/A;  1/11-eliquis hold ok see te-tb    ESOPHAGOGASTRODUODENOSCOPY N/A 2/28/2022    Procedure: EGD (ESOPHAGOGASTRODUODENOSCOPY) with dilation-either [facility] with Dr. Meza;  Surgeon: Rebeca Meza MD;  [facility]  Service: Endoscopy;  Laterality: N/A;  1/11-eliquis hold ok see te-tb  COVID test on 2/25/22 at [facility]  2/22-confirmed appt arrival time with pt-Kpvt    FOOT NEUROMA SURGERY  1985    HYSTERECTOMY       Family History   Problem Relation Age of Onset    Diabetes Mother     Diabetes Brother     Emphysema Maternal Aunt     Melanoma Neg Hx     Asthma Neg Hx     Colon cancer Neg Hx     Esophageal cancer Neg Hx      Social History     Tobacco Use    Smoking status: Never Smoker    Smokeless tobacco: Never Used   Substance Use Topics    Alcohol use: No    Drug use: No     Review of Systems   Constitutional: Negative for chills and fever.   HENT: Negative for sore throat and trouble swallowing.    Respiratory: Positive for cough. Negative for chest tightness and shortness of breath.    Cardiovascular: Negative for chest pain.   Gastrointestinal: Negative for abdominal pain, nausea and vomiting.   Genitourinary: Negative for dysuria.   Musculoskeletal: Negative for back pain.   Skin: Negative for rash.   Neurological: Negative for dizziness and weakness.   Hematological: Does not bruise/bleed easily.       Physical Exam     Initial Vitals [06/01/22 0906]   BP Pulse Resp Temp SpO2   (!) 145/65 75 18 98.1 °F (36.7 °C) 98 %      MAP       --         Physical Exam    Nursing note and vitals  reviewed.  Constitutional: She appears well-developed and well-nourished. She is not diaphoretic. No distress.   HENT:   Head: Normocephalic and atraumatic.   Right Ear: External ear normal.   Left Ear: External ear normal.   Mouth/Throat: Oropharynx is clear and moist.   Eyes: Conjunctivae and EOM are normal. Pupils are equal, round, and reactive to light. No scleral icterus.   Neck: No JVD present.   Cardiovascular: Normal rate, regular rhythm and intact distal pulses.   Pulmonary/Chest: Breath sounds normal. No respiratory distress. She has no wheezes. She has no rhonchi. She has no rales.   Speaks in full sentences without issue   Abdominal: Abdomen is soft. Bowel sounds are normal. There is no abdominal tenderness. There is no rebound and no guarding.   Musculoskeletal:         General: No edema. Normal range of motion.     Neurological: She is alert and oriented to person, place, and time. She has normal strength. No cranial nerve deficit or sensory deficit.   Skin: Skin is warm and dry. Capillary refill takes less than 2 seconds. No rash noted. No erythema. No pallor.   Psychiatric: She has a normal mood and affect. Her behavior is normal. Judgment and thought content normal.         ED Course   Procedures  Labs Reviewed - No data to display       Imaging Results          X-Ray Chest AP Portable (Final result)  Result time 06/01/22 10:56:35    Final result by Bear Delacruz MD (06/01/22 10:56:35)                 Impression:      No acute process.      Electronically signed by: Bear Delacruz MD  Date:    06/01/2022  Time:    10:56             Narrative:    EXAMINATION:  XR CHEST AP PORTABLE    CLINICAL HISTORY:  COVID-19    TECHNIQUE:  Single frontal view of the chest was performed.    COMPARISON:  05/27/2022.    FINDINGS:  The trachea is unremarkable.  There are calcifications of the aortic knob.  The cardiomediastinal silhouette is within normal limits.  The hemidiaphragms are unremarkable.  There are no  pleural.  There is no evidence of a pneumothorax.  There is no evidence of pneumomediastinum.  No airspace opacity is present.  There are degenerative changes in the osseous structures.                                 Medications - No data to display  Medical Decision Making:   History:   Old Medical Records: I decided to obtain old medical records.  Old Records Summarized: records from clinic visits.  Initial Assessment:   Patient recently Dx covid presents for continuation of cough despite tessalon. No resp distress, LTAB, VSS, afebrile, euvolemic  Differential Diagnosis:   Covid, bronchitis, less likely PNA. Physical exam and history taking lower clinical suspicion for resp failure, hypercapnic encephalopathy  Independently Interpreted Test(s):   I have ordered and independently interpreted X-rays - see prior notes.  Clinical Tests:   Radiological Study: Ordered and Reviewed             ED Course as of 06/02/22 1721 Wed Jun 01, 2022   1109 X-Ray Chest AP Portable  No acute findings  [MF]   1109 Pt ambulated down hallway with steady gait. SpO2 maintained 97%-98% on room air. No SOB reported [MF]      ED Course User Index  [MF] Aliyah Amaro PA-C           Stable for home care. Switched cough medications. Patient agreed to plan of care and voiced understanding. Discharged in stable condition with strict ED return precautions.    Aliyah Amaro PA-C  06/02/2022    Clinical Impression:   Final diagnoses:  [U07.1] COVID (Primary)  [R05.9] Cough          ED Disposition Condition    Discharge Stable        ED Prescriptions     Medication Sig Dispense Start Date End Date Auth. Provider    promethazine-dextromethorphan (PROMETHAZINE-DM) 6.25-15 mg/5 mL Syrp Take 5 mLs by mouth every 8 (eight) hours as needed. 105 mL 6/1/2022 6/8/2022 Aliyah Amaro PA-C        Follow-up Information    None          Aliyah Amaro PA-C  06/02/22 1722

## 2022-06-01 NOTE — DISCHARGE INSTRUCTIONS
Take cough medicine as prescribed, do not combine with other cough medicine. Do not take meclizine (Antivert) while using this medicine.    Return to ER for trouble breathing

## 2022-06-04 ENCOUNTER — LAB VISIT (OUTPATIENT)
Dept: LAB | Facility: HOSPITAL | Age: 87
End: 2022-06-04
Attending: INTERNAL MEDICINE
Payer: MEDICARE

## 2022-06-04 DIAGNOSIS — E05.80 IATROGENIC HYPERTHYROIDISM: ICD-10-CM

## 2022-06-04 LAB
T4 FREE SERPL-MCNC: 1.03 NG/DL (ref 0.71–1.51)
TSH SERPL DL<=0.005 MIU/L-ACNC: 0.39 UIU/ML (ref 0.4–4)

## 2022-06-04 PROCEDURE — 36415 COLL VENOUS BLD VENIPUNCTURE: CPT | Performed by: INTERNAL MEDICINE

## 2022-06-04 PROCEDURE — 84443 ASSAY THYROID STIM HORMONE: CPT | Performed by: INTERNAL MEDICINE

## 2022-06-04 PROCEDURE — 84439 ASSAY OF FREE THYROXINE: CPT | Performed by: INTERNAL MEDICINE

## 2022-06-06 ENCOUNTER — CLINICAL SUPPORT (OUTPATIENT)
Dept: REHABILITATION | Facility: OTHER | Age: 87
End: 2022-06-06
Payer: MEDICARE

## 2022-06-06 DIAGNOSIS — M25.512 LEFT SHOULDER PAIN, UNSPECIFIED CHRONICITY: ICD-10-CM

## 2022-06-06 DIAGNOSIS — M54.6 THORACIC SPINE PAIN: ICD-10-CM

## 2022-06-06 PROCEDURE — 97110 THERAPEUTIC EXERCISES: CPT | Mod: PN

## 2022-06-06 PROCEDURE — 97140 MANUAL THERAPY 1/> REGIONS: CPT | Mod: PN

## 2022-06-06 PROCEDURE — 97162 PT EVAL MOD COMPLEX 30 MIN: CPT | Mod: PN

## 2022-06-06 NOTE — PROGRESS NOTES
[facility]   Physical Therapy Initial Evaluation     Date: 6/6/2022   Name: Debbie Ding  Clinic Number: [id number]    Therapy Diagnosis:   Encounter Diagnoses   Name Primary?    Left shoulder pain, unspecified chronicity     Thoracic spine pain      Physician: Peter Joe PA*    Physician Orders: PT Eval and Treat   Medical Diagnosis from Referral: Left shoulder pain, unspecified chronicity [M25.512] Thoracic spine pain [M54.6]   Evaluation Date: 6/6/2022  Authorization Period Expiration: 5/3/2023  Plan of Care Expiration: 8/6/20/22  Visit #: 1/3 on 6/6/2022    Precautions: Standard     Time In: 10:11am  Time Out: 11:08am  Total Appointment Time (timed & untimed codes): 57 minutes    SUBJECTIVE     Date of onset: Feb of 2021    History of current condition - Debbie reports that she was sleeping and fell off of bed about a year ago resulting in L shoulder pain. She states that she went to the MD and was told that the pain is coming from her neck and back.     Falls: 1 in the past year resulting in injury     Imaging, X-ray of shoulder   Impression: No acute abnormality.  DJD of the shoulder.    Prior Therapy: none   Social History: 10 stairs to enter home, has hand rail, lives with daughter   Prior Level of Function: no limitation - independent with cooking, folding clothes and dressing   Current Level of Function: severe limitation - difficulty using L arm for ADLs and household chores, sitting 30-45 minutes at current (was sitting up to 2-4 hours)     Pain:  Current 10/10, worst 10/10, best 10/10   Location: L periscapular region and inferior shoulder   Description: constant pain with intermittent sharp pain and inferior shoulder   Aggravating Factors: prolonged sitting for zoom calls   Easing Factors: holding L arm behind back to relieve some pain     Patients goals: return to cooking      Medical History:   Past Medical History:   Diagnosis Date    Allergic rhinitis   "   Anticoagulant long-term use     Arthritis     Asthma     Bilateral pulmonary embolism 4/27/2019    Cataract     Chronic anticoagulation 9/28/2020    Depression     Diabetes mellitus     Fibromyalgia 7/2/2012    Fibromyalgia     GERD (gastroesophageal reflux disease)     Hypercholesterolemia 9/28/2020    Hypercholesterolemia 9/28/2020    Hypertension 7/2/2012    Thoracic or lumbosacral neuritis or radiculitis, unspecified     Thyroid disease     Ulcer      Surgical History:   Debbie Ding  has a past surgical history that includes Hysterectomy; Foot neuroma surgery (1985); Cataract extraction (Bilateral); Esophagogastroduodenoscopy (N/A, 3/8/2022); and Esophagogastroduodenoscopy (N/A, 2/28/2022).    Medications:   Debbie has a current medication list which includes the following prescription(s): albuterol, albuterol, amlodipine, clobetasol, cyproheptadine, donepezil, doxepin, esomeprazole, freestyle caryn 14 day reader, freestyle caryn 14 day sensor, fluocinonide, fluticasone-salmeterol 500-50 mcg/dose, glycopyrrolate, ketoconazole, ketoconazole, levothyroxine, meclizine, mirtazapine, pantoprazole, promethazine-dextromethorphan, restasis, and rivaroxaban.    Allergies:   Review of patient's allergies indicates:   Allergen Reactions    Melatonin      Other reaction(s): Other (See Comments)  Sleep Walks and has unnatural dreams    Talwin compound Hives    Talwin [pentazocine lactate] Hallucinations    Trazodone Other (See Comments)     Nightmares/sleep walk      Bentyl [dicyclomine] Anxiety      OBJECTIVE   6/6/2022:  Posture: forward head posture     R hand dominant.     Active  Range of Motion:   Cervical   Right  Left    Flexion 50     Extension  37     Side bending   33 30   Rotation   67 54      Active Range of Motion:   Shoulder Right  Left    Flexion 178 148 feels a "long shooting, pain"   Abduction 165 136 sharp pain    ER  84 78   IR 80 80     Upper Extremity Strength   Right  Left " "   Shoulder flexion: 3 3-   Shoulder Abduction: 3 3   Shoulder ER 3+ 3   Shoulder IR 3+ 3+     5Kg 10Kg   Upper traps  4 4-     Special Tests:  AC Joint Right  Left    Empty Can Test Patient experienced a "new" pain  Patient experienced a "new" pain    Subscaputlaris Lift Off Negative  Negative    Spurling's  Negative  Negative      Joint Mobility:   L GLENOHUMERAL JOINT: hypomobile, painful  Cervical: hypomobile, painful     Sensation:   patient reports altered sensation into B hands since her fall.     Limitation/Restriction for FOTO Shoulder Survey    Therapist reviewed FOTO scores for Debbie Ding on 6/6/2022.   FOTO documents entered into [employer] - see Media section.    Limitation Score: 62%  Predicted Score: 41%       TREATMENT     Total Treatment time (time-based codes) separate from Evaluation: 41 minutes     therapeutic exercises for 25 minutes:   L UE table slide: scaption, 10"x10  R SL scap retrac + L shld flex, 3x10  R SL ER, 3x10  Written HOME EXERCISE PROGRAM provided, reviewed, patient demo understanding   Patient education on nature of current condition and PHYSICAL THERAPY POC    manual therapy techniques for 10 minutes:   L GLENOHUMERAL JOINT in supine Gr III/IV-- JM  Cervical L UPAs in R SL Gr III/IV- JM     neuromuscular re-education for 00 minutes:   (initiate) chin tuck, 3x10    therapeutic activities for 6 minutes:   L UE wall slide, 3" hold, x 15    PATIENT EDUCATION AND HOME EXERCISES     Education provided:   - yes    Home Exercises Provided: yes. Exercises were reviewed and Debbie was able to demonstrate them prior to the end of the session.  Debbie demonstrated good  understanding of the education provided. See EMR under Patient Instructions for exercises provided during therapy sessions.    ASSESSMENT     Debbie is a 89 y.o. female referred to outpatient Physical Therapy with a medical diagnosis of Left shoulder pain, unspecified chronicity [M25.512] Thoracic spine pain [M54.6]. " Patient presents with decreased ROM, muscle strength, flexibility, joint mobility. Increased pain, stiffness, soft tissue restriction. Impaired posture, joint mechanics, balance, gait pattern.      Patient prognosis is Good.   Patient will benefit from skilled outpatient Physical Therapy to address the deficits stated above and in the chart below, provide patient /family education, and to maximize patientt's level of independence.     Plan of care discussed with patient: Yes  Patient's spiritual, cultural and educational needs considered and patient is agreeable to the plan of care and goals as stated.    Medical Necessity is demonstrated by the following  History  Co-morbidities and personal factors that may impact the plan of care Co-morbidities:    Allergic rhinitis    Anticoagulant long-term use    Arthritis    Asthma    Bilateral pulmonary embolism    Cataract    Chronic anticoagulation    Depression    Diabetes mellitus    Fibromyalgia    Fibromyalgia    GERD (gastroesophageal reflux disease)    Hypercholesterolemia    Hypercholesterolemia    Hypertension    Thoracic or lumbosacral neuritis or radiculitis, unspecified    Thyroid disease    Ulcer     Personal Factors:   age  lifestyle     moderate   Examination  Body Structures and Functions, activity limitations and participation restrictions that may impact the plan of care Body Regions:   neck  back  lower extremities  upper extremities  trunk    Body Systems:    ROM  strength  balance  gait  transfers  transitions  motor control  motor learning    Participation Restrictions:   Limitation in ADL, self care, social/recreational tasks     Activity limitations:   Learning and applying knowledge  no deficits    General Tasks and Commands  no deficits    Communication  no deficits    Mobility  lifting and carrying objects  fine hand use (grasping/picking up)  walking  driving (bike, car, motorcycle)    Self care  dressing  looking after one's  health    Domestic Life  shopping  cooking  doing house work (cleaning house, washing dishes, laundry)  assisting others    Interactions/Relationships  basic interpersonal interactions  complex interpersonal interactions  relating with strangers  formal relationships  family relationships    Life Areas  basic economic transactions    Community and Social Life  community life  recreation and leisure         moderate   Clinical Presentation evolving clinical presentation with changing clinical characteristics moderate   Decision Making/ Complexity Score: moderate     Anticipated Barriers for therapy: fall risk, osteoporosis     Goals:  Short Term Goals: 3 weeks   (1) patient will be I with HOME EXERCISE PROGRAM (initial, not met)   (2) patient will obtain 45 deg L cervical side bending ACTIVE RANGE OF MOTION to facilitate improved ability to engage in conversation with peers at table (initial, not met)   (3) patient will sit, > 2 hours, for participation in zoom calls according to previous level of function (initial, not met)     Long Term Goals: 6 weeks   (1) patient will stand, > 45 minutes, for participation in cooking tasks according to previous level of function (initial, not met)   (2) patient will patient obtain 4-/5 L shoulder flexion MMT to facilitate improved ability to use UE for folding laundry (initial, not met)   (3) patient will obtain 178 deg L shoulder flex ACTIVE RANGE OF MOTION to facilitate improved ability to access objects in upper cabinets while cooking (initial, not met)    PLAN   Plan of care Certification: 6/6/2022 to 8/6/2022    Outpatient Physical Therapy 2 times weekly for 6 weeks to include the following interventions: Cervical/Lumbar Traction, Electrical Stimulation , re-eval, dry needling, , Gait Training, Manual Therapy, Moist Heat/ Ice, Neuromuscular Re-ed, Patient Education, Self Care, Therapeutic Activities and Therapeutic Exercise.     Physical therapist and physical therapy  assistant(s) met face to face to discuss patient's treatment plan and progress towards established goals. Pt will be seen by a physical therapist minimally every 6th visit or every 30 days.    Potential for KX modifier and additional visits based on subjective report, objective examination, chronic nature of current condition, co morbidities, and nature of current condition.     Ely Beltran, PT      I CERTIFY THE NEED FOR THESE SERVICES FURNISHED UNDER THIS PLAN OF TREATMENT AND WHILE UNDER MY CARE   Physician's comments:     Physician's Signature: ___________________________________________________

## 2022-06-06 NOTE — PLAN OF CARE
[facility]   Physical Therapy Initial Evaluation     Date: 6/6/2022   Name: Debbie Ding  Clinic Number: [id number]    Therapy Diagnosis:   Encounter Diagnoses   Name Primary?    Left shoulder pain, unspecified chronicity     Thoracic spine pain      Physician: Peter Joe PA*    Physician Orders: PT Eval and Treat   Medical Diagnosis from Referral: Left shoulder pain, unspecified chronicity [M25.512] Thoracic spine pain [M54.6]   Evaluation Date: 6/6/2022  Authorization Period Expiration: 5/3/2023  Plan of Care Expiration: 8/6/20/22  Visit #: 1/3 on 6/6/2022    Precautions: Standard     Time In: 10:11am  Time Out: 11:08am  Total Appointment Time (timed & untimed codes): 57 minutes    SUBJECTIVE     Date of onset: Feb of 2021    History of current condition - Debbie reports that she was sleeping and fell off of bed about a year ago resulting in L shoulder pain. She states that she went to the MD and was told that the pain is coming from her neck and back.     Falls: 1 in the past year resulting in injury     Imaging, X-ray of shoulder   Impression: No acute abnormality.  DJD of the shoulder.    Prior Therapy: none   Social History: 10 stairs to enter home, has hand rail, lives with daughter   Prior Level of Function: no limitation - independent with cooking, folding clothes and dressing   Current Level of Function: severe limitation - difficulty using L arm for ADLs and household chores, sitting 30-45 minutes at current (was sitting up to 2-4 hours)     Pain:  Current 10/10, worst 10/10, best 10/10   Location: L periscapular region and inferior shoulder   Description: constant pain with intermittent sharp pain and inferior shoulder   Aggravating Factors: prolonged sitting for zoom calls   Easing Factors: holding L arm behind back to relieve some pain     Patients goals: return to cooking      Medical History:   Past Medical History:   Diagnosis Date    Allergic rhinitis   "   Anticoagulant long-term use     Arthritis     Asthma     Bilateral pulmonary embolism 4/27/2019    Cataract     Chronic anticoagulation 9/28/2020    Depression     Diabetes mellitus     Fibromyalgia 7/2/2012    Fibromyalgia     GERD (gastroesophageal reflux disease)     Hypercholesterolemia 9/28/2020    Hypercholesterolemia 9/28/2020    Hypertension 7/2/2012    Thoracic or lumbosacral neuritis or radiculitis, unspecified     Thyroid disease     Ulcer      Surgical History:   Debbie Ding  has a past surgical history that includes Hysterectomy; Foot neuroma surgery (1985); Cataract extraction (Bilateral); Esophagogastroduodenoscopy (N/A, 3/8/2022); and Esophagogastroduodenoscopy (N/A, 2/28/2022).    Medications:   Debbie has a current medication list which includes the following prescription(s): albuterol, albuterol, amlodipine, clobetasol, cyproheptadine, donepezil, doxepin, esomeprazole, freestyle caryn 14 day reader, freestyle caryn 14 day sensor, fluocinonide, fluticasone-salmeterol 500-50 mcg/dose, glycopyrrolate, ketoconazole, ketoconazole, levothyroxine, meclizine, mirtazapine, pantoprazole, promethazine-dextromethorphan, restasis, and rivaroxaban.    Allergies:   Review of patient's allergies indicates:   Allergen Reactions    Melatonin      Other reaction(s): Other (See Comments)  Sleep Walks and has unnatural dreams    Talwin compound Hives    Talwin [pentazocine lactate] Hallucinations    Trazodone Other (See Comments)     Nightmares/sleep walk      Bentyl [dicyclomine] Anxiety      OBJECTIVE   6/6/2022:  Posture: forward head posture     R hand dominant.     Active  Range of Motion:   Cervical   Right  Left    Flexion 50     Extension  37     Side bending   33 30   Rotation   67 54      Active Range of Motion:   Shoulder Right  Left    Flexion 178 148 feels a "long shooting, pain"   Abduction 165 136 sharp pain    ER  84 78   IR 80 80     Upper Extremity Strength   Right  Left " "   Shoulder flexion: 3 3-   Shoulder Abduction: 3 3   Shoulder ER 3+ 3   Shoulder IR 3+ 3+     5Kg 10Kg   Upper traps  4 4-     Special Tests:  AC Joint Right  Left    Empty Can Test Patient experienced a "new" pain  Patient experienced a "new" pain    Subscaputlaris Lift Off Negative  Negative    Spurling's  Negative  Negative      Joint Mobility:   L GLENOHUMERAL JOINT: hypomobile, painful  Cervical: hypomobile, painful     Sensation:   patient reports altered sensation into B hands since her fall.     Limitation/Restriction for FOTO Shoulder Survey    Therapist reviewed FOTO scores for Debbie Ding on 6/6/2022.   FOTO documents entered into [employer] - see Media section.    Limitation Score: 62%  Predicted Score: 41%       TREATMENT     Total Treatment time (time-based codes) separate from Evaluation: 41 minutes     therapeutic exercises for 25 minutes:   L UE table slide: scaption, 10"x10  R SL scap retrac + L shld flex, 3x10  R SL ER, 3x10  Written HOME EXERCISE PROGRAM provided, reviewed, patient demo understanding   Patient education on nature of current condition and PHYSICAL THERAPY POC    manual therapy techniques for 10 minutes:   L GLENOHUMERAL JOINT in supine Gr III/IV-- JM  Cervical L UPAs in R SL Gr III/IV- JM     neuromuscular re-education for 00 minutes:   (initiate) chin tuck, 3x10    therapeutic activities for 6 minutes:   L UE wall slide, 3" hold, x 15    PATIENT EDUCATION AND HOME EXERCISES     Education provided:   - yes    Home Exercises Provided: yes. Exercises were reviewed and Debbie was able to demonstrate them prior to the end of the session.  Debbie demonstrated good  understanding of the education provided. See EMR under Patient Instructions for exercises provided during therapy sessions.    ASSESSMENT     Debbie is a 89 y.o. female referred to outpatient Physical Therapy with a medical diagnosis of Left shoulder pain, unspecified chronicity [M25.512] Thoracic spine pain [M54.6]. " Patient presents with decreased ROM, muscle strength, flexibility, joint mobility. Increased pain, stiffness, soft tissue restriction. Impaired posture, joint mechanics, balance, gait pattern.      Patient prognosis is Good.   Patient will benefit from skilled outpatient Physical Therapy to address the deficits stated above and in the chart below, provide patient /family education, and to maximize patientt's level of independence.     Plan of care discussed with patient: Yes  Patient's spiritual, cultural and educational needs considered and patient is agreeable to the plan of care and goals as stated.    Medical Necessity is demonstrated by the following  History  Co-morbidities and personal factors that may impact the plan of care Co-morbidities:    Allergic rhinitis    Anticoagulant long-term use    Arthritis    Asthma    Bilateral pulmonary embolism    Cataract    Chronic anticoagulation    Depression    Diabetes mellitus    Fibromyalgia    Fibromyalgia    GERD (gastroesophageal reflux disease)    Hypercholesterolemia    Hypercholesterolemia    Hypertension    Thoracic or lumbosacral neuritis or radiculitis, unspecified    Thyroid disease    Ulcer     Personal Factors:   age  lifestyle     moderate   Examination  Body Structures and Functions, activity limitations and participation restrictions that may impact the plan of care Body Regions:   neck  back  lower extremities  upper extremities  trunk    Body Systems:    ROM  strength  balance  gait  transfers  transitions  motor control  motor learning    Participation Restrictions:   Limitation in ADL, self care, social/recreational tasks     Activity limitations:   Learning and applying knowledge  no deficits    General Tasks and Commands  no deficits    Communication  no deficits    Mobility  lifting and carrying objects  fine hand use (grasping/picking up)  walking  driving (bike, car, motorcycle)    Self care  dressing  looking after one's  health    Domestic Life  shopping  cooking  doing house work (cleaning house, washing dishes, laundry)  assisting others    Interactions/Relationships  basic interpersonal interactions  complex interpersonal interactions  relating with strangers  formal relationships  family relationships    Life Areas  basic economic transactions    Community and Social Life  community life  recreation and leisure         moderate   Clinical Presentation evolving clinical presentation with changing clinical characteristics moderate   Decision Making/ Complexity Score: moderate     Anticipated Barriers for therapy: fall risk, osteoporosis     Goals:  Short Term Goals: 3 weeks   (1) patient will be I with HOME EXERCISE PROGRAM (initial, not met)   (2) patient will obtain 45 deg L cervical side bending ACTIVE RANGE OF MOTION to facilitate improved ability to engage in conversation with peers at table (initial, not met)   (3) patient will sit, > 2 hours, for participation in zoom calls according to previous level of function (initial, not met)     Long Term Goals: 6 weeks   (1) patient will stand, > 45 minutes, for participation in cooking tasks according to previous level of function (initial, not met)   (2) patient will patient obtain 4-/5 L shoulder flexion MMT to facilitate improved ability to use UE for folding laundry (initial, not met)   (3) patient will obtain 178 deg L shoulder flex ACTIVE RANGE OF MOTION to facilitate improved ability to access objects in upper cabinets while cooking (initial, not met)    PLAN   Plan of care Certification: 6/6/2022 to 8/6/2022    Outpatient Physical Therapy 2 times weekly for 6 weeks to include the following interventions: Cervical/Lumbar Traction, Electrical Stimulation , re-eval, dry needling, , Gait Training, Manual Therapy, Moist Heat/ Ice, Neuromuscular Re-ed, Patient Education, Self Care, Therapeutic Activities and Therapeutic Exercise.     Physical therapist and physical therapy  assistant(s) met face to face to discuss patient's treatment plan and progress towards established goals. Pt will be seen by a physical therapist minimally every 6th visit or every 30 days.    Potential for KX modifier and additional visits based on subjective report, objective examination, chronic nature of current condition, co morbidities, and nature of current condition.     Ely Beltran, PT      I CERTIFY THE NEED FOR THESE SERVICES FURNISHED UNDER THIS PLAN OF TREATMENT AND WHILE UNDER MY CARE   Physician's comments:     Physician's Signature: ___________________________________________________

## 2022-06-07 ENCOUNTER — OFFICE VISIT (OUTPATIENT)
Dept: ORTHOPEDICS | Facility: CLINIC | Age: 87
End: 2022-06-07
Payer: MEDICARE

## 2022-06-07 ENCOUNTER — TELEPHONE (OUTPATIENT)
Dept: INTERNAL MEDICINE | Facility: CLINIC | Age: 87
End: 2022-06-07

## 2022-06-07 ENCOUNTER — OFFICE VISIT (OUTPATIENT)
Dept: INTERNAL MEDICINE | Facility: CLINIC | Age: 87
End: 2022-06-07
Payer: MEDICARE

## 2022-06-07 VITALS — HEIGHT: 65 IN | WEIGHT: 122.81 LBS | BODY MASS INDEX: 20.46 KG/M2

## 2022-06-07 VITALS
SYSTOLIC BLOOD PRESSURE: 120 MMHG | BODY MASS INDEX: 20.46 KG/M2 | HEART RATE: 79 BPM | HEIGHT: 65 IN | DIASTOLIC BLOOD PRESSURE: 70 MMHG | WEIGHT: 122.81 LBS | OXYGEN SATURATION: 99 %

## 2022-06-07 DIAGNOSIS — E03.9 HYPOTHYROIDISM, ADULT: ICD-10-CM

## 2022-06-07 DIAGNOSIS — M25.512 LEFT SHOULDER PAIN, UNSPECIFIED CHRONICITY: Primary | ICD-10-CM

## 2022-06-07 DIAGNOSIS — Z79.01 CHRONIC ANTICOAGULATION: ICD-10-CM

## 2022-06-07 DIAGNOSIS — Z86.711 HISTORY OF PULMONARY EMBOLISM: ICD-10-CM

## 2022-06-07 DIAGNOSIS — K31.84 GASTROPARESIS: ICD-10-CM

## 2022-06-07 DIAGNOSIS — I10 HYPERTENSION, UNSPECIFIED TYPE: ICD-10-CM

## 2022-06-07 DIAGNOSIS — Z86.718 HISTORY OF DEEP VENOUS THROMBOSIS: ICD-10-CM

## 2022-06-07 DIAGNOSIS — K21.9 GASTROESOPHAGEAL REFLUX DISEASE WITHOUT ESOPHAGITIS: Primary | ICD-10-CM

## 2022-06-07 DIAGNOSIS — U07.1 COVID-19 VIRUS INFECTION: ICD-10-CM

## 2022-06-07 PROCEDURE — 99999 PR PBB SHADOW E&M-EST. PATIENT-LVL V: CPT | Mod: PBBFAC,CR,, | Performed by: PHYSICIAN ASSISTANT

## 2022-06-07 PROCEDURE — 99213 PR OFFICE/OUTPT VISIT, EST, LEVL III, 20-29 MIN: ICD-10-PCS | Mod: S$PBB,,, | Performed by: PHYSICIAN ASSISTANT

## 2022-06-07 PROCEDURE — 99999 PR PBB SHADOW E&M-EST. PATIENT-LVL IV: CPT | Mod: PBBFAC,,, | Performed by: PHYSICIAN ASSISTANT

## 2022-06-07 PROCEDURE — 99999 PR PBB SHADOW E&M-EST. PATIENT-LVL IV: ICD-10-PCS | Mod: PBBFAC,,, | Performed by: PHYSICIAN ASSISTANT

## 2022-06-07 PROCEDURE — 99215 PR OFFICE/OUTPT VISIT, EST, LEVL V, 40-54 MIN: ICD-10-PCS | Mod: S$PBB,CR,, | Performed by: PHYSICIAN ASSISTANT

## 2022-06-07 PROCEDURE — 99214 OFFICE O/P EST MOD 30 MIN: CPT | Mod: PBBFAC,27 | Performed by: PHYSICIAN ASSISTANT

## 2022-06-07 PROCEDURE — 99999 PR PBB SHADOW E&M-EST. PATIENT-LVL V: ICD-10-PCS | Mod: PBBFAC,CR,, | Performed by: PHYSICIAN ASSISTANT

## 2022-06-07 PROCEDURE — 99215 OFFICE O/P EST HI 40 MIN: CPT | Mod: S$PBB,CR,, | Performed by: PHYSICIAN ASSISTANT

## 2022-06-07 PROCEDURE — 99213 OFFICE O/P EST LOW 20 MIN: CPT | Mod: S$PBB,,, | Performed by: PHYSICIAN ASSISTANT

## 2022-06-07 PROCEDURE — 99215 OFFICE O/P EST HI 40 MIN: CPT | Mod: 25,PBBFAC | Performed by: PHYSICIAN ASSISTANT

## 2022-06-07 RX ORDER — LEVOTHYROXINE SODIUM 25 UG/1
TABLET ORAL
Qty: 90 TABLET | Refills: 0 | Status: SHIPPED | OUTPATIENT
Start: 2022-06-07 | End: 2022-12-13 | Stop reason: SDUPTHER

## 2022-06-07 RX ORDER — BENZONATATE 100 MG/1
100 CAPSULE ORAL 3 TIMES DAILY PRN
Qty: 30 CAPSULE | Refills: 0 | Status: SHIPPED | OUTPATIENT
Start: 2022-06-07 | End: 2022-07-18

## 2022-06-07 RX ORDER — PANTOPRAZOLE SODIUM 40 MG/1
40 TABLET, DELAYED RELEASE ORAL 2 TIMES DAILY
Qty: 180 TABLET | Refills: 3 | Status: SHIPPED | OUTPATIENT
Start: 2022-06-07 | End: 2022-06-15 | Stop reason: SDUPTHER

## 2022-06-07 RX ORDER — PANTOPRAZOLE SODIUM 40 MG/1
40 TABLET, DELAYED RELEASE ORAL 2 TIMES DAILY
Qty: 180 TABLET | Refills: 3 | Status: SHIPPED | OUTPATIENT
Start: 2022-06-07 | End: 2022-06-07 | Stop reason: SDUPTHER

## 2022-06-07 NOTE — PROGRESS NOTES
Dr Kira Ramirez at bedside Subjective:       Patient ID: Debbie Ding is a 89 y.o. female.        Chief Complaint: Follow-up    Debbie Ding is an established patient of Carolyn Mejia MD here today for follow up visit.    Covid 19 positive 5/27 but sx started a few days before  S/p monoclonal antibody infusion 5/30  3/4 covid vaccines  Still coughing but overall improved, no shortness of breath, no chest pain, no fever  She saw Dr. Suazo, who put her on prednisone 2 tabs x 5 days, 1 tab x 5 days, 1/2 tab x 5 days, but she does not know dose of prednisone  She is using albuterol 3-4 times/day  Seen in ED 5/27 and 6/1 with two negative CXR    Hypothyroidism -  Synthroid dec 50 mcg --> 25 mcg 4/2022, repeat TSH 6/2022 with TSH still suppressed    BMI 20 -   Weight has gone down 4# since last visit  She had been on periactin but d/c due to drowsiness, plans to resume if weight goes down too much  Saw GI dietician who helped with gastroparesis diet, recall she declines reglan due to daughter with facial myoclonus     Recall she had extensive evaluation for weight loss ultimately due to gastroparesis  CT chest/abdomen/pelvis 1/2022 with nothing to explain weight loss  Saw GI and gastric emptying study confirmed gastropearesis 2/3/22, she did not want to take reglan due to concern for side effects  EGD 3/2022 with bravo pH positive, so continued on PPI indefinitely      Started on remeron and titrated to 15 mg to help with weight gain around 6/2021, initially helped to regain some weight, cannot take megace due to h/o DVT/PE     Baseline weight around 140#     Past medical history -   1.  Anxiety, grief - lorazepam prn, no longer taking lexparo     2.  HTN tx with amlodipine     3.  Hypothyroidism tx with synthroid (see above)     4.  H/o left shoulder pain that comes/goes     5.  Asthma is followed by Dr. Suazo and controlled with advair, albuterol prn     6.  She is on eliquis for h/o PE/DVT              Review of Systems    Constitutional: Negative for chills, diaphoresis, fatigue and fever.   HENT: Negative for congestion and sore throat.    Eyes: Negative for visual disturbance.   Respiratory: Positive for cough. Negative for chest tightness and shortness of breath.    Cardiovascular: Negative for chest pain, palpitations and leg swelling.   Gastrointestinal: Negative for abdominal pain, blood in stool, constipation, diarrhea, nausea and vomiting.   Genitourinary: Negative for dysuria, frequency, hematuria and urgency.   Musculoskeletal: Negative for arthralgias and back pain.   Skin: Negative for rash.   Neurological: Negative for dizziness, syncope, weakness and headaches.   Psychiatric/Behavioral: Negative for dysphoric mood and sleep disturbance. The patient is not nervous/anxious.        Objective:      Physical Exam  Vitals and nursing note reviewed.   Constitutional:       Appearance: Normal appearance. She is well-developed.   HENT:      Head: Normocephalic.      Right Ear: External ear normal.      Left Ear: External ear normal.   Eyes:      Pupils: Pupils are equal, round, and reactive to light.   Cardiovascular:      Rate and Rhythm: Normal rate and regular rhythm.      Heart sounds: Normal heart sounds. No murmur heard.    No friction rub. No gallop.   Pulmonary:      Effort: Pulmonary effort is normal. No respiratory distress.      Breath sounds: Normal breath sounds.   Abdominal:      Palpations: Abdomen is soft.      Tenderness: There is no abdominal tenderness.   Skin:     General: Skin is warm and dry.   Neurological:      Mental Status: She is alert.         Assessment:       1. Gastroesophageal reflux disease without esophagitis    2. COVID-19 virus infection    3. Hypothyroidism, adult    4. Hypertension, unspecified type    5. History of pulmonary embolism    6. History of deep venous thrombosis    7. Chronic anticoagulation    8. Gastroparesis    9. BMI 20.0-20.9, adult        Plan:       Debbie was seen today  "for follow-up.    Diagnoses and all orders for this visit:    Gastroesophageal reflux disease without esophagitis  -     REFILL: pantoprazole (PROTONIX) 40 MG tablet; Take 1 tablet (40 mg total) by mouth 2 (two) times daily.    COVID-19 virus infection - slowly improving, on prednisone per Dr. Suazo, using albuterol 3-4 times/day, no shortness of breath, pulse ox normal, CXR previously normal x 2  -     REFILL: benzonatate (TESSALON) 100 MG capsule; Take 1 capsule (100 mg total) by mouth 3 (three) times daily as needed for Cough.    Hypothyroidism, adult - reduce dose as below, repeat TSH in 2 months  -     levothyroxine (SYNTHROID) 25 MCG tablet; 1 tablet every day except hold on Sunday  -     TSH; Future    Hypertension, unspecified type - stable and controlled    History of pulmonary embolism  History of deep venous thrombosis  Chronic anticoagulation    Gastroparesis - following gastroparesis diet    BMI 20.0-20.9, adult - she is not currently taking periactin as she was getting drowsy but plans to try again to see if she continues to get drowsy on medication, weight has gone down about 4-5#, monitor    >45 minutes spent on patient encounter    Pt has been given instructions populated from [employer] database and has verbalized understanding of the after visit summary and information contained wherein.    Follow up with a primary care provider. May go to ER for acute shortness of breath, lightheadedness, fever, or any other emergent complaints or changes in condition.    "This note will be shared with the patient"    Future Appointments   Date Time Provider Department Center   6/7/2022  8:30 AM Peter Joe PA-C [facility]   6/13/2022  2:00 PM Ely Beltran, PT [facility] [place]   6/17/2022  7:00 AM Arya Castillo PTA [facility] [place]   6/21/2022  8:00 AM Arya Castillo PTA [facility] [place]   6/24/2022  2:00 PM Breanna Lacey, PT [facility] [place]   6/27/2022  8:00 AM Ely" Devin, PT [facility] [place]   6/29/2022  7:00 AM Breanna Lacey, PT [facility] [place]   7/4/2022  7:00 AM Ely Beltran, PT [facility] [place]   7/6/2022  7:00 AM Breanna Lacey, PT [facility] [place]   7/11/2022  8:00 AM Ely Beltran, PT [facility] [place]   7/13/2022  9:00 AM Ely Beltran, PT [facility] [place]   8/8/2022  9:00 AM LAB, APPOINTMENT [facility] IM Jeffrey Wu [facility]CL[facility]   8/23/2022 10:45 AM KATELYN Bridges POD Jeffrey Wu

## 2022-06-07 NOTE — PATIENT INSTRUCTIONS
Levothyroxine 25 mcg - take 1 tablet every day except DO NOT take levothyroxine on Sunday  Repeat thyroid blood test in 2 months

## 2022-06-07 NOTE — PROGRESS NOTES
SUBJECTIVE:     Chief Complaint & History of Present Illness:  Debbie Ding is a  Established  patient 89 y.o. female who is seen here today with a complaint of    Chief Complaint   Patient presents with    Left Shoulder - Pain    .  She has patient well-known to me was last seen treated the clinic for this condition 05/03/2022.  Since our last visit she has had her 1st visit of physical therapy and feels she is making very good progress her pain has decreased to some degree but is not gone but she is very enthusiastic about her physical therapy visits.  On a scale of 1-10, with 10 being worst pain imaginable, he rates this pain as 2 on good days and 4 on bad days.  she describes the pain as sore.    Review of patient's allergies indicates:   Allergen Reactions    Melatonin      Other reaction(s): Other (See Comments)  Sleep Walks and has unnatural dreams    Talwin compound Hives    Talwin [pentazocine lactate] Hallucinations    Trazodone Other (See Comments)     Nightmares/sleep walk      Bentyl [dicyclomine] Anxiety         Current Outpatient Medications   Medication Sig Dispense Refill    albuterol (PROVENTIL) 2.5 mg /3 mL (0.083 %) nebulizer solution Take 2.5 mg by nebulization every 6 (six) hours as needed. Rescue      albuterol (PROVENTIL/VENTOLIN HFA) 90 mcg/actuation inhaler INHALE 2 PUFFS BY MOUTH EVERY 6 HRS AS NEEDED      amLODIPine (NORVASC) 10 MG tablet TAKE 1 TABLET ONCE DAILY 90 tablet 3    benzonatate (TESSALON) 100 MG capsule Take 1 capsule (100 mg total) by mouth 3 (three) times daily as needed for Cough. 30 capsule 0    clobetasoL (TEMOVATE) 0.05 % external solution Pt to mix in 1 jar of cerave cream and apply to affected areas bid 50 mL 3    cyproheptadine (PERIACTIN) 4 mg tablet Take 1 tablet (4 mg total) by mouth 3 (three) times daily as needed (appetite). 90 tablet 1    donepeziL (ARICEPT) 10 MG tablet TAKE 1 TABLET EVERY MORNING.  NO FURTHER REFILL WITHOUT APPOINTMENT  (Patient taking differently: TAKE 1 TABLET EVERY MORNING.  NO FURTHER REFILL WITHOUT APPOINTMENT) 90 tablet 1    doxepin (SINEQUAN) 10 MG capsule TAKE 1 CAPSULE AT BEDTIME  FOR ITCHING (Patient taking differently: TAKE 1 CAPSULE AT BEDTIME  FOR ITCHING) 90 capsule 3    flash glucose scanning reader (FREESTYLE ALEXSANDRA 14 DAY READER) Misc 1 Device by Misc.(Non-Drug; Combo Route) route 2 (two) times a day. 1 each 11    flash glucose sensor (FREESTYLE ALEXSANDRA 14 DAY SENSOR) Kit 1 Device by Misc.(Non-Drug; Combo Route) route 2 (two) times a day. 1 kit 11    fluocinonide (LIDEX) 0.05 % external solution AAA scalp qday - bid prn pruritus 60 mL 3    fluticasone-salmeterol diskus inhaler 500-50 mcg Inhale 1 puff into the lungs 2 (two) times daily. Controller 60 each 11    glycopyrrolate (ROBINUL) 2 MG Tab Take 1 tablet (2 mg total) by mouth 2 (two) times daily. 180 tablet 3    ketoconazole (NIZORAL) 2 % cream Apply topically once daily. 60 g 2    ketoconazole (NIZORAL) 2 % shampoo Wash hair with medicated shampoo at least 2x/week - let sit on scalp at least 5 minutes prior to rinsing 120 mL 5    levothyroxine (SYNTHROID) 25 MCG tablet Take 1 tablet by mouth every day, except hold on Sundays. 90 tablet 0    meclizine (ANTIVERT) 25 mg tablet       mirtazapine (REMERON) 15 MG tablet TAKE 1 TABLET EVERY EVENING 90 tablet 3    pantoprazole (PROTONIX) 40 MG tablet Take 1 tablet (40 mg total) by mouth 2 (two) times daily. 180 tablet 3    promethazine-dextromethorphan (PROMETHAZINE-DM) 6.25-15 mg/5 mL Syrp Take 5 mLs by mouth every 8 (eight) hours as needed. 105 mL 0    RESTASIS 0.05 % ophthalmic emulsion       rivaroxaban (XARELTO) 10 mg Tab Take 1 tablet (10 mg total) by mouth daily with dinner or evening meal. 90 tablet 3     No current facility-administered medications for this visit.       Past Medical History:   Diagnosis Date    Allergic rhinitis     Anticoagulant long-term use     Arthritis     Asthma      Bilateral pulmonary embolism 4/27/2019    Cataract     Chronic anticoagulation 9/28/2020    Depression     Diabetes mellitus     Fibromyalgia 7/2/2012    Fibromyalgia     GERD (gastroesophageal reflux disease)     Hypercholesterolemia 9/28/2020    Hypercholesterolemia 9/28/2020    Hypertension 7/2/2012    Thoracic or lumbosacral neuritis or radiculitis, unspecified     Thyroid disease     Ulcer        Past Surgical History:   Procedure Laterality Date    CATARACT EXTRACTION Bilateral     ESOPHAGOGASTRODUODENOSCOPY N/A 3/8/2022    Procedure: EGD (ESOPHAGOGASTRODUODENOSCOPY) with dilation-either hospital ok with Dr. Meza;  Surgeon: Rebeca Meza MD;  Location: [facility];  Service: Endoscopy;  Laterality: N/A;  1/11-eliquis hold ok see te-tb    ESOPHAGOGASTRODUODENOSCOPY N/A 2/28/2022    Procedure: EGD (ESOPHAGOGASTRODUODENOSCOPY) with dilation-either [facility] with Dr. Meza;  Surgeon: Rebeca Meza MD;  Location: [facility] ([address] [place] [address]);  Service: Endoscopy;  Laterality: N/A;  1/11-eliquis hold ok see te-tb  COVID test on 2/25/22 at [facility]  2/22-confirmed appt arrival time with pt-Kpvt    FOOT NEUROMA SURGERY  1985    HYSTERECTOMY         Vital Signs (Most Recent)  There were no vitals filed for this visit.    Review of Systems:  ROS:  Constitutional: no fever or chills  Eyes: no visual changes  ENT: no nasal congestion or sore throat, Positive  vestibular dizziness  Respiratory: no cough or shortness of breath, Positive for asthma  Cardiovascular: no chest pain or palpitations  Gastrointestinal: no nausea or vomiting, tolerating diet, Positive for GERD, peptic ulcer disease  Genitourinary: no hematuria or dysuria, Positive CKD stage 3,  Integument/Breast: no rash or pruritis  Hematologic/Lymphatic: no easy bruising or lymphadenopathy, Positive long-term anticoagulation, history DVT, history of PE  Musculoskeletal: no arthralgias or myalgias, Positive chronic low back pain,  "fibromyalgia, osteoarthritis of multiple joints  Neurological: no seizures or tremors, Degenerative disc disease lumbar spine, history of memory loss, spinal stenosis without neurological claudication  Behavioral/Psych: no auditory or visual hallucinations, Positive for depression,  Endocrine: no heat or cold intolerance, Positive diabetes type 2, thyroid disease hypothyroidism           OBJECTIVE:     PHYSICAL EXAM:  Height: 5' 5" (165.1 cm) Weight: 55.7 kg (122 lb 12.7 oz), General Appearance: Well nourished, well developed, in no acute distress.  Neurological: Mood & affect are normal.  Shoulder exam: left  Tenderness: scapula, trapezius muscle  ROM: forward flexion 180/180, extension 45/45, full abduction 180/180, abduction-glenohumeral 90/90, external rotation 50/50  Shoulder Strength: biceps 5/5, triceps 5/5, abduction 5/5, adduction 5/5, external rotation 5/5 with shoulder at side, flexion 5/5, and extension 5/5  negative for tenderness about the glenohumeral joint, negative for tenderness over the acromioclavicular joint and negative for impingement sign  Stability tests: anterior apprehension test negative and posterior apprehension test negative  Special Tests:Cross-chest abduction: negative and [place]'s test: negative      Mild tenderness to palpation of the paraspinal muscles surrounding T3-T4 T5 on the left no palpable muscle spasm no decrease in range of motion or strength                   RADIOGRAPHS:  X-rays from previous visit reviewed by me today demonstrate no evidence of fracture dislocation joints spaces are well maintained no advanced degenerative joint disease    ASSESSMENT/PLAN:       ICD-10-CM ICD-9-CM   1. Left shoulder pain, unspecified chronicity  M25.512 719.41       Plan: We discussed with the patient at length all the different treatment options available for her left shoulder and spine will continue course including anti-inflammatories, acetaminophen, rest, ice, Physical therapy to " include strengthening exercise, occasional cortisone injections for temporary relief, arthroscopic surgical repair, and finally shoulder arthroplasty.   Will continue her physical therapy as directed follow-up in 4-6 weeks is needed

## 2022-06-09 RX ORDER — CYPROHEPTADINE HYDROCHLORIDE 4 MG/1
4 TABLET ORAL 3 TIMES DAILY PRN
Qty: 90 TABLET | Refills: 1 | Status: SHIPPED | OUTPATIENT
Start: 2022-06-09 | End: 2023-04-18

## 2022-06-09 NOTE — TELEPHONE ENCOUNTER
No new care gaps identified.  [facility] Embedded Care Gaps. Reference number: [id number]. 6/09/2022   8:26:23 AM TOMAST

## 2022-06-09 NOTE — TELEPHONE ENCOUNTER
----- Message from Brandy Cates sent at 6/9/2022  8:03 AM CDT -----  Contact: Self/[phone]  Requesting an RX refill or new RX.  Is this a refill or new RX: New  RX name and strength :  cyproheptadine (PERIACTIN) 4 mg tablet  Is this a 30 day or 90 day RX: 30  [facility]  [address] [place] [id number]  Phone: [phone] Fax: [phone]      The doctors have asked that we provide their patients with the following 2 reminders -- prescription refills can take up to 72 hours, and a friendly reminder that in the future you can use your [facility] account to request refills: yes

## 2022-06-15 DIAGNOSIS — K21.9 GASTROESOPHAGEAL REFLUX DISEASE WITHOUT ESOPHAGITIS: ICD-10-CM

## 2022-06-15 RX ORDER — PANTOPRAZOLE SODIUM 40 MG/1
40 TABLET, DELAYED RELEASE ORAL 2 TIMES DAILY
Qty: 180 TABLET | Refills: 3 | Status: SHIPPED | OUTPATIENT
Start: 2022-06-15 | End: 2022-06-22

## 2022-06-15 NOTE — TELEPHONE ENCOUNTER
----- Message from Lenora Villagran sent at 6/15/2022 11:20 AM CDT -----  Contact: Pt [phone]  Requesting an RX refill or new RX.  Is this a refill or new RX: refill   RX name and strength (copy/paste from chart):  pantoprazole (PROTONIX) 40 MG tablet  Is this a 30 day or 90 day RX: 90   Pharmacy name and phone # (copy/paste from chart):  [facility] [place] [place] - [facility] [address] AT [address] [facility]   Phone:  [phone]  Fax:  [phone]        The doctors have asked that we provide their patients with the following 2 reminders -- prescription refills can take up to 72 hours, and a friendly reminder that in the future you can use your [facility] account to request refills: call       Pt needs the script sent to the mail delivery pharmacy

## 2022-06-15 NOTE — TELEPHONE ENCOUNTER
No new care gaps identified.  [facility] Embedded Care Gaps. Reference number: [id number]. 6/15/2022   11:49:08 AM TOMAST

## 2022-06-17 ENCOUNTER — PATIENT MESSAGE (OUTPATIENT)
Dept: REHABILITATION | Facility: OTHER | Age: 87
End: 2022-06-17

## 2022-06-17 ENCOUNTER — DOCUMENTATION ONLY (OUTPATIENT)
Dept: REHABILITATION | Facility: OTHER | Age: 87
End: 2022-06-17
Payer: MEDICARE

## 2022-06-21 ENCOUNTER — CLINICAL SUPPORT (OUTPATIENT)
Dept: REHABILITATION | Facility: OTHER | Age: 87
End: 2022-06-21
Payer: MEDICARE

## 2022-06-21 ENCOUNTER — DOCUMENTATION ONLY (OUTPATIENT)
Dept: REHABILITATION | Facility: OTHER | Age: 87
End: 2022-06-21
Payer: MEDICARE

## 2022-06-21 DIAGNOSIS — M25.512 LEFT SHOULDER PAIN, UNSPECIFIED CHRONICITY: Primary | ICD-10-CM

## 2022-06-21 DIAGNOSIS — M54.6 THORACIC SPINE PAIN: ICD-10-CM

## 2022-06-21 PROCEDURE — 97110 THERAPEUTIC EXERCISES: CPT | Mod: PN,CQ

## 2022-06-21 PROCEDURE — 97112 NEUROMUSCULAR REEDUCATION: CPT | Mod: PN,CQ

## 2022-06-21 NOTE — PROGRESS NOTES
"[facility]   Physical Therapy Treatment Note     Name: Debbie Ding  Clinic Number: [id number]    Therapy Diagnosis:   Encounter Diagnoses   Name Primary?    Left shoulder pain, unspecified chronicity Yes    Thoracic spine pain      Physician: Peter Joe PA*    Visit Date: 6/21/2022    Physician Orders: PT Eval and Treat   Medical Diagnosis from Referral: Left shoulder pain, unspecified chronicity [M25.512] Thoracic spine pain [M54.6]   Evaluation Date: 6/6/2022  Authorization Period Expiration: 5/3/2023  Plan of Care Expiration: 8/6/20/22  Visit #: 2/1 ( auth pending )  FOTO # 1/3 on 6/6/2022     Precautions: Standard     Time In: 0800  Time Out: 0900  Total Billable Time: 30 minutes    SUBJECTIVE     She was compliant with home exercise program.  Response to previous treatment: Feeling a little better however she still has pain. States she has been performing her HEP  Functional change: n/a    Prior Level of Function: no limitation - independent with cooking, folding clothes and dressing   Current Level of Function: severe limitation - difficulty using L arm for ADLs and household chores, sitting 30-45 minutes at current (was sitting up to 2-4 hours)      Pain:  Current 7/10, worst 10/10, best 10/10   Location: L periscapular region and inferior shoulder   Description: constant pain with intermittent sharp pain and inferior shoulder   Aggravating Factors: prolonged sitting for zoom calls   Easing Factors: holding L arm behind back to relieve some pain      Patients goals: return to cooking       OBJECTIVE     Objective Measures updated at progress report unless specified.     Treatment     Debbie received the treatments listed below:    luda based on 1:1 tx:   therapeutic exercises for 17 minutes:     +Supine cervical rotation on towel roll 30x  +Supine wand flex 3x10  +Supine serratus punch with wand 3x10  L UE table slide: scaption, 10"x10  R SL scap retrac + L shld " "flex, 3x10  R SL ER, 3x10  R SL abd 3x10  +Sub -max shld iso Flex, Abd, ER/IR 10x5" ea  +Seated rows YTB 3x10           manual therapy techniques for 00minutes:   L GLENOHUMERAL JOINT in supine Gr III/IV-- JM  Cervical L UPAs in R SL Gr III/IV- JM      neuromuscular re-education for 8 minutes:   Chin tucks, 3x10     therapeutic activities for 5 minutes:   L UE wall slide, 3" hold, x 15           Patient Education and Home Exercises     Home Exercises Provided and Patient Education Provided     Education provided:   - Chin tucks for HEP    Written Home Exercises Provided: Patient instructed to cont prior HEP. Exercises were reviewed and Debbie was able to demonstrate them prior to the end of the session.  Debbie demonstrated fair  understanding of the education provided. See EMR under Patient Instructions for exercises provided during therapy sessions    ASSESSMENT     Pt arrived without an appointment however was still accommodated. Pt presents with muscle weaknes in deltoids and scapular muscles in L UE as well as decreased cervical rotation. Forward head and rounded shoulders in sitting/standing postures is noted. Verbal cuing to increase axial extension/cervical retraction during chin tucks.         Debbie Is progressing well towards her goals.     Patient prognosis is Good.       Pt will continue to benefit from skilled outpatient physical therapy to address the deficits listed in the problem list box on initial evaluation, provide pt/family education and to maximize pt's level of independence in the home and community environment.     Pt's spiritual, cultural and educational needs considered and pt agreeable to plan of care and goals.       Anticipated Barriers for therapy: fall risk, osteoporosis         Goals:  Short Term Goals: 3 weeks   (1) patient will be I with HOME EXERCISE PROGRAM (initial, not met)   (2) patient will obtain 45 deg L cervical side bending ACTIVE RANGE OF MOTION to facilitate improved " ability to engage in conversation with peers at table (initial, not met)   (3) patient will sit, > 2 hours, for participation in zoom calls according to previous level of function (initial, not met)      Long Term Goals: 6 weeks   (1) patient will stand, > 45 minutes, for participation in cooking tasks according to previous level of function (initial, not met)   (2) patient will patient obtain 4-/5 L shoulder flexion MMT to facilitate improved ability to use UE for folding laundry (initial, not met)   (3) patient will obtain 178 deg L shoulder flex ACTIVE RANGE OF MOTION to facilitate improved ability to access objects in upper cabinets while cooking (initial, not met)      PLAN     Plan of care Certification: 6/6/2022 to 8/6/2022    Focus on cervical/shoulder ROM and strength with emphasis on ADL performance.      Outpatient Physical Therapy 2 times weekly for 6 weeks to include the following interventions: Cervical/Lumbar Traction, Electrical Stimulation , re-eval, dry needling, , Gait Training, Manual Therapy, Moist Heat/ Ice, Neuromuscular Re-ed, Patient Education, Self Care, Therapeutic Activities and Therapeutic Exercise.      Physical therapist and physical therapy assistant(s) met face to face to discuss patient's treatment plan and progress towards established goals. Pt will be seen by a physical therapist minimally every 6th visit or every 30 days.     Potential for KX modifier and additional visits based on subjective report, objective examination, chronic nature of current condition, co morbidities, and nature of current condition.       Arya Castillo, PTA

## 2022-06-21 NOTE — PROGRESS NOTES
Physical Therapist and Physical Therapist Assistant met face to face to discuss patient's treatment plan and progress towards established goals. Pt will be seen by a physical therapist minimally every 6th visit or every 30 days.    Arya Castillo, PTA  6/21/2022

## 2022-06-22 ENCOUNTER — TELEPHONE (OUTPATIENT)
Dept: INTERNAL MEDICINE | Facility: CLINIC | Age: 87
End: 2022-06-22
Payer: MEDICARE

## 2022-06-22 RX ORDER — OMEPRAZOLE 20 MG/1
40 CAPSULE, DELAYED RELEASE ORAL
Status: CANCELLED | OUTPATIENT
Start: 2022-06-22 | End: 2022-06-22

## 2022-06-22 RX ORDER — OMEPRAZOLE 40 MG/1
40 CAPSULE, DELAYED RELEASE ORAL DAILY
Qty: 90 CAPSULE | Refills: 3 | Status: SHIPPED | OUTPATIENT
Start: 2022-06-22 | End: 2022-06-23 | Stop reason: SDUPTHER

## 2022-06-22 NOTE — TELEPHONE ENCOUNTER
----- Message from Lenora Villagran sent at 6/22/2022  8:17 AM CDT -----  Contact: Pt [phone]  Requesting an RX refill or new RX.  Is this a refill or new RX:   RX name and strength (copy/paste from chart):  Omeprazole  Is this a 30 day or 90 day RX:   Pharmacy name and phone # (copy/paste from chart):  [facility]   Phone:  [phone]  Fax:  [phone]        The doctors have asked that we provide their patients with the following 2 reminders -- prescription refills can take up to 72 hours, and a friendly reminder that in the future you can use your [facility] account to request refills: call      Patient states she needs a 30 day supply until she receives her mail order

## 2022-06-23 RX ORDER — OMEPRAZOLE 40 MG/1
40 CAPSULE, DELAYED RELEASE ORAL DAILY
Qty: 90 CAPSULE | Refills: 3 | Status: SHIPPED | OUTPATIENT
Start: 2022-06-23 | End: 2022-07-18

## 2022-06-23 NOTE — TELEPHONE ENCOUNTER
Called and spoke to pt she states [facility] hasn't mailed prescription out yet which I confirmed. Pt would like a 30 day supply sent to [facility] states she is out of medication. Pended just incase

## 2022-06-23 NOTE — TELEPHONE ENCOUNTER
----- Message from Ela Bond sent at 6/23/2022  9:15 AM CDT -----  Contact: [phone] Patient  Pt is requesting a call back about her Rx for omeprazole (PRILOSEC) 40 MG capsule being sent to the [facility] for a 30 day supply.

## 2022-06-23 NOTE — TELEPHONE ENCOUNTER
No new care gaps identified.  [facility] Embedded Care Gaps. Reference number: [id number]. 6/23/2022   9:38:51 AM TOMAST

## 2022-06-24 ENCOUNTER — CLINICAL SUPPORT (OUTPATIENT)
Dept: REHABILITATION | Facility: OTHER | Age: 87
End: 2022-06-24
Payer: MEDICARE

## 2022-06-24 DIAGNOSIS — M25.512 CHRONIC LEFT SHOULDER PAIN: ICD-10-CM

## 2022-06-24 DIAGNOSIS — G89.29 CHRONIC LEFT SHOULDER PAIN: ICD-10-CM

## 2022-06-24 PROCEDURE — 97110 THERAPEUTIC EXERCISES: CPT | Mod: PN

## 2022-06-24 PROCEDURE — 97112 NEUROMUSCULAR REEDUCATION: CPT | Mod: PN

## 2022-06-24 PROCEDURE — 97140 MANUAL THERAPY 1/> REGIONS: CPT | Mod: PN

## 2022-06-24 NOTE — PROGRESS NOTES
"[facility]   Physical Therapy Treatment Note     Name: Debbie Ding  Clinic Number: [id number]    Therapy Diagnosis:   Encounter Diagnosis   Name Primary?    Chronic left shoulder pain      Physician: Peter Joe PA*    Visit Date: 6/24/2022    Physician Orders: PT Eval and Treat   Medical Diagnosis from Referral: Left shoulder pain, unspecified chronicity [M25.512] Thoracic spine pain [M54.6]   Evaluation Date: 6/6/2022  Authorization Period Expiration: 5/3/2023  Plan of Care Expiration: 8/6/20/22  Visit #: 2/1 ( auth pending )  FOTO # 1/3 on 6/6/2022     Precautions: Standard     Time In: 1:50pm   Time Out: 2:50pm   Total Billable Time: 60 minutes    SUBJECTIVE     She was compliant with home exercise program.  Response to previous treatment: She reports she responded better with the new exercises last visit.   Functional change: She states she "feelings nothing" during ADL's.     Prior Level of Function: no limitation - independent with cooking, folding clothes and dressing   Current Level of Function: severe limitation - difficulty using L arm for ADLs and household chores, sitting 30-45 minutes at current (was sitting up to 2-4 hours)      Pain:  Current 5/10, worst 10/10, best 10/10   Location: L periscapular region and inferior shoulder   Description: constant pain with intermittent sharp pain and inferior shoulder   Aggravating Factors: prolonged sitting for zoom calls   Easing Factors: holding L arm behind back to relieve some pain      Patients goals: return to cooking       OBJECTIVE     Objective Measures updated at progress report unless specified.     Treatment     Debbie received the treatments listed below:    luda based on 1:1 tx:   therapeutic exercises for 30 minutes:     Supine cervical rotation on towel roll 30x  Supine wand flex 3x10  Supine serratus punch with wand 3x10  L UE table slide: scaption, 10"x10  R SL scap retrac + L shld flex, 3x10  R SL " "ER, 3x10  R SL abd 3x10  Sub -max shld iso Flex, Abd, ER/IR 10x5" ea  Seated rows YTB 3x10  +Scap squeezes 3x10            manual therapy techniques for 8 minutes:   L GLENOHUMERAL JOINT in supine Gr III/IV-- JM  Cervical L UPAs in R SL Gr III/IV- JM      neuromuscular re-education for 8 minutes:   Chin tucks, 3x10     therapeutic activities for 00 minutes:   L UE wall slide, 3" hold, x 15           Patient Education and Home Exercises     Home Exercises Provided and Patient Education Provided     Education provided:   - Chin tucks for HEP    Written Home Exercises Provided: Patient instructed to cont prior HEP. Exercises were reviewed and Debbie was able to demonstrate them prior to the end of the session.  Debbie demonstrated fair  understanding of the education provided. See EMR under Patient Instructions for exercises provided during therapy sessions    ASSESSMENT   Pt arrived feeling well but started to experience pain with the iniation of there ex. Pt's AROM was limited during wand ROM exercises today due to pain. Continued isometric exercises as tolerated. Pt required prolonged rest breaks between exercise in order for pain to subside. She responded well to gentle manual interventions and cervical traction. Continue to progress ROM & shoulder strengthening exercises as tolerated to improve ADL performance.          Debbie Is progressing well towards her goals.     Patient prognosis is Good.       Pt will continue to benefit from skilled outpatient physical therapy to address the deficits listed in the problem list box on initial evaluation, provide pt/family education and to maximize pt's level of independence in the home and community environment.     Pt's spiritual, cultural and educational needs considered and pt agreeable to plan of care and goals.       Anticipated Barriers for therapy: fall risk, osteoporosis         Goals:  Short Term Goals: 3 weeks   (1) patient will be I with HOME EXERCISE PROGRAM " (initial, not met)   (2) patient will obtain 45 deg L cervical side bending ACTIVE RANGE OF MOTION to facilitate improved ability to engage in conversation with peers at table (initial, not met)   (3) patient will sit, > 2 hours, for participation in zoom calls according to previous level of function (initial, not met)      Long Term Goals: 6 weeks   (1) patient will stand, > 45 minutes, for participation in cooking tasks according to previous level of function (initial, not met)   (2) patient will patient obtain 4-/5 L shoulder flexion MMT to facilitate improved ability to use UE for folding laundry (initial, not met)   (3) patient will obtain 178 deg L shoulder flex ACTIVE RANGE OF MOTION to facilitate improved ability to access objects in upper cabinets while cooking (initial, not met)      PLAN     Plan of care Certification: 6/6/2022 to 8/6/2022    Focus on cervical/shoulder ROM and strength with emphasis on ADL performance.      Outpatient Physical Therapy 2 times weekly for 6 weeks to include the following interventions: Cervical/Lumbar Traction, Electrical Stimulation , re-eval, dry needling, , Gait Training, Manual Therapy, Moist Heat/ Ice, Neuromuscular Re-ed, Patient Education, Self Care, Therapeutic Activities and Therapeutic Exercise.      Physical therapist and physical therapy assistant(s) met face to face to discuss patient's treatment plan and progress towards established goals. Pt will be seen by a physical therapist minimally every 6th visit or every 30 days.     Potential for KX modifier and additional visits based on subjective report, objective examination, chronic nature of current condition, co morbidities, and nature of current condition.     Pt was co-treated by CRISTINA Mc Mai under the direct supervision of a Licensed Physical Therapist    Breanna Lacey PT

## 2022-06-29 ENCOUNTER — CLINICAL SUPPORT (OUTPATIENT)
Dept: REHABILITATION | Facility: OTHER | Age: 87
End: 2022-06-29
Payer: MEDICARE

## 2022-06-29 DIAGNOSIS — M25.512 CHRONIC LEFT SHOULDER PAIN: Primary | ICD-10-CM

## 2022-06-29 DIAGNOSIS — G89.29 CHRONIC LEFT SHOULDER PAIN: Primary | ICD-10-CM

## 2022-06-29 PROCEDURE — 97110 THERAPEUTIC EXERCISES: CPT | Mod: PN,CQ

## 2022-06-29 PROCEDURE — 97140 MANUAL THERAPY 1/> REGIONS: CPT | Mod: PN,CQ

## 2022-06-29 NOTE — PROGRESS NOTES
"[facility]   Physical Therapy Treatment Note     Name: Debbie Ding  Clinic Number: [id number]    Therapy Diagnosis:   Encounter Diagnosis   Name Primary?    Chronic left shoulder pain Yes     Physician: Peter Joe PA*    Visit Date: 6/29/2022    Physician Orders: PT Eval and Treat   Medical Diagnosis from Referral: Left shoulder pain, unspecified chronicity [M25.512] Thoracic spine pain [M54.6]   Evaluation Date: 6/6/2022  Authorization Period Expiration: 5/3/2023  Plan of Care Expiration: 8/6/20/22  Visit #: 4/20  FOTO # 1/3 on 6/6/2022     Precautions: Standard     Time In: 0749  Time Out: 0855  Total Billable Time: 60 minutes    SUBJECTIVE     She was compliant with home exercise program.  Response to previous treatment: Pt reports feeling mild soreness/pain after her last session. States she feels it on the L side of her neck and attributes it to the table slide exercise.   Functional change: She states she "feelings nothing" during ADL's.     Prior Level of Function: no limitation - independent with cooking, folding clothes and dressing   Current Level of Function: severe limitation - difficulty using L arm for ADLs and household chores, sitting 30-45 minutes at current (was sitting up to 2-4 hours)      Pain:  Current 6/10, worst 10/10, best 10/10   Location: L periscapular region and inferior shoulder   Description: constant pain with intermittent sharp pain and inferior shoulder   Aggravating Factors: prolonged sitting for zoom calls   Easing Factors: holding L arm behind back to relieve some pain      Patients goals: return to cooking       OBJECTIVE     Objective Measures updated at progress report unless specified.     Treatment     Debbie received the treatments listed below:    luda based on 1:1 tx:   therapeutic exercises for 45 minutes:     Supine cervical rotation on towel roll 30x  Supine wand flex 3x10  Supine serratus punch with wand 3x10  L UE table " "slide: scaption, 10"x10  R SL scap retrac + L shld flex, 3x10  R SL ER, 3x10  R SL abd 3x10  +Seated L UT stretch, 4x20"  Sub -max shld iso Flex, Abd, ER/IR 10x5" ea  Seated rows YTB 3x10  Scap squeezes 3x10     +MHP to cervical region concurrently with supine exercises, 2 pillowcases       manual therapy techniques for 10 minutes:   +MFR to L UT   +STM to sub-occipitals and cervical paraspinals   L GLENOHUMERAL JOINT in supine Gr III/IV-- JM  Cervical L UPAs in R SL Gr III/IV- JM      neuromuscular re-education for 5 minutes:   Supine chin tucks, 3x10     therapeutic activities for 00 minutes:   L UE wall slide, 3" hold, x 15           Patient Education and Home Exercises     Home Exercises Provided and Patient Education Provided     Education provided:   - Use of MHP with proper layers, at home to cervical region for pain.     Written Home Exercises Provided: Patient instructed to cont prior HEP. Exercises were reviewed and Debbie was able to demonstrate them prior to the end of the session.  Debbie demonstrated fair  understanding of the education provided. See EMR under Patient Instructions for exercises provided during therapy sessions    ASSESSMENT   Pt reported pain was localized to the lateral aspect of the cervical /UT regions. Limitation was noted with cervical SB and ROT to R secondary to pain/tightness on the L. Moderate tightness in sub-occipitals/UT was noted during STM/MFR however, appeared to improve post manual. Pt received MHP concurrently with supine exercises in order to decrease muscle tightness. Pt reported feeling better post TX.          Debbie Is progressing well towards her goals.     Patient prognosis is Good.       Pt will continue to benefit from skilled outpatient physical therapy to address the deficits listed in the problem list box on initial evaluation, provide pt/family education and to maximize pt's level of independence in the home and community environment.     Pt's spiritual, " cultural and educational needs considered and pt agreeable to plan of care and goals.       Anticipated Barriers for therapy: fall risk, osteoporosis         Goals:  Short Term Goals: 3 weeks   (1) patient will be I with HOME EXERCISE PROGRAM (initial, not met)   (2) patient will obtain 45 deg L cervical side bending ACTIVE RANGE OF MOTION to facilitate improved ability to engage in conversation with peers at table (initial, not met)   (3) patient will sit, > 2 hours, for participation in zoom calls according to previous level of function (initial, not met)      Long Term Goals: 6 weeks   (1) patient will stand, > 45 minutes, for participation in cooking tasks according to previous level of function (initial, not met)   (2) patient will patient obtain 4-/5 L shoulder flexion MMT to facilitate improved ability to use UE for folding laundry (initial, not met)   (3) patient will obtain 178 deg L shoulder flex ACTIVE RANGE OF MOTION to facilitate improved ability to access objects in upper cabinets while cooking (initial, not met)      PLAN     Plan of care Certification: 6/6/2022 to 8/6/2022    Focus on cervical/shoulder ROM and strength with emphasis on ADL performance.      Outpatient Physical Therapy 2 times weekly for 6 weeks to include the following interventions: Cervical/Lumbar Traction, Electrical Stimulation , re-eval, dry needling, , Gait Training, Manual Therapy, Moist Heat/ Ice, Neuromuscular Re-ed, Patient Education, Self Care, Therapeutic Activities and Therapeutic Exercise.      Physical therapist and physical therapy assistant(s) met face to face to discuss patient's treatment plan and progress towards established goals. Pt will be seen by a physical therapist minimally every 6th visit or every 30 days.     Potential for KX modifier and additional visits based on subjective report, objective examination, chronic nature of current condition, co morbidities, and nature of current condition.         Arya  Jonathan, PTA

## 2022-06-30 ENCOUNTER — EXTERNAL CHRONIC CARE MANAGEMENT (OUTPATIENT)
Dept: PRIMARY CARE CLINIC | Facility: CLINIC | Age: 87
End: 2022-06-30
Payer: MEDICARE

## 2022-06-30 PROCEDURE — 99489 CPLX CHRNC CARE EA ADDL 30: CPT | Mod: S$PBB,,, | Performed by: INTERNAL MEDICINE

## 2022-06-30 PROCEDURE — 99489 PR COMPLX CHRON CARE MGMT, EA ADDTL 30 MIN, PER MONTH: ICD-10-PCS | Mod: S$PBB,,, | Performed by: INTERNAL MEDICINE

## 2022-06-30 PROCEDURE — 99489 CPLX CHRNC CARE EA ADDL 30: CPT | Mod: PBBFAC | Performed by: INTERNAL MEDICINE

## 2022-06-30 PROCEDURE — 99487 PR COMPLX CHRON CARE MGMT, 1ST HR, PER MONTH: ICD-10-PCS | Mod: S$PBB,,, | Performed by: INTERNAL MEDICINE

## 2022-06-30 PROCEDURE — 99487 CPLX CHRNC CARE 1ST 60 MIN: CPT | Mod: S$PBB,,, | Performed by: INTERNAL MEDICINE

## 2022-06-30 PROCEDURE — 99487 CPLX CHRNC CARE 1ST 60 MIN: CPT | Mod: PBBFAC | Performed by: INTERNAL MEDICINE

## 2022-07-04 ENCOUNTER — CLINICAL SUPPORT (OUTPATIENT)
Dept: REHABILITATION | Facility: OTHER | Age: 87
End: 2022-07-04
Payer: MEDICARE

## 2022-07-04 DIAGNOSIS — M25.512 CHRONIC LEFT SHOULDER PAIN: Primary | ICD-10-CM

## 2022-07-04 DIAGNOSIS — G89.29 CHRONIC LEFT SHOULDER PAIN: Primary | ICD-10-CM

## 2022-07-04 PROCEDURE — 97140 MANUAL THERAPY 1/> REGIONS: CPT | Mod: PN

## 2022-07-04 PROCEDURE — 97110 THERAPEUTIC EXERCISES: CPT | Mod: PN

## 2022-07-04 NOTE — PROGRESS NOTES
"[facility]   Physical Therapy Treatment Note     Name: Debbie Ding  Clinic Number: [id number]    Therapy Diagnosis:   Encounter Diagnosis   Name Primary?    Chronic left shoulder pain Yes     Physician: Peter Joe PA*    Visit Date: 7/4/2022    Physician Orders: PT Eval and Treat   Medical Diagnosis from Referral: Left shoulder pain, unspecified chronicity [M25.512] Thoracic spine pain [M54.6]   Evaluation Date: 6/6/2022  Authorization Period Expiration: 5/3/2023  Plan of Care Expiration: 8/6/20/22  Visit #: 4/20  FOTO # 1/3 on 6/6/2022     Precautions: Standard     Time In: 0900am  Time Out: 1000am  Total Billable Time: 60 minutes    SUBJECTIVE     She was compliant with home exercise program.  Response to previous treatment: Pt reports that she felt soreness on the left side of her neck after her previous session. She states that she never experienced this before.  Functional change: Pt reports that she is able to fold her laundry "without even noticing". She reports that her neck feels better.    Prior Level of Function: no limitation - independent with cooking, folding clothes and dressing   Current Level of Function: severe limitation - difficulty using L arm for ADLs and household chores, sitting 30-45 minutes at current (was sitting up to 2-4 hours)      Pain:  Current 6/10, worst 10/10, best 10/10   Location: L periscapular region and inferior shoulder   Description: constant pain with intermittent sharp pain and inferior shoulder   Aggravating Factors: prolonged sitting for zoom calls   Easing Factors: holding L arm behind back to relieve some pain      Patients goals: return to cooking       OBJECTIVE     Objective Measures updated at progress report unless specified.     Treatment     Debbie received the treatments listed below:    chagres based on 1:1 tx:   therapeutic exercises for 45 minutes:     Supine cervical rotation on towel roll 30x  Supine wand flex " "3x10  Supine serratus punch with wand 3x10  L UE table slide: scaption, 10"x10  R SL scap retrac + L shld flex, 3x10  R SL ER, 3x10  R SL abd 3x10  Seated L UT stretch, 4x20"  Sub -max shld iso Flex, Abd, ER/IR 10x5" ea  Seated rows YTB 3x10  Scap squeezes 3x10   +L shoulder ER vs kristin 3x10 repetitions  +R shoulder ER vs kristin 3x10 repetitions    MHP to cervical region concurrently with supine exercises, 2 pillowcases       manual therapy techniques for 10 minutes:   MFR to L UT   STM to sub-occipitals and cervical paraspinals   L GLENOHUMERAL JOINT in supine Gr III/IV-- JM  Cervical L UPAs in R SL Gr III/IV- JM      neuromuscular re-education for 5 minutes:   Supine chin tucks, 3x10     therapeutic activities for 00 minutes:   L UE wall slide, 3" hold, x 15           Patient Education and Home Exercises     Home Exercises Provided and Patient Education Provided     Education provided:   - Use of MHP with proper layers, at home to cervical region for pain.     Written Home Exercises Provided: Patient instructed to cont prior HEP. Exercises were reviewed and Debbie was able to demonstrate them prior to the end of the session.  Debbie demonstrated fair  understanding of the education provided. See EMR under Patient Instructions for exercises provided during therapy sessions    ASSESSMENT   Patient tolerated tx session fairly today. Pt demonstrated improved tolerance to scaption table slides compared to previous tx session. Added R/L shoulder internal/external rotation strengthening exercises this visit. Pt reported "discomfort" with this exercise but not pain. Continue PT POC with emphasis on improving pt's postural endurance. Plan to reassess pt's objective measurements next visit.         Debbie Is progressing well towards her goals.     Patient prognosis is Good.       Pt will continue to benefit from skilled outpatient physical therapy to address the deficits listed in the problem list box on initial evaluation, " provide pt/family education and to maximize pt's level of independence in the home and community environment.     Pt's spiritual, cultural and educational needs considered and pt agreeable to plan of care and goals.       Anticipated Barriers for therapy: fall risk, osteoporosis         Goals:  Short Term Goals: 3 weeks   (1) patient will be I with HOME EXERCISE PROGRAM (initial, not met)   (2) patient will obtain 45 deg L cervical side bending ACTIVE RANGE OF MOTION to facilitate improved ability to engage in conversation with peers at table (initial, not met)   (3) patient will sit, > 2 hours, for participation in zoom calls according to previous level of function (initial, not met)      Long Term Goals: 6 weeks   (1) patient will stand, > 45 minutes, for participation in cooking tasks according to previous level of function (initial, not met)   (2) patient will patient obtain 4-/5 L shoulder flexion MMT to facilitate improved ability to use UE for folding laundry (initial, not met)   (3) patient will obtain 178 deg L shoulder flex ACTIVE RANGE OF MOTION to facilitate improved ability to access objects in upper cabinets while cooking (initial, not met)      PLAN     Plan of care Certification: 6/6/2022 to 8/6/2022    Focus on cervical/shoulder ROM and strength with emphasis on ADL performance.      Outpatient Physical Therapy 2 times weekly for 6 weeks to include the following interventions: Cervical/Lumbar Traction, Electrical Stimulation , re-eval, dry needling, , Gait Training, Manual Therapy, Moist Heat/ Ice, Neuromuscular Re-ed, Patient Education, Self Care, Therapeutic Activities and Therapeutic Exercise.      Physical therapist and physical therapy assistant(s) met face to face to discuss patient's treatment plan and progress towards established goals. Pt will be seen by a physical therapist minimally every 6th visit or every 30 days.     Potential for KX modifier and additional visits based on subjective  report, objective examination, chronic nature of current condition, co morbidities, and nature of current condition.         Breanna Lacey, PT

## 2022-07-05 NOTE — PROGRESS NOTES
"[facility]   Physical Therapy Updated Plan of Care    Name: Debbie Ding  Clinic Number: [id number]    Therapy Diagnosis:   Encounter Diagnosis   Name Primary?    Chronic left shoulder pain Yes     Physician: Peter Joe PA*    Visit Date: 7/6/2022    Physician Orders: PT Eval and Treat   Medical Diagnosis from Referral: Left shoulder pain, unspecified chronicity [M25.512] Thoracic spine pain [M54.6]   Evaluation Date: 6/6/2022  Authorization Period Expiration: 5/3/2023  Plan of Care Expiration: 8/6/20/22  Visit #: 5/20  FOTO # 1/3 on 6/6/2022     Precautions: Standard     Time In: 0900am  Time Out: 1000am  Total Billable Time: 60 minutes    SUBJECTIVE     She was compliant with home exercise program.  Response to previous treatment: Pt reports that she feels pain "most of the time". Pt reports that her shoulder is about the same but her neck feels stronger.  Functional change: Pt reports that she is able to fold her laundry and peform other ADL's with less neck pain.     Prior Level of Function: no limitation - independent with cooking, folding clothes and dressing   Current Level of Function: severe limitation - difficulty using L arm for ADLs and household chores, sitting 30-45 minutes at current (was sitting up to 2-4 hours)      Pain:  Current 5/10, worst 10/10, best 5/10   Location: L periscapular region and inferior shoulder   Description: constant pain with intermittent sharp pain and inferior shoulder   Aggravating Factors: prolonged sitting for zoom calls   Easing Factors: holding L arm behind back to relieve some pain      Patients goals: return to cooking       OBJECTIVE   7/6/22:  Posture: forward head posture      R hand dominant.      Active  Range of Motion:   Cervical    Right  Left    Flexion 52       Extension  37       Side bending    33 31   Rotation    67 56      Active Range of Motion:   Shoulder Right  Left    Flexion 178 145 feels a "long shooting, " "pain"   Abduction 165 136 sharp pain    ER  84 78   IR 80 80      Upper Extremity Strength    Right  Left    Shoulder flexion: 3 3-   Shoulder Abduction: 3 3   Shoulder ER 3+ 3   Shoulder IR 3+ 3+     5Kg 10Kg   Upper traps  4 4-           Limitation/Restriction for FOTO Shoulder Survey     Therapist reviewed FOTO scores for Debbie Ding on 6/6/2022.   FOTO documents entered into [facility] - see Media section.     Limitation Score: 62%  Predicted Score: 41%               Treatment     Debbie received the treatments listed below:    chagres based on 1:1 tx:   therapeutic exercises for 60 minutes:     Supine cervical rotation on towel roll 30x  Supine wand flex 3x10  Supine serratus punch with wand 3x10  L UE table slide: scaption, 10"x10  R SL scap retrac + L shld flex, 3x10  R SL ER, 3x10  R SL abd 3x10  Seated L UT stretch, 4x20"  Sub -max shld iso Flex, Abd, ER/IR 10x5" ea  Seated rows YTB 3x10  Scap squeezes 3x10   L shoulder ER vs kristin 3x10 repetitions  R shoulder ER vs kristin 3x10 repetitions   +Updated POC    MHP to cervical region concurrently with supine exercises, 2 pillowcases       manual therapy techniques for 0 minutes:   MFR to L UT   STM to sub-occipitals and cervical paraspinals   L GLENOHUMERAL JOINT in supine Gr III/IV-- JM  Cervical L UPAs in R SL Gr III/IV- JM      neuromuscular re-education for 0 minutes:   Supine chin tucks, 3x10     therapeutic activities for 00 minutes:   L UE wall slide, 3" hold, x 15           Patient Education and Home Exercises     Home Exercises Provided and Patient Education Provided     Education provided:   - Use of MHP with proper layers, at home to cervical region for pain.     Written Home Exercises Provided: Patient instructed to cont prior HEP. Exercises were reviewed and Debbie was able to demonstrate them prior to the end of the session.  Debbie demonstrated fair  understanding of the education provided. See EMR under Patient Instructions for exercises " provided during therapy sessions    ASSESSMENT   Pt presents to physical therapy treatment with decreased active range of motion, decreased strength, poor posture, and increased pain due to neck and shoulder impairments. These factors are negatively impacting the patient's ability to complete their activities of daily living and work duties. Pt is progressing towards their short and long term goals as evidenced by decreased pain, improved strength and range of motion, and improved FOTO score. These goals remain appropriate for the plan of care. Pt would benefit from continued skilled physical therapy treatment to address these deficits so that they may return to their prior level of function with improved quality of life.          Debbie Is progressing well towards her goals.     Patient prognosis is Good.       Pt will continue to benefit from skilled outpatient physical therapy to address the deficits listed in the problem list box on initial evaluation, provide pt/family education and to maximize pt's level of independence in the home and community environment.     Pt's spiritual, cultural and educational needs considered and pt agreeable to plan of care and goals.       Anticipated Barriers for therapy: fall risk, osteoporosis         Goals:  Short Term Goals: 3 weeks   (1) patient will be I with HOME EXERCISE PROGRAM (MET, 7/6/22)  (2) patient will obtain 45 deg L cervical side bending ACTIVE RANGE OF MOTION to facilitate improved ability to engage in conversation with peers at table (Progressing not met, 7/6/22)  (3) patient will sit, > 2 hours, for participation in zoom calls according to previous level of function(Progressing not met, 7/6/22)     Long Term Goals: 6 weeks   (1) patient will stand, > 45 minutes, for participation in cooking tasks according to previous level of function (Progressing not met, 7/6/22)  (2) patient will patient obtain 4-/5 L shoulder flexion MMT to facilitate improved ability to  use UE for folding laundry (Progressing not met, 7/6/22)  (3) patient will obtain 178 deg L shoulder flex ACTIVE RANGE OF MOTION to facilitate improved ability to access objects in upper cabinets while cooking (Progressing not met, 7/6/22)      PLAN     Plan of care Certification: 7/6/22-9/6/22    Focus on cervical/shoulder ROM and strength with emphasis on ADL performance.      Outpatient Physical Therapy 2 times weekly for 6 weeks to include the following interventions: Cervical/Lumbar Traction, Electrical Stimulation , re-eval, dry needling, , Gait Training, Manual Therapy, Moist Heat/ Ice, Neuromuscular Re-ed, Patient Education, Self Care, Therapeutic Activities and Therapeutic Exercise.      Physical therapist and physical therapy assistant(s) met face to face to discuss patient's treatment plan and progress towards established goals. Pt will be seen by a physical therapist minimally every 6th visit or every 30 days.     Potential for KX modifier and additional visits based on subjective report, objective examination, chronic nature of current condition, co morbidities, and nature of current condition.         Breanna Lacey, PT

## 2022-07-06 ENCOUNTER — CLINICAL SUPPORT (OUTPATIENT)
Dept: REHABILITATION | Facility: OTHER | Age: 87
End: 2022-07-06
Payer: MEDICARE

## 2022-07-06 DIAGNOSIS — M25.512 CHRONIC LEFT SHOULDER PAIN: Primary | ICD-10-CM

## 2022-07-06 DIAGNOSIS — G89.29 CHRONIC LEFT SHOULDER PAIN: Primary | ICD-10-CM

## 2022-07-06 PROCEDURE — 97110 THERAPEUTIC EXERCISES: CPT | Mod: PN

## 2022-07-06 NOTE — PLAN OF CARE
"[facility]   Physical Therapy Updated Plan of Care    Name: Debbie Ding  Clinic Number: [id number]    Therapy Diagnosis:   Encounter Diagnosis   Name Primary?    Chronic left shoulder pain Yes     Physician: Peter Joe PA*    Visit Date: 7/6/2022    Physician Orders: PT Eval and Treat   Medical Diagnosis from Referral: Left shoulder pain, unspecified chronicity [M25.512] Thoracic spine pain [M54.6]   Evaluation Date: 6/6/2022  Authorization Period Expiration: 5/3/2023  Plan of Care Expiration: 8/6/20/22  Visit #: 5/20  FOTO # 1/3 on 6/6/2022     Precautions: Standard     Time In: 0900am  Time Out: 1000am  Total Billable Time: 60 minutes    SUBJECTIVE     She was compliant with home exercise program.  Response to previous treatment: Pt reports that she feels pain "most of the time". Pt reports that her shoulder is about the same but her neck feels stronger.  Functional change: Pt reports that she is able to fold her laundry and peform other ADL's with less neck pain.     Prior Level of Function: no limitation - independent with cooking, folding clothes and dressing   Current Level of Function: severe limitation - difficulty using L arm for ADLs and household chores, sitting 30-45 minutes at current (was sitting up to 2-4 hours)      Pain:  Current 5/10, worst 10/10, best 5/10   Location: L periscapular region and inferior shoulder   Description: constant pain with intermittent sharp pain and inferior shoulder   Aggravating Factors: prolonged sitting for zoom calls   Easing Factors: holding L arm behind back to relieve some pain      Patients goals: return to cooking       OBJECTIVE   7/6/22:  Posture: forward head posture      R hand dominant.      Active  Range of Motion:   Cervical    Right  Left    Flexion 52       Extension  37       Side bending    33 31   Rotation    67 56      Active Range of Motion:   Shoulder Right  Left    Flexion 178 145 feels a "long shooting, " "pain"   Abduction 165 136 sharp pain    ER  84 78   IR 80 80      Upper Extremity Strength    Right  Left    Shoulder flexion: 3 3-   Shoulder Abduction: 3 3   Shoulder ER 3+ 3   Shoulder IR 3+ 3+     5Kg 10Kg   Upper traps  4 4-           Limitation/Restriction for FOTO Shoulder Survey     Therapist reviewed FOTO scores for Debbie Ding on 6/6/2022.   FOTO documents entered into [facility] - see Media section.     Limitation Score: 62%  Predicted Score: 41%               Treatment     Debbie received the treatments listed below:    chagres based on 1:1 tx:   therapeutic exercises for 60 minutes:     Supine cervical rotation on towel roll 30x  Supine wand flex 3x10  Supine serratus punch with wand 3x10  L UE table slide: scaption, 10"x10  R SL scap retrac + L shld flex, 3x10  R SL ER, 3x10  R SL abd 3x10  Seated L UT stretch, 4x20"  Sub -max shld iso Flex, Abd, ER/IR 10x5" ea  Seated rows YTB 3x10  Scap squeezes 3x10   L shoulder ER vs kristin 3x10 repetitions  R shoulder ER vs kristin 3x10 repetitions   +Updated POC    MHP to cervical region concurrently with supine exercises, 2 pillowcases       manual therapy techniques for 0 minutes:   MFR to L UT   STM to sub-occipitals and cervical paraspinals   L GLENOHUMERAL JOINT in supine Gr III/IV-- JM  Cervical L UPAs in R SL Gr III/IV- JM      neuromuscular re-education for 0 minutes:   Supine chin tucks, 3x10     therapeutic activities for 00 minutes:   L UE wall slide, 3" hold, x 15           Patient Education and Home Exercises     Home Exercises Provided and Patient Education Provided     Education provided:   - Use of MHP with proper layers, at home to cervical region for pain.     Written Home Exercises Provided: Patient instructed to cont prior HEP. Exercises were reviewed and Debbie was able to demonstrate them prior to the end of the session.  Debbie demonstrated fair  understanding of the education provided. See EMR under Patient Instructions for exercises " provided during therapy sessions    ASSESSMENT   Pt presents to physical therapy treatment with decreased active range of motion, decreased strength, poor posture, and increased pain due to neck and shoulder impairments. These factors are negatively impacting the patient's ability to complete their activities of daily living and work duties. Pt is progressing towards their short and long term goals as evidenced by decreased pain, improved strength and range of motion, and improved FOTO score. These goals remain appropriate for the plan of care. Pt would benefit from continued skilled physical therapy treatment to address these deficits so that they may return to their prior level of function with improved quality of life.          Debbie Is progressing well towards her goals.     Patient prognosis is Good.       Pt will continue to benefit from skilled outpatient physical therapy to address the deficits listed in the problem list box on initial evaluation, provide pt/family education and to maximize pt's level of independence in the home and community environment.     Pt's spiritual, cultural and educational needs considered and pt agreeable to plan of care and goals.       Anticipated Barriers for therapy: fall risk, osteoporosis         Goals:  Short Term Goals: 3 weeks   (1) patient will be I with HOME EXERCISE PROGRAM (MET, 7/6/22)  (2) patient will obtain 45 deg L cervical side bending ACTIVE RANGE OF MOTION to facilitate improved ability to engage in conversation with peers at table (Progressing not met, 7/6/22)  (3) patient will sit, > 2 hours, for participation in zoom calls according to previous level of function(Progressing not met, 7/6/22)     Long Term Goals: 6 weeks   (1) patient will stand, > 45 minutes, for participation in cooking tasks according to previous level of function (Progressing not met, 7/6/22)  (2) patient will patient obtain 4-/5 L shoulder flexion MMT to facilitate improved ability to  use UE for folding laundry (Progressing not met, 7/6/22)  (3) patient will obtain 178 deg L shoulder flex ACTIVE RANGE OF MOTION to facilitate improved ability to access objects in upper cabinets while cooking (Progressing not met, 7/6/22)      PLAN     Plan of care Certification: 7/6/22-9/6/22    Focus on cervical/shoulder ROM and strength with emphasis on ADL performance.      Outpatient Physical Therapy 2 times weekly for 6 weeks to include the following interventions: Cervical/Lumbar Traction, Electrical Stimulation , re-eval, dry needling, , Gait Training, Manual Therapy, Moist Heat/ Ice, Neuromuscular Re-ed, Patient Education, Self Care, Therapeutic Activities and Therapeutic Exercise.      Physical therapist and physical therapy assistant(s) met face to face to discuss patient's treatment plan and progress towards established goals. Pt will be seen by a physical therapist minimally every 6th visit or every 30 days.     Potential for KX modifier and additional visits based on subjective report, objective examination, chronic nature of current condition, co morbidities, and nature of current condition.         Breanna Lacey, PT

## 2022-07-15 ENCOUNTER — OFFICE VISIT (OUTPATIENT)
Dept: ORTHOPEDICS | Facility: CLINIC | Age: 87
End: 2022-07-15
Payer: MEDICARE

## 2022-07-15 VITALS
DIASTOLIC BLOOD PRESSURE: 76 MMHG | HEIGHT: 65 IN | SYSTOLIC BLOOD PRESSURE: 122 MMHG | HEART RATE: 77 BPM | BODY MASS INDEX: 20.46 KG/M2 | WEIGHT: 122.81 LBS

## 2022-07-15 DIAGNOSIS — M65.312 TRIGGER THUMB OF LEFT HAND: ICD-10-CM

## 2022-07-15 DIAGNOSIS — M65.321 ACQUIRED TRIGGER FINGER OF RIGHT INDEX FINGER: Primary | ICD-10-CM

## 2022-07-15 DIAGNOSIS — M65.322 TRIGGER FINGER, LEFT INDEX FINGER: ICD-10-CM

## 2022-07-15 PROCEDURE — 99213 PR OFFICE/OUTPT VISIT, EST, LEVL III, 20-29 MIN: ICD-10-PCS | Mod: 25,S$PBB,, | Performed by: PLASTIC SURGERY

## 2022-07-15 PROCEDURE — 99213 OFFICE O/P EST LOW 20 MIN: CPT | Mod: PBBFAC,25 | Performed by: PLASTIC SURGERY

## 2022-07-15 PROCEDURE — 99999 PR PBB SHADOW E&M-EST. PATIENT-LVL III: CPT | Mod: PBBFAC,,, | Performed by: PLASTIC SURGERY

## 2022-07-15 PROCEDURE — 20550 NJX 1 TENDON SHEATH/LIGAMENT: CPT | Mod: PBBFAC,LT,51 | Performed by: PLASTIC SURGERY

## 2022-07-15 PROCEDURE — 99213 OFFICE O/P EST LOW 20 MIN: CPT | Mod: 25,S$PBB,, | Performed by: PLASTIC SURGERY

## 2022-07-15 PROCEDURE — 20550 NJX 1 TENDON SHEATH/LIGAMENT: CPT | Mod: S$PBB,50,, | Performed by: PLASTIC SURGERY

## 2022-07-15 PROCEDURE — 20550 PR INJECT TENDON SHEATH/LIGAMENT: ICD-10-PCS | Mod: S$PBB,50,, | Performed by: PLASTIC SURGERY

## 2022-07-15 PROCEDURE — 20550 NJX 1 TENDON SHEATH/LIGAMENT: CPT | Mod: PBBFAC | Performed by: PLASTIC SURGERY

## 2022-07-15 PROCEDURE — 99999 PR PBB SHADOW E&M-EST. PATIENT-LVL III: ICD-10-PCS | Mod: PBBFAC,,, | Performed by: PLASTIC SURGERY

## 2022-07-15 RX ORDER — TRIAMCINOLONE ACETONIDE 40 MG/ML
60 INJECTION, SUSPENSION INTRA-ARTICULAR; INTRAMUSCULAR
Status: COMPLETED | OUTPATIENT
Start: 2022-07-15 | End: 2022-07-15

## 2022-07-15 RX ADMIN — TRIAMCINOLONE ACETONIDE 60 MG: 40 INJECTION, SUSPENSION INTRA-ARTICULAR; INTRAMUSCULAR at 10:07

## 2022-07-15 NOTE — PROGRESS NOTES
Subjective:      Patient ID: Debbie Ding is a 89 y.o. female.    Chief Complaint: Pain of the Left Hand and Pain of the Right Hand    Debbie Ding returns to clinic today with bilateral hand pain  With complains of occasional triggering of the right index finger, left index and thumb.  She denies any numbness or tingling.  She denies any other changes in medical history or complaints.    Pain        Review of Systems   All other systems reviewed and are negative.        Objective:            General    Vitals reviewed.  Constitutional: She is oriented to person, place, and time. She appears well-nourished. No distress.   Neurological: She is alert and oriented to person, place, and time.             Right Hand/Wrist Exam     Inspection   Effusion: Hand -  absent  Deformity: Hand -  deformity    Pain   Hand - The patient exhibits pain of the index MCP.    Tenderness   The patient is tender to palpation of the more area.    Tests   Tinel's sign (median nerve): positive  Carpal Tunnel Compression Test: positive        Left Hand/Wrist Exam     Inspection   Effusion: Hand -  absent  Deformity: Hand -  present    Tests   Tinel's sign (median nerve): positive  Carpal Tunnel Compression Test: positive              PROCEDURE:  I have explained the risks, benefits, and alternatives of the procedure in detail.  The patient voices understanding and all questions have been answered. So after I performed a sterile prep of the skin, the level of there A-1 pulley of the right index finger and left thumb and index finger was injected using a 25 gauge needle from the volar approach with a  combination of 1cc 1% plain Lidocaine and 20mg of Kenalog each.  The patient is cautioned and immediate relief of pain is secondary to the local anesthetic and will be temporary.  After the anesthetic wears off there may be a increase in pain that may last for a few hours or a few days and they should use ice to help alleviate this flair  up of pain.               Assessment:       Encounter Diagnoses   Name Primary?    Acquired trigger finger of right index finger Yes    Trigger thumb of left hand     Trigger finger, left index finger           Plan:       Debbie was seen today for pain and pain.    Diagnoses and all orders for this visit:    Acquired trigger finger of right index finger    Trigger thumb of left hand    Trigger finger, left index finger    Other orders  -     triamcinolone acetonide injection 60 mg        Based on her presenting symptoms today it appears that her carpal tunnel numbness and tingling has improved however she continues to have triggering of the right index finger and of the left thumb and index finger.  We discussed the pathophysiology progression treatment of trigger digits in detail.  She wished to receive a corticosteroid injection into the tendon sheath of each of these digits.  Please see the procedure procedure note above.  She will follow up with me in 6-8 weeks if her symptoms fail to improve worsen.

## 2022-07-18 ENCOUNTER — OFFICE VISIT (OUTPATIENT)
Dept: INTERNAL MEDICINE | Facility: CLINIC | Age: 87
End: 2022-07-18
Payer: MEDICARE

## 2022-07-18 ENCOUNTER — PATIENT MESSAGE (OUTPATIENT)
Dept: REHABILITATION | Facility: OTHER | Age: 87
End: 2022-07-18

## 2022-07-18 ENCOUNTER — HOSPITAL ENCOUNTER (OUTPATIENT)
Dept: CARDIOLOGY | Facility: CLINIC | Age: 87
Discharge: HOME | End: 2022-07-18
Payer: MEDICARE

## 2022-07-18 ENCOUNTER — OFFICE VISIT (OUTPATIENT)
Dept: CARDIOLOGY | Facility: CLINIC | Age: 87
End: 2022-07-18
Payer: MEDICARE

## 2022-07-18 ENCOUNTER — TELEPHONE (OUTPATIENT)
Dept: OTOLARYNGOLOGY | Facility: CLINIC | Age: 87
End: 2022-07-18
Payer: MEDICARE

## 2022-07-18 VITALS
SYSTOLIC BLOOD PRESSURE: 128 MMHG | WEIGHT: 130.75 LBS | HEIGHT: 65 IN | BODY MASS INDEX: 21.79 KG/M2 | OXYGEN SATURATION: 98 % | HEART RATE: 68 BPM | DIASTOLIC BLOOD PRESSURE: 66 MMHG

## 2022-07-18 VITALS
BODY MASS INDEX: 22.34 KG/M2 | SYSTOLIC BLOOD PRESSURE: 147 MMHG | HEIGHT: 65 IN | OXYGEN SATURATION: 100 % | DIASTOLIC BLOOD PRESSURE: 67 MMHG | WEIGHT: 134.06 LBS | HEART RATE: 74 BPM

## 2022-07-18 DIAGNOSIS — R06.02 SHORTNESS OF BREATH: ICD-10-CM

## 2022-07-18 DIAGNOSIS — R06.02 SHORTNESS OF BREATH: Primary | ICD-10-CM

## 2022-07-18 DIAGNOSIS — K31.84 GASTROPARESIS: ICD-10-CM

## 2022-07-18 DIAGNOSIS — G47.33 OSA (OBSTRUCTIVE SLEEP APNEA): ICD-10-CM

## 2022-07-18 DIAGNOSIS — K21.9 GASTROESOPHAGEAL REFLUX DISEASE, UNSPECIFIED WHETHER ESOPHAGITIS PRESENT: ICD-10-CM

## 2022-07-18 DIAGNOSIS — Z86.16 HISTORY OF COVID-19: ICD-10-CM

## 2022-07-18 DIAGNOSIS — N18.31 STAGE 3A CHRONIC KIDNEY DISEASE: ICD-10-CM

## 2022-07-18 DIAGNOSIS — E03.9 HYPOTHYROIDISM, ADULT: ICD-10-CM

## 2022-07-18 DIAGNOSIS — E78.5 HYPERLIPIDEMIA ASSOCIATED WITH TYPE 2 DIABETES MELLITUS: ICD-10-CM

## 2022-07-18 DIAGNOSIS — Z79.01 CHRONIC ANTICOAGULATION: ICD-10-CM

## 2022-07-18 DIAGNOSIS — N18.31 TYPE 2 DIABETES MELLITUS WITH STAGE 3A CHRONIC KIDNEY DISEASE, WITHOUT LONG-TERM CURRENT USE OF INSULIN: ICD-10-CM

## 2022-07-18 DIAGNOSIS — E11.69 HYPERLIPIDEMIA ASSOCIATED WITH TYPE 2 DIABETES MELLITUS: ICD-10-CM

## 2022-07-18 DIAGNOSIS — E11.22 TYPE 2 DIABETES MELLITUS WITH STAGE 3A CHRONIC KIDNEY DISEASE, WITHOUT LONG-TERM CURRENT USE OF INSULIN: ICD-10-CM

## 2022-07-18 DIAGNOSIS — Z86.711 HISTORY OF PULMONARY EMBOLISM: ICD-10-CM

## 2022-07-18 DIAGNOSIS — I10 HYPERTENSION, UNSPECIFIED TYPE: ICD-10-CM

## 2022-07-18 DIAGNOSIS — Z86.718 HISTORY OF DEEP VENOUS THROMBOSIS: ICD-10-CM

## 2022-07-18 DIAGNOSIS — E03.9 HYPOTHYROIDISM, UNSPECIFIED TYPE: ICD-10-CM

## 2022-07-18 PROCEDURE — 99215 PR OFFICE/OUTPT VISIT, EST, LEVL V, 40-54 MIN: ICD-10-PCS | Mod: S$PBB,,, | Performed by: PHYSICIAN ASSISTANT

## 2022-07-18 PROCEDURE — 93010 EKG 12-LEAD: ICD-10-PCS | Mod: S$PBB,,, | Performed by: INTERNAL MEDICINE

## 2022-07-18 PROCEDURE — 93005 ELECTROCARDIOGRAM TRACING: CPT | Mod: PBBFAC | Performed by: INTERNAL MEDICINE

## 2022-07-18 PROCEDURE — 99214 OFFICE O/P EST MOD 30 MIN: CPT | Mod: 25,S$PBB,, | Performed by: INTERNAL MEDICINE

## 2022-07-18 PROCEDURE — 99999 PR PBB SHADOW E&M-EST. PATIENT-LVL V: ICD-10-PCS | Mod: PBBFAC,,, | Performed by: PHYSICIAN ASSISTANT

## 2022-07-18 PROCEDURE — 99215 OFFICE O/P EST HI 40 MIN: CPT | Mod: S$PBB,,, | Performed by: PHYSICIAN ASSISTANT

## 2022-07-18 PROCEDURE — 99214 PR OFFICE/OUTPT VISIT, EST, LEVL IV, 30-39 MIN: ICD-10-PCS | Mod: 25,S$PBB,, | Performed by: INTERNAL MEDICINE

## 2022-07-18 PROCEDURE — 99999 PR PBB SHADOW E&M-EST. PATIENT-LVL V: CPT | Mod: PBBFAC,,, | Performed by: INTERNAL MEDICINE

## 2022-07-18 PROCEDURE — 99215 OFFICE O/P EST HI 40 MIN: CPT | Mod: PBBFAC | Performed by: PHYSICIAN ASSISTANT

## 2022-07-18 PROCEDURE — 99999 PR PBB SHADOW E&M-EST. PATIENT-LVL V: ICD-10-PCS | Mod: PBBFAC,,, | Performed by: INTERNAL MEDICINE

## 2022-07-18 PROCEDURE — 93010 ELECTROCARDIOGRAM REPORT: CPT | Mod: S$PBB,,, | Performed by: INTERNAL MEDICINE

## 2022-07-18 PROCEDURE — 99999 PR PBB SHADOW E&M-EST. PATIENT-LVL V: CPT | Mod: PBBFAC,,, | Performed by: PHYSICIAN ASSISTANT

## 2022-07-18 PROCEDURE — 99215 OFFICE O/P EST HI 40 MIN: CPT | Mod: PBBFAC,27 | Performed by: INTERNAL MEDICINE

## 2022-07-18 RX ORDER — ESOMEPRAZOLE MAGNESIUM 40 MG/1
40 CAPSULE, DELAYED RELEASE ORAL 2 TIMES DAILY
Qty: 180 CAPSULE | Refills: 3 | Status: SHIPPED | OUTPATIENT
Start: 2022-07-18 | End: 2022-12-13 | Stop reason: SDUPTHER

## 2022-07-18 RX ORDER — GLYCOPYRROLATE 2 MG/1
2 TABLET ORAL 2 TIMES DAILY
Qty: 180 TABLET | Refills: 0 | Status: SHIPPED | OUTPATIENT
Start: 2022-07-18 | End: 2022-10-03

## 2022-07-18 RX ORDER — MECLIZINE HYDROCHLORIDE 25 MG/1
25 TABLET ORAL 2 TIMES DAILY PRN
Qty: 60 TABLET | Refills: 1 | Status: SHIPPED | OUTPATIENT
Start: 2022-07-18 | End: 2022-12-13 | Stop reason: SDUPTHER

## 2022-07-18 NOTE — PATIENT INSTRUCTIONS
Schedule a follow up with Dr. Suazo  Go to the ER if shortness of breath comes back  Will refer you to see a cardiologist

## 2022-07-18 NOTE — TELEPHONE ENCOUNTER
----- Message from Kelvin Rausch sent at 7/18/2022  9:26 AM CDT -----  Regarding: Call BAck  Name of Who is Calling:DEJAH ROLDAN [[id number]]              What is the request in detail: Patient requesting a call back about the problem she's having after her procedure 4 months ago. No other details was given Please assist              Can the clinic reply by [facility]: No              What Number to Call Back if not in [facility]:[phone]

## 2022-07-18 NOTE — PROGRESS NOTES
Chart has been dictated using voice recognition software.  It is not been reviewed carefully for any transcriptional errors due to this technology.   Subjective:   Patient ID:  Debbie Ding is a 89 y.o. female who presents for evaluation of Establish Care and Shortness of Breath      HPI:  Patient, who is normally followed by Dr. Nelson at [facility], with asthma, hypertension, hypercholesterolemia, type 2 diabetes, hypothyroidism, CKD 3, asthma, cognitive issues, and history of pulmonary emboli.  Patient was seen in primary care this morning and was complaining of shortness of breath and sent for cardiovascular evaluation.  The patient had COVID-19 infection at the end of May-2022 but did not need to be hospitalized.    Last Monday patient became very short of breath and family thought she was going to die. Used albuterol all day on Monday and Wed and breathing became somewhat better.  Baseline is dyspnea on minimal exertion but not at rest.  Wearing a mask increases her dyspnea. Patient denies any chest discomfort on exertion or at rest.  Patient denies any dyspnea at rest, orthopnea, or PND. No edema. Patient denies any palpitations, lightheadedness, or syncope.     Cardiac risk factors: Borderline diabetes, hyperlipidemia, hypertension, sedentary lifestyle (due to MELISSA)    Past Medical History:   Diagnosis Date    Allergic rhinitis     Anticoagulant long-term use     Arthritis     Asthma     Bilateral pulmonary embolism 4/27/2019    Cataract     Chronic anticoagulation 9/28/2020    Depression     Diabetes mellitus     Fibromyalgia 7/2/2012    Fibromyalgia     GERD (gastroesophageal reflux disease)     Hypercholesterolemia 9/28/2020    Hypercholesterolemia 9/28/2020    Hypertension 7/2/2012    Thoracic or lumbosacral neuritis or radiculitis, unspecified     Thyroid disease     Ulcer        Past Surgical History:   Procedure Laterality Date    CATARACT EXTRACTION Bilateral      ESOPHAGOGASTRODUODENOSCOPY N/A 3/8/2022    Procedure: EGD (ESOPHAGOGASTRODUODENOSCOPY) with dilation-either hospital ok with Dr. Meza;  Surgeon: Rebeca Meza MD;  Location: [facility] ENDO;  Service: Endoscopy;  Laterality: N/A;  1/11-eliquis hold ok see te-tb    ESOPHAGOGASTRODUODENOSCOPY N/A 2/28/2022    Procedure: EGD (ESOPHAGOGASTRODUODENOSCOPY) with dilation-either hospital ok with Dr. Meza;  Surgeon: Rebeca Meza MD;  Location: [facility] (2ND FLR);  Service: Endoscopy;  Laterality: N/A;  1/11-eliquis hold ok see te-tb  COVID test on 2/25/22 at [facility]  2/22-confirmed appt arrival time with pt-Kpvt    FOOT NEUROMA SURGERY  1985    HYSTERECTOMY         Social History     Tobacco Use    Smoking status: Never Smoker    Smokeless tobacco: Never Used   Substance Use Topics    Alcohol use: No    Drug use: No       Outpatient Medications Prior to Visit   Medication Sig Dispense Refill    albuterol (PROVENTIL) 2.5 mg /3 mL (0.083 %) nebulizer solution Take 2.5 mg by nebulization every 6 (six) hours as needed. Rescue      albuterol (PROVENTIL/VENTOLIN HFA) 90 mcg/actuation inhaler INHALE 2 PUFFS BY MOUTH EVERY 6 HRS AS NEEDED      amLODIPine (NORVASC) 10 MG tablet TAKE 1 TABLET ONCE DAILY 90 tablet 3    clobetasoL (TEMOVATE) 0.05 % external solution Pt to mix in 1 jar of cerave cream and apply to affected areas bid 50 mL 3    cyproheptadine (PERIACTIN) 4 mg tablet Take 1 tablet (4 mg total) by mouth 3 (three) times daily as needed (appetite). 90 tablet 1    donepeziL (ARICEPT) 10 MG tablet TAKE 1 TABLET EVERY MORNING.  NO FURTHER REFILL WITHOUT APPOINTMENT (Patient taking differently: TAKE 1 TABLET EVERY MORNING.  NO FURTHER REFILL WITHOUT APPOINTMENT) 90 tablet 1    doxepin (SINEQUAN) 10 MG capsule TAKE 1 CAPSULE AT BEDTIME  FOR ITCHING (Patient taking differently: TAKE 1 CAPSULE AT BEDTIME  FOR ITCHING) 90 capsule 3    esomeprazole (NEXIUM) 40 MG capsule Take 1 capsule (40 mg total) by mouth  2 (two) times daily. 180 capsule 3    flash glucose scanning reader (FREESTYLE ALEXSANDRA 14 DAY READER) Misc 1 Device by Misc.(Non-Drug; Combo Route) route 2 (two) times a day. 1 each 11    flash glucose sensor (FREESTYLE ALEXSANDRA 14 DAY SENSOR) Kit 1 Device by Misc.(Non-Drug; Combo Route) route 2 (two) times a day. 1 kit 11    fluocinonide (LIDEX) 0.05 % external solution AAA scalp qday - bid prn pruritus 60 mL 3    fluticasone-salmeterol diskus inhaler 500-50 mcg Inhale 1 puff into the lungs 2 (two) times daily. Controller 60 each 11    glycopyrrolate (ROBINUL) 2 MG Tab Take 1 tablet (2 mg total) by mouth 2 (two) times daily. 180 tablet 0    ketoconazole (NIZORAL) 2 % cream Apply topically once daily. 60 g 2    ketoconazole (NIZORAL) 2 % shampoo Wash hair with medicated shampoo at least 2x/week - let sit on scalp at least 5 minutes prior to rinsing 120 mL 5    levothyroxine (SYNTHROID) 25 MCG tablet Take 1 tablet by mouth every day, except hold on Sundays. 90 tablet 0    meclizine (ANTIVERT) 25 mg tablet Take 1 tablet (25 mg total) by mouth 2 (two) times daily as needed for Dizziness. 60 tablet 1    mirtazapine (REMERON) 15 MG tablet TAKE 1 TABLET EVERY EVENING 90 tablet 3    RESTASIS 0.05 % ophthalmic emulsion       rivaroxaban (XARELTO) 10 mg Tab Take 1 tablet (10 mg total) by mouth daily with dinner or evening meal. 90 tablet 3     No facility-administered medications prior to visit.       Review of patient's allergies indicates:   Allergen Reactions    Melatonin      Other reaction(s): Other (See Comments)  Sleep Walks and has unnatural dreams    Talwin compound Hives    Talwin [pentazocine lactate] Hallucinations    Trazodone Other (See Comments)     Nightmares/sleep walk      Bentyl [dicyclomine] Anxiety       Review of Systems   HENT: Negative for nosebleeds.    Respiratory: Negative for shortness of breath.    Hematologic/Lymphatic: Negative for bleeding problem. Does not bruise/bleed easily.  "  Musculoskeletal: Positive for joint swelling (knees bilateral).   Gastrointestinal: Positive for constipation. Negative for diarrhea, hematemesis, hematochezia, nausea and vomiting.   Genitourinary: Negative for hematuria.   Neurological: Positive for numbness (fingers on both hand), vertigo and weakness. Negative for focal weakness.      Objective:   Physical Exam  Constitutional:       Appearance: She is well-developed.      Comments: BP (!) 147/67 (BP Location: Left arm, Patient Position: Sitting, BP Method: Pediatric (Automatic))   Pulse 74   Ht 5' 5" (1.651 m)   Wt 60.8 kg (134 lb 0.6 oz)   SpO2 100%   BMI 22.31 kg/m²    Neck:      Thyroid: No thyromegaly.      Vascular: No carotid bruit or JVD.   Cardiovascular:      Rate and Rhythm: Normal rate and regular rhythm.      Pulses: Intact distal pulses.      Heart sounds: S1 normal and S2 normal. No murmur heard.    No friction rub. Gallop present. S4 sounds present.   Pulmonary:      Effort: Pulmonary effort is normal.      Breath sounds: Normal breath sounds. Transmitted upper airway sounds present. No wheezing or rales.      Comments: Patient has mild dyspnea when speaking.  Abdominal:      General: Bowel sounds are normal.      Palpations: Abdomen is soft.      Tenderness: There is no abdominal tenderness.   Musculoskeletal:      Cervical back: Neck supple.   Skin:     Nails: There is no clubbing.   Neurological:      Mental Status: She is alert and oriented to person, place, and time.         Lab Results   Component Value Date    WBC 5.92 04/02/2022    HGB 11.4 (L) 04/02/2022    HCT 34.6 (L) 04/02/2022    MCV 88 04/02/2022     04/02/2022       Lab Results   Component Value Date     04/02/2022    K 4.3 04/02/2022    BUN 18 04/02/2022    CREATININE 1.1 04/02/2022     (H) 04/02/2022    HGBA1C 6.1 (H) 04/02/2022    CHOL 277 (H) 04/02/2022     (H) 04/02/2022    LDLCALC 155.8 04/02/2022    TRIG 51 04/02/2022    CHOLHDL 40.1 " 04/02/2022    HGB 11.4 (L) 04/02/2022    HCT 34.6 (L) 04/02/2022     04/02/2022    INR 0.9 06/20/2019       Assessment:     1. Shortness of breath    2. History of pulmonary embolism    3. Hypertension, unspecified type    4. Type 2 diabetes mellitus with stage 3a chronic kidney disease, without long-term current use of insulin    5. Hyperlipidemia associated with type 2 diabetes mellitus    6. JASE (obstructive sleep apnea)    7. History of COVID-19    8. Hypothyroidism, unspecified type       Patient with history of pulmonary emboli presents with an episode of worsening shortness of breath her lasted a couple of days and now she is back at her baseline.  Patient does appear to get somewhat short of breath speaking to me.  She states that she is now at her baseline.  Physical exam is unrevealing and there is no evidence to her having heart failure at this time.  Her ECG is suggestive of right ventricular hypertrophy and a question of chronic thromboembolic pulmonary hypertension.  Therefore, the patient will be sent for a transthoracic echocardiogram to evaluate RV function and pulmonic pulmonic valve flow.  Her chest x-ray will need to be reviewed.  Is possible the patient will need to have V/Q screening scan done.  If no cardiac etiology can be found for her shortness of breath, the patient should be referred to Pulmonary Medicine.  The patient will follow-up with her usual cardiologist, Dr HAL Nelson, in 1 month.    Plan:     Debbie was seen today for establish care and shortness of breath.    Diagnoses and all orders for this visit:    Shortness of breath  -     Ambulatory referral/consult to Cardiology  -     Echo Saline Bubble? No; Future    History of pulmonary embolism    Hypertension, unspecified type    Type 2 diabetes mellitus with stage 3a chronic kidney disease, without long-term current use of insulin    Hyperlipidemia associated with type 2 diabetes mellitus    JASE (obstructive sleep  apnea)    History of COVID-19    Hypothyroidism, unspecified type          José Miguel Barrow MD  Consultative Cardiology

## 2022-07-18 NOTE — PROGRESS NOTES
"Subjective:       Patient ID: Debbie Ding is a 89 y.o. female.        Chief Complaint: Follow-up    Debbie Ding is an established patient of Carolyn Mejia MD here today for follow up visit.    1 week ago she had feeling of shortness of breath, felt weak.  Her grandson checked on her and called her daughters.  She felt she had nothing specific that could be evaluated in the ED so she decided not to go.  She felt better the next day.  She had no associated chest pain or tightness.  No N/V.  Just feeling of "sick."  No swelling.  Appetite has been better and she has gained weight.  She does not think she was wheezing.  She used her albuterol all day long that day.  After a while she felt like this helped but it took a long time.  She last saw Dr. Suazo 2 1/2 months ago.      She has multiple PPI rx she brings in today - omeprazole, nexium, and protonix, unclear which one she is taking, she thinks nexium.      She also has rx for memantine, which is not on med list but prescribed by Dr. Suazo.  She takes "as needed."  Also on aricept.    She had covid 5/27 and tx with prednisone.  All sx from covid resolved.  Seen in ED 5/27 and 6/1.      Hypothyroidism -  Synthroid dec 50 mcg --> 25 mcg 4/2022, repeat TSH 6/2022 with TSH still suppressed, so now holds on taking on Sunday     BMI up to 21, baseline weight is 140# -  She had been on periactin but d/c due to drowsiness, plans to resume if weight goes down too much  Saw GI dietician who helped with gastroparesis diet, recall she declines reglan due to daughter with facial myoclonus     Recall she had extensive evaluation for weight loss ultimately due to gastroparesis  CT chest/abdomen/pelvis 1/2022 with nothing to explain weight loss  Saw GI and gastric emptying study confirmed gastropearesis 2/3/22, she did not want to take reglan due to concern for side effects  EGD 3/2022 with bravo pH positive, so continued on PPI indefinitely      Started on remeron and " titrated to 15 mg to help with weight gain around 6/2021, initially helped to regain some weight, cannot take megace due to h/o DVT/PE     Past medical history -   1.  Anxiety, grief - lorazepam prn, no longer taking lexparo  2.  HTN tx with amlodipine  3.  Hypothyroidism tx with synthroid (see above)  4.  H/o left shoulder pain that comes/goes  5.  Asthma is followed by Dr. Suazo and controlled with advair, albuterol prn  6.  She is on eliquis for h/o PE/DVT          Review of Systems   Constitutional: Negative for chills, diaphoresis, fatigue and fever.   HENT: Negative for congestion and sore throat.    Eyes: Negative for visual disturbance.   Respiratory: Negative for cough, chest tightness and shortness of breath.    Cardiovascular: Negative for chest pain, palpitations and leg swelling.   Gastrointestinal: Negative for abdominal pain, blood in stool, constipation, diarrhea, nausea and vomiting.   Genitourinary: Negative for dysuria, frequency, hematuria and urgency.   Musculoskeletal: Negative for arthralgias and back pain.   Skin: Negative for rash.   Neurological: Negative for dizziness, syncope, weakness and headaches.   Psychiatric/Behavioral: Negative for dysphoric mood and sleep disturbance. The patient is not nervous/anxious.        Objective:      Physical Exam  Vitals and nursing note reviewed.   Constitutional:       Appearance: Normal appearance. She is well-developed.   HENT:      Head: Normocephalic.      Right Ear: External ear normal.      Left Ear: External ear normal.   Eyes:      Pupils: Pupils are equal, round, and reactive to light.   Cardiovascular:      Rate and Rhythm: Normal rate and regular rhythm.      Heart sounds: Normal heart sounds. No murmur heard.    No friction rub. No gallop.   Pulmonary:      Effort: Pulmonary effort is normal. No respiratory distress.      Breath sounds: Normal breath sounds.   Abdominal:      Palpations: Abdomen is soft.      Tenderness: There is no  abdominal tenderness.   Skin:     General: Skin is warm and dry.   Neurological:      Mental Status: She is alert.         Assessment:       1. Shortness of breath    2. Hypothyroidism, adult    3. History of pulmonary embolism    4. History of deep venous thrombosis    5. Chronic anticoagulation    6. Gastroparesis    7. BMI 20.0-20.9, adult    8. Stage 3a chronic kidney disease    9. Gastroesophageal reflux disease, unspecified whether esophagitis present        Plan:       Debbie was seen today for follow-up.    Diagnoses and all orders for this visit:    Shortness of breath - occurred 1 week ago, advised that she schedule a f/u with Dr. Suazo to review, no wheezing currently and now all sx resolved  -     CBC Auto Differential; Future  -     Comprehensive Metabolic Panel; Future  -     X-Ray Chest PA And Lateral; Future  -     Ambulatory referral/consult to Cardiology; Future  -     SCHEDULED EKG 12-LEAD (to Muse); Future    Hypothyroidism, adult  -     TSH; Future    History of pulmonary embolism  History of deep venous thrombosis  Chronic anticoagulation    Gastroparesis - following gastroparesis diet    BMI up to 21 -   Wt Readings from Last 4 Encounters:   07/18/22 59.3 kg (130 lb 11.7 oz)   07/15/22 55.7 kg (122 lb 12.7 oz)   06/07/22 55.7 kg (122 lb 12.7 oz)   06/07/22 55.7 kg (122 lb 12.7 oz)     Stage 3a chronic kidney disease    Gastroesophageal reflux disease, unspecified whether esophagitis present - multiple PPI rx, discussed all similar so will pick one, nexium has worked the best for her so will refill  -     esomeprazole (NEXIUM) 40 MG capsule; Take 1 capsule (40 mg total) by mouth 2 (two) times daily.    Other orders  -     meclizine (ANTIVERT) 25 mg tablet; Take 1 tablet (25 mg total) by mouth 2 (two) times daily as needed for Dizziness.  -     glycopyrrolate (ROBINUL) 2 MG Tab; Take 1 tablet (2 mg total) by mouth 2 (two) times daily.    >45 minutes spent on patient encounter    Addendum -  "discussed with Dr. Mejia, since she has not been taking memantine regularly will hold on restarting, may continue aricept    Pt has been given instructions populated from [employer] database and has verbalized understanding of the after visit summary and information contained wherein.    Follow up with a primary care provider. May go to ER for acute shortness of breath, lightheadedness, fever, or any other emergent complaints or changes in condition.    "This note will be shared with the patient"    Future Appointments   Date Time Provider Department Center   7/18/2022 12:00 PM EKG, APPT [facility] EKG [facility]   7/18/2022  1:00 PM José Miguel Barrow MD [facility] CARDIO [facility]   7/20/2022  8:00 AM Arya Castillo, PTA [facility] [place]   7/25/2022  9:00 AM Ely Beltran, PT [facility] [place]   7/27/2022  8:00 AM Arya Castillo, PTA [facility] [place]   8/8/2022  9:00 AM LAB, APPOINTMENT [facility] INTMED [facility] LAB IM [facility]   8/18/2022  2:00 PM Poppy Nelson MD [facility] [id number]   8/23/2022 10:45 AM Arturo Teresa DPM [facility] POD Jeffrey dee dee   9/7/2022 10:00 AM Carolyn Mejia MD [facility] IM [facility]                 "

## 2022-07-18 NOTE — Clinical Note
Thank you for referring Debbie Ding for evaluation of shortness of breath. Please see my note for details of this encounter. If you have any questions, please contact me.  Thank you again for the referral.

## 2022-07-19 ENCOUNTER — HOSPITAL ENCOUNTER (OUTPATIENT)
Dept: CARDIOLOGY | Facility: HOSPITAL | Age: 87
Discharge: HOME | End: 2022-07-19
Attending: INTERNAL MEDICINE
Payer: MEDICARE

## 2022-07-19 VITALS
HEART RATE: 70 BPM | SYSTOLIC BLOOD PRESSURE: 147 MMHG | DIASTOLIC BLOOD PRESSURE: 67 MMHG | WEIGHT: 134 LBS | HEIGHT: 65 IN | BODY MASS INDEX: 22.33 KG/M2

## 2022-07-19 DIAGNOSIS — R06.02 SHORTNESS OF BREATH: ICD-10-CM

## 2022-07-19 LAB
ASCENDING AORTA: 3.11 CM
AV INDEX (PROSTH): 1.01
AV MEAN GRADIENT: 4 MMHG
AV PEAK GRADIENT: 8 MMHG
AV VALVE AREA: 2.7 CM2
AV VELOCITY RATIO: 0.92
BSA FOR ECHO PROCEDURE: 1.67 M2
CV ECHO LV RWT: 0.42 CM
DOP CALC AO PEAK VEL: 1.43 M/S
DOP CALC AO VTI: 27.53 CM
DOP CALC LVOT AREA: 2.7 CM2
DOP CALC LVOT DIAMETER: 1.85 CM
DOP CALC LVOT PEAK VEL: 1.32 M/S
DOP CALC LVOT STROKE VOLUME: 74.42 CM3
DOP CALCLVOT PEAK VEL VTI: 27.7 CM
E WAVE DECELERATION TIME: 243.44 MSEC
E/A RATIO: 0.64
E/E' RATIO: 7.47 M/S
ECHO LV POSTERIOR WALL: 0.82 CM (ref 0.6–1.1)
EJECTION FRACTION: 65 %
FRACTIONAL SHORTENING: 35 % (ref 28–44)
INTERVENTRICULAR SEPTUM: 0.88 CM (ref 0.6–1.1)
IVRT: 108.47 MSEC
LA MAJOR: 4.4 CM
LA MINOR: 4.35 CM
LA WIDTH: 4.16 CM
LEFT ATRIUM SIZE: 2.92 CM
LEFT ATRIUM VOLUME INDEX MOD: 31.5 ML/M2
LEFT ATRIUM VOLUME INDEX: 27 ML/M2
LEFT ATRIUM VOLUME MOD: 52.56 CM3
LEFT ATRIUM VOLUME: 45.17 CM3
LEFT INTERNAL DIMENSION IN SYSTOLE: 2.52 CM (ref 2.1–4)
LEFT VENTRICLE DIASTOLIC VOLUME INDEX: 39.01 ML/M2
LEFT VENTRICLE DIASTOLIC VOLUME: 65.14 ML
LEFT VENTRICLE MASS INDEX: 58 G/M2
LEFT VENTRICLE SYSTOLIC VOLUME INDEX: 13.7 ML/M2
LEFT VENTRICLE SYSTOLIC VOLUME: 22.86 ML
LEFT VENTRICULAR INTERNAL DIMENSION IN DIASTOLE: 3.88 CM (ref 3.5–6)
LEFT VENTRICULAR MASS: 96.55 G
LV LATERAL E/E' RATIO: 7 M/S
LV SEPTAL E/E' RATIO: 8 M/S
MV A" WAVE DURATION": 15.7 MSEC
MV PEAK A VEL: 0.87 M/S
MV PEAK E VEL: 0.56 M/S
MV STENOSIS PRESSURE HALF TIME: 70.6 MS
MV VALVE AREA P 1/2 METHOD: 3.12 CM2
PISA TR MAX VEL: 3.06 M/S
PULM VEIN S/D RATIO: 1.16
PV PEAK D VEL: 0.55 M/S
PV PEAK S VEL: 0.64 M/S
RA MAJOR: 3.92 CM
RA PRESSURE: 3 MMHG
RA WIDTH: 2.84 CM
RIGHT VENTRICULAR END-DIASTOLIC DIMENSION: 3.08 CM
RV TISSUE DOPPLER FREE WALL SYSTOLIC VELOCITY 1 (APICAL 4 CHAMBER VIEW): 13.23 CM/S
SINUS: 3.15 CM
STJ: 2.76 CM
TDI LATERAL: 0.08 M/S
TDI SEPTAL: 0.07 M/S
TDI: 0.08 M/S
TR MAX PG: 37 MMHG
TRICUSPID ANNULAR PLANE SYSTOLIC EXCURSION: 2.19 CM
TV REST PULMONARY ARTERY PRESSURE: 40 MMHG

## 2022-07-19 PROCEDURE — 93306 TTE W/DOPPLER COMPLETE: CPT

## 2022-07-19 PROCEDURE — 93306 TTE W/DOPPLER COMPLETE: CPT | Mod: 26,,, | Performed by: INTERNAL MEDICINE

## 2022-07-19 PROCEDURE — 93306 ECHO (CUPID ONLY): ICD-10-PCS | Mod: 26,,, | Performed by: INTERNAL MEDICINE

## 2022-07-20 ENCOUNTER — DOCUMENTATION ONLY (OUTPATIENT)
Dept: REHABILITATION | Facility: OTHER | Age: 87
End: 2022-07-20
Payer: MEDICARE

## 2022-07-20 NOTE — PROGRESS NOTES
Patient was scheduled for a follow up physical therapy appointment at [facility] location on 7/20/2022. Patient failed to appear for the appointment without prior notification today.           Arya Castillo, PTA  7/20/2022

## 2022-07-21 ENCOUNTER — OFFICE VISIT (OUTPATIENT)
Dept: CARDIOLOGY | Facility: CLINIC | Age: 87
End: 2022-07-21
Payer: MEDICARE

## 2022-07-21 ENCOUNTER — PATIENT MESSAGE (OUTPATIENT)
Dept: REHABILITATION | Facility: OTHER | Age: 87
End: 2022-07-21
Payer: MEDICARE

## 2022-07-21 VITALS
BODY MASS INDEX: 22.85 KG/M2 | WEIGHT: 137.13 LBS | HEIGHT: 65 IN | SYSTOLIC BLOOD PRESSURE: 106 MMHG | OXYGEN SATURATION: 98 % | DIASTOLIC BLOOD PRESSURE: 62 MMHG | HEART RATE: 72 BPM

## 2022-07-21 DIAGNOSIS — E11.69 HYPERLIPIDEMIA ASSOCIATED WITH TYPE 2 DIABETES MELLITUS: ICD-10-CM

## 2022-07-21 DIAGNOSIS — G47.33 OSA (OBSTRUCTIVE SLEEP APNEA): ICD-10-CM

## 2022-07-21 DIAGNOSIS — R07.9 ACUTE CHEST PAIN: ICD-10-CM

## 2022-07-21 DIAGNOSIS — Z86.711 HISTORY OF PULMONARY EMBOLISM: ICD-10-CM

## 2022-07-21 DIAGNOSIS — N18.31 TYPE 2 DIABETES MELLITUS WITH STAGE 3A CHRONIC KIDNEY DISEASE, WITHOUT LONG-TERM CURRENT USE OF INSULIN: ICD-10-CM

## 2022-07-21 DIAGNOSIS — R06.02 SOB (SHORTNESS OF BREATH): ICD-10-CM

## 2022-07-21 DIAGNOSIS — E78.5 HYPERLIPIDEMIA ASSOCIATED WITH TYPE 2 DIABETES MELLITUS: ICD-10-CM

## 2022-07-21 DIAGNOSIS — I10 PRIMARY HYPERTENSION: ICD-10-CM

## 2022-07-21 DIAGNOSIS — Z79.01 CHRONIC ANTICOAGULATION: ICD-10-CM

## 2022-07-21 DIAGNOSIS — E11.22 TYPE 2 DIABETES MELLITUS WITH STAGE 3A CHRONIC KIDNEY DISEASE, WITHOUT LONG-TERM CURRENT USE OF INSULIN: ICD-10-CM

## 2022-07-21 DIAGNOSIS — I74.9 EMBOLISM AND THROMBOSIS OF UNSPECIFIED ARTERY: ICD-10-CM

## 2022-07-21 DIAGNOSIS — Z86.718 HISTORY OF DEEP VENOUS THROMBOSIS: Primary | ICD-10-CM

## 2022-07-21 PROCEDURE — 99999 PR PBB SHADOW E&M-EST. PATIENT-LVL V: CPT | Mod: PBBFAC,,, | Performed by: INTERNAL MEDICINE

## 2022-07-21 PROCEDURE — 99215 OFFICE O/P EST HI 40 MIN: CPT | Mod: PBBFAC | Performed by: INTERNAL MEDICINE

## 2022-07-21 PROCEDURE — 99205 OFFICE O/P NEW HI 60 MIN: CPT | Mod: S$PBB,,, | Performed by: INTERNAL MEDICINE

## 2022-07-21 PROCEDURE — 99999 PR PBB SHADOW E&M-EST. PATIENT-LVL V: ICD-10-PCS | Mod: PBBFAC,,, | Performed by: INTERNAL MEDICINE

## 2022-07-21 PROCEDURE — 99205 PR OFFICE/OUTPT VISIT, NEW, LEVL V, 60-74 MIN: ICD-10-PCS | Mod: S$PBB,,, | Performed by: INTERNAL MEDICINE

## 2022-07-21 RX ORDER — ATORVASTATIN CALCIUM 40 MG/1
40 TABLET, FILM COATED ORAL DAILY
Qty: 90 TABLET | Refills: 3 | Status: SHIPPED | OUTPATIENT
Start: 2022-07-21 | End: 2022-12-13 | Stop reason: SDUPTHER

## 2022-07-21 NOTE — PROGRESS NOTES
[facility]      Chief Complaint   Patient presents with    Previous Blood Clot    Shortness of Breath       Patient ID: Debbie Ding is a 89 y.o. female with history of DVT/bilateral PE (on Xarelto), HTN, HLD, DM2, JASE (on CPAP), who presents for an initial appointment.  Pertinent history/events as follows:     HPI:  Mrs. Ding reports being diagnosed with RLE DVT and bilateral PE in 2019.  She was treated with Eliquis 5 mg bid up until 1 month ago, at which time she was switched to Xarelto 10 mg daily because of insurance issues.  She reports no RLE edema or pain.  Her main complaint is SOB, which she states became worse after being diagnosed with bilateral PE, and progressed over time to the point where she has significant difficulty breathing.  She reports no chest pain or chest discomfort. Echo from 7/19/2022 with EF 65%; estimated PASP 40 mmhg.      Past Medical History:   Diagnosis Date    Allergic rhinitis     Anticoagulant long-term use     Arthritis     Asthma     Bilateral pulmonary embolism 4/27/2019    Cataract     Chronic anticoagulation 9/28/2020    Depression     Diabetes mellitus     Fibromyalgia 7/2/2012    Fibromyalgia     GERD (gastroesophageal reflux disease)     Hypercholesterolemia 9/28/2020    Hypercholesterolemia 9/28/2020    Hypertension 7/2/2012    Thoracic or lumbosacral neuritis or radiculitis, unspecified     Thyroid disease     Ulcer      Past Surgical History:   Procedure Laterality Date    CATARACT EXTRACTION Bilateral     ESOPHAGOGASTRODUODENOSCOPY N/A 3/8/2022    Procedure: EGD (ESOPHAGOGASTRODUODENOSCOPY) with dilation-either hospital ok with Dr. Meza;  Surgeon: Rebeca Meza MD;  Location: [facility];  Service: Endoscopy;  Laterality: N/A;  1/11-eliquis hold ok see te-tb    ESOPHAGOGASTRODUODENOSCOPY N/A 2/28/2022    Procedure: EGD (ESOPHAGOGASTRODUODENOSCOPY) with dilation-either hospital ok with Dr. Meza;  Surgeon: Rebeca BARBOZA  MD Derrick;  Location: [facility] ([address] [place] [address]);  Service: Endoscopy;  Laterality: N/A;  1/11-eliquis hold ok see te-tb  COVID test on 2/25/22 at [facility]  2/22-confirmed appt arrival time with pt-Kpvt    FOOT NEUROMA SURGERY  1985    HYSTERECTOMY       Social History     Socioeconomic History    Marital status:    Occupational History     Comment:  - school    Tobacco Use    Smoking status: Never Smoker    Smokeless tobacco: Never Used   Substance and Sexual Activity    Alcohol use: No    Drug use: No    Sexual activity: Not Currently   Social History Narrative    Lives with daughter, Benjie.        Other daughter, Madina, drives her around.        She lives independently, except for driving.          Stretches in bed.  Walks in neighborhood, and in house several times a day.     Social Determinants of Health     Financial Resource Strain: Low Risk     Difficulty of Paying Living Expenses: Not hard at all   Food Insecurity: No Food Insecurity    Worried About Running Out of Food in the Last Year: Never true    Ran Out of Food in the Last Year: Never true   Transportation Needs: No Transportation Needs    Lack of Transportation (Medical): No    Lack of Transportation (Non-Medical): No   Physical Activity: Insufficiently Active    Days of Exercise per Week: 3 days    Minutes of Exercise per Session: 20 min   Stress: No Stress Concern Present    Feeling of Stress : Not at all   Social Connections: Moderately Isolated    Frequency of Communication with Friends and Family: More than three times a week    Frequency of Social Gatherings with Friends and Family: More than three times a week    Attends Catholic Services: More than 4 times per year    Active Member of Clubs or Organizations: No    Attends Club or Organization Meetings: Never    Marital Status:    Housing Stability: Low Risk     Unable to Pay for Housing in the Last Year: No    Number of Places Lived in the Last  Year: 1    Unstable Housing in the Last Year: No     Family History   Problem Relation Age of Onset    Diabetes Mother     Diabetes Brother     Emphysema Maternal Aunt     Melanoma Neg Hx     Asthma Neg Hx     Colon cancer Neg Hx     Esophageal cancer Neg Hx        Review of patient's allergies indicates:   Allergen Reactions    Melatonin      Other reaction(s): Other (See Comments)  Sleep Walks and has unnatural dreams    Talwin compound Hives    Talwin [pentazocine lactate] Hallucinations    Trazodone Other (See Comments)     Nightmares/sleep walk      Bentyl [dicyclomine] Anxiety       Medication List with Changes/Refills   Current Medications    ALBUTEROL (PROVENTIL) 2.5 MG /3 ML (0.083 %) NEBULIZER SOLUTION    Take 2.5 mg by nebulization every 6 (six) hours as needed. Rescue    ALBUTEROL (PROVENTIL/VENTOLIN HFA) 90 MCG/ACTUATION INHALER    INHALE 2 PUFFS BY MOUTH EVERY 6 HRS AS NEEDED    AMLODIPINE (NORVASC) 10 MG TABLET    TAKE 1 TABLET ONCE DAILY    CLOBETASOL (TEMOVATE) 0.05 % EXTERNAL SOLUTION    Pt to mix in 1 jar of cerave cream and apply to affected areas bid    CYPROHEPTADINE (PERIACTIN) 4 MG TABLET    Take 1 tablet (4 mg total) by mouth 3 (three) times daily as needed (appetite).    DONEPEZIL (ARICEPT) 10 MG TABLET    TAKE 1 TABLET EVERY MORNING.  NO FURTHER REFILL WITHOUT APPOINTMENT    DOXEPIN (SINEQUAN) 10 MG CAPSULE    TAKE 1 CAPSULE AT BEDTIME  FOR ITCHING    ESOMEPRAZOLE (NEXIUM) 40 MG CAPSULE    Take 1 capsule (40 mg total) by mouth 2 (two) times daily.    FLASH GLUCOSE SCANNING READER (FREESTYLE ALEXSANDRA 14 DAY READER) MISC    1 Device by Misc.(Non-Drug; Combo Route) route 2 (two) times a day.    FLASH GLUCOSE SENSOR (FREESTYLE ALEXSANDRA 14 DAY SENSOR) KIT    1 Device by Misc.(Non-Drug; Combo Route) route 2 (two) times a day.    FLUOCINONIDE (LIDEX) 0.05 % EXTERNAL SOLUTION    AAA scalp qday - bid prn pruritus    FLUTICASONE-SALMETEROL DISKUS INHALER 500-50 MCG    Inhale 1 puff into the  "lungs 2 (two) times daily. Controller    GLYCOPYRROLATE (ROBINUL) 2 MG TAB    Take 1 tablet (2 mg total) by mouth 2 (two) times daily.    KETOCONAZOLE (NIZORAL) 2 % CREAM    Apply topically once daily.    KETOCONAZOLE (NIZORAL) 2 % SHAMPOO    Wash hair with medicated shampoo at least 2x/week - let sit on scalp at least 5 minutes prior to rinsing    LEVOTHYROXINE (SYNTHROID) 25 MCG TABLET    Take 1 tablet by mouth every day, except hold on Sundays.    MECLIZINE (ANTIVERT) 25 MG TABLET    Take 1 tablet (25 mg total) by mouth 2 (two) times daily as needed for Dizziness.    MIRTAZAPINE (REMERON) 15 MG TABLET    TAKE 1 TABLET EVERY EVENING    RESTASIS 0.05 % OPHTHALMIC EMULSION        RIVAROXABAN (XARELTO) 10 MG TAB    Take 1 tablet (10 mg total) by mouth daily with dinner or evening meal.       Review of Systems  Constitution: Denies chills, fever, and sweats.  HENT: Denies headaches or blurry vision.  Cardiovascular: Denies chest pain or irregular heart beat.  Respiratory: Positive for shortness of breath.  Gastrointestinal: Denies abdominal pain, nausea, or vomiting.  Musculoskeletal: Denies muscle cramps.  Neurological: Denies dizziness or focal weakness.  Psychiatric/Behavioral: Normal mental status.  Hematologic/Lymphatic: Denies bleeding problem or easy bruising/bleeding.  Skin: Denies rash or suspicious lesions    Physical Examination  /62   Pulse 72   Ht 5' 5" (1.651 m)   Wt 62.2 kg (137 lb 2 oz)   SpO2 98%   BMI 22.82 kg/m²     Constitutional: No acute distress, conversant  HEENT: Sclera anicteric, Pupils equal, round and reactive to light, extraocular motions intact, Oropharynx clear  Neck: No JVD, no carotid bruits  Cardiovascular: regular rate and rhythm, no murmur, rubs or gallops, normal S1/S2  Pulmonary: Clear to auscultation bilaterally  Abdominal: Abdomen soft, nontender, nondistended, positive bowel sounds  Extremities: No lower extremity edema,   Pulses:  Carotid pulses are 2+ on the right " side, and 2+ on the left side.  Radial pulses are 2+ on the right side, and 2+ on the left side.   Femoral pulses are 2+ on the right side, and 2+ on the left side.  Popliteal pulses are 2+ on the right side, and 2+ on the left side.   Dorsalis pedis pulses are 2+ on the right side, and 2+ on the left side.   Posterior tibial pulses are 2+ on the right side, and 2+ on the left side.    Skin: No ecchymosis, erythema, or ulcers  Psych: Alert and oriented x 3, appropriate affect  Neuro: CNII-XII intact, no focal deficits    Labs:  Most Recent Data  CBC:   Lab Results   Component Value Date    WBC 5.92 04/02/2022    HGB 11.4 (L) 04/02/2022    HCT 34.6 (L) 04/02/2022     04/02/2022    MCV 88 04/02/2022    RDW 17.5 (H) 04/02/2022     BMP:   Lab Results   Component Value Date     04/02/2022    K 4.3 04/02/2022     04/02/2022    CO2 24 04/02/2022    BUN 18 04/02/2022    CREATININE 1.1 04/02/2022     (H) 04/02/2022    CALCIUM 10.2 04/02/2022    MG 2.6 10/08/2021    PHOS 2.7 04/28/2019     LFTS;   Lab Results   Component Value Date    PROT 7.3 04/02/2022    ALBUMIN 3.8 04/02/2022    BILITOT 0.5 04/02/2022    AST 23 04/02/2022    ALKPHOS 57 04/02/2022    ALT 16 04/02/2022     COAGS:   Lab Results   Component Value Date    INR 0.9 06/20/2019     FLP:   Lab Results   Component Value Date    CHOL 277 (H) 04/02/2022     (H) 04/02/2022    LDLCALC 155.8 04/02/2022    TRIG 51 04/02/2022    CHOLHDL 40.1 04/02/2022     CARDIAC:   Lab Results   Component Value Date    TROPONINI <0.006 10/04/2021    BNP 36 10/04/2021       Imaging:    EKG 7/18/2022:  Normal sinus rhythm  Incomplete right bundle branch block    LLE Venous Ultrasound 12/14/2021:  No evidence of deep venous thrombosis in the left lower extremity.    BLE Venous Ultrasound 6/20/2019:  Positive DVT study with thrombus seen within the right popliteal and peroneal veins    Echo 7/19/2022:  · The left ventricle is normal in size with normal  systolic function. The estimated ejection fraction is 65%.  · Normal right ventricular size with normal right ventricular systolic function.  · Indeterminate left ventricular diastolic function.  · Mild tricuspid regurgitation.  · The estimated PA systolic pressure is 40 mmHg.  · Normal central venous pressure (3 mmHg).    Assessment/Plan:  Debbie Ding is a 89 y.o. female with history of DVT/bilateral PE (on Xarelto), HTN, HLD, DM2, asthma, who presents for an initial appointment.    1. SOB- Etiology unclear.  Given history of bilateral PE, check CTA chest.  Pt has several risk factors for CAD.  If no evidence of residual PE, will proceed with nuclear stress test to evaluate for myocardial ischemia.  Pt also has history of asthma.  Therefore, if cardiac workup is unrevealing, will refer to Pulmonary for evaluation.      2. History of RLE DVT/Bilateral PE- Check CTA chest and BLE venous ultrasound.  Continue Xarelto 10 mg daily.    3. HTN- Continue current medications.    4. HLD-  Start atorvastatin 40 mg daily.    Follow up in 3 months    Total duration of face to face visit time 45 minutes.  Total time spent counseling greater than fifty percent of total visit time.  Counseling included discussion regarding imaging findings, diagnosis, possibilities, treatment options, risks and benefits.  The patient had many questions regarding the options and long-term effects.    Benedicto Love MD, PhD  Interventional Cardiology

## 2022-07-21 NOTE — PATIENT INSTRUCTIONS
Assessment/Plan:  Debbie Ding is a 89 y.o. female with history of DVT/bilateral PE (on Xarelto), HTN, HLD, DM2, asthma, who presents for an initial appointment.    1. SOB- Etiology unclear.  Given history of bilateral PE, check CTA chest.  Pt has several risk factors for CAD.  If no evidence of residual PE, will proceed with nuclear stress test to evaluate for myocardial ischemia.  Pt also has history of asthma.  Therefore, if cardiac workup is unrevealing, will refer to Pulmonary for evaluation.      2. History of RLE DVT/Bilateral PE- Check CTA chest and BLE venous ultrasound.  Continue Xarelto 10 mg daily.    3. HTN- Continue current medications.    4. HLD-  Start atorvastatin 40 mg daily.    Follow up in 3 months

## 2022-07-25 ENCOUNTER — HOSPITAL ENCOUNTER (OUTPATIENT)
Dept: RADIOLOGY | Facility: HOSPITAL | Age: 87
Discharge: HOME | End: 2022-07-25
Attending: INTERNAL MEDICINE
Payer: MEDICARE

## 2022-07-25 DIAGNOSIS — R07.9 ACUTE CHEST PAIN: ICD-10-CM

## 2022-07-25 LAB
CREAT SERPL-MCNC: 1.1 MG/DL (ref 0.5–1.4)
SAMPLE: NORMAL

## 2022-07-25 PROCEDURE — 71275 CT ANGIOGRAPHY CHEST: CPT | Mod: 26,,, | Performed by: RADIOLOGY

## 2022-07-25 PROCEDURE — 71275 CTA CHEST NON CORONARY: ICD-10-PCS | Mod: 26,,, | Performed by: RADIOLOGY

## 2022-07-25 PROCEDURE — 71275 CT ANGIOGRAPHY CHEST: CPT | Mod: TC

## 2022-07-25 PROCEDURE — 25500020 PHARM REV CODE 255: Performed by: INTERNAL MEDICINE

## 2022-07-25 RX ADMIN — IOHEXOL 75 ML: 350 INJECTION, SOLUTION INTRAVENOUS at 07:07

## 2022-07-27 ENCOUNTER — TELEPHONE (OUTPATIENT)
Dept: ORTHOPEDICS | Facility: CLINIC | Age: 87
End: 2022-07-27
Payer: MEDICARE

## 2022-07-27 NOTE — TELEPHONE ENCOUNTER
----- Message from Alaina Parada sent at 7/27/2022  8:41 AM CDT -----  Regarding: SCHEDULE FOR INJECTION  Contact: pt  Pt's requesting a call back regarding scheduling for injection in knee's..     Confirmed contact info below:  Contact Name: Debbie Ding  Phone Number: [phone]

## 2022-07-28 ENCOUNTER — OFFICE VISIT (OUTPATIENT)
Dept: ORTHOPEDICS | Facility: CLINIC | Age: 87
End: 2022-07-28
Payer: MEDICARE

## 2022-07-28 ENCOUNTER — HOSPITAL ENCOUNTER (OUTPATIENT)
Dept: RADIOLOGY | Facility: HOSPITAL | Age: 87
Discharge: HOME | End: 2022-07-28
Attending: PHYSICIAN ASSISTANT
Payer: MEDICARE

## 2022-07-28 VITALS — SYSTOLIC BLOOD PRESSURE: 122 MMHG | HEART RATE: 79 BPM | DIASTOLIC BLOOD PRESSURE: 68 MMHG

## 2022-07-28 DIAGNOSIS — M25.561 CHRONIC PAIN OF BOTH KNEES: ICD-10-CM

## 2022-07-28 DIAGNOSIS — G89.29 CHRONIC PAIN OF BOTH KNEES: ICD-10-CM

## 2022-07-28 DIAGNOSIS — M17.11 PRIMARY OSTEOARTHRITIS OF RIGHT KNEE: Primary | ICD-10-CM

## 2022-07-28 DIAGNOSIS — M25.562 CHRONIC PAIN OF BOTH KNEES: ICD-10-CM

## 2022-07-28 DIAGNOSIS — Z86.718 HISTORY OF DEEP VENOUS THROMBOSIS: ICD-10-CM

## 2022-07-28 DIAGNOSIS — M17.11 PRIMARY OSTEOARTHRITIS OF RIGHT KNEE: ICD-10-CM

## 2022-07-28 DIAGNOSIS — K27.9 PUD (PEPTIC ULCER DISEASE): ICD-10-CM

## 2022-07-28 DIAGNOSIS — E11.22 TYPE 2 DIABETES MELLITUS WITH STAGE 3A CHRONIC KIDNEY DISEASE, WITHOUT LONG-TERM CURRENT USE OF INSULIN: ICD-10-CM

## 2022-07-28 DIAGNOSIS — N18.31 TYPE 2 DIABETES MELLITUS WITH STAGE 3A CHRONIC KIDNEY DISEASE, WITHOUT LONG-TERM CURRENT USE OF INSULIN: ICD-10-CM

## 2022-07-28 DIAGNOSIS — Z79.01 CHRONIC ANTICOAGULATION: ICD-10-CM

## 2022-07-28 PROCEDURE — 99999 PR PBB SHADOW E&M-EST. PATIENT-LVL III: CPT | Mod: PBBFAC,,, | Performed by: PHYSICIAN ASSISTANT

## 2022-07-28 PROCEDURE — 73562 XR KNEE ORTHO BILAT: ICD-10-PCS | Mod: 26,50,, | Performed by: RADIOLOGY

## 2022-07-28 PROCEDURE — 73562 X-RAY EXAM OF KNEE 3: CPT | Mod: TC,50

## 2022-07-28 PROCEDURE — 99213 OFFICE O/P EST LOW 20 MIN: CPT | Mod: PBBFAC | Performed by: PHYSICIAN ASSISTANT

## 2022-07-28 PROCEDURE — 99999 PR PBB SHADOW E&M-EST. PATIENT-LVL III: ICD-10-PCS | Mod: PBBFAC,,, | Performed by: PHYSICIAN ASSISTANT

## 2022-07-28 PROCEDURE — 73562 X-RAY EXAM OF KNEE 3: CPT | Mod: 26,50,, | Performed by: RADIOLOGY

## 2022-07-28 PROCEDURE — 99213 OFFICE O/P EST LOW 20 MIN: CPT | Mod: S$PBB,,, | Performed by: PHYSICIAN ASSISTANT

## 2022-07-28 PROCEDURE — 99213 PR OFFICE/OUTPT VISIT, EST, LEVL III, 20-29 MIN: ICD-10-PCS | Mod: S$PBB,,, | Performed by: PHYSICIAN ASSISTANT

## 2022-07-28 NOTE — PROGRESS NOTES
SUBJECTIVE:     Chief Complaint & History of Present Illness:  Debbie Ding is a Established patient 89 y.o. female who is seen here today with a complaint of    Chief Complaint   Patient presents with    Right Knee - Pain    Left Knee - Pain    .  She has patient well-known to me was last seen treated the clinic by me 06/07/2022 for her left shoulder from which she is doing very well.  She was seen by me for her knees 04/01/2022 at which time she had undergone an aspiration and cortisone injection of the right knee.  She has had return of swelling and pain in the right knee difficulty with start-up pain and prolonged standing.  She has treated conservatively since her initial scheduling of her appointment and the swelling has essentially returned to baseline although she does still have some difficulty with prolonged ambulation and flexion greater than 90°  On a scale of 1-10, with 10 being worst pain imaginable, he rates this pain as 3 on good days and 5 on bad days.  she describes the pain as sore.    Review of patient's allergies indicates:   Allergen Reactions    Melatonin      Other reaction(s): Other (See Comments)  Sleep Walks and has unnatural dreams    Talwin compound Hives    Talwin [pentazocine lactate] Hallucinations    Trazodone Other (See Comments)     Nightmares/sleep walk      Bentyl [dicyclomine] Anxiety         Current Outpatient Medications   Medication Sig Dispense Refill    albuterol (PROVENTIL) 2.5 mg /3 mL (0.083 %) nebulizer solution Take 2.5 mg by nebulization every 6 (six) hours as needed. Rescue      albuterol (PROVENTIL/VENTOLIN HFA) 90 mcg/actuation inhaler INHALE 2 PUFFS BY MOUTH EVERY 6 HRS AS NEEDED      amLODIPine (NORVASC) 10 MG tablet TAKE 1 TABLET ONCE DAILY 90 tablet 3    atorvastatin (LIPITOR) 40 MG tablet Take 1 tablet (40 mg total) by mouth once daily. 90 tablet 3    clobetasoL (TEMOVATE) 0.05 % external solution Pt to mix in 1 jar of cerave cream and apply  to affected areas bid 50 mL 3    cyproheptadine (PERIACTIN) 4 mg tablet Take 1 tablet (4 mg total) by mouth 3 (three) times daily as needed (appetite). 90 tablet 1    donepeziL (ARICEPT) 10 MG tablet TAKE 1 TABLET EVERY MORNING.  NO FURTHER REFILL WITHOUT APPOINTMENT (Patient taking differently: TAKE 1 TABLET EVERY MORNING.  NO FURTHER REFILL WITHOUT APPOINTMENT) 90 tablet 1    doxepin (SINEQUAN) 10 MG capsule TAKE 1 CAPSULE AT BEDTIME  FOR ITCHING (Patient taking differently: TAKE 1 CAPSULE AT BEDTIME  FOR ITCHING) 90 capsule 3    esomeprazole (NEXIUM) 40 MG capsule Take 1 capsule (40 mg total) by mouth 2 (two) times daily. 180 capsule 3    flash glucose scanning reader (Reading RoomSTYLE ALEXSANDRA 14 DAY READER) Misc 1 Device by Misc.(Non-Drug; Combo Route) route 2 (two) times a day. 1 each 11    flash glucose sensor (FREESTYLE ALEXSANDRA 14 DAY SENSOR) Kit 1 Device by Misc.(Non-Drug; Combo Route) route 2 (two) times a day. 1 kit 11    fluocinonide (LIDEX) 0.05 % external solution AAA scalp qday - bid prn pruritus 60 mL 3    glycopyrrolate (ROBINUL) 2 MG Tab Take 1 tablet (2 mg total) by mouth 2 (two) times daily. 180 tablet 0    ketoconazole (NIZORAL) 2 % cream Apply topically once daily. 60 g 2    ketoconazole (NIZORAL) 2 % shampoo Wash hair with medicated shampoo at least 2x/week - let sit on scalp at least 5 minutes prior to rinsing 120 mL 5    levothyroxine (SYNTHROID) 25 MCG tablet Take 1 tablet by mouth every day, except hold on Sundays. 90 tablet 0    meclizine (ANTIVERT) 25 mg tablet Take 1 tablet (25 mg total) by mouth 2 (two) times daily as needed for Dizziness. 60 tablet 1    mirtazapine (REMERON) 15 MG tablet TAKE 1 TABLET EVERY EVENING 90 tablet 3    RESTASIS 0.05 % ophthalmic emulsion       rivaroxaban (XARELTO) 10 mg Tab Take 1 tablet (10 mg total) by mouth daily with dinner or evening meal. 90 tablet 3    fluticasone-salmeterol diskus inhaler 500-50 mcg Inhale 1 puff into the lungs 2 (two) times  daily. Controller 60 each 11     No current facility-administered medications for this visit.       Past Medical History:   Diagnosis Date    Allergic rhinitis     Anticoagulant long-term use     Arthritis     Asthma     Bilateral pulmonary embolism 4/27/2019    Cataract     Chronic anticoagulation 9/28/2020    Depression     Diabetes mellitus     Fibromyalgia 7/2/2012    Fibromyalgia     GERD (gastroesophageal reflux disease)     Hypercholesterolemia 9/28/2020    Hypercholesterolemia 9/28/2020    Hypertension 7/2/2012    Thoracic or lumbosacral neuritis or radiculitis, unspecified     Thyroid disease     Ulcer        Past Surgical History:   Procedure Laterality Date    CATARACT EXTRACTION Bilateral     ESOPHAGOGASTRODUODENOSCOPY N/A 3/8/2022    Procedure: EGD (ESOPHAGOGASTRODUODENOSCOPY) with dilation-either hospital ok with Dr. Meza;  Surgeon: Rebeca Meza MD;  Location: [facility];  Service: Endoscopy;  Laterality: N/A;  1/11-eliquis hold ok see te-tb    ESOPHAGOGASTRODUODENOSCOPY N/A 2/28/2022    Procedure: EGD (ESOPHAGOGASTRODUODENOSCOPY) with dilation-either [facility] with Dr. Meza;  Surgeon: Rebeca Meza MD;  Location: [address]);  Service: Endoscopy;  Laterality: N/A;  1/11-eliquis hold ok see te-tb  COVID test on 2/25/22 at [facility]  2/22-confirmed appt arrival time with pt-Kpvt    FOOT NEUROMA SURGERY  1985    HYSTERECTOMY         Vital Signs (Most Recent)  Vitals:    07/28/22 0852   BP: 122/68   Pulse: 79           Review of Systems:  ROS:  Constitutional: no fever or chills  Eyes: no visual changes  ENT: no nasal congestion or sore throat, Positive  vestibular dizziness  Respiratory: no cough or shortness of breath, Positive for asthma  Cardiovascular: no chest pain or palpitations  Gastrointestinal: no nausea or vomiting, tolerating diet, Positive for GERD, peptic ulcer disease  Genitourinary: no hematuria or dysuria, Positive CKD stage  3,  Integument/Breast: no rash or pruritis  Hematologic/Lymphatic: no easy bruising or lymphadenopathy, Positive long-term anticoagulation, history DVT, history of PE  Musculoskeletal: no arthralgias or myalgias, Positive chronic low back pain, fibromyalgia, osteoarthritis of multiple joints  Neurological: no seizures or tremors, Degenerative disc disease lumbar spine, history of memory loss, spinal stenosis without neurological claudication  Behavioral/Psych: no auditory or visual hallucinations, Positive for depression,  Endocrine: no heat or cold intolerance, Positive diabetes type 2, thyroid disease hypothyroidism                 OBJECTIVE:     PHYSICAL EXAM:     , General Appearance: Well nourished, well developed, in no acute distress.  Neurological: Mood & affect are normal.    right  Knee Exam:  Knee Range of Motion:0-110 degrees flexion   Effusion:  Slight  Condition of skin:intact  Location of tenderness:Medial joint line   Strength:limited by pain and 5 of 5  Stability:  Lachman: stable, LCL: stable, MCL: stable, PCL: stable and posteromedial (dial): stable  Varus /Valgus stress:  normal  Chris:   negative/negative    left  Knee Exam:  Knee Range of Motion:0-120 degrees flexion   Effusion:none  Condition of skin:intact  Location of tenderness:Medial joint line   Strength:5 of 5  Stability:  Lachman: stable, LCL: stable, MCL: stable, PCL: stable and posteromedial (dial): stable  Varus /Valgus stress:  normal  Chris:   negative/negative      Hip Examination:  normal    RADIOGRAPHS:  X-rays taken today films reviewed by me demonstrate mild to moderate arthritic changes throughout both knees right more so than left with medial joint space narrowing and sclerotic changes early osteophytic spurring noted primarily medial compartments and patellofemoral regions.  No other evidence of fracture dislocation or other bony abnormalities    ASSESSMENT/PLAN:       ICD-10-CM ICD-9-CM   1. Chronic pain of both  knees  M25.561 719.46    M25.562 338.29    G89.29    2. Primary osteoarthritis of right knee  M17.11 715.16       Plan: We discussed with the patient at length all the different treatment options available for  the knee including anti-inflammatories, acetaminophen, rest, ice, knee strengthening exercise, occasional cortisone injections for temporary relief, Viscosupplimentation injections, arthroscopic debridement osteotomy, and finally knee arthroplasty.   Patient's symptoms have subsided substantially since her initial scheduling her visit although she still has difficulty with some of her daily activities with this in mind will provide her with a hinged knee brace for support and protection while caring out periods of prolonged standing ambulation and negotiating uneven unsafe surfaces

## 2022-07-29 ENCOUNTER — HOSPITAL ENCOUNTER (OUTPATIENT)
Dept: CARDIOLOGY | Facility: HOSPITAL | Age: 87
Discharge: HOME | End: 2022-07-29
Attending: INTERNAL MEDICINE
Payer: MEDICARE

## 2022-07-29 DIAGNOSIS — Z86.718 HISTORY OF DEEP VENOUS THROMBOSIS: ICD-10-CM

## 2022-07-29 DIAGNOSIS — I74.9 EMBOLISM AND THROMBOSIS OF UNSPECIFIED ARTERY: ICD-10-CM

## 2022-07-29 PROCEDURE — 93970 EXTREMITY STUDY: CPT | Mod: 26,,, | Performed by: INTERNAL MEDICINE

## 2022-07-29 PROCEDURE — 93970 EXTREMITY STUDY: CPT

## 2022-07-29 PROCEDURE — 93970 CV US DOPPLER VENOUS LEGS BILATERAL (CUPID ONLY): ICD-10-PCS | Mod: 26,,, | Performed by: INTERNAL MEDICINE

## 2022-07-31 ENCOUNTER — EXTERNAL CHRONIC CARE MANAGEMENT (OUTPATIENT)
Dept: PRIMARY CARE CLINIC | Facility: CLINIC | Age: 87
End: 2022-07-31
Payer: MEDICARE

## 2022-07-31 PROCEDURE — 99490 PR CHRONIC CARE MGMT, 1ST 20 MIN: ICD-10-PCS | Mod: S$PBB,,, | Performed by: INTERNAL MEDICINE

## 2022-07-31 PROCEDURE — 99490 CHRNC CARE MGMT STAFF 1ST 20: CPT | Mod: S$PBB,,, | Performed by: INTERNAL MEDICINE

## 2022-07-31 PROCEDURE — 99490 CHRNC CARE MGMT STAFF 1ST 20: CPT | Mod: PBBFAC | Performed by: INTERNAL MEDICINE

## 2022-08-09 ENCOUNTER — TELEPHONE (OUTPATIENT)
Dept: CARDIOLOGY | Facility: HOSPITAL | Age: 87
End: 2022-08-09
Payer: MEDICARE

## 2022-08-11 ENCOUNTER — HOSPITAL ENCOUNTER (OUTPATIENT)
Dept: CARDIOLOGY | Facility: HOSPITAL | Age: 87
Discharge: HOME | End: 2022-08-11
Attending: INTERNAL MEDICINE
Payer: MEDICARE

## 2022-08-11 VITALS
WEIGHT: 137 LBS | HEIGHT: 65 IN | HEART RATE: 81 BPM | SYSTOLIC BLOOD PRESSURE: 169 MMHG | BODY MASS INDEX: 22.82 KG/M2 | DIASTOLIC BLOOD PRESSURE: 81 MMHG

## 2022-08-11 DIAGNOSIS — R06.02 SOB (SHORTNESS OF BREATH): ICD-10-CM

## 2022-08-11 LAB
CV PHARM DOSE: 0.4 MG
CV STRESS BASE HR: 81 BPM
DIASTOLIC BLOOD PRESSURE: 81 MMHG
EJECTION FRACTION- HIGH: 65 %
END DIASTOLIC INDEX-HIGH: 153 ML/M2
END DIASTOLIC INDEX-LOW: 93 ML/M2
END SYSTOLIC INDEX-HIGH: 71 ML/M2
END SYSTOLIC INDEX-LOW: 31 ML/M2
NUC REST DIASTOLIC VOLUME INDEX: 25
NUC REST EJECTION FRACTION: 60
NUC REST SYSTOLIC VOLUME INDEX: 10
NUC STRESS DIASTOLIC VOLUME INDEX: 44
NUC STRESS EJECTION FRACTION: 68 %
NUC STRESS SYSTOLIC VOLUME INDEX: 14
OHS CV CPX 1 MINUTE RECOVERY HEART RATE: 96 BPM
OHS CV CPX 85 PERCENT MAX PREDICTED HEART RATE MALE: 109
OHS CV CPX MAX PREDICTED HEART RATE: 128
OHS CV CPX PATIENT IS FEMALE: 1
OHS CV CPX PATIENT IS MALE: 0
OHS CV CPX PEAK DIASTOLIC BLOOD PRESSURE: 76 MMHG
OHS CV CPX PEAK HEAR RATE: 93 BPM
OHS CV CPX PEAK RATE PRESSURE PRODUCT: NORMAL
OHS CV CPX PEAK SYSTOLIC BLOOD PRESSURE: 173 MMHG
OHS CV CPX PERCENT MAX PREDICTED HEART RATE ACHIEVED: 73
OHS CV CPX RATE PRESSURE PRODUCT PRESENTING: NORMAL
RETIRED EF AND QEF - SEE NOTES: 53 %
SYSTOLIC BLOOD PRESSURE: 169 MMHG

## 2022-08-11 PROCEDURE — 93018 CV STRESS TEST I&R ONLY: CPT | Mod: ,,, | Performed by: INTERNAL MEDICINE

## 2022-08-11 PROCEDURE — 78452 STRESS TEST WITH MYOCARDIAL PERFUSION (CUPID ONLY): ICD-10-PCS | Mod: 26,,, | Performed by: INTERNAL MEDICINE

## 2022-08-11 PROCEDURE — 93016 STRESS TEST WITH MYOCARDIAL PERFUSION (CUPID ONLY): ICD-10-PCS | Mod: ,,, | Performed by: INTERNAL MEDICINE

## 2022-08-11 PROCEDURE — 93016 CV STRESS TEST SUPVJ ONLY: CPT | Mod: ,,, | Performed by: INTERNAL MEDICINE

## 2022-08-11 PROCEDURE — A9502 TC99M TETROFOSMIN: HCPCS

## 2022-08-11 PROCEDURE — 93018 STRESS TEST WITH MYOCARDIAL PERFUSION (CUPID ONLY): ICD-10-PCS | Mod: ,,, | Performed by: INTERNAL MEDICINE

## 2022-08-11 PROCEDURE — 78452 HT MUSCLE IMAGE SPECT MULT: CPT | Mod: 26,,, | Performed by: INTERNAL MEDICINE

## 2022-08-11 RX ORDER — REGADENOSON 0.08 MG/ML
0.4 INJECTION, SOLUTION INTRAVENOUS ONCE
Status: DISCONTINUED | OUTPATIENT
Start: 2022-08-11 | End: 2024-01-03 | Stop reason: CLARIF

## 2022-08-23 ENCOUNTER — OFFICE VISIT (OUTPATIENT)
Dept: PODIATRY | Facility: CLINIC | Age: 87
End: 2022-08-23
Payer: MEDICARE

## 2022-08-23 VITALS
DIASTOLIC BLOOD PRESSURE: 73 MMHG | WEIGHT: 127 LBS | BODY MASS INDEX: 21.13 KG/M2 | HEART RATE: 88 BPM | SYSTOLIC BLOOD PRESSURE: 132 MMHG

## 2022-08-23 DIAGNOSIS — N18.31 TYPE 2 DIABETES MELLITUS WITH STAGE 3A CHRONIC KIDNEY DISEASE, WITHOUT LONG-TERM CURRENT USE OF INSULIN: Primary | ICD-10-CM

## 2022-08-23 DIAGNOSIS — B35.1 ONYCHOMYCOSIS DUE TO DERMATOPHYTE: ICD-10-CM

## 2022-08-23 DIAGNOSIS — L84 CORN OR CALLUS: ICD-10-CM

## 2022-08-23 DIAGNOSIS — E11.22 TYPE 2 DIABETES MELLITUS WITH STAGE 3A CHRONIC KIDNEY DISEASE, WITHOUT LONG-TERM CURRENT USE OF INSULIN: Primary | ICD-10-CM

## 2022-08-23 PROCEDURE — 99999 PR PBB SHADOW E&M-EST. PATIENT-LVL III: CPT | Mod: PBBFAC,,, | Performed by: PODIATRIST

## 2022-08-23 PROCEDURE — 99999 PR PBB SHADOW E&M-EST. PATIENT-LVL III: ICD-10-PCS | Mod: PBBFAC,,, | Performed by: PODIATRIST

## 2022-08-23 PROCEDURE — 11056 PARNG/CUTG B9 HYPRKR LES 2-4: CPT | Mod: Q9,PBBFAC | Performed by: PODIATRIST

## 2022-08-23 PROCEDURE — 11721 DEBRIDE NAIL 6 OR MORE: CPT | Mod: 59,Q9,S$PBB, | Performed by: PODIATRIST

## 2022-08-23 PROCEDURE — 11721 DEBRIDE NAIL 6 OR MORE: CPT | Mod: 59,Q9,PBBFAC | Performed by: PODIATRIST

## 2022-08-23 PROCEDURE — 11056 PR TRIM BENIGN HYPERKERATOTIC SKIN LESION,2-4: ICD-10-PCS | Mod: Q9,S$PBB,, | Performed by: PODIATRIST

## 2022-08-23 PROCEDURE — 11721 PR DEBRIDEMENT OF NAILS, 6 OR MORE: ICD-10-PCS | Mod: 59,Q9,S$PBB, | Performed by: PODIATRIST

## 2022-08-23 PROCEDURE — 99213 PR OFFICE/OUTPT VISIT, EST, LEVL III, 20-29 MIN: ICD-10-PCS | Mod: 25,S$PBB,, | Performed by: PODIATRIST

## 2022-08-23 PROCEDURE — 99213 OFFICE O/P EST LOW 20 MIN: CPT | Mod: PBBFAC | Performed by: PODIATRIST

## 2022-08-23 PROCEDURE — 99213 OFFICE O/P EST LOW 20 MIN: CPT | Mod: 25,S$PBB,, | Performed by: PODIATRIST

## 2022-08-23 PROCEDURE — 11056 PARNG/CUTG B9 HYPRKR LES 2-4: CPT | Mod: Q9,S$PBB,, | Performed by: PODIATRIST

## 2022-08-23 RX ORDER — DICLOFENAC SODIUM 10 MG/G
2 GEL TOPICAL 4 TIMES DAILY
Qty: 100 G | Refills: 2 | Status: SHIPPED | OUTPATIENT
Start: 2022-08-23 | End: 2022-12-13

## 2022-08-25 ENCOUNTER — TELEPHONE (OUTPATIENT)
Dept: PODIATRY | Facility: CLINIC | Age: 87
End: 2022-08-25
Payer: MEDICARE

## 2022-08-25 NOTE — TELEPHONE ENCOUNTER
----- Message from Suad Bassett sent at 8/25/2022 12:40 PM CDT -----  Regarding: Insoles and Shoes  Contact: pt @ [phone]  Pt is calling because  was supposed to give her information about where to get her insoles and shoes from. Asking for a call back

## 2022-08-30 NOTE — PROGRESS NOTES
Subjective:      Patient ID: Debbie Ding is a 89 y.o. female.    Chief Complaint: Follow-up, Foot Problem, Foot Pain, Diabetic Foot Exam (Carolyn Mejia MD 04/2022/), and Diabetes Mellitus    Debbie is a 89 y.o. female who presents to the podiatry clinic  with complaint of  bilateral foot pain. Onset of the symptoms was several weeks ago. Precipitating event: none known. Current symptoms include: ability to bear weight, but with some pain. Aggravating factors: any weight bearing. Symptoms have progressed to a point and plateaued. Patient has had prior foot problems. Evaluation to date: none. Treatment to date: none.  She is here for routine diabetic foot evaluation and foot care.  In addition she is here for new diabetic shoe gear prescription.    Review of Systems   Constitutional: Negative for chills, diaphoresis and fever.   Cardiovascular:  Negative for claudication, cyanosis, leg swelling and syncope.   Respiratory:  Negative for cough and shortness of breath.    Skin:  Positive for suspicious lesions. Negative for color change and nail changes.   Musculoskeletal:  Positive for joint pain and joint swelling. Negative for falls, muscle cramps and muscle weakness.   Gastrointestinal:  Negative for diarrhea, nausea and vomiting.   Neurological:  Negative for disturbances in coordination, numbness, paresthesias, sensory change, tremors and weakness.   Psychiatric/Behavioral:  Negative for altered mental status.          Objective:      Physical Exam  Vitals reviewed.   Constitutional:       Appearance: She is well-developed.   HENT:      Head: Normocephalic and atraumatic.   Cardiovascular:      Pulses:           Dorsalis pedis pulses are 1+ on the right side and 1+ on the left side.        Posterior tibial pulses are 1+ on the right side and 1+ on the left side.   Pulmonary:      Effort: Pulmonary effort is normal.   Musculoskeletal:         General: Normal range of motion.      Comments: Anterior, lateral,  and posterior muscle groups bilateral lower extremities show strength 4 over 5 symmetrically. Inspection and palpation of the joints and bones reveal no crepitus or joint effusion. No tenderness upon palpation. Mild plantar flexor contractures noted to digits 2 through 5 bilaterally.  Angle and base of gait are normal.   Feet:      Right foot:      Skin integrity: Callus and dry skin present.      Left foot:      Skin integrity: Callus and dry skin present.   Skin:     General: Skin is warm and dry.      Capillary Refill: Capillary refill takes 2 to 3 seconds.      Coloration: Skin is pale.      Comments: Skin bilateral lower extremities noted to be thin, dry, and atrophic.  Toenails thickened, discolored, with subungual fungal debris and tenderness noted.  Hyperkeratotic lesions noted to both feet plantarly with tenderness.   Neurological:      Mental Status: She is alert and oriented to person, place, and time.      Sensory: Sensory deficit present.      Motor: Atrophy present.      Deep Tendon Reflexes: Reflexes abnormal.      Reflex Scores:       Patellar reflexes are 1+ on the right side and 1+ on the left side.       Achilles reflexes are 1+ on the right side and 1+ on the left side.     Comments: Sharp, dull, light touch, and vibratory sensation are diminished bilaterally. Proprioceptive sensation is intact to both lower extremities. Ventura Rob monofilament exam shows loss of protective sensation to plantar toes 1 through 5 bilaterally. Deep tendon reflexes to the patellar tendons is 1 over 4 bilaterally symmetrical. Deep tendon reflexes to the Achilles tendon is 0 over 4 bilaterally symmetrical. No ankle clonus or Babinski reflex noted bilaterally. Coordination is fair to both lower extremities.  Patient admits to intermittent burning and tingling in the feet.   Psychiatric:         Behavior: Behavior normal.             Assessment:       Encounter Diagnoses   Name Primary?    Type 2 diabetes mellitus  with stage 3a chronic kidney disease, without long-term current use of insulin Yes    Corn or callus     Onychomycosis due to dermatophyte          Plan:       Debbie was seen today for follow-up, foot problem, foot pain, diabetic foot exam and diabetes mellitus.    Diagnoses and all orders for this visit:    Type 2 diabetes mellitus with stage 3a chronic kidney disease, without long-term current use of insulin  -     DIABETIC SHOES FOR HOME USE    Corn or callus  -     DIABETIC SHOES FOR HOME USE    Onychomycosis due to dermatophyte    Other orders  -     diclofenac sodium (VOLTAREN) 1 % Gel; Apply 2 g topically 4 (four) times daily.    I counseled the patient on her conditions, their implications and medical management.    Routine Foot Care    Performed by:  Arturo Teresa. DPM  Authorized by:  Patient     Consent Done?:  Yes (Verbal)     Nail Care Type:  Debride  Location(s): All  (Left 1st Toe, Left 3rd Toe, Left 2nd Toe, Left 4th Toe, Left 5th Toe, Right 1st Toe, Right 2nd Toe, Right 3rd Toe, Right 4th Toe and Right 5th Toe)  Patient tolerance:  Patient tolerated the procedure well with no immediate complications     With patient's permission, the toenails mentioned above were aggressively reduced and debrided using a nail nipper, removing all offending nail and debris. The patient will continue to monitor the areas daily, inspect the feet, wear protective shoe gear when ambulatory, and moisturizer to maintain skin integrity.      Callus Care Type: Debride    With patient's permission, the calluses/hyperkeratotic lesions mentioned above were aggressively reduced and debrided using a number 15 blade. The patient will continue to monitor the areas daily, inspect the feet, wear protective shoe gear when ambulatory, and moisturizer to maintain skin integrity.     Shoe inspection. Diabetic Foot Education. Patient reminded of the importance of good nutrition and blood sugar control to help prevent podiatric  complications of diabetes. Patient instructed on proper foot hygeine. We discussed wearing proper shoe gear, daily foot inspections and Diabetic foot education in detail.  Prescription for diabetic shoe gear dispensed today.  Return to clinic in 3-6 months or sooner if problems arise

## 2022-08-31 ENCOUNTER — EXTERNAL CHRONIC CARE MANAGEMENT (OUTPATIENT)
Dept: PRIMARY CARE CLINIC | Facility: CLINIC | Age: 87
End: 2022-08-31
Payer: MEDICARE

## 2022-08-31 ENCOUNTER — TELEPHONE (OUTPATIENT)
Dept: PODIATRY | Facility: CLINIC | Age: 87
End: 2022-08-31
Payer: MEDICARE

## 2022-08-31 PROCEDURE — 99490 CHRNC CARE MGMT STAFF 1ST 20: CPT | Mod: S$PBB,,, | Performed by: INTERNAL MEDICINE

## 2022-08-31 PROCEDURE — 99490 CHRNC CARE MGMT STAFF 1ST 20: CPT | Mod: PBBFAC | Performed by: INTERNAL MEDICINE

## 2022-08-31 PROCEDURE — 99490 PR CHRONIC CARE MGMT, 1ST 20 MIN: ICD-10-PCS | Mod: S$PBB,,, | Performed by: INTERNAL MEDICINE

## 2022-08-31 NOTE — TELEPHONE ENCOUNTER
Spoke with patient and ask her where she will like the prescription to go to, she stated she didn't any place, so I gave her a few different names of shoes store and she pick [employer] and ask to please fax paperwork to them, did so and advice patient to give them a call also to make an appointment to get measure for shoes

## 2022-08-31 NOTE — TELEPHONE ENCOUNTER
----- Message from Moraima Thomas sent at 8/31/2022  8:13 AM CDT -----  Debbie Ding calling regarding diabetic shoes and insoles.  she have not rec'd information.  call back [phone]

## 2022-09-07 ENCOUNTER — OFFICE VISIT (OUTPATIENT)
Dept: INTERNAL MEDICINE | Facility: CLINIC | Age: 87
End: 2022-09-07
Payer: MEDICARE

## 2022-09-07 VITALS
WEIGHT: 132.94 LBS | DIASTOLIC BLOOD PRESSURE: 58 MMHG | SYSTOLIC BLOOD PRESSURE: 106 MMHG | HEART RATE: 89 BPM | BODY MASS INDEX: 22.15 KG/M2 | HEIGHT: 65 IN | OXYGEN SATURATION: 100 %

## 2022-09-07 DIAGNOSIS — R06.09 CHRONIC DYSPNEA: Primary | ICD-10-CM

## 2022-09-07 DIAGNOSIS — R53.83 FATIGUE, UNSPECIFIED TYPE: ICD-10-CM

## 2022-09-07 DIAGNOSIS — K41.90 UNILATERAL FEMORAL HERNIA WITHOUT OBSTRUCTION OR GANGRENE, RECURRENCE NOT SPECIFIED: ICD-10-CM

## 2022-09-07 DIAGNOSIS — K14.3 BROWN HAIRY TONGUE: ICD-10-CM

## 2022-09-07 DIAGNOSIS — E11.9 CONTROLLED TYPE 2 DIABETES MELLITUS WITHOUT COMPLICATION, WITHOUT LONG-TERM CURRENT USE OF INSULIN: ICD-10-CM

## 2022-09-07 DIAGNOSIS — I27.20 PULMONARY HTN: ICD-10-CM

## 2022-09-07 PROCEDURE — 99214 OFFICE O/P EST MOD 30 MIN: CPT | Mod: S$PBB,,, | Performed by: INTERNAL MEDICINE

## 2022-09-07 PROCEDURE — 99214 PR OFFICE/OUTPT VISIT, EST, LEVL IV, 30-39 MIN: ICD-10-PCS | Mod: S$PBB,,, | Performed by: INTERNAL MEDICINE

## 2022-09-07 PROCEDURE — 99215 OFFICE O/P EST HI 40 MIN: CPT | Mod: PBBFAC | Performed by: INTERNAL MEDICINE

## 2022-09-07 PROCEDURE — 99999 PR PBB SHADOW E&M-EST. PATIENT-LVL V: ICD-10-PCS | Mod: PBBFAC,,, | Performed by: INTERNAL MEDICINE

## 2022-09-07 PROCEDURE — 99999 PR PBB SHADOW E&M-EST. PATIENT-LVL V: CPT | Mod: PBBFAC,,, | Performed by: INTERNAL MEDICINE

## 2022-09-07 NOTE — PROGRESS NOTES
Subjective:       Patient ID: Debbie Ding is a 89 y.o. female.    Chief Complaint: Follow-up    HPI  C/o pain last week lower abdomen.    She has felt a bulge in past, but not when having pain.  Tums, baking soda not helpful.  Pain lasted all day.  She has had identical pain in past.     She thinks her hernia has grown.  She has chronic constipation, tx with prunes.    CT scan 1/22- Increased size of the previously identified right femoral hernia which contains nonobstructed small bowel and mesenteric fat.    She thinks mirtazapine makes her tired.  Hasn't helped appetite and mood, in her opinion.    Continues to itch all over.    BMI 22.  Fairly stable.    Chronic dyspnea.  Saw Dr Suazo, her pulmonologist, last week.    Dr Barrow and Dr Love- -  nuclear stress - neg for ischemia.  LE U/s - neg for DVT.  RLE bakes cyst.  Echo - pasp 40  CTA - PE ruled outl  Review of Systems   Constitutional:  Negative for fever and unexpected weight change.   HENT:  Negative for nasal congestion and postnasal drip.    Eyes:  Negative for pain, discharge and visual disturbance.   Respiratory:  Positive for shortness of breath. Negative for cough, chest tightness and wheezing.    Cardiovascular:  Negative for chest pain and leg swelling.   Gastrointestinal:  Negative for abdominal pain, constipation, diarrhea and nausea.   Genitourinary:  Negative for difficulty urinating, dysuria and hematuria.   Integumentary:  Negative for rash.   Neurological:  Negative for headaches.   Psychiatric/Behavioral:  Negative for dysphoric mood and sleep disturbance. The patient is not nervous/anxious.        Objective:      Physical Exam  Constitutional:       General: She is not in acute distress.     Appearance: She is well-developed. She is not ill-appearing, toxic-appearing or diaphoretic.   HENT:      Mouth/Throat:      Comments: Brown hairy tongue  Eyes:      General: No scleral icterus.  Neck:      Thyroid: No thyromegaly.       Vascular: No JVD.   Cardiovascular:      Rate and Rhythm: Normal rate and regular rhythm.      Heart sounds: Normal heart sounds.   Pulmonary:      Effort: Pulmonary effort is normal. No respiratory distress.      Breath sounds: Normal breath sounds. No stridor. No wheezing, rhonchi or rales.   Abdominal:      General: There is no distension.      Palpations: Abdomen is soft. There is no mass.      Tenderness: There is no abdominal tenderness. There is no guarding or rebound.      Hernia: No hernia is present.   Musculoskeletal:      Right lower leg: No edema.      Left lower leg: No edema.   Neurological:      Mental Status: She is alert and oriented to person, place, and time.      Cranial Nerves: No cranial nerve deficit.   Psychiatric:         Mood and Affect: Mood normal.         Behavior: Behavior normal.         Thought Content: Thought content normal.         Judgment: Judgment normal.       Assessment:       Problem List Items Addressed This Visit    None  Visit Diagnoses       Chronic dyspnea    -  Primary    Pulmonary HTN        Unilateral femoral hernia without obstruction or gangrene, recurrence not specified        Relevant Orders    Ambulatory referral/consult to General Surgery    Fatigue, unspecified type        Controlled type 2 diabetes mellitus without complication, without long-term current use of insulin        Relevant Orders    Ambulatory referral/consult to Optometry    Microalbumin/Creatinine Ratio, Urine    Brown hairy tongue                Plan:       Debbie was seen today for follow-up.    Diagnoses and all orders for this visit:    Chronic dyspnea    Pulmonary HTN    Unilateral femoral hernia without obstruction or gangrene, recurrence not specified  -     Ambulatory referral/consult to General Surgery; Future    Fatigue, unspecified type    Controlled type 2 diabetes mellitus without complication, without long-term current use of insulin  -     Ambulatory referral/consult to Optometry;  Future  -     Microalbumin/Creatinine Ratio, Urine    Brown hairy tongue         Covid vac and flu vax this fall.  Taper Mirtazapine.  I advised against medical MJ for appetite, as BMI is improved.

## 2022-09-07 NOTE — PATIENT INSTRUCTIONS
Taper Mirtazapine.  Decrease dose to half a tab daily for 2 weeks, then half a tab every other day for 2 weeks, then stop.

## 2022-09-09 ENCOUNTER — TELEPHONE (OUTPATIENT)
Dept: PODIATRY | Facility: CLINIC | Age: 87
End: 2022-09-09
Payer: MEDICARE

## 2022-09-09 NOTE — TELEPHONE ENCOUNTER
Spoke with patient.  She would like to get footwear from [employer].  MD note, DPM note, RX for footwear routed to them

## 2022-09-09 NOTE — TELEPHONE ENCOUNTER
----- Message from Moni Villa sent at 9/9/2022 11:27 AM CDT -----  Regarding: appt  Contact: pt  Pt calling in regards to pt getting shoes and pads , has not heard from company yet and its been a week       Confirmed patient's contact info below:  Contact Name: Debbie Ding  Phone Number: [phone]

## 2022-09-12 ENCOUNTER — PATIENT MESSAGE (OUTPATIENT)
Dept: PODIATRY | Facility: CLINIC | Age: 87
End: 2022-09-12
Payer: MEDICARE

## 2022-09-13 ENCOUNTER — OFFICE VISIT (OUTPATIENT)
Dept: ORTHOPEDICS | Facility: CLINIC | Age: 87
End: 2022-09-13
Payer: MEDICARE

## 2022-09-13 VITALS
DIASTOLIC BLOOD PRESSURE: 70 MMHG | SYSTOLIC BLOOD PRESSURE: 114 MMHG | BODY MASS INDEX: 22.15 KG/M2 | HEIGHT: 65 IN | WEIGHT: 132.94 LBS | HEART RATE: 76 BPM

## 2022-09-13 DIAGNOSIS — N18.31 TYPE 2 DIABETES MELLITUS WITH STAGE 3A CHRONIC KIDNEY DISEASE, WITHOUT LONG-TERM CURRENT USE OF INSULIN: ICD-10-CM

## 2022-09-13 DIAGNOSIS — M17.11 PRIMARY OSTEOARTHRITIS OF RIGHT KNEE: Primary | ICD-10-CM

## 2022-09-13 DIAGNOSIS — E11.22 TYPE 2 DIABETES MELLITUS WITH STAGE 3A CHRONIC KIDNEY DISEASE, WITHOUT LONG-TERM CURRENT USE OF INSULIN: ICD-10-CM

## 2022-09-13 DIAGNOSIS — Z86.718 HISTORY OF DEEP VENOUS THROMBOSIS: ICD-10-CM

## 2022-09-13 PROCEDURE — 99213 PR OFFICE/OUTPT VISIT, EST, LEVL III, 20-29 MIN: ICD-10-PCS | Mod: S$PBB,25,, | Performed by: PHYSICIAN ASSISTANT

## 2022-09-13 PROCEDURE — 99999 PR PBB SHADOW E&M-EST. PATIENT-LVL IV: CPT | Mod: PBBFAC,,, | Performed by: PHYSICIAN ASSISTANT

## 2022-09-13 PROCEDURE — 99999 PR PBB SHADOW E&M-EST. PATIENT-LVL IV: ICD-10-PCS | Mod: PBBFAC,,, | Performed by: PHYSICIAN ASSISTANT

## 2022-09-13 PROCEDURE — 20610 DRAIN/INJ JOINT/BURSA W/O US: CPT | Mod: PBBFAC,RT | Performed by: PHYSICIAN ASSISTANT

## 2022-09-13 PROCEDURE — 99214 OFFICE O/P EST MOD 30 MIN: CPT | Mod: PBBFAC,25 | Performed by: PHYSICIAN ASSISTANT

## 2022-09-13 PROCEDURE — 99213 OFFICE O/P EST LOW 20 MIN: CPT | Mod: S$PBB,25,, | Performed by: PHYSICIAN ASSISTANT

## 2022-09-13 PROCEDURE — 20610 DRAIN/INJ JOINT/BURSA W/O US: CPT | Mod: S$PBB,RT,, | Performed by: PHYSICIAN ASSISTANT

## 2022-09-13 PROCEDURE — 20610 PR DRAIN/INJECT LARGE JOINT/BURSA: ICD-10-PCS | Mod: S$PBB,RT,, | Performed by: PHYSICIAN ASSISTANT

## 2022-09-13 RX ORDER — BETAMETHASONE SODIUM PHOSPHATE AND BETAMETHASONE ACETATE 3; 3 MG/ML; MG/ML
6 INJECTION, SUSPENSION INTRA-ARTICULAR; INTRALESIONAL; INTRAMUSCULAR; SOFT TISSUE
Status: COMPLETED | OUTPATIENT
Start: 2022-09-13 | End: 2022-09-13

## 2022-09-13 RX ADMIN — BETAMETHASONE SODIUM PHOSPHATE AND BETAMETHASONE ACETATE 6 MG: 3; 3 INJECTION, SUSPENSION INTRA-ARTICULAR; INTRALESIONAL; INTRAMUSCULAR at 07:09

## 2022-09-13 NOTE — PROGRESS NOTES
SUBJECTIVE:     Chief Complaint & History of Present Illness:  Debbie Ding is a Established patient 89 y.o. female who is seen here today with a complaint of    Chief Complaint   Patient presents with    Right Knee - Pain    .  He has patient well-known to me was last seen treated the clinic 07/28/2022 time we placed in 2  brace she had received some measure of relief from the raise but still continues to struggle with soreness and pain in the knee.  She is requesting repeat cortisone injection the right knee today  On a scale of 1-10, with 10 being worst pain imaginable, he rates this pain as 5 on good days and 8 on bad days.  she describes the pain as sore and achy.    Review of patient's allergies indicates:   Allergen Reactions    Melatonin      Other reaction(s): Other (See Comments)  Sleep Walks and has unnatural dreams    Talwin compound Hives    Talwin [pentazocine lactate] Hallucinations    Trazodone Other (See Comments)     Nightmares/sleep walk      Bentyl [dicyclomine] Anxiety         Current Outpatient Medications   Medication Sig Dispense Refill    albuterol (PROVENTIL) 2.5 mg /3 mL (0.083 %) nebulizer solution Take 2.5 mg by nebulization every 6 (six) hours as needed. Rescue      albuterol (PROVENTIL/VENTOLIN HFA) 90 mcg/actuation inhaler INHALE 2 PUFFS BY MOUTH EVERY 6 HRS AS NEEDED      amLODIPine (NORVASC) 10 MG tablet TAKE 1 TABLET ONCE DAILY 90 tablet 3    atorvastatin (LIPITOR) 40 MG tablet Take 1 tablet (40 mg total) by mouth once daily. 90 tablet 3    clobetasoL (TEMOVATE) 0.05 % external solution Pt to mix in 1 jar of cerave cream and apply to affected areas bid 50 mL 3    cyproheptadine (PERIACTIN) 4 mg tablet Take 1 tablet (4 mg total) by mouth 3 (three) times daily as needed (appetite). 90 tablet 1    diclofenac sodium (VOLTAREN) 1 % Gel Apply 2 g topically 4 (four) times daily. (Patient not taking: Reported on 9/7/2022) 100 g 2    donepeziL (ARICEPT) 10 MG tablet TAKE 1  TABLET EVERY MORNING.  NO FURTHER REFILL WITHOUT APPOINTMENT (Patient taking differently: TAKE 1 TABLET EVERY MORNING.  NO FURTHER REFILL WITHOUT APPOINTMENT) 90 tablet 1    doxepin (SINEQUAN) 10 MG capsule TAKE 1 CAPSULE AT BEDTIME  FOR ITCHING 90 capsule 3    esomeprazole (NEXIUM) 40 MG capsule Take 1 capsule (40 mg total) by mouth 2 (two) times daily. 180 capsule 3    flash glucose scanning reader (FREESTYLE ALEXSANDRA 14 DAY READER) Misc 1 Device by Misc.(Non-Drug; Combo Route) route 2 (two) times a day. (Patient not taking: Reported on 9/7/2022) 1 each 11    flash glucose sensor (FREESTYLE ALEXSANDRA 14 DAY SENSOR) Kit 1 Device by Misc.(Non-Drug; Combo Route) route 2 (two) times a day. (Patient not taking: Reported on 9/7/2022) 1 kit 11    fluocinonide (LIDEX) 0.05 % external solution AAA scalp qday - bid prn pruritus 60 mL 3    fluticasone-salmeterol diskus inhaler 500-50 mcg Inhale 1 puff into the lungs 2 (two) times daily. Controller 60 each 11    glycopyrrolate (ROBINUL) 2 MG Tab Take 1 tablet (2 mg total) by mouth 2 (two) times daily. 180 tablet 0    ketoconazole (NIZORAL) 2 % cream Apply topically once daily. (Patient not taking: Reported on 9/7/2022) 60 g 2    ketoconazole (NIZORAL) 2 % shampoo Wash hair with medicated shampoo at least 2x/week - let sit on scalp at least 5 minutes prior to rinsing 120 mL 5    levothyroxine (SYNTHROID) 25 MCG tablet Take 1 tablet by mouth every day, except hold on Sundays. 90 tablet 0    meclizine (ANTIVERT) 25 mg tablet Take 1 tablet (25 mg total) by mouth 2 (two) times daily as needed for Dizziness. 60 tablet 1    mirtazapine (REMERON) 15 MG tablet TAKE 1 TABLET EVERY EVENING 90 tablet 3    RESTASIS 0.05 % ophthalmic emulsion       rivaroxaban (XARELTO) 10 mg Tab Take 1 tablet (10 mg total) by mouth daily with dinner or evening meal. 90 tablet 3     Current Facility-Administered Medications   Medication Dose Route Frequency Provider Last Rate Last Admin    regadenoson injection  0.4 mg  0.4 mg Intravenous Once Benedicto Love MD PhD           Past Medical History:   Diagnosis Date    Allergic rhinitis     Anticoagulant long-term use     Arthritis     Asthma     Bilateral pulmonary embolism 4/27/2019    Cataract     Chronic anticoagulation 9/28/2020    Depression     Diabetes mellitus     Fibromyalgia 7/2/2012    Fibromyalgia     GERD (gastroesophageal reflux disease)     Hypercholesterolemia 9/28/2020    Hypercholesterolemia 9/28/2020    Hypertension 7/2/2012    Thoracic or lumbosacral neuritis or radiculitis, unspecified     Thyroid disease     Ulcer        Past Surgical History:   Procedure Laterality Date    CATARACT EXTRACTION Bilateral     ESOPHAGOGASTRODUODENOSCOPY N/A 3/8/2022    Procedure: EGD (ESOPHAGOGASTRODUODENOSCOPY) with dilation-either hospital ok with Dr. Meza;  Surgeon: Rebeca Meza MD;  Location: [facility];  Service: Endoscopy;  Laterality: N/A;  1/11-eliquis hold ok see te-tb    ESOPHAGOGASTRODUODENOSCOPY N/A 2/28/2022    Procedure: EGD (ESOPHAGOGASTRODUODENOSCOPY) with dilation-either [facility] with Dr. Meza;  Surgeon: Rebeca Meza MD;  Location: [address]);  Service: Endoscopy;  Laterality: N/A;  1/11-eliquis hold ok see te-tb  COVID test on 2/25/22 at [facility]  2/22-confirmed appt arrival time with pt-Kpvt    FOOT NEUROMA SURGERY  1985    HYSTERECTOMY         Vital Signs (Most Recent)  Vitals:    09/13/22 0731   BP: 114/70   Pulse: 76           Review of Systems:  ROS:  Constitutional: no fever or chills  Eyes: no visual changes  ENT: no nasal congestion or sore throat, Positive  vestibular dizziness  Respiratory: no cough or shortness of breath, Positive for asthma  Cardiovascular: no chest pain or palpitations  Gastrointestinal: no nausea or vomiting, tolerating diet, Positive for GERD, peptic ulcer disease  Genitourinary: no hematuria or dysuria, Positive CKD stage 3,  Integument/Breast: no rash or pruritis  Hematologic/Lymphatic: no  "easy bruising or lymphadenopathy, Positive long-term anticoagulation, history DVT, history of PE  Musculoskeletal: no arthralgias or myalgias, Positive chronic low back pain, fibromyalgia, osteoarthritis of multiple joints  Neurological: no seizures or tremors, Degenerative disc disease lumbar spine, history of memory loss, spinal stenosis without neurological claudication  Behavioral/Psych: no auditory or visual hallucinations, Positive for depression,  Endocrine: no heat or cold intolerance, Positive diabetes type 2, thyroid disease hypothyroidism              OBJECTIVE:     PHYSICAL EXAM:  Height: 5' 5" (165.1 cm) Weight: 60.3 kg (132 lb 15 oz), General Appearance: Well nourished, well developed, in no acute distress.  Neurological: Mood & affect are normal.    right  Knee Exam:  Knee Range of Motion:0-115 degrees flexion   Effusion:none  Condition of skin:intact  Location of tenderness:Medial joint line   Strength:5 of 5  Stability:  Lachman: stable, LCL: stable, MCL: stable, PCL: stable, and posteromedial (dial): stable  Varus /Valgus stress:  normal  Chris:   negative/negative    left  Knee Exam:  Knee Range of Motion:0-120 degrees flexion   Effusion:none  Condition of skin:intact  Location of tenderness:Medial joint line   Strength:5 of 5  Stability:  Lachman: stable, LCL: stable, MCL: stable, PCL: stable, and posteromedial (dial): stable  Varus /Valgus stress:  normal  Chris:   negative/negative      Hip Examination:  full painless range of motion, without tenderness    RADIOGRAPHS:  X-rays from previous visit reviewed by me today demonstrate mild to moderate arthritic changes throughout both right more so than left significant medial joint space narrowing sclerotic changes noted tricompartmentally    ASSESSMENT/PLAN:       ICD-10-CM ICD-9-CM   1. Primary osteoarthritis of right knee  M17.11 715.16   2. Type 2 diabetes mellitus with stage 3a chronic kidney disease, without long-term current use of " insulin  E11.22 250.40    N18.31 585.3   3. History of deep venous thrombosis  Z86.718 V12.51       Plan: We discussed with the patient at length all the different treatment options available for  the knee including anti-inflammatories, acetaminophen, rest, ice, knee strengthening exercise, occasional cortisone injections for temporary relief, Viscosupplimentation injections, arthroscopic debridement osteotomy, and finally knee arthroplasty.   Proceed cortisone injection of the right knee     The injection site was identified and the skin was prepared with a betadine solution. The   right  knee was injected with 1 ml of Celestone and 5 ml Lidocaine under sterile technique. Debbie Ding tolerated the procedure well, she was advised to rest the knee today, ice and elevation. she did receive immediate relief of the pain in and about his knee she was told this would be short lived and is secondary to the lidocaine. she may have an increase in his discomfort tonight followed by steady improvement over the next several days. I may take 1-3 weeks following the injection to get the full benefit of the medication.  I will see her back in 4-6 [facility]. Sooner if he has any problems or concerns.    Debbie Ding was advised to monitor her blood sugars closely over the next several days. The steroid may cause a rise in them. If her glucose levels rise to a point she is uncomfortable or he is unable to control them is is to contact his PCP or go immediately to the emergency department.

## 2022-09-16 ENCOUNTER — TELEPHONE (OUTPATIENT)
Dept: PODIATRY | Facility: CLINIC | Age: 87
End: 2022-09-16
Payer: MEDICARE

## 2022-09-16 NOTE — TELEPHONE ENCOUNTER
----- Message from Gabrielle Pena sent at 9/16/2022 10:34 AM CDT -----  Pt checking of status of shoes and pads for her feet that was sent to [facility].   Was advised that prescription was sent on Monday. States she will check with them.     Shoe store  P: [phone]    Pt cb [phone]      Donita Fontanez MA  to Debbie Ding    BC    8:39 AM  Good morning your shoe prescription was faxed to [facility] [employer] [facility]   (Not read)

## 2022-09-19 ENCOUNTER — TELEPHONE (OUTPATIENT)
Dept: PODIATRY | Facility: CLINIC | Age: 87
End: 2022-09-19
Payer: MEDICARE

## 2022-09-19 NOTE — TELEPHONE ENCOUNTER
----- Message from Moni Villa sent at 9/19/2022 12:50 PM CDT -----  Contact: pt  Pt calling in regards to Rx , store says it cant be filled ,  needs to call them ASAP , pt needs shoes to wear her feet are in pain , burning       Confirmed patient's contact info below:  Contact Name: Debbie Ding  Phone Number: [phone] [facility]

## 2022-09-30 ENCOUNTER — EXTERNAL CHRONIC CARE MANAGEMENT (OUTPATIENT)
Dept: PRIMARY CARE CLINIC | Facility: CLINIC | Age: 87
End: 2022-09-30
Payer: MEDICARE

## 2022-09-30 PROCEDURE — 99439 CHRNC CARE MGMT STAF EA ADDL: CPT | Mod: S$PBB,,, | Performed by: INTERNAL MEDICINE

## 2022-09-30 PROCEDURE — 99490 CHRNC CARE MGMT STAFF 1ST 20: CPT | Mod: PBBFAC | Performed by: INTERNAL MEDICINE

## 2022-09-30 PROCEDURE — 99490 CHRNC CARE MGMT STAFF 1ST 20: CPT | Mod: S$PBB,,, | Performed by: INTERNAL MEDICINE

## 2022-09-30 PROCEDURE — 99439 PR CHRONIC CARE MGMT, EA ADDTL 20 MIN: ICD-10-PCS | Mod: S$PBB,,, | Performed by: INTERNAL MEDICINE

## 2022-09-30 PROCEDURE — 99490 PR CHRONIC CARE MGMT, 1ST 20 MIN: ICD-10-PCS | Mod: S$PBB,,, | Performed by: INTERNAL MEDICINE

## 2022-09-30 PROCEDURE — 99439 CHRNC CARE MGMT STAF EA ADDL: CPT | Mod: PBBFAC | Performed by: INTERNAL MEDICINE

## 2022-10-05 ENCOUNTER — IMMUNIZATION (OUTPATIENT)
Dept: INTERNAL MEDICINE | Facility: CLINIC | Age: 87
End: 2022-10-05
Payer: MEDICARE

## 2022-10-05 PROCEDURE — G0008 ADMIN INFLUENZA VIRUS VAC: HCPCS | Mod: PBBFAC

## 2022-10-11 ENCOUNTER — TELEPHONE (OUTPATIENT)
Dept: GASTROENTEROLOGY | Facility: CLINIC | Age: 87
End: 2022-10-11
Payer: MEDICARE

## 2022-10-11 DIAGNOSIS — K21.9 GASTROESOPHAGEAL REFLUX DISEASE, UNSPECIFIED WHETHER ESOPHAGITIS PRESENT: ICD-10-CM

## 2022-10-11 DIAGNOSIS — R06.02 SHORTNESS OF BREATH: Primary | ICD-10-CM

## 2022-10-11 RX ORDER — FAMOTIDINE 20 MG/1
20 TABLET, FILM COATED ORAL DAILY PRN
Qty: 30 TABLET | Refills: 11 | Status: SHIPPED | OUTPATIENT
Start: 2022-10-11 | End: 2022-12-13 | Stop reason: SDUPTHER

## 2022-10-11 NOTE — PROGRESS NOTES
[facility]    I spoke with Ms. Ding.  We discussed that she had clinic follow up after her procedure and that I have not received any messages that she called until today 10/11/22.  I provided her with the number to the  GI clinic nurses station to call in the future.    I will refer her to pulmonary for a second opinion on her shortness of breath.    She will continue Nexium BID for her GERD and start Pepcid as needed for breakthrough symptoms.    Rebeca Meza MD

## 2022-10-11 NOTE — TELEPHONE ENCOUNTER
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 Provider has not returned any phone calls regarding procedure or questions. I will be contacting patient relations to tell them how she treated me." Please call to discuss with patient.    "

## 2022-10-11 NOTE — TELEPHONE ENCOUNTER
----- Message from Rebeca Meza MD sent at 10/11/2022  3:43 PM CDT -----  Regarding: Pulmonary referral  Can you schedule Ms. Ding in Pulmonary clinic?  Thanks!    Rebeca

## 2022-10-12 ENCOUNTER — OFFICE VISIT (OUTPATIENT)
Dept: INTERNAL MEDICINE | Facility: CLINIC | Age: 87
End: 2022-10-12
Payer: MEDICARE

## 2022-10-12 VITALS
SYSTOLIC BLOOD PRESSURE: 108 MMHG | HEART RATE: 80 BPM | WEIGHT: 128.31 LBS | DIASTOLIC BLOOD PRESSURE: 56 MMHG | OXYGEN SATURATION: 96 % | BODY MASS INDEX: 21.38 KG/M2 | HEIGHT: 65 IN

## 2022-10-12 DIAGNOSIS — R06.09 CHRONIC DYSPNEA: Primary | ICD-10-CM

## 2022-10-12 DIAGNOSIS — Z86.711 HISTORY OF PULMONARY EMBOLISM: ICD-10-CM

## 2022-10-12 DIAGNOSIS — K41.90 UNILATERAL FEMORAL HERNIA WITHOUT OBSTRUCTION OR GANGRENE, RECURRENCE NOT SPECIFIED: ICD-10-CM

## 2022-10-12 DIAGNOSIS — K21.9 GASTROESOPHAGEAL REFLUX DISEASE WITHOUT ESOPHAGITIS: ICD-10-CM

## 2022-10-12 DIAGNOSIS — I10 PRIMARY HYPERTENSION: ICD-10-CM

## 2022-10-12 DIAGNOSIS — I27.20 PULMONARY HYPERTENSION: ICD-10-CM

## 2022-10-12 PROCEDURE — 99999 PR PBB SHADOW E&M-EST. PATIENT-LVL IV: ICD-10-PCS | Mod: PBBFAC,,, | Performed by: INTERNAL MEDICINE

## 2022-10-12 PROCEDURE — 99214 PR OFFICE/OUTPT VISIT, EST, LEVL IV, 30-39 MIN: ICD-10-PCS | Mod: S$PBB,,, | Performed by: INTERNAL MEDICINE

## 2022-10-12 PROCEDURE — 99214 OFFICE O/P EST MOD 30 MIN: CPT | Mod: S$PBB,,, | Performed by: INTERNAL MEDICINE

## 2022-10-12 PROCEDURE — 99214 OFFICE O/P EST MOD 30 MIN: CPT | Mod: PBBFAC | Performed by: INTERNAL MEDICINE

## 2022-10-12 PROCEDURE — 99999 PR PBB SHADOW E&M-EST. PATIENT-LVL IV: CPT | Mod: PBBFAC,,, | Performed by: INTERNAL MEDICINE

## 2022-10-12 RX ORDER — PREDNISONE 10 MG/1
TABLET ORAL
COMMUNITY
Start: 2022-10-10 | End: 2023-04-18

## 2022-10-12 NOTE — PROGRESS NOTES
Subjective:       Patient ID: Debbie Ding is a 89 y.o. female.    Chief Complaint: Follow-up (ER follow up for shortness of breath )    HPI  Ed visit to [facility] for SOB 10/7.  .  Negative evaluation.    Dr Suazo, her pulmonologist, called in a 21 day course of prednisone Monday, which she has started.  Dyspnea thus far unchanged.      She has asthma, pulm htn, h/o dvt and chronic dyspnea.  She had Covid in May, tx with Bebtelovimab.    Dr Meza, her gastroenterologist, referred her to a pulmonologist here.     Her note from yday reviewed:  continue Nexium BID for her GERD and start Pepcid as needed for breakthrough symptoms.      She has GERD and gastroparesis.    Also with long standing htn, controlled today.    She has upcoming appt with Dr Carcamo to discuss enlarging femoral hernia. Not particularly painful.      Review of Systems   Constitutional:  Negative for fever and unexpected weight change.   HENT:  Negative for nasal congestion and postnasal drip.    Respiratory:  Positive for shortness of breath. Negative for chest tightness and wheezing.    Cardiovascular:  Negative for chest pain and leg swelling.   Gastrointestinal:  Negative for abdominal pain, anal bleeding, constipation, diarrhea, nausea and vomiting.   Genitourinary:  Negative for dysuria and urgency.   Integumentary:  Negative for rash.   Neurological:  Negative for headaches.   Psychiatric/Behavioral:  Negative for dysphoric mood and sleep disturbance. The patient is not nervous/anxious.        Objective:      Physical Exam  Constitutional:       General: She is not in acute distress.     Appearance: She is well-developed. She is not ill-appearing, toxic-appearing or diaphoretic.   Eyes:      General: No scleral icterus.  Neck:      Thyroid: No thyromegaly.      Vascular: No JVD.   Cardiovascular:      Rate and Rhythm: Normal rate and regular rhythm.      Heart sounds: Normal heart sounds.   Pulmonary:      Effort: Pulmonary effort is  normal. No respiratory distress.      Breath sounds: Normal breath sounds. No stridor. No wheezing, rhonchi or rales.   Abdominal:      General: There is no distension.      Palpations: Abdomen is soft. There is no mass.      Tenderness: There is no abdominal tenderness. There is no guarding or rebound.      Hernia: A hernia (large, partiallly reduceable R femoral hernia) is present.   Musculoskeletal:      Right lower leg: No edema.      Left lower leg: No edema.   Neurological:      Mental Status: She is alert and oriented to person, place, and time.      Cranial Nerves: No cranial nerve deficit.   Psychiatric:         Mood and Affect: Mood normal.         Behavior: Behavior normal.         Thought Content: Thought content normal.         Judgment: Judgment normal.       Assessment:       Problem List Items Addressed This Visit       Unilateral femoral hernia without obstruction or gangrene    Pulmonary hypertension    Hypertension    History of pulmonary embolism    GERD (gastroesophageal reflux disease)     Other Visit Diagnoses       Chronic dyspnea    -  Primary            Plan:       Debbie was seen today for follow-up.    Diagnoses and all orders for this visit:    Chronic dyspnea    History of pulmonary embolism    Gastroesophageal reflux disease without esophagitis    Pulmonary hypertension    Unilateral femoral hernia without obstruction or gangrene, recurrence not specified    Primary hypertension           Pulm consult  (2nd opinion), as recommended by Dr Meza, upcoming.   Dr Suazo has been managing for years.    Complete prednisone course, prescribed by Dr Suazo.    Upcoming surg appt to assess femoral hernia

## 2022-10-18 ENCOUNTER — OFFICE VISIT (OUTPATIENT)
Dept: SURGERY | Facility: CLINIC | Age: 87
End: 2022-10-18
Payer: MEDICARE

## 2022-10-18 VITALS
DIASTOLIC BLOOD PRESSURE: 61 MMHG | SYSTOLIC BLOOD PRESSURE: 136 MMHG | HEIGHT: 65 IN | HEART RATE: 80 BPM | BODY MASS INDEX: 22.3 KG/M2 | WEIGHT: 133.81 LBS

## 2022-10-18 DIAGNOSIS — K41.90 UNILATERAL FEMORAL HERNIA WITHOUT OBSTRUCTION OR GANGRENE, RECURRENCE NOT SPECIFIED: Primary | ICD-10-CM

## 2022-10-18 PROCEDURE — 99214 OFFICE O/P EST MOD 30 MIN: CPT | Mod: PBBFAC | Performed by: SURGERY

## 2022-10-18 PROCEDURE — 99999 PR PBB SHADOW E&M-EST. PATIENT-LVL IV: CPT | Mod: PBBFAC,,, | Performed by: SURGERY

## 2022-10-18 PROCEDURE — 99999 PR PBB SHADOW E&M-EST. PATIENT-LVL IV: ICD-10-PCS | Mod: PBBFAC,,, | Performed by: SURGERY

## 2022-10-18 PROCEDURE — 99213 PR OFFICE/OUTPT VISIT, EST, LEVL III, 20-29 MIN: ICD-10-PCS | Mod: S$PBB,,, | Performed by: SURGERY

## 2022-10-18 PROCEDURE — 99213 OFFICE O/P EST LOW 20 MIN: CPT | Mod: S$PBB,,, | Performed by: SURGERY

## 2022-10-18 NOTE — PROGRESS NOTES
General Surgery Office Visit   History and Physical    Patient Name: Debbie Ding  YOB: 1932 (89 y.o.)  MRN: [id number]  Today's Date: 10/18/2022    Referring Md:   Carolyn Mejia Md  [address] [place] [address]    SUBJECTIVE:     Chief Complaint: right femoral hernia    History of Present Illness:  Debbie Ding is a 89 y.o. female with PMHx of pulmonary htn, essential htn, tye on cpap, asthma, ckd 3, gastroparesis, VTE,  hld, dm2 (without insulin but with ckd),  here for femoral hernia she first noticed 2 years ago after she fell from her bed and her gynecologist noticed it. She denies pain and she herself cant tell that it has grown but reports her PCP has told her the bulge has enlarged. She denies fever, chills, unintentional weight loss, n/v/d, constipation, hematochezia, dysuria, hematuria, CP, SOB, and all other symptoms. Patient reports being compliant with home medication regimen.     Patient denies personal history of MI, CVA, lung disease.  Previous abdominal surgeries include: none  Denies alcohol, tobacco, and elicit drug use.   Takes Eliquis 2.5 BID      Review of patient's allergies indicates:   Allergen Reactions    Melatonin      Other reaction(s): Other (See Comments)  Sleep Walks and has unnatural dreams    Talwin compound Hives    Talwin [pentazocine lactate] Hallucinations    Trazodone Other (See Comments)     Nightmares/sleep walk      Bentyl [dicyclomine] Anxiety       Past Medical History:   Diagnosis Date    Allergic rhinitis     Anticoagulant long-term use     Arthritis     Asthma     Bilateral pulmonary embolism 4/27/2019    Cataract     Chronic anticoagulation 9/28/2020    Depression     Diabetes mellitus     Fibromyalgia 7/2/2012    Fibromyalgia     GERD (gastroesophageal reflux disease)     Hypercholesterolemia 9/28/2020    Hypercholesterolemia 9/28/2020    Hypertension 7/2/2012    Thoracic or lumbosacral neuritis or radiculitis, unspecified      "Thyroid disease     Ulcer      Past Surgical History:   Procedure Laterality Date    CATARACT EXTRACTION Bilateral     ESOPHAGOGASTRODUODENOSCOPY N/A 3/8/2022    Procedure: EGD (ESOPHAGOGASTRODUODENOSCOPY) with dilation-either hospital ok with Dr. Meza;  Surgeon: Rebeca Meza MD;  Location: [facility];  Service: Endoscopy;  Laterality: N/A;  1/11-eliquis hold ok see te-tb    ESOPHAGOGASTRODUODENOSCOPY N/A 2/28/2022    Procedure: EGD (ESOPHAGOGASTRODUODENOSCOPY) with dilation-either \[facility]\"" with Dr. Meza;  Surgeon: Rebeca Meza MD;  Location: [facility] ([address] [place] [address]);  Service: Endoscopy;  Laterality: N/A;  1/11-eliquis hold ok see te-tb  COVID test on 2/25/22 at [facility]  2/22-confirmed appt arrival time with pt-Kpvt    FOOT NEUROMA SURGERY  1985    HYSTERECTOMY       Family History   Problem Relation Age of Onset    Diabetes Mother     Diabetes Brother     Emphysema Maternal Aunt     Melanoma Neg Hx     Asthma Neg Hx     Colon cancer Neg Hx     Esophageal cancer Neg Hx      Social History     Tobacco Use    Smoking status: Never    Smokeless tobacco: Never   Substance Use Topics    Alcohol use: No    Drug use: No        Review of Systems:  Review of Systems   Constitutional:  Negative for chills, fever, malaise/fatigue and weight loss.   Respiratory:  Negative for cough and shortness of breath.    Cardiovascular:  Negative for chest pain.   Gastrointestinal:  Negative for abdominal pain, blood in stool, constipation, diarrhea, nausea and vomiting.   Musculoskeletal:  Positive for joint pain (right knee) and myalgias.   Skin:  Negative for rash.   Neurological:  Negative for dizziness and tremors.   Endo/Heme/Allergies:  Negative for environmental allergies. Does not bruise/bleed easily.     OBJECTIVE:     Vital Signs (Most Recent)  /61   Pulse 80   Ht 5' 5" (1.651 m)   Wt 60.7 kg (133 lb 13.1 oz)   BMI 22.27 kg/m²     Physical Exam  Constitutional:       General: She is not in acute " distress.     Appearance: Normal appearance.   HENT:      Head: Normocephalic and atraumatic.   Eyes:      General: No scleral icterus.     Conjunctiva/sclera: Conjunctivae normal.      Pupils: Pupils are equal, round, and reactive to light.   Neck:      Trachea: No tracheal deviation.   Cardiovascular:      Rate and Rhythm: Normal rate and regular rhythm.   Pulmonary:      Effort: Pulmonary effort is normal. No respiratory distress.      Breath sounds: No stridor.   Abdominal:      General: Abdomen is flat. There is no distension.      Palpations: Abdomen is soft.      Tenderness: There is no abdominal tenderness. There is no guarding.      Hernia: A hernia (small umbilical hernia; soft) is present.   Musculoskeletal:         General: No deformity or signs of injury. Normal range of motion.      Cervical back: No edema.      Comments: Large right femoral hernia palpable; non reducible   Skin:     General: Skin is warm and dry.      Coloration: Skin is not jaundiced.   Neurological:      General: No focal deficit present.      Mental Status: She is alert and oriented to person, place, and time.   Psychiatric:         Mood and Affect: Mood normal.         Behavior: Behavior normal.         Labs:     Lab Results   Component Value Date    WBC 5.92 04/02/2022    HGB 11.4 (L) 04/02/2022    HCT 34.6 (L) 04/02/2022    MCV 88 04/02/2022     04/02/2022         CMP  Sodium   Date Value Ref Range Status   04/02/2022 139 136 - 145 mmol/L Final     Potassium   Date Value Ref Range Status   04/02/2022 4.3 3.5 - 5.1 mmol/L Final     Chloride   Date Value Ref Range Status   04/02/2022 105 95 - 110 mmol/L Final     CO2   Date Value Ref Range Status   04/02/2022 24 23 - 29 mmol/L Final     Glucose   Date Value Ref Range Status   04/02/2022 129 (H) 70 - 110 mg/dL Final     BUN   Date Value Ref Range Status   04/02/2022 18 8 - 23 mg/dL Final     Creatinine   Date Value Ref Range Status   04/02/2022 1.1 0.5 - 1.4 mg/dL Final      Calcium   Date Value Ref Range Status   04/02/2022 10.2 8.7 - 10.5 mg/dL Final     Total Protein   Date Value Ref Range Status   04/02/2022 7.3 6.0 - 8.4 g/dL Final     Albumin   Date Value Ref Range Status   04/02/2022 3.8 3.5 - 5.2 g/dL Final     Total Bilirubin   Date Value Ref Range Status   04/02/2022 0.5 0.1 - 1.0 mg/dL Final     Comment:     For infants and newborns, interpretation of results should be based  on gestational age, weight and in agreement with clinical  observations.    Premature Infant recommended reference ranges:  Up to 24 hours.............<8.0 mg/dL  Up to 48 hours............<12.0 mg/dL  3-5 days..................<15.0 mg/dL  6-29 days.................<15.0 mg/dL       Alkaline Phosphatase   Date Value Ref Range Status   04/02/2022 57 55 - 135 U/L Final     AST   Date Value Ref Range Status   04/02/2022 23 10 - 40 U/L Final     ALT   Date Value Ref Range Status   04/02/2022 16 10 - 44 U/L Final     Anion Gap   Date Value Ref Range Status   04/02/2022 10 8 - 16 mmol/L Final     eGFR if    Date Value Ref Range Status   04/02/2022 51.4 (A) >60 mL/min/1.73 m^2 Final     eGFR if non    Date Value Ref Range Status   04/02/2022 44.6 (A) >60 mL/min/1.73 m^2 Final     Comment:     Calculation used to obtain the estimated glomerular filtration  rate (eGFR) is the CKD-EPI equation.              Imaging:   CT AP 1/14/22  Impression:  Increased size of the previously identified right femoral hernia which contains nonobstructed small bowel and mesenteric fat.      ASSESSMENT/PLAN:     Diagnoses and all orders for this visit:    Unilateral femoral hernia without obstruction or gangrene, recurrence not specified  -     Ambulatory referral/consult to General Surgery        Debbie Ding is a 89 y.o. female with PMHx of pulmonary HTN, essential HTN, JASE on CPAP (not using), asthma, CKD 3, gastroparesis, VTE,  HLD, DM2 here for femoral hernia she first noticed 2 years  ago after she fell from her bed. Also noted to have small umbilical hernia.      - Schedule for laparoscopic right femoral hernia and umbilical hernia repair   - Patient instructed to call clinic with any questions, concerns, or new symptoms  - Patient understands and in agreement with plan; all questions answered    Case discussed with Dr. Carcamo.      Alayna Oneill M.D.  General Surgery PGY-1  [facility]

## 2022-10-18 NOTE — H&P (VIEW-ONLY)
I have seen the patient, reviewed the Resident's history and physical, assessment and plan. I have personally interviewed and examined the patient at bedside and: agree with the findings.     88y/o with pulmonary htn, essential htn, tye on cpap, sob being worked up, asthma, ckd 3, gastroparesis, VTE,  hld, dm2 (without insulin but with ckd),  and femoral hernia.  Her hernia is not symptomatic but she does notice it is growing.  She has some constipation, no trouble urinating and has some cough due to asthma (has not needed hospitalization in the last year for asthma).    She says her memory is good.  She takes care of herself (does her own shopping, cleaning and drives).  Her daughter lives with her.    Cbc, bmp reviewed, elevated glc c/w dm, ckd 3  Ct reviewed, films viewed, enlarging right femoral hernia  Ges reviewed, c/w gastroparesis  Egd reviewed, small hh  Ph reviewed, positive  Nuclear stress reviewed    Normal myocardial perfusion scan. There is no evidence of myocardial ischemia or infarction.    There is a  mild intensity fixed perfusion abnormality in the inferior wall of the left ventricle secondary to diaphragm attenuation.    The gated perfusion images showed an ejection fraction of 60% at rest. The gated perfusion images showed an ejection fraction of 68% post stress. Normal ejection fraction is greater than 53%.    There is normal wall motion at rest and post stress.    LV cavity size is normal at rest and normal at stress.    The EKG portion of this study is abnormal but not diagnostic.    The patient reported no chest pain during the stress test.    There were no arrhythmias during stress.    There are no prior studies for comparison.  Echo reviewed  The left ventricle is normal in size with normal systolic function. The estimated ejection fraction is 65%.  Normal right ventricular size with normal right ventricular systolic function.  Indeterminate left ventricular diastolic function.  Mild  tricuspid regurgitation.  The estimated PA systolic pressure is 40 mmHg.  Normal central venous pressure (3 mmHg).    For laparoscopic rih with mesh.  23 hour stay.  Cardiology clearance.  She needs to control constipation after surgery (eats prunes).

## 2022-10-19 ENCOUNTER — TELEPHONE (OUTPATIENT)
Dept: SURGERY | Facility: CLINIC | Age: 87
End: 2022-10-19
Payer: MEDICARE

## 2022-10-19 ENCOUNTER — TELEPHONE (OUTPATIENT)
Dept: PULMONOLOGY | Facility: CLINIC | Age: 87
End: 2022-10-19
Payer: MEDICARE

## 2022-10-19 DIAGNOSIS — K41.90 UNILATERAL FEMORAL HERNIA WITHOUT OBSTRUCTION OR GANGRENE, RECURRENCE NOT SPECIFIED: Primary | ICD-10-CM

## 2022-10-19 NOTE — TELEPHONE ENCOUNTER
Tried contacting patient to inform her that I had to cancel he appointment with Dr. Buenrostro on 10/20/22 at 8:30 am due to an emergency. I left a voicemail for patient to call the office back. Office number provided.

## 2022-10-20 ENCOUNTER — TELEPHONE (OUTPATIENT)
Dept: GASTROENTEROLOGY | Facility: CLINIC | Age: 87
End: 2022-10-20
Payer: MEDICARE

## 2022-10-20 NOTE — TELEPHONE ENCOUNTER
----- Message from Kyleigh Marinelli MA sent at 10/20/2022 10:33 AM CDT -----  Contact: [phone]  Patient is requesting to know why her appt was canceled? Please call and advise, states she had a family emergency.

## 2022-10-20 NOTE — TELEPHONE ENCOUNTER
----- Message from Kyleigh Marinelli MA sent at 10/20/2022 10:33 AM CDT -----  Contact: [phone]  Patient is requesting to know why her appt was canceled? Please call and advise, states she had a family emergency.

## 2022-10-21 ENCOUNTER — TELEPHONE (OUTPATIENT)
Dept: ORTHOPEDICS | Facility: CLINIC | Age: 87
End: 2022-10-21
Payer: MEDICARE

## 2022-10-21 DIAGNOSIS — M17.11 PRIMARY OSTEOARTHRITIS OF RIGHT KNEE: Primary | ICD-10-CM

## 2022-10-26 ENCOUNTER — OFFICE VISIT (OUTPATIENT)
Dept: PODIATRY | Facility: CLINIC | Age: 87
End: 2022-10-26
Payer: MEDICARE

## 2022-10-26 VITALS
SYSTOLIC BLOOD PRESSURE: 122 MMHG | WEIGHT: 133.81 LBS | HEART RATE: 74 BPM | BODY MASS INDEX: 22.27 KG/M2 | DIASTOLIC BLOOD PRESSURE: 65 MMHG

## 2022-10-26 DIAGNOSIS — E11.22 TYPE 2 DIABETES MELLITUS WITH STAGE 3A CHRONIC KIDNEY DISEASE, WITHOUT LONG-TERM CURRENT USE OF INSULIN: Primary | ICD-10-CM

## 2022-10-26 DIAGNOSIS — N18.31 TYPE 2 DIABETES MELLITUS WITH STAGE 3A CHRONIC KIDNEY DISEASE, WITHOUT LONG-TERM CURRENT USE OF INSULIN: Primary | ICD-10-CM

## 2022-10-26 DIAGNOSIS — B35.1 ONYCHOMYCOSIS DUE TO DERMATOPHYTE: ICD-10-CM

## 2022-10-26 DIAGNOSIS — L84 CORN OR CALLUS: ICD-10-CM

## 2022-10-26 PROCEDURE — 11721 DEBRIDE NAIL 6 OR MORE: CPT | Mod: 59,Q9,PBBFAC | Performed by: PODIATRIST

## 2022-10-26 PROCEDURE — 11721 PR DEBRIDEMENT OF NAILS, 6 OR MORE: ICD-10-PCS | Mod: 59,Q9,S$PBB, | Performed by: PODIATRIST

## 2022-10-26 PROCEDURE — 99214 OFFICE O/P EST MOD 30 MIN: CPT | Mod: PBBFAC | Performed by: PODIATRIST

## 2022-10-26 PROCEDURE — 11056 PR TRIM BENIGN HYPERKERATOTIC SKIN LESION,2-4: ICD-10-PCS | Mod: Q9,S$PBB,, | Performed by: PODIATRIST

## 2022-10-26 PROCEDURE — 11056 PARNG/CUTG B9 HYPRKR LES 2-4: CPT | Mod: PBBFAC | Performed by: PODIATRIST

## 2022-10-26 PROCEDURE — 11056 PARNG/CUTG B9 HYPRKR LES 2-4: CPT | Mod: Q9,S$PBB,, | Performed by: PODIATRIST

## 2022-10-26 PROCEDURE — 99213 OFFICE O/P EST LOW 20 MIN: CPT | Mod: 25,S$PBB,, | Performed by: PODIATRIST

## 2022-10-26 PROCEDURE — 99999 PR PBB SHADOW E&M-EST. PATIENT-LVL IV: ICD-10-PCS | Mod: PBBFAC,,, | Performed by: PODIATRIST

## 2022-10-26 PROCEDURE — 99999 PR PBB SHADOW E&M-EST. PATIENT-LVL IV: CPT | Mod: PBBFAC,,, | Performed by: PODIATRIST

## 2022-10-26 PROCEDURE — 11721 DEBRIDE NAIL 6 OR MORE: CPT | Mod: 59,Q9,S$PBB, | Performed by: PODIATRIST

## 2022-10-26 PROCEDURE — 99213 PR OFFICE/OUTPT VISIT, EST, LEVL III, 20-29 MIN: ICD-10-PCS | Mod: 25,S$PBB,, | Performed by: PODIATRIST

## 2022-10-26 RX ORDER — NYSTATIN 100000 [USP'U]/ML
SUSPENSION ORAL
Status: ON HOLD | COMMUNITY
Start: 2022-09-09 | End: 2023-11-29 | Stop reason: HOSPADM

## 2022-10-26 RX ORDER — PREDNISOLONE ACETATE 10 MG/ML
3 SUSPENSION/ DROPS OPHTHALMIC 2 TIMES DAILY
Status: ON HOLD | COMMUNITY
Start: 2022-09-09 | End: 2023-11-29 | Stop reason: HOSPADM

## 2022-10-26 RX ORDER — GLYCOPYRROLATE 2 MG/1
2 TABLET ORAL
COMMUNITY
End: 2023-10-19 | Stop reason: SDUPTHER

## 2022-10-26 RX ORDER — ESCITALOPRAM OXALATE 10 MG/1
TABLET ORAL
COMMUNITY
Start: 2022-06-14 | End: 2023-04-18

## 2022-10-26 RX ORDER — CHLORHEXIDINE GLUCONATE ORAL RINSE 1.2 MG/ML
SOLUTION DENTAL
Status: ON HOLD | COMMUNITY
Start: 2022-07-29 | End: 2024-01-03 | Stop reason: CLARIF

## 2022-10-26 RX ORDER — PANTOPRAZOLE SODIUM 40 MG/1
40 TABLET, DELAYED RELEASE ORAL 2 TIMES DAILY
COMMUNITY
Start: 2022-06-25 | End: 2022-12-13

## 2022-10-26 RX ORDER — LEVALBUTEROL TARTRATE 45 UG/1
AEROSOL, METERED ORAL
COMMUNITY
Start: 2022-09-02 | End: 2023-10-30

## 2022-10-26 RX ORDER — LIDOCAINE HYDROCHLORIDE 20 MG/ML
SOLUTION ORAL; TOPICAL
Status: ON HOLD | COMMUNITY
Start: 2022-07-21 | End: 2023-11-29 | Stop reason: HOSPADM

## 2022-10-26 RX ORDER — DICLOFENAC SODIUM 10 MG/G
2 GEL TOPICAL 4 TIMES DAILY
Qty: 100 G | Refills: 2 | Status: SHIPPED | OUTPATIENT
Start: 2022-10-26 | End: 2023-01-31

## 2022-10-26 RX ORDER — ESOMEPRAZOLE MAGNESIUM 40 MG/1
40 CAPSULE, DELAYED RELEASE ORAL
COMMUNITY
Start: 2022-07-18 | End: 2022-11-11 | Stop reason: CLARIF

## 2022-10-26 RX ORDER — MEMANTINE HYDROCHLORIDE 10 MG/1
10 TABLET ORAL DAILY
COMMUNITY
Start: 2022-10-19 | End: 2023-10-30 | Stop reason: SDUPTHER

## 2022-10-26 RX ORDER — FLUCONAZOLE 100 MG/1
100 TABLET ORAL DAILY
COMMUNITY
Start: 2022-09-09 | End: 2022-11-11 | Stop reason: CLARIF

## 2022-10-26 RX ORDER — ESTRADIOL 0.1 MG/G
CREAM VAGINAL
COMMUNITY
Start: 2022-08-04 | End: 2022-12-08

## 2022-10-28 ENCOUNTER — OFFICE VISIT (OUTPATIENT)
Dept: ORTHOPEDICS | Facility: CLINIC | Age: 87
End: 2022-10-28
Payer: MEDICARE

## 2022-10-28 VITALS — HEART RATE: 75 BPM | DIASTOLIC BLOOD PRESSURE: 62 MMHG | SYSTOLIC BLOOD PRESSURE: 114 MMHG

## 2022-10-28 DIAGNOSIS — M17.11 PRIMARY OSTEOARTHRITIS OF RIGHT KNEE: Primary | ICD-10-CM

## 2022-10-28 PROCEDURE — 20610 DRAIN/INJ JOINT/BURSA W/O US: CPT | Mod: S$PBB,RT,, | Performed by: PHYSICIAN ASSISTANT

## 2022-10-28 PROCEDURE — 20610 PR DRAIN/INJECT LARGE JOINT/BURSA: ICD-10-PCS | Mod: S$PBB,RT,, | Performed by: PHYSICIAN ASSISTANT

## 2022-10-28 PROCEDURE — 99999 PR PBB SHADOW E&M-EST. PATIENT-LVL IV: ICD-10-PCS | Mod: PBBFAC,,, | Performed by: PHYSICIAN ASSISTANT

## 2022-10-28 PROCEDURE — 99499 NO LOS: ICD-10-PCS | Mod: S$PBB,,, | Performed by: PHYSICIAN ASSISTANT

## 2022-10-28 PROCEDURE — 99214 OFFICE O/P EST MOD 30 MIN: CPT | Mod: PBBFAC,25 | Performed by: PHYSICIAN ASSISTANT

## 2022-10-28 PROCEDURE — 99999 PR PBB SHADOW E&M-EST. PATIENT-LVL IV: CPT | Mod: PBBFAC,,, | Performed by: PHYSICIAN ASSISTANT

## 2022-10-28 PROCEDURE — 20610 DRAIN/INJ JOINT/BURSA W/O US: CPT | Mod: PBBFAC,RT | Performed by: PHYSICIAN ASSISTANT

## 2022-10-28 PROCEDURE — 99499 UNLISTED E&M SERVICE: CPT | Mod: S$PBB,,, | Performed by: PHYSICIAN ASSISTANT

## 2022-10-28 RX ADMIN — Medication 20 MG: at 09:10

## 2022-10-28 NOTE — PROGRESS NOTES
Debbie Ding is a 89 y.o. year old her here today for her 1st Euflexxa injection for degenerative changes of her right knee . she was last seen and treated in the clinic on 9/13/2022. There has been no significant change in her medical status since her last visit. No Fever, chills, malaise, or unexplained weight change.      Allergies, Medications, past medical and surgical history were reviewed .    Examination of the knee demonstrates  No evidence of edema, erythema , echymosis strength and range of motion are unchanged from previous visit.    The injection site was identified and the skin was prepared with a betadine solution. The  right knee  joint was injected with 2 ml of Euflexxa solution under sterile technique. Debbie Ding tolerated the procedure well, she was advised to rest the knee today, ice and elevation. I may take 3 -6 weeks following the last injection to get the full benefit of the medication.  I will see her back in 1 week. Sooner if he has any problems or concerns.           .     ICD-10-CM ICD-9-CM   1. Primary osteoarthritis of right knee  M17.11 715.16

## 2022-10-31 ENCOUNTER — EXTERNAL CHRONIC CARE MANAGEMENT (OUTPATIENT)
Dept: PRIMARY CARE CLINIC | Facility: CLINIC | Age: 87
End: 2022-10-31
Payer: MEDICARE

## 2022-10-31 PROCEDURE — 99490 CHRNC CARE MGMT STAFF 1ST 20: CPT | Mod: S$PBB,,, | Performed by: INTERNAL MEDICINE

## 2022-10-31 PROCEDURE — 99490 CHRNC CARE MGMT STAFF 1ST 20: CPT | Mod: PBBFAC | Performed by: INTERNAL MEDICINE

## 2022-10-31 PROCEDURE — 99439 CHRNC CARE MGMT STAF EA ADDL: CPT | Mod: PBBFAC | Performed by: INTERNAL MEDICINE

## 2022-10-31 PROCEDURE — 99439 PR CHRONIC CARE MGMT, EA ADDTL 20 MIN: ICD-10-PCS | Mod: S$PBB,,, | Performed by: INTERNAL MEDICINE

## 2022-10-31 PROCEDURE — 99439 CHRNC CARE MGMT STAF EA ADDL: CPT | Mod: S$PBB,,, | Performed by: INTERNAL MEDICINE

## 2022-10-31 PROCEDURE — 99490 PR CHRONIC CARE MGMT, 1ST 20 MIN: ICD-10-PCS | Mod: S$PBB,,, | Performed by: INTERNAL MEDICINE

## 2022-11-04 ENCOUNTER — OFFICE VISIT (OUTPATIENT)
Dept: ORTHOPEDICS | Facility: CLINIC | Age: 87
End: 2022-11-04
Payer: MEDICARE

## 2022-11-04 ENCOUNTER — PATIENT MESSAGE (OUTPATIENT)
Dept: ADMINISTRATIVE | Facility: OTHER | Age: 87
End: 2022-11-04
Payer: MEDICARE

## 2022-11-04 VITALS — DIASTOLIC BLOOD PRESSURE: 65 MMHG | SYSTOLIC BLOOD PRESSURE: 111 MMHG | HEART RATE: 77 BPM

## 2022-11-04 DIAGNOSIS — M17.11 PRIMARY OSTEOARTHRITIS OF RIGHT KNEE: Primary | ICD-10-CM

## 2022-11-04 PROCEDURE — 20610 PR DRAIN/INJECT LARGE JOINT/BURSA: ICD-10-PCS | Mod: S$PBB,RT,, | Performed by: PHYSICIAN ASSISTANT

## 2022-11-04 PROCEDURE — 20610 DRAIN/INJ JOINT/BURSA W/O US: CPT | Mod: PBBFAC,RT | Performed by: PHYSICIAN ASSISTANT

## 2022-11-04 PROCEDURE — 99214 OFFICE O/P EST MOD 30 MIN: CPT | Mod: PBBFAC,25 | Performed by: PHYSICIAN ASSISTANT

## 2022-11-04 PROCEDURE — 99999 PR PBB SHADOW E&M-EST. PATIENT-LVL IV: ICD-10-PCS | Mod: PBBFAC,,, | Performed by: PHYSICIAN ASSISTANT

## 2022-11-04 PROCEDURE — 99499 UNLISTED E&M SERVICE: CPT | Mod: S$PBB,,, | Performed by: PHYSICIAN ASSISTANT

## 2022-11-04 PROCEDURE — 99499 NO LOS: ICD-10-PCS | Mod: S$PBB,,, | Performed by: PHYSICIAN ASSISTANT

## 2022-11-04 PROCEDURE — 99999 PR PBB SHADOW E&M-EST. PATIENT-LVL IV: CPT | Mod: PBBFAC,,, | Performed by: PHYSICIAN ASSISTANT

## 2022-11-04 PROCEDURE — 20610 DRAIN/INJ JOINT/BURSA W/O US: CPT | Mod: S$PBB,RT,, | Performed by: PHYSICIAN ASSISTANT

## 2022-11-04 RX ADMIN — Medication 20 MG: at 08:11

## 2022-11-04 NOTE — PROGRESS NOTES
Debbie Ding is a 89 y.o. year old her here today for her 2nd Euflexxa injection for degenerative changes of her right knee . she was last seen and treated in the clinic on 10/28/2022. There has been no significant change in her medical status since her last visit. No Fever, chills, malaise, or unexplained weight change.      Allergies, Medications, past medical and surgical history were reviewed .    Examination of the knee demonstrates  No evidence of edema, erythema , echymosis strength and range of motion are unchanged from previous visit.    The injection site was identified and the skin was prepared with a betadine solution. The  right knee  joint was injected with 2 ml of Euflexxa solution under sterile technique. Debbie Ding tolerated the procedure well, she was advised to rest the knee today, ice and elevation. I may take 3 -6 weeks following the last injection to get the full benefit of the medication.  I will see her back in 1 week. Sooner if he has any problems or concerns.           .     ICD-10-CM ICD-9-CM   1. Primary osteoarthritis of right knee  M17.11 715.16

## 2022-11-05 NOTE — PROGRESS NOTES
Subjective:      Patient ID: Debbie Ding is a 89 y.o. female.    Chief Complaint: Foot Problem (Right foot big toenail hurts after purchasing a pair of shoes from [employer]  ), Nail Care (Carolyn Mejia MD/PCP - General 10/12/2022/), Diabetes Mellitus, and Diabetic Foot Exam    Debbie is a 89 y.o. female who presents to the podiatry clinic  with complaint of  bilateral foot pain. Onset of the symptoms was several weeks ago. Precipitating event: none known. Current symptoms include: ability to bear weight, but with some pain. Aggravating factors: any weight bearing. Symptoms have progressed to a point and plateaued. Patient has had prior foot problems. Evaluation to date: none. Treatment to date: none.  She is here for routine diabetic foot evaluation and foot care.  In addition she has had some increased discomfort to both great toe joints.  Both joints feel stiff at times.    Review of Systems   Constitutional: Negative for chills, diaphoresis and fever.   Cardiovascular:  Negative for claudication, cyanosis, leg swelling and syncope.   Respiratory:  Negative for cough and shortness of breath.    Skin:  Positive for suspicious lesions. Negative for color change and nail changes.   Musculoskeletal:  Positive for joint pain and joint swelling. Negative for falls, muscle cramps and muscle weakness.   Gastrointestinal:  Negative for diarrhea, nausea and vomiting.   Neurological:  Negative for disturbances in coordination, numbness, paresthesias, sensory change, tremors and weakness.   Psychiatric/Behavioral:  Negative for altered mental status.          Objective:      Physical Exam  Vitals reviewed.   Constitutional:       Appearance: She is well-developed.   HENT:      Head: Normocephalic and atraumatic.   Cardiovascular:      Pulses:           Dorsalis pedis pulses are 1+ on the right side and 1+ on the left side.        Posterior tibial pulses are 1+ on the right side and 1+ on the left side.   Pulmonary:       Effort: Pulmonary effort is normal.   Musculoskeletal:         General: Normal range of motion.      Comments: Anterior, lateral, and posterior muscle groups bilateral lower extremities show strength 4 over 5 symmetrically. Inspection and palpation of the joints and bones reveal no crepitus or joint effusion. No tenderness upon palpation. Mild plantar flexor contractures noted to digits 2 through 5 bilaterally.  Angle and base of gait are normal.    Mild tenderness to bilateral great toe joints dorsally.  Exostosis apparent bilaterally.  Decreased range of motion.   Feet:      Right foot:      Skin integrity: Callus and dry skin present.      Left foot:      Skin integrity: Callus and dry skin present.   Skin:     General: Skin is warm and dry.      Capillary Refill: Capillary refill takes 2 to 3 seconds.      Coloration: Skin is pale.      Comments: Skin bilateral lower extremities noted to be thin, dry, and atrophic.  Toenails thickened, discolored, with subungual fungal debris and tenderness noted.  Hyperkeratotic lesions noted to both feet plantarly with tenderness.   Neurological:      Mental Status: She is alert and oriented to person, place, and time.      Sensory: Sensory deficit present.      Motor: Atrophy present.      Deep Tendon Reflexes: Reflexes abnormal.      Reflex Scores:       Patellar reflexes are 1+ on the right side and 1+ on the left side.       Achilles reflexes are 1+ on the right side and 1+ on the left side.     Comments: Sharp, dull, light touch, and vibratory sensation are diminished bilaterally. Proprioceptive sensation is intact to both lower extremities. [place] monofilament exam shows loss of protective sensation to plantar toes 1 through 5 bilaterally. Deep tendon reflexes to the patellar tendons is 1 over 4 bilaterally symmetrical. Deep tendon reflexes to the Achilles tendon is 0 over 4 bilaterally symmetrical. No ankle clonus or Babinski reflex noted bilaterally.  Coordination is fair to both lower extremities.  Patient admits to intermittent burning and tingling in the feet.   Psychiatric:         Behavior: Behavior normal.             Assessment:       Encounter Diagnoses   Name Primary?    Type 2 diabetes mellitus with stage 3a chronic kidney disease, without long-term current use of insulin Yes    Corn or callus     Onychomycosis due to dermatophyte          Plan:       Debbie was seen today for foot problem, nail care, diabetes mellitus and diabetic foot exam.    Diagnoses and all orders for this visit:    Type 2 diabetes mellitus with stage 3a chronic kidney disease, without long-term current use of insulin    Corn or callus    Onychomycosis due to dermatophyte    Other orders  -     diclofenac sodium (VOLTAREN) 1 % Gel; Apply 2 g topically 4 (four) times daily.    I counseled the patient on her conditions, their implications and medical management.    Routine Foot Care    Performed by:  Arturo Teresa. DPM  Authorized by:  Patient     Consent Done?:  Yes (Verbal)     Nail Care Type:  Debride  Location(s): All  (Left 1st Toe, Left 3rd Toe, Left 2nd Toe, Left 4th Toe, Left 5th Toe, Right 1st Toe, Right 2nd Toe, Right 3rd Toe, Right 4th Toe and Right 5th Toe)  Patient tolerance:  Patient tolerated the procedure well with no immediate complications     With patient's permission, the toenails mentioned above were aggressively reduced and debrided using a nail nipper, removing all offending nail and debris. The patient will continue to monitor the areas daily, inspect the feet, wear protective shoe gear when ambulatory, and moisturizer to maintain skin integrity.      Callus Care Type: Debride    With patient's permission, the calluses/hyperkeratotic lesions mentioned above were aggressively reduced and debrided using a number 15 blade. The patient will continue to monitor the areas daily, inspect the feet, wear protective shoe gear when ambulatory, and moisturizer to  maintain skin integrity.     Shoe inspection. Diabetic Foot Education. Patient reminded of the importance of good nutrition and blood sugar control to help prevent podiatric complications of diabetes. Patient instructed on proper foot hygeine. We discussed wearing proper shoe gear, daily foot inspections and Diabetic foot education in detail.  Begin topical Voltaren gel bilateral great toe joint.  Return to clinic in 3-6 months or sooner if problems arise

## 2022-11-07 ENCOUNTER — TELEPHONE (OUTPATIENT)
Dept: CARDIOLOGY | Facility: CLINIC | Age: 87
End: 2022-11-07
Payer: MEDICARE

## 2022-11-07 NOTE — TELEPHONE ENCOUNTER
----- Message from Anjelica Guzman MA sent at 11/7/2022  8:22 AM CST -----  The patient need her medication to be refill Eliquis 2.5. mg please sent it to [facility]  the patient can be reach at [phone]. Thank you.

## 2022-11-11 ENCOUNTER — OFFICE VISIT (OUTPATIENT)
Dept: ORTHOPEDICS | Facility: CLINIC | Age: 87
End: 2022-11-11
Payer: MEDICARE

## 2022-11-11 ENCOUNTER — TELEPHONE (OUTPATIENT)
Dept: SURGERY | Facility: CLINIC | Age: 87
End: 2022-11-11
Payer: MEDICARE

## 2022-11-11 ENCOUNTER — TELEPHONE (OUTPATIENT)
Dept: CARDIOLOGY | Facility: CLINIC | Age: 87
End: 2022-11-11
Payer: MEDICARE

## 2022-11-11 ENCOUNTER — ANESTHESIA EVENT (OUTPATIENT)
Dept: SURGERY | Facility: HOSPITAL | Age: 87
End: 2022-11-11
Payer: MEDICARE

## 2022-11-11 VITALS
DIASTOLIC BLOOD PRESSURE: 64 MMHG | SYSTOLIC BLOOD PRESSURE: 131 MMHG | WEIGHT: 133 LBS | HEART RATE: 78 BPM | HEIGHT: 65 IN | BODY MASS INDEX: 22.16 KG/M2

## 2022-11-11 DIAGNOSIS — M17.11 PRIMARY OSTEOARTHRITIS OF RIGHT KNEE: Primary | ICD-10-CM

## 2022-11-11 PROCEDURE — 20610 DRAIN/INJ JOINT/BURSA W/O US: CPT | Mod: PBBFAC,RT | Performed by: PHYSICIAN ASSISTANT

## 2022-11-11 PROCEDURE — 99214 OFFICE O/P EST MOD 30 MIN: CPT | Mod: PBBFAC | Performed by: PHYSICIAN ASSISTANT

## 2022-11-11 PROCEDURE — 20610 DRAIN/INJ JOINT/BURSA W/O US: CPT | Mod: S$PBB,RT,, | Performed by: PHYSICIAN ASSISTANT

## 2022-11-11 PROCEDURE — 99999 PR PBB SHADOW E&M-EST. PATIENT-LVL IV: ICD-10-PCS | Mod: PBBFAC,,, | Performed by: PHYSICIAN ASSISTANT

## 2022-11-11 PROCEDURE — 99499 UNLISTED E&M SERVICE: CPT | Mod: S$PBB,,, | Performed by: PHYSICIAN ASSISTANT

## 2022-11-11 PROCEDURE — 99999 PR PBB SHADOW E&M-EST. PATIENT-LVL IV: CPT | Mod: PBBFAC,,, | Performed by: PHYSICIAN ASSISTANT

## 2022-11-11 PROCEDURE — 99499 NO LOS: ICD-10-PCS | Mod: S$PBB,,, | Performed by: PHYSICIAN ASSISTANT

## 2022-11-11 PROCEDURE — 20610 PR DRAIN/INJECT LARGE JOINT/BURSA: ICD-10-PCS | Mod: S$PBB,RT,, | Performed by: PHYSICIAN ASSISTANT

## 2022-11-11 RX ADMIN — Medication 20 MG: at 08:11

## 2022-11-11 NOTE — TELEPHONE ENCOUNTER
Spoke with pt.  She is aware that we can proceed with surgery and to hold her eliquis starting tomorrow.

## 2022-11-11 NOTE — PROGRESS NOTES
Debbie Ding is a 89 y.o. year old her here today for her 3rd Euflexxa injection for degenerative changes of her right knee . she was last seen and treated in the clinic on 11/4/2022. There has been no significant change in her medical status since her last visit. No Fever, chills, malaise, or unexplained weight change.      Allergies, Medications, past medical and surgical history were reviewed .    Examination of the knee demonstrates  No evidence of edema, erythema , echymosis strength and range of motion are unchanged from previous visit.    The injection site was identified and the skin was prepared with a betadine solution. The  right knee  joint was injected with 2 ml of Euflexxa solution under sterile technique. Debbie Ding tolerated the procedure well, she was advised to rest the knee today, ice and elevation. I may take 3 -6 weeks following the last injection to get the full benefit of the medication.  I will see her back in 6 months. Sooner if he has any problems or concerns.           .     ICD-10-CM ICD-9-CM   1. Primary osteoarthritis of right knee  M17.11 715.16

## 2022-11-11 NOTE — TELEPHONE ENCOUNTER
----- Message from Benedicto Love MD PhD sent at 11/11/2022  3:41 PM CST -----  OK to hold eliquis for 48 hrs prior to surgery.  Restart ASAP after surgery when safe from a bleeding perspective.     Thank you,  Dr. Love  ----- Message -----  From: Estela Perez MA  Sent: 11/11/2022  12:24 PM CST  To: Benedicto Love MD PhD      ----- Message -----  From: Marija Avery RN  Sent: 11/11/2022  10:13 AM CST  To: Ivan WILKERSON Staff    Wanted to check on the status of this request.  Pt is scheduled for Monday 12/14.  ----- Message -----  From: Marija Avery RN  Sent: 11/7/2022  11:13 AM CST  To: Benedicto Love MD PhD    Good morning-    Pt is scheduled for a laparoscopic right inguinal hernia repair and umbilical hernia repair under general anesthesia with Dr. Carcamo on 11/14.  Prior to proceeding, he wanted to make sure that pt is cleared from a cardiology standpoint and also recommendations on when pt can hold her eliquis.    Please advise.    Thanks,  Marija

## 2022-11-11 NOTE — TELEPHONE ENCOUNTER
----- Message from Benedicto Love MD PhD sent at 11/11/2022  4:01 PM CST -----  Please instruct pt to hold Eliquis for 48 hours prior to surgery.    Thank you,  Dr. Love

## 2022-11-11 NOTE — PRE-PROCEDURE INSTRUCTIONS
PreOp Instructions given:   - Verbal medication information (what to hold and what to take)   - NPO guidelines -2400-  - Arrival place directions given; time to be given the day before procedure by the   Surgeon's Office [facility]  - Bathing with antibacterial soap   - Don't wear any jewelry or bring any valuables AM of surgery   - No makeup or moisturizer to face   - No perfume/cologne, powder, lotions or aftershave   Pt. verbalized understanding.   Pt denies any h/o Anesthesia/Sedation complications or side effects.

## 2022-11-14 ENCOUNTER — ANESTHESIA (OUTPATIENT)
Dept: SURGERY | Facility: HOSPITAL | Age: 87
End: 2022-11-14
Payer: MEDICARE

## 2022-11-14 ENCOUNTER — HOSPITAL ENCOUNTER (OUTPATIENT)
Facility: HOSPITAL | Age: 87
Discharge: HOME | End: 2022-11-15
Attending: SURGERY | Admitting: SURGERY
Payer: MEDICARE

## 2022-11-14 DIAGNOSIS — K41.90 NON-RECURRENT UNILATERAL FEMORAL HERNIA WITHOUT OBSTRUCTION OR GANGRENE: Primary | ICD-10-CM

## 2022-11-14 DIAGNOSIS — K40.90 INGUINAL HERNIA: ICD-10-CM

## 2022-11-14 LAB
POCT GLUCOSE: 122 MG/DL (ref 70–110)
POCT GLUCOSE: 159 MG/DL (ref 70–110)

## 2022-11-14 PROCEDURE — 96372 THER/PROPH/DIAG INJ SC/IM: CPT | Performed by: STUDENT IN AN ORGANIZED HEALTH CARE EDUCATION/TRAINING PROGRAM

## 2022-11-14 PROCEDURE — 94761 N-INVAS EAR/PLS OXIMETRY MLT: CPT

## 2022-11-14 PROCEDURE — 25000003 PHARM REV CODE 250

## 2022-11-14 PROCEDURE — D9220A PRA ANESTHESIA: Mod: ,,, | Performed by: ANESTHESIOLOGY

## 2022-11-14 PROCEDURE — 36000708 HC OR TIME LEV III 1ST 15 MIN: Performed by: SURGERY

## 2022-11-14 PROCEDURE — 25000003 PHARM REV CODE 250: Performed by: SURGERY

## 2022-11-14 PROCEDURE — C1781 MESH (IMPLANTABLE): HCPCS | Performed by: SURGERY

## 2022-11-14 PROCEDURE — 25000003 PHARM REV CODE 250: Performed by: STUDENT IN AN ORGANIZED HEALTH CARE EDUCATION/TRAINING PROGRAM

## 2022-11-14 PROCEDURE — 37000008 HC ANESTHESIA 1ST 15 MINUTES: Performed by: SURGERY

## 2022-11-14 PROCEDURE — G0378 HOSPITAL OBSERVATION PER HR: HCPCS

## 2022-11-14 PROCEDURE — 63600175 PHARM REV CODE 636 W HCPCS: Performed by: ANESTHESIOLOGY

## 2022-11-14 PROCEDURE — 71000033 HC RECOVERY, INTIAL HOUR: Performed by: SURGERY

## 2022-11-14 PROCEDURE — 63600175 PHARM REV CODE 636 W HCPCS: Performed by: STUDENT IN AN ORGANIZED HEALTH CARE EDUCATION/TRAINING PROGRAM

## 2022-11-14 PROCEDURE — 88302 TISSUE EXAM BY PATHOLOGIST: CPT | Performed by: PATHOLOGY

## 2022-11-14 PROCEDURE — 25000242 PHARM REV CODE 250 ALT 637 W/ HCPCS: Performed by: STUDENT IN AN ORGANIZED HEALTH CARE EDUCATION/TRAINING PROGRAM

## 2022-11-14 PROCEDURE — 71000016 HC POSTOP RECOV ADDL HR: Performed by: SURGERY

## 2022-11-14 PROCEDURE — 27201423 OPTIME MED/SURG SUP & DEVICES STERILE SUPPLY: Performed by: SURGERY

## 2022-11-14 PROCEDURE — 49659 UNLSTD LAPS PX HRNAP HRNRPHY: CPT | Mod: ,,, | Performed by: SURGERY

## 2022-11-14 PROCEDURE — 37000009 HC ANESTHESIA EA ADD 15 MINS: Performed by: SURGERY

## 2022-11-14 PROCEDURE — 27000221 HC OXYGEN, UP TO 24 HOURS

## 2022-11-14 PROCEDURE — 64488 PR TAP BLOCK BILAT; BY INJECTION(S) INCL IMAG GUIDANCE: ICD-10-PCS | Mod: 59,,, | Performed by: ANESTHESIOLOGY

## 2022-11-14 PROCEDURE — 36000709 HC OR TIME LEV III EA ADD 15 MIN: Performed by: SURGERY

## 2022-11-14 PROCEDURE — 64488 TAP BLOCK BI INJECTION: CPT | Mod: 59,,, | Performed by: ANESTHESIOLOGY

## 2022-11-14 PROCEDURE — 88302 PR  SURG PATH,LEVEL II: ICD-10-PCS | Mod: 26,,, | Performed by: PATHOLOGY

## 2022-11-14 PROCEDURE — 82962 GLUCOSE BLOOD TEST: CPT | Performed by: SURGERY

## 2022-11-14 PROCEDURE — 71000015 HC POSTOP RECOV 1ST HR: Performed by: SURGERY

## 2022-11-14 PROCEDURE — D9220A PRA ANESTHESIA: ICD-10-PCS | Mod: ,,, | Performed by: ANESTHESIOLOGY

## 2022-11-14 PROCEDURE — 71000039 HC RECOVERY, EACH ADD'L HOUR: Performed by: SURGERY

## 2022-11-14 PROCEDURE — 49659 PR LAPROSCOPIC FEMORAL HERNIA REPAIR: ICD-10-PCS | Mod: ,,, | Performed by: SURGERY

## 2022-11-14 PROCEDURE — 88302 TISSUE EXAM BY PATHOLOGIST: CPT | Mod: 26,,, | Performed by: PATHOLOGY

## 2022-11-14 PROCEDURE — 64488 TAP BLOCK BI INJECTION: CPT | Performed by: STUDENT IN AN ORGANIZED HEALTH CARE EDUCATION/TRAINING PROGRAM

## 2022-11-14 PROCEDURE — 94640 AIRWAY INHALATION TREATMENT: CPT | Mod: XB

## 2022-11-14 DEVICE — PATCH HERNIA MESH VENTRALEX: Type: IMPLANTABLE DEVICE | Site: UMBILICAL | Status: FUNCTIONAL

## 2022-11-14 DEVICE — MESH 3DMAX ANAT LG RIGHT 4X6IN: Type: IMPLANTABLE DEVICE | Site: GROIN | Status: FUNCTIONAL

## 2022-11-14 RX ORDER — ROPIVACAINE HYDROCHLORIDE 5 MG/ML
INJECTION, SOLUTION EPIDURAL; INFILTRATION; PERINEURAL
Status: DISCONTINUED | OUTPATIENT
Start: 2022-11-14 | End: 2022-11-14

## 2022-11-14 RX ORDER — DEXTROSE MONOHYDRATE, SODIUM CHLORIDE, AND POTASSIUM CHLORIDE 50; 1.49; 4.5 G/1000ML; G/1000ML; G/1000ML
INJECTION, SOLUTION INTRAVENOUS CONTINUOUS
Status: DISCONTINUED | OUTPATIENT
Start: 2022-11-14 | End: 2022-11-15 | Stop reason: HOSPADM

## 2022-11-14 RX ORDER — PANTOPRAZOLE SODIUM 40 MG/1
40 TABLET, DELAYED RELEASE ORAL DAILY
Status: DISCONTINUED | OUTPATIENT
Start: 2022-11-14 | End: 2022-11-15 | Stop reason: HOSPADM

## 2022-11-14 RX ORDER — DEXAMETHASONE SODIUM PHOSPHATE 4 MG/ML
INJECTION, SOLUTION INTRA-ARTICULAR; INTRALESIONAL; INTRAMUSCULAR; INTRAVENOUS; SOFT TISSUE
Status: DISCONTINUED | OUTPATIENT
Start: 2022-11-14 | End: 2022-11-14

## 2022-11-14 RX ORDER — FENTANYL CITRATE 50 UG/ML
INJECTION, SOLUTION INTRAMUSCULAR; INTRAVENOUS
Status: DISCONTINUED | OUTPATIENT
Start: 2022-11-14 | End: 2022-11-14

## 2022-11-14 RX ORDER — SODIUM CHLORIDE 0.9 % (FLUSH) 0.9 %
10 SYRINGE (ML) INJECTION
Status: DISCONTINUED | OUTPATIENT
Start: 2022-11-14 | End: 2022-11-15 | Stop reason: HOSPADM

## 2022-11-14 RX ORDER — BUPIVACAINE HYDROCHLORIDE 2.5 MG/ML
INJECTION, SOLUTION EPIDURAL; INFILTRATION; INTRACAUDAL
Status: DISCONTINUED | OUTPATIENT
Start: 2022-11-14 | End: 2022-11-14

## 2022-11-14 RX ORDER — ONDANSETRON 2 MG/ML
INJECTION INTRAMUSCULAR; INTRAVENOUS
Status: DISCONTINUED | OUTPATIENT
Start: 2022-11-14 | End: 2022-11-14

## 2022-11-14 RX ORDER — ROCURONIUM BROMIDE 10 MG/ML
INJECTION, SOLUTION INTRAVENOUS
Status: DISCONTINUED | OUTPATIENT
Start: 2022-11-14 | End: 2022-11-14

## 2022-11-14 RX ORDER — HYDROMORPHONE HYDROCHLORIDE 1 MG/ML
0.5 INJECTION, SOLUTION INTRAMUSCULAR; INTRAVENOUS; SUBCUTANEOUS EVERY 4 HOURS PRN
Status: DISCONTINUED | OUTPATIENT
Start: 2022-11-14 | End: 2022-11-15 | Stop reason: HOSPADM

## 2022-11-14 RX ORDER — LEVOTHYROXINE SODIUM 25 UG/1
25 TABLET ORAL
Status: DISCONTINUED | OUTPATIENT
Start: 2022-11-15 | End: 2022-11-15 | Stop reason: HOSPADM

## 2022-11-14 RX ORDER — ACETAMINOPHEN 325 MG/1
650 TABLET ORAL EVERY 8 HOURS PRN
Status: DISCONTINUED | OUTPATIENT
Start: 2022-11-14 | End: 2022-11-15 | Stop reason: HOSPADM

## 2022-11-14 RX ORDER — DONEPEZIL HYDROCHLORIDE 10 MG/1
10 TABLET, FILM COATED ORAL DAILY
Status: DISCONTINUED | OUTPATIENT
Start: 2022-11-14 | End: 2022-11-15 | Stop reason: HOSPADM

## 2022-11-14 RX ORDER — FAMOTIDINE 20 MG/1
20 TABLET, FILM COATED ORAL DAILY
Status: DISCONTINUED | OUTPATIENT
Start: 2022-11-14 | End: 2022-11-15 | Stop reason: HOSPADM

## 2022-11-14 RX ORDER — ACETAMINOPHEN 500 MG
1000 TABLET ORAL ONCE
Status: COMPLETED | OUTPATIENT
Start: 2022-11-14 | End: 2022-11-14

## 2022-11-14 RX ORDER — PROCHLORPERAZINE EDISYLATE 5 MG/ML
5 INJECTION INTRAMUSCULAR; INTRAVENOUS EVERY 30 MIN PRN
Status: DISCONTINUED | OUTPATIENT
Start: 2022-11-14 | End: 2022-11-14 | Stop reason: HOSPADM

## 2022-11-14 RX ORDER — ENOXAPARIN SODIUM 100 MG/ML
40 INJECTION SUBCUTANEOUS EVERY 24 HOURS
Status: DISCONTINUED | OUTPATIENT
Start: 2022-11-14 | End: 2022-11-15 | Stop reason: HOSPADM

## 2022-11-14 RX ORDER — CEFAZOLIN SODIUM/WATER 2 G/20 ML
2 SYRINGE (ML) INTRAVENOUS
Status: COMPLETED | OUTPATIENT
Start: 2022-11-14 | End: 2022-11-14

## 2022-11-14 RX ORDER — ONDANSETRON 8 MG/1
8 TABLET, ORALLY DISINTEGRATING ORAL EVERY 8 HOURS PRN
Status: DISCONTINUED | OUTPATIENT
Start: 2022-11-14 | End: 2022-11-15 | Stop reason: HOSPADM

## 2022-11-14 RX ORDER — HYDROMORPHONE HYDROCHLORIDE 1 MG/ML
0.2 INJECTION, SOLUTION INTRAMUSCULAR; INTRAVENOUS; SUBCUTANEOUS EVERY 5 MIN PRN
Status: DISCONTINUED | OUTPATIENT
Start: 2022-11-14 | End: 2022-11-14 | Stop reason: HOSPADM

## 2022-11-14 RX ORDER — AMLODIPINE BESYLATE 10 MG/1
10 TABLET ORAL DAILY
Status: DISCONTINUED | OUTPATIENT
Start: 2022-11-14 | End: 2022-11-15 | Stop reason: HOSPADM

## 2022-11-14 RX ORDER — FENTANYL CITRATE 50 UG/ML
25 INJECTION, SOLUTION INTRAMUSCULAR; INTRAVENOUS EVERY 5 MIN PRN
Status: DISCONTINUED | OUTPATIENT
Start: 2022-11-14 | End: 2023-04-18

## 2022-11-14 RX ORDER — OXYCODONE HYDROCHLORIDE 5 MG/1
5 TABLET ORAL EVERY 4 HOURS PRN
Status: DISCONTINUED | OUTPATIENT
Start: 2022-11-14 | End: 2022-11-15 | Stop reason: HOSPADM

## 2022-11-14 RX ORDER — PROPOFOL 10 MG/ML
VIAL (ML) INTRAVENOUS
Status: DISCONTINUED | OUTPATIENT
Start: 2022-11-14 | End: 2022-11-14

## 2022-11-14 RX ORDER — MIDAZOLAM HYDROCHLORIDE 1 MG/ML
0.5 INJECTION INTRAMUSCULAR; INTRAVENOUS
Status: ACTIVE | OUTPATIENT
Start: 2022-11-14

## 2022-11-14 RX ORDER — PANTOPRAZOLE SODIUM 40 MG/1
40 TABLET, DELAYED RELEASE ORAL 2 TIMES DAILY
Status: DISCONTINUED | OUTPATIENT
Start: 2022-11-14 | End: 2022-11-15 | Stop reason: HOSPADM

## 2022-11-14 RX ORDER — ESCITALOPRAM OXALATE 10 MG/1
10 TABLET ORAL DAILY
Status: DISCONTINUED | OUTPATIENT
Start: 2022-11-14 | End: 2022-11-15 | Stop reason: HOSPADM

## 2022-11-14 RX ORDER — LIDOCAINE HYDROCHLORIDE 20 MG/ML
INJECTION, SOLUTION EPIDURAL; INFILTRATION; INTRACAUDAL; PERINEURAL
Status: DISCONTINUED | OUTPATIENT
Start: 2022-11-14 | End: 2022-11-14

## 2022-11-14 RX ORDER — SODIUM CHLORIDE 9 MG/ML
INJECTION, SOLUTION INTRAVENOUS CONTINUOUS
Status: DISCONTINUED | OUTPATIENT
Start: 2022-11-14 | End: 2022-11-14

## 2022-11-14 RX ORDER — ALBUTEROL SULFATE 2.5 MG/.5ML
2.5 SOLUTION RESPIRATORY (INHALATION) EVERY 4 HOURS
Status: DISCONTINUED | OUTPATIENT
Start: 2022-11-14 | End: 2022-11-15 | Stop reason: HOSPADM

## 2022-11-14 RX ORDER — ATORVASTATIN CALCIUM 40 MG/1
40 TABLET, FILM COATED ORAL DAILY
Status: DISCONTINUED | OUTPATIENT
Start: 2022-11-14 | End: 2022-11-15 | Stop reason: HOSPADM

## 2022-11-14 RX ADMIN — PROPOFOL 150 MG: 10 INJECTION, EMULSION INTRAVENOUS at 11:11

## 2022-11-14 RX ADMIN — ALBUTEROL SULFATE 2.5 MG: 2.5 SOLUTION RESPIRATORY (INHALATION) at 11:11

## 2022-11-14 RX ADMIN — FENTANYL CITRATE 25 MCG: 0.05 INJECTION, SOLUTION INTRAMUSCULAR; INTRAVENOUS at 12:11

## 2022-11-14 RX ADMIN — FENTANYL CITRATE 50 MCG: 0.05 INJECTION, SOLUTION INTRAMUSCULAR; INTRAVENOUS at 11:11

## 2022-11-14 RX ADMIN — ACETAMINOPHEN 1000 MG: 500 TABLET ORAL at 10:11

## 2022-11-14 RX ADMIN — ONDANSETRON 4 MG: 2 INJECTION INTRAMUSCULAR; INTRAVENOUS at 12:11

## 2022-11-14 RX ADMIN — ROPIVACAINE HYDROCHLORIDE 10 ML: 5 INJECTION EPIDURAL; INFILTRATION; PERINEURAL at 11:11

## 2022-11-14 RX ADMIN — PANTOPRAZOLE SODIUM 40 MG: 40 TABLET, DELAYED RELEASE ORAL at 08:11

## 2022-11-14 RX ADMIN — Medication 2 G: at 11:11

## 2022-11-14 RX ADMIN — ENOXAPARIN SODIUM 40 MG: 100 INJECTION SUBCUTANEOUS at 05:11

## 2022-11-14 RX ADMIN — FAMOTIDINE 20 MG: 20 TABLET ORAL at 02:11

## 2022-11-14 RX ADMIN — PANTOPRAZOLE SODIUM 40 MG: 40 TABLET, DELAYED RELEASE ORAL at 02:11

## 2022-11-14 RX ADMIN — DEXAMETHASONE SODIUM PHOSPHATE 4 MG: 4 INJECTION INTRA-ARTICULAR; INTRALESIONAL; INTRAMUSCULAR; INTRAVENOUS; SOFT TISSUE at 11:11

## 2022-11-14 RX ADMIN — SODIUM CHLORIDE, SODIUM GLUCONATE, SODIUM ACETATE, POTASSIUM CHLORIDE, MAGNESIUM CHLORIDE, SODIUM PHOSPHATE, DIBASIC, AND POTASSIUM PHOSPHATE: .53; .5; .37; .037; .03; .012; .00082 INJECTION, SOLUTION INTRAVENOUS at 01:11

## 2022-11-14 RX ADMIN — LIDOCAINE HYDROCHLORIDE 100 MG: 20 INJECTION, SOLUTION EPIDURAL; INFILTRATION; INTRACAUDAL; PERINEURAL at 11:11

## 2022-11-14 RX ADMIN — ATORVASTATIN CALCIUM 40 MG: 40 TABLET, FILM COATED ORAL at 02:11

## 2022-11-14 RX ADMIN — ALBUTEROL SULFATE 2.5 MG: 2.5 SOLUTION RESPIRATORY (INHALATION) at 07:11

## 2022-11-14 RX ADMIN — SODIUM CHLORIDE: 0.9 INJECTION, SOLUTION INTRAVENOUS at 10:11

## 2022-11-14 RX ADMIN — ROCURONIUM BROMIDE 50 MG: 10 INJECTION INTRAVENOUS at 11:11

## 2022-11-14 RX ADMIN — SODIUM CHLORIDE: 0.9 INJECTION, SOLUTION INTRAVENOUS at 11:11

## 2022-11-14 RX ADMIN — ALBUTEROL SULFATE 2.5 MG: 2.5 SOLUTION RESPIRATORY (INHALATION) at 05:11

## 2022-11-14 RX ADMIN — DEXTROSE, SODIUM CHLORIDE, AND POTASSIUM CHLORIDE: 5; .45; .15 INJECTION INTRAVENOUS at 02:11

## 2022-11-14 RX ADMIN — OXYCODONE 5 MG: 5 TABLET ORAL at 06:11

## 2022-11-14 RX ADMIN — PROPOFOL 50 MG: 10 INJECTION, EMULSION INTRAVENOUS at 12:11

## 2022-11-14 RX ADMIN — SUGAMMADEX 200 MG: 100 INJECTION, SOLUTION INTRAVENOUS at 01:11

## 2022-11-14 NOTE — ANESTHESIA PROCEDURE NOTES
Intubation    Date/Time: 11/14/2022 11:46 AM  Performed by: Ifeoma Lew MD  Authorized by: Kwasi Roberto Jr., MD     Intubation:     Induction:  Intravenous    Intubated:  Postinduction    Mask Ventilation:  Easy with oral airway    Attempts:  1    Attempted By:  Resident anesthesiologist    Method of Intubation:  Direct    Blade:  Mg 2    Laryngeal View Grade: Grade I - full view of cords      Difficult Airway Encountered?: No      Complications:  None    Airway Device:  Oral endotracheal tube    Airway Device Size:  7.0    Style/Cuff Inflation:  Cuffed    Secured at:  The lips    Placement Verified By:  Capnometry    Complicating Factors:  None    Findings Post-Intubation:  Atraumatic/condition of teeth unchanged and BS equal bilateral

## 2022-11-14 NOTE — TRANSFER OF CARE
Anesthesia Transfer of Care Note    Patient: Debbie Ding    Procedure(s) Performed: Procedure(s) (LRB):  REPAIR, HERNIA, INGUINAL, LAPAROSCOPIC (FEMORAL REPAIR W/ MESH) (Right)  REPAIR, HERNIA, UMBILICAL (N/A)    Patient location: PACU    Anesthesia Type: general    Transport from OR: Transported from OR on 6-10 L/min O2 by face mask with adequate spontaneous ventilation    Post pain: adequate analgesia    Post assessment: no apparent anesthetic complications    Post vital signs: stable    Level of consciousness: awake    Complications: none    Transfer of care protocol was followed      Last vitals:   Visit Vitals  BP (!) 183/77   Pulse 88   Temp 36.5 °C (97.7 °F) (Temporal)   Resp 14   SpO2 100%   Breastfeeding No

## 2022-11-14 NOTE — ANESTHESIA PROCEDURE NOTES
Bilateral rectus sheat blocks    Patient location during procedure: pre-op   Block not for primary anesthetic.  Reason for block: at surgeon's request and post-op pain management   Post-op Pain Location: bilateral abdominal wall pain   Start time: 11/14/2022 11:50 AM  Timeout: 11/14/2022 11:50 AM   End time: 11/14/2022 11:55 AM    Staffing  Authorizing Provider: Kwasi Roberto Jr., MD  Performing Provider: Ross Hui MD    Preanesthetic Checklist  Completed: patient identified, IV checked, site marked, risks and benefits discussed, surgical consent, monitors and equipment checked, pre-op evaluation and timeout performed  Peripheral Block  Patient position: supine  Prep: ChloraPrep  Patient monitoring: cardiac monitor, heart rate, continuous pulse ox, continuous capnometry and frequent blood pressure checks  Block type: rectus sheath  Laterality: bilateral  Injection technique: single shot  Needle  Needle type: Stimuplex   Needle gauge: 21 G  Needle length: 4 in  Needle localization: ultrasound guidance   -ultrasound image captured on disc.  Assessment  Injection assessment: negative aspiration, negative parasthesia and local visualized surrounding nerve  Paresthesia pain: none  Heart rate change: no  Slow fractionated injection: yes    Medications:    Medications: ropivacaine (NAROPIN) injection 0.5% - Perineural   10 mL - 11/14/2022 11:50:00 AM    Additional Notes  VSS.  [facility] RN monitoring vitals throughout procedure.  Patient tolerated procedure well.

## 2022-11-14 NOTE — BRIEF OP NOTE
Operative Note       Surgery Date: 11/14/2022     Surgeon(s) and Role:     * Benedicto Carcamo MD - Primary     * Martinez García MD - Resident - Assisting     * Landry Zuniga MD - Resident - Assisting    Pre-op Diagnosis:  Unilateral femoral hernia without obstruction or gangrene, recurrence not specified [K41.90]  Incarcerated    Post-op Diagnosis:  Unilateral femoral hernia without obstruction or gangrene, recurrence not specified [K41.90]  Incarcerated    Procedure(s) (LRB):  REPAIR, HERNIA, INGUINAL, LAPAROSCOPIC (FEMORAL REPAIR W/ MESH) (Right)  REPAIR, HERNIA, UMBILICAL (N/A)    Anesthesia: General    Procedure in Detail/Findings:  Small right lipoma of cord, Incarcerated right femoral hernia.  2cm umbilical hernia, repair with mesh.    Estimated Blood Loss: Minimal           Specimens (From admission, onward)       Start     Ordered    11/14/22 1245  Specimen to Pathology, Surgery General Surgery  Once        Comments: Pre-op Diagnosis: Unilateral femoral hernia without obstruction or gangrene, recurrence not specified [K41.90]      Procedure(s):  REPAIR, HERNIA, INGUINAL, LAPAROSCOPIC (FEMORAL REPAIR W/ MESH)  REPAIR, HERNIA, UMBILICAL, LAPAROSCOPIC     Number of specimens: 1    Name of specimens:   1.) Hernia sac and contents (permanent)   References:    Click here for ordering Quick Tip   Question:  Procedure Type:  Answer:  General Surgery    11/14/22 1257                  Implants:   Implant Name Type Inv. Item Serial No.  Lot No. LRB No. Used Action   MESH 3DMAX JACKIE LG RIGHT 4X6IN - [id number]  MESH 3DMAX JACKIE LG RIGHT 4X6IN  [employer] [id number] 1 Implanted   PATCH HERNIA MESH VENTRALEX - [id number]  PATCH HERNIA MESH VENTRALEX  [employer] [id number] N/A 1 Implanted              Disposition: PACU - hemodynamically stable.           Condition: Good    Attestation:  I was present and scrubbed for the entire procedure.

## 2022-11-14 NOTE — BRIEF OP NOTE
[facility] - Surgery ([facility])  Brief Operative Note    SUMMARY     Surgery Date: 11/14/2022     Surgeon(s) and Role:     * Benedicto Carcamo MD - Primary     * Martinez García MD - Resident - Assisting     * Landry Zuniga MD - Resident - Assisting        Pre-op Diagnosis:  Unilateral femoral hernia without obstruction or gangrene, recurrence not specified [K41.90]    Post-op Diagnosis:  Post-Op Diagnosis Codes:     * Unilateral femoral hernia without obstruction or gangrene, recurrence not specified [K41.90]    Procedure(s) (LRB):  REPAIR, HERNIA, INGUINAL, LAPAROSCOPIC (FEMORAL REPAIR W/ MESH) (Right)  REPAIR, HERNIA, UMBILICAL (N/A)    Anesthesia: General    Operative Findings: Laparoscopic right inguinal hernia repair with mesh. Open umbilical hernia repair with mesh.    Estimated Blood Loss: 3mL    Estimated Blood Loss has been documented.         Specimens:   Specimen (24h ago, onward)       Start     Ordered    11/14/22 1245  Specimen to Pathology, Surgery General Surgery  Once        Comments: Pre-op Diagnosis: Unilateral femoral hernia without obstruction or gangrene, recurrence not specified [K41.90]      Procedure(s):  REPAIR, HERNIA, INGUINAL, LAPAROSCOPIC (FEMORAL REPAIR W/ MESH)  REPAIR, HERNIA, UMBILICAL, LAPAROSCOPIC     Number of specimens: 1    Name of specimens:   1.) Hernia sac and contents (permanent)   References:    Click here for ordering Quick Tip   Question:  Procedure Type:  Answer:  General Surgery    11/14/22 1257                    [id number]

## 2022-11-14 NOTE — NURSING
Patient arrived to room from pacu. Aaax4. Skin w/d. Resp even and unlabored. Abd binder intact with a rounded slightly distended abdomen. Dressing to umbilicus dry/intact. Small lower lap site in suprapubic area open to air. No acute distress noted at this time.

## 2022-11-14 NOTE — ANESTHESIA PREPROCEDURE EVALUATION
[facility]  Anesthesia Pre-Operative Evaluation         Patient Name: Debbie Ding  YOB: 1932  MRN: [id number]    SUBJECTIVE:     Pre-operative evaluation for Procedure(s) (LRB):  REPAIR, HERNIA, INGUINAL, LAPAROSCOPIC (FEMORAL REPAIR W/ MESH) (Right)  REPAIR, HERNIA, UMBILICAL, LAPAROSCOPIC (N/A)     11/14/2022    Debbie Ding is a 89 y.o. female w/ a significant PMHx of pulmonary htn, essential htn, tye on cpap, asthma, ckd 3, gastroparesis, VTE on eliquis,  hld, dm2 (without insulin, a1c 6.1) presents with R inguinal and umbilical hernia.    Patient now presents for the above procedure(s).    Stress test 7/22 negative    TTE 7/2022   The left ventricle is normal in size with normal systolic function. The estimated ejection fraction is 65%.   Normal right ventricular size with normal right ventricular systolic function.   Indeterminate left ventricular diastolic function.   Mild tricuspid regurgitation.   The estimated PA systolic pressure is 40 mmHg.   Normal central venous pressure (3 mmHg).     Prev airway: None documented.    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    Fibromyalgia    Primary osteoarthritis involving multiple joints    Spondylosis without myelopathy    Spinal stenosis, lumbar region, without neurogenic claudication    Acquired spondylolisthesis    Thoracic or lumbosacral neuritis or radiculitis, unspecified    DDD (degenerative disc disease), lumbar    Neck pain    Atrophy of nasal turbinates    GERD (gastroesophageal reflux disease)    Chronic bilateral low back pain without sciatica    Decreased strength of trunk and back    Chronic asthma    SOB (shortness of breath)    Allergic rhinitis    Vestibular dizziness    Hearing loss     Hypothyroidism    Pseudophakia of both eyes    PUD (peptic ulcer disease)    Urinary, incontinence, stress female    Impaired functional mobility, balance, gait, and endurance    Hypokalemia    Debility    Hyperhidrosis    Chronic kidney disease, stage III (moderate)    History of pulmonary embolism    Overactive bladder    Type 2 diabetes mellitus with stage 3a chronic kidney disease, without long-term current use of insulin    History of deep venous thrombosis    Chronic anticoagulation    Hyperlipidemia associated with type 2 diabetes mellitus    Sleep walking    JASE (obstructive sleep apnea)    Chronic neck pain    Unspecified inflammatory spondylopathy, lumbar region    Asthma exacerbation    Bronchitis, chronic, mucopurulent    Gastroparesis    History of COVID-19    Chronic left shoulder pain    Pulmonary hypertension    Unilateral femoral hernia without obstruction or gangrene       Review of patient's allergies indicates:   Allergen Reactions    Melatonin      Other reaction(s): Other (See Comments)  Sleep Walks and has unnatural dreams    Talwin compound Hives    Talwin [pentazocine lactate] Hallucinations    Trazodone Other (See Comments)     Nightmares/sleep walk      Bentyl [dicyclomine] Anxiety       Current Inpatient Medications:   regadenoson  0.4 mg Intravenous Once       Current Facility-Administered Medications on File Prior to Encounter   Medication Dose Route Frequency Provider Last Rate Last Admin    regadenoson injection 0.4 mg  0.4 mg Intravenous Once Benedicto Love MD PhD         Current Outpatient Medications on File Prior to Encounter   Medication Sig Dispense Refill    amLODIPine (NORVASC) 10 MG tablet TAKE 1 TABLET ONCE DAILY 90 tablet 3    atorvastatin (LIPITOR) 40 MG tablet Take 1 tablet (40 mg total) by mouth once daily. 90 tablet 3    doxepin (SINEQUAN) 10 MG capsule TAKE 1 CAPSULE AT BEDTIME  FOR ITCHING 90 capsule 3    esomeprazole (NEXIUM)  40 MG capsule Take 1 capsule (40 mg total) by mouth 2 (two) times daily. 180 capsule 3    levothyroxine (SYNTHROID) 25 MCG tablet Take 1 tablet by mouth every day, except hold on Sundays. 90 tablet 0    mirtazapine (REMERON) 15 MG tablet TAKE 1 TABLET EVERY EVENING 90 tablet 3    albuterol (PROVENTIL) 2.5 mg /3 mL (0.083 %) nebulizer solution Take 2.5 mg by nebulization every 6 (six) hours as needed. Rescue      albuterol (PROVENTIL/VENTOLIN HFA) 90 mcg/actuation inhaler INHALE 2 PUFFS BY MOUTH EVERY 6 HRS AS NEEDED      clobetasoL (TEMOVATE) 0.05 % external solution Pt to mix in 1 jar of cerave cream and apply to affected areas bid 50 mL 3    cyproheptadine (PERIACTIN) 4 mg tablet Take 1 tablet (4 mg total) by mouth 3 (three) times daily as needed (appetite). 90 tablet 1    diclofenac sodium (VOLTAREN) 1 % Gel Apply 2 g topically 4 (four) times daily. 100 g 2    donepeziL (ARICEPT) 10 MG tablet TAKE 1 TABLET EVERY MORNING.  NO FURTHER REFILL WITHOUT APPOINTMENT (Patient taking differently: TAKE 1 TABLET EVERY MORNING.  NO FURTHER REFILL WITHOUT APPOINTMENT) 90 tablet 1    famotidine (PEPCID) 20 MG tablet Take 1 tablet (20 mg total) by mouth daily as needed for Heartburn (breakthrough heartburn and acid reflux not controlled with Nexium). 30 tablet 11    flash glucose scanning reader (FREESTYLE ALEXSANDRA 14 DAY READER) Misc 1 Device by Misc.(Non-Drug; Combo Route) route 2 (two) times a day. 1 each 11    flash glucose sensor (FREESTYLE ALEXSANDRA 14 DAY SENSOR) Kit 1 Device by Misc.(Non-Drug; Combo Route) route 2 (two) times a day. 1 kit 11    fluocinonide (LIDEX) 0.05 % external solution AAA scalp qday - bid prn pruritus 60 mL 3    fluticasone-salmeterol diskus inhaler 500-50 mcg Inhale 1 puff into the lungs 2 (two) times daily. Controller 60 each 11    glycopyrrolate (ROBINUL) 2 MG Tab TAKE 1 TABLET TWICE A  tablet 3    ketoconazole (NIZORAL) 2 % cream Apply topically once daily. 60 g 2     ketoconazole (NIZORAL) 2 % shampoo Wash hair with medicated shampoo at least 2x/week - let sit on scalp at least 5 minutes prior to rinsing 120 mL 5    meclizine (ANTIVERT) 25 mg tablet Take 1 tablet (25 mg total) by mouth 2 (two) times daily as needed for Dizziness. 60 tablet 1    predniSONE (DELTASONE) 10 MG tablet Take 2 Tablet po Qam x 7 days, take 1 Tablet po Qam x 7 days, take 1/2 a tablet po Qam x 7 days then stop      RESTASIS 0.05 % ophthalmic emulsion          Past Surgical History:   Procedure Laterality Date    CATARACT EXTRACTION Bilateral     ESOPHAGOGASTRODUODENOSCOPY N/A 3/8/2022    Procedure: EGD (ESOPHAGOGASTRODUODENOSCOPY) with dilation-either [facility] with Dr. Meza;  Surgeon: Rebeca Meza MD;  Location: [facility];  Service: Endoscopy;  Laterality: N/A;  1/11-eliquis hold ok see te-tb    ESOPHAGOGASTRODUODENOSCOPY N/A 2/28/2022    Procedure: EGD (ESOPHAGOGASTRODUODENOSCOPY) with dilation-either [facility] with Dr. Meza;  Surgeon: Rebeca Meza MD;  Location: [facility] ([address] [place] [address]);  Service: Endoscopy;  Laterality: N/A;  1/11-eliquis hold ok see te-tb  COVID test on 2/25/22 at [facility]  2/22-confirmed appt arrival time with pt-[facility]    FOOT NEUROMA SURGERY  1985    HYSTERECTOMY         Social History     Socioeconomic History    Marital status:    Occupational History     Comment:  - school    Tobacco Use    Smoking status: Never    Smokeless tobacco: Never   Substance and Sexual Activity    Alcohol use: No    Drug use: No    Sexual activity: Not Currently   Social History Narrative    Lives with daughter, Benjie.        Other daughter, Madina, drives her around.        She lives independently, except for driving.          Stretches in bed.  Walks in neighborhood, and in house several times a day.       OBJECTIVE:     Vital Signs Range (Last 24H):         Significant Labs:  Lab Results   Component Value Date    WBC 5.92 04/02/2022    HGB 11.4 (L)  04/02/2022    HCT 34.6 (L) 04/02/2022     04/02/2022    CHOL 277 (H) 04/02/2022    TRIG 51 04/02/2022     (H) 04/02/2022    ALT 16 04/02/2022    AST 23 04/02/2022     04/02/2022    K 4.3 04/02/2022     04/02/2022    CREATININE 1.1 04/02/2022    BUN 18 04/02/2022    CO2 24 04/02/2022    TSH 0.394 (L) 06/04/2022    INR 0.9 06/20/2019    HGBA1C 6.1 (H) 04/02/2022       Diagnostic Studies: No relevant studies.    EKG:   Results for orders placed or performed during the hospital encounter of 07/18/22   SCHEDULED EKG 12-LEAD (to Muse)    Collection Time: 07/18/22 12:24 PM    Narrative    Test Reason : R06.02,    Vent. Rate : 073 BPM     Atrial Rate : 073 BPM     P-R Int : 122 ms          QRS Dur : 106 ms      QT Int : 400 ms       P-R-T Axes : 076 075 052 degrees     QTc Int : 440 ms    Normal sinus rhythm  Incomplete right bundle branch block  Abnormal ECG  When compared with ECG of 04-OCT-2021 13:15,  No significant change was found  Confirmed by Landry Garcia MD ([id number]) on 7/18/2022 1:30:06 PM    Referred By: GOPI COREAS           Confirmed By:Landry Garcia MD       2D ECHO:  TTE:  Results for orders placed or performed during the hospital encounter of 07/19/22   Echo Saline Bubble? No   Result Value Ref Range    BSA 1.67 m2    TDI SEPTAL 0.07 m/s    LV LATERAL E/E' RATIO 7.00 m/s    LV SEPTAL E/E' RATIO 8.00 m/s    LA WIDTH 4.16 cm    TDI LATERAL 0.08 m/s    LVIDd 3.88 3.5 - 6.0 cm    IVS 0.88 0.6 - 1.1 cm    Posterior Wall 0.82 0.6 - 1.1 cm    LVIDs 2.52 2.1 - 4.0 cm    FS 35 28 - 44 %    LA volume 45.17 cm3    Sinus 3.15 cm    STJ 2.76 cm    Ascending aorta 3.11 cm    LV mass 96.55 g    LA size 2.92 cm    RVDD 3.08 cm    TAPSE 2.19 cm    RV S' 13.23 cm/s    Left Ventricle Relative Wall Thickness 0.42 cm    AV mean gradient 4 mmHg    AV valve area 2.70 cm2    AV Velocity Ratio 0.92     AV index (prosthetic) 1.01     MV valve area p 1/2 method 3.12 cm2    E/A ratio 0.64     Mean e' 0.08  "m/s    E wave deceleration time 243.44 msec    IVRT 108.47 msec    MV "A" wave duration 15.70 msec    Pulm vein S/D ratio 1.16     LVOT diameter 1.85 cm    LVOT area 2.7 cm2    LVOT peak nigel 1.32 m/s    LVOT peak VTI 27.70 cm    Ao peak nigel 1.43 m/s    Ao VTI 27.53 cm    LVOT stroke volume 74.42 cm3    AV peak gradient 8 mmHg    E/E' ratio 7.47 m/s    MV Peak E Nigel 0.56 m/s    TR Max Nigel 3.06 m/s    MV stenosis pressure 1/2 time 70.60 ms    MV Peak A Nigel 0.87 m/s    PV Peak S Nigel 0.64 m/s    PV Peak D Nigel 0.55 m/s    LV Systolic Volume 22.86 mL    LV Systolic Volume Index 13.7 mL/m2    LV Diastolic Volume 65.14 mL    LV Diastolic Volume Index 39.01 mL/m2    LA Volume Index 27.0 mL/m2    LV Mass Index 58 g/m2    RA Major Axis 3.92 cm    Left Atrium Minor Axis 4.35 cm    Left Atrium Major Axis 4.40 cm    Triscuspid Valve Regurgitation Peak Gradient 37 mmHg    LA Volume Index (Mod) 31.5 mL/m2    LA volume (mod) 52.56 cm3    RA Width 2.84 cm    Right Atrial Pressure (from IVC) 3 mmHg    EF 65 %    TV rest pulmonary artery pressure 40 mmHg    Narrative    · The left ventricle is normal in size with normal systolic function. The   estimated ejection fraction is 65%.  · Normal right ventricular size with normal right ventricular systolic   function.  · Indeterminate left ventricular diastolic function.  · Mild tricuspid regurgitation.  · The estimated PA systolic pressure is 40 mmHg.  · Normal central venous pressure (3 mmHg).          SHELLEY:  No results found. However, due to the size of the patient record, not all encounters were searched. Please check Results Review for a complete set of results.    ASSESSMENT/PLAN:         Pre-op Assessment    I have reviewed the Patient Summary Reports.     I have reviewed the Nursing Notes. I have reviewed the NPO Status.   I have reviewed the Medications.     Review of Systems  Anesthesia Hx:  No problems with previous Anesthesia  History of prior surgery of interest to airway " management or planning: Denies Family Hx of Anesthesia complications.   Denies Personal Hx of Anesthesia complications.   Social:  Non-Smoker, No Alcohol Use    Hematology/Oncology:  Hematology Normal   Oncology Normal     EENT/Dental:EENT/Dental Normal   Cardiovascular:   Exercise tolerance: good Hypertension Denies CAD.    Denies Dysrhythmias.  hyperlipidemia    Pulmonary:   Denies COPD. Asthma  Denies Sleep Apnea.    Renal/:   Chronic Renal Disease, CKD    Hepatic/GI:   GERD    Neurological:   Denies Neuromuscular Disease.    Endocrine:   Diabetes    Psych:   Denies Psychiatric History.          Physical Exam  General: Well nourished, Cooperative, Alert and Oriented    Airway:  Mallampati: II   Mouth Opening: Normal  TM Distance: Normal  Tongue: Normal  Neck ROM: Normal ROM    Dental:  Intact    Chest/Lungs:  Clear to auscultation, Normal Respiratory Rate    Heart:  Rate: Normal  Rhythm: Regular Rhythm  Sounds: Normal    Abdomen:  Nontender, Soft, Normal        Anesthesia Plan  Type of Anesthesia, risks & benefits discussed:    Anesthesia Type: Gen ETT  Intra-op Monitoring Plan: Standard ASA Monitors  Post Op Pain Control Plan: multimodal analgesia  Induction:  IV  Airway Plan: Direct, Post-Induction  Informed Consent: Informed consent signed with the Patient and all parties understand the risks and agree with anesthesia plan.  All questions answered.   ASA Score: 3  Day of Surgery Review of History & Physical: H&P Update referred to the surgeon/provider.    Ready For Surgery From Anesthesia Perspective.     .

## 2022-11-14 NOTE — ANESTHESIA POSTPROCEDURE EVALUATION
Anesthesia Post Evaluation    Patient: Debbie Ding    Procedure(s) Performed: Procedure(s) (LRB):  REPAIR, HERNIA, INGUINAL, LAPAROSCOPIC (FEMORAL REPAIR W/ MESH) (Right)  REPAIR, HERNIA, UMBILICAL (N/A)    Final Anesthesia Type: general      Patient location during evaluation: PACU  Patient participation: Yes- Able to Participate  Level of consciousness: awake and alert  Post-procedure vital signs: reviewed and stable  Pain management: adequate  Airway patency: patent    PONV status at discharge: No PONV  Anesthetic complications: no      Cardiovascular status: blood pressure returned to baseline  Respiratory status: spontaneous ventilation and room air  Hydration status: euvolemic  Follow-up not needed.          Vitals Value Taken Time   /64 11/14/22 1401   Temp 36.5 °C (97.7 °F) 11/14/22 1315   Pulse 84 11/14/22 1412   Resp 12 11/14/22 1412   SpO2 100 % 11/14/22 1412   Vitals shown include unvalidated device data.      No case tracking events are documented in the log.      Pain/Mellissa Score: Pain Rating Prior to Med Admin: 0 (11/14/2022 10:29 AM)  Mellissa Score: 8 (11/14/2022  1:30 PM)

## 2022-11-14 NOTE — PLAN OF CARE
Pt prepared for surgery per orders. Daughter at bedside. Pt to remove dentures prior to surgery and all belongings to be placed in locker per her request. H&P update and anesthesia consent needed for procedure today.

## 2022-11-15 VITALS
RESPIRATION RATE: 17 BRPM | HEART RATE: 73 BPM | DIASTOLIC BLOOD PRESSURE: 68 MMHG | OXYGEN SATURATION: 96 % | SYSTOLIC BLOOD PRESSURE: 156 MMHG | BODY MASS INDEX: 22.12 KG/M2 | WEIGHT: 132.94 LBS | TEMPERATURE: 99 F

## 2022-11-15 LAB
ALBUMIN SERPL BCP-MCNC: 2.9 G/DL (ref 3.5–5.2)
ALP SERPL-CCNC: 54 U/L (ref 55–135)
ALT SERPL W/O P-5'-P-CCNC: 9 U/L (ref 10–44)
ANION GAP SERPL CALC-SCNC: 8 MMOL/L (ref 8–16)
AST SERPL-CCNC: 16 U/L (ref 10–40)
BASOPHILS # BLD AUTO: 0.01 K/UL (ref 0–0.2)
BASOPHILS NFR BLD: 0.1 % (ref 0–1.9)
BILIRUB SERPL-MCNC: 0.6 MG/DL (ref 0.1–1)
BUN SERPL-MCNC: 16 MG/DL (ref 8–23)
CALCIUM SERPL-MCNC: 8.6 MG/DL (ref 8.7–10.5)
CHLORIDE SERPL-SCNC: 105 MMOL/L (ref 95–110)
CO2 SERPL-SCNC: 22 MMOL/L (ref 23–29)
CREAT SERPL-MCNC: 1.1 MG/DL (ref 0.5–1.4)
DIFFERENTIAL METHOD: ABNORMAL
EOSINOPHIL # BLD AUTO: 0 K/UL (ref 0–0.5)
EOSINOPHIL NFR BLD: 0 % (ref 0–8)
ERYTHROCYTE [DISTWIDTH] IN BLOOD BY AUTOMATED COUNT: 16.5 % (ref 11.5–14.5)
EST. GFR  (NO RACE VARIABLE): 47.7 ML/MIN/1.73 M^2
GLUCOSE SERPL-MCNC: 157 MG/DL (ref 70–110)
HCT VFR BLD AUTO: 34.2 % (ref 37–48.5)
HGB BLD-MCNC: 10.9 G/DL (ref 12–16)
IMM GRANULOCYTES # BLD AUTO: 0.07 K/UL (ref 0–0.04)
IMM GRANULOCYTES NFR BLD AUTO: 0.6 % (ref 0–0.5)
LYMPHOCYTES # BLD AUTO: 1.8 K/UL (ref 1–4.8)
LYMPHOCYTES NFR BLD: 16 % (ref 18–48)
MAGNESIUM SERPL-MCNC: 2.1 MG/DL (ref 1.6–2.6)
MCH RBC QN AUTO: 28.3 PG (ref 27–31)
MCHC RBC AUTO-ENTMCNC: 31.9 G/DL (ref 32–36)
MCV RBC AUTO: 89 FL (ref 82–98)
MONOCYTES # BLD AUTO: 0.8 K/UL (ref 0.3–1)
MONOCYTES NFR BLD: 7.2 % (ref 4–15)
NEUTROPHILS # BLD AUTO: 8.8 K/UL (ref 1.8–7.7)
NEUTROPHILS NFR BLD: 76.1 % (ref 38–73)
NRBC BLD-RTO: 0 /100 WBC
PHOSPHATE SERPL-MCNC: 2.8 MG/DL (ref 2.7–4.5)
PLATELET # BLD AUTO: 219 K/UL (ref 150–450)
PMV BLD AUTO: 11 FL (ref 9.2–12.9)
POTASSIUM SERPL-SCNC: 4.3 MMOL/L (ref 3.5–5.1)
PROT SERPL-MCNC: 6 G/DL (ref 6–8.4)
RBC # BLD AUTO: 3.85 M/UL (ref 4–5.4)
SODIUM SERPL-SCNC: 135 MMOL/L (ref 136–145)
WBC # BLD AUTO: 11.52 K/UL (ref 3.9–12.7)

## 2022-11-15 PROCEDURE — 94761 N-INVAS EAR/PLS OXIMETRY MLT: CPT

## 2022-11-15 PROCEDURE — 94640 AIRWAY INHALATION TREATMENT: CPT

## 2022-11-15 PROCEDURE — 25000242 PHARM REV CODE 250 ALT 637 W/ HCPCS: Performed by: STUDENT IN AN ORGANIZED HEALTH CARE EDUCATION/TRAINING PROGRAM

## 2022-11-15 PROCEDURE — 80053 COMPREHEN METABOLIC PANEL: CPT | Performed by: STUDENT IN AN ORGANIZED HEALTH CARE EDUCATION/TRAINING PROGRAM

## 2022-11-15 PROCEDURE — 84100 ASSAY OF PHOSPHORUS: CPT | Performed by: STUDENT IN AN ORGANIZED HEALTH CARE EDUCATION/TRAINING PROGRAM

## 2022-11-15 PROCEDURE — 85025 COMPLETE CBC W/AUTO DIFF WBC: CPT | Performed by: STUDENT IN AN ORGANIZED HEALTH CARE EDUCATION/TRAINING PROGRAM

## 2022-11-15 PROCEDURE — 63600175 PHARM REV CODE 636 W HCPCS: Performed by: STUDENT IN AN ORGANIZED HEALTH CARE EDUCATION/TRAINING PROGRAM

## 2022-11-15 PROCEDURE — 83735 ASSAY OF MAGNESIUM: CPT | Performed by: STUDENT IN AN ORGANIZED HEALTH CARE EDUCATION/TRAINING PROGRAM

## 2022-11-15 PROCEDURE — 36415 COLL VENOUS BLD VENIPUNCTURE: CPT | Performed by: STUDENT IN AN ORGANIZED HEALTH CARE EDUCATION/TRAINING PROGRAM

## 2022-11-15 PROCEDURE — 25000003 PHARM REV CODE 250: Performed by: STUDENT IN AN ORGANIZED HEALTH CARE EDUCATION/TRAINING PROGRAM

## 2022-11-15 PROCEDURE — 94799 UNLISTED PULMONARY SVC/PX: CPT

## 2022-11-15 RX ORDER — OXYCODONE HYDROCHLORIDE 5 MG/1
5 TABLET ORAL EVERY 4 HOURS PRN
Qty: 8 TABLET | Refills: 0 | Status: SHIPPED | OUTPATIENT
Start: 2022-11-15 | End: 2023-04-18

## 2022-11-15 RX ADMIN — ALBUTEROL SULFATE 2.5 MG: 2.5 SOLUTION RESPIRATORY (INHALATION) at 07:11

## 2022-11-15 RX ADMIN — FAMOTIDINE 20 MG: 20 TABLET ORAL at 09:11

## 2022-11-15 RX ADMIN — ATORVASTATIN CALCIUM 40 MG: 40 TABLET, FILM COATED ORAL at 09:11

## 2022-11-15 RX ADMIN — DEXTROSE, SODIUM CHLORIDE, AND POTASSIUM CHLORIDE: 5; .45; .15 INJECTION INTRAVENOUS at 05:11

## 2022-11-15 RX ADMIN — DONEPEZIL HYDROCHLORIDE 10 MG: 10 TABLET ORAL at 09:11

## 2022-11-15 RX ADMIN — OXYCODONE 5 MG: 5 TABLET ORAL at 09:11

## 2022-11-15 RX ADMIN — OXYCODONE 5 MG: 5 TABLET ORAL at 04:11

## 2022-11-15 RX ADMIN — AMLODIPINE BESYLATE 10 MG: 10 TABLET ORAL at 09:11

## 2022-11-15 RX ADMIN — ESCITALOPRAM OXALATE 10 MG: 10 TABLET ORAL at 09:11

## 2022-11-15 RX ADMIN — ALBUTEROL SULFATE 2.5 MG: 2.5 SOLUTION RESPIRATORY (INHALATION) at 03:11

## 2022-11-15 RX ADMIN — LEVOTHYROXINE SODIUM 25 MCG: 25 TABLET ORAL at 05:11

## 2022-11-15 RX ADMIN — PANTOPRAZOLE SODIUM 40 MG: 40 TABLET, DELAYED RELEASE ORAL at 09:11

## 2022-11-15 RX ADMIN — ALBUTEROL SULFATE 2.5 MG: 2.5 SOLUTION RESPIRATORY (INHALATION) at 12:11

## 2022-11-15 NOTE — DISCHARGE SUMMARY
[facility]  General Surgery  Discharge Summary      Patient Name: Debbie Ding  MRN: [id number]  Admission Date: 11/14/2022  Hospital Length of Stay: 0 days  Discharge Date and Time:  11/15/2022 11:08 AM  Attending Physician: Benedicto Carcamo MD   Discharging Provider: Martinez García MD  Primary Care Provider: Carolyn Mejia MD    HPI:   I have seen the patient, reviewed the Resident's history and physical, assessment and plan. I have personally interviewed and examined the patient at bedside and: agree with the findings.      89y/o with pulmonary htn, essential htn, tye on cpap, sob being worked up, asthma, ckd 3, gastroparesis, VTE,  hld, dm2 (without insulin but with ckd),  and femoral hernia.  Her hernia is not symptomatic but she does notice it is growing.  She has some constipation, no trouble urinating and has some cough due to asthma (has not needed hospitalization in the last year for asthma).     She says her memory is good.  She takes care of herself (does her own shopping, cleaning and drives).  Her daughter lives with her.     Cbc, bmp reviewed, elevated glc c/w dm, ckd 3  Ct reviewed, films viewed, enlarging right femoral hernia  Ges reviewed, c/w gastroparesis  Egd reviewed, small hh  Ph reviewed, positive  Nuclear stress reviewed    Normal myocardial perfusion scan. There is no evidence of myocardial ischemia or infarction.    There is a  mild intensity fixed perfusion abnormality in the inferior wall of the left ventricle secondary to diaphragm attenuation.    The gated perfusion images showed an ejection fraction of 60% at rest. The gated perfusion images showed an ejection fraction of 68% post stress. Normal ejection fraction is greater than 53%.    There is normal wall motion at rest and post stress.    LV cavity size is normal at rest and normal at stress.    The EKG portion of this study is abnormal but not diagnostic.    The patient reported no chest pain during the  stress test.    There were no arrhythmias during stress.    There are no prior studies for comparison.  Echo reviewed   The left ventricle is normal in size with normal systolic function. The estimated ejection fraction is 65%.   Normal right ventricular size with normal right ventricular systolic function.   Indeterminate left ventricular diastolic function.   Mild tricuspid regurgitation.   The estimated PA systolic pressure is 40 mmHg.   Normal central venous pressure (3 mmHg).  Procedure(s) (LRB):  REPAIR, HERNIA, INGUINAL, LAPAROSCOPIC (FEMORAL REPAIR W/ MESH) (Right)  REPAIR, HERNIA, UMBILICAL (N/A)      Indwelling Lines/Drains at time of discharge:   Lines/Drains/Airways     Drain  Duration           Female External Urinary Catheter 11/14/22 2345 <1 day              Hospital Course: Patient underwent above procedure, had an uneventful postoperative course and was discharged home on POD#1        Goals of Care Treatment Preferences:  Code Status: Full Code      Consults:     Significant Diagnostic Studies: Labs:   BMP:   Recent Labs   Lab 11/15/22  0443   *   *   K 4.3      CO2 22*   BUN 16   CREATININE 1.1   CALCIUM 8.6*   MG 2.1       Pending Diagnostic Studies:     Procedure Component Value Units Date/Time    Specimen to Pathology, Surgery General Surgery [[id number]] Collected: 11/14/22 1257    Order Status: Sent Lab Status: In process Updated: 11/14/22 1617        Final Active Diagnoses:    Diagnosis Date Noted POA    PRINCIPAL PROBLEM:  Unilateral femoral hernia without obstruction or gangrene [K41.90] 10/12/2022 Yes      Problems Resolved During this Admission:      Discharged Condition: good    Disposition: Home or Self Care    Follow Up:   Follow-up Information     Benedicto Carcamo MD Follow up in 2 week(s).    Specialties: General Surgery, Bariatrics  Why: Post-op  Contact information:  [address]  [facility] [address] [phone]                       Patient  Instructions:      Diet Adult Regular     Lifting restrictions   Order Comments: No lifting greater than 10 pounds for 6 weeks from day of surgery.  No pushing/pulling such as vacuuming or raking.  No straining, avoid constipation and take stool softeners as described and laxatives as needed.  No driving while on narcotics and until you can react quickly without pain.     No dressing needed   Order Comments: WOUND CARE  You have skin glue over your incision(s)  This will slowly flake away in about 10 days  It is okay to shower starting the day after surgery  Can pat your incision dry  Please do not scrub hard over your incisions  Please do not pick the glue off or it will reopen the wound     Notify your health care provider if you experience any of the following:  temperature >100.4     Notify your health care provider if you experience any of the following:  severe uncontrolled pain     Notify your health care provider if you experience any of the following:  redness, tenderness, or signs of infection (pain, swelling, redness, odor or green/yellow discharge around incision site)     Medications:  Reconciled Home Medications:      Medication List      START taking these medications    oxyCODONE 5 MG immediate release tablet  Commonly known as: ROXICODONE  Take 1 tablet (5 mg total) by mouth every 4 (four) hours as needed for Pain.        CONTINUE taking these medications    * albuterol 2.5 mg /3 mL (0.083 %) nebulizer solution  Commonly known as: PROVENTIL  Take 2.5 mg by nebulization every 6 (six) hours as needed. Rescue     * albuterol 90 mcg/actuation inhaler  Commonly known as: PROVENTIL/VENTOLIN HFA  INHALE 2 PUFFS BY MOUTH EVERY 6 HRS AS NEEDED     amLODIPine 10 MG tablet  Commonly known as: NORVASC  TAKE 1 TABLET ONCE DAILY     apixaban 2.5 mg Tab  Commonly known as: ELIQUIS  Take 1 tablet (2.5 mg total) by mouth once daily.     atorvastatin 40 MG tablet  Commonly known as: LIPITOR  Take 1 tablet (40 mg  total) by mouth once daily.     chlorhexidine 0.12 % solution  Commonly known as: PERIDEX  SWISH WITH 10ML FOR 30 seconds THEN EXPECTORATE TWICE DAILY     clobetasoL 0.05 % external solution  Commonly known as: TEMOVATE  Pt to mix in 1 jar of cerave cream and apply to affected areas bid     cyproheptadine 4 mg tablet  Commonly known as: PERIACTIN  Take 1 tablet (4 mg total) by mouth 3 (three) times daily as needed (appetite).     * diclofenac sodium 1 % Gel  Commonly known as: VOLTAREN  Apply 2 g topically 4 (four) times daily.     * diclofenac sodium 1 % Gel  Commonly known as: VOLTAREN  Apply 2 g topically 4 (four) times daily.     donepeziL 10 MG tablet  Commonly known as: ARICEPT  TAKE 1 TABLET EVERY MORNING.  NO FURTHER REFILL WITHOUT APPOINTMENT     doxepin 10 MG capsule  Commonly known as: SINEQUAN  TAKE 1 CAPSULE AT BEDTIME  FOR ITCHING     EScitalopram oxalate 10 MG tablet  Commonly known as: LEXAPRO  Take by mouth.     esomeprazole 40 MG capsule  Commonly known as: NEXIUM  Take 1 capsule (40 mg total) by mouth 2 (two) times daily.     estradioL 0.01 % (0.1 mg/gram) vaginal cream  Commonly known as: ESTRACE  Place vaginally.     famotidine 20 MG tablet  Commonly known as: PEPCID  Take 1 tablet (20 mg total) by mouth daily as needed for Heartburn (breakthrough heartburn and acid reflux not controlled with Nexium).     fluocinonide 0.05 % external solution  Commonly known as: LIDEX  AAA scalp qday - bid prn pruritus     fluticasone-salmeterol 500-50 mcg/dose 500-50 mcg/dose Dsdv diskus inhaler  Commonly known as: ADVAIR DISKUS  Inhale 1 puff into the lungs 2 (two) times daily. Controller     FREESTYLE ALEXSANDRA 14 DAY READER Misc  Generic drug: flash glucose scanning reader  1 Device by Misc.(Non-Drug; Combo Route) route 2 (two) times a day.     FREESTYLE ALEXSANDRA 14 DAY SENSOR Kit  Generic drug: flash glucose sensor  1 Device by Misc.(Non-Drug; Combo Route) route 2 (two) times a day.     * glycopyrrolate 2 MG  Tab  Commonly known as: ROBINUL  TAKE 1 TABLET TWICE A DAY     * glycopyrrolate 2 MG Tab  Commonly known as: ROBINUL  Take 2 mg by mouth.     * ketoconazole 2 % shampoo  Commonly known as: NIZORAL  Wash hair with medicated shampoo at least 2x/week - let sit on scalp at least 5 minutes prior to rinsing     * ketoconazole 2 % cream  Commonly known as: NIZORAL  Apply topically once daily.     levalbuterol 45 mcg/actuation inhaler  Commonly known as: XOPENEX HFA  Inhale into the lungs.     levothyroxine 25 MCG tablet  Commonly known as: SYNTHROID  Take 1 tablet by mouth every day, except hold on Sundays.     LIDOcaine VISCOUS 2 % Soln  Generic drug: LIDOcaine HCl 2%  Take by mouth.     meclizine 25 mg tablet  Commonly known as: ANTIVERT  Take 1 tablet (25 mg total) by mouth 2 (two) times daily as needed for Dizziness.     memantine 10 MG Tab  Commonly known as: NAMENDA  Take 10 mg by mouth once daily.     mirtazapine 15 MG tablet  Commonly known as: REMERON  TAKE 1 TABLET EVERY EVENING     nystatin 100,000 unit/mL suspension  Commonly known as: MYCOSTATIN  SMARTSI Teaspoon By Mouth 4 Times Daily     pantoprazole 40 MG tablet  Commonly known as: PROTONIX  Take 40 mg by mouth 2 (two) times daily.     prednisoLONE acetate 1 % Drps  Commonly known as: PRED FORTE  3 drops 2 (two) times daily.     predniSONE 10 MG tablet  Commonly known as: DELTASONE  Take 2 Tablet po Qam x 7 days, take 1 Tablet po Qam x 7 days, take 1/2 a tablet po Qam x 7 days then stop     RESTASIS 0.05 % ophthalmic emulsion  Generic drug: cycloSPORINE         * This list has 8 medication(s) that are the same as other medications prescribed for you. Read the directions carefully, and ask your doctor or other care provider to review them with you.              Time spent on the discharge of patient: 15 minutes    Martinez García MD  General Surgery  [facility] - Surgery

## 2022-11-15 NOTE — PLAN OF CARE
Patient AAO X 4, VSS. Gave PRN once for pain. Abdominal binder in place with guaze and Tegaderm on the umbilicus. Lap site CELE underneath. D5 1/2 NS with KCL going at 50cc/hr. Bed locked at lowest setting, yellow socks on, call bell in reach. Remains free from falls.

## 2022-11-15 NOTE — OP NOTE
DATE OF PROCEDURE: 11/14/2022    PRE OP DIAGNOSIS: Unilateral femoral hernia without obstruction or gangrene, recurrence not specified [K41.90] and umbilical hernia    POST OP DIAGNOSIS: Unilateral femoral hernia without obstruction or gangrene, recurrence not specified [K41.90] and umbilical hernia    PROCEDURE: Procedure(s) (LRB):  REPAIR, HERNIA, INGUINAL, LAPAROSCOPIC (FEMORAL REPAIR W/ MESH) (Right)  REPAIR, HERNIA, UMBILICAL (N/A)    Surgeon(s) and Role:     * Benedicto Carcamo MD - Primary     * Martinez García MD - Resident - Chief     * Landry Zuniga MD - Resident - Assisting    ANESTHESIA: General.     PROCEDURE IN DETAIL: The patient was placed under general anesthesia. Cuevas   was placed. The abdomen was prepped and draped in the usual manner. Access to   the preperitoneal space was gained by making an incision in the inferior   umbilicus, bluntly dissecting down to the rectus sheath, sharply incising   this and bluntly dissecting the rectus muscle to the posterior sheath and   placing a balloon dilator from this point to the pubic symphysis and dilating   under direct vision to less than 30 puffs. The balloon was removed, the trocar was left   in its place and pneumopreperitoneum to 8 mm of CO2 gas was obtained. Two 5 mm   trocars were placed in the lower midline under direct vision. Pubic symphysis   and ramus were dissected free on both sides. This was taken to the anterior   superior iliac spine on both sides. There were no direct hernias, no lipomas of cord, no indirect hernias and right femoral hernias.  These were brought deeply and freely into the preperitoneal space. 1 Max 3D meshes were soaked in antibiotic solution, placed in the preperitoneal space and placed appropriately in the inguinal region.  Eacy mesh was tacked along the anterior border, to the symphysis, ramis and when two patches were used to each other in the midline The preperitoneum was inspected for hemostasis.  Trocars were removed under direct vision. Prior to removing the last trocar, pneumopreperitoneum was allowed to escape and 20 mL of Marcaine solution instilled.  We got around the umbilical stalk bluntly and released it with cautery.  We then cleared the fascia at the umbilicus and removed the sac containing pre-peritoneal fat.  We placed an appropriate size piece of mesh and anchored this to fascia with Vicryl.  We then closed fascia primarily over the mesh with PDS and also closed our anterior fascia incision with PDS.  The umbilical stalk was tacked back down with vicryl suture and the umbilical wound was closed with Monocryl.  The laparoscopic incisions were closed with Plain gut suture.  Sterile dressings were applied and the patient tolerated the procedure well and was brought to Recovery Room in stable condition. Sponge and needle counts were correct at the end of the case.    Blood loss: min Pathology: none Complications: none

## 2022-11-15 NOTE — SUBJECTIVE & OBJECTIVE
Interval History: Did well overnight.  Pain controlled, toloerating diet.    Medications:  Continuous Infusions:   dextrose 5 % and 0.45 % NaCl with KCl 20 mEq 50 mL/hr at 11/15/22 0545     Scheduled Meds:   albuterol sulfate  2.5 mg Nebulization Q4H    amLODIPine  10 mg Oral Daily    atorvastatin  40 mg Oral Daily    donepeziL  10 mg Oral Daily    enoxaparin  40 mg Subcutaneous Daily    EScitalopram oxalate  10 mg Oral Daily    famotidine  20 mg Oral Daily    levothyroxine  25 mcg Oral Before breakfast    pantoprazole  40 mg Oral Daily    pantoprazole  40 mg Oral BID     PRN Meds:acetaminophen, fentaNYL, HYDROmorphone, midazolam, ondansetron, oxyCODONE, sodium chloride 0.9%, sodium chloride 0.9%     Review of patient's allergies indicates:   Allergen Reactions    Melatonin      Other reaction(s): Other (See Comments)  Sleep Walks and has unnatural dreams    Talwin compound Hives    Talwin [pentazocine lactate] Hallucinations    Trazodone Other (See Comments)     Nightmares/sleep walk      Bentyl [dicyclomine] Anxiety     Objective:     Vital Signs (Most Recent):  Temp: 98 °F (36.7 °C) (11/15/22 0430)  Pulse: 71 (11/15/22 0430)  Resp: 18 (11/15/22 0430)  BP: (!) 147/68 (11/15/22 0430)  SpO2: 96 % (11/15/22 0430)   Vital Signs (24h Range):  Temp:  [96.4 °F (35.8 °C)-98 °F (36.7 °C)] 98 °F (36.7 °C)  Pulse:  [71-88] 71  Resp:  [10-19] 18  SpO2:  [92 %-100 %] 96 %  BP: (121-183)/(58-77) 147/68     Weight: 60.3 kg (132 lb 15 oz)  Body mass index is 22.12 kg/m².    Intake/Output - Last 3 Shifts         11/13 0700  11/14 0659 11/14 0700  11/15 0659 11/15 0700 11/16 0659    P.O.  360     IV Piggyback  1000     Total Intake(mL/kg)  1360 (22.6)     Urine (mL/kg/hr)  1975     Total Output  1975     Net  -615            Urine Occurrence  3 x             Physical Exam  Vitals reviewed.   Constitutional:       Appearance: She is well-developed.   HENT:      Head: Normocephalic and atraumatic.   Eyes:      General:         Right  eye: No discharge.         Left eye: No discharge.   Cardiovascular:      Rate and Rhythm: Normal rate and regular rhythm.   Pulmonary:      Effort: Pulmonary effort is normal. No respiratory distress.   Abdominal:      Comments: Abdomen soft, non-distended, appropriately tender to palpation.  Incisions c/d/i   Musculoskeletal:         General: Normal range of motion.      Cervical back: Normal range of motion and neck supple.   Skin:     General: Skin is warm and dry.   Neurological:      Mental Status: She is alert and oriented to person, place, and time.   Psychiatric:         Behavior: Behavior normal.       Significant Labs:  I have reviewed all pertinent lab results within the past 24 hours.    Significant Diagnostics:  I have reviewed all pertinent imaging results/findings within the past 24 hours.

## 2022-11-15 NOTE — HOSPITAL COURSE
Patient underwent above procedure, had an uneventful postoperative course and was discharged home on POD#1

## 2022-11-15 NOTE — ASSESSMENT & PLAN NOTE
89 yo F s/p uneventful laparoscopic femoral hernia repair on 11/14    -Doing well this AM, will plan to d/c today

## 2022-11-15 NOTE — PROGRESS NOTES
[facility]  General Surgery  Progress Note    Subjective:     History of Present Illness:  No notes on file    Post-Op Info:  Procedure(s) (LRB):  REPAIR, HERNIA, INGUINAL, LAPAROSCOPIC (FEMORAL REPAIR W/ MESH) (Right)  REPAIR, HERNIA, UMBILICAL (N/A)   1 Day Post-Op     Interval History: Did well overnight.  Pain controlled, toloerating diet.    Medications:  Continuous Infusions:   dextrose 5 % and 0.45 % NaCl with KCl 20 mEq 50 mL/hr at 11/15/22 0545     Scheduled Meds:   albuterol sulfate  2.5 mg Nebulization Q4H    amLODIPine  10 mg Oral Daily    atorvastatin  40 mg Oral Daily    donepeziL  10 mg Oral Daily    enoxaparin  40 mg Subcutaneous Daily    EScitalopram oxalate  10 mg Oral Daily    famotidine  20 mg Oral Daily    levothyroxine  25 mcg Oral Before breakfast    pantoprazole  40 mg Oral Daily    pantoprazole  40 mg Oral BID     PRN Meds:acetaminophen, fentaNYL, HYDROmorphone, midazolam, ondansetron, oxyCODONE, sodium chloride 0.9%, sodium chloride 0.9%     Review of patient's allergies indicates:   Allergen Reactions    Melatonin      Other reaction(s): Other (See Comments)  Sleep Walks and has unnatural dreams    Talwin compound Hives    Talwin [pentazocine lactate] Hallucinations    Trazodone Other (See Comments)     Nightmares/sleep walk      Bentyl [dicyclomine] Anxiety     Objective:     Vital Signs (Most Recent):  Temp: 98 °F (36.7 °C) (11/15/22 0430)  Pulse: 71 (11/15/22 0430)  Resp: 18 (11/15/22 0430)  BP: (!) 147/68 (11/15/22 0430)  SpO2: 96 % (11/15/22 0430)   Vital Signs (24h Range):  Temp:  [96.4 °F (35.8 °C)-98 °F (36.7 °C)] 98 °F (36.7 °C)  Pulse:  [71-88] 71  Resp:  [10-19] 18  SpO2:  [92 %-100 %] 96 %  BP: (121-183)/(58-77) 147/68     Weight: 60.3 kg (132 lb 15 oz)  Body mass index is 22.12 kg/m².    Intake/Output - Last 3 Shifts         11/13 0700  11/14 0659 11/14 0700  11/15 0659 11/15 0700  11/16 0659    P.O.  360     IV Piggyback  1000     Total Intake(mL/kg)   1360 (22.6)     Urine (mL/kg/hr)  1975     Total Output  1975     Net  -615            Urine Occurrence  3 x             Physical Exam  Vitals reviewed.   Constitutional:       Appearance: She is well-developed.   HENT:      Head: Normocephalic and atraumatic.   Eyes:      General:         Right eye: No discharge.         Left eye: No discharge.   Cardiovascular:      Rate and Rhythm: Normal rate and regular rhythm.   Pulmonary:      Effort: Pulmonary effort is normal. No respiratory distress.   Abdominal:      Comments: Abdomen soft, non-distended, appropriately tender to palpation.  Incisions c/d/i   Musculoskeletal:         General: Normal range of motion.      Cervical back: Normal range of motion and neck supple.   Skin:     General: Skin is warm and dry.   Neurological:      Mental Status: She is alert and oriented to person, place, and time.   Psychiatric:         Behavior: Behavior normal.       Significant Labs:  I have reviewed all pertinent lab results within the past 24 hours.    Significant Diagnostics:  I have reviewed all pertinent imaging results/findings within the past 24 hours.    Assessment/Plan:     * Unilateral femoral hernia without obstruction or gangrene  89 yo F s/p uneventful laparoscopic femoral hernia repair on 11/14    -Doing well this AM, will plan to d/c today        Martinez García MD  General Surgery  [facility] - Surgery

## 2022-11-18 LAB
FINAL PATHOLOGIC DIAGNOSIS: NORMAL
GROSS: NORMAL
Lab: NORMAL

## 2022-11-18 NOTE — PLAN OF CARE
[facility]  Discharge Final Note    Primary Care Provider: Carolyn Mejia MD    Expected Discharge Date: 11/15/2022    Final Discharge Note (most recent)       Final Note - 11/15/22 1555          Final Note    Assessment Type Final Discharge Note     Anticipated Discharge Disposition Home or Self Care     What phone number can be called within the next 1-3 days to see how you are doing after discharge? [phone]     Hospital Resources/Appts/Education Provided Provided patient/caregiver with written discharge plan information;Appointments scheduled by Navigator/Coordinator                   Future Appointments   Date Time Provider Department Center   11/29/2022 10:30 AM Benedicto Carcamo MD [facility]   12/8/2022  1:30 PM [facility]          Contact Info       Benedicto Carcamo MD   Specialty: General Surgery, Bariatrics    [facility]  [facility] [address]   Phone: [phone]       Next Steps: Follow up in 2 week(s)    Instructions: Post-op

## 2022-11-29 ENCOUNTER — OFFICE VISIT (OUTPATIENT)
Dept: SURGERY | Facility: CLINIC | Age: 87
End: 2022-11-29
Payer: MEDICARE

## 2022-11-29 VITALS
HEIGHT: 65 IN | DIASTOLIC BLOOD PRESSURE: 63 MMHG | BODY MASS INDEX: 21.89 KG/M2 | HEART RATE: 81 BPM | WEIGHT: 131.38 LBS | SYSTOLIC BLOOD PRESSURE: 140 MMHG

## 2022-11-29 DIAGNOSIS — Z09 POSTOP CHECK: Primary | ICD-10-CM

## 2022-11-29 PROBLEM — K41.90 UNILATERAL FEMORAL HERNIA WITHOUT OBSTRUCTION OR GANGRENE: Status: RESOLVED | Noted: 2022-10-12 | Resolved: 2022-11-29

## 2022-11-29 PROCEDURE — 99999 PR PBB SHADOW E&M-EST. PATIENT-LVL IV: ICD-10-PCS | Mod: PBBFAC,,, | Performed by: SURGERY

## 2022-11-29 PROCEDURE — 99214 OFFICE O/P EST MOD 30 MIN: CPT | Mod: PBBFAC | Performed by: SURGERY

## 2022-11-29 PROCEDURE — 99999 PR PBB SHADOW E&M-EST. PATIENT-LVL IV: CPT | Mod: PBBFAC,,, | Performed by: SURGERY

## 2022-11-29 PROCEDURE — 99024 POSTOP FOLLOW-UP VISIT: CPT | Mod: POP,,, | Performed by: SURGERY

## 2022-11-29 PROCEDURE — 99024 PR POST-OP FOLLOW-UP VISIT: ICD-10-PCS | Mod: POP,,, | Performed by: SURGERY

## 2022-11-29 NOTE — PROGRESS NOTES
I have seen the patient, reviewed the Resident's history and physical, assessment and plan. I have personally interviewed and examined the patient at bedside and: agree with the findings.     S/p lap rih with mesh and umbilical hernia repair with mesh 11/14/22.  She is doing well.  Wounds clear, small right groin seroma, no hernias.  Light duty until Dec 26 and rtc prn.

## 2022-11-29 NOTE — LETTER
November 29, 2022        Carolyn Mejia MD  [address]  [facility] [address]             [facility]  [address]  [facility] [address]  Phone: [phone]   Patient: Debbie Ding   MR Number: [id number]   YOB: 1932   Date of Visit: 11/29/2022       Dear Dr. Mejia:    Thank you for referring Debbie Ding to me for evaluation. Attached you will find relevant portions of my assessment and plan of care.    If you have questions, please do not hesitate to call me. I look forward to following Debbie Ding along with you.    Sincerely,      Benedicto Carcamo MD            CC  No Recipients    Enclosure

## 2022-11-29 NOTE — PROGRESS NOTES
[facility]  Post Op Visit    SUBJECTIVE:  Debbie Ding is a 89 y.o. female who presents to clinic today for post op follow up after lap rih (femoral) with mesh and umbilical hernia repair with mesh on 11/14/22. She endorses mild, tolerable pain at umbilicus. She denies fevers, chills, nausea, vomiting, diarrhea and is returning to her usual activity level. She is tolerating diet with normal appetite and bowel function and denies redness around or drainage from incisions.    OBJECTIVE:  Vitals:    11/29/22 1030   BP: (!) 140/63   Pulse: 81     Body mass index is 21.87 kg/m².    General: female in NAD. Not toxic appearing.   HENT: EOM intact.   Pulmonary: No respiratory distress. Effort normal.  Cardiovascular: Regular rate.   Abdomen: soft, non-tender, non-distended  Skin: Incisions are healing well without signs of infection. No erythema, drainage, or increased warmth noted. Umbilical incision with crusting looks normal  : small right groin seroma    ASSESSMENT/PLAN:    Debbie Ding is a 89 y.o. y/o female who presents to clinic today for f/u s/p lap rih (femoral) with mesh and umbilical hernia repair with mesh on 11/14/22. Clinically improving and meeting all post op milestones     - Patient is advised to avoid heavy lifting or strenuous activity for another 4 weeks.  - Follow up w/ PCP for chronic constipation management   - Follow-up PRN. Call clinic with any questions or concerns  - All questions answered; patient is comfortable with the above follow-up plan and verbalized understanding.    GUIDO Pineda-S2  [facility]

## 2022-11-30 ENCOUNTER — EXTERNAL CHRONIC CARE MANAGEMENT (OUTPATIENT)
Dept: PRIMARY CARE CLINIC | Facility: CLINIC | Age: 87
End: 2022-11-30
Payer: MEDICARE

## 2022-11-30 ENCOUNTER — TELEPHONE (OUTPATIENT)
Dept: INTERNAL MEDICINE | Facility: CLINIC | Age: 87
End: 2022-11-30
Payer: MEDICARE

## 2022-11-30 ENCOUNTER — TELEPHONE (OUTPATIENT)
Dept: PULMONOLOGY | Facility: CLINIC | Age: 87
End: 2022-11-30
Payer: MEDICARE

## 2022-11-30 PROCEDURE — 99490 CHRNC CARE MGMT STAFF 1ST 20: CPT | Mod: PBBFAC,25 | Performed by: INTERNAL MEDICINE

## 2022-11-30 PROCEDURE — 99439 CHRNC CARE MGMT STAF EA ADDL: CPT | Mod: S$PBB,,, | Performed by: INTERNAL MEDICINE

## 2022-11-30 PROCEDURE — 99490 PR CHRONIC CARE MGMT, 1ST 20 MIN: ICD-10-PCS | Mod: S$PBB,,, | Performed by: INTERNAL MEDICINE

## 2022-11-30 PROCEDURE — 99439 CHRNC CARE MGMT STAF EA ADDL: CPT | Mod: PBBFAC | Performed by: INTERNAL MEDICINE

## 2022-11-30 PROCEDURE — 99439 PR CHRONIC CARE MGMT, EA ADDTL 20 MIN: ICD-10-PCS | Mod: S$PBB,,, | Performed by: INTERNAL MEDICINE

## 2022-11-30 PROCEDURE — 99490 CHRNC CARE MGMT STAFF 1ST 20: CPT | Mod: S$PBB,,, | Performed by: INTERNAL MEDICINE

## 2022-11-30 NOTE — TELEPHONE ENCOUNTER
----- Message from Benedicto Carcamo MD sent at 11/29/2022 10:58 AM CST -----  She is doing well after hernia surgery.  She is complaining of some constipation though.  Could you please help her with that?  Thanks.

## 2022-11-30 NOTE — TELEPHONE ENCOUNTER
Spoke with patient informing her that I was calling to schedule appointment with pulmonary from referral in the system. Patients previous appointment was cancelled due to DR. Chávez having to book out. I advised patient that the first available was 1/12/2023 with Dr. Mitchell. Patient accepted the appointment.

## 2022-12-02 ENCOUNTER — TELEPHONE (OUTPATIENT)
Dept: INTERNAL MEDICINE | Facility: CLINIC | Age: 87
End: 2022-12-02
Payer: MEDICARE

## 2022-12-02 NOTE — TELEPHONE ENCOUNTER
----- Message from Gordon Mai sent at 12/2/2022  9:00 AM CST -----  Contact: [phone]  Pt wants a call back about she said had an appt n Jan with her PCP but I don't see anything and the next appt is in March. Pt wants a call back.

## 2022-12-08 ENCOUNTER — OFFICE VISIT (OUTPATIENT)
Dept: CARDIOLOGY | Facility: CLINIC | Age: 87
End: 2022-12-08
Payer: MEDICARE

## 2022-12-08 VITALS
HEIGHT: 65 IN | HEART RATE: 76 BPM | SYSTOLIC BLOOD PRESSURE: 128 MMHG | WEIGHT: 132.5 LBS | BODY MASS INDEX: 22.08 KG/M2 | DIASTOLIC BLOOD PRESSURE: 60 MMHG

## 2022-12-08 DIAGNOSIS — Z86.16 HISTORY OF COVID-19: ICD-10-CM

## 2022-12-08 DIAGNOSIS — I26.99 BILATERAL PULMONARY EMBOLISM: ICD-10-CM

## 2022-12-08 DIAGNOSIS — G47.33 OSA (OBSTRUCTIVE SLEEP APNEA): ICD-10-CM

## 2022-12-08 DIAGNOSIS — I10 PRIMARY HYPERTENSION: ICD-10-CM

## 2022-12-08 DIAGNOSIS — E78.5 HYPERLIPIDEMIA ASSOCIATED WITH TYPE 2 DIABETES MELLITUS: ICD-10-CM

## 2022-12-08 DIAGNOSIS — E11.22 TYPE 2 DIABETES MELLITUS WITH STAGE 3A CHRONIC KIDNEY DISEASE, WITHOUT LONG-TERM CURRENT USE OF INSULIN: ICD-10-CM

## 2022-12-08 DIAGNOSIS — Z86.718 HISTORY OF DEEP VENOUS THROMBOSIS: ICD-10-CM

## 2022-12-08 DIAGNOSIS — R06.02 SOB (SHORTNESS OF BREATH): Primary | ICD-10-CM

## 2022-12-08 DIAGNOSIS — Z86.711 HISTORY OF PULMONARY EMBOLISM: ICD-10-CM

## 2022-12-08 DIAGNOSIS — E11.69 HYPERLIPIDEMIA ASSOCIATED WITH TYPE 2 DIABETES MELLITUS: ICD-10-CM

## 2022-12-08 DIAGNOSIS — N18.31 TYPE 2 DIABETES MELLITUS WITH STAGE 3A CHRONIC KIDNEY DISEASE, WITHOUT LONG-TERM CURRENT USE OF INSULIN: ICD-10-CM

## 2022-12-08 DIAGNOSIS — Z79.01 CHRONIC ANTICOAGULATION: ICD-10-CM

## 2022-12-08 PROCEDURE — 99215 PR OFFICE/OUTPT VISIT, EST, LEVL V, 40-54 MIN: ICD-10-PCS | Mod: S$PBB,,, | Performed by: INTERNAL MEDICINE

## 2022-12-08 PROCEDURE — 99999 PR PBB SHADOW E&M-EST. PATIENT-LVL V: ICD-10-PCS | Mod: PBBFAC,,, | Performed by: INTERNAL MEDICINE

## 2022-12-08 PROCEDURE — 99215 OFFICE O/P EST HI 40 MIN: CPT | Mod: S$PBB,,, | Performed by: INTERNAL MEDICINE

## 2022-12-08 PROCEDURE — 99999 PR PBB SHADOW E&M-EST. PATIENT-LVL V: CPT | Mod: PBBFAC,,, | Performed by: INTERNAL MEDICINE

## 2022-12-08 PROCEDURE — 99215 OFFICE O/P EST HI 40 MIN: CPT | Mod: PBBFAC | Performed by: INTERNAL MEDICINE

## 2022-12-08 NOTE — PROGRESS NOTES
[facility]      Chief Complaint   Patient presents with    History of deep venous thrombosis    Follow-up       Patient ID: Debbie Ding is a 89 y.o. 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  Pertinent history/events as follows:     -At our initial clinic visit on 7/21/202, Mrs. Ding reported being diagnosed with RLE DVT and bilateral PE in 2019.  She was treated with Eliquis 5 mg bid up until 1 month ago, at which time she was switched to Xarelto 10 mg daily because of insurance issues.  She reports no RLE edema or pain.  Her main complaint is SOB, which she states became worse after being diagnosed with bilateral PE, and progressed over time to the point where she has significant difficulty breathing.  She reports no chest pain or chest discomfort. Echo from 7/19/2022 with EF 65%; estimated PASP 40 mmhg.  Plan:   SOB- Etiology unclear.  Given history of bilateral PE, check CTA chest.  Pt has several risk factors for CAD.  If no evidence of residual PE, will proceed with nuclear stress test to evaluate for myocardial ischemia.  Pt also has history of asthma.  Therefore, if cardiac workup is unrevealing, will refer to Pulmonary for evaluation.    History of RLE DVT/Bilateral PE- Check CTA chest and BLE venous ultrasound.  Continue Xarelto 10 mg daily.  HTN- Continue current medications.  HLD-  Start atorvastatin 40 mg daily.    HPI:  Mrs. Ding reports continued SOB as described at clinic visit on 7/21/2022.  CTA Chest on 7/25/2022 revealed no CT evidence of pulmonary embolus to the subsegmental branches.  There is dependent small airways disease, consider aspiration versus noninfectious small airways disease; findings appears stable since prior exam.  Mild scarring within the right middle lobe and lingula, possibly related to previous atypical infection.  BLE Venous Ultrasound on 7/29/2022 revealed no  evidence of a right or left lower extremity DVT.  A Baker's cyst measuring 4.55 cm x 0.92 cm was seen in the right extremity.  Nuclear Stress Test on 8/11/2022 demonstrated normal myocardial perfusion with no evidence of myocardial ischemia or infarction.    Past Medical History:   Diagnosis Date    Allergic rhinitis     Anticoagulant long-term use     Arthritis     Asthma     Bilateral pulmonary embolism 4/27/2019    Cataract     Chronic anticoagulation 9/28/2020    Depression     Diabetes mellitus     Fibromyalgia 7/2/2012    Fibromyalgia     GERD (gastroesophageal reflux disease)     Hypercholesterolemia 9/28/2020    Hypercholesterolemia 9/28/2020    Hypertension 7/2/2012    Thoracic or lumbosacral neuritis or radiculitis, unspecified     Thyroid disease     Ulcer      Past Surgical History:   Procedure Laterality Date    CATARACT EXTRACTION Bilateral     ESOPHAGOGASTRODUODENOSCOPY N/A 3/8/2022    Procedure: EGD (ESOPHAGOGASTRODUODENOSCOPY) with dilation-either hospital ok with Dr. Meza;  Surgeon: Rebeca Meza MD;  Location: [facility];  Service: Endoscopy;  Laterality: N/A;  1/11-eliquis hold ok see te-tb    ESOPHAGOGASTRODUODENOSCOPY N/A 2/28/2022    Procedure: EGD (ESOPHAGOGASTRODUODENOSCOPY) with dilation-either [facility] ok with Dr. Meza;  Surgeon: Rebeca Meza MD;  Location: [address]);  Service: Endoscopy;  Laterality: N/A;  1/11-eliquis hold ok see te-tb  COVID test on 2/25/22 at [facility]  2/22-confirmed appt arrival time with pt-Kpvt    FOOT NEUROMA SURGERY  1985    HYSTERECTOMY       Social History     Socioeconomic History    Marital status:    Occupational History     Comment:  - school    Tobacco Use    Smoking status: Never    Smokeless tobacco: Never   Substance and Sexual Activity    Alcohol use: No    Drug use: No    Sexual activity: Not Currently   Social History Narrative    Lives with daughter, Benjie.        Other daughter, Madina, drives her around.         She lives independently, except for driving.          Stretches in bed.  Walks in neighborhood, and in house several times a day.     Family History   Problem Relation Age of Onset    Diabetes Mother     Diabetes Brother     Emphysema Maternal Aunt     Melanoma Neg Hx     Asthma Neg Hx     Colon cancer Neg Hx     Esophageal cancer Neg Hx        Review of patient's allergies indicates:   Allergen Reactions    Melatonin      Other reaction(s): Other (See Comments)  Sleep Walks and has unnatural dreams    Talwin compound Hives    Talwin [pentazocine lactate] Hallucinations    Trazodone Other (See Comments)     Nightmares/sleep walk      Bentyl [dicyclomine] Anxiety       Medication List with Changes/Refills   Current Medications    ALBUTEROL (PROVENTIL) 2.5 MG /3 ML (0.083 %) NEBULIZER SOLUTION    Take 2.5 mg by nebulization every 6 (six) hours as needed. Rescue    ALBUTEROL (PROVENTIL/VENTOLIN HFA) 90 MCG/ACTUATION INHALER    INHALE 2 PUFFS BY MOUTH EVERY 6 HRS AS NEEDED    AMLODIPINE (NORVASC) 10 MG TABLET    TAKE 1 TABLET ONCE DAILY    APIXABAN (ELIQUIS) 2.5 MG TAB    Take 1 tablet (2.5 mg total) by mouth once daily.    ATORVASTATIN (LIPITOR) 40 MG TABLET    Take 1 tablet (40 mg total) by mouth once daily.    CHLORHEXIDINE (PERIDEX) 0.12 % SOLUTION    SWISH WITH 10ML FOR 30 seconds THEN EXPECTORATE TWICE DAILY    CLOBETASOL (TEMOVATE) 0.05 % EXTERNAL SOLUTION    Pt to mix in 1 jar of cerave cream and apply to affected areas bid    CYPROHEPTADINE (PERIACTIN) 4 MG TABLET    Take 1 tablet (4 mg total) by mouth 3 (three) times daily as needed (appetite).    DICLOFENAC SODIUM (VOLTAREN) 1 % GEL    Apply 2 g topically 4 (four) times daily.    DICLOFENAC SODIUM (VOLTAREN) 1 % GEL    Apply 2 g topically 4 (four) times daily.    DONEPEZIL (ARICEPT) 10 MG TABLET    TAKE 1 TABLET EVERY MORNING.  NO FURTHER REFILL WITHOUT APPOINTMENT    DOXEPIN (SINEQUAN) 10 MG CAPSULE    TAKE 1 CAPSULE AT BEDTIME  FOR ITCHING     ESCITALOPRAM OXALATE (LEXAPRO) 10 MG TABLET    Take by mouth.    ESOMEPRAZOLE (NEXIUM) 40 MG CAPSULE    Take 1 capsule (40 mg total) by mouth 2 (two) times daily.    FAMOTIDINE (PEPCID) 20 MG TABLET    Take 1 tablet (20 mg total) by mouth daily as needed for Heartburn (breakthrough heartburn and acid reflux not controlled with Nexium).    FLASH GLUCOSE SCANNING READER (FREESTYLE ALEXSANDRA 14 DAY READER) MISC    1 Device by Misc.(Non-Drug; Combo Route) route 2 (two) times a day.    FLASH GLUCOSE SENSOR (FREESTYLE ALEXSANDRA 14 DAY SENSOR) KIT    1 Device by Misc.(Non-Drug; Combo Route) route 2 (two) times a day.    FLUOCINONIDE (LIDEX) 0.05 % EXTERNAL SOLUTION    AAA scalp qday - bid prn pruritus    FLUTICASONE-SALMETEROL DISKUS INHALER 500-50 MCG    Inhale 1 puff into the lungs 2 (two) times daily. Controller    GLYCOPYRROLATE (ROBINUL) 2 MG TAB    TAKE 1 TABLET TWICE A DAY    GLYCOPYRROLATE (ROBINUL) 2 MG TAB    Take 2 mg by mouth.    KETOCONAZOLE (NIZORAL) 2 % CREAM    Apply topically once daily.    KETOCONAZOLE (NIZORAL) 2 % SHAMPOO    Wash hair with medicated shampoo at least 2x/week - let sit on scalp at least 5 minutes prior to rinsing    LEVALBUTEROL (XOPENEX HFA) 45 MCG/ACTUATION INHALER    Inhale into the lungs.    LEVOTHYROXINE (SYNTHROID) 25 MCG TABLET    Take 1 tablet by mouth every day, except hold on Sundays.    LIDOCAINE VISCOUS 2 % SOLUTION    Take by mouth.    MECLIZINE (ANTIVERT) 25 MG TABLET    Take 1 tablet (25 mg total) by mouth 2 (two) times daily as needed for Dizziness.    MEMANTINE (NAMENDA) 10 MG TAB    Take 10 mg by mouth once daily.    MIRTAZAPINE (REMERON) 15 MG TABLET    TAKE 1 TABLET EVERY EVENING    NYSTATIN (MYCOSTATIN) 100,000 UNIT/ML SUSPENSION    SMARTSI Teaspoon By Mouth 4 Times Daily    OXYCODONE (ROXICODONE) 5 MG IMMEDIATE RELEASE TABLET    Take 1 tablet (5 mg total) by mouth every 4 (four) hours as needed for Pain.    PANTOPRAZOLE (PROTONIX) 40 MG TABLET    Take 40 mg by mouth 2  "(two) times daily.    PREDNISOLONE ACETATE (PRED FORTE) 1 % DRPS    3 drops 2 (two) times daily.    PREDNISONE (DELTASONE) 10 MG TABLET    Take 2 Tablet po Qam x 7 days, take 1 Tablet po Qam x 7 days, take 1/2 a tablet po Qam x 7 days then stop    RESTASIS 0.05 % OPHTHALMIC EMULSION       Discontinued Medications    ESTRADIOL (ESTRACE) 0.01 % (0.1 MG/GRAM) VAGINAL CREAM    Place vaginally.       Review of Systems  Constitution: Denies chills, fever, and sweats.  HENT: Denies headaches or blurry vision.  Cardiovascular: Denies chest pain or irregular heart beat.  Respiratory: Positive for shortness of breath.  Gastrointestinal: Denies abdominal pain, nausea, or vomiting.  Musculoskeletal: Denies muscle cramps.  Neurological: Denies dizziness or focal weakness.  Psychiatric/Behavioral: Normal mental status.  Hematologic/Lymphatic: Denies bleeding problem or easy bruising/bleeding.  Skin: Denies rash or suspicious lesions    Physical Examination  /60   Pulse 76   Ht 5' 5" (1.651 m)   Wt 60.1 kg (132 lb 7.9 oz)   BMI 22.05 kg/m²     Constitutional: No acute distress, conversant  HEENT: Sclera anicteric, Pupils equal, round and reactive to light, extraocular motions intact, Oropharynx clear  Neck: No JVD, no carotid bruits  Cardiovascular: regular rate and rhythm, no murmur, rubs or gallops, normal S1/S2  Pulmonary: Clear to auscultation bilaterally  Abdominal: Abdomen soft, nontender, nondistended, positive bowel sounds  Extremities: No lower extremity edema,   Pulses:  Carotid pulses are 2+ on the right side, and 2+ on the left side.  Radial pulses are 2+ on the right side, and 2+ on the left side.   Femoral pulses are 2+ on the right side, and 2+ on the left side.  Popliteal pulses are 2+ on the right side, and 2+ on the left side.   Dorsalis pedis pulses are 2+ on the right side, and 2+ on the left side.   Posterior tibial pulses are 2+ on the right side, and 2+ on the left side.    Skin: No ecchymosis, " erythema, or ulcers  Psych: Alert and oriented x 3, appropriate affect  Neuro: CNII-XII intact, no focal deficits    Labs:  Most Recent Data  CBC:   Lab Results   Component Value Date    WBC 11.52 11/15/2022    HGB 10.9 (L) 11/15/2022    HCT 34.2 (L) 11/15/2022     11/15/2022    MCV 89 11/15/2022    RDW 16.5 (H) 11/15/2022     BMP:   Lab Results   Component Value Date     (L) 11/15/2022    K 4.3 11/15/2022     11/15/2022    CO2 22 (L) 11/15/2022    BUN 16 11/15/2022    CREATININE 1.1 11/15/2022     (H) 11/15/2022    CALCIUM 8.6 (L) 11/15/2022    MG 2.1 11/15/2022    PHOS 2.8 11/15/2022     LFTS;   Lab Results   Component Value Date    PROT 6.0 11/15/2022    ALBUMIN 2.9 (L) 11/15/2022    BILITOT 0.6 11/15/2022    AST 16 11/15/2022    ALKPHOS 54 (L) 11/15/2022    ALT 9 (L) 11/15/2022     COAGS:   Lab Results   Component Value Date    INR 0.9 06/20/2019     FLP:   Lab Results   Component Value Date    CHOL 277 (H) 04/02/2022     (H) 04/02/2022    LDLCALC 155.8 04/02/2022    TRIG 51 04/02/2022    CHOLHDL 40.1 04/02/2022     CARDIAC:   Lab Results   Component Value Date    TROPONINI <0.006 10/04/2021    BNP 36 10/04/2021       Imaging:    EKG 7/18/2022:  Normal sinus rhythm  Incomplete right bundle branch block    Nuclear Stress Test 8/11/2022:    Normal myocardial perfusion scan. There is no evidence of myocardial ischemia or infarction.    There is a  mild intensity fixed perfusion abnormality in the inferior wall of the left ventricle secondary to diaphragm attenuation.    The gated perfusion images showed an ejection fraction of 60% at rest. The gated perfusion images showed an ejection fraction of 68% post stress. Normal ejection fraction is greater than 53%.    There is normal wall motion at rest and post stress.    LV cavity size is normal at rest and normal at stress.    The EKG portion of this study is abnormal but not diagnostic.    The patient reported no chest pain during the  stress test.    There were no arrhythmias during stress.    There are no prior studies for comparison.    CTA Chest 7/25/2022:  1.  No CT evidence of pulmonary embolus to the subsegmental branches.    2.  Dependent small airways disease, consider aspiration versus noninfectious small airways disease; findings appears stable since prior exam.  Mild scarring within the right middle lobe and lingula, possibly related to previous atypical infection.    BLE Venous Ultrasound 7/29/2022:  There is no evidence of a right lower extremity DVT.  The right superficial femoral middle vein is normal.  A Baker's cyst measuring 4.55 cm x 0.92 cm was seen in the right extremity.  There is no evidence of a left lower extremity DVT.    Echo 7/19/2022:  The left ventricle is normal in size with normal systolic function. The estimated ejection fraction is 65%.  Normal right ventricular size with normal right ventricular systolic function.  Indeterminate left ventricular diastolic function.  Mild tricuspid regurgitation.  The estimated PA systolic pressure is 40 mmHg.  Normal central venous pressure (3 mmHg).    LLE Venous Ultrasound 12/14/2021:  No evidence of deep venous thrombosis in the left lower extremity.    BLE Venous Ultrasound 6/20/2019:  Positive DVT study with thrombus seen within the right popliteal and peroneal veins    Echo 7/19/2022:  The left ventricle is normal in size with normal systolic function. The estimated ejection fraction is 65%.  Normal right ventricular size with normal right ventricular systolic function.  Indeterminate left ventricular diastolic function.  Mild tricuspid regurgitation.  The estimated PA systolic pressure is 40 mmHg.  Normal central venous pressure (3 mmHg).    Assessment/Plan:  Debbie Ding is a 89 y.o. 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    1. SOB- Etiology unclear.  Likely related to  pathology inherent to the underlying lung parenchyma  based of current workup.  CTA Chest on 7/25/2022 revealed no CT evidence of pulmonary embolus to the subsegmental branches.  There is dependent small airways disease, consider aspiration versus noninfectious small airways disease; findings appears stable since prior exam.  Mild scarring within the right middle lobe and lingula, possibly related to previous atypical infection.   A Baker's cyst measuring 4.55 cm x 0.92 cm was seen in the right extremity.  Nuclear Stress Test on 8/11/2022 demonstrated normal myocardial perfusion with no evidence of myocardial ischemia or infarction.  Refer to Pulmonary for evaluation.      2. History of RLE DVT/Bilateral PE- CTA Chest on 7/25/2022 revealed no CT evidence of pulmonary embolus to the subsegmental branches.  BLE Venous Ultrasound on 7/29/2022 revealed no evidence of a right or left lower extremity DVT. Continue Xarelto 10 mg daily.    3. HTN- Continue current medications.    4. HLD-  Continue atorvastatin 40 mg daily.    5. JASE- Encourage CPAP usage.     Follow up in 6 months with lipids prior    Total duration of face to face visit time 45 minutes.  Total time spent counseling greater than fifty percent of total visit time.  Counseling included discussion regarding imaging findings, diagnosis, possibilities, treatment options, risks and benefits.  The patient had many questions regarding the options and long-term effects.    Benedicto Love MD, PhD  Interventional Cardiology

## 2022-12-08 NOTE — PATIENT INSTRUCTIONS
Assessment/Plan:  Debbie Ding is a 89 y.o. 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    1. SOB- Etiology unclear.  Likely related to pathology inherent to the underlying lung parenchyma  based of current workup.  CTA Chest on 7/25/2022 revealed no CT evidence of pulmonary embolus to the subsegmental branches.  There is dependent small airways disease, consider aspiration versus noninfectious small airways disease; findings appears stable since prior exam.  Mild scarring within the right middle lobe and lingula, possibly related to previous atypical infection.   A Baker's cyst measuring 4.55 cm x 0.92 cm was seen in the right extremity.  Nuclear Stress Test on 8/11/2022 demonstrated normal myocardial perfusion with no evidence of myocardial ischemia or infarction.  Refer to Pulmonary for evaluation.      2. History of RLE DVT/Bilateral PE- CTA Chest on 7/25/2022 revealed no CT evidence of pulmonary embolus to the subsegmental branches.  BLE Venous Ultrasound on 7/29/2022 revealed no evidence of a right or left lower extremity DVT. Continue Xarelto 10 mg daily.    3. HTN- Continue current medications.    4. HLD-  Continue atorvastatin 40 mg daily.    5. JASE- Encourage CPAP usage.     Follow up in 6 months with lipids prior

## 2022-12-13 ENCOUNTER — LAB VISIT (OUTPATIENT)
Dept: LAB | Facility: HOSPITAL | Age: 87
End: 2022-12-13
Payer: MEDICARE

## 2022-12-13 ENCOUNTER — OFFICE VISIT (OUTPATIENT)
Dept: INTERNAL MEDICINE | Facility: CLINIC | Age: 87
End: 2022-12-13
Payer: MEDICARE

## 2022-12-13 VITALS
DIASTOLIC BLOOD PRESSURE: 50 MMHG | HEART RATE: 78 BPM | SYSTOLIC BLOOD PRESSURE: 120 MMHG | BODY MASS INDEX: 22.44 KG/M2 | HEIGHT: 65 IN | OXYGEN SATURATION: 98 % | WEIGHT: 134.69 LBS

## 2022-12-13 DIAGNOSIS — Z86.711 HISTORY OF PULMONARY EMBOLISM: ICD-10-CM

## 2022-12-13 DIAGNOSIS — E11.69 HYPERLIPIDEMIA ASSOCIATED WITH TYPE 2 DIABETES MELLITUS: ICD-10-CM

## 2022-12-13 DIAGNOSIS — E78.5 HYPERLIPIDEMIA, UNSPECIFIED HYPERLIPIDEMIA TYPE: Primary | ICD-10-CM

## 2022-12-13 DIAGNOSIS — E03.9 HYPOTHYROIDISM, ADULT: ICD-10-CM

## 2022-12-13 DIAGNOSIS — I10 PRIMARY HYPERTENSION: ICD-10-CM

## 2022-12-13 DIAGNOSIS — N18.30 TYPE 2 DIABETES MELLITUS WITH STAGE 3 CHRONIC KIDNEY DISEASE, WITHOUT LONG-TERM CURRENT USE OF INSULIN, UNSPECIFIED WHETHER STAGE 3A OR 3B CKD: ICD-10-CM

## 2022-12-13 DIAGNOSIS — E03.9 HYPOTHYROIDISM, UNSPECIFIED TYPE: ICD-10-CM

## 2022-12-13 DIAGNOSIS — Z86.718 HISTORY OF DEEP VENOUS THROMBOSIS: ICD-10-CM

## 2022-12-13 DIAGNOSIS — E78.5 HYPERLIPIDEMIA ASSOCIATED WITH TYPE 2 DIABETES MELLITUS: ICD-10-CM

## 2022-12-13 DIAGNOSIS — G47.33 OSA (OBSTRUCTIVE SLEEP APNEA): ICD-10-CM

## 2022-12-13 DIAGNOSIS — E11.22 TYPE 2 DIABETES MELLITUS WITH STAGE 3 CHRONIC KIDNEY DISEASE, WITHOUT LONG-TERM CURRENT USE OF INSULIN, UNSPECIFIED WHETHER STAGE 3A OR 3B CKD: ICD-10-CM

## 2022-12-13 DIAGNOSIS — R06.02 SHORTNESS OF BREATH: ICD-10-CM

## 2022-12-13 DIAGNOSIS — K21.9 GASTROESOPHAGEAL REFLUX DISEASE, UNSPECIFIED WHETHER ESOPHAGITIS PRESENT: ICD-10-CM

## 2022-12-13 DIAGNOSIS — Z79.01 CHRONIC ANTICOAGULATION: ICD-10-CM

## 2022-12-13 DIAGNOSIS — J45.909 CHRONIC ASTHMA WITHOUT COMPLICATION, UNSPECIFIED ASTHMA SEVERITY, UNSPECIFIED WHETHER PERSISTENT: ICD-10-CM

## 2022-12-13 LAB
ALBUMIN SERPL BCP-MCNC: 3.6 G/DL (ref 3.5–5.2)
ALP SERPL-CCNC: 60 U/L (ref 55–135)
ALT SERPL W/O P-5'-P-CCNC: 10 U/L (ref 10–44)
ANION GAP SERPL CALC-SCNC: 12 MMOL/L (ref 8–16)
AST SERPL-CCNC: 15 U/L (ref 10–40)
BASOPHILS # BLD AUTO: 0.04 K/UL (ref 0–0.2)
BASOPHILS NFR BLD: 0.4 % (ref 0–1.9)
BILIRUB SERPL-MCNC: 0.8 MG/DL (ref 0.1–1)
BUN SERPL-MCNC: 20 MG/DL (ref 8–23)
CALCIUM SERPL-MCNC: 9.6 MG/DL (ref 8.7–10.5)
CHLORIDE SERPL-SCNC: 104 MMOL/L (ref 95–110)
CO2 SERPL-SCNC: 24 MMOL/L (ref 23–29)
CREAT SERPL-MCNC: 1.1 MG/DL (ref 0.5–1.4)
DIFFERENTIAL METHOD: ABNORMAL
EOSINOPHIL # BLD AUTO: 0 K/UL (ref 0–0.5)
EOSINOPHIL NFR BLD: 0.4 % (ref 0–8)
ERYTHROCYTE [DISTWIDTH] IN BLOOD BY AUTOMATED COUNT: 16.3 % (ref 11.5–14.5)
EST. GFR  (NO RACE VARIABLE): 47.7 ML/MIN/1.73 M^2
GLUCOSE SERPL-MCNC: 103 MG/DL (ref 70–110)
HCT VFR BLD AUTO: 41.8 % (ref 37–48.5)
HGB BLD-MCNC: 13.2 G/DL (ref 12–16)
IMM GRANULOCYTES # BLD AUTO: 0.06 K/UL (ref 0–0.04)
IMM GRANULOCYTES NFR BLD AUTO: 0.6 % (ref 0–0.5)
LYMPHOCYTES # BLD AUTO: 3.7 K/UL (ref 1–4.8)
LYMPHOCYTES NFR BLD: 38.2 % (ref 18–48)
MCH RBC QN AUTO: 28.5 PG (ref 27–31)
MCHC RBC AUTO-ENTMCNC: 31.6 G/DL (ref 32–36)
MCV RBC AUTO: 90 FL (ref 82–98)
MONOCYTES # BLD AUTO: 0.7 K/UL (ref 0.3–1)
MONOCYTES NFR BLD: 7.5 % (ref 4–15)
NEUTROPHILS # BLD AUTO: 5.2 K/UL (ref 1.8–7.7)
NEUTROPHILS NFR BLD: 52.9 % (ref 38–73)
NRBC BLD-RTO: 0 /100 WBC
PLATELET # BLD AUTO: 212 K/UL (ref 150–450)
PMV BLD AUTO: 11.7 FL (ref 9.2–12.9)
POTASSIUM SERPL-SCNC: 3.5 MMOL/L (ref 3.5–5.1)
PROT SERPL-MCNC: 7.1 G/DL (ref 6–8.4)
RBC # BLD AUTO: 4.63 M/UL (ref 4–5.4)
SODIUM SERPL-SCNC: 140 MMOL/L (ref 136–145)
TSH SERPL DL<=0.005 MIU/L-ACNC: 2.99 UIU/ML (ref 0.4–4)
WBC # BLD AUTO: 9.76 K/UL (ref 3.9–12.7)

## 2022-12-13 PROCEDURE — 99215 PR OFFICE/OUTPT VISIT, EST, LEVL V, 40-54 MIN: ICD-10-PCS | Mod: S$PBB,,, | Performed by: PHYSICIAN ASSISTANT

## 2022-12-13 PROCEDURE — 99999 PR PBB SHADOW E&M-EST. PATIENT-LVL V: CPT | Mod: PBBFAC,,, | Performed by: PHYSICIAN ASSISTANT

## 2022-12-13 PROCEDURE — 85025 COMPLETE CBC W/AUTO DIFF WBC: CPT | Performed by: PHYSICIAN ASSISTANT

## 2022-12-13 PROCEDURE — 36415 COLL VENOUS BLD VENIPUNCTURE: CPT | Performed by: PHYSICIAN ASSISTANT

## 2022-12-13 PROCEDURE — 84443 ASSAY THYROID STIM HORMONE: CPT | Performed by: PHYSICIAN ASSISTANT

## 2022-12-13 PROCEDURE — 99215 OFFICE O/P EST HI 40 MIN: CPT | Mod: S$PBB,,, | Performed by: PHYSICIAN ASSISTANT

## 2022-12-13 PROCEDURE — 80053 COMPREHEN METABOLIC PANEL: CPT | Performed by: PHYSICIAN ASSISTANT

## 2022-12-13 PROCEDURE — 99215 OFFICE O/P EST HI 40 MIN: CPT | Mod: PBBFAC | Performed by: PHYSICIAN ASSISTANT

## 2022-12-13 PROCEDURE — 99999 PR PBB SHADOW E&M-EST. PATIENT-LVL V: ICD-10-PCS | Mod: PBBFAC,,, | Performed by: PHYSICIAN ASSISTANT

## 2022-12-13 RX ORDER — MECLIZINE HYDROCHLORIDE 25 MG/1
25 TABLET ORAL 2 TIMES DAILY PRN
Qty: 60 TABLET | Refills: 1 | Status: CANCELLED | OUTPATIENT
Start: 2022-12-13

## 2022-12-13 RX ORDER — MIRTAZAPINE 15 MG/1
15 TABLET, FILM COATED ORAL NIGHTLY
Qty: 90 TABLET | Refills: 3 | Status: ON HOLD | OUTPATIENT
Start: 2022-12-13 | End: 2024-01-03

## 2022-12-13 RX ORDER — LEVOTHYROXINE SODIUM 25 UG/1
TABLET ORAL
Qty: 90 TABLET | Refills: 3 | Status: SHIPPED | OUTPATIENT
Start: 2022-12-13 | End: 2023-04-23

## 2022-12-13 RX ORDER — ATORVASTATIN CALCIUM 40 MG/1
40 TABLET, FILM COATED ORAL DAILY
Qty: 90 TABLET | Refills: 3 | Status: SHIPPED | OUTPATIENT
Start: 2022-12-13 | End: 2023-11-17

## 2022-12-13 RX ORDER — ESOMEPRAZOLE MAGNESIUM 40 MG/1
40 CAPSULE, DELAYED RELEASE ORAL 2 TIMES DAILY
Qty: 180 CAPSULE | Refills: 3 | Status: CANCELLED | OUTPATIENT
Start: 2022-12-13 | End: 2023-12-13

## 2022-12-13 RX ORDER — FLASH GLUCOSE SENSOR
1 KIT MISCELLANEOUS 2 TIMES DAILY
Qty: 1 KIT | Refills: 11 | Status: CANCELLED | OUTPATIENT
Start: 2022-12-13

## 2022-12-13 RX ORDER — FAMOTIDINE 20 MG/1
20 TABLET, FILM COATED ORAL DAILY PRN
Qty: 30 TABLET | Refills: 11 | Status: CANCELLED | OUTPATIENT
Start: 2022-12-13 | End: 2023-12-13

## 2022-12-13 RX ORDER — MECLIZINE HYDROCHLORIDE 25 MG/1
25 TABLET ORAL 2 TIMES DAILY PRN
Qty: 180 TABLET | Refills: 1 | Status: SHIPPED | OUTPATIENT
Start: 2022-12-13 | End: 2023-05-15

## 2022-12-13 RX ORDER — LEVOTHYROXINE SODIUM 25 UG/1
TABLET ORAL
Qty: 90 TABLET | Refills: 0 | Status: CANCELLED | OUTPATIENT
Start: 2022-12-13

## 2022-12-13 RX ORDER — MIRTAZAPINE 15 MG/1
15 TABLET, FILM COATED ORAL NIGHTLY
Qty: 90 TABLET | Refills: 3 | Status: CANCELLED | OUTPATIENT
Start: 2022-12-13

## 2022-12-13 RX ORDER — ATORVASTATIN CALCIUM 40 MG/1
40 TABLET, FILM COATED ORAL DAILY
Qty: 90 TABLET | Refills: 3 | Status: CANCELLED | OUTPATIENT
Start: 2022-12-13 | End: 2023-12-13

## 2022-12-13 RX ORDER — ESOMEPRAZOLE MAGNESIUM 40 MG/1
40 CAPSULE, DELAYED RELEASE ORAL 2 TIMES DAILY
Qty: 180 CAPSULE | Refills: 3 | Status: SHIPPED | OUTPATIENT
Start: 2022-12-13 | End: 2023-09-19

## 2022-12-13 RX ORDER — FAMOTIDINE 20 MG/1
20 TABLET, FILM COATED ORAL DAILY PRN
Qty: 90 TABLET | Refills: 3 | Status: SHIPPED | OUTPATIENT
Start: 2022-12-13 | End: 2023-10-30 | Stop reason: SDUPTHER

## 2022-12-13 RX ORDER — FLASH GLUCOSE SCANNING READER
1 EACH MISCELLANEOUS 2 TIMES DAILY
Qty: 1 EACH | Refills: 11 | Status: CANCELLED | OUTPATIENT
Start: 2022-12-13

## 2022-12-13 NOTE — PROGRESS NOTES
Subjective:       Patient ID: Debbie Ding is a 89 y.o. female.        Chief Complaint: Follow-up    Debbie Ding is an established patient of Carolyn Mejia MD here today for follow up visit.    JASE - she has CPAP at home but it leaks water so she does not use it, cardio told her she needs to, she has tried to contact the company unsuccessfully to look at it, she plans to try again, she has had the machine for several years but has not used it for the past 6 months     She had covid 5/27/22 and tx with prednisone.  All sx from covid resolved.  Seen in ED 5/27 and 6/1.       Hypothyroidism -  Synthroid dec 50 mcg --> 25 mcg 4/2022, repeat TSH 6/2022 with TSH still suppressed, so now holds on taking on Sunday, due for repeat TSH     BMI up to 22  Baseline weight is 140#, today she is 134#  She takes periactin at times  Saw GI dietician who helped with gastroparesis diet, recall she declines reglan due to daughter with facial myoclonus     Recall she had extensive evaluation for weight loss ultimately due to gastroparesis  CT chest/abdomen/pelvis 1/2022 with nothing to explain weight loss  Saw GI and gastric emptying study confirmed gastropearesis 2/3/22, she did not want to take reglan due to concern for side effects  EGD 3/2022 with bravo pH positive, so continued on PPI indefinitely      Started on remeron and titrated to 15 mg to help with weight gain around 6/2021, initially helped to regain some weight, cannot take megace due to h/o DVT/PE     Past medical history -   1.  Anxiety, grief - lorazepam prn, no longer taking lexparo  2.  HTN tx with amlodipine  3.  Hypothyroidism tx with synthroid (see above)  4.  H/o left shoulder pain that comes/goes  5.  Asthma is followed by Dr. Suazo and controlled with advair, albuterol prn  6.  She is on eliquis for h/o PE/DVT         Review of Systems   Constitutional:  Negative for chills, diaphoresis, fatigue and fever.   HENT:  Negative for congestion and sore  throat.    Eyes:  Negative for visual disturbance.   Respiratory:  Negative for cough, chest tightness and shortness of breath.    Cardiovascular:  Negative for chest pain, palpitations and leg swelling.   Gastrointestinal:  Negative for abdominal pain, blood in stool, constipation, diarrhea, nausea and vomiting.   Genitourinary:  Negative for dysuria, frequency, hematuria and urgency.   Musculoskeletal:  Negative for arthralgias and back pain.   Skin:  Negative for rash.   Neurological:  Negative for dizziness, syncope, weakness and headaches.   Psychiatric/Behavioral:  Negative for dysphoric mood and sleep disturbance. The patient is not nervous/anxious.      Objective:      Physical Exam  Vitals and nursing note reviewed.   Constitutional:       Appearance: Normal appearance. She is well-developed.   HENT:      Head: Normocephalic.      Right Ear: External ear normal.      Left Ear: External ear normal.   Eyes:      Pupils: Pupils are equal, round, and reactive to light.   Cardiovascular:      Rate and Rhythm: Normal rate and regular rhythm.      Heart sounds: Normal heart sounds. No murmur heard.    No friction rub. No gallop.   Pulmonary:      Effort: Pulmonary effort is normal. No respiratory distress.      Breath sounds: Normal breath sounds.   Abdominal:      Palpations: Abdomen is soft.      Tenderness: There is no abdominal tenderness.   Skin:     General: Skin is warm and dry.   Neurological:      Mental Status: She is alert.       Assessment:       1. Hyperlipidemia, unspecified hyperlipidemia type    2. Gastroesophageal reflux disease, unspecified whether esophagitis present    3. Type 2 diabetes mellitus with stage 3 chronic kidney disease, without long-term current use of insulin, unspecified whether stage 3a or 3b CKD    4. Hypothyroidism, adult    5. JASE (obstructive sleep apnea)    6. Chronic asthma without complication, unspecified asthma severity, unspecified whether persistent    7. Primary  hypertension    8. Hyperlipidemia associated with type 2 diabetes mellitus    9. History of pulmonary embolism    10. History of deep venous thrombosis    11. Chronic anticoagulation    12. Hypothyroidism, unspecified type          Plan:       Debbie was seen today for follow-up.    Diagnoses and all orders for this visit:    Hyperlipidemia, unspecified hyperlipidemia type  -     REFILL: atorvastatin (LIPITOR) 40 MG tablet; Take 1 tablet (40 mg total) by mouth once daily.    Gastroesophageal reflux disease, unspecified whether esophagitis present  -     REFILL: esomeprazole (NEXIUM) 40 MG capsule; Take 1 capsule (40 mg total) by mouth 2 (two) times daily.  -     REFILL: famotidine (PEPCID) 20 MG tablet; Take 1 tablet (20 mg total) by mouth daily as needed for Heartburn (breakthrough heartburn and acid reflux not controlled with Nexium).    Type 2 diabetes mellitus with stage 3 chronic kidney disease, without long-term current use of insulin, unspecified whether stage 3a or 3b CKD  Lab Results   Component Value Date    HGBA1C 6.1 (H) 04/02/2022    HGBA1C 6.4 (H) 10/04/2021    HGBA1C 6.6 (H) 06/09/2021     Hypothyroidism, adult - due for repeat TSH, check today  -     REFILL: levothyroxine (SYNTHROID) 25 MCG tablet; Take 1 tablet by mouth every day, except hold on Sundays.    JASE (obstructive sleep apnea)  -     Ambulatory referral/consult to Sleep Disorders; Future    Chronic asthma without complication, unspecified asthma severity, unspecified whether persistent - followed by Dr. Suazo    Hyperlipidemia associated with type 2 diabetes mellitus    History of pulmonary embolism  History of deep venous thrombosis  Chronic anticoagulation    Get labs today  She will f/u with company regarding CPAP machine - will also refer to sleep clinic  >45 minutes spent on patient encounter today    Pt has been given instructions populated from patient instructions database and has verbalized understanding of the after visit summary  "and information contained wherein.    Follow up with a primary care provider. May go to ER for acute shortness of breath, lightheadedness, fever, or any other emergent complaints or changes in condition.    "This note will be shared with the patient"    Future Appointments   Date Time Provider Department Center   1/12/2023  9:00 AM Aamir Mitchell MD [facility]   2/2/2023 10:00 AM Andrews Del Castillo MD [facility]                 "

## 2022-12-19 ENCOUNTER — TELEPHONE (OUTPATIENT)
Dept: GASTROENTEROLOGY | Facility: CLINIC | Age: 87
End: 2022-12-19
Payer: MEDICARE

## 2022-12-19 ENCOUNTER — TELEPHONE (OUTPATIENT)
Dept: SURGERY | Facility: CLINIC | Age: 87
End: 2022-12-19
Payer: MEDICARE

## 2022-12-19 NOTE — TELEPHONE ENCOUNTER
Returned pt phone call.  Pt would like to be seen she is worried that she has another hernia.  Pt scheduled for 12/27 @ 0730.  Pt ok with date/time.

## 2022-12-19 NOTE — TELEPHONE ENCOUNTER
----- Message from Shania Arita sent at 12/19/2022  8:30 AM CST -----  Contact: pt  Pt is requesting a call from the nurse to discuss complications from a procedure with Dr. Meza from earlier this year.     Confirmed contact below:  Contact Name:Debbie Ding  Phone Number: [phone]

## 2022-12-19 NOTE — TELEPHONE ENCOUNTER
----- Message from Shania Arita sent at 12/19/2022  8:27 AM CST -----  Contact: pt  Provider completed a procedure with pt on 11/14 and pt states she is having trouble with a 3rd hernia. She would like the provider or providers nurse to reach out to her to discuss further.     Confirmed contact below:  Contact Name:Debbie Timur  Phone Number: [phone]

## 2022-12-19 NOTE — TELEPHONE ENCOUNTER
[facility]    I spoke with the patient.  She will increase her Omeprazole to twice per day.  She has a new prescription for esomeprazole 40mg BID being mailed to her house.  She will stop the Omeprazole and start this when it arrives.  I have requested that she be placed on the waitlist for a GI clinic appt.    Rebeca Meza MD

## 2022-12-19 NOTE — TELEPHONE ENCOUNTER
MA returned patient's call. Mrs. Lewis is calling to inform Dr. Meza she is still experiencing severe pain since her procedure.     Patient rated her chest and esophagus pain a 10/10 and she is only taking Tums to help with the pain.     Patient is requesting a call from Dr. Meza.

## 2022-12-21 DIAGNOSIS — L29.9 PRURITUS: ICD-10-CM

## 2022-12-21 RX ORDER — KETOCONAZOLE 20 MG/ML
SHAMPOO, SUSPENSION TOPICAL
Qty: 120 ML | Refills: 5 | Status: ON HOLD | OUTPATIENT
Start: 2022-12-21 | End: 2024-01-03 | Stop reason: CLARIF

## 2022-12-27 ENCOUNTER — OFFICE VISIT (OUTPATIENT)
Dept: SURGERY | Facility: CLINIC | Age: 87
End: 2022-12-27
Payer: MEDICARE

## 2022-12-27 VITALS
WEIGHT: 134 LBS | SYSTOLIC BLOOD PRESSURE: 146 MMHG | DIASTOLIC BLOOD PRESSURE: 85 MMHG | HEIGHT: 65 IN | BODY MASS INDEX: 22.33 KG/M2 | HEART RATE: 78 BPM

## 2022-12-27 DIAGNOSIS — Z09 POSTOP CHECK: Primary | ICD-10-CM

## 2022-12-27 PROCEDURE — 99999 PR PBB SHADOW E&M-EST. PATIENT-LVL II: CPT | Mod: PBBFAC,,, | Performed by: SURGERY

## 2022-12-27 PROCEDURE — 99024 POSTOP FOLLOW-UP VISIT: CPT | Mod: POP,,, | Performed by: SURGERY

## 2022-12-27 PROCEDURE — 99999 PR PBB SHADOW E&M-EST. PATIENT-LVL II: ICD-10-PCS | Mod: PBBFAC,,, | Performed by: SURGERY

## 2022-12-27 PROCEDURE — 99024 PR POST-OP FOLLOW-UP VISIT: ICD-10-PCS | Mod: POP,,, | Performed by: SURGERY

## 2022-12-27 PROCEDURE — 99212 OFFICE O/P EST SF 10 MIN: CPT | Mod: PBBFAC | Performed by: SURGERY

## 2022-12-27 RX ORDER — FLUCONAZOLE 150 MG/1
150 TABLET ORAL
COMMUNITY
Start: 2022-12-20 | End: 2023-04-18

## 2022-12-27 RX ORDER — CICLOPIROX 1 G/100ML
SHAMPOO TOPICAL
COMMUNITY
Start: 2022-12-19

## 2022-12-27 RX ORDER — BUDESONIDE AND FORMOTEROL FUMARATE DIHYDRATE 160; 4.5 UG/1; UG/1
AEROSOL RESPIRATORY (INHALATION)
COMMUNITY
Start: 2022-12-12 | End: 2023-10-30

## 2022-12-27 NOTE — PROGRESS NOTES
"Debbie Ding is a 89 y.o. female patient.   1. Postop check      Past Medical History:   Diagnosis Date    Allergic rhinitis     Anticoagulant long-term use     Arthritis     Asthma     Bilateral pulmonary embolism 4/27/2019    Cataract     Chronic anticoagulation 9/28/2020    Depression     Diabetes mellitus     Fibromyalgia 7/2/2012    Fibromyalgia     GERD (gastroesophageal reflux disease)     Hypercholesterolemia 9/28/2020    Hypercholesterolemia 9/28/2020    Hypertension 7/2/2012    Thoracic or lumbosacral neuritis or radiculitis, unspecified     Thyroid disease     Ulcer      No past surgical history pertinent negatives on file.  Scheduled Meds:   regadenoson  0.4 mg Intravenous Once     Continuous Infusions:  PRN Meds:    Review of patient's allergies indicates:   Allergen Reactions    Melatonin      Other reaction(s): Other (See Comments)  Sleep Walks and has unnatural dreams    Talwin compound Hives    Talwin [pentazocine lactate] Hallucinations    Trazodone Other (See Comments)     Nightmares/sleep walk      Bentyl [dicyclomine] Anxiety     There are no hospital problems to display for this patient.    Blood pressure (!) 146/85, pulse 78, height 5' 5" (1.651 m), weight 60.8 kg (134 lb).    Subjective  S/p lap rih with mesh and umbilical hernia repair with mesh 11/14/22.  She is concerned because after she eats she gets bloated and feels pain right above the navel and near the xyphoid bone.  She wonders if she has further hernias.  Her right groin area feels fine.  Objective:  Vital signs (most recent): Blood pressure (!) 146/85, pulse 78, height 5' 5" (1.651 m), weight 60.8 kg (134 lb).  No hernias on PE.  Abdomen soft.  Scar tissue at the area of prior umbilical hernia.  Xyphoid palpable in the epigastrium.     Assessment & Plan  I reassured her that this is all ok.  If anything worsens I would like to obtain a ct.       Benedicto Carcamo MD  12/27/2022          "

## 2022-12-31 ENCOUNTER — EXTERNAL CHRONIC CARE MANAGEMENT (OUTPATIENT)
Dept: PRIMARY CARE CLINIC | Facility: CLINIC | Age: 87
End: 2022-12-31
Payer: MEDICARE

## 2022-12-31 PROCEDURE — 99490 PR CHRONIC CARE MGMT, 1ST 20 MIN: ICD-10-PCS | Mod: S$PBB,,, | Performed by: INTERNAL MEDICINE

## 2022-12-31 PROCEDURE — 99439 PR CHRONIC CARE MGMT, EA ADDTL 20 MIN: ICD-10-PCS | Mod: S$PBB,,, | Performed by: INTERNAL MEDICINE

## 2022-12-31 PROCEDURE — 99439 CHRNC CARE MGMT STAF EA ADDL: CPT | Mod: S$PBB,,, | Performed by: INTERNAL MEDICINE

## 2022-12-31 PROCEDURE — 99490 CHRNC CARE MGMT STAFF 1ST 20: CPT | Mod: PBBFAC | Performed by: INTERNAL MEDICINE

## 2022-12-31 PROCEDURE — 99490 CHRNC CARE MGMT STAFF 1ST 20: CPT | Mod: S$PBB,,, | Performed by: INTERNAL MEDICINE

## 2022-12-31 PROCEDURE — 99439 CHRNC CARE MGMT STAF EA ADDL: CPT | Mod: PBBFAC,27 | Performed by: INTERNAL MEDICINE

## 2023-01-18 NOTE — TELEPHONE ENCOUNTER
"  Assessment & Plan       ICD-10-CM    1. Chronic bilateral low back pain without sciatica  M54.50 XR Lumbar Spine 2/3 Views    G89.29 UA Macro with Reflex to Micro and Culture - lab collect     UA Macro with Reflex to Micro and Culture - lab collect     Urine Microscopic     Physical Therapy Referral      2. Epididymitis  N45.1 Urine Culture Aerobic Bacterial - lab collect     levofloxacin (LEVAQUIN) 500 MG tablet      3. Prostate cancer (H)  C61       4. Malignant neoplasm of upper lobe of right lung (H)  C34.11       5. Fatty liver  K76.0       6. Gastroesophageal reflux disease, unspecified whether esophagitis present  K21.9       7. Major depressive disorder, recurrent episode, mild (H)  F33.0       8. Anxiety  F41.9       9. Medication monitoring encounter  Z51.81           Discussed treatment/modality options, including risk and benefits, he desires:    1) heat/ice, stretching    2) PT    3) salonpas 4% lidocaine patch 12 hrs on/off    4) hold on tylenol/NSAID's (hx of fatty liver / black stools)    5) levaquin    All diagnosis above reviewed and noted above, otherwise stable.      See [facility] orders for further details.        BMI:   Estimated body mass index is 27.73 kg/m  as calculated from the following:    Height as of this encounter: 1.727 m (5' 8\").    Weight as of this encounter: 82.7 kg (182 lb 6.4 oz).     Return in about 2 weeks (around 2/1/2023) for Medication Recheck Visit, Follow Up Acute, Follow Up Chronic.    No LOS data to display    Doing chart review, history and exam, documentation and further activities as noted.           Ryan Kamara MD, FAAFP     [facility]  [address] [place] [id number]  [email or web address]   Office: [phone]  Fax: [phone]  Pager: [phone]       Subjective   Juwan is a 71 year old, presenting for the following health issues:    Hx of prostate cancer and lung " Discussed with patient.  She has stopped the citalopram because of sedation and wants to go back to the Remeron.  We will call in the 15 mg at bedtime dosing.  This was originally prescribed by her psychiatrist.  She had been on Cymbalta and past and advised her that she needs to discontinue this as well.  She has an appointment to see me in the next month or 2..   cancer - Hx of fatty liver (Dr Clayton)    Bilateral mid to low back x 3 months - always present - initially on rt side - moved wallet to left side - no radiation - no numbness - no tingling - urine incontinence (Dr Chan - post op) - no stool incontinence - hx of UTI no current symptoms - better with marijuana brownies/tylenol/NSAID's - worse with bending over - no help with chiropractor - hx of RLE peripheral neuropathy    Recent nausea, no vomiting, on/off for 1 week, no signs of bleeding    Left inguinal pain into testicle    Due for cpx fasting    Depression/Anxiety    PHQ = 5  BENSON = 2      Musculoskeletal Problem    History of Present Illness       Back Pain:  He presents for follow up of back pain. Patient's back pain is a new problem.    Original cause of back pain: not sure  First noticed back pain: more than 1 month ago  Patient feels back pain: constantlyLocation of back pain:  Right lower back and left lower back  Description of back pain: dull ache  Back pain spreads: nowhere    Since patient first noticed back pain, pain is: unchanged  Does back pain interfere with his job:  Not applicable  On a scale of 1-10 (10 being the worst), patient describes pain as:  4  What makes back pain worse: bending  Acupuncture: not tried  Acetaminophen: not tried  Activity or exercise: helpful  Chiropractor:  Not helpful  Cold: not tried  Heat: not tried  Massage: not tried  Muscle relaxants: not tried  NSAIDS: helpful  Opioids: not tried  Physical Therapy: not tried  Rest: not helpful  Steroid Injection: not tried  Stretching: helpful  Surgery: not tried  TENS unit: not tried  Topical pain relievers: helpful  Other healthcare providers patient is seeing for back pain: None    He eats 0-1 servings of fruits and vegetables daily.He consumes 1 sweetened beverage(s) daily.He exercises with enough effort to increase his heart rate 20 to 29 minutes per day.  He exercises with enough effort to increase his heart rate 5 days  "per week.   He is taking medications regularly.       Pain History:  When did you first notice your pain? - Chronic Pain   Have you seen this provider for your pain in the past?   No   Where in your body do your have pain? Back   Are you seeing anyone else for your pain? No  What makes your pain better? Tylenol   What makes your pain worse? Bending   How has pain affected your ability to work? Not applicable  Who lives in your household? Friends     PHQ-9 SCORE 1/20/2022 10/17/2022 1/18/2023   PHQ-9 Total Score - - -   PHQ-9 Total Score MyChart - 4 (Minimal depression) -   PHQ-9 Total Score 0 4 5             PHQ-9 SCORE 1/20/2022 10/17/2022 1/18/2023   PHQ-9 Total Score - - -   PHQ-9 Total Score MyChart - 4 (Minimal depression) -   PHQ-9 Total Score 0 4 5     BENSON-7 SCORE 8/10/2021 1/20/2022 1/18/2023   Total Score - - -   Total Score 0 (minimal anxiety) - -   Total Score 0 0 1     PEG Score 1/18/2023   PEG Total Score 1.33     Review of Systems   CONSTITUTIONAL: NEGATIVE for fever, chills, change in weight  INTEGUMENTARY/SKIN: NEGATIVE for worrisome rashes, moles or lesions  EYES: NEGATIVE for vision changes or irritation  ENT/MOUTH: NEGATIVE for ear, mouth and throat problems  RESP: NEGATIVE for significant cough or SOB  BREAST: NEGATIVE for masses, tenderness or discharge  CV: NEGATIVE for chest pain, palpitations or peripheral edema  GI: NEGATIVE for nausea, abdominal pain, heartburn, or change in bowel habits  : NEGATIVE for frequency, dysuria, or hematuria  MUSCULOSKELETAL: NEGATIVE for significant arthralgias or myalgia  NEURO: NEGATIVE for weakness, dizziness or paresthesias  ENDOCRINE: NEGATIVE for temperature intolerance, skin/hair changes  HEME: NEGATIVE for bleeding problems  PSYCHIATRIC: NEGATIVE for changes in mood or affect      Objective    /72   Pulse 71   Temp 97.8  F (36.6  C) (Tympanic)   Resp 16   Ht 1.727 m (5' 8\")   Wt 82.7 kg (182 lb 6.4 oz)   SpO2 99%   BMI 27.73 kg/m    Body " mass index is 27.73 kg/m .  Physical Exam   GENERAL: healthy, alert and no distress  EYES: Eyes grossly normal to inspection, PERRL and conjunctivae and sclerae normal  HENT: ear canals and TM's normal, nose and mouth without ulcers or lesions  NECK: no adenopathy, no asymmetry, masses, or scars and thyroid normal to palpation  RESP: lungs clear to auscultation - no rales, rhonchi or wheezes  CV: regular rate and rhythm, normal S1 S2, no S3 or S4, no murmur, click or rub, no peripheral edema and peripheral pulses strong  ABDOMEN: soft, nontender, no hepatosplenomegaly, no masses and bowel sounds normal  MS: no gross musculoskeletal defects noted, no edema  SKIN: no suspicious lesions or rashes  NEURO: Normal strength and tone, mentation intact and speech normal  PSYCH: mentation appears normal, affect normal/bright     - left epididymal tenderness - no inguinal hernias    UA - reviewed, 0-2 rbc's, 0-5 wbc's, UC pending    Lumbar spine - loss of lumbar lordosis o/w negative

## 2023-01-31 ENCOUNTER — EXTERNAL CHRONIC CARE MANAGEMENT (OUTPATIENT)
Dept: PRIMARY CARE CLINIC | Facility: CLINIC | Age: 88
End: 2023-01-31
Payer: MEDICARE

## 2023-01-31 ENCOUNTER — TELEPHONE (OUTPATIENT)
Dept: ORTHOPEDICS | Facility: CLINIC | Age: 88
End: 2023-01-31
Payer: MEDICARE

## 2023-01-31 PROCEDURE — 99490 CHRNC CARE MGMT STAFF 1ST 20: CPT | Mod: S$PBB,,, | Performed by: INTERNAL MEDICINE

## 2023-01-31 PROCEDURE — 99490 CHRNC CARE MGMT STAFF 1ST 20: CPT | Mod: PBBFAC | Performed by: INTERNAL MEDICINE

## 2023-01-31 PROCEDURE — 99490 PR CHRONIC CARE MGMT, 1ST 20 MIN: ICD-10-PCS | Mod: S$PBB,,, | Performed by: INTERNAL MEDICINE

## 2023-01-31 RX ORDER — DICLOFENAC SODIUM 10 MG/G
2 GEL TOPICAL 3 TIMES DAILY
Qty: 400 G | Refills: 0 | Status: SHIPPED | OUTPATIENT
Start: 2023-01-31 | End: 2023-04-18 | Stop reason: SDUPTHER

## 2023-01-31 NOTE — TELEPHONE ENCOUNTER
----- Message from Alaina Parada sent at 1/31/2023  8:49 AM CST -----  Regarding: PT'S REQUESTING A CALL BACK FROM JOE REGARDING INJECTION IN RIGHT KNEE  Contact: PT  Confirmed contact info below:  Contact Name: Debbie Ding  Phone Number: [phone]

## 2023-02-01 ENCOUNTER — TELEPHONE (OUTPATIENT)
Dept: GASTROENTEROLOGY | Facility: CLINIC | Age: 88
End: 2023-02-01
Payer: MEDICARE

## 2023-02-01 NOTE — TELEPHONE ENCOUNTER
Spoke with patient and she would like a later appointment with Dr. Del Castillo. I informed patient that Dr. Del Castillo is booked until April for appointment. Patient has agreed to be added to the waitlist. She said that she does not want to see her previous GI doctor.

## 2023-02-01 NOTE — TELEPHONE ENCOUNTER
----- Message from Vilma Paniagua sent at 2/1/2023  8:32 AM CST -----  Debbie Ding calling regarding wanting to be rescheduled for a later date with the Doctor and want to can cancel the current appt, call back [phone]

## 2023-02-28 ENCOUNTER — EXTERNAL CHRONIC CARE MANAGEMENT (OUTPATIENT)
Dept: PRIMARY CARE CLINIC | Facility: CLINIC | Age: 88
End: 2023-02-28
Payer: MEDICARE

## 2023-02-28 PROCEDURE — 99490 CHRNC CARE MGMT STAFF 1ST 20: CPT | Mod: PBBFAC | Performed by: INTERNAL MEDICINE

## 2023-02-28 PROCEDURE — 99490 PR CHRONIC CARE MGMT, 1ST 20 MIN: ICD-10-PCS | Mod: S$PBB,,, | Performed by: INTERNAL MEDICINE

## 2023-02-28 PROCEDURE — 99490 CHRNC CARE MGMT STAFF 1ST 20: CPT | Mod: S$PBB,,, | Performed by: INTERNAL MEDICINE

## 2023-03-06 ENCOUNTER — TELEPHONE (OUTPATIENT)
Dept: SURGERY | Facility: CLINIC | Age: 88
End: 2023-03-06
Payer: MEDICARE

## 2023-03-06 DIAGNOSIS — K42.9 UMBILICAL HERNIA WITHOUT OBSTRUCTION AND WITHOUT GANGRENE: Primary | ICD-10-CM

## 2023-03-06 NOTE — TELEPHONE ENCOUNTER
----- Message from Moraima Thomas sent at 3/6/2023 11:07 AM CST -----  Regarding: order/advice  Contact: [phone]  Debbie Ding calling regarding Orders (message) for US.  She stated she is have pain in her naval and groin area where the hernias was removed from.  She stated Dr. Carcamo would order US if the pain continued

## 2023-03-08 ENCOUNTER — TELEPHONE (OUTPATIENT)
Dept: SURGERY | Facility: CLINIC | Age: 88
End: 2023-03-08
Payer: MEDICARE

## 2023-03-08 DIAGNOSIS — Z09 POSTOP CHECK: Primary | ICD-10-CM

## 2023-03-13 ENCOUNTER — TELEPHONE (OUTPATIENT)
Dept: INTERNAL MEDICINE | Facility: CLINIC | Age: 88
End: 2023-03-13
Payer: MEDICARE

## 2023-03-13 NOTE — TELEPHONE ENCOUNTER
----- Message from Gordon Mai sent at 3/13/2023 12:01 PM CDT -----  Contact: [facility]n [phone] option [phone] Ref # [id number]  Marilyn is calling about the Pantorazole 40 MG Please give them a call back.

## 2023-03-16 ENCOUNTER — TELEPHONE (OUTPATIENT)
Dept: ORTHOPEDICS | Facility: CLINIC | Age: 88
End: 2023-03-16
Payer: MEDICARE

## 2023-03-16 NOTE — TELEPHONE ENCOUNTER
----- Message from Palak Mcnamara sent at 3/16/2023  8:41 AM CDT -----  Contact: pt  Pt requesting call back RE: would like to know if it is time for her injection again      Confirmed contact below:  Contact Name:Debbie Ding  Phone Number: [phone]

## 2023-03-20 ENCOUNTER — OFFICE VISIT (OUTPATIENT)
Dept: PODIATRY | Facility: CLINIC | Age: 88
End: 2023-03-20
Payer: MEDICARE

## 2023-03-20 VITALS
DIASTOLIC BLOOD PRESSURE: 67 MMHG | WEIGHT: 144.06 LBS | HEART RATE: 73 BPM | BODY MASS INDEX: 24 KG/M2 | HEIGHT: 65 IN | SYSTOLIC BLOOD PRESSURE: 125 MMHG

## 2023-03-20 DIAGNOSIS — E11.22 TYPE 2 DIABETES MELLITUS WITH STAGE 3A CHRONIC KIDNEY DISEASE, WITHOUT LONG-TERM CURRENT USE OF INSULIN: Primary | ICD-10-CM

## 2023-03-20 DIAGNOSIS — L84 CORN OR CALLUS: ICD-10-CM

## 2023-03-20 DIAGNOSIS — N18.31 TYPE 2 DIABETES MELLITUS WITH STAGE 3A CHRONIC KIDNEY DISEASE, WITHOUT LONG-TERM CURRENT USE OF INSULIN: Primary | ICD-10-CM

## 2023-03-20 PROCEDURE — 11730 AVULSION NAIL PLATE SIMPLE 1: CPT | Mod: TA,S$PBB,, | Performed by: PODIATRIST

## 2023-03-20 PROCEDURE — 99999 PR PBB SHADOW E&M-EST. PATIENT-LVL IV: ICD-10-PCS | Mod: PBBFAC,,, | Performed by: PODIATRIST

## 2023-03-20 PROCEDURE — 99999 PR PBB SHADOW E&M-EST. PATIENT-LVL IV: CPT | Mod: PBBFAC,,, | Performed by: PODIATRIST

## 2023-03-20 PROCEDURE — 11056 PARNG/CUTG B9 HYPRKR LES 2-4: CPT | Mod: PBBFAC | Performed by: PODIATRIST

## 2023-03-20 PROCEDURE — 11730 PR REMOVAL OF NAIL PLATE: ICD-10-PCS | Mod: TA,S$PBB,, | Performed by: PODIATRIST

## 2023-03-20 PROCEDURE — 11056 PR TRIM BENIGN HYPERKERATOTIC SKIN LESION,2-4: ICD-10-PCS | Mod: Q9,59,S$PBB, | Performed by: PODIATRIST

## 2023-03-20 PROCEDURE — 11721 DEBRIDE NAIL 6 OR MORE: CPT | Mod: Q9,59,S$PBB, | Performed by: PODIATRIST

## 2023-03-20 PROCEDURE — 99214 PR OFFICE/OUTPT VISIT, EST, LEVL IV, 30-39 MIN: ICD-10-PCS | Mod: 25,S$PBB,, | Performed by: PODIATRIST

## 2023-03-20 PROCEDURE — 11056 PARNG/CUTG B9 HYPRKR LES 2-4: CPT | Mod: Q9,59,S$PBB, | Performed by: PODIATRIST

## 2023-03-20 PROCEDURE — 99214 OFFICE O/P EST MOD 30 MIN: CPT | Mod: 25,S$PBB,, | Performed by: PODIATRIST

## 2023-03-20 PROCEDURE — 99214 OFFICE O/P EST MOD 30 MIN: CPT | Mod: PBBFAC | Performed by: PODIATRIST

## 2023-03-20 PROCEDURE — 11730 AVULSION NAIL PLATE SIMPLE 1: CPT | Mod: TA,PBBFAC | Performed by: PODIATRIST

## 2023-03-20 PROCEDURE — 11721 DEBRIDE NAIL 6 OR MORE: CPT | Mod: Q9,59,PBBFAC | Performed by: PODIATRIST

## 2023-03-20 PROCEDURE — 11721 PR DEBRIDEMENT OF NAILS, 6 OR MORE: ICD-10-PCS | Mod: Q9,59,S$PBB, | Performed by: PODIATRIST

## 2023-03-20 RX ORDER — PREDNISONE 10 MG/1
10 TABLET ORAL
COMMUNITY
Start: 2023-02-15 | End: 2023-04-18

## 2023-03-20 RX ORDER — CLOTRIMAZOLE AND BETAMETHASONE DIPROPIONATE 10; .64 MG/G; MG/G
CREAM TOPICAL 2 TIMES DAILY
Qty: 45 G | Refills: 2 | Status: ON HOLD | OUTPATIENT
Start: 2023-03-20 | End: 2024-01-03

## 2023-03-20 RX ORDER — METOCLOPRAMIDE 5 MG/1
TABLET ORAL
COMMUNITY
End: 2023-10-30

## 2023-03-20 RX ORDER — CLONAZEPAM 0.12 MG/1
0.12 TABLET, ORALLY DISINTEGRATING ORAL
COMMUNITY
Start: 2023-01-20 | End: 2023-04-18

## 2023-03-20 RX ORDER — FLUCONAZOLE 200 MG/1
TABLET ORAL
COMMUNITY
End: 2023-04-18

## 2023-03-20 RX ORDER — FLUTICASONE PROPIONATE AND SALMETEROL 500; 50 UG/1; UG/1
POWDER RESPIRATORY (INHALATION)
Status: ON HOLD | COMMUNITY
Start: 2023-03-13 | End: 2023-11-29 | Stop reason: HOSPADM

## 2023-03-20 RX ORDER — MUPIROCIN 20 MG/G
OINTMENT TOPICAL
Status: ON HOLD | COMMUNITY
End: 2023-11-29 | Stop reason: HOSPADM

## 2023-03-20 RX ORDER — FLUCONAZOLE 100 MG/1
TABLET ORAL
COMMUNITY
End: 2023-04-18

## 2023-03-20 RX ORDER — PROMETHAZINE HYDROCHLORIDE AND DEXTROMETHORPHAN HYDROBROMIDE 6.25; 15 MG/5ML; MG/5ML
SYRUP ORAL
COMMUNITY
End: 2023-04-18

## 2023-03-20 RX ORDER — ESTRADIOL 0.1 MG/G
CREAM VAGINAL
Status: ON HOLD | COMMUNITY
Start: 2023-02-02 | End: 2023-11-29 | Stop reason: HOSPADM

## 2023-03-20 RX ORDER — ESTRADIOL 0.1 MG/G
CREAM VAGINAL
COMMUNITY
End: 2023-04-18

## 2023-03-20 RX ORDER — ALBUTEROL SULFATE 90 UG/1
2 AEROSOL, METERED RESPIRATORY (INHALATION) EVERY 6 HOURS PRN
COMMUNITY
Start: 2023-03-15

## 2023-03-20 RX ORDER — CLONAZEPAM 0.12 MG/1
TABLET, ORALLY DISINTEGRATING ORAL
COMMUNITY
End: 2023-04-18

## 2023-03-20 RX ORDER — BENZONATATE 100 MG/1
CAPSULE ORAL
Status: ON HOLD | COMMUNITY
End: 2023-12-31

## 2023-03-20 RX ORDER — FLUOCINOLONE ACETONIDE 0.11 MG/ML
OIL TOPICAL
Status: ON HOLD | COMMUNITY
End: 2024-01-03

## 2023-03-20 RX ORDER — GABAPENTIN 100 MG/1
CAPSULE ORAL
COMMUNITY
End: 2023-10-30

## 2023-03-20 RX ORDER — LIDOCAINE 30 MG/G
1 CREAM TOPICAL 2 TIMES DAILY
Qty: 85 G | Refills: 3 | Status: ON HOLD | OUTPATIENT
Start: 2023-03-20 | End: 2023-11-29 | Stop reason: HOSPADM

## 2023-03-21 ENCOUNTER — TELEPHONE (OUTPATIENT)
Dept: PODIATRY | Facility: CLINIC | Age: 88
End: 2023-03-21
Payer: MEDICARE

## 2023-03-21 NOTE — TELEPHONE ENCOUNTER
Spoke with [facility]. They received the prescription instead of the [facility]. They are sending it over now.    Called and left vm informing pt of this and any questions can be answered [phone]

## 2023-03-21 NOTE — TELEPHONE ENCOUNTER
----- Message from Ghislaine Durham sent at 3/21/2023  9:11 AM CDT -----  Regarding: refill  Contact: self @ [phone]  Pt says [facility] has not received her prescriptions for clotrimazole-betamethasone 1-0.05% (LOTRISONE) cream and LIDOcaine 3 % Crea even though the system shows a confirmation from the pharmacy on both.  Pls resend.  Pls call pt.       [facility] - [place] [address]   Phone:  [phone]  Fax:  [phone]

## 2023-03-23 ENCOUNTER — TELEPHONE (OUTPATIENT)
Dept: PODIATRY | Facility: CLINIC | Age: 88
End: 2023-03-23
Payer: MEDICARE

## 2023-03-23 ENCOUNTER — PATIENT MESSAGE (OUTPATIENT)
Dept: PODIATRY | Facility: CLINIC | Age: 88
End: 2023-03-23
Payer: MEDICARE

## 2023-03-27 ENCOUNTER — TELEPHONE (OUTPATIENT)
Dept: PODIATRY | Facility: CLINIC | Age: 88
End: 2023-03-27
Payer: MEDICARE

## 2023-03-27 ENCOUNTER — HOSPITAL ENCOUNTER (OUTPATIENT)
Dept: RADIOLOGY | Facility: OTHER | Age: 88
Discharge: HOME | End: 2023-03-27
Attending: SURGERY
Payer: MEDICARE

## 2023-03-27 DIAGNOSIS — K42.9 UMBILICAL HERNIA WITHOUT OBSTRUCTION AND WITHOUT GANGRENE: ICD-10-CM

## 2023-03-27 PROCEDURE — 74177 CT ABD & PELVIS W/CONTRAST: CPT | Mod: TC

## 2023-03-27 PROCEDURE — A9698 NON-RAD CONTRAST MATERIALNOC: HCPCS | Performed by: SURGERY

## 2023-03-27 PROCEDURE — 25500020 PHARM REV CODE 255: Performed by: SURGERY

## 2023-03-27 PROCEDURE — 74177 CT ABDOMEN PELVIS WITH CONTRAST: ICD-10-PCS | Mod: 26,,, | Performed by: RADIOLOGY

## 2023-03-27 PROCEDURE — 74177 CT ABD & PELVIS W/CONTRAST: CPT | Mod: 26,,, | Performed by: RADIOLOGY

## 2023-03-27 RX ADMIN — IOHEXOL 75 ML: 350 INJECTION, SOLUTION INTRAVENOUS at 08:03

## 2023-03-27 RX ADMIN — IOHEXOL 1000 ML: 9 SOLUTION ORAL at 08:03

## 2023-03-27 NOTE — TELEPHONE ENCOUNTER
----- Message from Adina Mireles sent at 3/27/2023  1:33 PM CDT -----  Regarding: Rx Info  Contact: [phone] Ext [phone]  Amparo Mosher is requesting a call back about pts rx for diabetic shoes.  Please call.

## 2023-03-27 NOTE — PROGRESS NOTES
Subjective:      Patient ID: Debbie Ding is a 89 y.o. female.    Chief Complaint: Foot Pain (Right foot) and Diabetes Mellitus (PCP_ 12/13/2022/Mandy Winchester)      Debbie is a 89 y.o. female who presents to the podiatry clinic  with complaint of  bilateral foot pain. Onset of the symptoms was several weeks ago. Precipitating event: none known. Current symptoms include: ability to bear weight, but with some pain. Aggravating factors: any weight bearing. Symptoms have progressed to a point and plateaued. Patient has had prior foot problems. Evaluation to date: none. Treatment to date: none.  She is here for routine diabetic foot evaluation and foot care.  New issue of a ingrown/loose left hallux nail.    Review of Systems   Constitutional: Negative for chills, diaphoresis and fever.   Cardiovascular:  Negative for claudication, cyanosis, leg swelling and syncope.   Respiratory:  Negative for cough and shortness of breath.    Skin:  Positive for suspicious lesions. Negative for color change and nail changes.   Musculoskeletal:  Positive for joint pain and joint swelling. Negative for falls, muscle cramps and muscle weakness.   Gastrointestinal:  Negative for diarrhea, nausea and vomiting.   Neurological:  Negative for disturbances in coordination, numbness, paresthesias, sensory change, tremors and weakness.   Psychiatric/Behavioral:  Negative for altered mental status.          Objective:      Physical Exam  Vitals reviewed.   Constitutional:       Appearance: She is well-developed.   HENT:      Head: Normocephalic and atraumatic.   Cardiovascular:      Pulses:           Dorsalis pedis pulses are 1+ on the right side and 1+ on the left side.        Posterior tibial pulses are 1+ on the right side and 1+ on the left side.   Pulmonary:      Effort: Pulmonary effort is normal.   Musculoskeletal:         General: Normal range of motion.      Comments: Anterior, lateral, and posterior muscle groups bilateral lower  extremities show strength 4 over 5 symmetrically. Inspection and palpation of the joints and bones reveal no crepitus or joint effusion. No tenderness upon palpation. Mild plantar flexor contractures noted to digits 2 through 5 bilaterally.  Angle and base of gait are normal.    Mild tenderness to bilateral great toe joints dorsally.  Exostosis apparent bilaterally.  Decreased range of motion.   Feet:      Right foot:      Skin integrity: Callus and dry skin present.      Left foot:      Skin integrity: Callus and dry skin present.   Skin:     General: Skin is warm and dry.      Capillary Refill: Capillary refill takes 2 to 3 seconds.      Coloration: Skin is pale.      Comments: Skin bilateral lower extremities noted to be thin, dry, and atrophic.  Toenails thickened, discolored, with subungual fungal debris and tenderness noted.  Hyperkeratotic lesions noted to both feet plantarly with tenderness.   Neurological:      Mental Status: She is alert and oriented to person, place, and time.      Sensory: Sensory deficit present.      Motor: Atrophy present.      Deep Tendon Reflexes: Reflexes abnormal.      Reflex Scores:       Patellar reflexes are 1+ on the right side and 1+ on the left side.       Achilles reflexes are 1+ on the right side and 1+ on the left side.     Comments: Sharp, dull, light touch, and vibratory sensation are diminished bilaterally. Proprioceptive sensation is intact to both lower extremities. Fork Rob monofilament exam shows loss of protective sensation to plantar toes 1 through 5 bilaterally. Deep tendon reflexes to the patellar tendons is 1 over 4 bilaterally symmetrical. Deep tendon reflexes to the Achilles tendon is 0 over 4 bilaterally symmetrical. No ankle clonus or Babinski reflex noted bilaterally. Coordination is fair to both lower extremities.  Patient admits to intermittent burning and tingling in the feet.   Psychiatric:         Behavior: Behavior normal.              Assessment:       Encounter Diagnoses   Name Primary?    Type 2 diabetes mellitus with stage 3a chronic kidney disease, without long-term current use of insulin Yes    Corn or callus          Plan:       Debbie was seen today for foot pain and diabetes mellitus.    Diagnoses and all orders for this visit:    Type 2 diabetes mellitus with stage 3a chronic kidney disease, without long-term current use of insulin  -     DIABETIC SHOES FOR HOME USE    Corn or callus  -     DIABETIC SHOES FOR HOME USE    Other orders  -     clotrimazole-betamethasone 1-0.05% (LOTRISONE) cream; Apply topically 2 (two) times daily.  -     LIDOcaine 3 % Crea; Apply 1 application topically 2 (two) times a day.    I counseled the patient on her conditions, their implications and medical management.    Routine Foot Care    Performed by:  Arturo Teresa. DPM  Authorized by:  Patient     Consent Done?:  Yes (Verbal)     Nail Care Type:  Debride  Location(s): All  (Left 1st Toe, Left 3rd Toe, Left 2nd Toe, Left 4th Toe, Left 5th Toe, Right 1st Toe, Right 2nd Toe, Right 3rd Toe, Right 4th Toe and Right 5th Toe)  Patient tolerance:  Patient tolerated the procedure well with no immediate complications     With patient's permission, the toenails mentioned above were aggressively reduced and debrided using a nail nipper, removing all offending nail and debris. The patient will continue to monitor the areas daily, inspect the feet, wear protective shoe gear when ambulatory, and moisturizer to maintain skin integrity.      Callus Care Type: Debride    With patient's permission, the calluses/hyperkeratotic lesions mentioned above were aggressively reduced and debrided using a number 15 blade. The patient will continue to monitor the areas daily, inspect the feet, wear protective shoe gear when ambulatory, and moisturizer to maintain skin integrity.     Temporary Nail Removal     Performed by:  Arturo Teresa DPM     Consent Done?:  Yes (Written)      Location:  Left hallux nail    Anesthesia:  Digital block  Local anesthetic: 0.5% Marcaine without epinephrine  Anesthetic total (ml):  4  Preparation:  Skin prepped with alcohol and skin prepped with Betadine     Amount removed:  Total nail avulsion  Wedge excision of skin of nail fold: No    Nail bed sutured?: No    Nail matrix removed:  No  Removed nail replaced and anchored: No    Dressing applied:  4x4, antibiotic ointment and dressing applied  Patient tolerance:  Patient tolerated the procedure well with no immediate complications    Digital nerve block applied to the above-mentioned toe. Toe was prepped and draped in a sterile fashion and penrose drain applied. Anesthesia was confirmed and the offending portion of nail plate was freed from the nail bed and eponychium, and was then excised. The surgical site was then lavaged with copious amounts of 70% isopropyl alcohol. Penrose drain was removed and betadine ointment and dry sterile dressing applied. Post-op care instructions were discussed and dispensed to patient.    Shoe inspection. Diabetic Foot Education. Patient reminded of the importance of good nutrition and blood sugar control to help prevent podiatric complications of diabetes. Patient instructed on proper foot hygeine. We discussed wearing proper shoe gear, daily foot inspections and Diabetic foot education in detail.    Return to clinic in 3 months or sooner if problems arise

## 2023-03-27 NOTE — TELEPHONE ENCOUNTER
----- Message from Moni Whipple sent at 3/27/2023  2:25 PM CDT -----  Regarding: return call  Contact: @ [phone] ext [phone]  Nomi Christensen is returning a call in regards to getting  a new script please call and adv@ [phone] ext [phone]

## 2023-03-28 ENCOUNTER — TELEPHONE (OUTPATIENT)
Dept: INTERNAL MEDICINE | Facility: CLINIC | Age: 88
End: 2023-03-28
Payer: MEDICARE

## 2023-03-28 ENCOUNTER — NURSE TRIAGE (OUTPATIENT)
Dept: ADMINISTRATIVE | Facility: CLINIC | Age: 88
End: 2023-03-28
Payer: MEDICARE

## 2023-03-28 ENCOUNTER — TELEPHONE (OUTPATIENT)
Dept: ORTHOPEDICS | Facility: CLINIC | Age: 88
End: 2023-03-28
Payer: MEDICARE

## 2023-03-28 NOTE — TELEPHONE ENCOUNTER
Patient c/o difficulty breathing at rest. Advised per protocol to go to the nearest ED now for further evaluation. Patient VU. Advised the patient to call 911 if symptoms worsen.    Reason for Disposition   [1] MODERATE difficulty breathing (e.g., speaks in phrases, SOB even at rest, pulse 100-120) AND [2] NEW-onset or WORSE than normal    Additional Information   Negative: SEVERE difficulty breathing (e.g., struggling for each breath, speaks in single words)   Negative: [1] Breathing stopped AND [2] hasn't returned   Negative: Choking on something   Negative: Bluish (or gray) lips or face now   Negative: Difficult to awaken or acting confused (e.g., disoriented, slurred speech)   Negative: Passed out (i.e., lost consciousness, collapsed and was not responding)   Negative: Wheezing started suddenly after medicine, an allergic food or bee sting   Negative: Stridor   Negative: Slow, shallow and weak breathing   Negative: Sounds like a life-threatening emergency to the triager    Protocols used: Breathing Difficulty-A-AH

## 2023-03-28 NOTE — TELEPHONE ENCOUNTER
----- Message from Moni Whipple sent at 3/28/2023 10:54 AM CDT -----  Contact: @[phone]  Pt called in regards to speak to someone about changing her appointment date closer to her next injection for next month  .....Please call and adv @[phone]

## 2023-03-28 NOTE — TELEPHONE ENCOUNTER
Cpap was used last night and it was not used in a long time. Cpap was updated to 7. Pt states that when she woke up this morning she was feeling a little tightness in her chest. Pt states that it kept getting tighter. Pt stated that she used her inhaler and it provided no relief. Pt then took half of a 500mg tylenol and it provided little relief. She then took the other half and it also provided little relief. Pt denies any SOB. Pt states that she does feel like it's hard for her to breathe.     Pt was transferred to the OOC RN.

## 2023-03-29 NOTE — TELEPHONE ENCOUNTER
Called and spoke with daughter. States pt is ok, she is at a hearing aid appointment. No acute distress at this time.

## 2023-03-30 ENCOUNTER — TELEPHONE (OUTPATIENT)
Dept: SURGERY | Facility: CLINIC | Age: 88
End: 2023-03-30
Payer: MEDICARE

## 2023-03-30 NOTE — TELEPHONE ENCOUNTER
Informed pt of test results per Dr. Carcamo. Pt to come in to clinic to discuss results and possible surgery. Appointment scheduled for 9/13/23 @ 3725.

## 2023-03-30 NOTE — TELEPHONE ENCOUNTER
----- Message from Benedicto Carcamo MD sent at 3/27/2023 12:41 PM CDT -----  This doesn't allow me to answer the patient through a chat.    On the one hand the inguinal hernia is completely repaired.  The fat seen in the prior hernia area is not a hernia but normal.    On the other hand the umbilical hernia is still there, although smaller.  I believe she is symptomatic so she may want it repaired.  It would be better to be done robotically as we were not able to do a good repair open.  ----- Message -----  From: Vitaly, Rad Results In  Sent: 3/27/2023   9:02 AM CDT  To: Benedicto Carcamo MD

## 2023-03-31 ENCOUNTER — EXTERNAL CHRONIC CARE MANAGEMENT (OUTPATIENT)
Dept: PRIMARY CARE CLINIC | Facility: CLINIC | Age: 88
End: 2023-03-31
Payer: MEDICARE

## 2023-03-31 PROCEDURE — 99439 CHRNC CARE MGMT STAF EA ADDL: CPT | Mod: S$PBB,,, | Performed by: INTERNAL MEDICINE

## 2023-03-31 PROCEDURE — 99490 CHRNC CARE MGMT STAFF 1ST 20: CPT | Mod: PBBFAC | Performed by: INTERNAL MEDICINE

## 2023-03-31 PROCEDURE — 99439 PR CHRONIC CARE MGMT, EA ADDTL 20 MIN: ICD-10-PCS | Mod: S$PBB,,, | Performed by: INTERNAL MEDICINE

## 2023-03-31 PROCEDURE — 99439 CHRNC CARE MGMT STAF EA ADDL: CPT | Mod: PBBFAC | Performed by: INTERNAL MEDICINE

## 2023-03-31 PROCEDURE — 99490 CHRNC CARE MGMT STAFF 1ST 20: CPT | Mod: S$PBB,,, | Performed by: INTERNAL MEDICINE

## 2023-03-31 PROCEDURE — 99490 PR CHRONIC CARE MGMT, 1ST 20 MIN: ICD-10-PCS | Mod: S$PBB,,, | Performed by: INTERNAL MEDICINE

## 2023-04-03 ENCOUNTER — TELEPHONE (OUTPATIENT)
Dept: GASTROENTEROLOGY | Facility: CLINIC | Age: 88
End: 2023-04-03
Payer: MEDICARE

## 2023-04-03 ENCOUNTER — TELEPHONE (OUTPATIENT)
Dept: PODIATRY | Facility: CLINIC | Age: 88
End: 2023-04-03
Payer: MEDICARE

## 2023-04-03 NOTE — TELEPHONE ENCOUNTER
----- Message from Bakari Cormier MA sent at 4/3/2023  8:20 AM CDT -----  Type: Patient Call Back    Who called:Self    What is the request in detail: pt. Doesn't want to see this provider anymore and is asking to be scheduled with a Dr. Diaz?? Not sure who that is..     Can the clinic reply by [facility]?No    Would the patient rather a call back or a response via [facility]? yes    Best call back number: [phone]

## 2023-04-03 NOTE — TELEPHONE ENCOUNTER
----- Message from Harika Yanes sent at 4/3/2023  8:12 AM CDT -----  Regarding: SHOE RX  Contact: Patient  Type: Patient Call Back         Who called: Patient         What is the request in detail: I have Debbie Ding calling in regards to her shoe prescription routed to [facility] back on 3/23; she wanted to get an update on the request; please advise         Best call back number: [phone]         Additional Information:            Thank You

## 2023-04-04 ENCOUNTER — TELEPHONE (OUTPATIENT)
Dept: PODIATRY | Facility: CLINIC | Age: 88
End: 2023-04-04
Payer: MEDICARE

## 2023-04-04 NOTE — TELEPHONE ENCOUNTER
I spoke with Naye from [facility] she will fax over a form form [facility] shailesh Duckworth, I will give it to Dr. Teresa on 4/5.

## 2023-04-04 NOTE — TELEPHONE ENCOUNTER
I call patient she requested her DM prescription be route to [facility]. Prescription was routed by me on today.

## 2023-04-11 ENCOUNTER — TELEPHONE (OUTPATIENT)
Dept: ORTHOPEDICS | Facility: CLINIC | Age: 88
End: 2023-04-11
Payer: MEDICARE

## 2023-04-11 ENCOUNTER — TELEPHONE (OUTPATIENT)
Dept: PODIATRY | Facility: CLINIC | Age: 88
End: 2023-04-11
Payer: MEDICARE

## 2023-04-11 NOTE — TELEPHONE ENCOUNTER
----- Message from Donita Fontanez MA sent at 4/11/2023  3:35 PM CDT -----  Regarding: [facility] paperwork  Can you check to see if  Completed this paperwork on his desk Thanks  ----- Message -----  From: Vilma Paniagua  Sent: 4/11/2023   3:31 PM CDT  To: Donita Fontanez MA, [facility] Staff    DEJAH ROLDAN calling regarding Donita from the office spoke to Naye from [facility] about the form and prescription for the diabetic shoes and according to Amparo, the perscription is being send with the wrong info for the insoles. This process have been done over and over incorrectly per the PT information and is upset because of it and is in need of the shoe and want this matter corrected, call back to PT to inform.[phone] PT stated that she just spoke to [employer]'s and is told that the same thing has happened again or contact Naye at [facility] to correct the necessary info

## 2023-04-13 ENCOUNTER — OFFICE VISIT (OUTPATIENT)
Dept: SURGERY | Facility: CLINIC | Age: 88
End: 2023-04-13
Payer: MEDICARE

## 2023-04-13 ENCOUNTER — OFFICE VISIT (OUTPATIENT)
Dept: ORTHOPEDICS | Facility: CLINIC | Age: 88
End: 2023-04-13
Payer: MEDICARE

## 2023-04-13 VITALS
HEIGHT: 65 IN | BODY MASS INDEX: 24.46 KG/M2 | HEART RATE: 71 BPM | DIASTOLIC BLOOD PRESSURE: 71 MMHG | WEIGHT: 146.81 LBS | SYSTOLIC BLOOD PRESSURE: 133 MMHG

## 2023-04-13 VITALS
HEIGHT: 65 IN | WEIGHT: 144.19 LBS | DIASTOLIC BLOOD PRESSURE: 70 MMHG | HEART RATE: 69 BPM | BODY MASS INDEX: 24.02 KG/M2 | SYSTOLIC BLOOD PRESSURE: 164 MMHG

## 2023-04-13 DIAGNOSIS — N18.31 TYPE 2 DIABETES MELLITUS WITH STAGE 3A CHRONIC KIDNEY DISEASE, WITHOUT LONG-TERM CURRENT USE OF INSULIN: ICD-10-CM

## 2023-04-13 DIAGNOSIS — E11.22 TYPE 2 DIABETES MELLITUS WITH STAGE 3A CHRONIC KIDNEY DISEASE, WITHOUT LONG-TERM CURRENT USE OF INSULIN: ICD-10-CM

## 2023-04-13 DIAGNOSIS — M17.11 PRIMARY OSTEOARTHRITIS OF RIGHT KNEE: Primary | ICD-10-CM

## 2023-04-13 DIAGNOSIS — R30.0 DYSURIA: Primary | ICD-10-CM

## 2023-04-13 PROCEDURE — 99213 PR OFFICE/OUTPT VISIT, EST, LEVL III, 20-29 MIN: ICD-10-PCS | Mod: S$PBB,25,, | Performed by: PHYSICIAN ASSISTANT

## 2023-04-13 PROCEDURE — 20610 DRAIN/INJ JOINT/BURSA W/O US: CPT | Mod: PBBFAC | Performed by: PHYSICIAN ASSISTANT

## 2023-04-13 PROCEDURE — 99213 OFFICE O/P EST LOW 20 MIN: CPT | Mod: S$PBB,25,, | Performed by: PHYSICIAN ASSISTANT

## 2023-04-13 PROCEDURE — 20610 DRAIN/INJ JOINT/BURSA W/O US: CPT | Mod: S$PBB,RT,, | Performed by: PHYSICIAN ASSISTANT

## 2023-04-13 PROCEDURE — 99999 PR PBB SHADOW E&M-EST. PATIENT-LVL IV: ICD-10-PCS | Mod: PBBFAC,,, | Performed by: SURGERY

## 2023-04-13 PROCEDURE — 99214 OFFICE O/P EST MOD 30 MIN: CPT | Mod: PBBFAC,27,25 | Performed by: SURGERY

## 2023-04-13 PROCEDURE — 99999 PR PBB SHADOW E&M-EST. PATIENT-LVL V: CPT | Mod: PBBFAC,,, | Performed by: PHYSICIAN ASSISTANT

## 2023-04-13 PROCEDURE — 99999 PR PBB SHADOW E&M-EST. PATIENT-LVL V: ICD-10-PCS | Mod: PBBFAC,,, | Performed by: PHYSICIAN ASSISTANT

## 2023-04-13 PROCEDURE — 99213 OFFICE O/P EST LOW 20 MIN: CPT | Mod: S$PBB,,, | Performed by: SURGERY

## 2023-04-13 PROCEDURE — 99213 PR OFFICE/OUTPT VISIT, EST, LEVL III, 20-29 MIN: ICD-10-PCS | Mod: S$PBB,,, | Performed by: SURGERY

## 2023-04-13 PROCEDURE — 99215 OFFICE O/P EST HI 40 MIN: CPT | Mod: PBBFAC,25 | Performed by: PHYSICIAN ASSISTANT

## 2023-04-13 PROCEDURE — 20610 PR DRAIN/INJECT LARGE JOINT/BURSA: ICD-10-PCS | Mod: S$PBB,RT,, | Performed by: PHYSICIAN ASSISTANT

## 2023-04-13 PROCEDURE — 99999 PR PBB SHADOW E&M-EST. PATIENT-LVL IV: CPT | Mod: PBBFAC,,, | Performed by: SURGERY

## 2023-04-13 RX ORDER — BETAMETHASONE SODIUM PHOSPHATE AND BETAMETHASONE ACETATE 3; 3 MG/ML; MG/ML
6 INJECTION, SUSPENSION INTRA-ARTICULAR; INTRALESIONAL; INTRAMUSCULAR; SOFT TISSUE
Status: COMPLETED | OUTPATIENT
Start: 2023-04-13 | End: 2023-04-13

## 2023-04-13 RX ADMIN — BETAMETHASONE SODIUM PHOSPHATE AND BETAMETHASONE ACETATE 6 MG: 3; 3 INJECTION, SUSPENSION INTRA-ARTICULAR; INTRALESIONAL; INTRAMUSCULAR at 08:04

## 2023-04-13 NOTE — PROGRESS NOTES
SUBJECTIVE:     Chief Complaint & History of Present Illness:  Debbie Ding is a Established patient 89 y.o. female who is seen here today with a complaint of  right knee pain .  She is patient well-known to me was last seen treated the clinic 11/11/2022 at which time he completed a round of viscosupplementation for her right knee from which she did fairly well she is had return of pain soreness the knee difficulty with start-up pain in periods of prolonged standing and ambulation requesting cortisone injections today  On a scale of 1-10, with 10 being worst pain imaginable, he rates this pain as 4 on good days and 7 on bad days.  she describes the pain as sore and achy.    Review of patient's allergies indicates:   Allergen Reactions    Melatonin      Other reaction(s): Other (See Comments)  Sleep Walks and has unnatural dreams    Talwin compound Hives    Talwin [pentazocine lactate] Hallucinations    Trazodone Other (See Comments)     Nightmares/sleep walk      Bentyl [dicyclomine] Anxiety         Current Outpatient Medications   Medication Sig Dispense Refill    albuterol (PROVENTIL) 2.5 mg /3 mL (0.083 %) nebulizer solution Take 2.5 mg by nebulization every 6 (six) hours as needed. Rescue      albuterol (PROVENTIL/VENTOLIN HFA) 90 mcg/actuation inhaler INHALE 2 PUFFS BY MOUTH EVERY 6 HRS AS NEEDED      albuterol (PROVENTIL/VENTOLIN HFA) 90 mcg/actuation inhaler Inhale 2 puffs into the lungs every 6 (six) hours as needed.      amLODIPine (NORVASC) 10 MG tablet TAKE 1 TABLET ONCE DAILY 90 tablet 3    apixaban (ELIQUIS) 2.5 mg Tab Take 1 tablet (2.5 mg total) by mouth once daily. 90 tablet 3    atorvastatin (LIPITOR) 40 MG tablet Take 1 tablet (40 mg total) by mouth once daily. 90 tablet 3    benzonatate (TESSALON) 100 MG capsule benzonatate 100 mg capsule      chlorhexidine (PERIDEX) 0.12 % solution SWISH WITH 10ML FOR 30 seconds THEN EXPECTORATE TWICE DAILY      ciclopirox 1 % shampoo Apply topically.       clobetasoL (TEMOVATE) 0.05 % external solution Pt to mix in 1 jar of cerave cream and apply to affected areas bid 50 mL 3    clonazePAM (KLONOPIN) 0.125 MG disintegrating tablet clonazepam 0.125 mg disintegrating tablet   PLACE ONE TABLET ON THE TONGUE AND allow TO DISSOLVE ONCE A DAY 30 minutes BEFORE bedtime WITH FOOD      clonazePAM (KLONOPIN) 0.125 MG disintegrating tablet Take 0.125 mg by mouth.      clotrimazole-betamethasone 1-0.05% (LOTRISONE) cream Apply topically 2 (two) times daily. 45 g 2    cyproheptadine (PERIACTIN) 4 mg tablet Take 1 tablet (4 mg total) by mouth 3 (three) times daily as needed (appetite). 90 tablet 1    donepeziL (ARICEPT) 10 MG tablet TAKE 1 TABLET EVERY MORNING.  NO FURTHER REFILL WITHOUT APPOINTMENT (Patient taking differently: TAKE 1 TABLET EVERY MORNING.  NO FURTHER REFILL WITHOUT APPOINTMENT) 90 tablet 1    doxepin (SINEQUAN) 10 MG capsule TAKE 1 CAPSULE AT BEDTIME  FOR ITCHING 90 capsule 3    EScitalopram oxalate (LEXAPRO) 10 MG tablet Take by mouth.      esomeprazole (NEXIUM) 40 MG capsule Take 1 capsule (40 mg total) by mouth 2 (two) times daily. 180 capsule 3    estradioL (ESTRACE) 0.01 % (0.1 mg/gram) vaginal cream Place vaginally.      estradioL (ESTRACE) 0.01 % (0.1 mg/gram) vaginal cream estradiol 0.01% (0.1 mg/gram) vaginal cream      famotidine (PEPCID) 20 MG tablet Take 1 tablet (20 mg total) by mouth daily as needed for Heartburn (breakthrough heartburn and acid reflux not controlled with Nexium). 90 tablet 3    flash glucose scanning reader (FREESTYLE ALEXSANDRA 14 DAY READER) Misc 1 Device by Misc.(Non-Drug; Combo Route) route 2 (two) times a day. 1 each 11    flash glucose sensor (FREESTYLE ALEXSANDRA 14 DAY SENSOR) Kit 1 Device by Misc.(Non-Drug; Combo Route) route 2 (two) times a day. 1 kit 11    fluconazole (DIFLUCAN) 100 MG tablet fluconazole 100 mg tablet   TAKE 1 TABLET BY MOUTH ONCE DAILY FOR FOURTEEN DAYS      fluconazole (DIFLUCAN) 150 MG Tab Take 150 mg by mouth.       fluconazole (DIFLUCAN) 200 MG Tab fluconazole 200 mg tablet      fluocinolone (DERMA-SMOOTHE) 0.01 % external oil fluocinolone 0.01 % scalp oil and shower cap   Apply ONE A SMALL AMOUNT TO affected AREA as directed apply TO SCALP 2-3 TIMES PER WEEK FOR ITCHING]      fluocinonide (LIDEX) 0.05 % external solution AAA scalp qday - bid prn pruritus 60 mL 3    fluticasone-salmeterol diskus inhaler 500-50 mcg USE 1 INHALATION ORALLY    INTO THE LUNGS TWO TIMES A DAY      gabapentin (NEURONTIN) 100 MG capsule gabapentin 100 mg capsule      glycopyrrolate (ROBINUL) 2 MG Tab TAKE 1 TABLET TWICE A  tablet 3    glycopyrrolate (ROBINUL) 2 MG Tab Take 2 mg by mouth.      ketoconazole (NIZORAL) 2 % cream Apply topically once daily. 60 g 2    ketoconazole (NIZORAL) 2 % shampoo Wash hair with medicated shampoo at least 2x/week - let sit on scalp at least 5 minutes prior to rinsing 120 mL 5    levalbuterol (XOPENEX HFA) 45 mcg/actuation inhaler Inhale into the lungs.      levothyroxine (SYNTHROID) 25 MCG tablet Take 1 tablet by mouth every day, except hold on Sundays. 90 tablet 3    LIDOcaine 3 % Crea Apply 1 application topically 2 (two) times a day. 85 g 3    LIDOCAINE VISCOUS 2 % solution Take by mouth.      meclizine (ANTIVERT) 25 mg tablet Take 1 tablet (25 mg total) by mouth 2 (two) times daily as needed for Dizziness. 180 tablet 1    memantine (NAMENDA) 10 MG Tab Take 10 mg by mouth once daily.      metoclopramide HCl (REGLAN) 5 MG tablet metoclopramide 5 mg tablet      mirtazapine (REMERON) 15 MG tablet Take 1 tablet (15 mg total) by mouth every evening. 90 tablet 3    mupirocin (BACTROBAN) 2 % ointment mupirocin 2 % topical ointment   APPLY LIBERALLY TO THE AFFECTED AREA TWICE DAILY TO WOUND ON SCALP      nystatin (MYCOSTATIN) 100,000 unit/mL suspension SMARTSI Teaspoon By Mouth 4 Times Daily      oxyCODONE (ROXICODONE) 5 MG immediate release tablet Take 1 tablet (5 mg total) by mouth every 4 (four) hours as  needed for Pain. 8 tablet 0    prednisoLONE acetate (PRED FORTE) 1 % DrpS 3 drops 2 (two) times daily.      predniSONE (DELTASONE) 10 MG tablet Take 2 Tablet po Qam x 7 days, take 1 Tablet po Qam x 7 days, take 1/2 a tablet po Qam x 7 days then stop      predniSONE (DELTASONE) 10 MG tablet Take 10 mg by mouth.      promethazine-dextromethorphan (PROMETHAZINE-DM) 6.25-15 mg/5 mL Syrp promethazine-DM 6.25 mg-15 mg/5 mL oral syrup      RESTASIS 0.05 % ophthalmic emulsion       rivaroxaban (XARELTO) 10 mg Tab Xarelto 10 mg tablet      SYMBICORT 160-4.5 mcg/actuation HFAA Inhale into the lungs.      diclofenac sodium (VOLTAREN) 1 % Gel Apply 2 g topically 3 (three) times daily. for 10 days 400 g 0     Current Facility-Administered Medications   Medication Dose Route Frequency Provider Last Rate Last Admin    regadenoson injection 0.4 mg  0.4 mg Intravenous Once Benedicto Love MD PhD         Facility-Administered Medications Ordered in Other Visits   Medication Dose Route Frequency Provider Last Rate Last Admin    fentaNYL 50 mcg/mL injection 25 mcg  25 mcg Intravenous Q5 Min PRN Ross Hui MD        midazolam (VERSED) 1 mg/mL injection 0.5 mg  0.5 mg Intravenous PRN Ross Hui MD           Past Medical History:   Diagnosis Date    Allergic rhinitis     Anticoagulant long-term use     Arthritis     Asthma     Bilateral pulmonary embolism 4/27/2019    Cataract     Chronic anticoagulation 9/28/2020    Depression     Diabetes mellitus     Fibromyalgia 7/2/2012    Fibromyalgia     GERD (gastroesophageal reflux disease)     Hypercholesterolemia 9/28/2020    Hypercholesterolemia 9/28/2020    Hypertension 7/2/2012    Thoracic or lumbosacral neuritis or radiculitis, unspecified     Thyroid disease     Ulcer        Past Surgical History:   Procedure Laterality Date    CATARACT EXTRACTION Bilateral     ESOPHAGOGASTRODUODENOSCOPY N/A 3/8/2022    Procedure: EGD (ESOPHAGOGASTRODUODENOSCOPY) with dilation-either  "hospital ok with Dr. Meza;  Surgeon: Rebeca Meza MD;  Location: [facility];  Service: Endoscopy;  Laterality: N/A;  1/11-eliquis hold ok see te-tb    ESOPHAGOGASTRODUODENOSCOPY N/A 2/28/2022    Procedure: EGD (ESOPHAGOGASTRODUODENOSCOPY) with dilation-either [facility] with Dr. Meza;  Surgeon: Rebeca Meza MD;  Location: [facility] ([address] [place] [address]);  Service: Endoscopy;  Laterality: N/A;  1/11-eliquis hold ok see te-tb  COVID test on 2/25/22 at [facility]  2/22-confirmed appt arrival time with pt-Kpvt    FOOT NEUROMA SURGERY  1985    HYSTERECTOMY         Vital Signs (Most Recent)  Vitals:    04/13/23 0739   BP: 133/71   Pulse: 71           Review of Systems:  ROS:  Constitutional: no fever or chills  Eyes: no visual changes  ENT: no nasal congestion or sore throat, Positive  vestibular dizziness  Respiratory: no cough or shortness of breath, Positive for asthma  Cardiovascular: no chest pain or palpitations  Gastrointestinal: no nausea or vomiting, tolerating diet, Positive for GERD, peptic ulcer disease  Genitourinary: no hematuria or dysuria, Positive CKD stage 3,  Integument/Breast: no rash or pruritis  Hematologic/Lymphatic: no easy bruising or lymphadenopathy, Positive long-term anticoagulation, history DVT, history of PE  Musculoskeletal: no arthralgias or myalgias, Positive chronic low back pain, fibromyalgia, osteoarthritis of multiple joints  Neurological: no seizures or tremors, Degenerative disc disease lumbar spine, history of memory loss, spinal stenosis without neurological claudication  Behavioral/Psych: no auditory or visual hallucinations, Positive for depression,  Endocrine: no heat or cold intolerance, Positive diabetes type 2, thyroid disease hypothyroidism                OBJECTIVE:     PHYSICAL EXAM:  Height: 5' 5" (165.1 cm) Weight: 66.6 kg (146 lb 13.2 oz), General Appearance: Well nourished, well developed, in no acute distress.  Neurological: Mood & affect are normal.  right  Knee " Exam:  Knee Range of Motion:0-115 degrees flexion   Effusion:none  Condition of skin:intact  Location of tenderness:Medial joint line   Strength:5 of 5  Stability:  Lachman: stable, LCL: stable, MCL: stable, PCL: stable, and posteromedial (dial): stable  Varus /Valgus stress:  normal  Chris:   negative/negative     left  Knee Exam:  Knee Range of Motion:0-120 degrees flexion   Effusion:none  Condition of skin:intact  Location of tenderness:Medial joint line   Strength:5 of 5  Stability:  Lachman: stable, LCL: stable, MCL: stable, PCL: stable, and posteromedial (dial): stable  Varus /Valgus stress:  normal  Chris:   negative/negative        Hip Examination:  full painless range of motion, without tenderness    RADIOGRAPHS:  X-rays from previous visit reviewed by me today demonstrate mild to moderate arthritic changes throughout both right more so than left significant medial joint space narrowing sclerotic changes noted tricompartmentally    ASSESSMENT/PLAN:       ICD-10-CM ICD-9-CM   1. Primary osteoarthritis of right knee  M17.11 715.16   2. Type 2 diabetes mellitus with stage 3a chronic kidney disease, without long-term current use of insulin  E11.22 250.40    N18.31 585.3       Plan: We discussed with the patient at length all the different treatment options available for  the knee including anti-inflammatories, acetaminophen, rest, ice, knee strengthening exercise, occasional cortisone injections for temporary relief, Viscosupplimentation injections, arthroscopic debridement osteotomy, and finally knee arthroplasty.   Will proceed with cortisone injection of the right knee     The injection site was identified and the skin was prepared with a betadine solution. The   right  knee was injected with 1 ml of Celestone and 5 ml Lidocaine under sterile technique. Debbie Ding tolerated the procedure well, she was advised to rest the knee today, ice and elevation. she did receive immediate relief of the pain  in and about his knee she was told this would be short lived and is secondary to the lidocaine. she may have an increase in his discomfort tonight followed by steady improvement over the next several days. I may take 1-3 weeks following the injection to get the full benefit of the medication.  I will see her back in 4-6 months. Sooner if he has any problems or concerns.    Debbie TORRES Timur was advised to monitor her blood sugars closely over the next several days. The steroid may cause a rise in them. If her glucose levels rise to a point she is uncomfortable or he is unable to control them is is to contact his PCP or go immediately to the emergency department.

## 2023-04-13 NOTE — PROGRESS NOTES
I have seen the patient, reviewed the Resident's history and physical, assessment and plan. I have personally interviewed and examined the patient at bedside and: agree with the findings.     S/p lap rih with mesh and umbilical hernia repair with mesh 11/14/22. Now with symptoms to the left and below the navel and far to the left of her hernia recurrence.  This is a sharp pain lasting seconds.  The pains are spontaneous and don't wake her up at night.  She denies pain on urination, bowel habits don't change the pain and she is not sure if eating changes the pain.  She has gained weight since December.  She has chronic constipation and she controls it with prunes, denies straining to urinate and doesn't have a chronic cough.    She says she would like to be walking more.    PE abdomen benign, non-tender, small reducible hernia above the naval for 3 fb.    Cbc, cmp reviewed, basically ok  Ct 2023 reviewed, films viewed    1.  Status post repair of prior umbilical hernia with decrease in size of the hernia defect.  This is tiny and appears to be cephalad to the mesh.    2.  Status post repair of right femoral hernia with resolution of the previous bowel component within the hernia.  Small amount of mesenteric fat remains within the right femoral canal.    3.  Additional findings as in report.  Nuc stress 2022    Normal myocardial perfusion scan. There is no evidence of myocardial ischemia or infarction.    There is a  mild intensity fixed perfusion abnormality in the inferior wall of the left ventricle secondary to diaphragm attenuation.    The gated perfusion images showed an ejection fraction of 60% at rest. The gated perfusion images showed an ejection fraction of 68% post stress. Normal ejection fraction is greater than 53%.    There is normal wall motion at rest and post stress.    LV cavity size is normal at rest and normal at stress.    The EKG portion of this study is abnormal but not diagnostic.    The patient  reported no chest pain during the stress test.    There were no arrhythmias during stress.    There are no prior studies for comparison.  Tte 2022 reviewed  The left ventricle is normal in size with normal systolic function. The estimated ejection fraction is 65%.  Normal right ventricular size with normal right ventricular systolic function.  Indeterminate left ventricular diastolic function.  Mild tricuspid regurgitation.  The estimated PA systolic pressure is 40 mmHg.  Normal central venous pressure (3 mmHg).    Left suprapubic pain.  Obtain ua and follow up with pcp.  Follow up here 1 year.

## 2023-04-13 NOTE — PROGRESS NOTES
Progress Note  General Surgery      SUBJECTIVE:   Debbie Ding is a  89 y.o.female s/p R femoral hernia repair and umbilical hernia repair in Nov 2022. She has been doing well postoperative but reports a left sided sharp pain. The pain is intermittent occurring only about once every other week. She describes it as stinging in nature. Its onset is quick on and is relieved within seconds. It does not wake her up at night. The pain is not associated with any activities and is spontaneous. Denies pain at navel or right groin.      Review of patient's allergies indicates:   Allergen Reactions    Melatonin      Other reaction(s): Other (See Comments)  Sleep Walks and has unnatural dreams    Talwin compound Hives    Talwin [pentazocine lactate] Hallucinations    Trazodone Other (See Comments)     Nightmares/sleep walk      Bentyl [dicyclomine] Anxiety       Past Medical History:   Diagnosis Date    Allergic rhinitis     Anticoagulant long-term use     Arthritis     Asthma     Bilateral pulmonary embolism 4/27/2019    Cataract     Chronic anticoagulation 9/28/2020    Depression     Diabetes mellitus     Fibromyalgia 7/2/2012    Fibromyalgia     GERD (gastroesophageal reflux disease)     Hypercholesterolemia 9/28/2020    Hypercholesterolemia 9/28/2020    Hypertension 7/2/2012    Thoracic or lumbosacral neuritis or radiculitis, unspecified     Thyroid disease     Ulcer      Past Surgical History:   Procedure Laterality Date    CATARACT EXTRACTION Bilateral     ESOPHAGOGASTRODUODENOSCOPY N/A 3/8/2022    Procedure: EGD (ESOPHAGOGASTRODUODENOSCOPY) with dilation-either hospital ok with Dr. Meza;  Surgeon: Rebeca Meza MD;  Location: [facility];  Service: Endoscopy;  Laterality: N/A;  1/11-eliquis hold ok see te-tb    ESOPHAGOGASTRODUODENOSCOPY N/A 2/28/2022    Procedure: EGD (ESOPHAGOGASTRODUODENOSCOPY) with dilation-either hospital ok with Dr. Meza;  Surgeon: Rebeca Meza MD;  Location: [facility] ([address] [place] [address]);   Service: Endoscopy;  Laterality: N/A;  1/11-eliquis hold ok see te-tb  COVID test on 2/25/22 at [facility]  2/22-confirmed appt arrival time with pt-Kpvt    FOOT NEUROMA SURGERY  1985    HYSTERECTOMY       Family History   Problem Relation Age of Onset    Diabetes Mother     Diabetes Brother     Emphysema Maternal Aunt     Melanoma Neg Hx     Asthma Neg Hx     Colon cancer Neg Hx     Esophageal cancer Neg Hx      Social History     Tobacco Use    Smoking status: Never    Smokeless tobacco: Never   Substance Use Topics    Alcohol use: No    Drug use: No        Review of Systems:  Review of Systems   Constitutional:  Negative for chills and fever.   HENT: Negative.     Eyes: Negative.    Respiratory:  Negative for cough.    Cardiovascular: Negative.    Gastrointestinal:  Positive for abdominal pain and constipation. Negative for heartburn, nausea and vomiting.   Genitourinary: Negative.    Neurological: Negative.  Negative for dizziness and headaches.   Endo/Heme/Allergies: Negative.    Psychiatric/Behavioral: Negative.       OBJECTIVE:     Vitals:    04/13/23 0853   BP: (!) 164/70   Pulse: 69       Physical Exam:  Physical Exam  HENT:      Head: Normocephalic.      Mouth/Throat:      Mouth: Mucous membranes are moist.   Eyes:      Pupils: Pupils are equal, round, and reactive to light.   Cardiovascular:      Rate and Rhythm: Normal rate.      Pulses: Normal pulses.   Pulmonary:      Effort: Pulmonary effort is normal.   Abdominal:      General: Abdomen is flat.      Palpations: Abdomen is soft.      Tenderness: There is no abdominal tenderness.   Musculoskeletal:      Cervical back: Normal range of motion.   Skin:     General: Skin is warm.      Capillary Refill: Capillary refill takes less than 2 seconds.   Neurological:      General: No focal deficit present.       ASSESSMENT/PLAN:   Debbie Ding is a 89 y.o. female s/p R femoral hernia repair and umbilical hernia repair in Nov 2022. CT demonstrated recurrent  hernia above navel, not likely related to the pain patient describes.     Plan:  - UA and follow up with PCP  - follow up in 1 year

## 2023-04-15 RX ORDER — SULFAMETHOXAZOLE AND TRIMETHOPRIM 800; 160 MG/1; MG/1
1 TABLET ORAL 2 TIMES DAILY
Qty: 14 TABLET | Refills: 0 | Status: SHIPPED | OUTPATIENT
Start: 2023-04-15 | End: 2023-04-22

## 2023-04-17 ENCOUNTER — TELEPHONE (OUTPATIENT)
Dept: INTERNAL MEDICINE | Facility: CLINIC | Age: 88
End: 2023-04-17
Payer: MEDICARE

## 2023-04-17 NOTE — TELEPHONE ENCOUNTER
Attempted to call pt twice. Phone will  and then the phone is hung up.    Was able to reach the pt on Ms. Nieves's line. The pt will have someone go and  her rx so that she can start taking it as well

## 2023-04-17 NOTE — TELEPHONE ENCOUNTER
----- Message from Carolyn Mejia MD sent at 4/15/2023  4:42 PM CDT -----  Pls make sure she is aware:    Ms Ding  - you have a uti-  I sent in Bactrim Rx -to [facility] - not sure if they are open - does she want it sent somewhere else?

## 2023-04-18 ENCOUNTER — LAB VISIT (OUTPATIENT)
Dept: LAB | Facility: HOSPITAL | Age: 88
End: 2023-04-18
Attending: INTERNAL MEDICINE
Payer: MEDICARE

## 2023-04-18 ENCOUNTER — OFFICE VISIT (OUTPATIENT)
Dept: INTERNAL MEDICINE | Facility: CLINIC | Age: 88
End: 2023-04-18
Payer: MEDICARE

## 2023-04-18 ENCOUNTER — PATIENT OUTREACH (OUTPATIENT)
Dept: ADMINISTRATIVE | Facility: HOSPITAL | Age: 88
End: 2023-04-18
Payer: MEDICARE

## 2023-04-18 ENCOUNTER — DOCUMENTATION ONLY (OUTPATIENT)
Dept: INTERNAL MEDICINE | Facility: CLINIC | Age: 88
End: 2023-04-18

## 2023-04-18 VITALS
OXYGEN SATURATION: 97 % | HEART RATE: 64 BPM | WEIGHT: 143.5 LBS | HEIGHT: 65 IN | DIASTOLIC BLOOD PRESSURE: 56 MMHG | SYSTOLIC BLOOD PRESSURE: 128 MMHG | BODY MASS INDEX: 23.91 KG/M2

## 2023-04-18 DIAGNOSIS — E78.5 HYPERLIPIDEMIA, UNSPECIFIED HYPERLIPIDEMIA TYPE: Primary | ICD-10-CM

## 2023-04-18 DIAGNOSIS — J41.1 BRONCHITIS, CHRONIC, MUCOPURULENT: ICD-10-CM

## 2023-04-18 DIAGNOSIS — E03.9 HYPOTHYROIDISM, UNSPECIFIED TYPE: ICD-10-CM

## 2023-04-18 DIAGNOSIS — E78.5 HYPERLIPIDEMIA, UNSPECIFIED HYPERLIPIDEMIA TYPE: ICD-10-CM

## 2023-04-18 DIAGNOSIS — Z86.718 HISTORY OF DEEP VENOUS THROMBOSIS: ICD-10-CM

## 2023-04-18 DIAGNOSIS — M46.96 UNSPECIFIED INFLAMMATORY SPONDYLOPATHY, LUMBAR REGION: ICD-10-CM

## 2023-04-18 DIAGNOSIS — Z86.39 HISTORY OF DIABETES MELLITUS: ICD-10-CM

## 2023-04-18 DIAGNOSIS — R60.9 EDEMA, UNSPECIFIED TYPE: ICD-10-CM

## 2023-04-18 DIAGNOSIS — F01.518 MIXED CORTICAL AND SUBCORTICAL VASCULAR DEMENTIA WITH BEHAVIORAL DISTURBANCE: ICD-10-CM

## 2023-04-18 DIAGNOSIS — G47.33 OSA (OBSTRUCTIVE SLEEP APNEA): ICD-10-CM

## 2023-04-18 DIAGNOSIS — H81.90 VESTIBULAR DIZZINESS: ICD-10-CM

## 2023-04-18 DIAGNOSIS — I27.20 PULMONARY HYPERTENSION: ICD-10-CM

## 2023-04-18 LAB
CHOLEST SERPL-MCNC: 191 MG/DL (ref 120–199)
CHOLEST/HDLC SERPL: 1.9 {RATIO} (ref 2–5)
ESTIMATED AVG GLUCOSE: 146 MG/DL (ref 68–131)
HBA1C MFR BLD: 6.7 % (ref 4–5.6)
HDLC SERPL-MCNC: 99 MG/DL (ref 40–75)
HDLC SERPL: 51.8 % (ref 20–50)
LDLC SERPL CALC-MCNC: 64.4 MG/DL (ref 63–159)
NONHDLC SERPL-MCNC: 92 MG/DL
T4 FREE SERPL-MCNC: 0.78 NG/DL (ref 0.71–1.51)
TRIGL SERPL-MCNC: 138 MG/DL (ref 30–150)
TSH SERPL DL<=0.005 MIU/L-ACNC: 17.56 UIU/ML (ref 0.4–4)

## 2023-04-18 PROCEDURE — 99214 OFFICE O/P EST MOD 30 MIN: CPT | Mod: PBBFAC | Performed by: INTERNAL MEDICINE

## 2023-04-18 PROCEDURE — 84443 ASSAY THYROID STIM HORMONE: CPT | Performed by: INTERNAL MEDICINE

## 2023-04-18 PROCEDURE — 36415 COLL VENOUS BLD VENIPUNCTURE: CPT | Performed by: INTERNAL MEDICINE

## 2023-04-18 PROCEDURE — 99999 PR PBB SHADOW E&M-EST. PATIENT-LVL IV: CPT | Mod: PBBFAC,,, | Performed by: INTERNAL MEDICINE

## 2023-04-18 PROCEDURE — 99215 OFFICE O/P EST HI 40 MIN: CPT | Mod: S$PBB,,, | Performed by: INTERNAL MEDICINE

## 2023-04-18 PROCEDURE — 99215 PR OFFICE/OUTPT VISIT, EST, LEVL V, 40-54 MIN: ICD-10-PCS | Mod: S$PBB,,, | Performed by: INTERNAL MEDICINE

## 2023-04-18 PROCEDURE — 80061 LIPID PANEL: CPT | Performed by: INTERNAL MEDICINE

## 2023-04-18 PROCEDURE — 99999 PR PBB SHADOW E&M-EST. PATIENT-LVL IV: ICD-10-PCS | Mod: PBBFAC,,, | Performed by: INTERNAL MEDICINE

## 2023-04-18 PROCEDURE — 84439 ASSAY OF FREE THYROXINE: CPT | Performed by: INTERNAL MEDICINE

## 2023-04-18 PROCEDURE — 83036 HEMOGLOBIN GLYCOSYLATED A1C: CPT | Performed by: INTERNAL MEDICINE

## 2023-04-18 RX ORDER — DICLOFENAC SODIUM 10 MG/G
2 GEL TOPICAL 3 TIMES DAILY
Qty: 400 G | Refills: 0 | Status: SHIPPED | OUTPATIENT
Start: 2023-04-18 | End: 2023-12-29 | Stop reason: ALTCHOICE

## 2023-04-18 RX ORDER — VIBEGRON 75 MG/1
1 TABLET, FILM COATED ORAL
COMMUNITY
Start: 2023-03-23

## 2023-04-18 NOTE — PROGRESS NOTES
Subjective     Patient ID: Debbie Ding is a 89 y.o. female.    Chief Complaint: Follow-up    HPI  C/o equilibrium problem, for which she takes meclizine daily.    SYmptoms were evaluated at [facility] by an ENT- I don't see note.      ENG scheduled next month.  Then will return to see outside ENT.    UNable to get inserts for shoes.  Rx at [facility].    SHe needs a never diagnostic code, as [employer] won't pay.    C/o edema of feet x 3-4 weeks.  Painful.    Followed by Dr Love- doppler u.s neg for dvt- baker's cyst on R 7/22.      UTI detected 4/13, tx with bactrim.  No dysuria, but had suprapubic pain, which has improved.    She thinks she has persistent thrush, tx by Dr Mathis, for more than 1 year.    .Asthma pretty well controlled.  Dyspnea with exertion, but she is more comfortable.  CPAP, which she started regularly about 4 mo ago, has been helpful. Chronic cough improved.  Dr Fox manages chronic bronchitis.    Hypothyroidism.   TSH normal in Dec. Taking 25 mcg daily.    Wt much improved with improved appetite.  Up to 143lbs, up 10 lbs from December.  BMI 23.9.    R knee improved after steroid infection.      NO abd pain, gerd.  Taking nexium daily, rarerly pepcid.    U r incontinence controlled with Gemtesa.         Neuropathy - gabapentin makes her tired, so doesn't take it.  Review of Systems   Constitutional:  Negative for fever and unexpected weight change.   HENT:  Negative for nasal congestion and postnasal drip.    Respiratory:  Positive for wheezing. Negative for chest tightness and shortness of breath.    Cardiovascular:  Positive for leg swelling. Negative for chest pain.   Gastrointestinal:  Negative for abdominal pain, anal bleeding, constipation, diarrhea, nausea and vomiting.   Genitourinary:  Negative for dysuria and urgency.   Integumentary:  Negative for rash.   Neurological:  Negative for headaches.   Psychiatric/Behavioral:  Negative for dysphoric mood and sleep disturbance. The patient is  not nervous/anxious.         Objective     Physical Exam  Constitutional:       General: She is not in acute distress.     Appearance: She is well-developed.   HENT:      Head: Normocephalic and atraumatic.      Right Ear: External ear normal.      Left Ear: External ear normal.      Nose: Nose normal.      Mouth/Throat:      Comments:  No thrush  Eyes:      General: No scleral icterus.        Right eye: No discharge.         Left eye: No discharge.      Conjunctiva/sclera: Conjunctivae normal.      Pupils: Pupils are equal, round, and reactive to light.   Neck:      Thyroid: No thyromegaly.      Vascular: No JVD.   Cardiovascular:      Rate and Rhythm: Normal rate and regular rhythm.      Heart sounds: Normal heart sounds. No murmur heard.    No gallop.   Pulmonary:      Effort: Pulmonary effort is normal. No respiratory distress.      Breath sounds: Normal breath sounds. No wheezing or rales.   Abdominal:      General: Bowel sounds are normal. There is no distension.      Palpations: Abdomen is soft. There is no mass.      Tenderness: There is no abdominal tenderness. There is no guarding or rebound.   Musculoskeletal:         General: No tenderness. Normal range of motion.      Cervical back: Normal range of motion and neck supple.      Right lower leg: No edema.      Left lower leg: No edema.      Comments: Lipomatous collection lateral ankles bilat   Lymphadenopathy:      Cervical: No cervical adenopathy.   Skin:     General: Skin is warm and dry.      Findings: No rash.   Neurological:      Mental Status: She is alert and oriented to person, place, and time.      Cranial Nerves: No cranial nerve deficit.      Coordination: Coordination normal.   Psychiatric:         Behavior: Behavior normal.         Thought Content: Thought content normal.         Judgment: Judgment normal.        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  F/u ENG, ENT  42 min spent with pt and direct care and chart review, documentation

## 2023-04-18 NOTE — Clinical Note
Estevan - could you help with diabetic shoes?  She was told by [facility] that she needs different codes for medicare to cover.  thanks

## 2023-04-18 NOTE — PROGRESS NOTES
Health Maintenance Due   Topic Date Due    Shingles Vaccine (1 of 2) Never done    Hemoglobin A1c  10/02/2022    Lipid Panel  04/02/2023          updated. Triggered LINKS and Care Everywhere. Reconciled immunizations.  Set reminder for eye exam scheduled next month.    Jelly Antunez LPN   Clinical Care Coordinator  Primary Care and Wellness

## 2023-04-23 DIAGNOSIS — E03.9 HYPOTHYROIDISM, ADULT: ICD-10-CM

## 2023-04-23 RX ORDER — LEVOTHYROXINE SODIUM 50 UG/1
50 TABLET ORAL
Qty: 90 TABLET | Refills: 0 | Status: SHIPPED | OUTPATIENT
Start: 2023-04-23 | End: 2023-07-02 | Stop reason: SDUPTHER

## 2023-04-23 NOTE — PROGRESS NOTES
Pls call- she is not getting enough thyroid replacement.  Confirm she is taking 25 mcg daily.  If so, increase synthroid to 50 mcg - she may take 2 of what she has til she runs out.  Schedule tsh 2 months

## 2023-04-24 ENCOUNTER — TELEPHONE (OUTPATIENT)
Dept: INTERNAL MEDICINE | Facility: CLINIC | Age: 88
End: 2023-04-24
Payer: MEDICARE

## 2023-04-24 NOTE — TELEPHONE ENCOUNTER
----- Message from Carolyn Mejia MD sent at 4/23/2023  5:49 PM CDT -----  Pls call- she is not getting enough thyroid replacement.  Confirm she is taking 25 mcg daily.  If so, increase synthroid to 50 mcg - she may take 2 of what she has til she runs out.  Schedule tsh 2 months

## 2023-04-26 ENCOUNTER — TELEPHONE (OUTPATIENT)
Dept: ORTHOPEDICS | Facility: CLINIC | Age: 88
End: 2023-04-26
Payer: MEDICARE

## 2023-04-26 DIAGNOSIS — M79.641 BILATERAL HAND PAIN: Primary | ICD-10-CM

## 2023-04-26 DIAGNOSIS — M79.642 BILATERAL HAND PAIN: Primary | ICD-10-CM

## 2023-04-26 NOTE — TELEPHONE ENCOUNTER
Spoke to Patient and she stated she did not want to see Dr Nuñez that she wanted to see Dr Esparza, XR 3:30 and 4:15 xray 5/9----- Message from Eliana Chakraborty sent at 4/26/2023 12:44 PM CDT -----  Regarding: sooner appt  Name of caller: fausto       What is the requesting detail: pt is scheduled to be seen at 6-13 but is wondering  if she can be seen sooner. Please call her back to let her  know what her options are.       Can the clinic reply by [facility]: no       What number to call back:[phone]

## 2023-04-27 ENCOUNTER — TELEPHONE (OUTPATIENT)
Dept: INTERNAL MEDICINE | Facility: CLINIC | Age: 88
End: 2023-04-27
Payer: MEDICARE

## 2023-04-27 NOTE — TELEPHONE ENCOUNTER
----- Message from Guerda Wolf sent at 4/27/2023  8:11 AM CDT -----  Contact: [phone] PAtient  1MEDICALADVICE     Patient is calling for Medical Advice regarding:retaining fluid in right foot and leg, so swollen Left leg and foot is a little swollen not much    How long has patient had these symptoms: 5-6 months    Pharmacy name and phone#:   [facility] [place] [address]  [address]  [place] [address]  Phone: [phone] Fax: [phone]          Would like response via [employer]:  Call Back Please. Pt stated she wears her compression socks but she is still retaining fluid in her legs and she had told Dr Mejia about it. Please call and advise.     Comments:

## 2023-04-27 NOTE — TELEPHONE ENCOUNTER
Returned pt call. Pt states at her appt on 4/18 she discussed bilateral lower extremity edema with PCP. Pt states she has not had a chance to get fitted for the compression stocking yet. Pt states the edema hasn't gotten worse, but she wants to know the cause of this happening. Pt states the edema improves at night but when she wakes in the morning it gets worse. Pt described swelling as a balloon and states it burns. Pt states elevation of legs during the day doesn't help.

## 2023-04-28 NOTE — TELEPHONE ENCOUNTER
Pls has call - she has been evaluated by a cardiologist Dr Love  Swelling likely due to venous insufficiency (dilated veins) due to prior DVT and aging.  She also has Baker's cyst behind r knee, which can cause swelling.     Treatment is compression stockings.

## 2023-04-28 NOTE — TELEPHONE ENCOUNTER
Spoke to patient and advised of recommendation. Patient verbalized understanding.       Stated she will go sometime next week to get fitted

## 2023-04-30 ENCOUNTER — EXTERNAL CHRONIC CARE MANAGEMENT (OUTPATIENT)
Dept: PRIMARY CARE CLINIC | Facility: CLINIC | Age: 88
End: 2023-04-30
Payer: MEDICARE

## 2023-04-30 PROCEDURE — 99490 CHRNC CARE MGMT STAFF 1ST 20: CPT | Mod: PBBFAC | Performed by: INTERNAL MEDICINE

## 2023-04-30 PROCEDURE — 99490 CHRNC CARE MGMT STAFF 1ST 20: CPT | Mod: S$PBB,,, | Performed by: INTERNAL MEDICINE

## 2023-04-30 PROCEDURE — 99490 PR CHRONIC CARE MGMT, 1ST 20 MIN: ICD-10-PCS | Mod: S$PBB,,, | Performed by: INTERNAL MEDICINE

## 2023-05-01 ENCOUNTER — TELEPHONE (OUTPATIENT)
Dept: INTERNAL MEDICINE | Facility: CLINIC | Age: 88
End: 2023-05-01
Payer: MEDICARE

## 2023-05-01 NOTE — TELEPHONE ENCOUNTER
----- Message from Keri Cosby sent at 5/1/2023 10:48 AM CDT -----  Contact: Naye/ Tom Godinez/ [phone] ex [phone]  Diabetic Notes.  What supplies are needed: Most recent chart note is needed on patient , please fax to

## 2023-05-01 NOTE — TELEPHONE ENCOUNTER
She may but otc compression stockings through [employer], [employer],etc  Medicare will not cover, but will be cheaper

## 2023-05-01 NOTE — TELEPHONE ENCOUNTER
----- Message from Kiersten Yuen sent at 5/1/2023  8:02 AM CDT -----  Contact: pt [phone]  Patient went to get fitted for compression stocking and they cost $100.00. Wants to know if there is a less expensive location.    Please call and advise.    Thank You

## 2023-05-02 ENCOUNTER — TELEPHONE (OUTPATIENT)
Dept: INTERNAL MEDICINE | Facility: CLINIC | Age: 88
End: 2023-05-02
Payer: MEDICARE

## 2023-05-02 NOTE — TELEPHONE ENCOUNTER
----- Message from Shelby Cosby sent at 5/2/2023 10:31 AM CDT -----  Contact: Pt Mobile [phone]  Patient is calling in regards to her saying that [employer] is waiting for you to send some information over to them that they need in order for them to complete the patients order for her Diabetic Shoes.

## 2023-05-09 ENCOUNTER — TELEPHONE (OUTPATIENT)
Dept: ORTHOPEDICS | Facility: CLINIC | Age: 88
End: 2023-05-09
Payer: MEDICARE

## 2023-05-09 NOTE — TELEPHONE ENCOUNTER
----- Message from Tierney Beverly sent at 5/9/2023  3:15 PM CDT -----  Type:  Patient Returning Call    Who Called: pt   Who Left Message for Patient: pt   Does the patient know what this is regarding pt need a call she can her appt do to weather   Would the patient rather a call back or a response via [facility]?  Call   Best Call Back Number:[phone]  Additional Information:  call back  only appt  was on June 13 and she have an appt on that day with the

## 2023-05-09 NOTE — TELEPHONE ENCOUNTER
Spoke with pt and reschedule appt and xray with Dr Hutchison for 2nd opinion.  Appt letter mailed to pt.  Pt verbalized understanding.

## 2023-05-09 NOTE — TELEPHONE ENCOUNTER
I have attempted without success to contact this patient by phone. Left VM for pt to contact office regarding sooner appointment

## 2023-05-11 DIAGNOSIS — M17.11 PRIMARY OSTEOARTHRITIS OF RIGHT KNEE: Primary | ICD-10-CM

## 2023-05-15 RX ORDER — MECLIZINE HYDROCHLORIDE 25 MG/1
TABLET ORAL
Qty: 180 TABLET | Refills: 1 | Status: SHIPPED | OUTPATIENT
Start: 2023-05-15 | End: 2023-11-17

## 2023-05-15 NOTE — TELEPHONE ENCOUNTER
Refill Routing Note   Medication(s) are not appropriate for processing by [facility] for the following reason(s):      Medication outside of protocol    [facility] action(s):  Route None identified          Appointments  past 12m or future 3m with PCP    Date Provider   Last Visit   12/13/2022 Mandy Winchester PA-C   Next Visit   Visit date not found Mandy Winchester PA-C   ED visits in past 90 days: 0        Note composed:9:37 AM 05/15/2023

## 2023-05-17 NOTE — TELEPHONE ENCOUNTER
Office will be on the look out for paper work. Stated they will fax papers over   Detail Level: Generalized Hide Include Location In Plan Question?: No

## 2023-05-18 ENCOUNTER — OFFICE VISIT (OUTPATIENT)
Dept: ORTHOPEDICS | Facility: CLINIC | Age: 88
End: 2023-05-18
Payer: MEDICARE

## 2023-05-18 VITALS
BODY MASS INDEX: 23.09 KG/M2 | SYSTOLIC BLOOD PRESSURE: 128 MMHG | HEIGHT: 65 IN | HEART RATE: 74 BPM | DIASTOLIC BLOOD PRESSURE: 68 MMHG | WEIGHT: 138.56 LBS

## 2023-05-18 DIAGNOSIS — M17.11 PRIMARY OSTEOARTHRITIS OF RIGHT KNEE: Primary | ICD-10-CM

## 2023-05-18 PROCEDURE — 99999 PR PBB SHADOW E&M-EST. PATIENT-LVL IV: CPT | Mod: PBBFAC,,, | Performed by: PHYSICIAN ASSISTANT

## 2023-05-18 PROCEDURE — 20610 DRAIN/INJ JOINT/BURSA W/O US: CPT | Mod: S$PBB,RT,, | Performed by: PHYSICIAN ASSISTANT

## 2023-05-18 PROCEDURE — 99499 NO LOS: ICD-10-PCS | Mod: S$PBB,,, | Performed by: PHYSICIAN ASSISTANT

## 2023-05-18 PROCEDURE — 99999 PR PBB SHADOW E&M-EST. PATIENT-LVL IV: ICD-10-PCS | Mod: PBBFAC,,, | Performed by: PHYSICIAN ASSISTANT

## 2023-05-18 PROCEDURE — 20610 DRAIN/INJ JOINT/BURSA W/O US: CPT | Mod: PBBFAC | Performed by: PHYSICIAN ASSISTANT

## 2023-05-18 PROCEDURE — 99499 UNLISTED E&M SERVICE: CPT | Mod: S$PBB,,, | Performed by: PHYSICIAN ASSISTANT

## 2023-05-18 PROCEDURE — 99214 OFFICE O/P EST MOD 30 MIN: CPT | Mod: PBBFAC,25 | Performed by: PHYSICIAN ASSISTANT

## 2023-05-18 PROCEDURE — 20610 PR DRAIN/INJECT LARGE JOINT/BURSA: ICD-10-PCS | Mod: S$PBB,RT,, | Performed by: PHYSICIAN ASSISTANT

## 2023-05-18 RX ADMIN — Medication 20 MG: at 08:05

## 2023-05-18 NOTE — PROGRESS NOTES
Debbie Ding is a 89 y.o. year old her here today for her 1st Euflexxa injection for degenerative changes of her right knee . she was last seen and treated in the clinic on 4/13/2023. There has been no significant change in her medical status since her last visit. No Fever, chills, malaise, or unexplained weight change.      Allergies, Medications, past medical and surgical history were reviewed .    Examination of the knee demonstrates  No evidence of edema, erythema , echymosis strength and range of motion are unchanged from previous visit.    The injection site was identified and the skin was prepared with a betadine solution. The  right knee  joint was injected with 2 ml of Euflexxa solution under sterile technique. Debbie Ding tolerated the procedure well, she was advised to rest the knee today, ice and elevation. I may take 3 -6 weeks following the last injection to get the full benefit of the medication.  I will see her back in 1 w4ek. Sooner if he has any problems or concerns.           .     ICD-10-CM ICD-9-CM   1. Primary osteoarthritis of right knee  M17.11 715.16

## 2023-05-23 ENCOUNTER — OFFICE VISIT (OUTPATIENT)
Dept: GASTROENTEROLOGY | Facility: CLINIC | Age: 88
End: 2023-05-23
Payer: MEDICARE

## 2023-05-23 VITALS
DIASTOLIC BLOOD PRESSURE: 63 MMHG | WEIGHT: 140 LBS | HEIGHT: 65 IN | HEART RATE: 73 BPM | SYSTOLIC BLOOD PRESSURE: 117 MMHG | BODY MASS INDEX: 23.32 KG/M2

## 2023-05-23 DIAGNOSIS — K22.4 ESOPHAGEAL SPASM: Primary | ICD-10-CM

## 2023-05-23 DIAGNOSIS — K22.2 SCHATZKI'S RING OF DISTAL ESOPHAGUS: ICD-10-CM

## 2023-05-23 PROCEDURE — 99215 OFFICE O/P EST HI 40 MIN: CPT | Mod: PBBFAC

## 2023-05-23 PROCEDURE — 99214 PR OFFICE/OUTPT VISIT, EST, LEVL IV, 30-39 MIN: ICD-10-PCS | Mod: S$PBB,,,

## 2023-05-23 PROCEDURE — 99999 PR PBB SHADOW E&M-EST. PATIENT-LVL V: CPT | Mod: PBBFAC,,,

## 2023-05-23 PROCEDURE — 99214 OFFICE O/P EST MOD 30 MIN: CPT | Mod: S$PBB,,,

## 2023-05-23 PROCEDURE — 99999 PR PBB SHADOW E&M-EST. PATIENT-LVL V: ICD-10-PCS | Mod: PBBFAC,,,

## 2023-05-23 RX ORDER — DOXEPIN HYDROCHLORIDE 10 MG/1
10 CAPSULE ORAL NIGHTLY
COMMUNITY
Start: 2023-05-19 | End: 2023-08-24

## 2023-05-23 NOTE — PROGRESS NOTES
"                                                                           Gastroenterology Clinic Consultation Note    Reason for Visit:  The primary encounter diagnosis was Esophageal spasm. A diagnosis of Schatzki's ring of distal esophagus was also pertinent to this visit.    PCP:   Carolyn Mejia         Initial HPI   This is a 89 y.o. female presenting for followup from her EGD 3/16/2022. The burden of her complaints seem to be respiratory in which she states she feels short of breath. Describes it as "movement sensation in the upper part of her chest" that causes her to have shortness of breath. Concerned that the procedure caused these pulmonary issues. Procedure and anatomy explained extensively to patient. Denies any dysphagia, odynophagia, globus sensation, nausea, vomiting, diarrhea or constipation. Does endorse difficulty catching her breath. Following pulmonology at [facility]. No other reported GI symptoms. Does have chronic GERD symptoms. Currently controlled with her Omeprazole.       ROS:  Review of Systems   Constitutional:  Negative for chills, diaphoresis, fever, malaise/fatigue and weight loss.   HENT:  Negative for sore throat.    Eyes:  Negative for redness.   Gastrointestinal:  Negative for abdominal pain, blood in stool, constipation, diarrhea, heartburn, melena, nausea and vomiting.   Skin:  Negative for itching and rash.   Neurological:  Negative for dizziness, loss of consciousness and weakness.      Medical History:  has a past medical history of Allergic rhinitis, Anticoagulant long-term use, Arthritis, Asthma, Bilateral pulmonary embolism (4/27/2019), Cataract, Chronic anticoagulation (9/28/2020), Depression, Diabetes mellitus, Fibromyalgia (7/2/2012), Fibromyalgia, GERD (gastroesophageal reflux disease), Hypercholesterolemia (9/28/2020), Hypercholesterolemia (9/28/2020), Hypertension (7/2/2012), Thoracic or lumbosacral neuritis or radiculitis, unspecified, Thyroid " disease, and Ulcer.    Surgical History:  has a past surgical history that includes Hysterectomy; Foot neuroma surgery (1985); Cataract extraction (Bilateral); Esophagogastroduodenoscopy (N/A, 3/8/2022); and Esophagogastroduodenoscopy (N/A, 2/28/2022).    Family History: family history includes Diabetes in her brother and mother; Emphysema in her maternal aunt..       Review of patient's allergies indicates:   Allergen Reactions    Melatonin      Other reaction(s): Other (See Comments)  Sleep Walks and has unnatural dreams    Talwin compound Hives    Talwin [pentazocine lactate] Hallucinations    Trazodone Other (See Comments)     Nightmares/sleep walk      Bentyl [dicyclomine] Anxiety       Current Outpatient Medications on File Prior to Visit   Medication Sig Dispense Refill    albuterol (PROVENTIL) 2.5 mg /3 mL (0.083 %) nebulizer solution Take 2.5 mg by nebulization every 6 (six) hours as needed. Rescue      albuterol (PROVENTIL/VENTOLIN HFA) 90 mcg/actuation inhaler Inhale 2 puffs into the lungs every 6 (six) hours as needed.      amLODIPine (NORVASC) 10 MG tablet TAKE 1 TABLET ONCE DAILY 90 tablet 3    apixaban (ELIQUIS) 2.5 mg Tab Take 1 tablet (2.5 mg total) by mouth once daily. 90 tablet 3    atorvastatin (LIPITOR) 40 MG tablet Take 1 tablet (40 mg total) by mouth once daily. 90 tablet 3    benzonatate (TESSALON) 100 MG capsule benzonatate 100 mg capsule      chlorhexidine (PERIDEX) 0.12 % solution SWISH WITH 10ML FOR 30 seconds THEN EXPECTORATE TWICE DAILY      ciclopirox 1 % shampoo Apply topically.      clobetasoL (TEMOVATE) 0.05 % external solution Pt to mix in 1 jar of cerave cream and apply to affected areas bid 50 mL 3    clotrimazole-betamethasone 1-0.05% (LOTRISONE) cream Apply topically 2 (two) times daily. 45 g 2    donepeziL (ARICEPT) 10 MG tablet TAKE 1 TABLET EVERY MORNING.  NO FURTHER REFILL WITHOUT APPOINTMENT (Patient taking differently: TAKE 1 TABLET EVERY MORNING.  NO FURTHER REFILL  WITHOUT APPOINTMENT) 90 tablet 1    doxepin (SINEQUAN) 10 MG capsule Take 10 mg by mouth every evening.      esomeprazole (NEXIUM) 40 MG capsule Take 1 capsule (40 mg total) by mouth 2 (two) times daily. 180 capsule 3    estradioL (ESTRACE) 0.01 % (0.1 mg/gram) vaginal cream Place vaginally.      famotidine (PEPCID) 20 MG tablet Take 1 tablet (20 mg total) by mouth daily as needed for Heartburn (breakthrough heartburn and acid reflux not controlled with Nexium). 90 tablet 3    flash glucose scanning reader (FREESTYLE ALEXSANDRA 14 DAY READER) Misc 1 Device by Misc.(Non-Drug; Combo Route) route 2 (two) times a day. 1 each 11    flash glucose sensor (FREESTYLE ALEXSANDRA 14 DAY SENSOR) Kit 1 Device by Misc.(Non-Drug; Combo Route) route 2 (two) times a day. 1 kit 11    fluocinolone (DERMA-SMOOTHE) 0.01 % external oil fluocinolone 0.01 % scalp oil and shower cap   Apply ONE A SMALL AMOUNT TO affected AREA as directed apply TO SCALP 2-3 TIMES PER WEEK FOR ITCHING]      fluocinonide (LIDEX) 0.05 % external solution AAA scalp qday - bid prn pruritus 60 mL 3    fluticasone-salmeterol diskus inhaler 500-50 mcg USE 1 INHALATION ORALLY    INTO THE LUNGS TWO TIMES A DAY      gabapentin (NEURONTIN) 100 MG capsule gabapentin 100 mg capsule      GEMTESA 75 mg Tab Take 1 tablet by mouth.      glycopyrrolate (ROBINUL) 2 MG Tab TAKE 1 TABLET TWICE A  tablet 3    glycopyrrolate (ROBINUL) 2 MG Tab Take 2 mg by mouth.      ketoconazole (NIZORAL) 2 % cream Apply topically once daily. 60 g 2    ketoconazole (NIZORAL) 2 % shampoo Wash hair with medicated shampoo at least 2x/week - let sit on scalp at least 5 minutes prior to rinsing 120 mL 5    levalbuterol (XOPENEX HFA) 45 mcg/actuation inhaler Inhale into the lungs.      levothyroxine (SYNTHROID) 50 MCG tablet Take 1 tablet (50 mcg total) by mouth before breakfast. 90 tablet 0    LIDOcaine 3 % Crea Apply 1 application topically 2 (two) times a day. 85 g 3    LIDOCAINE VISCOUS 2 % solution  "Take by mouth.      meclizine (ANTIVERT) 25 mg tablet TAKE 1 TABLET TWICE A DAY  AS NEEDED FOR DIZZINESS 180 tablet 1    memantine (NAMENDA) 10 MG Tab Take 10 mg by mouth once daily.      metoclopramide HCl (REGLAN) 5 MG tablet metoclopramide 5 mg tablet      mirtazapine (REMERON) 15 MG tablet Take 1 tablet (15 mg total) by mouth every evening. 90 tablet 3    mupirocin (BACTROBAN) 2 % ointment mupirocin 2 % topical ointment   APPLY LIBERALLY TO THE AFFECTED AREA TWICE DAILY TO WOUND ON SCALP      nystatin (MYCOSTATIN) 100,000 unit/mL suspension SMARTSI Teaspoon By Mouth 4 Times Daily      prednisoLONE acetate (PRED FORTE) 1 % DrpS 3 drops 2 (two) times daily.      RESTASIS 0.05 % ophthalmic emulsion       rivaroxaban (XARELTO) 10 mg Tab Xarelto 10 mg tablet      diclofenac sodium (VOLTAREN) 1 % Gel Apply 2 g topically 3 (three) times daily. for 10 days 400 g 0    SYMBICORT 160-4.5 mcg/actuation HFAA Inhale into the lungs.       Current Facility-Administered Medications on File Prior to Visit   Medication Dose Route Frequency Provider Last Rate Last Admin    midazolam (VERSED) 1 mg/mL injection 0.5 mg  0.5 mg Intravenous PRN Ross Hui MD        regadenoson injection 0.4 mg  0.4 mg Intravenous Once Benedicto Love MD PhD        sodium hyaluronate (EUFLEXXA) 10 mg/mL(mw 2.4 -3.6 million) injection 20 mg  20 mg Intra-articular Weekly Peter Joe PA-C   20 mg at 23 0846         Objective Findings:    Vital Signs:  /63   Pulse 73   Ht 5' 5" (1.651 m)   Wt 63.5 kg (139 lb 15.9 oz)   BMI 23.30 kg/m²   Body mass index is 23.3 kg/m².    Physical Exam:  Physical Exam  Vitals and nursing note reviewed.   Constitutional:       General: She is not in acute distress.     Appearance: She is normal weight. She is not ill-appearing.   HENT:      Mouth/Throat:      Mouth: Mucous membranes are moist.      Pharynx: Oropharynx is clear.   Eyes:      General: No scleral icterus.  Abdominal:      " General: Abdomen is flat. Bowel sounds are normal. There is no distension.      Palpations: Abdomen is soft. There is no mass.      Tenderness: There is no abdominal tenderness.      Hernia: No hernia is present.   Skin:     General: Skin is warm and dry.      Capillary Refill: Capillary refill takes less than 2 seconds.      Coloration: Skin is not jaundiced or pale.      Findings: No bruising or erythema.   Neurological:      Mental Status: She is alert and oriented to person, place, and time. Mental status is at baseline.           Labs:  Lab Results   Component Value Date    WBC 9.76 12/13/2022    HGB 13.2 12/13/2022    HCT 41.8 12/13/2022     12/13/2022    CRP 0.9 03/03/2021    CHOL 191 04/18/2023    TRIG 138 04/18/2023    HDL 99 (H) 04/18/2023    ALKPHOS 60 12/13/2022    LIPASE 5 03/07/2019    ALT 10 12/13/2022    AST 15 12/13/2022     03/27/2023    K 4.1 03/27/2023     (H) 03/27/2023    CREATININE 1.2 03/27/2023    BUN 9 03/27/2023    CO2 23 03/27/2023    TSH 17.563 (H) 04/18/2023    INR 0.9 06/20/2019    HGBA1C 6.7 (H) 04/18/2023       Imaging reviewed:   CT abdomen 3/6/2023  Impression:     1.  Status post repair of prior umbilical hernia with decrease in size of the hernia defect.     2.  Status post repair of right femoral hernia with resolution of the previous bowel component within the hernia.  Small amount of mesenteric fat remains within the right femoral canal.     3.  Additional findings as above.        Electronically signed by: Bear Delacruz MD  Date:                                            03/27/2023  Time:                                           09:00      Endoscopy reviewed: EGD 3/8/2022  Impression:            - 2 cm hiatal hernia.                          - Non-obstructing Schatzki ring. Dilated without                          disruption of the ring. Disrupted with biopsy                          forceps.                          - Erythematous mucosa in the antrum.  Biopsied.                          - Normal examined duodenum.                          - The BRAVO pH capsule was deployed.                          - Biopsies were taken with a cold forceps for                          histology in the middle third of the esophagus.   Recommendation:        - Discharge patient to home.                          - Resume previous diet.                          - Continue present medications.                          - Await pathology results.                          - Bravo study is planned for 96 hours off of all                          acid lowering medications.   Rebeca Meza MD   3/8/2022 1:01:19 PM     Bravo 3/16/2022  Impression:            Positive Bravo Study for GERD. No symptom data was                        recorded by the patient during this study.   Recommendation:        Follow up in GI clinic.   Rebeca Meza MD   3/16/2022 4:04:16 PM     Assessment:  1. Esophageal spasm    2. Schatzki's ring of distal esophagus             Plan:  Symptoms seem to be related to an esophageal spasm. Recommended peppermint (Altoid) to help spasms. Patient states that her pulmonologist does not want her to have peppermint. Patient will check with her pulmonologist to make sure this is not contraindicated.   No symptoms of dysphagia, odynophagia, globus sensation to indicate reoccurrence of Schatzki's ring.       Thank you for allowing me to participate in this patient's care.    Sincerely,     Marija Guevara NP  Gastroenterology Department  [facility]

## 2023-05-25 ENCOUNTER — OFFICE VISIT (OUTPATIENT)
Dept: ORTHOPEDICS | Facility: CLINIC | Age: 88
End: 2023-05-25
Payer: MEDICARE

## 2023-05-25 VITALS — HEIGHT: 65 IN | BODY MASS INDEX: 23.14 KG/M2 | WEIGHT: 138.88 LBS

## 2023-05-25 DIAGNOSIS — R53.81 DEBILITY: ICD-10-CM

## 2023-05-25 DIAGNOSIS — Z74.09 DECREASED AMBULATION STATUS: ICD-10-CM

## 2023-05-25 DIAGNOSIS — M51.36 DDD (DEGENERATIVE DISC DISEASE), LUMBAR: Primary | ICD-10-CM

## 2023-05-25 DIAGNOSIS — M17.11 PRIMARY OSTEOARTHRITIS OF RIGHT KNEE: ICD-10-CM

## 2023-05-25 DIAGNOSIS — R29.898 DECREASED STRENGTH OF TRUNK AND BACK: ICD-10-CM

## 2023-05-25 PROCEDURE — 99999 PR PBB SHADOW E&M-EST. PATIENT-LVL IV: CPT | Mod: PBBFAC,,, | Performed by: PHYSICIAN ASSISTANT

## 2023-05-25 PROCEDURE — 20610 DRAIN/INJ JOINT/BURSA W/O US: CPT | Mod: S$PBB,RT,, | Performed by: PHYSICIAN ASSISTANT

## 2023-05-25 PROCEDURE — 99499 NO LOS: ICD-10-PCS | Mod: S$PBB,,, | Performed by: PHYSICIAN ASSISTANT

## 2023-05-25 PROCEDURE — 99214 OFFICE O/P EST MOD 30 MIN: CPT | Mod: PBBFAC | Performed by: PHYSICIAN ASSISTANT

## 2023-05-25 PROCEDURE — 99999 PR PBB SHADOW E&M-EST. PATIENT-LVL IV: ICD-10-PCS | Mod: PBBFAC,,, | Performed by: PHYSICIAN ASSISTANT

## 2023-05-25 PROCEDURE — 20610 PR DRAIN/INJECT LARGE JOINT/BURSA: ICD-10-PCS | Mod: S$PBB,RT,, | Performed by: PHYSICIAN ASSISTANT

## 2023-05-25 PROCEDURE — 99499 UNLISTED E&M SERVICE: CPT | Mod: S$PBB,,, | Performed by: PHYSICIAN ASSISTANT

## 2023-05-25 PROCEDURE — 20610 DRAIN/INJ JOINT/BURSA W/O US: CPT | Mod: PBBFAC,RT | Performed by: PHYSICIAN ASSISTANT

## 2023-05-25 RX ADMIN — Medication 20 MG: at 08:05

## 2023-05-25 NOTE — PROGRESS NOTES
Debbie Ding is a 89 y.o. year old her here today for her 2nd Euflexxa injection for degenerative changes of her right knee . she was last seen and treated in the clinic on 5/18/2023. There has been no significant change in her medical status since her last visit. No Fever, chills, malaise, or unexplained weight change.      Allergies, Medications, past medical and surgical history were reviewed .    Examination of the knee demonstrates  No evidence of edema, erythema , echymosis strength and range of motion are unchanged from previous visit.  Patient reports she continues to struggle with ambulation secondary to fatigue and persistent low back issues.  States she has been unable to ambulate outside the house without her Rollator walker which was damaged over a year ago.     The injection site was identified and the skin was prepared with a betadine solution. The  right knee  joint was injected with 2 ml of Euflexxa solution under sterile technique. Debbie Ding tolerated the procedure well, she was advised to rest the knee today, ice and elevation. I may take 3 -6 weeks following the last injection to get the full benefit of the medication.  I will see her back in 1 week. Sooner if he has any problems or concerns.    Order for new Rollator walker placed today           .     ICD-10-CM ICD-9-CM   1. Decreased strength of trunk and back  R29.898 729.89   2. Debility  R53.81 799.3   3. Decreased ambulation status  Z74.09 780.99   4. Primary osteoarthritis of right knee  M17.11 715.16

## 2023-05-29 ENCOUNTER — PATIENT OUTREACH (OUTPATIENT)
Dept: ADMINISTRATIVE | Facility: HOSPITAL | Age: 88
End: 2023-05-29
Payer: MEDICARE

## 2023-05-29 NOTE — LETTER
AUTHORIZATION FOR RELEASE OF   CONFIDENTIAL INFORMATION    Dear Dr.Delmar Muñoz,    We are seeing Debbie Ding, date of birth 10/23/1932, in the clinic at [facility]. Carolyn Mejia MD is the patient's PCP. Debbie Ding has an outstanding lab/procedure at the time we reviewed her chart. In order to help keep her health information updated, she has authorized us to request the following medical record(s):        (  )  MAMMOGRAM                                      (  )  COLONOSCOPY      (  )  PAP SMEAR                                          (  )  OUTSIDE LAB RESULTS     (  )  DEXA SCAN                                          (  x)  EYE EXAM            (  )  FOOT EXAM                                          (  )  ENTIRE RECORD     (  )  OUTSIDE IMMUNIZATIONS                 (  )  _______________         Please fax records to [facility], Nona K Epstein, MD, [phone]     If you have any questions, please contact Jelly Antunez LPN at [phone].           Patient Name: Debbie Ding  : 10/23/1932  Patient Phone #: [phone]

## 2023-05-29 NOTE — PROGRESS NOTES
Health Maintenance Due   Topic Date Due    Shingles Vaccine (1 of 2) Never done    COVID-19 Vaccine (6 - Moderna series) 11/17/2022       HM updated. Triggered LINKS and Care Everywhere. Eye exam record requested from .    Jelly Antunez LPN   Clinical Care Coordinator  Primary Care and Wellness

## 2023-05-30 ENCOUNTER — TELEPHONE (OUTPATIENT)
Dept: ORTHOPEDICS | Facility: CLINIC | Age: 88
End: 2023-05-30
Payer: MEDICARE

## 2023-05-30 NOTE — TELEPHONE ENCOUNTER
----- Message from Moni Villa sent at 5/30/2023 10:27 AM CDT -----  Contact: pt  Pt calling in regards to her walker, say medicare never reached out , please call         Confirmed patient's contact info below:  Contact Name: Debbie Timur  Phone Number: [phone]

## 2023-05-31 ENCOUNTER — EXTERNAL CHRONIC CARE MANAGEMENT (OUTPATIENT)
Dept: PRIMARY CARE CLINIC | Facility: CLINIC | Age: 88
End: 2023-05-31
Payer: MEDICARE

## 2023-05-31 PROCEDURE — 99490 PR CHRONIC CARE MGMT, 1ST 20 MIN: ICD-10-PCS | Mod: S$PBB,,, | Performed by: INTERNAL MEDICINE

## 2023-05-31 PROCEDURE — 99490 CHRNC CARE MGMT STAFF 1ST 20: CPT | Mod: PBBFAC | Performed by: INTERNAL MEDICINE

## 2023-05-31 PROCEDURE — 99490 CHRNC CARE MGMT STAFF 1ST 20: CPT | Mod: S$PBB,,, | Performed by: INTERNAL MEDICINE

## 2023-06-01 ENCOUNTER — OFFICE VISIT (OUTPATIENT)
Dept: ORTHOPEDICS | Facility: CLINIC | Age: 88
End: 2023-06-01
Payer: MEDICARE

## 2023-06-01 VITALS
SYSTOLIC BLOOD PRESSURE: 115 MMHG | DIASTOLIC BLOOD PRESSURE: 65 MMHG | HEART RATE: 65 BPM | HEIGHT: 65 IN | WEIGHT: 142.06 LBS | BODY MASS INDEX: 23.67 KG/M2

## 2023-06-01 DIAGNOSIS — M17.11 PRIMARY OSTEOARTHRITIS OF RIGHT KNEE: Primary | ICD-10-CM

## 2023-06-01 PROCEDURE — 99999 PR PBB SHADOW E&M-EST. PATIENT-LVL IV: CPT | Mod: PBBFAC,,, | Performed by: PHYSICIAN ASSISTANT

## 2023-06-01 PROCEDURE — 20610 DRAIN/INJ JOINT/BURSA W/O US: CPT | Mod: PBBFAC | Performed by: PHYSICIAN ASSISTANT

## 2023-06-01 PROCEDURE — 99499 UNLISTED E&M SERVICE: CPT | Mod: S$PBB,,, | Performed by: PHYSICIAN ASSISTANT

## 2023-06-01 PROCEDURE — 20610 DRAIN/INJ JOINT/BURSA W/O US: CPT | Mod: S$PBB,RT,, | Performed by: PHYSICIAN ASSISTANT

## 2023-06-01 PROCEDURE — 99999 PR PBB SHADOW E&M-EST. PATIENT-LVL IV: ICD-10-PCS | Mod: PBBFAC,,, | Performed by: PHYSICIAN ASSISTANT

## 2023-06-01 PROCEDURE — 99499 NO LOS: ICD-10-PCS | Mod: S$PBB,,, | Performed by: PHYSICIAN ASSISTANT

## 2023-06-01 PROCEDURE — 20610 PR DRAIN/INJECT LARGE JOINT/BURSA: ICD-10-PCS | Mod: S$PBB,RT,, | Performed by: PHYSICIAN ASSISTANT

## 2023-06-01 PROCEDURE — 99214 OFFICE O/P EST MOD 30 MIN: CPT | Mod: PBBFAC,25 | Performed by: PHYSICIAN ASSISTANT

## 2023-06-01 RX ADMIN — Medication 20 MG: at 08:06

## 2023-06-01 NOTE — PROGRESS NOTES
Debbie Ding is a 89 y.o. year old her here today for her 3rd Euflexxa injection for degenerative changes of her right knee . she was last seen and treated in the clinic on 5/25/2023. There has been no significant change in her medical status since her last visit. No Fever, chills, malaise, or unexplained weight change.      Allergies, Medications, past medical and surgical history were reviewed .    Examination of the knee demonstrates  No evidence of edema, erythema , echymosis strength and range of motion are unchanged from previous visit.    The injection site was identified and the skin was prepared with a betadine solution. The  right knee  joint was injected with 2 ml of Euflexxa solution under sterile technique. Debbie Ding tolerated the procedure well, she was advised to rest the knee today, ice and elevation. I may take 3 -6 weeks following the last injection to get the full benefit of the medication.  I will see her back in 6 months. Sooner if he has any problems or concerns.           .     ICD-10-CM ICD-9-CM   1. Primary osteoarthritis of right knee  M17.11 715.16

## 2023-06-05 ENCOUNTER — TELEPHONE (OUTPATIENT)
Dept: ORTHOPEDICS | Facility: CLINIC | Age: 88
End: 2023-06-05
Payer: MEDICARE

## 2023-06-05 ENCOUNTER — TELEPHONE (OUTPATIENT)
Dept: PODIATRY | Facility: CLINIC | Age: 88
End: 2023-06-05
Payer: MEDICARE

## 2023-06-05 NOTE — TELEPHONE ENCOUNTER
Spoke with pt. Pt has appt 6/14/2023. Only cancelled appt for 6/26/2023. Pt verbalized understanding.

## 2023-06-06 ENCOUNTER — HOSPITAL ENCOUNTER (OUTPATIENT)
Dept: RADIOLOGY | Facility: OTHER | Age: 88
Discharge: HOME | End: 2023-06-06
Attending: ORTHOPAEDIC SURGERY
Payer: MEDICARE

## 2023-06-06 ENCOUNTER — OFFICE VISIT (OUTPATIENT)
Dept: ORTHOPEDICS | Facility: CLINIC | Age: 88
End: 2023-06-06
Payer: MEDICARE

## 2023-06-06 VITALS
BODY MASS INDEX: 23.63 KG/M2 | DIASTOLIC BLOOD PRESSURE: 71 MMHG | SYSTOLIC BLOOD PRESSURE: 134 MMHG | WEIGHT: 142 LBS | HEART RATE: 66 BPM

## 2023-06-06 DIAGNOSIS — M79.642 BILATERAL HAND PAIN: ICD-10-CM

## 2023-06-06 DIAGNOSIS — M79.641 BILATERAL HAND PAIN: ICD-10-CM

## 2023-06-06 DIAGNOSIS — G56.03 BILATERAL CARPAL TUNNEL SYNDROME: Primary | ICD-10-CM

## 2023-06-06 PROCEDURE — 73130 XR HAND COMPLETE 3 VIEWS BILATERAL: ICD-10-PCS | Mod: 26,50,, | Performed by: RADIOLOGY

## 2023-06-06 PROCEDURE — 73130 X-RAY EXAM OF HAND: CPT | Mod: 26,50,, | Performed by: RADIOLOGY

## 2023-06-06 PROCEDURE — 73130 X-RAY EXAM OF HAND: CPT | Mod: TC,50,FY

## 2023-06-06 PROCEDURE — 99999 PR PBB SHADOW E&M-EST. PATIENT-LVL IV: CPT | Mod: PBBFAC,,, | Performed by: ORTHOPAEDIC SURGERY

## 2023-06-06 PROCEDURE — 99204 OFFICE O/P NEW MOD 45 MIN: CPT | Mod: S$PBB,,, | Performed by: ORTHOPAEDIC SURGERY

## 2023-06-06 PROCEDURE — 99204 PR OFFICE/OUTPT VISIT, NEW, LEVL IV, 45-59 MIN: ICD-10-PCS | Mod: S$PBB,,, | Performed by: ORTHOPAEDIC SURGERY

## 2023-06-06 PROCEDURE — 99999 PR PBB SHADOW E&M-EST. PATIENT-LVL IV: ICD-10-PCS | Mod: PBBFAC,,, | Performed by: ORTHOPAEDIC SURGERY

## 2023-06-06 PROCEDURE — 99214 OFFICE O/P EST MOD 30 MIN: CPT | Mod: PBBFAC | Performed by: ORTHOPAEDIC SURGERY

## 2023-06-07 ENCOUNTER — TELEPHONE (OUTPATIENT)
Dept: INTERNAL MEDICINE | Facility: CLINIC | Age: 88
End: 2023-06-07
Payer: MEDICARE

## 2023-06-07 NOTE — TELEPHONE ENCOUNTER
----- Message from Kiersten Yuen sent at 6/7/2023  1:03 PM CDT -----  Contact: pt [phone]  Patient states that the compression stockings are making her swelling worse and is very painful.    Please call and advise.    Thank You

## 2023-06-07 NOTE — TELEPHONE ENCOUNTER
Please advise should patient discontinue using compression stocking and should she be seen for swelling?

## 2023-06-08 ENCOUNTER — TELEPHONE (OUTPATIENT)
Dept: CARDIOLOGY | Facility: CLINIC | Age: 88
End: 2023-06-08
Payer: MEDICARE

## 2023-06-08 ENCOUNTER — TELEPHONE (OUTPATIENT)
Dept: INTERNAL MEDICINE | Facility: CLINIC | Age: 88
End: 2023-06-08
Payer: MEDICARE

## 2023-06-08 NOTE — TELEPHONE ENCOUNTER
"Called and spoke to pt. Pt states she is experiencing swelling to bilateral lower extremities onset 4-5 months. Pt states this has progressively gotten worse. Pt states the swelling and pain increases with the compression stocking. Pt states she sits with her legs elevated and sleeps with her leg elevated and that makes the pain better but she said it doesn't help the swelling. Pt states by 2pm, her feet look like balloons and they burn. Pt states she has an appt with Dr Cline on the 29th of June. Pt denies CP or SOB, states she just has her "usual" SOB. Please advise if you would like pt to come in for eval.   "

## 2023-06-08 NOTE — TELEPHONE ENCOUNTER
I spoke with Mrs. Ding and she stated that her swelling in both legs and feet are getting increasingly worse. She stated that it burns and the compression stockings are not working. Mrs. Ding was instructed to report to the ED if her swelling did not get any better. She verbalized understanding.

## 2023-06-08 NOTE — TELEPHONE ENCOUNTER
----- Message from Guerda Wolf sent at 6/8/2023  8:42 AM CDT -----  Contact: [phone] Patient  Pt is calling in regards to her previous message about the swelling and if she should continue wearing the compression socks. Pt stated her legs are in a lot of pain due to the compression socks making it worse. Please call and advise. ASAP. Thank you

## 2023-06-08 NOTE — TELEPHONE ENCOUNTER
----- Message from Malia George MA sent at 6/8/2023 11:04 AM CDT -----  Contact: [phone]  Patient called                In regards to retuning a phone call to speak back with staff.            Call back [phone]

## 2023-06-08 NOTE — TELEPHONE ENCOUNTER
----- Message from Phuong Nagel sent at 6/8/2023  1:20 PM CDT -----  Regarding: severe leg swelling  Pls call pt at [phone].  Pt states that she has severe swelling from her feet to her thigh in her right leg.  She says it is painful and burning really bad.  She has some swelling in the left as well but not as bad as the right.   This has been going on for a few Months now but is getting worse.  Her compression stockings make it worse as well.    Thank you

## 2023-06-08 NOTE — TELEPHONE ENCOUNTER
Stockings should help swelling, not worsen it.  If swelling is worsening, pls make appt with me or Mandy

## 2023-06-09 NOTE — PROGRESS NOTES
SUBJECTIVE:     Chief Complaint & History of Present Illness:  Debbie Ding is a Established patient 89 y.o. female who is seen here today with a complaint of hand pain especially base of the thumb is very achy it is difficult opening jars or pinch especially putting key in the door no numbness tingling no fever chills    Review of patient's allergies indicates:   Allergen Reactions    Melatonin      Other reaction(s): Other (See Comments)  Sleep Walks and has unnatural dreams    Talwin compound Hives    Talwin [pentazocine lactate] Hallucinations    Trazodone Other (See Comments)     Nightmares/sleep walk      Bentyl [dicyclomine] Anxiety         Current Outpatient Medications   Medication Sig Dispense Refill    albuterol (PROVENTIL) 2.5 mg /3 mL (0.083 %) nebulizer solution Take 2.5 mg by nebulization every 6 (six) hours as needed. Rescue      albuterol (PROVENTIL/VENTOLIN HFA) 90 mcg/actuation inhaler Inhale 2 puffs into the lungs every 6 (six) hours as needed.      apixaban (ELIQUIS) 2.5 mg Tab Take 1 tablet (2.5 mg total) by mouth once daily. 90 tablet 3    atorvastatin (LIPITOR) 40 MG tablet Take 1 tablet (40 mg total) by mouth once daily. 90 tablet 3    benzonatate (TESSALON) 100 MG capsule benzonatate 100 mg capsule      chlorhexidine (PERIDEX) 0.12 % solution SWISH WITH 10ML FOR 30 seconds THEN EXPECTORATE TWICE DAILY      ciclopirox 1 % shampoo Apply topically.      clobetasoL (TEMOVATE) 0.05 % external solution Pt to mix in 1 jar of cerave cream and apply to affected areas bid 50 mL 3    clotrimazole-betamethasone 1-0.05% (LOTRISONE) cream Apply topically 2 (two) times daily. 45 g 2    donepeziL (ARICEPT) 10 MG tablet TAKE 1 TABLET EVERY MORNING.  NO FURTHER REFILL WITHOUT APPOINTMENT (Patient taking differently: TAKE 1 TABLET EVERY MORNING.  NO FURTHER REFILL WITHOUT APPOINTMENT) 90 tablet 1    doxepin (SINEQUAN) 10 MG capsule Take 10 mg by mouth every evening.      esomeprazole (NEXIUM) 40 MG  capsule Take 1 capsule (40 mg total) by mouth 2 (two) times daily. 180 capsule 3    estradioL (ESTRACE) 0.01 % (0.1 mg/gram) vaginal cream Place vaginally.      famotidine (PEPCID) 20 MG tablet Take 1 tablet (20 mg total) by mouth daily as needed for Heartburn (breakthrough heartburn and acid reflux not controlled with Nexium). 90 tablet 3    flash glucose scanning reader (FREESTYLE ALEXSANDRA 14 DAY READER) Misc 1 Device by Misc.(Non-Drug; Combo Route) route 2 (two) times a day. 1 each 11    flash glucose sensor (FREESTYLE ALEXSANDRA 14 DAY SENSOR) Kit 1 Device by Misc.(Non-Drug; Combo Route) route 2 (two) times a day. 1 kit 11    fluocinolone (DERMA-SMOOTHE) 0.01 % external oil fluocinolone 0.01 % scalp oil and shower cap   Apply ONE A SMALL AMOUNT TO affected AREA as directed apply TO SCALP 2-3 TIMES PER WEEK FOR ITCHING]      fluocinonide (LIDEX) 0.05 % external solution AAA scalp qday - bid prn pruritus 60 mL 3    fluticasone-salmeterol diskus inhaler 500-50 mcg USE 1 INHALATION ORALLY    INTO THE LUNGS TWO TIMES A DAY      gabapentin (NEURONTIN) 100 MG capsule gabapentin 100 mg capsule      GEMTESA 75 mg Tab Take 1 tablet by mouth.      glycopyrrolate (ROBINUL) 2 MG Tab TAKE 1 TABLET TWICE A  tablet 3    glycopyrrolate (ROBINUL) 2 MG Tab Take 2 mg by mouth.      ketoconazole (NIZORAL) 2 % cream Apply topically once daily. 60 g 2    ketoconazole (NIZORAL) 2 % shampoo Wash hair with medicated shampoo at least 2x/week - let sit on scalp at least 5 minutes prior to rinsing 120 mL 5    levalbuterol (XOPENEX HFA) 45 mcg/actuation inhaler Inhale into the lungs.      levothyroxine (SYNTHROID) 50 MCG tablet Take 1 tablet (50 mcg total) by mouth before breakfast. 90 tablet 0    LIDOcaine 3 % Crea Apply 1 application topically 2 (two) times a day. 85 g 3    LIDOCAINE VISCOUS 2 % solution Take by mouth.      meclizine (ANTIVERT) 25 mg tablet TAKE 1 TABLET TWICE A DAY  AS NEEDED FOR DIZZINESS 180 tablet 1    memantine  (NAMENDA) 10 MG Tab Take 10 mg by mouth once daily.      metoclopramide HCl (REGLAN) 5 MG tablet metoclopramide 5 mg tablet      mirtazapine (REMERON) 15 MG tablet Take 1 tablet (15 mg total) by mouth every evening. 90 tablet 3    mupirocin (BACTROBAN) 2 % ointment mupirocin 2 % topical ointment   APPLY LIBERALLY TO THE AFFECTED AREA TWICE DAILY TO WOUND ON SCALP      nystatin (MYCOSTATIN) 100,000 unit/mL suspension SMARTSI Teaspoon By Mouth 4 Times Daily      prednisoLONE acetate (PRED FORTE) 1 % DrpS 3 drops 2 (two) times daily.      RESTASIS 0.05 % ophthalmic emulsion       rivaroxaban (XARELTO) 10 mg Tab Xarelto 10 mg tablet      SYMBICORT 160-4.5 mcg/actuation HFAA Inhale into the lungs.      amLODIPine (NORVASC) 5 MG tablet Take 1 tablet (5 mg total) by mouth once daily. 90 tablet 3    diclofenac sodium (VOLTAREN) 1 % Gel Apply 2 g topically 3 (three) times daily. for 10 days 400 g 0    hydroCHLOROthiazide (HYDRODIURIL) 25 MG tablet Take 1 tablet (25 mg total) by mouth once daily. 90 tablet 0    hydrocortisone-aloe vera 0.5 % Crea Place 1 application into the left eye 2 (two) times daily.       Current Facility-Administered Medications   Medication Dose Route Frequency Provider Last Rate Last Admin    regadenoson injection 0.4 mg  0.4 mg Intravenous Once Benedicto Love MD PhD         Facility-Administered Medications Ordered in Other Visits   Medication Dose Route Frequency Provider Last Rate Last Admin    midazolam (VERSED) 1 mg/mL injection 0.5 mg  0.5 mg Intravenous PRN Ross Hui MD           Past Medical History:   Diagnosis Date    Allergic rhinitis     Anticoagulant long-term use     Arthritis     Asthma     Bilateral pulmonary embolism 2019    Cataract     Chronic anticoagulation 2020    Depression     Diabetes mellitus     Fibromyalgia 2012    Fibromyalgia     GERD (gastroesophageal reflux disease)     Hypercholesterolemia 2020    Hypercholesterolemia 2020     Hypertension 7/2/2012    Thoracic or lumbosacral neuritis or radiculitis, unspecified     Thyroid disease     Ulcer        Past Surgical History:   Procedure Laterality Date    CATARACT EXTRACTION Bilateral     ESOPHAGOGASTRODUODENOSCOPY N/A 3/8/2022    Procedure: EGD (ESOPHAGOGASTRODUODENOSCOPY) with dilation-either hospital ok with Dr. Meza;  Surgeon: Rebeca Meza MD;  Location: [facility];  Service: Endoscopy;  Laterality: N/A;  1/11-eliquis hold ok see te-tb    ESOPHAGOGASTRODUODENOSCOPY N/A 2/28/2022    Procedure: EGD (ESOPHAGOGASTRODUODENOSCOPY) with dilation-either hospital ok with Dr. Meza;  Surgeon: Rebeca Meza MD;  Location: [address]);  Service: Endoscopy;  Laterality: N/A;  1/11-eliquis hold ok see te-tb  COVID test on 2/25/22 at [facility]  2/22-confirmed appt arrival time with pt-Kpvt    FOOT NEUROMA SURGERY  1985    HYSTERECTOMY         Vital Signs (Most Recent)  Vitals:    06/06/23 1605   BP: 134/71   Pulse: 66           Review of Systems:  ROS:  Constitutional: no fever or chills  Eyes: no visual changes  ENT: no nasal congestion or sore throat, Positive  vestibular dizziness  Respiratory: no cough or shortness of breath, Positive for asthma  Cardiovascular: no chest pain or palpitations  Gastrointestinal: no nausea or vomiting, tolerating diet, Positive for GERD, peptic ulcer disease  Genitourinary: no hematuria or dysuria, Positive CKD stage 3,  Integument/Breast: no rash or pruritis  Hematologic/Lymphatic: no easy bruising or lymphadenopathy, Positive long-term anticoagulation, history DVT, history of PE  Musculoskeletal: no arthralgias or myalgias, Positive chronic low back pain, fibromyalgia, osteoarthritis of multiple joints  Neurological: no seizures or tremors, Degenerative disc disease lumbar spine, history of memory loss, spinal stenosis without neurological claudication  Behavioral/Psych: no auditory or visual hallucinations, Positive for depression,  Endocrine: no  heat or cold intolerance, Positive diabetes type 2, thyroid disease hypothyroidism                OBJECTIVE:     PHYSICAL EXAM:    Weight: 64.4 kg (141 lb 15.6 oz), General Appearance: Well nourished, well developed, in no acute distress.  Neurological: Mood & affect are normal.  Skin is clean dry and intact no bruising or swelling she is have tenderness palpation over thumb base no pain over the 1st dorsal compartment positive grind test difficulty with opposition and opening up her thumb webspace  RADIOGRAPHS:  X-rays from previous visit reviewed by me today    Impression:     No acute osseous abnormality seen.  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     ASSESSMENT/PLAN:       ICD-10-CM ICD-9-CM   1. Bilateral carpal tunnel syndrome  G56.03 354.0       Plan: We discussed with the patient at length all the different treatment options available for thumb base arthritits    Paraffin Voltaren injections therapy surgery all of this was discussed in great length with the patient

## 2023-06-12 ENCOUNTER — TELEPHONE (OUTPATIENT)
Dept: ORTHOPEDICS | Facility: CLINIC | Age: 88
End: 2023-06-12
Payer: MEDICARE

## 2023-06-12 NOTE — TELEPHONE ENCOUNTER
Spoke c pt. Confirmed with Sigifredo that he will make any adjustments he can for her bracing. Pt stated she would come in after lunch today to have them looked at. Patient verbalized understanding and was thankful.         ----- Message from Abisai Smart sent at 6/12/2023  8:46 AM CDT -----  Name of Who is Calling: DEJAH ROLDAN [[id number]]                What is the request in detail: Pt called because she states that that brace that is on her hand hurts and she would like to have to change out if possible.Please call back to further assist.                 Can the clinic reply by [facility]: No                What [facility] to Call Back [facility]:[phone]

## 2023-06-13 ENCOUNTER — TELEPHONE (OUTPATIENT)
Dept: ORTHOPEDICS | Facility: CLINIC | Age: 88
End: 2023-06-13
Payer: MEDICARE

## 2023-06-13 ENCOUNTER — OFFICE VISIT (OUTPATIENT)
Dept: INTERNAL MEDICINE | Facility: CLINIC | Age: 88
End: 2023-06-13
Payer: MEDICARE

## 2023-06-13 VITALS
WEIGHT: 143.06 LBS | HEIGHT: 65 IN | DIASTOLIC BLOOD PRESSURE: 54 MMHG | BODY MASS INDEX: 23.83 KG/M2 | HEART RATE: 69 BPM | OXYGEN SATURATION: 96 % | SYSTOLIC BLOOD PRESSURE: 116 MMHG

## 2023-06-13 DIAGNOSIS — Z86.718 PERSONAL HISTORY OF DVT (DEEP VEIN THROMBOSIS): ICD-10-CM

## 2023-06-13 DIAGNOSIS — R60.9 EDEMA, UNSPECIFIED TYPE: ICD-10-CM

## 2023-06-13 DIAGNOSIS — M71.21 BAKER'S CYST OF KNEE, RIGHT: Primary | ICD-10-CM

## 2023-06-13 DIAGNOSIS — I10 ESSENTIAL HYPERTENSION: ICD-10-CM

## 2023-06-13 PROCEDURE — 99214 OFFICE O/P EST MOD 30 MIN: CPT | Mod: S$PBB,,, | Performed by: INTERNAL MEDICINE

## 2023-06-13 PROCEDURE — 99214 OFFICE O/P EST MOD 30 MIN: CPT | Mod: PBBFAC | Performed by: INTERNAL MEDICINE

## 2023-06-13 PROCEDURE — 99214 PR OFFICE/OUTPT VISIT, EST, LEVL IV, 30-39 MIN: ICD-10-PCS | Mod: S$PBB,,, | Performed by: INTERNAL MEDICINE

## 2023-06-13 PROCEDURE — 99999 PR PBB SHADOW E&M-EST. PATIENT-LVL IV: ICD-10-PCS | Mod: PBBFAC,,, | Performed by: INTERNAL MEDICINE

## 2023-06-13 PROCEDURE — 99999 PR PBB SHADOW E&M-EST. PATIENT-LVL IV: CPT | Mod: PBBFAC,,, | Performed by: INTERNAL MEDICINE

## 2023-06-13 RX ORDER — HYDROCHLOROTHIAZIDE 25 MG/1
25 TABLET ORAL DAILY
Qty: 90 TABLET | Refills: 0 | Status: ON HOLD | OUTPATIENT
Start: 2023-06-13 | End: 2024-01-03

## 2023-06-13 RX ORDER — AMLODIPINE BESYLATE 5 MG/1
5 TABLET ORAL DAILY
Qty: 90 TABLET | Refills: 3 | Status: SHIPPED | OUTPATIENT
Start: 2023-06-13 | End: 2023-07-10

## 2023-06-13 NOTE — PATIENT INSTRUCTIONS
Reduce amlodipine to 5 mg daily.    Start HCTZ ( fluid pill) - 1 a day.    Buy compression socks from [employer].

## 2023-06-13 NOTE — PROGRESS NOTES
Subjective     Patient ID: Debbie Ding is a 89 y.o. female.    Chief Complaint: Edema    HPI  C/o worsening edema x 2 months.  R> L.  Painful.  Burning sensation.    She last saw Dr Love for edema in December.     H/o dvt, on Xarelo..        US last July - Baker's cyst on R.       Echo then:  ef 65%.  Indeterminate left ventricular diastolic function    She has hypothyroidism, and is taking a higher dose of synthroid.    Urine in April neg for protein..  Gfr 43 in March.  She has taken stable dose of amlodipine for years.    No cp, sob.     Review of Systems   Constitutional:  Negative for fever and unexpected weight change.   HENT:  Negative for nasal congestion and postnasal drip.    Eyes:  Negative for pain, discharge and visual disturbance.   Respiratory:  Negative for cough, chest tightness, shortness of breath and wheezing.    Cardiovascular:  Negative for chest pain and leg swelling.   Gastrointestinal:  Negative for abdominal pain, constipation, diarrhea and nausea.   Genitourinary:  Negative for difficulty urinating, dysuria and hematuria.   Integumentary:  Negative for rash.   Neurological:  Negative for headaches.   Psychiatric/Behavioral:  Negative for dysphoric mood and sleep disturbance. The patient is not nervous/anxious.         Objective     Physical Exam  Constitutional:       General: She is not in acute distress.     Appearance: She is well-developed. She is not ill-appearing, toxic-appearing or diaphoretic.   Eyes:      General: No scleral icterus.  Neck:      Thyroid: No thyromegaly.      Vascular: No JVD.   Cardiovascular:      Rate and Rhythm: Normal rate and regular rhythm.      Heart sounds: Normal heart sounds. No murmur heard.  Pulmonary:      Effort: Pulmonary effort is normal. No respiratory distress.      Breath sounds: Normal breath sounds. No stridor. No wheezing, rhonchi or rales.   Abdominal:      General: There is no distension.      Palpations: Abdomen is soft. There is  no mass.      Tenderness: There is no abdominal tenderness. There is no guarding.      Hernia: No hernia is present.   Musculoskeletal:      Cervical back: No rigidity or tenderness.      Right lower leg: Edema present.      Left lower leg: Edema present.   Lymphadenopathy:      Cervical: No cervical adenopathy.   Neurological:      Mental Status: She is alert and oriented to person, place, and time.   Psychiatric:         Mood and Affect: Mood normal.         Behavior: Behavior normal.         Thought Content: Thought content normal.     Pedal edema on r > L.     Assessment and Plan     1. Baker's cyst of knee, right    2. Essential hypertension    3. Edema, unspecified type  -     Basic Metabolic Panel; Future; Expected date: 06/13/2023  -     CV Ultrasound doppler venous legs bilat; Future    4. Personal history of DVT (deep vein thrombosis)    Other orders  -     amLODIPine (NORVASC) 5 MG tablet; Take 1 tablet (5 mg total) by mouth once daily.  Dispense: 90 tablet; Refill: 3  -     hydroCHLOROthiazide (HYDRODIURIL) 25 MG tablet; Take 1 tablet (25 mg total) by mouth once daily.  Dispense: 90 tablet; Refill: 0        Debbie was seen today for edema.    Diagnoses and all orders for this visit:    Baker's cyst of knee, right    Essential hypertension    Edema, unspecified type  -     Basic Metabolic Panel; Future  -     CV Ultrasound doppler venous legs bilat; Future    Personal history of DVT (deep vein thrombosis)    Other orders  -     amLODIPine (NORVASC) 5 MG tablet; Take 1 tablet (5 mg total) by mouth once daily.  -     hydroCHLOROthiazide (HYDRODIURIL) 25 MG tablet; Take 1 tablet (25 mg total) by mouth once daily.            I have reviewed the provider's instructions with the patient, answering all questions to her satisfaction.   Mandy 1 mo with BMP  Follow up in about 4 weeks (around 7/11/2023).

## 2023-06-14 ENCOUNTER — OFFICE VISIT (OUTPATIENT)
Dept: PODIATRY | Facility: CLINIC | Age: 88
End: 2023-06-14
Payer: MEDICARE

## 2023-06-14 ENCOUNTER — HOSPITAL ENCOUNTER (OUTPATIENT)
Dept: RADIOLOGY | Facility: HOSPITAL | Age: 88
Discharge: HOME | End: 2023-06-14
Attending: PODIATRIST
Payer: MEDICARE

## 2023-06-14 ENCOUNTER — TELEPHONE (OUTPATIENT)
Dept: PODIATRY | Facility: CLINIC | Age: 88
End: 2023-06-14
Payer: MEDICARE

## 2023-06-14 VITALS
SYSTOLIC BLOOD PRESSURE: 149 MMHG | HEART RATE: 63 BPM | HEIGHT: 65 IN | WEIGHT: 143.31 LBS | DIASTOLIC BLOOD PRESSURE: 75 MMHG | BODY MASS INDEX: 23.88 KG/M2

## 2023-06-14 DIAGNOSIS — M79.671 RIGHT FOOT PAIN: ICD-10-CM

## 2023-06-14 DIAGNOSIS — M79.671 RIGHT FOOT PAIN: Primary | ICD-10-CM

## 2023-06-14 DIAGNOSIS — R60.0 EDEMA OF RIGHT LOWER LEG: ICD-10-CM

## 2023-06-14 DIAGNOSIS — E11.22 TYPE 2 DIABETES MELLITUS WITH STAGE 3A CHRONIC KIDNEY DISEASE, WITHOUT LONG-TERM CURRENT USE OF INSULIN: ICD-10-CM

## 2023-06-14 DIAGNOSIS — N18.31 TYPE 2 DIABETES MELLITUS WITH STAGE 3A CHRONIC KIDNEY DISEASE, WITHOUT LONG-TERM CURRENT USE OF INSULIN: ICD-10-CM

## 2023-06-14 PROCEDURE — 99214 OFFICE O/P EST MOD 30 MIN: CPT | Mod: S$PBB,,, | Performed by: PODIATRIST

## 2023-06-14 PROCEDURE — 99999 PR PBB SHADOW E&M-EST. PATIENT-LVL V: CPT | Mod: PBBFAC,,, | Performed by: PODIATRIST

## 2023-06-14 PROCEDURE — 73630 XR FOOT COMPLETE 3 VIEW RIGHT: ICD-10-PCS | Mod: 26,RT,, | Performed by: RADIOLOGY

## 2023-06-14 PROCEDURE — 99215 OFFICE O/P EST HI 40 MIN: CPT | Mod: PBBFAC | Performed by: PODIATRIST

## 2023-06-14 PROCEDURE — 99999 PR PBB SHADOW E&M-EST. PATIENT-LVL V: ICD-10-PCS | Mod: PBBFAC,,, | Performed by: PODIATRIST

## 2023-06-14 PROCEDURE — 99214 PR OFFICE/OUTPT VISIT, EST, LEVL IV, 30-39 MIN: ICD-10-PCS | Mod: S$PBB,,, | Performed by: PODIATRIST

## 2023-06-14 PROCEDURE — 73630 X-RAY EXAM OF FOOT: CPT | Mod: TC,RT

## 2023-06-14 PROCEDURE — 73630 X-RAY EXAM OF FOOT: CPT | Mod: 26,RT,, | Performed by: RADIOLOGY

## 2023-06-14 RX ORDER — METHYL SALICYLATE/MENTHOL 30 %-10 %
1 CREAM (GRAM) TOPICAL 2 TIMES DAILY
Status: ON HOLD | COMMUNITY
Start: 2023-06-02 | End: 2024-01-03

## 2023-06-16 ENCOUNTER — TELEPHONE (OUTPATIENT)
Dept: PODIATRY | Facility: CLINIC | Age: 88
End: 2023-06-16
Payer: MEDICARE

## 2023-06-16 ENCOUNTER — TELEPHONE (OUTPATIENT)
Dept: INTERNAL MEDICINE | Facility: CLINIC | Age: 88
End: 2023-06-16
Payer: MEDICARE

## 2023-06-16 ENCOUNTER — TELEPHONE (OUTPATIENT)
Dept: ORTHOPEDICS | Facility: CLINIC | Age: 88
End: 2023-06-16
Payer: MEDICARE

## 2023-06-16 NOTE — TELEPHONE ENCOUNTER
Called pt to schedule appt, no  answer. Left message to return call. Pt scheduled for July 18 at 2:30pm.

## 2023-06-16 NOTE — TELEPHONE ENCOUNTER
----- Message from Shiloh Jean-Baptiste sent at 6/16/2023  8:00 AM CDT -----  Regarding: pt advice/appt access  Contact: [phone]  Debbie Timur calling regarding Patient Advice (message) for #requesting a call back regarding she was told she should see her in a month but no appt is scheduled. Please call

## 2023-06-16 NOTE — TELEPHONE ENCOUNTER
----- Message from Abbey Ambrocio MA sent at 6/15/2023  9:47 AM CDT -----  Regarding: RE: Appt  Pt needs to f/u with Dr. Benedicto Love in Vascular Medicine per Dr. Veloz   ----- Message -----  From: Sarah Beverly MA  Sent: 6/14/2023   9:29 AM CDT  To: [facility] Vascular Surgery Clinical Support  Subject: Appt                                             Good Morning,    Can you please assist in getting this pt scheduled with Vascular surgery? Thanks for all you do.    Sarah

## 2023-06-16 NOTE — TELEPHONE ENCOUNTER
----- Message from Shiloh Jean-Baptiste sent at 6/16/2023  8:00 AM CDT -----  Regarding: pt advice/appt access  Contact: [phone]  Debbie Ding calling regarding Patient Advice (message) for #requesting a call back regarding getting a cortizone shot. She states she have an appt on Mon and would like to know if she can come in. Please call

## 2023-06-16 NOTE — TELEPHONE ENCOUNTER
----- Message from Abbey Ambrocio MA sent at 6/15/2023  9:47 AM CDT -----  Regarding: RE: Appt  Pt needs to f/u with Dr. Benedicto Love in Vascular Medicine per Dr. Veloz   ----- Message -----  From: Sarah Beverly MA  Sent: 6/14/2023   9:29 AM CDT  To: [facility] Vascular Surgery Clinical Support  Subject: Appt                                             Good Morning,    Can you please assist in getting this pt scheduled with Vascular surgery? Thanks for all you do.    Sarah

## 2023-06-16 NOTE — PROGRESS NOTES
Spoke to pt and informed her it is too soon for another injection must be 3 months from 5/25, pt verbalized understanding.

## 2023-06-20 ENCOUNTER — HOSPITAL ENCOUNTER (OUTPATIENT)
Dept: CARDIOLOGY | Facility: HOSPITAL | Age: 88
Discharge: HOME | End: 2023-06-20
Attending: INTERNAL MEDICINE
Payer: MEDICARE

## 2023-06-20 ENCOUNTER — TELEPHONE (OUTPATIENT)
Dept: INTERNAL MEDICINE | Facility: CLINIC | Age: 88
End: 2023-06-20
Payer: MEDICARE

## 2023-06-20 DIAGNOSIS — R60.9 EDEMA, UNSPECIFIED TYPE: Primary | ICD-10-CM

## 2023-06-20 DIAGNOSIS — R60.9 EDEMA, UNSPECIFIED TYPE: ICD-10-CM

## 2023-06-20 PROCEDURE — 93970 EXTREMITY STUDY: CPT

## 2023-06-20 PROCEDURE — 93970 CV US DOPPLER VENOUS LEGS BILATERAL (CUPID ONLY): ICD-10-PCS | Mod: 26,,, | Performed by: INTERNAL MEDICINE

## 2023-06-20 PROCEDURE — 93970 EXTREMITY STUDY: CPT | Mod: 26,,, | Performed by: INTERNAL MEDICINE

## 2023-06-20 NOTE — PROGRESS NOTES
Subjective:      Patient ID: Debbie Ding is a 89 y.o. female.    Chief Complaint: Toe Pain (Right great toe numbness ) and Foot Swelling (Bilateral )      Debbie is a 89 y.o. female who presents to the podiatry clinic  with complaint of  bilateral foot pain. Onset of the symptoms was several weeks ago. Precipitating event: none known. Current symptoms include: ability to bear weight, but with some pain. Aggravating factors: any weight bearing. Symptoms have progressed to a point and plateaued. Patient has had prior foot problems. Evaluation to date: none. Treatment to date: none.  She is here for routine diabetic foot evaluation and foot care.  Issue with great toe numbness.    Review of Systems   Constitutional: Negative for chills, diaphoresis and fever.   Cardiovascular:  Negative for claudication, cyanosis, leg swelling and syncope.   Respiratory:  Negative for cough and shortness of breath.    Skin:  Positive for suspicious lesions. Negative for color change and nail changes.   Musculoskeletal:  Positive for joint pain and joint swelling. Negative for falls, muscle cramps and muscle weakness.   Gastrointestinal:  Negative for diarrhea, nausea and vomiting.   Neurological:  Negative for disturbances in coordination, numbness, paresthesias, sensory change, tremors and weakness.   Psychiatric/Behavioral:  Negative for altered mental status.          Objective:      Physical Exam  Vitals reviewed.   Constitutional:       Appearance: She is well-developed.   HENT:      Head: Normocephalic and atraumatic.   Cardiovascular:      Pulses:           Dorsalis pedis pulses are 1+ on the right side and 1+ on the left side.        Posterior tibial pulses are 1+ on the right side and 1+ on the left side.   Pulmonary:      Effort: Pulmonary effort is normal.   Musculoskeletal:         General: Normal range of motion.      Comments: Anterior, lateral, and posterior muscle groups bilateral lower extremities show strength 4  over 5 symmetrically. Inspection and palpation of the joints and bones reveal no crepitus or joint effusion. No tenderness upon palpation. Mild plantar flexor contractures noted to digits 2 through 5 bilaterally.  Angle and base of gait are normal.    Mild tenderness to bilateral great toe joints dorsally.  Exostosis apparent bilaterally.  Decreased range of motion.   Feet:      Right foot:      Skin integrity: Callus and dry skin present.      Left foot:      Skin integrity: Callus and dry skin present.   Skin:     General: Skin is warm and dry.      Capillary Refill: Capillary refill takes 2 to 3 seconds.      Coloration: Skin is pale.      Comments: Skin bilateral lower extremities noted to be thin, dry, and atrophic.  Toenails thickened, discolored, with subungual fungal debris and tenderness noted.  Hyperkeratotic lesions noted to both feet plantarly with tenderness.   Neurological:      Mental Status: She is alert and oriented to person, place, and time.      Sensory: Sensory deficit present.      Motor: Atrophy present.      Deep Tendon Reflexes: Reflexes abnormal.      Reflex Scores:       Patellar reflexes are 1+ on the right side and 1+ on the left side.       Achilles reflexes are 1+ on the right side and 1+ on the left side.     Comments: Sharp, dull, light touch, and vibratory sensation are diminished bilaterally. Proprioceptive sensation is intact to both lower extremities. [place] monofilament exam shows loss of protective sensation to plantar toes 1 through 5 bilaterally. Deep tendon reflexes to the patellar tendons is 1 over 4 bilaterally symmetrical. Deep tendon reflexes to the Achilles tendon is 0 over 4 bilaterally symmetrical. No ankle clonus or Babinski reflex noted bilaterally. Coordination is fair to both lower extremities.  Patient admits to intermittent burning and tingling in the feet.   Psychiatric:         Behavior: Behavior normal.             Assessment:       Encounter  Diagnoses   Name Primary?    Right foot pain Yes    Edema of right lower leg     Type 2 diabetes mellitus with stage 3a chronic kidney disease, without long-term current use of insulin          Plan:       Debbie was seen today for toe pain and foot swelling.    Diagnoses and all orders for this visit:    Right foot pain  -     X-Ray Foot Complete Right; Future  -     Ambulatory referral/consult to Vascular Surgery; Future    Edema of right lower leg  -     Ambulatory referral/consult to Vascular Surgery; Future    Type 2 diabetes mellitus with stage 3a chronic kidney disease, without long-term current use of insulin      I counseled the patient on her conditions, their implications and medical management.    Shoe inspection. Diabetic Foot Education. Patient reminded of the importance of good nutrition and blood sugar control to help prevent podiatric complications of diabetes. Patient instructed on proper foot hygeine. We discussed wearing proper shoe gear, daily foot inspections and Diabetic foot education in detail.    Return to clinic in 3 months or sooner if problems arise

## 2023-06-21 ENCOUNTER — TELEPHONE (OUTPATIENT)
Dept: PODIATRY | Facility: CLINIC | Age: 88
End: 2023-06-21
Payer: MEDICARE

## 2023-06-21 DIAGNOSIS — M79.672 FOOT PAIN, BILATERAL: ICD-10-CM

## 2023-06-21 DIAGNOSIS — M79.671 RIGHT FOOT PAIN: Primary | ICD-10-CM

## 2023-06-21 DIAGNOSIS — M79.671 FOOT PAIN, BILATERAL: ICD-10-CM

## 2023-06-22 ENCOUNTER — TELEPHONE (OUTPATIENT)
Dept: INTERNAL MEDICINE | Facility: CLINIC | Age: 88
End: 2023-06-22
Payer: MEDICARE

## 2023-06-22 NOTE — TELEPHONE ENCOUNTER
----- Message from Carolyn Mejia MD sent at 6/21/2023  5:41 PM CDT -----  Pls call pt - u/s showed old clot behind r knee.  She is on proper treatment (Eliquis)  Has swelling improved with the diuretic I prescribed?  Pls let Dr Love know of clot when she sees him next week.

## 2023-06-23 ENCOUNTER — TELEPHONE (OUTPATIENT)
Dept: INTERNAL MEDICINE | Facility: CLINIC | Age: 88
End: 2023-06-23
Payer: MEDICARE

## 2023-06-26 ENCOUNTER — LAB VISIT (OUTPATIENT)
Dept: LAB | Facility: HOSPITAL | Age: 88
End: 2023-06-26
Attending: INTERNAL MEDICINE
Payer: MEDICARE

## 2023-06-26 ENCOUNTER — TELEPHONE (OUTPATIENT)
Dept: INTERNAL MEDICINE | Facility: CLINIC | Age: 88
End: 2023-06-26
Payer: MEDICARE

## 2023-06-26 DIAGNOSIS — E03.9 HYPOTHYROIDISM, ADULT: ICD-10-CM

## 2023-06-26 LAB
T4 FREE SERPL-MCNC: 0.83 NG/DL (ref 0.71–1.51)
TSH SERPL DL<=0.005 MIU/L-ACNC: 9.87 UIU/ML (ref 0.4–4)

## 2023-06-26 PROCEDURE — 84439 ASSAY OF FREE THYROXINE: CPT | Performed by: INTERNAL MEDICINE

## 2023-06-26 PROCEDURE — 84443 ASSAY THYROID STIM HORMONE: CPT | Performed by: INTERNAL MEDICINE

## 2023-06-26 PROCEDURE — 36415 COLL VENOUS BLD VENIPUNCTURE: CPT | Performed by: INTERNAL MEDICINE

## 2023-06-26 NOTE — TELEPHONE ENCOUNTER
Called pt to review results note from PCP. Pt verbalzied understanding. Pt states she still has the swelling, it has not improved. Pt states she will inform Dr Cline of clot.

## 2023-06-27 ENCOUNTER — HOSPITAL ENCOUNTER (OUTPATIENT)
Dept: RADIOLOGY | Facility: OTHER | Age: 88
Discharge: HOME | End: 2023-06-27
Attending: PODIATRIST
Payer: MEDICARE

## 2023-06-27 DIAGNOSIS — M79.671 FOOT PAIN, BILATERAL: ICD-10-CM

## 2023-06-27 DIAGNOSIS — M79.672 FOOT PAIN, BILATERAL: ICD-10-CM

## 2023-06-27 PROCEDURE — 73630 X-RAY EXAM OF FOOT: CPT | Mod: 26,50,, | Performed by: RADIOLOGY

## 2023-06-27 PROCEDURE — 73630 X-RAY EXAM OF FOOT: CPT | Mod: TC,50,FY

## 2023-06-27 PROCEDURE — 73630 XR FOOT COMPLETE 3 VIEW BILATERAL: ICD-10-PCS | Mod: 26,50,, | Performed by: RADIOLOGY

## 2023-06-28 ENCOUNTER — TELEPHONE (OUTPATIENT)
Dept: PODIATRY | Facility: CLINIC | Age: 88
End: 2023-06-28
Payer: MEDICARE

## 2023-06-28 NOTE — TELEPHONE ENCOUNTER
----- Message from Lashonda Ding sent at 6/28/2023  8:27 AM CDT -----  Regarding: Xray Results  Contact: Oskar [phone]  Pt is calling to get results from xray on 6/28 please call

## 2023-06-28 NOTE — TELEPHONE ENCOUNTER
CALLED PATIENT GAVE HER THE X-RAY RESULT.    PATIENT WILL LIKE TO KNOW WHAT ABOUT THE KNOT THAT IS ON TOP OF HER FOOT?     PLEASE ADVISE

## 2023-06-29 ENCOUNTER — OFFICE VISIT (OUTPATIENT)
Dept: CARDIOLOGY | Facility: CLINIC | Age: 88
End: 2023-06-29
Payer: MEDICARE

## 2023-06-29 VITALS
HEART RATE: 72 BPM | SYSTOLIC BLOOD PRESSURE: 133 MMHG | WEIGHT: 140.63 LBS | BODY MASS INDEX: 23.43 KG/M2 | HEIGHT: 65 IN | DIASTOLIC BLOOD PRESSURE: 60 MMHG

## 2023-06-29 DIAGNOSIS — Z86.711 HISTORY OF PULMONARY EMBOLISM: ICD-10-CM

## 2023-06-29 DIAGNOSIS — N18.31 TYPE 2 DIABETES MELLITUS WITH STAGE 3A CHRONIC KIDNEY DISEASE, WITHOUT LONG-TERM CURRENT USE OF INSULIN: Primary | ICD-10-CM

## 2023-06-29 DIAGNOSIS — E11.69 HYPERLIPIDEMIA ASSOCIATED WITH TYPE 2 DIABETES MELLITUS: ICD-10-CM

## 2023-06-29 DIAGNOSIS — I10 PRIMARY HYPERTENSION: ICD-10-CM

## 2023-06-29 DIAGNOSIS — E11.22 TYPE 2 DIABETES MELLITUS WITH STAGE 3A CHRONIC KIDNEY DISEASE, WITHOUT LONG-TERM CURRENT USE OF INSULIN: Primary | ICD-10-CM

## 2023-06-29 DIAGNOSIS — Z86.718 HISTORY OF DEEP VENOUS THROMBOSIS: ICD-10-CM

## 2023-06-29 DIAGNOSIS — Z79.01 CHRONIC ANTICOAGULATION: ICD-10-CM

## 2023-06-29 DIAGNOSIS — R06.02 SOB (SHORTNESS OF BREATH): ICD-10-CM

## 2023-06-29 DIAGNOSIS — E78.5 HYPERLIPIDEMIA ASSOCIATED WITH TYPE 2 DIABETES MELLITUS: ICD-10-CM

## 2023-06-29 PROCEDURE — 99215 OFFICE O/P EST HI 40 MIN: CPT | Mod: S$PBB,,, | Performed by: INTERNAL MEDICINE

## 2023-06-29 PROCEDURE — 99999 PR PBB SHADOW E&M-EST. PATIENT-LVL V: CPT | Mod: PBBFAC,,, | Performed by: INTERNAL MEDICINE

## 2023-06-29 PROCEDURE — 99215 OFFICE O/P EST HI 40 MIN: CPT | Mod: PBBFAC | Performed by: INTERNAL MEDICINE

## 2023-06-29 PROCEDURE — 99215 PR OFFICE/OUTPT VISIT, EST, LEVL V, 40-54 MIN: ICD-10-PCS | Mod: S$PBB,,, | Performed by: INTERNAL MEDICINE

## 2023-06-29 PROCEDURE — 99999 PR PBB SHADOW E&M-EST. PATIENT-LVL V: ICD-10-PCS | Mod: PBBFAC,,, | Performed by: INTERNAL MEDICINE

## 2023-06-29 NOTE — PROGRESS NOTES
[facility]      Chief Complaint   Patient presents with    Hx of deep veous thrombosis       Patient ID: Debbie Ding is a 89 y.o. 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  Pertinent history/events as follows:     -At our initial clinic visit on 7/21/202, Mrs. Ding reported being diagnosed with RLE DVT and bilateral PE in 2019.  She was treated with Eliquis 5 mg bid up until 1 month ago, at which time she was switched to Xarelto 10 mg daily because of insurance issues.  She reports no RLE edema or pain.  Her main complaint is SOB, which she states became worse after being diagnosed with bilateral PE, and progressed over time to the point where she has significant difficulty breathing.  She reports no chest pain or chest discomfort. Echo from 7/19/2022 with EF 65%; estimated PASP 40 mmhg.  Plan:   SOB- Etiology unclear.  Given history of bilateral PE, check CTA chest.  Pt has several risk factors for CAD.  If no evidence of residual PE, will proceed with nuclear stress test to evaluate for myocardial ischemia.  Pt also has history of asthma.  Therefore, if cardiac workup is unrevealing, will refer to Pulmonary for evaluation.    History of RLE DVT/Bilateral PE- Check CTA chest and BLE venous ultrasound.  Continue Xarelto 10 mg daily.  HTN- Continue current medications.  HLD-  Start atorvastatin 40 mg daily.    -At follow up clinic visit on 12/8/2022, Mrs. Ding reported continued SOB as described at clinic visit on 7/21/2022.  CTA Chest on 7/25/2022 revealed no CT evidence of pulmonary embolus to the subsegmental branches.  There is dependent small airways disease, consider aspiration versus noninfectious small airways disease; findings appears stable since prior exam.  Mild scarring within the right middle lobe and lingula, possibly related to previous atypical infection.  BLE Venous Ultrasound on 7/29/2022  revealed no evidence of a right or left lower extremity DVT.  A Baker's cyst measuring 4.55 cm x 0.92 cm was seen in the right extremity.  Nuclear Stress Test on 8/11/2022 demonstrated normal myocardial perfusion with no evidence of myocardial ischemia or infarction.  Plan:   SOB- Etiology unclear.  Likely related to pathology inherent to the underlying lung parenchyma based on current workup.  CTA Chest on 7/25/2022 revealed no CT evidence of pulmonary embolus to the subsegmental branches.  There is dependent small airways disease, consider aspiration versus noninfectious small airways disease; findings appears stable since prior exam.  Mild scarring within the right middle lobe and lingula, possibly related to previous atypical infection.   A Baker's cyst measuring 4.55 cm x 0.92 cm was seen in the right extremity.  Nuclear Stress Test on 8/11/2022 demonstrated normal myocardial perfusion with no evidence of myocardial ischemia or infarction.  Refer to Pulmonary for evaluation.    History of RLE DVT/Bilateral PE- CTA Chest on 7/25/2022 revealed no CT evidence of pulmonary embolus to the subsegmental branches.  BLE Venous Ultrasound on 7/29/2022 revealed no evidence of a right or left lower extremity DVT. Continue Xarelto 10 mg daily.  HTN- Continue current medications.  HLD-  Continue atorvastatin 40 mg daily.  JASE- Encourage CPAP usage.     HPI:  Mrs. Ding reports improvement in SOB.  She has no chest pain, TIA symptoms or syncope.  LDL 64 on 4/18/2023.      Past Medical History:   Diagnosis Date    Allergic rhinitis     Anticoagulant long-term use     Arthritis     Asthma     Bilateral pulmonary embolism 4/27/2019    Cataract     Chronic anticoagulation 9/28/2020    Depression     Diabetes mellitus     Fibromyalgia 7/2/2012    Fibromyalgia     GERD (gastroesophageal reflux disease)     Hypercholesterolemia 9/28/2020    Hypercholesterolemia 9/28/2020    Hypertension 7/2/2012    Thoracic or lumbosacral neuritis  or radiculitis, unspecified     Thyroid disease     Ulcer      Past Surgical History:   Procedure Laterality Date    CATARACT EXTRACTION Bilateral     ESOPHAGOGASTRODUODENOSCOPY N/A 3/8/2022    Procedure: EGD (ESOPHAGOGASTRODUODENOSCOPY) with dilation-either hospital ok with Dr. Meza;  Surgeon: Rebeca Meza MD;  Location: [facility] ENDO;  Service: Endoscopy;  Laterality: N/A;  1/11-eliquis hold ok see te-tb    ESOPHAGOGASTRODUODENOSCOPY N/A 2/28/2022    Procedure: EGD (ESOPHAGOGASTRODUODENOSCOPY) with dilation-either [facility] ok with Dr. Meza;  Surgeon: Rebeca Meza MD;  Location: [facility] ENDO ([facility] FLR);  Service: Endoscopy;  Laterality: N/A;  1/11-eliquis hold ok see te-tb  COVID test on 2/25/22 at [facility]  2/22-confirmed appt arrival time with pt-Kpvt    FOOT NEUROMA SURGERY  1985    HYSTERECTOMY       Social History     Socioeconomic History    Marital status:    Occupational History     Comment:  - school    Tobacco Use    Smoking status: Never    Smokeless tobacco: Never   Substance and Sexual Activity    Alcohol use: No    Drug use: No    Sexual activity: Not Currently   Social History Narrative    Lives with daughter, Benjie.        Other daughter, Madina, drives her around.        She lives independently, except for driving.          Stretches in bed.  Walks in neighborhood, and in house several times a day.     Family History   Problem Relation Age of Onset    Diabetes Mother     Diabetes Brother     Emphysema Maternal Aunt     Melanoma Neg Hx     Asthma Neg Hx     Colon cancer Neg Hx     Esophageal cancer Neg Hx        Review of patient's allergies indicates:   Allergen Reactions    Melatonin      Other reaction(s): Other (See Comments)  Sleep Walks and has unnatural dreams    Talwin compound Hives    Talwin [pentazocine lactate] Hallucinations    Trazodone Other (See Comments)     Nightmares/sleep walk      Bentyl [dicyclomine] Anxiety       Medication List with Changes/Refills    Current Medications    ALBUTEROL (PROVENTIL) 2.5 MG /3 ML (0.083 %) NEBULIZER SOLUTION    Take 2.5 mg by nebulization every 6 (six) hours as needed. Rescue    ALBUTEROL (PROVENTIL/VENTOLIN HFA) 90 MCG/ACTUATION INHALER    Inhale 2 puffs into the lungs every 6 (six) hours as needed.    AMLODIPINE (NORVASC) 5 MG TABLET    Take 1 tablet (5 mg total) by mouth once daily.    APIXABAN (ELIQUIS) 2.5 MG TAB    Take 1 tablet (2.5 mg total) by mouth once daily.    ATORVASTATIN (LIPITOR) 40 MG TABLET    Take 1 tablet (40 mg total) by mouth once daily.    BENZONATATE (TESSALON) 100 MG CAPSULE    benzonatate 100 mg capsule    CHLORHEXIDINE (PERIDEX) 0.12 % SOLUTION    SWISH WITH 10ML FOR 30 seconds THEN EXPECTORATE TWICE DAILY    CICLOPIROX 1 % SHAMPOO    Apply topically.    CLOBETASOL (TEMOVATE) 0.05 % EXTERNAL SOLUTION    Pt to mix in 1 jar of cerave cream and apply to affected areas bid    CLOTRIMAZOLE-BETAMETHASONE 1-0.05% (LOTRISONE) CREAM    Apply topically 2 (two) times daily.    DICLOFENAC SODIUM (VOLTAREN) 1 % GEL    Apply 2 g topically 3 (three) times daily. for 10 days    DONEPEZIL (ARICEPT) 10 MG TABLET    TAKE 1 TABLET EVERY MORNING.  NO FURTHER REFILL WITHOUT APPOINTMENT    DOXEPIN (SINEQUAN) 10 MG CAPSULE    Take 10 mg by mouth every evening.    ESOMEPRAZOLE (NEXIUM) 40 MG CAPSULE    Take 1 capsule (40 mg total) by mouth 2 (two) times daily.    ESTRADIOL (ESTRACE) 0.01 % (0.1 MG/GRAM) VAGINAL CREAM    Place vaginally.    FAMOTIDINE (PEPCID) 20 MG TABLET    Take 1 tablet (20 mg total) by mouth daily as needed for Heartburn (breakthrough heartburn and acid reflux not controlled with Nexium).    FLASH GLUCOSE SCANNING READER (FREESTYLE ALEXSANDRA 14 DAY READER) MISC    1 Device by Misc.(Non-Drug; Combo Route) route 2 (two) times a day.    FLASH GLUCOSE SENSOR (FREESTYLE ALEXSANDRA 14 DAY SENSOR) KIT    1 Device by Misc.(Non-Drug; Combo Route) route 2 (two) times a day.    FLUOCINOLONE (DERMA-SMOOTHE) 0.01 % EXTERNAL OIL     fluocinolone 0.01 % scalp oil and shower cap   Apply ONE A SMALL AMOUNT TO affected AREA as directed apply TO SCALP 2-3 TIMES PER WEEK FOR ITCHING]    FLUOCINONIDE (LIDEX) 0.05 % EXTERNAL SOLUTION    AAA scalp qday - bid prn pruritus    FLUTICASONE-SALMETEROL DISKUS INHALER 500-50 MCG    USE 1 INHALATION ORALLY    INTO THE LUNGS TWO TIMES A DAY    GABAPENTIN (NEURONTIN) 100 MG CAPSULE    gabapentin 100 mg capsule    GEMTESA 75 MG TAB    Take 1 tablet by mouth.    GLYCOPYRROLATE (ROBINUL) 2 MG TAB    TAKE 1 TABLET TWICE A DAY    GLYCOPYRROLATE (ROBINUL) 2 MG TAB    Take 2 mg by mouth.    HYDROCHLOROTHIAZIDE (HYDRODIURIL) 25 MG TABLET    Take 1 tablet (25 mg total) by mouth once daily.    HYDROCORTISONE-ALOE VERA 0.5 % CREA    Place 1 application into the left eye 2 (two) times daily.    KETOCONAZOLE (NIZORAL) 2 % CREAM    Apply topically once daily.    KETOCONAZOLE (NIZORAL) 2 % SHAMPOO    Wash hair with medicated shampoo at least 2x/week - let sit on scalp at least 5 minutes prior to rinsing    LEVALBUTEROL (XOPENEX HFA) 45 MCG/ACTUATION INHALER    Inhale into the lungs.    LEVOTHYROXINE (SYNTHROID) 50 MCG TABLET    Take 1 tablet (50 mcg total) by mouth before breakfast.    LIDOCAINE 3 % CREA    Apply 1 application topically 2 (two) times a day.    LIDOCAINE VISCOUS 2 % SOLUTION    Take by mouth.    MECLIZINE (ANTIVERT) 25 MG TABLET    TAKE 1 TABLET TWICE A DAY  AS NEEDED FOR DIZZINESS    MEMANTINE (NAMENDA) 10 MG TAB    Take 10 mg by mouth once daily.    METOCLOPRAMIDE HCL (REGLAN) 5 MG TABLET    metoclopramide 5 mg tablet    MIRTAZAPINE (REMERON) 15 MG TABLET    Take 1 tablet (15 mg total) by mouth every evening.    MUPIROCIN (BACTROBAN) 2 % OINTMENT    mupirocin 2 % topical ointment   APPLY LIBERALLY TO THE AFFECTED AREA TWICE DAILY TO WOUND ON SCALP    NYSTATIN (MYCOSTATIN) 100,000 UNIT/ML SUSPENSION    SMARTSI Teaspoon By Mouth 4 Times Daily    PREDNISOLONE ACETATE (PRED FORTE) 1 % DRPS    3 drops 2 (two)  "times daily.    RESTASIS 0.05 % OPHTHALMIC EMULSION        RIVAROXABAN (XARELTO) 10 MG TAB    Xarelto 10 mg tablet    SYMBICORT 160-4.5 MCG/ACTUATION HFAA    Inhale into the lungs.       Review of Systems  Constitution: Denies chills, fever, and sweats.  HENT: Denies headaches or blurry vision.  Cardiovascular: Denies chest pain or irregular heart beat.  Respiratory: Positive for shortness of breath.  Gastrointestinal: Denies abdominal pain, nausea, or vomiting.  Musculoskeletal: Denies muscle cramps.  Neurological: Denies dizziness or focal weakness.  Psychiatric/Behavioral: Normal mental status.  Hematologic/Lymphatic: Denies bleeding problem or easy bruising/bleeding.  Skin: Denies rash or suspicious lesions    Physical Examination  /60   Pulse 72   Ht 5' 5" (1.651 m)   Wt 63.8 kg (140 lb 10.5 oz)   BMI 23.41 kg/m²     Constitutional: No acute distress, conversant  HEENT: Sclera anicteric, Pupils equal, round and reactive to light, extraocular motions intact, Oropharynx clear  Neck: No JVD, no carotid bruits  Cardiovascular: regular rate and rhythm, no murmur, rubs or gallops, normal S1/S2  Pulmonary: Clear to auscultation bilaterally  Abdominal: Abdomen soft, nontender, nondistended, positive bowel sounds  Extremities: No lower extremity edema,   Pulses:  Carotid pulses are 2+ on the right side, and 2+ on the left side.  Radial pulses are 2+ on the right side, and 2+ on the left side.   Femoral pulses are 2+ on the right side, and 2+ on the left side.  Popliteal pulses are 2+ on the right side, and 2+ on the left side.   Dorsalis pedis pulses are 2+ on the right side, and 2+ on the left side.   Posterior tibial pulses are 2+ on the right side, and 2+ on the left side.    Skin: No ecchymosis, erythema, or ulcers  Psych: Alert and oriented x 3, appropriate affect  Neuro: CNII-XII intact, no focal deficits    Labs:  Most Recent Data  CBC:   Lab Results   Component Value Date    WBC 9.76 12/13/2022    HGB " 13.2 12/13/2022    HCT 41.8 12/13/2022     12/13/2022    MCV 90 12/13/2022    RDW 16.3 (H) 12/13/2022     BMP:   Lab Results   Component Value Date     03/27/2023    K 4.1 03/27/2023     (H) 03/27/2023    CO2 23 03/27/2023    BUN 9 03/27/2023    CREATININE 1.2 03/27/2023     (H) 03/27/2023    CALCIUM 9.5 03/27/2023    MG 2.1 11/15/2022    PHOS 2.8 11/15/2022     LFTS;   Lab Results   Component Value Date    PROT 7.1 12/13/2022    ALBUMIN 3.6 12/13/2022    BILITOT 0.8 12/13/2022    AST 15 12/13/2022    ALKPHOS 60 12/13/2022    ALT 10 12/13/2022     COAGS:   Lab Results   Component Value Date    INR 0.9 06/20/2019     FLP:   Lab Results   Component Value Date    CHOL 191 04/18/2023    HDL 99 (H) 04/18/2023    LDLCALC 64.4 04/18/2023    TRIG 138 04/18/2023    CHOLHDL 51.8 (H) 04/18/2023     CARDIAC:   Lab Results   Component Value Date    TROPONINI <0.006 10/04/2021    BNP 36 10/04/2021       Imaging:    EKG 7/18/2022:  Normal sinus rhythm  Incomplete right bundle branch block    Nuclear Stress Test 8/11/2022:    Normal myocardial perfusion scan. There is no evidence of myocardial ischemia or infarction.    There is a  mild intensity fixed perfusion abnormality in the inferior wall of the left ventricle secondary to diaphragm attenuation.    The gated perfusion images showed an ejection fraction of 60% at rest. The gated perfusion images showed an ejection fraction of 68% post stress. Normal ejection fraction is greater than 53%.    There is normal wall motion at rest and post stress.    LV cavity size is normal at rest and normal at stress.    The EKG portion of this study is abnormal but not diagnostic.    The patient reported no chest pain during the stress test.    There were no arrhythmias during stress.    There are no prior studies for comparison.    CTA Chest 7/25/2022:  1.  No CT evidence of pulmonary embolus to the subsegmental branches.    2.  Dependent small airways disease,  consider aspiration versus noninfectious small airways disease; findings appears stable since prior exam.  Mild scarring within the right middle lobe and lingula, possibly related to previous atypical infection.    BLE Venous Ultrasound 7/29/2022:  There is no evidence of a right lower extremity DVT.  The right superficial femoral middle vein is normal.  A Baker's cyst measuring 4.55 cm x 0.92 cm was seen in the right extremity.  There is no evidence of a left lower extremity DVT.    Echo 7/19/2022:  The left ventricle is normal in size with normal systolic function. The estimated ejection fraction is 65%.  Normal right ventricular size with normal right ventricular systolic function.  Indeterminate left ventricular diastolic function.  Mild tricuspid regurgitation.  The estimated PA systolic pressure is 40 mmHg.  Normal central venous pressure (3 mmHg).    LLE Venous Ultrasound 12/14/2021:  No evidence of deep venous thrombosis in the left lower extremity.    BLE Venous Ultrasound 6/20/2019:  Positive DVT study with thrombus seen within the right popliteal and peroneal veins    Echo 7/19/2022:  The left ventricle is normal in size with normal systolic function. The estimated ejection fraction is 65%.  Normal right ventricular size with normal right ventricular systolic function.  Indeterminate left ventricular diastolic function.  Mild tricuspid regurgitation.  The estimated PA systolic pressure is 40 mmHg.  Normal central venous pressure (3 mmHg).    Assessment/Plan:  Debbie Ding is a 89 y.o. 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    1. SOB- Etiology unclear.  Now improved.  Likely related to pathology inherent to the underlying lung parenchyma  based of current workup.  CTA Chest on 7/25/2022 revealed no CT evidence of pulmonary embolus to the subsegmental branches.  There is dependent small airways disease,  consider aspiration versus noninfectious small airways disease; findings appears stable since prior exam.  Mild scarring within the right middle lobe and lingula, possibly related to previous atypical infection.   A Baker's cyst measuring 4.55 cm x 0.92 cm was seen in the right extremity.  Nuclear Stress Test on 8/11/2022 demonstrated normal myocardial perfusion with no evidence of myocardial ischemia or infarction.  Pt referred to Pulmonary for evaluation.      2. History of RLE DVT/Bilateral PE- CTA Chest on 7/25/2022 revealed no CT evidence of pulmonary embolus to the subsegmental branches.  BLE Venous Ultrasound on 7/29/2022 revealed no evidence of a right or left lower extremity DVT. Continue Xarelto 10 mg daily.    3. HTN- Continue current medications.    4. HLD- LDL 64 on 4/18/2023.  Continue atorvastatin 40 mg daily.    5. JASE- Encourage CPAP usage.     Follow up in 6 months     Total duration of face to face visit time 30 minutes.  Total time spent counseling greater than fifty percent of total visit time.  Counseling included discussion regarding imaging findings, diagnosis, possibilities, treatment options, risks and benefits.  The patient had many questions regarding the options and long-term effects.    Benedicto Love MD, PhD  Interventional Cardiology

## 2023-06-30 ENCOUNTER — EXTERNAL CHRONIC CARE MANAGEMENT (OUTPATIENT)
Dept: PRIMARY CARE CLINIC | Facility: CLINIC | Age: 88
End: 2023-06-30
Payer: MEDICARE

## 2023-06-30 ENCOUNTER — TELEPHONE (OUTPATIENT)
Dept: PODIATRY | Facility: CLINIC | Age: 88
End: 2023-06-30
Payer: MEDICARE

## 2023-06-30 ENCOUNTER — TELEPHONE (OUTPATIENT)
Dept: INTERNAL MEDICINE | Facility: CLINIC | Age: 88
End: 2023-06-30
Payer: MEDICARE

## 2023-06-30 PROCEDURE — 99490 CHRNC CARE MGMT STAFF 1ST 20: CPT | Mod: PBBFAC | Performed by: INTERNAL MEDICINE

## 2023-06-30 PROCEDURE — 99490 CHRNC CARE MGMT STAFF 1ST 20: CPT | Mod: S$PBB,,, | Performed by: INTERNAL MEDICINE

## 2023-06-30 PROCEDURE — 99490 PR CHRONIC CARE MGMT, 1ST 20 MIN: ICD-10-PCS | Mod: S$PBB,,, | Performed by: INTERNAL MEDICINE

## 2023-06-30 NOTE — TELEPHONE ENCOUNTER
----- Message from Mary Lou Williamson sent at 6/29/2023  3:55 PM CDT -----  Contact: [phone]  Pt is calling she states she is needing a soon appt ASAP she states she just saw her cardiologist and there is some things that the office has not done they told her that was suppose to be done for her please give return call

## 2023-06-30 NOTE — TELEPHONE ENCOUNTER
Spoke with patient requesting to be seen today with Dr. Teresa(Podiatry), no availability today,  she has been scheduled with another provider Dr. Patiño(Podiatry) on 07/03/2023 at 11:30am. She stated that was fine.          ----- Message from Ifeoma Figueroa sent at 6/30/2023  8:21 AM CDT -----  Regarding: appt access  Contact: pt [phone]  Pt is calling to speak with someone in provider office pt would like to be seen by provider today pt stated her right foot is swollen pt  is asking for a return call please call pt at  [phone]

## 2023-07-02 DIAGNOSIS — E03.9 HYPOTHYROIDISM, ADULT: Primary | ICD-10-CM

## 2023-07-02 RX ORDER — LEVOTHYROXINE SODIUM 75 UG/1
75 TABLET ORAL
Qty: 90 TABLET | Refills: 0 | Status: SHIPPED | OUTPATIENT
Start: 2023-07-02 | End: 2023-09-07 | Stop reason: SDUPTHER

## 2023-07-02 NOTE — PROGRESS NOTES
Pls call- she is not getting enough synthroid- increase dose to  0.75 mcg daily.  (If she has any of 50 mcg left, she may take 1.5 tabs po q day til she runs out)  TSH 2 months.  NE

## 2023-07-03 ENCOUNTER — TELEPHONE (OUTPATIENT)
Dept: INTERNAL MEDICINE | Facility: CLINIC | Age: 88
End: 2023-07-03
Payer: MEDICARE

## 2023-07-03 ENCOUNTER — OFFICE VISIT (OUTPATIENT)
Dept: PODIATRY | Facility: CLINIC | Age: 88
End: 2023-07-03
Payer: MEDICARE

## 2023-07-03 VITALS
BODY MASS INDEX: 23.54 KG/M2 | HEART RATE: 66 BPM | WEIGHT: 141.31 LBS | SYSTOLIC BLOOD PRESSURE: 105 MMHG | HEIGHT: 65 IN | DIASTOLIC BLOOD PRESSURE: 66 MMHG

## 2023-07-03 DIAGNOSIS — R60.0 BILATERAL LOWER EXTREMITY EDEMA: Primary | ICD-10-CM

## 2023-07-03 DIAGNOSIS — E11.22 TYPE 2 DIABETES MELLITUS WITH STAGE 3A CHRONIC KIDNEY DISEASE, WITHOUT LONG-TERM CURRENT USE OF INSULIN: ICD-10-CM

## 2023-07-03 DIAGNOSIS — N18.31 TYPE 2 DIABETES MELLITUS WITH STAGE 3A CHRONIC KIDNEY DISEASE, WITHOUT LONG-TERM CURRENT USE OF INSULIN: ICD-10-CM

## 2023-07-03 PROCEDURE — 99999 PR PBB SHADOW E&M-EST. PATIENT-LVL IV: CPT | Mod: PBBFAC,,, | Performed by: PODIATRIST

## 2023-07-03 PROCEDURE — 99213 PR OFFICE/OUTPT VISIT, EST, LEVL III, 20-29 MIN: ICD-10-PCS | Mod: S$PBB,,, | Performed by: PODIATRIST

## 2023-07-03 PROCEDURE — 99213 OFFICE O/P EST LOW 20 MIN: CPT | Mod: S$PBB,,, | Performed by: PODIATRIST

## 2023-07-03 PROCEDURE — 99214 OFFICE O/P EST MOD 30 MIN: CPT | Mod: PBBFAC | Performed by: PODIATRIST

## 2023-07-03 PROCEDURE — 99999 PR PBB SHADOW E&M-EST. PATIENT-LVL IV: ICD-10-PCS | Mod: PBBFAC,,, | Performed by: PODIATRIST

## 2023-07-03 NOTE — TELEPHONE ENCOUNTER
Called and spoke to pt. Reviewed note from PCP, pt verbalized understanding. Pt scheduled for repeat labs.

## 2023-07-06 ENCOUNTER — OFFICE VISIT (OUTPATIENT)
Dept: ORTHOPEDICS | Facility: CLINIC | Age: 88
End: 2023-07-06
Payer: MEDICARE

## 2023-07-06 VITALS
HEIGHT: 65 IN | HEART RATE: 57 BPM | BODY MASS INDEX: 22.9 KG/M2 | DIASTOLIC BLOOD PRESSURE: 66 MMHG | WEIGHT: 137.44 LBS | SYSTOLIC BLOOD PRESSURE: 123 MMHG

## 2023-07-06 DIAGNOSIS — M17.11 PRIMARY OSTEOARTHRITIS OF RIGHT KNEE: Primary | ICD-10-CM

## 2023-07-06 PROCEDURE — 99999 PR PBB SHADOW E&M-EST. PATIENT-LVL IV: ICD-10-PCS | Mod: PBBFAC,,, | Performed by: PHYSICIAN ASSISTANT

## 2023-07-06 PROCEDURE — 99213 PR OFFICE/OUTPT VISIT, EST, LEVL III, 20-29 MIN: ICD-10-PCS | Mod: S$PBB,,, | Performed by: PHYSICIAN ASSISTANT

## 2023-07-06 PROCEDURE — 99214 OFFICE O/P EST MOD 30 MIN: CPT | Mod: PBBFAC | Performed by: PHYSICIAN ASSISTANT

## 2023-07-06 PROCEDURE — 99999 PR PBB SHADOW E&M-EST. PATIENT-LVL IV: CPT | Mod: PBBFAC,,, | Performed by: PHYSICIAN ASSISTANT

## 2023-07-06 PROCEDURE — 99213 OFFICE O/P EST LOW 20 MIN: CPT | Mod: S$PBB,,, | Performed by: PHYSICIAN ASSISTANT

## 2023-07-06 NOTE — PROGRESS NOTES
SUBJECTIVE:     Chief Complaint & History of Present Illness:  Debbie Ding is a Established patient 89 y.o. female who is seen here today with a complaint of    Chief Complaint   Patient presents with    Right Knee - Pain    .  She is patient well-known to me as longstanding history of right knee pain last seen treated the clinic for this condition 06/01/2023 which time she completed her round of viscosupplementation.  Since that time she is been followed by Podiatry for her foot issues in his gotten proper footwear and support feeling much better.  Still has some achiness and soreness in her knee with prolonged standing and ambulation as requiring about a brace for these periods  On a scale of 1-10, with 10 being worst pain imaginable, he rates this pain as 2 on good days and 7 on bad days.  she describes the pain as sore and achy.    Review of patient's allergies indicates:   Allergen Reactions    Melatonin      Other reaction(s): Other (See Comments)  Sleep Walks and has unnatural dreams    Talwin compound Hives    Talwin [pentazocine lactate] Hallucinations    Trazodone Other (See Comments)     Nightmares/sleep walk      Bentyl [dicyclomine] Anxiety         Current Outpatient Medications   Medication Sig Dispense Refill    albuterol (PROVENTIL) 2.5 mg /3 mL (0.083 %) nebulizer solution Take 2.5 mg by nebulization every 6 (six) hours as needed. Rescue      albuterol (PROVENTIL/VENTOLIN HFA) 90 mcg/actuation inhaler Inhale 2 puffs into the lungs every 6 (six) hours as needed.      amLODIPine (NORVASC) 5 MG tablet Take 1 tablet (5 mg total) by mouth once daily. 90 tablet 3    apixaban (ELIQUIS) 2.5 mg Tab Take 1 tablet (2.5 mg total) by mouth once daily. 90 tablet 3    atorvastatin (LIPITOR) 40 MG tablet Take 1 tablet (40 mg total) by mouth once daily. 90 tablet 3    benzonatate (TESSALON) 100 MG capsule benzonatate 100 mg capsule      chlorhexidine (PERIDEX) 0.12 % solution SWISH WITH 10ML FOR 30 seconds  THEN EXPECTORATE TWICE DAILY      ciclopirox 1 % shampoo Apply topically.      clobetasoL (TEMOVATE) 0.05 % external solution Pt to mix in 1 jar of cerave cream and apply to affected areas bid 50 mL 3    clotrimazole-betamethasone 1-0.05% (LOTRISONE) cream Apply topically 2 (two) times daily. 45 g 2    donepeziL (ARICEPT) 10 MG tablet TAKE 1 TABLET EVERY MORNING.  NO FURTHER REFILL WITHOUT APPOINTMENT (Patient taking differently: TAKE 1 TABLET EVERY MORNING.  NO FURTHER REFILL WITHOUT APPOINTMENT) 90 tablet 1    doxepin (SINEQUAN) 10 MG capsule Take 10 mg by mouth every evening.      esomeprazole (NEXIUM) 40 MG capsule Take 1 capsule (40 mg total) by mouth 2 (two) times daily. 180 capsule 3    estradioL (ESTRACE) 0.01 % (0.1 mg/gram) vaginal cream Place vaginally.      famotidine (PEPCID) 20 MG tablet Take 1 tablet (20 mg total) by mouth daily as needed for Heartburn (breakthrough heartburn and acid reflux not controlled with Nexium). 90 tablet 3    flash glucose scanning reader (FREESTYLE ALEXSANDRA 14 DAY READER) Misc 1 Device by Misc.(Non-Drug; Combo Route) route 2 (two) times a day. 1 each 11    flash glucose sensor (FREESTYLE ALEXSANDRA 14 DAY SENSOR) Kit 1 Device by Misc.(Non-Drug; Combo Route) route 2 (two) times a day. 1 kit 11    fluocinolone (DERMA-SMOOTHE) 0.01 % external oil fluocinolone 0.01 % scalp oil and shower cap   Apply ONE A SMALL AMOUNT TO affected AREA as directed apply TO SCALP 2-3 TIMES PER WEEK FOR ITCHING]      fluocinonide (LIDEX) 0.05 % external solution AAA scalp qday - bid prn pruritus 60 mL 3    fluticasone-salmeterol diskus inhaler 500-50 mcg USE 1 INHALATION ORALLY    INTO THE LUNGS TWO TIMES A DAY      gabapentin (NEURONTIN) 100 MG capsule gabapentin 100 mg capsule      GEMTESA 75 mg Tab Take 1 tablet by mouth.      glycopyrrolate (ROBINUL) 2 MG Tab TAKE 1 TABLET TWICE A  tablet 3    glycopyrrolate (ROBINUL) 2 MG Tab Take 2 mg by mouth.      hydroCHLOROthiazide (HYDRODIURIL) 25 MG  tablet Take 1 tablet (25 mg total) by mouth once daily. 90 tablet 0    hydrocortisone-aloe vera 0.5 % Crea Place 1 application into the left eye 2 (two) times daily.      ketoconazole (NIZORAL) 2 % cream Apply topically once daily. 60 g 2    ketoconazole (NIZORAL) 2 % shampoo Wash hair with medicated shampoo at least 2x/week - let sit on scalp at least 5 minutes prior to rinsing 120 mL 5    levalbuterol (XOPENEX HFA) 45 mcg/actuation inhaler Inhale into the lungs.      levothyroxine (SYNTHROID) 75 MCG tablet Take 1 tablet (75 mcg total) by mouth before breakfast. 90 tablet 0    LIDOcaine 3 % Crea Apply 1 application topically 2 (two) times a day. 85 g 3    LIDOCAINE VISCOUS 2 % solution Take by mouth.      meclizine (ANTIVERT) 25 mg tablet TAKE 1 TABLET TWICE A DAY  AS NEEDED FOR DIZZINESS 180 tablet 1    memantine (NAMENDA) 10 MG Tab Take 10 mg by mouth once daily.      metoclopramide HCl (REGLAN) 5 MG tablet metoclopramide 5 mg tablet      mirtazapine (REMERON) 15 MG tablet Take 1 tablet (15 mg total) by mouth every evening. 90 tablet 3    mupirocin (BACTROBAN) 2 % ointment mupirocin 2 % topical ointment   APPLY LIBERALLY TO THE AFFECTED AREA TWICE DAILY TO WOUND ON SCALP      nystatin (MYCOSTATIN) 100,000 unit/mL suspension SMARTSI Teaspoon By Mouth 4 Times Daily      prednisoLONE acetate (PRED FORTE) 1 % DrpS 3 drops 2 (two) times daily.      RESTASIS 0.05 % ophthalmic emulsion       rivaroxaban (XARELTO) 10 mg Tab Xarelto 10 mg tablet      SYMBICORT 160-4.5 mcg/actuation HFAA Inhale into the lungs.      diclofenac sodium (VOLTAREN) 1 % Gel Apply 2 g topically 3 (three) times daily. for 10 days 400 g 0     Current Facility-Administered Medications   Medication Dose Route Frequency Provider Last Rate Last Admin    regadenoson injection 0.4 mg  0.4 mg Intravenous Once Benedicto Love MD PhD         Facility-Administered Medications Ordered in Other Visits   Medication Dose Route Frequency Provider Last Rate  Last Admin    midazolam (VERSED) 1 mg/mL injection 0.5 mg  0.5 mg Intravenous PRN Ross Hui MD           Past Medical History:   Diagnosis Date    Allergic rhinitis     Anticoagulant long-term use     Arthritis     Asthma     Bilateral pulmonary embolism 4/27/2019    Cataract     Chronic anticoagulation 9/28/2020    Depression     Diabetes mellitus     Fibromyalgia 7/2/2012    Fibromyalgia     GERD (gastroesophageal reflux disease)     Hypercholesterolemia 9/28/2020    Hypercholesterolemia 9/28/2020    Hypertension 7/2/2012    Thoracic or lumbosacral neuritis or radiculitis, unspecified     Thyroid disease     Ulcer        Past Surgical History:   Procedure Laterality Date    CATARACT EXTRACTION Bilateral     ESOPHAGOGASTRODUODENOSCOPY N/A 3/8/2022    Procedure: EGD (ESOPHAGOGASTRODUODENOSCOPY) with dilation-either hospital ok with Dr. Meza;  Surgeon: Rebeca Meza MD;  Location: [facility];  Service: Endoscopy;  Laterality: N/A;  1/11-eliquis hold ok see te-tb    ESOPHAGOGASTRODUODENOSCOPY N/A 2/28/2022    Procedure: EGD (ESOPHAGOGASTRODUODENOSCOPY) with dilation-either [facility] ok with Dr. Meza;  Surgeon: Rebeca Meza MD;  Location: [address]);  Service: Endoscopy;  Laterality: N/A;  1/11-eliquis hold ok see te-tb  COVID test on 2/25/22 at [facility]  2/22-confirmed appt arrival time with pt-Kpvt    FOOT NEUROMA SURGERY  1985    HYSTERECTOMY         Vital Signs (Most Recent)  Vitals:    07/06/23 0806   BP: 123/66   Pulse: (!) 57           Review of Systems:  ROS:  Constitutional: no fever or chills  Eyes: no visual changes  ENT: no nasal congestion or sore throat, Positive  vestibular dizziness  Respiratory: no cough or shortness of breath, Positive for asthma  Cardiovascular: no chest pain or palpitations  Gastrointestinal: no nausea or vomiting, tolerating diet, Positive for GERD, peptic ulcer disease  Genitourinary: no hematuria or dysuria, Positive CKD stage  "3,  Integument/Breast: no rash or pruritis  Hematologic/Lymphatic: no easy bruising or lymphadenopathy, Positive long-term anticoagulation, history DVT, history of PE  Musculoskeletal: no arthralgias or myalgias, Positive chronic low back pain, fibromyalgia, osteoarthritis of multiple joints  Neurological: no seizures or tremors, Degenerative disc disease lumbar spine, history of memory loss, spinal stenosis without neurological claudication  Behavioral/Psych: no auditory or visual hallucinations, Positive for depression,  Endocrine: no heat or cold intolerance, Positive diabetes type 2, thyroid disease hypothyroidism              OBJECTIVE:     PHYSICAL EXAM:  Height: 5' 5" (165.1 cm) Weight: 62.4 kg (137 lb 7.3 oz), General Appearance: Well nourished, well developed, in no acute distress.  Neurological: Mood & affect are normal.  right  Knee Exam:  Knee Range of Motion:0-115 degrees flexion   Effusion:none  Condition of skin:intact  Location of tenderness:Medial joint line   Strength:5 of 5  Stability:  Lachman: stable, LCL: stable, MCL: stable, PCL: stable, and posteromedial (dial): stable  Varus /Valgus stress:  normal  Chris:   negative/negative     left  Knee Exam:  Knee Range of Motion:0-120 degrees flexion   Effusion:none  Condition of skin:intact  Location of tenderness:Medial joint line   Strength:5 of 5  Stability:  Lachman: stable, LCL: stable, MCL: stable, PCL: stable, and posteromedial (dial): stable  Varus /Valgus stress:  normal  Chris:   negative/negative        Hip Examination:  full painless range of motion, without tenderness    RADIOGRAPHS:  X-rays from previous visit reviewed by me today demonstrate mild to moderate arthritic changes throughout both right more so than left significant medial joint space narrowing sclerotic changes noted tricompartmentally    ASSESSMENT/PLAN:       ICD-10-CM ICD-9-CM   1. Primary osteoarthritis of right knee  M17.11 715.16       Plan: We discussed with the " patient at length all the different treatment options available for  the knee including anti-inflammatories, acetaminophen, rest, ice, knee strengthening exercise, occasional cortisone injections for temporary relief, Viscosupplimentation injections, arthroscopic debridement osteotomy, and finally knee arthroplasty.   Patient provided with slight on knee brace for support and protection feels very comfortable with it follow-up p.r.n.

## 2023-07-10 ENCOUNTER — TELEPHONE (OUTPATIENT)
Dept: ORTHOPEDICS | Facility: CLINIC | Age: 88
End: 2023-07-10
Payer: MEDICARE

## 2023-07-10 ENCOUNTER — LAB VISIT (OUTPATIENT)
Dept: LAB | Facility: HOSPITAL | Age: 88
End: 2023-07-10
Payer: MEDICARE

## 2023-07-10 ENCOUNTER — OFFICE VISIT (OUTPATIENT)
Dept: INTERNAL MEDICINE | Facility: CLINIC | Age: 88
End: 2023-07-10
Payer: MEDICARE

## 2023-07-10 ENCOUNTER — TELEPHONE (OUTPATIENT)
Dept: ADMINISTRATIVE | Facility: CLINIC | Age: 88
End: 2023-07-10
Payer: MEDICARE

## 2023-07-10 VITALS
WEIGHT: 137.69 LBS | DIASTOLIC BLOOD PRESSURE: 60 MMHG | OXYGEN SATURATION: 97 % | HEART RATE: 63 BPM | SYSTOLIC BLOOD PRESSURE: 118 MMHG | BODY MASS INDEX: 22.94 KG/M2 | HEIGHT: 65 IN

## 2023-07-10 DIAGNOSIS — I10 HYPERTENSION, ESSENTIAL: Primary | ICD-10-CM

## 2023-07-10 DIAGNOSIS — N18.31 STAGE 3A CHRONIC KIDNEY DISEASE: ICD-10-CM

## 2023-07-10 DIAGNOSIS — M79.89 LEG SWELLING: ICD-10-CM

## 2023-07-10 DIAGNOSIS — E03.9 HYPOTHYROIDISM, UNSPECIFIED TYPE: ICD-10-CM

## 2023-07-10 DIAGNOSIS — I10 HYPERTENSION, ESSENTIAL: ICD-10-CM

## 2023-07-10 LAB
ANION GAP SERPL CALC-SCNC: 8 MMOL/L (ref 8–16)
BUN SERPL-MCNC: 23 MG/DL (ref 8–23)
CALCIUM SERPL-MCNC: 10.4 MG/DL (ref 8.7–10.5)
CHLORIDE SERPL-SCNC: 105 MMOL/L (ref 95–110)
CO2 SERPL-SCNC: 29 MMOL/L (ref 23–29)
CREAT SERPL-MCNC: 1.4 MG/DL (ref 0.5–1.4)
EST. GFR  (NO RACE VARIABLE): 35.7 ML/MIN/1.73 M^2
GLUCOSE SERPL-MCNC: 106 MG/DL (ref 70–110)
POTASSIUM SERPL-SCNC: 4.3 MMOL/L (ref 3.5–5.1)
SODIUM SERPL-SCNC: 142 MMOL/L (ref 136–145)

## 2023-07-10 PROCEDURE — 99999 PR PBB SHADOW E&M-EST. PATIENT-LVL V: CPT | Mod: PBBFAC,,, | Performed by: PHYSICIAN ASSISTANT

## 2023-07-10 PROCEDURE — 36415 COLL VENOUS BLD VENIPUNCTURE: CPT | Performed by: PHYSICIAN ASSISTANT

## 2023-07-10 PROCEDURE — 99215 OFFICE O/P EST HI 40 MIN: CPT | Mod: PBBFAC | Performed by: PHYSICIAN ASSISTANT

## 2023-07-10 PROCEDURE — 99214 OFFICE O/P EST MOD 30 MIN: CPT | Mod: S$PBB,,, | Performed by: PHYSICIAN ASSISTANT

## 2023-07-10 PROCEDURE — 80048 BASIC METABOLIC PNL TOTAL CA: CPT | Performed by: PHYSICIAN ASSISTANT

## 2023-07-10 PROCEDURE — 99214 PR OFFICE/OUTPT VISIT, EST, LEVL IV, 30-39 MIN: ICD-10-PCS | Mod: S$PBB,,, | Performed by: PHYSICIAN ASSISTANT

## 2023-07-10 PROCEDURE — 99999 PR PBB SHADOW E&M-EST. PATIENT-LVL V: ICD-10-PCS | Mod: PBBFAC,,, | Performed by: PHYSICIAN ASSISTANT

## 2023-07-10 RX ORDER — AMLODIPINE BESYLATE 2.5 MG/1
2.5 TABLET ORAL DAILY
Qty: 90 TABLET | Refills: 3 | Status: SHIPPED | OUTPATIENT
Start: 2023-07-10 | End: 2023-10-30 | Stop reason: SDUPTHER

## 2023-07-10 NOTE — PATIENT INSTRUCTIONS
Will try to decrease amlodipine to 2.5 mg daily to see if swelling improves more  Prescription for compression stockings sent to pharmacy

## 2023-07-10 NOTE — TELEPHONE ENCOUNTER
Called patient, scheduled for CSI as requested.     ----- Message from Kareen Mullen sent at 7/10/2023 12:52 PM CDT -----  Regarding: Appt Access  Contact: pt [phone]  Pt calling to schedule injection. Pls call

## 2023-07-10 NOTE — PROGRESS NOTES
Subjective:       Patient ID: Debbie Ding is a 89 y.o. female.        Chief Complaint: Follow-up    Debbie Ding is an established patient of Carolyn Mejia MD here today for follow up visit.    HTN -   Amlodipine 5 mg  Hctz 25 mg  Adjusted 6/2023 to try to help LE edema  She has had improvement but not resolution and continues to be bothersome  She tells me Dr. Love said it is due to amlodipine so she would like to reduce dose further as BP is very well controlled  Needs BMP today as recently started hctz  Compression stockings are cost prohibitive though helpful in past    JASE     She had covid 5/27/22     Hypothyroidism -  Several dose changes of synthroid over past year  Most recently with TSH up 9.8 6/2023 so synthroid inc from 50 mcg --> 75 mcg  Complaint of fatigue     BMI up to 22  Baseline weight is 140#, today she is 137#  She takes periactin at times  Saw GI dietician who helped with gastroparesis diet, recall she declines reglan due to daughter with facial myoclonus     Recall she had extensive evaluation for weight loss ultimately due to gastroparesis  CT chest/abdomen/pelvis 1/2022 with nothing to explain weight loss  Saw GI and gastric emptying study confirmed gastropearesis 2/3/22, she did not want to take reglan due to concern for side effects  EGD 3/2022 with bravo pH positive, so continued on PPI indefinitely      Started on remeron and titrated to 15 mg to help with weight gain around 6/2021, initially helped to regain some weight, cannot take megace due to h/o DVT/PE     Past medical history -   1.  Anxiety, grief - lorazepam prn, no longer taking lexparo  2.  HTN tx with amlodipine, hctz  3.  Hypothyroidism tx with synthroid (see above)  4.  H/o left shoulder pain that comes/goes  5.  Asthma is followed by Dr. Suazo and controlled with advair, albuterol prn  6.  She is on eliquis for h/o PE/DVT        Review of Systems   Constitutional:  Negative for chills, diaphoresis, fatigue  and fever.   HENT:  Negative for congestion and sore throat.    Eyes:  Negative for visual disturbance.   Respiratory:  Negative for cough, chest tightness and shortness of breath.    Cardiovascular:  Positive for leg swelling (ankle swelling, R>L). Negative for chest pain and palpitations.   Gastrointestinal:  Negative for abdominal pain, blood in stool, constipation, diarrhea, nausea and vomiting.   Genitourinary:  Negative for dysuria, frequency, hematuria and urgency.   Musculoskeletal:  Negative for arthralgias and back pain.   Skin:  Negative for rash.   Neurological:  Negative for dizziness, syncope, weakness and headaches.   Psychiatric/Behavioral:  Negative for dysphoric mood and sleep disturbance. The patient is not nervous/anxious.      Objective:      Physical Exam  Vitals and nursing note reviewed.   Constitutional:       Appearance: Normal appearance. She is well-developed.   HENT:      Head: Normocephalic.      Right Ear: External ear normal.      Left Ear: External ear normal.   Eyes:      Pupils: Pupils are equal, round, and reactive to light.   Cardiovascular:      Rate and Rhythm: Normal rate and regular rhythm.      Heart sounds: Normal heart sounds. No murmur heard.    No friction rub. No gallop.   Pulmonary:      Effort: Pulmonary effort is normal. No respiratory distress.      Breath sounds: Normal breath sounds.   Abdominal:      Palpations: Abdomen is soft.      Tenderness: There is no abdominal tenderness.   Musculoskeletal:      Comments: Ankle swelling, R>L   Skin:     General: Skin is warm and dry.   Neurological:      Mental Status: She is alert.       Assessment:       1. Hypertension, essential    2. Leg swelling    3. Stage 3a chronic kidney disease    4. Hypothyroidism, unspecified type        Plan:       Debbie was seen today for follow-up.    Diagnoses and all orders for this visit:    Hypertension, essential  -     amLODIPine (NORVASC) 2.5 MG tablet; Take 1 tablet (2.5 mg total)  "by mouth once daily.  -     Basic Metabolic Panel; Future    Leg swelling  -     COMPRESSION STOCKINGS    Stage 3a chronic kidney disease    Hypothyroidism, unspecified type    BP is very well controlled today at 118/60 so will try reducing amlodipine to 2.5 mg daily, continue hctz 25 mg and check BMP today  She is still bothered by LE edema  Will fax prescription for compression stockings again to check on pricing  She has started inc dose of synthroid  F/u as scheduled    Pt has been given instructions populated from patient instructions database and has verbalized understanding of the after visit summary and information contained wherein.    Follow up with a primary care provider. May go to ER for acute shortness of breath, lightheadedness, fever, or any other emergent complaints or changes in condition.    "This note will be shared with the patient"    Future Appointments   Date Time Provider Department Center   7/10/2023  9:40 AM LAB, APPOINTMENT [facility] LAB  Jeffrey Wu PCW   7/11/2023  8:00 AM Karime Maria NP [facility] Jeffrey Wu [facility]W   7/18/2023  2:30 PM Carolyn Mejia MD [facility] Jeffrey Heathy PCW   7/19/2023  3:00 PM NEUROLOGY, EMG CLINIC [facility] NEURO [facility]   7/20/2023 10:00 AM VASCULAR, CARDIOLOGY [facility] RUDI [facility]   7/27/2023  9:30 AM Avril Hutchison MD [facility] HAND [facility]   8/1/2023  8:00 AM [facility]   8/29/2023 11:45 AM BISI Veloz II, MD [facility] VEINCL [facility]   9/4/2023  9:00 AM LAB, APPOINTMENT [facility] LAB  [address] PCW                 "

## 2023-07-11 ENCOUNTER — TELEPHONE (OUTPATIENT)
Dept: INTERNAL MEDICINE | Facility: CLINIC | Age: 88
End: 2023-07-11

## 2023-07-11 ENCOUNTER — OFFICE VISIT (OUTPATIENT)
Dept: INTERNAL MEDICINE | Facility: CLINIC | Age: 88
End: 2023-07-11
Payer: MEDICARE

## 2023-07-11 VITALS
HEART RATE: 55 BPM | OXYGEN SATURATION: 97 % | DIASTOLIC BLOOD PRESSURE: 60 MMHG | SYSTOLIC BLOOD PRESSURE: 108 MMHG | WEIGHT: 137.38 LBS | BODY MASS INDEX: 22.86 KG/M2

## 2023-07-11 DIAGNOSIS — I27.20 PULMONARY HYPERTENSION: ICD-10-CM

## 2023-07-11 DIAGNOSIS — M46.00 ENTHESOPATHY, SPINAL: ICD-10-CM

## 2023-07-11 DIAGNOSIS — N18.31 TYPE 2 DIABETES MELLITUS WITH STAGE 3A CHRONIC KIDNEY DISEASE, WITHOUT LONG-TERM CURRENT USE OF INSULIN: ICD-10-CM

## 2023-07-11 DIAGNOSIS — Z99.89 DEPENDENCE ON OTHER ENABLING MACHINES AND DEVICES: ICD-10-CM

## 2023-07-11 DIAGNOSIS — E78.5 HYPERLIPIDEMIA ASSOCIATED WITH TYPE 2 DIABETES MELLITUS: ICD-10-CM

## 2023-07-11 DIAGNOSIS — M47.819 SPONDYLOSIS WITHOUT MYELOPATHY: ICD-10-CM

## 2023-07-11 DIAGNOSIS — F01.518 MIXED CORTICAL AND SUBCORTICAL VASCULAR DEMENTIA WITH BEHAVIORAL DISTURBANCE: ICD-10-CM

## 2023-07-11 DIAGNOSIS — E11.69 HYPERLIPIDEMIA ASSOCIATED WITH TYPE 2 DIABETES MELLITUS: ICD-10-CM

## 2023-07-11 DIAGNOSIS — Z00.00 ENCOUNTER FOR PREVENTIVE HEALTH EXAMINATION: Primary | ICD-10-CM

## 2023-07-11 DIAGNOSIS — E11.22 TYPE 2 DIABETES MELLITUS WITH STAGE 3A CHRONIC KIDNEY DISEASE, WITHOUT LONG-TERM CURRENT USE OF INSULIN: ICD-10-CM

## 2023-07-11 DIAGNOSIS — J84.10 LUNG GRANULOMA: ICD-10-CM

## 2023-07-11 PROCEDURE — 99999 PR PBB SHADOW E&M-EST. PATIENT-LVL IV: ICD-10-PCS | Mod: PBBFAC,,, | Performed by: NURSE PRACTITIONER

## 2023-07-11 PROCEDURE — 99214 OFFICE O/P EST MOD 30 MIN: CPT | Mod: PBBFAC | Performed by: NURSE PRACTITIONER

## 2023-07-11 PROCEDURE — G0439 PR MEDICARE ANNUAL WELLNESS SUBSEQUENT VISIT: ICD-10-PCS | Mod: ,,, | Performed by: NURSE PRACTITIONER

## 2023-07-11 PROCEDURE — 99999 PR PBB SHADOW E&M-EST. PATIENT-LVL IV: CPT | Mod: PBBFAC,,, | Performed by: NURSE PRACTITIONER

## 2023-07-11 PROCEDURE — G0439 PPPS, SUBSEQ VISIT: HCPCS | Mod: ,,, | Performed by: NURSE PRACTITIONER

## 2023-07-11 NOTE — PROGRESS NOTES
Debbie Ding presented for a  Medicare AWV and comprehensive Health Risk Assessment today. The following components were reviewed and updated:    Medical history  Family History  Social history  Allergies and Current Medications  Health Risk Assessment  Health Maintenance  Care Team         ** See Completed Assessments for Annual Wellness Visit within the encounter summary.**         The following assessments were completed:  Living Situation  CAGE  Depression Screening  Timed Get Up and Go- not done due to mobility impairment  Whisper Test  Cognitive Function Screening - not done due to memory impairment  Nutrition Screening  ADL Screening  PAQ Screening        Vitals:    07/11/23 0816   BP: 108/60   Pulse: (!) 55   SpO2: 97%   Weight: 62.3 kg (137 lb 5.6 oz)     Body mass index is 22.86 kg/m².  Physical Exam  Vitals and nursing note reviewed.   Constitutional:       Appearance: She is well-developed.   HENT:      Head: Normocephalic and atraumatic.      Right Ear: External ear normal.      Left Ear: External ear normal.      Nose: Nose normal.   Eyes:      Pupils: Pupils are equal, round, and reactive to light.   Cardiovascular:      Rate and Rhythm: Normal rate and regular rhythm.      Heart sounds: Normal heart sounds.   Pulmonary:      Effort: Pulmonary effort is normal.      Breath sounds: Normal breath sounds.   Abdominal:      General: Bowel sounds are normal.      Palpations: Abdomen is soft.   Musculoskeletal:         General: Normal range of motion.      Cervical back: Normal range of motion.   Skin:     General: Skin is warm and dry.   Neurological:      Mental Status: She is alert. Mental status is at baseline.           Diagnoses and health risks identified today and associated recommendations/orders:    1. Encounter for preventive health examination  Health Maintenance updated   Records reviewed   Exam done     2. 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 Continue current medications. Followed by PCP.     3. Spondylosis without myelopathy  Stable and chronic. Followed by PCP.    4. Dependence on other enabling machines and devices  Patient ambulated with cane. Stable and chronic.    5. Hyperlipidemia associated with type 2 diabetes mellitus  Stable and chronic. Continue current medications. Followed by PCP.     6. Pulmonary hypertension  Stable and chronic. Continue current medications. Followed by PCP.     7. Type 2 diabetes mellitus with stage 3a chronic kidney disease, without long-term current use of insulin  Stable and chronic. Continue current medications. Followed by PCP.     8. Enthesopathy, spinal  Noted on MRI 4/5/2021. Ligamentum flavum hypertrophy.    9. Lung granuloma  Noted on CTA 7/25/2022. Stable and chronic.   Counseling and Referral of Other Preventative  (Italic type indicates deductible and co-insurance are waived)    Patient Name: Debbie Ding  Today's Date: 7/11/2023    Health Maintenance         Date Due Completion Date    Shingles Vaccine (1 of 2) Never done ---    Eye Exam 04/14/2022 4/14/2021    COVID-19 Vaccine (6 - Moderna series) 11/17/2022 9/22/2022    Influenza Vaccine (1) 09/01/2023 10/5/2022    Diabetes Urine Screening 09/13/2023 9/13/2022    Hemoglobin A1c 10/18/2023 4/18/2023    Lipid Panel 04/18/2024 4/18/2023    TETANUS VACCINE 09/05/2029 9/5/2019          No orders of the defined types were placed in this encounter.    Provided Debbie with a 5-10 year written screening schedule and personal prevention plan. Recommendations were developed using the USPSTF age appropriate recommendations. Education, counseling, and referrals were provided as needed. After Visit Summary printed and given to patient which includes a list of additional screenings\tests needed.    Follow up in about 1 week (around 7/18/2023) for follow up with PCP.    Karime Maria NP      I offered to discuss advanced care planning, including how to  pick a person who would make decisions for you if you were unable to make them for yourself, called a health care power of , and what kind of decisions you might make such as use of life sustaining treatments such as ventilators and tube feeding when faced with a life limiting illness recorded on a living will that they will need to know. (How you want to be cared for as you near the end of your natural life)     X  Patient is unable to engage in a discussion regarding advanced directives due to severe physical and/or cognitive impairment.  Review for Opioid Screening: Pt does not have Rx for Opioids     Review for Substance Use Disorders: Patient does not use substance

## 2023-07-11 NOTE — PATIENT INSTRUCTIONS
Counseling and Referral of Other Preventative  (Italic type indicates deductible and co-insurance are waived)    Patient Name: Debbie Ding  Today's Date: 7/11/2023    Health Maintenance       Date Due Completion Date    Shingles Vaccine (1 of 2) Never done ---    Eye Exam 04/14/2022 4/14/2021    COVID-19 Vaccine (6 - Moderna series) 11/17/2022 9/22/2022    Influenza Vaccine (1) 09/01/2023 10/5/2022    Diabetes Urine Screening 09/13/2023 9/13/2022    Hemoglobin A1c 10/18/2023 4/18/2023    Lipid Panel 04/18/2024 4/18/2023    TETANUS VACCINE 09/05/2029 9/5/2019        No orders of the defined types were placed in this encounter.    The following information is provided to all patients.  This information is to help you find resources for any of the problems found today that may be affecting your health:                Living healthy guide: www.[facility].[place].[facility]      Understanding Diabetes: www.diabetes.org      Eating healthy: www.cdc.gov/healthyweight      [facility] home safety checklist: www.cdc.gov/steadi/patient.html      Agency on Aging: www.goea.[place].[facility]      Alcoholics anonymous (AA): www.aa.org      Physical Activity: www.saundra.nih.gov/pg1dnas      Tobacco use: www.quitwithusla.org

## 2023-07-13 NOTE — TELEPHONE ENCOUNTER
Cindy TURCIOS requested assistance with patient. In discharge note it was stated that patient cannot afford her medications. Met with patient. Pt has Medicare A, B and D [facility] manages prescription plan). Pt also had active [place] Medicaid. Pt has out of pocket costs of 3-5 dollars for medications. Suggested that she do mail order as medications might be more cost effective. Pt prefers to get medications from [facility]. Pt states she can afford the few dollars that she is charged in copays. No additional needs at this time. Will open case if referral is received. Pt is scheduled t see Annabella LORA Wednesday.

## 2023-07-17 NOTE — PROGRESS NOTES
Subjective:      Patient ID: Debbie Ding is a 89 y.o. female.    Chief Complaint: Foot Problem (Foot swelling )      Debbie is a 89 y.o. female who presents to the podiatry clinic  with complaint of  bilateral foot pain. Onset of the symptoms was several weeks ago. Precipitating event: none known. Current symptoms include: ability to bear weight, but with some pain. Aggravating factors: any weight bearing. Symptoms have progressed to a point and plateaued. Patient has had prior foot problems. Evaluation to date: none. Treatment to date: none.  She is here for routine diabetic foot evaluation and foot care.  Issue with great toe numbness.    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    Fibromyalgia    Primary osteoarthritis involving multiple joints    Spondylosis without myelopathy    Spinal stenosis, lumbar region, without neurogenic claudication    Acquired spondylolisthesis    Thoracic or lumbosacral neuritis or radiculitis, unspecified    DDD (degenerative disc disease), lumbar    Neck pain    Atrophy of nasal turbinates    GERD (gastroesophageal reflux disease)    Chronic bilateral low back pain without sciatica    Decreased strength of trunk and back    Chronic asthma    SOB (shortness of breath)    Allergic rhinitis    Vestibular dizziness    Hearing loss    Hypothyroidism    Pseudophakia of both eyes    PUD (peptic ulcer disease)    Urinary, incontinence, stress female    Impaired functional mobility, balance, gait, and endurance    Hypokalemia    Debility    Hyperhidrosis    Chronic kidney disease, stage III (moderate)    History of pulmonary embolism    Overactive bladder    Type 2 diabetes mellitus with stage 3a chronic kidney disease, without long-term current use of insulin    History of deep venous thrombosis    Chronic  anticoagulation    Hyperlipidemia associated with type 2 diabetes mellitus    Sleep walking    JASE (obstructive sleep apnea)    Chronic neck pain    Unspecified inflammatory spondylopathy, lumbar region    Asthma exacerbation    Bronchitis, chronic, mucopurulent    Gastroparesis    History of COVID-19    Chronic left shoulder pain    Pulmonary hypertension       Current Outpatient Medications on File Prior to Visit   Medication Sig Dispense Refill    albuterol (PROVENTIL) 2.5 mg /3 mL (0.083 %) nebulizer solution Take 2.5 mg by nebulization every 6 (six) hours as needed. Rescue      albuterol (PROVENTIL/VENTOLIN HFA) 90 mcg/actuation inhaler Inhale 2 puffs into the lungs every 6 (six) hours as needed.      apixaban (ELIQUIS) 2.5 mg Tab Take 1 tablet (2.5 mg total) by mouth once daily. 90 tablet 3    atorvastatin (LIPITOR) 40 MG tablet Take 1 tablet (40 mg total) by mouth once daily. 90 tablet 3    benzonatate (TESSALON) 100 MG capsule benzonatate 100 mg capsule      chlorhexidine (PERIDEX) 0.12 % solution SWISH WITH 10ML FOR 30 seconds THEN EXPECTORATE TWICE DAILY      ciclopirox 1 % shampoo Apply topically.      clobetasoL (TEMOVATE) 0.05 % external solution Pt to mix in 1 jar of cerave cream and apply to affected areas bid 50 mL 3    clotrimazole-betamethasone 1-0.05% (LOTRISONE) cream Apply topically 2 (two) times daily. 45 g 2    donepeziL (ARICEPT) 10 MG tablet TAKE 1 TABLET EVERY MORNING.  NO FURTHER REFILL WITHOUT APPOINTMENT (Patient taking differently: TAKE 1 TABLET EVERY MORNING.  NO FURTHER REFILL WITHOUT APPOINTMENT) 90 tablet 1    doxepin (SINEQUAN) 10 MG capsule Take 10 mg by mouth every evening.      esomeprazole (NEXIUM) 40 MG capsule Take 1 capsule (40 mg total) by mouth 2 (two) times daily. 180 capsule 3    estradioL (ESTRACE) 0.01 % (0.1 mg/gram) vaginal cream Place vaginally.      famotidine (PEPCID) 20 MG tablet Take 1 tablet (20 mg total) by mouth daily as needed for Heartburn (breakthrough  heartburn and acid reflux not controlled with Nexium). 90 tablet 3    flash glucose scanning reader (FREESTYLE ALEXSANDRA 14 DAY READER) Misc 1 Device by Misc.(Non-Drug; Combo Route) route 2 (two) times a day. 1 each 11    flash glucose sensor (FREESTYLE ALEXSANDRA 14 DAY SENSOR) Kit 1 Device by Misc.(Non-Drug; Combo Route) route 2 (two) times a day. 1 kit 11    fluocinolone (DERMA-SMOOTHE) 0.01 % external oil fluocinolone 0.01 % scalp oil and shower cap   Apply ONE A SMALL AMOUNT TO affected AREA as directed apply TO SCALP 2-3 TIMES PER WEEK FOR ITCHING]      fluocinonide (LIDEX) 0.05 % external solution AAA scalp qday - bid prn pruritus 60 mL 3    fluticasone-salmeterol diskus inhaler 500-50 mcg USE 1 INHALATION ORALLY    INTO THE LUNGS TWO TIMES A DAY      gabapentin (NEURONTIN) 100 MG capsule gabapentin 100 mg capsule      GEMTESA 75 mg Tab Take 1 tablet by mouth.      glycopyrrolate (ROBINUL) 2 MG Tab TAKE 1 TABLET TWICE A  tablet 3    glycopyrrolate (ROBINUL) 2 MG Tab Take 2 mg by mouth.      hydroCHLOROthiazide (HYDRODIURIL) 25 MG tablet Take 1 tablet (25 mg total) by mouth once daily. 90 tablet 0    hydrocortisone-aloe vera 0.5 % Crea Place 1 application into the left eye 2 (two) times daily.      ketoconazole (NIZORAL) 2 % cream Apply topically once daily. 60 g 2    ketoconazole (NIZORAL) 2 % shampoo Wash hair with medicated shampoo at least 2x/week - let sit on scalp at least 5 minutes prior to rinsing 120 mL 5    levalbuterol (XOPENEX HFA) 45 mcg/actuation inhaler Inhale into the lungs.      levothyroxine (SYNTHROID) 75 MCG tablet Take 1 tablet (75 mcg total) by mouth before breakfast. 90 tablet 0    LIDOcaine 3 % Crea Apply 1 application topically 2 (two) times a day. 85 g 3    LIDOCAINE VISCOUS 2 % solution Take by mouth.      meclizine (ANTIVERT) 25 mg tablet TAKE 1 TABLET TWICE A DAY  AS NEEDED FOR DIZZINESS 180 tablet 1    memantine (NAMENDA) 10 MG Tab Take 10 mg by mouth once daily.      metoclopramide  HCl (REGLAN) 5 MG tablet metoclopramide 5 mg tablet      mirtazapine (REMERON) 15 MG tablet Take 1 tablet (15 mg total) by mouth every evening. 90 tablet 3    mupirocin (BACTROBAN) 2 % ointment mupirocin 2 % topical ointment   APPLY LIBERALLY TO THE AFFECTED AREA TWICE DAILY TO WOUND ON SCALP      nystatin (MYCOSTATIN) 100,000 unit/mL suspension SMARTSI Teaspoon By Mouth 4 Times Daily      prednisoLONE acetate (PRED FORTE) 1 % DrpS 3 drops 2 (two) times daily.      RESTASIS 0.05 % ophthalmic emulsion       rivaroxaban (XARELTO) 10 mg Tab Xarelto 10 mg tablet      SYMBICORT 160-4.5 mcg/actuation HFAA Inhale into the lungs.      diclofenac sodium (VOLTAREN) 1 % Gel Apply 2 g topically 3 (three) times daily. for 10 days (Patient not taking: Reported on 2023) 400 g 0     Current Facility-Administered Medications on File Prior to Visit   Medication Dose Route Frequency Provider Last Rate Last Admin    midazolam (VERSED) 1 mg/mL injection 0.5 mg  0.5 mg Intravenous PRN Ross Hui MD        regadenoson injection 0.4 mg  0.4 mg Intravenous Once Benedicto Love MD PhD           Review of patient's allergies indicates:   Allergen Reactions    Melatonin      Other reaction(s): Other (See Comments)  Sleep Walks and has unnatural dreams    Talwin compound Hives    Talwin [pentazocine lactate] Hallucinations    Trazodone Other (See Comments)     Nightmares/sleep walk      Bentyl [dicyclomine] Anxiety       Past Surgical History:   Procedure Laterality Date    CATARACT EXTRACTION Bilateral     ESOPHAGOGASTRODUODENOSCOPY N/A 3/8/2022    Procedure: EGD (ESOPHAGOGASTRODUODENOSCOPY) with dilation-either hospital ok with Dr. Meza;  Surgeon: Rebeca Meza MD;  Location: [facility];  Service: Endoscopy;  Laterality: N/A;  -eliquis hold ok see te-tb    ESOPHAGOGASTRODUODENOSCOPY N/A 2022    Procedure: EGD (ESOPHAGOGASTRODUODENOSCOPY) with dilation-either hospital ok with Dr. Meza;  Surgeon: Rebeca BARBOZA  MD Derrick;  Location: [facility] ([address] [place] [address]);  Service: Endoscopy;  Laterality: N/A;  1/11-eliquis hold ok see te-tb  COVID test on 2/25/22 at [facility]  2/22-confirmed appt arrival time with pt-[facility]    FOOT NEUROMA SURGERY  1985    HYSTERECTOMY         Family History   Problem Relation Age of Onset    Diabetes Mother     Diabetes Brother     Emphysema Maternal Aunt     Melanoma Neg Hx     Asthma Neg Hx     Colon cancer Neg Hx     Esophageal cancer Neg Hx        Social History     Socioeconomic History    Marital status:    Occupational History     Comment:  - school    Tobacco Use    Smoking status: Never    Smokeless tobacco: Never   Substance and Sexual Activity    Alcohol use: No    Drug use: No    Sexual activity: Not Currently   Social History Narrative    Lives with daughter, Benjie.        Other daughter, Madina, drives her around.        She lives independently, except for driving.          Stretches in bed.  Walks in neighborhood, and in house several times a day.     Social Determinants of Health     Financial Resource Strain: Low Risk     Difficulty of Paying Living Expenses: Not hard at all   Food Insecurity: No Food Insecurity    Worried About Running Out of Food in the Last Year: Never true    Ran Out of Food in the Last Year: Never true   Transportation Needs: No Transportation Needs    Lack of Transportation (Medical): No    Lack of Transportation (Non-Medical): No   Physical Activity: Inactive    Days of Exercise per Week: 0 days    Minutes of Exercise per Session: 0 min   Stress: No Stress Concern Present    Feeling of Stress : Not at all   Housing Stability: Low Risk     Unable to Pay for Housing in the Last Year: No    Number of Places Lived in the Last Year: 1    Unstable Housing in the Last Year: No             Review of Systems   Constitutional: Negative for chills, diaphoresis and fever.   Cardiovascular:  Positive for leg swelling. Negative for claudication, cyanosis and  "syncope.   Respiratory:  Negative for cough and shortness of breath.    Skin:  Positive for suspicious lesions. Negative for color change and nail changes.   Musculoskeletal:  Positive for joint pain and joint swelling. Negative for falls, muscle cramps and muscle weakness.   Gastrointestinal:  Negative for diarrhea, nausea and vomiting.   Neurological:  Positive for paresthesias. Negative for disturbances in coordination, numbness, sensory change, tremors and weakness.   Psychiatric/Behavioral:  Negative for altered mental status.          Objective:      Vitals:    07/03/23 1137   BP: 105/66   Pulse: 66   Weight: 64.1 kg (141 lb 5 oz)   Height: 5' 5" (1.651 m)   PainSc:   6       Physical Exam  Vitals reviewed.   Constitutional:       Appearance: She is well-developed.   HENT:      Head: Normocephalic and atraumatic.   Cardiovascular:      Pulses:           Dorsalis pedis pulses are 1+ on the right side and 1+ on the left side.        Posterior tibial pulses are 1+ on the right side and 1+ on the left side.   Pulmonary:      Effort: Pulmonary effort is normal.   Musculoskeletal:         General: Normal range of motion.      Right lower leg: Edema present.      Left lower leg: Edema present.      Comments: Anterior, lateral, and posterior muscle groups bilateral lower extremities show strength 4 over 5 symmetrically. Inspection and palpation of the joints and bones reveal no crepitus or joint effusion. No tenderness upon palpation. Mild plantar flexor contractures noted to digits 2 through 5 bilaterally.  Angle and base of gait are normal.    Mild tenderness to bilateral great toe joints dorsally.  Exostosis apparent bilaterally.  Decreased range of motion.   Feet:      Right foot:      Skin integrity: Callus and dry skin present.      Left foot:      Skin integrity: Callus and dry skin present.   Skin:     General: Skin is warm and dry.      Capillary Refill: Capillary refill takes 2 to 3 seconds.      Coloration: " Skin is pale.      Comments: Skin bilateral lower extremities noted to be thin, dry, and atrophic.  Toenails thickened, discolored, with subungual fungal debris and tenderness noted.  Hyperkeratotic lesions noted to both feet plantarly with tenderness.   Neurological:      Mental Status: She is alert and oriented to person, place, and time.      Sensory: Sensory deficit present.      Motor: Atrophy present.      Deep Tendon Reflexes: Reflexes abnormal.      Reflex Scores:       Patellar reflexes are 1+ on the right side and 1+ on the left side.       Achilles reflexes are 1+ on the right side and 1+ on the left side.     Comments: Sharp, dull, light touch, and vibratory sensation are diminished bilaterally. Proprioceptive sensation is intact to both lower extremities. [place] Rob monofilament exam shows loss of protective sensation to plantar toes 1 through 5 bilaterally. Deep tendon reflexes to the patellar tendons is 1 over 4 bilaterally symmetrical. Deep tendon reflexes to the Achilles tendon is 0 over 4 bilaterally symmetrical. No ankle clonus or Babinski reflex noted bilaterally. Coordination is fair to both lower extremities.  Patient admits to intermittent burning and tingling in the feet.   Psychiatric:         Behavior: Behavior normal.             Assessment:       Encounter Diagnoses   Name Primary?    Bilateral lower extremity edema Yes    Type 2 diabetes mellitus with stage 3a chronic kidney disease, without long-term current use of insulin            Plan:       Debbie was seen today for foot problem.    Diagnoses and all orders for this visit:    Bilateral lower extremity edema    Type 2 diabetes mellitus with stage 3a chronic kidney disease, without long-term current use of insulin        I counseled the patient on her conditions, their implications and medical management.    Shoe inspection. Diabetic Foot Education. Patient reminded of the importance of good nutrition and blood sugar control to  help prevent podiatric complications of diabetes. Patient instructed on proper foot hygeine. We discussed wearing proper shoe gear, daily foot inspections and Diabetic foot education in detail.    Discussed the myriad of reasons why fluid collects in soft tissues including but not limited to:    Disease of the heart like congestive heart failure  Standing or sitting for long periods of time  Infection of the feet or legs  Blood pooling in the veins of your legs (venous insufficiency)  Dilated veins in your lower leg (varicose veins)  Garters or other clothing that is tight on your legs. This will cause blood to pool in your legs because the clothing limits blood flow.  Some medicines such as blood pressure medicines like calcium channel blockers (amlodipine) and steroids, some antidepressants like MAO inhibitors and tricyclics  Many types of renal disease  Liver failure or cirrhosis  Poor nutrition    Medical treatment will depend on what is causing the swelling in your legs. Your healthcare provider may prescribe water pills (diuretics) to get rid of the extra fluid.    Tubigrips to BLE; patient is to elevate legs. When sleeping, place a pillow under lower extremities. When sitting, support the legs so that they are level with the waist.    Encouraged a low sodium diet and a walking regimen    Return to clinic in 3 months with Dr. Teresa or sooner if problems arise

## 2023-07-19 ENCOUNTER — PROCEDURE VISIT (OUTPATIENT)
Dept: NEUROLOGY | Facility: CLINIC | Age: 88
End: 2023-07-19
Payer: MEDICARE

## 2023-07-19 DIAGNOSIS — G56.03 BILATERAL CARPAL TUNNEL SYNDROME: ICD-10-CM

## 2023-07-19 PROCEDURE — 95911 NRV CNDJ TEST 9-10 STUDIES: CPT | Mod: PBBFAC | Performed by: PHYSICAL MEDICINE & REHABILITATION

## 2023-07-19 PROCEDURE — 95886 PR EMG COMPLETE, W/ NERVE CONDUCTION STUDIES, 5+ MUSCLES: ICD-10-PCS | Mod: 26,S$PBB,, | Performed by: PHYSICAL MEDICINE & REHABILITATION

## 2023-07-19 PROCEDURE — 95911 NRV CNDJ TEST 9-10 STUDIES: CPT | Mod: 26,S$PBB,, | Performed by: PHYSICAL MEDICINE & REHABILITATION

## 2023-07-19 PROCEDURE — 95886 MUSC TEST DONE W/N TEST COMP: CPT | Mod: 26,S$PBB,, | Performed by: PHYSICAL MEDICINE & REHABILITATION

## 2023-07-19 PROCEDURE — 95911 PR NERVE CONDUCTION STUDY; 9-10 STUDIES: ICD-10-PCS | Mod: 26,S$PBB,, | Performed by: PHYSICAL MEDICINE & REHABILITATION

## 2023-07-19 PROCEDURE — 95886 MUSC TEST DONE W/N TEST COMP: CPT | Mod: PBBFAC | Performed by: PHYSICAL MEDICINE & REHABILITATION

## 2023-07-19 NOTE — PROCEDURES
Test Date:  2023    Patient: Debbie Ding : 10/23/1932 Physician: Dallin Guerin D.O.   ID#: [id number] SEX: Female Ref. Phys: Avril Hutchison, *     HPI: Debbie Ding is a 89 y.o.female who presents for NCS/EMG to evaluate for CTS bilaterally.    NCV & EMG Findings:  The right median motor nerves were of prolonged latency and decreased CV across the wrists bilaterally.    Evaluation of the left median sensory and the right median sensory nerves showed prolonged distal peak latency and decreased conduction velocity.  All remaining nerves (as indicated in the following tables) were within normal limits.  Needle evaluation of the right Triceps Brachii muscle showed diminished recruitment.  The right Pronator Teres muscle showed increased insertional activity.  The right Abductor Pollicis Brevis muscle showed increased motor unit amplitude, increased motor unit duration, and diminished recruitment.  The left Abductor Pollicis Brevis muscle showed increased motor unit amplitude and increased motor unit duration.  All remaining muscles (as indicated in the following table) showed no evidence of electrical instability.    Impression:  Note: Patient advanced age is a limiting factor of this test.  Much of the normative reference data caps out at age >79.  It is a known phenomenon that both conduction velocity slows (0.5-4m/s per decade over age 60), amplitudes decrease with age (up to 50% by age 70 for SNAP amplitudes, and to a lesser degree CMAP amplitudes), and that MUAP durations increase with age.  Therefore, in patients over the age of 60, some abnormalities may be age related.  The normative data below takes this into account up to age 79, but not beyond, and so age is a limitation for patients older than age 79.       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 Carpal tunnel syndrome).  There is some degree of motor  axonal loss.  There is no active denervation.  This is somewhat difficult to grade given advanced age, but likely graded as Moderate-Severe in severity on the right, and Mild-Moderate on the left.    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  This is not a pathologic finding, rather a normal variant.        ___________________________  Dallin Guerin D.O.        NCS+  Motor Nerve Results      Latency Amplitude F-Lat Segment Distance CV Comment   Site (ms) Norm (mV) Norm (ms)  (cm) (m/s) Norm    Left Median (APB)   Palm 1.43 - 2.5 -         Wrist 4.4 - 3.6 -  Wrist-Palm 7 23 -    Elbow 8.2 - 3.3 -  Elbow-Wrist 23 61 -    Right Median (APB)   Palm - - - -         Wrist *5.6 - 3.2 -  Wrist-Palm 7 - -    Elbow 8.8 - 2.8 -  Elbow-Wrist 24 75 -    Left Ulnar (ADM)   Wrist 2.7  < 3.7 6.3  > 3.0         Bel Elbow 7.0 - 4.9 -  Bel Elbow-Wrist 25 58  > 52    Abv Elbow 8.2 - 5.4 -  Abv Elbow-Bel Elbow 8 67  > 43    Right Ulnar (ADM)   Wrist 2.5  < 3.7 6.7  > 3.0         Bel Elbow 7.1 - 6.7 -  Bel Elbow-Wrist 24 52  > 52    Abv Elbow 8.8 - 6.5 -  Abv Elbow-Bel Elbow 9 53  > 43      Sensory Nerve Results      Latency (Peak) Amplitude (P-P) Segment Distance CV Comment   Site (ms) Norm (µV) Norm  (cm) (m/s) Norm    Left Median   Wrist-Dig II *4.6  < 4.0 21 - Wrist-Dig II 14 *30  > 39    Right Median   Wrist-Dig II *4.7  < 4.0 16 - Wrist-Dig II 14 *30  > 39    Left Ulnar   Wrist-Dig V 3.2  < 4.0 33 - Wrist-Dig V 14 44  > 38    Right Ulnar   Wrist-Dig V 3.2  < 4.0 17 - Wrist-Dig V 14 44  > 38    Right Radial   Forearm-Wrist 2.5  < 2.8 12  > 11 Forearm-Wrist 10 40 -      EMG+     Side Muscle Nerve Root Ins  Act Fibs Psw Amp Dur Poly Recrt Int Pat Comment   Right Deltoid Axillary C5-C6 Nml Nml Nml Nml Nml 0 Nml Nml    Right Biceps Musculocut C5-C6 Nml Nml Nml Nml Nml 0 Nml Nml    Right Triceps Radial C6-C8 Nml Nml Nml Nml Nml 0 *Reduced Nml 1 unit at 15 Hz   Right Pronator Teres Median C6-C7 *Incr Nml Nml Nml Nml 0 Nml Nml +CRD   Right APB Median C8-T1 Nml Nml Nml *Incr *>12ms 0 *Reduced Nml    Left Deltoid Axillary C5-C6 Nml Nml Nml Nml Nml 0 Nml Nml    Left Biceps Musculocut C5-C6 Nml Nml Nml Nml Nml 0 Nml Nml    Left Triceps Radial C6-C8 Nml Nml Nml Nml Nml 0 Nml Nml    Left Pronator Teres Median C6-C7 Nml Nml Nml Nml Nml 0 Nml Nml    Left APB Median C8-T1 Nml Nml Nml *Incr *>12ms 0 Nml Nml    Right EIP Post Interosseous,  R... C7-C8 Nml Nml Nml Nml Nml 0 Nml Nml    Right Cervical Parasp (Lower) Rami C7-C8 Nml Nml Nml                 Waveforms:    Motor              Sensory

## 2023-07-20 ENCOUNTER — HOSPITAL ENCOUNTER (OUTPATIENT)
Dept: CARDIOLOGY | Facility: HOSPITAL | Age: 88
Discharge: HOME | End: 2023-07-20
Attending: INTERNAL MEDICINE
Payer: MEDICARE

## 2023-07-20 DIAGNOSIS — R60.9 EDEMA, UNSPECIFIED TYPE: ICD-10-CM

## 2023-07-20 PROCEDURE — 93970 EXTREMITY STUDY: CPT | Mod: 26,,, | Performed by: INTERNAL MEDICINE

## 2023-07-20 PROCEDURE — 93970 CV US DOPPLER VENOUS LEGS BILATERAL (CUPID ONLY): ICD-10-PCS | Mod: 26,,, | Performed by: INTERNAL MEDICINE

## 2023-07-20 PROCEDURE — 93970 EXTREMITY STUDY: CPT

## 2023-07-21 ENCOUNTER — OFFICE VISIT (OUTPATIENT)
Dept: ORTHOPEDICS | Facility: CLINIC | Age: 88
End: 2023-07-21
Payer: MEDICARE

## 2023-07-21 VITALS — DIASTOLIC BLOOD PRESSURE: 70 MMHG | SYSTOLIC BLOOD PRESSURE: 127 MMHG | HEART RATE: 76 BPM

## 2023-07-21 DIAGNOSIS — M17.0 PRIMARY OSTEOARTHRITIS OF BOTH KNEES: Primary | ICD-10-CM

## 2023-07-21 PROCEDURE — 99214 OFFICE O/P EST MOD 30 MIN: CPT | Mod: PBBFAC | Performed by: PHYSICIAN ASSISTANT

## 2023-07-21 PROCEDURE — 99999 PR PBB SHADOW E&M-EST. PATIENT-LVL IV: ICD-10-PCS | Mod: PBBFAC,,, | Performed by: PHYSICIAN ASSISTANT

## 2023-07-21 PROCEDURE — 99213 PR OFFICE/OUTPT VISIT, EST, LEVL III, 20-29 MIN: ICD-10-PCS | Mod: S$PBB,,, | Performed by: PHYSICIAN ASSISTANT

## 2023-07-21 PROCEDURE — 99213 OFFICE O/P EST LOW 20 MIN: CPT | Mod: S$PBB,,, | Performed by: PHYSICIAN ASSISTANT

## 2023-07-21 PROCEDURE — 99999 PR PBB SHADOW E&M-EST. PATIENT-LVL IV: CPT | Mod: PBBFAC,,, | Performed by: PHYSICIAN ASSISTANT

## 2023-07-21 NOTE — PROGRESS NOTES
SUBJECTIVE:     Chief Complaint & History of Present Illness:  Debbie Ding is a Established patient 89 y.o. female who is seen here today with a complaint of  left knee pain .  She is patient well-known to me was last seen treated the clinic for this condition 07/06/2023.  At that point we had placed her in a hinged knee brace for support and protection to alleviate some of the medial joint space pressures on her knee.  She returns today stating the brace is too tight in his pushing hard on the lateral aspect of her knee causing her significant pain.  We will try to get this changed out for a more comfortable brace  On a scale of 1-10, with 10 being worst pain imaginable, he rates this pain as 5 on good days and 8 on bad days.  she describes the pain as tender.    Review of patient's allergies indicates:   Allergen Reactions    Melatonin      Other reaction(s): Other (See Comments)  Sleep Walks and has unnatural dreams    Talwin compound Hives    Talwin [pentazocine lactate] Hallucinations    Trazodone Other (See Comments)     Nightmares/sleep walk      Bentyl [dicyclomine] Anxiety         Current Outpatient Medications   Medication Sig Dispense Refill    albuterol (PROVENTIL) 2.5 mg /3 mL (0.083 %) nebulizer solution Take 2.5 mg by nebulization every 6 (six) hours as needed. Rescue      albuterol (PROVENTIL/VENTOLIN HFA) 90 mcg/actuation inhaler Inhale 2 puffs into the lungs every 6 (six) hours as needed.      amLODIPine (NORVASC) 2.5 MG tablet Take 1 tablet (2.5 mg total) by mouth once daily. 90 tablet 3    apixaban (ELIQUIS) 2.5 mg Tab Take 1 tablet (2.5 mg total) by mouth once daily. 90 tablet 3    atorvastatin (LIPITOR) 40 MG tablet Take 1 tablet (40 mg total) by mouth once daily. 90 tablet 3    benzonatate (TESSALON) 100 MG capsule benzonatate 100 mg capsule      chlorhexidine (PERIDEX) 0.12 % solution SWISH WITH 10ML FOR 30 seconds THEN EXPECTORATE TWICE DAILY      ciclopirox 1 % shampoo Apply  topically.      clobetasoL (TEMOVATE) 0.05 % external solution Pt to mix in 1 jar of cerave cream and apply to affected areas bid 50 mL 3    clotrimazole-betamethasone 1-0.05% (LOTRISONE) cream Apply topically 2 (two) times daily. 45 g 2    donepeziL (ARICEPT) 10 MG tablet TAKE 1 TABLET EVERY MORNING.  NO FURTHER REFILL WITHOUT APPOINTMENT (Patient taking differently: TAKE 1 TABLET EVERY MORNING.  NO FURTHER REFILL WITHOUT APPOINTMENT) 90 tablet 1    doxepin (SINEQUAN) 10 MG capsule Take 10 mg by mouth every evening.      esomeprazole (NEXIUM) 40 MG capsule Take 1 capsule (40 mg total) by mouth 2 (two) times daily. 180 capsule 3    estradioL (ESTRACE) 0.01 % (0.1 mg/gram) vaginal cream Place vaginally.      famotidine (PEPCID) 20 MG tablet Take 1 tablet (20 mg total) by mouth daily as needed for Heartburn (breakthrough heartburn and acid reflux not controlled with Nexium). 90 tablet 3    flash glucose scanning reader (FREESTYLE ALEXSANDRA 14 DAY READER) Misc 1 Device by Misc.(Non-Drug; Combo Route) route 2 (two) times a day. 1 each 11    flash glucose sensor (FREESTYLE ALEXSANDRA 14 DAY SENSOR) Kit 1 Device by Misc.(Non-Drug; Combo Route) route 2 (two) times a day. 1 kit 11    fluocinolone (DERMA-SMOOTHE) 0.01 % external oil fluocinolone 0.01 % scalp oil and shower cap   Apply ONE A SMALL AMOUNT TO affected AREA as directed apply TO SCALP 2-3 TIMES PER WEEK FOR ITCHING]      fluocinonide (LIDEX) 0.05 % external solution AAA scalp qday - bid prn pruritus 60 mL 3    fluticasone-salmeterol diskus inhaler 500-50 mcg USE 1 INHALATION ORALLY    INTO THE LUNGS TWO TIMES A DAY      gabapentin (NEURONTIN) 100 MG capsule gabapentin 100 mg capsule      GEMTESA 75 mg Tab Take 1 tablet by mouth.      glycopyrrolate (ROBINUL) 2 MG Tab TAKE 1 TABLET TWICE A  tablet 3    glycopyrrolate (ROBINUL) 2 MG Tab Take 2 mg by mouth.      hydroCHLOROthiazide (HYDRODIURIL) 25 MG tablet Take 1 tablet (25 mg total) by mouth once daily. 90 tablet 0     hydrocortisone-aloe vera 0.5 % Crea Place 1 application into the left eye 2 (two) times daily.      ketoconazole (NIZORAL) 2 % cream Apply topically once daily. 60 g 2    ketoconazole (NIZORAL) 2 % shampoo Wash hair with medicated shampoo at least 2x/week - let sit on scalp at least 5 minutes prior to rinsing 120 mL 5    levalbuterol (XOPENEX HFA) 45 mcg/actuation inhaler Inhale into the lungs.      levothyroxine (SYNTHROID) 75 MCG tablet Take 1 tablet (75 mcg total) by mouth before breakfast. 90 tablet 0    LIDOcaine 3 % Crea Apply 1 application topically 2 (two) times a day. 85 g 3    LIDOCAINE VISCOUS 2 % solution Take by mouth.      meclizine (ANTIVERT) 25 mg tablet TAKE 1 TABLET TWICE A DAY  AS NEEDED FOR DIZZINESS 180 tablet 1    memantine (NAMENDA) 10 MG Tab Take 10 mg by mouth once daily.      metoclopramide HCl (REGLAN) 5 MG tablet metoclopramide 5 mg tablet      mirtazapine (REMERON) 15 MG tablet Take 1 tablet (15 mg total) by mouth every evening. 90 tablet 3    mupirocin (BACTROBAN) 2 % ointment mupirocin 2 % topical ointment   APPLY LIBERALLY TO THE AFFECTED AREA TWICE DAILY TO WOUND ON SCALP      nystatin (MYCOSTATIN) 100,000 unit/mL suspension SMARTSI Teaspoon By Mouth 4 Times Daily      prednisoLONE acetate (PRED FORTE) 1 % DrpS 3 drops 2 (two) times daily.      RESTASIS 0.05 % ophthalmic emulsion       rivaroxaban (XARELTO) 10 mg Tab Xarelto 10 mg tablet      SYMBICORT 160-4.5 mcg/actuation HFAA Inhale into the lungs.      diclofenac sodium (VOLTAREN) 1 % Gel Apply 2 g topically 3 (three) times daily. for 10 days (Patient not taking: Reported on 2023) 400 g 0     Current Facility-Administered Medications   Medication Dose Route Frequency Provider Last Rate Last Admin    regadenoson injection 0.4 mg  0.4 mg Intravenous Once Benedicto Love MD PhD         Facility-Administered Medications Ordered in Other Visits   Medication Dose Route Frequency Provider Last Rate Last Admin    midazolam  (VERSED) 1 mg/mL injection 0.5 mg  0.5 mg Intravenous PRN Ross Hui MD           Past Medical History:   Diagnosis Date    Allergic rhinitis     Anticoagulant long-term use     Arthritis     Asthma     Bilateral pulmonary embolism 4/27/2019    Cataract     Chronic anticoagulation 9/28/2020    Depression     Diabetes mellitus     Fibromyalgia 7/2/2012    Fibromyalgia     GERD (gastroesophageal reflux disease)     Hypercholesterolemia 9/28/2020    Hypercholesterolemia 9/28/2020    Hypertension 7/2/2012    Thoracic or lumbosacral neuritis or radiculitis, unspecified     Thyroid disease     Ulcer        Past Surgical History:   Procedure Laterality Date    CATARACT EXTRACTION Bilateral     ESOPHAGOGASTRODUODENOSCOPY N/A 3/8/2022    Procedure: EGD (ESOPHAGOGASTRODUODENOSCOPY) with dilation-either hospital ok with Dr. Meza;  Surgeon: Rebeca Meza MD;  Location: [facility];  Service: Endoscopy;  Laterality: N/A;  1/11-eliquis hold ok see te-tb    ESOPHAGOGASTRODUODENOSCOPY N/A 2/28/2022    Procedure: EGD (ESOPHAGOGASTRODUODENOSCOPY) with dilation-either [facility] ok with Dr. Meza;  Surgeon: Rebeca Meza MD;  Location: [facility] ([address] [place] [address]);  Service: Endoscopy;  Laterality: N/A;  1/11-eliquis hold ok see te-tb  COVID test on 2/25/22 at [facility]  2/22-confirmed appt arrival time with pt-Kpvt    FOOT NEUROMA SURGERY  1985    HYSTERECTOMY         Vital Signs (Most Recent)  Vitals:    07/21/23 0831   BP: 127/70   Pulse: 76           Review of Systems:  ROS:  Constitutional: no fever or chills  Eyes: no visual changes  ENT: no nasal congestion or sore throat, Positive  vestibular dizziness  Respiratory: no cough or shortness of breath, Positive for asthma  Cardiovascular: no chest pain or palpitations  Gastrointestinal: no nausea or vomiting, tolerating diet, Positive for GERD, peptic ulcer disease  Genitourinary: no hematuria or dysuria, Positive CKD stage 3,  Integument/Breast: no rash or  pruritis  Hematologic/Lymphatic: no easy bruising or lymphadenopathy, Positive long-term anticoagulation, history DVT, history of PE  Musculoskeletal: no arthralgias or myalgias, Positive chronic low back pain, fibromyalgia, osteoarthritis of multiple joints  Neurological: no seizures or tremors, Degenerative disc disease lumbar spine, history of memory loss, spinal stenosis without neurological claudication  Behavioral/Psych: no auditory or visual hallucinations, Positive for depression,  Endocrine: no heat or cold intolerance, Positive diabetes type 2, thyroid disease hypothyroidism                OBJECTIVE:     PHYSICAL EXAM:     , General Appearance: Well nourished, well developed, in no acute distress.  Neurological: Mood & affect are normal.    ASSESSMENT/PLAN:       ICD-10-CM ICD-9-CM   1. Primary osteoarthritis of both knees  M17.0 715.16       Plan hinged knee brace exchange for slight on brace with better fit patient is very happy with the support.  Follow-up p.r.n.

## 2023-07-24 ENCOUNTER — TELEPHONE (OUTPATIENT)
Dept: ORTHOPEDICS | Facility: CLINIC | Age: 88
End: 2023-07-24
Payer: MEDICARE

## 2023-07-24 NOTE — TELEPHONE ENCOUNTER
LVM c pt. Attempting to confirm appt location & time c Dr. Esparza 07/27/23 to review the results of her EMG. Requested a call back to the [facility] at [phone] with any questions, concerns or need for a different appointment time.

## 2023-07-25 NOTE — TELEPHONE ENCOUNTER
----- Message from Peter Joe PA-C sent at 6/13/2023  8:46 AM CDT -----  Regarding: FW: pt request    ----- Message -----  From: Elvin Solares MA  Sent: 6/12/2023   9:17 AM CDT  To: Peter Joe PA-C  Subject: FW: pt request                                   Didn't know if that's ok or not.    ----- Message -----  From: Lanie Ingram  Sent: 6/12/2023   8:24 AM CDT  To: [facility] Melo Staff  Subject: pt request                                       Name of Who is Calling:DEJAH ORLDAN [[id number]]          What is the request in detail:       pt has a appointment on 6/14 with another provider and would like to       see if she could get a appointment for a injection on the same day           Can the clinic reply by [facility]: no           [facility] [phone] (home) [phone] (work)             Yes

## 2023-07-27 ENCOUNTER — OFFICE VISIT (OUTPATIENT)
Dept: ORTHOPEDICS | Facility: CLINIC | Age: 88
End: 2023-07-27
Payer: MEDICARE

## 2023-07-27 VITALS
DIASTOLIC BLOOD PRESSURE: 69 MMHG | HEIGHT: 65 IN | SYSTOLIC BLOOD PRESSURE: 146 MMHG | HEART RATE: 61 BPM | BODY MASS INDEX: 22.82 KG/M2 | WEIGHT: 137 LBS

## 2023-07-27 DIAGNOSIS — G56.03 BILATERAL CARPAL TUNNEL SYNDROME: Primary | ICD-10-CM

## 2023-07-27 PROCEDURE — 20526 CARPAL TUNNEL: ICD-10-PCS | Mod: S$PBB,50,, | Performed by: ORTHOPAEDIC SURGERY

## 2023-07-27 PROCEDURE — 99999 PR PBB SHADOW E&M-EST. PATIENT-LVL IV: CPT | Mod: PBBFAC,,, | Performed by: ORTHOPAEDIC SURGERY

## 2023-07-27 PROCEDURE — 20526 THER INJECTION CARP TUNNEL: CPT | Mod: PBBFAC,LT | Performed by: ORTHOPAEDIC SURGERY

## 2023-07-27 PROCEDURE — 99214 PR OFFICE/OUTPT VISIT, EST, LEVL IV, 30-39 MIN: ICD-10-PCS | Mod: S$PBB,25,, | Performed by: ORTHOPAEDIC SURGERY

## 2023-07-27 PROCEDURE — 99999PBSHW PR PBB SHADOW TECHNICAL ONLY FILED TO HB: Mod: PBBFAC,,,

## 2023-07-27 PROCEDURE — 99999 PR PBB SHADOW E&M-EST. PATIENT-LVL IV: ICD-10-PCS | Mod: PBBFAC,,, | Performed by: ORTHOPAEDIC SURGERY

## 2023-07-27 PROCEDURE — 99214 OFFICE O/P EST MOD 30 MIN: CPT | Mod: S$PBB,25,, | Performed by: ORTHOPAEDIC SURGERY

## 2023-07-27 PROCEDURE — 99214 OFFICE O/P EST MOD 30 MIN: CPT | Mod: PBBFAC,25 | Performed by: ORTHOPAEDIC SURGERY

## 2023-07-27 PROCEDURE — 99999PBSHW PR PBB SHADOW TECHNICAL ONLY FILED TO HB: ICD-10-PCS | Mod: PBBFAC,,,

## 2023-07-27 RX ORDER — FLUCONAZOLE 200 MG/1
TABLET ORAL
COMMUNITY
End: 2023-10-30

## 2023-07-27 RX ORDER — ESCITALOPRAM OXALATE 10 MG/1
TABLET ORAL
COMMUNITY
End: 2023-08-01

## 2023-07-27 RX ORDER — PROMETHAZINE HYDROCHLORIDE AND DEXTROMETHORPHAN HYDROBROMIDE 6.25; 15 MG/5ML; MG/5ML
SYRUP ORAL
COMMUNITY
End: 2023-10-30 | Stop reason: SDUPTHER

## 2023-07-27 RX ORDER — PANTOPRAZOLE SODIUM 40 MG/1
TABLET, DELAYED RELEASE ORAL
COMMUNITY
End: 2023-10-30

## 2023-07-27 RX ORDER — FLUCONAZOLE 100 MG/1
TABLET ORAL
COMMUNITY
End: 2023-10-30

## 2023-07-27 RX ORDER — OXYCODONE HYDROCHLORIDE 5 MG/1
TABLET ORAL
COMMUNITY
End: 2023-10-30

## 2023-07-27 RX ORDER — FLUCONAZOLE 150 MG/1
1 TABLET ORAL DAILY
Status: ON HOLD | COMMUNITY
End: 2023-11-29 | Stop reason: HOSPADM

## 2023-07-27 RX ORDER — PREDNISONE 10 MG/1
TABLET ORAL
COMMUNITY
End: 2023-10-30

## 2023-07-27 RX ORDER — CLONAZEPAM 0.12 MG/1
TABLET, ORALLY DISINTEGRATING ORAL
COMMUNITY
End: 2023-10-30

## 2023-07-27 RX ORDER — OMEPRAZOLE 40 MG/1
CAPSULE, DELAYED RELEASE ORAL
Status: ON HOLD | COMMUNITY
End: 2023-11-29 | Stop reason: HOSPADM

## 2023-07-27 RX ORDER — CYPROHEPTADINE HYDROCHLORIDE 4 MG/1
TABLET ORAL
Status: ON HOLD | COMMUNITY
End: 2024-01-03

## 2023-07-27 RX ADMIN — DEXAMETHASONE SODIUM PHOSPHATE 4 MG: 4 INJECTION INTRA-ARTICULAR; INTRALESIONAL; INTRAMUSCULAR; INTRAVENOUS; SOFT TISSUE at 09:07

## 2023-07-27 NOTE — PROCEDURES
Carpal Tunnel    Date/Time: 7/27/2023 9:30 AM  Performed by: Avril Hutchison MD  Authorized by: Avril Hutchison MD     Consent Done?:  Yes (Verbal)  Indications:  Pain  Timeout: prior to procedure the correct patient, procedure, and site was verified    Prep: patient was prepped and draped in usual sterile fashion      Local anesthesia used?: Yes    Anesthesia:  Local infiltration  Local anesthetic:  Lidocaine 1% without epinephrine  Location:  Wrist (right)  Ultrasonic Guidance for Needle Placement?: Yes    Needle size:  25 G  Approach:  Volar  Medications:  4 mg dexAMETHasone 4 mg/mL

## 2023-07-27 NOTE — PROGRESS NOTES
SUBJECTIVE:     Chief Complaint & History of Present Illness:  Debbie Ding is a Established patient 89 y.o. female who is seen here today with a complaint of hand pain especially base of the thumb is very achy it is difficult opening jars or pinch especially putting key in the door no numbness tingling no fever chills    Interval Update 07/27/23:    Pt presents in clinic today to review the results of her EMG  07/19/23. PT states that her bilateral thumb and wrist pain are constant and equally bothersome. Pt is unable to open jars or bottles or retract a lighter for her gas stove. Pt does not live alone and has assistance when needed. Pt states that last injections done by Dr. Nuñez in 2022 provided no relief.       Review of patient's allergies indicates:   Allergen Reactions    Melatonin      Other reaction(s): Other (See Comments)  Sleep Walks and has unnatural dreams    Talwin compound Hives    Talwin [pentazocine lactate] Hallucinations    Trazodone Other (See Comments)     Nightmares/sleep walk      Bentyl [dicyclomine] Anxiety         Current Outpatient Medications   Medication Sig Dispense Refill    albuterol (PROVENTIL) 2.5 mg /3 mL (0.083 %) nebulizer solution Take 2.5 mg by nebulization every 6 (six) hours as needed. Rescue      albuterol (PROVENTIL/VENTOLIN HFA) 90 mcg/actuation inhaler Inhale 2 puffs into the lungs every 6 (six) hours as needed.      amLODIPine (NORVASC) 2.5 MG tablet Take 1 tablet (2.5 mg total) by mouth once daily. 90 tablet 3    apixaban (ELIQUIS) 2.5 mg Tab Take 1 tablet (2.5 mg total) by mouth once daily. 90 tablet 3    atorvastatin (LIPITOR) 40 MG tablet Take 1 tablet (40 mg total) by mouth once daily. 90 tablet 3    benzonatate (TESSALON) 100 MG capsule benzonatate 100 mg capsule      chlorhexidine (PERIDEX) 0.12 % solution SWISH WITH 10ML FOR 30 seconds THEN EXPECTORATE TWICE DAILY      ciclopirox 1 % shampoo Apply topically.      clobetasoL (TEMOVATE) 0.05 % external  solution Pt to mix in 1 jar of cerave cream and apply to affected areas bid 50 mL 3    clonazePAM (KLONOPIN) 0.125 MG disintegrating tablet PLACE ONE TABLET ON THE TONGUE AND allow TO DISSOLVE ONCE A DAY 30 minutes BEFORE bedtime WITH FOOD      clotrimazole-betamethasone 1-0.05% (LOTRISONE) cream Apply topically 2 (two) times daily. 45 g 2    cyproheptadine (PERIACTIN) 4 mg tablet       donepeziL (ARICEPT) 10 MG tablet TAKE 1 TABLET EVERY MORNING.  NO FURTHER REFILL WITHOUT APPOINTMENT (Patient taking differently: TAKE 1 TABLET EVERY MORNING.  NO FURTHER REFILL WITHOUT APPOINTMENT) 90 tablet 1    doxepin (SINEQUAN) 10 MG capsule Take 10 mg by mouth every evening.      EScitalopram oxalate (LEXAPRO) 10 MG tablet       esomeprazole (NEXIUM) 40 MG capsule Take 1 capsule (40 mg total) by mouth 2 (two) times daily. 180 capsule 3    estradioL (ESTRACE) 0.01 % (0.1 mg/gram) vaginal cream Place vaginally.      famotidine (PEPCID) 20 MG tablet Take 1 tablet (20 mg total) by mouth daily as needed for Heartburn (breakthrough heartburn and acid reflux not controlled with Nexium). 90 tablet 3    flash glucose scanning reader (FREESTYLE ALEXSANDRA 14 DAY READER) Misc 1 Device by Misc.(Non-Drug; Combo Route) route 2 (two) times a day. 1 each 11    flash glucose sensor (FREESTYLE ALEXSANDRA 14 DAY SENSOR) Kit 1 Device by Misc.(Non-Drug; Combo Route) route 2 (two) times a day. 1 kit 11    fluconazole (DIFLUCAN) 100 MG tablet TAKE 1 TABLET BY MOUTH ONCE DAILY FOR FOURTEEN DAYS      fluconazole (DIFLUCAN) 150 MG Tab Take 1 tablet by mouth once daily.      fluconazole (DIFLUCAN) 200 MG Tab       fluocinolone (DERMA-SMOOTHE) 0.01 % external oil fluocinolone 0.01 % scalp oil and shower cap   Apply ONE A SMALL AMOUNT TO affected AREA as directed apply TO SCALP 2-3 TIMES PER WEEK FOR ITCHING]      fluocinonide (LIDEX) 0.05 % external solution AAA scalp qday - bid prn pruritus 60 mL 3    fluticasone-salmeterol diskus inhaler 500-50 mcg USE 1  INHALATION ORALLY    INTO THE LUNGS TWO TIMES A DAY      gabapentin (NEURONTIN) 100 MG capsule gabapentin 100 mg capsule      GEMTESA 75 mg Tab Take 1 tablet by mouth.      glycopyrrolate (ROBINUL) 2 MG Tab TAKE 1 TABLET TWICE A  tablet 3    glycopyrrolate (ROBINUL) 2 MG Tab Take 2 mg by mouth.      hydroCHLOROthiazide (HYDRODIURIL) 25 MG tablet Take 1 tablet (25 mg total) by mouth once daily. 90 tablet 0    hydrocortisone-aloe vera 0.5 % Crea Place 1 application into the left eye 2 (two) times daily.      ketoconazole (NIZORAL) 2 % cream Apply topically once daily. 60 g 2    ketoconazole (NIZORAL) 2 % shampoo Wash hair with medicated shampoo at least 2x/week - let sit on scalp at least 5 minutes prior to rinsing 120 mL 5    levalbuterol (XOPENEX HFA) 45 mcg/actuation inhaler Inhale into the lungs.      levothyroxine (SYNTHROID) 75 MCG tablet Take 1 tablet (75 mcg total) by mouth before breakfast. 90 tablet 0    LIDOcaine 3 % Crea Apply 1 application topically 2 (two) times a day. 85 g 3    LIDOCAINE VISCOUS 2 % solution Take by mouth.      meclizine (ANTIVERT) 25 mg tablet TAKE 1 TABLET TWICE A DAY  AS NEEDED FOR DIZZINESS 180 tablet 1    memantine (NAMENDA) 10 MG Tab Take 10 mg by mouth once daily.      metoclopramide HCl (REGLAN) 5 MG tablet metoclopramide 5 mg tablet      mirtazapine (REMERON) 15 MG tablet Take 1 tablet (15 mg total) by mouth every evening. 90 tablet 3    mupirocin (BACTROBAN) 2 % ointment mupirocin 2 % topical ointment   APPLY LIBERALLY TO THE AFFECTED AREA TWICE DAILY TO WOUND ON SCALP      nystatin (MYCOSTATIN) 100,000 unit/mL suspension SMARTSI Teaspoon By Mouth 4 Times Daily      omeprazole (PRILOSEC) 40 MG capsule       oxyCODONE (ROXICODONE) 5 MG immediate release tablet       pantoprazole (PROTONIX) 40 MG tablet       prednisoLONE acetate (PRED FORTE) 1 % DrpS 3 drops 2 (two) times daily.      predniSONE (DELTASONE) 10 MG tablet 2 TABS BY MOUTH EVERY MORNING FOR 7 DAYS; 1 TAB  EVERY MORNING FOR 7 DAYS; HALF TAB EVERY MORNING FOR 7 DAYS; THEN STOP      promethazine-dextromethorphan (PROMETHAZINE-DM) 6.25-15 mg/5 mL Syrp       RESTASIS 0.05 % ophthalmic emulsion       rivaroxaban (XARELTO) 10 mg Tab Xarelto 10 mg tablet      SYMBICORT 160-4.5 mcg/actuation HFAA Inhale into the lungs.      diclofenac sodium (VOLTAREN) 1 % Gel Apply 2 g topically 3 (three) times daily. for 10 days (Patient not taking: Reported on 7/11/2023) 400 g 0     Current Facility-Administered Medications   Medication Dose Route Frequency Provider Last Rate Last Admin    regadenoson injection 0.4 mg  0.4 mg Intravenous Once Benedicto Love MD PhD         Facility-Administered Medications Ordered in Other Visits   Medication Dose Route Frequency Provider Last Rate Last Admin    midazolam (VERSED) 1 mg/mL injection 0.5 mg  0.5 mg Intravenous PRN Ross Hui MD           Past Medical History:   Diagnosis Date    Allergic rhinitis     Anticoagulant long-term use     Arthritis     Asthma     Bilateral pulmonary embolism 4/27/2019    Cataract     Chronic anticoagulation 9/28/2020    Depression     Diabetes mellitus     Fibromyalgia 7/2/2012    Fibromyalgia     GERD (gastroesophageal reflux disease)     Hypercholesterolemia 9/28/2020    Hypercholesterolemia 9/28/2020    Hypertension 7/2/2012    Thoracic or lumbosacral neuritis or radiculitis, unspecified     Thyroid disease     Ulcer        Past Surgical History:   Procedure Laterality Date    CATARACT EXTRACTION Bilateral     ESOPHAGOGASTRODUODENOSCOPY N/A 3/8/2022    Procedure: EGD (ESOPHAGOGASTRODUODENOSCOPY) with dilation-either [facility] with Dr. Meza;  Surgeon: Rebeca Meza MD;  Location[facility];  Service: Endoscopy;  [facility]: N/A;  [facility]    ESOPHAGOGASTRODUODENOSCOPY N/A 2/28/2022    Procedure: EGD (ESOPHAGOGASTRODUODENOSCOPY) with dilation-either [facility] with Dr. Meza;  Surgeon: Rebeca Meza MD;  Location:  [facility] (2ND FLR);  Service: Endoscopy;  Laterality: N/A;  1/11-eliquis hold ok see te-tb  COVID test on 2/25/22 at [facility]  2/22-confirmed appt arrival time with pt-Kpvt    FOOT NEUROMA SURGERY  1985    HYSTERECTOMY         Vital Signs (Most Recent)  Vitals:    07/27/23 0855   BP: (!) 146/69   Pulse: 61           Review of Systems:  ROS:  Constitutional: no fever or chills  Eyes: no visual changes  ENT: no nasal congestion or sore throat, Positive  vestibular dizziness  Respiratory: no cough or shortness of breath, Positive for asthma  Cardiovascular: no chest pain or palpitations  Gastrointestinal: no nausea or vomiting, tolerating diet, Positive for GERD, peptic ulcer disease  Genitourinary: no hematuria or dysuria, Positive CKD stage 3,  Integument/Breast: no rash or pruritis  Hematologic/Lymphatic: no easy bruising or lymphadenopathy, Positive long-term anticoagulation, history DVT, history of PE  Musculoskeletal: no arthralgias or myalgias, Positive chronic low back pain, fibromyalgia, osteoarthritis of multiple joints  Neurological: no seizures or tremors, Degenerative disc disease lumbar spine, history of memory loss, spinal stenosis without neurological claudication  Behavioral/Psych: no auditory or visual hallucinations, Positive for depression,  Endocrine: no heat or cold intolerance, Positive diabetes type 2, thyroid disease hypothyroidism                OBJECTIVE:     PHYSICAL EXAM:  Height: 5' 5" (165.1 cm) Weight: 62.1 kg (137 lb), General Appearance: Well nourished, well developed, in no acute distress.  Neurological: Mood & affect are normal.  Skin is clean dry and intact no bruising or swelling she is have tenderness palpation over thumb base no pain over the 1st dorsal compartment positive grind test difficulty with opposition and opening up her thumb webspace  RADIOGRAPHS:  X-rays from previous visit reviewed by me today    Impression:     No acute osseous abnormality seen.  Advanced 1st " [facility] osteoarthritis both hands.      Bilateral Hand  EMG 07/19/23   Impression:  Note: Patient advanced age is a limiting factor of this test.  Much of the normative reference data caps out at age >79.  It is a known phenomenon that both conduction velocity slows (0.5-4m/s per decade over age 60), amplitudes decrease with age (up to 50% by age 70 for SNAP amplitudes, and to a lesser degree CMAP amplitudes), and that MUAP durations increase with age.  Therefore, in patients over the age of 60, some abnormalities may be age related.  The normative data below takes this into account up to age 79, but not beyond, and so age is a limitation for patients older than age 79.       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 Carpal tunnel syndrome).  There is some degree of motor axonal loss.  There is no active denervation.  This is somewhat difficult to grade given advanced age, but likely graded as Moderate-Severe in severity on the right, and Mild-Moderate on the left.    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  This is not a pathologic finding, rather a normal variant.         ASSESSMENT/PLAN:     No diagnosis found.      Plan:     Discussed at length surgery vs injection. Injections don't last for pt  Pt also has significant compression of B hands  Pt has constant N/T/Pain  Discussed that the sensation may not return as well as the strength. Heavy furniture on the rug analogy used.

## 2023-07-27 NOTE — PROCEDURES
Carpal Tunnel    Date/Time: 7/27/2023 9:30 AM  Performed by: Avril Hutchison MD  Authorized by: Avril Hutchison MD     Consent Done?:  Yes (Verbal)  Indications:  Pain  Timeout: prior to procedure the correct patient, procedure, and site was verified    Prep: patient was prepped and draped in usual sterile fashion      Local anesthesia used?: Yes    Anesthesia:  Local infiltration  Local anesthetic:  Lidocaine 1% without epinephrine  Location:  Wrist  Site:  L carpal tunnel  Ultrasonic Guidance for Needle Placement?: No    Needle size:  25 G  Approach:  Volar  Medications:  4 mg dexAMETHasone 4 mg/mL

## 2023-07-31 ENCOUNTER — EXTERNAL CHRONIC CARE MANAGEMENT (OUTPATIENT)
Dept: PRIMARY CARE CLINIC | Facility: CLINIC | Age: 88
End: 2023-07-31
Payer: MEDICARE

## 2023-07-31 PROCEDURE — 99490 CHRNC CARE MGMT STAFF 1ST 20: CPT | Mod: PBBFAC | Performed by: INTERNAL MEDICINE

## 2023-07-31 PROCEDURE — 99439 CHRNC CARE MGMT STAF EA ADDL: CPT | Mod: S$PBB,,, | Performed by: INTERNAL MEDICINE

## 2023-07-31 PROCEDURE — 99490 PR CHRONIC CARE MGMT, 1ST 20 MIN: ICD-10-PCS | Mod: S$PBB,,, | Performed by: INTERNAL MEDICINE

## 2023-07-31 PROCEDURE — 99439 PR CHRONIC CARE MGMT, EA ADDTL 20 MIN: ICD-10-PCS | Mod: S$PBB,,, | Performed by: INTERNAL MEDICINE

## 2023-07-31 PROCEDURE — 99439 CHRNC CARE MGMT STAF EA ADDL: CPT | Mod: PBBFAC,27 | Performed by: INTERNAL MEDICINE

## 2023-07-31 PROCEDURE — 99490 CHRNC CARE MGMT STAFF 1ST 20: CPT | Mod: S$PBB,,, | Performed by: INTERNAL MEDICINE

## 2023-08-01 ENCOUNTER — OFFICE VISIT (OUTPATIENT)
Dept: PHYSICAL MEDICINE AND REHAB | Facility: CLINIC | Age: 88
End: 2023-08-01
Payer: MEDICARE

## 2023-08-01 VITALS
WEIGHT: 135.13 LBS | HEART RATE: 61 BPM | SYSTOLIC BLOOD PRESSURE: 144 MMHG | HEIGHT: 65 IN | OXYGEN SATURATION: 100 % | DIASTOLIC BLOOD PRESSURE: 63 MMHG | BODY MASS INDEX: 22.51 KG/M2

## 2023-08-01 DIAGNOSIS — M48.02 CERVICAL SPINAL STENOSIS: ICD-10-CM

## 2023-08-01 DIAGNOSIS — M54.50 CHRONIC BILATERAL LOW BACK PAIN WITHOUT SCIATICA: ICD-10-CM

## 2023-08-01 DIAGNOSIS — G89.29 CHRONIC NECK PAIN: ICD-10-CM

## 2023-08-01 DIAGNOSIS — M51.36 DDD (DEGENERATIVE DISC DISEASE), LUMBAR: ICD-10-CM

## 2023-08-01 DIAGNOSIS — M15.9 PRIMARY OSTEOARTHRITIS INVOLVING MULTIPLE JOINTS: ICD-10-CM

## 2023-08-01 DIAGNOSIS — G89.29 CHRONIC BILATERAL LOW BACK PAIN WITHOUT SCIATICA: ICD-10-CM

## 2023-08-01 DIAGNOSIS — G56.03 BILATERAL CARPAL TUNNEL SYNDROME: ICD-10-CM

## 2023-08-01 DIAGNOSIS — M47.816 LUMBAR FACET ARTHROPATHY: ICD-10-CM

## 2023-08-01 DIAGNOSIS — M47.812 SPONDYLOSIS OF CERVICAL REGION WITHOUT MYELOPATHY OR RADICULOPATHY: ICD-10-CM

## 2023-08-01 DIAGNOSIS — M54.2 CHRONIC NECK PAIN: ICD-10-CM

## 2023-08-01 DIAGNOSIS — M79.7 FIBROMYALGIA SYNDROME: Primary | ICD-10-CM

## 2023-08-01 DIAGNOSIS — M48.02 NEURAL FORAMINAL STENOSIS OF CERVICAL SPINE: ICD-10-CM

## 2023-08-01 PROCEDURE — 99204 OFFICE O/P NEW MOD 45 MIN: CPT | Mod: S$PBB,,, | Performed by: PHYSICAL MEDICINE & REHABILITATION

## 2023-08-01 PROCEDURE — 99999 PR PBB SHADOW E&M-EST. PATIENT-LVL V: ICD-10-PCS | Mod: PBBFAC,,, | Performed by: PHYSICAL MEDICINE & REHABILITATION

## 2023-08-01 PROCEDURE — 99215 OFFICE O/P EST HI 40 MIN: CPT | Mod: PBBFAC | Performed by: PHYSICAL MEDICINE & REHABILITATION

## 2023-08-01 PROCEDURE — 99999 PR PBB SHADOW E&M-EST. PATIENT-LVL V: CPT | Mod: PBBFAC,,, | Performed by: PHYSICAL MEDICINE & REHABILITATION

## 2023-08-01 PROCEDURE — 99204 PR OFFICE/OUTPT VISIT, NEW, LEVL IV, 45-59 MIN: ICD-10-PCS | Mod: S$PBB,,, | Performed by: PHYSICAL MEDICINE & REHABILITATION

## 2023-08-01 RX ORDER — DULOXETIN HYDROCHLORIDE 30 MG/1
30 CAPSULE, DELAYED RELEASE ORAL DAILY
Qty: 30 CAPSULE | Refills: 1 | Status: SHIPPED | OUTPATIENT
Start: 2023-08-01 | End: 2023-09-12 | Stop reason: SDUPTHER

## 2023-08-01 RX ORDER — ACETAMINOPHEN 500 MG
500-1000 TABLET ORAL 3 TIMES DAILY PRN
Refills: 0 | COMMUNITY
Start: 2023-08-01

## 2023-08-01 NOTE — PROGRESS NOTES
"Subjective:       Patient ID: Debbie Ding is a 89 y.o. female.    Chief Complaint: Neck Pain and Back Pain      HPI      Mrs. 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  She is presenting to the [facility] for help with pain management.      The patient has been complaining of generalized muscle and joint aching for over 25 years.  He was diagnosed with fibromyalgia.  She has been followed up for a while by Rheumatology.  She was also seen in the past by Physical Medicine and Rehabilitation (last visit in 02/2019).  Her symptoms have been stable but worse over the past 6 months.  She is currently complaining of generalized muscle and joint aching, stiffness, fatigue/poor energy, hypersensitivity to pain and sleep impairment.  Her symptoms are diffuse but worse in her neck and back.    She has been complaining of low back pain for 25 years.  She has history of sciatica.  She recalls having spine injection once.  Review of the chart shows that she had an epidural steroid injection (right L4 transforaminal) in 05/2014.  She reports little relief.  Lumbar spine MRI in 04/2021 showed "Degenerative changes disc space level L4-L5 primarily and to a lesser degree L5-S1 with minimal grade 1 anterolisthesis at L4-L5 and mild bilateral neural foraminal encroachment".  Her symptoms have been getting progressively worse.  Currently, her back pain is intermittent aching in the whole lumbar spine.  She denies any radiation to her legs.  It is worse with standing or walking.  It is better with rest.  Her maximum pain is 9-10/10 and minimum 0/10.  Today it is 0/10.  The patient reports bilateral lower extremity weakness that has been stable.  She denies any leg numbness.  She " "denies any bowel or bladder incontinence.  She denies any leg claudications.    She has been also complaining of right knee pain due to OA.  She received corticosteroid injections periodically with some relief.      She has been complaining of neck pain for about 25 years.  Her pain has been getting progressively worse.  Cervical spine MRI in 04/2021 showed "Cervical spondylosis, most evident C5-C6 where there is evidence for posterior marginal osteophytic disc spur complex resulting in mild-moderate central canal narrowing with concavity upon the ventral aspect of the cord yet no alteration in cord signal intensity.  Associated moderate bilateral neural foraminal encroachment.".  She has been treated conservatively.  Currently, her neck pain is intermittent aching and stiffness in the whole cervical spine, more on the left side.  She denies any radiation to her arms.  It flares up sporadically.  Her maximum pain is 10/10 and minimum 0/10.  Today it is 0/10.  She complains of bilateral hand  weakness and numbness attributed to carpal tunnel syndrome, confirmed by EMG on 07/19/2023 (moderate to severe on the right, and mild-to-moderate on the left.  She gets cortisone shots periodically).  She denies any impaired hand coordination.  She has impaired gait and has been using a straight cane or a Rollator walker.      She is currently taking:  Over-the-counter Tylenol 500 mg p.r.n., usually once per day   She was previously on gabapentin but stopped due to sedation.      Past Medical History:   Diagnosis Date    Allergic rhinitis     Anticoagulant long-term use     Arthritis     Asthma     Bilateral pulmonary embolism 4/27/2019    Cataract     Chronic anticoagulation 9/28/2020    Depression     Diabetes mellitus     Fibromyalgia 7/2/2012    Fibromyalgia     GERD (gastroesophageal reflux disease)     Hypercholesterolemia 9/28/2020    Hypercholesterolemia 9/28/2020    Hypertension 7/2/2012    Thoracic or lumbosacral " neuritis or radiculitis, unspecified     Thyroid disease     Ulcer         Review of patient's allergies indicates:   Allergen Reactions    Melatonin      Other reaction(s): Other (See Comments)  Sleep Walks and has unnatural dreams    Talwin compound Hives    Talwin [pentazocine lactate] Hallucinations    Trazodone Other (See Comments)     Nightmares/sleep walk      Bentyl [dicyclomine] Anxiety        Review of Systems   Constitutional:  Positive for fatigue. Negative for chills and fever.   Eyes:  Negative for visual disturbance.   Respiratory:  Positive for shortness of breath.    Cardiovascular:  Negative for chest pain.   Gastrointestinal:  Positive for constipation. Negative for nausea and vomiting.   Genitourinary:  Negative for difficulty urinating.   Musculoskeletal:  Positive for arthralgias, back pain, gait problem, myalgias, neck pain and neck stiffness.   Neurological:  Positive for weakness and numbness. Negative for dizziness and headaches.   Psychiatric/Behavioral:  Positive for sleep disturbance. Negative for behavioral problems.              Objective:      Physical Exam  Vitals reviewed.   Constitutional:       Appearance: She is well-developed.   HENT:      Head: Normocephalic and atraumatic.   Eyes:      Extraocular Movements: Extraocular movements intact.   Musculoskeletal:      Cervical back: Normal range of motion. Tenderness present.      Comments: BUE:  ROM:   RUE: full.   LUE: full.  Strength:    RUE: 5-/5 at shoulder abduction, 5 elbow flexion, 5 wrist extension, 5 elbow extension, 5 finger flexion, 4 finger abduction.   LUE: 5-/5 at shoulder abduction, 5 elbow flexion, 5 wrist extension, 5 elbow extension, 5 finger flexion, 4 finger abduction.  Sensation to pinprick:   RUE: decreased at digit 1   LUE: decreased at digit 1  DTR:    RUE: +1 biceps, +1 triceps.   LUE:  +1 biceps, +1 triceps.  García:   RUE: -ve.   LUE: -ve.    BLE:  ROM:   RLE: full.   LLE: full.  Knee crepitus:   RLE:  +ve.   LLE: +ve.   Strength:    RLE: 4/5 at hip flexion, 5 knee extension, 5 ankle DF, 5 PF, 5 EHL.   LLE: 4/5 at hip flexion, 5 knee extension, 5 ankle DF, 5 PF, 5 EHL.  Sensation to pinprick:     RLE: intact.      LLE: intact.   DTR:     RLE: +1 knee, +1 ankle.    LLE: +1 knee, +1 ankle.  Clonus:    Rt ankle: -ve.    Lt ankle: -ve.  SLR:      RLE: -ve at 70 degrees.      LLE: -ve at 70 degrees.   GADIEL:     RLE: -ve.      LLE: -ve.    +ve tenderness over lumbar spine.  +ve diffuse tender points over the upper & lower back, anterior chest wall, hips, elbows & knees (18/18 fibromyalgia reference points)    No leg length discrepancy.    Directional Preference:  Spine flexion: 90 degrees , no pain in back.  Spine extension: 20 degrees, mild pain in back.  Lateral bending: mild pain to Right, mild pain to Left.      Gait: slow ann, using a straight cane       Skin:     General: Skin is warm.   Neurological:      General: No focal deficit present.      Mental Status: She is alert.   Psychiatric:         Mood and Affect: Mood normal.         Behavior: Behavior normal.         Assessment:       1. Fibromyalgia syndrome    2. Chronic bilateral low back pain without sciatica    3. DDD (degenerative disc disease), lumbar    4. Lumbar facet arthropathy    5. Chronic neck pain    6. Spondylosis of cervical region without myelopathy or radiculopathy    7. Cervical spinal stenosis    8. Neural foraminal stenosis of cervical spine    9. Primary osteoarthritis involving multiple joints    10. Bilateral carpal tunnel syndrome        Plan:       - Oral NSAIDs will be avoided due to anticoagulation with Eliquis.  - Start DULoxetine (CYMBALTA) 30 MG capsule; Take 1 capsule (30 mg total) by mouth once daily. In 1-2 weeks, if no relief, may increase dose to 2 capsules once daily. Call for refills.  - Increase acetaminophen (TYLENOL) 500 MG tablet; Take 1-2 tablets (500-1,000 mg total) by mouth 3 (three) times daily as needed for  Pain.  - The patient is not able to commit to a course of Physical Therapy at this point.  - The patient was encouraged to adopt a regular Home Exercise Program, and provided with printouts.  - Follow up in about 3 months (around 11/1/2023).    This was a 50 minute visit, 50% of which was spent educating the patient about the diagnosis and the treatment plan.    This note was partly generated with [employer] voice recognition software. I apologize for any possible typographical errors.

## 2023-08-01 NOTE — PATIENT INSTRUCTIONS
Neck/Back Pain [General]    Both neck and back pain are usually caused by injury to the muscles or ligaments of the spine. Sometimes the disks that separate each bone of the spine may cause pain by putting pressure on a nearby nerve. Back and neck pain may appear after a sudden twisting/bending force (such as in a car accident), or sometimes after a simple awkward movement. In either case, muscle spasm is often present and adds to the pain.  Acute neck and back pain usually gets better in one to two weeks. Pain related to disk disease, arthritis in the spinal joints or spinal stenosis (narrowing of the spinal canal) can become chronic and last for months or years.  Home Care:  FOR NECK PAIN: Use a comfortable pillow that supports the head and keeps the spine in a neutral position. The position of the head should not be tilted forward or backward.  FOR BACK PAIN: You may need to stay in bed the first few days. But, as soon as possible, begin sitting or walking to avoid problems with prolonged bed rest (muscle weakness, worsening back stiffness and pain, blood clots in the legs).  When in bed, try to find a position of comfort. A firm mattress is best. Try lying flat on your back with pillows under your knees. You can also try lying on your side with your knees bent up towards your chest and a pillow between your knees.  Avoid prolonged sitting. This puts more stress on the lower back than standing or walking.  During the first two days after injury, apply an ICE PACK to the painful area for 20 minutes every 2-4 hours. This will reduce swelling and pain. HEAT (hot shower, hot bath or heating pad) works well for muscle spasm. You can start with ice, then switch to heat after two days. Some patients feel best alternating ice and heat treatments. Use the one method that feels the best to you.  You may use acetaminophen (Tylenol) or ibuprofen (Motrin, Advil) to control pain, unless another pain medicine was prescribed.  [NOTE: If you have chronic liver or kidney disease or ever had a stomach ulcer or GI bleeding, talk with your doctor before using these medicines.]  Be aware of safe lifting methods and do not lift anything over 15 pounds until all the pain is gone.  Follow Up  with your physician or this facility if your symptoms do not start to improve after one week. Physical therapy or further tests may be needed.  [NOTE: If X-rays were taken, they will be reviewed by a radiologist. You will be notified of any new findings that may affect your care.]  Get Prompt Medical Attention  if any of the following occur:  Pain becomes worse or spreads into your arms or legs  Weakness, numbness or pain in one or both arms or legs  Loss of bowel or bladder control  Numbness in the groin area  Difficulty walking  Fever of 100.4ºF (38ºC) or higher, or as directed by your healthcare provider  © 8280-4279 [place] [address]. All rights reserved. This information is not intended as a substitute for professional medical care. Always follow your healthcare professional's instructions.    Back Exercises: Back Press  Do this exercise on your hands and knees. Keep your knees under your hips and your hands under your shoulders. Keep your spine in a neutral position (not arched or sagging). Be sure to maintain your necks natural curve.  Tighten your abdominal and buttocks muscles to press your back upward. Let your head drop slightly.  Hold for 5 seconds. Return to starting position.  Repeat 5 times.     © 6682-7233 [facility], [address] [place] [address]. All rights reserved. This information is not intended as a substitute for professional medical care. Always follow your healthcare professional's instructions.    Back Exercises: Back Release  Do this exercise on your hands and knees. Keep your knees under your hips and your hands under your shoulders. Keep your spine in a neutral position  (not arched or sagging). Be sure to maintain your necks natural curve.  Relax your abdominal and buttocks muscles, lift your head, and let your back sag. Be sure to keep your weight evenly distributed. Dont sit back on your hips.   Hold for 5 seconds.  Return to starting position.  Repeat 5 times.  © 0679-9[address] [place] [address]. All rights reserved. This information is not intended as a substitute for professional medical care. Always follow your healthcare professional's instructions.    Back Exercises: Bridge  The Bridge exercise strengthens your abdominal, buttocks, and hamstring muscles. This helps keep your back stable and aligned when you walk.  Lie on the floor with your back and palms flat. Bend your knees. Keep your feet flat on the floor.  Contract your abdominal and buttocks muscles. Slowly lift your buttocks off the floor until there is a straight line from your knees to your shoulders.  Hold for 5  seconds. Repeat 10 times.    © 0312-6792 [place] [address]. All rights reserved. This information is not intended as a substitute for professional medical care. Always follow your healthcare professional's instructions.    Back Exercises: Elbow Press    To start, lie face down on your stomach, feet slightly apart, forehead on the floor. Breathe deeply. You should feel comfortable and relaxed in this position.  Press up on your forearms. Keep your abdomen and hips on the floor.  Hold for 20 seconds. Lower slowly.  Repeat 2 times.  Return to starting position.  © 6021-8550 [place] [address]. All rights reserved. This information is not intended as a substitute for professional medical care. Always follow your healthcare professional's instructions.    Back Exercises: Pelvic Tilt  To start, lie on your back with your knees bent and feet flat on the floor. Dont press your neck or lower  back to the floor. Breathe deeply. You should feel comfortable and relaxed in this position.  Tighten your abdomen and buttocks, and press your lower back toward the floor. This should be a small, subtle movement.  Hold for 5 seconds. Release.  Repeat 5 times.         © 9927-7139 [facility], [address] [place] [address]. All rights reserved. This information is not intended as a substitute for professional medical care. Always follow your healthcare professional's instructions.    Back Exercises: Partial Curl-Ups          To start, lie on your back with your knees bent and feet flat on the floor. Dont press your neck or lower back to the floor. Breathe deeply. You should feel comfortable and relaxed in this position.  Cross your arms loosely.  Tighten your abdomen and curl detention up, keeping your head in line with your shoulders.  Hold for 5 seconds. Uncurl to lie down.  Repeat 5 times.   © 0713-3574 [facility], [address] [place] [address]. All rights reserved. This information is not intended as a substitute for professional medical care. Always follow your healthcare professional's instructions.    Back Exercises: Lower Back Stretch                            To start, sit in a chair with your feet flat on the floor. Shift your weight slightly forward to avoid rounding your back. Relax, and keep your ears, shoulders, and hips aligned.  Sit with your feet well apart.  Bend forward and touch the floor with the backs of your hands. Relax and let your body drop.  Hold for 20 seconds. Return to starting position.  Repeat 2 times.   © 9839-5971 [facility], [address] [place] [address]. All rights reserved. This information is not intended as a substitute for professional medical care. Always follow your healthcare professional's instructions.    Back Exercises: Seated Rotation      To start, sit in a chair with your feet flat on the floor. Shift your weight  slightly forward to avoid rounding your back. Relax, and keep your ears, shoulders, and hips aligned.  Fold your arms, elbows just below shoulder height.  Turn from the waist with hips forward. Turn your head last.  Hold for a count of 5. Return to starting position.  Repeat 5 times on one side. Then switch sides.  © 2568-6836 [facility], [address] [place] [address]. All rights reserved. This information is not intended as a substitute for professional medical care. Always follow your healthcare professional's instructions.    Back Exercises: Side Stretch  To start, sit in a chair with your feet flat on the floor. Shift your weight slightly forward to avoid rounding your back. Relax. Keep your ears, shoulders, and hips aligned.  Stretch your right arm overhead.  Slowly bend to the left. Dont twist your torso.  Hold for 20 seconds. Return to starting position.  Repeat 2 times. Then, switch to the other side.  © 1679-4160 Hermelinda [address] [address]. All rights reserved. This information is not intended as a substitute for professional medical care. Always follow your healthcare professional's instructions      Neck/Back Pain [General]    Both neck and back pain are usually caused by injury to the muscles or ligaments of the spine. Sometimes the disks that separate each bone of the spine may cause pain by putting pressure on a nearby nerve. Back and neck pain may appear after a sudden twisting/bending force (such as in a car accident), or sometimes after a simple awkward movement. In either case, muscle spasm is often present and adds to the pain.  Acute neck and back pain usually gets better in one to two weeks. Pain related to disk disease, arthritis in the spinal joints or spinal stenosis (narrowing of the spinal canal) can become chronic and last for months or years.  Home Care:  FOR NECK PAIN: Use a comfortable pillow that supports the head and keeps the spine in  a neutral position. The position of the head should not be tilted forward or backward.  FOR BACK PAIN: You may need to stay in bed the first few days. But, as soon as possible, begin sitting or walking to avoid problems with prolonged bed rest (muscle weakness, worsening back stiffness and pain, blood clots in the legs).  When in bed, try to find a position of comfort. A firm mattress is best. Try lying flat on your back with pillows under your knees. You can also try lying on your side with your knees bent up towards your chest and a pillow between your knees.  Avoid prolonged sitting. This puts more stress on the lower back than standing or walking.  During the first two days after injury, apply an ICE PACK to the painful area for 20 minutes every 2-4 hours. This will reduce swelling and pain. HEAT (hot shower, hot bath or heating pad) works well for muscle spasm. You can start with ice, then switch to heat after two days. Some patients feel best alternating ice and heat treatments. Use the one method that feels the best to you.  You may use acetaminophen (Tylenol) or ibuprofen (Motrin, Advil) to control pain, unless another pain medicine was prescribed. [NOTE: If you have chronic liver or kidney disease or ever had a stomach ulcer or GI bleeding, talk with your doctor before using these medicines.]  Be aware of safe lifting methods and do not lift anything over 15 pounds until all the pain is gone.  Follow Up  with your physician or this facility if your symptoms do not start to improve after one week. Physical therapy or further tests may be needed.  [NOTE: If X-rays were taken, they will be reviewed by a radiologist. You will be notified of any new findings that may affect your care.]  Get Prompt Medical Attention  if any of the following occur:  Pain becomes worse or spreads into your arms or legs  Weakness, numbness or pain in one or both arms or legs  Loss of bowel or bladder control  Numbness in the groin  area  Difficulty walking  Fever of 100.4ºF (38ºC) or higher, or as directed by your healthcare provider  © 6979-8866 [facility], [address] [place] [address]. All rights reserved. This information is not intended as a substitute for professional medical care. Always follow your healthcare professional's instructions.    Neck Exercises: Passive Neck Rotation          To start, lie on your back, knees bent and feet flat on the floor. Keep your ears, shoulders, and hips aligned, but dont press your lower back to the floor. Rest your hands on your pelvis. Breathe deeply and relax.  With your neck relaxed, place the palm of one hand on your forehead. Use your hand to turn your head to one side until you feel a stretch in the neck muscles. Do not push through pain.  Hold for 5 seconds. Then turn to the other side.  Repeat 5 times on each side.   Note: Keep your shoulders on the floor. Dont lift your chin as you turn your head.  © 9574-6939 [facility], [address] [place] [address]. All rights reserved. This information is not intended as a substitute for professional medical care. Always follow your healthcare professional's instructions.    Exercises: Neck Isometrics  To start, sit in a chair with your feet flat on the floor. Your weight should be slightly forward so that youre balanced evenly on your buttocks. Relax your shoulders and keep your head level. Using a chair with arms may help you keep your balance.  Press your palm against your forehead. Resist with your neck muscles. Hold for 10 seconds. Relax. Repeat 5 times.  Do the exercise again, pressing on the side of your head. Repeat 5 times. Switch sides.  Do the exercise again, pressing on the back of your head. Repeat 5 times.          For your safety, check with your healthcare provider before starting an exercise program.    © 0527-9687 [facility], [address] [place] [address], [place] [address]. All rights reserved. This  information is not intended as a substitute for professional medical care. Always follow your healthcare professional's instructions.    Shoulder and Upper Back Stretch  To start, stand tall with your ears, shoulders, and hips in line. Your feet should be slightly apart, positioned just under your hips. Focus your eyes directly in front of you.  this position for a few seconds before starting your exercise. This helps increase your awareness of proper posture.  Reach overhead and slightly back with both arms. Keep your shoulders and neck aligned and your elbows behind your shoulders.  With your palms facing the ceiling, turn your fingers inward.  Take a deep breath. Breathe out and lower your elbows toward your buttocks. Hold for 5 seconds, then return to starting position.  Repeat 3 times.          © 7927-8033 [facility], [address] [place] [address], [place] [address]. All rights reserved. This information is not intended as a substitute for professional medical care. Always follow your healthcare professional's instructions.

## 2023-08-09 RX ORDER — DEXAMETHASONE SODIUM PHOSPHATE 4 MG/ML
4 INJECTION, SOLUTION INTRA-ARTICULAR; INTRALESIONAL; INTRAMUSCULAR; INTRAVENOUS; SOFT TISSUE
Status: DISCONTINUED | OUTPATIENT
Start: 2023-07-27 | End: 2023-08-09 | Stop reason: HOSPADM

## 2023-08-15 DIAGNOSIS — M79.89 PAIN AND SWELLING OF LOWER EXTREMITY, UNSPECIFIED LATERALITY: Primary | ICD-10-CM

## 2023-08-15 DIAGNOSIS — M79.606 PAIN AND SWELLING OF LOWER EXTREMITY, UNSPECIFIED LATERALITY: Primary | ICD-10-CM

## 2023-08-15 DIAGNOSIS — I80.03 PHLEBITIS AND THROMBOPHLEBITIS OF SUPERFICIAL VESSELS OF LOWER EXTREMITIES, BILATERAL: ICD-10-CM

## 2023-08-23 NOTE — TELEPHONE ENCOUNTER
Refill Routing Note   Medication(s) are not appropriate for processing by [facility] for the following reason(s):      Drug-disease interaction    [facility] action(s):  Defer Care Due:  None identified     Medication Therapy Plan: doxepin and JASE (obstructive sleep apnea)    Pharmacist review requested: Yes     Appointments  past 12m or future 3m with PCP    Date Provider   Last Visit   6/13/2023 Carolyn Mejia MD   Next Visit   10/30/2023 Carolyn Mejia MD   ED visits in past 90 days: 0        Note composed:9:21 AM 08/23/2023

## 2023-08-23 NOTE — TELEPHONE ENCOUNTER
No care due was identified.  [facility] Embedded Care Due Messages. Reference number: [id number].   8/23/2023 1:20:41 AM CDT

## 2023-08-24 RX ORDER — DOXEPIN HYDROCHLORIDE 10 MG/1
10 CAPSULE ORAL NIGHTLY
Qty: 90 CAPSULE | Refills: 3 | Status: SHIPPED | OUTPATIENT
Start: 2023-08-24

## 2023-08-31 ENCOUNTER — EXTERNAL CHRONIC CARE MANAGEMENT (OUTPATIENT)
Dept: PRIMARY CARE CLINIC | Facility: CLINIC | Age: 88
End: 2023-08-31
Payer: MEDICARE

## 2023-08-31 PROCEDURE — 99490 CHRNC CARE MGMT STAFF 1ST 20: CPT | Mod: PBBFAC | Performed by: INTERNAL MEDICINE

## 2023-08-31 PROCEDURE — 99490 PR CHRONIC CARE MGMT, 1ST 20 MIN: ICD-10-PCS | Mod: S$PBB,,, | Performed by: INTERNAL MEDICINE

## 2023-08-31 PROCEDURE — 99490 CHRNC CARE MGMT STAFF 1ST 20: CPT | Mod: S$PBB,,, | Performed by: INTERNAL MEDICINE

## 2023-09-05 ENCOUNTER — HOSPITAL ENCOUNTER (OUTPATIENT)
Dept: RADIOLOGY | Facility: OTHER | Age: 88
Discharge: HOME | End: 2023-09-05
Attending: SURGERY
Payer: MEDICARE

## 2023-09-05 DIAGNOSIS — I80.03 PHLEBITIS AND THROMBOPHLEBITIS OF SUPERFICIAL VESSELS OF LOWER EXTREMITIES, BILATERAL: ICD-10-CM

## 2023-09-05 DIAGNOSIS — M79.89 PAIN AND SWELLING OF LOWER EXTREMITY, UNSPECIFIED LATERALITY: ICD-10-CM

## 2023-09-05 DIAGNOSIS — M79.606 PAIN AND SWELLING OF LOWER EXTREMITY, UNSPECIFIED LATERALITY: ICD-10-CM

## 2023-09-05 PROCEDURE — 93970 EXTREMITY STUDY: CPT | Mod: TC

## 2023-09-05 PROCEDURE — 93970 EXTREMITY STUDY: CPT | Mod: 26,,, | Performed by: RADIOLOGY

## 2023-09-05 PROCEDURE — 93970 US LOWER EXTREMITY VEINS BILATERAL INSUFFICIENCY: ICD-10-PCS | Mod: 26,,, | Performed by: RADIOLOGY

## 2023-09-07 ENCOUNTER — TELEPHONE (OUTPATIENT)
Dept: ORTHOPEDICS | Facility: CLINIC | Age: 88
End: 2023-09-07
Payer: MEDICARE

## 2023-09-07 DIAGNOSIS — E03.9 HYPOTHYROIDISM, ADULT: ICD-10-CM

## 2023-09-07 RX ORDER — LEVOTHYROXINE SODIUM 75 UG/1
75 TABLET ORAL
Qty: 90 TABLET | Refills: 3 | Status: SHIPPED | OUTPATIENT
Start: 2023-09-07 | End: 2023-10-30 | Stop reason: SDUPTHER

## 2023-09-07 NOTE — TELEPHONE ENCOUNTER
Spoke to patient and moved her appointment to Sept 13th with Sona----- Message from Bakari Cormier MA sent at 9/7/2023 12:14 PM CDT -----  Type: Patient Call Back    Who called: Self    What is the request in detail:pt. Is asking if her appt. Cam be moved to the 13th .. She has a appt. There and is trying to not have to come down there back to back.. please assist    Can the clinic reply by [facility]?NO    Would the patient rather a call back or a response via [facility]? Yes, call     Best call back number: [phone]

## 2023-09-12 ENCOUNTER — TELEPHONE (OUTPATIENT)
Dept: ORTHOPEDICS | Facility: CLINIC | Age: 88
End: 2023-09-12
Payer: MEDICARE

## 2023-09-12 RX ORDER — DULOXETIN HYDROCHLORIDE 30 MG/1
30 CAPSULE, DELAYED RELEASE ORAL DAILY
Qty: 30 CAPSULE | Refills: 1 | Status: SHIPPED | OUTPATIENT
Start: 2023-09-12 | End: 2023-09-22 | Stop reason: SDUPTHER

## 2023-09-13 ENCOUNTER — OFFICE VISIT (OUTPATIENT)
Dept: ORTHOPEDICS | Facility: CLINIC | Age: 88
End: 2023-09-13
Payer: MEDICARE

## 2023-09-13 ENCOUNTER — LAB VISIT (OUTPATIENT)
Dept: LAB | Facility: OTHER | Age: 88
End: 2023-09-13
Attending: INTERNAL MEDICINE
Payer: MEDICARE

## 2023-09-13 VITALS — WEIGHT: 135.13 LBS | BODY MASS INDEX: 22.51 KG/M2 | HEIGHT: 65 IN

## 2023-09-13 DIAGNOSIS — G56.03 BILATERAL CARPAL TUNNEL SYNDROME: Primary | ICD-10-CM

## 2023-09-13 DIAGNOSIS — R60.9 EDEMA, UNSPECIFIED TYPE: ICD-10-CM

## 2023-09-13 LAB
ANION GAP SERPL CALC-SCNC: 10 MMOL/L (ref 8–16)
BUN SERPL-MCNC: 13 MG/DL (ref 8–23)
CALCIUM SERPL-MCNC: 10 MG/DL (ref 8.7–10.5)
CHLORIDE SERPL-SCNC: 108 MMOL/L (ref 95–110)
CO2 SERPL-SCNC: 25 MMOL/L (ref 23–29)
CREAT SERPL-MCNC: 1.4 MG/DL (ref 0.5–1.4)
EST. GFR  (NO RACE VARIABLE): 36 ML/MIN/1.73 M^2
GLUCOSE SERPL-MCNC: 146 MG/DL (ref 70–110)
POTASSIUM SERPL-SCNC: 4.1 MMOL/L (ref 3.5–5.1)
SODIUM SERPL-SCNC: 143 MMOL/L (ref 136–145)

## 2023-09-13 PROCEDURE — 80048 BASIC METABOLIC PNL TOTAL CA: CPT | Performed by: INTERNAL MEDICINE

## 2023-09-13 PROCEDURE — 99214 OFFICE O/P EST MOD 30 MIN: CPT | Mod: S$PBB,,, | Performed by: PHYSICIAN ASSISTANT

## 2023-09-13 PROCEDURE — 99214 OFFICE O/P EST MOD 30 MIN: CPT | Mod: PBBFAC | Performed by: PHYSICIAN ASSISTANT

## 2023-09-13 PROCEDURE — 99214 PR OFFICE/OUTPT VISIT, EST, LEVL IV, 30-39 MIN: ICD-10-PCS | Mod: S$PBB,,, | Performed by: PHYSICIAN ASSISTANT

## 2023-09-13 PROCEDURE — 99999 PR PBB SHADOW E&M-EST. PATIENT-LVL IV: ICD-10-PCS | Mod: PBBFAC,,, | Performed by: PHYSICIAN ASSISTANT

## 2023-09-13 PROCEDURE — 36415 COLL VENOUS BLD VENIPUNCTURE: CPT | Performed by: INTERNAL MEDICINE

## 2023-09-13 PROCEDURE — 99999 PR PBB SHADOW E&M-EST. PATIENT-LVL IV: CPT | Mod: PBBFAC,,, | Performed by: PHYSICIAN ASSISTANT

## 2023-09-13 NOTE — PROGRESS NOTES
Subjective:      Patient ID: Debbie Ding is a 89 y.o. female.    Chief Complaint: Follow-up of the Left Hand      HPI  Debbie Ding is a right hand dominant 89 y.o. female presenting today for follow up of bilateral carpal tunnel syndrome. She has constant numbness in the fingertips but sometimes the whole hands will go numb. Pain is most significant on the left side she reports pain at the volar wrist. Pain was improved with prior carpal tunnel injection. She also has known bilateral thumb CMC arthritis she currently denies pain at the thumb bases. Prior EMG results below.     Review of patient's allergies indicates:   Allergen Reactions    Melatonin      Other reaction(s): Other (See Comments)  Sleep Walks and has unnatural dreams    Talwin compound Hives    Talwin [pentazocine lactate] Hallucinations    Trazodone Other (See Comments)     Nightmares/sleep walk      Bentyl [dicyclomine] Anxiety         Current Outpatient Medications   Medication Sig Dispense Refill    acetaminophen (TYLENOL) 500 MG tablet Take 1-2 tablets (500-1,000 mg total) by mouth 3 (three) times daily as needed for Pain.  0    albuterol (PROVENTIL) 2.5 mg /3 mL (0.083 %) nebulizer solution Take 2.5 mg by nebulization every 6 (six) hours as needed. Rescue      albuterol (PROVENTIL/VENTOLIN HFA) 90 mcg/actuation inhaler Inhale 2 puffs into the lungs every 6 (six) hours as needed.      amLODIPine (NORVASC) 2.5 MG tablet Take 1 tablet (2.5 mg total) by mouth once daily. 90 tablet 3    apixaban (ELIQUIS) 2.5 mg Tab Take 1 tablet (2.5 mg total) by mouth once daily. 90 tablet 3    atorvastatin (LIPITOR) 40 MG tablet Take 1 tablet (40 mg total) by mouth once daily. 90 tablet 3    benzonatate (TESSALON) 100 MG capsule benzonatate 100 mg capsule      chlorhexidine (PERIDEX) 0.12 % solution SWISH WITH 10ML FOR 30 seconds THEN EXPECTORATE TWICE DAILY      ciclopirox 1 % shampoo Apply topically.      clobetasoL (TEMOVATE) 0.05 % external solution  Pt to mix in 1 jar of cerave cream and apply to affected areas bid 50 mL 3    clonazePAM (KLONOPIN) 0.125 MG disintegrating tablet PLACE ONE TABLET ON THE TONGUE AND allow TO DISSOLVE ONCE A DAY 30 minutes BEFORE bedtime WITH FOOD      clotrimazole-betamethasone 1-0.05% (LOTRISONE) cream Apply topically 2 (two) times daily. 45 g 2    cyproheptadine (PERIACTIN) 4 mg tablet       donepeziL (ARICEPT) 10 MG tablet TAKE 1 TABLET EVERY MORNING.  NO FURTHER REFILL WITHOUT APPOINTMENT (Patient taking differently: TAKE 1 TABLET EVERY MORNING.  NO FURTHER REFILL WITHOUT APPOINTMENT) 90 tablet 1    doxepin (SINEQUAN) 10 MG capsule TAKE 1 CAPSULE AT BEDTIME  FOR ITCHING 90 capsule 3    DULoxetine (CYMBALTA) 30 MG capsule Take 1 capsule (30 mg total) by mouth once daily. In 1-2 weeks, if no relief, may increase dose to 2 capsules once daily. Call for refills. 30 capsule 1    esomeprazole (NEXIUM) 40 MG capsule Take 1 capsule (40 mg total) by mouth 2 (two) times daily. 180 capsule 3    estradioL (ESTRACE) 0.01 % (0.1 mg/gram) vaginal cream Place vaginally.      famotidine (PEPCID) 20 MG tablet Take 1 tablet (20 mg total) by mouth daily as needed for Heartburn (breakthrough heartburn and acid reflux not controlled with Nexium). 90 tablet 3    flash glucose scanning reader (FREESTYLE ALEXSANDRA 14 DAY READER) Misc 1 Device by Misc.(Non-Drug; Combo Route) route 2 (two) times a day. 1 each 11    flash glucose sensor (FREESTYLE ALEXSANDRA 14 DAY SENSOR) Kit 1 Device by Misc.(Non-Drug; Combo Route) route 2 (two) times a day. 1 kit 11    fluconazole (DIFLUCAN) 100 MG tablet TAKE 1 TABLET BY MOUTH ONCE DAILY FOR FOURTEEN DAYS      fluconazole (DIFLUCAN) 150 MG Tab Take 1 tablet by mouth once daily.      fluconazole (DIFLUCAN) 200 MG Tab       fluocinolone (DERMA-SMOOTHE) 0.01 % external oil fluocinolone 0.01 % scalp oil and shower cap   Apply ONE A SMALL AMOUNT TO affected AREA as directed apply TO SCALP 2-3 TIMES PER WEEK FOR ITCHING]       fluocinonide (LIDEX) 0.05 % external solution AAA scalp qday - bid prn pruritus 60 mL 3    fluticasone-salmeterol diskus inhaler 500-50 mcg USE 1 INHALATION ORALLY    INTO THE LUNGS TWO TIMES A DAY      gabapentin (NEURONTIN) 100 MG capsule gabapentin 100 mg capsule      GEMTESA 75 mg Tab Take 1 tablet by mouth.      glycopyrrolate (ROBINUL) 2 MG Tab TAKE 1 TABLET TWICE A  tablet 3    glycopyrrolate (ROBINUL) 2 MG Tab Take 2 mg by mouth.      hydrocortisone-aloe vera 0.5 % Crea Place 1 application into the left eye 2 (two) times daily.      ketoconazole (NIZORAL) 2 % cream Apply topically once daily. 60 g 2    ketoconazole (NIZORAL) 2 % shampoo Wash hair with medicated shampoo at least 2x/week - let sit on scalp at least 5 minutes prior to rinsing 120 mL 5    levalbuterol (XOPENEX HFA) 45 mcg/actuation inhaler Inhale into the lungs.      levothyroxine (SYNTHROID) 75 MCG tablet Take 1 tablet (75 mcg total) by mouth before breakfast. 90 tablet 3    LIDOcaine 3 % Crea Apply 1 application topically 2 (two) times a day. 85 g 3    LIDOCAINE VISCOUS 2 % solution Take by mouth.      meclizine (ANTIVERT) 25 mg tablet TAKE 1 TABLET TWICE A DAY  AS NEEDED FOR DIZZINESS 180 tablet 1    memantine (NAMENDA) 10 MG Tab Take 10 mg by mouth once daily.      metoclopramide HCl (REGLAN) 5 MG tablet metoclopramide 5 mg tablet      mirtazapine (REMERON) 15 MG tablet Take 1 tablet (15 mg total) by mouth every evening. 90 tablet 3    mupirocin (BACTROBAN) 2 % ointment mupirocin 2 % topical ointment   APPLY LIBERALLY TO THE AFFECTED AREA TWICE DAILY TO WOUND ON SCALP      nystatin (MYCOSTATIN) 100,000 unit/mL suspension SMARTSI Teaspoon By Mouth 4 Times Daily      omeprazole (PRILOSEC) 40 MG capsule       oxyCODONE (ROXICODONE) 5 MG immediate release tablet       pantoprazole (PROTONIX) 40 MG tablet       prednisoLONE acetate (PRED FORTE) 1 % DrpS 3 drops 2 (two) times daily.      predniSONE (DELTASONE) 10 MG tablet 2 TABS BY  MOUTH EVERY MORNING FOR 7 DAYS; 1 TAB EVERY MORNING FOR 7 DAYS; HALF TAB EVERY MORNING FOR 7 DAYS; THEN STOP      promethazine-dextromethorphan (PROMETHAZINE-DM) 6.25-15 mg/5 mL Syrp       RESTASIS 0.05 % ophthalmic emulsion       rivaroxaban (XARELTO) 10 mg Tab Xarelto 10 mg tablet      SYMBICORT 160-4.5 mcg/actuation HFAA Inhale into the lungs.      diclofenac sodium (VOLTAREN) 1 % Gel Apply 2 g topically 3 (three) times daily. for 10 days (Patient not taking: Reported on 7/11/2023) 400 g 0    hydroCHLOROthiazide (HYDRODIURIL) 25 MG tablet Take 1 tablet (25 mg total) by mouth once daily. 90 tablet 0     Current Facility-Administered Medications   Medication Dose Route Frequency Provider Last Rate Last Admin    regadenoson injection 0.4 mg  0.4 mg Intravenous Once Benedicto Love MD PhD         Facility-Administered Medications Ordered in Other Visits   Medication Dose Route Frequency Provider Last Rate Last Admin    midazolam (VERSED) 1 mg/mL injection 0.5 mg  0.5 mg Intravenous PRN Ross Hui MD           Past Medical History:   Diagnosis Date    Allergic rhinitis     Anticoagulant long-term use     Arthritis     Asthma     Bilateral pulmonary embolism 4/27/2019    Cataract     Chronic anticoagulation 9/28/2020    Depression     Diabetes mellitus     Fibromyalgia 7/2/2012    Fibromyalgia     GERD (gastroesophageal reflux disease)     Hypercholesterolemia 9/28/2020    Hypercholesterolemia 9/28/2020    Hypertension 7/2/2012    Thoracic or lumbosacral neuritis or radiculitis, unspecified     Thyroid disease     Ulcer        Past Surgical History:   Procedure Laterality Date    CATARACT EXTRACTION Bilateral     ESOPHAGOGASTRODUODENOSCOPY N/A 3/8/2022    Procedure: EGD (ESOPHAGOGASTRODUODENOSCOPY) with dilation-[facility] with Dr. Meza;  Surgeon: Rebeca Meza MD;  Location: [facility];  Service: Endoscopy;  Laterality: N/A;  1/11-eliquis hold ok see te-tb    ESOPHAGOGASTRODUODENOSCOPY N/A  "2/28/2022    Procedure: EGD (ESOPHAGOGASTRODUODENOSCOPY) with dilation-either [facility] with Dr. Meza;  Surgeon: Rebeca Meza MD;  Location: [address]);  Service: Endoscopy;  Laterality: N/A;  1/11-eliquis hold ok see te-tb  COVID test on 2/25/22 at [facility]  2/22-confirmed appt arrival time with pt-Kp[facility]    FOOT NEUROMA SURGERY  1985    HYSTERECTOMY         Review of Systems:  Constitutional: Negative for chills and fever.   Respiratory: Negative for cough and shortness of breath.    Gastrointestinal: Negative for nausea and vomiting.   Skin: Negative for rash.   Neurological: Negative for dizziness and headaches.   Psychiatric/Behavioral: Negative for depression.   MSK as in HPI       OBJECTIVE:     PHYSICAL EXAM:  Ht 5' 5" (1.651 m)   Wt 61.3 kg (135 lb 2.3 oz)   BMI 22.49 kg/m²     GEN:  NAD, well-developed, well-groomed.  NEURO: Awake, alert, and oriented. Normal attention and concentration.    PSYCH: Normal mood and affect. Behavior is normal.  HEENT: No cervical lymphadenopathy noted.  CARDIOVASCULAR: Radial pulses 2+ bilaterally. No LE edema noted.  PULMONARY: Breath sounds normal. No respiratory distress.  SKIN: Intact, no rashes.      MSK:   BUE:  Good active ROM of the wrist and fingers. Positive tinels at the bilateral carpal tunnel. No change in symptoms with durkans. There is notable thenar atrophy. There is Z deformity of the bilateral thumbs with no significant ttp at the CMC joints. AIN/PIN/Radial/Median/Ulnar Nerves assessed in isolation without deficit. Radial & Ulnar arteries palpated 2+. Capillary Refill <3s.      RADIOGRAPHS:  Bl hands 6/6/23   Impression:   No acute osseous abnormality seen.  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  Comments: I have personally reviewed the imaging and I agree with the above radiologist's report.      Bilateral Hand  EMG 07/19/23   Impression:  Note: Patient advanced age is a limiting factor of this test.  Much of the normative " reference data caps out at age >79.  It is a known phenomenon that both conduction velocity slows (0.5-4m/s per decade over age 60), amplitudes decrease with age (up to 50% by age 70 for SNAP amplitudes, and to a lesser degree CMAP amplitudes), and that MUAP durations increase with age.  Therefore, in patients over the age of 60, some abnormalities may be age related.  The normative data below takes this into account up to age 79, but not beyond, and so age is a limitation for patients older than age 79.       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 Carpal tunnel syndrome).  There is some degree of motor axonal loss.  There is no active denervation.  This is somewhat difficult to grade given advanced age, but likely graded as Moderate-Severe in severity on the right, and Mild-Moderate on the left.    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  This is not a pathologic finding, rather a normal variant.          ASSESSMENT/PLAN:       ICD-10-CM ICD-9-CM   1. Bilateral carpal tunnel syndrome  G56.03 354.0          No orders of the defined types were placed in this encounter.    Plan:   Treatment options discussed. She wishes to proceed with a left carpal tunnel release. Consents reviewed and signed in clinic today all questions answered.   RTC PO. Will proceed with R CTR once left has healed         The patient indicates understanding of these issues and agrees to the plan.    Sona Daley PA-C  [facility]   [facility]  [place], [place]

## 2023-09-14 DIAGNOSIS — G56.03 BILATERAL CARPAL TUNNEL SYNDROME: Primary | ICD-10-CM

## 2023-09-18 DIAGNOSIS — K21.9 GASTROESOPHAGEAL REFLUX DISEASE, UNSPECIFIED WHETHER ESOPHAGITIS PRESENT: ICD-10-CM

## 2023-09-18 DIAGNOSIS — E03.9 HYPOTHYROIDISM, ADULT: ICD-10-CM

## 2023-09-18 NOTE — TELEPHONE ENCOUNTER
Refill Routing Note   Medication(s) are not appropriate for processing by [facility] for the following reason(s):      Non-participating provider:     [facility] action(s):  Route Care Due:  None identified   Medication Therapy Plan:         Appointments  past 12m or future 3m with PCP    Date Provider   Last Visit   7/10/2023 Mandy Winchester PA-C   Next Visit   Visit date not found Mandy Winchester PA-C   ED visits in past 90 days: 0        Note composed:6:27 PM 09/18/2023

## 2023-09-19 RX ORDER — ESOMEPRAZOLE MAGNESIUM 40 MG/1
40 CAPSULE, DELAYED RELEASE ORAL 2 TIMES DAILY
Qty: 180 CAPSULE | Refills: 3 | Status: SHIPPED | OUTPATIENT
Start: 2023-09-19

## 2023-09-19 RX ORDER — LEVOTHYROXINE SODIUM 25 UG/1
TABLET ORAL
Qty: 78 TABLET | Refills: 3 | OUTPATIENT
Start: 2023-09-19

## 2023-09-20 RX ORDER — APIXABAN 2.5 MG/1
2.5 TABLET, FILM COATED ORAL
Qty: 90 TABLET | Refills: 3 | Status: SHIPPED | OUTPATIENT
Start: 2023-09-20

## 2023-09-22 RX ORDER — DULOXETIN HYDROCHLORIDE 30 MG/1
30 CAPSULE, DELAYED RELEASE ORAL DAILY
Qty: 30 CAPSULE | Refills: 0 | Status: SHIPPED | OUTPATIENT
Start: 2023-09-22 | End: 2023-11-16 | Stop reason: SDUPTHER

## 2023-09-28 RX ORDER — GLYCOPYRROLATE 2 MG/1
TABLET ORAL
Qty: 180 TABLET | Refills: 3 | Status: SHIPPED | OUTPATIENT
Start: 2023-09-28

## 2023-09-28 NOTE — TELEPHONE ENCOUNTER
Refill Routing Note   Medication(s) are not appropriate for processing by [facility] for the following reason(s):      Medication outside of protocol    [facility] action(s):  Route Care Due:  None identified            Appointments  past 12m or future 3m with PCP    Date Provider   Last Visit   6/13/2023 Carolyn Mejia MD   Next Visit   10/30/2023 Carolyn Mejia MD   ED visits in past 90 days: 0        Note composed:10:49 AM 09/28/2023          
Discharged

## 2023-09-30 ENCOUNTER — EXTERNAL CHRONIC CARE MANAGEMENT (OUTPATIENT)
Dept: PRIMARY CARE CLINIC | Facility: CLINIC | Age: 88
End: 2023-09-30
Payer: MEDICARE

## 2023-09-30 PROCEDURE — 99490 PR CHRONIC CARE MGMT, 1ST 20 MIN: ICD-10-PCS | Mod: S$PBB,,, | Performed by: INTERNAL MEDICINE

## 2023-09-30 PROCEDURE — 99490 CHRNC CARE MGMT STAFF 1ST 20: CPT | Mod: S$PBB,,, | Performed by: INTERNAL MEDICINE

## 2023-09-30 PROCEDURE — 99490 CHRNC CARE MGMT STAFF 1ST 20: CPT | Mod: PBBFAC | Performed by: INTERNAL MEDICINE

## 2023-10-16 ENCOUNTER — PATIENT OUTREACH (OUTPATIENT)
Dept: ADMINISTRATIVE | Facility: HOSPITAL | Age: 88
End: 2023-10-16
Payer: MEDICARE

## 2023-10-16 ENCOUNTER — TELEPHONE (OUTPATIENT)
Dept: INTERNAL MEDICINE | Facility: CLINIC | Age: 88
End: 2023-10-16
Payer: MEDICARE

## 2023-10-16 NOTE — TELEPHONE ENCOUNTER
----- Message from Mary Lou Williamson sent at 10/16/2023  8:04 AM CDT -----  Contact: [phone]  [email or web address]     Patient is calling for Medical Advice regarding:appt tomorrow for covid shot     How long has patient had these symptoms:    Pharmacy name and phone#:    Would like response via [employer]:  no     Comments:pt is calling asking the dr if it is ok for her to take the covid shot please give return call

## 2023-10-16 NOTE — PROGRESS NOTES
Health Maintenance Due   Topic Date Due    Shingles Vaccine (1 of 2) Never done    Influenza Vaccine (1) 09/01/2023    COVID-19 Vaccine (8 - 2023-24 season) 09/01/2023    Diabetes Urine Screening  09/13/2023    Hemoglobin A1c  10/18/2023        updated. Triggered LINKS and Care everywhere. Chart reviewed. Added lab reminder to upcoming appt notes.     Jelly Antunez LPN   Clinical Care Coordinator  Primary Care and Wellness

## 2023-10-17 ENCOUNTER — TELEPHONE (OUTPATIENT)
Dept: INTERNAL MEDICINE | Facility: CLINIC | Age: 88
End: 2023-10-17
Payer: MEDICARE

## 2023-10-17 NOTE — TELEPHONE ENCOUNTER
----- Message from Fernando Mullen sent at 10/17/2023  8:07 AM CDT -----  Contact: self [phone]  Per pt requesting a call stated have a cold schedule to take a covid vaccine today at noon and will naz advice if still should get also stated called yesterday still awaiting.    Please call and advise

## 2023-10-17 NOTE — TELEPHONE ENCOUNTER
Called patient advised as long as she hasn't had fever in the last 24 hours states she hasn't. She is ok to get the covid vaccine.

## 2023-10-19 NOTE — PRE-PROCEDURE INSTRUCTIONS
Pre admit phone call completed.    Instructions given to patient about NPO status as follows:     The evening before surgery do not eat anything after 9 p.m. ( this includes hard candy, chewing gum and mints).  You may only have GATORADE, POWERADE AND WATER from 9 p.m. until you leave your home. DO NOT  DRINK ANY LIQUIDS ON THE WAY TO THE HOSPITAL.      Patient was also instructed on the below information:    Park in the Parking lot behind the hospital or in the [employer] [facility] across the street from the parking lot.  Parking is complimentary.  If you will be discharged the same day as your procedure, please arrange for a responsible adult to drive you home or  to accompany you if traveling by taxi.  YOU WILL NOT BE PERMITTED TO DRIVE OR TO LEAVE THE HOSPITAL ALONE AFTER SURGERY.  It is strongly recommended that you arrange for someone to remain with you for the first 24 hrs following your surgery.    Patient verbalized understanding of above instructions.

## 2023-10-23 ENCOUNTER — HOSPITAL ENCOUNTER (EMERGENCY)
Facility: OTHER | Age: 88
Discharge: HOME | End: 2023-10-23
Attending: EMERGENCY MEDICINE
Payer: MEDICARE

## 2023-10-23 VITALS
DIASTOLIC BLOOD PRESSURE: 65 MMHG | RESPIRATION RATE: 17 BRPM | HEIGHT: 65 IN | WEIGHT: 130 LBS | OXYGEN SATURATION: 100 % | BODY MASS INDEX: 21.66 KG/M2 | TEMPERATURE: 98 F | SYSTOLIC BLOOD PRESSURE: 136 MMHG | HEART RATE: 81 BPM

## 2023-10-23 DIAGNOSIS — R05.9 COUGH: ICD-10-CM

## 2023-10-23 DIAGNOSIS — J40 BRONCHITIS: Primary | ICD-10-CM

## 2023-10-23 DIAGNOSIS — Z98.890 POST-OPERATIVE STATE: Primary | ICD-10-CM

## 2023-10-23 PROBLEM — G56.02 LEFT CARPAL TUNNEL SYNDROME: Status: ACTIVE | Noted: 2023-10-23

## 2023-10-23 LAB
ALBUMIN SERPL BCP-MCNC: 3.9 G/DL (ref 3.5–5.2)
ALP SERPL-CCNC: 69 U/L (ref 55–135)
ALT SERPL W/O P-5'-P-CCNC: 11 U/L (ref 10–44)
ANION GAP SERPL CALC-SCNC: 8 MMOL/L (ref 8–16)
AST SERPL-CCNC: 18 U/L (ref 10–40)
BASOPHILS # BLD AUTO: 0.03 K/UL (ref 0–0.2)
BASOPHILS NFR BLD: 0.5 % (ref 0–1.9)
BILIRUB SERPL-MCNC: 0.7 MG/DL (ref 0.1–1)
BUN SERPL-MCNC: 18 MG/DL (ref 10–30)
CALCIUM SERPL-MCNC: 9.8 MG/DL (ref 8.7–10.5)
CHLORIDE SERPL-SCNC: 107 MMOL/L (ref 95–110)
CO2 SERPL-SCNC: 24 MMOL/L (ref 23–29)
CREAT SERPL-MCNC: 1.2 MG/DL (ref 0.5–1.4)
CTP QC/QA: YES
CTP QC/QA: YES
DIFFERENTIAL METHOD: ABNORMAL
EOSINOPHIL # BLD AUTO: 0.4 K/UL (ref 0–0.5)
EOSINOPHIL NFR BLD: 6.3 % (ref 0–8)
ERYTHROCYTE [DISTWIDTH] IN BLOOD BY AUTOMATED COUNT: 15.6 % (ref 11.5–14.5)
EST. GFR  (NO RACE VARIABLE): 43 ML/MIN/1.73 M^2
GLUCOSE SERPL-MCNC: 124 MG/DL (ref 70–110)
HCT VFR BLD AUTO: 38.3 % (ref 37–48.5)
HGB BLD-MCNC: 12.8 G/DL (ref 12–16)
IMM GRANULOCYTES # BLD AUTO: 0.02 K/UL (ref 0–0.04)
IMM GRANULOCYTES NFR BLD AUTO: 0.3 % (ref 0–0.5)
LYMPHOCYTES # BLD AUTO: 1.4 K/UL (ref 1–4.8)
LYMPHOCYTES NFR BLD: 22.2 % (ref 18–48)
MCH RBC QN AUTO: 29.6 PG (ref 27–31)
MCHC RBC AUTO-ENTMCNC: 33.4 G/DL (ref 32–36)
MCV RBC AUTO: 89 FL (ref 82–98)
MONOCYTES # BLD AUTO: 0.5 K/UL (ref 0.3–1)
MONOCYTES NFR BLD: 7.1 % (ref 4–15)
NEUTROPHILS # BLD AUTO: 4.1 K/UL (ref 1.8–7.7)
NEUTROPHILS NFR BLD: 63.6 % (ref 38–73)
NRBC BLD-RTO: 0 /100 WBC
PLATELET # BLD AUTO: 171 K/UL (ref 150–450)
PMV BLD AUTO: 10.6 FL (ref 9.2–12.9)
POC MOLECULAR INFLUENZA A AGN: NEGATIVE
POC MOLECULAR INFLUENZA B AGN: NEGATIVE
POTASSIUM SERPL-SCNC: 3.8 MMOL/L (ref 3.5–5.1)
PROT SERPL-MCNC: 7.2 G/DL (ref 6–8.4)
RBC # BLD AUTO: 4.32 M/UL (ref 4–5.4)
SARS-COV-2 RDRP RESP QL NAA+PROBE: NEGATIVE
SODIUM SERPL-SCNC: 139 MMOL/L (ref 136–145)
WBC # BLD AUTO: 6.48 K/UL (ref 3.9–12.7)

## 2023-10-23 PROCEDURE — 85025 COMPLETE CBC W/AUTO DIFF WBC: CPT | Performed by: EMERGENCY MEDICINE

## 2023-10-23 PROCEDURE — 80053 COMPREHEN METABOLIC PANEL: CPT | Performed by: EMERGENCY MEDICINE

## 2023-10-23 PROCEDURE — 99284 EMERGENCY DEPT VISIT MOD MDM: CPT | Mod: 25

## 2023-10-23 PROCEDURE — 94640 AIRWAY INHALATION TREATMENT: CPT

## 2023-10-23 PROCEDURE — 25000242 PHARM REV CODE 250 ALT 637 W/ HCPCS: Performed by: EMERGENCY MEDICINE

## 2023-10-23 PROCEDURE — 87635 SARS-COV-2 COVID-19 AMP PRB: CPT | Performed by: EMERGENCY MEDICINE

## 2023-10-23 RX ORDER — LEVALBUTEROL 1.25 MG/.5ML
1.25 SOLUTION, CONCENTRATE RESPIRATORY (INHALATION) ONCE
Status: COMPLETED | OUTPATIENT
Start: 2023-10-23 | End: 2023-10-23

## 2023-10-23 RX ORDER — MUPIROCIN 20 MG/G
OINTMENT TOPICAL
Status: CANCELLED | OUTPATIENT
Start: 2023-10-23

## 2023-10-23 RX ORDER — AZITHROMYCIN 250 MG/1
250 TABLET, FILM COATED ORAL DAILY
Qty: 5 TABLET | Refills: 0 | Status: SHIPPED | OUTPATIENT
Start: 2023-10-23 | End: 2023-10-30

## 2023-10-23 RX ORDER — IPRATROPIUM BROMIDE 0.5 MG/2.5ML
0.5 SOLUTION RESPIRATORY (INHALATION) ONCE
Status: COMPLETED | OUTPATIENT
Start: 2023-10-23 | End: 2023-10-23

## 2023-10-23 RX ADMIN — LEVALBUTEROL 1.25 MG: 1.25 SOLUTION, CONCENTRATE RESPIRATORY (INHALATION) at 08:10

## 2023-10-23 RX ADMIN — IPRATROPIUM BROMIDE 0.5 MG: 0.5 SOLUTION RESPIRATORY (INHALATION) at 08:10

## 2023-10-23 NOTE — ED TRIAGE NOTES
Pt presents to ED c/o persistent cough x2 weeks. Pt reports her cough mostly dry,  but sometimes is coughing up phlegm. Ongoing despite taking OTC mucinex and robitussin. Coarse breath sounds bilaterally. Hx of asthma and COPD, denies home O2 use. Denies fevers, congestion, sore throat, any other URI symptoms.

## 2023-10-23 NOTE — ED PROVIDER NOTES
Encounter Date: 10/23/2023    SCRIBE #1 NOTE: I, Helena Lou, am scribing for, and in the presence of,  Jessica Ureña MD. I have scribed the following portions of the note - Other sections scribed: HPI, ROS, PE.       History     Chief Complaint   Patient presents with    Cough     Cough the last 1-2 weeks. States she can't stop coughing. Symptoms unrelieved with OTC meds mucinex and robitussin.      Time seen by provider: 7:48 AM    This is a 89 y.o. female with a PMHx of asthma who presents with complaint of a cough onset 1 month ago. The Pt states that this has been an ongoing problem and despite taking several over the counter medications has gotten worse. She reports that the main OTC medication she has been using is Mucinex and DayQuil. She endorses that her cough is productive of yellow sputum. She denies any recent travel. She endorses some SOB, but notes that is normal for herself at baseline. She denies any fever, wheezing, or sore throat. This is the extent of the patient's complaints at this time.       The history is provided by the patient.     Review of patient's allergies indicates:   Allergen Reactions    Melatonin      Other reaction(s): Other (See Comments)  Sleep Walks and has unnatural dreams    Talwin compound Hives    Talwin [pentazocine lactate] Hallucinations    Trazodone Other (See Comments)     Nightmares/sleep walk      Bentyl [dicyclomine] Anxiety     Past Medical History:   Diagnosis Date    Allergic rhinitis     Anticoagulant long-term use     Arthritis     Asthma     Bilateral pulmonary embolism 4/27/2019    Cataract     Chronic anticoagulation 9/28/2020    Depression     Diabetes mellitus     Fibromyalgia 7/2/2012    Fibromyalgia     GERD (gastroesophageal reflux disease)     Hypercholesterolemia 9/28/2020    Hypercholesterolemia 9/28/2020    Hypertension 7/2/2012    Thoracic or lumbosacral neuritis or radiculitis, unspecified     Thyroid disease     Ulcer      Past Surgical  History:   Procedure Laterality Date    CATARACT EXTRACTION Bilateral     ESOPHAGOGASTRODUODENOSCOPY N/A 3/8/2022    Procedure: EGD (ESOPHAGOGASTRODUODENOSCOPY) with dilation-either hospital ok with Dr. Meza;  Surgeon: Rebeca Meza MD;  Location: [facility];  Service: Endoscopy;  Laterality: N/A;  1/11-eliquis hold ok see te-tb    ESOPHAGOGASTRODUODENOSCOPY N/A 2/28/2022    Procedure: EGD (ESOPHAGOGASTRODUODENOSCOPY) with dilation-either hospital ok with Dr. Meza;  Surgeon: Rebeca Meza MD;  Location: [facility] ([facility]);  Service: Endoscopy;  Laterality: N/A;  1/11-eliquis hold ok see te-tb  COVID test on 2/25/22 at [facility]  2/22-confirmed appt arrival time with pt-Kpvt    FOOT NEUROMA SURGERY  1985    HYSTERECTOMY       Family History   Problem Relation Age of Onset    Diabetes Mother     Diabetes Brother     Emphysema Maternal Aunt     Melanoma Neg Hx     Asthma Neg Hx     Colon cancer Neg Hx     Esophageal cancer Neg Hx      Social History     Tobacco Use    Smoking status: Never    Smokeless tobacco: Never   Substance Use Topics    Alcohol use: No    Drug use: No     Review of Systems   Constitutional:  Negative for fever.   HENT:  Negative for sore throat.    Respiratory:  Positive for cough and shortness of breath.    Gastrointestinal:  Negative for nausea.   Genitourinary:  Negative for dysuria.   Musculoskeletal:  Negative for back pain.   Skin:  Negative for rash.   Neurological:  Negative for weakness.   Hematological:  Does not bruise/bleed easily.       Physical Exam     Initial Vitals [10/23/23 0728]   BP Pulse Resp Temp SpO2   (!) 146/66 76 20 98.2 °F (36.8 °C) 97 %      MAP       --         Physical Exam    Constitutional: She appears well-developed. She is cooperative.   Speaking in full sentences.    HENT:   Head: Atraumatic.   Eyes: Conjunctivae and lids are normal.   Neck:   Normal range of motion.  Cardiovascular:  Normal rate.           Pulmonary/Chest: No respiratory distress.    Coarse cough. Coarse breath sounds bilaterally with diminished flow to bilateral bases. Faint wheezing heard on left side.   Musculoskeletal:         General: Normal range of motion.      Cervical back: Normal range of motion.     Neurological: She is alert.   Skin: No rash noted.   Psychiatric: She has a normal mood and affect. Her speech is normal and behavior is normal.         ED Course   Procedures  Labs Reviewed   CBC W/ AUTO DIFFERENTIAL - Abnormal; Notable for the following components:       Result Value    RDW 15.6 (*)     All other components within normal limits   COMPREHENSIVE METABOLIC PANEL - Abnormal; Notable for the following components:    Glucose 124 (*)     eGFR 43 (*)     All other components within normal limits   SARS-COV-2 RDRP GENE   POCT INFLUENZA A/B MOLECULAR          Imaging Results              X-Ray Chest PA And Lateral (Final result)  Result time 10/23/23 09:03:09      Final result by Brandy Mathew MD (10/23/23 09:03:09)                   Impression:      No significant abnormality seen.      Electronically signed by: Brandy Mathew MD  Date:    10/23/2023  Time:    09:03               Narrative:    EXAMINATION:  XR CHEST PA AND LATERAL    TECHNIQUE:  PA and lateral views of the chest were performed.    COMPARISON:  10/07/2022    FINDINGS:  The cardiac silhouette is normal in size, midline.    The pulmonary vascularity is normal.    The pulmonary tono appear normal bilaterally.    The lungs are well expanded and clear.    No focal airspace disease.    No pleural effusion, no pneumothorax.    The osseous structures appear within normal limits for age.                                       Medications   levalbuterol nebulizer solution 1.25 mg (1.25 mg Nebulization Given 10/23/23 0820)     And   ipratropium 0.02 % nebulizer solution 0.5 mg (0.5 mg Nebulization Given 10/23/23 0820)     Medical Decision Making  Urgent evaluation a 89-year-old female here with persistent  cough for several weeks.  Patient has a history of asthma, COPD.  Patient reports yellow sputum production.  Patient denies fevers or chills.  Patient endorses baseline minimal dyspnea on exertion without any change.  Here on examination the patient appears comfortable, faint wheezing noted to bilateral bases with diminished air entry.  Will plan to administer neb trial, chest x-ray and reassess.    Amount and/or Complexity of Data Reviewed  External Data Reviewed: radiology and notes.     Details: Chest x-ray interpreted by myself with no fluid overload, no focal infiltrate, no cardiomegaly  Labs: ordered. Decision-making details documented in ED Course.  Radiology: ordered.  ECG/medicine tests: ordered.    Risk  Prescription drug management.            Scribe Attestation:   Scribe #1: I performed the above scribed service and the documentation accurately describes the services I performed. I attest to the accuracy of the note.        ED Course as of 10/23/23 0942   Mon Oct 23, 2023   0937 BP(!): 146/66  Blood pressure improved prior to discharge home.  Will send home with antibiotics given duration of symptoms and COPD.  Encouraged to follow up with PCP, continue home nebulizers as needed. [DM]      ED Course User Index  [DM] Jessica Ureña MD                Physician Attestation for Scribe: I, Adelita, reviewed documentation as scribed in my presence, which is both accurate and complete.     Clinical Impression:   Final diagnoses:  [R05.9] Cough  [J40] Bronchitis (Primary)        ED Disposition Condition    Discharge Stable          ED Prescriptions       Medication Sig Dispense Start Date End Date Auth. Provider    azithromycin (ZITHROMAX Z-CHELA) 250 MG tablet Take 1 tablet (250 mg total) by mouth once daily. 5 tablet 10/23/2023 -- Jessica Ureña MD          Follow-up Information       Follow up With Specialties Details Why Contact Info    Carolyn Mejia MD Internal Medicine Schedule an appointment as  soon as possible for a visit   [address]  [place] [address]  [phone]               Jessica Ureña MD  10/23/23 0942       Jessica Ureña MD  10/23/23 0902

## 2023-10-24 ENCOUNTER — TELEPHONE (OUTPATIENT)
Dept: ORTHOPEDICS | Facility: CLINIC | Age: 88
End: 2023-10-24
Payer: MEDICARE

## 2023-10-24 ENCOUNTER — TELEPHONE (OUTPATIENT)
Dept: INTERNAL MEDICINE | Facility: CLINIC | Age: 88
End: 2023-10-24
Payer: MEDICARE

## 2023-10-24 NOTE — TELEPHONE ENCOUNTER
----- Message from Carolyn Mejia MD sent at 10/24/2023 12:29 PM CDT -----  Contact: Pt [phone]  Yes, that is ok  ----- Message -----  From: Lucita Mullen MA  Sent: 10/24/2023   8:54 AM CDT  To: Carolyn Mejia MD    Patient would like to know if she can take nexium with z-chela?  ----- Message -----  From: Rc Goodman  Sent: 10/24/2023   8:23 AM CDT  To: Jackie FRANCISCO Staff    Consult    She wants to know if she can take the esomeprazole (NEXIUM) 40 MG capsule with the azithromycin (ZITHROMAX Z-CHELA) 250 MG tablet the she got from the ER doctor yesterday. She said she thinks  the pamphlet with the Zpack mentions something about it not mixing with it.    Thank you

## 2023-10-30 ENCOUNTER — DOCUMENTATION ONLY (OUTPATIENT)
Dept: INTERNAL MEDICINE | Facility: CLINIC | Age: 88
End: 2023-10-30

## 2023-10-30 ENCOUNTER — OFFICE VISIT (OUTPATIENT)
Dept: INTERNAL MEDICINE | Facility: CLINIC | Age: 88
End: 2023-10-30
Payer: MEDICARE

## 2023-10-30 ENCOUNTER — LAB VISIT (OUTPATIENT)
Dept: LAB | Facility: HOSPITAL | Age: 88
End: 2023-10-30
Attending: INTERNAL MEDICINE
Payer: MEDICARE

## 2023-10-30 ENCOUNTER — IMMUNIZATION (OUTPATIENT)
Dept: INTERNAL MEDICINE | Facility: CLINIC | Age: 88
End: 2023-10-30
Payer: MEDICARE

## 2023-10-30 VITALS
HEART RATE: 64 BPM | SYSTOLIC BLOOD PRESSURE: 124 MMHG | BODY MASS INDEX: 22.3 KG/M2 | DIASTOLIC BLOOD PRESSURE: 52 MMHG | OXYGEN SATURATION: 100 % | WEIGHT: 133.81 LBS | HEIGHT: 65 IN

## 2023-10-30 DIAGNOSIS — K21.9 GASTROESOPHAGEAL REFLUX DISEASE WITHOUT ESOPHAGITIS: ICD-10-CM

## 2023-10-30 DIAGNOSIS — N18.31 TYPE 2 DIABETES MELLITUS WITH STAGE 3A CHRONIC KIDNEY DISEASE, WITHOUT LONG-TERM CURRENT USE OF INSULIN: ICD-10-CM

## 2023-10-30 DIAGNOSIS — J41.1 BRONCHITIS, CHRONIC, MUCOPURULENT: Primary | ICD-10-CM

## 2023-10-30 DIAGNOSIS — E03.9 HYPOTHYROIDISM, UNSPECIFIED TYPE: ICD-10-CM

## 2023-10-30 DIAGNOSIS — E11.22 TYPE 2 DIABETES MELLITUS WITH STAGE 3A CHRONIC KIDNEY DISEASE, WITHOUT LONG-TERM CURRENT USE OF INSULIN: ICD-10-CM

## 2023-10-30 DIAGNOSIS — I10 HYPERTENSION, ESSENTIAL: ICD-10-CM

## 2023-10-30 DIAGNOSIS — R60.9 EDEMA, UNSPECIFIED TYPE: ICD-10-CM

## 2023-10-30 DIAGNOSIS — I10 PRIMARY HYPERTENSION: ICD-10-CM

## 2023-10-30 DIAGNOSIS — Z86.718 HISTORY OF DEEP VENOUS THROMBOSIS: ICD-10-CM

## 2023-10-30 DIAGNOSIS — E03.9 HYPOTHYROIDISM, ADULT: ICD-10-CM

## 2023-10-30 DIAGNOSIS — K21.9 GASTROESOPHAGEAL REFLUX DISEASE, UNSPECIFIED WHETHER ESOPHAGITIS PRESENT: ICD-10-CM

## 2023-10-30 DIAGNOSIS — G47.33 OSA (OBSTRUCTIVE SLEEP APNEA): ICD-10-CM

## 2023-10-30 DIAGNOSIS — M79.7 FIBROMYALGIA: ICD-10-CM

## 2023-10-30 PROBLEM — Z86.16 HISTORY OF COVID-19: Status: RESOLVED | Noted: 2022-05-27 | Resolved: 2023-10-30

## 2023-10-30 LAB
ESTIMATED AVG GLUCOSE: 134 MG/DL (ref 68–131)
HBA1C MFR BLD: 6.3 % (ref 4–5.6)

## 2023-10-30 PROCEDURE — 99999PBSHW FLU VACCINE - QUADRIVALENT - ADJUVANTED: ICD-10-PCS | Mod: PBBFAC,,,

## 2023-10-30 PROCEDURE — 99999 PR PBB SHADOW E&M-EST. PATIENT-LVL V: CPT | Mod: PBBFAC,,, | Performed by: INTERNAL MEDICINE

## 2023-10-30 PROCEDURE — G0008 ADMIN INFLUENZA VIRUS VAC: HCPCS | Mod: PBBFAC

## 2023-10-30 PROCEDURE — 99215 OFFICE O/P EST HI 40 MIN: CPT | Mod: PBBFAC,25 | Performed by: INTERNAL MEDICINE

## 2023-10-30 PROCEDURE — 99215 PR OFFICE/OUTPT VISIT, EST, LEVL V, 40-54 MIN: ICD-10-PCS | Mod: S$PBB,,, | Performed by: INTERNAL MEDICINE

## 2023-10-30 PROCEDURE — 36415 COLL VENOUS BLD VENIPUNCTURE: CPT | Performed by: INTERNAL MEDICINE

## 2023-10-30 PROCEDURE — 99999PBSHW FLU VACCINE - QUADRIVALENT - ADJUVANTED: Mod: PBBFAC,,,

## 2023-10-30 PROCEDURE — 83036 HEMOGLOBIN GLYCOSYLATED A1C: CPT | Performed by: INTERNAL MEDICINE

## 2023-10-30 PROCEDURE — 99999 PR PBB SHADOW E&M-EST. PATIENT-LVL V: ICD-10-PCS | Mod: PBBFAC,,, | Performed by: INTERNAL MEDICINE

## 2023-10-30 PROCEDURE — 99215 OFFICE O/P EST HI 40 MIN: CPT | Mod: S$PBB,,, | Performed by: INTERNAL MEDICINE

## 2023-10-30 RX ORDER — LEVOTHYROXINE SODIUM 75 UG/1
75 TABLET ORAL
Qty: 90 TABLET | Refills: 3 | Status: SHIPPED | OUTPATIENT
Start: 2023-10-30 | End: 2023-12-06 | Stop reason: SDUPTHER

## 2023-10-30 RX ORDER — PREDNISONE 20 MG/1
40 TABLET ORAL DAILY
Qty: 10 TABLET | Refills: 0 | Status: SHIPPED | OUTPATIENT
Start: 2023-10-30 | End: 2023-11-04

## 2023-10-30 RX ORDER — AMLODIPINE BESYLATE 2.5 MG/1
2.5 TABLET ORAL DAILY
Qty: 90 TABLET | Refills: 3 | Status: ON HOLD | OUTPATIENT
Start: 2023-10-30 | End: 2024-01-03

## 2023-10-30 RX ORDER — MEMANTINE HYDROCHLORIDE 10 MG/1
10 TABLET ORAL DAILY
Qty: 180 TABLET | Refills: 3 | Status: SHIPPED | OUTPATIENT
Start: 2023-10-30

## 2023-10-30 RX ORDER — FAMOTIDINE 20 MG/1
20 TABLET, FILM COATED ORAL DAILY PRN
Qty: 90 TABLET | Refills: 3 | Status: SHIPPED | OUTPATIENT
Start: 2023-10-30 | End: 2024-10-29

## 2023-10-30 RX ORDER — PROMETHAZINE HYDROCHLORIDE AND DEXTROMETHORPHAN HYDROBROMIDE 6.25; 15 MG/5ML; MG/5ML
5 SYRUP ORAL EVERY 8 HOURS PRN
Qty: 118 ML | Refills: 0 | Status: ON HOLD | OUTPATIENT
Start: 2023-10-30 | End: 2024-01-03

## 2023-10-30 NOTE — Clinical Note
Franc- could you tell me what long acting beta agonist/steroid is covered.? She told me Symbicort is not THanks so much

## 2023-10-30 NOTE — PROGRESS NOTES
Subjective     Patient ID: Debbie Ding is a 89 y.o. female.    Chief Complaint: Follow-up    Follow-up  Pertinent negatives include no abdominal pain, chest pain, congestion, coughing, fever, headaches, nausea or rash.     Cough x 3 weeks, improving..  Stable sob.  Not wheezing.  She has moderate persistent asthma.    She hasn't seen her pulmonologist recently.   In May, told to rduce prednisone to  mg daily, but she is not taking.  ED visit 10/23:  cxr clear.  Was given zpak.  She would like a cough suppressant.  Her insurance is not covering symbicort.    She c/o burning of tongue, which she assumes is thrush.  Shehas taken diflucan a number of times.    Advised against taking 5 h [employer] suplement, as contains caffeine.    C/o chronic fatigue.    She has JASE, but is not using CPAP machine..    Chronic edema r foot, improved. .  Dr Cline has evaluated.  Remote h/o R DVT.  Amlodipine was reduced to 2.5  Mg in July, in hopes that would help, and it may have..      CTS - surgery on L was postponed.    Gerd- tx with nexium.  Not taking reglan.    Fibromyalgia, tx with cymbalta.    Iching improved.  Takes sinequan prn.    Diet controlled DM.  A1c 6.7 6 mo ago  Review of Systems   Constitutional:  Negative for fever and unexpected weight change.   HENT:  Negative for nasal congestion and postnasal drip.    Eyes:  Negative for pain, discharge and visual disturbance.   Respiratory:  Negative for cough, chest tightness, shortness of breath and wheezing.    Cardiovascular:  Negative for chest pain and leg swelling.   Gastrointestinal:  Negative for abdominal pain, constipation, diarrhea and nausea.   Genitourinary:  Negative for difficulty urinating, dysuria and hematuria.   Integumentary:  Negative for rash.   Neurological:  Negative for headaches.   Psychiatric/Behavioral:  Negative for dysphoric mood and sleep disturbance. The patient is not nervous/anxious.               Objective     Physical Exam  Constitutional:        General: She is not in acute distress.     Appearance: She is well-developed. She is not ill-appearing, toxic-appearing or diaphoretic.   HENT:      Mouth/Throat:      Comments: No thrush  Eyes:      General: No scleral icterus.  Neck:      Thyroid: No thyromegaly.      Vascular: No JVD.   Cardiovascular:      Rate and Rhythm: Normal rate and regular rhythm.      Heart sounds: Normal heart sounds. No murmur heard.  Pulmonary:      Effort: Pulmonary effort is normal. No respiratory distress.      Breath sounds: Normal breath sounds. No stridor. No wheezing, rhonchi or rales.   Abdominal:      General: There is no distension.      Palpations: Abdomen is soft. There is no mass.      Tenderness: There is no abdominal tenderness. There is no guarding.      Hernia: No hernia is present.   Musculoskeletal:      Cervical back: No rigidity or tenderness.      Right lower leg: No edema.      Left lower leg: No edema.   Lymphadenopathy:      Cervical: No cervical adenopathy.   Neurological:      Mental Status: She is alert and oriented to person, place, and time.   Psychiatric:         Mood and Affect: Mood normal.         Behavior: Behavior normal.         Thought Content: Thought content normal.            Lab Results   Component Value Date    WBC 6.48 10/23/2023    HGB 12.8 10/23/2023    HCT 38.3 10/23/2023     10/23/2023    CHOL 191 04/18/2023    TRIG 138 04/18/2023    HDL 99 (H) 04/18/2023    ALT 11 10/23/2023    AST 18 10/23/2023     10/23/2023    K 3.8 10/23/2023     10/23/2023    CREATININE 1.2 10/23/2023    BUN 18 10/23/2023    CO2 24 10/23/2023    TSH 2.170 09/05/2023    INR 0.9 06/20/2019    HGBA1C 6.3 (H) 10/30/2023     Assessment and Plan     1. Bronchitis, chronic, mucopurulent  -     promethazine-dextromethorphan (PROMETHAZINE-DM) 6.25-15 mg/5 mL Syrp; Take 5 mLs by mouth every 8 (eight) hours as needed (cough).  Dispense: 118 mL; Refill: 0    2. Hypothyroidism, adult  -      levothyroxine (SYNTHROID) 75 MCG tablet; Take 1 tablet (75 mcg total) by mouth before breakfast.  Dispense: 90 tablet; Refill: 3    3. Gastroesophageal reflux disease, unspecified whether esophagitis present  -     famotidine (PEPCID) 20 MG tablet; Take 1 tablet (20 mg total) by mouth daily as needed for Heartburn (breakthrough heartburn and acid reflux not controlled with Nexium).  Dispense: 90 tablet; Refill: 3    4. Hypertension, essential  -     amLODIPine (NORVASC) 2.5 MG tablet; Take 1 tablet (2.5 mg total) by mouth once daily.  Dispense: 90 tablet; Refill: 3    5. Fibromyalgia    6. Gastroesophageal reflux disease without esophagitis    7. History of deep venous thrombosis  Overview:  4/2019: DVT.      8. Primary hypertension  Overview:  2019: Diagnosed.      9. Hypothyroidism, unspecified type  Overview:   2000: Diagnosed.      10. Type 2 diabetes mellitus with stage 3a chronic kidney disease, without long-term current use of insulin  Overview:  2019: Diagnosed. Complications: CKD3a. Medications: Diet.    Orders:  -     Hemoglobin A1C; Future; Expected date: 01/28/2024    11. JASE (obstructive sleep apnea)  -     CPAP/BIPAP SUPPLIES    12. Edema, unspecified type    Other orders  -     memantine (NAMENDA) 10 MG Tab; Take 1 tablet (10 mg total) by mouth once daily.  Dispense: 180 tablet; Refill: 3  -     predniSONE (DELTASONE) 20 MG tablet; Take 2 tablets (40 mg total) by mouth once daily. for 5 days  Dispense: 10 tablet; Refill: 0      FLu and RSV.  F/u pulmonary  Prednisone burst.  Will determine what steroid inhaler is covered by her insurance.  Compression stockings.  Order nasal cpap mask.  Restart cpap  Mandy 6 months.     [facility] pharmacy - steroid inhaler?  More than 40 min spent with patient    (Not clear why she is taking namenda and aricept, as obviously not demented, but doing very sell so will continue.  Discussed with pt.)    Follow up in about 6 months (around 4/30/2024) for appt with Mandy.

## 2023-10-31 ENCOUNTER — EXTERNAL CHRONIC CARE MANAGEMENT (OUTPATIENT)
Dept: PRIMARY CARE CLINIC | Facility: CLINIC | Age: 88
End: 2023-10-31
Payer: MEDICARE

## 2023-10-31 PROCEDURE — 99490 PR CHRONIC CARE MGMT, 1ST 20 MIN: ICD-10-PCS | Mod: S$PBB,,, | Performed by: INTERNAL MEDICINE

## 2023-10-31 PROCEDURE — 99490 CHRNC CARE MGMT STAFF 1ST 20: CPT | Mod: S$PBB,,, | Performed by: INTERNAL MEDICINE

## 2023-10-31 PROCEDURE — 99490 CHRNC CARE MGMT STAFF 1ST 20: CPT | Mod: PBBFAC | Performed by: INTERNAL MEDICINE

## 2023-11-16 RX ORDER — DULOXETIN HYDROCHLORIDE 30 MG/1
30 CAPSULE, DELAYED RELEASE ORAL DAILY
Qty: 30 CAPSULE | Refills: 2 | Status: SHIPPED | OUTPATIENT
Start: 2023-11-16 | End: 2023-11-27 | Stop reason: SDUPTHER

## 2023-11-17 DIAGNOSIS — E03.9 HYPOTHYROIDISM, ADULT: ICD-10-CM

## 2023-11-17 DIAGNOSIS — E78.5 HYPERLIPIDEMIA, UNSPECIFIED HYPERLIPIDEMIA TYPE: ICD-10-CM

## 2023-11-17 RX ORDER — ATORVASTATIN CALCIUM 40 MG/1
40 TABLET, FILM COATED ORAL DAILY
Qty: 90 TABLET | Refills: 1 | Status: SHIPPED | OUTPATIENT
Start: 2023-11-17

## 2023-11-17 RX ORDER — LEVOTHYROXINE SODIUM 25 UG/1
TABLET ORAL
Qty: 78 TABLET | Refills: 3 | OUTPATIENT
Start: 2023-11-17

## 2023-11-17 NOTE — TELEPHONE ENCOUNTER
Refill Routing Note   Medication(s) are not appropriate for processing by [facility] for the following reason(s):        No active prescription written by provider    [facility] action(s):  Quick Discontinue  Defer        Medication Therapy Plan: Levothyroxine- discontinued 7/2/23 by Dr Carolyn Mejia; dose adjustment      Appointments  past 12m or future 3m with PCP    Date Provider   Last Visit   10/30/2023 Carolyn Mejia MD   Next Visit   11/17/2023 Carolyn Mejia MD   ED visits in past 90 days: 1        Note composed:11:04 AM 11/17/2023

## 2023-11-17 NOTE — TELEPHONE ENCOUNTER
No care due was identified.  Health [facility] Embedded Care Due Messages. Reference number: [id number].   11/17/2023 10:48:22 AM CST

## 2023-11-27 ENCOUNTER — TELEPHONE (OUTPATIENT)
Dept: NEUROLOGY | Facility: CLINIC | Age: 88
End: 2023-11-27
Payer: MEDICARE

## 2023-11-27 DIAGNOSIS — Z98.890 POST-OPERATIVE STATE: Primary | ICD-10-CM

## 2023-11-27 RX ORDER — DULOXETIN HYDROCHLORIDE 30 MG/1
30 CAPSULE, DELAYED RELEASE ORAL DAILY
Qty: 30 CAPSULE | Refills: 2 | Status: SHIPPED | OUTPATIENT
Start: 2023-11-27 | End: 2023-11-28 | Stop reason: SDUPTHER

## 2023-11-27 RX ORDER — TRAMADOL HYDROCHLORIDE 50 MG/1
50 TABLET ORAL EVERY 6 HOURS PRN
Qty: 10 TABLET | Refills: 0 | Status: ON HOLD | OUTPATIENT
Start: 2023-11-27 | End: 2024-01-03

## 2023-11-27 NOTE — H&P
Orthopedic Surgery  History & Physical    Subjective:      Patient ID: Debbie Ding is a 89 y.o. female.    Chief Complaint: No chief complaint on file.      HPI  Debbie Ding is a right hand dominant 89 y.o. female presenting today for follow up of bilateral carpal tunnel syndrome. She has constant numbness in the fingertips but sometimes the whole hands will go numb. Pain is most significant on the left side she reports pain at the volar wrist. Pain was improved with prior carpal tunnel injection. She also has known bilateral thumb CMC arthritis she currently denies pain at the thumb bases. Prior EMG results below.     Review of patient's allergies indicates:   Allergen Reactions    Melatonin      Other reaction(s): Other (See Comments)  Sleep Walks and has unnatural dreams    Talwin compound Hives    Talwin [pentazocine lactate] Hallucinations    Trazodone Other (See Comments)     Nightmares/sleep walk      Bentyl [dicyclomine] Anxiety         Current Facility-Administered Medications   Medication Dose Route Frequency Provider Last Rate Last Admin    regadenoson injection 0.4 mg  0.4 mg Intravenous Once Benedicto Love MD PhD         Current Outpatient Medications   Medication Sig Dispense Refill    acetaminophen (TYLENOL) 500 MG tablet Take 1-2 tablets (500-1,000 mg total) by mouth 3 (three) times daily as needed for Pain.  0    doxepin (SINEQUAN) 10 MG capsule TAKE 1 CAPSULE AT BEDTIME  FOR ITCHING 90 capsule 3    ELIQUIS 2.5 mg Tab TAKE 1 TABLET DAILY 90 tablet 3    esomeprazole (NEXIUM) 40 MG capsule TAKE 1 CAPSULE TWICE DAILY 180 capsule 3    GEMTESA 75 mg Tab Take 1 tablet by mouth twice a week.      glycopyrrolate (ROBINUL) 2 MG Tab TAKE 1 TABLET TWICE A  tablet 3    hydroCHLOROthiazide (HYDRODIURIL) 25 MG tablet Take 1 tablet (25 mg total) by mouth once daily. 90 tablet 0    mirtazapine (REMERON) 15 MG tablet Take 1 tablet (15 mg total) by mouth every evening. 90 tablet 3    albuterol  (PROVENTIL) 2.5 mg /3 mL (0.083 %) nebulizer solution Take 2.5 mg by nebulization every 6 (six) hours as needed. Rescue      albuterol (PROVENTIL/VENTOLIN HFA) 90 mcg/actuation inhaler Inhale 2 puffs into the lungs every 6 (six) hours as needed.      amLODIPine (NORVASC) 2.5 MG tablet Take 1 tablet (2.5 mg total) by mouth once daily. 90 tablet 3    atorvastatin (LIPITOR) 40 MG tablet Take 1 tablet (40 mg total) by mouth once daily. 90 tablet 1    benzonatate (TESSALON) 100 MG capsule benzonatate 100 mg capsule      chlorhexidine (PERIDEX) 0.12 % solution SWISH WITH 10ML FOR 30 seconds THEN EXPECTORATE TWICE DAILY      ciclopirox 1 % shampoo Apply topically.      clobetasoL (TEMOVATE) 0.05 % external solution Pt to mix in 1 jar of cerave cream and apply to affected areas bid 50 mL 3    clotrimazole-betamethasone 1-0.05% (LOTRISONE) cream Apply topically 2 (two) times daily. 45 g 2    cyproheptadine (PERIACTIN) 4 mg tablet       diclofenac sodium (VOLTAREN) 1 % Gel Apply 2 g topically 3 (three) times daily. for 10 days (Patient not taking: Reported on 7/11/2023) 400 g 0    donepeziL (ARICEPT) 10 MG tablet TAKE 1 TABLET EVERY MORNING.  NO FURTHER REFILL WITHOUT APPOINTMENT (Patient taking differently: TAKE 1 TABLET EVERY MORNING.  NO FURTHER REFILL WITHOUT APPOINTMENT) 90 tablet 1    DULoxetine (CYMBALTA) 30 MG capsule Take 1 capsule (30 mg total) by mouth once daily. In 1-2 weeks, if no relief, may increase dose to 2 capsules once daily. Call for refills. 30 capsule 2    estradioL (ESTRACE) 0.01 % (0.1 mg/gram) vaginal cream Place vaginally.      famotidine (PEPCID) 20 MG tablet Take 1 tablet (20 mg total) by mouth daily as needed for Heartburn (breakthrough heartburn and acid reflux not controlled with Nexium). 90 tablet 3    fluconazole (DIFLUCAN) 150 MG Tab Take 1 tablet by mouth once daily.      fluocinolone (DERMA-SMOOTHE) 0.01 % external oil fluocinolone 0.01 % scalp oil and shower cap   Apply ONE A SMALL AMOUNT  TO affected AREA as directed apply TO SCALP 2-3 TIMES PER WEEK FOR ITCHING]      fluocinonide (LIDEX) 0.05 % external solution AAA scalp qday - bid prn pruritus 60 mL 3    fluticasone-salmeterol diskus inhaler 500-50 mcg USE 1 INHALATION ORALLY    INTO THE LUNGS TWO TIMES A DAY      hydrocortisone-aloe vera 0.5 % Crea Place 1 application into the left eye 2 (two) times daily.      ketoconazole (NIZORAL) 2 % cream Apply topically once daily. 60 g 2    ketoconazole (NIZORAL) 2 % shampoo Wash hair with medicated shampoo at least 2x/week - let sit on scalp at least 5 minutes prior to rinsing 120 mL 5    levothyroxine (SYNTHROID) 75 MCG tablet Take 1 tablet (75 mcg total) by mouth before breakfast. 90 tablet 3    LIDOcaine 3 % Crea Apply 1 application topically 2 (two) times a day. 85 g 3    LIDOCAINE VISCOUS 2 % solution Take by mouth.      meclizine (ANTIVERT) 25 mg tablet TAKE 1 TABLET TWICE A DAY  AS NEEDED FOR DIZZINESS 180 tablet 1    memantine (NAMENDA) 10 MG Tab Take 1 tablet (10 mg total) by mouth once daily. 180 tablet 3    mupirocin (BACTROBAN) 2 % ointment mupirocin 2 % topical ointment   APPLY LIBERALLY TO THE AFFECTED AREA TWICE DAILY TO WOUND ON SCALP      nystatin (MYCOSTATIN) 100,000 unit/mL suspension SMARTSI Teaspoon By Mouth 4 Times Daily      omeprazole (PRILOSEC) 40 MG capsule       prednisoLONE acetate (PRED FORTE) 1 % DrpS 3 drops 2 (two) times daily.      promethazine-dextromethorphan (PROMETHAZINE-DM) 6.25-15 mg/5 mL Syrp Take 5 mLs by mouth every 8 (eight) hours as needed (cough). 118 mL 0    RESTASIS 0.05 % ophthalmic emulsion       traMADoL (ULTRAM) 50 mg tablet Take 1 tablet (50 mg total) by mouth every 6 (six) hours as needed for Pain. 10 tablet 0     Facility-Administered Medications Ordered in Other Encounters   Medication Dose Route Frequency Provider Last Rate Last Admin    midazolam (VERSED) 1 mg/mL injection 0.5 mg  0.5 mg Intravenous PRN Ross Hui MD           Past  "Medical History:   Diagnosis Date    Allergic rhinitis     Anticoagulant long-term use     Arthritis     Asthma     Bilateral pulmonary embolism 4/27/2019    Cataract     Chronic anticoagulation 9/28/2020    Depression     Diabetes mellitus     Fibromyalgia 7/2/2012    Fibromyalgia     GERD (gastroesophageal reflux disease)     Hypercholesterolemia 9/28/2020    Hypercholesterolemia 9/28/2020    Hypertension 7/2/2012    Thoracic or lumbosacral neuritis or radiculitis, unspecified     Thyroid disease     Ulcer        Past Surgical History:   Procedure Laterality Date    CATARACT EXTRACTION Bilateral     ESOPHAGOGASTRODUODENOSCOPY N/A 3/8/2022    Procedure: EGD (ESOPHAGOGASTRODUODENOSCOPY) with dilation-either hospital ok with Dr. Meza;  Surgeon: Rebeca Meza MD;  Location: [facility];  Service: Endoscopy;  Laterality: N/A;  1/11-eliquis hold ok see te-tb    ESOPHAGOGASTRODUODENOSCOPY N/A 2/28/2022    Procedure: EGD (ESOPHAGOGASTRODUODENOSCOPY) with dilation-either [facility] with Dr. Meza;  Surgeon: Rebeca Meza MD;  Location: [address]);  Service: Endoscopy;  Laterality: N/A;  1/11-eliquis hold ok see te-tb  COVID test on 2/25/22 at [facility]  2/22-confirmed appt arrival time with pt-Kpvt    FOOT NEUROMA SURGERY  1985    HYSTERECTOMY         Review of Systems:  Constitutional: Negative for chills and fever.   Respiratory: Negative for cough and shortness of breath.    Gastrointestinal: Negative for nausea and vomiting.   Skin: Negative for rash.   Neurological: Negative for dizziness and headaches.   Psychiatric/Behavioral: Negative for depression.   MSK as in HPI       OBJECTIVE:     PHYSICAL EXAM:  Ht 5' 5" (1.651 m)   Wt 59 kg (130 lb)   Breastfeeding No   BMI 21.63 kg/m²     GEN:  NAD, well-developed, well-groomed.  NEURO: Awake, alert, and oriented. Normal attention and concentration.    PSYCH: Normal mood and affect. Behavior is normal.  HEENT: No cervical lymphadenopathy " noted.  CARDIOVASCULAR: Radial pulses 2+ bilaterally. No LE edema noted.  PULMONARY: Breath sounds normal. No respiratory distress.  SKIN: Intact, no rashes.      MSK:   BUE:  Good active ROM of the wrist and fingers. Positive tinels at the bilateral carpal tunnel. No change in symptoms with durkans. There is notable thenar atrophy. There is Z deformity of the bilateral thumbs with no significant ttp at the CMC joints. AIN/PIN/Radial/Median/Ulnar Nerves assessed in isolation without deficit. Radial & Ulnar arteries palpated 2+. Capillary Refill <3s.      RADIOGRAPHS:  Bl hands 6/6/23   Impression:   No acute osseous abnormality seen.  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  Comments: I have personally reviewed the imaging and I agree with the above radiologist's report.      Bilateral Hand  EMG 07/19/23   Impression:  Note: Patient advanced age is a limiting factor of this test.  Much of the normative reference data caps out at age >79.  It is a known phenomenon that both conduction velocity slows (0.5-4m/s per decade over age 60), amplitudes decrease with age (up to 50% by age 70 for SNAP amplitudes, and to a lesser degree CMAP amplitudes), and that MUAP durations increase with age.  Therefore, in patients over the age of 60, some abnormalities may be age related.  The normative data below takes this into account up to age 79, but not beyond, and so age is a limitation for patients older than age 79.       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 Carpal tunnel syndrome).  There is some degree of motor axonal loss.  There is no active denervation.  This is somewhat difficult to grade given advanced age, but likely graded as Moderate-Severe in severity on the right, and Mild-Moderate on the left.    There was a chronic repetitive discharge in the right pronator teres along with decreased recruitment in the right triceps, suggestive of a  chronic right C6 and/or C7 radiculopathy without active/ongoing denervation, but not definitively so.    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  This is not a pathologic finding, rather a normal variant.          ASSESSMENT/PLAN:       ICD-10-CM ICD-9-CM   1. Left carpal tunnel syndrome  G56.02 354.0       Orders Placed This Encounter    Case Request Operating Room: RELEASE, CARPAL TUNNEL,LEFT     No orders of the defined types were placed in this encounter.    Plan:   Treatment options discussed. She wishes to proceed with a left carpal tunnel release. Consents reviewed and signed in clinic today all questions answered.   RTC PO. Will proceed with R CTR once left has healed         The patient indicates understanding of these issues and agrees to the plan.

## 2023-11-28 ENCOUNTER — TELEPHONE (OUTPATIENT)
Dept: ORTHOPEDICS | Facility: CLINIC | Age: 88
End: 2023-11-28
Payer: MEDICARE

## 2023-11-28 ENCOUNTER — ANESTHESIA EVENT (OUTPATIENT)
Dept: SURGERY | Facility: OTHER | Age: 88
End: 2023-11-28
Payer: MEDICARE

## 2023-11-28 RX ORDER — DULOXETIN HYDROCHLORIDE 30 MG/1
30 CAPSULE, DELAYED RELEASE ORAL DAILY
Qty: 30 CAPSULE | Refills: 2 | Status: SHIPPED | OUTPATIENT
Start: 2023-11-28 | End: 2023-12-29 | Stop reason: ALTCHOICE

## 2023-11-28 NOTE — TELEPHONE ENCOUNTER
LVM for pts daughter, Informed of 6:30 arrival time for 11/29/23 surgery at the [facility]. Reminded pt of NPO status. Jordan message sent

## 2023-11-28 NOTE — TELEPHONE ENCOUNTER
----- Message from Ifeoma Figueroa sent at 11/27/2023  8:06 AM CST -----  Regarding: refill  Contact: [phone]  Who Called: Debbie    Refill or New Rx:refill    RX Name and Strength:DULoxetine (CYMBALTA) 30 MG capsule    Preferred Pharmacy with phone number:[facility] - [place] - [address] [facility] AT [address] to [address] Phone: [phone] Fax:[phone]  Would the patient rather a call back or a response via [facility]? Call back    Best Call Back Number:[phone]    Additional Information: pt stated she is totally out of this medication and needs the provider to send this script with a 90 day supply in order for the insurance to cover it because this medication is considered a maintenance script

## 2023-11-29 ENCOUNTER — ANESTHESIA (OUTPATIENT)
Dept: SURGERY | Facility: OTHER | Age: 88
End: 2023-11-29
Payer: MEDICARE

## 2023-11-29 ENCOUNTER — HOSPITAL ENCOUNTER (OUTPATIENT)
Facility: OTHER | Age: 88
Discharge: HOME | End: 2023-11-29
Attending: ORTHOPAEDIC SURGERY | Admitting: ORTHOPAEDIC SURGERY
Payer: MEDICARE

## 2023-11-29 VITALS
HEART RATE: 82 BPM | HEIGHT: 65 IN | WEIGHT: 130 LBS | DIASTOLIC BLOOD PRESSURE: 67 MMHG | RESPIRATION RATE: 16 BRPM | BODY MASS INDEX: 21.66 KG/M2 | SYSTOLIC BLOOD PRESSURE: 143 MMHG | TEMPERATURE: 98 F | OXYGEN SATURATION: 98 %

## 2023-11-29 DIAGNOSIS — G56.02 LEFT CARPAL TUNNEL SYNDROME: ICD-10-CM

## 2023-11-29 PROCEDURE — 37000008 HC ANESTHESIA 1ST 15 MINUTES: Performed by: ORTHOPAEDIC SURGERY

## 2023-11-29 PROCEDURE — 64721 CARPAL TUNNEL SURGERY: CPT | Mod: LT,GC,, | Performed by: ORTHOPAEDIC SURGERY

## 2023-11-29 PROCEDURE — 71000016 HC POSTOP RECOV ADDL HR: Performed by: ORTHOPAEDIC SURGERY

## 2023-11-29 PROCEDURE — 25000003 PHARM REV CODE 250: Performed by: STUDENT IN AN ORGANIZED HEALTH CARE EDUCATION/TRAINING PROGRAM

## 2023-11-29 PROCEDURE — 71000015 HC POSTOP RECOV 1ST HR: Performed by: ORTHOPAEDIC SURGERY

## 2023-11-29 PROCEDURE — 36000707: Performed by: ORTHOPAEDIC SURGERY

## 2023-11-29 PROCEDURE — 37000009 HC ANESTHESIA EA ADD 15 MINS: Performed by: ORTHOPAEDIC SURGERY

## 2023-11-29 PROCEDURE — 63600175 PHARM REV CODE 636 W HCPCS: Performed by: STUDENT IN AN ORGANIZED HEALTH CARE EDUCATION/TRAINING PROGRAM

## 2023-11-29 PROCEDURE — D9220A PRA ANESTHESIA: Mod: ,,, | Performed by: STUDENT IN AN ORGANIZED HEALTH CARE EDUCATION/TRAINING PROGRAM

## 2023-11-29 PROCEDURE — 63600175 PHARM REV CODE 636 W HCPCS

## 2023-11-29 PROCEDURE — 36000706: Performed by: ORTHOPAEDIC SURGERY

## 2023-11-29 PROCEDURE — D9220A PRA ANESTHESIA: ICD-10-PCS | Mod: ,,, | Performed by: STUDENT IN AN ORGANIZED HEALTH CARE EDUCATION/TRAINING PROGRAM

## 2023-11-29 PROCEDURE — 25000003 PHARM REV CODE 250: Performed by: ORTHOPAEDIC SURGERY

## 2023-11-29 PROCEDURE — 25000003 PHARM REV CODE 250

## 2023-11-29 PROCEDURE — 63600175 PHARM REV CODE 636 W HCPCS: Performed by: ANESTHESIOLOGY

## 2023-11-29 PROCEDURE — 64721 PR REVISE MEDIAN N/CARPAL TUNNEL SURG: ICD-10-PCS | Mod: LT,GC,, | Performed by: ORTHOPAEDIC SURGERY

## 2023-11-29 PROCEDURE — 63600175 PHARM REV CODE 636 W HCPCS: Performed by: ORTHOPAEDIC SURGERY

## 2023-11-29 RX ORDER — LIDOCAINE HYDROCHLORIDE 10 MG/ML
0.5 INJECTION, SOLUTION EPIDURAL; INFILTRATION; INTRACAUDAL; PERINEURAL ONCE
Status: DISCONTINUED | OUTPATIENT
Start: 2023-11-29 | End: 2023-11-29 | Stop reason: HOSPADM

## 2023-11-29 RX ORDER — BUPIVACAINE HYDROCHLORIDE 2.5 MG/ML
INJECTION, SOLUTION EPIDURAL; INFILTRATION; INTRACAUDAL
Status: DISCONTINUED | OUTPATIENT
Start: 2023-11-29 | End: 2023-11-29 | Stop reason: HOSPADM

## 2023-11-29 RX ORDER — LIDOCAINE HYDROCHLORIDE 20 MG/ML
INJECTION INTRAVENOUS
Status: DISCONTINUED | OUTPATIENT
Start: 2023-11-29 | End: 2023-11-29

## 2023-11-29 RX ORDER — SODIUM CHLORIDE, SODIUM LACTATE, POTASSIUM CHLORIDE, CALCIUM CHLORIDE 600; 310; 30; 20 MG/100ML; MG/100ML; MG/100ML; MG/100ML
INJECTION, SOLUTION INTRAVENOUS CONTINUOUS
Status: DISCONTINUED | OUTPATIENT
Start: 2023-11-29 | End: 2023-11-29 | Stop reason: HOSPADM

## 2023-11-29 RX ORDER — PROPOFOL 10 MG/ML
VIAL (ML) INTRAVENOUS
Status: DISCONTINUED | OUTPATIENT
Start: 2023-11-29 | End: 2023-11-29

## 2023-11-29 RX ORDER — LIDOCAINE HYDROCHLORIDE 10 MG/ML
INJECTION, SOLUTION EPIDURAL; INFILTRATION; INTRACAUDAL; PERINEURAL
Status: DISCONTINUED | OUTPATIENT
Start: 2023-11-29 | End: 2023-11-29 | Stop reason: HOSPADM

## 2023-11-29 RX ADMIN — LIDOCAINE HYDROCHLORIDE 60 MG: 20 INJECTION, SOLUTION INTRAVENOUS at 08:11

## 2023-11-29 RX ADMIN — CEFAZOLIN 2 G: 2 INJECTION, POWDER, FOR SOLUTION INTRAMUSCULAR; INTRAVENOUS at 08:11

## 2023-11-29 RX ADMIN — PROPOFOL 50 MG: 10 INJECTION, EMULSION INTRAVENOUS at 08:11

## 2023-11-29 RX ADMIN — SODIUM CHLORIDE, SODIUM LACTATE, POTASSIUM CHLORIDE, AND CALCIUM CHLORIDE: 600; 310; 30; 20 INJECTION, SOLUTION INTRAVENOUS at 08:11

## 2023-11-29 NOTE — BRIEF OP NOTE
[facility] ([place])  Brief Operative Note    Surgery Date: 11/29/2023     Surgeon(s) and Role:     * Avril Hutchison MD - Primary    Assisting Surgeon: None    Pre-op Diagnosis:  Bilateral carpal tunnel syndrome [G56.03]    Post-op Diagnosis:  Post-Op Diagnosis Codes:     * Bilateral carpal tunnel syndrome [G56.03]    Procedure(s) (LRB):  RELEASE, CARPAL TUNNEL,LEFT (Left)    Anesthesia: Local MAC    Operative Findings: See op note    Estimated Blood Loss: * No values recorded between 11/29/2023  8:42 AM and 11/29/2023  8:59 AM *         Specimens:   Specimen (24h ago, onward)      None              Discharge Note    OUTCOME: Patient tolerated treatment/procedure well without complication and is now ready for discharge.    DISPOSITION: Home or Self Care    FINAL DIAGNOSIS:  Left carpal tunnel syndrome    FOLLOWUP: In clinic    DISCHARGE INSTRUCTIONS:    Discharge Procedure Orders   Diet general     Call MD for:  temperature >100.4     Call MD for:  persistent nausea and vomiting     Call MD for:  severe uncontrolled pain     Call MD for:  difficulty breathing, headache or visual disturbances     Call MD for:  redness, tenderness, or signs of infection (pain, swelling, redness, odor or green/yellow discharge around incision site)     Call MD for:  hives     Call MD for:  persistent dizziness or light-headedness     Call MD for:  extreme fatigue     No driving, operating heavy equipment or signing legal documents while taking pain medication     Leave dressing on - Keep it clean, dry, and intact until clinic visit     Non weight bearing

## 2023-11-29 NOTE — PLAN OF CARE
Debbie Ding has met all discharge criteria from Phase II. Vital Signs are stable, ambulating  without difficulty. Discharge instructions given, patient verbalized understanding. Discharged from facility via wheelchair in stable condition.

## 2023-11-29 NOTE — OP NOTE
DATE OF PROCEDURE: 11/29/2023   SERVICE: Orthopedics.   ATTENDING SURGEON: Avril Hutchison M.D.   ASSISTANT SURGEON: Dimitrios To MD - RES  PREOPERATIVE DIAGNOSIS: Left carpal tunnel syndrome.   POSTOPERATIVE DIAGNOSIS: Left carpal tunnel syndrome.   PROCEDURE: Left carpal tunnel release.   ANESTHESIA: Local placed by surgeon and MAC.   FLUIDS: Lactated Ringers.   BLOOD LOSS: None. No blood was given.   TOURNIQUET TIME: 12 minutes.   PACKS AND DRAINS: None.   IMPLANTS: None.   SPECIMENS: None.   COMPLICATIONS: None.   INDICATIONS FOR PROCEDURE: Debbie Ding is a 89 y.o. female  who had numbness   and tingling into her left hand. She has failed conservative treatment, EMG   was positive for carpal tunnel syndrome. Therefore, operative intervention was   deemed necessary. Risks and benefits were explained to the patient in clinic   since performed in clinic.   PROCEDURE IN DETAIL: After correct site was marked with the patient's   participation in the holding area, the patient was brought to the Operating   Room, placed in supine position, underwent MAC anesthesia. A well-padded   nonsterile tourniquet was placed on the left arm. A time-out was called for   correct site, procedure and patient to be indicated. Under sterile conditions,   an injection of lidocaine 1% was injected into the carpal tunnel space. The arm   was prepped and draped in normal sterile fashion. The incision was marked out   using Reyes cardinal lines. The arm was exsanguinated using Esmarch.   Tourniquet was insufflated to 250 mmHg and that is where it remained for a total   of 12 minutes. The incision was made down to the palmar fascia. The palmar   fascia was sharply incised. The transverse ligament was identified. It was   very thick in nature. It was sharply incised. Median nerve was identified. A   Mosquito hemostat was passed from the undersurface of the transverse ligament   proximally and distally to free it from the median  nerve. Metzenbaum scissors   were utilized to cut the transverse ligament proximally and distally. The median nerve was noted to be flattened due to compression. Once that   was completely released, a Mosquito hemostat was passed again to confirm a   complete release. Once that was performed, the area was irrigated with copious   amounts of normal saline. Nylon closed the skin. Sterile dressing was applied.   Tourniquet was deflated. Brisk capillary refill ensued. The patient was   transported to the Recovery Room in stable condition.   POSTOPERATIVE PLAN FOR THIS PATIENT: She is to keep her dressing clean, dry and   intact. We will see her back in 2 weeks for stitch removal.

## 2023-11-29 NOTE — ANESTHESIA POSTPROCEDURE EVALUATION
Anesthesia Post Evaluation    Patient: Debbie Ding    Procedure(s) Performed: Procedure(s) (LRB):  RELEASE, CARPAL TUNNEL,LEFT (Left)    Final Anesthesia Type: MAC      Patient location during evaluation: [facility]  Patient participation: Yes- Able to Participate  Level of consciousness: awake and alert  Post-procedure vital signs: reviewed and stable  Pain management: adequate  Airway patency: patent    PONV status at discharge: No PONV  Anesthetic complications: no      Cardiovascular status: blood pressure returned to baseline and hemodynamically stable  Respiratory status: unassisted, spontaneous ventilation and room air  Hydration status: euvolemic  Follow-up not needed.              Vitals Value Taken Time   /68 11/29/23 0859   Temp 36 11/29/23 0859   Pulse 83 11/29/23 0859   Resp 14 11/29/23 0859   SpO2 98 11/29/23 0859         No case tracking events are documented in the log.      Pain/Mellissa Score: No data recorded

## 2023-11-29 NOTE — ANESTHESIA PREPROCEDURE EVALUATION
11/29/2023  Debbie Ding is a 89 y.o., female.      Pre-op Assessment    I have reviewed the Patient Summary Reports.     I have reviewed the Nursing Notes. I have reviewed the NPO Status.   I have reviewed the Medications.     Review of Systems  Anesthesia Hx:  No problems with previous Anesthesia             Denies Family Hx of Anesthesia complications.    Denies Personal Hx of Anesthesia complications.                    Social:  Non-Smoker, No Alcohol Use       Hematology/Oncology:    Oncology Normal                                   EENT/Dental:  chronic allergic rhinitis           Cardiovascular:     Hypertension    Denies CAD.    Denies CABG/stent.        hyperlipidemia    Hx DVT/PE    2022 - nml EF and neg stress test    pHTN - PAP 40                         Pulmonary:    Asthma moderate   Sleep Apnea                Renal/:  Chronic Renal Disease, CKD                Hepatic/GI:   PUD,  GERD Denies Liver Disease.  Gastroparesis          Musculoskeletal:  Arthritis   fibromyalgia       Spine Disorders: thoracic, lumbar and cervical            Neurological:  Denies TIA.  Denies CVA.    Denies Seizures.         Dementia mild                        Endocrine:  Diabetes Hypothyroidism              Physical Exam  General: Well nourished and Cooperative    Airway:  Mallampati: II   Mouth Opening: Normal  TM Distance: Normal  Neck ROM: Normal ROM    Dental:  Dentures, Partial Dentures        Anesthesia Plan  Type of Anesthesia, risks & benefits discussed:    Anesthesia Type: MAC  Intra-op Monitoring Plan: Standard ASA Monitors  Post Op Pain Control Plan: multimodal analgesia  Induction:  IV  Airway Plan: Video  Informed Consent: Informed consent signed with the Patient and all parties understand the risks and agree with anesthesia plan.  All questions answered.   ASA Score: 3  Day of Surgery Review of  History & Physical: H&P Update referred to the surgeon/provider.  Anesthesia Plan Notes: Saw PCP 10/2023    Ready For Surgery From Anesthesia Perspective.     .

## 2023-11-30 ENCOUNTER — EXTERNAL CHRONIC CARE MANAGEMENT (OUTPATIENT)
Dept: PRIMARY CARE CLINIC | Facility: CLINIC | Age: 88
End: 2023-11-30
Payer: MEDICARE

## 2023-11-30 PROCEDURE — 99490 PR CHRONIC CARE MGMT, 1ST 20 MIN: ICD-10-PCS | Mod: S$PBB,,, | Performed by: INTERNAL MEDICINE

## 2023-11-30 PROCEDURE — 99490 CHRNC CARE MGMT STAFF 1ST 20: CPT | Mod: PBBFAC | Performed by: INTERNAL MEDICINE

## 2023-11-30 PROCEDURE — 99490 CHRNC CARE MGMT STAFF 1ST 20: CPT | Mod: S$PBB,,, | Performed by: INTERNAL MEDICINE

## 2023-12-06 DIAGNOSIS — E03.9 HYPOTHYROIDISM, ADULT: ICD-10-CM

## 2023-12-06 RX ORDER — LEVOTHYROXINE SODIUM 75 UG/1
75 TABLET ORAL
Qty: 90 TABLET | Refills: 3 | Status: SHIPPED | OUTPATIENT
Start: 2023-12-06 | End: 2023-12-19 | Stop reason: SDUPTHER

## 2023-12-06 NOTE — TELEPHONE ENCOUNTER
----- Message from Penny Samuel MA sent at 12/6/2023  8:41 AM CST -----  Requesting an RX refill or new RX.    RX name and strength: Synthroid not Levothyroxine    Is this a refill or new RX: Refill    Is this a 30 day or 90 day RX: N/A    Pharmacy name and phone:     [facility] - [place] - [facility] AT [address] [facility]  [place] [id number]  Phone: [phone] Fax: [phone]

## 2023-12-06 NOTE — TELEPHONE ENCOUNTER
No care due was identified.  Health [facility] Embedded Care Due Messages. Reference number: [id number].   12/06/2023 12:02:29 PM CST

## 2023-12-07 ENCOUNTER — TELEPHONE (OUTPATIENT)
Dept: ORTHOPEDICS | Facility: CLINIC | Age: 88
End: 2023-12-07
Payer: MEDICARE

## 2023-12-07 NOTE — TELEPHONE ENCOUNTER
Spoke to patient and advised he to elevate, ice and move her fingers around to get the swelling down

## 2023-12-12 ENCOUNTER — TELEPHONE (OUTPATIENT)
Dept: ORTHOPEDICS | Facility: CLINIC | Age: 88
End: 2023-12-12
Payer: MEDICARE

## 2023-12-13 ENCOUNTER — OFFICE VISIT (OUTPATIENT)
Dept: ORTHOPEDICS | Facility: CLINIC | Age: 88
End: 2023-12-13
Payer: MEDICARE

## 2023-12-13 VITALS — BODY MASS INDEX: 21.67 KG/M2 | HEIGHT: 65 IN | WEIGHT: 130.06 LBS

## 2023-12-13 DIAGNOSIS — Z98.890 POST-OPERATIVE STATE: Primary | ICD-10-CM

## 2023-12-13 DIAGNOSIS — G56.03 BILATERAL CARPAL TUNNEL SYNDROME: ICD-10-CM

## 2023-12-13 PROCEDURE — 99024 PR POST-OP FOLLOW-UP VISIT: ICD-10-PCS | Mod: POP,,, | Performed by: SPECIALIST/TECHNOLOGIST

## 2023-12-13 PROCEDURE — 99214 OFFICE O/P EST MOD 30 MIN: CPT | Mod: PBBFAC | Performed by: SPECIALIST/TECHNOLOGIST

## 2023-12-13 PROCEDURE — 99999 PR PBB SHADOW E&M-EST. PATIENT-LVL IV: CPT | Mod: PBBFAC,,, | Performed by: SPECIALIST/TECHNOLOGIST

## 2023-12-13 PROCEDURE — 99999 PR PBB SHADOW E&M-EST. PATIENT-LVL IV: ICD-10-PCS | Mod: PBBFAC,,, | Performed by: SPECIALIST/TECHNOLOGIST

## 2023-12-13 PROCEDURE — 99024 POSTOP FOLLOW-UP VISIT: CPT | Mod: POP,,, | Performed by: SPECIALIST/TECHNOLOGIST

## 2023-12-13 NOTE — PROGRESS NOTES
Ms. Ding is here today for a post-operative visit.  She is 14 days status post Left Carpal Tunnel Release by Dr. Hutchison on 11/29/23. She reports that she is in minimal pain. Notes that she will have occasional pain through the wrist and fingers randomly. She states she took all of her Tramadol and is transitioning to Tylenol.   She denies fever, chills, and sweats since the time of the surgery.     Physical exam:    There were no vitals filed for this visit.  Vital signs are stable, patient is afebrile.  Patient is well dressed and well groomed, no acute distress.  Alert and oriented to person, place, and time.  Post op dressing taken down.  Incision is clean, dry and intact.  There is no erythema or exudate.  There is no sign of any infection. She is NVI. Sutures removed without difficulty. Wound not fully closed at distal scar.     Assessment:  s/p Left Carpal Tunnel Release    Plan:  There are no diagnoses linked to this encounter.    - PO instruction reviewed and provided to patient  - Educated patient on HEP ROM of the Elbow, Wrist and Hand for 15 minutes.  - Steri-strips applied to incision.   - Patient to f/u in 1 week for wound check.     Jl Calvin PA-C, [facility]  [facility]  [facility] [place], [place]    Disclaimer: This note has been generated using voice-recognition software. There may be typographical errors that have been missed during proof-reading.

## 2023-12-14 ENCOUNTER — TELEPHONE (OUTPATIENT)
Dept: ORTHOPEDICS | Facility: CLINIC | Age: 88
End: 2023-12-14
Payer: MEDICARE

## 2023-12-19 ENCOUNTER — TELEPHONE (OUTPATIENT)
Dept: INTERNAL MEDICINE | Facility: CLINIC | Age: 88
End: 2023-12-19
Payer: MEDICARE

## 2023-12-19 DIAGNOSIS — E03.9 HYPOTHYROIDISM, ADULT: ICD-10-CM

## 2023-12-19 RX ORDER — LEVOTHYROXINE SODIUM 75 UG/1
75 TABLET ORAL
Qty: 30 TABLET | Refills: 8 | Status: SHIPPED | OUTPATIENT
Start: 2023-12-19 | End: 2024-03-19 | Stop reason: SDUPTHER

## 2023-12-19 NOTE — TELEPHONE ENCOUNTER
No care due was identified.  Health [facility] Embedded Care Due Messages. Reference number: [id number].   12/19/2023 11:17:13 AM CST

## 2023-12-19 NOTE — TELEPHONE ENCOUNTER
----- Message from Cristian Corcoran sent at 12/18/2023 11:33 AM CST -----  Contact: [phone]@Patient  Good morning patient would like a call back to discuss a med that she needs the doc to call in for her. Please give patient a call back [phone]

## 2023-12-19 NOTE — TELEPHONE ENCOUNTER
Refill Decision Note   Debbie Moonblanc  is requesting a refill authorization.  Brief Assessment and Rationale for Refill:  Approve     Medication Therapy Plan:  sending rx to pharmacy requested in message      Comments:     No Care Gaps recommended.     Note composed:3:49 PM 12/19/2023

## 2023-12-19 NOTE — TELEPHONE ENCOUNTER
----- Message from Aleyda Guillory sent at 12/19/2023  8:10 AM CST -----  Contact: Patient @ [phone]  Requesting an RX refill or new RX.  Is this a refill or new RX:   RX name and strength levothyroxine (SYNTHROID) 75 MCG tablet  Is this a 30 day or 90 day RX: 30  Pharmacy name and phone #      [facility]        The doctors have asked that we provide their patients with the following 2 reminders -- prescription refills can take up to 72 hours, and a friendly reminder that in the future you can use your [facility] account to request refills: yes

## 2023-12-20 ENCOUNTER — TELEPHONE (OUTPATIENT)
Dept: ORTHOPEDICS | Facility: CLINIC | Age: 88
End: 2023-12-20
Payer: MEDICARE

## 2023-12-20 ENCOUNTER — OFFICE VISIT (OUTPATIENT)
Dept: INTERNAL MEDICINE | Facility: CLINIC | Age: 88
End: 2023-12-20
Payer: MEDICARE

## 2023-12-20 ENCOUNTER — OFFICE VISIT (OUTPATIENT)
Dept: ORTHOPEDICS | Facility: CLINIC | Age: 88
End: 2023-12-20
Payer: MEDICARE

## 2023-12-20 VITALS — BODY MASS INDEX: 21.67 KG/M2 | WEIGHT: 130.06 LBS | HEIGHT: 65 IN

## 2023-12-20 VITALS
HEART RATE: 71 BPM | BODY MASS INDEX: 21.96 KG/M2 | DIASTOLIC BLOOD PRESSURE: 60 MMHG | SYSTOLIC BLOOD PRESSURE: 120 MMHG | OXYGEN SATURATION: 97 % | HEIGHT: 65 IN | WEIGHT: 131.81 LBS

## 2023-12-20 DIAGNOSIS — G56.03 BILATERAL CARPAL TUNNEL SYNDROME: ICD-10-CM

## 2023-12-20 DIAGNOSIS — E11.22 TYPE 2 DIABETES MELLITUS WITH STAGE 3A CHRONIC KIDNEY DISEASE, WITHOUT LONG-TERM CURRENT USE OF INSULIN: ICD-10-CM

## 2023-12-20 DIAGNOSIS — J45.909 CHRONIC ASTHMA WITHOUT COMPLICATION, UNSPECIFIED ASTHMA SEVERITY, UNSPECIFIED WHETHER PERSISTENT: Primary | ICD-10-CM

## 2023-12-20 DIAGNOSIS — E11.69 HYPERLIPIDEMIA ASSOCIATED WITH TYPE 2 DIABETES MELLITUS: ICD-10-CM

## 2023-12-20 DIAGNOSIS — N18.31 TYPE 2 DIABETES MELLITUS WITH STAGE 3A CHRONIC KIDNEY DISEASE, WITHOUT LONG-TERM CURRENT USE OF INSULIN: ICD-10-CM

## 2023-12-20 DIAGNOSIS — E78.5 HYPERLIPIDEMIA ASSOCIATED WITH TYPE 2 DIABETES MELLITUS: ICD-10-CM

## 2023-12-20 DIAGNOSIS — I10 HYPERTENSION, ESSENTIAL: ICD-10-CM

## 2023-12-20 DIAGNOSIS — E03.9 HYPOTHYROIDISM, UNSPECIFIED TYPE: ICD-10-CM

## 2023-12-20 DIAGNOSIS — Z98.890 POST-OPERATIVE STATE: Primary | ICD-10-CM

## 2023-12-20 DIAGNOSIS — E03.9 HYPOTHYROIDISM, ADULT: ICD-10-CM

## 2023-12-20 PROCEDURE — 99999 PR PBB SHADOW E&M-EST. PATIENT-LVL V: CPT | Mod: PBBFAC,,, | Performed by: PHYSICIAN ASSISTANT

## 2023-12-20 PROCEDURE — 99214 OFFICE O/P EST MOD 30 MIN: CPT | Mod: S$PBB,,, | Performed by: PHYSICIAN ASSISTANT

## 2023-12-20 PROCEDURE — 99999 PR PBB SHADOW E&M-EST. PATIENT-LVL V: ICD-10-PCS | Mod: PBBFAC,,, | Performed by: PHYSICIAN ASSISTANT

## 2023-12-20 PROCEDURE — 99214 PR OFFICE/OUTPT VISIT, EST, LEVL IV, 30-39 MIN: ICD-10-PCS | Mod: S$PBB,,, | Performed by: PHYSICIAN ASSISTANT

## 2023-12-20 PROCEDURE — 99024 PR POST-OP FOLLOW-UP VISIT: ICD-10-PCS | Mod: POP,,, | Performed by: SPECIALIST/TECHNOLOGIST

## 2023-12-20 PROCEDURE — 99999 PR PBB SHADOW E&M-EST. PATIENT-LVL IV: CPT | Mod: PBBFAC,,, | Performed by: SPECIALIST/TECHNOLOGIST

## 2023-12-20 PROCEDURE — 99214 OFFICE O/P EST MOD 30 MIN: CPT | Mod: PBBFAC | Performed by: SPECIALIST/TECHNOLOGIST

## 2023-12-20 PROCEDURE — 99999 PR PBB SHADOW E&M-EST. PATIENT-LVL IV: ICD-10-PCS | Mod: PBBFAC,,, | Performed by: SPECIALIST/TECHNOLOGIST

## 2023-12-20 PROCEDURE — 99215 OFFICE O/P EST HI 40 MIN: CPT | Mod: PBBFAC,27 | Performed by: PHYSICIAN ASSISTANT

## 2023-12-20 PROCEDURE — 99024 POSTOP FOLLOW-UP VISIT: CPT | Mod: POP,,, | Performed by: SPECIALIST/TECHNOLOGIST

## 2023-12-20 RX ORDER — ALBUTEROL SULFATE 0.83 MG/ML
2.5 SOLUTION RESPIRATORY (INHALATION) EVERY 6 HOURS PRN
Qty: 90 EACH | Refills: 3 | Status: SHIPPED | OUTPATIENT
Start: 2023-12-20

## 2023-12-20 RX ORDER — FLUTICASONE PROPIONATE AND SALMETEROL 250; 50 UG/1; UG/1
1 POWDER RESPIRATORY (INHALATION) 2 TIMES DAILY
Qty: 60 EACH | Refills: 6 | Status: SHIPPED | OUTPATIENT
Start: 2023-12-20 | End: 2023-12-20

## 2023-12-20 RX ORDER — FLUTICASONE PROPIONATE AND SALMETEROL XINAFOATE 230; 21 UG/1; UG/1
2 AEROSOL, METERED RESPIRATORY (INHALATION) 2 TIMES DAILY
Qty: 36 G | Refills: 3 | Status: SHIPPED | OUTPATIENT
Start: 2023-12-20 | End: 2024-12-19

## 2023-12-20 NOTE — PROGRESS NOTES
Subjective:       Patient ID: Debbie Ding is a 89 y.o. female.        Chief Complaint: Follow-up    Debbie Ding is an established patient of Carolyn Mejia MD here today for follow up visit.    Left carpal tunnel repair with Dr. Isaias Boggs, doing well, wearing brace    Asthma -   Has been off symbicort for months due to cost  Previously on advair and this was covered so will resume as she has had coughing/wheezing and using albuterol, no wheezing today, overdue for f/u with Dr. Suazo    HTN -   Amlodipine 2.5 mg  Hctz 25 mg  Adjusted 7/2023 to try to help LE edema  Compression stockings are cost prohibitive though helpful in past     JASE     She had covid 5/27/22     Hypothyroidism -  Several dose changes of synthroid over past year  Most recently with TSH up 9.8 6/2023 so synthroid inc from 50 mcg --> 75 mcg  Now on levothyroxine instead of name brand synthroid and having some mild nausea, defers to change med until first of year as in past synthroid was not covered     BMI up to 22  Baseline weight is 140#, today she is 131#  She takes periactin at times  Saw GI dietician who helped with gastroparesis diet, recall she declines reglan due to daughter with facial myoclonus     Recall she had extensive evaluation for weight loss ultimately due to gastroparesis  CT chest/abdomen/pelvis 1/2022 with nothing to explain weight loss  Saw GI and gastric emptying study confirmed gastropearesis 2/3/22, she did not want to take reglan due to concern for side effects  EGD 3/2022 with bravo pH positive, so continued on PPI indefinitely      Started on remeron and titrated to 15 mg to help with weight gain around 6/2021, initially helped to regain some weight, cannot take megace due to h/o DVT/PE     Past medical history -   1.  Anxiety, grief - lorazepam prn, no longer taking lexparo  2.  HTN tx with amlodipine, hctz  3.  Hypothyroidism tx with synthroid (see above)  4.  H/o left shoulder pain that comes/goes  5.   Asthma is followed by Dr. Suazo  6.  She is on eliquis for h/o PE/DVT            Review of Systems   Constitutional:  Negative for chills, diaphoresis, fatigue and fever.   HENT:  Negative for congestion and sore throat.    Eyes:  Negative for visual disturbance.   Respiratory:  Negative for cough, chest tightness and shortness of breath.    Cardiovascular:  Negative for chest pain, palpitations and leg swelling.   Gastrointestinal:  Negative for abdominal pain, blood in stool, constipation, diarrhea, nausea and vomiting.   Genitourinary:  Negative for dysuria, frequency, hematuria and urgency.   Musculoskeletal:  Negative for arthralgias and back pain.   Skin:  Negative for rash.   Neurological:  Negative for dizziness, syncope, weakness and headaches.   Psychiatric/Behavioral:  Negative for dysphoric mood and sleep disturbance. The patient is not nervous/anxious.        Objective:      Physical Exam  Vitals and nursing note reviewed.   Constitutional:       Appearance: Normal appearance. She is well-developed.   HENT:      Head: Normocephalic.      Right Ear: External ear normal.      Left Ear: External ear normal.   Eyes:      Pupils: Pupils are equal, round, and reactive to light.   Cardiovascular:      Rate and Rhythm: Normal rate and regular rhythm.      Heart sounds: Normal heart sounds. No murmur heard.     No friction rub. No gallop.   Pulmonary:      Effort: Pulmonary effort is normal. No respiratory distress.      Breath sounds: Normal breath sounds.   Abdominal:      Palpations: Abdomen is soft.      Tenderness: There is no abdominal tenderness.   Skin:     General: Skin is warm and dry.   Neurological:      Mental Status: She is alert.         Assessment:       1. Chronic asthma without complication, unspecified asthma severity, unspecified whether persistent    2. Hypothyroidism, unspecified type    3. Hypothyroidism, adult    4. Type 2 diabetes mellitus with stage 3a chronic kidney disease, without  "long-term current use of insulin    5. Hypertension, essential    6. Hyperlipidemia associated with type 2 diabetes mellitus        Plan:       Debbie was seen today for follow-up.    Diagnoses and all orders for this visit:    Chronic asthma without complication, unspecified asthma severity, unspecified whether persistent - schedule a f/u with Dr. Suazo  -     albuterol (PROVENTIL) 2.5 mg /3 mL (0.083 %) nebulizer solution; Take 3 mLs (2.5 mg total) by nebulization every 6 (six) hours as needed for Wheezing. Rescue  -     START: fluticasone-salmeterol 230-21 mcg/dose (ADVAIR HFA) 230-21 mcg/actuation HFAA inhaler; Inhale 2 puffs into the lungs 2 (two) times daily. Controller    Hypothyroidism, adult  -     TSH; Future    Type 2 diabetes mellitus with stage 3a chronic kidney disease, without long-term current use of insulin - stable and controlled, continue current regimen  -     Hemoglobin A1C; Future  Lab Results   Component Value Date    HGBA1C 6.3 (H) 10/30/2023    HGBA1C 6.7 (H) 04/18/2023    HGBA1C 6.1 (H) 04/02/2022     Hypertension, essential - stable and controlled, continue current regimen  -     CBC Auto Differential; Future  -     Comprehensive Metabolic Panel; Future  BP Readings from Last 5 Encounters:   12/20/23 120/60   11/29/23 (!) 143/67   10/30/23 (!) 124/52   10/23/23 136/65   08/01/23 (!) 144/63      Hyperlipidemia associated with type 2 diabetes mellitus  -     Lipid Panel; Future    Lab work in 3-4 months and f/u with Dr. Mejia  To let me know if advair is not covered    Pt has been given instructions populated from patient instructions database and has verbalized understanding of the after visit summary and information contained wherein.    Follow up with a primary care provider. May go to ER for acute shortness of breath, lightheadedness, fever, or any other emergent complaints or changes in condition.    "This note will be shared with the patient"    Future Appointments   Date Time " Provider Department Center   1/11/2024 10:00 AM Peter Joe PA-C [facility] ORTHO [facility]   3/21/2024 11:00 AM [facility]   4/22/2024  9:30 AM LAB, [facility]   4/24/2024 10:30 AM Carolyn Mejia MD [facility] CARIASW

## 2023-12-20 NOTE — PATIENT INSTRUCTIONS
Start advair 2 puffs twice daily  Schedule a follow up with Dr. Suazo  Lab work due around 4/2024 and see Dr. Mejia then

## 2023-12-20 NOTE — TELEPHONE ENCOUNTER
----- Message from Judi Palumbo sent at 12/20/2023 11:27 AM CST -----  Regarding: Pt calling to reschedule, not made aware of appt 12/8/2023  Contact: @[phone]  Regarding:Pt calling to reschedule, not made aware of appt 12/8/2023    Contact: Debbie    Type: Call back to advise of appt times    Who Called: Debbie    Would the patient rather a call back or a response via [facility]? Phone call    Best Call Back Number: [phone]

## 2023-12-20 NOTE — TELEPHONE ENCOUNTER
Tried contacting patient regarding scheduling a orthopedics  follow up appointment no answer left message on voice mail.

## 2023-12-21 ENCOUNTER — TELEPHONE (OUTPATIENT)
Dept: INTERNAL MEDICINE | Facility: CLINIC | Age: 88
End: 2023-12-21
Payer: MEDICARE

## 2023-12-21 NOTE — TELEPHONE ENCOUNTER
----- Message from Carolyn Mejia MD sent at 12/20/2023  2:01 PM CST -----    ----- Message -----  From: Franc Moon  Sent: 10/31/2023   7:16 AM CST  To: Carolyn Mejia MD    The following test claim:     Pulmicort Flexhaler 180mcg/actuation $10    Insurance only cover Brand Name for: Advair Diskus 500-50 mcg/dose $5  Insurance only cover Brand Name for: Advair -21 mcg/actuation $10    Insurance only cover Brand Name for: Breo Ellipta 100-25 mcg/dose $10    Sincerely,  Franc Moon  Prior Authorization Department   [facility]  ----- Message -----  From: Carolyn Mejia MD  Sent: 10/30/2023   6:55 PM CDT  To: Lidia- could you tell me what long acting beta agonist/steroid is covered.?  She told me Symbicort is not  THanks so much

## 2023-12-29 ENCOUNTER — OFFICE VISIT (OUTPATIENT)
Dept: URGENT CARE | Facility: CLINIC | Age: 88
End: 2023-12-29
Payer: MEDICARE

## 2023-12-29 ENCOUNTER — HOSPITAL ENCOUNTER (INPATIENT)
Facility: OTHER | Age: 88
LOS: 5 days | Discharge: HOME HEALTH | DRG: 189 | End: 2024-01-03
Attending: EMERGENCY MEDICINE | Admitting: STUDENT IN AN ORGANIZED HEALTH CARE EDUCATION/TRAINING PROGRAM
Payer: MEDICARE

## 2023-12-29 VITALS
HEIGHT: 65 IN | WEIGHT: 131 LBS | RESPIRATION RATE: 16 BRPM | DIASTOLIC BLOOD PRESSURE: 74 MMHG | BODY MASS INDEX: 21.83 KG/M2 | OXYGEN SATURATION: 91 % | TEMPERATURE: 98 F | HEART RATE: 82 BPM | SYSTOLIC BLOOD PRESSURE: 128 MMHG

## 2023-12-29 DIAGNOSIS — R09.02 HYPOXIA: ICD-10-CM

## 2023-12-29 DIAGNOSIS — I10 HYPERTENSION, ESSENTIAL: ICD-10-CM

## 2023-12-29 DIAGNOSIS — R07.9 CHEST PAIN: ICD-10-CM

## 2023-12-29 DIAGNOSIS — M48.061 SPINAL STENOSIS, LUMBAR REGION, WITHOUT NEUROGENIC CLAUDICATION: ICD-10-CM

## 2023-12-29 DIAGNOSIS — J47.9 BRONCHIECTASIS WITHOUT COMPLICATION: ICD-10-CM

## 2023-12-29 DIAGNOSIS — J45.901 EXACERBATION OF ASTHMA, UNSPECIFIED ASTHMA SEVERITY, UNSPECIFIED WHETHER PERSISTENT: Primary | ICD-10-CM

## 2023-12-29 DIAGNOSIS — J18.9 PNEUMONIA OF BOTH LUNGS DUE TO INFECTIOUS ORGANISM, UNSPECIFIED PART OF LUNG: Primary | ICD-10-CM

## 2023-12-29 DIAGNOSIS — M15.9 PRIMARY OSTEOARTHRITIS INVOLVING MULTIPLE JOINTS: ICD-10-CM

## 2023-12-29 DIAGNOSIS — R05.9 COUGH, UNSPECIFIED TYPE: ICD-10-CM

## 2023-12-29 DIAGNOSIS — R05.3 CHRONIC COUGH: ICD-10-CM

## 2023-12-29 DIAGNOSIS — R05.9 COUGH: ICD-10-CM

## 2023-12-29 DIAGNOSIS — R53.81 DEBILITY: ICD-10-CM

## 2023-12-29 PROBLEM — Z79.4 TYPE 2 DIABETES MELLITUS WITH KIDNEY COMPLICATION, WITH LONG-TERM CURRENT USE OF INSULIN: Status: ACTIVE | Noted: 2021-10-08

## 2023-12-29 PROBLEM — E11.29 TYPE 2 DIABETES MELLITUS WITH KIDNEY COMPLICATION, WITH LONG-TERM CURRENT USE OF INSULIN: Status: ACTIVE | Noted: 2021-10-08

## 2023-12-29 PROBLEM — N18.30 STAGE 3 CHRONIC KIDNEY DISEASE: Status: ACTIVE | Noted: 2021-10-10

## 2023-12-29 PROBLEM — J96.01 ACUTE RESPIRATORY FAILURE WITH HYPOXIA: Status: ACTIVE | Noted: 2021-10-10

## 2023-12-29 LAB
ALBUMIN SERPL BCP-MCNC: 3.6 G/DL (ref 3.5–5.2)
ALP SERPL-CCNC: 59 U/L (ref 55–135)
ALT SERPL W/O P-5'-P-CCNC: 11 U/L (ref 10–44)
ANION GAP SERPL CALC-SCNC: 11 MMOL/L (ref 8–16)
AST SERPL-CCNC: 24 U/L (ref 10–40)
BACTERIA #/AREA URNS HPF: ABNORMAL /HPF
BASOPHILS # BLD AUTO: 0.03 K/UL (ref 0–0.2)
BASOPHILS NFR BLD: 0.5 % (ref 0–1.9)
BILIRUB SERPL-MCNC: 0.9 MG/DL (ref 0.1–1)
BILIRUB UR QL STRIP: NEGATIVE
BNP SERPL-MCNC: 108 PG/ML (ref 0–99)
BUN SERPL-MCNC: 14 MG/DL (ref 10–30)
CALCIUM SERPL-MCNC: 9 MG/DL (ref 8.7–10.5)
CHLORIDE SERPL-SCNC: 103 MMOL/L (ref 95–110)
CLARITY UR: ABNORMAL
CO2 SERPL-SCNC: 23 MMOL/L (ref 23–29)
COLOR UR: YELLOW
CREAT SERPL-MCNC: 1.4 MG/DL (ref 0.5–1.4)
CTP QC/QA: YES
DIFFERENTIAL METHOD BLD: ABNORMAL
EOSINOPHIL # BLD AUTO: 0 K/UL (ref 0–0.5)
EOSINOPHIL NFR BLD: 0.2 % (ref 0–8)
ERYTHROCYTE [DISTWIDTH] IN BLOOD BY AUTOMATED COUNT: 15.5 % (ref 11.5–14.5)
EST. GFR  (NO RACE VARIABLE): 36 ML/MIN/1.73 M^2
GLUCOSE SERPL-MCNC: 114 MG/DL (ref 70–110)
GLUCOSE UR QL STRIP: NEGATIVE
HCT VFR BLD AUTO: 36.9 % (ref 37–48.5)
HGB BLD-MCNC: 12.3 G/DL (ref 12–16)
HGB UR QL STRIP: NEGATIVE
HYALINE CASTS #/AREA URNS LPF: 18 /LPF
IMM GRANULOCYTES # BLD AUTO: 0.01 K/UL (ref 0–0.04)
IMM GRANULOCYTES NFR BLD AUTO: 0.2 % (ref 0–0.5)
KETONES UR QL STRIP: NEGATIVE
LACTATE SERPL-SCNC: 1.3 MMOL/L (ref 0.5–2.2)
LEUKOCYTE ESTERASE UR QL STRIP: NEGATIVE
LYMPHOCYTES # BLD AUTO: 0.8 K/UL (ref 1–4.8)
LYMPHOCYTES NFR BLD: 13.5 % (ref 18–48)
MCH RBC QN AUTO: 29.2 PG (ref 27–31)
MCHC RBC AUTO-ENTMCNC: 33.3 G/DL (ref 32–36)
MCV RBC AUTO: 88 FL (ref 82–98)
MICROSCOPIC COMMENT: ABNORMAL
MONOCYTES # BLD AUTO: 0.4 K/UL (ref 0.3–1)
MONOCYTES NFR BLD: 6.9 % (ref 4–15)
NEUTROPHILS # BLD AUTO: 4.5 K/UL (ref 1.8–7.7)
NEUTROPHILS NFR BLD: 78.7 % (ref 38–73)
NITRITE UR QL STRIP: NEGATIVE
NRBC BLD-RTO: 0 /100 WBC
PH UR STRIP: 5 [PH] (ref 5–8)
PLATELET # BLD AUTO: 179 K/UL (ref 150–450)
PMV BLD AUTO: 10.9 FL (ref 9.2–12.9)
POTASSIUM SERPL-SCNC: 3.3 MMOL/L (ref 3.5–5.1)
PROCALCITONIN SERPL IA-MCNC: 0.09 NG/ML
PROT SERPL-MCNC: 7.4 G/DL (ref 6–8.4)
PROT UR QL STRIP: ABNORMAL
RBC # BLD AUTO: 4.21 M/UL (ref 4–5.4)
RBC #/AREA URNS HPF: 6 /HPF (ref 0–4)
SARS-COV-2 AG RESP QL IA.RAPID: NEGATIVE
SODIUM SERPL-SCNC: 137 MMOL/L (ref 136–145)
SP GR UR STRIP: 1.02 (ref 1–1.03)
SQUAMOUS #/AREA URNS HPF: 0 /HPF
TROPONIN I SERPL DL<=0.01 NG/ML-MCNC: <0.006 NG/ML (ref 0–0.03)
URN SPEC COLLECT METH UR: ABNORMAL
UROBILINOGEN UR STRIP-ACNC: NEGATIVE EU/DL
WBC # BLD AUTO: 5.77 K/UL (ref 3.9–12.7)
WBC #/AREA URNS HPF: 4 /HPF (ref 0–5)

## 2023-12-29 PROCEDURE — 83880 ASSAY OF NATRIURETIC PEPTIDE: CPT | Performed by: EMERGENCY MEDICINE

## 2023-12-29 PROCEDURE — 84145 PROCALCITONIN (PCT): CPT | Performed by: EMERGENCY MEDICINE

## 2023-12-29 PROCEDURE — 87811 SARS-COV-2 COVID19 W/OPTIC: CPT | Mod: QW,S$GLB,, | Performed by: FAMILY MEDICINE

## 2023-12-29 PROCEDURE — 99214 PR OFFICE/OUTPT VISIT, EST, LEVL IV, 30-39 MIN: ICD-10-PCS | Mod: S$GLB,,, | Performed by: FAMILY MEDICINE

## 2023-12-29 PROCEDURE — 25000242 PHARM REV CODE 250 ALT 637 W/ HCPCS: Performed by: EMERGENCY MEDICINE

## 2023-12-29 PROCEDURE — 94640 AIRWAY INHALATION TREATMENT: CPT

## 2023-12-29 PROCEDURE — 25000003 PHARM REV CODE 250: Performed by: NURSE PRACTITIONER

## 2023-12-29 PROCEDURE — 87040 BLOOD CULTURE FOR BACTERIA: CPT | Performed by: EMERGENCY MEDICINE

## 2023-12-29 PROCEDURE — 11000001 HC ACUTE MED/SURG PRIVATE ROOM

## 2023-12-29 PROCEDURE — 96365 THER/PROPH/DIAG IV INF INIT: CPT

## 2023-12-29 PROCEDURE — 83605 ASSAY OF LACTIC ACID: CPT | Performed by: EMERGENCY MEDICINE

## 2023-12-29 PROCEDURE — 25000003 PHARM REV CODE 250: Performed by: EMERGENCY MEDICINE

## 2023-12-29 PROCEDURE — 87811 SARS CORONAVIRUS 2 ANTIGEN POCT, MANUAL READ: ICD-10-PCS | Mod: QW,S$GLB,, | Performed by: FAMILY MEDICINE

## 2023-12-29 PROCEDURE — 27000221 HC OXYGEN, UP TO 24 HOURS

## 2023-12-29 PROCEDURE — 84484 ASSAY OF TROPONIN QUANT: CPT | Performed by: EMERGENCY MEDICINE

## 2023-12-29 PROCEDURE — 85025 COMPLETE CBC W/AUTO DIFF WBC: CPT | Performed by: EMERGENCY MEDICINE

## 2023-12-29 PROCEDURE — 25000242 PHARM REV CODE 250 ALT 637 W/ HCPCS: Performed by: NURSE PRACTITIONER

## 2023-12-29 PROCEDURE — 99285 EMERGENCY DEPT VISIT HI MDM: CPT | Mod: 25

## 2023-12-29 PROCEDURE — 81000 URINALYSIS NONAUTO W/SCOPE: CPT | Performed by: EMERGENCY MEDICINE

## 2023-12-29 PROCEDURE — 63600175 PHARM REV CODE 636 W HCPCS: Performed by: EMERGENCY MEDICINE

## 2023-12-29 PROCEDURE — 99214 OFFICE O/P EST MOD 30 MIN: CPT | Mod: S$GLB,,, | Performed by: FAMILY MEDICINE

## 2023-12-29 PROCEDURE — 96375 TX/PRO/DX INJ NEW DRUG ADDON: CPT

## 2023-12-29 PROCEDURE — 96366 THER/PROPH/DIAG IV INF ADDON: CPT

## 2023-12-29 PROCEDURE — 80053 COMPREHEN METABOLIC PANEL: CPT | Performed by: EMERGENCY MEDICINE

## 2023-12-29 PROCEDURE — 94761 N-INVAS EAR/PLS OXIMETRY MLT: CPT

## 2023-12-29 PROCEDURE — 21400001 HC TELEMETRY ROOM

## 2023-12-29 RX ORDER — LEVOTHYROXINE SODIUM 75 UG/1
75 TABLET ORAL
Status: DISCONTINUED | OUTPATIENT
Start: 2023-12-30 | End: 2024-01-03 | Stop reason: HOSPADM

## 2023-12-29 RX ORDER — MEMANTINE HYDROCHLORIDE 5 MG/1
10 TABLET ORAL DAILY
Status: DISCONTINUED | OUTPATIENT
Start: 2023-12-30 | End: 2024-01-03 | Stop reason: HOSPADM

## 2023-12-29 RX ORDER — MAGNESIUM SULFATE HEPTAHYDRATE 40 MG/ML
2 INJECTION, SOLUTION INTRAVENOUS
Status: COMPLETED | OUTPATIENT
Start: 2023-12-29 | End: 2023-12-29

## 2023-12-29 RX ORDER — IBUPROFEN 200 MG
16 TABLET ORAL
Status: DISCONTINUED | OUTPATIENT
Start: 2023-12-29 | End: 2024-01-03 | Stop reason: HOSPADM

## 2023-12-29 RX ORDER — GLUCAGON 1 MG
1 KIT INJECTION
Status: DISCONTINUED | OUTPATIENT
Start: 2023-12-29 | End: 2024-01-03 | Stop reason: HOSPADM

## 2023-12-29 RX ORDER — ATORVASTATIN CALCIUM 20 MG/1
40 TABLET, FILM COATED ORAL DAILY
Status: DISCONTINUED | OUTPATIENT
Start: 2023-12-30 | End: 2024-01-03 | Stop reason: HOSPADM

## 2023-12-29 RX ORDER — GABAPENTIN 100 MG/1
100 CAPSULE ORAL
COMMUNITY
Start: 2023-11-15

## 2023-12-29 RX ORDER — AMLODIPINE BESYLATE 2.5 MG/1
2.5 TABLET ORAL DAILY
Status: DISCONTINUED | OUTPATIENT
Start: 2023-12-30 | End: 2024-01-01

## 2023-12-29 RX ORDER — IPRATROPIUM BROMIDE AND ALBUTEROL SULFATE 2.5; .5 MG/3ML; MG/3ML
3 SOLUTION RESPIRATORY (INHALATION)
Status: COMPLETED | OUTPATIENT
Start: 2023-12-29 | End: 2023-12-29

## 2023-12-29 RX ORDER — MAG HYDROX/ALUMINUM HYD/SIMETH 200-200-20
30 SUSPENSION, ORAL (FINAL DOSE FORM) ORAL 4 TIMES DAILY PRN
Status: DISCONTINUED | OUTPATIENT
Start: 2023-12-29 | End: 2024-01-03 | Stop reason: HOSPADM

## 2023-12-29 RX ORDER — NALOXONE HCL 0.4 MG/ML
0.02 VIAL (ML) INJECTION
Status: DISCONTINUED | OUTPATIENT
Start: 2023-12-29 | End: 2024-01-03 | Stop reason: HOSPADM

## 2023-12-29 RX ORDER — AMOXICILLIN 250 MG
1 CAPSULE ORAL 2 TIMES DAILY
Status: DISCONTINUED | OUTPATIENT
Start: 2023-12-29 | End: 2024-01-03 | Stop reason: HOSPADM

## 2023-12-29 RX ORDER — METHYLPREDNISOLONE SOD SUCC 125 MG
125 VIAL (EA) INJECTION
Status: COMPLETED | OUTPATIENT
Start: 2023-12-29 | End: 2023-12-29

## 2023-12-29 RX ORDER — IBUPROFEN 200 MG
24 TABLET ORAL
Status: DISCONTINUED | OUTPATIENT
Start: 2023-12-29 | End: 2024-01-03 | Stop reason: HOSPADM

## 2023-12-29 RX ORDER — IPRATROPIUM BROMIDE AND ALBUTEROL SULFATE 2.5; .5 MG/3ML; MG/3ML
3 SOLUTION RESPIRATORY (INHALATION) EVERY 4 HOURS
Status: DISCONTINUED | OUTPATIENT
Start: 2023-12-29 | End: 2024-01-02

## 2023-12-29 RX ORDER — PANTOPRAZOLE SODIUM 40 MG/1
40 TABLET, DELAYED RELEASE ORAL DAILY
Status: DISCONTINUED | OUTPATIENT
Start: 2023-12-30 | End: 2024-01-03 | Stop reason: HOSPADM

## 2023-12-29 RX ORDER — ONDANSETRON 2 MG/ML
4 INJECTION INTRAMUSCULAR; INTRAVENOUS EVERY 6 HOURS PRN
Status: DISCONTINUED | OUTPATIENT
Start: 2023-12-29 | End: 2024-01-03 | Stop reason: HOSPADM

## 2023-12-29 RX ORDER — INSULIN ASPART 100 [IU]/ML
0-5 INJECTION, SOLUTION INTRAVENOUS; SUBCUTANEOUS
Status: DISCONTINUED | OUTPATIENT
Start: 2023-12-29 | End: 2024-01-03 | Stop reason: HOSPADM

## 2023-12-29 RX ORDER — ACETAMINOPHEN 500 MG
1000 TABLET ORAL
Status: COMPLETED | OUTPATIENT
Start: 2023-12-29 | End: 2023-12-29

## 2023-12-29 RX ORDER — ACETAMINOPHEN 325 MG/1
650 TABLET ORAL EVERY 8 HOURS PRN
Status: DISCONTINUED | OUTPATIENT
Start: 2023-12-29 | End: 2024-01-03 | Stop reason: HOSPADM

## 2023-12-29 RX ORDER — DONEPEZIL HYDROCHLORIDE 10 MG/1
1 TABLET, FILM COATED ORAL NIGHTLY
COMMUNITY

## 2023-12-29 RX ORDER — SODIUM CHLORIDE 0.9 % (FLUSH) 0.9 %
10 SYRINGE (ML) INJECTION EVERY 8 HOURS PRN
Status: DISCONTINUED | OUTPATIENT
Start: 2023-12-29 | End: 2024-01-03 | Stop reason: HOSPADM

## 2023-12-29 RX ORDER — MIRTAZAPINE 15 MG/1
15 TABLET, FILM COATED ORAL NIGHTLY
Status: DISCONTINUED | OUTPATIENT
Start: 2023-12-29 | End: 2024-01-03 | Stop reason: HOSPADM

## 2023-12-29 RX ORDER — SIMETHICONE 80 MG
1 TABLET,CHEWABLE ORAL 4 TIMES DAILY PRN
Status: DISCONTINUED | OUTPATIENT
Start: 2023-12-29 | End: 2024-01-03 | Stop reason: HOSPADM

## 2023-12-29 RX ORDER — PROCHLORPERAZINE EDISYLATE 5 MG/ML
5 INJECTION INTRAMUSCULAR; INTRAVENOUS EVERY 6 HOURS PRN
Status: DISCONTINUED | OUTPATIENT
Start: 2023-12-29 | End: 2024-01-03 | Stop reason: HOSPADM

## 2023-12-29 RX ORDER — HYDROCHLOROTHIAZIDE 25 MG/1
25 TABLET ORAL DAILY
Status: DISCONTINUED | OUTPATIENT
Start: 2023-12-30 | End: 2024-01-03 | Stop reason: HOSPADM

## 2023-12-29 RX ORDER — PREDNISONE 20 MG/1
40 TABLET ORAL DAILY
Status: DISCONTINUED | OUTPATIENT
Start: 2023-12-30 | End: 2024-01-03 | Stop reason: HOSPADM

## 2023-12-29 RX ADMIN — METHYLPREDNISOLONE SODIUM SUCCINATE 125 MG: 125 INJECTION, POWDER, FOR SOLUTION INTRAMUSCULAR; INTRAVENOUS at 01:12

## 2023-12-29 RX ADMIN — IPRATROPIUM BROMIDE AND ALBUTEROL SULFATE 3 ML: 2.5; .5 SOLUTION RESPIRATORY (INHALATION) at 11:12

## 2023-12-29 RX ADMIN — MAGNESIUM SULFATE HEPTAHYDRATE 2 G: 40 INJECTION, SOLUTION INTRAVENOUS at 03:12

## 2023-12-29 RX ADMIN — ACETAMINOPHEN 1000 MG: 500 TABLET ORAL at 01:12

## 2023-12-29 RX ADMIN — IPRATROPIUM BROMIDE AND ALBUTEROL SULFATE 3 ML: 2.5; .5 SOLUTION RESPIRATORY (INHALATION) at 01:12

## 2023-12-29 RX ADMIN — POTASSIUM BICARBONATE 35 MEQ: 391 TABLET, EFFERVESCENT ORAL at 09:12

## 2023-12-29 RX ADMIN — MIRTAZAPINE 15 MG: 15 TABLET, FILM COATED ORAL at 09:12

## 2023-12-29 RX ADMIN — IPRATROPIUM BROMIDE AND ALBUTEROL SULFATE 3 ML: 2.5; .5 SOLUTION RESPIRATORY (INHALATION) at 08:12

## 2023-12-29 RX ADMIN — SENNOSIDES AND DOCUSATE SODIUM 1 TABLET: 8.6; 5 TABLET ORAL at 09:12

## 2023-12-29 NOTE — Clinical Note
Diagnosis: Hypoxia [028711]   Future Attending Provider: JOHNSON COKER [[id number]]   Admitting Provider:: JOHNSON COKER [[id number]]   Reason for IP Medical Treatment  (Clinical interventions that can only be accomplished in the IP setting? ) :: O2   I certify that Inpatient services for greater than or equal to 2 midnights are medically necessary:: Yes   Plans for Post-Acute care--if anticipated (pick the single best option):: A. No post acute care anticipated at this time

## 2023-12-29 NOTE — ED PROVIDER NOTES
Encounter Date: 12/29/2023    SCRIBE #1 NOTE: I, Karie Malcolm, am scribing for, and in the presence of,  Jc Cooper MD. I have scribed the following portions of the note - Other sections scribed: HPI, ROS, PE.       History     Chief Complaint   Patient presents with    Hypoxia     Pt sent from  due to low O2 after pneumonia diagnosis today. Pt reporting SOB at this time. Denies chest pain. O2 91% RA in triage.      Time seen by provider: 12:57 PM    This is a 89 y.o. female who presents from  with cough and concerns for pneumonia. Patient states cough initially began three to four months ago and resolved briefly before retuning over the past few weeks. During the initial onset, she was treated with antibiotics. Her cough is worse at night and has become painful to her chest and back. She reports associated exertional dyspnea and rare wheezing but no orthopnea, or leg swelling. Patient also reports a runny nose this morning but no appetite change or sore throat. Patient has been taking Robitussin and her Albuterol inhaler. The latter alleviates her dyspnea but not the cough. No history of tobacco use. This is the extent of the patient's complaints at this time.    The history is provided by the patient.     Review of patient's allergies indicates:   Allergen Reactions    Melatonin      Other reaction(s): Other (See Comments)  Sleep Walks and has unnatural dreams    Talwin compound Hives    Talwin [pentazocine lactate] Hallucinations    Trazodone Other (See Comments)     Nightmares/sleep walk      Bentyl [dicyclomine] Anxiety     Past Medical History:   Diagnosis Date    Allergic rhinitis     Anticoagulant long-term use     Arthritis     Asthma     Bilateral pulmonary embolism 4/27/2019    Cataract     Chronic anticoagulation 9/28/2020    Depression     Diabetes mellitus     Fibromyalgia 7/2/2012    Fibromyalgia     GERD (gastroesophageal reflux disease)     Hypercholesterolemia 9/28/2020     Hypercholesterolemia 9/28/2020    Hypertension 7/2/2012    Thoracic or lumbosacral neuritis or radiculitis, unspecified     Thyroid disease     Ulcer      Past Surgical History:   Procedure Laterality Date    CARPAL TUNNEL RELEASE Left 11/29/2023    Procedure: RELEASE, CARPAL TUNNEL,LEFT;  Surgeon: Avril Hutchison MD;  Location: [facility];  Service: Orthopedics;  Laterality: Left;    CATARACT EXTRACTION Bilateral     ESOPHAGOGASTRODUODENOSCOPY N/A 3/8/2022    Procedure: EGD (ESOPHAGOGASTRODUODENOSCOPY) with dilation-either hospital ok with Dr. Meza;  Surgeon: Rebeca Meza MD;  Location: [facility];  Service: Endoscopy;  Laterality: N/A;  1/11-eliquis hold ok see te-tb    ESOPHAGOGASTRODUODENOSCOPY N/A 2/28/2022    Procedure: EGD (ESOPHAGOGASTRODUODENOSCOPY) with dilation-either hospital ok with Dr. Meza;  Surgeon: Rebeca Meza MD;  Location: [facility] ([address] [place] [address]);  Service: Endoscopy;  Laterality: N/A;  1/11-eliquis hold ok see te-tb  COVID test on 2/25/22 at [facility]  2/22-confirmed appt arrival time with pt-Kpvt    FOOT NEUROMA SURGERY  1985    HYSTERECTOMY       Family History   Problem Relation Age of Onset    Diabetes Mother     Diabetes Brother     Emphysema Maternal Aunt     Melanoma Neg Hx     Asthma Neg Hx     Colon cancer Neg Hx     Esophageal cancer Neg Hx      Social History     Tobacco Use    Smoking status: Never    Smokeless tobacco: Never   Substance Use Topics    Alcohol use: No    Drug use: No     Review of Systems   Constitutional:  Negative for fever.   HENT:  Positive for rhinorrhea. Negative for congestion.    Eyes:  Negative for redness.   Respiratory:  Positive for cough and shortness of breath.    Cardiovascular:  Negative for chest pain.   Gastrointestinal:  Negative for abdominal pain.   Genitourinary:  Negative for dysuria.   Skin:  Negative for rash.   Neurological:  Negative for headaches.   Psychiatric/Behavioral:  Negative for confusion.        Physical Exam      Initial Vitals [12/29/23 1109]   BP Pulse Resp Temp SpO2   (!) 121/58 79 16 98.1 °F (36.7 °C) (!) 91 %      MAP       --         Physical Exam    Constitutional: She is not diaphoretic. No distress.   HENT:   Head: Normocephalic and atraumatic.   Eyes: Conjunctivae are normal.   Neck: Neck supple.   Cardiovascular:  Normal rate, regular rhythm, S1 normal, S2 normal, normal heart sounds and intact distal pulses.           No murmur heard.  Pulmonary/Chest: No respiratory distress. She has wheezes. She has no rhonchi. She has no rales.   Decreased air entry diffusely. Wheezing with cough.    Abdominal: Abdomen is soft. There is no abdominal tenderness. There is no rebound and no guarding.   Musculoskeletal:         General: No edema.      Cervical back: Neck supple.     Neurological: She is alert and oriented to person, place, and time.   Skin: Skin is warm and dry.   Psychiatric: She has a normal mood and affect.         ED Course   Procedures  Labs Reviewed   COMPREHENSIVE METABOLIC PANEL - Abnormal; Notable for the following components:       Result Value    Potassium 3.3 (*)     Glucose 114 (*)     eGFR 36 (*)     All other components within normal limits   CBC W/ AUTO DIFFERENTIAL - Abnormal; Notable for the following components:    Hematocrit 36.9 (*)     RDW 15.5 (*)     Lymph # 0.8 (*)     Gran % 78.7 (*)     Lymph % 13.5 (*)     All other components within normal limits   B-TYPE NATRIURETIC PEPTIDE - Abnormal; Notable for the following components:     (*)     All other components within normal limits   CULTURE, BLOOD   CULTURE, BLOOD   LACTIC ACID, PLASMA   TROPONIN I   URINALYSIS, REFLEX TO URINE CULTURE   PROCALCITONIN          Imaging Results              X-Ray Chest AP Portable (Final result)  Result time 12/29/23 13:23:43   Procedure changed from X-Ray Chest PA And Lateral     Final result by Vikash Parry MD (12/29/23 13:23:43)                   Impression:      Patchy opacities lung  bases could relate to atelectasis, aspiration or pneumonia.      Electronically signed by: Vikash Parry  Date:    12/29/2023  Time:    13:23               Narrative:    EXAMINATION:  XR CHEST AP PORTABLE    CLINICAL HISTORY:  cough; Cough, unspecified    TECHNIQUE:  Single frontal view of the chest was performed.    COMPARISON:  Chest radiograph performed 10/23/2023    FINDINGS:  Cardiomediastinal contours appear to be grossly unchanged.    Background interstitial coarsening again noted.    New patchy opacities are noted at the lung bases.    No definite pneumothorax.    Trace pleural effusions are not entirely excluded.    No acute abnormality in the visualized abdomen.    Osseous and soft tissue structures appear without definite acute change.                                       Medications   methylPREDNISolone sodium succinate injection 125 mg (125 mg Intravenous Given 12/29/23 1337)   albuterol-ipratropium 2.5 mg-0.5 mg/3 mL nebulizer solution 3 mL (3 mLs Nebulization Given 12/29/23 1310)   acetaminophen tablet 1,000 mg (1,000 mg Oral Given 12/29/23 1337)   magnesium sulfate 2g in water 50mL IVPB (premix) (0 g Intravenous Stopped 12/29/23 1741)     Medical Decision Making      89-year-old female with history of asthma, HTN, hypothyroidism, sent by urgent care for evaluation of chronic cough and hypoxia.  Patient states she has been dealing with a cough for months, initial onset about 4 months ago with transient improvement before recurrence in the past month.  In the past week it has gotten worse with associated MELISSA.  She denies any fevers or exertional chest pain, no lower extremity edema.  She has been using her home albuterol with little relief, and cough appears to be worse at night, though she denies any orthopnea.  She reportedly had O2 sat 91% on room air with rhonchi on lung exam at urgent care so sent here for further evaluation.  On lung exam now she has somewhat diminished air entry diffusely with  faint expiratory wheezing with cough concerning for asthma component.  Chronic cough may related to untreated asthma and bronchospasm.  Given risk factors would also consider new onset CHF exacerbation though clinical exam less consistent with this.  Less likely pneumonia or infectious etiology given chronicity of cough and no fever.  Finally, she does have history of distant DVT but is still on Eliquis, lower suspicion for PE.      Initial labs with normal CBC, mild hypokalemia and CR 1.4, with previous baseline range 1.1 to 1.4.  BNP slightly elevated at 108, and troponin normal with no elevation of lactate either.  Chest x-ray read as patchy opacities at lung bases which could be atelectasis, aspiration, or pneumonia.  Based on chronicity of cough and lack of fevers low suspicion for pneumonia, will send procalcitonin cultures but hold antibiotics empirically.  Patient initially treated with Solu-Medrol, nebs, IV magnesium and on reassessment still has some expiratory wheezing and hypoxia on room air.  She ambulated with 2 L nasal cannula and still desatted to 87%, so she meets criteria for admission.  I suspect she has persistent asthma exacerbation but would also consider new onset CHF given slightly elevated BNP and chest x-ray findings; most recent EF last year was 65% with indeterminate diastolic dysfunction.  Will admit to hospitalist service for further management, would consider PE study if no improvement with asthma treatment and lungs are clear with persistent hypoxia.      Amount and/or Complexity of Data Reviewed  Labs: ordered. Decision-making details documented in ED Course.  Radiology: ordered and independent interpretation performed. Decision-making details documented in ED Course.    Risk  OTC drugs.  Prescription drug management.  Decision regarding hospitalization.            Scribe Attestation:   Scribe #1: I performed the above scribed service and the documentation accurately describes the  services I performed. I attest to the accuracy of the note.    I, Dr. Jc Cooper, personally performed the services described in this documentation. All medical record entries made by the scribe were at my direction and in my presence.  I have reviewed the chart and agree that the record reflects my personal performance and is accurate and complete. Jc Cooper MD.                                  Clinical Impression:  Final diagnoses:  [R05.9] Cough  [R09.02] Hypoxia  [J45.901] Exacerbation of asthma, unspecified asthma severity, unspecified whether persistent (Primary)          ED Disposition Condition    Admit Stable                Jc Cooper MD  12/29/23 2686

## 2023-12-29 NOTE — ED TRIAGE NOTES
Pt presents to the ER with complaints of cough with SOB for the past week that progressively got worse yesterday. Pt was seen at  where she was found to have pneumonia and hypoxia. Pt denies fever or chest pain but reports pain in the back of the neck due to excessive coughing.

## 2023-12-29 NOTE — ED NOTES
LOC: The patient is awake, alert, and oriented to self, place, time, and situation. Pt is calm and cooperative. Affect is appropriate.  Speech is appropriate and clear.     APPEARANCE: Patient sitting on stretcher in no acute distress.  Patient is clean and well groomed.    SKIN: The skin is warm and dry; color consistent with ethnicity.  Patient has poor skin turgor and dry mucus membranes.  Skin intact; no breakdown or bruising noted.     MUSCULOSKELETAL: Patient moving upper and lower extremities without difficulty; denies pain in the extremities or back but complains of pain in the back of the neck due to excessive coughing.  Denies weakness.     RESPIRATORY: Airway is open and patent. Respirations spontaneous, even, easy, and non-labored.  Patient has a normal effort and rate.  No accessory muscle use noted. Pt reports excessive nonproductive cough. Pt reports SOB that is worse with exertion. Oxygen saturation in the lower 90's noted.     CARDIAC:  Normal rate noted.  No peripheral edema noted. No complaints of chest pain.     ABDOMEN: Soft and non tender to palpation.  No distention noted. Pt denies abdominal pain; denies nausea, vomiting, diarrhea, or constipation.    NEUROLOGIC: Eyes open spontaneously.  Behavior appropriate to situation.  Follows commands; facial expression symmetrical.  Purposeful motor response noted; normal sensation in all extremities. Pt denies headache; denies lightheadedness or dizziness; denies visual disturbances; denies loss of balance; denies unilateral weakness.

## 2023-12-29 NOTE — PROGRESS NOTES
"Subjective:      Patient ID: Debbie Ding is a 89 y.o. female.    Vitals:  height is 5' 5" (1.651 m) and weight is 59.4 kg (131 lb). Her temperature is 97.8 °F (36.6 °C). Her blood pressure is 128/74 and her pulse is 82. Her respiration is 16 and oxygen saturation is 91% (abnormal).     Chief Complaint: Cough    Pt presents complaints of a cough. Symptoms started over a week ago. Cough is constant. Pt states her cough kept her from sleeping last night. Pt states she was diagnosed with bronchitis about 3 months ago and has had a lingering cough since then but has gotten worse in the past week. OTC none    Cough  This is a recurrent problem. The current episode started 1 to 4 weeks ago. The problem has been unchanged. The problem occurs constantly. The cough is Non-productive. Associated symptoms include shortness of breath. She has tried nothing for the symptoms. Her past medical history is significant for asthma and bronchitis.       Respiratory:  Positive for cough and shortness of breath.       Objective:     Physical Exam   Constitutional: She is oriented to person, place, and time. She appears well-developed. She is cooperative.  Non-toxic appearance. She does not appear ill. No distress.   HENT:   Head: Normocephalic and atraumatic.   Ears:   Right Ear: Hearing, tympanic membrane, external ear and ear canal normal.   Left Ear: Hearing, tympanic membrane, external ear and ear canal normal.   Nose: Nose normal. No mucosal edema, rhinorrhea or nasal deformity. No epistaxis. Right sinus exhibits no maxillary sinus tenderness and no frontal sinus tenderness. Left sinus exhibits no maxillary sinus tenderness and no frontal sinus tenderness.   Mouth/Throat: Uvula is midline, oropharynx is clear and moist and mucous membranes are normal. Mucous membranes are moist. No trismus in the jaw. Normal dentition. No uvula swelling. No oropharyngeal exudate, posterior oropharyngeal edema or posterior oropharyngeal erythema. " Oropharynx is clear.   Eyes: Conjunctivae and lids are normal. No scleral icterus.   Neck: Trachea normal and phonation normal. Neck supple. No edema present. No erythema present. No neck rigidity present.   Cardiovascular: Normal rate, regular rhythm, normal heart sounds and normal pulses.   Pulmonary/Chest: Effort normal. No respiratory distress. She has no decreased breath sounds. She has rhonchi.   Abdominal: Normal appearance.   Musculoskeletal: Normal range of motion.         General: No deformity or edema. Normal range of motion.   Neurological: She is alert and oriented to person, place, and time. She exhibits normal muscle tone. Coordination normal.   Skin: Skin is warm, dry, intact, not diaphoretic and not pale.   Psychiatric: Her speech is normal and behavior is normal. Judgment and thought content normal.   Nursing note and vitals reviewed.      Assessment:     1. Pneumonia of both lungs due to infectious organism, unspecified part of lung    2. Cough, unspecified type    3. Hypoxia        Plan:       Pneumonia of both lungs due to infectious organism, unspecified part of lung  -     Refer to Emergency Dept.    Cough, unspecified type  -     SARS Coronavirus 2 Antigen, POCT Manual Read    Hypoxia  -     Refer to Emergency Dept.    Pt feels she is safe to go to ER by private vehicle. She refuses EMS  Pt or guardian provided educational materials and instructions regarding their visit diagnosis.

## 2023-12-30 PROBLEM — E87.6 HYPOKALEMIA: Status: RESOLVED | Noted: 2019-04-28 | Resolved: 2023-12-30

## 2023-12-30 LAB
ALBUMIN SERPL BCP-MCNC: 3.6 G/DL (ref 3.5–5.2)
ALP SERPL-CCNC: 64 U/L (ref 55–135)
ALT SERPL W/O P-5'-P-CCNC: 10 U/L (ref 10–44)
ANION GAP SERPL CALC-SCNC: 11 MMOL/L (ref 8–16)
AST SERPL-CCNC: 24 U/L (ref 10–40)
BASOPHILS # BLD AUTO: 0.01 K/UL (ref 0–0.2)
BASOPHILS NFR BLD: 0.4 % (ref 0–1.9)
BILIRUB SERPL-MCNC: 0.6 MG/DL (ref 0.1–1)
BUN SERPL-MCNC: 19 MG/DL (ref 10–30)
CALCIUM SERPL-MCNC: 9.5 MG/DL (ref 8.7–10.5)
CHLORIDE SERPL-SCNC: 105 MMOL/L (ref 95–110)
CO2 SERPL-SCNC: 25 MMOL/L (ref 23–29)
CREAT SERPL-MCNC: 1.3 MG/DL (ref 0.5–1.4)
DIFFERENTIAL METHOD BLD: ABNORMAL
EOSINOPHIL # BLD AUTO: 0 K/UL (ref 0–0.5)
EOSINOPHIL NFR BLD: 0 % (ref 0–8)
ERYTHROCYTE [DISTWIDTH] IN BLOOD BY AUTOMATED COUNT: 15.5 % (ref 11.5–14.5)
EST. GFR  (NO RACE VARIABLE): 39 ML/MIN/1.73 M^2
GLUCOSE SERPL-MCNC: 149 MG/DL (ref 70–110)
HCT VFR BLD AUTO: 36 % (ref 37–48.5)
HGB BLD-MCNC: 12.1 G/DL (ref 12–16)
IMM GRANULOCYTES # BLD AUTO: 0.02 K/UL (ref 0–0.04)
IMM GRANULOCYTES NFR BLD AUTO: 0.7 % (ref 0–0.5)
LYMPHOCYTES # BLD AUTO: 0.9 K/UL (ref 1–4.8)
LYMPHOCYTES NFR BLD: 33.1 % (ref 18–48)
MCH RBC QN AUTO: 29.2 PG (ref 27–31)
MCHC RBC AUTO-ENTMCNC: 33.6 G/DL (ref 32–36)
MCV RBC AUTO: 87 FL (ref 82–98)
MONOCYTES # BLD AUTO: 0.2 K/UL (ref 0.3–1)
MONOCYTES NFR BLD: 6.2 % (ref 4–15)
NEUTROPHILS # BLD AUTO: 1.6 K/UL (ref 1.8–7.7)
NEUTROPHILS NFR BLD: 59.6 % (ref 38–73)
NRBC BLD-RTO: 0 /100 WBC
PLATELET # BLD AUTO: 175 K/UL (ref 150–450)
PMV BLD AUTO: 11.3 FL (ref 9.2–12.9)
POCT GLUCOSE: 113 MG/DL (ref 70–110)
POCT GLUCOSE: 135 MG/DL (ref 70–110)
POCT GLUCOSE: 158 MG/DL (ref 70–110)
POCT GLUCOSE: 161 MG/DL (ref 70–110)
POTASSIUM SERPL-SCNC: 4.4 MMOL/L (ref 3.5–5.1)
PROT SERPL-MCNC: 7.5 G/DL (ref 6–8.4)
RBC # BLD AUTO: 4.14 M/UL (ref 4–5.4)
SODIUM SERPL-SCNC: 141 MMOL/L (ref 136–145)
WBC # BLD AUTO: 2.75 K/UL (ref 3.9–12.7)

## 2023-12-30 PROCEDURE — 97161 PT EVAL LOW COMPLEX 20 MIN: CPT

## 2023-12-30 PROCEDURE — 97116 GAIT TRAINING THERAPY: CPT

## 2023-12-30 PROCEDURE — 85025 COMPLETE CBC W/AUTO DIFF WBC: CPT | Performed by: NURSE PRACTITIONER

## 2023-12-30 PROCEDURE — 97535 SELF CARE MNGMENT TRAINING: CPT

## 2023-12-30 PROCEDURE — 27000221 HC OXYGEN, UP TO 24 HOURS

## 2023-12-30 PROCEDURE — 25000003 PHARM REV CODE 250: Performed by: STUDENT IN AN ORGANIZED HEALTH CARE EDUCATION/TRAINING PROGRAM

## 2023-12-30 PROCEDURE — 94761 N-INVAS EAR/PLS OXIMETRY MLT: CPT

## 2023-12-30 PROCEDURE — 36415 COLL VENOUS BLD VENIPUNCTURE: CPT | Performed by: NURSE PRACTITIONER

## 2023-12-30 PROCEDURE — 99900035 HC TECH TIME PER 15 MIN (STAT)

## 2023-12-30 PROCEDURE — 21400001 HC TELEMETRY ROOM

## 2023-12-30 PROCEDURE — 92610 EVALUATE SWALLOWING FUNCTION: CPT

## 2023-12-30 PROCEDURE — 94640 AIRWAY INHALATION TREATMENT: CPT

## 2023-12-30 PROCEDURE — 25000003 PHARM REV CODE 250: Performed by: NURSE PRACTITIONER

## 2023-12-30 PROCEDURE — 63600175 PHARM REV CODE 636 W HCPCS: Performed by: NURSE PRACTITIONER

## 2023-12-30 PROCEDURE — 97166 OT EVAL MOD COMPLEX 45 MIN: CPT

## 2023-12-30 PROCEDURE — 80053 COMPREHEN METABOLIC PANEL: CPT | Performed by: NURSE PRACTITIONER

## 2023-12-30 PROCEDURE — 25000242 PHARM REV CODE 250 ALT 637 W/ HCPCS: Performed by: NURSE PRACTITIONER

## 2023-12-30 RX ORDER — BENZONATATE 100 MG/1
100 CAPSULE ORAL 3 TIMES DAILY PRN
Status: DISCONTINUED | OUTPATIENT
Start: 2023-12-30 | End: 2023-12-31

## 2023-12-30 RX ORDER — GLYCOPYRROLATE 1 MG/1
2 TABLET ORAL 2 TIMES DAILY
Status: DISCONTINUED | OUTPATIENT
Start: 2023-12-30 | End: 2024-01-03 | Stop reason: HOSPADM

## 2023-12-30 RX ADMIN — SENNOSIDES AND DOCUSATE SODIUM 1 TABLET: 8.6; 5 TABLET ORAL at 09:12

## 2023-12-30 RX ADMIN — MEMANTINE HYDROCHLORIDE 10 MG: 5 TABLET ORAL at 09:12

## 2023-12-30 RX ADMIN — ATORVASTATIN CALCIUM 40 MG: 20 TABLET, FILM COATED ORAL at 09:12

## 2023-12-30 RX ADMIN — BENZONATATE 100 MG: 100 CAPSULE ORAL at 11:12

## 2023-12-30 RX ADMIN — ACETAMINOPHEN 650 MG: 325 TABLET, FILM COATED ORAL at 09:12

## 2023-12-30 RX ADMIN — GLYCOPYRROLATE 2 MG: 1 TABLET ORAL at 09:12

## 2023-12-30 RX ADMIN — IPRATROPIUM BROMIDE AND ALBUTEROL SULFATE 3 ML: 2.5; .5 SOLUTION RESPIRATORY (INHALATION) at 07:12

## 2023-12-30 RX ADMIN — APIXABAN 2.5 MG: 2.5 TABLET, FILM COATED ORAL at 09:12

## 2023-12-30 RX ADMIN — LEVOTHYROXINE SODIUM 75 MCG: 75 TABLET ORAL at 05:12

## 2023-12-30 RX ADMIN — HYDROCHLOROTHIAZIDE 25 MG: 25 TABLET ORAL at 09:12

## 2023-12-30 RX ADMIN — IPRATROPIUM BROMIDE AND ALBUTEROL SULFATE 3 ML: 2.5; .5 SOLUTION RESPIRATORY (INHALATION) at 03:12

## 2023-12-30 RX ADMIN — BENZONATATE 100 MG: 100 CAPSULE ORAL at 09:12

## 2023-12-30 RX ADMIN — IPRATROPIUM BROMIDE AND ALBUTEROL SULFATE 3 ML: 2.5; .5 SOLUTION RESPIRATORY (INHALATION) at 11:12

## 2023-12-30 RX ADMIN — IPRATROPIUM BROMIDE AND ALBUTEROL SULFATE 3 ML: 2.5; .5 SOLUTION RESPIRATORY (INHALATION) at 08:12

## 2023-12-30 RX ADMIN — AMLODIPINE BESYLATE 2.5 MG: 2.5 TABLET ORAL at 09:12

## 2023-12-30 RX ADMIN — PANTOPRAZOLE SODIUM 40 MG: 40 TABLET, DELAYED RELEASE ORAL at 09:12

## 2023-12-30 RX ADMIN — PREDNISONE 40 MG: 20 TABLET ORAL at 09:12

## 2023-12-30 RX ADMIN — MIRTAZAPINE 15 MG: 15 TABLET, FILM COATED ORAL at 09:12

## 2023-12-30 NOTE — NURSING
Nurses Note -- 4 Eyes      12/30/2023   12:38 AM      Skin assessed during: Admit      [x] No Altered Skin Integrity Present    []Prevention Measures Documented      [] Yes- Altered Skin Integrity Present or Discovered   [] LDA Added if Not in Epic (Describe Wound)   [] New Altered Skin Integrity was Present on Admit and Documented in LDA   [] Wound Image Taken    Wound Care Consulted? No    Attending Nurse:  DONALD Abbasi    Second RN/Staff Member:  DONALD Rowland

## 2023-12-30 NOTE — PT/OT/SLP EVAL
Speech Language Pathology Evaluation  Bedside Swallow    Patient Name:  Debbie Ding   MRN:  [id number]  Admitting Diagnosis: Acute hypoxemic respiratory failure    Recommendations:                 General Recommendations:    SLP to follow 3-5x/week during admit to further assess oropharyngeal swallow function and determine need for objective imaging of swallow function    Diet recommendations: regular solids, thin liquids    Aspiration Precautions:   Upright seating during and 2H post intake   Slow rate of intake  Small bolus volumes   Alternate solids/liquids to promote pharyngoesophageal clearance     General Precautions: Standard,      Communication strategies:  patient would benefit from OP SLP tx for voice deficits    Assessment:     Debbie Ding is a 89 y.o. female with an SLP diagnosis of hoarse vocal quality, hx of stroboscopy with known deficits- recommendation in 2022 was for 6-8 sessions of OP SLP tx though patient only attended first session, reports she was told she did not need to return though this was not recommendation.  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    History:     HPI per ED NP    HPI: Debbie Ding is a 89-year-old female with a past medical history of asthma, bilateral PE, GERD, hypercholesteremia, fibromyalgia and thyroid disease who presents with worsening cough and shortness of breath with exertion.  Patient was initially seen at urgent care and found to be hypoxic to 91% and referred patient to the ED. Patient states she was diagnosed with bronchitis 3 months ago and has had a persistent cough since then.  She reports that her cough has worsened and she has worsening shortness of breath over the past few days.  Patient states using her albuterol and OTC Robitussin with minimal relief.     Upon arrival to the ED, patient found to  be hypoxic with 91% and was placed on 2 L O2 via NC with improvement.  Lab work revealed hypokalemia 3.3 and .  No leukocytosis and troponin negative.  Lactic acid 1.3 and procalcitonin 0.09.  UA negative for UTI.  She denies history of CHF with most recent BNP 36 two years ago.  She denies lower extremity swelling and states her cough is nonproductive.  Patient further denies chest pain, fever, chills, nausea and vomiting.  Chest x-ray revealed patchy opacities to the lungs could be related to atelectasis, aspiration or pneumonia.  Patient received IV magnesium, methylprednisone and albuterol nebulizer in the ED with some relief.  Patient referred to Hospital Medicine and will be admitted for further evaluation and management     Past Medical History:   Diagnosis Date    Allergic rhinitis     Anticoagulant long-term use     Arthritis     Asthma     Bilateral pulmonary embolism 4/27/2019    Cataract     Chronic anticoagulation 9/28/2020    Depression     Diabetes mellitus     Fibromyalgia 7/2/2012    Fibromyalgia     GERD (gastroesophageal reflux disease)     Hypercholesterolemia 9/28/2020    Hypercholesterolemia 9/28/2020    Hypertension 7/2/2012    Thoracic or lumbosacral neuritis or radiculitis, unspecified     Thyroid disease     Ulcer        Past Surgical History:   Procedure Laterality Date    CARPAL TUNNEL RELEASE Left 11/29/2023    Procedure: RELEASE, CARPAL TUNNEL,LEFT;  Surgeon: Avril Hutchison MD;  Location: [facility];  Service: Orthopedics;  Laterality: Left;    CATARACT EXTRACTION Bilateral     ESOPHAGOGASTRODUODENOSCOPY N/A 3/8/2022    Procedure: EGD (ESOPHAGOGASTRODUODENOSCOPY) with dilation-either [facility] with Dr. Meaz;  Surgeon: Rebeca Meza MD;  Location: [facility];  Service: Endoscopy;  Laterality: N/A;  1/11-eliquis hold ok see te-tb    ESOPHAGOGASTRODUODENOSCOPY N/A 2/28/2022    Procedure: EGD (ESOPHAGOGASTRODUODENOSCOPY) with dilation-either hospital ok with Dr. Meza;   Surgeon: Rebeca Meza MD;  [facility] ([address] [place] [address]);  Service: Endoscopy;  Laterality: N/A;  1/11-eliquis hold ok see te-tb  COVID test on 2/25/22 at [facility]  2/22-confirmed appt arrival time with pt-Kpvt    FOOT NEUROMA SURGERY  1985    HYSTERECTOMY         Social History: Patient lives with family.    Prior Intubation HX:  none this admit    Esophagram 5/14/2021:  FINDINGS:  Swallowing: Unremarkable     Esophagus: Normal caliber.  No stricture or intraluminal filling defect.  There is significant esophageal dysmotility.  Failure of the primary wave to clear the esophagus with significant stasis of the barium column throughout the esophagus.  Finding present in the standing position, even worse when prone.  Numerous disorganized tertiary contractions.  Provided barium tablet progressed without evidence of obstruction.  Small hiatal hernia.     Gastroesophageal reflux: None observed.     Other findings: Moderate cervical spondylosis     Impression:     Esophageal dysmotility as above.     Small hiatal hernia.     No gastroesophageal reflux elicited on this exam.    Stroboscopy findings per Dr. Ridley 5/4/22  All findings were normal except:  - bilateral vocal fold atrophy, mild-moderate  - posterior/LCA-predominant closure  - truncated phonatory segment with small amplitudes and variable aperiodic vibration  - phonatory tremor on sustained vowels, involving intrinsic larynx and supraglottis  - scattered pharyngeal leukoplakia    Chest X-Ray 12/29/2023:  FINDINGS:  Cardiomediastinal contours appear to be grossly unchanged.     Background interstitial coarsening again noted.     New patchy opacities are noted at the lung bases.     No definite pneumothorax.     Trace pleural effusions are not entirely excluded.     No acute abnormality in the visualized abdomen.     Osseous and soft tissue structures appear without definite acute change.     Impression:     Patchy opacities lung bases could relate to  atelectasis, aspiration or pneumonia.    Prior diet: regular solids, thin liquids.    Subjective     Patient awake, alert, participative in evaluation    Pain/Comfort:   Denies    Respiratory Status: Room air    Objective:     Oral Musculature Evaluation   Partial dentures noted  Face is symmetric at rest and during speech/swallowing tasks  Lingual protrusion to midline, adequate lateralization  Labial retraction at bilateral corners is functional  Vocal quality is weak    Bedside Swallow Eval:   Consistencies Assessed:  Thin liquids 4 oz thin liquid via singular and consecutive cupsips  Solids 1/2 lizet cracker      Oral Phase:   Mandibular depression is functional for bolus acceptance  Labial seal adequate, no anterior loss noted across consistencies  Mastication is functional  A/p transit appears timely, functional, no oral residues post intake    Pharyngeal Phase:   Single swallows per bolus noted  No overt s/s aspiration noted. No coughing, throat clearing, or changes to vocal quality noted    Impressions:  Patient without overt signs of pharyngeal dysphagia or aspiration noted. However, risk present for pharyngeal dysphagia and possible aspiration. Risks include vocal fold atrophy (stroboscopy 2022), hx of esophageal dysmotility and reflux (esophagram 2021), compromised respiratory status and abnormal CXR, as well as generalized weakness. Reviewed risks with patient as well as strategies to mitigate risks (slow rate of intake, small bolus volumes, alternate solids/liquids, upright seating during and 2H post intake, oral care TID). Provided patient with denture cup, emesis basin, two toothbrushes, toothpaste, and mouthwash for oral care. Reviewed will follow up with patient if to remain admitted.     Goals:   Multidisciplinary Problems       SLP Goals          Problem: SLP    Goal Priority Disciplines Outcome   SLP Goal     SLP    Description: 1. Patient will utilize learned aspiration risk precautions (slow  rate of intake, small bolus volumes, alternating solids/liquids, upright seating during and 2H post intake) in 100% of opportunities independently to tolerate regular solids and thin liquids without signs of dysphagia or respiratory compromise.     2) Patient will recall pillars of aspiration pna in 100% of opportunities given min cues from clinician to reduce risk of aspiration pna and promote use of aspiration precautions.                        Plan:     Patient to be seen:    3-5x/week while admitted  Plan of Care expires:   1/6/2024  Plan of Care reviewed with:    patient, RN  SLP Follow-Up:     yes     Discharge recommendations:    OP SLP tx for ongoing voice therapy  Barriers to Discharge:  None    Time Tracking:     SLP Treatment Date:   12/30/23  Speech Start Time:  1415  Speech Stop Time:  1425     Speech Total Time (min):  10 min    Billable Minutes: Luisito 10     12/30/2023

## 2023-12-30 NOTE — ASSESSMENT & PLAN NOTE
History noted currently controlled with amlodipine and HCTZ    -continue amlodipine 2.5 mg daily  -continue HCTZ 25 mg daily

## 2023-12-30 NOTE — NURSING
"0900: Spoke with Dr Moran r/t pt has no diet orders. States concerns that pt may be aspirating. States to eval pt at BS. Pt given water and pudding, which she swallowed without difficulty-and no coughing afterwards. Pt reports that she has no problems swallowing. Pt has non-productive, hacking cough that is spontaneous. Lung sounds: KATIE inspiratory wheezes, otherwise clear,diminished. O2 sat:98% on 1.5l O2 per NC. Pt denies pain/discomforts/SOB at present. She is AAOx4. Pt ate the entire pudding cup with no s/s of aspiration observed. She is stating that she is "starving" and requesting breakfast. Will notify MD of previous and inquire about diet order.  0932: Dr Moran at  seeing pt. Diabetic diet ordered.   1440: SPT saw pt and reports that she feels pt is safe to continue with PO diet. Pt education r/t aspiration prevention per SPT with patient. PT ambulated patient, and reports that patient did well and is a standby assist. Pt assisted back to bed. Purewick re-applied r/t pt states she is incontinent of urine at times and requests Purewick. Denies SOB, or any other problems at present. Call light within reach. Continue to monitor.  1730: Pt requested home medication: Glycopyrrolate 2mg PO BID. Notified Dr. Moran. Orders received. Pt takes medication for for hyperhidrosis (excessive sweating) per pt reports.   0845: Pt lying in bed resting. Denies SOB or needs at this time. Reports that cough has "gotten better." Instricted pt to call PRN need for cough medication. Verbalized understanding. Call light in reach. Will report to PM shift RN.      "

## 2023-12-30 NOTE — HPI
HPI by Angelica Lee NP:  Debbie Ding is a 89-year-old female with a past medical history of asthma, bilateral PE, GERD, hypercholesteremia, fibromyalgia and thyroid disease who presents with worsening cough and shortness of breath with exertion.  Patient was initially seen at urgent care and found to be hypoxic to 91% and referred patient to the ED. Patient states she was diagnosed with bronchitis 3 months ago and has had a persistent cough since then.  She reports that her cough has worsened and she has worsening shortness of breath over the past few days.  Patient states using her albuterol and OTC Robitussin with minimal relief.    Upon arrival to the ED, patient found to be hypoxic with 91% and was placed on 2 L O2 via NC with improvement.  Lab work revealed hypokalemia 3.3 and .  No leukocytosis and troponin negative.  Lactic acid 1.3 and procalcitonin 0.09.  UA negative for UTI.  She denies history of CHF with most recent BNP 36 two years ago.  She denies lower extremity swelling and states her cough is nonproductive.  Patient further denies chest pain, fever, chills, nausea and vomiting.  Chest x-ray revealed patchy opacities to the lungs could be related to atelectasis, aspiration or pneumonia.  Patient received IV magnesium, methylprednisone and albuterol nebulizer in the ED with some relief.  Patient referred to Hospital Medicine and will be admitted for further evaluation and management

## 2023-12-30 NOTE — ASSESSMENT & PLAN NOTE
Patient with history of CKD stage 3.  Creatinine currently at baseline     Plan -   -continue to monitor renal function  -avoid nephrotoxins

## 2023-12-30 NOTE — ASSESSMENT & PLAN NOTE
History noted currently controlled with amlodipine and HCTZ    Plan -   -continue amlodipine 2.5 mg daily  -continue HCTZ 25 mg daily

## 2023-12-30 NOTE — PLAN OF CARE
Problem: Occupational Therapy  Goal: Occupational Therapy Goal  Description: Goals to be met by: 1/13/2024     Patient will increase functional independence with ADLs by performing:    UE Dressing with Modified [place].  LE Dressing with Modified [place].  Grooming while standing at sink with Modified [place].  Toileting from bedside commode with Modified [place] for hygiene and clothing management.   Toilet transfer to bedside commode with Modified [place].    Outcome: Ongoing, Progressing     Initial OT eval/treat complete.  Has shower chair and rollator used for community ambulation.  Will defer AD needs to PT.  Needs BSC.  Recommend post acute Low Intensity therapy.  To benefit from continued acute care OT services to increase independence in self-care/functional transfers.  OT to follow.

## 2023-12-30 NOTE — PROGRESS NOTES
[facility]  Progress Note    Patient Name: Debbie Ding  MRN: [id number]  Patient Class: IP- Inpatient   Admission Date: 12/29/2023  Length of Stay: 1 days  Attending Physician: Samm Moran MD  Primary Care Provider: Carolyn Mejia MD        Subjective:     Principal Problem:Acute hypoxemic respiratory failure        HPI:  Debbie Ding is a 89-year-old female with a past medical history of asthma, bilateral PE, GERD, hypercholesteremia, fibromyalgia and thyroid disease who presents with worsening cough and shortness of breath with exertion.  Patient was initially seen at urgent care and found to be hypoxic to 91% and referred patient to the ED. Patient states she was diagnosed with bronchitis 3 months ago and has had a persistent cough since then.  She reports that her cough has worsened and she has worsening shortness of breath over the past few days.  Patient states using her albuterol and OTC Robitussin with minimal relief.    Upon arrival to the ED, patient found to be hypoxic with 91% and was placed on 2 L O2 via NC with improvement.  Lab work revealed hypokalemia 3.3 and .  No leukocytosis and troponin negative.  Lactic acid 1.3 and procalcitonin 0.09.  UA negative for UTI.  She denies history of CHF with most recent BNP 36 two years ago.  She denies lower extremity swelling and states her cough is nonproductive.  Patient further denies chest pain, fever, chills, nausea and vomiting.  Chest x-ray revealed patchy opacities to the lungs could be related to atelectasis, aspiration or pneumonia.  Patient received IV magnesium, methylprednisone and albuterol nebulizer in the ED with some relief.  Patient referred to Hospital Medicine and will be admitted for further evaluation and management    Overview/Hospital Course:  Patient seen today.  Still complaining of significant cough.  Antitussives ordered.  Her last asthma exacerbation was years ago and coughing  is typical of an exacerbation.  Concern for aspiration with x-ray findings, and history of vocal cord dysfunction and history of dilations for strictures in the esophagus.  Patient was evaluated by speech and does not display overt signs of aspiration during evaluation -  pt was given education on aspiration precautions.      Interval history - Patient seen today.  Still complaining of significant cough.  Antitussives ordered. Cont duonebs.     Review of Systems   Constitutional:  Negative for activity change, appetite change, chills and fever.   HENT:  Negative for congestion, sore throat and trouble swallowing.    Eyes:  Negative for photophobia and visual disturbance.   Respiratory:  Positive for cough and shortness of breath. Negative for chest tightness.    Cardiovascular:  Negative for chest pain, palpitations and leg swelling.   Gastrointestinal:  Negative for abdominal pain, diarrhea and nausea.   Genitourinary:  Negative for dysuria, flank pain and hematuria.   Musculoskeletal:  Negative for back pain.   Neurological:  Negative for dizziness, weakness and headaches.   Psychiatric/Behavioral:  Negative for confusion.      Objective:     Vital Signs (Most Recent):  Temp: 96.4 °F (35.8 °C) (12/29/23 2020)  Pulse: 78 (12/29/23 2331)  Resp: 16 (12/29/23 2331)  BP: (!) 154/67 (12/29/23 2020)  SpO2: 97 % (12/29/23 2047) Vital Signs (24h Range):  Temp:  [96.4 °F (35.8 °C)-98.1 °F (36.7 °C)] 96.4 °F (35.8 °C)  Pulse:  [74-99] 78  Resp:  [16-20] 16  SpO2:  [91 %-98 %] 97 %  BP: (121-154)/(58-86) 154/67     Weight: 59 kg (130 lb)  Body mass index is 21.63 kg/m².     Physical Exam  Constitutional:       General: She is not in acute distress.     Appearance: She is not ill-appearing.   Pulmonary:      Effort: No respiratory distress.      Breath sounds: Wheezing (right lower lung) present.      Comments: Coughing while in room  Abdominal:      General: There is no distension.      Tenderness: There is no abdominal  tenderness.   Musculoskeletal:      Right lower leg: No edema.      Left lower leg: No edema.   Neurological:      General: No focal deficit present.      Mental Status: She is oriented to person, place, and time.                Significant Labs: All pertinent labs within the past 24 hours have been reviewed.      Significant Imaging: I have reviewed all pertinent imaging results/findings within the past 24 hours.    Assessment/Plan:      * Acute hypoxemic respiratory failure  Cough (typical of asthma exacerbation)     Patient with history of asthma found to be hypoxic to 91%.  Chest x-ray revealed patchy opacities in the lung bases related to atelectasis, aspiration or pneumonia.  No leukocytosis and procalcitonin 0.09.  Per chart review, patient unable to take Symbicort secondary to cost of medication and was recently started on Advar    Plan -   -prednisone 40 mg daily  -defer antibiotics given no leukocytosis and procalcitonin 0.09  -oxygen p.r.n.  -albuterol-ipratropium Q 4  -Follow up with Pulmonology, Dr. Suazo outpatient  - Anti tussives added   - Speech consulted for history of vocal cord dysfunction, esophageal stricture dilation and findings of possible aspiration of chest x-ray - does not display overt signs of aspiration during evaluation -  pt was given education on aspiration precautions.      History of deep venous thrombosis  History noted, currently on Eliquis 2.5 mg daily    Plan -   -continue Eliquis 2.5 mg daily      Type 2 diabetes mellitus with stage 3a chronic kidney disease, without long-term current use of insulin  History noted.  Patient's diabetes controlled with diet.  Most recent A1c 6.3 (10/2023).  Most recent glucose 114    Plan -   -POCT glucose  -low dose SSI    Chronic kidney disease, stage III (moderate)  Patient with history of CKD stage 3.  Creatinine currently at baseline     Plan -   -continue to monitor renal function  -avoid nephrotoxins    Hyperhidrosis  Continue home  glycopyrrolate 2 mg b.i.d.      GERD (gastroesophageal reflux disease)  History noted currently controlled with PPI    Plan -   -continue Protonix 40 mg daily    Hypertension  History noted currently controlled with amlodipine and HCTZ    Plan -   -continue amlodipine 2.5 mg daily  -continue HCTZ 25 mg daily      VTE Risk Mitigation (From admission, onward)           Ordered     apixaban tablet 2.5 mg  Daily         12/29/23 1931     IP VTE HIGH RISK PATIENT  Once         12/29/23 1918     Place sequential compression device  Until discontinued         12/29/23 1918                    Discharge Planning   NATE:      Code Status: Full Code   Is the patient medically ready for discharge?:     Reason for patient still in hospital (select all that apply): Treatment                     Samm Fonseca MD  Department of Hospital Medicine   [facility] ([place])

## 2023-12-30 NOTE — ASSESSMENT & PLAN NOTE
Patient has hypokalemia which is Acute and currently uncontrolled. Most recent potassium levels reviewed-   Lab Results   Component Value Date    K 4.4 12/30/2023   . Will continue potassium replacement per protocol and recheck repeat levels after replacement completed.

## 2023-12-30 NOTE — HOSPITAL COURSE
Patient complained of significant cough.  Antitussives ordered.  Her last asthma exacerbation was years ago and coughing is typical of her exacerbations.  Due to the CXR findings, history of vocal cord dysfunction and history of dilations for strictures in the esophagus there was a concern for chronic aspiration.  Patient was evaluated by speech and did not display overt signs of aspiration during evaluation although there was clearly vocal cord dysfunction.  Patient was given education on aspiration precautions.      She continued to have severe non productive hacking cough that had been worsening for about 4-5 months.  She reports that she had already been to another ED and her outpatient pulmonologist regarding this.  Both time she had been discharged with steroids which have not seemed to help.  She is very frustrated by this as it is interfering with her day-to-day activities.  After shared discussion Pulmonary was consulted, although we did discuss the limitations of inpatient workup.    Patient was seen by Pulmonary, based on CT chest appears to have bronchiectasis.  Cough appeared to be secondary to chronic aspiration.  They recommended checking a respiratory viral panel, TTE, induced sputum for culture and AFB (NTM), IgE for possible ABPA, continue prednisone (complete 5 days) and hypertonic saline nebs.  Echo showed a normal EF, grade I diastolic dysfunction and mild pulmonary HTN.  Patient was unable to tolerate the induced sputum.  She completed a course of prednisone while she was here without much change in her symptoms.      GI was consulted to evaluate and ordered a barium esophagram.  Results showed no evidence of a stricture, although there was moderate esophageal dysmotility and a small hiatal hernia.  The cough did not improve until she was started on Robitussin DM, which was remarkably helpful, and she felt well enough to be discharged home on it.  She will need close follow up with her  pulmonologist.  Home health was resumed.

## 2023-12-30 NOTE — PT/OT/SLP EVAL
Physical Therapy Evaluation    Patient Name:  Debbie Ding   MRN:  [id number]    Recommendations:     Discharge Recommendations: Low Intensity Therapy   Discharge Equipment Recommendations: walker, rolling   Barriers to discharge: None    Assessment:     Debbie Ding is a 89 y.o. female admitted with a medical diagnosis of Acute hypoxemic respiratory failure.  She presents with the following impairments/functional limitations: weakness, impaired endurance, impaired cardiopulmonary response to activity, decreased lower extremity function, impaired balance, impaired functional mobility, gait instability .     Patient evaluated by PT and goals established. Patient ambulated 200 feet with RW and CGA. No LOB or instability noted. Patient's O2 sats remained above 94% with all OOB activity while on room air.  PT will continue to follow and progress as tolerated. Rec for dc to Low Intensity Therapy. Please see progress note for detailed plan of care and recommendations.    The mobility limitation cannot be sufficiently resolved by the use of a cane.   Patient's functional mobility deficit can be sufficiently resolved with the use of a rolling walker. Patient's mobility limitation significantly impairs their ability to participate in one of more activities of daily living. The use of a rolling walker will significantly improve the patient's ability to participate in MRADLS and the patient will use it on regular basis in the home.       Rehab Prognosis: Good; patient would benefit from acute skilled PT services to address these deficits and reach maximum level of function.    Recent Surgery: * No surgery found *      Plan:     During this hospitalization, patient to be seen 3 x/week to address the identified rehab impairments via gait training, therapeutic activities, therapeutic exercises and progress toward the following goals:    Plan of Care Expires:  01/29/24    Subjective     Chief Complaint: none  Patient/Family  Comments/goals: Patient agrees to participate in PT intervention.   Pain/Comfort:  Pain Rating 1: 0/10    Patients cultural, spiritual, Orthodox conflicts given the current situation: no    Living Environment:  Patient lives with her daughter in a home with 15 steps to enter and no railing. She reports she required hand held assist to ascend/ descend the stairs.   Prior to admission, patients level of function was independent with majority of her functional mobility and ADLs.  Equipment used at home: rollator.  DME owned (not currently used):  rollator because it is too heavy for her to   .  Upon discharge, patient will have assistance from daughter .    Objective:     Communicated with nursing prior to session.  Patient found supine with peripheral IV, telemetry  upon PT entry to room.    General Precautions: Standard, fall  Orthopedic Precautions:N/A   Braces: N/A  Respiratory Status: Nasal cannula, flow 0 L/min O2 on 0 upon arrival with nasal cannula on the patient, nursing was informed and said the patient was suppose to be on 1.5 L. Remainder of session was on room air with O2 sats being monitored and above 94% throughout.     Exams:  Cognition:   Patient is oriented to person. Place, time and situation .  Pt follows approximately 100% of one and two  commands.    Mood: Pleasant and cooperative.   Safety Awareness: good  Musculoskeletal:  BMI: 21  Posture:  slouched shoulders, forward head posture   LE ROM/Strength:   R ROM: WNL  L ROM: WNl  R Strength:   Knee extension: 4/5  Dorsiflexion: 4/5   L Strength:   Knee extension: 4/5  Dorsiflexion: 4/5   Neuromuscular:  Sensation: Intact to light touch bilateral LEs.   Tone/Reflexes: No impairments identified with functional mobility. No formal testing performed.   Balance:   Static sitting: good   Static standing: good   Dynamic standing: fair + ( CGA)  Visual-vestibular: No impairments identified with functional mobility. No formal testing  performed.  Integument: Visible skin intact  Cardiopulmonary:  Edema: none noted      Functional Mobility:  Bed Mobility:     Supine to Sit: contact guard assistance  Transfers:     Sit to Stand:  contact guard assistance with rolling walker  Gait: Patient ambulated 200 feet with RW and CGA. No LOB or instability noted throughout. Decreased BLE foot clearance, step length and ann noted.       AM-PAC 6 CLICK MOBILITY  Total Score:19       Treatment & Education:  PT educated patient on the following :   PT plan of care/role of PT  Safety with OOB mobility  Use of RW  Safe and proper use of RW   Discharge disposition    Pt verbalized understanding       Patient left up in chair with all lines intact, call button in reach, and Speech therapist  present.    GOALS:   Multidisciplinary Problems       Physical Therapy Goals          Problem: Physical Therapy    Goal Priority Disciplines Outcome Goal Variances Interventions   Physical Therapy Goal     PT, PT/OT Ongoing, Progressing     Description: Goals to be met by: 2023    Patient will increase functional independence with mobility by performin. Sit<>stand with mod (I) with LRAD.  2. Gait x 400 feet with mod (I) with LRAD.  3. Ascend/descend 15 step(s) with least restrictive assistive device and hand held assist with no railing .                           History:     Past Medical History:   Diagnosis Date    Allergic rhinitis     Anticoagulant long-term use     Arthritis     Asthma     Bilateral pulmonary embolism 2019    Cataract     Chronic anticoagulation 2020    Depression     Diabetes mellitus     Fibromyalgia 2012    Fibromyalgia     GERD (gastroesophageal reflux disease)     Hypercholesterolemia 2020    Hypercholesterolemia 2020    Hypertension 2012    Thoracic or lumbosacral neuritis or radiculitis, unspecified     Thyroid disease     Ulcer        Past Surgical History:   Procedure Laterality Date    CARPAL TUNNEL  RELEASE Left 11/29/2023    Procedure: RELEASE, CARPAL TUNNEL,LEFT;  Surgeon: Avril Hutchison MD;  Location: [facility];  Service: Orthopedics;  Laterality: Left;    CATARACT EXTRACTION Bilateral     ESOPHAGOGASTRODUODENOSCOPY N/A 3/8/2022    Procedure: EGD (ESOPHAGOGASTRODUODENOSCOPY) with dilation-either hospital ok with Dr. Meza;  Surgeon: Rebeca Meza MD;  Location: [facility];  Service: Endoscopy;  Laterality: N/A;  1/11-eliquis hold ok see te-tb    ESOPHAGOGASTRODUODENOSCOPY N/A 2/28/2022    Procedure: EGD (ESOPHAGOGASTRODUODENOSCOPY) with dilation-either hospital ok with Dr. Meza;  Surgeon: Rebeca Meza MD;  Location: [address]);  Service: Endoscopy;  Laterality: N/A;  1/11-eliquis hold ok see te-tb  COVID test on 2/25/22 at [facility]  2/22-confirmed appt arrival time with pt-Kpvt    FOOT NEUROMA SURGERY  1985    HYSTERECTOMY         Time Tracking:     PT Received On: 12/30/23  PT Start Time: 1350     PT Stop Time: 1410  PT Total Time (min): 20 min     Billable Minutes: Evaluation 10 and Gait Training 10      12/30/2023

## 2023-12-30 NOTE — ASSESSMENT & PLAN NOTE
Patient has hypokalemia which is Acute and currently uncontrolled. Most recent potassium levels reviewed-   Lab Results   Component Value Date    K 3.3 (L) 12/29/2023   . Will continue potassium replacement per protocol and recheck repeat levels after replacement completed.

## 2023-12-30 NOTE — ASSESSMENT & PLAN NOTE
Patient with history of asthma found to be hypoxic to 91%.  Chest x-ray revealed patchy opacities in the lung bases related to atelectasis, aspiration or pneumonia.  No leukocytosis and procalcitonin 0.09.  Per chart review, patient unable to take Symbicort secondary to cost of medication and was recently started on Advar    -prednisone 40 mg daily  -defer antibiotics given no leukocytosis and procalcitonin 0.09  -oxygen p.r.n.  -albuterol-ipratropium Q 4  -Follow up with Pulmonology, Dr. Suazo outpatient

## 2023-12-30 NOTE — ASSESSMENT & PLAN NOTE
Cough (typical of asthma exacerbation)     Patient with history of asthma found to be hypoxic to 91%.  Chest x-ray revealed patchy opacities in the lung bases related to atelectasis, aspiration or pneumonia.  No leukocytosis and procalcitonin 0.09.  Per chart review, patient unable to take Symbicort secondary to cost of medication and was recently started on Advar    Plan -   -prednisone 40 mg daily  -defer antibiotics given no leukocytosis and procalcitonin 0.09  -oxygen p.r.n.  -albuterol-ipratropium Q 4  -Follow up with Pulmonology, Dr. Suazo outpatient  - Anti tussives added   - Speech consulted for history of vocal cord dysfunction, esophageal stricture dilation and findings of possible aspiration of chest x-ray - does not display overt signs of aspiration during evaluation -  pt was given education on aspiration precautions.

## 2023-12-30 NOTE — ASSESSMENT & PLAN NOTE
Patient with history of CKD stage 3.  Creatinine currently 1.4 and GFR 36    -continue to monitor renal function  -avoid nephrotoxins

## 2023-12-30 NOTE — ASSESSMENT & PLAN NOTE
History noted.  Patient's diabetes controlled with diet.  Most recent A1c 6.3 (10/2023).  Most recent glucose 114    Plan -   -POCT glucose  -low dose SSI

## 2023-12-30 NOTE — SUBJECTIVE & OBJECTIVE
Interval history - Patient seen today.  Still complaining of significant cough.  Antitussives ordered. Cont duonebs.     Review of Systems   Constitutional:  Negative for activity change, appetite change, chills and fever.   HENT:  Negative for congestion, sore throat and trouble swallowing.    Eyes:  Negative for photophobia and visual disturbance.   Respiratory:  Positive for cough and shortness of breath. Negative for chest tightness.    Cardiovascular:  Negative for chest pain, palpitations and leg swelling.   Gastrointestinal:  Negative for abdominal pain, diarrhea and nausea.   Genitourinary:  Negative for dysuria, flank pain and hematuria.   Musculoskeletal:  Negative for back pain.   Neurological:  Negative for dizziness, weakness and headaches.   Psychiatric/Behavioral:  Negative for confusion.      Objective:     Vital Signs (Most Recent):  Temp: 96.4 °F (35.8 °C) (12/29/23 2020)  Pulse: 78 (12/29/23 2331)  Resp: 16 (12/29/23 2331)  BP: (!) 154/67 (12/29/23 2020)  SpO2: 97 % (12/29/23 2047) Vital Signs (24h Range):  Temp:  [96.4 °F (35.8 °C)-98.1 °F (36.7 °C)] 96.4 °F (35.8 °C)  Pulse:  [74-99] 78  Resp:  [16-20] 16  SpO2:  [91 %-98 %] 97 %  BP: (121-154)/(58-86) 154/67     Weight: 59 kg (130 lb)  Body mass index is 21.63 kg/m².     Physical Exam  Constitutional:       General: She is not in acute distress.     Appearance: She is not ill-appearing.   Pulmonary:      Effort: No respiratory distress.      Breath sounds: Wheezing (right lower lung) present.      Comments: Coughing while in room  Abdominal:      General: There is no distension.      Tenderness: There is no abdominal tenderness.   Musculoskeletal:      Right lower leg: No edema.      Left lower leg: No edema.   Neurological:      General: No focal deficit present.      Mental Status: She is oriented to person, place, and time.                Significant Labs: All pertinent labs within the past 24 hours have been reviewed.      Significant  Imaging: I have reviewed all pertinent imaging results/findings within the past 24 hours.

## 2023-12-30 NOTE — PLAN OF CARE
Problem: Physical Therapy  Goal: Physical Therapy Goal  Description: Goals to be met by: 2023    Patient will increase functional independence with mobility by performin. Sit<>stand with mod (I) with LRAD.  2. Gait x 400 feet with mod (I) with LRAD.  3. Ascend/descend 15 step(s) with least restrictive assistive device and hand held assist with no railing .      Outcome: Ongoing, Progressing   Patient evaluated by PT and goals established. Patient ambulated 200 feet with RW and CGA. No LOB or instability noted.  PT will continue to follow and progress as tolerated. Rec for dc to Low Intensity Therapy. Please see progress note for detailed plan of care and recommendations.

## 2023-12-30 NOTE — H&P
[facility]  History & Physical    Patient Name: Debbie Ding  MRN: [id number]  Patient Class: IP- Inpatient  Admission Date: 12/29/2023  Attending Physician: Samm Moran MD   Primary Care Provider: Carolyn Mejia MD         Patient information was obtained from patient, past medical records, and ER records.     Subjective:     Principal Problem:Acute hypoxemic respiratory failure    Chief Complaint:   Chief Complaint   Patient presents with    Hypoxia     Pt sent from  due to low O2 after pneumonia diagnosis today. Pt reporting SOB at this time. Denies chest pain. O2 91% RA in triage.         HPI: Debbie Ding is a 89-year-old female with a past medical history of asthma, bilateral PE, GERD, hypercholesteremia, fibromyalgia and thyroid disease who presents with worsening cough and shortness of breath with exertion.  Patient was initially seen at urgent care and found to be hypoxic to 91% and referred patient to the ED. Patient states she was diagnosed with bronchitis 3 months ago and has had a persistent cough since then.  She reports that her cough has worsened and she has worsening shortness of breath over the past few days.  Patient states using her albuterol and OTC Robitussin with minimal relief.    Upon arrival to the ED, patient found to be hypoxic with 91% and was placed on 2 L O2 via NC with improvement.  Lab work revealed hypokalemia 3.3 and .  No leukocytosis and troponin negative.  Lactic acid 1.3 and procalcitonin 0.09.  UA negative for UTI.  She denies history of CHF with most recent BNP 36 two years ago.  She denies lower extremity swelling and states her cough is nonproductive.  Patient further denies chest pain, fever, chills, nausea and vomiting.  Chest x-ray revealed patchy opacities to the lungs could be related to atelectasis, aspiration or pneumonia.  Patient received IV magnesium, methylprednisone and albuterol nebulizer in the ED  with some relief.  Patient referred to Hospital Medicine and will be admitted for further evaluation and management    Past Medical History:   Diagnosis Date    Allergic rhinitis     Anticoagulant long-term use     Arthritis     Asthma     Bilateral pulmonary embolism 4/27/2019    Cataract     Chronic anticoagulation 9/28/2020    Depression     Diabetes mellitus     Fibromyalgia 7/2/2012    Fibromyalgia     GERD (gastroesophageal reflux disease)     Hypercholesterolemia 9/28/2020    Hypercholesterolemia 9/28/2020    Hypertension 7/2/2012    Thoracic or lumbosacral neuritis or radiculitis, unspecified     Thyroid disease     Ulcer        Past Surgical History:   Procedure Laterality Date    CARPAL TUNNEL RELEASE Left 11/29/2023    Procedure: RELEASE, CARPAL TUNNEL,LEFT;  Surgeon: Avril Hutchison MD;  Location: [facility];  Service: Orthopedics;  Laterality: Left;    CATARACT EXTRACTION Bilateral     ESOPHAGOGASTRODUODENOSCOPY N/A 3/8/2022    Procedure: EGD (ESOPHAGOGASTRODUODENOSCOPY) with dilation-either [facility] ok with Dr. Meza;  Surgeon: Rebeca Meza MD;  Location: [facility];  Service: Endoscopy;  Laterality: N/A;  1/11-eliquis hold ok see te-tb    ESOPHAGOGASTRODUODENOSCOPY N/A 2/28/2022    Procedure: EGD (ESOPHAGOGASTRODUODENOSCOPY) with dilation-either [facility] ok with Dr. Meza;  Surgeon: Rebeca Meza MD;  Location: [address]);  Service: Endoscopy;  Laterality: N/A;  1/11-eliquis hold ok see te-tb  COVID test on 2/25/22 at [facility]  2/22-confirmed appt arrival time with pt-Kpvt    FOOT NEUROMA SURGERY  1985    HYSTERECTOMY         Review of patient's allergies indicates:   Allergen Reactions    Melatonin      Other reaction(s): Other (See Comments)  Sleep Walks and has unnatural dreams    Talwin compound Hives    Talwin [pentazocine lactate] Hallucinations    Trazodone Other (See Comments)     Nightmares/sleep walk      Bentyl [dicyclomine] Anxiety       Current  Facility-Administered Medications on File Prior to Encounter   Medication    midazolam (VERSED) 1 mg/mL injection 0.5 mg     Current Outpatient Medications on File Prior to Encounter   Medication Sig    acetaminophen (TYLENOL) 500 MG tablet Take 1-2 tablets (500-1,000 mg total) by mouth 3 (three) times daily as needed for Pain.    albuterol (PROVENTIL) 2.5 mg /3 mL (0.083 %) nebulizer solution Take 3 mLs (2.5 mg total) by nebulization every 6 (six) hours as needed for Wheezing. Rescue    albuterol (PROVENTIL/VENTOLIN HFA) 90 mcg/actuation inhaler Inhale 2 puffs into the lungs every 6 (six) hours as needed.    amLODIPine (NORVASC) 2.5 MG tablet Take 1 tablet (2.5 mg total) by mouth once daily.    atorvastatin (LIPITOR) 40 MG tablet Take 1 tablet (40 mg total) by mouth once daily.    benzonatate (TESSALON) 100 MG capsule benzonatate 100 mg capsule    chlorhexidine (PERIDEX) 0.12 % solution SWISH WITH 10ML FOR 30 seconds THEN EXPECTORATE TWICE DAILY    ciclopirox 1 % shampoo Apply topically.    clobetasoL (TEMOVATE) 0.05 % external solution Pt to mix in 1 jar of cerave cream and apply to affected areas bid    clotrimazole-betamethasone 1-0.05% (LOTRISONE) cream Apply topically 2 (two) times daily.    cyproheptadine (PERIACTIN) 4 mg tablet     donepeziL (ARICEPT) 10 MG tablet Take 1 tablet by mouth every evening.    doxepin (SINEQUAN) 10 MG capsule TAKE 1 CAPSULE AT BEDTIME  FOR ITCHING    ELIQUIS 2.5 mg Tab TAKE 1 TABLET DAILY    esomeprazole (NEXIUM) 40 MG capsule TAKE 1 CAPSULE TWICE DAILY    famotidine (PEPCID) 20 MG tablet Take 1 tablet (20 mg total) by mouth daily as needed for Heartburn (breakthrough heartburn and acid reflux not controlled with Nexium).    fluocinolone (DERMA-SMOOTHE) 0.01 % external oil fluocinolone 0.01 % scalp oil and shower cap   Apply ONE A SMALL AMOUNT TO affected AREA as directed apply TO SCALP 2-3 TIMES PER WEEK FOR ITCHING]    fluocinonide (LIDEX) 0.05 % external solution AAA scalp qday  - bid prn pruritus    fluticasone-salmeterol 230-21 mcg/dose (ADVAIR HFA) 230-21 mcg/actuation HFAA inhaler Inhale 2 puffs into the lungs 2 (two) times daily. Controller    gabapentin (NEURONTIN) 100 MG capsule Take 100 mg by mouth.    GEMTESA 75 mg Tab Take 1 tablet by mouth twice a week.    glycopyrrolate (ROBINUL) 2 MG Tab TAKE 1 TABLET TWICE A DAY    hydroCHLOROthiazide (HYDRODIURIL) 25 MG tablet Take 1 tablet (25 mg total) by mouth once daily.    hydrocortisone-aloe vera 0.5 % Crea Place 1 application into the left eye 2 (two) times daily.    ketoconazole (NIZORAL) 2 % cream Apply topically once daily.    ketoconazole (NIZORAL) 2 % shampoo Wash hair with medicated shampoo at least 2x/week - let sit on scalp at least 5 minutes prior to rinsing    levothyroxine (SYNTHROID) 75 MCG tablet Take 1 tablet (75 mcg total) by mouth before breakfast.    meclizine (ANTIVERT) 25 mg tablet TAKE 1 TABLET TWICE A DAY  AS NEEDED FOR DIZZINESS    memantine (NAMENDA) 10 MG Tab Take 1 tablet (10 mg total) by mouth once daily.    mirtazapine (REMERON) 15 MG tablet Take 1 tablet (15 mg total) by mouth every evening.    promethazine-dextromethorphan (PROMETHAZINE-DM) 6.25-15 mg/5 mL Syrp Take 5 mLs by mouth every 8 (eight) hours as needed (cough).    traMADoL (ULTRAM) 50 mg tablet Take 1 tablet (50 mg total) by mouth every 6 (six) hours as needed for Pain.    [DISCONTINUED] diclofenac sodium (VOLTAREN) 1 % Gel Apply 2 g topically 3 (three) times daily. for 10 days (Patient not taking: Reported on 7/11/2023)    [DISCONTINUED] DULoxetine (CYMBALTA) 30 MG capsule Take 1 capsule (30 mg total) by mouth once daily. In 1-2 weeks, if no relief, may increase dose to 2 capsules once daily. Call for refills. (Patient not taking: Reported on 12/29/2023)    [DISCONTINUED] RESTASIS 0.05 % ophthalmic emulsion      Family History       Problem Relation (Age of Onset)    Diabetes Mother, Brother    Emphysema Maternal Aunt          Tobacco Use     Smoking status: Never    Smokeless tobacco: Never   Substance and Sexual Activity    Alcohol use: No    Drug use: No    Sexual activity: Not Currently     Review of Systems   Constitutional:  Negative for activity change, appetite change, chills and fever.   HENT:  Negative for congestion, sore throat and trouble swallowing.    Eyes:  Negative for photophobia and visual disturbance.   Respiratory:  Positive for cough and shortness of breath. Negative for chest tightness.    Cardiovascular:  Negative for chest pain, palpitations and leg swelling.   Gastrointestinal:  Negative for abdominal pain, diarrhea and nausea.   Genitourinary:  Negative for dysuria, flank pain and hematuria.   Musculoskeletal:  Negative for back pain.   Neurological:  Negative for dizziness, weakness and headaches.   Psychiatric/Behavioral:  Negative for confusion.      Objective:     Vital Signs (Most Recent):  Temp: 96.4 °F (35.8 °C) (12/29/23 2020)  Pulse: 78 (12/29/23 2331)  Resp: 16 (12/29/23 2331)  BP: (!) 154/67 (12/29/23 2020)  SpO2: 97 % (12/29/23 2047) Vital Signs (24h Range):  Temp:  [96.4 °F (35.8 °C)-98.1 °F (36.7 °C)] 96.4 °F (35.8 °C)  Pulse:  [74-99] 78  Resp:  [16-20] 16  SpO2:  [91 %-98 %] 97 %  BP: (121-154)/(58-86) 154/67     Weight: 59 kg (130 lb)  Body mass index is 21.63 kg/m².     Physical Exam  Vitals reviewed.   Constitutional:       Appearance: Normal appearance. She is normal weight.   HENT:      Head: Normocephalic.      Mouth/Throat:      Mouth: Mucous membranes are moist.      Pharynx: Oropharynx is clear.   Eyes:      Pupils: Pupils are equal, round, and reactive to light.   Cardiovascular:      Rate and Rhythm: Normal rate and regular rhythm.      Pulses: Normal pulses.      Heart sounds: Normal heart sounds.   Pulmonary:      Effort: Pulmonary effort is normal.      Breath sounds: Decreased breath sounds and wheezing present.   Abdominal:      General: Bowel sounds are normal.   Musculoskeletal:          General: Normal range of motion.      Cervical back: Normal range of motion.   Skin:     General: Skin is warm and dry.   Neurological:      Mental Status: She is alert and oriented to person, place, and time. Mental status is at baseline.   Psychiatric:         Mood and Affect: Mood normal.              CRANIAL NERVES     CN III, IV, VI   Pupils are equal, round, and reactive to light.       Significant Labs: All pertinent labs within the past 24 hours have been reviewed.  CBC:   Recent Labs   Lab 12/29/23  1234   WBC 5.77   HGB 12.3   HCT 36.9*        CMP:   Recent Labs   Lab 12/29/23  1234      K 3.3*      CO2 23   *   BUN 14   CREATININE 1.4   CALCIUM 9.0   PROT 7.4   ALBUMIN 3.6   BILITOT 0.9   ALKPHOS 59   AST 24   ALT 11   ANIONGAP 11       Significant Imaging: I have reviewed all pertinent imaging results/findings within the past 24 hours.    Assessment/Plan:     * Acute hypoxemic respiratory failure  Patient with history of asthma found to be hypoxic to 91%.  Chest x-ray revealed patchy opacities in the lung bases related to atelectasis, aspiration or pneumonia.  No leukocytosis and procalcitonin 0.09.  Per chart review, patient unable to take Symbicort secondary to cost of medication and was recently started on Advar    -prednisone 40 mg daily  -defer antibiotics given no leukocytosis and procalcitonin 0.09  -oxygen p.r.n.  -albuterol-ipratropium Q 4  -Follow up with Pulmonology, Dr. Suazo outpatient    History of deep venous thrombosis  History noted, currently on Eliquis 2.5 mg daily    -continue Eliquis 2.5 mg daily      Type 2 diabetes mellitus with stage 3a chronic kidney disease, without long-term current use of insulin  History noted.  Patient's diabetes controlled with diet.  Most recent A1c 6.3 (10/2023).  Most recent glucose 114    -POCT glucose  -low dose SSI    Chronic kidney disease, stage III (moderate)  Patient with history of CKD stage 3.  Creatinine currently 1.4  and GFR 36    -continue to monitor renal function  -avoid nephrotoxins    Hypokalemia  Patient has hypokalemia which is Acute and currently uncontrolled. Most recent potassium levels reviewed-   Lab Results   Component Value Date    K 3.3 (L) 12/29/2023   . Will continue potassium replacement per protocol and recheck repeat levels after replacement completed.     GERD (gastroesophageal reflux disease)  History noted currently controlled with PPI    -continue Protonix 40 mg daily    Hypertension  History noted currently controlled with amlodipine and HCTZ    -continue amlodipine 2.5 mg daily  -continue HCTZ 25 mg daily      VTE Risk Mitigation (From admission, onward)           Ordered     apixaban tablet 2.5 mg  Daily         12/29/23 1931     IP VTE HIGH RISK PATIENT  Once         12/29/23 1918     Place sequential compression device  Until discontinued         12/29/23 1918                                    Angelica Lee NP  Department of Hospital Medicine  [facility] ([place])

## 2023-12-30 NOTE — ASSESSMENT & PLAN NOTE
History noted.  Patient's diabetes controlled with diet.  Most recent A1c 6.3 (10/2023).  Most recent glucose 114    -POCT glucose  -low dose SSI

## 2023-12-30 NOTE — PT/OT/SLP EVAL
Occupational Therapy   Evaluation and Treatment    Name: Debbie Ding  MRN: [id number]  Admitting Diagnosis: Acute hypoxemic respiratory failure  Recent Surgery: * No surgery found *      Recommendations:     Discharge Recommendations: Low Intensity Therapy  Discharge Equipment Recommendations:  bedside commode (will defer AD needs to PT)  Barriers to discharge:  Inaccessible home environment (current functional level)    Assessment:   Initial OT eval/treat complete.  O2 sats monitored throughout session and found to be 93% and above.  Ambulating short household distance without RW requiring CGA/SBA and with RW requiring supervision.  Donned socks and hospital gown as would robe requiring clothing retrieval with supervision and SBA, respectively.  Has shower chair and rollator used for community ambulation.  Will defer AD needs to PT.  Needs BSC.  Recommend post acute Low Intensity therapy.  To benefit from continued acute care OT services to increase independence in self-care/functional transfers.  OT to follow.     Debbie Ding is a 89 y.o. female with a medical diagnosis of Acute hypoxemic respiratory failure.  She presents with below deficits decreasing independence in self-care/functional transfers. Performance deficits affecting function: weakness, impaired endurance, impaired self care skills, impaired functional mobility, gait instability, impaired balance, impaired cardiopulmonary response to activity.      Rehab Prognosis: Good; patient would benefit from acute skilled OT services to address these deficits and reach maximum level of function.       Plan:     Patient to be seen 3 x/week to address the above listed problems via self-care/home management, therapeutic activities, therapeutic exercises  Plan of Care Expires: 01/13/24  Plan of Care Reviewed with: patient (granddaughter stopped by for very short visit)    Subjective     Chief Complaint: No c/o pain.   Patient/Family Comments/goals: No goals  stated at this time.     Occupational Profile:  Lives with daughter that works FT day shift hours in [employer] with 15 LUIG[employer]; bedroom/bathroom on 2nd level of home.  Bathroom setup walk-in shower with shower chair and standard toilet.  Pt. Reports ambulating independently in home with occasional furniture cruising though uses rollator for community ambulation.  Previously MOD I with ADL, cooks, cleans, and still drives.   Equipment Used at Home: rollator, shower chair  Assistance upon Discharge: Daughter able to assist when not working though Pt. Also reports that her grandchildren visit her daily.     Pain/Comfort:  Pain Rating 1: 0/10  Pain Rating Post-Intervention 1: 0/10    Patients cultural, spiritual, Sabianism conflicts given the current situation:  (None stated.)    Objective:     Communicated with: Gina ALVARADO RN prior to session.  Patient found HOB elevated with peripheral IV, telemetry upon OT entry to room.    General Precautions: Standard, fall, diabetic  Orthopedic Precautions: N/A  Braces: N/A  Respiratory Status: Room air    Occupational Performance:    Bed Mobility:    Supine<>sit with supervision and HOB elevated when coming into sitting.      Functional Mobility/Transfers:  Sit>stand from EOB and ambulating short household distance bed>bathroom without use of RW with SBA, shortened steps, and slight forward flexed trunk.  Step transfer to standard toilet with use of grab bar, CGA, and increased time for controlled descent.  Ambulating bathroom>bed with RW and SBA.      Activities of Daily Living:  Donned hospital gown as would robe requiring clothing retrieval.  Donned socks to BLE seated EOB with downward reaching and cross leg tech.      Cognitive/Visual Perceptual:  Cognitive/Psychosocial Skills:  -       Oriented to: Person, Place, Time, and Situation   -       Follows Commands/attention:Follows one-step commands  -       Communication: able to make basic needs known and answer questions  appropriately  -       Memory: No Deficits noted  -       Safety awareness/insight to disability: impaired   -       Mood/Affect/Coping skills/emotional control: Cooperative and Pleasant  Visual/Perceptual:  -grossly intact    Physical Exam:  Postural examination/scapula alignment: -       Rounded shoulders  Skin integrity: Visible skin intact  Upper Extremity Range of Motion:  -       Right Upper Extremity: WFL  -       Left Upper Extremity: WFL  Upper Extremity Strength: -       Right Upper Extremity: 4-/5 gross  -       Left Upper Extremity: 4-/5 gross   Strength: -       Right Upper Extremity: fair plus  -       Left Upper Extremity: fair plus  Fine Motor Coordination: -       Intact  Left hand, finger to nose, Right hand, finger to nose, Left hand thumb/finger opposition skills, and Right hand thumb/finger opposition skills    AMPAC 6 Click ADL:  AMPAC Total Score: 19    Treatment & Education:  Educated on role of OT, POC, and DME needs including uses of BSC.   Supine<>sit with supervision and HOB elevated when coming into sitting.    Sit>stand from EOB and ambulating short household distance bed>bathroom without use of RW with SBA, shortened steps, and slight forward flexed trunk.  Step transfer to standard toilet with use of grab bar, CGA, and increased time for controlled descent.  Ambulating bathroom>bed with RW and SBA.   Donned hospital gown as would robe requiring clothing retrieval.  Donned socks to BLE seated EOB with downward reaching and cross leg tech.    Received call light review and importance of calling for assist as needed.     Patient left HOB elevated with all lines intact, call button in reach, and nursing notified    GOALS:   Multidisciplinary Problems       Occupational Therapy Goals          Problem: Occupational Therapy    Goal Priority Disciplines Outcome Interventions   Occupational Therapy Goal     OT, PT/OT Ongoing, Progressing    Description: Goals to be met by: 1/13/2024      Patient will increase functional independence with ADLs by performing:    UE Dressing with Modified [place].  LE Dressing with Modified [place].  Grooming while standing at sink with Modified [place].  Toileting from bedside commode with Modified [place] for hygiene and clothing management.   Toilet transfer to bedside commode with Modified [place].                         History:     Past Medical History:   Diagnosis Date    Allergic rhinitis     Anticoagulant long-term use     Arthritis     Asthma     Bilateral pulmonary embolism 4/27/2019    Cataract     Chronic anticoagulation 9/28/2020    Depression     Diabetes mellitus     Fibromyalgia 7/2/2012    Fibromyalgia     GERD (gastroesophageal reflux disease)     Hypercholesterolemia 9/28/2020    Hypercholesterolemia 9/28/2020    Hypertension 7/2/2012    Thoracic or lumbosacral neuritis or radiculitis, unspecified     Thyroid disease     Ulcer          Past Surgical History:   Procedure Laterality Date    CARPAL TUNNEL RELEASE Left 11/29/2023    Procedure: RELEASE, CARPAL TUNNEL,LEFT;  Surgeon: Avril Hutchison MD;  Location: [facility];  Service: Orthopedics;  Laterality: Left;    CATARACT EXTRACTION Bilateral     ESOPHAGOGASTRODUODENOSCOPY N/A 3/8/2022    Procedure: EGD (ESOPHAGOGASTRODUODENOSCOPY) with dilation-either hospital ok with Dr. Meza;  Surgeon: Rebeca Meza MD;  Location: [facility];  Service: Endoscopy;  Laterality: N/A;  1/11-eliquis hold ok see te-tb    ESOPHAGOGASTRODUODENOSCOPY N/A 2/28/2022    Procedure: EGD (ESOPHAGOGASTRODUODENOSCOPY) with dilation-either hospital ok with Dr. Meza;  Surgeon: Rebeca Meza MD;  Location: [address]);  Service: Endoscopy;  Laterality: N/A;  1/11-eliquis hold ok see te-tb  COVID test on 2/25/22 at [facility]  2/22-confirmed appt arrival time with pt-Kpvt    FOOT NEUROMA SURGERY  1985    HYSTERECTOMY         Time Tracking:     OT Date of Treatment: 12/30/23  OT  Start Time: 1546  OT Stop Time: 1608  OT Total Time (min): 22 min    Billable Minutes:Evaluation 10  Self Care/Home Management 12    12/30/2023

## 2023-12-30 NOTE — PLAN OF CARE
SLP dysphagia evaluation completed. SLP to follow up during admit to ensure comprehension of aspiration risk precautions, pillars of aspiration pna. 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 Patient may continue a regular diet with thin liquids given use of aspiration precautions as detailed below.     Problem: SLP  Goal: SLP Goal  Description: 1. Patient will utilize learned aspiration risk precautions (slow rate of intake, small bolus volumes, alternating solids/liquids, upright seating during and 2H post intake) in 100% of opportunities independently to tolerate regular solids and thin liquids without signs of dysphagia or respiratory compromise.     2) Patient will recall pillars of aspiration pna in 100% of opportunities given min cues from clinician to reduce risk of aspiration pna and promote use of aspiration precautions.   Outcome: Ongoing, Progressing

## 2023-12-31 ENCOUNTER — EXTERNAL CHRONIC CARE MANAGEMENT (OUTPATIENT)
Dept: PRIMARY CARE CLINIC | Facility: CLINIC | Age: 88
DRG: 189 | End: 2023-12-31
Payer: MEDICARE

## 2023-12-31 PROBLEM — J38.3 VOCAL CORD DYSFUNCTION: Status: ACTIVE | Noted: 2023-12-31

## 2023-12-31 PROBLEM — K22.2 ESOPHAGEAL STRICTURE: Status: ACTIVE | Noted: 2023-12-31

## 2023-12-31 PROBLEM — J96.01 ACUTE HYPOXEMIC RESPIRATORY FAILURE: Status: RESOLVED | Noted: 2021-10-10 | Resolved: 2023-12-31

## 2023-12-31 LAB
ANION GAP SERPL CALC-SCNC: 10 MMOL/L (ref 8–16)
BUN SERPL-MCNC: 27 MG/DL (ref 10–30)
CALCIUM SERPL-MCNC: 9.3 MG/DL (ref 8.7–10.5)
CHLORIDE SERPL-SCNC: 104 MMOL/L (ref 95–110)
CO2 SERPL-SCNC: 26 MMOL/L (ref 23–29)
CREAT SERPL-MCNC: 1.4 MG/DL (ref 0.5–1.4)
ERYTHROCYTE [DISTWIDTH] IN BLOOD BY AUTOMATED COUNT: 15.8 % (ref 11.5–14.5)
EST. GFR  (NO RACE VARIABLE): 36 ML/MIN/1.73 M^2
GLUCOSE SERPL-MCNC: 157 MG/DL (ref 70–110)
HCT VFR BLD AUTO: 37.4 % (ref 37–48.5)
HGB BLD-MCNC: 12.3 G/DL (ref 12–16)
MCH RBC QN AUTO: 28.9 PG (ref 27–31)
MCHC RBC AUTO-ENTMCNC: 32.9 G/DL (ref 32–36)
MCV RBC AUTO: 88 FL (ref 82–98)
PLATELET # BLD AUTO: 186 K/UL (ref 150–450)
PMV BLD AUTO: 11.2 FL (ref 9.2–12.9)
POCT GLUCOSE: 106 MG/DL (ref 70–110)
POCT GLUCOSE: 108 MG/DL (ref 70–110)
POCT GLUCOSE: 145 MG/DL (ref 70–110)
POCT GLUCOSE: 208 MG/DL (ref 70–110)
POTASSIUM SERPL-SCNC: 4 MMOL/L (ref 3.5–5.1)
RBC # BLD AUTO: 4.25 M/UL (ref 4–5.4)
SODIUM SERPL-SCNC: 140 MMOL/L (ref 136–145)
WBC # BLD AUTO: 6.9 K/UL (ref 3.9–12.7)

## 2023-12-31 PROCEDURE — 25000003 PHARM REV CODE 250: Performed by: STUDENT IN AN ORGANIZED HEALTH CARE EDUCATION/TRAINING PROGRAM

## 2023-12-31 PROCEDURE — 80048 BASIC METABOLIC PNL TOTAL CA: CPT | Performed by: STUDENT IN AN ORGANIZED HEALTH CARE EDUCATION/TRAINING PROGRAM

## 2023-12-31 PROCEDURE — 94640 AIRWAY INHALATION TREATMENT: CPT

## 2023-12-31 PROCEDURE — 99490 CHRNC CARE MGMT STAFF 1ST 20: CPT | Mod: PBBFAC | Performed by: INTERNAL MEDICINE

## 2023-12-31 PROCEDURE — 99439 CHRNC CARE MGMT STAF EA ADDL: CPT | Mod: PBBFAC | Performed by: INTERNAL MEDICINE

## 2023-12-31 PROCEDURE — 63600175 PHARM REV CODE 636 W HCPCS: Performed by: NURSE PRACTITIONER

## 2023-12-31 PROCEDURE — 36415 COLL VENOUS BLD VENIPUNCTURE: CPT | Performed by: STUDENT IN AN ORGANIZED HEALTH CARE EDUCATION/TRAINING PROGRAM

## 2023-12-31 PROCEDURE — 25000242 PHARM REV CODE 250 ALT 637 W/ HCPCS: Performed by: NURSE PRACTITIONER

## 2023-12-31 PROCEDURE — 21400001 HC TELEMETRY ROOM

## 2023-12-31 PROCEDURE — 85027 COMPLETE CBC AUTOMATED: CPT | Performed by: STUDENT IN AN ORGANIZED HEALTH CARE EDUCATION/TRAINING PROGRAM

## 2023-12-31 PROCEDURE — 94761 N-INVAS EAR/PLS OXIMETRY MLT: CPT

## 2023-12-31 PROCEDURE — 27000221 HC OXYGEN, UP TO 24 HOURS

## 2023-12-31 PROCEDURE — 25000003 PHARM REV CODE 250: Performed by: NURSE PRACTITIONER

## 2023-12-31 PROCEDURE — 99439 CHRNC CARE MGMT STAF EA ADDL: CPT | Mod: S$PBB,,, | Performed by: INTERNAL MEDICINE

## 2023-12-31 PROCEDURE — 99490 CHRNC CARE MGMT STAFF 1ST 20: CPT | Mod: S$PBB,,, | Performed by: INTERNAL MEDICINE

## 2023-12-31 RX ORDER — BENZONATATE 100 MG/1
100 CAPSULE ORAL 3 TIMES DAILY
Status: DISCONTINUED | OUTPATIENT
Start: 2023-12-31 | End: 2023-12-31

## 2023-12-31 RX ORDER — BENZONATATE 100 MG/1
100 CAPSULE ORAL 3 TIMES DAILY PRN
Qty: 30 CAPSULE | Refills: 1 | Status: SHIPPED | OUTPATIENT
Start: 2023-12-31 | End: 2024-01-23 | Stop reason: SDUPTHER

## 2023-12-31 RX ORDER — PREDNISONE 20 MG/1
40 TABLET ORAL DAILY
Qty: 6 TABLET | Refills: 0 | Status: SHIPPED | OUTPATIENT
Start: 2023-12-31 | End: 2024-01-03 | Stop reason: HOSPADM

## 2023-12-31 RX ORDER — BENZONATATE 100 MG/1
100 CAPSULE ORAL EVERY 6 HOURS
Status: DISCONTINUED | OUTPATIENT
Start: 2024-01-01 | End: 2024-01-03 | Stop reason: HOSPADM

## 2023-12-31 RX ADMIN — IPRATROPIUM BROMIDE AND ALBUTEROL SULFATE 3 ML: 2.5; .5 SOLUTION RESPIRATORY (INHALATION) at 04:12

## 2023-12-31 RX ADMIN — AMLODIPINE BESYLATE 2.5 MG: 2.5 TABLET ORAL at 10:12

## 2023-12-31 RX ADMIN — SENNOSIDES AND DOCUSATE SODIUM 1 TABLET: 8.6; 5 TABLET ORAL at 10:12

## 2023-12-31 RX ADMIN — LEVOTHYROXINE SODIUM 75 MCG: 75 TABLET ORAL at 05:12

## 2023-12-31 RX ADMIN — BENZONATATE 100 MG: 100 CAPSULE ORAL at 10:12

## 2023-12-31 RX ADMIN — IPRATROPIUM BROMIDE AND ALBUTEROL SULFATE 3 ML: 2.5; .5 SOLUTION RESPIRATORY (INHALATION) at 12:12

## 2023-12-31 RX ADMIN — HYDROCHLOROTHIAZIDE 25 MG: 25 TABLET ORAL at 10:12

## 2023-12-31 RX ADMIN — APIXABAN 2.5 MG: 2.5 TABLET, FILM COATED ORAL at 10:12

## 2023-12-31 RX ADMIN — MIRTAZAPINE 15 MG: 15 TABLET, FILM COATED ORAL at 09:12

## 2023-12-31 RX ADMIN — IPRATROPIUM BROMIDE AND ALBUTEROL SULFATE 3 ML: 2.5; .5 SOLUTION RESPIRATORY (INHALATION) at 08:12

## 2023-12-31 RX ADMIN — IPRATROPIUM BROMIDE AND ALBUTEROL SULFATE 3 ML: 2.5; .5 SOLUTION RESPIRATORY (INHALATION) at 07:12

## 2023-12-31 RX ADMIN — MEMANTINE HYDROCHLORIDE 10 MG: 5 TABLET ORAL at 10:12

## 2023-12-31 RX ADMIN — GLYCOPYRROLATE 2 MG: 1 TABLET ORAL at 10:12

## 2023-12-31 RX ADMIN — SENNOSIDES AND DOCUSATE SODIUM 1 TABLET: 8.6; 5 TABLET ORAL at 09:12

## 2023-12-31 RX ADMIN — PREDNISONE 40 MG: 20 TABLET ORAL at 10:12

## 2023-12-31 RX ADMIN — GLYCOPYRROLATE 2 MG: 1 TABLET ORAL at 09:12

## 2023-12-31 RX ADMIN — ACETAMINOPHEN 650 MG: 325 TABLET, FILM COATED ORAL at 02:12

## 2023-12-31 RX ADMIN — IPRATROPIUM BROMIDE AND ALBUTEROL SULFATE 3 ML: 2.5; .5 SOLUTION RESPIRATORY (INHALATION) at 11:12

## 2023-12-31 RX ADMIN — BENZONATATE 100 MG: 100 CAPSULE ORAL at 02:12

## 2023-12-31 RX ADMIN — IPRATROPIUM BROMIDE AND ALBUTEROL SULFATE 3 ML: 2.5; .5 SOLUTION RESPIRATORY (INHALATION) at 03:12

## 2023-12-31 RX ADMIN — ATORVASTATIN CALCIUM 40 MG: 20 TABLET, FILM COATED ORAL at 10:12

## 2023-12-31 RX ADMIN — PANTOPRAZOLE SODIUM 40 MG: 40 TABLET, DELAYED RELEASE ORAL at 10:12

## 2023-12-31 NOTE — ASSESSMENT & PLAN NOTE
Cough   History of esophageal stricture  Vocal cord dysfunction      Cough started 4-5 months ago - She reports that she had already been to an ED (out of [facility]) and her outpatient pulmonologist regarding this.  Both time she had been discharged with antibiotics and steroids which have not seemed to help.  She is very frustrated by this as it is interfering with her day-to-day activities.     Patient also has history of asthma found to be hypoxic to 91%.  Chest x-ray revealed patchy opacities in the lung bases related to atelectasis, aspiration or pneumonia.  No leukocytosis and procalcitonin 0.09.  Per chart review, patient unable to take Symbicort secondary to cost of medication and was recently started on Advar    Plan -   -prednisone 40 mg daily  -defer antibiotics given no leukocytosis and procalcitonin 0.09  -oxygen p.r.n.  -albuterol-ipratropium Q 4  -Follows with Pulmonology, Dr. Suazo outpatient  - Anti tussives added   - Speech consulted for history of vocal cord dysfunction, esophageal stricture dilation and findings of possible aspiration of chest x-ray - does not display overt signs of aspiration during evaluation -  pt was given education on aspiration precautions.    - pulmonary consulted for persistent hacking non productive cough - based on CT chest appears to have bronchiectasis.  Cough appears to be secondary to chronic aspiration - Check respiratory viral panel, TTE, induced sputum for culture and AFB (NTM), IgE for possible ABPA, continue prednisone (complete 5 days) and hypertonic saline based.

## 2023-12-31 NOTE — PROGRESS NOTES
[facility] Medicine  Progress Note    Patient Name: Debbie Ding  MRN: [id number]  Patient Class: IP- Inpatient   Admission Date: 12/29/2023  Length of Stay: 2 days  Attending Physician: Samm Moran MD  Primary Care Provider: Carolyn Mejia MD        Subjective:     Principal Problem:Acute hypoxemic respiratory failure        HPI:  Debbie Ding is a 89-year-old female with a past medical history of asthma, bilateral PE, GERD, hypercholesteremia, fibromyalgia and thyroid disease who presents with worsening cough and shortness of breath with exertion.  Patient was initially seen at urgent care and found to be hypoxic to 91% and referred patient to the ED. Patient states she was diagnosed with bronchitis 3 months ago and has had a persistent cough since then.  She reports that her cough has worsened and she has worsening shortness of breath over the past few days.  Patient states using her albuterol and OTC Robitussin with minimal relief.    Upon arrival to the ED, patient found to be hypoxic with 91% and was placed on 2 L O2 via NC with improvement.  Lab work revealed hypokalemia 3.3 and .  No leukocytosis and troponin negative.  Lactic acid 1.3 and procalcitonin 0.09.  UA negative for UTI.  She denies history of CHF with most recent BNP 36 two years ago.  She denies lower extremity swelling and states her cough is nonproductive.  Patient further denies chest pain, fever, chills, nausea and vomiting.  Chest x-ray revealed patchy opacities to the lungs could be related to atelectasis, aspiration or pneumonia.  Patient received IV magnesium, methylprednisone and albuterol nebulizer in the ED with some relief.  Patient referred to Hospital Medicine and will be admitted for further evaluation and management    Overview/Hospital Course:  Patient seen today.  Still complaining of significant cough.  Antitussives ordered.  Her last asthma exacerbation was years ago and coughing  is typical of an exacerbation.  Concern for aspiration with x-ray findings, and history of vocal cord dysfunction and history of dilations for strictures in the esophagus.  Patient was evaluated by speech and does not display overt signs of aspiration during evaluation -  pt was given education on aspiration precautions.      Pt continuous to have severe non productive hacking cough.  She reports a cough started about 4-5 months ago.  She reports that she had already been to an ED (out of [facility]) and her outpatient pulmonologist regarding this.  Both time she had been discharged with steroids which have not seemed to help.  She is very frustrated by this as it is interfering with her day-to-day activities.  After shared discussion between patient family and myself will consult Pulmonary though we did discuss the limitations of inpatient workup.    Interval history - continues to have nonproductive hacking cough.  Pulmonary consulted.    Review of Systems   Constitutional:  Negative for activity change, appetite change, chills and fever.   HENT:  Negative for congestion, sore throat and trouble swallowing.    Eyes:  Negative for photophobia and visual disturbance.   Respiratory:  Positive for cough and shortness of breath. Negative for chest tightness.    Cardiovascular:  Negative for chest pain, palpitations and leg swelling.   Gastrointestinal:  Negative for abdominal pain, diarrhea and nausea.   Genitourinary:  Negative for dysuria, flank pain and hematuria.   Musculoskeletal:  Negative for back pain.   Neurological:  Negative for dizziness, weakness and headaches.   Psychiatric/Behavioral:  Negative for confusion.      Objective:     Vital Signs (Most Recent):  Temp: 97.6 °F (36.4 °C) (12/31/23 1148)  Pulse: 76 (12/31/23 1148)  Resp: 17 (12/31/23 1148)  BP: (!) 145/68 (12/31/23 1148)  SpO2: (!) 93 % (12/31/23 1148) Vital Signs (24h Range):  Temp:  [97.6 °F (36.4 °C)-98.3 °F (36.8 °C)] 97.6 °F (36.4 °C)  Pulse:   [64-85] 76  Resp:  [14-18] 17  SpO2:  [93 %-98 %] 93 %  BP: (131-195)/(61-93) 145/68     Weight: 59 kg (130 lb)  Body mass index is 21.63 kg/m².     Physical Exam  Constitutional:       General: She is not in acute distress.     Appearance: She is not ill-appearing.   Pulmonary:      Effort: No respiratory distress.      Breath sounds: Wheezing (right lower lung) present.      Comments: Coughing while in room  Abdominal:      General: There is no distension.      Tenderness: There is no abdominal tenderness.   Musculoskeletal:      Right lower leg: No edema.      Left lower leg: No edema.   Neurological:      General: No focal deficit present.      Mental Status: She is oriented to person, place, and time.                Significant Labs: All pertinent labs within the past 24 hours have been reviewed.      Significant Imaging: I have reviewed all pertinent imaging results/findings within the past 24 hours.    Assessment/Plan:      * Acute hypoxemic respiratory failure  Cough   History of esophageal stricture  Vocal cord dysfunction      Cough started 4-5 months ago - She reports that she had already been to an ED (out of [facility]) and her outpatient pulmonologist regarding this.  Both time she had been discharged with steroids which have not seemed to help.  She is very frustrated by this as it is interfering with her day-to-day activities.     Patient also has history of asthma found to be hypoxic to 91%.  Chest x-ray revealed patchy opacities in the lung bases related to atelectasis, aspiration or pneumonia.  No leukocytosis and procalcitonin 0.09.  Per chart review, patient unable to take Symbicort secondary to cost of medication and was recently started on Advar    Plan -   -prednisone 40 mg daily  -defer antibiotics given no leukocytosis and procalcitonin 0.09  -oxygen p.r.n.  -albuterol-ipratropium Q 4  -Follows with Pulmonology, Dr. Suazo outpatient  - Anti tussives added   - Speech consulted for history of  vocal cord dysfunction, esophageal stricture dilation and findings of possible aspiration of chest x-ray - does not display overt signs of aspiration during evaluation -  pt was given education on aspiration precautions.    - pulmonary consulted for persistent hacking non productive cough     History of deep venous thrombosis  History noted, currently on Eliquis 2.5 mg daily    Plan -   -continue Eliquis 2.5 mg daily      Type 2 diabetes mellitus with stage 3a chronic kidney disease, without long-term current use of insulin  History noted.  Patient's diabetes controlled with diet.  Most recent A1c 6.3 (10/2023).  Most recent glucose 114    Plan -   -POCT glucose  -low dose SSI    Chronic kidney disease, stage III (moderate)  Patient with history of CKD stage 3.  Creatinine currently at baseline     Plan -   -continue to monitor renal function  -avoid nephrotoxins    Hyperhidrosis  Continue home glycopyrrolate 2 mg b.i.d.      GERD (gastroesophageal reflux disease)  History noted currently controlled with PPI    Plan -   -continue Protonix 40 mg daily    Hypertension  History noted currently controlled with amlodipine and HCTZ    Plan -   -continue amlodipine 2.5 mg daily  -continue HCTZ 25 mg daily      VTE Risk Mitigation (From admission, onward)           Ordered     apixaban tablet 2.5 mg  Daily         12/29/23 1931     IP VTE HIGH RISK PATIENT  Once         12/29/23 1918     Place sequential compression device  Until discontinued         12/29/23 1918                    Discharge Planning   NATE: 12/31/2023     Code Status: Full Code   Is the patient medically ready for discharge?:     Reason for patient still in hospital (select all that apply): Treatment  Discharge Plan A: Home with family   Discharge Delays: None known at this time              Samm Fonseca MD  Department of Hospital Medicine   [facility])

## 2023-12-31 NOTE — PLAN OF CARE
[facility]      HOME HEALTH ORDERS  FACE TO FACE ENCOUNTER    Patient Name: Debbie Ding  YOB: 1932    PCP: Carolyn Mejia MD   PCP Address[place] [address] [facility] [place] [address]  PCP Phone Number: [phone]  PCP Fax: [phone]    Encounter Date: 12/29/23    Admit to Home Health    Diagnoses:  Active Hospital Problems    Diagnosis  POA    History of deep venous thrombosis [Z86.718]  Not Applicable     4/2019: DVT.      Type 2 diabetes mellitus with stage 3a chronic kidney disease, without long-term current use of insulin [E11.22, N18.31]  Yes     2019: Diagnosed. Complications: CKD3a. Medications: Diet.      Chronic kidney disease, stage III (moderate) [N18.30]  Yes     1/29/2020: BUN/crea 12/1.2. CrCl 47.      Hyperhidrosis [R61]  Yes    GERD (gastroesophageal reflux disease) [K21.9]  Yes    Hypertension [I10]  Yes     2019: Diagnosed.        Resolved Hospital Problems    Diagnosis Date Resolved POA    *Acute hypoxemic respiratory failure [J96.01] 12/31/2023 Yes    Hypokalemia [E87.6] 12/30/2023 Yes       Follow Up Appointments:  Future Appointments   Date Time Provider Department Center   1/11/2024 10:00 AM Peter Joe PA-C [facility] ORTHO [facility]   3/21/2024 11:00 AM [facility]o Love [facility]   4/22/2024  9:30 AM LAB, APPOINTMENT [facility] LAB  [facility]   4/24/2024 10:30 AM Carolyn Mejia MD [facility]       Allergies:  Review of patient's allergies indicates:   Allergen Reactions    Melatonin      Other reaction(s): Other (See Comments)  Sleep Walks and has unnatural dreams    Talwin compound Hives    Talwin [pentazocine lactate] Hallucinations    Trazodone Other (See Comments)     Nightmares/sleep walk      Bentyl [dicyclomine] Anxiety       Medications: Review discharge medications with patient and family and provide education.    Current Facility-Administered Medications   Medication Dose Route  Frequency Provider Last Rate Last Admin    acetaminophen tablet 650 mg  650 mg Oral Q8H PRN Angelica Lee, NP   650 mg at 12/30/23 2121    albuterol-ipratropium 2.5 mg-0.5 mg/3 mL nebulizer solution 3 mL  3 mL Nebulization Q4H Angelica Lee, NP   3 mL at 12/31/23 0800    aluminum-magnesium hydroxide-simethicone 200-200-20 mg/5 mL suspension 30 mL  30 mL Oral QID PRN Angelica Lee, NP        amLODIPine tablet 2.5 mg  2.5 mg Oral Daily Angelica Lee, NP   2.5 mg at 12/30/23 0914    apixaban tablet 2.5 mg  2.5 mg Oral Daily Angelica Lee, NP   2.5 mg at 12/30/23 0915    atorvastatin tablet 40 mg  40 mg Oral Daily Angelica Lee, NP   40 mg at 12/30/23 0915    benzonatate capsule 100 mg  100 mg Oral TID PRN Samm Moran MD   100 mg at 12/30/23 2121    dextrose 10% bolus 125 mL 125 mL  12.5 g Intravenous PRN Angelica Lee, NP        dextrose 10% bolus 125 mL 125 mL  12.5 g Intravenous PRN Angelica Lee, NP        dextrose 10% bolus 250 mL 250 mL  25 g Intravenous PRN Angelica Lee, NP        dextrose 10% bolus 250 mL 250 mL  25 g Intravenous PRN Angelica Lee, NP        glucagon (human recombinant) injection 1 mg  1 mg Intramuscular PRN Angelica Lee, NP        glucagon (human recombinant) injection 1 mg  1 mg Intramuscular PRN Angelica Lee, NP        glucose chewable tablet 16 g  16 g Oral PRN Angelica Lee, NP        glucose chewable tablet 16 g  16 g Oral PRN Angelica Lee, NP        glucose chewable tablet 24 g  24 g Oral PRN Angelica Lee, NP        glucose chewable tablet 24 g  24 g Oral PRN Angelica Lee, NP        glycopyrrolate tablet 2 mg  2 mg Oral BID Samm Moran MD   2 mg at 12/30/23 2121    hydroCHLOROthiazide tablet 25 mg  25 mg Oral Daily Angelica Lee, NP   25 mg at 12/30/23 0915    insulin aspart U-100 pen 0-5 Units  0-5 Units Subcutaneous QID (AC + HS) PRN Angelica Lee, SOFIA        levothyroxine tablet  75 mcg  75 mcg Oral Before breakfast Angelica Lee, NP   75 mcg at 12/31/23 0515    memantine tablet 10 mg  10 mg Oral Daily Angelica Lee, NP   10 mg at 12/30/23 0915    mirtazapine tablet 15 mg  15 mg Oral QHS Angelica Lee, NP   15 mg at 12/30/23 2121    naloxone 0.4 mg/mL injection 0.02 mg  0.02 mg Intravenous PRN Angelica Lee, SOFIA        ondansetron injection 4 mg  4 mg Intravenous Q6H PRN Angelica Lee, SOFIA        pantoprazole EC tablet 40 mg  40 mg Oral Daily Angelica Lee, NP   40 mg at 12/30/23 0915    predniSONE tablet 40 mg  40 mg Oral Daily Angelica Lee, NP   40 mg at 12/30/23 0914    prochlorperazine injection Soln 5 mg  5 mg Intravenous Q6H PRN Angelica Lee, SOFIA        senna-docusate 8.6-50 mg per tablet 1 tablet  1 tablet Oral BID Angelica Lee NP   1 tablet at 12/30/23 2121    simethicone chewable tablet 80 mg  1 tablet Oral QID PRN Angelica Lee, SOFIA        sodium chloride 0.9% flush 10 mL  10 mL Intravenous Q8H PRN Angleica Lee, NP         Facility-Administered Medications Ordered in Other Encounters   Medication Dose Route Frequency Provider Last Rate Last Admin    midazolam (VERSED) 1 mg/mL injection 0.5 mg  0.5 mg Intravenous PRN Ross Hui MD         Current Discharge Medication List        START taking these medications    Details   predniSONE (DELTASONE) 20 MG tablet Take 2 tablets (40 mg total) by mouth once daily. for 3 days  Qty: 6 tablet, Refills: 0           CONTINUE these medications which have CHANGED    Details   benzonatate (TESSALON) 100 MG capsule Take 1 capsule (100 mg total) by mouth 3 (three) times daily as needed for Cough.  Qty: 30 capsule, Refills: 1           CONTINUE these medications which have NOT CHANGED    Details   acetaminophen (TYLENOL) 500 MG tablet Take 1-2 tablets (500-1,000 mg total) by mouth 3 (three) times daily as needed for Pain.  Refills: 0      albuterol (PROVENTIL) 2.5 mg /3 mL (0.083 %)  nebulizer solution Take 3 mLs (2.5 mg total) by nebulization every 6 (six) hours as needed for Wheezing. Rescue  Qty: 90 each, Refills: 3    Associated Diagnoses: Chronic asthma without complication, unspecified asthma severity, unspecified whether persistent      albuterol (PROVENTIL/VENTOLIN HFA) 90 mcg/actuation inhaler Inhale 2 puffs into the lungs every 6 (six) hours as needed.      amLODIPine (NORVASC) 2.5 MG tablet Take 1 tablet (2.5 mg total) by mouth once daily.  Qty: 90 tablet, Refills: 3    Comments: .  Associated Diagnoses: Hypertension, essential      atorvastatin (LIPITOR) 40 MG tablet Take 1 tablet (40 mg total) by mouth once daily.  Qty: 90 tablet, Refills: 1    Associated Diagnoses: Hyperlipidemia, unspecified hyperlipidemia type      chlorhexidine (PERIDEX) 0.12 % solution SWISH WITH 10ML FOR 30 seconds THEN EXPECTORATE TWICE DAILY      ciclopirox 1 % shampoo Apply topically.      clobetasoL (TEMOVATE) 0.05 % external solution Pt to mix in 1 jar of cerave cream and apply to affected areas bid  Qty: 50 mL, Refills: 3    Associated Diagnoses: Pruritus      clotrimazole-betamethasone 1-0.05% (LOTRISONE) cream Apply topically 2 (two) times daily.  Qty: 45 g, Refills: 2    Comments: For Rash      cyproheptadine (PERIACTIN) 4 mg tablet       donepeziL (ARICEPT) 10 MG tablet Take 1 tablet by mouth every evening.      doxepin (SINEQUAN) 10 MG capsule TAKE 1 CAPSULE AT BEDTIME  FOR ITCHING  Qty: 90 capsule, Refills: 3      ELIQUIS 2.5 mg Tab TAKE 1 TABLET DAILY  Qty: 90 tablet, Refills: 3      esomeprazole (NEXIUM) 40 MG capsule TAKE 1 CAPSULE TWICE DAILY  Qty: 180 capsule, Refills: 3    Associated Diagnoses: Gastroesophageal reflux disease, unspecified whether esophagitis present      famotidine (PEPCID) 20 MG tablet Take 1 tablet (20 mg total) by mouth daily as needed for Heartburn (breakthrough heartburn and acid reflux not controlled with Nexium).  Qty: 90 tablet, Refills: 3    Associated Diagnoses:  Gastroesophageal reflux disease, unspecified whether esophagitis present      fluocinolone (DERMA-SMOOTHE) 0.01 % external oil fluocinolone 0.01 % scalp oil and shower cap   Apply ONE A SMALL AMOUNT TO affected AREA as directed apply TO SCALP 2-3 TIMES PER WEEK FOR ITCHING]      fluocinonide (LIDEX) 0.05 % external solution AAA scalp qday - bid prn pruritus  Qty: 60 mL, Refills: 3    Associated Diagnoses: Pruritus      fluticasone-salmeterol 230-21 mcg/dose (ADVAIR HFA) 230-21 mcg/actuation HFAA inhaler Inhale 2 puffs into the lungs 2 (two) times daily. Controller  Qty: 36 g, Refills: 3    Associated Diagnoses: Chronic asthma without complication, unspecified asthma severity, unspecified whether persistent      gabapentin (NEURONTIN) 100 MG capsule Take 100 mg by mouth.      GEMTESA 75 mg Tab Take 1 tablet by mouth twice a week.      glycopyrrolate (ROBINUL) 2 MG Tab TAKE 1 TABLET TWICE A DAY  Qty: 180 tablet, Refills: 3      hydroCHLOROthiazide (HYDRODIURIL) 25 MG tablet Take 1 tablet (25 mg total) by mouth once daily.  Qty: 90 tablet, Refills: 0      hydrocortisone-aloe vera 0.5 % Crea Place 1 application into the left eye 2 (two) times daily.      ketoconazole (NIZORAL) 2 % cream Apply topically once daily.  Qty: 60 g, Refills: 2      ketoconazole (NIZORAL) 2 % shampoo Wash hair with medicated shampoo at least 2x/week - let sit on scalp at least 5 minutes prior to rinsing  Qty: 120 mL, Refills: 5    Associated Diagnoses: Pruritus      levothyroxine (SYNTHROID) 75 MCG tablet Take 1 tablet (75 mcg total) by mouth before breakfast.  Qty: 30 tablet, Refills: 8    Comments: She is requesting trade Synthroid, not generic  Associated Diagnoses: Hypothyroidism, adult      meclizine (ANTIVERT) 25 mg tablet TAKE 1 TABLET TWICE A DAY  AS NEEDED FOR DIZZINESS  Qty: 180 tablet, Refills: 1      memantine (NAMENDA) 10 MG Tab Take 1 tablet (10 mg total) by mouth once daily.  Qty: 180 tablet, Refills: 3      mirtazapine (REMERON)  15 MG tablet Take 1 tablet (15 mg total) by mouth every evening.  Qty: 90 tablet, Refills: 3      promethazine-dextromethorphan (PROMETHAZINE-DM) 6.25-15 mg/5 mL Syrp Take 5 mLs by mouth every 8 (eight) hours as needed (cough).  Qty: 118 mL, Refills: 0    Associated Diagnoses: Bronchitis, chronic, mucopurulent      traMADoL (ULTRAM) 50 mg tablet Take 1 tablet (50 mg total) by mouth every 6 (six) hours as needed for Pain.  Qty: 10 tablet, Refills: 0    Comments: Quantity prescribed more than 7 day supply? No. PO Delivery  Associated Diagnoses: Post-operative state               I have seen and examined this patient within the last 30 days. My clinical findings that support the need for the home health skilled services and home bound status are the following:no   Weakness/numbness causing balance and gait disturbance due to Weakness/Debility and asthma making it taxing to leave home.  Requiring assistive device to leave home due to unsteady gait caused by  Weakness/Debility and Asthma.           Referrals/ Consults  Physical Therapy to evaluate and treat. Evaluate for home safety and equipment needs; Establish/upgrade home exercise program. Perform / instruct on therapeutic exercises, gait training, transfer training, and Range of Motion.  Occupational Therapy to evaluate and treat. Evaluate home environment for safety and equipment needs. Perform/Instruct on transfers, ADL training, ROM, and therapeutic exercises.    Activities:   activity as tolerated    Nursing:   Agency to admit patient within 24 hours of hospital discharge unless specified on physician order or at patient request    SN to complete comprehensive assessment including routine vital signs. Instruct on disease process and s/s of complications to report to MD. Review/verify medication list sent home with the patient at time of discharge  and instruct patient/caregiver as needed. Frequency may be adjusted depending on start of care date.     Skilled  nurse to perform up to 3 visits PRN for symptoms related to diagnosis    Notify MD if SBP > 160 or < 90; DBP > 90 or < 50; HR > 120 or < 50; Temp > 101; O2 < 88%;    Ok to schedule additional visits based on staff availability and patient request on consecutive days within the home health episode.    When multiple disciplines ordered:    Start of Care occurs on Sunday - Wednesday schedule remaining discipline evaluations as ordered on separate consecutive days following the start of care.    Thursday SOC -schedule subsequent evaluations Friday and Monday the following week.     Friday - Saturday SOC - schedule subsequent discipline evaluations on consecutive days starting Monday of the following week.    For all post-discharge communication and subsequent orders please contact patient's primary care physician.         Home Health Aide:  Physical Therapy  and Occupational Therapy       I certify that this patient is confined to her home and needs physical therapy and occupational therapy.

## 2023-12-31 NOTE — PLAN OF CARE
The information below was gathered from the patient daughter Madina over the phone.     Initial Discharge Planning Assessment:  Patient admitted on: 12/29/23     Chart reviewed, Care plan discussed with treatment team,  attending Dr. Moran     PCP updated in [facility]: Dr. Mejia  Pharmacy, updated in Epic: [facility]     DME at home: Melissa       Current dispo: Home with family vs home health       Transportation: Family      Power of  or Living Will: Family      Anticipated DC needs from  perspective:        12/31/23 0754   Discharge Assessment   Assessment Type Discharge Planning Assessment   Confirmed/corrected address, phone number and insurance Yes   Confirmed Demographics Correct on Facesheet   Source of Information family;health record   People in Home child(nataliia), adult;grandchild(nataliia)   Do you expect to return to your current living situation? Yes   Do you have help at home or someone to help you manage your care at home? Yes   Who are your caregiver(s) and their phone number(s)? Family   Prior to hospitilization cognitive status: Unable to Assess   Current cognitive status: Unable to Assess   Walking or Climbing Stairs Difficulty yes   Equipment Currently Used at Home cane, straight;walker, standard   Readmission within 30 days? No   Patient currently being followed by outpatient case management? No   Do you currently have service(s) that help you manage your care at home? No   Do you take prescription medications? Yes   Do you have prescription coverage? Yes   Do you have any problems affording any of your prescribed medications? No   Is the patient taking medications as prescribed? yes   How do you get to doctors appointments? family or friend will provide;car, drives self   Are you on dialysis? No   Do you take coumadin? No   Discharge Plan A Home with family   Discharge Plan B Home Health   DME Needed Upon Discharge  none   Discharge Plan discussed with: Patient   Transition of Care  Barriers None

## 2023-12-31 NOTE — NURSING
Patient is lying in bed supine with HOB elevated. She is alert and oriented x 4. She has a frequent cough that has been controlled with benzonate. She does complain of pain in her throat and her ribs when coughing. PRN tylenol given. Patient is continent but has small amounts of urine released when coughing. Pure wick is intact. Patient can transfer for toileting. Patient has no needs at this time. IV's, R wrist and R forearm flushed and saline locked. Side rails up x 2, call light in reach, will continue to monitor.

## 2023-12-31 NOTE — SUBJECTIVE & OBJECTIVE
Interval history - continues to have nonproductive hacking cough.  Pulmonary consulted.    Review of Systems   Constitutional:  Negative for activity change, appetite change, chills and fever.   HENT:  Negative for congestion, sore throat and trouble swallowing.    Eyes:  Negative for photophobia and visual disturbance.   Respiratory:  Positive for cough and shortness of breath. Negative for chest tightness.    Cardiovascular:  Negative for chest pain, palpitations and leg swelling.   Gastrointestinal:  Negative for abdominal pain, diarrhea and nausea.   Genitourinary:  Negative for dysuria, flank pain and hematuria.   Musculoskeletal:  Negative for back pain.   Neurological:  Negative for dizziness, weakness and headaches.   Psychiatric/Behavioral:  Negative for confusion.      Objective:     Vital Signs (Most Recent):  Temp: 97.6 °F (36.4 °C) (12/31/23 1148)  Pulse: 76 (12/31/23 1148)  Resp: 17 (12/31/23 1148)  BP: (!) 145/68 (12/31/23 1148)  SpO2: (!) 93 % (12/31/23 1148) Vital Signs (24h Range):  Temp:  [97.6 °F (36.4 °C)-98.3 °F (36.8 °C)] 97.6 °F (36.4 °C)  Pulse:  [64-85] 76  Resp:  [14-18] 17  SpO2:  [93 %-98 %] 93 %  BP: (131-195)/(61-93) 145/68     Weight: 59 kg (130 lb)  Body mass index is 21.63 kg/m².     Physical Exam  Constitutional:       General: She is not in acute distress.     Appearance: She is not ill-appearing.   Pulmonary:      Effort: No respiratory distress.      Breath sounds: Wheezing (right lower lung) present.      Comments: Coughing while in room  Abdominal:      General: There is no distension.      Tenderness: There is no abdominal tenderness.   Musculoskeletal:      Right lower leg: No edema.      Left lower leg: No edema.   Neurological:      General: No focal deficit present.      Mental Status: She is oriented to person, place, and time.                Significant Labs: All pertinent labs within the past 24 hours have been reviewed.      Significant Imaging: I have reviewed all  pertinent imaging results/findings within the past 24 hours.

## 2023-12-31 NOTE — PLAN OF CARE
12/31/23 0809   Medicare Message   Important Message from Medicare regarding Discharge Appeal Rights Given to patient/caregiver;Explained to patient/caregiver;Signed/date by patient/caregiver   Date IMM was signed 12/31/23   Time IMM was signed 0809     Patient daughter gave verbal consent over the phone.

## 2023-12-31 NOTE — PLAN OF CARE
The mobility limitation cannot be sufficiently resolved by the use of a cane. Patient's functional mobility deficit can be sufficiently resolved with the use of a (Rolling Walker or Walker). Patient's mobility limitation significantly impairs their ability to participate in one of more activities of daily living. The use of a (RW or Walker) will significantly improve the patient's ability to participate in [facility] and the patient will use it on regular basis in the home

## 2023-12-31 NOTE — ASSESSMENT & PLAN NOTE
Cough (typical of asthma exacerbation)     Cough started 4-5 months ago - She reports that she had already been to an ED (out of [facility]) and her outpatient pulmonologist regarding this.  Both time she had been discharged with steroids which have not seemed to help.  She is very frustrated by this as it is interfering with her day-to-day activities.     Patient also has history of asthma found to be hypoxic to 91%.  Chest x-ray revealed patchy opacities in the lung bases related to atelectasis, aspiration or pneumonia.  No leukocytosis and procalcitonin 0.09.  Per chart review, patient unable to take Symbicort secondary to cost of medication and was recently started on Advar    Plan -   -prednisone 40 mg daily  -defer antibiotics given no leukocytosis and procalcitonin 0.09  -oxygen p.r.n.  -albuterol-ipratropium Q 4  -Follows with Pulmonology, Dr. Suazo outpatient  - Anti tussives added   - Speech consulted for history of vocal cord dysfunction, esophageal stricture dilation and findings of possible aspiration of chest x-ray - does not display overt signs of aspiration during evaluation -  pt was given education on aspiration precautions.    - pulmonary consulted for persistent hacking non productive cough

## 2023-12-31 NOTE — PLAN OF CARE
12/31/23 1221   Final Note   Assessment Type Final Discharge Note   Anticipated Discharge Disposition Home-Health   Hospital Resources/Appts/Education Provided Provided patient/caregiver with written discharge plan information;Appointments scheduled and added to AVS   Post-Acute Status   Discharge Delays None known at this time       Patient will discharge home with family & home health. ([facility])    Patient family will transport her home at discharge.     Patient choice obtained. (No preference)    Patient discharge orders/needs have been addressed from a SW/CM standpoint.

## 2024-01-01 PROBLEM — R05.3 CHRONIC COUGH: Status: ACTIVE | Noted: 2024-01-01

## 2024-01-01 LAB
ANION GAP SERPL CALC-SCNC: 11 MMOL/L (ref 8–16)
BUN SERPL-MCNC: 26 MG/DL (ref 10–30)
CALCIUM SERPL-MCNC: 9.4 MG/DL (ref 8.7–10.5)
CHLORIDE SERPL-SCNC: 104 MMOL/L (ref 95–110)
CO2 SERPL-SCNC: 24 MMOL/L (ref 23–29)
CREAT SERPL-MCNC: 1.2 MG/DL (ref 0.5–1.4)
ERYTHROCYTE [DISTWIDTH] IN BLOOD BY AUTOMATED COUNT: 15.7 % (ref 11.5–14.5)
EST. GFR  (NO RACE VARIABLE): 43 ML/MIN/1.73 M^2
GLUCOSE SERPL-MCNC: 146 MG/DL (ref 70–110)
HCT VFR BLD AUTO: 37 % (ref 37–48.5)
HGB BLD-MCNC: 12.6 G/DL (ref 12–16)
INFLUENZA A, MOLECULAR: NOT DETECTED
INFLUENZA B, MOLECULAR: NOT DETECTED
MCH RBC QN AUTO: 29.8 PG (ref 27–31)
MCHC RBC AUTO-ENTMCNC: 34.1 G/DL (ref 32–36)
MCV RBC AUTO: 88 FL (ref 82–98)
PLATELET # BLD AUTO: 189 K/UL (ref 150–450)
PMV BLD AUTO: 10.8 FL (ref 9.2–12.9)
POCT GLUCOSE: 117 MG/DL (ref 70–110)
POCT GLUCOSE: 122 MG/DL (ref 70–110)
POCT GLUCOSE: 187 MG/DL (ref 70–110)
POCT GLUCOSE: 199 MG/DL (ref 70–110)
POTASSIUM SERPL-SCNC: 4.1 MMOL/L (ref 3.5–5.1)
RBC # BLD AUTO: 4.23 M/UL (ref 4–5.4)
RSV AG BY MOLECULAR METHOD: NOT DETECTED
SARS-COV-2 RNA RESP QL NAA+PROBE: NOT DETECTED
SODIUM SERPL-SCNC: 139 MMOL/L (ref 136–145)
WBC # BLD AUTO: 6.42 K/UL (ref 3.9–12.7)

## 2024-01-01 PROCEDURE — 94761 N-INVAS EAR/PLS OXIMETRY MLT: CPT

## 2024-01-01 PROCEDURE — 36415 COLL VENOUS BLD VENIPUNCTURE: CPT | Performed by: STUDENT IN AN ORGANIZED HEALTH CARE EDUCATION/TRAINING PROGRAM

## 2024-01-01 PROCEDURE — 25000003 PHARM REV CODE 250: Performed by: STUDENT IN AN ORGANIZED HEALTH CARE EDUCATION/TRAINING PROGRAM

## 2024-01-01 PROCEDURE — 63600175 PHARM REV CODE 636 W HCPCS: Performed by: NURSE PRACTITIONER

## 2024-01-01 PROCEDURE — 80048 BASIC METABOLIC PNL TOTAL CA: CPT | Performed by: STUDENT IN AN ORGANIZED HEALTH CARE EDUCATION/TRAINING PROGRAM

## 2024-01-01 PROCEDURE — 25000003 PHARM REV CODE 250: Performed by: NURSE PRACTITIONER

## 2024-01-01 PROCEDURE — 99900035 HC TECH TIME PER 15 MIN (STAT)

## 2024-01-01 PROCEDURE — 85027 COMPLETE CBC AUTOMATED: CPT | Performed by: STUDENT IN AN ORGANIZED HEALTH CARE EDUCATION/TRAINING PROGRAM

## 2024-01-01 PROCEDURE — 99223 1ST HOSP IP/OBS HIGH 75: CPT | Mod: GC,,, | Performed by: INTERNAL MEDICINE

## 2024-01-01 PROCEDURE — 94640 AIRWAY INHALATION TREATMENT: CPT

## 2024-01-01 PROCEDURE — 0241U SARS-COV2 (COVID) WITH FLU/RSV BY PCR: CPT | Performed by: STUDENT IN AN ORGANIZED HEALTH CARE EDUCATION/TRAINING PROGRAM

## 2024-01-01 PROCEDURE — 25000242 PHARM REV CODE 250 ALT 637 W/ HCPCS: Performed by: NURSE PRACTITIONER

## 2024-01-01 PROCEDURE — 21400001 HC TELEMETRY ROOM

## 2024-01-01 PROCEDURE — 25000242 PHARM REV CODE 250 ALT 637 W/ HCPCS: Performed by: STUDENT IN AN ORGANIZED HEALTH CARE EDUCATION/TRAINING PROGRAM

## 2024-01-01 PROCEDURE — 27000646 HC AEROBIKA DEVICE

## 2024-01-01 PROCEDURE — 94664 DEMO&/EVAL PT USE INHALER: CPT

## 2024-01-01 RX ORDER — AMLODIPINE BESYLATE 5 MG/1
5 TABLET ORAL DAILY
Status: DISCONTINUED | OUTPATIENT
Start: 2024-01-01 | End: 2024-01-03 | Stop reason: HOSPADM

## 2024-01-01 RX ORDER — SODIUM CHLORIDE FOR INHALATION 3 %
4 VIAL, NEBULIZER (ML) INHALATION 2 TIMES DAILY
Status: DISCONTINUED | OUTPATIENT
Start: 2024-01-01 | End: 2024-01-03 | Stop reason: HOSPADM

## 2024-01-01 RX ORDER — SODIUM CHLORIDE FOR INHALATION 3 %
4 VIAL, NEBULIZER (ML) INHALATION 2 TIMES DAILY
Status: DISCONTINUED | OUTPATIENT
Start: 2024-01-01 | End: 2024-01-01

## 2024-01-01 RX ADMIN — BENZONATATE 100 MG: 100 CAPSULE, LIQUID FILLED ORAL at 11:01

## 2024-01-01 RX ADMIN — IPRATROPIUM BROMIDE AND ALBUTEROL SULFATE 3 ML: 2.5; .5 SOLUTION RESPIRATORY (INHALATION) at 03:01

## 2024-01-01 RX ADMIN — AMLODIPINE BESYLATE 5 MG: 5 TABLET ORAL at 08:01

## 2024-01-01 RX ADMIN — PREDNISONE 40 MG: 20 TABLET ORAL at 08:01

## 2024-01-01 RX ADMIN — SODIUM CHLORIDE SOLN NEBU 3% 4 ML: 3 NEBU SOLN at 01:01

## 2024-01-01 RX ADMIN — PANTOPRAZOLE SODIUM 40 MG: 40 TABLET, DELAYED RELEASE ORAL at 08:01

## 2024-01-01 RX ADMIN — IPRATROPIUM BROMIDE AND ALBUTEROL SULFATE 3 ML: 2.5; .5 SOLUTION RESPIRATORY (INHALATION) at 12:01

## 2024-01-01 RX ADMIN — ATORVASTATIN CALCIUM 40 MG: 20 TABLET, FILM COATED ORAL at 08:01

## 2024-01-01 RX ADMIN — IPRATROPIUM BROMIDE AND ALBUTEROL SULFATE 3 ML: 2.5; .5 SOLUTION RESPIRATORY (INHALATION) at 08:01

## 2024-01-01 RX ADMIN — BENZONATATE 100 MG: 100 CAPSULE, LIQUID FILLED ORAL at 05:01

## 2024-01-01 RX ADMIN — MEMANTINE HYDROCHLORIDE 10 MG: 5 TABLET ORAL at 08:01

## 2024-01-01 RX ADMIN — BENZONATATE 100 MG: 100 CAPSULE, LIQUID FILLED ORAL at 12:01

## 2024-01-01 RX ADMIN — GLYCOPYRROLATE 2 MG: 1 TABLET ORAL at 09:01

## 2024-01-01 RX ADMIN — APIXABAN 2.5 MG: 2.5 TABLET, FILM COATED ORAL at 08:01

## 2024-01-01 RX ADMIN — LEVOTHYROXINE SODIUM 75 MCG: 75 TABLET ORAL at 05:01

## 2024-01-01 RX ADMIN — MIRTAZAPINE 15 MG: 15 TABLET, FILM COATED ORAL at 09:01

## 2024-01-01 RX ADMIN — IPRATROPIUM BROMIDE AND ALBUTEROL SULFATE 3 ML: 2.5; .5 SOLUTION RESPIRATORY (INHALATION) at 07:01

## 2024-01-01 RX ADMIN — SODIUM CHLORIDE SOLN NEBU 3% 4 ML: 3 NEBU SOLN at 07:01

## 2024-01-01 RX ADMIN — HYDROCHLOROTHIAZIDE 25 MG: 25 TABLET ORAL at 09:01

## 2024-01-01 NOTE — HPI
89 year old woman with history of mild intermittent asthma (last exacerbation in years), bilateral pulmonary embolism on chronic anticoagulation with Eliquis, GERD, hypercholesteronemia, fibromyalgia, depression, CKD, thyroid disease that presented with worsening cough and shortness of breath with exertion. On presentation, patient was hemodynamically stable, not in distress, oxygen sat was 91%. Labs remarkable for mild hypokalemia (3.3), azotemia, and elevated BNP (108). CXR showed bilateral interstitial infiltrates (right > left). Patient was admitted for acute hypoxemic respiratory failure, started on prednisone and nebs. Pulmonary consulted for cough.  Patient discloses chronic cough that started as far back as 4 years ago after she had esophageal dilatation for acid reflux, but from the system, patient had esophageal dilatation for non-obstructing Schatzki ring in 2022. In the last 4 months, patient discloses the cough has gotten worse to the point when she has the spells of the cough, she felt as if she wants to loose her breath. The cough is non productive. The cough is more pronounced at night, when moving and while talking. Patient discloses feeling of sometimes moving in her throat but there is no dysphagia or sore throat.   Patient discloses no associated wheezing, shortness of breath, chest pain, orthopnea, fever, chills, lower extremities swelling, post nasal drip, changes in weight, or rash. Patient discloses no recent acid reflux symptoms.  Patient discloses no history of smoking, alcohol or illicit drug. Patient used to work as a  and there is no illicit drug use.  Patient is very active, able to drive, take care of grand children, clean and cook without no difficulty.   Patient follows up at [facility] and last seen on the system in 2021 by Dr. Chairez.  PFT of 07/08/2019 showed normal spirometry with normal diffusion capacity.  TTE of 07/19/2022 showed normal LV and RV systolic function, PASP of  40 and CVP of 3.  CTA chest of 07/21/2022 showed no PE but there are stable mild biapical scarring, band like opacity within the lingula and right middle lobe are associated with mild bronchiectasis, several bilateral posterior segment lower lobe ground-glass micro nodules, some appearing as a cluster of tree a in bud nodules.  CT abdomen and Pelvis of 03/27/2023 showed stable appearance of pulmonary nodules in the left lung base and subsegmental atelectasis in the lung bases.  CT chest showed multifocal filling defects throughout distal bronchials, scattered heterogeneous patchy and consolidative ground-glass opacities throughout both lungs with upper lung predominance, atelectasis versus scarring about the right lower lobe atelectasis versus scarring involving the right greater than left lung bases, few scattered pulmonary micro nodules throughout both lungs.     Flexible Laryngeal Videostroscopy of 05/04/2022:  All findings were normal except:  - bilateral vocal fold atrophy, mild-moderate  - posterior/LCA-predominant closure  - truncated phonatory segment with small amplitudes and variable aperiodic vibration  - phonatory tremor on sustained vowels, involving intrinsic larynx and supraglottis  - scattered pharyngeal leukoplakia

## 2024-01-01 NOTE — SUBJECTIVE & OBJECTIVE
Interval history - continues to have nonproductive hacking cough.  Pulmonary consulted and recs ordered. Work up in process.     Review of Systems   Constitutional:  Negative for activity change, appetite change, chills and fever.   HENT:  Negative for congestion, sore throat and trouble swallowing.    Eyes:  Negative for photophobia and visual disturbance.   Respiratory:  Positive for cough and shortness of breath. Negative for chest tightness.    Cardiovascular:  Negative for chest pain, palpitations and leg swelling.   Gastrointestinal:  Negative for abdominal pain, diarrhea and nausea.   Genitourinary:  Negative for dysuria, flank pain and hematuria.   Musculoskeletal:  Negative for back pain.   Neurological:  Negative for dizziness, weakness and headaches.   Psychiatric/Behavioral:  Negative for confusion.      Objective:     Vital Signs (Most Recent):  Temp: 98.9 °F (37.2 °C) (01/01/24 1104)  Pulse: 83 (01/01/24 1300)  Resp: 18 (01/01/24 1300)  BP: (!) 144/67 (01/01/24 1104)  SpO2: 97 % (01/01/24 1300) Vital Signs (24h Range):  Temp:  [97.3 °F (36.3 °C)-98.9 °F (37.2 °C)] 98.9 °F (37.2 °C)  Pulse:  [73-88] 83  Resp:  [16-20] 18  SpO2:  [95 %-100 %] 97 %  BP: (139-169)/(64-81) 144/67     Weight: 55.9 kg (123 lb 3.8 oz)  Body mass index is 20.51 kg/m².     Physical Exam  Constitutional:       General: She is not in acute distress.     Appearance: She is not ill-appearing.   Pulmonary:      Effort: No respiratory distress.      Breath sounds: Wheezing (right lower lung) present.      Comments: Coughing while in room  Abdominal:      General: There is no distension.      Tenderness: There is no abdominal tenderness.   Musculoskeletal:      Right lower leg: No edema.      Left lower leg: No edema.   Neurological:      General: No focal deficit present.      Mental Status: She is oriented to person, place, and time.                Significant Labs: All pertinent labs within the past 24 hours have been  reviewed.      Significant Imaging: I have reviewed all pertinent imaging results/findings within the past 24 hours.

## 2024-01-01 NOTE — ASSESSMENT & PLAN NOTE
Acute on chronic cough  CT scan showed   multifocal filling defects throughout distal bronchials, scattered heterogeneous patchy and consolidative ground-glass opacities throughout both lungs with upper lung predominance, atelectasis versus scarring about the right lower lobe atelectasis versus scarring involving the right greater than left lung bases, few scattered pulmonary micro nodules throughout both lungs.  The groundglass opacities appear to be new compared with the previous study.  Possible differential for the acute worsening cough includes viral infection, pulmonary edema given elevated BNP vs HP given upper lobe predominance  Patient was evaluated by SLP without overt signs of pharyngeal dysphagia or aspiration noted.    Check respiratory viral panel, TTE, induced sputum culture for AFB (NTM)  Continue prednisone (complete 5 days)  Resume home dose fluticasone-salmeterol  Continue duonebs  Trial of a dose of lasix  The glycopyrorolate she take for her Hyperhidrosis probably contributing to her ability to clear secretion. Will add hypertonic saline nebs with acapella  Aspiration precautions  PT/OT  Outpatient repeat PFT  Outpatient follow up with pulmonary with repeat chest imaging

## 2024-01-01 NOTE — CONSULTS
[facility] ([place])  Pulmonology  Consult Note    Patient Name: Debbie Ding  MRN: [id number]  Admission Date: 12/29/2023  Hospital Length of Stay: 3 days  Code Status: Full Code  Attending Physician: Samm Moran MD  Primary Care Provider: Carolyn Mejia MD   Principal Problem: Acute hypoxemic respiratory failure    Inpatient consult to Pulmonology  Consult performed by: Stefani Andrew MD  Consult ordered by: Samm Moran MD        Subjective:     HPI:  89 year old woman with history of mild intermittent asthma (last exacerbation in years), bilateral pulmonary embolism on chronic anticoagulation with Eliquis, GERD, hypercholesteronemia, fibromyalgia, depression, CKD, thyroid disease that presented with worsening cough and shortness of breath with exertion. On presentation, patient was hemodynamically stable, not in distress, oxygen sat was 91%. Labs remarkable for mild hypokalemia (3.3), azotemia, and elevated BNP (108). CXR showed bilateral interstitial infiltrates (right > left). Patient was admitted for acute hypoxemic respiratory failure, started on prednisone and nebs. Pulmonary consulted for cough.  Patient discloses chronic cough that started as far back as 4 years ago after she had esophageal dilatation for acid reflux, but from the system, patient had esophageal dilatation for non-obstructing Schatzki ring in 2022. In the last 4 months, patient discloses the cough has gotten worse to the point when she has the spells of the cough, she felt as if she wants to loose her breath. The cough is non productive. The cough is more pronounced at night, when moving and while talking. Patient discloses feeling of sometimes moving in her throat but there is no dysphagia or sore throat.   Patient discloses no associated wheezing, shortness of breath, chest pain, orthopnea, fever, chills, lower extremities swelling, post nasal drip, changes in weight, or rash. Patient discloses no recent acid  reflux symptoms.  Patient discloses no history of smoking, alcohol or illicit drug. Patient used to work as a  and there is no illicit drug use.  Patient is very active, able to drive, take care of grand children, clean and cook without no difficulty.   Patient follows up at [facility] and last seen on the system in 2021 by Dr. Chairez.  PFT of 07/08/2019 showed normal spirometry with normal diffusion capacity.  TTE of 07/19/2022 showed normal LV and RV systolic function, PASP of 40 and CVP of 3.  CTA chest of 07/21/2022 showed no PE but there are stable mild biapical scarring, band like opacity within the lingula and right middle lobe are associated with mild bronchiectasis, several bilateral posterior segment lower lobe ground-glass micro nodules, some appearing as a cluster of tree a in bud nodules.  CT abdomen and Pelvis of 03/27/2023 showed stable appearance of pulmonary nodules in the left lung base and subsegmental atelectasis in the lung bases.  CT chest showed multifocal filling defects throughout distal bronchials, scattered heterogeneous patchy and consolidative ground-glass opacities throughout both lungs with upper lung predominance, atelectasis versus scarring about the right lower lobe atelectasis versus scarring involving the right greater than left lung bases, few scattered pulmonary micro nodules throughout both lungs.     Flexible Laryngeal Videostroscopy of 05/04/2022:  All findings were normal except:  - bilateral vocal fold atrophy, mild-moderate  - posterior/LCA-predominant closure  - truncated phonatory segment with small amplitudes and variable aperiodic vibration  - phonatory tremor on sustained vowels, involving intrinsic larynx and supraglottis  - scattered pharyngeal leukoplakia    Past Medical History:   Diagnosis Date    Allergic rhinitis     Anticoagulant long-term use     Arthritis     Asthma     Bilateral pulmonary embolism 4/27/2019    Cataract     Chronic anticoagulation  9/28/2020    Depression     Diabetes mellitus     Fibromyalgia 7/2/2012    Fibromyalgia     GERD (gastroesophageal reflux disease)     Hypercholesterolemia 9/28/2020    Hypercholesterolemia 9/28/2020    Hypertension 7/2/2012    Thoracic or lumbosacral neuritis or radiculitis, unspecified     Thyroid disease     Ulcer        Past Surgical History:   Procedure Laterality Date    CARPAL TUNNEL RELEASE Left 11/29/2023    Procedure: RELEASE, CARPAL TUNNEL,LEFT;  Surgeon: Avril Hutchison MD;  Location: [facility];  Service: Orthopedics;  Laterality: Left;    CATARACT EXTRACTION Bilateral     ESOPHAGOGASTRODUODENOSCOPY N/A 3/8/2022    Procedure: EGD (ESOPHAGOGASTRODUODENOSCOPY) with dilation-either hospital ok with Dr. Meza;  Surgeon: Rebeca Meza MD;  Location: [facility];  Service: Endoscopy;  Laterality: N/A;  1/11-eliquis hold ok see te-tb    ESOPHAGOGASTRODUODENOSCOPY N/A 2/28/2022    Procedure: EGD (ESOPHAGOGASTRODUODENOSCOPY) with dilation-either [facility] ok with Dr. Meza;  Surgeon: Rebeca Meza MD;  Location: [facility] ([facility]);  Service: Endoscopy;  Laterality: N/A;  1/11-eliquis hold ok see te-tb  COVID test on 2/25/22 at [facility]  2/22-confirmed appt arrival time with pt-[id number]    FOOT NEUROMA SURGERY  1985    HYSTERECTOMY         Review of patient's allergies indicates:   Allergen Reactions    Melatonin      Other reaction(s): Other (See Comments)  Sleep Walks and has unnatural dreams    Talwin compound Hives    Talwin [pentazocine lactate] Hallucinations    Trazodone Other (See Comments)     Nightmares/sleep walk      Bentyl [dicyclomine] Anxiety       Family History       Problem Relation (Age of Onset)    Diabetes Mother, Brother    Emphysema Maternal Aunt          Tobacco Use    Smoking status: Never    Smokeless tobacco: Never   Substance and Sexual Activity    Alcohol use: No    Drug use: No    Sexual activity: Not Currently         Review of Systems   Constitutional:  Negative for  appetite change, chills, diaphoresis, fatigue, fever and unexpected weight change.   HENT:  Negative for congestion, postnasal drip, rhinorrhea, sore throat, trouble swallowing and voice change.    Respiratory:  Positive for cough. Negative for chest tightness, shortness of breath, wheezing and stridor.    Cardiovascular:  Negative for chest pain, palpitations and leg swelling.   Gastrointestinal:  Negative for abdominal pain, nausea and vomiting.   Genitourinary:  Negative for dysuria.   Skin:  Negative for rash.   Neurological:  Negative for headaches.   Psychiatric/Behavioral:  Negative for agitation and confusion.      Objective:     Vital Signs (Most Recent):  Temp: 98.7 °F (37.1 °C) (01/01/24 0731)  Pulse: 79 (01/01/24 0800)  Resp: 16 (01/01/24 0800)  BP: (!) 164/81 (01/01/24 0731)  SpO2: 100 % (01/01/24 0800) Vital Signs (24h Range):  Temp:  [97.3 °F (36.3 °C)-98.7 °F (37.1 °C)] 98.7 °F (37.1 °C)  Pulse:  [73-88] 79  Resp:  [16-20] 16  SpO2:  [93 %-100 %] 100 %  BP: (139-169)/(64-81) 164/81     Weight: 55.9 kg (123 lb 3.8 oz)  Body mass index is 20.51 kg/m².      Intake/Output Summary (Last 24 hours) at 1/1/2024 0984  Last data filed at 1/1/2024 0528  Gross per 24 hour   Intake --   Output 750 ml   Net -750 ml        Physical Exam  Vitals reviewed.   Constitutional:       General: She is not in acute distress.     Appearance: Normal appearance. She is not ill-appearing.   HENT:      Head: Normocephalic and atraumatic.      Nose: No congestion.   Eyes:      Conjunctiva/sclera: Conjunctivae normal.   Cardiovascular:      Rate and Rhythm: Normal rate and regular rhythm.      Pulses: Normal pulses.      Heart sounds: No murmur heard.  Pulmonary:      Effort: Pulmonary effort is normal. No respiratory distress.      Breath sounds: Mid to end inspiratory crepitus in the posterior lung fields. No wheezing or rhonchi.   Abdominal:      General: There is no distension.      Palpations: Abdomen is soft.    Musculoskeletal:      Right lower leg: No edema.      Left lower leg: No edema.   Neurological:      General: No focal deficit present.      Mental Status: She is alert and oriented to person, place, and time.   Psychiatric:         Mood and Affect: Mood normal.         Behavior: Behavior normal.          Vents:  Oxygen Concentration (%): 28 (12/31/23 0432)    Lines/Drains/Airways       Drain  Duration             Female External Urinary Catheter w/ Suction 12/29/23 1900 2 days              Peripheral Intravenous Line  Duration                  Peripheral IV - Single Lumen 12/29/23 1200 20 G Posterior;Right Wrist 2 days         Peripheral IV - Single Lumen 12/29/23 1234 22 G Right Antecubital 2 days                    Significant Labs:    CBC/Anemia Profile:  Recent Labs   Lab 12/31/23  0443 01/01/24  0430   WBC 6.90 6.42   HGB 12.3 12.6   HCT 37.4 37.0    189   MCV 88 88   RDW 15.8* 15.7*        Chemistries:  Recent Labs   Lab 12/31/23  0443 01/01/24  0430    139   K 4.0 4.1    104   CO2 26 24   BUN 27 26   CREATININE 1.4 1.2   CALCIUM 9.3 9.4       Recent Lab Results  (Last 5 results in the past 24 hours)        01/01/24  0826   01/01/24  0430   12/31/23  2014   12/31/23  1638   12/31/23  1149        Anion Gap   11             BUN   26             Calcium   9.4             Chloride   104             CO2   24             Creatinine   1.2             eGFR   43             Glucose   146             Hematocrit   37.0             Hemoglobin   12.6             MCH   29.8             MCHC   34.1             MCV   88             MPV   10.8             Platelet Count   189             POCT Glucose 117     145   208   106       Potassium   4.1  Comment: Specimen slightly hemolyzed             RBC   4.23             RDW   15.7             Sodium   139             WBC   6.42                                    Significant Imaging:   I have reviewed all pertinent imaging results/findings within the past 24  hours.    FINDINGS:  Structures at the base of the neck are unremarkable.  Axillary and thoracic soft tissues are unremarkable.     The heart is normal in size with trace pericardial effusion.  Coronary artery calcification.     Pulmonary vasculature is unremarkable.     Aorta is normal in course and caliber with mild calcific atherosclerosis.  No aneurysmal dilatation.     No mediastinal or hilar lymphadenopathy.     Partially visualized intra-abdominal organs are unremarkable.     Major airways are patent.  Multifocal filling defects throughout distal bronchials, for example involving the left lower lobe (axial series 4, image 371), likely representing retained secretions.     Scattered heterogeneous patchy and consolidative ground-glass opacities throughout both lungs with upper lung predominance, for example involving the right upper lobe (axial series 4, image 153).  No large region consolidation, pleural effusion, or pneumothorax.  Atelectasis versus scarring about the right lower lobe atelectasis versus scarring involving the right greater than left lung bases.  Few, scattered pulmonary micro nodules throughout both lungs, for example in the right lung measuring approximately 3 mm (axial series 4, image 287), and in the left lung measuring 2 mm (axial series 4, image 162).     Osseous structures demonstrate no evidence for acute fracture or osseous destructive lesion.  Degenerative change of the visualized spine.     Impression:     Scattered heterogeneous patchy and consolidative ground-glass opacities throughout both lungs with upper lung zone predominance.  Findings are nonspecific, with considerations including infectious or non infectious inflammatory process, noting sequela of aspiration could present similarly.     Atelectasis versus scarring involving the lung bases.     Scattered pulmonary micro nodules as detailed above.  For multiple solid nodules all <6 mm, Fleischner Society 2017 guidelines  recommend no routine follow up for a low risk patient, or follow up with non-contrast chest CT at 12 months after discovery in a high risk patient.     Multifocal areas of retained secretions within the distal bronchials, likely representing sequela of chronic small airways disease.        Electronically signed by: Pineda Casas  Date:                                            12/31/2023  Time:                                           14:49           Exam Ended: 12/31/23 14:26 CST Last Resulted: 12/31/23 14:49 CST           Assessment/Plan:     Pulmonary  Chronic cough  Acute on chronic cough  CT scan showed multifocal distal bronchial wall thickening, scattered heterogeneous patchy and consolidative ground-glass opacities throughout both lungs with upper lung predominance, atelectasis versus scarring about the right lower lobe atelectasis versus scarring involving the right greater than left lung bases, few scattered pulmonary micro nodules throughout both lungs and mild bronchiectasis.  The groundglass opacities appear to be new compared with the previous study.  Possible differential for the acute worsening cough includes viral infection, pulmonary edema given elevated BNP vs HP given upper lobe predominance (less likely given absence of systemic symptoms)    Chronic cough likely due chronic aspiration, bronchiectasis, vocal cord dysfunction (See Flexible Laryngeal Videostroscopy of 05/04/2022)  Patient was evaluated by SLP without overt signs of pharyngeal dysphagia or aspiration noted.    Check respiratory viral panel, TTE, induced sputum for culture and AFB (NTM)  Continue prednisone (complete 5 days)  Resume home dose fluticasone-salmeterol  Continue duonebs  The glycopyrorolate she takes for her Hyperhidrosis probably contributing to her ability to clear secretion. Will add hypertonic saline nebs with acapella  Aspiration precautions  PT/OT  Speech therapy          Thank you for your consult. Patient was seen  with the attending physician Dr. Martinez Gonzalez.     Stefani Andrew MD  Pulmonology  [facility] ([place])

## 2024-01-01 NOTE — PROGRESS NOTES
[facility] Medicine  Progress Note    Patient Name: Debbie Ding  MRN: [id number]  Patient Class: IP- Inpatient   Admission Date: 12/29/2023  Length of Stay: 3 days  Attending Physician: Samm Moran MD  Primary Care Provider: Carolyn Mejia MD        Subjective:     Principal Problem:Acute hypoxemic respiratory failure        HPI:  Debbie Ding is a 89-year-old female with a past medical history of asthma, bilateral PE, GERD, hypercholesteremia, fibromyalgia and thyroid disease who presents with worsening cough and shortness of breath with exertion.  Patient was initially seen at urgent care and found to be hypoxic to 91% and referred patient to the ED. Patient states she was diagnosed with bronchitis 3 months ago and has had a persistent cough since then.  She reports that her cough has worsened and she has worsening shortness of breath over the past few days.  Patient states using her albuterol and OTC Robitussin with minimal relief.    Upon arrival to the ED, patient found to be hypoxic with 91% and was placed on 2 L O2 via NC with improvement.  Lab work revealed hypokalemia 3.3 and .  No leukocytosis and troponin negative.  Lactic acid 1.3 and procalcitonin 0.09.  UA negative for UTI.  She denies history of CHF with most recent BNP 36 two years ago.  She denies lower extremity swelling and states her cough is nonproductive.  Patient further denies chest pain, fever, chills, nausea and vomiting.  Chest x-ray revealed patchy opacities to the lungs could be related to atelectasis, aspiration or pneumonia.  Patient received IV magnesium, methylprednisone and albuterol nebulizer in the ED with some relief.  Patient referred to Hospital Medicine and will be admitted for further evaluation and management    Overview/Hospital Course:  Patient seen today.  Still complaining of significant cough.  Antitussives ordered.  Her last asthma exacerbation was years ago and coughing  is typical of an exacerbation.  Concern for aspiration with x-ray findings, and history of vocal cord dysfunction and history of dilations for strictures in the esophagus.  Patient was evaluated by speech and does not display overt signs of aspiration during evaluation -  pt was given education on aspiration precautions.      Pt continuous to have severe non productive hacking cough.  She reports a cough started about 4-5 months ago.  She reports that she had already been to an ED (out of [facility]) and her outpatient pulmonologist regarding this.  Both time she had been discharged with steroids which have not seemed to help.  She is very frustrated by this as it is interfering with her day-to-day activities.  After shared discussion between patient family and myself will consult Pulmonary though we did discuss the limitations of inpatient workup.    Seen by Pulmonary, based on CT chest appears to have bronchiectasis.  Cough appears to be secondary to chronic aspiration - Check respiratory viral panel, TTE, induced sputum for culture and AFB (NTM), IgE for possible ABPA, continue prednisone (complete 5 days) and hypertonic saline based.       Interval history - continues to have nonproductive hacking cough.  Pulmonary consulted and recs ordered. Work up in process.     Review of Systems   Constitutional:  Negative for activity change, appetite change, chills and fever.   HENT:  Negative for congestion, sore throat and trouble swallowing.    Eyes:  Negative for photophobia and visual disturbance.   Respiratory:  Positive for cough and shortness of breath. Negative for chest tightness.    Cardiovascular:  Negative for chest pain, palpitations and leg swelling.   Gastrointestinal:  Negative for abdominal pain, diarrhea and nausea.   Genitourinary:  Negative for dysuria, flank pain and hematuria.   Musculoskeletal:  Negative for back pain.   Neurological:  Negative for dizziness, weakness and headaches.    Psychiatric/Behavioral:  Negative for confusion.      Objective:     Vital Signs (Most Recent):  Temp: 98.9 °F (37.2 °C) (01/01/24 1104)  Pulse: 83 (01/01/24 1300)  Resp: 18 (01/01/24 1300)  BP: (!) 144/67 (01/01/24 1104)  SpO2: 97 % (01/01/24 1300) Vital Signs (24h Range):  Temp:  [97.3 °F (36.3 °C)-98.9 °F (37.2 °C)] 98.9 °F (37.2 °C)  Pulse:  [73-88] 83  Resp:  [16-20] 18  SpO2:  [95 %-100 %] 97 %  BP: (139-169)/(64-81) 144/67     Weight: 55.9 kg (123 lb 3.8 oz)  Body mass index is 20.51 kg/m².     Physical Exam  Constitutional:       General: She is not in acute distress.     Appearance: She is not ill-appearing.   Pulmonary:      Effort: No respiratory distress.      Breath sounds: Wheezing (right lower lung) present.      Comments: Coughing while in room  Abdominal:      General: There is no distension.      Tenderness: There is no abdominal tenderness.   Musculoskeletal:      Right lower leg: No edema.      Left lower leg: No edema.   Neurological:      General: No focal deficit present.      Mental Status: She is oriented to person, place, and time.                Significant Labs: All pertinent labs within the past 24 hours have been reviewed.      Significant Imaging: I have reviewed all pertinent imaging results/findings within the past 24 hours.    Assessment/Plan:      * Acute hypoxemic respiratory failure  Cough   History of esophageal stricture  Vocal cord dysfunction      Cough started 4-5 months ago - She reports that she had already been to an ED (out of [facility]) and her outpatient pulmonologist regarding this.  Both time she had been discharged with antibiotics and steroids which have not seemed to help.  She is very frustrated by this as it is interfering with her day-to-day activities.     Patient also has history of asthma found to be hypoxic to 91%.  Chest x-ray revealed patchy opacities in the lung bases related to atelectasis, aspiration or pneumonia.  No leukocytosis and procalcitonin  0.09.  Per chart review, patient unable to take Symbicort secondary to cost of medication and was recently started on Advar    Plan -   -prednisone 40 mg daily  -defer antibiotics given no leukocytosis and procalcitonin 0.09  -oxygen p.r.n.  -albuterol-ipratropium Q 4  -Follows with Pulmonology, Dr. Suazo outpatient  - Anti tussives added   - Speech consulted for history of vocal cord dysfunction, esophageal stricture dilation and findings of possible aspiration of chest x-ray - does not display overt signs of aspiration during evaluation -  pt was given education on aspiration precautions.    - pulmonary consulted for persistent hacking non productive cough - based on CT chest appears to have bronchiectasis.  Cough appears to be secondary to chronic aspiration - Check respiratory viral panel, TTE, induced sputum for culture and AFB (NTM), IgE for possible ABPA, continue prednisone (complete 5 days) and hypertonic saline based.     History of deep venous thrombosis  History noted, currently on Eliquis 2.5 mg daily    Plan -   -continue Eliquis 2.5 mg daily      Type 2 diabetes mellitus with stage 3a chronic kidney disease, without long-term current use of insulin  History noted.  Patient's diabetes controlled with diet.  Most recent A1c 6.3 (10/2023).  Most recent glucose 114    Plan -   -POCT glucose  -low dose SSI    Chronic kidney disease, stage III (moderate)  Patient with history of CKD stage 3.  Creatinine currently at baseline     Plan -   -continue to monitor renal function  -avoid nephrotoxins    Hyperhidrosis  Continue home glycopyrrolate 2 mg b.i.d.      GERD (gastroesophageal reflux disease)  History noted currently controlled with PPI    Plan -   -continue Protonix 40 mg daily    Hypertension  History noted currently controlled with amlodipine and HCTZ    Plan -   -continue amlodipine 2.5 mg daily  -continue HCTZ 25 mg daily      VTE Risk Mitigation (From admission, onward)           Ordered      apixaban tablet 2.5 mg  Daily         12/29/23 1931     IP VTE HIGH RISK PATIENT  Once         12/29/23 1918     Place sequential compression device  Until discontinued         12/29/23 1918                    Discharge Planning   NATE: 12/31/2023     Code Status: Full Code   Is the patient medically ready for discharge?:     Reason for patient still in hospital (select all that apply): Treatment  Discharge Plan A: Home with family   Discharge Delays: None known at this time              Samm Fonseca MD  Department of Hospital Medicine   [facility])

## 2024-01-01 NOTE — SUBJECTIVE & OBJECTIVE
Past Medical History:   Diagnosis Date    Allergic rhinitis     Anticoagulant long-term use     Arthritis     Asthma     Bilateral pulmonary embolism 4/27/2019    Cataract     Chronic anticoagulation 9/28/2020    Depression     Diabetes mellitus     Fibromyalgia 7/2/2012    Fibromyalgia     GERD (gastroesophageal reflux disease)     Hypercholesterolemia 9/28/2020    Hypercholesterolemia 9/28/2020    Hypertension 7/2/2012    Thoracic or lumbosacral neuritis or radiculitis, unspecified     Thyroid disease     Ulcer        Past Surgical History:   Procedure Laterality Date    CARPAL TUNNEL RELEASE Left 11/29/2023    Procedure: RELEASE, CARPAL TUNNEL,LEFT;  Surgeon: Avril Hutchison MD;  Location: [facility];  Service: Orthopedics;  Laterality: Left;    CATARACT EXTRACTION Bilateral     ESOPHAGOGASTRODUODENOSCOPY N/A 3/8/2022    Procedure: EGD (ESOPHAGOGASTRODUODENOSCOPY) with dilation-either [facility] ok with Dr. Meza;  Surgeon: Rebeca Meza MD;  Location: [facility];  Service: Endoscopy;  Laterality: N/A;  1/11-eliquis hold ok see te-tb    ESOPHAGOGASTRODUODENOSCOPY N/A 2/28/2022    Procedure: EGD (ESOPHAGOGASTRODUODENOSCOPY) with dilation-either [facility] ok with Dr. Meza;  Surgeon: Rebeca Meza MD;  Location: [facility] ([address] [place] [address]);  Service: Endoscopy;  Laterality: N/A;  1/11-eliquis hold ok see te-tb  COVID test on 2/25/22 at [facility]  2/22-confirmed appt arrival time with pt-Kpvt    FOOT NEUROMA SURGERY  1985    HYSTERECTOMY         Review of patient's allergies indicates:   Allergen Reactions    Melatonin      Other reaction(s): Other (See Comments)  Sleep Walks and has unnatural dreams    Talwin compound Hives    Talwin [pentazocine lactate] Hallucinations    Trazodone Other (See Comments)     Nightmares/sleep walk      Bentyl [dicyclomine] Anxiety       Family History       Problem Relation (Age of Onset)    Diabetes Mother, Brother    Emphysema Maternal Aunt          Tobacco Use    Smoking  status: Never    Smokeless tobacco: Never   Substance and Sexual Activity    Alcohol use: No    Drug use: No    Sexual activity: Not Currently         Review of Systems   Constitutional:  Negative for appetite change, chills, diaphoresis, fatigue, fever and unexpected weight change.   HENT:  Negative for congestion, postnasal drip, rhinorrhea, sore throat, trouble swallowing and voice change.    Respiratory:  Positive for cough. Negative for chest tightness, shortness of breath, wheezing and stridor.    Cardiovascular:  Negative for chest pain, palpitations and leg swelling.   Gastrointestinal:  Negative for abdominal pain, nausea and vomiting.   Genitourinary:  Negative for dysuria.   Skin:  Negative for rash.   Neurological:  Negative for headaches.   Psychiatric/Behavioral:  Negative for agitation and confusion.      Objective:     Vital Signs (Most Recent):  Temp: 98.7 °F (37.1 °C) (01/01/24 0731)  Pulse: 79 (01/01/24 0800)  Resp: 16 (01/01/24 0800)  BP: (!) 164/81 (01/01/24 0731)  SpO2: 100 % (01/01/24 0800) Vital Signs (24h Range):  Temp:  [97.3 °F (36.3 °C)-98.7 °F (37.1 °C)] 98.7 °F (37.1 °C)  Pulse:  [73-88] 79  Resp:  [16-20] 16  SpO2:  [93 %-100 %] 100 %  BP: (139-169)/(64-81) 164/81     Weight: 55.9 kg (123 lb 3.8 oz)  Body mass index is 20.51 kg/m².      Intake/Output Summary (Last 24 hours) at 1/1/2024 0953  Last data filed at 1/1/2024 0528  Gross per 24 hour   Intake --   Output 750 ml   Net -750 ml        Physical Exam  Vitals reviewed.   Constitutional:       General: She is not in acute distress.     Appearance: Normal appearance. She is not ill-appearing.   HENT:      Head: Normocephalic and atraumatic.      Nose: No congestion.   Eyes:      Conjunctiva/sclera: Conjunctivae normal.   Cardiovascular:      Rate and Rhythm: Normal rate and regular rhythm.      Pulses: Normal pulses.      Heart sounds: No murmur heard.  Pulmonary:      Effort: Pulmonary effort is normal. No respiratory distress.       Breath sounds: Normal breath sounds. No wheezing or rhonchi.   Abdominal:      General: There is no distension.      Palpations: Abdomen is soft.   Musculoskeletal:      Right lower leg: No edema.      Left lower leg: No edema.   Neurological:      General: No focal deficit present.      Mental Status: She is alert and oriented to person, place, and time.   Psychiatric:         Mood and Affect: Mood normal.         Behavior: Behavior normal.          Vents:  Oxygen Concentration (%): 28 (12/31/23 0432)    Lines/Drains/Airways       Drain  Duration             Female External Urinary Catheter w/ Suction 12/29/23 1900 2 days              Peripheral Intravenous Line  Duration                  Peripheral IV - Single Lumen 12/29/23 1200 20 G Posterior;Right Wrist 2 days         Peripheral IV - Single Lumen 12/29/23 1234 22 G Right Antecubital 2 days                    Significant Labs:    CBC/Anemia Profile:  Recent Labs   Lab 12/31/23  0443 01/01/24  0430   WBC 6.90 6.42   HGB 12.3 12.6   HCT 37.4 37.0    189   MCV 88 88   RDW 15.8* 15.7*        Chemistries:  Recent Labs   Lab 12/31/23  0443 01/01/24  0430    139   K 4.0 4.1    104   CO2 26 24   BUN 27 26   CREATININE 1.4 1.2   CALCIUM 9.3 9.4       Recent Lab Results  (Last 5 results in the past 24 hours)        01/01/24  0826   01/01/24  0430   12/31/23  2014   12/31/23  1638   12/31/23  1149        Anion Gap   11             BUN   26             Calcium   9.4             Chloride   104             CO2   24             Creatinine   1.2             eGFR   43             Glucose   146             Hematocrit   37.0             Hemoglobin   12.6             MCH   29.8             MCHC   34.1             MCV   88             MPV   10.8             Platelet Count   189             POCT Glucose 117     145   208   106       Potassium   4.1  Comment: Specimen slightly hemolyzed             RBC   4.23             RDW   15.7             Sodium   139              WBC   6.42                                    Significant Imaging:   I have reviewed all pertinent imaging results/findings within the past 24 hours.    FINDINGS:  Structures at the base of the neck are unremarkable.  Axillary and thoracic soft tissues are unremarkable.     The heart is normal in size with trace pericardial effusion.  Coronary artery calcification.     Pulmonary vasculature is unremarkable.     Aorta is normal in course and caliber with mild calcific atherosclerosis.  No aneurysmal dilatation.     No mediastinal or hilar lymphadenopathy.     Partially visualized intra-abdominal organs are unremarkable.     Major airways are patent.  Multifocal filling defects throughout distal bronchials, for example involving the left lower lobe (axial series 4, image 371), likely representing retained secretions.     Scattered heterogeneous patchy and consolidative ground-glass opacities throughout both lungs with upper lung predominance, for example involving the right upper lobe (axial series 4, image 153).  No large region consolidation, pleural effusion, or pneumothorax.  Atelectasis versus scarring about the right lower lobe atelectasis versus scarring involving the right greater than left lung bases.  Few, scattered pulmonary micro nodules throughout both lungs, for example in the right lung measuring approximately 3 mm (axial series 4, image 287), and in the left lung measuring 2 mm (axial series 4, image 162).     Osseous structures demonstrate no evidence for acute fracture or osseous destructive lesion.  Degenerative change of the visualized spine.     Impression:     Scattered heterogeneous patchy and consolidative ground-glass opacities throughout both lungs with upper lung zone predominance.  Findings are nonspecific, with considerations including infectious or non infectious inflammatory process, noting sequela of aspiration could present similarly.     Atelectasis versus scarring involving the  lung bases.     Scattered pulmonary micro nodules as detailed above.  For multiple solid nodules all <6 mm, Fleischner Society 2017 guidelines recommend no routine follow up for a low risk patient, or follow up with non-contrast chest CT at 12 months after discovery in a high risk patient.     Multifocal areas of retained secretions within the distal bronchials, likely representing sequela of chronic small airways disease.        Electronically signed by: Pineda Casas  Date:                                            12/31/2023  Time:                                           14:49           Exam Ended: 12/31/23 14:26 CST Last Resulted: 12/31/23 14:49 CST

## 2024-01-02 ENCOUNTER — TELEPHONE (OUTPATIENT)
Dept: INTERNAL MEDICINE | Facility: CLINIC | Age: 89
End: 2024-01-02
Payer: MEDICARE

## 2024-01-02 LAB
ANION GAP SERPL CALC-SCNC: 12 MMOL/L (ref 8–16)
AORTIC ROOT ANNULUS: 2.65 CM
ASCENDING AORTA: 2.16 CM
AV INDEX (PROSTH): 0.63
AV MEAN GRADIENT: 4 MMHG
AV PEAK GRADIENT: 7 MMHG
AV VALVE AREA BY VELOCITY RATIO: 1.51 CM²
AV VALVE AREA: 1.6 CM²
AV VELOCITY RATIO: 0.6
BSA FOR ECHO PROCEDURE: 1.6 M2
BUN SERPL-MCNC: 29 MG/DL (ref 10–30)
CALCIUM SERPL-MCNC: 9.7 MG/DL (ref 8.7–10.5)
CHLORIDE SERPL-SCNC: 104 MMOL/L (ref 95–110)
CO2 SERPL-SCNC: 27 MMOL/L (ref 23–29)
CREAT SERPL-MCNC: 1.3 MG/DL (ref 0.5–1.4)
CV ECHO LV RWT: 0.5 CM
DOP CALC AO PEAK VEL: 1.31 M/S
DOP CALC AO VTI: 26.1 CM
DOP CALC LVOT AREA: 2.5 CM2
DOP CALC LVOT DIAMETER: 1.8 CM
DOP CALC LVOT PEAK VEL: 0.78 M/S
DOP CALC LVOT STROKE VOLUME: 41.71 CM3
DOP CALCLVOT PEAK VEL VTI: 16.4 CM
E WAVE DECELERATION TIME: 236.41 MSEC
E/A RATIO: 0.63
ECHO LV POSTERIOR WALL: 0.86 CM (ref 0.6–1.1)
ERYTHROCYTE [DISTWIDTH] IN BLOOD BY AUTOMATED COUNT: 15.6 % (ref 11.5–14.5)
EST. GFR  (NO RACE VARIABLE): 39 ML/MIN/1.73 M^2
FRACTIONAL SHORTENING: 29 % (ref 28–44)
GLOBAL LONGITUIDAL STRAIN: 16.6 %
GLUCOSE SERPL-MCNC: 135 MG/DL (ref 70–110)
HCT VFR BLD AUTO: 38 % (ref 37–48.5)
HGB BLD-MCNC: 12.4 G/DL (ref 12–16)
IGE SERPL-ACNC: 60 IU/ML (ref 0–100)
INTERVENTRICULAR SEPTUM: 0.82 CM (ref 0.6–1.1)
IVC DIAMETER: 1.61 CM
LA MAJOR: 3.9 CM
LA MINOR: 4.16 CM
LAAS-AP2: 17 CM2
LAAS-AP4: 13 CM2
LEFT ATRIUM SIZE: 2.41 CM
LEFT INTERNAL DIMENSION IN SYSTOLE: 2.44 CM (ref 2.1–4)
LEFT VENTRICLE DIASTOLIC VOLUME INDEX: 30.37 ML/M2
LEFT VENTRICLE DIASTOLIC VOLUME: 48.9 ML
LEFT VENTRICLE MASS INDEX: 49 G/M2
LEFT VENTRICLE SYSTOLIC VOLUME INDEX: 13.1 ML/M2
LEFT VENTRICLE SYSTOLIC VOLUME: 21.05 ML
LEFT VENTRICULAR INTERNAL DIMENSION IN DIASTOLE: 3.44 CM (ref 3.5–6)
LEFT VENTRICULAR MASS: 78.4 G
LVOT MG: 1.44 MMHG
LVOT MV: 0.56 CM/S
MCH RBC QN AUTO: 28.9 PG (ref 27–31)
MCHC RBC AUTO-ENTMCNC: 32.6 G/DL (ref 32–36)
MCV RBC AUTO: 89 FL (ref 82–98)
MV PEAK A VEL: 1.12 M/S
MV PEAK E VEL: 0.7 M/S
MV STENOSIS PRESSURE HALF TIME: 68.56 MS
MV VALVE AREA P 1/2 METHOD: 3.21 CM2
PISA TR MAX VEL: 3.26 M/S
PLATELET # BLD AUTO: 222 K/UL (ref 150–450)
PMV BLD AUTO: 11.5 FL (ref 9.2–12.9)
POCT GLUCOSE: 154 MG/DL (ref 70–110)
POCT GLUCOSE: 185 MG/DL (ref 70–110)
POCT GLUCOSE: 200 MG/DL (ref 70–110)
POCT GLUCOSE: 94 MG/DL (ref 70–110)
POTASSIUM SERPL-SCNC: 3.8 MMOL/L (ref 3.5–5.1)
PV MV: 0.61 M/S
PV PEAK GRADIENT: 4 MMHG
PV PEAK VELOCITY: 1 M/S
RA MAJOR: 3.87 CM
RA PRESSURE ESTIMATED: 3 MMHG
RA WIDTH: 3.6 CM
RBC # BLD AUTO: 4.29 M/UL (ref 4–5.4)
RIGHT VENTRICULAR END-DIASTOLIC DIMENSION: 1.94 CM
RV TB RVSP: 6 MMHG
RV TISSUE DOPPLER FREE WALL SYSTOLIC VELOCITY 1 (APICAL 4 CHAMBER VIEW): 12.51 CM/S
SINUS: 2.1 CM
SODIUM SERPL-SCNC: 143 MMOL/L (ref 136–145)
STJ: 2.19 CM
TR MAX PG: 43 MMHG
TRICUSPID ANNULAR PLANE SYSTOLIC EXCURSION: 1.7 CM
TV REST PULMONARY ARTERY PRESSURE: 46 MMHG
WBC # BLD AUTO: 8.47 K/UL (ref 3.9–12.7)
Z-SCORE OF LEFT VENTRICULAR DIMENSION IN END DIASTOLE: -2.84
Z-SCORE OF LEFT VENTRICULAR DIMENSION IN END SYSTOLE: -1.18

## 2024-01-02 PROCEDURE — 85027 COMPLETE CBC AUTOMATED: CPT | Performed by: STUDENT IN AN ORGANIZED HEALTH CARE EDUCATION/TRAINING PROGRAM

## 2024-01-02 PROCEDURE — 25000242 PHARM REV CODE 250 ALT 637 W/ HCPCS: Performed by: STUDENT IN AN ORGANIZED HEALTH CARE EDUCATION/TRAINING PROGRAM

## 2024-01-02 PROCEDURE — 36415 COLL VENOUS BLD VENIPUNCTURE: CPT | Performed by: STUDENT IN AN ORGANIZED HEALTH CARE EDUCATION/TRAINING PROGRAM

## 2024-01-02 PROCEDURE — 80048 BASIC METABOLIC PNL TOTAL CA: CPT | Performed by: STUDENT IN AN ORGANIZED HEALTH CARE EDUCATION/TRAINING PROGRAM

## 2024-01-02 PROCEDURE — 99900035 HC TECH TIME PER 15 MIN (STAT)

## 2024-01-02 PROCEDURE — 21400001 HC TELEMETRY ROOM

## 2024-01-02 PROCEDURE — 94664 DEMO&/EVAL PT USE INHALER: CPT

## 2024-01-02 PROCEDURE — 94761 N-INVAS EAR/PLS OXIMETRY MLT: CPT

## 2024-01-02 PROCEDURE — 25000003 PHARM REV CODE 250: Performed by: NURSE PRACTITIONER

## 2024-01-02 PROCEDURE — 25000003 PHARM REV CODE 250: Performed by: STUDENT IN AN ORGANIZED HEALTH CARE EDUCATION/TRAINING PROGRAM

## 2024-01-02 PROCEDURE — 99232 SBSQ HOSP IP/OBS MODERATE 35: CPT | Mod: GC,,, | Performed by: INTERNAL MEDICINE

## 2024-01-02 PROCEDURE — 92526 ORAL FUNCTION THERAPY: CPT

## 2024-01-02 PROCEDURE — 94640 AIRWAY INHALATION TREATMENT: CPT

## 2024-01-02 PROCEDURE — 63600175 PHARM REV CODE 636 W HCPCS: Performed by: NURSE PRACTITIONER

## 2024-01-02 PROCEDURE — 82785 ASSAY OF IGE: CPT | Performed by: STUDENT IN AN ORGANIZED HEALTH CARE EDUCATION/TRAINING PROGRAM

## 2024-01-02 PROCEDURE — 97116 GAIT TRAINING THERAPY: CPT | Mod: CQ

## 2024-01-02 PROCEDURE — 25000242 PHARM REV CODE 250 ALT 637 W/ HCPCS: Performed by: NURSE PRACTITIONER

## 2024-01-02 RX ORDER — FLUTICASONE PROPIONATE AND SALMETEROL XINAFOATE 230; 21 UG/1; UG/1
2 AEROSOL, METERED RESPIRATORY (INHALATION) 2 TIMES DAILY
Status: DISCONTINUED | OUTPATIENT
Start: 2024-01-02 | End: 2024-01-02 | Stop reason: CLARIF

## 2024-01-02 RX ORDER — GUAIFENESIN/DEXTROMETHORPHAN 100-10MG/5
10 SYRUP ORAL EVERY 6 HOURS
Status: DISCONTINUED | OUTPATIENT
Start: 2024-01-02 | End: 2024-01-03 | Stop reason: HOSPADM

## 2024-01-02 RX ORDER — GUAIFENESIN/DEXTROMETHORPHAN 100-10MG/5
5 SYRUP ORAL EVERY 6 HOURS PRN
Status: DISCONTINUED | OUTPATIENT
Start: 2024-01-02 | End: 2024-01-02

## 2024-01-02 RX ORDER — IPRATROPIUM BROMIDE AND ALBUTEROL SULFATE 2.5; .5 MG/3ML; MG/3ML
3 SOLUTION RESPIRATORY (INHALATION) EVERY 6 HOURS PRN
Status: DISCONTINUED | OUTPATIENT
Start: 2024-01-02 | End: 2024-01-03 | Stop reason: HOSPADM

## 2024-01-02 RX ORDER — FLUTICASONE FUROATE AND VILANTEROL 200; 25 UG/1; UG/1
1 POWDER RESPIRATORY (INHALATION) DAILY
Status: DISCONTINUED | OUTPATIENT
Start: 2024-01-02 | End: 2024-01-03 | Stop reason: HOSPADM

## 2024-01-02 RX ADMIN — SODIUM CHLORIDE SOLN NEBU 3% 4 ML: 3 NEBU SOLN at 07:01

## 2024-01-02 RX ADMIN — BENZONATATE 100 MG: 100 CAPSULE, LIQUID FILLED ORAL at 11:01

## 2024-01-02 RX ADMIN — SENNOSIDES AND DOCUSATE SODIUM 1 TABLET: 8.6; 5 TABLET ORAL at 08:01

## 2024-01-02 RX ADMIN — IPRATROPIUM BROMIDE AND ALBUTEROL SULFATE 3 ML: 2.5; .5 SOLUTION RESPIRATORY (INHALATION) at 03:01

## 2024-01-02 RX ADMIN — LEVOTHYROXINE SODIUM 75 MCG: 75 TABLET ORAL at 05:01

## 2024-01-02 RX ADMIN — GLYCOPYRROLATE 2 MG: 1 TABLET ORAL at 08:01

## 2024-01-02 RX ADMIN — BENZONATATE 100 MG: 100 CAPSULE, LIQUID FILLED ORAL at 06:01

## 2024-01-02 RX ADMIN — GUAIFENESIN AND DEXTROMETHORPHAN 10 ML: 100; 10 SYRUP ORAL at 11:01

## 2024-01-02 RX ADMIN — PANTOPRAZOLE SODIUM 40 MG: 40 TABLET, DELAYED RELEASE ORAL at 10:01

## 2024-01-02 RX ADMIN — ATORVASTATIN CALCIUM 40 MG: 20 TABLET, FILM COATED ORAL at 10:01

## 2024-01-02 RX ADMIN — BENZONATATE 100 MG: 100 CAPSULE, LIQUID FILLED ORAL at 05:01

## 2024-01-02 RX ADMIN — MEMANTINE HYDROCHLORIDE 10 MG: 5 TABLET ORAL at 10:01

## 2024-01-02 RX ADMIN — BENZONATATE 100 MG: 100 CAPSULE, LIQUID FILLED ORAL at 12:01

## 2024-01-02 RX ADMIN — HYDROCHLOROTHIAZIDE 25 MG: 25 TABLET ORAL at 10:01

## 2024-01-02 RX ADMIN — MIRTAZAPINE 15 MG: 15 TABLET, FILM COATED ORAL at 08:01

## 2024-01-02 RX ADMIN — FLUTICASONE FUROATE AND VILANTEROL TRIFENATATE 1 PUFF: 200; 25 POWDER RESPIRATORY (INHALATION) at 03:01

## 2024-01-02 RX ADMIN — APIXABAN 2.5 MG: 2.5 TABLET, FILM COATED ORAL at 10:01

## 2024-01-02 RX ADMIN — GUAIFENESIN AND DEXTROMETHORPHAN 10 ML: 100; 10 SYRUP ORAL at 10:01

## 2024-01-02 RX ADMIN — IPRATROPIUM BROMIDE AND ALBUTEROL SULFATE 3 ML: 2.5; .5 SOLUTION RESPIRATORY (INHALATION) at 07:01

## 2024-01-02 RX ADMIN — PREDNISONE 40 MG: 20 TABLET ORAL at 10:01

## 2024-01-02 RX ADMIN — IPRATROPIUM BROMIDE AND ALBUTEROL SULFATE 3 ML: 2.5; .5 SOLUTION RESPIRATORY (INHALATION) at 12:01

## 2024-01-02 RX ADMIN — SODIUM CHLORIDE SOLN NEBU 3% 4 ML: 3 NEBU SOLN at 08:01

## 2024-01-02 RX ADMIN — AMLODIPINE BESYLATE 5 MG: 5 TABLET ORAL at 10:01

## 2024-01-02 RX ADMIN — GLYCOPYRROLATE 2 MG: 1 TABLET ORAL at 10:01

## 2024-01-02 RX ADMIN — GUAIFENESIN AND DEXTROMETHORPHAN 10 ML: 100; 10 SYRUP ORAL at 06:01

## 2024-01-02 NOTE — SUBJECTIVE & OBJECTIVE
Interval history - Patient continues to have significant cough.  Robitussin DM ordered.  Given findings of aspiration on imaging and history of esophageal procedures we will consult GI. Echo to be done today.     Review of Systems   Constitutional:  Negative for activity change, appetite change, chills and fever.   HENT:  Negative for congestion, sore throat and trouble swallowing.    Eyes:  Negative for photophobia and visual disturbance.   Respiratory:  Positive for cough and shortness of breath. Negative for chest tightness.    Cardiovascular:  Negative for chest pain, palpitations and leg swelling.   Gastrointestinal:  Negative for abdominal pain, diarrhea and nausea.   Genitourinary:  Negative for dysuria, flank pain and hematuria.   Musculoskeletal:  Negative for back pain.   Neurological:  Negative for dizziness, weakness and headaches.   Psychiatric/Behavioral:  Negative for confusion.      Objective:     Vital Signs (Most Recent):  Temp: 99.3 °F (37.4 °C) (01/02/24 1210)  Pulse: 87 (01/02/24 1210)  Resp: 17 (01/02/24 1210)  BP: 128/64 (01/02/24 1210)  SpO2: (!) 94 % (01/02/24 1210) Vital Signs (24h Range):  Temp:  [97.7 °F (36.5 °C)-99.8 °F (37.7 °C)] 99.3 °F (37.4 °C)  Pulse:  [75-89] 87  Resp:  [15-20] 17  SpO2:  [92 %-96 %] 94 %  BP: (127-184)/(60-81) 128/64     Weight: 55.8 kg (123 lb)  Body mass index is 20.47 kg/m².     Physical Exam  Constitutional:       General: She is not in acute distress.     Appearance: She is not ill-appearing.   Pulmonary:      Effort: No respiratory distress.      Breath sounds: Wheezing (right lower lung) present.      Comments: Coughing while in room  Abdominal:      General: There is no distension.      Tenderness: There is no abdominal tenderness.   Musculoskeletal:      Right lower leg: No edema.      Left lower leg: No edema.   Neurological:      General: No focal deficit present.      Mental Status: She is oriented to person, place, and time.                 Significant Labs: All pertinent labs within the past 24 hours have been reviewed.      Significant Imaging: I have reviewed all pertinent imaging results/findings within the past 24 hours.

## 2024-01-02 NOTE — PLAN OF CARE
Medicare Message     Important Message from Medicare regarding Discharge Appeal Rights Given to patient/caregiver; Explained to patient/caregiver; Signed/date by patient/caregiver Given to patient/caregiver; Explained to patient/caregiver; Signed/date by patient/caregiver   Date IMM was signed 12/31/2023 1/2/2024   Time IMM was signed [id number]

## 2024-01-02 NOTE — PROGRESS NOTES
[facility] Medicine  Progress Note    Patient Name: Debbie Ding  MRN: [id number]  Patient Class: IP- Inpatient   Admission Date: 12/29/2023  Length of Stay: 4 days  Attending Physician: Samm Moran MD  Primary Care Provider: Carolyn Mejia MD        Subjective:     Principal Problem:Acute hypoxemic respiratory failure        HPI:  Debbie Ding is a 89-year-old female with a past medical history of asthma, bilateral PE, GERD, hypercholesteremia, fibromyalgia and thyroid disease who presents with worsening cough and shortness of breath with exertion.  Patient was initially seen at urgent care and found to be hypoxic to 91% and referred patient to the ED. Patient states she was diagnosed with bronchitis 3 months ago and has had a persistent cough since then.  She reports that her cough has worsened and she has worsening shortness of breath over the past few days.  Patient states using her albuterol and OTC Robitussin with minimal relief.    Upon arrival to the ED, patient found to be hypoxic with 91% and was placed on 2 L O2 via NC with improvement.  Lab work revealed hypokalemia 3.3 and .  No leukocytosis and troponin negative.  Lactic acid 1.3 and procalcitonin 0.09.  UA negative for UTI.  She denies history of CHF with most recent BNP 36 two years ago.  She denies lower extremity swelling and states her cough is nonproductive.  Patient further denies chest pain, fever, chills, nausea and vomiting.  Chest x-ray revealed patchy opacities to the lungs could be related to atelectasis, aspiration or pneumonia.  Patient received IV magnesium, methylprednisone and albuterol nebulizer in the ED with some relief.  Patient referred to Hospital Medicine and will be admitted for further evaluation and management    Overview/Hospital Course:  Patient seen today.  Still complaining of significant cough.  Antitussives ordered.  Her last asthma exacerbation was years ago and coughing  is typical of an exacerbation.  Concern for aspiration with x-ray findings, and history of vocal cord dysfunction and history of dilations for strictures in the esophagus.  Patient was evaluated by speech and does not display overt signs of aspiration during evaluation -  pt was given education on aspiration precautions.      Pt continues to have severe non productive hacking cough.  She reports worsening of cough about 4-5 months ago.  She reports that she had already been to an ED (out of [facility]) and her outpatient pulmonologist regarding this.  Both time she had been discharged with steroids which have not seemed to help.  She is very frustrated by this as it is interfering with her day-to-day activities.  After shared discussion between patient family and myself will consult Pulmonary though we did discuss the limitations of inpatient workup.    Seen by Pulmonary, based on CT chest appears to have bronchiectasis.  Cough appears to be secondary to chronic aspiration - Check respiratory viral panel, TTE, induced sputum for culture and AFB (NTM), IgE for possible ABPA, continue prednisone (complete 5 days) and hypertonic saline based.     Patient continues to have significant cough.  Robitussin DM ordered.  Given findings of aspiration on imaging and history of esophageal procedures we will consult GI.      Interval history - Patient continues to have significant cough.  Robitussin DM ordered.  Given findings of aspiration on imaging and history of esophageal procedures we will consult GI. Echo to be done today.     Review of Systems   Constitutional:  Negative for activity change, appetite change, chills and fever.   HENT:  Negative for congestion, sore throat and trouble swallowing.    Eyes:  Negative for photophobia and visual disturbance.   Respiratory:  Positive for cough and shortness of breath. Negative for chest tightness.    Cardiovascular:  Negative for chest pain, palpitations and leg swelling.    Gastrointestinal:  Negative for abdominal pain, diarrhea and nausea.   Genitourinary:  Negative for dysuria, flank pain and hematuria.   Musculoskeletal:  Negative for back pain.   Neurological:  Negative for dizziness, weakness and headaches.   Psychiatric/Behavioral:  Negative for confusion.      Objective:     Vital Signs (Most Recent):  Temp: 99.3 °F (37.4 °C) (01/02/24 1210)  Pulse: 87 (01/02/24 1210)  Resp: 17 (01/02/24 1210)  BP: 128/64 (01/02/24 1210)  SpO2: (!) 94 % (01/02/24 1210) Vital Signs (24h Range):  Temp:  [97.7 °F (36.5 °C)-99.8 °F (37.7 °C)] 99.3 °F (37.4 °C)  Pulse:  [75-89] 87  Resp:  [15-20] 17  SpO2:  [92 %-96 %] 94 %  BP: (127-184)/(60-81) 128/64     Weight: 55.8 kg (123 lb)  Body mass index is 20.47 kg/m².     Physical Exam  Constitutional:       General: She is not in acute distress.     Appearance: She is not ill-appearing.   Pulmonary:      Effort: No respiratory distress.      Breath sounds: Wheezing (right lower lung) present.      Comments: Coughing while in room  Abdominal:      General: There is no distension.      Tenderness: There is no abdominal tenderness.   Musculoskeletal:      Right lower leg: No edema.      Left lower leg: No edema.   Neurological:      General: No focal deficit present.      Mental Status: She is oriented to person, place, and time.                Significant Labs: All pertinent labs within the past 24 hours have been reviewed.      Significant Imaging: I have reviewed all pertinent imaging results/findings within the past 24 hours.    Assessment/Plan:      * Acute hypoxemic respiratory failure  Cough   History of esophageal stricture  Vocal cord dysfunction      Cough started 4-5 months ago - She reports that she had already been to an ED (out of [facility]) and her outpatient pulmonologist regarding this.  Both time she had been discharged with antibiotics and steroids which have not seemed to help.  She is very frustrated by this as it is interfering with  her day-to-day activities.     Patient also has history of asthma found to be hypoxic to 91%.  Chest x-ray revealed patchy opacities in the lung bases related to atelectasis, aspiration or pneumonia.  No leukocytosis and procalcitonin 0.09.  Per chart review, patient unable to take Symbicort secondary to cost of medication and was recently started on Advar    Plan -   -prednisone 40 mg daily  -defer antibiotics given no leukocytosis and procalcitonin 0.09  -oxygen p.r.n.  -albuterol-ipratropium Q 4  -Follows with Pulmonology, Dr. Suazo outpatient  - Anti tussives added   - Speech consulted for history of vocal cord dysfunction, esophageal stricture dilation and findings of possible aspiration of chest x-ray - does not display overt signs of aspiration during evaluation -  pt was given education on aspiration precautions.    - pulmonary consulted for persistent hacking non productive cough - based on CT chest appears to have bronchiectasis.  Cough appears to be secondary to chronic aspiration - Check respiratory viral panel, TTE, induced sputum for culture and AFB (NTM), IgE for possible ABPA, continue prednisone (complete 5 days) and hypertonic saline based.   - Patient continues to have significant cough.  Robitussin DM ordered.  Given findings of aspiration on imaging and history of esophageal procedures we will consult GI.    History of deep venous thrombosis  History noted, currently on Eliquis 2.5 mg daily    Plan -   -continue Eliquis 2.5 mg daily      Type 2 diabetes mellitus with stage 3a chronic kidney disease, without long-term current use of insulin  History noted.  Patient's diabetes controlled with diet.  Most recent A1c 6.3 (10/2023).  Most recent glucose 114    Plan -   -POCT glucose  -low dose SSI    Chronic kidney disease, stage III (moderate)  Patient with history of CKD stage 3.  Creatinine currently at baseline     Plan -   -continue to monitor renal function  -avoid  nephrotoxins    Hyperhidrosis  Continue home glycopyrrolate 2 mg b.i.d.      GERD (gastroesophageal reflux disease)  History noted currently controlled with PPI    Plan -   -continue Protonix 40 mg daily    Hypertension  History noted currently controlled with amlodipine and HCTZ    Plan -   -continue amlodipine 2.5 mg daily  -continue HCTZ 25 mg daily      VTE Risk Mitigation (From admission, onward)           Ordered     apixaban tablet 2.5 mg  Daily         12/29/23 1931     IP VTE HIGH RISK PATIENT  Once         12/29/23 1918     Place sequential compression device  Until discontinued         12/29/23 1918                    Discharge Planning   NATE: 1/4/2024     Code Status: Full Code   Is the patient medically ready for discharge?:     Reason for patient still in hospital (select all that apply): Treatment  Discharge Plan A: Home with family   Discharge Delays: None known at this time              Samm Fonseca MD  Department of Hospital Medicine   [facility] ([place])

## 2024-01-02 NOTE — PROGRESS NOTES
[facility] ([place])  Pulmonology  Progress Note    Patient Name: Debbie Ding  MRN: [id number]  Admission Date: 12/29/2023  Hospital Length of Stay: 4 days  Code Status: Full Code  Attending Provider: Samm Moran MD  Primary Care Provider: Carolyn Mejia MD   Principal Problem: Acute hypoxemic respiratory failure    Subjective:     Interval History:   Patient continued to have spells of cough that is aggravated by talking and most pronounce at night. Cough is not productive. Patient is frustrated about the cough. Patient remains afebrile.        Objective:     Vital Signs (Most Recent):  Temp: 99.8 °F (37.7 °C) (01/02/24 0719)  Pulse: 76 (01/02/24 0730)  Resp: 18 (01/02/24 0730)  BP: (!) 153/70 (01/02/24 0719)  SpO2: (!) 93 % (01/02/24 0730) Vital Signs (24h Range):  Temp:  [97.7 °F (36.5 °C)-99.8 °F (37.7 °C)] 99.8 °F (37.7 °C)  Pulse:  [75-89] 76  Resp:  [15-20] 18  SpO2:  [92 %-97 %] 93 %  BP: (127-184)/(60-81) 153/70     Weight: 55.9 kg (123 lb 3.8 oz)  Body mass index is 20.51 kg/m².      Intake/Output Summary (Last 24 hours) at 1/2/2024 1007  Last data filed at 1/2/2024 0521  Gross per 24 hour   Intake --   Output 800 ml   Net -800 ml        Physical Exam  Vitals reviewed.   Constitutional:       General: She is not in acute distress.     Appearance: Normal appearance. She is not ill-appearing.       HENT:      Head: Normocephalic and atraumatic.      Nose: No congestion.   Eyes:      Conjunctiva/sclera: Conjunctivae normal.   Cardiovascular:      Rate and Rhythm: Normal rate and regular rhythm.      Pulses: Normal pulses.      Heart sounds: No murmur heard.  Pulmonary:      Effort: Pulmonary effort is normal. No respiratory distress.      Breath sounds:  No wheezing or rhonchi.   Abdominal:      General: There is no distension.      Palpations: Abdomen is soft.   Musculoskeletal:      Right lower leg: No edema.      Left lower leg: No edema.   Neurological:      General: No focal deficit  present.      Mental Status: She is alert and oriented to person, place, and time.   Psychiatric:         Mood and Affect: Mood normal.         Behavior: Behavior normal.         Review of Systems    Vents:  Oxygen Concentration (%): 28 (12/31/23 0432)    Lines/Drains/Airways       Drain  Duration             Female External Urinary Catheter w/ Suction 12/29/23 1900 3 days              Peripheral Intravenous Line  Duration                  Peripheral IV - Single Lumen 12/29/23 1234 22 G Right Antecubital 3 days                    Significant Labs:    CBC/Anemia Profile:  Recent Labs   Lab 01/01/24  0430 01/02/24  0446   WBC 6.42 8.47   HGB 12.6 12.4   HCT 37.0 38.0    222   MCV 88 89   RDW 15.7* 15.6*        Chemistries:  Recent Labs   Lab 01/01/24  0430 01/02/24  0446    143   K 4.1 3.8    104   CO2 24 27   BUN 26 29   CREATININE 1.2 1.3   CALCIUM 9.4 9.7       Recent Lab Results  (Last 5 results in the past 24 hours)        01/02/24  0719   01/02/24  0446   01/01/24  2037   01/01/24  1637   01/01/24  1342        RSV Ag by Molecular Method         Not Detected       Influenza A, Molecular         Not Detected       Influenza B, Molecular         Not Detected       Anion Gap   12             BUN   29             Calcium   9.7             Chloride   104             CO2   27             Creatinine   1.3             eGFR   39             Glucose   135             Hematocrit   38.0             Hemoglobin   12.4             MCH   28.9             MCHC   32.6             MCV   89             MPV   11.5             Platelet Count   222             POCT Glucose 94     187   199         Potassium   3.8             RBC   4.29             RDW   15.6             SARS-CoV2 (COVID-19) Qualitative PCR         Not Detected  Comment: This test utilizes a real-time reverse transcription  polymerase chain reaction procedure to amplify and  detect the SARS-CoV-2 and detect the SARS-CoV-2 N2 and E nucleic  acid  targets. The analytical sensitivity (limit of detection) of  this assay is 250 copies/mL.    A Detected result implies that the patient is infected with the  SARS-CoV-2 virus and is presumed to be contagious.  A Not Detected result implies that the SARS-CoV-2 target nucleic  acids are not present above the limit of detection. It does not  rule out the possibility of COVID-19 and should not be the sole  basis for treatment decisions. If COVID-19 is strongly suspected  based on clinical and epidemiological history, re-testing should  be considered.    This test is Food and Drug Administration (FDA) approved. Performance   characteristics of this has been independently verified by [facility] Department of Pathology and Laboratory Medicine.         Sodium   143             WBC   8.47                                    Significant Imaging:  I have reviewed all pertinent imaging results/findings within the past 24 hours.  Assessment/Plan:     Pulmonary  Chronic cough  Acute on chronic cough  CT scan showed multifocal distal bronchial wall thickening, scattered heterogeneous patchy and consolidative ground-glass opacities throughout both lungs with upper lung predominance, atelectasis versus scarring about the right lower lobe atelectasis versus scarring involving the right greater than left lung bases, few scattered pulmonary micro nodules throughout both lungs and mild bronchiectasis.  The groundglass opacities appear to be new compared with the previous study.  Possible differential for the acute worsening cough includes viral infection, pulmonary edema given elevated BNP vs HP given upper lobe predominance (less likely given absence of systemic symptoms)     Chronic cough likely due chronic aspiration, bronchiectasis, vocal cord dysfunction (See Flexible Laryngeal Videostroscopy of 05/04/2022)  Patient was evaluated by SLP without overt signs of pharyngeal dysphagia or aspiration noted.     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 Hypertonic saline nebs with acapella  Will add dextromethorphan-guaifenesin to help with the cough  Aspiration precautions  PT/OT  Speech therapy  Agree with GI evaluation  Might need outpatient re-evaluation by ENT     Pulmonary will continue to follow.     Stefani Andrew MD  Pulmonology  [facility] ([place])

## 2024-01-02 NOTE — CONSULTS
.[facility] ([place])  Gastroenterology  Consult Note    Patient Name: Debbie Ding  MRN: [id number]  Admission Date: 12/29/2023  Hospital Length of Stay: 4 days  Code Status: Full Code   Primary Care Physician: Carolyn Mejia MD  Principal Problem:Acute hypoxemic respiratory failure    Inpatient consult to Gastroenterology  Consult performed by: Jose Finch MD  Consult ordered by: Samm Moran MD        Subjective:     Chief complaint:  Chronic cough    HPI:  89-year-old woman who was sent to the ED because of a chronic cough and shortness of breath.  She was found to be hypoxic with an 91% oxygen saturation.  She has been evaluated by Pulmonary who feels her symptoms may be from chronic aspiration.    The patient has been seen by me in the past.  She had seen me in 2020 for dysphagia.  An EGD revealed an esophageal ring which was dilated, but did not help her symptoms.  Subsequently an esophagram showed stasis of barium in the esophagus which eventually pass spontaneously.  An esophageal manometry was performed and showed incomplete bolus clearance on all swallows, but was otherwise unremarkable.  It did not suggest achalasia.  She was referred to [facility] for further evaluation/treatment of esophageal dysmotility.  She underwent another EGD with dilatation there.  She was found to have an esophageal ring which was dilated.  The patient feels her coughing symptoms started after this procedure.  She has also undergone a Bravo pH study which demonstrated acid reflux.    Past medical history:  Past Medical History:   Diagnosis Date    Allergic rhinitis     Anticoagulant long-term use     Arthritis     Asthma     Bilateral pulmonary embolism 4/27/2019    Cataract     Chronic anticoagulation 9/28/2020    Depression     Diabetes mellitus     Fibromyalgia 7/2/2012    Fibromyalgia     GERD (gastroesophageal reflux disease)     Hypercholesterolemia 9/28/2020    Hypercholesterolemia 9/28/2020     Hypertension 7/2/2012    Thoracic or lumbosacral neuritis or radiculitis, unspecified     Thyroid disease     Ulcer        Past surgical history:  Past Surgical History:   Procedure Laterality Date    CARPAL TUNNEL RELEASE Left 11/29/2023    Procedure: RELEASE, CARPAL TUNNEL,LEFT;  Surgeon: Avril Hutchison MD;  Location: [facility];  Service: Orthopedics;  Laterality: Left;    CATARACT EXTRACTION Bilateral     ESOPHAGOGASTRODUODENOSCOPY N/A 3/8/2022    Procedure: EGD (ESOPHAGOGASTRODUODENOSCOPY) with dilation-either hospital ok with Dr. Meza;  Surgeon: Rebeca Meza MD;  Location: [facility];  Service: Endoscopy;  Laterality: N/A;  1/11-eliquis hold ok see te-tb    ESOPHAGOGASTRODUODENOSCOPY N/A 2/28/2022    Procedure: EGD (ESOPHAGOGASTRODUODENOSCOPY) with dilation-either hospital ok with Dr. Meza;  Surgeon: Rebeca Meza MD;  Location: [address]);  Service: Endoscopy;  Laterality: N/A;  1/11-eliquis hold ok see te-tb  COVID test on 2/25/22 at [facility]  2/22-confirmed appt arrival time with pt-[id number]    FOOT NEUROMA SURGERY  1985    HYSTERECTOMY         Social history:  Social History     Socioeconomic History    Marital status:    Occupational History     Comment:  - school    Tobacco Use    Smoking status: Never    Smokeless tobacco: Never   Substance and Sexual Activity    Alcohol use: No    Drug use: No    Sexual activity: Not Currently   Social History Narrative    Lives with daughter, Benjie.        Other daughter, Madina, drives her around.        She lives independently, except for driving.          Stretches in bed.  Walks in neighborhood, and in house several times a day.     Social Determinants of Health     Financial Resource Strain: Low Risk  (7/11/2023)    Overall Financial Resource Strain (CARDIA)     Difficulty of Paying Living Expenses: Not hard at all   Food Insecurity: No Food Insecurity (7/11/2023)    Hunger Vital Sign     Worried About Running Out of Food  in the Last Year: Never true     Ran Out of Food in the Last Year: Never true   Transportation Needs: No Transportation Needs (7/11/2023)    PRAPARE - Transportation     Lack of Transportation (Medical): No     Lack of Transportation (Non-Medical): No   Physical Activity: Inactive (7/11/2023)    Exercise Vital Sign     Days of Exercise per Week: 0 days     Minutes of Exercise per Session: 0 min   Stress: No Stress Concern Present (7/11/2023)    [employer] - Occupational Stress Questionnaire     Feeling of Stress : Not at all   Social Connections: Moderately Isolated (10/7/2021)    Social Connection and Isolation Panel [NHANES]     Frequency of Communication with Friends and Family: More than three times a week     Frequency of Social Gatherings with Friends and Family: More than three times a week     Attends Congregation Services: More than 4 times per year     Active Member of Clubs or Organizations: No     Attends Club or Organization Meetings: Never     Marital Status:    Housing Stability: Low Risk  (7/11/2023)    Housing Stability Vital Sign     Unable to Pay for Housing in the Last Year: No     Number of Places Lived in the Last Year: 1     Unstable Housing in the Last Year: No       Family history:  Family History   Problem Relation Age of Onset    Diabetes Mother     Diabetes Brother     Emphysema Maternal Aunt     Melanoma Neg Hx     Asthma Neg Hx     Colon cancer Neg Hx     Esophageal cancer Neg Hx        Medications:  Facility-Administered Medications Prior to Admission   Medication Dose Route Frequency Provider Last Rate Last Admin    regadenoson injection 0.4 mg  0.4 mg Intravenous Once Benedicto Love MD PhD         Medications Prior to Admission   Medication Sig Dispense Refill Last Dose    acetaminophen (TYLENOL) 500 MG tablet Take 1-2 tablets (500-1,000 mg total) by mouth 3 (three) times daily as needed for Pain.  0     albuterol (PROVENTIL) 2.5 mg /3 mL  (0.083 %) nebulizer solution Take 3 mLs (2.5 mg total) by nebulization every 6 (six) hours as needed for Wheezing. Rescue 90 each 3     albuterol (PROVENTIL/VENTOLIN HFA) 90 mcg/actuation inhaler Inhale 2 puffs into the lungs every 6 (six) hours as needed.       amLODIPine (NORVASC) 2.5 MG tablet Take 1 tablet (2.5 mg total) by mouth once daily. 90 tablet 3     atorvastatin (LIPITOR) 40 MG tablet Take 1 tablet (40 mg total) by mouth once daily. 90 tablet 1     chlorhexidine (PERIDEX) 0.12 % solution SWISH WITH 10ML FOR 30 seconds THEN EXPECTORATE TWICE DAILY       ciclopirox 1 % shampoo Apply topically.       clobetasoL (TEMOVATE) 0.05 % external solution Pt to mix in 1 jar of cerave cream and apply to affected areas bid 50 mL 3     clotrimazole-betamethasone 1-0.05% (LOTRISONE) cream Apply topically 2 (two) times daily. 45 g 2     cyproheptadine (PERIACTIN) 4 mg tablet        donepeziL (ARICEPT) 10 MG tablet Take 1 tablet by mouth every evening.       doxepin (SINEQUAN) 10 MG capsule TAKE 1 CAPSULE AT BEDTIME  FOR ITCHING 90 capsule 3     ELIQUIS 2.5 mg Tab TAKE 1 TABLET DAILY 90 tablet 3     esomeprazole (NEXIUM) 40 MG capsule TAKE 1 CAPSULE TWICE DAILY 180 capsule 3     famotidine (PEPCID) 20 MG tablet Take 1 tablet (20 mg total) by mouth daily as needed for Heartburn (breakthrough heartburn and acid reflux not controlled with Nexium). 90 tablet 3     fluocinolone (DERMA-SMOOTHE) 0.01 % external oil fluocinolone 0.01 % scalp oil and shower cap   Apply ONE A SMALL AMOUNT TO affected AREA as directed apply TO SCALP 2-3 TIMES PER WEEK FOR ITCHING]       fluocinonide (LIDEX) 0.05 % external solution AAA scalp qday - bid prn pruritus 60 mL 3     fluticasone-salmeterol 230-21 mcg/dose (ADVAIR HFA) 230-21 mcg/actuation HFAA inhaler Inhale 2 puffs into the lungs 2 (two) times daily. Controller 36 g 3     gabapentin (NEURONTIN) 100 MG capsule Take 100 mg by mouth.       GEMTESA 75 mg Tab Take 1 tablet by mouth twice a  week.       glycopyrrolate (ROBINUL) 2 MG Tab TAKE 1 TABLET TWICE A  tablet 3     hydroCHLOROthiazide (HYDRODIURIL) 25 MG tablet Take 1 tablet (25 mg total) by mouth once daily. 90 tablet 0     hydrocortisone-aloe vera 0.5 % Crea Place 1 application into the left eye 2 (two) times daily.       ketoconazole (NIZORAL) 2 % cream Apply topically once daily. 60 g 2     ketoconazole (NIZORAL) 2 % shampoo Wash hair with medicated shampoo at least 2x/week - let sit on scalp at least 5 minutes prior to rinsing 120 mL 5     levothyroxine (SYNTHROID) 75 MCG tablet Take 1 tablet (75 mcg total) by mouth before breakfast. 30 tablet 8     meclizine (ANTIVERT) 25 mg tablet TAKE 1 TABLET TWICE A DAY  AS NEEDED FOR DIZZINESS 180 tablet 1     memantine (NAMENDA) 10 MG Tab Take 1 tablet (10 mg total) by mouth once daily. 180 tablet 3     mirtazapine (REMERON) 15 MG tablet Take 1 tablet (15 mg total) by mouth every evening. 90 tablet 3     promethazine-dextromethorphan (PROMETHAZINE-DM) 6.25-15 mg/5 mL Syrp Take 5 mLs by mouth every 8 (eight) hours as needed (cough). 118 mL 0     traMADoL (ULTRAM) 50 mg tablet Take 1 tablet (50 mg total) by mouth every 6 (six) hours as needed for Pain. 10 tablet 0     [DISCONTINUED] benzonatate (TESSALON) 100 MG capsule benzonatate 100 mg capsule          Allergies:  Review of patient's allergies indicates:   Allergen Reactions    Melatonin      Other reaction(s): Other (See Comments)  Sleep Walks and has unnatural dreams    Talwin compound Hives    Talwin [pentazocine lactate] Hallucinations    Trazodone Other (See Comments)     Nightmares/sleep walk      Bentyl [dicyclomine] Anxiety       Review of systems:  CONSTITUTIONAL: Negative for fever, chills, weakness, weight loss, weight gain.  HEENT:  Positive for sore throat.  CARDIOVASCULAR: Negative for chest pain or palpitations.  RESPIRATORY:  Positive for shortness of breath and cough  GASTROINTESTINAL: See HPI  GENITOURINARY: Negative for  dysuria or hematuria.  NEUROLOGIC: Negative for headaches  Aside from above positives, complete 10 point review of systems negative.    Objective:     Vital Signs (Most Recent):  Temp: 99.3 °F (37.4 °C) (01/02/24 1210)  Pulse: 81 (01/02/24 1521)  Resp: 20 (01/02/24 1521)  BP: 128/64 (01/02/24 1210)  SpO2: (!) 94 % (01/02/24 1521) Vital Signs (24h Range):  Temp:  [97.7 °F (36.5 °C)-99.8 °F (37.7 °C)] 99.3 °F (37.4 °C)  Pulse:  [75-87] 81  Resp:  [15-20] 20  SpO2:  [92 %-95 %] 94 %  BP: (127-184)/(62-81) 128/64     Physical examination:  General: well developed, elderly, somewhat frail, in no acute distress  HENT: NCAT, hearing grossly intact  Eyes:  anicteric sclera  Cardiovascular: Regular rate and rhythm.   Lungs: Non-labored respirations. Breath sounds equal.   Abdomen: soft, NTND, normoactive BS  Extremities: No C/C/E  Neuro: AA&O x 3  Psych: Appropriate mood and affect.   Musculoskeletal: no deformity    Labs:  CBC:   Recent Labs   Lab 01/01/24  0430 01/02/24  0446   WBC 6.42 8.47   HGB 12.6 12.4   HCT 37.0 38.0    222     CMP:   Recent Labs   Lab 01/02/24  0446   *   CALCIUM 9.7      K 3.8   CO2 27      BUN 29   CREATININE 1.3       Imaging:  Imaging reviewed      Assessment:   89-year-old woman with a chronic cough possibly from chronic aspiration.  She has a history of nonspecific esophageal dysmotility.  She also has an esophageal ring that was dilated.  She feels her symptoms started after this dilatation, but that does not seem likely.  She had a previous barium esophagram as above.  This will be repeated.    Plan:   1. Plan barium esophagram  2. Depending on findings of the esophagram, EGD can be considered, if indicated.        Thank you for your consult.     Jose Finch MD  Gastroenterology  [facility] ([place])

## 2024-01-02 NOTE — SUBJECTIVE & OBJECTIVE
Interval History:   Patient continued to have spells of cough that is aggravated by talking and most pronounce at night. Cough is not productive. Patient is frustrated about the cough. Patient remains afebrile.        Objective:     Vital Signs (Most Recent):  Temp: 99.8 °F (37.7 °C) (01/02/24 0719)  Pulse: 76 (01/02/24 0730)  Resp: 18 (01/02/24 0730)  BP: (!) 153/70 (01/02/24 0719)  SpO2: (!) 93 % (01/02/24 0730) Vital Signs (24h Range):  Temp:  [97.7 °F (36.5 °C)-99.8 °F (37.7 °C)] 99.8 °F (37.7 °C)  Pulse:  [75-89] 76  Resp:  [15-20] 18  SpO2:  [92 %-97 %] 93 %  BP: (127-184)/(60-81) 153/70     Weight: 55.9 kg (123 lb 3.8 oz)  Body mass index is 20.51 kg/m².      Intake/Output Summary (Last 24 hours) at 1/2/2024 1007  Last data filed at 1/2/2024 0521  Gross per 24 hour   Intake --   Output 800 ml   Net -800 ml        Physical Exam  Vitals reviewed.   Constitutional:       General: She is not in acute distress.     Appearance: Normal appearance. She is not ill-appearing.       HENT:      Head: Normocephalic and atraumatic.      Nose: No congestion.   Eyes:      Conjunctiva/sclera: Conjunctivae normal.   Cardiovascular:      Rate and Rhythm: Normal rate and regular rhythm.      Pulses: Normal pulses.      Heart sounds: No murmur heard.  Pulmonary:      Effort: Pulmonary effort is normal. No respiratory distress.      Breath sounds:  No wheezing or rhonchi.   Abdominal:      General: There is no distension.      Palpations: Abdomen is soft.   Musculoskeletal:      Right lower leg: No edema.      Left lower leg: No edema.   Neurological:      General: No focal deficit present.      Mental Status: She is alert and oriented to person, place, and time.   Psychiatric:         Mood and Affect: Mood normal.         Behavior: Behavior normal.         Review of Systems    Vents:  Oxygen Concentration (%): 28 (12/31/23 0432)    Lines/Drains/Airways       Drain  Duration             Female External Urinary Catheter w/ Suction  12/29/23 1900 3 days              Peripheral Intravenous Line  Duration                  Peripheral IV - Single Lumen 12/29/23 1234 22 G Right Antecubital 3 days                    Significant Labs:    CBC/Anemia Profile:  Recent Labs   Lab 01/01/24  0430 01/02/24  0446   WBC 6.42 8.47   HGB 12.6 12.4   HCT 37.0 38.0    222   MCV 88 89   RDW 15.7* 15.6*        Chemistries:  Recent Labs   Lab 01/01/24  0430 01/02/24  0446    143   K 4.1 3.8    104   CO2 24 27   BUN 26 29   CREATININE 1.2 1.3   CALCIUM 9.4 9.7       Recent Lab Results  (Last 5 results in the past 24 hours)        01/02/24  0719   01/02/24 0446   01/01/24  2037   01/01/24  1637   01/01/24  1342        RSV Ag by Molecular Method         Not Detected       Influenza A, Molecular         Not Detected       Influenza B, Molecular         Not Detected       Anion Gap   12             BUN   29             Calcium   9.7             Chloride   104             CO2   27             Creatinine   1.3             eGFR   39             Glucose   135             Hematocrit   38.0             Hemoglobin   12.4             MCH   28.9             MCHC   32.6             MCV   89             MPV   11.5             Platelet Count   222             POCT Glucose 94     187   199         Potassium   3.8             RBC   4.29             RDW   15.6             SARS-CoV2 (COVID-19) Qualitative PCR         Not Detected  Comment: This test utilizes a real-time reverse transcription  polymerase chain reaction procedure to amplify and  detect the SARS-CoV-2 and detect the SARS-CoV-2 N2 and E nucleic  acid targets. The analytical sensitivity (limit of detection) of  this assay is 250 copies/mL.    A Detected result implies that the patient is infected with the  SARS-CoV-2 virus and is presumed to be contagious.  A Not Detected result implies that the SARS-CoV-2 target nucleic  acids are not present above the limit of detection. It does not  rule out the  possibility of COVID-19 and should not be the sole  basis for treatment decisions. If COVID-19 is strongly suspected  based on clinical and epidemiological history, re-testing should  be considered.    This test is Food and Drug Administration (FDA) approved. Performance   characteristics of this has been independently verified by [facility] Department of Pathology and Laboratory Medicine.         Sodium   143             WBC   8.47                                    Significant Imaging:  I have reviewed all pertinent imaging results/findings within the past 24 hours.

## 2024-01-02 NOTE — TELEPHONE ENCOUNTER
----- Message from Beba Cosby sent at 1/2/2024  9:12 AM CST -----  Contact: [phone]  Pt is currently in the hospital at [facility]. Per pt, she would like for Dr Mejia to come see her or give her a call to discuss transferring her to [facility].  Per pt, she can't stop coughing and she feels it is getting worse.     Please give her a call at the hospital.             Thank you

## 2024-01-02 NOTE — PT/OT/SLP PROGRESS
Speech Language Pathology Treatment    Patient Name:  Debbie Ding   MRN:  [id number]  Admitting Diagnosis: Acute hypoxemic respiratory failure    Recommendations:                  General Recommendations:    SLP to follow 3-5x/week during admit to further assess oropharyngeal swallow function and determine need for objective imaging of swallow function  Agree with recommendation for GI consult, as patient with hx esophageal dysphagia     Diet recommendations: regular solids, thin liquids     Aspiration Precautions:   Upright seating during and 2H post intake   Slow rate of intake  Small bolus volumes   Alternate solids/liquids to promote pharyngoesophageal clearance     General Precautions: Standard,       Communication strategies:  patient would benefit from OP SLP tx for voice deficits       Assessment:     Debbie Ding is a 89 y.o. female with an SLP diagnosis of hoarse vocal quality, hx of stroboscopy with known deficits- recommendation in 2022 was for 6-8 sessions of OP SLP tx though patient only attended first session, reports she was told she did not need to return though this was not recommendation.  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     Subjective     Patient awake, alert, participative in evaluation       Pain/Comfort:   Denies during this assesment period    Respiratory Status: Room air    Objective:     Noted pulm consult from 1/1/24. Per pulm CT scan reveals bronchiectasis, worse in both bases.  Suspect chronic aspiration as predominant cause of her cough.     GI consult placed by primary MD due to hx of esophageal stricture, dilation, GERD.  Patient presents with CT scans indicating chronic aspiration without overt bedside signs of aspiration during SLP evaluation. [place] swallow protocol/3oz water test (6oz provided) was administered to further  assess oropharyngeal swallow function this date. Again, no signs of overt aspiration or pharyngeal dysphagia, patient passed willard swallow protocol  Will await GI recommendations, however, given hx of significant esophageal dysmotility, stricture requiring dilation, and passed willard swallow protocol- suspect if aspiration occurring may be post prandial in nature. Can assess objectively, however, more likely to benefit from functional esophageal assessment at this time. Will follow up with GI recs.     Bedside Swallow Eval:   Consistencies Assessed:  Thin liquids 6 oz thin liquid via consecutive cupsips for willard swallow protocol     Oral Phase:   Mandibular depression is functional for bolus acceptance  Labial seal adequate, no anterior loss noted across consistencies  Mastication is functional  A/p transit appears timely, functional, no oral residues post intake     Pharyngeal Phase:   Single swallows per bolus noted  Patient able to consecutively consume entire 6oz water without ceasing  No overt s/s aspiration noted. No coughing, throat clearing, or changes to vocal quality noted     Impressions:  Patient passed willard swallow protocol without overt signs of pharyngeal dysphagia or aspiration noted. However, risk present for pharyngeal dysphagia and possible aspiration. Risks include vocal fold atrophy (stroboscopy 2022), hx of esophageal dysmotility and reflux (esophagram 2021), compromised respiratory status and abnormal CXR, as well as generalized weakness. Reviewed risks with patient as well as strategies to mitigate risks (slow rate of intake, small bolus volumes, alternate solids/liquids, upright seating during and 2H post intake, oral care TID).          Goals:   Multidisciplinary Problems       SLP Goals          Problem: SLP    Goal Priority Disciplines Outcome   SLP Goal     SLP Ongoing, Progressing   Description: 1. Patient will utilize learned aspiration risk precautions (slow rate of intake, small bolus  volumes, alternating solids/liquids, upright seating during and 2H post intake) in 100% of opportunities independently to tolerate regular solids and thin liquids without signs of dysphagia or respiratory compromise.     2) Patient will recall pillars of aspiration pna in 100% of opportunities given min cues from clinician to reduce risk of aspiration pna and promote use of aspiration precautions.                        Plan:       Patient to be seen:    3-5x/week while admitted  Plan of Care expires:   1/6/2024  Plan of Care reviewed with:    patient, RN  SLP Follow-Up:     yes     Discharge recommendations:    OP SLP tx for ongoing voice therapy   Barriers to Discharge:  None    Time Tracking:     SLP Treatment Date:   12/30/23  Speech Start Time:  1415  Speech Stop Time:  1425     Speech Total Time (min):  10 min    Billable Minutes: Treatment Swallowing Dysfunction 10    01/02/2024

## 2024-01-02 NOTE — PT/OT/SLP PROGRESS
Occupational Therapy      Patient Name:  Debbie Ding   MRN:  [id number]    Patient not seen today secondary to doctors entered in as patient was starting occupational therapy (conversation with dctrs lasted 10 mins +) @09:15AM. Attempted again at 10:03AM patient asked to come back later d/t excessive coughing sitting EOB and stating moving will exacerbate her coughing. Will follow-up as scheduling permits.    1/2/2024

## 2024-01-02 NOTE — CARE UPDATE
01/02/24 0733   Aerosol Therapy   $ Aerosol Therapy Charges Aerosol Treatment  (3% sodium chloride)   Respiratory Treatment Status (SVN) given   Treatment Route (SVN) air;mask   Patient Position (SVN) semi-Theodore's   Post Treatment Assessment (SVN) breath sounds unchanged   Signs of Intolerance (SVN) none   Vibratory PEP Therapy   $ Vibratory PEP Charges Acapella Therapy   $ Vibratory PEP Tech Time Charges 15 min   Daily Review of Necessity (PEP Therapy) completed   Type (PEP Therapy) vibratory/oscillatory   Device (PEP Therapy) flutter   Route (PEP Therapy) mouthpiece   Breaths per Cycle (PEP Therapy) 12   Cycles (PEP Therapy) 1   PEP Pressure (cm H2O) 5   PEP Duration (min) 5   Settings (PEP Therapy) PEP 5   Patient Position (PEP Therapy) semi-Theodore's   Post Treatment Assessment (PEP) breath sounds unchanged   Signs of Intolerance (PEP Therapy) none

## 2024-01-02 NOTE — ASSESSMENT & PLAN NOTE
Cough   History of esophageal stricture  Vocal cord dysfunction      Cough started 4-5 months ago - She reports that she had already been to an ED (out of [facility]) and her outpatient pulmonologist regarding this.  Both time she had been discharged with antibiotics and steroids which have not seemed to help.  She is very frustrated by this as it is interfering with her day-to-day activities.     Patient also has history of asthma found to be hypoxic to 91%.  Chest x-ray revealed patchy opacities in the lung bases related to atelectasis, aspiration or pneumonia.  No leukocytosis and procalcitonin 0.09.  Per chart review, patient unable to take Symbicort secondary to cost of medication and was recently started on Advar    Plan -   -prednisone 40 mg daily  -defer antibiotics given no leukocytosis and procalcitonin 0.09  -oxygen p.r.n.  -albuterol-ipratropium Q 4  -Follows with Pulmonology, Dr. uSazo outpatient  - Anti tussives added   - Speech consulted for history of vocal cord dysfunction, esophageal stricture dilation and findings of possible aspiration of chest x-ray - does not display overt signs of aspiration during evaluation -  pt was given education on aspiration precautions.    - pulmonary consulted for persistent hacking non productive cough - based on CT chest appears to have bronchiectasis.  Cough appears to be secondary to chronic aspiration - Check respiratory viral panel, TTE, induced sputum for culture and AFB (NTM), IgE for possible ABPA, continue prednisone (complete 5 days) and hypertonic saline based.   - Patient continues to have significant cough.  Robitussin DM ordered.  Given findings of aspiration on imaging and history of esophageal procedures we will consult GI.

## 2024-01-02 NOTE — NURSING
"0850: Rounds with Dr. Moran. Pt AAOx4, sitting up in bed eating breakfast. Pt reports that she did not sleep well last night. Reports that her cough is not better. Denies needs at present. Call light in reach. Instructed to call prn needs or changes.Verbalized understanding. Continue to monitor.  1745:GI Dr Finch saw patient. Orders received. Patient cough has gotten less frequent. Pt denies needs at present. Call light in reach. Continue to monitor.  1800: Pt aware of [place] esophagram orders. Pt reports cough meds seem to be "working well" Continue to monitor.  "

## 2024-01-02 NOTE — CARE UPDATE
01/02/24 0730   Patient Assessment/Suction   Level of Consciousness (AVPU) alert   Respiratory Effort Normal;Unlabored   Expansion/Accessory Muscles/Retractions no retractions;no use of accessory muscles   All Lung Fields Breath Sounds diminished   PRE-TX-O2   Device (Oxygen Therapy) room air   SpO2 (!) 93 %   Pulse Oximetry Type Intermittent   $ Pulse Oximetry - Multiple Charge Pulse Oximetry - Multiple   Pulse 76   Resp 18   Aerosol Therapy   $ Aerosol Therapy Charges Aerosol Treatment  (Duoneb)   Daily Review of Necessity (SVN) completed   Respiratory Treatment Status (SVN) given   Treatment Route (SVN) mask;air   Patient Position (SVN) semi-Theodore's   Post Treatment Assessment (SVN) breath sounds unchanged   Signs of Intolerance (SVN) none

## 2024-01-02 NOTE — PLAN OF CARE
Problem: Adult Inpatient Plan of Care  Goal: Plan of Care Review  Outcome: Ongoing, Progressing  Goal: Patient-Specific Goal (Individualized)  Outcome: Ongoing, Progressing  Goal: Absence of Hospital-Acquired Illness or Injury  Outcome: Ongoing, Progressing  Goal: Optimal Comfort and Wellbeing  Outcome: Ongoing, Progressing     Problem: Diabetes Comorbidity  Goal: Blood Glucose Level Within Targeted Range  Outcome: Ongoing, Progressing     Problem: Infection  Goal: Absence of Infection Signs and Symptoms  Outcome: Ongoing, Progressing     POC reviewed with patient. All questions and concerns addressed. Fall/safety precautions implemented and maintained. Cough noted. Unproductive. Scheduled tessalon perles administered. Blood glucose monitored. No acute events noted this shift. Please see flowsheet for full assessment and vitals. Bed locked in lowest position. Side rails up x2. Call bell within reach.

## 2024-01-02 NOTE — PT/OT/SLP PROGRESS
"Physical Therapy Treatment    Patient Name:  Debbie Ding   MRN:  [id number]    Recommendations:     Discharge Recommendations: Low Intensity Therapy  Discharge Equipment Recommendations: bedside commode  Barriers to discharge: None    Assessment:     Debbie Ding is a 89 y.o. female admitted with a medical diagnosis of Acute hypoxemic respiratory failure.  She presents with the following impairments/functional limitations: weakness, impaired endurance, impaired self care skills, impaired functional mobility, gait instability, decreased safety awareness ;pt with good mobility today, amb'd ~200' w/ CGA/SBA, no AD used. O2:98% on RA, HR:88.    Rehab Prognosis: Good; patient would benefit from acute skilled PT services to address these deficits and reach maximum level of function.    Recent Surgery: * No surgery found *      Plan:     During this hospitalization, patient to be seen 3 x/week to address the identified rehab impairments via gait training, therapeutic activities, therapeutic exercises and progress toward the following goals:    Plan of Care Expires:  01/29/24    Subjective     Chief Complaint: continuous coughing  Patient/Family Comments/goals: pt agreeable to session, "I've just been coughing a lot". Pt reports she has a rollator at home, but doesn't use it in the home.   Pain/Comfort:  Pain Rating 1: 0/10  Pain Rating Post-Intervention 1: 0/10      Objective:     Communicated with nurse prior to session.  Patient found supine with peripheral IV upon PT entry to room.     General Precautions: Standard, fall, diabetic  Orthopedic Precautions: N/A  Braces: N/A  Respiratory Status: Room air     Functional Mobility:  Bed Mobility:     Supine to Sit: stand by assistance  Sit to Supine: stand by assistance  Transfers:     Sit to Stand:  stand by assistance with no AD  Gait: amb'd ~200' w/ CGA/SBA, no AD used, O2:98% on RA      AM-PAC 6 CLICK MOBILITY  Turning over in bed (including adjusting bedclothes, " sheets and blankets)?: 4  Sitting down on and standing up from a chair with arms (e.g., wheelchair, bedside commode, etc.): 3  Moving from lying on back to sitting on the side of the bed?: 4  Moving to and from a bed to a chair (including a wheelchair)?: 3  Need to walk in hospital room?: 3  Climbing 3-5 steps with a railing?: 3  Basic Mobility Total Score: 20       Treatment & Education:      Patient left HOB elevated with all lines intact, call button in reach, and nurse notified..    GOALS:   Multidisciplinary Problems       Physical Therapy Goals          Problem: Physical Therapy    Goal Priority Disciplines Outcome Goal Variances Interventions   Physical Therapy Goal     PT, PT/OT Ongoing, Progressing     Description: Goals to be met by: 2023    Patient will increase functional independence with mobility by performin. Sit<>stand with mod (I) with LRAD.  2. Gait x 400 feet with mod (I) with LRAD.  3. Ascend/descend 15 step(s) with least restrictive assistive device and hand held assist with no railing .                           Time Tracking:     PT Received On: 24  PT Start Time: 1443     PT Stop Time: 1455  PT Total Time (min): 12 min     Billable Minutes: Gait Training 12    Treatment Type: Treatment  PT/PTA: PTA     Number of PTA visits since last PT visit: 2024

## 2024-01-03 ENCOUNTER — TELEPHONE (OUTPATIENT)
Dept: INTERNAL MEDICINE | Facility: CLINIC | Age: 89
End: 2024-01-03
Payer: MEDICARE

## 2024-01-03 VITALS
OXYGEN SATURATION: 98 % | HEIGHT: 65 IN | HEART RATE: 95 BPM | DIASTOLIC BLOOD PRESSURE: 74 MMHG | RESPIRATION RATE: 20 BRPM | TEMPERATURE: 98 F | SYSTOLIC BLOOD PRESSURE: 112 MMHG | BODY MASS INDEX: 20.49 KG/M2 | WEIGHT: 123 LBS

## 2024-01-03 PROBLEM — K22.4 ESOPHAGEAL DYSMOTILITY: Status: ACTIVE | Noted: 2024-01-03

## 2024-01-03 PROBLEM — J47.9 BRONCHIECTASIS WITHOUT COMPLICATION: Status: ACTIVE | Noted: 2024-01-03

## 2024-01-03 PROBLEM — N18.31 STAGE 3A CHRONIC KIDNEY DISEASE: Status: ACTIVE | Noted: 2019-10-09

## 2024-01-03 LAB — POCT GLUCOSE: 134 MG/DL (ref 70–110)

## 2024-01-03 PROCEDURE — 94664 DEMO&/EVAL PT USE INHALER: CPT

## 2024-01-03 PROCEDURE — 25000003 PHARM REV CODE 250: Performed by: STUDENT IN AN ORGANIZED HEALTH CARE EDUCATION/TRAINING PROGRAM

## 2024-01-03 PROCEDURE — 25000003 PHARM REV CODE 250: Performed by: NURSE PRACTITIONER

## 2024-01-03 PROCEDURE — 97535 SELF CARE MNGMENT TRAINING: CPT | Mod: CO

## 2024-01-03 PROCEDURE — 94640 AIRWAY INHALATION TREATMENT: CPT

## 2024-01-03 PROCEDURE — 97116 GAIT TRAINING THERAPY: CPT | Mod: CQ

## 2024-01-03 PROCEDURE — 99232 SBSQ HOSP IP/OBS MODERATE 35: CPT | Mod: GC,,, | Performed by: INTERNAL MEDICINE

## 2024-01-03 PROCEDURE — 25000242 PHARM REV CODE 250 ALT 637 W/ HCPCS: Performed by: STUDENT IN AN ORGANIZED HEALTH CARE EDUCATION/TRAINING PROGRAM

## 2024-01-03 PROCEDURE — 94761 N-INVAS EAR/PLS OXIMETRY MLT: CPT

## 2024-01-03 PROCEDURE — 99900035 HC TECH TIME PER 15 MIN (STAT)

## 2024-01-03 PROCEDURE — 63600175 PHARM REV CODE 636 W HCPCS: Performed by: NURSE PRACTITIONER

## 2024-01-03 RX ORDER — MIRTAZAPINE 15 MG/1
15 TABLET, FILM COATED ORAL NIGHTLY
Qty: 90 TABLET | Refills: 0 | Status: SHIPPED | OUTPATIENT
Start: 2024-01-03 | End: 2024-01-23 | Stop reason: SDUPTHER

## 2024-01-03 RX ORDER — GUAIFENESIN/DEXTROMETHORPHAN 100-10MG/5
10 SYRUP ORAL EVERY 6 HOURS PRN
Qty: 354 ML | Refills: 0 | Status: SHIPPED | OUTPATIENT
Start: 2024-01-03

## 2024-01-03 RX ORDER — AMLODIPINE BESYLATE 2.5 MG/1
5 TABLET ORAL DAILY
Start: 2024-01-03 | End: 2025-01-02

## 2024-01-03 RX ADMIN — SODIUM CHLORIDE SOLN NEBU 3% 4 ML: 3 NEBU SOLN at 07:01

## 2024-01-03 RX ADMIN — LEVOTHYROXINE SODIUM 75 MCG: 75 TABLET ORAL at 05:01

## 2024-01-03 RX ADMIN — BENZONATATE 100 MG: 100 CAPSULE, LIQUID FILLED ORAL at 11:01

## 2024-01-03 RX ADMIN — FLUTICASONE FUROATE AND VILANTEROL TRIFENATATE 1 PUFF: 200; 25 POWDER RESPIRATORY (INHALATION) at 07:01

## 2024-01-03 RX ADMIN — GLYCOPYRROLATE 2 MG: 1 TABLET ORAL at 08:01

## 2024-01-03 RX ADMIN — PANTOPRAZOLE SODIUM 40 MG: 40 TABLET, DELAYED RELEASE ORAL at 08:01

## 2024-01-03 RX ADMIN — AMLODIPINE BESYLATE 5 MG: 5 TABLET ORAL at 08:01

## 2024-01-03 RX ADMIN — MEMANTINE HYDROCHLORIDE 10 MG: 5 TABLET ORAL at 08:01

## 2024-01-03 RX ADMIN — HYDROCHLOROTHIAZIDE 25 MG: 25 TABLET ORAL at 08:01

## 2024-01-03 RX ADMIN — GUAIFENESIN AND DEXTROMETHORPHAN 10 ML: 100; 10 SYRUP ORAL at 05:01

## 2024-01-03 RX ADMIN — PREDNISONE 40 MG: 20 TABLET ORAL at 08:01

## 2024-01-03 RX ADMIN — APIXABAN 2.5 MG: 2.5 TABLET, FILM COATED ORAL at 08:01

## 2024-01-03 RX ADMIN — ATORVASTATIN CALCIUM 40 MG: 20 TABLET, FILM COATED ORAL at 08:01

## 2024-01-03 RX ADMIN — GUAIFENESIN AND DEXTROMETHORPHAN 10 ML: 100; 10 SYRUP ORAL at 11:01

## 2024-01-03 RX ADMIN — SENNOSIDES AND DOCUSATE SODIUM 1 TABLET: 8.6; 5 TABLET ORAL at 08:01

## 2024-01-03 RX ADMIN — GUAIFENESIN AND DEXTROMETHORPHAN 10 ML: 100; 10 SYRUP ORAL at 06:01

## 2024-01-03 RX ADMIN — BENZONATATE 100 MG: 100 CAPSULE, LIQUID FILLED ORAL at 06:01

## 2024-01-03 RX ADMIN — BENZONATATE 100 MG: 100 CAPSULE, LIQUID FILLED ORAL at 05:01

## 2024-01-03 NOTE — PLAN OF CARE
[facility] ([place])      HOME HEALTH ORDERS  FACE TO FACE ENCOUNTER    Patient Name: Debbie Ding  YOB: 1932    PCP: Carolyn Mejia MD   PCP Address: [address] / [place] [address]  PCP Phone Number: [phone]  PCP Fax: [phone]    Encounter Date: 12/29/23    Admit to Home Health    Diagnoses:  Active Hospital Problems    Diagnosis  POA    *Acute hypoxemic respiratory failure [J96.01]  Yes     Priority: 1 - High    Bronchiectasis without complication [J47.9]  Yes     Priority: 2     Esophageal dysmotility [K22.4]  Yes     Priority: 3     Vocal cord dysfunction [J38.3]  Yes     Priority: 4     Chronic cough [R05.3]  Yes    Esophageal stricture [K22.2]  Yes    History of deep venous thrombosis [Z86.718]  Not Applicable     4/2019: DVT.      Type 2 diabetes mellitus with stage 3a chronic kidney disease, without long-term current use of insulin [E11.22, N18.31]  Yes     2019: Diagnosed. Complications: CKD3a. Medications: Diet.      Stage 3a chronic kidney disease [N18.31]  Yes     1/29/2020: BUN/crea 12/1.2. CrCl 47.      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        Resolved Hospital Problems    Diagnosis Date Resolved POA    Hypokalemia [E87.6] 12/30/2023 Yes       Follow Up Appointments:  Future Appointments   Date Time Provider Department Center   1/11/2024 10:00 AM Peter Joe PA-C [facility] ORTHO Jeffrey Wu Ort   3/21/2024 11:00 AM Benedicto Love MD PhD [facility] PERVAS Jeffrey Wu   4/22/2024  9:30 AM LAB, APPOINTMENT [facility] WILLAM [facility] LAB IM Jeffrey Wu PCW   4/24/2024 10:30 AM Carolyn Mejia MD [facility] IM [facility] PCLIVAN       Allergies:  Review of patient's allergies indicates:   Allergen Reactions    Melatonin      Other reaction(s): Other (See Comments)  Sleep Walks and has unnatural dreams    Talwin compound Hives    Talwin [pentazocine lactate] Hallucinations     Trazodone Other (See Comments)     Nightmares/sleep walk      Bentyl [dicyclomine] Anxiety       Medications: Review discharge medications with patient and family and provide education.    Current Facility-Administered Medications   Medication Dose Route Frequency Provider Last Rate Last Admin    acetaminophen tablet 650 mg  650 mg Oral Q8H PRN Angelica Lee NP   650 mg at 12/31/23 1409    albuterol-ipratropium 2.5 mg-0.5 mg/3 mL nebulizer solution 3 mL  3 mL Nebulization Q6H PRN Samm Moran MD        aluminum-magnesium hydroxide-simethicone 200-200-20 mg/5 mL suspension 30 mL  30 mL Oral QID PRN Angelica Lee NP        amLODIPine tablet 5 mg  5 mg Oral Daily Samm Moran MD   5 mg at 01/03/24 0840    apixaban tablet 2.5 mg  2.5 mg Oral Daily Angelica Lee NP   2.5 mg at 01/03/24 0840    atorvastatin tablet 40 mg  40 mg Oral Daily Angelica Lee NP   40 mg at 01/03/24 0839    benzonatate capsule 100 mg  100 mg Oral Q6H Samm Moran MD   100 mg at 01/03/24 1145    dextromethorphan-guaiFENesin  mg/5 ml liquid 10 mL  10 mL Oral Q6H Stefani Andrew MD   10 mL at 01/03/24 1145    dextrose 10% bolus 125 mL 125 mL  12.5 g Intravenous PRN Angelica Lee, NP        dextrose 10% bolus 125 mL 125 mL  12.5 g Intravenous PRN Angelica Lee, NP        dextrose 10% bolus 250 mL 250 mL  25 g Intravenous PRN Angelica Lee, NP        dextrose 10% bolus 250 mL 250 mL  25 g Intravenous PRN Angelica Lee, SOFIA        ez paque ba sulfate 35 mL  35 mL Oral ONCE PRN Kirstin Bansal MD        fluticasone furoate-vilanteroL 200-25 mcg/dose diskus inhaler 1 puff  1 puff Inhalation Daily Samm Moran MD   1 puff at 01/03/24 0751    glucagon (human recombinant) injection 1 mg  1 mg Intramuscular PRN Angelica Lee, NP        glucagon (human recombinant) injection 1 mg  1 mg Intramuscular PRN Angelica Lee, NP        glucose chewable tablet 16 g  16 g Oral PRN  Angelica Lee, NP        glucose chewable tablet 16 g  16 g Oral PRN Angelica Lee, NP        glucose chewable tablet 24 g  24 g Oral PRN Angelica Lee, NP        glucose chewable tablet 24 g  24 g Oral PRN Angelica Lee, NP        glycopyrrolate tablet 2 mg  2 mg Oral BID Samm Moran MD   2 mg at 01/03/24 0839    hydroCHLOROthiazide tablet 25 mg  25 mg Oral Daily Angelica Lee, NP   25 mg at 01/03/24 0839    insulin aspart U-100 pen 0-5 Units  0-5 Units Subcutaneous QID (AC + HS) PRN Angelica Lee, NP        levothyroxine tablet 75 mcg  75 mcg Oral Before breakfast Angelica Lee, NP   75 mcg at 01/03/24 0546    memantine tablet 10 mg  10 mg Oral Daily Angelica Lee, NP   10 mg at 01/03/24 0840    mirtazapine tablet 15 mg  15 mg Oral QHS Angelica Lee, NP   15 mg at 01/02/24 2049    naloxone 0.4 mg/mL injection 0.02 mg  0.02 mg Intravenous PRN Angelica Lee, NP        ondansetron injection 4 mg  4 mg Intravenous Q6H PRN Angelica Lee, NP        pantoprazole EC tablet 40 mg  40 mg Oral Daily Angelica Lee, NP   40 mg at 01/03/24 0839    predniSONE tablet 40 mg  40 mg Oral Daily Angelica Lee, NP   40 mg at 01/03/24 0840    prochlorperazine injection Soln 5 mg  5 mg Intravenous Q6H PRN Angelica Lee, NP        senna-docusate 8.6-50 mg per tablet 1 tablet  1 tablet Oral BID Angelica Lee, NP   1 tablet at 01/03/24 0839    simethicone chewable tablet 80 mg  1 tablet Oral QID PRN Angelica Lee, NP        sodium chloride 0.9% flush 10 mL  10 mL Intravenous Q8H PRN Angelica Lee, SOFIA        sodium chloride 3% nebulizer solution 4 mL  4 mL Nebulization BID Stefani Andrew MD   4 mL at 01/03/24 0751     Facility-Administered Medications Ordered in Other Encounters   Medication Dose Route Frequency Provider Last Rate Last Admin    midazolam (VERSED) 1 mg/mL injection 0.5 mg  0.5 mg Intravenous PRN [place] Hui MD          Current Discharge Medication List        START taking these medications    Details   dextromethorphan-guaiFENesin  mg/5 ml (ROBITUSSIN-DM)  mg/5 mL liquid Take 10 mLs by mouth every 6 (six) hours as needed (Cough).  Qty: 354 mL, Refills: 0           CONTINUE these medications which have CHANGED    Details   amLODIPine (NORVASC) 2.5 MG tablet Take 2 tablets (5 mg total) by mouth once daily.    Comments: .  Associated Diagnoses: Hypertension, essential      benzonatate (TESSALON) 100 MG capsule Take 1 capsule (100 mg total) by mouth 3 (three) times daily as needed for Cough.  Qty: 30 capsule, Refills: 1      mirtazapine (REMERON) 15 MG tablet Take 1 tablet (15 mg total) by mouth every evening.  Qty: 90 tablet, Refills: 0           CONTINUE these medications which have NOT CHANGED    Details   acetaminophen (TYLENOL) 500 MG tablet Take 1-2 tablets (500-1,000 mg total) by mouth 3 (three) times daily as needed for Pain.  Refills: 0      albuterol (PROVENTIL) 2.5 mg /3 mL (0.083 %) nebulizer solution Take 3 mLs (2.5 mg total) by nebulization every 6 (six) hours as needed for Wheezing. Rescue  Qty: 90 each, Refills: 3    Associated Diagnoses: Chronic asthma without complication, unspecified asthma severity, unspecified whether persistent      albuterol (PROVENTIL/VENTOLIN HFA) 90 mcg/actuation inhaler Inhale 2 puffs into the lungs every 6 (six) hours as needed.      atorvastatin (LIPITOR) 40 MG tablet Take 1 tablet (40 mg total) by mouth once daily.  Qty: 90 tablet, Refills: 1    Associated Diagnoses: Hyperlipidemia, unspecified hyperlipidemia type      ciclopirox 1 % shampoo Apply topically.      donepeziL (ARICEPT) 10 MG tablet Take 1 tablet by mouth every evening.      doxepin (SINEQUAN) 10 MG capsule TAKE 1 CAPSULE AT BEDTIME  FOR ITCHING  Qty: 90 capsule, Refills: 3      ELIQUIS 2.5 mg Tab TAKE 1 TABLET DAILY  Qty: 90 tablet, Refills: 3      esomeprazole (NEXIUM) 40 MG capsule TAKE 1 CAPSULE TWICE  DAILY  Qty: 180 capsule, Refills: 3    Associated Diagnoses: Gastroesophageal reflux disease, unspecified whether esophagitis present      famotidine (PEPCID) 20 MG tablet Take 1 tablet (20 mg total) by mouth daily as needed for Heartburn (breakthrough heartburn and acid reflux not controlled with Nexium).  Qty: 90 tablet, Refills: 3    Associated Diagnoses: Gastroesophageal reflux disease, unspecified whether esophagitis present      fluocinonide (LIDEX) 0.05 % external solution AAA scalp qday - bid prn pruritus  Qty: 60 mL, Refills: 3    Associated Diagnoses: Pruritus      fluticasone-salmeterol 230-21 mcg/dose (ADVAIR HFA) 230-21 mcg/actuation HFAA inhaler Inhale 2 puffs into the lungs 2 (two) times daily. Controller  Qty: 36 g, Refills: 3    Associated Diagnoses: Chronic asthma without complication, unspecified asthma severity, unspecified whether persistent      gabapentin (NEURONTIN) 100 MG capsule Take 100 mg by mouth.      GEMTESA 75 mg Tab Take 1 tablet by mouth twice a week.      glycopyrrolate (ROBINUL) 2 MG Tab TAKE 1 TABLET TWICE A DAY  Qty: 180 tablet, Refills: 3      levothyroxine (SYNTHROID) 75 MCG tablet Take 1 tablet (75 mcg total) by mouth before breakfast.  Qty: 30 tablet, Refills: 8    Comments: She is requesting trade Synthroid, not generic  Associated Diagnoses: Hypothyroidism, adult      meclizine (ANTIVERT) 25 mg tablet TAKE 1 TABLET TWICE A DAY  AS NEEDED FOR DIZZINESS  Qty: 180 tablet, Refills: 1      memantine (NAMENDA) 10 MG Tab Take 1 tablet (10 mg total) by mouth once daily.  Qty: 180 tablet, Refills: 3           STOP taking these medications       hydroCHLOROthiazide (HYDRODIURIL) 25 MG tablet Comments:   Reason for Stopping:                 I have seen and examined this patient within the last 30 days. My clinical findings that support the need for the home health skilled services and home bound status are the following:no   Weakness/numbness causing balance and gait disturbance due  to Weakness/Debility making it taxing to leave home.     Diet:   regular diet    Referrals/ Consults  Physical Therapy to evaluate and treat. Evaluate for home safety and equipment needs; Establish/upgrade home exercise program. Perform / instruct on therapeutic exercises, gait training, transfer training, and Range of Motion.  Occupational Therapy to evaluate and treat. Evaluate home environment for safety and equipment needs. Perform/Instruct on transfers, ADL training, ROM, and therapeutic exercises.   to evaluate for community resources/long-range planning.  Aide to provide assistance with personal care, ADLs, and vital signs.    Activities:   activity as tolerated    Nursing:   Agency to admit patient within 24 hours of hospital discharge unless specified on physician order or at patient request    SN to complete comprehensive assessment including routine vital signs. Instruct on disease process and s/s of complications to report to MD. Review/verify medication list sent home with the patient at time of discharge  and instruct patient/caregiver as needed. Frequency may be adjusted depending on start of care date.     Skilled nurse to perform up to 3 visits PRN for symptoms related to diagnosis    Notify MD if SBP > 160 or < 90; DBP > 90 or < 50; HR > 120 or < 50; Temp > 101; O2 < 88%    Ok to schedule additional visits based on staff availability and patient request on consecutive days within the home health episode.    When multiple disciplines ordered:    Start of Care occurs on Sunday - Wednesday schedule remaining discipline evaluations as ordered on separate consecutive days following the start of care.    Thursday SOC -schedule subsequent evaluations Friday and Monday the following week.     Friday - Saturday SOC - schedule subsequent discipline evaluations on consecutive days starting Monday of the following week.    For all post-discharge communication and subsequent orders please contact  patient's primary care physician.    I certify that this patient is confined to her home and needs physical therapy and occupational therapy.

## 2024-01-03 NOTE — PLAN OF CARE
SW met with patient to discuss discharge. Patient will discharge home with [facility]. Patient appointments have been scheduled and added to AVS. Patient pcp office will call patient with appt. Patient family to transport patient home. All CM needs have been met.    01/03/24 1533   Final Note   Assessment Type Final Discharge Note   Anticipated Discharge Disposition Home-Health   Hospital Resources/Appts/Education Provided Provided patient/caregiver with written discharge plan information;Appointments scheduled and added to AVS   Post-Acute Status   Post-Acute Authorization Home Health   Home Health Status Set-up Complete/Auth obtained   Discharge Delays None known at this time     [facility]  Discharge Final Note    Primary Care Provider: Carolyn Mejia MD    Expected Discharge Date: 1/3/2024    Final Discharge Note (most recent)       Final Note - 01/03/24 1533          Final Note    Assessment Type Final Discharge Note (P)      Anticipated Discharge Disposition Home-Health Care Svc (P)      Hospital Resources/Appts/Education Provided Provided patient/caregiver with written discharge plan information;Appointments scheduled and added to AVS (P)         Post-Acute Status    Post-Acute Authorization Home Health (P)      Home Health Status Set-up Complete/Auth obtained (P)      Discharge Delays None known at this time (P)                      Important Message from Medicare  Important Message from Medicare regarding Discharge Appeal Rights: Given to patient/caregiver, Explained to patient/caregiver, Signed/date by patient/caregiver     Date IMM was signed: 01/02/24  Time IMM was signed: 0840    Contact Info       Carolyn Mejia MD   Specialty: Internal Medicine   Relationship: PCP - General    [address]  [facility] [address]   Phone: [phone]       Next Steps: Schedule an appointment as soon as possible for a visit    Instructions: Please call PCP office to schedule patient hospital  follow up.    Saturnino Suazo MD   Specialty: Pulmonary Disease    [facility]  [address] [place] [id number]   Phone: [phone]       Next Steps: Follow up on 2/6/2024    Instructions: 1:00PM

## 2024-01-03 NOTE — PT/OT/SLP PROGRESS
Physical Therapy Treatment    Patient Name:  Debbie Ding   MRN:  [id number]    Recommendations:     Discharge Recommendations: Low Intensity Therapy  Discharge Equipment Recommendations: bedside commode  Barriers to discharge: None    Assessment:     Debbie Ding is a 89 y.o. female admitted with a medical diagnosis of Acute hypoxemic respiratory failure.  She presents with the following impairments/functional limitations: weakness, gait instability, impaired endurance, impaired functional mobility, impaired self care skills, decreased safety awareness.    Supine>sit with SBA  Sit>stand with no AD and SBA  Amb 300' with no AD and SBA, pt reaching for wall support (R hand) for majority of bout, pt with decreased stability but no LoB  Pt could benefit from AD but prefers not to use one; would like a more lightweight rollator that what this hospitals provides  Rec Low Intensity Therapy    Rehab Prognosis: Good; patient would benefit from acute skilled PT services to address these deficits and reach maximum level of function.    Recent Surgery: * No surgery found *      Plan:     During this hospitalization, patient to be seen 3 x/week to address the identified rehab impairments via gait training, therapeutic activities, therapeutic exercises and progress toward the following goals:    Plan of Care Expires:  01/29/24    Subjective     Chief Complaint: coughing  Patient/Family Comments/goals: They might send me home today  Pain/Comfort:  Pain Rating 1: 0/10  Pain Rating Post-Intervention 1: 0/10      Objective:     Communicated with nurse Stack prior to session.  Patient found HOB elevated with peripheral IV, PureWick upon PT entry to room.     General Precautions: Standard, fall, diabetic  Orthopedic Precautions: N/A  Braces: N/A  Respiratory Status: Room air     Functional Mobility:  Bed Mobility:     Supine to Sit: stand by assistance  Transfers:     Sit to Stand:  stand by assistance with no AD  Gait: 300' with  no AD and SBA, pt reaching for wall support (R hand) for majority of bout, pt with decreased stability but no LoB      AM-PAC 6 CLICK MOBILITY  Turning over in bed (including adjusting bedclothes, sheets and blankets)?: 4  Sitting down on and standing up from a chair with arms (e.g., wheelchair, bedside commode, etc.): 3  Moving from lying on back to sitting on the side of the bed?: 4  Moving to and from a bed to a chair (including a wheelchair)?: 3  Need to walk in hospital room?: 3  Climbing 3-5 steps with a railing?: 3  Basic Mobility Total Score: 20       Treatment & Education:  Pt donned briefs with SBA before ambulation  Gait training as noted    Patient left up in chair with all lines intact, call button in reach, and nurse Seda notified..    GOALS:   Multidisciplinary Problems       Physical Therapy Goals          Problem: Physical Therapy    Goal Priority Disciplines Outcome Goal Variances Interventions   Physical Therapy Goal     PT, PT/OT Ongoing, Progressing     Description: Goals to be met by: 2023    Patient will increase functional independence with mobility by performin. Sit<>stand with mod (I) with LRAD.  2. Gait x 400 feet with mod (I) with LRAD.  3. Ascend/descend 15 step(s) with least restrictive assistive device and hand held assist with no railing .                           Time Tracking:     PT Received On: 24  PT Start Time: 1349     PT Stop Time: 1412  PT Total Time (min): 23 min     Billable Minutes: Gait Training 23    Treatment Type: Treatment  PT/PTA: PTA     Number of PTA visits since last PT visit: 2     2024

## 2024-01-03 NOTE — DISCHARGE SUMMARY
[facility] Surg [facility] Medicine  Discharge Summary      Patient Name: Debbie Ding  MRN: [id number]  GLADYS: [id number]  Patient Class: IP- Inpatient  Admission Date: 12/29/2023  Hospital Length of Stay: 5 days  Discharge Date and Time:  01/03/2024 4:49 PM  Attending Physician: Kirstin Bansal MD   Discharging Provider: Kirstin Bansal MD  Primary Care Provider: Carolyn Mejia MD    Primary Care Team: Networked reference to record PCT     HPI:   HPI by Angelica Lee NP:  Debbie Ding is a 89-year-old female with a past medical history of asthma, bilateral PE, GERD, hypercholesteremia, fibromyalgia and thyroid disease who presents with worsening cough and shortness of breath with exertion.  Patient was initially seen at urgent care and found to be hypoxic to 91% and referred patient to the ED. Patient states she was diagnosed with bronchitis 3 months ago and has had a persistent cough since then.  She reports that her cough has worsened and she has worsening shortness of breath over the past few days.  Patient states using her albuterol and OTC Robitussin with minimal relief.    Upon arrival to the ED, patient found to be hypoxic with 91% and was placed on 2 L O2 via NC with improvement.  Lab work revealed hypokalemia 3.3 and .  No leukocytosis and troponin negative.  Lactic acid 1.3 and procalcitonin 0.09.  UA negative for UTI.  She denies history of CHF with most recent BNP 36 two years ago.  She denies lower extremity swelling and states her cough is nonproductive.  Patient further denies chest pain, fever, chills, nausea and vomiting.  Chest x-ray revealed patchy opacities to the lungs could be related to atelectasis, aspiration or pneumonia.  Patient received IV magnesium, methylprednisone and albuterol nebulizer in the ED with some relief.  Patient referred to Hospital Medicine and will be admitted for further evaluation and management          Hospital Course:   Patient  complained of significant cough.  Antitussives ordered.  Her last asthma exacerbation was years ago and coughing is typical of her exacerbations.  Due to the CXR findings, history of vocal cord dysfunction and history of dilations for strictures in the esophagus there was a concern for chronic aspiration.  Patient was evaluated by speech and did not display overt signs of aspiration during evaluation although there was clearly vocal cord dysfunction.  Patient was given education on aspiration precautions.      She continued to have severe non productive hacking cough that had been worsening for about 4-5 months.  She reports that she had already been to another ED and her outpatient pulmonologist regarding this.  Both time she had been discharged with steroids which have not seemed to help.  She is very frustrated by this as it is interfering with her day-to-day activities.  After shared discussion Pulmonary was consulted, although we did discuss the limitations of inpatient workup.    Patient was seen by Pulmonary, based on CT chest appears to have bronchiectasis.  Cough appeared to be secondary to chronic aspiration.  They recommended checking a respiratory viral panel, TTE, induced sputum for culture and AFB (NTM), IgE for possible ABPA, continue prednisone (complete 5 days) and hypertonic saline nebs.  Echo showed a normal EF, grade I diastolic dysfunction and mild pulmonary HTN.  Patient was unable to tolerate the induced sputum.  She completed a course of prednisone while she was here without much change in her symptoms.      GI was consulted to evaluate and ordered a barium esophagram.  Results showed no evidence of a stricture, although there was moderate esophageal dysmotility and a small hiatal hernia.  The cough did not improve until she was started on Robitussin DM, which was remarkably helpful, and she felt well enough to be discharged home on it.  She will need close follow up with her pulmonologist.   Home health was resumed.     Goals of Care Treatment Preferences:  Code Status: Full Code      Consults:   Consults (From admission, onward)          Status Ordering Provider     Inpatient consult to Gastroenterology  Once        Provider:  (Not yet assigned)    Completed JOHNSON COKER     Inpatient consult to Pulmonology  Once        Provider:  Stefani Andrew MD    Completed JOHNSON COKER              Final Active Diagnoses:    Diagnosis Date Noted POA    PRINCIPAL PROBLEM:  Acute hypoxemic respiratory failure [J96.01] 10/10/2021 Yes    Bronchiectasis without complication [J47.9] 01/03/2024 Yes    Esophageal dysmotility [K22.4] 01/03/2024 Yes    Vocal cord dysfunction [J38.3] 12/31/2023 Yes    Chronic cough [R05.3] 01/01/2024 Yes    History of deep venous thrombosis [Z86.718] 09/28/2020 Not Applicable    Type 2 diabetes mellitus with stage 3a chronic kidney disease, without long-term current use of insulin [E11.22, N18.31]  Yes    Stage 3a chronic kidney disease [N18.31] 10/09/2019 Yes    Hyperhidrosis [R61] 07/28/2019 Yes    GERD (gastroesophageal reflux disease) [K21.9] 09/21/2015 Yes    Primary hypertension [I10] 07/02/2012 Yes      Problems Resolved During this Admission:    Diagnosis Date Noted Date Resolved POA    Hypokalemia [E87.6] 04/28/2019 12/30/2023 Yes       Discharged Condition: stable    Disposition: Home-Health Care [facility]    Follow Up:   Follow-up Information       Carolyn Mejia MD. Schedule an appointment as soon as possible for a visit.    Specialty: Internal Medicine  Why: Please call PCP office to schedule patient hospital follow up.  Contact information:  [address]  [facility] [address]  [phone]               Saturnino Suazo MD Follow up on 2/6/2024.    Specialty: Pulmonary Disease  Why: 1:00PM  Contact information:  [address]   [address]  [facility] [address] [phone]                           Patient Instructions:      COMMODE FOR HOME USE  "    Order Specific Question Answer Comments   Type: Standard    Height: 5' 5" (1.651 m)    Weight: 55.8 kg (123 lb)    Length of need (1-99 months): 99      WALKER FOR HOME USE     Order Specific Question Answer Comments   Type of Walker: Rollator with brakes and/or seat    With wheels? Yes    Height: 5' 5" (1.651 m)    Weight: 59 kg (130 lb)    Length of need (1-99 months): 99    Does patient have medical equipment at home? cane, straight    Does patient have medical equipment at home? walker, standard    Please check all that apply: Patient's condition impairs ambulation.    Please check all that apply: Patient is unable to safely ambulate without equipment.      Ambulatory referral/consult to Outpatient Case Management   Referral Priority: Routine Referral Type: Consultation   Referral Reason: Specialty Services Required   Number of Visits Requested: 1     Diet Adult Regular     Activity as tolerated         Medications:  Reconciled Home Medications:      Medication List        START taking these medications      dextromethorphan-guaiFENesin  mg/5 ml  mg/5 mL liquid  Commonly known as: ROBITUSSIN-DM  Take 10 mLs by mouth every 6 (six) hours as needed (Cough).            CHANGE how you take these medications      benzonatate 100 MG capsule  Commonly known as: TESSALON  Take 1 capsule (100 mg total) by mouth 3 (three) times daily as needed for Cough.  What changed: See the new instructions.            CONTINUE taking these medications      acetaminophen 500 MG tablet  Commonly known as: TYLENOL  Take 1-2 tablets (500-1,000 mg total) by mouth 3 (three) times daily as needed for Pain.     * albuterol 90 mcg/actuation inhaler  Commonly known as: PROVENTIL/VENTOLIN HFA  Inhale 2 puffs into the lungs every 6 (six) hours as needed.     * albuterol 2.5 mg /3 mL (0.083 %) nebulizer solution  Commonly known as: PROVENTIL  Take 3 mLs (2.5 mg total) by nebulization every 6 (six) hours as needed for Wheezing. " Rescue     amLODIPine 2.5 MG tablet  Commonly known as: NORVASC  Take 2 tablets (5 mg total) by mouth once daily.     atorvastatin 40 MG tablet  Commonly known as: LIPITOR  Take 1 tablet (40 mg total) by mouth once daily.     ciclopirox 1 % shampoo  Apply topically.     donepeziL 10 MG tablet  Commonly known as: ARICEPT  Take 1 tablet by mouth every evening.     doxepin 10 MG capsule  Commonly known as: SINEQUAN  TAKE 1 CAPSULE AT BEDTIME  FOR ITCHING     ELIQUIS 2.5 mg Tab  Generic drug: apixaban  TAKE 1 TABLET DAILY     esomeprazole 40 MG capsule  Commonly known as: NEXIUM  TAKE 1 CAPSULE TWICE DAILY     famotidine 20 MG tablet  Commonly known as: PEPCID  Take 1 tablet (20 mg total) by mouth daily as needed for Heartburn (breakthrough heartburn and acid reflux not controlled with Nexium).     fluocinonide 0.05 % external solution  Commonly known as: LIDEX  AAA scalp qday - bid prn pruritus     fluticasone-salmeterol 230-21 mcg/dose 230-21 mcg/actuation Hfaa inhaler  Commonly known as: ADVAIR HFA  Inhale 2 puffs into the lungs 2 (two) times daily. Controller     gabapentin 100 MG capsule  Commonly known as: NEURONTIN  Take 100 mg by mouth.     GEMTESA 75 mg Tab  Generic drug: vibegron  Take 1 tablet by mouth twice a week.     glycopyrrolate 2 MG Tab  Commonly known as: ROBINUL  TAKE 1 TABLET TWICE A DAY     levothyroxine 75 MCG tablet  Commonly known as: SYNTHROID  Take 1 tablet (75 mcg total) by mouth before breakfast.     meclizine 25 mg tablet  Commonly known as: ANTIVERT  TAKE 1 TABLET TWICE A DAY  AS NEEDED FOR DIZZINESS     memantine 10 MG Tab  Commonly known as: NAMENDA  Take 1 tablet (10 mg total) by mouth once daily.     mirtazapine 15 MG tablet  Commonly known as: REMERON  Take 1 tablet (15 mg total) by mouth every evening.           * This list has 2 medication(s) that are the same as other medications prescribed for you. Read the directions carefully, and ask your doctor or other care provider to review  them with you.                    Time spent on the discharge of patient: >30 minutes         Kirstin South MD  Department of Hospital Medicine  [facility] ([employer])

## 2024-01-03 NOTE — SUBJECTIVE & OBJECTIVE
Interval History:   Patient discloses cough is much better with the cough medicine. Patient discloses episode of abnormal movement in her retrosternal to her throat this morning, felt like a plastic was there moving.      Objective:     Vital Signs (Most Recent):  Temp: 98.8 °F (37.1 °C) (01/03/24 1153)  Pulse: 79 (01/03/24 1153)  Resp: 18 (01/03/24 1153)  BP: (!) 150/66 (01/03/24 1153)  SpO2: (!) 93 % (01/03/24 1153) Vital Signs (24h Range):  Temp:  [97.7 °F (36.5 °C)-98.8 °F (37.1 °C)] 98.8 °F (37.1 °C)  Pulse:  [73-81] 79  Resp:  [16-20] 18  SpO2:  [92 %-96 %] 93 %  BP: (120-172)/(64-93) 150/66     Weight: 55.8 kg (123 lb)  Body mass index is 20.47 kg/m².      Intake/Output Summary (Last 24 hours) at 1/3/2024 1238  Last data filed at 1/3/2024 0844  Gross per 24 hour   Intake 670 ml   Output 620 ml   Net 50 ml        Physical Exam  Vitals and nursing note reviewed.      Constitutional:       General: She is not in acute distress.     Appearance: Normal appearance. She is not ill-appearing.         HENT:      Head: Normocephalic and atraumatic.      Nose: No congestion.   Eyes:      Conjunctiva/sclera: Conjunctivae normal.   Cardiovascular:      Rate and Rhythm: Normal rate and regular rhythm.      Pulses: Normal pulses.      Heart sounds: No murmur heard.  Pulmonary:      Effort: Pulmonary effort is normal. No respiratory distress.      Breath sounds:  No wheezing or rhonchi.   Abdominal:      General: There is no distension.      Palpations: Abdomen is soft.   Musculoskeletal:      Right lower leg: No edema.      Left lower leg: No edema.   Neurological:      General: No focal deficit present.      Mental Status: She is alert and oriented to person, place, and time.   Psychiatric:         Mood and Affect: Mood normal.         Behavior: Behavior normal.      Review of Systems    Vents:  Oxygen Concentration (%): 28 (12/31/23 0432)    Lines/Drains/Airways       Drain  Duration             Female External Urinary  Catheter w/ Suction 12/29/23 1900 4 days              Peripheral Intravenous Line  Duration                  Peripheral IV - Single Lumen 12/29/23 1234 22 G Right Antecubital 5 days                    Significant Labs:    CBC/Anemia Profile:  Recent Labs   Lab 01/02/24  0446   WBC 8.47   HGB 12.4   HCT 38.0      MCV 89   RDW 15.6*        Chemistries:  Recent Labs   Lab 01/02/24  0446      K 3.8      CO2 27   BUN 29   CREATININE 1.3   CALCIUM 9.7       All pertinent labs within the past 24 hours have been reviewed.    Significant Imaging:  I have reviewed all pertinent imaging results/findings within the past 24 hours.    CLINICAL HISTORY:  dysphagia;     TECHNIQUE:  Contrast material: Barium sulfate suspension     Fluoroscopy time: 1 minute     COMPARISON:  05/14/2021     FINDINGS:  Swallowing: Normal.     Esophagus: Esophagus is mildly distended; GE junction is widely patent.  Multiple non propulsive tertiary wave contractions with contrast stasis in the upper thoracic esophagus.     Gastroesophageal reflux: None observed.     Other findings: Small hiatal hernia.     Impression:     Moderate esophageal dysmotility resulting in contrast stasis in the upper thoracic esophagus.     Small hiatal hernia with no gastroesophageal reflux witnessed.        Electronically signed by: Ena Olson  Date:                                            01/03/2024  Time:                                           11:31           Exam Ended: 01/03/24 11:17 CST Last Resulted: 01/03/24 11:31 CST

## 2024-01-03 NOTE — PROGRESS NOTES
[facility] Surg ([place])  Pulmonology  Progress Note    Patient Name: Debbie Ding  MRN: [id number]  Admission Date: 12/29/2023  Hospital Length of Stay: 5 days  Code Status: Full Code  Attending Provider: Kirstin Bansal MD  Primary Care Provider: Carolyn Mejia MD   Principal Problem: Acute hypoxemic respiratory failure    Subjective:     Interval History:   Patient discloses cough is much better with the cough medicine. Patient discloses episode of abnormal movement in her retrosternal to her throat this morning, felt like a plastic was there moving.      Objective:     Vital Signs (Most Recent):  Temp: 98.8 °F (37.1 °C) (01/03/24 1153)  Pulse: 79 (01/03/24 1153)  Resp: 18 (01/03/24 1153)  BP: (!) 150/66 (01/03/24 1153)  SpO2: (!) 93 % (01/03/24 1153) Vital Signs (24h Range):  Temp:  [97.7 °F (36.5 °C)-98.8 °F (37.1 °C)] 98.8 °F (37.1 °C)  Pulse:  [73-81] 79  Resp:  [16-20] 18  SpO2:  [92 %-96 %] 93 %  BP: (120-172)/(64-93) 150/66     Weight: 55.8 kg (123 lb)  Body mass index is 20.47 kg/m².      Intake/Output Summary (Last 24 hours) at 1/3/2024 1238  Last data filed at 1/3/2024 0844  Gross per 24 hour   Intake 670 ml   Output 620 ml   Net 50 ml        Physical Exam  Vitals and nursing note reviewed.      Constitutional:       General: She is not in acute distress.     Appearance: Normal appearance. She is not ill-appearing.         HENT:      Head: Normocephalic and atraumatic.      Nose: No congestion.   Eyes:      Conjunctiva/sclera: Conjunctivae normal.   Cardiovascular:      Rate and Rhythm: Normal rate and regular rhythm.      Pulses: Normal pulses.      Heart sounds: No murmur heard.  Pulmonary:      Effort: Pulmonary effort is normal. No respiratory distress.      Breath sounds:  No wheezing or rhonchi.   Abdominal:      General: There is no distension.      Palpations: Abdomen is soft.   Musculoskeletal:      Right lower leg: No edema.      Left lower leg: No edema.   Neurological:       General: No focal deficit present.      Mental Status: She is alert and oriented to person, place, and time.   Psychiatric:         Mood and Affect: Mood normal.         Behavior: Behavior normal.      Review of Systems    Vents:  Oxygen Concentration (%): 28 (12/31/23 0432)    Lines/Drains/Airways       Drain  Duration             Female External Urinary Catheter w/ Suction 12/29/23 1900 4 days              Peripheral Intravenous Line  Duration                  Peripheral IV - Single Lumen 12/29/23 1234 22 G Right Antecubital 5 days                    Significant Labs:    CBC/Anemia Profile:  Recent Labs   Lab 01/02/24  0446   WBC 8.47   HGB 12.4   HCT 38.0      MCV 89   RDW 15.6*        Chemistries:  Recent Labs   Lab 01/02/24  0446      K 3.8      CO2 27   BUN 29   CREATININE 1.3   CALCIUM 9.7       All pertinent labs within the past 24 hours have been reviewed.    Significant Imaging:  I have reviewed all pertinent imaging results/findings within the past 24 hours.    CLINICAL HISTORY:  dysphagia;     TECHNIQUE:  Contrast material: Barium sulfate suspension     Fluoroscopy time: 1 minute     COMPARISON:  05/14/2021     FINDINGS:  Swallowing: Normal.     Esophagus: Esophagus is mildly distended; GE junction is widely patent.  Multiple non propulsive tertiary wave contractions with contrast stasis in the upper thoracic esophagus.     Gastroesophageal reflux: None observed.     Other findings: Small hiatal hernia.     Impression:     Moderate esophageal dysmotility resulting in contrast stasis in the upper thoracic esophagus.     Small hiatal hernia with no gastroesophageal reflux witnessed.        Electronically signed by: Ena Olson  Date:                                            01/03/2024  Time:                                           11:31           Exam Ended: 01/03/24 11:17 CST Last Resulted: 01/03/24 11:31 CST           Assessment/Plan:     Pulmonary  Chronic cough  Acute on  chronic cough  CT scan showed multifocal distal bronchial wall thickening, scattered heterogeneous patchy and consolidative ground-glass opacities throughout both lungs with upper lung predominance, atelectasis versus scarring about the right lower lobe atelectasis versus scarring involving the right greater than left lung bases, few scattered pulmonary micro nodules throughout both lungs and mild bronchiectasis.  The groundglass opacities appear to be new compared with the previous study.  FL esophagram of 01/03/2024 showed mildly distended esophagus, widely patent GE junction, small hiatal hernia and moderate esophageal dysmotility resulting in contrast stasis in the upper thoracic esophagus.  Possible differential for the acute worsening cough includes viral infection, pulmonary edema given elevated BNP vs HP given upper lobe predominance (less likely given absence of systemic symptoms)  TTE showed normal RV and LV systolic function, grade I diastolic dysfunction     Chronic cough likely due chronic aspiration on the background of esophageal dysmotility, bronchiectasis, vocal cord dysfunction (See Flexible Laryngeal Videostroscopy of 05/04/2022)  Patient was evaluated by SLP without overt signs of pharyngeal dysphagia or aspiration noted.     Check induced sputum for culture and AFB (NTM)  Continue prednisone (complete 5 days)  Continue home dose fluticasone-salmeterol  Continue duonebs as needed  The glycopyrorolate she takes for her Hyperhidrosis probably contributing to her ability to clear secretion. Hypertonic saline nebs with acapella  Continue dextromethorphan-guaifenesin to help with the cough  Aspiration precautions  PT/OT  Speech therapy  Follow up GI recommendations   Might need outpatient re-evaluation by ENT  Outpatient follow up with her pulmonologist     Pulmonary will sign off. Please call with questions.       Stefani Andrew MD  Pulmonology  [facility] ([place])

## 2024-01-03 NOTE — TELEPHONE ENCOUNTER
----- Message from Ifeoma Feliciano sent at 1/2/2024  2:06 PM CST -----  Would like to receive medical advice.  Brie Nelson calling from [facility] and she calling to schedule pt a follow up appt from being discharged from the hospital. Brie stated that appt needs to be  in 7 days. Brie refused the appt that's for 03.20.2024. Please call Brie at [phone]     Would they like a call back or a response via [facility]:  call    Additional information:  n/a

## 2024-01-03 NOTE — TELEPHONE ENCOUNTER
----- Message from Fernando Mullen sent at 1/3/2024  3:29 PM CST -----  Contact: self  [phone]  [facility] pt  requesting contact pt for hosp f/u.    Please call and advise

## 2024-01-03 NOTE — PT/OT/SLP PROGRESS
Occupational Therapy   Treatment    Name: Debbie Ding  MRN: [id number]  Admitting Diagnosis:  Acute hypoxemic respiratory failure       Recommendations:     Discharge Recommendations: Low Intensity Therapy  Discharge Equipment Recommendations:  bedside commode (defer AD to PT)  Barriers to discharge:  Inaccessible home environment (current functional level)    Assessment:     Debbie Ding is a 89 y.o. female with a medical diagnosis of Acute hypoxemic respiratory failure.  She presents with excessive coughing throughout session. Performance deficits affecting function are weakness, impaired endurance, impaired self care skills, impaired functional mobility, gait instability, decreased safety awareness. Patient with SBA/CGA for functional mobility, no AD of 100ft with occasional furniture cruising and HHA. SBA for grooming tasks standing at the sink. SBA for toilet transfer with grab bars - no voiding performed. Overall, tolerated session well and progressing toward goals.     Rehab Prognosis:  Good; patient would benefit from acute skilled OT services to address these deficits and reach maximum level of function.       Plan:     Patient to be seen 3 x/week to address the above listed problems via self-care/home management, therapeutic activities, therapeutic exercises  Plan of Care Expires: 01/13/24  Plan of Care Reviewed with: patient    Subjective     Chief Complaint: persistent coughing    Patient/Family Comments/goals: return to PLOF  Pain/Comfort:  Pain Rating 1: 0/10    Objective:     Communicated with: RN prior to session.  Patient found supine with peripheral IV upon OT entry to room.    General Precautions: Standard, fall, diabetic    Orthopedic Precautions:N/A  Braces: N/A  Respiratory Status: Room air     Occupational Performance:     Bed Mobility:    Supine <> Sit: CGA   Scoot EOB: SBA      Functional Mobility/Transfers:  Sit <> Stand: SBA   Functional Mobility: SBA/CGA, no AD for distance of 100ft  with occasional cruising and HHA   Toilet transfer: SBA, grab bars     Activities of Daily Living:  Grooming: SBA for face washing standing at the sink   LB Dressing: SBA to don/doff socks long sitting in bed with figure four technique   UB Dressing: SBA to don/doff robe while standing   Toileting: SBA for gown and robe management thought no voiding performed       [facility] 6 Click ADL: 19    Treatment & Education:  OT role, plan of care, progression of goals, importance of continued OOB activity, ADL/functional transfer and mobility retraining, discharge recommendation, call don't fall, safety precautions, fall prevention.     Patient left HOB elevated with all lines intact, call button in reach, and RN notified    GOALS:   Multidisciplinary Problems       Occupational Therapy Goals          Problem: Occupational Therapy    Goal Priority Disciplines Outcome Interventions   Occupational Therapy Goal     OT, PT/OT Ongoing, Progressing    Description: Goals to be met by: 1/13/2024     Patient will increase functional independence with ADLs by performing:    UE Dressing with Modified [place].  LE Dressing with Modified [place].  Grooming while standing at sink with Modified [place].  Toileting from bedside commode with Modified [place] for hygiene and clothing management.   Toilet transfer to bedside commode with Modified [place].                         Time Tracking:     OT Date of Treatment: 01/03/24  OT Start Time: 0919  OT Stop Time: 0932  OT Total Time (min): 13 min    Billable Minutes:Self Care/Home Management 13 min    OT/CELE: CELE     Number of CELE visits since last OT visit: 1    1/3/2024

## 2024-01-03 NOTE — PLAN OF CARE
Patient educated on discharge instructions and verbalized understanding.  All questions answered to patient's satisfaction.  IV removed without complication.   Patient to be transported off unit via wheelchair.  Problem: Adult Inpatient Plan of Care  Goal: Plan of Care Review  Outcome: Met  Goal: Patient-Specific Goal (Individualized)  Outcome: Met  Goal: Absence of Hospital-Acquired Illness or Injury  Outcome: Met  Goal: Optimal Comfort and Wellbeing  Outcome: Met  Goal: Readiness for Transition of Care  Outcome: Met     Problem: Diabetes Comorbidity  Goal: Blood Glucose Level Within Targeted Range  Outcome: Met     Problem: Infection  Goal: Absence of Infection Signs and Symptoms  Outcome: Met     Problem: Skin Injury Risk Increased  Goal: Skin Health and Integrity  Outcome: Met     Problem: Fall Injury Risk  Goal: Absence of Fall and Fall-Related Injury  Outcome: Met

## 2024-01-04 LAB
BACTERIA BLD CULT: NORMAL
BACTERIA BLD CULT: NORMAL
POCT GLUCOSE: 155 MG/DL (ref 70–110)

## 2024-01-04 NOTE — TELEPHONE ENCOUNTER
Returned pt call. Pt states she has been monitoring her pulse ox at home and it has been fluctuating from 85-96%, currently 95% on room air. When pt asked if she is feeling short of breath, pt states she is always SOB. Pt asking if PCP would order her at home oxygen. Pt has f/u with PCP on Wed, pt DC from hospital yesterday.

## 2024-01-04 NOTE — TELEPHONE ENCOUNTER
Returned call to Brie, no answer. Left message to return call. Pt scheduled for appt with PCP 1/10 at 9:30a..

## 2024-01-04 NOTE — TELEPHONE ENCOUNTER
----- Message from Ela Varun sent at 1/4/2024 11:13 AM CST -----  Contact: [phone] Patient  Patient would like to get medical advice.  Symptoms (please be specific):   Pt states she was advised by the hospital to record her pulse ox readings. Pt states her pulse ox was 96 and is asking if that is ok. Pt states she was not provided a guideline for her pulse ox.   How long have you had these symptoms: N/A  Would you like a call back, or a response through your [facility] portal?:   call back  Pharmacy name and phone # (copy from chart): N/A    Comments:

## 2024-01-04 NOTE — TELEPHONE ENCOUNTER
I will evaluate when she come in for appt.  If she needed oxygen it would have been prescribed at discharge.  Write down numbers for me to review next week.  Of course if SOB, return to ED

## 2024-01-12 ENCOUNTER — OUTPATIENT CASE MANAGEMENT (OUTPATIENT)
Dept: ADMINISTRATIVE | Facility: OTHER | Age: 89
End: 2024-01-12
Payer: MEDICARE

## 2024-01-12 NOTE — PROGRESS NOTES
Outpatient Care Management  Initial Patient Assessment    Patient: Debbie Ding  MRN: [id number]  Date of Service: 01/12/2024  Completed by: Leigh Ann Asencio RN  Referral Date: 01/02/2024  Date of Eligibility: 1/3/2024  Program: High Risk  Status: Ongoing  Effective Dates: 1/12/2024 - present  Responsible Staff: Leigh Ann Asencio RN        Reason for Visit   Patient presents with    OPCM Chart Review     1/12/24    Nursing Assessment     1/12/24    OPCM Enrollment Call     1/12/24       Brief Summary:  Debbie Ding was referred by Dr. Moran for Asthma exacerbation. Patient qualifies for program based on risk score of 77%.   Active problem list, medical, surgical and social history reviewed. Active comorbidities include HTN, Hyperlipidemia, Depression, Fibromyalgia, and Asthma. Areas of need identified by patient include help with cost of Remeron prescription and help managing Asthma. Referral has been sent to Pharmacy Assistance program.   Next steps: Follow up with patient on or around 1/19/24.    Disability Status  Is the patient alert and oriented (person, place, time, and situation)?: Alert and oriented x 4  Hearing Difficulty or Deaf: yes  Hearing Management: other (wears bilateral hearing aids)  Visual Difficulty or Blind: yes  Visual and Hearing Needs Conclusion: wears glasses all of the time  Vision Management: Other (wears glasses all of the time)  Difficulty Concentrating, Remembering or Making Decisions: no  Communication Difficulty: no  Eating/Swallowing Difficulty: no  Walking or Climbing Stairs Difficulty: yes  Walking or Climbing Stairs: ambulation difficulty, requires equipment  Mobility Management: uses walker  Dressing/Bathing Difficulty: no  Toileting : Independent  Continence : Incontience - Bladder (wears adult diapers only at night)  Difficulty Managing Errands Independently: yes  Errands Management: Daughters assist  Equipment Currently Used at Home: CPAP; nebulizer;  rollator; shower chair  ADL Conclusion Statement: Patient is independent with ADLs. Can feed, dress, and bathe herself. Still cooks. Uses rollator for ambulation.  Change in Functional Status Since Onset of Current Illness/Injury: no        Spiritual Beliefs  Spiritual, Cultural Beliefs, Scientology Practices, Values that Affect Care: no      Social History     Socioeconomic History    Marital status:    Occupational History     Comment:  - school    Tobacco Use    Smoking status: Never    Smokeless tobacco: Never   Substance and Sexual Activity    Alcohol use: No    Drug use: No    Sexual activity: Not Currently   Social History Narrative    Lives with daughter, Benjie.        Other daughter, Madina, drives her around.        She lives independently, except for driving.          Stretches in bed.  Walks in neighborhood, and in house several times a day.     Social Determinants of Health     Financial Resource Strain: Medium Risk (1/12/2024)    Overall Financial Resource Strain (CARDIA)     Difficulty of Paying Living Expenses: Somewhat hard   Food Insecurity: No Food Insecurity (1/12/2024)    Hunger Vital Sign     Worried About Running Out of Food in the Last Year: Never true     Ran Out of Food in the Last Year: Never true   Transportation Needs: No Transportation Needs (1/12/2024)    PRAPARE - Transportation     Lack of Transportation (Medical): No     Lack of Transportation (Non-Medical): No   Physical Activity: Insufficiently Active (1/12/2024)    Exercise Vital Sign     Days of Exercise per Week: 4 days     Minutes of Exercise per Session: 10 min   Stress: No Stress Concern Present (1/12/2024)    [employer] [place] [employer] - Occupational Stress Questionnaire     Feeling of Stress : Not at all   Social Connections: Moderately Isolated (1/12/2024)    Social Connection and Isolation Panel [NHANES]     Frequency of Communication with Friends and Family: More than three times a week      Frequency of Social Gatherings with Friends and Family: More than three times a week     Attends Adventism Services: More than 4 times per year     Active Member of Clubs or Organizations: No     Attends Club or Organization Meetings: Never     Marital Status:    Housing Stability: Low Risk  (1/12/2024)    Housing Stability Vital Sign     Unable to Pay for Housing in the Last Year: No     Number of Places Lived in the Last Year: 1     Unstable Housing in the Last Year: No       Roles and Relationships  Primary Source of Support/Comfort: child(nataliia)  Name of Support/Comfort Primary Source: Ezequiel Farr daughter  Primary Roles/Responsibilities: wage earner, full-time  Secondary Source of Support/Comfort: child(nataliia); extended family  Name of Support/Comfort Secondary Source: daughter, grandchildren      Advance Directives (For Healthcare)  Advance Directive  (If Adv Dir status is received, view document under Adv Dir in header or Chart Review Media tab): Patient has advance directive, copy not received.  Patient Requests Assistance: Patient will do independently        Patient Reported Insurance  Verified current insurance plan:: Medicare; Jia            1/12/2024    10:55 AM 7/11/2023     8:21 AM 6/13/2023    12:03 PM 12/8/2022     1:28 PM 10/19/2021    11:27 AM 10/7/2021     2:57 PM 6/22/2021    12:41 PM   Depression Patient Health Questionnaire   Over the last two weeks how often have you been bothered by little interest or pleasure in doing things Not at all Several days Not at all Not at all Not at all Not at all Not at all   Over the last two weeks how often have you been bothered by feeling down, depressed or hopeless Not at all Not at all Not at all Not at all Several days Not at all Not at all   PHQ-2 Total Score 0 1 0 0 1 0 0       Learning Assessment       01/12/2024 1055 [facility] (1/12/2024 - Present)   Created by Leigh Ann Asencio, RN -  (Nurse) Status: Complete                  PRIMARY LEARNER     Primary Learner Name:  Debbie Ding BB - 01/12/2024 1055    Relationship:  Patient BB - 01/12/2024 1055    Does the primary learner have any barriers to learning?:  No Barriers BB - 01/12/2024 1055    What is the preferred language of the primary learner?:  English BB - 01/12/2024 1055    Is an  required?:  No BB - 01/12/2024 1055    How does the primary learner prefer to learn new concepts?:  Listening, Reading, Demonstration BB - 01/12/2024 1055    How often do you need to have someone help you read instructions, pamphlets, or written material from your doctor or pharmacy?:  Never BB - 01/12/2024 1055        CO-LEARNER #1     No question answered        CO-LEARNER #2     No question answered        SPECIAL TOPICS     No question answered        ANSWERED BY:     No question answered        Edit History       Leigh Ann Asencio, RN -  (Nurse)   01/12/2024 1055

## 2024-01-12 NOTE — LETTER
January 12, 2024             Dear Ms. Debbie Ding,    Welcome to [facility] Complex Care Management Program.  It was a pleasure talking with you today.  My name is Leigh Ann Asencio, and I look forward to being your Nurse Care Manager.  My goal is to help you function at the healthiest and highest level possible.  You can contact me directly at [phone].    As an [facility] patient, some of the services we may be able to provide include:     Development of an individualized care plan with a Registered Nurse   Connection with a   Connection with available resources and services    Coordinate communication among your care team members   Provide coaching and education   Help you understand your doctors treatment plan  Help you obtain information about your insurance coverage.     All services provided by [facility] Complex Care Managers and other care team members are coordinated with and communicated to your primary care team.      As part of your enrollment, you will be receiving education materials and more information about these services in your [facility] account, by phone or through the mail.  If you do not wish to participate or receive information, please contact our office at [phone].      Kind Regards,      Leigh Ann Asencio, RN  [facility]   Out-patient RN Complex Care Manager

## 2024-01-16 ENCOUNTER — TELEPHONE (OUTPATIENT)
Dept: PHARMACY | Facility: CLINIC | Age: 89
End: 2024-01-16
Payer: MEDICARE

## 2024-01-16 NOTE — TELEPHONE ENCOUNTER
Patient picked up 30 day supply [facility] 1/3/24 for $5 co-pay. This is the cheapest co-pay available and could not offer any further assistance

## 2024-01-17 ENCOUNTER — TELEPHONE (OUTPATIENT)
Dept: GASTROENTEROLOGY | Facility: CLINIC | Age: 89
End: 2024-01-17
Payer: MEDICARE

## 2024-01-17 NOTE — TELEPHONE ENCOUNTER
----- Message from Suad Bassett sent at 1/17/2024  2:04 PM CST -----  Regarding: Throat  Contact: Pt @ [phone]  Pt feel the need to be seen urgently. Had an EGD done and her throat is closing up on her. Asking for a call back

## 2024-01-22 NOTE — PROGRESS NOTES
Subjective     Patient ID: Debbie Ding is a 89 y.o. female.    Chief Complaint: Follow-up    HPI  She had a terrible cough which led to hospitalization 12/29-1/3.  Cough finally resolved a few days ago.  She was evaluated by Pulm and GI.    Tx with prednisone, resp treatments.  Robitussin helped a little.  Tessalon qhs also helpful.  Esophagram:  mod esoph dysmotility.  Sm HH without GERD.  CT chest:  micro nodules, scattered grand class opacities.  She is scheduled to see her Pulmonologist, Dr Suazo, in early Feb.    He called in prednisone , which she is slowly tapering.    She has been unaware of wheezing throughout this episode, despite her h/o unocntrolled asthma..  She has been terribly hoarse with this illness.  Not improving.  She was evaluated by Dr Ridley in past for less severe hoarsness, and completed speech therapy in 2022.   She attributes beginning of hoarseness to EGD  3/22.    No dysphagia.  .  She has a feeling of obstruction at base of neck.    Recent labs reviewed.  CBC fine.    Stable ckd 3b.      Dm. Last A1c 6.3 in October.  She has not checked glucose since starting steroids.    She is convinced that trade Remeron helps with appetite, and generic doesn't.  Was told to inform insurance co by faxing to her [employer].      Fax [phone]      Review of Systems   Constitutional:  Negative for fever and unexpected weight change.   HENT:  Negative for nasal congestion and postnasal drip.    Eyes:  Negative for pain, discharge and visual disturbance.   Respiratory:  Negative for cough, chest tightness, shortness of breath and wheezing.    Cardiovascular:  Negative for chest pain and leg swelling.   Gastrointestinal:  Negative for abdominal pain, constipation, diarrhea and nausea.   Genitourinary:  Negative for difficulty urinating, dysuria and hematuria.   Integumentary:  Negative for rash.   Neurological:  Negative for headaches.   Psychiatric/Behavioral:  Negative for dysphoric mood  "and sleep disturbance. The patient is not nervous/anxious.           Objective     Physical Exam  Constitutional:       General: She is not in acute distress.     Appearance: She is well-developed. She is not ill-appearing, toxic-appearing or diaphoretic.   Eyes:      General: No scleral icterus.  Neck:      Thyroid: No thyromegaly.      Vascular: No JVD.   Cardiovascular:      Rate and Rhythm: Normal rate and regular rhythm.      Heart sounds: Normal heart sounds.   Pulmonary:      Effort: Pulmonary effort is normal. No respiratory distress.      Breath sounds: Normal breath sounds. No stridor. No wheezing, rhonchi or rales.   Abdominal:      General: There is no distension.      Palpations: Abdomen is soft. There is no mass.      Tenderness: There is no abdominal tenderness. There is no guarding or rebound.      Hernia: No hernia is present.   Musculoskeletal:      Right lower leg: No edema.      Left lower leg: No edema.   Neurological:      Mental Status: She is alert and oriented to person, place, and time.      Cranial Nerves: No cranial nerve deficit.   Psychiatric:         Mood and Affect: Mood normal.         Behavior: Behavior normal.         Thought Content: Thought content normal.         Judgment: Judgment normal.            Assessment and Plan     1. Chronic cough    2. Hoarseness  -     Ambulatory referral/consult to ENT; Future; Expected date: 01/30/2024    3. Chronic asthma without complication, unspecified asthma severity, unspecified whether persistent  Overview:  2000: Diagnosed.  On Advair 250 once daily and Spiriva.  Has nebs but no machine.  Last used rescue "months" ago      4. Esophageal dysmotility    5. Stage 3b chronic kidney disease    6. Vocal cord dysfunction    7. Type 2 diabetes mellitus with stage 3a chronic kidney disease, without long-term current use of insulin  Overview:  2019: Diagnosed. Complications: CKD3a. Medications: Diet.    Orders:  -     Microalbumin/Creatinine Ratio, " Urine    8. Aortic atherosclerosis  Assessment & Plan:  Tx with atorvastatin      9. Bronchitis, chronic, mucopurulent    10. Pulmonary hypertension  Assessment & Plan:  Managed by pulmonary      11. Chronic pulmonary embolism, unspecified pulmonary embolism type, unspecified whether acute cor pulmonale present  Overview:  On Eliquis daily    Assessment & Plan:  Managed by pulmonary        Will request trade Remeron, as she finds it more effective than generic.           No follow-ups on file.

## 2024-01-23 ENCOUNTER — OFFICE VISIT (OUTPATIENT)
Dept: INTERNAL MEDICINE | Facility: CLINIC | Age: 89
End: 2024-01-23
Payer: MEDICARE

## 2024-01-23 ENCOUNTER — TELEPHONE (OUTPATIENT)
Dept: INTERNAL MEDICINE | Facility: CLINIC | Age: 89
End: 2024-01-23

## 2024-01-23 ENCOUNTER — LAB VISIT (OUTPATIENT)
Dept: LAB | Facility: HOSPITAL | Age: 89
End: 2024-01-23
Attending: INTERNAL MEDICINE
Payer: MEDICARE

## 2024-01-23 VITALS
DIASTOLIC BLOOD PRESSURE: 72 MMHG | OXYGEN SATURATION: 96 % | SYSTOLIC BLOOD PRESSURE: 120 MMHG | WEIGHT: 138 LBS | HEART RATE: 87 BPM | BODY MASS INDEX: 23.56 KG/M2 | HEIGHT: 64 IN

## 2024-01-23 DIAGNOSIS — I27.82 CHRONIC PULMONARY EMBOLISM, UNSPECIFIED PULMONARY EMBOLISM TYPE, UNSPECIFIED WHETHER ACUTE COR PULMONALE PRESENT: ICD-10-CM

## 2024-01-23 DIAGNOSIS — E11.22 TYPE 2 DIABETES MELLITUS WITH STAGE 3A CHRONIC KIDNEY DISEASE, WITHOUT LONG-TERM CURRENT USE OF INSULIN: ICD-10-CM

## 2024-01-23 DIAGNOSIS — I27.20 PULMONARY HYPERTENSION: ICD-10-CM

## 2024-01-23 DIAGNOSIS — N18.32 STAGE 3B CHRONIC KIDNEY DISEASE: ICD-10-CM

## 2024-01-23 DIAGNOSIS — J45.909 CHRONIC ASTHMA WITHOUT COMPLICATION, UNSPECIFIED ASTHMA SEVERITY, UNSPECIFIED WHETHER PERSISTENT: ICD-10-CM

## 2024-01-23 DIAGNOSIS — K22.4 ESOPHAGEAL DYSMOTILITY: ICD-10-CM

## 2024-01-23 DIAGNOSIS — J41.1 BRONCHITIS, CHRONIC, MUCOPURULENT: ICD-10-CM

## 2024-01-23 DIAGNOSIS — J38.3 VOCAL CORD DYSFUNCTION: ICD-10-CM

## 2024-01-23 DIAGNOSIS — I70.0 AORTIC ATHEROSCLEROSIS: ICD-10-CM

## 2024-01-23 DIAGNOSIS — N18.31 TYPE 2 DIABETES MELLITUS WITH STAGE 3A CHRONIC KIDNEY DISEASE, WITHOUT LONG-TERM CURRENT USE OF INSULIN: ICD-10-CM

## 2024-01-23 DIAGNOSIS — R05.3 CHRONIC COUGH: Primary | ICD-10-CM

## 2024-01-23 DIAGNOSIS — R49.0 HOARSENESS: ICD-10-CM

## 2024-01-23 PROBLEM — M46.96 UNSPECIFIED INFLAMMATORY SPONDYLOPATHY, LUMBAR REGION: Status: RESOLVED | Noted: 2021-03-02 | Resolved: 2024-01-23

## 2024-01-23 PROBLEM — J96.01 ACUTE HYPOXEMIC RESPIRATORY FAILURE: Status: RESOLVED | Noted: 2021-10-10 | Resolved: 2024-01-23

## 2024-01-23 LAB
ANION GAP SERPL CALC-SCNC: 11 MMOL/L (ref 8–16)
BUN SERPL-MCNC: 18 MG/DL (ref 10–30)
CALCIUM SERPL-MCNC: 9.5 MG/DL (ref 8.7–10.5)
CHLORIDE SERPL-SCNC: 103 MMOL/L (ref 95–110)
CO2 SERPL-SCNC: 23 MMOL/L (ref 23–29)
CREAT SERPL-MCNC: 1.5 MG/DL (ref 0.5–1.4)
EST. GFR  (NO RACE VARIABLE): 32.7 ML/MIN/1.73 M^2
GLUCOSE SERPL-MCNC: 191 MG/DL (ref 70–110)
POTASSIUM SERPL-SCNC: 3.9 MMOL/L (ref 3.5–5.1)
SODIUM SERPL-SCNC: 137 MMOL/L (ref 136–145)

## 2024-01-23 PROCEDURE — 99999 PR PBB SHADOW E&M-EST. PATIENT-LVL IV: CPT | Mod: PBBFAC,,, | Performed by: INTERNAL MEDICINE

## 2024-01-23 PROCEDURE — 82043 UR ALBUMIN QUANTITATIVE: CPT | Performed by: INTERNAL MEDICINE

## 2024-01-23 PROCEDURE — 36415 COLL VENOUS BLD VENIPUNCTURE: CPT | Performed by: INTERNAL MEDICINE

## 2024-01-23 PROCEDURE — 83036 HEMOGLOBIN GLYCOSYLATED A1C: CPT | Performed by: INTERNAL MEDICINE

## 2024-01-23 PROCEDURE — 99214 OFFICE O/P EST MOD 30 MIN: CPT | Mod: PBBFAC | Performed by: INTERNAL MEDICINE

## 2024-01-23 PROCEDURE — 99214 OFFICE O/P EST MOD 30 MIN: CPT | Mod: S$PBB,,, | Performed by: INTERNAL MEDICINE

## 2024-01-23 PROCEDURE — 80048 BASIC METABOLIC PNL TOTAL CA: CPT | Performed by: INTERNAL MEDICINE

## 2024-01-23 RX ORDER — MIRTAZAPINE 15 MG/1
15 TABLET, FILM COATED ORAL NIGHTLY
Qty: 90 TABLET | Refills: 0 | Status: SHIPPED | OUTPATIENT
Start: 2024-01-23 | End: 2024-01-25 | Stop reason: SDUPTHER

## 2024-01-23 RX ORDER — BENZONATATE 100 MG/1
100 CAPSULE ORAL 3 TIMES DAILY PRN
Qty: 30 CAPSULE | Refills: 1 | Status: SHIPPED | OUTPATIENT
Start: 2024-01-23

## 2024-01-24 LAB
ALBUMIN/CREAT UR: 13.2 UG/MG (ref 0–30)
CREAT UR-MCNC: 152 MG/DL (ref 15–325)
ESTIMATED AVG GLUCOSE: 148 MG/DL (ref 68–131)
HBA1C MFR BLD: 6.8 % (ref 4–5.6)
MICROALBUMIN UR DL<=1MG/L-MCNC: 20 UG/ML

## 2024-01-24 NOTE — TELEPHONE ENCOUNTER
She is convinced that trade Remeron helps with appetite, and generic doesn't.  Was told to inform insurance co by faxing to her [employer].      Fax [phone]    thanks

## 2024-01-25 ENCOUNTER — TELEPHONE (OUTPATIENT)
Dept: INTERNAL MEDICINE | Facility: CLINIC | Age: 89
End: 2024-01-25
Payer: MEDICARE

## 2024-01-25 RX ORDER — MIRTAZAPINE 15 MG/1
15 TABLET, FILM COATED ORAL NIGHTLY
Qty: 90 TABLET | Refills: 0 | Status: SHIPPED | OUTPATIENT
Start: 2024-01-25

## 2024-01-25 NOTE — TELEPHONE ENCOUNTER
----- Message from Felicitas Proctor sent at 1/25/2024 12:53 PM CST -----  Contact: [phone]  Patient is requesting a call from the staff: Regarding question about mirtazapine (REMERON) 15 MG tablet

## 2024-01-25 NOTE — TELEPHONE ENCOUNTER
Patient states that Mirtazapine states she spoke to the pharmacy and was told there is a difference states some ingredients are different. She would like Remeron brand name. States when she takes Mirtazapine she has to add another pill to make her eat and with brand name she doesn't.  States she is working with a Portia at [place] to coordinate getting the brand name. States she left a fax number at her appt on 1.23.24 to have prescription faxed to. She would medication to go to [facility] mail order.

## 2024-01-25 NOTE — TELEPHONE ENCOUNTER
She told me this already  I sent a message to Viviane with this info.  BUt I did send to [place] , [facility]    Only call her back if you think you need to

## 2024-01-25 NOTE — TELEPHONE ENCOUNTER
----- Message from Beatrice Velasquez sent at 1/25/2024 12:39 PM CST -----  Contact: [phone]  Patient called, requested a courtesy call from Dr Mejia's nurse ASAP. About medications.Did not specify. Thank you.

## 2024-01-26 ENCOUNTER — OUTPATIENT CASE MANAGEMENT (OUTPATIENT)
Dept: ADMINISTRATIVE | Facility: OTHER | Age: 89
End: 2024-01-26
Payer: MEDICARE

## 2024-01-26 DIAGNOSIS — N18.32 CHRONIC KIDNEY DISEASE (CKD) STAGE G3B/A2, MODERATELY DECREASED GLOMERULAR FILTRATION RATE (GFR) BETWEEN 30-44 ML/MIN/1.73 SQUARE METER AND ALBUMINURIA CREATININE RATIO BETWEEN 30-299 MG/G: Primary | ICD-10-CM

## 2024-01-26 NOTE — TELEPHONE ENCOUNTER
----- Message from Madhuri Maynard sent at 1/26/2024 10:52 AM CST -----  Contact: Debbie [phone]  Would like to receive medical advice.     Would they like a call back or a response via [facility]:  call back    Additional information:  Debbie is calling to say no one has called her from the ENT department to schedule the appt like the provider stated to her. Debbie states she spoke to the provider on behalf of her medication and needs the provider to fax the medication they spoke about, pt states she gave the provider a fax number to send the RX to. Debbie states she told her provider what was going on and would like to know if there is a problem. Please call Debbie back for advice      Debbie states this is very important and needs to speak to someone asap

## 2024-01-26 NOTE — PROGRESS NOTES
Outpatient Care Management  Plan of Care Follow Up Visit    Patient: Debbie Ding  MRN: [id number]  Date of Service: 01/26/2024  Completed by: Leigh Ann Asencio RN  Referral Date: 01/02/2024    Reason for Visit   Patient presents with    OPCM Chart Review     1/26/24    OPCM RN Follow Up Call     1/26/24       Brief Summary:     OPCM follow up complete. Continued education on Asthma. Patient is in agreement with follow up call on or around 2/2/24.

## 2024-01-29 ENCOUNTER — TELEPHONE (OUTPATIENT)
Dept: INTERNAL MEDICINE | Facility: CLINIC | Age: 89
End: 2024-01-29
Payer: MEDICARE

## 2024-01-29 NOTE — TELEPHONE ENCOUNTER
Nurse please call [facility] mail order pharmacy on patient chart to see what else is needed.

## 2024-01-29 NOTE — TELEPHONE ENCOUNTER
She thinks trade remeron is more effective at helping appetite that generic..  If insurance does not accept that explanation,  pls will let her know

## 2024-01-29 NOTE — TELEPHONE ENCOUNTER
----- Message from Asha Salcido sent at 1/29/2024 10:08 AM CST -----  Contact: Pt  [phone]  Would like to receive medical advice.  Would they like a call back or a response via [facility]:  Call Back   Additional information:      Pt is requesting a call back regarding the wording on their mirtazapine (REMERON) 15 MG tablet. She says that if it is not worded correctly that she is charged around $500 for the prescription.    Sophy contacted the pt regarding this as well.

## 2024-01-29 NOTE — TELEPHONE ENCOUNTER
"Patient states her insurance company states the wording that was sent in by pcp isn't supporting her needing brand name Remeron. I advised the patient the provider can only go off what the patient says. "Patient states Remeron brand name helps with with appetite. " I advised this is exactly what the provider sent to the insurance company.   "

## 2024-01-30 ENCOUNTER — EXTERNAL HOME HEALTH (OUTPATIENT)
Dept: HOME HEALTH SERVICES | Facility: HOSPITAL | Age: 89
End: 2024-01-30
Payer: MEDICARE

## 2024-01-31 ENCOUNTER — EXTERNAL CHRONIC CARE MANAGEMENT (OUTPATIENT)
Dept: PRIMARY CARE CLINIC | Facility: CLINIC | Age: 89
End: 2024-01-31
Payer: MEDICARE

## 2024-01-31 DIAGNOSIS — R49.0 MUSCLE TENSION DYSPHONIA: Primary | ICD-10-CM

## 2024-01-31 PROCEDURE — 99490 CHRNC CARE MGMT STAFF 1ST 20: CPT | Mod: PBBFAC,25 | Performed by: INTERNAL MEDICINE

## 2024-01-31 PROCEDURE — 99490 CHRNC CARE MGMT STAFF 1ST 20: CPT | Mod: S$PBB,,, | Performed by: INTERNAL MEDICINE

## 2024-01-31 PROCEDURE — 99439 CHRNC CARE MGMT STAF EA ADDL: CPT | Mod: PBBFAC | Performed by: INTERNAL MEDICINE

## 2024-01-31 PROCEDURE — 99439 CHRNC CARE MGMT STAF EA ADDL: CPT | Mod: S$PBB,,, | Performed by: INTERNAL MEDICINE

## 2024-02-01 ENCOUNTER — OFFICE VISIT (OUTPATIENT)
Dept: OTOLARYNGOLOGY | Facility: CLINIC | Age: 89
End: 2024-02-01
Payer: MEDICARE

## 2024-02-01 ENCOUNTER — TELEPHONE (OUTPATIENT)
Dept: SPEECH THERAPY | Facility: HOSPITAL | Age: 89
End: 2024-02-01
Payer: MEDICARE

## 2024-02-01 VITALS
DIASTOLIC BLOOD PRESSURE: 83 MMHG | WEIGHT: 138 LBS | BODY MASS INDEX: 23.56 KG/M2 | HEART RATE: 77 BPM | HEIGHT: 64 IN | SYSTOLIC BLOOD PRESSURE: 128 MMHG

## 2024-02-01 DIAGNOSIS — R49.0 MUSCLE TENSION DYSPHONIA: Primary | ICD-10-CM

## 2024-02-01 DIAGNOSIS — J38.7 PRESBYLARYNGES: ICD-10-CM

## 2024-02-01 DIAGNOSIS — R49.0 HOARSENESS: ICD-10-CM

## 2024-02-01 PROCEDURE — 99214 OFFICE O/P EST MOD 30 MIN: CPT | Mod: 25,S$PBB,, | Performed by: NURSE PRACTITIONER

## 2024-02-01 PROCEDURE — 99999 PR PBB SHADOW E&M-EST. PATIENT-LVL IV: CPT | Mod: PBBFAC,,, | Performed by: NURSE PRACTITIONER

## 2024-02-01 PROCEDURE — 31579 LARYNGOSCOPY TELESCOPIC: CPT | Mod: PBBFAC | Performed by: NURSE PRACTITIONER

## 2024-02-01 PROCEDURE — 31579 LARYNGOSCOPY TELESCOPIC: CPT | Mod: S$PBB,,, | Performed by: NURSE PRACTITIONER

## 2024-02-01 PROCEDURE — 99214 OFFICE O/P EST MOD 30 MIN: CPT | Mod: PBBFAC,25 | Performed by: NURSE PRACTITIONER

## 2024-02-01 NOTE — PROGRESS NOTES
[facility]  Department of Otorhinolaryngology and Communication Sciences    Debbie Ding is a 89 y.o. female who presents to the [facility] for consultation at the kind request of Dr. Carolyn Mejia for further management of hoarseness.     She was seen by Dr. Ridley in 2022 and diagnosed with MTD and vocal fold atrophy. She completed speech therapy and her voice eventually returned to baseline. Her dysphonia returned 2 months ago. In December she had a severe cough. She was admitted due to the cough. She states she did not have pneumonia, just a severe cough. Her voice is high pitched and strained. It sounds like this all day every day. She has not had a normal voice in 2 months. There is no pain or dysphagia. Her cough has mostly resolved now.    Past Medical History  She has a past medical history of Allergic rhinitis, Anticoagulant long-term use, Arthritis, Asthma, Bilateral pulmonary embolism, Cataract, Chronic anticoagulation, Depression, Diabetes mellitus, Fibromyalgia, Fibromyalgia, GERD (gastroesophageal reflux disease), Hypercholesterolemia, Hypercholesterolemia, Hypertension, Thoracic or lumbosacral neuritis or radiculitis, unspecified, Thyroid disease, and Ulcer.    Past Surgical History  She has a past surgical history that includes Hysterectomy; Foot neuroma surgery (1985); Cataract extraction (Bilateral); Esophagogastroduodenoscopy (N/A, 3/8/2022); Esophagogastroduodenoscopy (N/A, 2/28/2022); and Carpal tunnel release (Left, 11/29/2023).    Family History  Her family history includes Diabetes in her brother and mother; Emphysema in her maternal aunt.    Social History  She reports that she has never smoked. She has never used smokeless tobacco. She reports that she does not drink alcohol and does not use drugs.    Allergies  She is allergic to melatonin, talwin compound, talwin [pentazocine lactate], trazodone, and bentyl [dicyclomine].    Medications  She has a current medication  "list which includes the following prescription(s): acetaminophen, albuterol, albuterol, amlodipine, atorvastatin, benzonatate, ciclopirox, dextromethorphan-guaifenesin  mg/5 ml, donepezil, doxepin, eliquis, esomeprazole, famotidine, fluocinonide, fluticasone-salmeterol 230-21 mcg/dose, gabapentin, gemtesa, glycopyrrolate, levothyroxine, meclizine, memantine, and mirtazapine, and the following Facility-Administered Medications: midazolam.    Review of Systems   Constitutional:  Negative for fever.   HENT:  Negative for sore throat.    Eyes:  Negative for visual disturbance.   Respiratory:  Negative for wheezing.    Cardiovascular:  Negative for chest pain.   Gastrointestinal:  Negative for nausea.   Musculoskeletal:  Negative for arthralgias.   Skin:  Negative for rash.   Neurological:  Negative for tremors.   Hematological:  Does not bruise/bleed easily.   Psychiatric/Behavioral:  The patient is not nervous/anxious.           Objective:     /83 (BP Location: Right arm, Patient Position: Sitting, BP Method: Medium (Automatic))   Pulse 77   Ht 5' 4" (1.626 m)   Wt 62.6 kg (138 lb 0.1 oz)   BMI 23.69 kg/m²      Physical Exam    Constitutional: comfortable, well dressed  Psychiatric: appropriate affect  Respiratory: comfortably breathing, symmetric chest rise, no stridor  Voice: mild-to-severe variable aperiodic roughness; poor flexibility  Cardiovascular: upper extremities non-edematous  Lymphatic: no cervical lymphadenopathy  Neurologic: alert and oriented to time, place, person, and situation; cranial nerves 3-12 grossly intact  Head: normocephalic  Eyes: conjunctivae and sclerae clear  Ears: normal pinnae, hearing aids in place.  Nose: mucosa pink and noncongested, no masses, no mucopurulence, no polyps  Oral cavity / oropharynx: no mucosal lesions  Neck: soft, full range of motion, laryngotracheal complex palpable with appropriate landmarks, larynx elevates on swallowing  Indirect laryngoscopy: " limited due to gag    Procedure  Flexible Laryngeal Videostroboscopy (17496): Laryngeal videostroboscopy is indicated to assess laryngeal vibratory biomechanics and vocal fold oscillation, which cannot be assessed with a plain light examination. This was carried out transnasally with a distal chip videoendoscope. After verbal consent was obtained, the patient was positioned and the nose was topically decongested with 1% phenylephrine and topically anesthetized with 4% lidocaine. The endoscope was passed through the most patent nasal cavity and positioned to image the nasopharynx, larynx, and hypopharynx in detail. The following features were examined: nasopharyngeal, laryngeal, hypopharyngeal masses; velopharyngeal strength, closure, and symmetry of motion; vocal fold range and symmetry of motion; laryngeal mucosal edema, erythema, inflammation, and hydration; salivary pooling; and gross laryngeal sensation. During phonation, the vocal folds were assessed for glottal closure; mucosal wave; vocal fold lesions; vibratory periodicity, amplitude, and phase symmetry; and vertical height match. The equipment was removed. The patient tolerated the procedure well without complication. All findings were normal except:  - bilateral vocal fold atrophy, mild-moderate  - posterior/LCA-predominant closure  - truncated phonatory segment with small amplitudes and variable aperiodic vibration  - phonatory tremor on sustained vowels, involving intrinsic larynx and supraglottis        Assessment:     Debbie Ding is a 89 y.o. 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 I note findings of vocal fold atrophy, tremor, and muscle tension dysphonia.       Plan:        I had a discussion with the patient regarding her condition and the further workup and management options.      SLP voice evaluation and subsequent therapy sessions are medically necessary for restoration of laryngeal function. We will arrange for this to occur here at the [facility] in the coming weeks.      All questions were answered, and the patient is in agreement with the above.

## 2024-02-02 ENCOUNTER — OUTPATIENT CASE MANAGEMENT (OUTPATIENT)
Dept: ADMINISTRATIVE | Facility: OTHER | Age: 89
End: 2024-02-02
Payer: MEDICARE

## 2024-02-02 NOTE — PROGRESS NOTES
Outpatient Care Management  Plan of Care Follow Up Visit    Patient: Debbie Ding  MRN: [id number]  Date of Service: 02/02/2024  Completed by: Leigh Ann Asencio RN  Referral Date: 01/02/2024    Reason for Visit   Patient presents with    OPCM Chart Review     2/2/24    OPCM RN Follow Up Call     2/2/24       Brief Summary:     OPCM follow up complete. Continued education on Asthma. Patient is in agreement with follow up call on or around 2/16/24.

## 2024-02-05 ENCOUNTER — DOCUMENT SCAN (OUTPATIENT)
Dept: HOME HEALTH SERVICES | Facility: HOSPITAL | Age: 89
End: 2024-02-05
Payer: MEDICARE

## 2024-02-07 ENCOUNTER — OFFICE VISIT (OUTPATIENT)
Dept: ORTHOPEDICS | Facility: CLINIC | Age: 89
End: 2024-02-07
Payer: MEDICARE

## 2024-02-07 VITALS
WEIGHT: 135.13 LBS | BODY MASS INDEX: 23.07 KG/M2 | SYSTOLIC BLOOD PRESSURE: 135 MMHG | DIASTOLIC BLOOD PRESSURE: 70 MMHG | HEIGHT: 64 IN | HEART RATE: 78 BPM

## 2024-02-07 DIAGNOSIS — M17.0 PRIMARY OSTEOARTHRITIS OF BOTH KNEES: Primary | ICD-10-CM

## 2024-02-07 PROCEDURE — 99213 OFFICE O/P EST LOW 20 MIN: CPT | Mod: S$PBB,,, | Performed by: PHYSICIAN ASSISTANT

## 2024-02-07 PROCEDURE — 99214 OFFICE O/P EST MOD 30 MIN: CPT | Mod: PBBFAC | Performed by: PHYSICIAN ASSISTANT

## 2024-02-07 PROCEDURE — 99999 PR PBB SHADOW E&M-EST. PATIENT-LVL IV: CPT | Mod: PBBFAC,,, | Performed by: PHYSICIAN ASSISTANT

## 2024-02-07 NOTE — PROGRESS NOTES
SUBJECTIVE:     Chief Complaint & History of Present Illness:  Debbie Ding is a Established patient 89 y.o. female who is seen here today with a complaint of    Chief Complaint   Patient presents with    Right Knee - Pain    .  She is patient well-known to me was last seen treated the clinic 07/21/2023 that time we would placed her in a hinged knee brace for her left knee discomfort which she has tolerated very well but states that since the swelling has decreased in the knee she has having a hard time keeping it in place.  She also has concerns with difficulty negotiating her walker in and out of the house after her carpal tunnel surgery.  On a scale of 1-10, with 10 being worst pain imaginable, he rates this pain as 2 on good days and 6 on bad days.  she describes the pain as tender and sore.    Review of patient's allergies indicates:   Allergen Reactions    Melatonin      Other reaction(s): Other (See Comments)  Sleep Walks and has unnatural dreams    Talwin compound Hives    Talwin [pentazocine lactate] Hallucinations    Trazodone Other (See Comments)     Nightmares/sleep walk      Bentyl [dicyclomine] Anxiety         Current Outpatient Medications   Medication Sig Dispense Refill    acetaminophen (TYLENOL) 500 MG tablet Take 1-2 tablets (500-1,000 mg total) by mouth 3 (three) times daily as needed for Pain.  0    albuterol (PROVENTIL) 2.5 mg /3 mL (0.083 %) nebulizer solution Take 3 mLs (2.5 mg total) by nebulization every 6 (six) hours as needed for Wheezing. Rescue 90 each 3    albuterol (PROVENTIL/VENTOLIN HFA) 90 mcg/actuation inhaler Inhale 2 puffs into the lungs every 6 (six) hours as needed.      amLODIPine (NORVASC) 2.5 MG tablet Take 2 tablets (5 mg total) by mouth once daily.      atorvastatin (LIPITOR) 40 MG tablet Take 1 tablet (40 mg total) by mouth once daily. 90 tablet 1    benzonatate (TESSALON) 100 MG capsule Take 1 capsule (100 mg total) by mouth 3 (three) times daily as needed for  Cough. 30 capsule 1    ciclopirox 1 % shampoo Apply topically.      dextromethorphan-guaiFENesin  mg/5 ml (ROBITUSSIN-DM)  mg/5 mL liquid Take 10 mLs by mouth every 6 (six) hours as needed (Cough). 354 mL 0    donepeziL (ARICEPT) 10 MG tablet Take 1 tablet by mouth every evening.      doxepin (SINEQUAN) 10 MG capsule TAKE 1 CAPSULE AT BEDTIME  FOR ITCHING 90 capsule 3    ELIQUIS 2.5 mg Tab TAKE 1 TABLET DAILY 90 tablet 3    esomeprazole (NEXIUM) 40 MG capsule TAKE 1 CAPSULE TWICE DAILY 180 capsule 3    famotidine (PEPCID) 20 MG tablet Take 1 tablet (20 mg total) by mouth daily as needed for Heartburn (breakthrough heartburn and acid reflux not controlled with Nexium). 90 tablet 3    fluocinonide (LIDEX) 0.05 % external solution AAA scalp qday - bid prn pruritus 60 mL 3    fluticasone-salmeterol 230-21 mcg/dose (ADVAIR HFA) 230-21 mcg/actuation HFAA inhaler Inhale 2 puffs into the lungs 2 (two) times daily. Controller 36 g 3    gabapentin (NEURONTIN) 100 MG capsule Take 100 mg by mouth.      GEMTESA 75 mg Tab Take 1 tablet by mouth twice a week.      glycopyrrolate (ROBINUL) 2 MG Tab TAKE 1 TABLET TWICE A  tablet 3    levothyroxine (SYNTHROID) 75 MCG tablet Take 1 tablet (75 mcg total) by mouth before breakfast. 30 tablet 8    meclizine (ANTIVERT) 25 mg tablet TAKE 1 TABLET TWICE A DAY  AS NEEDED FOR DIZZINESS 180 tablet 1    memantine (NAMENDA) 10 MG Tab Take 1 tablet (10 mg total) by mouth once daily. 180 tablet 3    mirtazapine (REMERON) 15 MG tablet Take 1 tablet (15 mg total) by mouth every evening. 90 tablet 0     No current facility-administered medications for this visit.     Facility-Administered Medications Ordered in Other Visits   Medication Dose Route Frequency Provider Last Rate Last Admin    midazolam (VERSED) 1 mg/mL injection 0.5 mg  0.5 mg Intravenous PRN Ross Hui MD           Past Medical History:   Diagnosis Date    Allergic rhinitis     Anticoagulant long-term use      Arthritis     Asthma     Bilateral pulmonary embolism 4/27/2019    Cataract     Chronic anticoagulation 9/28/2020    Depression     Diabetes mellitus     Fibromyalgia 7/2/2012    Fibromyalgia     GERD (gastroesophageal reflux disease)     Hypercholesterolemia 9/28/2020    Hypercholesterolemia 9/28/2020    Hypertension 7/2/2012    Thoracic or lumbosacral neuritis or radiculitis, unspecified     Thyroid disease     Ulcer        Past Surgical History:   Procedure Laterality Date    CARPAL TUNNEL RELEASE Left 11/29/2023    Procedure: RELEASE, CARPAL TUNNEL,LEFT;  Surgeon: Avril Hutchison MD;  Location: [facility];  Service: Orthopedics;  Laterality: Left;    CATARACT EXTRACTION Bilateral     ESOPHAGOGASTRODUODENOSCOPY N/A 3/8/2022    Procedure: EGD (ESOPHAGOGASTRODUODENOSCOPY) with dilation-either hospital ok with Dr. Meza;  Surgeon: Rebeca Meza MD;  Location: [facility];  Service: Endoscopy;  Laterality: N/A;  1/11-eliquis hold ok see te-tb    ESOPHAGOGASTRODUODENOSCOPY N/A 2/28/2022    Procedure: EGD (ESOPHAGOGASTRODUODENOSCOPY) with dilation-either [facility] ok with Dr. Meza;  Surgeon: Rebeca Meza MD;  Location: [facility] ([address] [place] [address]);  Service: Endoscopy;  Laterality: N/A;  1/11-eliquis hold ok see te-tb  COVID test on 2/25/22 at [facility]  2/22-confirmed appt arrival time with pt-Kpvt    FOOT NEUROMA SURGERY  1985    HYSTERECTOMY         Vital Signs (Most Recent)  Vitals:    02/07/24 0811   BP: 135/70   Pulse: 78           Review of Systems:  ROS:  Constitutional: no fever or chills  Eyes: no visual changes  ENT: no nasal congestion or sore throat, Positive  vestibular dizziness  Respiratory: no cough or shortness of breath, Positive for asthma  Cardiovascular: no chest pain or palpitations  Gastrointestinal: no nausea or vomiting, tolerating diet, Positive for GERD, peptic ulcer disease  Genitourinary: no hematuria or dysuria, Positive CKD stage 3,  Integument/Breast: no rash or  "pruritis  Hematologic/Lymphatic: no easy bruising or lymphadenopathy, Positive long-term anticoagulation, history DVT, history of PE  Musculoskeletal: no arthralgias or myalgias, Positive chronic low back pain, fibromyalgia, osteoarthritis of multiple joints  Neurological: no seizures or tremors, Degenerative disc disease lumbar spine, history of memory loss, spinal stenosis without neurological claudication  Behavioral/Psych: no auditory or visual hallucinations, Positive for depression,  Endocrine: no heat or cold intolerance, Positive diabetes type 2, thyroid disease hypothyroidism              OBJECTIVE:     PHYSICAL EXAM:  Height: 5' 4" (162.6 cm) Weight: 61.3 kg (135 lb 2.3 oz), General Appearance: Well nourished, well developed, in no acute distress.  Neurological: Mood & affect are normal.    left  Knee Exam:  Knee Range of Motion:0-120 degrees flexion   Effusion:  Slight  Condition of skin:intact  Location of tenderness:Medial joint line   Strength:limited by pain and 5 of 5  Stability:  Lachman: stable, LCL: stable, MCL: stable, PCL: stable, and posteromedial (dial): stable  Varus /Valgus stress:  normal  Chris:   negative/negative    right  Knee Exam:  Knee Range of Motion:limited by pain and 0-125 degrees flexion   Effusion:none  Condition of skin:intact  Location of tenderness:Medial joint line   Strength:limited by pain and 5 of 5  Stability:  Lachman: stable, LCL: stable, MCL: stable, PCL: stable, and posteromedial (dial): stable  Varus /Valgus stress:  normal  Chris:   negative/negative      Hip Examination:  normal    RADIOGRAPHS:  X-rays from previous visit reviewed by me today demonstrate moderate arthritic changes throughout both knees right more so than left with significant loss of both medial and lateral joint space sclerotic changes most notable in the medial compartment osteophytic spurring tricompartmentally no evidence of fracture dislocation or other bony " abnormality    ASSESSMENT/PLAN:       ICD-10-CM ICD-9-CM   1. Primary osteoarthritis of both knees  M17.0 715.16       Plan: We discussed with the patient at length all the different treatment options available for  the knee including anti-inflammatories, acetaminophen, rest, ice, knee strengthening exercise, occasional cortisone injections for temporary relief, Viscosupplimentation injections, arthroscopic debridement osteotomy, and finally knee arthroplasty.   DME was consulted to the room we have replaced her knee brace for 1 with a better support and protection.  The need for her walker in its size and weight were discussed at length patient is going to keep her feet equipment at this time follow-up p.r.n.

## 2024-02-08 ENCOUNTER — OFFICE VISIT (OUTPATIENT)
Dept: PHYSICAL MEDICINE AND REHAB | Facility: CLINIC | Age: 89
End: 2024-02-08
Payer: MEDICARE

## 2024-02-08 VITALS — BODY MASS INDEX: 22.86 KG/M2 | WEIGHT: 133.94 LBS | OXYGEN SATURATION: 97 % | HEIGHT: 64 IN

## 2024-02-08 DIAGNOSIS — M15.9 PRIMARY OSTEOARTHRITIS INVOLVING MULTIPLE JOINTS: ICD-10-CM

## 2024-02-08 DIAGNOSIS — M51.36 DDD (DEGENERATIVE DISC DISEASE), LUMBAR: ICD-10-CM

## 2024-02-08 DIAGNOSIS — M48.02 NEURAL FORAMINAL STENOSIS OF CERVICAL SPINE: ICD-10-CM

## 2024-02-08 DIAGNOSIS — M54.50 CHRONIC BILATERAL LOW BACK PAIN WITHOUT SCIATICA: ICD-10-CM

## 2024-02-08 DIAGNOSIS — R26.9 GAIT DISORDER: ICD-10-CM

## 2024-02-08 DIAGNOSIS — G89.29 CHRONIC BILATERAL LOW BACK PAIN WITHOUT SCIATICA: ICD-10-CM

## 2024-02-08 DIAGNOSIS — M79.7 FIBROMYALGIA SYNDROME: Primary | ICD-10-CM

## 2024-02-08 DIAGNOSIS — G56.03 BILATERAL CARPAL TUNNEL SYNDROME: ICD-10-CM

## 2024-02-08 DIAGNOSIS — M48.02 CERVICAL SPINAL STENOSIS: ICD-10-CM

## 2024-02-08 DIAGNOSIS — M47.812 SPONDYLOSIS OF CERVICAL REGION WITHOUT MYELOPATHY OR RADICULOPATHY: ICD-10-CM

## 2024-02-08 DIAGNOSIS — G89.29 CHRONIC NECK PAIN: ICD-10-CM

## 2024-02-08 DIAGNOSIS — M47.816 LUMBAR FACET ARTHROPATHY: ICD-10-CM

## 2024-02-08 DIAGNOSIS — M54.2 CHRONIC NECK PAIN: ICD-10-CM

## 2024-02-08 PROCEDURE — 99999 PR PBB SHADOW E&M-EST. PATIENT-LVL III: CPT | Mod: PBBFAC,,, | Performed by: PHYSICAL MEDICINE & REHABILITATION

## 2024-02-08 PROCEDURE — 99213 OFFICE O/P EST LOW 20 MIN: CPT | Mod: PBBFAC | Performed by: PHYSICAL MEDICINE & REHABILITATION

## 2024-02-08 PROCEDURE — 99214 OFFICE O/P EST MOD 30 MIN: CPT | Mod: S$PBB,,, | Performed by: PHYSICAL MEDICINE & REHABILITATION

## 2024-02-08 RX ORDER — DULOXETIN HYDROCHLORIDE 60 MG/1
60 CAPSULE, DELAYED RELEASE ORAL DAILY
Qty: 90 CAPSULE | Refills: 1 | Status: SHIPPED | OUTPATIENT
Start: 2024-02-08 | End: 2025-02-07

## 2024-02-08 NOTE — PROGRESS NOTES
Subjective:       Patient ID: Debbie Ding is a 89 y.o. female.    Chief Complaint: No chief complaint on file.      HPI      Mrs. 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  She presented to the [facility] on 08/01/2023 for fibromyalgia, chronic low back pain and chronic neck pain.  She was started on duloxetine and p.r.n. Tylenol.    Since her last visit, the patient has been followed up at the hand clinic.  On 11/29/2023 she underwent left carpal tunnel release few follow-up visits show no significant problems.  She was also seen yesterday at [facility] for her knee pain.  Options of treatment were discussed.  No injections were done.  Her knee brace was replaced.    The patient is coming to the clinic for follow-up.  Her fibromyalgia symptoms including  generalized muscle and joint aching, stiffness, fatigue/poor energy, hypersensitivity to pain and sleep impairment have been stable with occasional flare ups.    Her low back pain has been stable with occasional flare ups.  It is an intermittent  aching in the whole lumbar spine.  She denies any radiation to her legs.  It is worse with standing or walking.  It is better with rest.  Her maximum pain is 9-10/10 and minimum 5-6/10.  Today it is 5-6/10.  The patient reports bilateral lower extremity weakness that has been stable.  She denies any leg numbness.  She denies any bowel or bladder incontinence.  She denies any leg claudications.    Her neck pain has been stable with occasional flare ups.  It is an intermittent aching and stiffness in the whole cervical spine, more on the left side.  She denies any radiation to her arms.  Her maximum pain is 7-8/10 and minimum 5-6/10.  Today it is 5-6/10.  She  complains of bilateral hand  weakness and numbness. These symptoms are better in the left hand since surgery. She denies any impaired hand coordination.  She has impaired gait and has been using a straight cane or a Rollator walker (she dais it is too heavy).    Her right knee pain due to OA has been better.      She is currently taking:  Duloxetine 30 mg capsule, 2 capsules daily  Over-the-counter Tylenol 500 mg p.r.n., but she has not been taking it recently   She was previously on gabapentin but stopped due to sedation.      Past Medical History:   Diagnosis Date    Allergic rhinitis     Anticoagulant long-term use     Arthritis     Asthma     Bilateral pulmonary embolism 4/27/2019    Cataract     Chronic anticoagulation 9/28/2020    Depression     Diabetes mellitus     Fibromyalgia 7/2/2012    Fibromyalgia     GERD (gastroesophageal reflux disease)     Hypercholesterolemia 9/28/2020    Hypercholesterolemia 9/28/2020    Hypertension 7/2/2012    Thoracic or lumbosacral neuritis or radiculitis, unspecified     Thyroid disease     Ulcer         Review of patient's allergies indicates:   Allergen Reactions    Melatonin      Other reaction(s): Other (See Comments)  Sleep Walks and has unnatural dreams    Talwin compound Hives    Talwin [pentazocine lactate] Hallucinations    Trazodone Other (See Comments)     Nightmares/sleep walk      Bentyl [dicyclomine] Anxiety        Review of Systems   Constitutional:  Positive for fatigue. Negative for chills and fever.   Eyes:  Negative for visual disturbance.   Respiratory:  Positive for shortness of breath.    Cardiovascular:  Negative for chest pain.   Gastrointestinal:  Positive for constipation. Negative for nausea and vomiting.   Genitourinary:  Negative for difficulty urinating.   Musculoskeletal:  Positive for arthralgias, back pain, gait problem, myalgias, neck pain and neck stiffness.   Neurological:  Positive for weakness and numbness. Negative for dizziness and  headaches.   Psychiatric/Behavioral:  Positive for sleep disturbance. Negative for behavioral problems.              Objective:      Physical Exam  Vitals reviewed.   Constitutional:       Appearance: She is well-developed.   HENT:      Head: Normocephalic and atraumatic.   Eyes:      Extraocular Movements: Extraocular movements intact.   Musculoskeletal:      Cervical back: Normal range of motion. Tenderness present.      Comments: BUE:  ROM:   RUE: full.   LUE: full.  Strength:    RUE: 5-/5 at shoulder abduction, 5 elbow flexion, 5 elbow extension, 5- finger flexion.   LUE: 5-/5 at shoulder abduction, 5 elbow flexion, 5 elbow extension, 5- finger flexion.  Sensation to pinprick:   RUE: decreased at digit 1   LUE: decreased at digit 1  DTR:    RUE: +1 biceps, +1 triceps.   LUE:  +1 biceps, +1 triceps.  García:   RUE: -ve.   LUE: -ve.    BLE:  ROM:   RLE: full.   LLE: full.  Knee crepitus:   RLE: +ve.   LLE: +ve.   Strength:    RLE: 4/5 at hip flexion, 5 knee extension, 5 ankle DF, 5 PF, 5 EHL.   LLE: 4/5 at hip flexion, 5 knee extension, 5 ankle DF, 5 PF, 5 EHL.  Sensation to pinprick:     RLE: intact.      LLE: intact.   DTR:     RLE: +1 knee, +1 ankle.    LLE: +1 knee, +1 ankle.  Clonus:    Rt ankle: -ve.    Lt ankle: -ve.  SLR (sitting):      RLE: -ve.      LLE: -ve.      +ve tenderness over lumbar spine.  +ve diffuse tender points over the upper & lower back, anterior chest wall, hips, elbows & knees  Gait: slow ann, using a straight cane       Skin:     General: Skin is warm.   Neurological:      General: No focal deficit present.      Mental Status: She is alert.   Psychiatric:         Mood and Affect: Mood normal.         Behavior: Behavior normal.       Assessment:       1. Fibromyalgia syndrome    2. Chronic bilateral low back pain without sciatica    3. DDD (degenerative disc disease), lumbar    4. Lumbar facet arthropathy    5. Chronic neck pain    6. Spondylosis of cervical region without  myelopathy or radiculopathy    7. Cervical spinal stenosis    8. Neural foraminal stenosis of cervical spine    9. Primary osteoarthritis involving multiple joints    10. Bilateral carpal tunnel syndrome        Plan:       - Oral NSAIDs will be avoided due to anticoagulation with Eliquis.  - Continue DULoxetine (CYMBALTA) 60 MG capsule; Take 1 capsule (60 mg total) by mouth once daily.  -Continue acetaminophen (TYLENOL) 500 MG tablet; Take 1-2 tablets (500-1,000 mg total) by mouth 3 (three) times daily as needed for Pain.  - Regular home exercise program was encouraged.  - A prescription for a rollator walker was given to the patient to help improve gait safety.  - Follow up in about 4 months (around 6/8/2024).    This was a 30 minute visit, 50% of which was spent educating the patient about the diagnosis and the treatment plan.    This note was partly generated with [employer] voice recognition software. I apologize for any possible typographical errors.

## 2024-02-09 ENCOUNTER — TELEPHONE (OUTPATIENT)
Dept: PHYSICAL MEDICINE AND REHAB | Facility: CLINIC | Age: 89
End: 2024-02-09

## 2024-02-09 NOTE — TELEPHONE ENCOUNTER
----- Message from Ifeoma Figueroa sent at 2/9/2024  8:04 AM CST -----  Regarding: pt advice  Contact: [phone]  Pt stated the provider order the wrong type of walker provider ordered a in home walker pt stated she needs a walker to get to and from the doctor's office and grocery store pls contact pt at  [phone]

## 2024-02-19 ENCOUNTER — OUTPATIENT CASE MANAGEMENT (OUTPATIENT)
Dept: ADMINISTRATIVE | Facility: OTHER | Age: 89
End: 2024-02-19
Payer: MEDICARE

## 2024-02-19 NOTE — PROGRESS NOTES
Outpatient Care Management  Plan of Care Follow Up Visit    Patient: Debbie Dign  MRN: [id number]  Date of Service: 02/19/2024  Completed by: Leigh Ann Asencio RN  Referral Date: 01/02/2024    Reason for Visit   Patient presents with    OPCM Chart Review     2/19/24    OPCM RN Follow Up Call     2/19/24       Brief Summary:     OPCM follow up complete. Continued education on Asthma. Patient is in agreement with follow up call on or around  3/4/24.

## 2024-02-26 ENCOUNTER — DOCUMENT SCAN (OUTPATIENT)
Dept: HOME HEALTH SERVICES | Facility: HOSPITAL | Age: 89
End: 2024-02-26
Payer: MEDICARE

## 2024-02-29 ENCOUNTER — EXTERNAL CHRONIC CARE MANAGEMENT (OUTPATIENT)
Dept: PRIMARY CARE CLINIC | Facility: CLINIC | Age: 89
End: 2024-02-29
Payer: MEDICARE

## 2024-02-29 PROCEDURE — 99490 CHRNC CARE MGMT STAFF 1ST 20: CPT | Mod: PBBFAC | Performed by: INTERNAL MEDICINE

## 2024-02-29 PROCEDURE — 99490 CHRNC CARE MGMT STAFF 1ST 20: CPT | Mod: S$PBB,,, | Performed by: INTERNAL MEDICINE

## 2024-03-05 ENCOUNTER — DOCUMENT SCAN (OUTPATIENT)
Dept: HOME HEALTH SERVICES | Facility: HOSPITAL | Age: 89
End: 2024-03-05
Payer: MEDICARE

## 2024-03-06 ENCOUNTER — OUTPATIENT CASE MANAGEMENT (OUTPATIENT)
Dept: ADMINISTRATIVE | Facility: OTHER | Age: 89
End: 2024-03-06
Payer: MEDICARE

## 2024-03-06 NOTE — PROGRESS NOTES
Outpatient Care Management  Plan of Care Follow Up Visit    Patient: Debbie Ding  MRN: [id number]  Date of Service: 03/06/2024  Completed by: Leigh Ann Asencio RN  Referral Date: 01/02/2024    Reason for Visit   Patient presents with    OPCM Chart Review     3/6/24    OPCM RN Follow Up Call     3/6/24       Brief Summary:     OPCM follow up complete. Continued education on Asthma. Patient is in agreement with follow up call on or around 3/20/24.

## 2024-03-13 ENCOUNTER — OFFICE VISIT (OUTPATIENT)
Dept: ORTHOPEDICS | Facility: CLINIC | Age: 89
End: 2024-03-13
Payer: MEDICARE

## 2024-03-13 ENCOUNTER — TELEPHONE (OUTPATIENT)
Dept: NEUROLOGY | Facility: CLINIC | Age: 89
End: 2024-03-13
Payer: MEDICARE

## 2024-03-13 DIAGNOSIS — R29.898 WEAKNESS OF BOTH LOWER EXTREMITIES: ICD-10-CM

## 2024-03-13 DIAGNOSIS — M17.0 PRIMARY OSTEOARTHRITIS OF BOTH KNEES: Primary | ICD-10-CM

## 2024-03-13 PROCEDURE — 99213 OFFICE O/P EST LOW 20 MIN: CPT | Mod: PBBFAC | Performed by: PHYSICIAN ASSISTANT

## 2024-03-13 PROCEDURE — 99999 PR PBB SHADOW E&M-EST. PATIENT-LVL III: CPT | Mod: PBBFAC,,, | Performed by: PHYSICIAN ASSISTANT

## 2024-03-13 PROCEDURE — 99213 OFFICE O/P EST LOW 20 MIN: CPT | Mod: S$PBB,,, | Performed by: PHYSICIAN ASSISTANT

## 2024-03-13 NOTE — PROGRESS NOTES
SUBJECTIVE:     Chief Complaint & History of Present Illness:  Debbie Ding is a Established patient 89 y.o. female who is seen here today with a complaint of  bilateral knee pain and weakness .  She is patient well-known to me was last seen treated the clinic for this condition 02/07/2024 that time we would finish her with a hinged knee brace for the left knee and a Rollator walker for her limited ambulation.  She returns today requesting a knee brace for the opposing knee and inquiring about a smaller Rollator walker  On a scale of 1-10, with 10 being worst pain imaginable, he rates this pain as 3 on good days and 6 on bad days.  she describes the pain as achy and sore.    Review of patient's allergies indicates:   Allergen Reactions    Melatonin      Other reaction(s): Other (See Comments)  Sleep Walks and has unnatural dreams    Talwin compound Hives    Talwin [pentazocine lactate] Hallucinations    Trazodone Other (See Comments)     Nightmares/sleep walk      Bentyl [dicyclomine] Anxiety         Current Outpatient Medications   Medication Sig Dispense Refill    acetaminophen (TYLENOL) 500 MG tablet Take 1-2 tablets (500-1,000 mg total) by mouth 3 (three) times daily as needed for Pain.  0    albuterol (PROVENTIL) 2.5 mg /3 mL (0.083 %) nebulizer solution Take 3 mLs (2.5 mg total) by nebulization every 6 (six) hours as needed for Wheezing. Rescue 90 each 3    albuterol (PROVENTIL/VENTOLIN HFA) 90 mcg/actuation inhaler Inhale 2 puffs into the lungs every 6 (six) hours as needed.      amLODIPine (NORVASC) 2.5 MG tablet Take 2 tablets (5 mg total) by mouth once daily.      atorvastatin (LIPITOR) 40 MG tablet Take 1 tablet (40 mg total) by mouth once daily. 90 tablet 1    benzonatate (TESSALON) 100 MG capsule Take 1 capsule (100 mg total) by mouth 3 (three) times daily as needed for Cough. 30 capsule 1    ciclopirox 1 % shampoo Apply topically.      dextromethorphan-guaiFENesin  mg/5 ml (ROBITUSSIN-DM)   mg/5 mL liquid Take 10 mLs by mouth every 6 (six) hours as needed (Cough). 354 mL 0    donepeziL (ARICEPT) 10 MG tablet Take 1 tablet by mouth every evening.      doxepin (SINEQUAN) 10 MG capsule TAKE 1 CAPSULE AT BEDTIME  FOR ITCHING 90 capsule 3    DULoxetine (CYMBALTA) 60 MG capsule Take 1 capsule (60 mg total) by mouth once daily. 90 capsule 1    ELIQUIS 2.5 mg Tab TAKE 1 TABLET DAILY 90 tablet 3    esomeprazole (NEXIUM) 40 MG capsule TAKE 1 CAPSULE TWICE DAILY 180 capsule 3    famotidine (PEPCID) 20 MG tablet Take 1 tablet (20 mg total) by mouth daily as needed for Heartburn (breakthrough heartburn and acid reflux not controlled with Nexium). 90 tablet 3    fluocinonide (LIDEX) 0.05 % external solution AAA scalp qday - bid prn pruritus 60 mL 3    fluticasone-salmeterol 230-21 mcg/dose (ADVAIR HFA) 230-21 mcg/actuation HFAA inhaler Inhale 2 puffs into the lungs 2 (two) times daily. Controller 36 g 3    gabapentin (NEURONTIN) 100 MG capsule Take 100 mg by mouth.      GEMTESA 75 mg Tab Take 1 tablet by mouth twice a week.      glycopyrrolate (ROBINUL) 2 MG Tab TAKE 1 TABLET TWICE A  tablet 3    levothyroxine (SYNTHROID) 75 MCG tablet Take 1 tablet (75 mcg total) by mouth before breakfast. 30 tablet 8    meclizine (ANTIVERT) 25 mg tablet TAKE 1 TABLET TWICE A DAY  AS NEEDED FOR DIZZINESS 180 tablet 1    memantine (NAMENDA) 10 MG Tab Take 1 tablet (10 mg total) by mouth once daily. 180 tablet 3    mirtazapine (REMERON) 15 MG tablet Take 1 tablet (15 mg total) by mouth every evening. 90 tablet 0     No current facility-administered medications for this visit.     Facility-Administered Medications Ordered in Other Visits   Medication Dose Route Frequency Provider Last Rate Last Admin    midazolam (VERSED) 1 mg/mL injection 0.5 mg  0.5 mg Intravenous PRN Ross Hui MD           Past Medical History:   Diagnosis Date    Allergic rhinitis     Anticoagulant long-term use     Arthritis     Asthma      Bilateral pulmonary embolism 4/27/2019    Cataract     Chronic anticoagulation 9/28/2020    Depression     Diabetes mellitus     Fibromyalgia 7/2/2012    Fibromyalgia     GERD (gastroesophageal reflux disease)     Hypercholesterolemia 9/28/2020    Hypercholesterolemia 9/28/2020    Hypertension 7/2/2012    Thoracic or lumbosacral neuritis or radiculitis, unspecified     Thyroid disease     Ulcer        Past Surgical History:   Procedure Laterality Date    CARPAL TUNNEL RELEASE Left 11/29/2023    Procedure: RELEASE, CARPAL TUNNEL,LEFT;  Surgeon: Avril Hutchison MD;  Location: [facility];  Service: Orthopedics;  Laterality: Left;    CATARACT EXTRACTION Bilateral     ESOPHAGOGASTRODUODENOSCOPY N/A 3/8/2022    Procedure: EGD (ESOPHAGOGASTRODUODENOSCOPY) with dilation-either hospital ok with Dr. Meza;  Surgeon: Rebeca Meza MD;  Location: [facility];  Service: Endoscopy;  Laterality: N/A;  1/11-eliquis hold ok see te-tb    ESOPHAGOGASTRODUODENOSCOPY N/A 2/28/2022    Procedure: EGD (ESOPHAGOGASTRODUODENOSCOPY) with dilation-either [facility] ok with Dr. Meza;  Surgeon: Rebeca Meza MD;  Location: [facility] ([address] [place] [address]);  Service: Endoscopy;  Laterality: N/A;  1/11-eliquis hold ok see te-tb  COVID test on 2/25/22 at [facility]  2/22-confirmed appt arrival time with pt-Kpvt    FOOT NEUROMA SURGERY  1985    HYSTERECTOMY         Vital Signs (Most Recent)  There were no vitals filed for this visit.        Review of Systems:  ROS:  Constitutional: no fever or chills  Eyes: no visual changes  ENT: no nasal congestion or sore throat, Positive  vestibular dizziness  Respiratory: no cough or shortness of breath, Positive for asthma  Cardiovascular: no chest pain or palpitations  Gastrointestinal: no nausea or vomiting, tolerating diet, Positive for GERD, peptic ulcer disease  Genitourinary: no hematuria or dysuria, Positive CKD stage 3,  Integument/Breast: no rash or pruritis  Hematologic/Lymphatic: no easy bruising or  lymphadenopathy, Positive long-term anticoagulation, history DVT, history of PE  Musculoskeletal: no arthralgias or myalgias, Positive chronic low back pain, fibromyalgia, osteoarthritis of multiple joints  Neurological: no seizures or tremors, Degenerative disc disease lumbar spine, history of memory loss, spinal stenosis without neurological claudication  Behavioral/Psych: no auditory or visual hallucinations, Positive for depression,  Endocrine: no heat or cold intolerance, Positive diabetes type 2, thyroid disease hypothyroidism                OBJECTIVE:     PHYSICAL EXAM:     , General Appearance: Well nourished, well developed, in no acute distress.  Neurological: Mood & affect are normal.    left  Knee Exam:  Knee Range of Motion:0-125 degrees flexion   Effusion: none  Condition of skin:intact  Location of tenderness:Medial joint line   Strength:limited by pain and 5 of 5  Stability:  Lachman: stable, LCL: stable, MCL: stable, PCL: stable, and posteromedial (dial): stable  Varus /Valgus stress:  normal  Chris:   negative/negative     right  Knee Exam:  Knee Range of Motion:limited by pain and 0-125 degrees flexion   Effusion:none  Condition of skin:intact  Location of tenderness:Medial joint line   Strength:limited by pain and 5 of 5  Stability:  Lachman: stable, LCL: stable, MCL: stable, PCL: stable, and posteromedial (dial): stable  Varus /Valgus stress:  normal  Chris:   negative/negative        Hip Examination:  normal    RADIOGRAPHS:  X-rays from previous visit reviewed by me today demonstrate moderate arthritic changes throughout both knees right more so than left with significant loss of both medial and lateral joint space sclerotic changes most notable in the medial compartment osteophytic spurring tricompartmentally no evidence of fracture dislocation or other bony abnormality     ASSESSMENT/PLAN:       ICD-10-CM ICD-9-CM   1. Primary osteoarthritis of both knees  M17.0 715.16   2. Weakness of  both lower extremities  R29.898 729.89       Plan: We discussed with the patient at length all the different treatment options available for  the knee including anti-inflammatories, acetaminophen, rest, ice, knee strengthening exercise, occasional cortisone injections for temporary relief, Viscosupplimentation injections, arthroscopic debridement osteotomy, and finally knee arthroplasty.   Hinged knee brace for the left knee.  Patient advised the only size walker smaller than she currently has would be pediatric which would be unsafe for her height and weight

## 2024-03-13 NOTE — TELEPHONE ENCOUNTER
----- Message from Liv Vega sent at 3/13/2024 10:41 AM CDT -----  Regarding: Concerns about Walker  Contact: Pt @ [phone]  Pt is calling to speak with Dr. Ngo about getting a smaller walker she can handle. Please c/b to advise.. Thanks

## 2024-03-15 ENCOUNTER — TELEPHONE (OUTPATIENT)
Dept: PHYSICAL MEDICINE AND REHAB | Facility: CLINIC | Age: 89
End: 2024-03-15
Payer: MEDICARE

## 2024-03-15 NOTE — TELEPHONE ENCOUNTER
Spoke to Pt. Pt is stating she feels like her legs are going to give out and she will fall. Pt stated she is having back issues as well. Dr. Joe, her orthopedic provider, stated that the feeling of her legs giving out is related to her back pain. Pt is requesting medication for this issue. I asked the Pt if she is having issues with Cymbalta and she stated she is not.

## 2024-03-15 NOTE — TELEPHONE ENCOUNTER
----- Message from Gabbi Jean-Baptiste sent at 3/15/2024 11:32 AM CDT -----  Regarding: Advise  Contact: [phone]  Pt is calling stating that she would like to speak to provider in regards to medication: DULoxetine (CYMBALTA) 60 MG capsule. Please give pt a call back.

## 2024-03-15 NOTE — TELEPHONE ENCOUNTER
I called the patient.    He said her back pain has been worse.  He is on duloxetine 60 mg p.o. q.d..  She has not been taking Tylenol.  She thinks it may make her sleepy.  I told her it is unlikely for Tylenol to make her sleepy.  She can start taking Tylenol Extra Strength p.r.n. up to 4 tablets per day.  I also suggested physical therapy.  She said orthopedics already ordered it.

## 2024-03-19 DIAGNOSIS — E03.9 HYPOTHYROIDISM, ADULT: ICD-10-CM

## 2024-03-19 RX ORDER — LEVOTHYROXINE SODIUM 75 UG/1
75 TABLET ORAL
Qty: 30 TABLET | Refills: 8 | Status: SHIPPED | OUTPATIENT
Start: 2024-03-19 | End: 2024-03-28 | Stop reason: SDUPTHER

## 2024-03-19 NOTE — TELEPHONE ENCOUNTER
----- Message from Beatrice Velasquez sent at 3/19/2024  8:50 AM CDT -----  Requesting an RX refill or new RX.  Is this a refill or new RX: Refill 1  RX name and strength (copy/paste from chart):  levothyroxine (SYNTHROID) 75 MCG tablet  Is this a 30 day or 90 day RX:   Pharmacy name and phone # (copy/paste from chart): [facility] - [place] - [address] [facility] AT Portal to [facility] Sites   Phone: [phone]  Fax: [phone]         The doctors have asked that we provide their patients with the following 2 reminders -- prescription refills can take up to 72 hours, and a friendly reminder that in the future you can use your [facility] account to request refills:     Rx needs to say generic permissible in the prescription

## 2024-03-19 NOTE — TELEPHONE ENCOUNTER
Care Due:                  Date            Visit Type   Department     Provider  --------------------------------------------------------------------------------                                HOSPITAL     [facility] INTERNAL  Last Visit: 01-      FOLLOW UP    MEDICINE       GEORGIA BLACKWELL                              EP -                              PRIMARY      [facility] INTERNAL  Next Visit: 04-      CARE ([facility])   MEDICINE       GE[facility]IA BLACKWELL                                                            Last  Test          Frequency    Reason                     Performed    Due Date  --------------------------------------------------------------------------------    Lipid Panel.  12 months..  atorvastatin.............  04- 04-    Health [facility] Embedded Care Due Messages. Reference number: [id number].   3/19/2024 11:53:03 AM CDT

## 2024-03-20 ENCOUNTER — TELEPHONE (OUTPATIENT)
Dept: INTERNAL MEDICINE | Facility: CLINIC | Age: 89
End: 2024-03-20

## 2024-03-20 ENCOUNTER — OUTPATIENT CASE MANAGEMENT (OUTPATIENT)
Dept: ADMINISTRATIVE | Facility: OTHER | Age: 89
End: 2024-03-20
Payer: MEDICARE

## 2024-03-20 RX ORDER — CYPROHEPTADINE HYDROCHLORIDE 4 MG/1
4 TABLET ORAL 2 TIMES DAILY PRN
Qty: 60 TABLET | Refills: 5 | Status: SHIPPED | OUTPATIENT
Start: 2024-03-20

## 2024-03-20 NOTE — LETTER
Debbiearaceli Ding  [address] [place] [address]      Dear Ms. Ding,     I am your nurse with [facility]s Outpatient Care Management Department. I have been unsuccessful at reaching you since we spoke on 3/6.  At your earliest convenience, I would like to discuss your healthcare progress.      Please contact me at [phone] from 8:00 AM to 4:30 PM on Monday through Friday.     As you know, [facility] is a program offered to you through [facility] where a nurse is available 24/7 to answer questions or provide medical advice, their number is [phone].    Kind Regards,      Leigh Ann Asencio, RN  Outpatient Care Management

## 2024-03-20 NOTE — PROGRESS NOTES
3/20/24 1st attempt to complete follow-up for Outpatient Care Management: Left message for patient requesting a return call. OPCM letter has been sent. Will attempt to contact patient again at a later date.

## 2024-03-26 ENCOUNTER — TELEPHONE (OUTPATIENT)
Dept: INTERNAL MEDICINE | Facility: CLINIC | Age: 89
End: 2024-03-26

## 2024-03-26 NOTE — TELEPHONE ENCOUNTER
----- Message from Cristian Corcoran sent at 3/26/2024  9:33 AM CDT -----  Contact: [phone]@patient  Requesting an RX refill or new RX.levothyroxine (SYNTHROID) 75 MCG tablet  Is this a refill or new RX: refill  RX name and strength (copy/paste from chart):    Is this a 30 day or 90 day RX:   Pharmacy name and phone #  [facility] [place] - [facility] AT PORTAL TO [facility] SITES  The doctors have asked that we provide their patients with the following 2 reminders -- prescription refills can take up to 72 hours, and a friendly reminder that in the future you can use your [facility] account to request refills:       NOTE: Patient would like the doc to send a 7 day supply to her local drug store [facility] - [address].   Phone: [phone]

## 2024-03-28 DIAGNOSIS — E03.9 HYPOTHYROIDISM, ADULT: ICD-10-CM

## 2024-03-28 RX ORDER — LEVOTHYROXINE SODIUM 75 UG/1
75 TABLET ORAL
Qty: 7 TABLET | Refills: 1 | Status: SHIPPED | OUTPATIENT
Start: 2024-03-28

## 2024-03-28 NOTE — TELEPHONE ENCOUNTER
----- Message from Ela Varun sent at 3/28/2024  7:58 AM CDT -----  Contact: [phone] Patient  Pt is following up on her request for her Rx to be sent to a local pharmacy.     Requesting an RX refill or new RX.  Is this a refill or new RX: new  RX name and strength (copy/paste from chart): levothyroxine (SYNTHROID) 75 MCG tablet  Is this a 30 day or 90 day RX: short fill  Pharmacy name and phone # (copy/paste from chart):      [facility] - [place] [address]  [place] [address]  Phone: [phone] Fax: [phone]       The doctors have asked that we provide their patients with the following 2 reminders -- prescription refills can take up to 72 hours, and a friendly reminder that in the future you can use your [facility] account to request refills: yes

## 2024-03-28 NOTE — TELEPHONE ENCOUNTER
No care due was identified.  [facility] Embedded Care Due Messages. Reference number: [id number].   3/28/2024 2:46:29 PM CDT

## 2024-03-29 DIAGNOSIS — I10 ESSENTIAL HYPERTENSION: ICD-10-CM

## 2024-03-30 RX ORDER — AMLODIPINE BESYLATE 10 MG/1
TABLET ORAL
Qty: 90 TABLET | Refills: 3 | OUTPATIENT
Start: 2024-03-30

## 2024-03-30 NOTE — TELEPHONE ENCOUNTER
No care due was identified.  Health [facility] Embedded Care Due Messages. Reference number: [id number].   3/30/2024 7:02:12 AM CDT

## 2024-03-30 NOTE — TELEPHONE ENCOUNTER
Refill Decision Note   Debbie Ding  is requesting a refill authorization.  Brief Assessment and Rationale for Refill:  Quick Discontinue     Medication Therapy Plan:  dose decresed      Comments:     Note composed:7:14 AM 03/30/2024

## 2024-03-31 ENCOUNTER — EXTERNAL CHRONIC CARE MANAGEMENT (OUTPATIENT)
Dept: PRIMARY CARE CLINIC | Facility: CLINIC | Age: 89
End: 2024-03-31
Payer: MEDICARE

## 2024-03-31 PROCEDURE — 99439 CHRNC CARE MGMT STAF EA ADDL: CPT | Mod: S$PBB,,, | Performed by: INTERNAL MEDICINE

## 2024-03-31 PROCEDURE — 99490 CHRNC CARE MGMT STAFF 1ST 20: CPT | Mod: PBBFAC | Performed by: INTERNAL MEDICINE

## 2024-03-31 PROCEDURE — 99490 CHRNC CARE MGMT STAFF 1ST 20: CPT | Mod: S$PBB,,, | Performed by: INTERNAL MEDICINE

## 2024-03-31 PROCEDURE — 99439 CHRNC CARE MGMT STAF EA ADDL: CPT | Mod: PBBFAC | Performed by: INTERNAL MEDICINE

## 2024-04-01 ENCOUNTER — OUTPATIENT CASE MANAGEMENT (OUTPATIENT)
Dept: ADMINISTRATIVE | Facility: OTHER | Age: 89
End: 2024-04-01
Payer: MEDICARE

## 2024-04-01 NOTE — PROGRESS NOTES
Outpatient Care Management  Plan of Care Follow Up Visit    Patient: Debbie Ding  MRN: [id number]  Date of Service: 04/01/2024  Completed by: Leigh Ann Asencio RN  Referral Date: 01/02/2024    Reason for Visit   Patient presents with    OPCM Chart Review     4/1/24    OPCM RN Follow Up Call     4/1/24       Brief Summary:     OPCM follow up complete. Continued education on Asthma. Patient is in agreement with follow up call on or around 4/22/24.

## 2024-04-09 ENCOUNTER — PATIENT OUTREACH (OUTPATIENT)
Dept: ADMINISTRATIVE | Facility: HOSPITAL | Age: 89
End: 2024-04-09
Payer: MEDICARE

## 2024-04-09 NOTE — PROGRESS NOTES
Health Maintenance Due   Topic Date Due    Shingles Vaccine (1 of 2) Never done    COVID-19 Vaccine (9 - 2023-24 season) 12/12/2023    Lipid Panel  04/18/2024    Eye Exam  05/31/2024       Chart reviewed and updated.     Jelly Antunez LPN   Clinical Care Coordinator  Primary Care and Wellness

## 2024-04-12 ENCOUNTER — LAB VISIT (OUTPATIENT)
Dept: LAB | Facility: HOSPITAL | Age: 89
End: 2024-04-12
Payer: MEDICARE

## 2024-04-12 DIAGNOSIS — E78.5 HYPERLIPIDEMIA ASSOCIATED WITH TYPE 2 DIABETES MELLITUS: ICD-10-CM

## 2024-04-12 DIAGNOSIS — E11.22 TYPE 2 DIABETES MELLITUS WITH STAGE 3A CHRONIC KIDNEY DISEASE, WITHOUT LONG-TERM CURRENT USE OF INSULIN: ICD-10-CM

## 2024-04-12 DIAGNOSIS — N18.31 TYPE 2 DIABETES MELLITUS WITH STAGE 3A CHRONIC KIDNEY DISEASE, WITHOUT LONG-TERM CURRENT USE OF INSULIN: ICD-10-CM

## 2024-04-12 DIAGNOSIS — I10 HYPERTENSION, ESSENTIAL: ICD-10-CM

## 2024-04-12 DIAGNOSIS — N18.32 CHRONIC KIDNEY DISEASE (CKD) STAGE G3B/A2, MODERATELY DECREASED GLOMERULAR FILTRATION RATE (GFR) BETWEEN 30-44 ML/MIN/1.73 SQUARE METER AND ALBUMINURIA CREATININE RATIO BETWEEN 30-299 MG/G: ICD-10-CM

## 2024-04-12 DIAGNOSIS — E11.69 HYPERLIPIDEMIA ASSOCIATED WITH TYPE 2 DIABETES MELLITUS: ICD-10-CM

## 2024-04-12 DIAGNOSIS — E03.9 HYPOTHYROIDISM, ADULT: ICD-10-CM

## 2024-04-12 LAB
ALBUMIN SERPL BCP-MCNC: 3.7 G/DL (ref 3.5–5.2)
ALP SERPL-CCNC: 87 U/L (ref 55–135)
ALT SERPL W/O P-5'-P-CCNC: 11 U/L (ref 10–44)
ANION GAP SERPL CALC-SCNC: 7 MMOL/L (ref 8–16)
ANION GAP SERPL CALC-SCNC: 7 MMOL/L (ref 8–16)
AST SERPL-CCNC: 18 U/L (ref 10–40)
BASOPHILS # BLD AUTO: 0.05 K/UL (ref 0–0.2)
BASOPHILS NFR BLD: 0.9 % (ref 0–1.9)
BILIRUB SERPL-MCNC: 0.8 MG/DL (ref 0.1–1)
BUN SERPL-MCNC: 13 MG/DL (ref 10–30)
BUN SERPL-MCNC: 13 MG/DL (ref 10–30)
CALCIUM SERPL-MCNC: 9.7 MG/DL (ref 8.7–10.5)
CALCIUM SERPL-MCNC: 9.7 MG/DL (ref 8.7–10.5)
CHLORIDE SERPL-SCNC: 105 MMOL/L (ref 95–110)
CHLORIDE SERPL-SCNC: 105 MMOL/L (ref 95–110)
CHOLEST SERPL-MCNC: 168 MG/DL (ref 120–199)
CHOLEST/HDLC SERPL: 1.7 {RATIO} (ref 2–5)
CO2 SERPL-SCNC: 30 MMOL/L (ref 23–29)
CO2 SERPL-SCNC: 30 MMOL/L (ref 23–29)
CREAT SERPL-MCNC: 1.1 MG/DL (ref 0.5–1.4)
CREAT SERPL-MCNC: 1.1 MG/DL (ref 0.5–1.4)
DIFFERENTIAL METHOD BLD: ABNORMAL
EOSINOPHIL # BLD AUTO: 0.8 K/UL (ref 0–0.5)
EOSINOPHIL NFR BLD: 14 % (ref 0–8)
ERYTHROCYTE [DISTWIDTH] IN BLOOD BY AUTOMATED COUNT: 15.8 % (ref 11.5–14.5)
EST. GFR  (NO RACE VARIABLE): 47.4 ML/MIN/1.73 M^2
EST. GFR  (NO RACE VARIABLE): 47.4 ML/MIN/1.73 M^2
ESTIMATED AVG GLUCOSE: 137 MG/DL (ref 68–131)
GLUCOSE SERPL-MCNC: 101 MG/DL (ref 70–110)
GLUCOSE SERPL-MCNC: 101 MG/DL (ref 70–110)
HBA1C MFR BLD: 6.4 % (ref 4–5.6)
HCT VFR BLD AUTO: 38 % (ref 37–48.5)
HDLC SERPL-MCNC: 99 MG/DL (ref 40–75)
HDLC SERPL: 58.9 % (ref 20–50)
HGB BLD-MCNC: 12.2 G/DL (ref 12–16)
IMM GRANULOCYTES # BLD AUTO: 0.01 K/UL (ref 0–0.04)
IMM GRANULOCYTES NFR BLD AUTO: 0.2 % (ref 0–0.5)
LDLC SERPL CALC-MCNC: 60.4 MG/DL (ref 63–159)
LYMPHOCYTES # BLD AUTO: 2.2 K/UL (ref 1–4.8)
LYMPHOCYTES NFR BLD: 39.3 % (ref 18–48)
MCH RBC QN AUTO: 29.3 PG (ref 27–31)
MCHC RBC AUTO-ENTMCNC: 32.1 G/DL (ref 32–36)
MCV RBC AUTO: 91 FL (ref 82–98)
MONOCYTES # BLD AUTO: 0.5 K/UL (ref 0.3–1)
MONOCYTES NFR BLD: 8.7 % (ref 4–15)
NEUTROPHILS # BLD AUTO: 2 K/UL (ref 1.8–7.7)
NEUTROPHILS NFR BLD: 36.9 % (ref 38–73)
NONHDLC SERPL-MCNC: 69 MG/DL
NRBC BLD-RTO: 0 /100 WBC
PLATELET # BLD AUTO: 183 K/UL (ref 150–450)
PMV BLD AUTO: 11.9 FL (ref 9.2–12.9)
POTASSIUM SERPL-SCNC: 4.1 MMOL/L (ref 3.5–5.1)
POTASSIUM SERPL-SCNC: 4.1 MMOL/L (ref 3.5–5.1)
PROT SERPL-MCNC: 7.2 G/DL (ref 6–8.4)
RBC # BLD AUTO: 4.17 M/UL (ref 4–5.4)
SODIUM SERPL-SCNC: 142 MMOL/L (ref 136–145)
SODIUM SERPL-SCNC: 142 MMOL/L (ref 136–145)
T4 FREE SERPL-MCNC: 1.41 NG/DL (ref 0.71–1.51)
TRIGL SERPL-MCNC: 43 MG/DL (ref 30–150)
TSH SERPL DL<=0.005 MIU/L-ACNC: 0.36 UIU/ML (ref 0.4–4)
WBC # BLD AUTO: 5.49 K/UL (ref 3.9–12.7)

## 2024-04-12 PROCEDURE — 84443 ASSAY THYROID STIM HORMONE: CPT | Performed by: PHYSICIAN ASSISTANT

## 2024-04-12 PROCEDURE — 83036 HEMOGLOBIN GLYCOSYLATED A1C: CPT | Performed by: PHYSICIAN ASSISTANT

## 2024-04-12 PROCEDURE — 85025 COMPLETE CBC W/AUTO DIFF WBC: CPT | Performed by: PHYSICIAN ASSISTANT

## 2024-04-12 PROCEDURE — 80053 COMPREHEN METABOLIC PANEL: CPT | Performed by: PHYSICIAN ASSISTANT

## 2024-04-12 PROCEDURE — 36415 COLL VENOUS BLD VENIPUNCTURE: CPT | Performed by: PHYSICIAN ASSISTANT

## 2024-04-12 PROCEDURE — 80061 LIPID PANEL: CPT | Performed by: PHYSICIAN ASSISTANT

## 2024-04-12 PROCEDURE — 84439 ASSAY OF FREE THYROXINE: CPT | Performed by: PHYSICIAN ASSISTANT

## 2024-04-16 ENCOUNTER — OFFICE VISIT (OUTPATIENT)
Dept: CARDIOLOGY | Facility: CLINIC | Age: 89
End: 2024-04-16
Payer: MEDICARE

## 2024-04-16 VITALS
OXYGEN SATURATION: 96 % | BODY MASS INDEX: 22.63 KG/M2 | HEIGHT: 65 IN | WEIGHT: 135.81 LBS | DIASTOLIC BLOOD PRESSURE: 60 MMHG | HEART RATE: 73 BPM | SYSTOLIC BLOOD PRESSURE: 140 MMHG

## 2024-04-16 DIAGNOSIS — Z86.718 HISTORY OF DEEP VENOUS THROMBOSIS: ICD-10-CM

## 2024-04-16 DIAGNOSIS — N18.31 TYPE 2 DIABETES MELLITUS WITH STAGE 3A CHRONIC KIDNEY DISEASE, WITHOUT LONG-TERM CURRENT USE OF INSULIN: ICD-10-CM

## 2024-04-16 DIAGNOSIS — I27.20 PULMONARY HYPERTENSION: ICD-10-CM

## 2024-04-16 DIAGNOSIS — I10 PRIMARY HYPERTENSION: ICD-10-CM

## 2024-04-16 DIAGNOSIS — E11.69 HYPERLIPIDEMIA ASSOCIATED WITH TYPE 2 DIABETES MELLITUS: ICD-10-CM

## 2024-04-16 DIAGNOSIS — J41.1 BRONCHITIS, CHRONIC, MUCOPURULENT: ICD-10-CM

## 2024-04-16 DIAGNOSIS — R05.3 CHRONIC COUGH: ICD-10-CM

## 2024-04-16 DIAGNOSIS — I70.0 AORTIC ATHEROSCLEROSIS: ICD-10-CM

## 2024-04-16 DIAGNOSIS — E78.5 HYPERLIPIDEMIA ASSOCIATED WITH TYPE 2 DIABETES MELLITUS: ICD-10-CM

## 2024-04-16 DIAGNOSIS — Z74.09 IMPAIRED FUNCTIONAL MOBILITY, BALANCE, GAIT, AND ENDURANCE: ICD-10-CM

## 2024-04-16 DIAGNOSIS — Z86.711 HISTORY OF PULMONARY EMBOLISM: ICD-10-CM

## 2024-04-16 DIAGNOSIS — E11.22 TYPE 2 DIABETES MELLITUS WITH STAGE 3A CHRONIC KIDNEY DISEASE, WITHOUT LONG-TERM CURRENT USE OF INSULIN: ICD-10-CM

## 2024-04-16 DIAGNOSIS — G47.33 OSA (OBSTRUCTIVE SLEEP APNEA): ICD-10-CM

## 2024-04-16 DIAGNOSIS — R06.02 SOB (SHORTNESS OF BREATH): Primary | ICD-10-CM

## 2024-04-16 DIAGNOSIS — Z79.01 CHRONIC ANTICOAGULATION: ICD-10-CM

## 2024-04-16 PROCEDURE — 99215 OFFICE O/P EST HI 40 MIN: CPT | Mod: PBBFAC | Performed by: INTERNAL MEDICINE

## 2024-04-16 PROCEDURE — 99999 PR PBB SHADOW E&M-EST. PATIENT-LVL V: CPT | Mod: PBBFAC,,, | Performed by: INTERNAL MEDICINE

## 2024-04-16 PROCEDURE — 99215 OFFICE O/P EST HI 40 MIN: CPT | Mod: S$PBB,,, | Performed by: INTERNAL MEDICINE

## 2024-04-16 NOTE — PATIENT INSTRUCTIONS
Assessment/Plan:  Debbie Ding is a 89 y.o. female with history of DVT/bilateral PE (on Eliquis), HTN, HLD, DM2, asthma, JASE (not on CPAP), who presents for a follow up appointment.    1. SOB- Due to lung parenchymal disease.  Cardiac workup as per above.  Continue management per Pulmonary.        2. History of RLE DVT/Bilateral PE- CTA Chest on 7/25/2022 revealed no CT evidence of pulmonary embolus to the subsegmental branches.  BLE Venous Ultrasound on 7/29/2022 revealed no evidence of a right or left lower extremity DVT. Continue Eliquis 2.5 mg bid.      3. HTN- Continue current medications.    4. HLD- LDL 60 on 4/12/2024.  Continue atorvastatin 40 mg daily.    5. JASE- Encourage CPAP usage.     Follow up in 1 year

## 2024-04-16 NOTE — PROGRESS NOTES
[facility]      Chief Complaint   Patient presents with    Shortness of Breath       Patient ID: Debbie Ding is a 89 y.o. 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  Pertinent history/events as follows:     -At our initial clinic visit on 7/21/202, Mrs. Ding reported being diagnosed with RLE DVT and bilateral PE in 2019.  She was treated with Eliquis 5 mg bid up until 1 month ago, at which time she was switched to Xarelto 10 mg daily because of insurance issues.  She reports no RLE edema or pain.  Her main complaint is SOB, which she states became worse after being diagnosed with bilateral PE, and progressed over time to the point where she has significant difficulty breathing.  She reports no chest pain or chest discomfort. Echo from 7/19/2022 with EF 65%; estimated PASP 40 mmhg.  Plan:   SOB- Etiology unclear.  Given history of bilateral PE, check CTA chest.  Pt has several risk factors for CAD.  If no evidence of residual PE, will proceed with nuclear stress test to evaluate for myocardial ischemia.  Pt also has history of asthma.  Therefore, if cardiac workup is unrevealing, will refer to Pulmonary for evaluation.    History of RLE DVT/Bilateral PE- Check CTA chest and BLE venous ultrasound.  Continue Xarelto 10 mg daily.  HTN- Continue current medications.  HLD-  Start atorvastatin 40 mg daily.    -At follow up clinic visit on 12/8/2022, Mrs. Ding reported continued SOB as described at clinic visit on 7/21/2022.  CTA Chest on 7/25/2022 revealed no CT evidence of pulmonary embolus to the subsegmental branches.  There is dependent small airways disease, consider aspiration versus noninfectious small airways disease; findings appears stable since prior exam.  Mild scarring within the right middle lobe and lingula, possibly related to previous atypical infection.  BLE Venous Ultrasound on 7/29/2022 revealed  no evidence of a right or left lower extremity DVT.  A Baker's cyst measuring 4.55 cm x 0.92 cm was seen in the right extremity.  Nuclear Stress Test on 8/11/2022 demonstrated normal myocardial perfusion with no evidence of myocardial ischemia or infarction.  Plan:   SOB- Etiology unclear.  Likely related to pathology inherent to the underlying lung parenchyma based on current workup.  CTA Chest on 7/25/2022 revealed no CT evidence of pulmonary embolus to the subsegmental branches.  There is dependent small airways disease, consider aspiration versus noninfectious small airways disease; findings appears stable since prior exam.  Mild scarring within the right middle lobe and lingula, possibly related to previous atypical infection.   A Baker's cyst measuring 4.55 cm x 0.92 cm was seen in the right extremity.  Nuclear Stress Test on 8/11/2022 demonstrated normal myocardial perfusion with no evidence of myocardial ischemia or infarction.  Refer to Pulmonary for evaluation.    History of RLE DVT/Bilateral PE- CTA Chest on 7/25/2022 revealed no CT evidence of pulmonary embolus to the subsegmental branches.  BLE Venous Ultrasound on 7/29/2022 revealed no evidence of a right or left lower extremity DVT. Continue Xarelto 10 mg daily.  HTN- Continue current medications.  HLD-  Continue atorvastatin 40 mg daily.  JASE- Encourage CPAP usage.     -6/29/2023 clinic visit: Mrs. Ding reports improvement in SOB.  She has no chest pain, TIA symptoms or syncope.  LDL 64 on 4/18/2023.  Plan:   SOB- Etiology unclear.  Now improved.  Likely related to pathology inherent to the underlying lung parenchyma  based of current workup.  CTA Chest on 7/25/2022 revealed no CT evidence of pulmonary embolus to the subsegmental branches. There is dependent small airways disease, consider aspiration versus noninfectious small airways disease; findings appears stable since prior exam.  Mild scarring within the right middle lobe and lingula,  possibly related to previous atypical infection. Nuclear Stress Test on 8/11/2022 demonstrated normal myocardial perfusion with no evidence of myocardial ischemia or infarction.  Pt referred to Pulmonary for evaluation.    History of RLE DVT/Bilateral PE- CTA Chest on 7/25/2022 revealed no CT evidence of pulmonary embolus to the subsegmental branches.  BLE Venous Ultrasound on 7/29/2022 revealed no evidence of a right or left lower extremity DVT. Continue Xarelto 10 mg daily.  HTN- Continue current medications.  HLD- LDL 64 on 4/18/2023.  Continue atorvastatin 40 mg daily.  JASE- Encourage CPAP usage.     HPI:  Mrs. Ding reports continued SOB, which is now managed by Pulmonology.  She has no chest pain, LE edema, TIA symptoms or syncope.  LDL 60 on 4/12/2024.      Past Medical History:   Diagnosis Date    Allergic rhinitis     Anticoagulant long-term use     Arthritis     Asthma     Bilateral pulmonary embolism 4/27/2019    Cataract     Chronic anticoagulation 9/28/2020    Depression     Diabetes mellitus     Fibromyalgia 7/2/2012    Fibromyalgia     GERD (gastroesophageal reflux disease)     Hypercholesterolemia 9/28/2020    Hypercholesterolemia 9/28/2020    Hypertension 7/2/2012    Thoracic or lumbosacral neuritis or radiculitis, unspecified     Thyroid disease     Ulcer      Past Surgical History:   Procedure Laterality Date    CARPAL TUNNEL RELEASE Left 11/29/2023    Procedure: RELEASE, CARPAL TUNNEL,LEFT;  Surgeon: Avril Hutchison MD;  Location: [facility];  Service: Orthopedics;  Laterality: Left;    CATARACT EXTRACTION Bilateral     ESOPHAGOGASTRODUODENOSCOPY N/A 3/8/2022    Procedure: EGD (ESOPHAGOGASTRODUODENOSCOPY) with dilation-either [facility] with Dr. Meza;  Surgeon: Rebeca Meza MD;  Location: [facility];  Service: Endoscopy;  Laterality: N/A;  1/11-eliquis hold ok see te-tb    ESOPHAGOGASTRODUODENOSCOPY N/A 2/28/2022    Procedure: EGD (ESOPHAGOGASTRODUODENOSCOPY) with dilation-either  [facility] with Dr. Meza;  Surgeon: Rebeca Meza MD;  Location: [address]);  Service: Endoscopy;  Laterality: N/A;  1/11-eliquis hold ok see te-tb  COVID test on 2/25/22 at [facility]  2/22-confirmed appt arrival time with pt-Kpvt    FOOT NEUROMA SURGERY  1985    HYSTERECTOMY       Social History     Socioeconomic History    Marital status:    Occupational History     Comment:  - school    Tobacco Use    Smoking status: Never    Smokeless tobacco: Never   Substance and Sexual Activity    Alcohol use: No    Drug use: No    Sexual activity: Not Currently   Social History Narrative    Lives with daughter, Benjie.        Other daughter, Madina, drives her around.        She lives independently, except for driving.          Stretches in bed.  Walks in neighborhood, and in house several times a day.     Social Determinants of Health     Financial Resource Strain: Medium Risk (1/12/2024)    Overall Financial Resource Strain (CARDIA)     Difficulty of Paying Living Expenses: Somewhat hard   Food Insecurity: No Food Insecurity (1/12/2024)    Hunger Vital Sign     Worried About Running Out of Food in the Last Year: Never true     Ran Out of Food in the Last Year: Never true   Transportation Needs: No Transportation Needs (1/12/2024)    PRAPARE - Transportation     Lack of Transportation (Medical): No     Lack of Transportation (Non-Medical): No   Physical Activity: Insufficiently Active (1/12/2024)    Exercise Vital Sign     Days of Exercise per Week: 4 days     Minutes of Exercise per Session: 10 min   Stress: No Stress Concern Present (1/12/2024)    [employer] - Occupational Stress Questionnaire     Feeling of Stress : Not at all   Social Connections: Moderately Isolated (1/12/2024)    Social Connection and Isolation Panel [NHANES]     Frequency of Communication with Friends and Family: More than three times a week     Frequency of Social Gatherings with  Friends and Family: More than three times a week     Attends Mu-ism Services: More than 4 times per year     Active Member of Clubs or Organizations: No     Attends Club or Organization Meetings: Never     Marital Status:    Housing Stability: Low Risk  (1/12/2024)    Housing Stability Vital Sign     Unable to Pay for Housing in the Last Year: No     Number of Places Lived in the Last Year: 1     Unstable Housing in the Last Year: No     Family History   Problem Relation Name Age of Onset    Diabetes Mother      Diabetes Brother      Emphysema Maternal Aunt      Melanoma Neg Hx      Asthma Neg Hx      Colon cancer Neg Hx      Esophageal cancer Neg Hx         Review of patient's allergies indicates:   Allergen Reactions    Melatonin      Other reaction(s): Other (See Comments)  Sleep Walks and has unnatural dreams    Talwin compound Hives    Talwin [pentazocine lactate] Hallucinations    Trazodone Other (See Comments)     Nightmares/sleep walk      Bentyl [dicyclomine] Anxiety       Medication List with Changes/Refills   Current Medications    ACETAMINOPHEN (TYLENOL) 500 MG TABLET    Take 1-2 tablets (500-1,000 mg total) by mouth 3 (three) times daily as needed for Pain.    ALBUTEROL (PROVENTIL) 2.5 MG /3 ML (0.083 %) NEBULIZER SOLUTION    Take 3 mLs (2.5 mg total) by nebulization every 6 (six) hours as needed for Wheezing. Rescue    ALBUTEROL (PROVENTIL/VENTOLIN HFA) 90 MCG/ACTUATION INHALER    Inhale 2 puffs into the lungs every 6 (six) hours as needed.    AMLODIPINE (NORVASC) 2.5 MG TABLET    Take 2 tablets (5 mg total) by mouth once daily.    ATORVASTATIN (LIPITOR) 40 MG TABLET    Take 1 tablet (40 mg total) by mouth once daily.    BENZONATATE (TESSALON) 100 MG CAPSULE    Take 1 capsule (100 mg total) by mouth 3 (three) times daily as needed for Cough.    CICLOPIROX 1 % SHAMPOO    Apply topically.    CYPROHEPTADINE (PERIACTIN) 4 MG TABLET    Take 1 tablet (4 mg total) by mouth 2 (two) times daily as  needed (for appetite).    DEXTROMETHORPHAN-GUAIFENESIN  MG/5 ML (ROBITUSSIN-DM)  MG/5 ML LIQUID    Take 10 mLs by mouth every 6 (six) hours as needed (Cough).    DONEPEZIL (ARICEPT) 10 MG TABLET    Take 1 tablet by mouth every evening.    DOXEPIN (SINEQUAN) 10 MG CAPSULE    TAKE 1 CAPSULE AT BEDTIME  FOR ITCHING    DULOXETINE (CYMBALTA) 60 MG CAPSULE    Take 1 capsule (60 mg total) by mouth once daily.    ELIQUIS 2.5 MG TAB    TAKE 1 TABLET DAILY    ESOMEPRAZOLE (NEXIUM) 40 MG CAPSULE    TAKE 1 CAPSULE TWICE DAILY    FAMOTIDINE (PEPCID) 20 MG TABLET    Take 1 tablet (20 mg total) by mouth daily as needed for Heartburn (breakthrough heartburn and acid reflux not controlled with Nexium).    FLUOCINONIDE (LIDEX) 0.05 % EXTERNAL SOLUTION    AAA scalp qday - bid prn pruritus    FLUTICASONE-SALMETEROL 230-21 MCG/DOSE (ADVAIR HFA) 230-21 MCG/ACTUATION HFAA INHALER    Inhale 2 puffs into the lungs 2 (two) times daily. Controller    GABAPENTIN (NEURONTIN) 100 MG CAPSULE    Take 100 mg by mouth.    GEMTESA 75 MG TAB    Take 1 tablet by mouth twice a week.    GLYCOPYRROLATE (ROBINUL) 2 MG TAB    TAKE 1 TABLET TWICE A DAY    LEVOTHYROXINE (SYNTHROID) 75 MCG TABLET    Take 1 tablet (75 mcg total) by mouth before breakfast.    MECLIZINE (ANTIVERT) 25 MG TABLET    TAKE 1 TABLET TWICE A DAY  AS NEEDED FOR DIZZINESS    MEMANTINE (NAMENDA) 10 MG TAB    Take 1 tablet (10 mg total) by mouth once daily.    MIRTAZAPINE (REMERON) 15 MG TABLET    Take 1 tablet (15 mg total) by mouth every evening.       Review of Systems  Constitution: Denies chills, fever, and sweats.  HENT: Denies headaches or blurry vision.  Cardiovascular: Denies chest pain or irregular heart beat.  Respiratory: Positive for shortness of breath.  Gastrointestinal: Denies abdominal pain, nausea, or vomiting.  Musculoskeletal: Denies muscle cramps.  Neurological: Denies dizziness or focal weakness.  Psychiatric/Behavioral: Normal mental  "status.  Hematologic/Lymphatic: Denies bleeding problem or easy bruising/bleeding.  Skin: Denies rash or suspicious lesions    Physical Examination  BP (!) 140/60   Pulse 73   Ht 5' 5" (1.651 m)   Wt 61.6 kg (135 lb 12.9 oz)   SpO2 96%   BMI 22.60 kg/m²     Constitutional: No acute distress, conversant  HEENT: Sclera anicteric, Pupils equal, round and reactive to light, extraocular motions intact, Oropharynx clear  Neck: No JVD, no carotid bruits  Cardiovascular: regular rate and rhythm, no murmur, rubs or gallops, normal S1/S2  Pulmonary: Clear to auscultation bilaterally  Abdominal: Abdomen soft, nontender, nondistended, positive bowel sounds  Extremities: No lower extremity edema,   Pulses:  Carotid pulses are 2+ on the right side, and 2+ on the left side.  Radial pulses are 2+ on the right side, and 2+ on the left side.   Femoral pulses are 2+ on the right side, and 2+ on the left side.  Popliteal pulses are 2+ on the right side, and 2+ on the left side.   Dorsalis pedis pulses are 2+ on the right side, and 2+ on the left side.   Posterior tibial pulses are 2+ on the right side, and 2+ on the left side.    Skin: No ecchymosis, erythema, or ulcers  Psych: Alert and oriented x 3, appropriate affect  Neuro: CNII-XII intact, no focal deficits    Labs:  Most Recent Data  CBC:   Lab Results   Component Value Date    WBC 5.49 04/12/2024    HGB 12.2 04/12/2024    HCT 38.0 04/12/2024     04/12/2024    MCV 91 04/12/2024    RDW 15.8 (H) 04/12/2024     BMP:   Lab Results   Component Value Date     04/12/2024     04/12/2024    K 4.1 04/12/2024    K 4.1 04/12/2024     04/12/2024     04/12/2024    CO2 30 (H) 04/12/2024    CO2 30 (H) 04/12/2024    BUN 13 04/12/2024    BUN 13 04/12/2024    CREATININE 1.1 04/12/2024    CREATININE 1.1 04/12/2024     04/12/2024     04/12/2024    CALCIUM 9.7 04/12/2024    CALCIUM 9.7 04/12/2024    MG 2.1 11/15/2022    PHOS 2.8 11/15/2022     LFTS; "   Lab Results   Component Value Date    PROT 7.2 04/12/2024    ALBUMIN 3.7 04/12/2024    BILITOT 0.8 04/12/2024    AST 18 04/12/2024    ALKPHOS 87 04/12/2024    ALT 11 04/12/2024     COAGS:   Lab Results   Component Value Date    INR 0.9 06/20/2019     FLP:   Lab Results   Component Value Date    CHOL 168 04/12/2024    HDL 99 (H) 04/12/2024    LDLCALC 60.4 (L) 04/12/2024    TRIG 43 04/12/2024    CHOLHDL 58.9 (H) 04/12/2024     CARDIAC:   Lab Results   Component Value Date    TROPONINI <0.006 12/29/2023     (H) 12/29/2023       Imaging:    EKG 7/18/2022:  Normal sinus rhythm  Incomplete right bundle branch block    CT Chest 12/21/2023:  Scattered heterogeneous patchy and consolidative ground-glass opacities throughout both lungs with upper lung zone predominance.  Findings are nonspecific, with considerations including infectious or non infectious inflammatory process, noting sequela of aspiration could present similarly.   Atelectasis versus scarring involving the lung bases.   Scattered pulmonary micro nodules as detailed above.  For multiple solid nodules all <6 mm, Fleischner Society 2017 guidelines recommend no routine follow up for a low risk patient, or follow up with non-contrast chest CT at 12 months after discovery in a high risk patient.   Multifocal areas of retained secretions within the distal bronchials, likely representing sequela of chronic small airways disease.    Nuclear Stress Test 8/11/2022:    Normal myocardial perfusion scan. There is no evidence of myocardial ischemia or infarction.    There is a  mild intensity fixed perfusion abnormality in the inferior wall of the left ventricle secondary to diaphragm attenuation.    The gated perfusion images showed an ejection fraction of 60% at rest. The gated perfusion images showed an ejection fraction of 68% post stress. Normal ejection fraction is greater than 53%.    There is normal wall motion at rest and post stress.    LV cavity size is  normal at rest and normal at stress.    The EKG portion of this study is abnormal but not diagnostic.    The patient reported no chest pain during the stress test.    There were no arrhythmias during stress.    There are no prior studies for comparison.    CTA Chest 7/25/2022:  1.  No CT evidence of pulmonary embolus to the subsegmental branches.    2.  Dependent small airways disease, consider aspiration versus noninfectious small airways disease; findings appears stable since prior exam.  Mild scarring within the right middle lobe and lingula, possibly related to previous atypical infection.    BLE Venous Ultrasound 7/29/2022:  There is no evidence of a right lower extremity DVT.  The right superficial femoral middle vein is normal.  A Baker's cyst measuring 4.55 cm x 0.92 cm was seen in the right extremity.  There is no evidence of a left lower extremity DVT.    Echo 7/19/2022:  The left ventricle is normal in size with normal systolic function. The estimated ejection fraction is 65%.  Normal right ventricular size with normal right ventricular systolic function.  Indeterminate left ventricular diastolic function.  Mild tricuspid regurgitation.  The estimated PA systolic pressure is 40 mmHg.  Normal central venous pressure (3 mmHg).    LLE Venous Ultrasound 12/14/2021:  No evidence of deep venous thrombosis in the left lower extremity.    BLE Venous Ultrasound 6/20/2019:  Positive DVT study with thrombus seen within the right popliteal and peroneal veins    Echo 7/19/2022:  The left ventricle is normal in size with normal systolic function. The estimated ejection fraction is 65%.  Normal right ventricular size with normal right ventricular systolic function.  Indeterminate left ventricular diastolic function.  Mild tricuspid regurgitation.  The estimated PA systolic pressure is 40 mmHg.  Normal central venous pressure (3 mmHg).    Assessment/Plan:  Debbie Ding is a 89 y.o. female with history of  DVT/bilateral PE (on Eliquis), HTN, HLD, DM2, asthma, JASE (not on CPAP), who presents for a follow up appointment.    1. SOB- Due to lung parenchymal disease.  Cardiac workup as per above.  Continue management per Pulmonary.        2. History of RLE DVT/Bilateral PE- CTA Chest on 7/25/2022 revealed no CT evidence of pulmonary embolus to the subsegmental branches.  BLE Venous Ultrasound on 7/29/2022 revealed no evidence of a right or left lower extremity DVT. Continue Eliquis 2.5 mg bid.      3. HTN- Continue current medications.    4. HLD- LDL 60 on 4/12/2024.  Continue atorvastatin 40 mg daily.    5. JASE- Encourage CPAP usage.     Follow up in 1 year    Total duration of face to face visit time 30 minutes.  Total time spent counseling greater than fifty percent of total visit time.  Counseling included discussion regarding imaging findings, diagnosis, possibilities, treatment options, risks and benefits.  The patient had many questions regarding the options and long-term effects.    Benedicto Love MD, PhD  Interventional Cardiology

## 2024-04-18 DIAGNOSIS — I10 HYPERTENSION, ESSENTIAL: ICD-10-CM

## 2024-04-18 DIAGNOSIS — E03.9 HYPOTHYROIDISM, ADULT: ICD-10-CM

## 2024-04-18 NOTE — TELEPHONE ENCOUNTER
No care due was identified.  [facility] Embedded Care Due Messages. Reference number: [id number].   4/18/2024 9:25:03 AM CDT

## 2024-04-18 NOTE — TELEPHONE ENCOUNTER
----- Message from Beba Cosby sent at 4/18/2024  8:24 AM CDT -----  Contact: [phone]  Requesting an RX refill or new RX.  Is this a refill or new RX: refill   RX name and strength (copy/paste from charamLODIPine (NORVASC) 2.5 MG tablet)  Is this a 30 day or 90 day RX: 30 day   Pharmacy name and phone # (copy/paste from chart):    [facility] - [address] [address]  Phone: [phone] Fax: [phone]    Requesting an RX refill or new RX.  Is this a refill or new RX: refill   RX name and strength (copy/paste from chart):  levothyroxine (SYNTHROID) 75 MCG tablet 7 tablet  Is this a 30 day or 90 day RX: 30 day   Pharmacy name and phone # (copy/paste from chart):    [facility] - [address] [address]  Phone: [phone] Fax: [phone]    The doctors have asked that we provide their patients with the following 2 reminders -- prescription refills can take up to 72 hours, and a friendly reminder that in the future you can use your [facility] account to request refills: pt is aware       Thank you

## 2024-04-19 ENCOUNTER — TELEPHONE (OUTPATIENT)
Dept: INTERNAL MEDICINE | Facility: CLINIC | Age: 89
End: 2024-04-19
Payer: MEDICARE

## 2024-04-19 RX ORDER — LEVOTHYROXINE SODIUM 75 UG/1
75 TABLET ORAL
Qty: 7 TABLET | Refills: 1 | Status: CANCELLED | OUTPATIENT
Start: 2024-04-19

## 2024-04-19 RX ORDER — LEVOTHYROXINE SODIUM 75 UG/1
75 TABLET ORAL
Qty: 90 TABLET | Refills: 0 | Status: SHIPPED | OUTPATIENT
Start: 2024-04-19

## 2024-04-19 RX ORDER — AMLODIPINE BESYLATE 2.5 MG/1
5 TABLET ORAL DAILY
Qty: 90 TABLET | Refills: 3 | Status: SHIPPED | OUTPATIENT
Start: 2024-04-19 | End: 2024-04-22 | Stop reason: SDUPTHER

## 2024-04-19 NOTE — TELEPHONE ENCOUNTER
Refill Routing Note   Medication(s) are not appropriate for processing by [facility] for the following reason(s):        Required vitals abnormal  No active prescription written by provider  Other    [facility] action(s):  Defer        Medication Therapy Plan: hospital approved dose adjustment for Amlodipine 2.5 mg ,1 tab 2 times daily on 1/3/24;sent as no print to pharmacy and therefore not received.      Appointments  past 12m or future 3m with PCP    Date Provider   Last Visit   1/23/2024 Carolyn Mejia MD   Next Visit   4/24/2024 Carolyn Mejia MD   ED visits in past 90 days: 0        Note composed:12:05 PM 04/19/2024

## 2024-04-19 NOTE — TELEPHONE ENCOUNTER
Refill Routing Note   Medication(s) are not appropriate for processing by [facility] for the following reason(s):        Required vitals abnormal: Amlodipine  No active prescription written by provider:Amlodipine  Per pharmacy note, pt requesting brand Synthroid; pended to [facility] for provider approval    [facility] action(s):  Defer        Medication Therapy Plan: hospital approved dose adjustment for Amlodipine 2.5 mg ,1 tab 2 times daily on 1/3/24;sent as no print to pharmacy and therefore not received.      Appointments  past 12m or future 3m with PCP    Date Provider   Last Visit   1/23/2024 Carolyn Mejia MD   Next Visit   4/24/2024 Carolyn Mejia MD   ED visits in past 90 days: 0        Note composed:11:57 AM 04/19/2024

## 2024-04-19 NOTE — TELEPHONE ENCOUNTER
----- Message from Shelby S Harjeet sent at 4/19/2024  9:05 AM CDT -----  Contact: Home  [phone]  Requesting an RX refill or new RX.  Is this a refill or new RX: Refill  RX name and strength amLODIPine (NORVASC) 2.5 MG tablet  Is this a 30 day or 90 day RX: ...  Pharmacy name and phone #   [facility] - [address] [address]  Phone: [phone] Fax: [phone]  The doctors have asked that we provide their patients with the following 2 reminders -- prescription refills can take up to 72 hours, and a friendly reminder that in the future you can use your [facility] account to request refills:

## 2024-04-19 NOTE — TELEPHONE ENCOUNTER
----- Message from Guerda Wolf sent at 4/19/2024  2:17 PM CDT -----  Contact: [phone] PAtient  Pt is calling in regards to the status of her amLODIPine (NORVASC) 2.5 MG tablet being filled. Please call and advise. Pt is out of medication. Thank you

## 2024-04-22 ENCOUNTER — OUTPATIENT CASE MANAGEMENT (OUTPATIENT)
Dept: ADMINISTRATIVE | Facility: OTHER | Age: 89
End: 2024-04-22
Payer: MEDICARE

## 2024-04-22 DIAGNOSIS — I10 HYPERTENSION, ESSENTIAL: ICD-10-CM

## 2024-04-22 RX ORDER — AMLODIPINE BESYLATE 2.5 MG/1
5 TABLET ORAL DAILY
Qty: 90 TABLET | Refills: 3 | Status: SHIPPED | OUTPATIENT
Start: 2024-04-22

## 2024-04-22 NOTE — TELEPHONE ENCOUNTER
No care due was identified.  [facility] Embedded Care Due Messages. Reference number: [id number].   4/22/2024 3:42:46 PM CDT

## 2024-04-22 NOTE — PROGRESS NOTES
Outpatient Care Management  Plan of Care Follow Up Visit    Patient: Debbie Ding  MRN: [id number]  Date of Service: 04/22/2024  Completed by: Leigh Ann Asencio RN  Referral Date: 01/02/2024    Reason for Visit   Patient presents with    OPCM Chart Review     4/22/24    OPCM RN Follow Up Call     4/22/24       Brief Summary:     OPCM follow up complete. Continued education on Asthma. Patient is in agreement with follow up call on or around 5/13/24.

## 2024-04-22 NOTE — TELEPHONE ENCOUNTER
----- Message from Leigh Ann Asencio RN sent at 4/22/2024  3:28 PM CDT -----  Regarding: CPAP tubing and 5-7 day supply of amlodipine  Good afternoon, I am an outpatient  working with Ms. Perea. I spoke with her earlier, and she told me she contacted [facility] this morning regarding her prescription for amlodipine and they informed her that her insurance said she cannot get it from [facility] and that it has to be sent through the mail. She has been without the medication for 2 weeks and was told it would take 5-7 business days for her to receive it. She denies any recent headache, chest pain, or dizziness and says she hasn't noticed any elevated blood pressure readings yet. Is there a way that she can get a 5 day supply or is it okay that she goes another week without medication? She is also requesting tubing for her CPAP machine. She got a nasal pillow mask but was not sent the proper tubing. She has called [employer] numerous times but they will not send the new tubing that goes with the nasal pillow mask so she is not using her CPAP machine at all. Can Dr. Mejia place an order for the nasal pillow tubing? Thank you.        Kind Regards,    Leigh Ann Asencio RN  [phone]  Outpatient Case Management

## 2024-04-30 ENCOUNTER — EXTERNAL CHRONIC CARE MANAGEMENT (OUTPATIENT)
Dept: PRIMARY CARE CLINIC | Facility: CLINIC | Age: 89
End: 2024-04-30
Payer: MEDICARE

## 2024-04-30 ENCOUNTER — TELEPHONE (OUTPATIENT)
Dept: ORTHOPEDICS | Facility: CLINIC | Age: 89
End: 2024-04-30
Payer: MEDICARE

## 2024-04-30 PROCEDURE — 99490 CHRNC CARE MGMT STAFF 1ST 20: CPT | Mod: S$PBB,,, | Performed by: INTERNAL MEDICINE

## 2024-04-30 PROCEDURE — 99490 CHRNC CARE MGMT STAFF 1ST 20: CPT | Mod: PBBFAC | Performed by: INTERNAL MEDICINE

## 2024-05-01 ENCOUNTER — TELEPHONE (OUTPATIENT)
Dept: INTERNAL MEDICINE | Facility: CLINIC | Age: 89
End: 2024-05-01
Payer: MEDICARE

## 2024-05-01 NOTE — TELEPHONE ENCOUNTER
----- Message from Mandy Winchester PA-C sent at 5/1/2024  6:46 AM CDT -----  Patient cancelled f/u with Dr. Mejia 4/24 - please assist her in rescheduling

## 2024-05-02 ENCOUNTER — TELEPHONE (OUTPATIENT)
Dept: ORTHOPEDICS | Facility: CLINIC | Age: 89
End: 2024-05-02
Payer: MEDICARE

## 2024-05-02 NOTE — TELEPHONE ENCOUNTER
Return call no answer left message on voice mail regarding rescheduling her orthopedics appointment.

## 2024-05-03 ENCOUNTER — TELEPHONE (OUTPATIENT)
Dept: INTERNAL MEDICINE | Facility: CLINIC | Age: 89
End: 2024-05-03
Payer: MEDICARE

## 2024-05-06 ENCOUNTER — TELEPHONE (OUTPATIENT)
Dept: INTERNAL MEDICINE | Facility: CLINIC | Age: 89
End: 2024-05-06
Payer: MEDICARE

## 2024-05-06 DIAGNOSIS — M48.061 SPINAL STENOSIS, LUMBAR REGION, WITHOUT NEUROGENIC CLAUDICATION: ICD-10-CM

## 2024-05-06 DIAGNOSIS — G89.29 CHRONIC BILATERAL LOW BACK PAIN WITHOUT SCIATICA: Primary | ICD-10-CM

## 2024-05-06 DIAGNOSIS — R29.898 DECREASED STRENGTH OF TRUNK AND BACK: ICD-10-CM

## 2024-05-06 DIAGNOSIS — M54.50 CHRONIC BILATERAL LOW BACK PAIN WITHOUT SCIATICA: Primary | ICD-10-CM

## 2024-05-09 ENCOUNTER — TELEPHONE (OUTPATIENT)
Dept: ORTHOPEDICS | Facility: CLINIC | Age: 89
End: 2024-05-09
Payer: MEDICARE

## 2024-05-09 ENCOUNTER — TELEPHONE (OUTPATIENT)
Dept: INTERNAL MEDICINE | Facility: CLINIC | Age: 89
End: 2024-05-09
Payer: MEDICARE

## 2024-05-09 DIAGNOSIS — E03.9 HYPOTHYROIDISM, ADULT: Primary | ICD-10-CM

## 2024-05-09 NOTE — TELEPHONE ENCOUNTER
----- Message from Mandy Winchester PA-C sent at 5/9/2024  7:13 AM CDT -----  Please call patient - TSH is out of range - will need to repeat in 4-6 weeks  - order in so please schedule  Schedule a f/u with Dr. Mejia after as she missed prior appointment

## 2024-05-09 NOTE — TELEPHONE ENCOUNTER
Spoke with pt, she states that she is in so much pain, she is unsure if she will be able to come in to be seen. Pt states that she will try to get to pain management on next week but she can't make any promises. Pt would like pcp to refill all of her medications, just in case she is not able to come in because of pain, and when she gets better she will come in.

## 2024-05-11 DIAGNOSIS — E78.5 HYPERLIPIDEMIA, UNSPECIFIED HYPERLIPIDEMIA TYPE: ICD-10-CM

## 2024-05-11 RX ORDER — ATORVASTATIN CALCIUM 40 MG/1
40 TABLET, FILM COATED ORAL
Qty: 90 TABLET | Refills: 2 | Status: SHIPPED | OUTPATIENT
Start: 2024-05-11

## 2024-05-11 NOTE — TELEPHONE ENCOUNTER
No care due was identified.  [facility] Embedded Care Due Messages. Reference number: [id number].   5/11/2024 1:15:32 AM CDT

## 2024-05-11 NOTE — TELEPHONE ENCOUNTER
Refill Routing Note   Medication(s) are not appropriate for processing by [facility] for the following reason(s):      Medication outside of protocol    [facility] action(s):  Approve  Route Care Due:  None identified            Appointments  past 12m or future 3m with PCP    Date Provider   Last Visit   1/23/2024 Carolyn Mejia MD   Next Visit   7/9/2024 Carolyn Mejia MD   ED visits in past 90 days: 0        Note composed:11:49 AM 05/11/2024           73 yo M w/ PMH CLL and waldenstroms macroglobulinemia (on ninlaro and dexamethasone), gout, afib (with pacemaker, on amio and coumadin), CKD, dementia who presents from [facility] rehab for pos blood culture. Pt was hospitalized from 12/22 - 12/28 for a fall c/b pubic rami fracture, no surgery indicated per ortho. Towards end of hospitalization, pt developed some redness around IV site, no fevers or tachycardia were documented, pt denied subjective fever during hospitalization. IV was removed, pt was deemed stable, DC'd to rehab. While in rehab, had fever on 12/31, bx drawn resulted with MRSA, pt states was started on an oral abx on Monday, was changed to vano IV on Wednesday, decision was made on 1/4 to send pt to ED for admission.     Pt endorses red rash on RUE near site of previous IV. Denies other complaints at this time of CP, SOB, dysuria, headaches, new back pain (tho does have chronic back pain), no new trauma.   Of note, pt remians on chemo w// ninlaro and dexamethasone (cycle 4/6), due for dose today, states that his oncologist is aware of his MRSA infection and wants to procede with giving chemo today.     In ED:   - afebrile, HR 60s, BP wnl, sat'ing wnl on RA.   - no leukocytosis, does have anemia, elevated sCr, low vanco trough,     Patient is a 72y old  Male who presents with a chief complaint of positive blood cult (04 Jan 2019 13:34)    HPI:  73 yo M w/ PMH CLL and waldenstroms macroglobulinemia (on ninlaro and dexamethasone), gout, afib (with pacemaker, on coumadin), dementia who presents from [facility] rehab for pos blood culture. Pt states had a mechanical fall, was hospitalized with d/c on saturday 12/19. At that time, pt states his right upper arm was red and swollen at prior IV site. On Sunday, he had a fever, and had bcx taken in rehab, found to have MRSA baceremia. Transferred to ED to r/o endocarditis. Pt denies complaints at this time, no cough, chest pain, sob.  pt did not have fever while in hospital last week.  also denies right arm swelling while in hospital   reports prior h/o cardiac murmur (04 Jan 2019 13:34)      PAST MEDICAL & SURGICAL HISTORY:  Memory deficit  BPH (benign prostatic hyperplasia)  [place] (hard of hearing)  Gout  CKD (chronic kidney disease)  Nephrolithiasis  Hypothyroid  Bradycardia, drug induced  Waldenstrom macroglobulinemia  Diverticulitis  Atrial fibrillation  GERD (gastroesophageal reflux disease)  Anxiety disorder  CLL (chronic lymphocytic leukemia): in remission  History of cardiac pacemaker in situ  History of appendectomy  History of cataract surgery, right: IOL  History of laparoscopic cholecystectomy: 4/2014  S/P hernia repair: x2  Meniscus tear: s/p removal of Meniscus 8 months ago      Social history:  Lives with wife, denies EtOH and tobacoo use.     FAMILY HISTORY:  No pertinent family history in first degree relatives    General: Denies fever, chills, weight loss  HEENT: Denies vision changes  Cardio: Denies CP, palpitations  Pulm: Denies SOB, wheezing, cough,   GI: Denies nausea, vomiting, abdominal pain, dark stools  Neuro: Denies focal weakness, headaches, decreased/altered sensation  Musc Skel: Denies back pain, joint pain  Uro: Denies dysuria  Skin: R forearm rash, some TTP, improved since started abx.   Endocrine: Denies polyuria, polydipsia  Psych: Denies anxiety and depressed mood    Allergic/Immunologic:	No hives or rash   Allergies    No Known Allergies    Intolerances    rituximab (Rash)      Antimicrobials:          Vital Signs Last 24 Hrs  T(C): 37.1 (04 Jan 2019 11:45), Max: 37.1 (04 Jan 2019 11:45)  T(F): 98.7 (04 Jan 2019 11:45), Max: 98.7 (04 Jan 2019 11:45)  HR: 64 (04 Jan 2019 11:45) (64 - 64)  BP: 138/73 (04 Jan 2019 11:45) (138/73 - 138/73)  BP(mean): --  RR: 16 (04 Jan 2019 11:45) (16 - 16)  SpO2: 95% (04 Jan 2019 11:45) (95% - 95%)      PHYSICAL EXAM:  General: NAD, well-developed  HEENT: EOMI  Neck: Supple, No JVD  Chest/Lung: Clear to auscultation bilaterally; No wheezes  Heart: Regular rate and rhythm; No murmurs, rubs, or gallops. Capillary refill WNL  Abdome: Soft, Nontender, Nondistended; Bowel sounds present  Extremities: No lower extremity edema. (+) RUE forearm erythema.   Psych: AAOx3  Neurology: non-focal, strength and sensation grossly intact UE and LE BL. No spinal TTP  Skin: No rashes or lesions               10.1   5.6   )-----------( 158      ( 04 Jan 2019 13:17 )             30.5         01-04    138  |  103  |  35<H>  ----------------------------<  101<H>  4.7   |  24  |  2.62<H>    Ca    9.3      04 Jan 2019 13:17    TPro  6.3  /  Alb  3.7  /  TBili  0.4  /  DBili  x   /  AST  12  /  ALT  17  /  AlkPhos  92  01-04      RECENT CULTURES:  01-01 @ 04:41  .Urine CL CATCH MDSTRM  --  --  --    <10,000 CFU/ml Normal Urogenital libby present  --  12-31 @ 17:34  .Blood PERCUTANEOUS (BLOOD)  Blood Culture PCR  Methicillin resistant Staphylococcus aureus  Blood Culture PCR  PCR    Growth in aerobic and anaerobic bottles: Methicillin resistant  Staphylococcus aureus  "Due to technical problems, Proteus sp. will Not be reported as part of  the BCID panel until further notice"  ***Blood Panel PCR results on this specimen are available  approximately 3 hours after the Gram stain result.***  Gram stain, PCR, and/or culture results may not always  correspond due to difference in methodologies.  ************************************************************  This PCR assay was performed using [employer].  The following targets are tested for: Enterococcus,  vancomycin resistant enterococci, Listeria monocytogenes,  coagulase negative staphylococci, S. aureus,  methicillin resistant S. aureus, Streptococcus agalactiae  (Group B), S. pneumoniae, S. pyogenes (Group A),  Acinetobacter baumannii, Enterobacter cloacae, E. coli,  Klebsiella oxytoca, K. pneumoniae, Proteus sp.,  Serratia marcescens, Haemophilus influenzae,  Neisseria meningitidis, Pseudomonas aeruginosa, Candida  albicans, C. glabrata, C krusei, C parapsilosis,  C. tropicalis and the KPC resistance gene.  --      MICROBIOLOGY:  Culture Results:   <10,000 CFU/ml Normal Urogenital libby present (01-01 @ 04:41)  Culture Results:   Growth in aerobic and anaerobic bottles: Methicillin resistant  Staphylococcus aureus  "Due to technical problems, Proteus sp. will Not be reported as part of  the BCID panel until further notice"  ***Blood Panel PCR results on this specimen are available  approximately 3 hours after the Gram stain result.***  Gram stain, PCR, and/or culture results may not always  correspond due to difference in methodologies.  ************************************************************  This PCR assay was performed using [employer].  The following targets are tested for: Enterococcus,  vancomycin resistant enterococci, Listeria monocytogenes,  coagulase negative staphylococci, S. aureus,  methicillin resistant S. aureus, Streptococcus agalactiae  (Group B), S. pneumoniae, S. pyogenes (Group A),  Acinetobacter baumannii, Enterobacter cloacae, E. coli,  Klebsiella oxytoca, K. pneumoniae, Proteus sp.,  Serratia marcescens, Haemophilus influenzae,  Neisseria meningitidis, Pseudomonas aeruginosa, Candida  albicans, C. glabrata, C krusei, C parapsilosis,  C. tropicalis and the KPC resistance gene. (12-31 @ 17:34)      Radiology:      Assessment:        Recommendations and Plan: HPI: 71 yo M w/ PMH CLL and waldenstroms macroglobulinemia (on ninlaro and dexamethasone), gout, afib (with pacemaker, on coumadin), dementia who presents from [facility] rehab for pos blood culture. Pt states had a mechanical fall, was hospitalized with d/c on saturday 12/19. At that time, pt states his right upper arm was red and swollen at prior IV site. On Sunday, he had a fever, and had bcx taken in rehab, found to have MRSA baceremia. Transferred to ED to r/o endocarditis. Pt denies complaints at this time, no cough, chest pain, sob.  pt did not have fever while in hospital last week.  also denies right arm swelling while in hospital   reports prior h/o cardiac murmur (04 Jan 2019 13:34)    Above reviewed ID note:    71 yo M w/ PMH CLL and waldenstroms macroglobulinemia (on ninlaro and dexamethasone), gout, afib (with pacemaker, on amio and coumadin), CKD, dementia who presents from [facility] rehab for pos blood culture. Pt was hospitalized from 12/22 - 12/28 for a fall c/b pubic rami fracture, no surgery indicated per ortho. Towards end of hospitalization, pt developed some redness around IV site, no fevers or tachycardia were documented, pt denied subjective fever during hospitalization. IV was removed, pt was deemed stable, DC'd to rehab. While in rehab, had fever on 12/31, bx drawn resulted with MRSA, pt states was started on an oral abx on Monday, was changed to vano IV on Wednesday, decision was made on 1/4 to send pt to ED for admission.     Pt endorses red rash on RUE near site of previous IV. Denies other complaints at this time of CP, SOB, dysuria, headaches, new back pain (tho does have chronic back pain), no new trauma.   Of note, pt remians on chemo w// ninlaro and dexamethasone (cycle 4/6), due for dose today, states that his oncologist is aware of his MRSA infection and wants to procede with giving chemo today.     In ED:   - afebrile, HR 60s, BP wnl, sat'ing wnl on RA.   - no leukocytosis, does have anemia, elevated sCr, low vanco trough,     PAST MEDICAL & SURGICAL HISTORY:  Memory deficit  BPH (benign prostatic hyperplasia)  [place] (hard of hearing)  Gout  CKD (chronic kidney disease)  Nephrolithiasis  Hypothyroid  Bradycardia, drug induced  Waldenstrom macroglobulinemia  Diverticulitis  Atrial fibrillation  GERD (gastroesophageal reflux disease)  Anxiety disorder  CLL (chronic lymphocytic leukemia): in remission  History of cardiac pacemaker in situ  History of appendectomy  History of cataract surgery, right: IOL  History of laparoscopic cholecystectomy: 4/2014  S/P hernia repair: x2  Meniscus tear: s/p removal of Meniscus 8 months ago    Social history:  Lives with wife, denies EtOH and tobacoo use.     FAMILY HISTORY:  No pertinent family history in first degree relatives    ROS  General: Denies fever, chills, weight loss  HEENT: Denies vision changes  Cardio: Denies CP, palpitations  Pulm: Denies SOB, wheezing, cough,   GI: Denies nausea, vomiting, abdominal pain, dark stools  Neuro: Denies focal weakness, headaches, decreased/altered sensation  Musc Skel: Denies back pain, joint pain  Uro: Denies dysuria  Skin: R forearm rash, some TTP, improved since started abx.   Endocrine: Denies polyuria, polydipsia  Psych: Denies anxiety and depressed mood    Allergic/Immunologic:	No hives or rash   Allergies    No Known Allergies  rituximab (Rash)    Antimicrobials: Vanco at [place]    Vital Signs Last 24 Hrs  T(C): 37.1 (04 Jan 2019 11:45), Max: 37.1 (04 Jan 2019 11:45)  T(F): 98.7 (04 Jan 2019 11:45), Max: 98.7 (04 Jan 2019 11:45)  HR: 64 (04 Jan 2019 11:45) (64 - 64)  BP: 138/73 (04 Jan 2019 11:45) (138/73 - 138/73)  RR: 16 (04 Jan 2019 11:45) (16 - 16)  SpO2: 95% (04 Jan 2019 11:45) (95% - 95%)    PHYSICAL EXAM:  General: NAD, well-developed  HEENT: EOMI  Neck: Supple, No JVD  Chest/Lung: Clear to auscultation bilaterally; No wheezes  Heart: Regular rate and rhythm; No murmurs, rubs, or gallops. Capillary refill WNL  Abdomen: Soft, Nontender, Nondistended; Bowel sounds present  Extremities: No lower extremity edema. (+) RUE forearm erythema.   Psych: AAOx3  Neurology: non-focal, strength and sensation grossly intact UE and LE BL. No spinal TTP  Skin: No rashes or lesions    Labs:               10.1   5.6   )-----------( 158      ( 04 Jan 2019 13:17 )             30.5     01-04    138  |  103  |  35<H>  ----------------------------<  101<H>  4.7   |  24  |  2.62<H>    Ca    9.3      04 Jan 2019 13:17    TPro  6.3  /  Alb  3.7  /  TBili  0.4  /  DBili  x   /  AST  12  /  ALT  17  /  AlkPhos  92  01-04    MICROBIOLOGY:    12-31 @ 17:34  .Blood PERCUTANEOUS (BLOOD)  Blood Culture PCR  Methicillin resistant Staphylococcus aureus  Blood Culture PCR  PCR    Growth in aerobic and anaerobic bottles: Methicillin resistant  Staphylococcus aureus    Culture Results:   <10,000 CFU/ml Normal Urogenital libby present (01-01 @ 04:41)    Culture Results:   Growth in aerobic and anaerobic bottles: Methicillin resistant  Staphylococcus aureus (12-31 @ 17:34)    Radiology:    XR 12/22:    FINDINGS:     Pelvis:    Acute mildly displaced superior and inferior pubic rami fracture. Severe   right and moderate left superolateral hip joint space narrowing. Multiple   surgical clips are noted in the pelvic cavity. The visualization of the   sacrum is limited due to overlying bowel gas.    Right hip/femur:    No acute fracture of the right femur. There is severe superolateral joint   space narrowing at the right hip with an osseous protuberance at the   femoral head and neck junction anterolaterally. There is medial   tibiofemoral compartment joint space narrowing. Vascular calcifications.    IMPRESSION:    Acute mildly displaced right superior and inferior pubic rami fractures.    No right femoral fracture. Severe right hip arthrosis.

## 2024-05-12 RX ORDER — MECLIZINE HYDROCHLORIDE 25 MG/1
TABLET ORAL
Qty: 180 TABLET | Refills: 1 | Status: SHIPPED | OUTPATIENT
Start: 2024-05-12

## 2024-05-13 ENCOUNTER — OUTPATIENT CASE MANAGEMENT (OUTPATIENT)
Dept: ADMINISTRATIVE | Facility: OTHER | Age: 89
End: 2024-05-13
Payer: MEDICARE

## 2024-05-13 NOTE — TELEPHONE ENCOUNTER
Please confirm with her what she is actually taking and what needs refills  She has a long med list

## 2024-05-13 NOTE — LETTER
Debbiearaceli Ding  [address] [address]      Dear Ms. Lewis,     I am your nurse with [facility]s Outpatient [facility] Management Department. I was unsuccessful in reaching you today. At your earliest convenience, I would like to discuss your healthcare progress.      Please contact me at [phone] from 8:00 AM to 4:30 PM on Monday through Friday.     As you know, [facility] is a program offered to you through [facility] where a nurse is available 24/7 to answer questions or provide medical advice, their number is [phone].    Kind Regards,      Leigh Ann Asencio, RN  Outpatient Care Management

## 2024-05-13 NOTE — PROGRESS NOTES
5/13/24 1st attempt to complete follow-up for Outpatient Care Management: Left message for patient requesting a return call. O[facility] letter has been sent. Will attempt to contact patient again at a later date.

## 2024-05-15 ENCOUNTER — OFFICE VISIT (OUTPATIENT)
Dept: ORTHOPEDICS | Facility: CLINIC | Age: 89
End: 2024-05-15
Payer: MEDICARE

## 2024-05-15 ENCOUNTER — TELEPHONE (OUTPATIENT)
Dept: INTERNAL MEDICINE | Facility: CLINIC | Age: 89
End: 2024-05-15
Payer: MEDICARE

## 2024-05-15 DIAGNOSIS — M17.0 PRIMARY OSTEOARTHRITIS OF BOTH KNEES: Primary | ICD-10-CM

## 2024-05-15 PROCEDURE — 20610 DRAIN/INJ JOINT/BURSA W/O US: CPT | Mod: S$PBB,RT,, | Performed by: PHYSICIAN ASSISTANT

## 2024-05-15 PROCEDURE — 99999 PR PBB SHADOW E&M-EST. PATIENT-LVL III: CPT | Mod: PBBFAC,,, | Performed by: PHYSICIAN ASSISTANT

## 2024-05-15 PROCEDURE — 20610 DRAIN/INJ JOINT/BURSA W/O US: CPT | Mod: PBBFAC,RT | Performed by: PHYSICIAN ASSISTANT

## 2024-05-15 PROCEDURE — 99213 OFFICE O/P EST LOW 20 MIN: CPT | Mod: PBBFAC,25 | Performed by: PHYSICIAN ASSISTANT

## 2024-05-15 PROCEDURE — 99214 OFFICE O/P EST MOD 30 MIN: CPT | Mod: S$PBB,25,, | Performed by: PHYSICIAN ASSISTANT

## 2024-05-15 PROCEDURE — 99999PBSHW PR PBB SHADOW TECHNICAL ONLY FILED TO HB: Mod: PBBFAC,,,

## 2024-05-15 RX ORDER — BETAMETHASONE SODIUM PHOSPHATE AND BETAMETHASONE ACETATE 3; 3 MG/ML; MG/ML
6 INJECTION, SUSPENSION INTRA-ARTICULAR; INTRALESIONAL; INTRAMUSCULAR; SOFT TISSUE
Status: COMPLETED | OUTPATIENT
Start: 2024-05-15 | End: 2024-05-15

## 2024-05-15 RX ADMIN — BETAMETHASONE SODIUM PHOSPHATE AND BETAMETHASONE ACETATE 6 MG: 3; 3 INJECTION, SUSPENSION INTRA-ARTICULAR; INTRALESIONAL; INTRAMUSCULAR at 08:05

## 2024-05-15 NOTE — PROGRESS NOTES
SUBJECTIVE:     Chief Complaint & History of Present Illness:  Debbie Ding is a Established patient 89 y.o. female who is seen here today with a complaint of    Chief Complaint   Patient presents with    Right Knee - Pain    .  She is patient well-known to me was last seen treated the clinic for this condition 03/13/2024 that time we had set her up with a hinged knee brace for the left knee.  She continues to struggle with pain soreness primarily in the right knee at this time requesting repeat cortisone injection in the knee  On a scale of 1-10, with 10 being worst pain imaginable, he rates this pain as 5 on good days and 8 on bad days.  she describes the pain as sore and achy.    Review of patient's allergies indicates:   Allergen Reactions    Melatonin      Other reaction(s): Other (See Comments)  Sleep Walks and has unnatural dreams    Talwin compound Hives    Talwin [pentazocine lactate] Hallucinations    Trazodone Other (See Comments)     Nightmares/sleep walk      Bentyl [dicyclomine] Anxiety         Current Outpatient Medications   Medication Sig Dispense Refill    acetaminophen (TYLENOL) 500 MG tablet Take 1-2 tablets (500-1,000 mg total) by mouth 3 (three) times daily as needed for Pain.  0    albuterol (PROVENTIL) 2.5 mg /3 mL (0.083 %) nebulizer solution Take 3 mLs (2.5 mg total) by nebulization every 6 (six) hours as needed for Wheezing. Rescue 90 each 3    albuterol (PROVENTIL/VENTOLIN HFA) 90 mcg/actuation inhaler Inhale 2 puffs into the lungs every 6 (six) hours as needed.      amLODIPine (NORVASC) 2.5 MG tablet Take 2 tablets (5 mg total) by mouth once daily. 90 tablet 3    atorvastatin (LIPITOR) 40 MG tablet TAKE 1 TABLET ONCE DAILY 90 tablet 2    benzonatate (TESSALON) 100 MG capsule Take 1 capsule (100 mg total) by mouth 3 (three) times daily as needed for Cough. 30 capsule 1    ciclopirox 1 % shampoo Apply topically.      cyproheptadine (PERIACTIN) 4 mg tablet Take 1 tablet (4 mg total)  by mouth 2 (two) times daily as needed (for appetite). 60 tablet 5    dextromethorphan-guaiFENesin  mg/5 ml (ROBITUSSIN-DM)  mg/5 mL liquid Take 10 mLs by mouth every 6 (six) hours as needed (Cough). 354 mL 0    donepeziL (ARICEPT) 10 MG tablet Take 1 tablet by mouth every evening.      doxepin (SINEQUAN) 10 MG capsule TAKE 1 CAPSULE AT BEDTIME  FOR ITCHING 90 capsule 3    DULoxetine (CYMBALTA) 60 MG capsule Take 1 capsule (60 mg total) by mouth once daily. 90 capsule 1    ELIQUIS 2.5 mg Tab TAKE 1 TABLET DAILY 90 tablet 3    esomeprazole (NEXIUM) 40 MG capsule TAKE 1 CAPSULE TWICE DAILY 180 capsule 3    famotidine (PEPCID) 20 MG tablet Take 1 tablet (20 mg total) by mouth daily as needed for Heartburn (breakthrough heartburn and acid reflux not controlled with Nexium). 90 tablet 3    fluocinonide (LIDEX) 0.05 % external solution AAA scalp qday - bid prn pruritus 60 mL 3    fluticasone-salmeterol 230-21 mcg/dose (ADVAIR HFA) 230-21 mcg/actuation HFAA inhaler Inhale 2 puffs into the lungs 2 (two) times daily. Controller 36 g 3    gabapentin (NEURONTIN) 100 MG capsule Take 100 mg by mouth.      GEMTESA 75 mg Tab Take 1 tablet by mouth twice a week.      glycopyrrolate (ROBINUL) 2 MG Tab TAKE 1 TABLET TWICE A  tablet 3    levothyroxine (SYNTHROID) 75 MCG tablet Take 1 tablet (75 mcg total) by mouth before breakfast. 7 tablet 1    meclizine (ANTIVERT) 25 mg tablet TAKE 1 TABLET TWICE A DAY  AS NEEDED FOR DIZZINESS 180 tablet 1    memantine (NAMENDA) 10 MG Tab Take 1 tablet (10 mg total) by mouth once daily. 180 tablet 3    mirtazapine (REMERON) 15 MG tablet Take 1 tablet (15 mg total) by mouth every evening. 90 tablet 0    SYNTHROID 75 mcg tablet Take 1 tablet (75 mcg total) by mouth before breakfast. 90 tablet 0     Current Facility-Administered Medications   Medication Dose Route Frequency Provider Last Rate Last Admin    betamethasone acetate-betamethasone sodium phosphate injection 6 mg  6 mg  Intra-articular 1 time in Clinic/HOD Peter Joe PA-C         Facility-Administered Medications Ordered in Other Visits   Medication Dose Route Frequency Provider Last Rate Last Admin    midazolam (VERSED) 1 mg/mL injection 0.5 mg  0.5 mg Intravenous PRN Ross Hui MD           Past Medical History:   Diagnosis Date    Allergic rhinitis     Anticoagulant long-term use     Arthritis     Asthma     Bilateral pulmonary embolism 4/27/2019    Cataract     Chronic anticoagulation 9/28/2020    Depression     Diabetes mellitus     Fibromyalgia 7/2/2012    Fibromyalgia     GERD (gastroesophageal reflux disease)     Hypercholesterolemia 9/28/2020    Hypercholesterolemia 9/28/2020    Hypertension 7/2/2012    Thoracic or lumbosacral neuritis or radiculitis, unspecified     Thyroid disease     Ulcer        Past Surgical History:   Procedure Laterality Date    CARPAL TUNNEL RELEASE Left 11/29/2023    Procedure: RELEASE, CARPAL TUNNEL,LEFT;  Surgeon: Avril Hutchison MD;  Location: [facility];  Service: Orthopedics;  Laterality: Left;    CATARACT EXTRACTION Bilateral     ESOPHAGOGASTRODUODENOSCOPY N/A 3/8/2022    Procedure: EGD (ESOPHAGOGASTRODUODENOSCOPY) with dilation-either hospital ok with Dr. Meza;  Surgeon: Rebeca Meza MD;  Location: [facility];  Service: Endoscopy;  Laterality: N/A;  1/11-eliquis hold ok see te-tb    ESOPHAGOGASTRODUODENOSCOPY N/A 2/28/2022    Procedure: EGD (ESOPHAGOGASTRODUODENOSCOPY) with dilation-either hospital ok with Dr. Meza;  Surgeon: Rebeca Meza MD;  Location: [address]);  Service: Endoscopy;  Laterality: N/A;  1/11-eliquis hold ok see te-tb  COVID test on 2/25/22 at [facility]  2/22-confirmed appt arrival time with pt-Kpvt    FOOT NEUROMA SURGERY  1985    HYSTERECTOMY         Vital Signs (Most Recent)  There were no vitals filed for this visit.        Review of Systems:  ROS:  Constitutional: no fever or chills  Eyes: no visual changes  ENT: no nasal  congestion or sore throat, Positive  vestibular dizziness  Respiratory: no cough or shortness of breath, Positive for asthma  Cardiovascular: no chest pain or palpitations  Gastrointestinal: no nausea or vomiting, tolerating diet, Positive for GERD, peptic ulcer disease  Genitourinary: no hematuria or dysuria, Positive CKD stage 3,  Integument/Breast: no rash or pruritis  Hematologic/Lymphatic: no easy bruising or lymphadenopathy, Positive long-term anticoagulation, history DVT, history of PE  Musculoskeletal: no arthralgias or myalgias, Positive chronic low back pain, fibromyalgia, osteoarthritis of multiple joints  Neurological: no seizures or tremors, Degenerative disc disease lumbar spine, history of memory loss, spinal stenosis without neurological claudication  Behavioral/Psych: no auditory or visual hallucinations, Positive for depression,  Endocrine: no heat or cold intolerance, Positive diabetes type 2, thyroid disease hypothyroidism                OBJECTIVE:     PHYSICAL EXAM:     , General Appearance: Well nourished, well developed, in no acute distress.  Neurological: Mood & affect are normal.  right  Knee Exam:  Knee Range of Motion:limited by pain and 0-120 degrees flexion   Effusion:none  Condition of skin:intact  Location of tenderness:Medial joint line   Strength:limited by pain and 5 of 5  Stability:  Lachman: stable, LCL: stable, MCL: stable, PCL: stable, and posteromedial (dial): stable  Varus /Valgus stress:  normal  Chris:   negative/negative        Hip Examination:  normal    RADIOGRAPHS:  X-rays from previous visit reviewed by me today demonstrate moderate arthritic changes throughout both knees right more so than left with complete loss of medial joint space and sclerotic changes    ASSESSMENT/PLAN:       ICD-10-CM ICD-9-CM   1. Primary osteoarthritis of both knees  M17.0 715.16       Plan: We discussed with the patient at length all the different treatment options available for  the knee  including anti-inflammatories, acetaminophen, rest, ice, knee strengthening exercise, occasional cortisone injections for temporary relief, Viscosupplimentation injections, arthroscopic debridement osteotomy, and finally knee arthroplasty.   Will proceed with therapeutic cortisone injection of the right knee     The risks, benefits, pros, cons, and potential side effects of the procedure were discussed with the patient in detail all questions were answered.  The patient is comfortable and willing to proceed with the procedure. Verbal consent was obtained and the proper joint was identified by the patient and provider        The injection site was identified and the skin was prepared with a betadine solution. The   right  knee was injected with 1 ml of Celestone and 5 ml Lidocaine under sterile technique. Debbie Ding tolerated the procedure well, she was advised to rest the knee today, ice and elevation. she did receive immediate relief of the pain in and about his knee she was told this would be short lived and is secondary to the lidocaine. she may have an increase in his discomfort tonight followed by steady improvement over the next several days. I may take 1-3 weeks following the injection to get the full benefit of the medication.  I will see her back in 4-6 months. Sooner if he has any problems or concerns.    Debbie Ding was advised to monitor her blood sugars closely over the next several days. The steroid may cause a rise in them. If her glucose levels rise to a point she is uncomfortable or he is unable to control them is is to contact his PCP or go immediately to the emergency department.

## 2024-05-20 ENCOUNTER — TELEPHONE (OUTPATIENT)
Dept: NEUROLOGY | Facility: CLINIC | Age: 89
End: 2024-05-20
Payer: MEDICARE

## 2024-05-20 NOTE — TELEPHONE ENCOUNTER
I called the patient.    She said that the walker she got long time ago is too big for her.  I told her I give her prescription when I saw her last time in 02/2024.  She said she did not get that prescription.  I told her I will reprinted prescription and mail it to her and she can take it to any wheelchair vendor to get a new walker.

## 2024-05-20 NOTE — TELEPHONE ENCOUNTER
----- Message from Karine Smart MA sent at 5/20/2024  2:11 PM CDT -----  Hello,    Please advise, thanks  ----- Message -----  From: Vilma Paniagua  Sent: 5/20/2024   8:43 AM CDT  To: Jeni HUFF Staff    Debbie Ding calling regarding Orders for a smaller walker than what she currently have. PT stated that the walker is too big and too heavy and is needing to have an adjustment to be able to move around better, call back to PT to inform of this request, [phone]

## 2024-05-20 NOTE — TELEPHONE ENCOUNTER
----- Message from Vilma Paniagua sent at 5/20/2024  7:44 AM CDT -----  Debbie Ding calling regarding the appointment that she is scheduled for on 06/10/24 for 1:40 pm. PT stated that the date is fine, but the time is needing to be in the morning like 8 am to 10 am works better for the PT, call back to inform  [phone]

## 2024-05-21 ENCOUNTER — OUTPATIENT CASE MANAGEMENT (OUTPATIENT)
Dept: ADMINISTRATIVE | Facility: OTHER | Age: 89
End: 2024-05-21
Payer: MEDICARE

## 2024-05-21 ENCOUNTER — TELEPHONE (OUTPATIENT)
Dept: ORTHOPEDICS | Facility: CLINIC | Age: 89
End: 2024-05-21
Payer: MEDICARE

## 2024-05-21 NOTE — TELEPHONE ENCOUNTER
Spoke with patient stating Cat Joe is with patients but we will call her regarding the  next step for her rt. Knee pain

## 2024-05-22 ENCOUNTER — TELEPHONE (OUTPATIENT)
Dept: NEUROLOGY | Facility: CLINIC | Age: 89
End: 2024-05-22
Payer: MEDICARE

## 2024-05-22 RX ORDER — TRAMADOL HYDROCHLORIDE 50 MG/1
50 TABLET ORAL 3 TIMES DAILY PRN
Qty: 90 TABLET | Refills: 0 | Status: SHIPPED | OUTPATIENT
Start: 2024-05-22 | End: 2024-05-23 | Stop reason: SDUPTHER

## 2024-05-22 NOTE — TELEPHONE ENCOUNTER
----- Message from Chan Riley MA sent at 5/22/2024  2:30 PM CDT -----  Contact: [phone]  Spoke with pt regarding message left with phone staff . Pt stated current Rx is not working pt stated she need something else also pt stated cvan you give her a call .  ----- Message -----  From: Rachel Bassett  Sent: 5/22/2024   9:08 AM CDT  To: Jeni HUFF Staff    1MEDICALADVICE     Patient is calling for Medical Advice regarding:pt is calling to see if you can give her something for pain. The meds you gave is not helping  at all.please give something else.    How long has patient had these symptoms:    Pharmacy name and phone#:  [facility] - [place] - [address]  [address] [place] [address]  Phone: [phone] Fax: [phone]         Would like response via [facility]:  callback    Comments:

## 2024-05-22 NOTE — TELEPHONE ENCOUNTER
I called the patient.    She said her back pain has been worse.  Tylenol does not help and sometimes makes her sleepy.  Tylenol with codeine also did not help.  She had been previously on tramadol with some relief although she did not take it consistently.  I told her I will send a prescription for tramadol 50 mg p.o. 3 times per day as needed.  He was also asking for her prescription for the Rollator walker.  I told her I printed 2 days ago and it is supposed be mailed to her.  She can call me if she does not receive it.  She can also let us know about the name of a wheelchair company so we can fax a prescription directly to them if needed.

## 2024-05-22 NOTE — TELEPHONE ENCOUNTER
----- Message from Rachel Reade Serjio sent at 5/22/2024  9:06 AM CDT -----  Contact: [phone]  1MEDICALADVICE     Patient is calling for Medical Advice regarding:pt is calling to see if you can give her something for pain. The meds you gave is not helping  at all.please give something else.    How long has patient had these symptoms:    Pharmacy name and phone#:  [facility] - [place] [facility] [address] [facility] [address]  Phone: [phone] Fax: [phone]         Would like response via [facility]:  callback    Comments:

## 2024-05-23 RX ORDER — TRAMADOL HYDROCHLORIDE 50 MG/1
50 TABLET ORAL 3 TIMES DAILY PRN
Qty: 90 TABLET | Refills: 0 | Status: SHIPPED | OUTPATIENT
Start: 2024-05-23 | End: 2024-06-22

## 2024-05-23 NOTE — TELEPHONE ENCOUNTER
----- Message from Jass Castillo sent at 5/23/2024  2:12 PM CDT -----  Regarding: Refill  Contact: [phone]  Patient called requesting a refill on  traMADoL (ULTRAM) 50 mg tablet  medication. She said the prescription was sent too the wrong pharmacy.  Please contact patient as soon as possible.       [facility] - [place]  [address]  [address]  [place] [address]  Phone: [phone] Fax: [phone]

## 2024-05-25 NOTE — PROGRESS NOTES
Outpatient Care Management  Patient Not Qualified    Patient: Debbie Ding  MRN:  [id number]  Date of Service:  5/24/2024  Completed by:  Leigh Ann Asencio RN    Chief Complaint   Patient presents with    OPCM Chart Review     5/21/24    OPCM RN Follow Up Call     5/21/24    Case Closure     5/21/24       Patient Summary                5/21/24 OPCM follow up complete. Continued education on Asthma. All goals within care plan have been met. Encouraged patient to contact this RN in the future should any needs or concerns arise. Patient verbalized understanding. Closing case.

## 2024-05-29 ENCOUNTER — TELEPHONE (OUTPATIENT)
Dept: PHYSICAL MEDICINE AND REHAB | Facility: CLINIC | Age: 89
End: 2024-05-29
Payer: MEDICARE

## 2024-05-29 DIAGNOSIS — M54.50 CHRONIC BILATERAL LOW BACK PAIN WITHOUT SCIATICA: ICD-10-CM

## 2024-05-29 DIAGNOSIS — M15.9 PRIMARY OSTEOARTHRITIS INVOLVING MULTIPLE JOINTS: ICD-10-CM

## 2024-05-29 DIAGNOSIS — R26.9 GAIT DISORDER: Primary | ICD-10-CM

## 2024-05-29 DIAGNOSIS — M79.7 FIBROMYALGIA SYNDROME: ICD-10-CM

## 2024-05-29 DIAGNOSIS — G56.03 BILATERAL CARPAL TUNNEL SYNDROME: ICD-10-CM

## 2024-05-29 DIAGNOSIS — G89.29 CHRONIC BILATERAL LOW BACK PAIN WITHOUT SCIATICA: ICD-10-CM
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
## 2024-05-30 ENCOUNTER — TELEPHONE (OUTPATIENT)
Dept: ORTHOPEDICS | Facility: CLINIC | Age: 89
End: 2024-05-30
Payer: MEDICARE

## 2024-05-30 NOTE — TELEPHONE ENCOUNTER
Spoke with patient regarding her rt . Knee pain , I stated she can not get another  rt.knee injection per Peter Joe, she just had a cortisone injection on 5-15-24 , Peter stated to I should schedule a pain  management appointment  for her. Patient stated she would like to have a total knee replacement , Peter Sated no she can't

## 2024-05-31 ENCOUNTER — TELEPHONE (OUTPATIENT)
Dept: ORTHOPEDICS | Facility: CLINIC | Age: 89
End: 2024-05-31
Payer: MEDICARE

## 2024-05-31 ENCOUNTER — EXTERNAL CHRONIC CARE MANAGEMENT (OUTPATIENT)
Dept: PRIMARY CARE CLINIC | Facility: CLINIC | Age: 89
End: 2024-05-31
Payer: MEDICARE

## 2024-05-31 PROCEDURE — 99487 CPLX CHRNC CARE 1ST 60 MIN: CPT | Mod: PBBFAC | Performed by: INTERNAL MEDICINE

## 2024-05-31 PROCEDURE — 99489 CPLX CHRNC CARE EA ADDL 30: CPT | Mod: S$PBB,,, | Performed by: INTERNAL MEDICINE

## 2024-05-31 PROCEDURE — 99487 CPLX CHRNC CARE 1ST 60 MIN: CPT | Mod: S$PBB,,, | Performed by: INTERNAL MEDICINE

## 2024-05-31 PROCEDURE — 99489 CPLX CHRNC CARE EA ADDL 30: CPT | Mod: PBBFAC | Performed by: INTERNAL MEDICINE

## 2024-05-31 NOTE — TELEPHONE ENCOUNTER
Spoke with patient regarding a rt. total knee replacement ,Cat Joe stated no patient can not have a knee replacement , she need a appointment in  pain  management.

## 2024-06-10 ENCOUNTER — PATIENT MESSAGE (OUTPATIENT)
Dept: INTERNAL MEDICINE | Facility: CLINIC | Age: 89
End: 2024-06-10
Payer: MEDICARE

## 2024-06-10 ENCOUNTER — TELEPHONE (OUTPATIENT)
Dept: INTERNAL MEDICINE | Facility: CLINIC | Age: 89
End: 2024-06-10
Payer: MEDICARE

## 2024-06-11 ENCOUNTER — TELEPHONE (OUTPATIENT)
Dept: ORTHOPEDICS | Facility: CLINIC | Age: 89
End: 2024-06-11
Payer: MEDICARE

## 2024-06-11 NOTE — TELEPHONE ENCOUNTER
Spoke with patient regarding her orthopedics appointment on Friday 6-14-24 will be canceled because patient can not get another knee injection . Patient had a appointment in pain management  on 6-7-24 patient canceled , physical medicine appointment  on 5-7-24, 6-10-24 one was canceled the other appointment was a no show .

## 2024-06-13 ENCOUNTER — TELEPHONE (OUTPATIENT)
Dept: PAIN MEDICINE | Facility: CLINIC | Age: 89
End: 2024-06-13
Payer: MEDICARE

## 2024-06-13 NOTE — TELEPHONE ENCOUNTER
Staff called pt about rescheduling her no show with  on 6/7.  Patient didn't answer, staff left voicemail stating to give a call back or send a portal message if want to be rescheduled.

## 2024-06-20 RX ORDER — MEMANTINE HYDROCHLORIDE 10 MG/1
10 TABLET ORAL DAILY
Qty: 180 TABLET | Refills: 3 | Status: SHIPPED | OUTPATIENT
Start: 2024-06-20

## 2024-06-30 ENCOUNTER — EXTERNAL CHRONIC CARE MANAGEMENT (OUTPATIENT)
Dept: PRIMARY CARE CLINIC | Facility: CLINIC | Age: 89
End: 2024-06-30
Payer: MEDICARE

## 2024-06-30 PROCEDURE — 99490 CHRNC CARE MGMT STAFF 1ST 20: CPT | Mod: PBBFAC | Performed by: INTERNAL MEDICINE

## 2024-06-30 PROCEDURE — 99490 CHRNC CARE MGMT STAFF 1ST 20: CPT | Mod: S$PBB,,, | Performed by: INTERNAL MEDICINE

## 2024-07-01 ENCOUNTER — OFFICE VISIT (OUTPATIENT)
Dept: URGENT CARE | Facility: CLINIC | Age: 89
End: 2024-07-01
Payer: MEDICARE

## 2024-07-01 VITALS
HEIGHT: 65 IN | BODY MASS INDEX: 22.49 KG/M2 | DIASTOLIC BLOOD PRESSURE: 70 MMHG | HEART RATE: 89 BPM | OXYGEN SATURATION: 97 % | WEIGHT: 135 LBS | TEMPERATURE: 98 F | SYSTOLIC BLOOD PRESSURE: 147 MMHG | RESPIRATION RATE: 16 BRPM

## 2024-07-01 DIAGNOSIS — H93.13 TINNITUS OF BOTH EARS: Primary | ICD-10-CM

## 2024-07-01 DIAGNOSIS — L29.9 ITCHING OF EAR: ICD-10-CM

## 2024-07-01 PROBLEM — G31.83 LEWY BODY DEMENTIA: Status: ACTIVE | Noted: 2021-10-28

## 2024-07-01 PROBLEM — F33.9 DEPRESSION, RECURRENT: Chronic | Status: ACTIVE | Noted: 2018-04-25

## 2024-07-01 PROBLEM — J45.40 MODERATE PERSISTENT ASTHMA WITHOUT COMPLICATION: Status: ACTIVE | Noted: 2021-07-23

## 2024-07-01 PROBLEM — J45.40 ASTHMA, MODERATE PERSISTENT, POORLY-CONTROLLED: Status: ACTIVE | Noted: 2020-12-30

## 2024-07-01 PROBLEM — R53.81 PHYSICAL DECONDITIONING: Status: ACTIVE | Noted: 2021-12-07

## 2024-07-01 PROBLEM — B37.0 THRUSH: Status: ACTIVE | Noted: 2022-05-24

## 2024-07-01 PROBLEM — F02.80 LEWY BODY DEMENTIA: Status: ACTIVE | Noted: 2021-10-28

## 2024-07-01 PROCEDURE — 99213 OFFICE O/P EST LOW 20 MIN: CPT | Mod: S$GLB,,, | Performed by: NURSE PRACTITIONER

## 2024-07-01 RX ORDER — LORATADINE 10 MG/1
10 TABLET ORAL DAILY PRN
Qty: 30 TABLET | Refills: 0 | Status: SHIPPED | OUTPATIENT
Start: 2024-07-01

## 2024-07-01 NOTE — PATIENT INSTRUCTIONS
Please return here or go to the Emergency Department for any concerns or worsening of condition.  Please follow up with your primary care doctor or ENT specialist as soon as possible.

## 2024-07-01 NOTE — PROGRESS NOTES
"Subjective:      Patient ID: Debbie Ding is a 89 y.o. female.    Vitals:  height is 5' 5" (1.651 m) and weight is 61.2 kg (135 lb). Her oral temperature is 98 °F (36.7 °C). Her blood pressure is 147/70 (abnormal) and her pulse is 89. Her respiration is 16 and oxygen saturation is 97%.     Chief Complaint: Tinnitus    Pt complains of bilateral tinnitus and left ear pain. Symptoms have been ongoing for a while but have worsened in the last few days. Pt has not taken any medications to help.    89-year-old female presents to clinic with complaints of bilateral ringing in ears, left ear pain for several months.  History of hearing loss, vestibular dizziness. Last otolaryngology visit 2/1/24     Ringing in Ears:   Chronicity:  New  Onset:  1 to 4 weeks ago  Progression since onset:  Unchanged  Frequency:  Constantly  Pain scale:  0/10  Severity:  Mild  Duration:  Off/on all day  Ringing in ear characteristics:  Moderate   Associated symptoms: Ear pain and tinnitus.  No dizziness, no vertigo, no ear congestion, no fever, no headaches, no buzzing, no rhinorrhea, no fluctuance, no otalgia, no otorrhea, no facial weakness, no visual disturbances and not masked by noise.  Aggravated by:  Nothing  Treatments tried:  Nothing  Improvement on treatment:  No reliefNo dizziness and no ear infections.      Constitution: Negative for fever.   HENT:  Positive for ear pain and tinnitus. Negative for ear discharge, foreign body in ear and hearing loss.    Skin:  Negative for erythema.   Neurological:  Negative for dizziness, history of vertigo and headaches.      Objective:     Physical Exam   Constitutional: She is oriented to person, place, and time. She appears well-developed.   HENT:   Head: Normocephalic and atraumatic. Head is without abrasion, without contusion and without laceration.   Ears:   Right Ear: Tympanic membrane, external ear and ear canal normal.   Left Ear: Tympanic membrane, external ear and ear canal normal. "   Nose: Nose normal.   Mouth/Throat: Oropharynx is clear and moist and mucous membranes are normal.   Eyes: EOM and lids are normal. Extraocular movement intact   Neck: Trachea normal and phonation normal. Neck supple.   Cardiovascular: Normal rate.   Pulmonary/Chest: Effort normal.   Musculoskeletal: Normal range of motion.         General: Normal range of motion.   Neurological: no focal deficit. She is alert and oriented to person, place, and time.   Skin: Skin is warm, dry, intact and no rash. Capillary refill takes less than 2 seconds. No abrasion, No burn, No bruising, No erythema and No ecchymosis   Psychiatric: Her speech is normal and behavior is normal. Judgment and thought content normal.   Nursing note and vitals reviewed.      Assessment:     1. Tinnitus of both ears    2. Itching of ear        Plan:       Tinnitus of both ears    Itching of ear  -     loratadine (CLARITIN) 10 mg tablet; Take 1 tablet (10 mg total) by mouth daily as needed for Allergies.  Dispense: 30 tablet; Refill: 0      Patient Instructions   Please return here or go to the Emergency Department for any concerns or worsening of condition.  Please follow up with your primary care doctor or ENT specialist as soon as possible.

## 2024-07-02 ENCOUNTER — TELEPHONE (OUTPATIENT)
Dept: URGENT CARE | Facility: CLINIC | Age: 89
End: 2024-07-02
Payer: MEDICARE

## 2024-07-02 ENCOUNTER — OUTPATIENT CASE MANAGEMENT (OUTPATIENT)
Dept: ADMINISTRATIVE | Facility: OTHER | Age: 89
End: 2024-07-02
Payer: MEDICARE

## 2024-07-02 NOTE — TELEPHONE ENCOUNTER
I called the Pharmacy to verify the received the medications , and I was notified that is was out foe delivery  I will notify patient of above mention     Thanks   Santa

## 2024-07-02 NOTE — LETTER
July 2, 2024             Dear Jaden Lewis to [facility] Complex Care Management [facility].  It was a pleasure talking with you today.  My name is Leigh Ann Asencio, and I look forward to being your Nurse Care Manager again.  My goal is to help you function at the healthiest and highest level possible.  You can contact me directly at [phone].    As an [facility] patient, some of the services we may be able to provide include:     Development of an individualized care plan with a Registered Nurse   Connection with a   Connection with available resources and services    Coordinate communication among your care team members   Provide coaching and education   Help you understand your doctors treatment plan  Help you obtain information about your insurance coverage.     All services provided by [facility] Complex Care Managers and other care team members are coordinated with and communicated to your primary care team.      As part of your enrollment, you will be receiving education materials and more information about these services in your [facility] account, by phone or through the mail.  If you do not wish to participate or receive information, please contact our office at [phone].      Kind Regards,      Leigh Ann Asencio, RN  [facility]   Out-patient RN Complex Care Manager

## 2024-07-10 ENCOUNTER — OUTPATIENT CASE MANAGEMENT (OUTPATIENT)
Dept: ADMINISTRATIVE | Facility: OTHER | Age: 89
End: 2024-07-10
Payer: MEDICARE

## 2024-07-11 ENCOUNTER — OUTPATIENT CASE MANAGEMENT (OUTPATIENT)
Dept: ADMINISTRATIVE | Facility: OTHER | Age: 89
End: 2024-07-11
Payer: MEDICARE

## 2024-07-11 NOTE — PROGRESS NOTES
Outpatient Care Management  Plan of Care Follow Up Visit    Patient: Debbie Ding  MRN: [id number]  Date of Service: 07/11/2024  Completed by: Leigh Ann Asencio RN  Referral Date: 01/02/2024    Reason for Visit   Patient presents with    OPCM Chart Review     7/11/24    OPCM RN Follow Up Call     7/11/24       Brief Summary:     OPCM follow up complete. Continued education on Asthma. Patient is in agreement with follow up call on or around 7/18/24.

## 2024-07-11 NOTE — PROGRESS NOTES
Outpatient Care Management  Initial Patient Assessment    Patient: Debbie Ding  MRN: [id number]  Date of Service: 07/02/2024  Completed by: Leigh Ann Asencio RN  Referral Date: 01/02/2024  Date of Eligibility: 7/2/2024  Program:   High Risk  Status: Ongoing  Effective Dates: 7/2/2024 - present  Responsible Staff: Leigh Ann Asencio RN        Reason for Visit   Patient presents with    OPCM Chart Review     7/2/24    Nursing Assessment     7/2/24    OPCM Enrollment Call     7/2/24       Brief Summary:  Debbie Ding was self referred for transportation and asthma. Patient qualifies for program based on risk score of 76%.   Active problem list, medical, surgical and social history reviewed. Active comorbidities include HTN, Hyperlipidemia, Asthma, Fibromyalgia, and Depression. Areas of need identified by patient include help with transportation and managing Asthma. Referred patient to OPCM  for transportation.  Next steps: Follow up with patient on or around 7/9/24.    Disability Status  Is the patient alert and oriented (person, place, time, and situation)?: Alert and oriented x 4  Hearing Difficulty or Deaf: yes  Hearing Management: other (bilateral hearing aides)  Visual Difficulty or Blind: yes  Visual and Hearing Needs Conclusion: wears glasses all of the time  Vision Management: Other (wears glasses all of the time)  Difficulty Concentrating, Remembering or Making Decisions: no  Communication Difficulty: no  Eating/Swallowing Difficulty: no  Walking or Climbing Stairs Difficulty: yes  Walking or Climbing Stairs: ambulation difficulty, requires equipment  Mobility Management: uses cane  Dressing/Bathing Difficulty: yes  Dressing/Bathing: bathing difficulty, requires equipment  Dressing/Bathing Management: has shower chair  Toileting : Independent  Continence : Incontience - Bladder (wears pads/adult diapers as needed)  Difficulty Managing Errands Independently: yes  Errands Management:  daughter assists when needed  Equipment Currently Used at Home: cane, quad; nebulizer; shower chair; CPAP  ADL Conclusion Statement: Patient is independent with ADLs. Can dress, bathe, and feed herself.  Service Animal Required: no  Change in Functional Status Since Onset of Current Illness/Injury: no        Spiritual Beliefs  Spiritual, Cultural Beliefs, Latter-day Practices, Values that Affect Care: yes  Description of Beliefs that Will Affect Care: Mandaen      Social History     Socioeconomic History    Marital status:    Occupational History     Comment:  - school    Tobacco Use    Smoking status: Never    Smokeless tobacco: Never   Substance and Sexual Activity    Alcohol use: No    Drug use: No    Sexual activity: Not Currently   Social History Narrative    Lives with daughter, Benjie.        Other daughter, Madina, drives her around.        She lives independently, except for driving.          Stretches in bed.  Walks in neighborhood, and in house several times a day.     Social Determinants of Health     Financial Resource Strain: Medium Risk (7/2/2024)    Overall Financial Resource Strain (CARDIA)     Difficulty of Paying Living Expenses: Somewhat hard   Food Insecurity: No Food Insecurity (7/2/2024)    Hunger Vital Sign     Worried About Running Out of Food in the Last Year: Never true     Ran Out of Food in the Last Year: Never true   Transportation Needs: No Transportation Needs (7/2/2024)    TRANSPORTATION NEEDS     Transportation : No   Physical Activity: Insufficiently Active (7/2/2024)    Exercise Vital Sign     Days of Exercise per Week: 2 days     Minutes of Exercise per Session: 10 min   Stress: No Stress Concern Present (7/2/2024)    [employer] - Occupational Stress Questionnaire     Feeling of Stress : Only a little   Housing Stability: Low Risk  (7/2/2024)    Housing Stability Vital Sign     Unable to Pay for Housing in the Last Year: No      Homeless in the Last Year: No       Roles and Relationships  Primary Source of Support/Comfort: child(nataliia)  Name of Support/Comfort Primary Source: Ezequiel flores  Primary Roles/Responsibilities: wage earner, full-time  Secondary Source of Support/Comfort: child(nataliia)  Name of Support/Comfort Secondary Source: Madina Larkin      Advance Directives (For Healthcare)  Advance Directive  (If Adv Dir status is received, view document under Adv Dir in header or Chart Review Media tab): Patient does not have Advance Directive, declines information.  Patient Requests Assistance: Patient will do independently        Patient Reported Insurance  Verified current insurance plan:: Medicare; [employer]            7/2/2024     4:46 PM 1/12/2024    10:55 AM 7/11/2023     8:21 AM 6/13/2023    12:03 PM 12/8/2022     1:28 PM 10/19/2021    11:27 AM 10/7/2021     2:57 PM   Depression Patient Health Questionnaire   Over the last two weeks how often have you been bothered by little interest or pleasure in doing things Not at all Not at all Several days Not at all Not at all Not at all Not at all   Over the last two weeks how often have you been bothered by feeling down, depressed or hopeless Several days Not at all Not at all Not at all Not at all Several days Not at all   PHQ-2 Total Score 1 0 1 0 0 1 0       Learning Assessment       07/02/2024 1652 [facility] (7/2/2024 - Present)   Created by Leigh Ann Asencio RN -  (Nurse) Status: Complete                 PRIMARY LEARNER     Primary Learner Name:  Debbie Ding BB - 07/02/2024 1652    Relationship:  Patient BB - 07/02/2024 1652    Does the primary learner have any barriers to learning?:  No Barriers BB - 07/02/2024 1652    What is the preferred language of the primary learner?:  English BB - 07/02/2024 1652    Is an  required?:  No BB - 07/02/2024 1652    How does the primary learner prefer to learn new concepts?:  Listening,  Reading, Demonstration BB - 07/02/2024 1652    How often do you need to have someone help you read instructions, pamphlets, or written material from your doctor or pharmacy?:  Never BB - 07/02/2024 1652        CO-LEARNER #1     No question answered        CO-LEARNER #2     No question answered        SPECIAL TOPICS     No question answered        ANSWERED BY:     No question answered        Comments         Edit History       Leigh Ann Asencio, RN -  (Nurse)   07/02/2024 1652

## 2024-07-12 NOTE — PROGRESS NOTES
Outpatient Care Management   - Patient Assessment    Patient: Debbie Ding  MRN:  [id number]  Date of Service:  7/12/2024  Completed by:  Marija Rm LMSW  Referral Date: 01/02/2024    Reason for Visit   Patient presents with    Social Work Assessment     7/10/2024       Brief Summary:  received a referral from OPCM RN, Leigh Ann Asencio for the following HR SW psychosocial needs transportation resources.  The patient also requests assistance with getting rollator. SW completed social assessment with pt via telephone.She lives with her daughter and is independent with bathing and dressing. Pt can also do some cooking. Pt reported they have someone come and clean for them. Pt's older daughter brings her to MD appointments and grocery shops for her, but pt lives with younger daughter. Pt denied any issues affording groceries or utilities at this time. Pt is not wanting to utilize [employer] or Lyft or Uber. [facility] RN discussed [facility] membership. Pt stated she did not receive the information so SW will send to pt as well just in case. Pt also stated she would like rollator. Per chart review, rollator was ordered on 5/29/2024. SW will check into this.   Care plan was created in collaboration with patient/caregiver input.   completed the SDOH questionnaire.

## 2024-07-15 ENCOUNTER — PATIENT OUTREACH (OUTPATIENT)
Dept: ADMINISTRATIVE | Facility: OTHER | Age: 89
End: 2024-07-15
Payer: MEDICARE

## 2024-07-15 NOTE — PROGRESS NOTES
Community Health Worker's assistance requested from Marija Rm, to contact [facility],   Agency Representative: Rep from [facility]  Outcome of Call: Rep stated that Ms Ding is unable to receive a new rollator until 2028. She has received one in 2023 and unable to get one due to insurance not covering one until every five years.   Follow up needed: n/a  Next Scheduled follow up: no

## 2024-07-18 ENCOUNTER — OUTPATIENT CASE MANAGEMENT (OUTPATIENT)
Dept: ADMINISTRATIVE | Facility: OTHER | Age: 89
End: 2024-07-18
Payer: MEDICARE

## 2024-07-18 NOTE — PROGRESS NOTES
Outpatient Care Management  Plan of Care Follow Up Visit    Patient: Debbie Ding  MRN: [id number]  Date of Service: 07/18/2024  Completed by: Leigh Ann Asencio RN  Referral Date: 01/02/2024    Reason for Visit   Patient presents with    OPCM Chart Review     7/18/24    OPCM RN Follow Up Call     7/18/24       Brief Summary:     OPCM follow up complete. Continued education on Asthma. Patient is in agreement with follow up call on or around 8/1/24.

## 2024-07-19 ENCOUNTER — LAB VISIT (OUTPATIENT)
Dept: LAB | Facility: OTHER | Age: 89
End: 2024-07-19
Attending: INTERNAL MEDICINE
Payer: MEDICARE

## 2024-07-19 DIAGNOSIS — K59.00 CONSTIPATION: Primary | ICD-10-CM

## 2024-07-19 DIAGNOSIS — E03.9 HYPOTHYROIDISM, ADULT: ICD-10-CM

## 2024-07-19 DIAGNOSIS — R63.0 POOR APPETITE: ICD-10-CM

## 2024-07-19 DIAGNOSIS — K21.9 ACID REFLUX: ICD-10-CM

## 2024-07-19 LAB
ALBUMIN SERPL BCP-MCNC: 3.8 G/DL (ref 3.5–5.2)
ALP SERPL-CCNC: 86 U/L (ref 55–135)
ALT SERPL W/O P-5'-P-CCNC: 12 U/L (ref 10–44)
ANION GAP SERPL CALC-SCNC: 9 MMOL/L (ref 8–16)
AST SERPL-CCNC: 21 U/L (ref 10–40)
BASOPHILS # BLD AUTO: 0.05 K/UL (ref 0–0.2)
BASOPHILS NFR BLD: 0.8 % (ref 0–1.9)
BILIRUB SERPL-MCNC: 0.4 MG/DL (ref 0.1–1)
BUN SERPL-MCNC: 21 MG/DL (ref 10–30)
CALCIUM SERPL-MCNC: 9.7 MG/DL (ref 8.7–10.5)
CHLORIDE SERPL-SCNC: 107 MMOL/L (ref 95–110)
CO2 SERPL-SCNC: 25 MMOL/L (ref 23–29)
CREAT SERPL-MCNC: 1.8 MG/DL (ref 0.5–1.4)
DIFFERENTIAL METHOD BLD: ABNORMAL
EOSINOPHIL # BLD AUTO: 0.3 K/UL (ref 0–0.5)
EOSINOPHIL NFR BLD: 4.9 % (ref 0–8)
ERYTHROCYTE [DISTWIDTH] IN BLOOD BY AUTOMATED COUNT: 16.1 % (ref 11.5–14.5)
EST. GFR  (NO RACE VARIABLE): 26 ML/MIN/1.73 M^2
GLUCOSE SERPL-MCNC: 122 MG/DL (ref 70–110)
HCT VFR BLD AUTO: 38.2 % (ref 37–48.5)
HGB BLD-MCNC: 12.2 G/DL (ref 12–16)
IMM GRANULOCYTES # BLD AUTO: 0.03 K/UL (ref 0–0.04)
IMM GRANULOCYTES NFR BLD AUTO: 0.5 % (ref 0–0.5)
LYMPHOCYTES # BLD AUTO: 2.5 K/UL (ref 1–4.8)
LYMPHOCYTES NFR BLD: 38.8 % (ref 18–48)
MCH RBC QN AUTO: 29.2 PG (ref 27–31)
MCHC RBC AUTO-ENTMCNC: 31.9 G/DL (ref 32–36)
MCV RBC AUTO: 91 FL (ref 82–98)
MONOCYTES # BLD AUTO: 0.5 K/UL (ref 0.3–1)
MONOCYTES NFR BLD: 8.3 % (ref 4–15)
NEUTROPHILS # BLD AUTO: 3 K/UL (ref 1.8–7.7)
NEUTROPHILS NFR BLD: 46.7 % (ref 38–73)
NRBC BLD-RTO: 0 /100 WBC
PLATELET # BLD AUTO: 146 K/UL (ref 150–450)
PLATELET BLD QL SMEAR: ABNORMAL
PMV BLD AUTO: 11.7 FL (ref 9.2–12.9)
POTASSIUM SERPL-SCNC: 4.5 MMOL/L (ref 3.5–5.1)
PROT SERPL-MCNC: 7.3 G/DL (ref 6–8.4)
RBC # BLD AUTO: 4.18 M/UL (ref 4–5.4)
SODIUM SERPL-SCNC: 141 MMOL/L (ref 136–145)
TSH SERPL DL<=0.005 MIU/L-ACNC: 1.74 UIU/ML (ref 0.4–4)
WBC # BLD AUTO: 6.5 K/UL (ref 3.9–12.7)

## 2024-07-19 PROCEDURE — 80053 COMPREHEN METABOLIC PANEL: CPT | Performed by: INTERNAL MEDICINE

## 2024-07-19 PROCEDURE — 36415 COLL VENOUS BLD VENIPUNCTURE: CPT | Performed by: PHYSICIAN ASSISTANT

## 2024-07-19 PROCEDURE — 85025 COMPLETE CBC W/AUTO DIFF WBC: CPT | Performed by: INTERNAL MEDICINE

## 2024-07-19 PROCEDURE — 84443 ASSAY THYROID STIM HORMONE: CPT | Performed by: PHYSICIAN ASSISTANT

## 2024-07-22 ENCOUNTER — PATIENT MESSAGE (OUTPATIENT)
Dept: PODIATRY | Facility: CLINIC | Age: 89
End: 2024-07-22
Payer: MEDICARE

## 2024-07-22 ENCOUNTER — TELEPHONE (OUTPATIENT)
Dept: INTERNAL MEDICINE | Facility: CLINIC | Age: 89
End: 2024-07-22
Payer: MEDICARE

## 2024-07-22 NOTE — TELEPHONE ENCOUNTER
----- Message from Julio Daniel sent at 7/22/2024  3:56 PM CDT -----  Regarding: Lab called again  Contact: [facility] Jennyfer [phone]  1MEDICALADVICE     Patient is calling for Medical Advice regarding: Second time today;  called to report a lab that was changed/edited. Please call back when available to discuss with Jennyfer in the Hem Lab. Contact info provided.     How long has patient had these symptoms:     Pharmacy name and phone#:     Patient wants a call back or thru [facility]: Call     Comments:

## 2024-07-23 ENCOUNTER — TELEPHONE (OUTPATIENT)
Dept: INTERNAL MEDICINE | Facility: CLINIC | Age: 89
End: 2024-07-23
Payer: MEDICARE

## 2024-07-23 NOTE — TELEPHONE ENCOUNTER
----- Message from Julio Daniel sent at 7/22/2024 10:31 AM CDT -----  Regarding: Lab  Contact: [facility] Jennyfer [phone]  1MEDICALADVICE     Patient is calling for Medical Advice regarding:  called to report a lab that was changed/edited. Please call back when available to discuss with Jennyfer in the Hem Lab. Contact info provided.    How long has patient had these symptoms:    Pharmacy name and phone#:    Patient wants a call back or [facility]: Call    Comments:

## 2024-07-25 ENCOUNTER — OUTPATIENT CASE MANAGEMENT (OUTPATIENT)
Dept: ADMINISTRATIVE | Facility: OTHER | Age: 89
End: 2024-07-25
Payer: MEDICARE

## 2024-07-25 RX ORDER — DOXEPIN HYDROCHLORIDE 10 MG/1
10 CAPSULE ORAL NIGHTLY
Qty: 90 CAPSULE | Refills: 1 | Status: SHIPPED | OUTPATIENT
Start: 2024-07-25

## 2024-07-25 NOTE — LETTER
Debbie Ding  [address] [place] [address]      Dear Debbie Ding,     I am writing from the Outpatient Care Management Department at [facility]. I have been unsuccessful at reaching you since we spoke on 7/12/2024.  I hope you found the assistance previously provided to you helpful.     Please contact me at [phone] from 8:00AM to 4:30 PM on Monday thru Friday.     As you know, [facility] also has a program with a nurse available 24/7 to answer questions or provide medical advice.  [facility] can be reached at [phone].    Sincerely,       Marija Rm LMSW  Outpatient Care Management

## 2024-07-25 NOTE — TELEPHONE ENCOUNTER
No care due was identified.  [facility] Embedded Care Due Messages. Reference number: [id number].   7/25/2024 7:10:37 AM CDT

## 2024-07-25 NOTE — TELEPHONE ENCOUNTER
Refill Decision Note   Debbie Timur  is requesting a refill authorization.  Brief Assessment and Rationale for Refill:  Defer  Approve     Medication Therapy Plan:         Pharmacist review requested: Yes   Extended chart review required: Yes   Comments:     Note composed:1:48 PM 07/25/2024

## 2024-07-25 NOTE — TELEPHONE ENCOUNTER
Refill Routing Note   Medication(s) are not appropriate for processing by [facility] for the following reason(s):      Drug-disease interaction  Drug-drug interaction    [facility] action(s):  Defer Care Due:  None identified     Medication Therapy Plan: Drug-Drug: traMADoL and doxepin,Drug-Disease: doxepin and JASE (obstructive sleep apnea)    Pharmacist review requested: Yes     Appointments  past 12m or future 3m with PCP    Date Provider   Last Visit   1/23/2024 Carolyn Mejia MD   Next Visit   Visit date not found Carolyn Mejia MD   ED visits in past 90 days: 0        Note composed:12:45 PM 07/25/2024

## 2024-07-29 ENCOUNTER — PATIENT MESSAGE (OUTPATIENT)
Dept: PODIATRY | Facility: CLINIC | Age: 89
End: 2024-07-29
Payer: MEDICARE

## 2024-07-30 DIAGNOSIS — Z00.00 ENCOUNTER FOR MEDICARE ANNUAL WELLNESS EXAM: ICD-10-CM

## 2024-07-31 ENCOUNTER — EXTERNAL CHRONIC CARE MANAGEMENT (OUTPATIENT)
Dept: PRIMARY CARE CLINIC | Facility: CLINIC | Age: 89
End: 2024-07-31
Payer: MEDICARE

## 2024-07-31 PROCEDURE — 99490 CHRNC CARE MGMT STAFF 1ST 20: CPT | Mod: S$PBB,,, | Performed by: INTERNAL MEDICINE

## 2024-07-31 PROCEDURE — 99490 CHRNC CARE MGMT STAFF 1ST 20: CPT | Mod: PBBFAC | Performed by: INTERNAL MEDICINE

## 2024-08-01 ENCOUNTER — OUTPATIENT CASE MANAGEMENT (OUTPATIENT)
Dept: ADMINISTRATIVE | Facility: OTHER | Age: 89
End: 2024-08-01
Payer: MEDICARE

## 2024-08-01 NOTE — PROGRESS NOTES
"Outpatient Care Management   - Care Plan Follow Up    Patient: Debbie Ding  MRN:  [id number]  Date of Service:  8/1/2024  Completed by:  Marija Rm LMSW  Referral Date: 01/02/2024    Reason for Visit   Patient presents with    Other     7/25/2024  1st attempt to complete Follow-Up  for Outpatient Care Management, left message.  Will mail unable to assess letter.        OPCM SW Follow Up Call     8/1/2024       Brief Summary: TERRI followed up with pt via telephone. She stated she is doing "pretty good". Pt has been attending outpatient PT at [facility] in [place]. Pt received the information on the [facility] transportation and she stated she doesn't want to do that. She will continue having her daughter bring her to MD appointments. Pt also is not eligible for rollator due to getting a walker in 2023. TERRI educated pt on [facility]OM[facility] again. TERRI will mail her this information.     Complex Care Plan     Care plan was discussed and completed today with input from patient and/or caregiver.    Patient Instructions     No follow-ups on file.  "

## 2024-08-01 NOTE — PROGRESS NOTES
8/1/24 1st attempt to complete follow-up for Outpatient Care Management: Left message for patient requesting a return call. OPCM letter has been sent. Will attempt to contact patient again at a later date.

## 2024-08-01 NOTE — LETTER
Debbiearaceli Ding  [address] [address]      Dear Ms. Lewis,     I am your nurse with [facility] Outpatient Care Management Department. I was unsuccessful in reaching you today. At your earliest convenience, I would like to discuss your healthcare progress.      Please contact me at [phone] from 8:00 AM to 4:30 PM on Monday through Friday.     As you know, [facility] is a program offered to you through [facility] where a nurse is available 24/7 to answer questions or provide medical advice, their number is [phone].    Kind Regards,      Leigh Ann Asencio, RN  Outpatient Care Management

## 2024-08-05 ENCOUNTER — TELEPHONE (OUTPATIENT)
Dept: PHYSICAL MEDICINE AND REHAB | Facility: CLINIC | Age: 89
End: 2024-08-05
Payer: MEDICARE

## 2024-08-05 ENCOUNTER — TELEPHONE (OUTPATIENT)
Dept: PODIATRY | Facility: CLINIC | Age: 89
End: 2024-08-05
Payer: MEDICARE

## 2024-08-05 ENCOUNTER — TELEPHONE (OUTPATIENT)
Dept: NEUROLOGY | Facility: CLINIC | Age: 89
End: 2024-08-05
Payer: MEDICARE

## 2024-08-05 DIAGNOSIS — G89.29 CHRONIC BILATERAL LOW BACK PAIN WITHOUT SCIATICA: ICD-10-CM

## 2024-08-05 DIAGNOSIS — M54.50 CHRONIC BILATERAL LOW BACK PAIN WITHOUT SCIATICA: ICD-10-CM

## 2024-08-05 DIAGNOSIS — R26.9 GAIT DISORDER: Primary | ICD-10-CM

## 2024-08-05 DIAGNOSIS — M15.9 PRIMARY OSTEOARTHRITIS INVOLVING MULTIPLE JOINTS: ICD-10-CM

## 2024-08-05 DIAGNOSIS — G56.03 BILATERAL CARPAL TUNNEL SYNDROME: ICD-10-CM

## 2024-08-06 ENCOUNTER — PATIENT OUTREACH (OUTPATIENT)
Dept: NEUROLOGY | Facility: CLINIC | Age: 89
End: 2024-08-06
Payer: MEDICARE

## 2024-08-08 ENCOUNTER — PATIENT OUTREACH (OUTPATIENT)
Dept: NEUROLOGY | Facility: CLINIC | Age: 89
End: 2024-08-08
Payer: MEDICARE

## 2024-08-12 ENCOUNTER — OUTPATIENT CASE MANAGEMENT (OUTPATIENT)
Dept: ADMINISTRATIVE | Facility: OTHER | Age: 89
End: 2024-08-12
Payer: MEDICARE

## 2024-08-12 NOTE — PROGRESS NOTES
Outpatient Care Management  Plan of Care Follow Up Visit    Patient: Debbie Ding  MRN: [id number]  Date of Service: 08/12/2024  Completed by: Leigh Ann Asencio RN  Referral Date: 01/02/2024    Reason for Visit   Patient presents with    OPCM Chart Review     8/12/24    OPCM RN Follow Up Call     8/12/24    Other     Care Gap Review       Brief Summary:     OPCM follow up complete. Continued education on Asthma. Patient is in agreement with follow up call on or around 8/26/24.

## 2024-08-14 ENCOUNTER — PATIENT OUTREACH (OUTPATIENT)
Dept: NEUROLOGY | Facility: CLINIC | Age: 89
End: 2024-08-14
Payer: MEDICARE

## 2024-08-15 NOTE — PLAN OF CARE
Problem: Adult Inpatient Plan of Care  Goal: Plan of Care Review  Outcome: Ongoing, Progressing  Goal: Patient-Specific Goal (Individualized)  Outcome: Ongoing, Progressing  Goal: Absence of Hospital-Acquired Illness or Injury  Outcome: Ongoing, Progressing  Goal: Optimal Comfort and Wellbeing  Outcome: Ongoing, Progressing  Goal: Readiness for Transition of Care  Outcome: Ongoing, Progressing     Problem: Diabetes Comorbidity  Goal: Blood Glucose Level Within Targeted Range  Outcome: Ongoing, Progressing     Problem: Infection  Goal: Absence of Infection Signs and Symptoms  Outcome: Ongoing, Progressing      Major depression CAD (coronary artery disease)

## 2024-08-16 ENCOUNTER — OUTPATIENT CASE MANAGEMENT (OUTPATIENT)
Dept: ADMINISTRATIVE | Facility: OTHER | Age: 89
End: 2024-08-16
Payer: MEDICARE

## 2024-08-16 NOTE — PROGRESS NOTES
Outpatient Care Management   - Care Plan Follow Up    Patient: Debbie Ding  MRN:  [id number]  Date of Service:  8/16/2024  Completed by:  Marija Rm LMSW  Referral Date: 01/02/2024    Reason for Visit   Patient presents with    Case Closure     8/16/2024       Brief Summary: SW followed up with pt via telephone. Pt had not received mailed information on [facility]. SW verbally gave her phone number, address and days/times warehouse is open for a rollator. Pt reported doing well at home and denied any other issues. Collaborated with OPCM RN, case closed.     Complex Care Plan     Care plan was discussed and completed today with input from patient and/or caregiver.    Patient Instructions     No follow-ups on file.

## 2024-08-22 DIAGNOSIS — E03.9 HYPOTHYROIDISM, ADULT: ICD-10-CM

## 2024-08-22 RX ORDER — LEVOTHYROXINE SODIUM 75 UG/1
75 TABLET ORAL
Qty: 90 TABLET | Refills: 1 | Status: SHIPPED | OUTPATIENT
Start: 2024-08-22

## 2024-08-22 NOTE — TELEPHONE ENCOUNTER
No care due was identified.  [facility] Embedded Care Due Messages. Reference number: [id number].   8/22/2024 3:14:17 PM CDT

## 2024-08-22 NOTE — TELEPHONE ENCOUNTER
----- Message from Cristian Corcoran sent at 8/22/2024  2:09 PM CDT -----  Contact: [phone] @patient  Requesting an RX refill or new RX.levothyroxine (SYNTHROID) 75 MCG tablet    Is this a refill or new RX: refill     RX name and strength (copy/paste from chart):      Is this a 30 day or 90 day RX:     Pharmacy name and phone # [facility] [place] - [address] [facility] AT [facility]   Phone: [phone]    The doctors have asked that we provide their patients with the following 2 reminders -- prescription refills can take up to 72 hours, and a friendly reminder that in the future you can use your [facility] account to request refills:

## 2024-08-23 NOTE — TELEPHONE ENCOUNTER
Refill Decision Note   Debbie Timur  is requesting a refill authorization.  Brief Assessment and Rationale for Refill:  Approve     Medication Therapy Plan:  TSH-WNL      Alert overridden per protocol: Yes   Comments:     Note composed:11:04 PM 08/22/2024

## 2024-08-29 ENCOUNTER — OUTPATIENT CASE MANAGEMENT (OUTPATIENT)
Dept: ADMINISTRATIVE | Facility: OTHER | Age: 89
End: 2024-08-29
Payer: MEDICARE

## 2024-08-29 NOTE — PROGRESS NOTES
Outpatient Care Management  Plan of Care Follow Up Visit    Patient: Debbie Ding  MRN: [id number]  Date of Service: 08/29/2024  Completed by: Leigh Ann Asencio RN  Referral Date: 01/02/2024    Reason for Visit   Patient presents with    OPCM Chart Review     8/29/24    OPCM RN Follow Up Call     8/29/24       Brief Summary:     OPCM follow up complete. Continued education on Asthma. Patient is in agreement with follow up call on or around 9/12/24.

## 2024-08-31 ENCOUNTER — EXTERNAL CHRONIC CARE MANAGEMENT (OUTPATIENT)
Dept: PRIMARY CARE CLINIC | Facility: CLINIC | Age: 89
End: 2024-08-31
Payer: MEDICARE

## 2024-08-31 PROCEDURE — 99439 CHRNC CARE MGMT STAF EA ADDL: CPT | Mod: PBBFAC | Performed by: INTERNAL MEDICINE

## 2024-08-31 PROCEDURE — 99490 CHRNC CARE MGMT STAFF 1ST 20: CPT | Mod: PBBFAC | Performed by: INTERNAL MEDICINE

## 2024-08-31 PROCEDURE — 99439 CHRNC CARE MGMT STAF EA ADDL: CPT | Mod: S$PBB,,, | Performed by: INTERNAL MEDICINE

## 2024-08-31 PROCEDURE — 99490 CHRNC CARE MGMT STAFF 1ST 20: CPT | Mod: S$PBB,,, | Performed by: INTERNAL MEDICINE

## 2024-09-13 ENCOUNTER — OUTPATIENT CASE MANAGEMENT (OUTPATIENT)
Dept: ADMINISTRATIVE | Facility: OTHER | Age: 89
End: 2024-09-13
Payer: MEDICARE

## 2024-09-13 NOTE — LETTER
Debbiearaceli Ding  [address] [address]      Dear Ms. Lewis,     I am your nurse with [facility] Out[facility] Department. I was unsuccessful in reaching you today. At your earliest convenience, I would like to discuss your healthcare progress.      Please contact me at [phone] from 8:00 AM to 4:30 PM on Monday through Friday.     As you know, [facility] is a program offered to you through [facility] where a nurse is available 24/7 to answer questions or provide medical advice, their number is [phone].    Kind Regards,      Leigh Ann Asencio, RN  Outpatient Care Management

## 2024-09-13 NOTE — PROGRESS NOTES
9/13/24 1st attempt to complete follow-up for Outpatient Care Management: Left message for patient requesting a return call. [facility] letter has been sent. Will attempt to contact patient again at a later date.

## 2024-09-16 RX ORDER — GLYCOPYRROLATE 2 MG/1
TABLET ORAL
Qty: 180 TABLET | Refills: 3 | Status: SHIPPED | OUTPATIENT
Start: 2024-09-16

## 2024-09-16 NOTE — TELEPHONE ENCOUNTER
No care due was identified.  [facility] Embedded Care Due Messages. Reference number: [id number].   9/15/2024 7:10:50 PM CDT

## 2024-09-30 ENCOUNTER — EXTERNAL CHRONIC CARE MANAGEMENT (OUTPATIENT)
Dept: PRIMARY CARE CLINIC | Facility: CLINIC | Age: 89
End: 2024-09-30
Payer: MEDICARE

## 2024-09-30 PROCEDURE — 99490 CHRNC CARE MGMT STAFF 1ST 20: CPT | Mod: S$PBB,,, | Performed by: INTERNAL MEDICINE

## 2024-09-30 PROCEDURE — 99490 CHRNC CARE MGMT STAFF 1ST 20: CPT | Mod: PBBFAC | Performed by: INTERNAL MEDICINE

## 2024-10-01 ENCOUNTER — OUTPATIENT CASE MANAGEMENT (OUTPATIENT)
Dept: ADMINISTRATIVE | Facility: OTHER | Age: 89
End: 2024-10-01
Payer: MEDICARE

## 2024-10-01 NOTE — PROGRESS NOTES
Outpatient Care Management  Patient Not Qualified    Patient: Debbie Ding  MRN:  [id number]  Date of Service:  10/1/2024  Completed by:  Leigh Ann Asencio RN    Chief Complaint   Patient presents with    OPCM Chart Review     10/1/24    Case Closure     10/1/24       Patient Summary                10/1/24 Unable to reach patient for follow up after multiple attempts. [facility] letter has been sent. No response from patient. Closing case.

## 2024-10-07 ENCOUNTER — TELEPHONE (OUTPATIENT)
Dept: INTERNAL MEDICINE | Facility: CLINIC | Age: 89
End: 2024-10-07
Payer: MEDICARE

## 2024-10-07 DIAGNOSIS — I10 HYPERTENSION, ESSENTIAL: ICD-10-CM

## 2024-10-07 RX ORDER — AMLODIPINE BESYLATE 2.5 MG/1
5 TABLET ORAL
Qty: 90 TABLET | Refills: 0 | Status: SHIPPED | OUTPATIENT
Start: 2024-10-07

## 2024-10-07 NOTE — TELEPHONE ENCOUNTER
Refill Routing Note   Medication(s) are not appropriate for processing by [facility] for the following reason(s):        Required vitals abnormal    [facility] action(s):  Defer               Appointments  past 12m or future 3m with PCP    Date Provider   Last Visit   1/23/2024 Carolyn Mejia MD   Next Visit   Visit date not found Carolyn Mejia MD   ED visits in past 90 days: 0        Note composed:12:55 PM 10/07/2024

## 2024-10-07 NOTE — TELEPHONE ENCOUNTER
No care due was identified.  [facility] Embedded Care Due Messages. Reference number: [id number].   10/07/2024 1:17:17 AM CDT

## 2024-10-08 NOTE — TELEPHONE ENCOUNTER
Called pt to scheduled follow up, pt phone is going to voicemail. A message wasn't left because the pt mailbox is full

## 2024-10-21 DIAGNOSIS — K21.9 GASTROESOPHAGEAL REFLUX DISEASE, UNSPECIFIED WHETHER ESOPHAGITIS PRESENT: ICD-10-CM

## 2024-10-21 NOTE — TELEPHONE ENCOUNTER
Refill Routing Note   Medication(s) are not appropriate for processing by [facility] for the following reason(s):        Non-participating provider  Responsible provider unclear    [facility] action(s):  Route             Appointments  past 12m or future 3m with PCP    Date Provider   Last Visit   12/20/2023 Mandy Winchester PA-C   Next Visit   Visit date not found Mandy Winchester PA-C   ED visits in past 90 days: 0        Note composed:2:13 PM 10/21/2024

## 2024-10-22 RX ORDER — ESOMEPRAZOLE MAGNESIUM 40 MG/1
40 CAPSULE, DELAYED RELEASE ORAL 2 TIMES DAILY
Qty: 180 CAPSULE | Refills: 3 | Status: SHIPPED | OUTPATIENT
Start: 2024-10-22

## 2024-10-31 ENCOUNTER — EXTERNAL CHRONIC CARE MANAGEMENT (OUTPATIENT)
Dept: PRIMARY CARE CLINIC | Facility: CLINIC | Age: 89
End: 2024-10-31
Payer: MEDICARE

## 2024-10-31 PROCEDURE — 99490 CHRNC CARE MGMT STAFF 1ST 20: CPT | Mod: PBBFAC | Performed by: INTERNAL MEDICINE

## 2024-10-31 PROCEDURE — 99490 CHRNC CARE MGMT STAFF 1ST 20: CPT | Mod: S$PBB,,, | Performed by: INTERNAL MEDICINE

## 2024-11-05 RX ORDER — MECLIZINE HYDROCHLORIDE 25 MG/1
TABLET ORAL
Qty: 180 TABLET | Refills: 1 | Status: SHIPPED | OUTPATIENT
Start: 2024-11-05

## 2024-11-05 NOTE — TELEPHONE ENCOUNTER
Refill Routing Note   Medication(s) are not appropriate for processing by [facility] for the following reason(s):        Outside of protocol    [facility] action(s):  Route   Requires appointment : Yes             Appointments  past 12m or future 3m with PCP    Date Provider   Last Visit   1/23/2024 Carolyn Mejia MD   Next Visit   Visit date not found Carolyn Mejia MD   ED visits in past 90 days: 0        Note composed:8:05 AM 11/05/2024

## 2024-11-05 NOTE — TELEPHONE ENCOUNTER
Care Due:                  Date            Visit Type   Department     Provider  --------------------------------------------------------------------------------                                HOSPITAL     [facility]  Last Visit: 01-      FOLLOW UP    MEDICINE       [place]  Next Visit: None Scheduled  None         None Found                                                            Last  Test          Frequency    Reason                     Performed    Due Date  --------------------------------------------------------------------------------    Office Visit  12 months..  glycopyrrolate...........  01- 01-    [facility] Embedded Care Due Messages. Reference number: [id number].   11/05/2024 1:04:35 AM CST

## 2024-11-07 ENCOUNTER — TELEPHONE (OUTPATIENT)
Dept: DERMATOLOGY | Facility: CLINIC | Age: 89
End: 2024-11-07
Payer: MEDICARE

## 2024-11-07 NOTE — TELEPHONE ENCOUNTER
----- Message from Beatrice sent at 11/7/2024  8:59 AM CST -----  Contact: [phone]  Patient called, would like a call back in regards needing to find out who specialized on Hyperhidrosis.

## 2024-11-08 RX ORDER — APIXABAN 2.5 MG/1
2.5 TABLET, FILM COATED ORAL
Qty: 90 TABLET | Refills: 3 | Status: SHIPPED | OUTPATIENT
Start: 2024-11-08

## 2024-11-21 DIAGNOSIS — I10 HYPERTENSION, ESSENTIAL: ICD-10-CM

## 2024-11-21 RX ORDER — AMLODIPINE BESYLATE 2.5 MG/1
5 TABLET ORAL
Qty: 90 TABLET | Refills: 0 | Status: SHIPPED | OUTPATIENT
Start: 2024-11-21

## 2024-11-21 NOTE — TELEPHONE ENCOUNTER
No care due was identified.  [facility] Embedded Care Due Messages. Reference number: [id number].   11/21/2024 1:13:42 AM CST

## 2024-11-21 NOTE — TELEPHONE ENCOUNTER
Refill Routing Note   Medication(s) are not appropriate for processing by [facility] for the following reason(s):        Required vitals abnormal    [facility] action(s):  Defer               Appointments  past 12m or future 3m with PCP    Date Provider   Last Visit   1/23/2024 Carolyn Mejia MD   Next Visit   Visit date not found Carolyn Mejia MD   ED visits in past 90 days: 0        Note composed:10:08 AM 11/21/2024

## 2024-11-30 ENCOUNTER — EXTERNAL CHRONIC CARE MANAGEMENT (OUTPATIENT)
Dept: PRIMARY CARE CLINIC | Facility: CLINIC | Age: 89
End: 2024-11-30
Payer: MEDICARE

## 2024-11-30 PROCEDURE — 99487 CPLX CHRNC CARE 1ST 60 MIN: CPT | Mod: S$PBB,,, | Performed by: INTERNAL MEDICINE

## 2024-11-30 PROCEDURE — 99489 CPLX CHRNC CARE EA ADDL 30: CPT | Mod: PBBFAC | Performed by: INTERNAL MEDICINE

## 2024-11-30 PROCEDURE — 99489 CPLX CHRNC CARE EA ADDL 30: CPT | Mod: S$PBB,,, | Performed by: INTERNAL MEDICINE

## 2024-11-30 PROCEDURE — 99487 CPLX CHRNC CARE 1ST 60 MIN: CPT | Mod: PBBFAC | Performed by: INTERNAL MEDICINE

## 2024-12-07 NOTE — TELEPHONE ENCOUNTER
Refill Routing Note   Medication(s) are not appropriate for processing by [facility] for the following reason(s):      Medication outside of protocol    [facility] action(s):  Route Care Due:  None identified            Appointments  past 12m or future 3m with PCP    Date Provider   Last Visit   1/23/2024 Carolyn Mejia MD   Next Visit   Visit date not found Carolyn Mejia MD   ED visits in past 90 days: 0        Note composed:11:50 AM 12/07/2024

## 2024-12-08 RX ORDER — BENZONATATE 100 MG/1
100 CAPSULE ORAL
Qty: 30 CAPSULE | Refills: 1 | Status: SHIPPED | OUTPATIENT
Start: 2024-12-08

## 2024-12-12 ENCOUNTER — OFFICE VISIT (OUTPATIENT)
Dept: PODIATRY | Facility: CLINIC | Age: 89
End: 2024-12-12
Payer: MEDICARE

## 2024-12-12 ENCOUNTER — HOSPITAL ENCOUNTER (OUTPATIENT)
Dept: RADIOLOGY | Facility: HOSPITAL | Age: 89
Discharge: HOME | End: 2024-12-12
Attending: PODIATRIST
Payer: MEDICARE

## 2024-12-12 VITALS
HEIGHT: 65 IN | DIASTOLIC BLOOD PRESSURE: 73 MMHG | WEIGHT: 134.94 LBS | SYSTOLIC BLOOD PRESSURE: 159 MMHG | BODY MASS INDEX: 22.48 KG/M2 | HEART RATE: 80 BPM

## 2024-12-12 DIAGNOSIS — G57.53 TARSAL TUNNEL SYNDROME OF BOTH LOWER EXTREMITIES: ICD-10-CM

## 2024-12-12 DIAGNOSIS — R60.0 BILATERAL LOWER EXTREMITY EDEMA: ICD-10-CM

## 2024-12-12 DIAGNOSIS — E11.22 TYPE 2 DIABETES MELLITUS WITH STAGE 3A CHRONIC KIDNEY DISEASE, WITHOUT LONG-TERM CURRENT USE OF INSULIN: ICD-10-CM

## 2024-12-12 DIAGNOSIS — G57.53 TARSAL TUNNEL SYNDROME OF BOTH LOWER EXTREMITIES: Primary | ICD-10-CM

## 2024-12-12 DIAGNOSIS — R60.0 EDEMA OF RIGHT LOWER LEG: ICD-10-CM

## 2024-12-12 DIAGNOSIS — L84 CORN OR CALLUS: ICD-10-CM

## 2024-12-12 DIAGNOSIS — N18.31 TYPE 2 DIABETES MELLITUS WITH STAGE 3A CHRONIC KIDNEY DISEASE, WITHOUT LONG-TERM CURRENT USE OF INSULIN: ICD-10-CM

## 2024-12-12 PROCEDURE — 99214 OFFICE O/P EST MOD 30 MIN: CPT | Mod: PBBFAC,25 | Performed by: PODIATRIST

## 2024-12-12 PROCEDURE — 73630 X-RAY EXAM OF FOOT: CPT | Mod: 26,50,, | Performed by: RADIOLOGY

## 2024-12-12 PROCEDURE — 99999 PR PBB SHADOW E&M-EST. PATIENT-LVL IV: CPT | Mod: PBBFAC,,, | Performed by: PODIATRIST

## 2024-12-12 PROCEDURE — 73630 X-RAY EXAM OF FOOT: CPT | Mod: TC,50

## 2024-12-12 PROCEDURE — 99214 OFFICE O/P EST MOD 30 MIN: CPT | Mod: S$PBB,,, | Performed by: PODIATRIST

## 2024-12-12 RX ORDER — LIDOCAINE 30 MG/G
1 CREAM TOPICAL 2 TIMES DAILY
Qty: 85 G | Refills: 3 | Status: SHIPPED | OUTPATIENT
Start: 2024-12-12

## 2024-12-14 NOTE — PROGRESS NOTES
Subjective:      Patient ID: Debbie Ding is a 89 y.o. female.    Chief Complaint: Foot Pain (Right foot feels like its tape up)      Debbie is a 89 y.o. female who presents to the podiatry clinic  with complaint of  bilateral foot pain. Onset of the symptoms was several weeks ago. Precipitating event: none known. Current symptoms include: ability to bear weight, but with some pain. Aggravating factors: any weight bearing. Symptoms have progressed to a point and plateaued. Patient has had prior foot problems. Evaluation to date: none. Treatment to date: none.  She is here for routine diabetic foot evaluation and foot care.  Issue with great toe numbness.    Review of Systems   Constitutional: Negative for chills, diaphoresis and fever.   Cardiovascular:  Negative for claudication, cyanosis, leg swelling and syncope.   Respiratory:  Negative for cough and shortness of breath.    Skin:  Positive for suspicious lesions. Negative for color change and nail changes.   Musculoskeletal:  Positive for joint pain and joint swelling. Negative for falls, muscle cramps and muscle weakness.   Gastrointestinal:  Negative for diarrhea, nausea and vomiting.   Neurological:  Negative for disturbances in coordination, numbness, paresthesias, sensory change, tremors and weakness.   Psychiatric/Behavioral:  Negative for altered mental status.            Objective:      Physical Exam  Vitals reviewed.   Constitutional:       Appearance: She is well-developed.   HENT:      Head: Normocephalic and atraumatic.   Cardiovascular:      Pulses:           Dorsalis pedis pulses are 1+ on the right side and 1+ on the left side.        Posterior tibial pulses are 1+ on the right side and 1+ on the left side.   Pulmonary:      Effort: Pulmonary effort is normal.   Musculoskeletal:         General: Normal range of motion.      Comments: Anterior, lateral, and posterior muscle groups bilateral lower extremities show strength 4 over 5  symmetrically. Inspection and palpation of the joints and bones reveal no crepitus or joint effusion. No tenderness upon palpation. Mild plantar flexor contractures noted to digits 2 through 5 bilaterally.  Angle and base of gait are normal.    Mild tenderness to bilateral great toe joints dorsally.  Exostosis apparent bilaterally.  Decreased range of motion.   Feet:      Right foot:      Skin integrity: Callus and dry skin present.      Left foot:      Skin integrity: Callus and dry skin present.   Skin:     General: Skin is warm and dry.      Capillary Refill: Capillary refill takes 2 to 3 seconds.      Coloration: Skin is pale.      Comments: Skin bilateral lower extremities noted to be thin, dry, and atrophic.  Toenails thickened, discolored, with subungual fungal debris and tenderness noted.  Hyperkeratotic lesions noted to both feet plantarly with tenderness.   Neurological:      Mental Status: She is alert and oriented to person, place, and time.      Sensory: Sensory deficit present.      Motor: Atrophy present.      Deep Tendon Reflexes: Reflexes abnormal.      Reflex Scores:       Patellar reflexes are 1+ on the right side and 1+ on the left side.       Achilles reflexes are 1+ on the right side and 1+ on the left side.     Comments: Sharp, dull, light touch, and vibratory sensation are diminished bilaterally. Proprioceptive sensation is intact to both lower extremities. [place] Rob monofilament exam shows loss of protective sensation to plantar toes 1 through 5 bilaterally. Deep tendon reflexes to the patellar tendons is 1 over 4 bilaterally symmetrical. Deep tendon reflexes to the Achilles tendon is 0 over 4 bilaterally symmetrical. No ankle clonus or Babinski reflex noted bilaterally. Coordination is fair to both lower extremities.  Patient admits to intermittent burning and tingling in the feet.   Psychiatric:         Behavior: Behavior normal.               Assessment:       Encounter Diagnoses    Name Primary?    Tarsal tunnel syndrome of both lower extremities Yes    Type 2 diabetes mellitus with stage 3a chronic kidney disease, without long-term current use of insulin     Bilateral lower extremity edema     Edema of right lower leg     Corn or callus          Plan:       Debbie was seen today for foot pain.    Diagnoses and all orders for this visit:    Tarsal tunnel syndrome of both lower extremities  -     LIDOcaine 3 % Crea; Apply 1 application  topically 2 (two) times a day.  -     X-Ray Foot Complete Bilateral; Future  -     EMG W/ ULTRASOUND AND NERVE CONDUCTION TEST 2 Extremities; Future    Type 2 diabetes mellitus with stage 3a chronic kidney disease, without long-term current use of insulin    Bilateral lower extremity edema    Edema of right lower leg    Corn or callus      I counseled the patient on her conditions, their implications and medical management.    Shoe inspection. Diabetic Foot Education. Patient reminded of the importance of good nutrition and blood sugar control to help prevent podiatric complications of diabetes. Patient instructed on proper foot hygeine. We discussed wearing proper shoe gear, daily foot inspections and Diabetic foot education in detail.    Follow-up for x-ray and EMG/nerve conduction study review.

## 2024-12-23 ENCOUNTER — TELEPHONE (OUTPATIENT)
Dept: SURGERY | Facility: CLINIC | Age: 89
End: 2024-12-23
Payer: MEDICARE

## 2024-12-23 NOTE — TELEPHONE ENCOUNTER
----- Message from Nina sent at 12/23/2024  9:20 AM CST -----  Regarding: Appt reschedule  Contact: pt [phone]  Type:  Needs Medical Advice    Who Called: Debbie is calling to reschedule her appt   Would the patient rather a call back or a response via [facility]? Call back  Best Call Back Number: pt [phone]   Additional Information:

## 2024-12-31 ENCOUNTER — EXTERNAL CHRONIC CARE MANAGEMENT (OUTPATIENT)
Dept: PRIMARY CARE CLINIC | Facility: CLINIC | Age: 89
End: 2024-12-31
Payer: MEDICARE

## 2024-12-31 PROCEDURE — 99490 CHRNC CARE MGMT STAFF 1ST 20: CPT | Mod: PBBFAC | Performed by: INTERNAL MEDICINE

## 2024-12-31 PROCEDURE — 99439 CHRNC CARE MGMT STAF EA ADDL: CPT | Mod: PBBFAC | Performed by: INTERNAL MEDICINE

## 2025-01-02 ENCOUNTER — TELEPHONE (OUTPATIENT)
Dept: ORTHOPEDICS | Facility: CLINIC | Age: OVER 89
End: 2025-01-02
Payer: MEDICARE

## 2025-01-02 ENCOUNTER — TELEPHONE (OUTPATIENT)
Dept: NEUROLOGY | Facility: CLINIC | Age: OVER 89
End: 2025-01-02
Payer: MEDICARE

## 2025-01-02 NOTE — TELEPHONE ENCOUNTER
----- Message from Dick Hammer sent at 1/2/2025  3:49 PM CST -----  Regarding: FW: Scheduling  Good afternoon,    Can someone please assist with reaching out to patient to see about getting her scheduled at our [place] Location? Please let me know if there is anything further that I can do to assist.    Thanks,  Harman  ----- Message -----  From: Anju Tovar MA  Sent: 12/12/2024   2:28 PM CST  To: Harman Villagran  Subject: Scheduling                                       Good afternoon,     Can you assist with scheduling patient for EMG with orders in place. Thanks for all that you do for out patients, have a great rest of your day.         Thanks,   Anju Tovar

## 2025-01-02 NOTE — PROGRESS NOTES
Called and spoke with pt in regards to upcoming appt with Maurizio on 01/27. Pt says she is looking for someone who does laser procedures on the knee. Informed pt Maurizio does  not do laser procedures and pt decided to cancel her upcoming appt with Maurizio.

## 2025-01-02 NOTE — TELEPHONE ENCOUNTER
----- Message from Chelsea sent at 1/2/2025  9:21 AM CST -----  Contact: [phone] .1MEDICALADVICE     Patient is calling for Medical Advice regarding: pt would like a call in regards to upcoming appt. Please call and advise.     How long has patient had these symptoms:    Pharmacy name and phone#:    Patient wants a call back or thru [facility]:    Comments:    Please advise patient replies from provider may take up to 48 hours.

## 2025-01-09 ENCOUNTER — TELEPHONE (OUTPATIENT)
Dept: PHYSICAL MEDICINE AND REHAB | Facility: CLINIC | Age: OVER 89
End: 2025-01-09
Payer: MEDICARE

## 2025-01-09 NOTE — TELEPHONE ENCOUNTER
----- Message from Sparkle sent at 1/8/2025  1:01 PM CST -----  Contact: Patient  [phone]  .1MEDICALADVICE   PATIENT REQUESTS ORDER FOR A WALKER  with [facility]    Patient is calling for Medical Advice regarding:    How long has patient had these symptoms: Cannot Walk    Pharmacy name and phone#:    Patient wants a call back or thru [facility]:Patient  [phone]    Comments:    Please advise patient replies from provider may take up to 48 hours.

## 2025-01-13 ENCOUNTER — TELEPHONE (OUTPATIENT)
Dept: INTERNAL MEDICINE | Facility: CLINIC | Age: OVER 89
End: 2025-01-13
Payer: MEDICARE

## 2025-01-13 NOTE — TELEPHONE ENCOUNTER
----- Message from Gordon sent at 1/13/2025  7:53 AM CST -----  Contact: [phone] .1MEDICALADVICE     Patient is calling for Medical Advice regarding: Pt needs a call back about getting a referral for her knee    How long has patient had these symptoms:    Pharmacy name and phone#:    Patient wants a call back or thru [facility]: call back     Comments:    Please advise patient replies from provider may take up to 48 hours.

## 2025-01-21 RX ORDER — DOXEPIN HYDROCHLORIDE 10 MG/1
10 CAPSULE ORAL NIGHTLY
Qty: 90 CAPSULE | Refills: 1 | Status: SHIPPED | OUTPATIENT
Start: 2025-01-21

## 2025-01-21 NOTE — TELEPHONE ENCOUNTER
Care Due:                  Date            Visit Type   Department     Provider  --------------------------------------------------------------------------------                                HOSPITAL     [facility] INTERNAL  Last Visit: 01-      FOLLOW UP    MEDICINE       [place] [facility]  Next Visit: None Scheduled  None         None Found                                                            Last  Test          Frequency    Reason                     Performed    Due Date  --------------------------------------------------------------------------------    Lipid Panel.  12 months..  atorvastatin.............  04- 04-    Health [facility] Embedded Care Due Messages. Reference number: [id number].   1/21/2025 1:17:39 AM CST

## 2025-01-21 NOTE — TELEPHONE ENCOUNTER
Refill Routing Note   Medication(s) are not appropriate for processing by [facility] for the following reason(s):        Drug-drug interaction  Drug-disease interaction    [facility] action(s):  Defer     Requires labs : Yes      Medication Therapy Plan: doxepin and JASE (obstructive sleep apnea); ULTRAM      Appointments  past 12m or future 3m with PCP    Date Provider   Last Visit   1/23/2024 Carolyn Mejia MD   Next Visit   Visit date not found Carolyn Mejia MD   ED visits in past 90 days: 0        Note composed:10:43 AM 01/21/2025

## 2025-01-25 ENCOUNTER — TELEPHONE (OUTPATIENT)
Dept: SPORTS MEDICINE | Facility: CLINIC | Age: OVER 89
End: 2025-01-25
Payer: MEDICARE

## 2025-01-25 NOTE — TELEPHONE ENCOUNTER
Spoke with pt and let her know I rescheduled her to Bassam Gordon PA-C due to her injury not being surgical. Pt states understanding and appreciation.

## 2025-01-29 ENCOUNTER — OFFICE VISIT (OUTPATIENT)
Dept: PODIATRY | Facility: CLINIC | Age: OVER 89
End: 2025-01-29
Payer: MEDICARE

## 2025-01-29 VITALS
SYSTOLIC BLOOD PRESSURE: 152 MMHG | BODY MASS INDEX: 23.19 KG/M2 | HEART RATE: 84 BPM | DIASTOLIC BLOOD PRESSURE: 84 MMHG | WEIGHT: 139.31 LBS

## 2025-01-29 DIAGNOSIS — R60.0 BILATERAL LOWER EXTREMITY EDEMA: ICD-10-CM

## 2025-01-29 DIAGNOSIS — M79.671 RIGHT FOOT PAIN: ICD-10-CM

## 2025-01-29 DIAGNOSIS — G57.31 SUPERFICIAL PERONEAL NERVE NEUROPATHY, RIGHT: ICD-10-CM

## 2025-01-29 DIAGNOSIS — E11.22 TYPE 2 DIABETES MELLITUS WITH STAGE 3A CHRONIC KIDNEY DISEASE, WITHOUT LONG-TERM CURRENT USE OF INSULIN: Primary | ICD-10-CM

## 2025-01-29 DIAGNOSIS — N18.31 TYPE 2 DIABETES MELLITUS WITH STAGE 3A CHRONIC KIDNEY DISEASE, WITHOUT LONG-TERM CURRENT USE OF INSULIN: Primary | ICD-10-CM

## 2025-01-29 PROCEDURE — 64640 INJECTION TREATMENT OF NERVE: CPT | Mod: PBBFAC | Performed by: PODIATRIST

## 2025-01-29 PROCEDURE — 99214 OFFICE O/P EST MOD 30 MIN: CPT | Mod: PBBFAC | Performed by: PODIATRIST

## 2025-01-29 PROCEDURE — 99999 PR PBB SHADOW E&M-EST. PATIENT-LVL IV: CPT | Mod: PBBFAC,,, | Performed by: PODIATRIST

## 2025-01-29 NOTE — PROGRESS NOTES
Subjective:      Patient ID: Debbie Ding is a 89 y.o. female.    Chief Complaint: Results (Discuss EMG )      Debbie is a 89 y.o. female who presents to the podiatry clinic  with complaint of  bilateral foot pain. Onset of the symptoms was several weeks ago. Precipitating event: none known. Current symptoms include: ability to bear weight, but with some pain. Aggravating factors: any weight bearing. Symptoms have progressed to a point and plateaued. Patient has had prior foot problems.  Coming nerve conduction study.  Ongoing issues with burning pain specifically to the top of the right foot.    Review of Systems   Constitutional: Negative for chills, diaphoresis and fever.   Cardiovascular:  Negative for claudication, cyanosis, leg swelling and syncope.   Respiratory:  Negative for cough and shortness of breath.    Skin:  Positive for suspicious lesions. Negative for color change and nail changes.   Musculoskeletal:  Positive for joint pain and joint swelling. Negative for falls, muscle cramps and muscle weakness.   Gastrointestinal:  Negative for diarrhea, nausea and vomiting.   Neurological:  Negative for disturbances in coordination, numbness, paresthesias, sensory change, tremors and weakness.   Psychiatric/Behavioral:  Negative for altered mental status.            Objective:      Physical Exam  Vitals reviewed.   Constitutional:       Appearance: She is well-developed.   HENT:      Head: Normocephalic and atraumatic.   Cardiovascular:      Pulses:           Dorsalis pedis pulses are 1+ on the right side and 1+ on the left side.        Posterior tibial pulses are 1+ on the right side and 1+ on the left side.   Pulmonary:      Effort: Pulmonary effort is normal.   Musculoskeletal:         General: Normal range of motion.      Comments: Anterior, lateral, and posterior muscle groups bilateral lower extremities show strength 4 over 5 symmetrically. Inspection and palpation of the joints and bones reveal no  crepitus or joint effusion. No tenderness upon palpation. Mild plantar flexor contractures noted to digits 2 through 5 bilaterally.  Angle and base of gait are normal.    Mild tenderness to bilateral great toe joints dorsally.  Exostosis apparent bilaterally.  Decreased range of motion.   Feet:      Right foot:      Skin integrity: Callus and dry skin present.      Left foot:      Skin integrity: Callus and dry skin present.   Skin:     General: Skin is warm and dry.      Capillary Refill: Capillary refill takes 2 to 3 seconds.      Coloration: Skin is pale.      Comments: Skin bilateral lower extremities noted to be thin, dry, and atrophic.  Toenails thickened, discolored, with subungual fungal debris and tenderness noted.  Hyperkeratotic lesions noted to both feet plantarly with tenderness.   Neurological:      Mental Status: She is alert and oriented to person, place, and time.      Sensory: Sensory deficit present.      Motor: Atrophy present.      Deep Tendon Reflexes: Reflexes abnormal.      Reflex Scores:       Patellar reflexes are 1+ on the right side and 1+ on the left side.       Achilles reflexes are 1+ on the right side and 1+ on the left side.     Comments: Sharp, dull, light touch, and vibratory sensation are diminished bilaterally. Proprioceptive sensation is intact to both lower extremities. [place] Rob monofilament exam shows loss of protective sensation to plantar toes 1 through 5 bilaterally. Deep tendon reflexes to the patellar tendons is 1 over 4 bilaterally symmetrical. Deep tendon reflexes to the Achilles tendon is 0 over 4 bilaterally symmetrical. No ankle clonus or Babinski reflex noted bilaterally. Coordination is fair to both lower extremities.  Patient admits to intermittent burning and tingling in the feet.   Psychiatric:         Behavior: Behavior normal.       Positive provocation sign/Tinel sign noted to right superficial peroneal nerve.        Assessment:       Encounter  Diagnoses   Name Primary?    Type 2 diabetes mellitus with stage 3a chronic kidney disease, without long-term current use of insulin Yes    Bilateral lower extremity edema     Right foot pain     Superficial peroneal nerve neuropathy, right          Plan:       Debbie was seen today for results.    Diagnoses and all orders for this visit:    Type 2 diabetes mellitus with stage 3a chronic kidney disease, without long-term current use of insulin    Bilateral lower extremity edema    Right foot pain    Superficial peroneal nerve neuropathy, right      I counseled the patient on her conditions, their implications and medical management.    Shoe inspection. Diabetic Foot Education. Patient reminded of the importance of good nutrition and blood sugar control to help prevent podiatric complications of diabetes. Patient instructed on proper foot hygeine. We discussed wearing proper shoe gear, daily foot inspections and Diabetic foot education in detail.    Nerve injection:    Performed by:  Arturo Teresa DPM    Preop diagnosis:  Neuropathy symptoms/paresthesias/pain    The area overlying the right superficial peroneal nerve(s) was sterilely prepped, verbal consent was obtained, 2 cc's of 0.5% Marcaine plain was infiltrated around the affected nerve(s) for a diagnostic nerve block.  This was well tolerated with no complications.    Follow-up for x-ray and EMG/nerve conduction study review.

## 2025-01-31 ENCOUNTER — EXTERNAL CHRONIC CARE MANAGEMENT (OUTPATIENT)
Dept: PRIMARY CARE CLINIC | Facility: CLINIC | Age: OVER 89
End: 2025-01-31
Payer: MEDICARE

## 2025-01-31 PROCEDURE — 99439 CHRNC CARE MGMT STAF EA ADDL: CPT | Mod: S$PBB,,, | Performed by: INTERNAL MEDICINE

## 2025-01-31 PROCEDURE — 99490 CHRNC CARE MGMT STAFF 1ST 20: CPT | Mod: S$PBB,,, | Performed by: INTERNAL MEDICINE

## 2025-01-31 PROCEDURE — 99439 CHRNC CARE MGMT STAF EA ADDL: CPT | Mod: PBBFAC | Performed by: INTERNAL MEDICINE

## 2025-01-31 PROCEDURE — 99490 CHRNC CARE MGMT STAFF 1ST 20: CPT | Mod: PBBFAC | Performed by: INTERNAL MEDICINE

## 2025-02-03 DIAGNOSIS — E78.5 HYPERLIPIDEMIA, UNSPECIFIED HYPERLIPIDEMIA TYPE: ICD-10-CM

## 2025-02-03 RX ORDER — ATORVASTATIN CALCIUM 40 MG/1
40 TABLET, FILM COATED ORAL
Qty: 90 TABLET | Refills: 0 | Status: SHIPPED | OUTPATIENT
Start: 2025-02-03

## 2025-02-03 NOTE — TELEPHONE ENCOUNTER
Pt scheduled for f/u appt with Mandy Winchester   [Normal Mood and Affect] : normal mood and affect [Able to Communicate] : able to communicate [Well Developed] : well developed [Well Nourished] : well nourished [NL (30)] : right lateral bending 30 degrees [Extension] : extension [Facet arthropathy] : Facet arthropathy [Disc space narrowing] : Disc space narrowing [AP] : anteroposterior [There are no fractures, subluxations or dislocations. No significant abnormalities are seen] : There are no fractures, subluxations or dislocations. No significant abnormalities are seen [de-identified] : thin [] : negative sitting straight leg raise [FreeTextEntry1] : DDD T12-L1, L4-5.  DFD L4-S1. [TWNoteComboBox7] : forward flexion 75 degrees [de-identified] : extension 10 degrees

## 2025-02-03 NOTE — TELEPHONE ENCOUNTER
No care due was identified.  Health [facility] Embedded Care Due Messages. Reference number: [id number].   2/03/2025 1:12:15 AM CST

## 2025-02-03 NOTE — TELEPHONE ENCOUNTER
Debbie Ding  is requesting a refill authorization.  Brief Assessment and Rationale for Refill:  Approve     Medication Therapy Plan:         Comments:     Note composed:6:22 AM 02/03/2025

## 2025-02-21 DIAGNOSIS — Z00.00 ENCOUNTER FOR MEDICARE ANNUAL WELLNESS EXAM: ICD-10-CM

## 2025-02-28 ENCOUNTER — EXTERNAL CHRONIC CARE MANAGEMENT (OUTPATIENT)
Dept: PRIMARY CARE CLINIC | Facility: CLINIC | Age: OVER 89
End: 2025-02-28
Payer: MEDICARE

## 2025-02-28 PROCEDURE — 99490 CHRNC CARE MGMT STAFF 1ST 20: CPT | Mod: S$PBB,,, | Performed by: INTERNAL MEDICINE

## 2025-02-28 PROCEDURE — 99490 CHRNC CARE MGMT STAFF 1ST 20: CPT | Mod: PBBFAC | Performed by: INTERNAL MEDICINE

## 2025-03-07 ENCOUNTER — TELEPHONE (OUTPATIENT)
Dept: INTERNAL MEDICINE | Facility: CLINIC | Age: OVER 89
End: 2025-03-07

## 2025-03-07 DIAGNOSIS — E03.9 HYPOTHYROIDISM, ADULT: ICD-10-CM

## 2025-03-07 RX ORDER — LEVOTHYROXINE SODIUM 75 UG/1
75 TABLET ORAL
Qty: 90 TABLET | Refills: 1 | Status: SHIPPED | OUTPATIENT
Start: 2025-03-07

## 2025-03-07 NOTE — TELEPHONE ENCOUNTER
Care Due:                  Date            Visit Type   Department     Provider  --------------------------------------------------------------------------------                                [facility] INTERNAL  Last Visit: 01-      FOLLOW UP    MEDICINE       [place] [facility]  Next Visit: None Scheduled  None         None Found                                                            Last  Test          Frequency    Reason                     Performed    Due Date  --------------------------------------------------------------------------------    Office Visit  12 months..  SYNTHROID, amLODIPine,     01- 01-                             atorvastatin, famotidine,                             glycopyrrolate,                             levothyroxine,                             mirtazapine..............    [facility] [employer] Embedded Care Due Messages. Reference number: [id number].   3/07/2025 2:08:47 PM CST

## 2025-03-14 ENCOUNTER — OFFICE VISIT (OUTPATIENT)
Dept: INTERNAL MEDICINE | Facility: CLINIC | Age: OVER 89
End: 2025-03-14
Payer: MEDICARE

## 2025-03-14 ENCOUNTER — LAB VISIT (OUTPATIENT)
Dept: LAB | Facility: HOSPITAL | Age: OVER 89
End: 2025-03-14
Payer: MEDICARE

## 2025-03-14 ENCOUNTER — TELEPHONE (OUTPATIENT)
Dept: PRIMARY CARE CLINIC | Facility: CLINIC | Age: OVER 89
End: 2025-03-14
Payer: MEDICARE

## 2025-03-14 VITALS
SYSTOLIC BLOOD PRESSURE: 130 MMHG | HEIGHT: 65 IN | BODY MASS INDEX: 23.03 KG/M2 | OXYGEN SATURATION: 97 % | DIASTOLIC BLOOD PRESSURE: 70 MMHG | WEIGHT: 138.25 LBS | HEART RATE: 73 BPM

## 2025-03-14 DIAGNOSIS — E11.9 TYPE 2 DIABETES MELLITUS WITHOUT COMPLICATION, WITHOUT LONG-TERM CURRENT USE OF INSULIN: ICD-10-CM

## 2025-03-14 DIAGNOSIS — Z00.00 ANNUAL PHYSICAL EXAM: ICD-10-CM

## 2025-03-14 DIAGNOSIS — M17.11 OSTEOARTHRITIS OF RIGHT KNEE, UNSPECIFIED OSTEOARTHRITIS TYPE: ICD-10-CM

## 2025-03-14 DIAGNOSIS — E13.69 OTHER SPECIFIED DIABETES MELLITUS WITH OTHER SPECIFIED COMPLICATION, WITHOUT LONG-TERM CURRENT USE OF INSULIN: ICD-10-CM

## 2025-03-14 DIAGNOSIS — I10 HYPERTENSION, ESSENTIAL: ICD-10-CM

## 2025-03-14 DIAGNOSIS — E03.9 HYPOTHYROIDISM, ADULT: ICD-10-CM

## 2025-03-14 DIAGNOSIS — Z00.00 ANNUAL PHYSICAL EXAM: Primary | ICD-10-CM

## 2025-03-14 DIAGNOSIS — E78.5 HYPERLIPIDEMIA, UNSPECIFIED HYPERLIPIDEMIA TYPE: ICD-10-CM

## 2025-03-14 DIAGNOSIS — L65.9 HAIR LOSS: ICD-10-CM

## 2025-03-14 DIAGNOSIS — J45.909 CHRONIC ASTHMA WITHOUT COMPLICATION, UNSPECIFIED ASTHMA SEVERITY, UNSPECIFIED WHETHER PERSISTENT: ICD-10-CM

## 2025-03-14 LAB
ALBUMIN SERPL BCP-MCNC: 3.9 G/DL (ref 3.5–5.2)
ALP SERPL-CCNC: 67 U/L (ref 40–150)
ALT SERPL W/O P-5'-P-CCNC: 9 U/L (ref 10–44)
ANION GAP SERPL CALC-SCNC: 10 MMOL/L (ref 8–16)
AST SERPL-CCNC: 17 U/L (ref 10–40)
BASOPHILS # BLD AUTO: 0.04 K/UL (ref 0–0.2)
BASOPHILS NFR BLD: 0.5 % (ref 0–1.9)
BILIRUB SERPL-MCNC: 0.7 MG/DL (ref 0.1–1)
BUN SERPL-MCNC: 26 MG/DL (ref 10–30)
CALCIUM SERPL-MCNC: 10 MG/DL (ref 8.7–10.5)
CHLORIDE SERPL-SCNC: 107 MMOL/L (ref 95–110)
CO2 SERPL-SCNC: 25 MMOL/L (ref 23–29)
CREAT SERPL-MCNC: 1.2 MG/DL (ref 0.5–1.4)
DIFFERENTIAL METHOD BLD: ABNORMAL
EOSINOPHIL # BLD AUTO: 0 K/UL (ref 0–0.5)
EOSINOPHIL NFR BLD: 0.4 % (ref 0–8)
ERYTHROCYTE [DISTWIDTH] IN BLOOD BY AUTOMATED COUNT: 15.9 % (ref 11.5–14.5)
EST. GFR  (NO RACE VARIABLE): 42.5 ML/MIN/1.73 M^2
ESTIMATED AVG GLUCOSE: 137 MG/DL (ref 68–131)
GLUCOSE SERPL-MCNC: 145 MG/DL (ref 70–110)
HBA1C MFR BLD: 6.4 % (ref 4–5.6)
HCT VFR BLD AUTO: 42.8 % (ref 37–48.5)
HGB BLD-MCNC: 13.8 G/DL (ref 12–16)
IMM GRANULOCYTES # BLD AUTO: 0.02 K/UL (ref 0–0.04)
IMM GRANULOCYTES NFR BLD AUTO: 0.2 % (ref 0–0.5)
LYMPHOCYTES # BLD AUTO: 1.9 K/UL (ref 1–4.8)
LYMPHOCYTES NFR BLD: 21.8 % (ref 18–48)
MCH RBC QN AUTO: 29.2 PG (ref 27–31)
MCHC RBC AUTO-ENTMCNC: 32.2 G/DL (ref 32–36)
MCV RBC AUTO: 91 FL (ref 82–98)
MONOCYTES # BLD AUTO: 0.4 K/UL (ref 0.3–1)
MONOCYTES NFR BLD: 4.2 % (ref 4–15)
NEUTROPHILS # BLD AUTO: 6.2 K/UL (ref 1.8–7.7)
NEUTROPHILS NFR BLD: 72.9 % (ref 38–73)
NRBC BLD-RTO: 0 /100 WBC
PLATELET # BLD AUTO: 244 K/UL (ref 150–450)
PMV BLD AUTO: 11.3 FL (ref 9.2–12.9)
POTASSIUM SERPL-SCNC: 4 MMOL/L (ref 3.5–5.1)
PROT SERPL-MCNC: 7.8 G/DL (ref 6–8.4)
RBC # BLD AUTO: 4.72 M/UL (ref 4–5.4)
SODIUM SERPL-SCNC: 142 MMOL/L (ref 136–145)
T4 FREE SERPL-MCNC: 1.3 NG/DL (ref 0.71–1.51)
TSH SERPL DL<=0.005 MIU/L-ACNC: 0.04 UIU/ML (ref 0.4–4)
WBC # BLD AUTO: 8.56 K/UL (ref 3.9–12.7)

## 2025-03-14 PROCEDURE — 85025 COMPLETE CBC W/AUTO DIFF WBC: CPT

## 2025-03-14 PROCEDURE — 84443 ASSAY THYROID STIM HORMONE: CPT

## 2025-03-14 PROCEDURE — 36415 COLL VENOUS BLD VENIPUNCTURE: CPT

## 2025-03-14 PROCEDURE — 84439 ASSAY OF FREE THYROXINE: CPT

## 2025-03-14 PROCEDURE — 99999 PR PBB SHADOW E&M-EST. PATIENT-LVL IV: CPT | Mod: PBBFAC,GC,,

## 2025-03-14 PROCEDURE — 80053 COMPREHEN METABOLIC PANEL: CPT

## 2025-03-14 PROCEDURE — 99214 OFFICE O/P EST MOD 30 MIN: CPT | Mod: PBBFAC

## 2025-03-14 PROCEDURE — 83036 HEMOGLOBIN GLYCOSYLATED A1C: CPT

## 2025-03-14 RX ORDER — LEVOTHYROXINE SODIUM 75 UG/1
75 TABLET ORAL
Qty: 90 TABLET | Refills: 3 | Status: SHIPPED | OUTPATIENT
Start: 2025-03-14

## 2025-03-14 RX ORDER — ATORVASTATIN CALCIUM 40 MG/1
40 TABLET, FILM COATED ORAL DAILY
Qty: 90 TABLET | Refills: 0 | Status: SHIPPED | OUTPATIENT
Start: 2025-03-14

## 2025-03-14 RX ORDER — LEVOTHYROXINE SODIUM 75 UG/1
75 TABLET ORAL
Qty: 90 TABLET | Refills: 1 | Status: CANCELLED | OUTPATIENT
Start: 2025-03-14

## 2025-03-14 RX ORDER — DICLOFENAC SODIUM 10 MG/G
2 GEL TOPICAL DAILY
Qty: 20 G | Refills: 3 | Status: SHIPPED | OUTPATIENT
Start: 2025-03-14

## 2025-03-14 RX ORDER — FLUTICASONE PROPIONATE AND SALMETEROL XINAFOATE 230; 21 UG/1; UG/1
2 AEROSOL, METERED RESPIRATORY (INHALATION) 2 TIMES DAILY
Qty: 36 G | Refills: 3 | Status: SHIPPED | OUTPATIENT
Start: 2025-03-14 | End: 2026-03-14

## 2025-03-14 RX ORDER — ALBUTEROL SULFATE 90 UG/1
2 INHALANT RESPIRATORY (INHALATION) EVERY 6 HOURS PRN
Qty: 8 G | Refills: 3 | Status: CANCELLED | OUTPATIENT
Start: 2025-03-14 | End: 2026-03-14

## 2025-03-14 RX ORDER — AMLODIPINE BESYLATE 5 MG/1
5 TABLET ORAL DAILY
Qty: 90 TABLET | Refills: 3 | Status: SHIPPED | OUTPATIENT
Start: 2025-03-14 | End: 2026-03-14

## 2025-03-14 RX ORDER — ALBUTEROL SULFATE 0.83 MG/ML
2.5 SOLUTION RESPIRATORY (INHALATION) EVERY 6 HOURS PRN
Qty: 90 EACH | Refills: 3 | Status: SHIPPED | OUTPATIENT
Start: 2025-03-14

## 2025-03-14 NOTE — PATIENT INSTRUCTIONS
To do    your medications  Get your lab work  Please see your PCP in 1 week for follow up  Please schedule an appointment with your new PCP at the [facility]  [phone]  Please schedule your dermatology appointment

## 2025-03-14 NOTE — TELEPHONE ENCOUNTER
Attempted to call pt to informed her she is ineligible for 65 [facility] clinic due to her insurance, left  with a a contact phone number [phone], where they are accepting new patients.

## 2025-03-14 NOTE — PROGRESS NOTES
Clinic Note  3/14/2025      Subjective:         Chief Complaint: Annual Exam      Debbie is a 89 y.o. female with a PMH of T2DM (A1c 6.6), HTN, asthma, CKD3, DVT/unprovoked DVT (eliquis), Jehovah Witness, HLD, R knee OA, chronic cough, dementia, chronic itching, GERD, hyperhidrosis, hypothyroidism, chronic constipation, vertigo, JASE, fibromyalgia, overactive bladder, poor appetite, being seen for an established visit.    HPI  ACTIVE PROBLEMS     R knee OA  - uses CBD cream but states it does not help  - would like voltaren gel   - does not use a cane or walker     Hair loss  - would like to see dermatology     CHRONIC PROBLEMS     T2DM   - pt was unaware of this diagnosis and does not take any medications for it      Fibromyalgia   - PT worked in the past, would not like to see them right now   - has been rx duloxetine, tramadol, tylenol in the past which did not work.   - has seen pain management in the past   - pain well controlled     Asthma  - albuterol prn which she uses  - Fluticasone propionate / Salmeterol (advair) which the patient is not using because she thought she only needed her albuterol   - has prednisone rx from her pulmonologist which she states she will take prn only when she has SOB and cough.   - follows with pulm at [facility]     HTN  - amlodipine 5 mg     DVT/unprovoked PE  - eliquis lifelong    HLD  - atorvastatin     Chronic dry cough   - benzonatate prn   - better   - no aspiration     Dementia   - donepexil     Itching  - doxepin prn     GERD  - esomeprazole BID daily and eats 20-30 mins after eats  - heart burn controlled     Hyperhidrosis   - glycopyrrolate BID  - symptoms well controlled    Hypothyroidism  - synthroid 75 mcg     Constipation   - linzess   - other laxatives causes diarrhea   - BM once a day     Vertigo   - meclizine prn  - not taking for a while     JASE  - CPAP rx by her pulmonologist   - states she is not using it because has not had an asthma attack     Over  active bladder  - vibegron 75 mg daily     Medical History:   PMH: TT2DM (A1c 6.6), HTN, asthma, CKD3, DVT/unprovoked DVT (eliquis), Jehovah Witness, HLD, R knee OA, chronic cough, dementia, chronic itching, GERD, hyperhidrosis, hypothyroidism, chronic constipation, vertigo, JASE, overactive bladder, poor appetite, fibromyalgia    Allergies: Talwin, melatonin, trazodone    Surgical History: nothing recent. Hysterectomy 2/2 to 1984 and Foot neuroma surgery (Left, 1985 )  Hospitalizations: hospitalized 12/29/24 - 1/3/25 for cough 2/2 to bronchiectasis. Developed hoarseness which is now resolved.     Relevant Medications: compliant with medications. albuterol prn (couple of times a week), amlodipine 5 mg, eliquis 2.5 mg daily, atorvastatin 40 mg daily, benzonatate 100 mg prn (once every 2 weeks), donepezil, doxepin prn for itching (once a month), esomeprazole 40 mg BID, glycopyrrolate, linzess, synthroid, memantine, prednisone 10 mg daily     Medications she is not taking: cyproheptadine because it causes her to eat sweets only and not food, vaginal cream because she is worried about side effects, gabapentin 100 mg (was on for REM behavior disorder), remeron (states does not help appetite), duloxetine and tylenol (does not work), famotidine (does not work) tramadol (does not work), claritin (does not work)    Social History:  Tobacco use: never   EtOH use: never  Illicit drug use: never     Vaccinations:  Shingrix rec at 50 : she states she never had chicken pox so she does not take the shingles shot    Health Maintenance:  A1C: interested     Functionality:  Lives with her daughter. 3 grandchildren live with father. Lives in a house. No recent falls   Is able to complete ADLs and IADLs. She likes to cook and bake   Ambulates with a limp after a fall 4 hours ago   Safety at home: yes     Review of Systems   Constitutional:  Negative for chills, fever and weight loss.   HENT:  Negative for congestion and sore throat.     Eyes:  Negative for blurred vision.   Respiratory:  Negative for cough and shortness of breath.    Cardiovascular:  Negative for chest pain, palpitations and leg swelling.   Gastrointestinal:  Negative for constipation, diarrhea, nausea and vomiting.   Genitourinary:  Negative for dysuria, frequency and urgency.   Musculoskeletal:  Positive for joint pain. Negative for back pain.        Right sided knee pain    Skin:  Negative for rash.   Neurological:  Negative for dizziness and loss of consciousness.   Psychiatric/Behavioral:  The patient is not nervous/anxious and does not have insomnia.        Past Medical History:   Diagnosis Date    Allergic rhinitis     Anticoagulant long-term use     Arthritis     Asthma     Bilateral pulmonary embolism 4/27/2019    Cataract     Chronic anticoagulation 9/28/2020    Depression     Diabetes mellitus     Fibromyalgia 7/2/2012    Fibromyalgia     GERD (gastroesophageal reflux disease)     Hypercholesterolemia 9/28/2020    Hypercholesterolemia 9/28/2020    Hypertension 7/2/2012    Thoracic or lumbosacral neuritis or radiculitis, unspecified     Thyroid disease     Ulcer        Family History   Problem Relation Name Age of Onset    Diabetes Mother      Diabetes Brother      Emphysema Maternal Aunt      Melanoma Neg Hx      Asthma Neg Hx      Colon cancer Neg Hx      Esophageal cancer Neg Hx          reports that she has never smoked. She has never used smokeless tobacco. She reports that she does not drink alcohol and does not use drugs.    Medication List with Changes/Refills   New Medications    DICLOFENAC SODIUM (VOLTAREN ARTHRITIS PAIN) 1 % GEL    Apply 2 g topically once daily.   Current Medications    ALBUTEROL (PROVENTIL/VENTOLIN HFA) 90 MCG/ACTUATION INHALER    Inhale 2 puffs into the lungs every 6 (six) hours as needed.    BENZONATATE (TESSALON) 100 MG CAPSULE    TAKE 1 CAPSULE BY MOUTH THREE TIMES DAILY AS NEEDED FOR COUGH    CICLOPIROX 1 % SHAMPOO    Apply topically.     DEXTROMETHORPHAN-GUAIFENESIN  MG/5 ML (ROBITUSSIN-DM)  MG/5 ML LIQUID    Take 10 mLs by mouth every 6 (six) hours as needed (Cough).    DONEPEZIL (ARICEPT) 10 MG TABLET    Take 1 tablet by mouth every evening.    DOXEPIN (SINEQUAN) 10 MG CAPSULE    TAKE 1 CAPSULE AT BEDTIME  FOR ITCHING    ELIQUIS 2.5 MG TAB    TAKE 1 TABLET DAILY    ESOMEPRAZOLE (NEXIUM) 40 MG CAPSULE    TAKE 1 CAPSULE TWICE DAILY    FLUOCINONIDE (LIDEX) 0.05 % EXTERNAL SOLUTION    AAA scalp qday - bid prn pruritus    GEMTESA 75 MG TAB    Take 1 tablet by mouth twice a week.    GLYCOPYRROLATE (ROBINUL) 2 MG TAB    TAKE 1 TABLET TWICE A DAY    LEVOTHYROXINE (SYNTHROID) 75 MCG TABLET    Take 1 tablet (75 mcg total) by mouth before breakfast.    LIDOCAINE 3 % CREA    Apply 1 application  topically 2 (two) times a day.    MECLIZINE (ANTIVERT) 25 MG TABLET    TAKE 1 TABLET TWICE A DAY  AS NEEDED FOR DIZZINESS    MEMANTINE (NAMENDA) 10 MG TAB    Take 1 tablet (10 mg total) by mouth once daily.   Changed and/or Refilled Medications    Modified Medication Previous Medication    ALBUTEROL (PROVENTIL) 2.5 MG /3 ML (0.083 %) NEBULIZER SOLUTION albuterol (PROVENTIL) 2.5 mg /3 mL (0.083 %) nebulizer solution       Take 3 mLs (2.5 mg total) by nebulization every 6 (six) hours as needed for Wheezing. Rescue    Take 3 mLs (2.5 mg total) by nebulization every 6 (six) hours as needed for Wheezing. Rescue    AMLODIPINE (NORVASC) 5 MG TABLET amLODIPine (NORVASC) 2.5 MG tablet       Take 1 tablet (5 mg total) by mouth once daily.    TAKE 2 TABLETS ONCE DAILY    ATORVASTATIN (LIPITOR) 40 MG TABLET atorvastatin (LIPITOR) 40 MG tablet       Take 1 tablet (40 mg total) by mouth once daily.    TAKE 1 TABLET ONCE DAILY    FLUTICASONE-SALMETEROL 230-21 MCG/DOSE (ADVAIR HFA) 230-21 MCG/ACTUATION HFAA INHALER fluticasone-salmeterol 230-21 mcg/dose (ADVAIR HFA) 230-21 mcg/actuation HFAA inhaler       Inhale 2 puffs into the lungs 2 (two) times daily. Controller     "Inhale 2 puffs into the lungs 2 (two) times daily. Controller    SYNTHROID 75 MCG TABLET SYNTHROID 75 mcg tablet       Take 1 tablet (75 mcg total) by mouth before breakfast.    Take 1 tablet (75 mcg total) by mouth before breakfast.   Discontinued Medications    ACETAMINOPHEN (TYLENOL) 500 MG TABLET    Take 1-2 tablets (500-1,000 mg total) by mouth 3 (three) times daily as needed for Pain.    DULOXETINE (CYMBALTA) 60 MG CAPSULE    Take 1 capsule (60 mg total) by mouth once daily.    FAMOTIDINE (PEPCID) 20 MG TABLET    Take 1 tablet (20 mg total) by mouth daily as needed for Heartburn (breakthrough heartburn and acid reflux not controlled with Nexium).    GABAPENTIN (NEURONTIN) 100 MG CAPSULE    Take 100 mg by mouth.    LORATADINE (CLARITIN) 10 MG TABLET    Take 1 tablet (10 mg total) by mouth daily as needed for Allergies.    MIRTAZAPINE (REMERON) 15 MG TABLET    Take 1 tablet (15 mg total) by mouth every evening.    TRAMADOL (ULTRAM) 50 MG TABLET    Take 1 tablet (50 mg total) by mouth 3 (three) times daily as needed for Pain.     Review of patient's allergies indicates:   Allergen Reactions    Melatonin      Other reaction(s): Other (See Comments)  Sleep Walks and has unnatural dreams    Talwin compound Hives    Talwin [pentazocine lactate] Hallucinations    Trazodone Other (See Comments)     Nightmares/sleep walk      Bentyl [dicyclomine] Anxiety       Problem List[1]        Objective:      /70 (BP Location: Right arm, Patient Position: Sitting)   Pulse 73   Ht 5' 5" (1.651 m)   Wt 62.7 kg (138 lb 3.7 oz)   SpO2 97%   BMI 23.00 kg/m²   Estimated body mass index is 23 kg/m² as calculated from the following:    Height as of this encounter: 5' 5" (1.651 m).    Weight as of this encounter: 62.7 kg (138 lb 3.7 oz).  Physical Exam   Constitutional: She is oriented to person, place, and time. No distress.   HENT:   Head: Normocephalic and atraumatic.   Right Ear: External ear normal.   Left Ear: External ear " normal.   Nose: Nose normal.   Eyes: Conjunctivae are normal. Right eye exhibits no discharge. Left eye exhibits no discharge.   Cardiovascular: Normal rate, regular rhythm and normal heart sounds. Pulmonary:      Effort: Pulmonary effort is normal. No respiratory distress.      Breath sounds: Normal breath sounds.     Abdominal: Soft. She exhibits no distension and no mass. There is no abdominal tenderness. There is no rebound and no guarding. No hernia.   Musculoskeletal:         General: Normal range of motion.      Cervical back: Normal range of motion.      Right lower leg: No edema.      Left lower leg: No edema.      Comments: Timed up and go test around 10 - 11 seconds   Mild gait issues 2/2 to right knee pain and misalignment, but not enough to impair gait     Neurological: She is alert and oriented to person, place, and time.   Skin: Skin is warm and dry. No erythema.   Psychiatric: Her behavior is normal. Mood normal.          Assessment and Plan:         Debbie was seen today for annual exam.    Diagnoses and all orders for this visit:    Annual physical exam  The patient presents today for an annual [facility] care appointment. I have completed a full history and physical, ordered basic labs on this patient, completed a full medical reconciliation, refilled her medications she needed, restarted her advair, and discontinued medications she states no longer helps her. The patient also had acute MSK issues she wanted to address but were not an emergency, therefore she will follow up with me in one week. Given I will not be at [facility] after June, I have requested [facility] to take over her care after I leave.   -     CBC Auto Differential; Future  -     Comprehensive Metabolic Panel; Future  -     Ambulatory referral/consult to Internal Medicine; Future  -     CBC Auto Differential; Future    Hypothyroidism, adult  The patient has a chronic history of hypothyroidism. Provided refill of medication  and will order repeat labs today. Last labs 7 mos ago were wnl.   -     TSH; Future  -     T4, Free; Future  -     SYNTHROID 75 mcg tablet; Take 1 tablet (75 mcg total) by mouth before breakfast.    Chronic asthma without complication, unspecified asthma severity, unspecified whether persistent  The patient had a hospitalization from Dec 2024 to Jan 2025 for cough 2/2 to chronic aspiration and bronchiectasis for which she was discharged on prednisone. She follows with [facility] pul[facility], Dr. Suazo, who has rx'ed albuterol, advair, and CPAP. The patient states she only uses her albuterol and has not used her advair or CPAP. The patient also has rx of prednisone which she only uses when she has an exacerbation, and takes at the direction of her pulmonologist. I have filled her advair and advised the patient to restart the medication. I also refilled her albuterol. She needs to continue following with pulmonology.   -     fluticasone-salmeterol 230-21 mcg/dose (ADVAIR HFA) 230-21 mcg/actuation HFAA inhaler; Inhale 2 puffs into the lungs 2 (two) times daily. Controller  -     albuterol (PROVENTIL) 2.5 mg /3 mL (0.083 %) nebulizer solution; Take 3 mLs (2.5 mg total) by nebulization every 6 (six) hours as needed for Wheezing. Rescue    Hypertension, essential  Medication refilled, BP well controlled  -     amLODIPine (NORVASC) 5 MG tablet; Take 1 tablet (5 mg total) by mouth once daily.    Hyperlipidemia, unspecified hyperlipidemia type  Medication refilled given the patient is tolerating well.   -     atorvastatin (LIPITOR) 40 MG tablet; Take 1 tablet (40 mg total) by mouth once daily.    Type 2 diabetes mellitus without complication, without long-term current use of insulin  Last A1c 6.6. the patient states she is unaware of this diagnosis. However her A1C has been > 6.5 since 2017. She does not take any medications for this.   -     Hemoglobin A1C; Future    Hair loss  Requested by patient   -     Ambulatory  referral/consult to Dermatology; Future    Osteoarthritis of right knee, unspecified osteoarthritis type  Requested medication   -     diclofenac sodium (VOLTAREN ARTHRITIS PAIN) 1 % Gel; Apply 2 g topically once daily.    Other Orders Placed This Visit:  Orders Placed This Encounter   Procedures    CBC Auto Differential    Comprehensive Metabolic Panel    TSH    T4, Free    Hemoglobin A1C    Ambulatory referral/consult to Dermatology    Ambulatory referral/consult to Internal Medicine           Discussed with Dr. Victoria  Will contact w/ lab results and will follow up in one week to address her acute MSK issues     Signed:    Shantel Goncalves,   Internal Medicine PGY 3         [1]   Patient Active Problem List  Diagnosis    Primary hypertension    Incontinence of urine    Fibromyalgia    Primary osteoarthritis involving multiple joints    Spondylosis without myelopathy    Spinal stenosis, lumbar region, without neurogenic claudication    Acquired spondylolisthesis    Thoracic or lumbosacral neuritis or radiculitis, unspecified    DDD (degenerative disc disease), lumbar    Neck pain    Atrophy of nasal turbinates    GERD (gastroesophageal reflux disease)    Chronic bilateral low back pain without sciatica    Decreased strength of trunk and back    Chronic asthma    SOB (shortness of breath)    Allergic rhinitis    Vestibular dizziness    Hearing loss    Hypothyroidism    Pseudophakia of both eyes    PUD (peptic ulcer disease)    Urinary, incontinence, stress female    Impaired functional mobility, balance, gait, and endurance    Chronic pulmonary embolism, unspecified pulmonary embolism type, unspecified whether acute cor pulmonale present    Debility    Hyperhidrosis    History of pulmonary embolism    Overactive bladder    Type 2 diabetes mellitus with stage 3a chronic kidney disease, without long-term current use of insulin    History of deep venous thrombosis    Chronic anticoagulation    Hyperlipidemia associated  with type 2 diabetes mellitus    Sleep walking    JASE (obstructive sleep apnea)    Chronic neck pain    Asthma exacerbation    Bronchitis, chronic, mucopurulent    Gastroparesis    Chronic left shoulder pain    Pulmonary hypertension    Left carpal tunnel syndrome    Stage 3 chronic kidney disease    Type 2 diabetes mellitus with kidney complication, with long-term current use of insulin    Vocal cord dysfunction    Chronic cough    Esophageal dysmotility    Bronchiectasis without complication    Aortic atherosclerosis    Presbylarynges    Muscle tension dysphonia    Asthma, moderate persistent, poorly-controlled    Depression, recurrent    Lewy body dementia    Moderate persistent asthma without complication    Physical deconditioning    Thrush

## 2025-03-17 DIAGNOSIS — J45.909 CHRONIC ASTHMA WITHOUT COMPLICATION, UNSPECIFIED ASTHMA SEVERITY, UNSPECIFIED WHETHER PERSISTENT: ICD-10-CM

## 2025-03-17 RX ORDER — FLUTICASONE PROPIONATE AND SALMETEROL XINAFOATE 230; 21 UG/1; UG/1
2 AEROSOL, METERED RESPIRATORY (INHALATION) 2 TIMES DAILY
Qty: 36 G | Refills: 3 | Status: SHIPPED | OUTPATIENT
Start: 2025-03-17 | End: 2026-03-17

## 2025-03-28 ENCOUNTER — TELEPHONE (OUTPATIENT)
Dept: PODIATRY | Facility: CLINIC | Age: OVER 89
End: 2025-03-28
Payer: MEDICARE

## 2025-03-28 NOTE — TELEPHONE ENCOUNTER
Call patient to confirmed appointment on 04/01/2025 with Dr. Teresa. Patient did not answer so I left detailed appointment with date and time and if anything changes please reach out to us at [phone].

## 2025-03-31 ENCOUNTER — EXTERNAL CHRONIC CARE MANAGEMENT (OUTPATIENT)
Dept: PRIMARY CARE CLINIC | Facility: CLINIC | Age: OVER 89
End: 2025-03-31
Payer: MEDICARE

## 2025-03-31 ENCOUNTER — TELEPHONE (OUTPATIENT)
Dept: PODIATRY | Facility: CLINIC | Age: OVER 89
End: 2025-03-31
Payer: MEDICARE

## 2025-03-31 PROCEDURE — 99439 CHRNC CARE MGMT STAF EA ADDL: CPT | Mod: S$PBB,,, | Performed by: INTERNAL MEDICINE

## 2025-03-31 PROCEDURE — 99439 CHRNC CARE MGMT STAF EA ADDL: CPT | Mod: PBBFAC | Performed by: INTERNAL MEDICINE

## 2025-03-31 PROCEDURE — 99490 CHRNC CARE MGMT STAFF 1ST 20: CPT | Mod: PBBFAC | Performed by: INTERNAL MEDICINE

## 2025-03-31 PROCEDURE — 99490 CHRNC CARE MGMT STAFF 1ST 20: CPT | Mod: S$PBB,,, | Performed by: INTERNAL MEDICINE

## 2025-03-31 NOTE — TELEPHONE ENCOUNTER
Call pt in regards to appointment on 04/01/2025 with Dr. Teresa due to him being gout of clinic. Patient did not answer so I left detailed voicemail verbally stating that Dr. Teresa will be out and that appointment will be cancel and to reach out to us at [phone] to help with rescheduling appointment.

## 2025-04-14 ENCOUNTER — PROCEDURE VISIT (OUTPATIENT)
Dept: NEUROLOGY | Facility: CLINIC | Age: OVER 89
End: 2025-04-14
Payer: MEDICARE

## 2025-04-14 ENCOUNTER — RESULTS FOLLOW-UP (OUTPATIENT)
Dept: ENDOCRINOLOGY | Facility: HOSPITAL | Age: OVER 89
End: 2025-04-14

## 2025-04-14 ENCOUNTER — TELEPHONE (OUTPATIENT)
Dept: ENDOCRINOLOGY | Facility: HOSPITAL | Age: OVER 89
End: 2025-04-14
Payer: MEDICARE

## 2025-04-14 DIAGNOSIS — E03.9 HYPOTHYROIDISM, ADULT: ICD-10-CM

## 2025-04-14 DIAGNOSIS — G57.53 TARSAL TUNNEL SYNDROME OF BOTH LOWER EXTREMITIES: ICD-10-CM

## 2025-04-14 PROCEDURE — 95886 MUSC TEST DONE W/N TEST COMP: CPT | Mod: 26,S$PBB,, | Performed by: PHYSICAL MEDICINE & REHABILITATION

## 2025-04-14 PROCEDURE — 95910 NRV CNDJ TEST 7-8 STUDIES: CPT | Mod: PBBFAC,PN | Performed by: PHYSICAL MEDICINE & REHABILITATION

## 2025-04-14 PROCEDURE — 95886 MUSC TEST DONE W/N TEST COMP: CPT | Mod: PBBFAC,PN | Performed by: PHYSICAL MEDICINE & REHABILITATION

## 2025-04-14 PROCEDURE — 95910 NRV CNDJ TEST 7-8 STUDIES: CPT | Mod: 26,S$PBB,, | Performed by: PHYSICAL MEDICINE & REHABILITATION

## 2025-04-14 RX ORDER — LEVOTHYROXINE SODIUM 50 UG/1
50 TABLET ORAL
Qty: 30 TABLET | Refills: 11 | Status: SHIPPED | OUTPATIENT
Start: 2025-04-14

## 2025-04-14 NOTE — PROCEDURES
Test Date:  2025    Patient: debbie zarco : 10/23/1932 Physician: Dallin Guerin D.O.   ID#: [id number] SEX: Female Ref. Phys: Arturo Teresa DPM     HPI: Debbie Zarco is a 89 y.o.female who presents for NCS/EMG to evaluate for tarsal tunnel bilaterally.     PROCEDURE:  Prior to the procedure, the procedure was discussed in detail with the patient.  All questions were answered, and verbal consent was obtained.  For nerve conduction studies, a combination of surface electrodes, bar electrodes, and/or ring electrodes were used as needed.  For needle EMG, each site was cleaned and prepped in usual fashion with an alcohol pad.  A monopolar needle (28G) was used.  There was no significant bleeding, and bandages were applied as needed.  The procedure was tolerated without adverse effect.  The patient was instructed on post-procedure care including ice if needed for 10-15 minutes up to 4 times/day for any sore muscles.  I discussed with the patient that the data would be reviewed and a report sent to the referring provider, where any follow up questions regarding next steps should be directed.        NCV & EMG Findings:  All nerve conduction studies (as indicated in the following tables) were within normal limits.  Needle evaluation of the Left Abductor Hallucis muscle showed increased motor unit amplitude, increased motor unit duration, and diminished recruitment.  All remaining muscles (as indicated in the following table) showed no evidence of electrical instability.      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  In isolation to one muscle (especially a distal foot muscle in the elderly population), this is of limited diagnostic significance.  This was otherwise a normal study without definitive evidence of tibial mononeuropathy on either side.          ___________________________  Dallin Guerin D.O.        NCS+  Motor Nerve Results      Latency Amplitude  F-Lat Segment Distance CV Comment   Site (ms) Norm (mV) Norm (ms)  (cm) (m/s) Norm    Left Fibular (EDB)   Ankle 3.7  < 6.5 2.9  > 1.10         Bel Fib Head 12.5 - 2.0 -  Bel Fib Head-Ankle 36 41 -    Pop Fossa 14.0 - 2.4 -  Pop Fossa-Bel Fib Head 9 60  > 42    Right Fibular (EDB)   Ankle 4.5  < 6.5 1.94  > 1.10         Bel Fib Head 12.7 - 1.72 -  Bel Fib Head-Ankle 35 43 -    Pop Fossa 14.7 - 1.74 -  Pop Fossa-Bel Fib Head 10 50  > 42    Left Tibial (AHB)   Ankle 5.2  < 6.1 5.5  > 1.10         Right Tibial (AHB)   Ankle 3.5  < 6.1 5.2  > 1.10           Sensory Nerve Results      Start Lat Latency (Peak) Amplitude (P-P) Segment Distance Start CV Comment   Site (ms) Norm (ms) (µV) Norm  (cm) (m/s) Norm    Left Sural (Rec:Lat Mall)   Calf 2.1  < 3.6 2.7 23  > 4 Calf-Lat Mall 14 67  > 39    Right Sural (Rec:Lat Mall)   Calf 2.6  < 3.6 3.3 18  > 4 Calf-Lat Mall 14 54  > 39    Right Superficial Fibular (Rec:Ankle)   14 cm 1.43 - 2.4 34  > 5 14 cm-Ankle 14 98  > 32      EMG+     Side Muscle Nerve Root Ins Act Fibs Psw Amp Dur Poly Recrt Int Pat Comment   Left Vastus Med Femoral L2-L4 Nml Nml Nml Nml Nml 0 Nml Nml    Left Tib Anterior Fibular,  Deep Fibula... L4-L5 Nml Nml Nml Nml Nml 0 Nml Nml    Left Fib Longus Fibular,  Superficial... L5-S1 Nml Nml Nml Nml Nml 0 Nml Nml    RIGHT AHB Tibial,  Medial Plant... S1-S2 Nml Nml Nml Nml Nml 0 Nml Nml    Left Gastroc Tibial S1-S2 Nml Nml Nml Nml Nml 0 Nml Nml    Left ADM p Tibial,  Lateral Plan... S1-S2 Nml Nml Nml Nml Nml 0 Nml Nml    Right Vastus Med Femoral L2-L4 Nml Nml Nml Nml Nml 0 Nml Nml    Right Tib Anterior Fibular,  Deep Fibula... L4-L5 Nml Nml Nml Nml Nml 0 Nml Nml    Right Fib Longus Fibular,  Superficial... L5-S1 Nml Nml Nml Nml Nml 0 Nml Nml    LEFT AHB Tibial,  Medial Plant... S1-S2 Nml Nml Nml *Incr *>12ms 0 *Reduced Nml 20 mV MUAPs   Right Gastroc Tibial S1-S2 Nml Nml Nml Nml Nml 0 Nml Nml            Waveforms:    Motor              Sensory

## 2025-04-16 NOTE — TELEPHONE ENCOUNTER
37 Called in again about her medications.  I had called in brand-name Lyrica but apparently they gave her the generic again.  I told her to contact the pharmacy.  She also is discussing her sleep medication.  [facility].  I gave them her numbers since she states she cannot get in touch with them.   [phone]

## 2025-04-30 ENCOUNTER — EXTERNAL CHRONIC CARE MANAGEMENT (OUTPATIENT)
Dept: PRIMARY CARE CLINIC | Facility: CLINIC | Age: OVER 89
End: 2025-04-30
Payer: MEDICARE

## 2025-04-30 PROCEDURE — 99490 CHRNC CARE MGMT STAFF 1ST 20: CPT | Mod: PBBFAC | Performed by: INTERNAL MEDICINE

## 2025-04-30 PROCEDURE — 99439 CHRNC CARE MGMT STAF EA ADDL: CPT | Mod: PBBFAC | Performed by: INTERNAL MEDICINE

## 2025-05-01 ENCOUNTER — OFFICE VISIT (OUTPATIENT)
Dept: CARDIOLOGY | Facility: CLINIC | Age: OVER 89
End: 2025-05-01
Payer: MEDICARE

## 2025-05-01 VITALS
DIASTOLIC BLOOD PRESSURE: 73 MMHG | BODY MASS INDEX: 22.22 KG/M2 | HEIGHT: 65 IN | HEART RATE: 76 BPM | OXYGEN SATURATION: 97 % | WEIGHT: 133.38 LBS | SYSTOLIC BLOOD PRESSURE: 163 MMHG

## 2025-05-01 DIAGNOSIS — I27.82 CHRONIC PULMONARY EMBOLISM, UNSPECIFIED PULMONARY EMBOLISM TYPE, UNSPECIFIED WHETHER ACUTE COR PULMONALE PRESENT: ICD-10-CM

## 2025-05-01 DIAGNOSIS — I10 PRIMARY HYPERTENSION: ICD-10-CM

## 2025-05-01 DIAGNOSIS — N18.30 STAGE 3 CHRONIC KIDNEY DISEASE, UNSPECIFIED WHETHER STAGE 3A OR 3B CKD: ICD-10-CM

## 2025-05-01 DIAGNOSIS — E11.21 TYPE 2 DIABETES MELLITUS WITH DIABETIC NEPHROPATHY, WITH LONG-TERM CURRENT USE OF INSULIN: ICD-10-CM

## 2025-05-01 DIAGNOSIS — E11.22 TYPE 2 DIABETES MELLITUS WITH STAGE 3A CHRONIC KIDNEY DISEASE, WITHOUT LONG-TERM CURRENT USE OF INSULIN: ICD-10-CM

## 2025-05-01 DIAGNOSIS — I70.0 AORTIC ATHEROSCLEROSIS: ICD-10-CM

## 2025-05-01 DIAGNOSIS — Z79.01 CHRONIC ANTICOAGULATION: ICD-10-CM

## 2025-05-01 DIAGNOSIS — G89.29 CHRONIC BILATERAL LOW BACK PAIN WITHOUT SCIATICA: ICD-10-CM

## 2025-05-01 DIAGNOSIS — M54.50 CHRONIC BILATERAL LOW BACK PAIN WITHOUT SCIATICA: ICD-10-CM

## 2025-05-01 DIAGNOSIS — I27.20 PULMONARY HYPERTENSION: ICD-10-CM

## 2025-05-01 DIAGNOSIS — Z86.711 HISTORY OF PULMONARY EMBOLISM: Primary | ICD-10-CM

## 2025-05-01 DIAGNOSIS — G47.33 OSA (OBSTRUCTIVE SLEEP APNEA): ICD-10-CM

## 2025-05-01 DIAGNOSIS — Z79.4 TYPE 2 DIABETES MELLITUS WITH DIABETIC NEPHROPATHY, WITH LONG-TERM CURRENT USE OF INSULIN: ICD-10-CM

## 2025-05-01 DIAGNOSIS — E11.69 HYPERLIPIDEMIA ASSOCIATED WITH TYPE 2 DIABETES MELLITUS: ICD-10-CM

## 2025-05-01 DIAGNOSIS — R06.02 SOB (SHORTNESS OF BREATH): ICD-10-CM

## 2025-05-01 DIAGNOSIS — N18.31 TYPE 2 DIABETES MELLITUS WITH STAGE 3A CHRONIC KIDNEY DISEASE, WITHOUT LONG-TERM CURRENT USE OF INSULIN: ICD-10-CM

## 2025-05-01 DIAGNOSIS — E78.5 HYPERLIPIDEMIA ASSOCIATED WITH TYPE 2 DIABETES MELLITUS: ICD-10-CM

## 2025-05-01 DIAGNOSIS — Z86.718 HISTORY OF DEEP VENOUS THROMBOSIS: ICD-10-CM

## 2025-05-01 PROCEDURE — 99215 OFFICE O/P EST HI 40 MIN: CPT | Mod: PBBFAC | Performed by: INTERNAL MEDICINE

## 2025-05-01 PROCEDURE — 99999 PR PBB SHADOW E&M-EST. PATIENT-LVL V: CPT | Mod: PBBFAC,,, | Performed by: INTERNAL MEDICINE

## 2025-05-01 NOTE — PROGRESS NOTES
[facility]      Chief Complaint   Patient presents with    Hypertension       Patient ID: Debbie Ding is a 89 y.o. 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  Pertinent history/events as follows:     -At our initial clinic visit on 7/21/202, Mrs. Ding reported being diagnosed with RLE DVT and bilateral PE in 2019.  She was treated with Eliquis 5 mg bid up until 1 month ago, at which time she was switched to Xarelto 10 mg daily because of insurance issues.  She reports no RLE edema or pain.  Her main complaint is SOB, which she states became worse after being diagnosed with bilateral PE, and progressed over time to the point where she has significant difficulty breathing.  She reports no chest pain or chest discomfort. Echo from 7/19/2022 with EF 65%; estimated PASP 40 mmhg.  Plan:   SOB- Etiology unclear.  Given history of bilateral PE, check CTA chest.  Pt has several risk factors for CAD.  If no evidence of residual PE, will proceed with nuclear stress test to evaluate for myocardial ischemia.  Pt also has history of asthma.  Therefore, if cardiac workup is unrevealing, will refer to Pulmonary for evaluation.    History of RLE DVT/Bilateral PE- Check CTA chest and BLE venous ultrasound.  Continue Xarelto 10 mg daily.  HTN- Continue current medications.  HLD-  Start atorvastatin 40 mg daily.    -At follow up clinic visit on 12/8/2022, Mrs. Ding reported continued SOB as described at clinic visit on 7/21/2022.  CTA Chest on 7/25/2022 revealed no CT evidence of pulmonary embolus to the subsegmental branches.  There is dependent small airways disease, consider aspiration versus noninfectious small airways disease; findings appears stable since prior exam.  Mild scarring within the right middle lobe and lingula, possibly related to previous atypical infection.  BLE Venous Ultrasound on 7/29/2022 revealed no  evidence of a right or left lower extremity DVT.  A Baker's cyst measuring 4.55 cm x 0.92 cm was seen in the right extremity.  Nuclear Stress Test on 8/11/2022 demonstrated normal myocardial perfusion with no evidence of myocardial ischemia or infarction.  Plan:   SOB- Etiology unclear.  Likely related to pathology inherent to the underlying lung parenchyma based on current workup.  CTA Chest on 7/25/2022 revealed no CT evidence of pulmonary embolus to the subsegmental branches.  There is dependent small airways disease, consider aspiration versus noninfectious small airways disease; findings appears stable since prior exam.  Mild scarring within the right middle lobe and lingula, possibly related to previous atypical infection.   A Baker's cyst measuring 4.55 cm x 0.92 cm was seen in the right extremity.  Nuclear Stress Test on 8/11/2022 demonstrated normal myocardial perfusion with no evidence of myocardial ischemia or infarction.  Refer to Pulmonary for evaluation.    History of RLE DVT/Bilateral PE- CTA Chest on 7/25/2022 revealed no CT evidence of pulmonary embolus to the subsegmental branches.  BLE Venous Ultrasound on 7/29/2022 revealed no evidence of a right or left lower extremity DVT. Continue Xarelto 10 mg daily.  HTN- Continue current medications.  HLD-  Continue atorvastatin 40 mg daily.  JASE- Encourage CPAP usage.     -6/29/2023 clinic visit: Mrs. Ding reports improvement in SOB.  She has no chest pain, TIA symptoms or syncope.  LDL 64 on 4/18/2023.  Plan:   SOB- Etiology unclear.  Now improved.  Likely related to pathology inherent to the underlying lung parenchyma  based of current workup.  CTA Chest on 7/25/2022 revealed no CT evidence of pulmonary embolus to the subsegmental branches. There is dependent small airways disease, consider aspiration versus noninfectious small airways disease; findings appears stable since prior exam.  Mild scarring within the right middle lobe and lingula, possibly  related to previous atypical infection. Nuclear Stress Test on 8/11/2022 demonstrated normal myocardial perfusion with no evidence of myocardial ischemia or infarction.  Pt referred to Pulmonary for evaluation.    History of RLE DVT/Bilateral PE- CTA Chest on 7/25/2022 revealed no CT evidence of pulmonary embolus to the subsegmental branches.  BLE Venous Ultrasound on 7/29/2022 revealed no evidence of a right or left lower extremity DVT. Continue Xarelto 10 mg daily.  HTN- Continue current medications.  HLD- LDL 64 on 4/18/2023.  Continue atorvastatin 40 mg daily.  JASE- Encourage CPAP usage.     4/16/2024 clinic visit: Mrs. Ding reports continued SOB, which is now managed by Pulmonology.  She has no chest pain, LE edema, TIA symptoms or syncope.  LDL 60 on 4/12/2024.    Plan:  SOB- Due to lung parenchymal disease.  Cardiac workup as per above.  Continue management per Pulmonary.      History of RLE DVT/Bilateral PE- CTA Chest on 7/25/2022 revealed no CT evidence of pulmonary embolus to the subsegmental branches.  BLE Venous Ultrasound on 7/29/2022 revealed no evidence of a right or left lower extremity DVT. Continue Eliquis 2.5 mg bid.    HTN- Continue current medications.  HLD- LDL 60 on 4/12/2024.  Continue atorvastatin 40 mg daily.  JASE- Encourage CPAP usage.     HPI:  Mrs. Ding reports chest pain, LE edema, TIA symptoms or syncope.  SOB is chronic and unchanged. She follows with Pulmonology.  Main complaint today is back pain. Echo on 1/2/2024 as per below.     Past Medical History:   Diagnosis Date    Allergic rhinitis     Anticoagulant long-term use     Arthritis     Asthma     Bilateral pulmonary embolism 4/27/2019    Cataract     Chronic anticoagulation 9/28/2020    Depression     Diabetes mellitus     Fibromyalgia 7/2/2012    Fibromyalgia     GERD (gastroesophageal reflux disease)     Hypercholesterolemia 9/28/2020    Hypercholesterolemia 9/28/2020    Hypertension 7/2/2012    Thoracic or lumbosacral  neuritis or radiculitis, unspecified     Thyroid disease     Ulcer      Past Surgical History:   Procedure Laterality Date    CARPAL TUNNEL RELEASE Left 11/29/2023    Procedure: RELEASE, CARPAL TUNNEL,LEFT;  Surgeon: Avril Hucthison MD;  Location: [facility];  Service: Orthopedics;  Laterality: Left;    CATARACT EXTRACTION Bilateral     ESOPHAGOGASTRODUODENOSCOPY N/A 3/8/2022    Procedure: EGD (ESOPHAGOGASTRODUODENOSCOPY) with dilation-either hospital ok with Dr. Meza;  Surgeon: Rebeca Meza MD;  Location: [facility];  Service: Endoscopy;  Laterality: N/A;  1/11-eliquis hold ok see te-tb    ESOPHAGOGASTRODUODENOSCOPY N/A 2/28/2022    Procedure: EGD (ESOPHAGOGASTRODUODENOSCOPY) with dilation-either [facility] ok with Dr. Meza;  Surgeon: Rebeca Meza MD;  Location: [facility] ([address] [place] [address]);  Service: Endoscopy;  Laterality: N/A;  1/11-eliquis hold ok see te-tb  COVID test on 2/25/22 at [facility]  2/22-confirmed appt arrival time with pt-Kpvt    FOOT NEUROMA SURGERY  1985    HYSTERECTOMY       Social History     Socioeconomic History    Marital status:    Occupational History     Comment:  - school    Tobacco Use    Smoking status: Never    Smokeless tobacco: Never   Substance and Sexual Activity    Alcohol use: No    Drug use: No    Sexual activity: Not Currently   Social History Narrative    Lives with daughter, Benjie.        Other daughter, Madina, drives her around.        She lives independently, except for driving.          Stretches in bed.  Walks in neighborhood, and in house several times a day.     Social Drivers of Health     Financial Resource Strain: Medium Risk (7/2/2024)    Overall Financial Resource Strain (CARDIA)     Difficulty of Paying Living Expenses: Somewhat hard   Food Insecurity: No Food Insecurity (7/2/2024)    Hunger Vital Sign     Worried About Running Out of Food in the Last Year: Never true     Ran Out of Food in the Last Year: Never true   Transportation  Needs: No Transportation Needs (7/2/2024)    TRANSPORTATION NEEDS     Transportation : No   Physical Activity: Insufficiently Active (7/2/2024)    Exercise Vital Sign     Days of Exercise per Week: 2 days     Minutes of Exercise per Session: 10 min   Stress: No Stress Concern Present (7/2/2024)    [employer] [place] [employer] - Occupational Stress Questionnaire     Feeling of Stress : Only a little   Housing Stability: Unknown (7/2/2024)    Housing Stability Vital Sign     Unable to Pay for Housing in the Last Year: No     Homeless in the Last Year: No     Family History   Problem Relation Name Age of Onset    Diabetes Mother      Diabetes Brother      Emphysema Maternal Aunt      Melanoma Neg Hx      Asthma Neg Hx      Colon cancer Neg Hx      Esophageal cancer Neg Hx         Review of patient's allergies indicates:   Allergen Reactions    Melatonin      Other reaction(s): Other (See Comments)  Sleep Walks and has unnatural dreams    Talwin compound Hives    Talwin [pentazocine lactate] Hallucinations    Trazodone Other (See Comments)     Nightmares/sleep walk      Bentyl [dicyclomine] Anxiety       Medication List with Changes/Refills   Current Medications    ALBUTEROL (PROVENTIL) 2.5 MG /3 ML (0.083 %) NEBULIZER SOLUTION    Take 3 mLs (2.5 mg total) by nebulization every 6 (six) hours as needed for Wheezing. Rescue    ALBUTEROL (PROVENTIL/VENTOLIN HFA) 90 MCG/ACTUATION INHALER    Inhale 2 puffs into the lungs every 6 (six) hours as needed.    AMLODIPINE (NORVASC) 5 MG TABLET    Take 1 tablet (5 mg total) by mouth once daily.    ATORVASTATIN (LIPITOR) 40 MG TABLET    Take 1 tablet (40 mg total) by mouth once daily.    BENZONATATE (TESSALON) 100 MG CAPSULE    TAKE 1 CAPSULE BY MOUTH THREE TIMES DAILY AS NEEDED FOR COUGH    CICLOPIROX 1 % SHAMPOO    Apply topically.    DEXTROMETHORPHAN-GUAIFENESIN  MG/5 ML (ROBITUSSIN-DM)  MG/5 ML LIQUID    Take 10 mLs by mouth every 6 (six) hours as needed  "(Cough).    DICLOFENAC SODIUM (VOLTAREN ARTHRITIS PAIN) 1 % GEL    Apply 2 g topically once daily.    DONEPEZIL (ARICEPT) 10 MG TABLET    Take 1 tablet by mouth every evening.    DOXEPIN (SINEQUAN) 10 MG CAPSULE    TAKE 1 CAPSULE AT BEDTIME  FOR ITCHING    ELIQUIS 2.5 MG TAB    TAKE 1 TABLET DAILY    ESOMEPRAZOLE (NEXIUM) 40 MG CAPSULE    TAKE 1 CAPSULE TWICE DAILY    FLUOCINONIDE (LIDEX) 0.05 % EXTERNAL SOLUTION    AAA scalp qday - bid prn pruritus    FLUTICASONE-SALMETEROL 230-21 MCG/DOSE (ADVAIR HFA) 230-21 MCG/ACTUATION HFAA INHALER    Inhale 2 puffs into the lungs 2 (two) times daily - Controller    GEMTESA 75 MG TAB    Take 1 tablet by mouth twice a week.    GLYCOPYRROLATE (ROBINUL) 2 MG TAB    TAKE 1 TABLET TWICE A DAY    LEVOTHYROXINE (SYNTHROID) 50 MCG TABLET    Take 1 tablet (50 mcg total) by mouth before breakfast.    LIDOCAINE 3 % CREA    Apply 1 application  topically 2 (two) times a day.    MECLIZINE (ANTIVERT) 25 MG TABLET    TAKE 1 TABLET TWICE A DAY  AS NEEDED FOR DIZZINESS    MEMANTINE (NAMENDA) 10 MG TAB    Take 1 tablet (10 mg total) by mouth once daily.       Review of Systems  Constitution: Denies chills, fever, and sweats.  HENT: Denies headaches or blurry vision.  Cardiovascular: Denies chest pain or irregular heart beat.  Respiratory: Positive for shortness of breath.  Gastrointestinal: Denies abdominal pain, nausea, or vomiting.  Musculoskeletal: Denies muscle cramps.  Neurological: Denies dizziness or focal weakness.  Psychiatric/Behavioral: Normal mental status.  Hematologic/Lymphatic: Denies bleeding problem or easy bruising/bleeding.  Skin: Denies rash or suspicious lesions    Physical Examination  BP (!) 163/73 (Patient Position: Sitting)   Pulse 76   Ht 5' 5" (1.651 m)   Wt 60.5 kg (133 lb 6.1 oz)   SpO2 97%   BMI 22.20 kg/m²     Constitutional: No acute distress, conversant  HEENT: Sclera anicteric, Pupils equal, round and reactive to light, extraocular motions intact, " Oropharynx clear  Neck: No JVD, no carotid bruits  Cardiovascular: regular rate and rhythm, no murmur, rubs or gallops, normal S1/S2  Pulmonary: Clear to auscultation bilaterally  Abdominal: Abdomen soft, nontender, nondistended, positive bowel sounds  Extremities: No lower extremity edema,   Pulses:  Carotid pulses are 2+ on the right side, and 2+ on the left side.  Radial pulses are 2+ on the right side, and 2+ on the left side.   Femoral pulses are 2+ on the right side, and 2+ on the left side.  Popliteal pulses are 2+ on the right side, and 2+ on the left side.   Dorsalis pedis pulses are 2+ on the right side, and 2+ on the left side.   Posterior tibial pulses are 2+ on the right side, and 2+ on the left side.    Skin: No ecchymosis, erythema, or ulcers  Psych: Alert and oriented x 3, appropriate affect  Neuro: CNII-XII intact, no focal deficits    Labs:  Most Recent Data  CBC:   Lab Results   Component Value Date    WBC 8.56 03/14/2025    HGB 13.8 03/14/2025    HCT 42.8 03/14/2025     03/14/2025    MCV 91 03/14/2025    RDW 15.9 (H) 03/14/2025     BMP:   Lab Results   Component Value Date     03/14/2025    K 4.0 03/14/2025     03/14/2025    CO2 25 03/14/2025    BUN 26 03/14/2025    CREATININE 1.2 03/14/2025     (H) 03/14/2025    CALCIUM 10.0 03/14/2025    MG 2.1 11/15/2022    PHOS 2.8 11/15/2022     LFTS;   Lab Results   Component Value Date    PROT 7.8 03/14/2025    ALBUMIN 3.9 03/14/2025    BILITOT 0.7 03/14/2025    AST 17 03/14/2025    ALKPHOS 67 03/14/2025    ALT 9 (L) 03/14/2025     COAGS:   Lab Results   Component Value Date    INR 0.9 06/20/2019     FLP:   Lab Results   Component Value Date    CHOL 168 04/12/2024    HDL 99 (H) 04/12/2024    LDLCALC 60.4 (L) 04/12/2024    TRIG 43 04/12/2024    CHOLHDL 58.9 (H) 04/12/2024     CARDIAC:   Lab Results   Component Value Date    TROPONINI <0.006 12/29/2023     (H) 12/29/2023       Imaging:    EKG 7/18/2022:  Normal sinus  rhythm  Incomplete right bundle branch block    Echo 1/2/2024:    Left Ventricle: The left ventricle is normal in size. Normal wall thickness. There is concentric remodeling. There is normal systolic function with a visually estimated ejection fraction of 60 - 65%. Global longitudinal strain is -16.6%. Grade I diastolic dysfunction.    Right Ventricle: Normal right ventricular cavity size. Wall thickness is normal. Systolic function is normal.    Aortic Valve: There is mild aortic valve sclerosis.    Pulmonary Artery: There is mild pulmonary hypertension. The estimated pulmonary artery systolic pressure is 46 mmHg.    CT Chest 12/21/2023:  Scattered heterogeneous patchy and consolidative ground-glass opacities throughout both lungs with upper lung zone predominance.  Findings are nonspecific, with considerations including infectious or non infectious inflammatory process, noting sequela of aspiration could present similarly.   Atelectasis versus scarring involving the lung bases.   Scattered pulmonary micro nodules as detailed above.  For multiple solid nodules all <6 mm, Fleischner Society 2017 guidelines recommend no routine follow up for a low risk patient, or follow up with non-contrast chest CT at 12 months after discovery in a high risk patient.   Multifocal areas of retained secretions within the distal bronchials, likely representing sequela of chronic small airways disease.    Nuclear Stress Test 8/11/2022:    Normal myocardial perfusion scan. There is no evidence of myocardial ischemia or infarction.    There is a  mild intensity fixed perfusion abnormality in the inferior wall of the left ventricle secondary to diaphragm attenuation.    The gated perfusion images showed an ejection fraction of 60% at rest. The gated perfusion images showed an ejection fraction of 68% post stress. Normal ejection fraction is greater than 53%.    There is normal wall motion at rest and post stress.    LV cavity size is  normal at rest and normal at stress.    The EKG portion of this study is abnormal but not diagnostic.    The patient reported no chest pain during the stress test.    There were no arrhythmias during stress.    There are no prior studies for comparison.    CTA Chest 7/25/2022:  1.  No CT evidence of pulmonary embolus to the subsegmental branches.    2.  Dependent small airways disease, consider aspiration versus noninfectious small airways disease; findings appears stable since prior exam.  Mild scarring within the right middle lobe and lingula, possibly related to previous atypical infection.    BLE Venous Ultrasound 7/29/2022:  There is no evidence of a right lower extremity DVT.  The right superficial femoral middle vein is normal.  A Baker's cyst measuring 4.55 cm x 0.92 cm was seen in the right extremity.  There is no evidence of a left lower extremity DVT.    Echo 7/19/2022:  The left ventricle is normal in size with normal systolic function. The estimated ejection fraction is 65%.  Normal right ventricular size with normal right ventricular systolic function.  Indeterminate left ventricular diastolic function.  Mild tricuspid regurgitation.  The estimated PA systolic pressure is 40 mmHg.  Normal central venous pressure (3 mmHg).    LLE Venous Ultrasound 12/14/2021:  No evidence of deep venous thrombosis in the left lower extremity.    BLE Venous Ultrasound 6/20/2019:  Positive DVT study with thrombus seen within the right popliteal and peroneal veins    Echo 7/19/2022:  The left ventricle is normal in size with normal systolic function. The estimated ejection fraction is 65%.  Normal right ventricular size with normal right ventricular systolic function.  Indeterminate left ventricular diastolic function.  Mild tricuspid regurgitation.  The estimated PA systolic pressure is 40 mmHg.  Normal central venous pressure (3 mmHg).    Assessment/Plan:  Debbie Ding is a 89 y.o. female with history of  DVT/bilateral PE (on Eliquis), HTN, HLD, DM2, asthma, JASE (not on CPAP), who presents for a follow up appointment.    1. SOB- Due to lung parenchymal disease.  Cardiac workup as per above.  Continue management per Pulmonary.        2. History of RLE DVT/Bilateral PE- CTA Chest on 7/25/2022 revealed no CT evidence of pulmonary embolus to the subsegmental branches.  BLE Venous Ultrasound on 7/29/2022 revealed no evidence of a right or left lower extremity DVT. Continue Eliquis 2.5 mg bid.      3. HTN- Continue current medications.    4. HLD- Stable. Continue atorvastatin 40 mg daily.    5. JASE- Encourage CPAP usage.     6. Back Pain- Refer to Sports Medicine for evaluation.     Follow up in 1 year    Total duration of face to face visit time 30 minutes.  Total time spent counseling greater than fifty percent of total visit time.  Counseling included discussion regarding imaging findings, diagnosis, possibilities, treatment options, risks and benefits.  The patient had many questions regarding the options and long-term effects.    Benedicto Love MD, PhD  Interventional Cardiology

## 2025-05-01 NOTE — PATIENT INSTRUCTIONS
Assessment/Plan:  Debbie Ding is a 89 y.o. female with history of DVT/bilateral PE (on Eliquis), HTN, HLD, DM2, asthma, JASE (not on CPAP), who presents for a follow up appointment.    1. SOB- Due to lung parenchymal disease.  Cardiac workup as per above.  Continue management per Pulmonary.        2. History of RLE DVT/Bilateral PE- CTA Chest on 7/25/2022 revealed no CT evidence of pulmonary embolus to the subsegmental branches.  BLE Venous Ultrasound on 7/29/2022 revealed no evidence of a right or left lower extremity DVT. Continue Eliquis 2.5 mg bid.      3. HTN- Continue current medications.    4. HLD- Stable. Continue atorvastatin 40 mg daily.    5. JASE- Encourage CPAP usage.     6. Back Pain- Refer to Sports Medicine for evaluation.     Follow up in 1 year

## 2025-05-19 RX ORDER — BENZONATATE 100 MG/1
100 CAPSULE ORAL
Qty: 30 CAPSULE | Refills: 1 | Status: SHIPPED | OUTPATIENT
Start: 2025-05-19

## 2025-05-26 ENCOUNTER — TELEPHONE (OUTPATIENT)
Dept: PODIATRY | Facility: CLINIC | Age: OVER 89
End: 2025-05-26
Payer: MEDICARE

## 2025-05-26 NOTE — TELEPHONE ENCOUNTER
Call patient in regards to missed appointment on 05/26/2025 with Dr. Teresa. Patient answer and I was able to get her rescheduled for his next availability. Patient verbally confirmed new appointment date and time.

## 2025-05-31 ENCOUNTER — EXTERNAL CHRONIC CARE MANAGEMENT (OUTPATIENT)
Dept: PRIMARY CARE CLINIC | Facility: CLINIC | Age: OVER 89
End: 2025-05-31
Payer: MEDICARE

## 2025-05-31 PROCEDURE — 99487 CPLX CHRNC CARE 1ST 60 MIN: CPT | Mod: S$PBB,,, | Performed by: INTERNAL MEDICINE

## 2025-05-31 PROCEDURE — 99489 CPLX CHRNC CARE EA ADDL 30: CPT | Mod: S$PBB,,, | Performed by: INTERNAL MEDICINE

## 2025-05-31 PROCEDURE — 99489 CPLX CHRNC CARE EA ADDL 30: CPT | Mod: PBBFAC | Performed by: INTERNAL MEDICINE

## 2025-05-31 PROCEDURE — 99487 CPLX CHRNC CARE 1ST 60 MIN: CPT | Mod: PBBFAC | Performed by: INTERNAL MEDICINE

## 2025-06-03 ENCOUNTER — TELEPHONE (OUTPATIENT)
Dept: ORTHOPEDICS | Facility: CLINIC | Age: OVER 89
End: 2025-06-03
Payer: MEDICARE

## 2025-06-03 ENCOUNTER — OFFICE VISIT (OUTPATIENT)
Dept: INTERNAL MEDICINE | Facility: CLINIC | Age: OVER 89
End: 2025-06-03
Payer: MEDICARE

## 2025-06-03 VITALS
DIASTOLIC BLOOD PRESSURE: 68 MMHG | BODY MASS INDEX: 22.08 KG/M2 | HEART RATE: 75 BPM | SYSTOLIC BLOOD PRESSURE: 144 MMHG | HEIGHT: 65 IN | OXYGEN SATURATION: 98 % | WEIGHT: 132.5 LBS

## 2025-06-03 DIAGNOSIS — M54.9 BACK PAIN, UNSPECIFIED BACK LOCATION, UNSPECIFIED BACK PAIN LATERALITY, UNSPECIFIED CHRONICITY: Primary | ICD-10-CM

## 2025-06-03 DIAGNOSIS — E03.9 HYPOTHYROIDISM, ADULT: ICD-10-CM

## 2025-06-03 DIAGNOSIS — I10 HYPERTENSION, ESSENTIAL: ICD-10-CM

## 2025-06-03 DIAGNOSIS — H81.90 VESTIBULAR DIZZINESS: ICD-10-CM

## 2025-06-03 DIAGNOSIS — E78.5 HYPERLIPIDEMIA, UNSPECIFIED HYPERLIPIDEMIA TYPE: ICD-10-CM

## 2025-06-03 PROCEDURE — 99214 OFFICE O/P EST MOD 30 MIN: CPT | Mod: PBBFAC

## 2025-06-03 PROCEDURE — 99999 PR PBB SHADOW E&M-EST. PATIENT-LVL IV: CPT | Mod: PBBFAC,GC,,

## 2025-06-03 RX ORDER — AMLODIPINE BESYLATE 5 MG/1
10 TABLET ORAL DAILY
Qty: 180 TABLET | Refills: 3 | Status: CANCELLED | OUTPATIENT
Start: 2025-06-03 | End: 2026-06-03

## 2025-06-03 RX ORDER — ATORVASTATIN CALCIUM 40 MG/1
40 TABLET, FILM COATED ORAL DAILY
Qty: 90 TABLET | Refills: 3 | Status: SHIPPED | OUTPATIENT
Start: 2025-06-03

## 2025-06-03 RX ORDER — LIDOCAINE 30 MG/G
1 CREAM TOPICAL 2 TIMES DAILY
Qty: 85 G | Refills: 3 | Status: SHIPPED | OUTPATIENT
Start: 2025-06-03

## 2025-06-03 RX ORDER — LEVOTHYROXINE SODIUM 25 UG/1
25 TABLET ORAL
Qty: 90 TABLET | Refills: 3 | Status: CANCELLED | OUTPATIENT
Start: 2025-06-03

## 2025-06-06 ENCOUNTER — TELEPHONE (OUTPATIENT)
Dept: INTERNAL MEDICINE | Facility: CLINIC | Age: OVER 89
End: 2025-06-06
Payer: MEDICARE

## 2025-06-13 ENCOUNTER — TELEPHONE (OUTPATIENT)
Dept: INTERNAL MEDICINE | Facility: CLINIC | Age: OVER 89
End: 2025-06-13
Payer: MEDICARE

## 2025-06-13 NOTE — TELEPHONE ENCOUNTER
Copied from CRM #[id number]. Topic: General Inquiry - Patient Advice  >> Jun 13, 2025  1:29 PM Rivka wrote:  .1MEDICALADVICE     Patient is calling for Medical Advice regarding: Per  nurse Amee - Pt is requesting a walker - grey folding walker (not a rollater)    How long has patient had these symptoms:    Pharmacy name and phone#: [facility]    Patient wants a call back or thru [facility], provide patient's call back phone number: Amee [phone]    Comments:    Please advise patient replies from provider may take up to 48 hours.

## 2025-06-16 DIAGNOSIS — M47.819 SPONDYLOSIS WITHOUT MYELOPATHY: Primary | ICD-10-CM

## 2025-06-17 ENCOUNTER — TELEPHONE (OUTPATIENT)
Dept: INTERNAL MEDICINE | Facility: CLINIC | Age: OVER 89
End: 2025-06-17
Payer: MEDICARE

## 2025-06-17 ENCOUNTER — TELEPHONE (OUTPATIENT)
Dept: ORTHOPEDICS | Facility: CLINIC | Age: OVER 89
End: 2025-06-17
Payer: MEDICARE

## 2025-06-17 NOTE — TELEPHONE ENCOUNTER
Copied from CRM #[id number]. Topic: General Inquiry - Patient Advice  >> Jun 16, 2025  4:14 PM Julio wrote:  Type: Returning a call    Who left a message? Ashley Melo    When did the practice call?  today    Does patient know what this is regarding: [employer]    Who called and best call back number: Patient; [phone] (H)      Would the patient rather a call back or a response via [facility]? Call    Comments:

## 2025-06-17 NOTE — TELEPHONE ENCOUNTER
Called pt; pt answered    Message routed incorreectly:  pt is trying to contact Dr Peter Joe's (Orthopedics) office regarding an injection in her knee     Routing to correct staff box

## 2025-06-19 ENCOUNTER — TELEPHONE (OUTPATIENT)
Dept: ORTHOPEDICS | Facility: CLINIC | Age: OVER 89
End: 2025-06-19
Payer: MEDICARE

## 2025-06-19 NOTE — TELEPHONE ENCOUNTER
Return  call no answer ,left message on voice mail regarding her orthopedics appointment on 6-20-25

## 2025-06-19 NOTE — TELEPHONE ENCOUNTER
Please call patient and ask which local pharmacy she would like a short supply of Eliquis to be sent to.    Thanks!   See below. Patient's prep instructions were re-sent on 6/19 to both her live well via my chart and mailed out to the home address listed in her chart as requested fyi.

## 2025-06-20 ENCOUNTER — TELEPHONE (OUTPATIENT)
Dept: ORTHOPEDICS | Facility: CLINIC | Age: OVER 89
End: 2025-06-20
Payer: MEDICARE

## 2025-06-20 ENCOUNTER — OFFICE VISIT (OUTPATIENT)
Dept: ORTHOPEDICS | Facility: CLINIC | Age: OVER 89
End: 2025-06-20
Payer: MEDICARE

## 2025-06-20 ENCOUNTER — TELEPHONE (OUTPATIENT)
Dept: CARDIOLOGY | Facility: CLINIC | Age: OVER 89
End: 2025-06-20
Payer: MEDICARE

## 2025-06-20 ENCOUNTER — HOSPITAL ENCOUNTER (OUTPATIENT)
Dept: RADIOLOGY | Facility: HOSPITAL | Age: OVER 89
Discharge: HOME | End: 2025-06-20
Attending: PHYSICIAN ASSISTANT
Payer: MEDICARE

## 2025-06-20 VITALS — BODY MASS INDEX: 21.81 KG/M2 | WEIGHT: 130.94 LBS | HEIGHT: 65 IN

## 2025-06-20 DIAGNOSIS — M17.0 PRIMARY OSTEOARTHRITIS OF BOTH KNEES: ICD-10-CM

## 2025-06-20 DIAGNOSIS — M17.11 PRIMARY OSTEOARTHRITIS OF RIGHT KNEE: Primary | ICD-10-CM

## 2025-06-20 DIAGNOSIS — M17.0 PRIMARY OSTEOARTHRITIS OF BOTH KNEES: Primary | ICD-10-CM

## 2025-06-20 PROCEDURE — 99214 OFFICE O/P EST MOD 30 MIN: CPT | Mod: PBBFAC,25 | Performed by: PHYSICIAN ASSISTANT

## 2025-06-20 PROCEDURE — 99999 PR PBB SHADOW E&M-EST. PATIENT-LVL IV: CPT | Mod: PBBFAC,,, | Performed by: PHYSICIAN ASSISTANT

## 2025-06-20 PROCEDURE — 73562 X-RAY EXAM OF KNEE 3: CPT | Mod: TC,50

## 2025-06-20 PROCEDURE — 73562 X-RAY EXAM OF KNEE 3: CPT | Mod: 26,50,, | Performed by: RADIOLOGY

## 2025-06-20 NOTE — PROGRESS NOTES
Debbie Ding is a 89 y.o. year old her here today for her 1st Euflexxa injection for degenerative changes of her right knee . she was last seen and treated in the clinic on 5/15/2024. There has been no significant change in her medical status since her last visit. No Fever, chills, malaise, or unexplained weight change.      Allergies, Medications, past medical and surgical history were reviewed .    Examination of the knee demonstrates  No evidence of edema, erythema , echymosis strength and range of motion are unchanged from previous visit.    The risks, benefits, pros, cons, and potential side effects of the procedure were discussed with the patient in detail all questions were answered.  The patient is comfortable and willing to proceed with the procedure. Verbal consent was obtained and the proper joint was identified by the patient and provider.     The injection site was identified and the skin was prepared with a chlorhexidine solution. The  right knee  joint was injected with 2 ml of Euflexxa solution under sterile technique. Debbie Ding tolerated the procedure well, she was advised to rest the knee today, ice and elevation. I may take 3 -6 weeks following the last injection to get the full benefit of the medication.  I will see her back in 1 week. Sooner if he has any problems or concerns.           .     ICD-10-CM ICD-9-CM   1. Primary osteoarthritis of both knees  M17.0 715.16

## 2025-06-20 NOTE — TELEPHONE ENCOUNTER
Spoke with Dr. Love who advised patient to report to the ED for evaluation. Patient voiced understanding.

## 2025-06-20 NOTE — TELEPHONE ENCOUNTER
Patient called reporting moderate shortness of breath with minimal exertion. This is new onset. She does state she has asthma/COPD but does not believe it to be a respiratory cause at this time. Reports does have a history of pulmonary embolism. Patient denies illness/injury/surgery with prolonged bed rest in past month. Patient denies long distance travel. Patient reports to be taking Eliquis and has not missed any doses. Patient denies weight gain. Patient does not have swelling of the legs. Please advise.

## 2025-06-24 ENCOUNTER — TELEPHONE (OUTPATIENT)
Dept: ORTHOPEDICS | Facility: CLINIC | Age: OVER 89
End: 2025-06-24
Payer: MEDICARE

## 2025-06-24 NOTE — TELEPHONE ENCOUNTER
Spoke with patient regarding her rt. knee pain . Patient have  to get off her feet ice and elevate her knee . Patient have a appointment on Friday 6-27-25 for her 2nd  rt. Knee euflexxa gel injection

## 2025-06-30 ENCOUNTER — EXTERNAL CHRONIC CARE MANAGEMENT (OUTPATIENT)
Dept: PRIMARY CARE CLINIC | Facility: CLINIC | Age: OVER 89
End: 2025-06-30
Payer: MEDICARE

## 2025-06-30 PROCEDURE — 99489 CPLX CHRNC CARE EA ADDL 30: CPT | Mod: PBBFAC | Performed by: INTERNAL MEDICINE

## 2025-06-30 PROCEDURE — 99487 CPLX CHRNC CARE 1ST 60 MIN: CPT | Mod: PBBFAC | Performed by: INTERNAL MEDICINE

## 2025-06-30 PROCEDURE — 99489 CPLX CHRNC CARE EA ADDL 30: CPT | Mod: S$PBB,,, | Performed by: INTERNAL MEDICINE

## 2025-06-30 PROCEDURE — 99487 CPLX CHRNC CARE 1ST 60 MIN: CPT | Mod: S$PBB,,, | Performed by: INTERNAL MEDICINE

## 2025-06-30 NOTE — TELEPHONE ENCOUNTER
Copied from CRM #[id number]. Topic: Medications - Medication Question  >> Jun 30, 2025  8:59 AM Rivka wrote:  .1MEDICALADVICE     Patient is calling for Medical Advice regarding: Pt would like to know if PCP Sacha can refill the following for future requests.  meclizine (ANTIVERT) 25 mg tablet 180 tablet 1 11/5/2024 - No    How long has patient had these symptoms:    Pharmacy name and phone#:   [facility] - [place], [place] [address] [facility] [address]  Phone: [phone] Fax: [phone]    Patient wants a call back or thru [facility], provide patient's call back phone number: call back to [phone] (H)    Comments: Please review at advise.    Please advise patient replies from provider may take up to 48 hours.

## 2025-07-01 ENCOUNTER — LAB VISIT (OUTPATIENT)
Dept: LAB | Facility: HOSPITAL | Age: OVER 89
End: 2025-07-01
Payer: MEDICARE

## 2025-07-01 ENCOUNTER — OFFICE VISIT (OUTPATIENT)
Dept: INTERNAL MEDICINE | Facility: CLINIC | Age: OVER 89
End: 2025-07-01
Payer: MEDICARE

## 2025-07-01 VITALS
BODY MASS INDEX: 22.08 KG/M2 | HEART RATE: 86 BPM | SYSTOLIC BLOOD PRESSURE: 138 MMHG | WEIGHT: 132.5 LBS | DIASTOLIC BLOOD PRESSURE: 60 MMHG | HEIGHT: 65 IN

## 2025-07-01 DIAGNOSIS — R10.814 LEFT LOWER QUADRANT ABDOMINAL TENDERNESS WITHOUT REBOUND TENDERNESS: ICD-10-CM

## 2025-07-01 DIAGNOSIS — R10.814 LEFT LOWER QUADRANT ABDOMINAL TENDERNESS WITHOUT REBOUND TENDERNESS: Primary | ICD-10-CM

## 2025-07-01 LAB
ABSOLUTE EOSINOPHIL (OHS): 0.04 K/UL
ABSOLUTE MONOCYTE (OHS): 0.68 K/UL (ref 0.3–1)
ABSOLUTE NEUTROPHIL COUNT (OHS): 4.1 K/UL (ref 1.8–7.7)
ALBUMIN SERPL BCP-MCNC: 3.8 G/DL (ref 3.5–5.2)
ALP SERPL-CCNC: 68 UNIT/L (ref 40–150)
ALT SERPL W/O P-5'-P-CCNC: 10 UNIT/L (ref 10–44)
ANION GAP (OHS): 11 MMOL/L (ref 8–16)
AST SERPL-CCNC: 15 UNIT/L (ref 11–45)
BASOPHILS # BLD AUTO: 0.02 K/UL
BASOPHILS NFR BLD AUTO: 0.3 %
BILIRUB SERPL-MCNC: 0.7 MG/DL (ref 0.1–1)
BUN SERPL-MCNC: 16 MG/DL (ref 10–30)
CALCIUM SERPL-MCNC: 9.6 MG/DL (ref 8.7–10.5)
CHLORIDE SERPL-SCNC: 106 MMOL/L (ref 95–110)
CO2 SERPL-SCNC: 24 MMOL/L (ref 23–29)
CREAT SERPL-MCNC: 1.4 MG/DL (ref 0.5–1.4)
ERYTHROCYTE [DISTWIDTH] IN BLOOD BY AUTOMATED COUNT: 16.4 % (ref 11.5–14.5)
GFR SERPLBLD CREATININE-BSD FMLA CKD-EPI: 35 ML/MIN/1.73/M2
GLUCOSE SERPL-MCNC: 105 MG/DL (ref 70–110)
HCT VFR BLD AUTO: 37.4 % (ref 37–48.5)
HGB BLD-MCNC: 12.3 GM/DL (ref 12–16)
IMM GRANULOCYTES # BLD AUTO: 0.03 K/UL (ref 0–0.04)
IMM GRANULOCYTES NFR BLD AUTO: 0.4 % (ref 0–0.5)
LYMPHOCYTES # BLD AUTO: 2.92 K/UL (ref 1–4.8)
MCH RBC QN AUTO: 29 PG (ref 27–31)
MCHC RBC AUTO-ENTMCNC: 32.9 G/DL (ref 32–36)
MCV RBC AUTO: 88 FL (ref 82–98)
NUCLEATED RBC (/100WBC) (OHS): 0 /100 WBC
PLATELET # BLD AUTO: 212 K/UL (ref 150–450)
PMV BLD AUTO: 11.1 FL (ref 9.2–12.9)
POTASSIUM SERPL-SCNC: 4.1 MMOL/L (ref 3.5–5.1)
PROT SERPL-MCNC: 7 GM/DL (ref 6–8.4)
RBC # BLD AUTO: 4.24 M/UL (ref 4–5.4)
RELATIVE EOSINOPHIL (OHS): 0.5 %
RELATIVE LYMPHOCYTE (OHS): 37.5 % (ref 18–48)
RELATIVE MONOCYTE (OHS): 8.7 % (ref 4–15)
RELATIVE NEUTROPHIL (OHS): 52.6 % (ref 38–73)
SODIUM SERPL-SCNC: 141 MMOL/L (ref 136–145)
WBC # BLD AUTO: 7.79 K/UL (ref 3.9–12.7)

## 2025-07-01 PROCEDURE — 99213 OFFICE O/P EST LOW 20 MIN: CPT | Mod: S$PBB,,, | Performed by: INTERNAL MEDICINE

## 2025-07-01 PROCEDURE — 36415 COLL VENOUS BLD VENIPUNCTURE: CPT

## 2025-07-01 PROCEDURE — 82040 ASSAY OF SERUM ALBUMIN: CPT

## 2025-07-01 PROCEDURE — 85025 COMPLETE CBC W/AUTO DIFF WBC: CPT

## 2025-07-01 PROCEDURE — 99214 OFFICE O/P EST MOD 30 MIN: CPT | Mod: PBBFAC | Performed by: INTERNAL MEDICINE

## 2025-07-01 PROCEDURE — 99999 PR PBB SHADOW E&M-EST. PATIENT-LVL IV: CPT | Mod: PBBFAC,,, | Performed by: INTERNAL MEDICINE

## 2025-07-01 RX ORDER — MECLIZINE HYDROCHLORIDE 25 MG/1
25 TABLET ORAL 2 TIMES DAILY PRN
Qty: 60 TABLET | Refills: 1 | Status: SHIPPED | OUTPATIENT
Start: 2025-07-01

## 2025-07-01 NOTE — PROGRESS NOTES
Subjective:       Patient ID: Debbie Ding is a 89 y.o. female.    Chief Complaint:   Flank Pain    HPI - urgent care visit.  She has had pain in the left lower quadrant of her abdomen for the past 6-7 months.  She says it is getting worse.  No fevers or chills.  No history of diverticulitis.  She sometimes uses a heating pad or CBD ointment with some relief.  The pain does not change with bowel movements.  When I asked her to provide a urine sample for today, she stated that since starting the gemtesa for urinary incontinence, she can only go once per day, and she has already urinated today.  Prior to starting Gemtesa, she described her urination as like a .    Pmh/meds:  Reviewed and reconciled in EPIC with patient during visit today.    Review of Systems   Constitutional:  Negative for fatigue.   HENT:  Negative for congestion.    Respiratory:  Negative for shortness of breath.    Cardiovascular:  Negative for chest pain.   Gastrointestinal:  Positive for abdominal pain. Negative for constipation, diarrhea, nausea and vomiting.   Genitourinary:  Positive for difficulty urinating (retention).   Musculoskeletal:  Negative for arthralgias.   Skin:  Negative for rash.   Neurological:  Negative for headaches.   Psychiatric/Behavioral:  Negative for sleep disturbance.        Objective:      Physical Exam  Vitals reviewed.   Constitutional:       Appearance: She is well-developed.      Comments: Lean elderly woman who climbed onto the exam table without any difficulty.   HENT:      Head: Normocephalic and atraumatic.   Cardiovascular:      Rate and Rhythm: Normal rate and regular rhythm.      Heart sounds: Normal heart sounds. No murmur heard.     No friction rub. No gallop.   Pulmonary:      Effort: Pulmonary effort is normal. No respiratory distress.      Breath sounds: Normal breath sounds. No wheezing or rales.   Chest:      Chest wall: No tenderness.   Skin:     General: Skin is warm and dry.       Findings: No erythema.   Neurological:      General: No focal deficit present.      Mental Status: She is alert.   Psychiatric:         Mood and Affect: Mood normal.         Assessment:       1. Left lower quadrant abdominal tenderness without rebound tenderness        Plan:       Debbie was seen today for flank pain.    Diagnoses and all orders for this visit:    Left lower quadrant abdominal tenderness without rebound tenderness - unclear etiology, though I believe this may be a side effect of the Gemtesa causing urinary retention and abdominal discomfort.  I will start with lab work and an ultrasound of the abdomen.  As she can not provide a urine sample today, she will bring us a sample tomorrow.  -     CBC Auto Differential; Future  -     Cancel: Urinalysis  -     US Abdomen Complete; Future  -     Comprehensive Metabolic Panel; Future  -     Urinalysis; Future    Rtc joaquinn    NAV Morgan MD MPH  Staff Internist

## 2025-07-01 NOTE — TELEPHONE ENCOUNTER
Hi, please call the patient and explain that the patient's resident doctor has graduated, Shantel Hussein, DO.  Please offer an appt with a new resident doctor.  I will send in one prescription of this medicine, but patient needs to be seen for any further prescriptions.  Thank you, Dorian Duarte

## 2025-07-02 ENCOUNTER — TELEPHONE (OUTPATIENT)
Dept: PHYSICAL MEDICINE AND REHAB | Facility: CLINIC | Age: OVER 89
End: 2025-07-02
Payer: MEDICARE

## 2025-07-02 ENCOUNTER — LAB VISIT (OUTPATIENT)
Dept: LAB | Facility: HOSPITAL | Age: OVER 89
End: 2025-07-02
Payer: MEDICARE

## 2025-07-02 DIAGNOSIS — E78.5 HYPERLIPIDEMIA, UNSPECIFIED HYPERLIPIDEMIA TYPE: ICD-10-CM

## 2025-07-02 DIAGNOSIS — E03.9 HYPOTHYROIDISM, ADULT: ICD-10-CM

## 2025-07-02 LAB
CHOLEST SERPL-MCNC: 226 MG/DL (ref 120–199)
CHOLEST/HDLC SERPL: 2.3 {RATIO} (ref 2–5)
HDLC SERPL-MCNC: 100 MG/DL (ref 40–75)
HDLC SERPL: 44.2 % (ref 20–50)
LDLC SERPL CALC-MCNC: 110.8 MG/DL (ref 63–159)
NONHDLC SERPL-MCNC: 126 MG/DL
T4 FREE SERPL-MCNC: 1.24 NG/DL (ref 0.71–1.51)
TRIGL SERPL-MCNC: 76 MG/DL (ref 30–150)
TSH SERPL-ACNC: 0.06 UIU/ML (ref 0.4–4)

## 2025-07-02 PROCEDURE — 36415 COLL VENOUS BLD VENIPUNCTURE: CPT

## 2025-07-02 PROCEDURE — 84443 ASSAY THYROID STIM HORMONE: CPT

## 2025-07-02 PROCEDURE — 80061 LIPID PANEL: CPT

## 2025-07-02 PROCEDURE — 84439 ASSAY OF FREE THYROXINE: CPT

## 2025-07-02 NOTE — TELEPHONE ENCOUNTER
Reached out to patient to let her know that the appointment on 7/21/25 has been canceled due to the provider being out of office. No answer/ I left voice message for her informing her of this information and advised that she call the office back to be rescheduled.

## 2025-07-03 ENCOUNTER — LAB VISIT (OUTPATIENT)
Dept: LAB | Facility: HOSPITAL | Age: OVER 89
End: 2025-07-03
Attending: INTERNAL MEDICINE
Payer: MEDICARE

## 2025-07-03 ENCOUNTER — RESULTS FOLLOW-UP (OUTPATIENT)
Dept: INTERNAL MEDICINE | Facility: CLINIC | Age: OVER 89
End: 2025-07-03

## 2025-07-03 ENCOUNTER — TELEPHONE (OUTPATIENT)
Dept: INTERNAL MEDICINE | Facility: CLINIC | Age: OVER 89
End: 2025-07-03
Payer: MEDICARE

## 2025-07-03 DIAGNOSIS — R10.814 LEFT LOWER QUADRANT ABDOMINAL TENDERNESS WITHOUT REBOUND TENDERNESS: ICD-10-CM

## 2025-07-03 LAB
BILIRUB UR QL STRIP.AUTO: NEGATIVE
CLARITY UR: CLEAR
COLOR UR AUTO: YELLOW
GLUCOSE UR QL STRIP: NEGATIVE
HGB UR QL STRIP: NEGATIVE
KETONES UR QL STRIP: NEGATIVE
LEUKOCYTE ESTERASE UR QL STRIP: NEGATIVE
NITRITE UR QL STRIP: NEGATIVE
PH UR STRIP: 6 [PH]
PROT UR QL STRIP: NEGATIVE
SP GR UR STRIP: 1.01
UROBILINOGEN UR STRIP-ACNC: NEGATIVE EU/DL

## 2025-07-03 PROCEDURE — 81003 URINALYSIS AUTO W/O SCOPE: CPT

## 2025-07-03 NOTE — TELEPHONE ENCOUNTER
Help her get a primary.  I would not go to ENT for dizziness right now.  Let's see how it goes once she's gone down to twice weekly Gemtesa.    Thanks.  D

## 2025-07-03 NOTE — TELEPHONE ENCOUNTER
Patient advised of lab results and advised to take Gentesa only 2 a week. Patient reported taking it once a day. Patient also reports having dizzy spells and asked for a referral to the ENT please advise Patient currently has no PCP

## 2025-07-03 NOTE — TELEPHONE ENCOUNTER
Please call this patient.  Lab and urinalysis were unremarkable.  I believe her abdominal discomfort is due to the Gemtesa medication.  She should be taking it twice a week.  If she is taking it every day, that is not correct.  If she is taking it twice a week, ask her to cut it in half    Dr. Morgan

## 2025-07-07 ENCOUNTER — TELEPHONE (OUTPATIENT)
Dept: PHYSICAL MEDICINE AND REHAB | Facility: CLINIC | Age: OVER 89
End: 2025-07-07
Payer: MEDICARE

## 2025-07-07 NOTE — TELEPHONE ENCOUNTER
Returned call to patient. Spoke with patient and confirmed her appointment has been rescheduled for 10/8/25 @2:20pm. Patient accepted scheduled appointment. Appointment reminder mailed out.

## 2025-07-07 NOTE — TELEPHONE ENCOUNTER
Copied from CRM #[id number]. Topic: General Inquiry - Return Call  >> Jul 7, 2025  7:19 AM Ela wrote:  Patient is returning a phone call.    Who left a message for the patient: Tierney Nam LPN    Does patient know what this is regarding:  Reached out to patient to let her know that the appointment on 7/21/25 has been canceled due to the provider being out of office. No answer/ I left voice message for her informing her of this information and advised that she call the office back to be rescheduled.     Would you like a call back, or a response through your [facility] portal?:   call back to pt     Best call back number: [phone]    Comments:

## 2025-07-14 ENCOUNTER — HOSPITAL ENCOUNTER (OUTPATIENT)
Dept: RADIOLOGY | Facility: OTHER | Age: OVER 89
Discharge: HOME | End: 2025-07-14
Payer: MEDICARE

## 2025-07-14 ENCOUNTER — OFFICE VISIT (OUTPATIENT)
Dept: INTERNAL MEDICINE | Facility: CLINIC | Age: OVER 89
End: 2025-07-14
Payer: MEDICARE

## 2025-07-14 VITALS
OXYGEN SATURATION: 97 % | SYSTOLIC BLOOD PRESSURE: 137 MMHG | HEIGHT: 65 IN | HEART RATE: 80 BPM | BODY MASS INDEX: 21.86 KG/M2 | DIASTOLIC BLOOD PRESSURE: 63 MMHG | WEIGHT: 131.19 LBS

## 2025-07-14 DIAGNOSIS — L85.3 DRY SKIN: ICD-10-CM

## 2025-07-14 DIAGNOSIS — R10.32 LEFT LOWER QUADRANT ABDOMINAL PAIN: ICD-10-CM

## 2025-07-14 DIAGNOSIS — R10.32 LEFT LOWER QUADRANT ABDOMINAL PAIN: Primary | ICD-10-CM

## 2025-07-14 DIAGNOSIS — R42 DIZZINESS: ICD-10-CM

## 2025-07-14 PROCEDURE — 74176 CT ABD & PELVIS W/O CONTRAST: CPT | Mod: TC

## 2025-07-14 PROCEDURE — 74176 CT ABD & PELVIS W/O CONTRAST: CPT | Mod: 26,,, | Performed by: RADIOLOGY

## 2025-07-14 PROCEDURE — 99999 PR PBB SHADOW E&M-EST. PATIENT-LVL III: CPT | Mod: PBBFAC,GC,,

## 2025-07-14 PROCEDURE — 99213 OFFICE O/P EST LOW 20 MIN: CPT | Mod: PBBFAC,25

## 2025-07-14 RX ORDER — HYDROCORTISONE 1 %
CREAM (GRAM) TOPICAL 2 TIMES DAILY
Qty: 120 G | Refills: 0 | Status: SHIPPED | OUTPATIENT
Start: 2025-07-14

## 2025-07-14 NOTE — PATIENT INSTRUCTIONS
You seen today in clinic for belly pain     A abdominal image was ordered to evaluate what the cause may be.     In addition, please apply cream to both top of feet and face, but Apply cream lightly to face

## 2025-07-14 NOTE — PROGRESS NOTES
"Subjective     Patient ID: Debbie Ding is a 89 y.o. female.    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 She was previously followed by Dr. Shantel Goncalves. In addition, she was recently seen by Dr. Morgan 7/1/2025 in which she complained of LLQ abd pain for the past 6 months.     Today, she reports similar symptoms of abdominal pain. Her UA/CBC were negative. An abd US was ordered but she did not complete it. Her abdominal pain is not associated with any other symptoms ~ eating, bowel movements. States symptoms can occur at rest but can worsen with movement occasinally. Denies constipation (last bowel movement was normal in the morning) and takes laxatives regularly. Her last CT abd/pelvis was 2023 which was relatively unremarkable other than "colonic diverticula without evidence of acute diverticulitis"     Lastly she reports ongoing itchiness along her nasal bridge and top portions of her bilateral feet and dizziness (noted since 2017 ~  The patient follows with [facility] Neurology Dr. Hickman and her appointment is scheduled 8/12/25) She takes Meclizine and topicals.     History obtained through chart review and patient.         Review of Systems   Constitutional: Negative.    Eyes:  Negative for visual disturbance.   Gastrointestinal:  Positive for abdominal pain (LLQ). Negative for abdominal distention, blood in stool, change in bowel habit, constipation, nausea, rectal pain and vomiting.   Genitourinary: Negative.    Integumentary:  Positive for rash (pruritic on nose and feet).   Neurological:  Positive for dizziness.   Psychiatric/Behavioral: Negative.          "   Objective     Physical Exam  Vitals and nursing note reviewed.   Constitutional:       General: She is not in acute distress.     Appearance: Normal appearance. She is normal weight. She is not ill-appearing, toxic-appearing or diaphoretic.   HENT:      Head: Normocephalic and atraumatic.   Eyes:      General: No scleral icterus.        Right eye: No discharge.         Left eye: No discharge.      Extraocular Movements: Extraocular movements intact.      Conjunctiva/sclera: Conjunctivae normal.      Pupils: Pupils are equal, round, and reactive to light.   Cardiovascular:      Rate and Rhythm: Normal rate and regular rhythm.      Pulses: Normal pulses.      Heart sounds: Normal heart sounds.   Pulmonary:      Effort: Pulmonary effort is normal. No respiratory distress.      Breath sounds: Normal breath sounds. No stridor. No wheezing, rhonchi or rales.   Chest:      Chest wall: No tenderness.   Abdominal:      General: There is no distension.      Palpations: There is no mass.      Tenderness: There is abdominal tenderness (epigastric, L-side). There is no guarding or rebound.   Musculoskeletal:         General: No swelling, tenderness or signs of injury. Normal range of motion.      Cervical back: Normal range of motion.      Right lower leg: No edema.      Left lower leg: No edema.   Skin:     General: Skin is warm and dry.      Coloration: Skin is not jaundiced or pale.      Findings: No bruising, erythema, lesion or rash.   Neurological:      Mental Status: She is alert and oriented to person, place, and time. Mental status is at baseline.   Psychiatric:         Mood and Affect: Mood normal.         Behavior: Behavior normal.         Thought Content: Thought content normal.         Judgment: Judgment normal.          Assessment and Plan   89F presents with ongoing LLQ abd pain for the past 6 months. No associated symptoms and having normal bowel movements. Recent workup of UA/CBC unremarkable. Has history of  diverticulosis per CT abd/pelvis 2023. Otherwise reporting ongoing itchiness and dizziness that has been previously charted in prior visits.        1. Left lower quadrant abdominal pain  Comments:  Ongoing abd pain for the past 6 months with non associated symptoms with regular bowel movements. On review, has history of diverticulosis ~ will evaluate w/ CT. Will consider Benatyl if CT results are normal   Orders:  -     CT Abdomen Pelvis  Without Contrast; Future; Expected date: 07/14/2025    2. Dry skin  Comments:  Noted dry skin on nasal bridge and feet with associated itchiness.    3. Dizziness  Comments:  Reports chronic dizziness. Takes Meclizine at home with some relief. Will avoid sedating medications given age. Has clinic appt with neurology in 8/2025    Other orders  -     hydrocortisone 1 % cream; Apply topically 2 (two) times daily.  Dispense: 120 g; Refill: 0        Discussed with Dr. Chua          Follow up in about 6 weeks (around 8/25/2025).    Andrews Mir, PGY2  [facility]   Internal Medicine

## 2025-07-15 ENCOUNTER — TELEPHONE (OUTPATIENT)
Dept: INTERNAL MEDICINE | Facility: CLINIC | Age: OVER 89
End: 2025-07-15
Payer: MEDICARE

## 2025-07-15 RX ORDER — DEXTROMETHORPHAN HYDROBROMIDE, GUAIFENESIN 5; 100 MG/5ML; MG/5ML
650 LIQUID ORAL EVERY 8 HOURS PRN
Qty: 30 TABLET | Refills: 0 | Status: SHIPPED | OUTPATIENT
Start: 2025-07-15

## 2025-07-15 RX ORDER — SIMETHICONE 125 MG
125 CAPSULE ORAL 4 TIMES DAILY PRN
Qty: 90 CAPSULE | Refills: 0 | Status: SHIPPED | OUTPATIENT
Start: 2025-07-15

## 2025-07-15 NOTE — TELEPHONE ENCOUNTER
Copied from CRM #[id number]. Topic: General Inquiry - Patient Advice  >> Jul 15, 2025 10:38 AM Keri wrote:  .1MEDICALADVICE     Patient is calling for Medical Advice regarding:Patient was seen yesterday and thought the provider was giving her something for her headaches and she did not get anything for her headaches and she would like something called in for her headaches today please.         Pharmacy name and phone#:[facility] - [place] - [id number] [facility] [address]  Phone: [phone] Fax: [phone]        Patient wants a call back or thru [facility], provide patient's call back phone number:[phone] (H)    Comments: Please call to advise/confirm    Please advise patient replies from provider may take up to 48 hours.

## 2025-07-15 NOTE — TELEPHONE ENCOUNTER
Called Debbie Timur on her home phone [phone].     Discussed the results of CT abd/pelvis and reassured patient that the results were unremarkable. Recommended Simethicone for flatulence and discussed side effects ~ patient agreeable    In addition, patient asking for headache medications ~ sending Tylenol as needed to her local pharmacy.      All follow-up questions answered.     Andrews Mir, PGY2  [facility]   Internal Medicine   [phone]

## 2025-07-17 ENCOUNTER — TELEPHONE (OUTPATIENT)
Dept: INTERNAL MEDICINE | Facility: CLINIC | Age: OVER 89
End: 2025-07-17
Payer: MEDICARE

## 2025-07-17 NOTE — TELEPHONE ENCOUNTER
Called pt; pt did not answer    Left a message asking pt to call back     Medications were previously sent in 7/15/25 following a phone call with Dr Mir.

## 2025-07-17 NOTE — TELEPHONE ENCOUNTER
Copied from CRM #[id number]. Topic: General Inquiry - Patient Advice  >> Jul 17, 2025  9:50 AM Keri wrote:  .1MEDICALADVICE     Patient is calling for Medical Advice regarding:Patient was seen on July 14 and stated she was to have a prescription that she didn't receive and would like to know what it was for and would like someone to give give her a call back  this morning please to advise.         Pharmacy name and phone#:[facility] - [place] [address] [place] [address] [facility] [address]  Phone: [phone] Fax: [phone]        Patient wants a call back or thru [facility], provide patient's call back phone number:call  [phone] (H)    Comments: second call, please rasheed to advise.     Please advise patient replies from provider may take up to 48 hours.

## 2025-07-18 ENCOUNTER — TELEPHONE (OUTPATIENT)
Dept: INTERNAL MEDICINE | Facility: CLINIC | Age: OVER 89
End: 2025-07-18
Payer: MEDICARE

## 2025-07-18 NOTE — TELEPHONE ENCOUNTER
Copied from CRM #[id number]. Topic: General Inquiry - Return Call  >> Jul 18, 2025 11:54 AM Gordon wrote:  Patient is returning a phone call.  Who left a message for the patient: Kelton  Does patient know what this is regarding:    Would you like a call back, or a response through your [facility] portal?:   Call back [phone]  Comments:

## 2025-07-21 ENCOUNTER — RESULTS FOLLOW-UP (OUTPATIENT)
Dept: INTERNAL MEDICINE | Facility: CLINIC | Age: OVER 89
End: 2025-07-21
Payer: MEDICARE

## 2025-07-31 ENCOUNTER — TELEPHONE (OUTPATIENT)
Dept: INTERNAL MEDICINE | Facility: CLINIC | Age: OVER 89
End: 2025-07-31
Payer: MEDICARE

## 2025-07-31 ENCOUNTER — TELEPHONE (OUTPATIENT)
Dept: CARDIOLOGY | Facility: CLINIC | Age: OVER 89
End: 2025-07-31
Payer: MEDICARE

## 2025-07-31 NOTE — TELEPHONE ENCOUNTER
Pt reporting persistent pains, headaches, and dizziness     Pt was seen 7/14/25  Tylenol PRN was previously prescribed for headache

## 2025-07-31 NOTE — TELEPHONE ENCOUNTER
----- Message from Maura sent at 7/31/2025  8:34 AM CDT -----  Regarding: Dizziness  Patient experiencing dizziness, headache also pain in her left side under her ribs. Patient asking can she be seen. Please call back @ [phone]. Thank you Maura

## 2025-07-31 NOTE — TELEPHONE ENCOUNTER
Spoke with pt to follow up with primary and if symptom get worse to report to the ED for evaluation. Pt verbalized understanding.

## 2025-07-31 NOTE — TELEPHONE ENCOUNTER
Copied from CRM #[id number]. Topic: General Inquiry - Patient Advice  >> Jul 31, 2025  8:57 AM Brandy wrote:  Type:  Needs Medical Advice    Who Called: Patient/Debbie   Symptoms (please be specific):  Pain /Headaches/Dizziness   How long has patient had these symptoms:  Ongoing   Pharmacy name and phone #:  [facility] - [place], [place] - [address] [facility]  [address] [facility] [address]  Phone: [phone] Fax: [phone]       Would the patient rather a call back or a response via [facility]?  Call back   Best Call Back Number: Self/[phone]   Additional Information: pt said that she is calling in regards to she spoke with the provider about pains , headaches and dizziness  that she was having and she would like a return call from the nurse because she is still having those symptoms

## 2025-08-01 ENCOUNTER — TELEPHONE (OUTPATIENT)
Dept: PHYSICAL MEDICINE AND REHAB | Facility: CLINIC | Age: OVER 89
End: 2025-08-01
Payer: MEDICARE

## 2025-08-01 ENCOUNTER — TELEPHONE (OUTPATIENT)
Dept: CARDIOLOGY | Facility: CLINIC | Age: OVER 89
End: 2025-08-01
Payer: MEDICARE

## 2025-08-01 NOTE — TELEPHONE ENCOUNTER
Returned call to pt in regards to message left w/phone staff about a sooner appt/no answer. I left a message letting her know that there are no appts sooner than what she is scheduled for and I placed her on a waitlist for a cancellation.

## 2025-08-01 NOTE — TELEPHONE ENCOUNTER
Patient called.  Reports shooting pain in both legs for the past 2 months that seems to have increase in frequency.  Patient reports the pain in both legs and the left rib area.  Pt with a hx of asthma and denies any worsening of SOB.  Denies any wheezing, weight gain or swelling.  Medication records indicates Atorvastatin but prt reports she has not taken that in years because she thought she was on too many medications.  Patient had been of Amlodipine for about a month b/c she ran out and her BP was 212/98 yesterday.  She was having HA and dizziness.  Pt started back on her BP medication yesterday and her BP today was 128/98.

## 2025-08-01 NOTE — TELEPHONE ENCOUNTER
Copied from CRM #[id number]. Topic: Appointments - Appointment Rescheduling  >> Aug 1, 2025 11:28 AM Moni wrote:  Pt calling in regards for a sooner carl , pt is scheduled for October , pt wants a walker     Confirmed patient's contact info below:  Contact Name: Debbie Ding  Phone Number: [phone]

## 2025-08-01 NOTE — TELEPHONE ENCOUNTER
----- Message from Yesica sent at 8/1/2025  3:54 PM CDT -----  Regarding: Pains in legs/thighs  Pt [phone] says she's having real bad pains shooting thru her thighs and lower legs and she would like a call from the nurse. I tried to schedule her an appt, but the first available with Dr. Love is not until November.    LOV 5/12/25 Dr. Love    Thanks

## 2025-08-02 ENCOUNTER — HOSPITAL ENCOUNTER (OUTPATIENT)
Facility: OTHER | Age: OVER 89
Discharge: HOME | End: 2025-08-03
Attending: EMERGENCY MEDICINE | Admitting: INTERNAL MEDICINE
Payer: MEDICARE

## 2025-08-02 DIAGNOSIS — I20.89 ANGINAL EQUIVALENT: ICD-10-CM

## 2025-08-02 DIAGNOSIS — J45.40 MODERATE PERSISTENT ASTHMA WITHOUT COMPLICATION: Chronic | ICD-10-CM

## 2025-08-02 DIAGNOSIS — J47.9 BRONCHIECTASIS WITHOUT COMPLICATION: ICD-10-CM

## 2025-08-02 DIAGNOSIS — R07.89 CHEST WALL PAIN: ICD-10-CM

## 2025-08-02 DIAGNOSIS — R06.02 SOB (SHORTNESS OF BREATH): ICD-10-CM

## 2025-08-02 DIAGNOSIS — R06.00 DYSPNEA: ICD-10-CM

## 2025-08-02 DIAGNOSIS — R07.9 CHEST PAIN: Primary | ICD-10-CM

## 2025-08-02 DIAGNOSIS — R79.89 ELEVATED TROPONIN: ICD-10-CM

## 2025-08-02 PROBLEM — I27.82 CHRONIC PULMONARY EMBOLISM, UNSPECIFIED PULMONARY EMBOLISM TYPE, UNSPECIFIED WHETHER ACUTE COR PULMONALE PRESENT: Chronic | Status: ACTIVE | Noted: 2019-04-27

## 2025-08-02 PROBLEM — N32.81 OVERACTIVE BLADDER: Chronic | Status: ACTIVE | Noted: 2019-12-03

## 2025-08-02 PROBLEM — Z71.89 ACP (ADVANCE CARE PLANNING): Status: ACTIVE | Noted: 2025-08-02

## 2025-08-02 PROBLEM — G31.83 LEWY BODY DEMENTIA: Chronic | Status: ACTIVE | Noted: 2021-10-28

## 2025-08-02 PROBLEM — I27.20 PULMONARY HYPERTENSION: Chronic | Status: ACTIVE | Noted: 2022-10-12

## 2025-08-02 PROBLEM — N18.30 STAGE 3 CHRONIC KIDNEY DISEASE: Chronic | Status: ACTIVE | Noted: 2021-10-10

## 2025-08-02 PROBLEM — E11.29 TYPE 2 DIABETES MELLITUS WITH KIDNEY COMPLICATION, WITH LONG-TERM CURRENT USE OF INSULIN: Chronic | Status: ACTIVE | Noted: 2021-10-08

## 2025-08-02 PROBLEM — Z79.4 TYPE 2 DIABETES MELLITUS WITH KIDNEY COMPLICATION, WITH LONG-TERM CURRENT USE OF INSULIN: Chronic | Status: ACTIVE | Noted: 2021-10-08

## 2025-08-02 PROBLEM — E03.9 HYPOTHYROIDISM: Chronic | Status: ACTIVE | Noted: 2019-03-06

## 2025-08-02 PROBLEM — E11.69 HYPERLIPIDEMIA ASSOCIATED WITH TYPE 2 DIABETES MELLITUS: Chronic | Status: ACTIVE | Noted: 2020-09-28

## 2025-08-02 PROBLEM — Z86.718 HISTORY OF DEEP VENOUS THROMBOSIS: Chronic | Status: ACTIVE | Noted: 2020-09-28

## 2025-08-02 PROBLEM — J45.901 ASTHMA EXACERBATION: Status: RESOLVED | Noted: 2021-10-04 | Resolved: 2025-08-02

## 2025-08-02 PROBLEM — F02.80 LEWY BODY DEMENTIA: Chronic | Status: ACTIVE | Noted: 2021-10-28

## 2025-08-02 PROBLEM — Z86.711 HISTORY OF PULMONARY EMBOLISM: Chronic | Status: ACTIVE | Noted: 2019-04-27

## 2025-08-02 PROBLEM — B37.0 THRUSH: Status: RESOLVED | Noted: 2022-05-24 | Resolved: 2025-08-02

## 2025-08-02 PROBLEM — R53.81 PHYSICAL DECONDITIONING: Status: RESOLVED | Noted: 2021-12-07 | Resolved: 2025-08-02

## 2025-08-02 PROBLEM — E78.5 HYPERLIPIDEMIA ASSOCIATED WITH TYPE 2 DIABETES MELLITUS: Chronic | Status: ACTIVE | Noted: 2020-09-28

## 2025-08-02 LAB
ABSOLUTE EOSINOPHIL (OHS): 0.12 K/UL
ABSOLUTE MONOCYTE (OHS): 0.44 K/UL (ref 0.3–1)
ABSOLUTE NEUTROPHIL COUNT (OHS): 2.51 K/UL (ref 1.8–7.7)
ALBUMIN SERPL BCP-MCNC: 3.9 G/DL (ref 3.5–5.2)
ALP SERPL-CCNC: 64 UNIT/L (ref 40–150)
ALT SERPL W/O P-5'-P-CCNC: 10 UNIT/L (ref 10–44)
ANION GAP (OHS): 10 MMOL/L (ref 8–16)
ANION GAP (OHS): 7 MMOL/L (ref 8–16)
AST SERPL-CCNC: 19 UNIT/L (ref 11–45)
BASOPHILS # BLD AUTO: 0.02 K/UL
BASOPHILS NFR BLD AUTO: 0.4 %
BILIRUB SERPL-MCNC: 0.7 MG/DL (ref 0.1–1)
BUN SERPL-MCNC: 11 MG/DL (ref 10–30)
BUN SERPL-MCNC: 12 MG/DL (ref 10–30)
CALCIUM SERPL-MCNC: 9.6 MG/DL (ref 8.7–10.5)
CALCIUM SERPL-MCNC: 9.7 MG/DL (ref 8.7–10.5)
CHLORIDE SERPL-SCNC: 108 MMOL/L (ref 95–110)
CHLORIDE SERPL-SCNC: 110 MMOL/L (ref 95–110)
CO2 SERPL-SCNC: 21 MMOL/L (ref 23–29)
CO2 SERPL-SCNC: 27 MMOL/L (ref 23–29)
CREAT SERPL-MCNC: 1 MG/DL (ref 0.5–1.4)
CREAT SERPL-MCNC: 1.3 MG/DL (ref 0.5–1.4)
CTP QC/QA: YES
CTP QC/QA: YES
D DIMER PPP IA.FEU-MCNC: 0.42 MG/L FEU
EAG (OHS): 137 MG/DL (ref 68–131)
ERYTHROCYTE [DISTWIDTH] IN BLOOD BY AUTOMATED COUNT: 16.4 % (ref 11.5–14.5)
GFR SERPLBLD CREATININE-BSD FMLA CKD-EPI: 39 ML/MIN/1.73/M2
GFR SERPLBLD CREATININE-BSD FMLA CKD-EPI: 53 ML/MIN/1.73/M2
GLUCOSE SERPL-MCNC: 111 MG/DL (ref 70–110)
GLUCOSE SERPL-MCNC: 113 MG/DL (ref 70–110)
GLUCOSE SERPL-MCNC: 118 MG/DL (ref 70–110)
HBA1C MFR BLD: 6.4 % (ref 4–5.6)
HCT VFR BLD AUTO: 39.5 % (ref 37–48.5)
HCV AB SERPL QL IA: NORMAL
HGB BLD-MCNC: 13 GM/DL (ref 12–16)
HIV 1+2 AB+HIV1 P24 AG SERPL QL IA: NORMAL
IMM GRANULOCYTES # BLD AUTO: 0.02 K/UL (ref 0–0.04)
IMM GRANULOCYTES NFR BLD AUTO: 0.4 % (ref 0–0.5)
LYMPHOCYTES # BLD AUTO: 1.78 K/UL (ref 1–4.8)
MAGNESIUM SERPL-MCNC: 2.2 MG/DL (ref 1.6–2.6)
MCH RBC QN AUTO: 29.5 PG (ref 27–31)
MCHC RBC AUTO-ENTMCNC: 32.9 G/DL (ref 32–36)
MCV RBC AUTO: 90 FL (ref 82–98)
NUCLEATED RBC (/100WBC) (OHS): 0 /100 WBC
PLATELET # BLD AUTO: 199 K/UL (ref 150–450)
PMV BLD AUTO: 10.6 FL (ref 9.2–12.9)
POC MOLECULAR INFLUENZA A AGN: NEGATIVE
POC MOLECULAR INFLUENZA B AGN: NEGATIVE
POCT GLUCOSE: 111 MG/DL (ref 70–110)
POCT GLUCOSE: 119 MG/DL (ref 70–110)
POCT GLUCOSE: 140 MG/DL (ref 70–110)
POTASSIUM SERPL-SCNC: 3.9 MMOL/L (ref 3.5–5.1)
POTASSIUM SERPL-SCNC: 4 MMOL/L (ref 3.5–5.1)
PROT SERPL-MCNC: 7.2 GM/DL (ref 6–8.4)
RBC # BLD AUTO: 4.4 M/UL (ref 4–5.4)
RELATIVE EOSINOPHIL (OHS): 2.5 %
RELATIVE LYMPHOCYTE (OHS): 36.4 % (ref 18–48)
RELATIVE MONOCYTE (OHS): 9 % (ref 4–15)
RELATIVE NEUTROPHIL (OHS): 51.3 % (ref 38–73)
SARS-COV-2 RDRP RESP QL NAA+PROBE: NEGATIVE
SODIUM SERPL-SCNC: 141 MMOL/L (ref 136–145)
SODIUM SERPL-SCNC: 142 MMOL/L (ref 136–145)
TROPONIN I SERPL HS-MCNC: 25 NG/L
TROPONIN I SERPL HS-MCNC: 42 NG/L
WBC # BLD AUTO: 4.89 K/UL (ref 3.9–12.7)

## 2025-08-02 PROCEDURE — 94761 N-INVAS EAR/PLS OXIMETRY MLT: CPT

## 2025-08-02 PROCEDURE — 25000242 PHARM REV CODE 250 ALT 637 W/ HCPCS: Mod: MUE | Performed by: EMERGENCY MEDICINE

## 2025-08-02 PROCEDURE — 63600175 PHARM REV CODE 636 W HCPCS: Performed by: INTERNAL MEDICINE

## 2025-08-02 PROCEDURE — G0378 HOSPITAL OBSERVATION PER HR: HCPCS

## 2025-08-02 PROCEDURE — 84484 ASSAY OF TROPONIN QUANT: CPT | Mod: 91 | Performed by: PHYSICIAN ASSISTANT

## 2025-08-02 PROCEDURE — 83036 HEMOGLOBIN GLYCOSYLATED A1C: CPT | Performed by: INTERNAL MEDICINE

## 2025-08-02 PROCEDURE — 87635 SARS-COV-2 COVID-19 AMP PRB: CPT | Performed by: EMERGENCY MEDICINE

## 2025-08-02 PROCEDURE — 86803 HEPATITIS C AB TEST: CPT | Performed by: EMERGENCY MEDICINE

## 2025-08-02 PROCEDURE — 99285 EMERGENCY DEPT VISIT HI MDM: CPT | Mod: 25

## 2025-08-02 PROCEDURE — 25000003 PHARM REV CODE 250: Performed by: INTERNAL MEDICINE

## 2025-08-02 PROCEDURE — 84484 ASSAY OF TROPONIN QUANT: CPT | Mod: 91 | Performed by: INTERNAL MEDICINE

## 2025-08-02 PROCEDURE — 83735 ASSAY OF MAGNESIUM: CPT | Performed by: PHYSICIAN ASSISTANT

## 2025-08-02 PROCEDURE — 93010 ELECTROCARDIOGRAM REPORT: CPT | Mod: ,,, | Performed by: INTERNAL MEDICINE

## 2025-08-02 PROCEDURE — 94640 AIRWAY INHALATION TREATMENT: CPT

## 2025-08-02 PROCEDURE — 84484 ASSAY OF TROPONIN QUANT: CPT | Performed by: EMERGENCY MEDICINE

## 2025-08-02 PROCEDURE — 25000003 PHARM REV CODE 250: Performed by: PHYSICIAN ASSISTANT

## 2025-08-02 PROCEDURE — 25000242 PHARM REV CODE 250 ALT 637 W/ HCPCS: Performed by: EMERGENCY MEDICINE

## 2025-08-02 PROCEDURE — 25000003 PHARM REV CODE 250: Performed by: EMERGENCY MEDICINE

## 2025-08-02 PROCEDURE — 85025 COMPLETE CBC W/AUTO DIFF WBC: CPT | Performed by: EMERGENCY MEDICINE

## 2025-08-02 PROCEDURE — 36415 COLL VENOUS BLD VENIPUNCTURE: CPT | Performed by: INTERNAL MEDICINE

## 2025-08-02 PROCEDURE — 80053 COMPREHEN METABOLIC PANEL: CPT | Performed by: EMERGENCY MEDICINE

## 2025-08-02 PROCEDURE — 87389 HIV-1 AG W/HIV-1&-2 AB AG IA: CPT | Performed by: EMERGENCY MEDICINE

## 2025-08-02 PROCEDURE — 93005 ELECTROCARDIOGRAM TRACING: CPT

## 2025-08-02 PROCEDURE — 85379 FIBRIN DEGRADATION QUANT: CPT | Performed by: EMERGENCY MEDICINE

## 2025-08-02 PROCEDURE — 82962 GLUCOSE BLOOD TEST: CPT | Mod: 91

## 2025-08-02 PROCEDURE — 96374 THER/PROPH/DIAG INJ IV PUSH: CPT

## 2025-08-02 RX ORDER — MEMANTINE HYDROCHLORIDE 10 MG/1
10 TABLET ORAL DAILY
Status: DISCONTINUED | OUTPATIENT
Start: 2025-08-03 | End: 2025-08-03 | Stop reason: HOSPADM

## 2025-08-02 RX ORDER — HYDRALAZINE HYDROCHLORIDE 20 MG/ML
5 INJECTION INTRAMUSCULAR; INTRAVENOUS EVERY 6 HOURS PRN
Status: DISCONTINUED | OUTPATIENT
Start: 2025-08-02 | End: 2025-08-03 | Stop reason: HOSPADM

## 2025-08-02 RX ORDER — DONEPEZIL HYDROCHLORIDE 5 MG/1
10 TABLET, FILM COATED ORAL NIGHTLY
Status: DISCONTINUED | OUTPATIENT
Start: 2025-08-02 | End: 2025-08-03 | Stop reason: HOSPADM

## 2025-08-02 RX ORDER — IPRATROPIUM BROMIDE AND ALBUTEROL SULFATE 2.5; .5 MG/3ML; MG/3ML
3 SOLUTION RESPIRATORY (INHALATION) ONCE
Status: COMPLETED | OUTPATIENT
Start: 2025-08-02 | End: 2025-08-02

## 2025-08-02 RX ORDER — ASPIRIN 325 MG
325 TABLET ORAL
Status: COMPLETED | OUTPATIENT
Start: 2025-08-02 | End: 2025-08-02

## 2025-08-02 RX ORDER — TALC
6 POWDER (GRAM) TOPICAL NIGHTLY PRN
Status: DISCONTINUED | OUTPATIENT
Start: 2025-08-02 | End: 2025-08-02

## 2025-08-02 RX ORDER — LEVOTHYROXINE SODIUM 50 UG/1
50 TABLET ORAL
Status: DISCONTINUED | OUTPATIENT
Start: 2025-08-03 | End: 2025-08-03 | Stop reason: HOSPADM

## 2025-08-02 RX ORDER — LIDOCAINE 50 MG/G
1 PATCH TOPICAL
Status: DISCONTINUED | OUTPATIENT
Start: 2025-08-02 | End: 2025-08-03 | Stop reason: HOSPADM

## 2025-08-02 RX ORDER — ACETAMINOPHEN 325 MG/1
650 TABLET ORAL EVERY 4 HOURS PRN
Status: DISCONTINUED | OUTPATIENT
Start: 2025-08-02 | End: 2025-08-03 | Stop reason: HOSPADM

## 2025-08-02 RX ORDER — PANTOPRAZOLE SODIUM 40 MG/1
40 TABLET, DELAYED RELEASE ORAL DAILY
Status: DISCONTINUED | OUTPATIENT
Start: 2025-08-03 | End: 2025-08-03 | Stop reason: HOSPADM

## 2025-08-02 RX ORDER — FLUTICASONE FUROATE AND VILANTEROL 200; 25 UG/1; UG/1
1 POWDER RESPIRATORY (INHALATION) DAILY
Status: DISCONTINUED | OUTPATIENT
Start: 2025-08-03 | End: 2025-08-03 | Stop reason: HOSPADM

## 2025-08-02 RX ORDER — INSULIN ASPART 100 [IU]/ML
0-5 INJECTION, SOLUTION INTRAVENOUS; SUBCUTANEOUS
Status: DISCONTINUED | OUTPATIENT
Start: 2025-08-02 | End: 2025-08-03 | Stop reason: HOSPADM

## 2025-08-02 RX ORDER — SODIUM CHLORIDE 0.9 % (FLUSH) 0.9 %
10 SYRINGE (ML) INJECTION
Status: CANCELLED | OUTPATIENT
Start: 2025-08-02

## 2025-08-02 RX ORDER — NAPROXEN 500 MG/1
500 TABLET ORAL
Status: COMPLETED | OUTPATIENT
Start: 2025-08-02 | End: 2025-08-02

## 2025-08-02 RX ORDER — AMLODIPINE BESYLATE 5 MG/1
5 TABLET ORAL DAILY
Status: DISCONTINUED | OUTPATIENT
Start: 2025-08-03 | End: 2025-08-03 | Stop reason: HOSPADM

## 2025-08-02 RX ORDER — NITROGLYCERIN 20 MG/G
0.5 OINTMENT TOPICAL ONCE
Status: COMPLETED | OUTPATIENT
Start: 2025-08-02 | End: 2025-08-02

## 2025-08-02 RX ORDER — ONDANSETRON 4 MG/1
4 TABLET, ORALLY DISINTEGRATING ORAL EVERY 6 HOURS PRN
Status: DISCONTINUED | OUTPATIENT
Start: 2025-08-02 | End: 2025-08-03 | Stop reason: HOSPADM

## 2025-08-02 RX ORDER — ONDANSETRON HYDROCHLORIDE 2 MG/ML
4 INJECTION, SOLUTION INTRAVENOUS EVERY 6 HOURS PRN
Status: DISCONTINUED | OUTPATIENT
Start: 2025-08-02 | End: 2025-08-03 | Stop reason: HOSPADM

## 2025-08-02 RX ORDER — SODIUM CHLORIDE 0.9 % (FLUSH) 0.9 %
10 SYRINGE (ML) INJECTION
Status: DISCONTINUED | OUTPATIENT
Start: 2025-08-02 | End: 2025-08-03 | Stop reason: HOSPADM

## 2025-08-02 RX ORDER — NAPROXEN 500 MG/1
500 TABLET ORAL 2 TIMES DAILY PRN
Qty: 28 TABLET | Refills: 0 | Status: SHIPPED | OUTPATIENT
Start: 2025-08-02 | End: 2025-08-16

## 2025-08-02 RX ORDER — IBUPROFEN 200 MG
16 TABLET ORAL
Status: DISCONTINUED | OUTPATIENT
Start: 2025-08-02 | End: 2025-08-03 | Stop reason: HOSPADM

## 2025-08-02 RX ORDER — GLUCAGON 1 MG
1 KIT INJECTION
Status: DISCONTINUED | OUTPATIENT
Start: 2025-08-02 | End: 2025-08-03 | Stop reason: HOSPADM

## 2025-08-02 RX ORDER — IPRATROPIUM BROMIDE AND ALBUTEROL SULFATE 2.5; .5 MG/3ML; MG/3ML
3 SOLUTION RESPIRATORY (INHALATION) EVERY 4 HOURS PRN
Status: DISCONTINUED | OUTPATIENT
Start: 2025-08-02 | End: 2025-08-03 | Stop reason: HOSPADM

## 2025-08-02 RX ORDER — IBUPROFEN 200 MG
24 TABLET ORAL
Status: DISCONTINUED | OUTPATIENT
Start: 2025-08-02 | End: 2025-08-03 | Stop reason: HOSPADM

## 2025-08-02 RX ORDER — ONDANSETRON HYDROCHLORIDE 2 MG/ML
4 INJECTION, SOLUTION INTRAVENOUS EVERY 8 HOURS PRN
Status: CANCELLED | OUTPATIENT
Start: 2025-08-02

## 2025-08-02 RX ORDER — DICLOFENAC SODIUM 10 MG/G
2 GEL TOPICAL 3 TIMES DAILY PRN
Status: DISCONTINUED | OUTPATIENT
Start: 2025-08-02 | End: 2025-08-02

## 2025-08-02 RX ADMIN — IPRATROPIUM BROMIDE AND ALBUTEROL SULFATE 3 ML: 2.5; .5 SOLUTION RESPIRATORY (INHALATION) at 09:08

## 2025-08-02 RX ADMIN — LIDOCAINE 1 PATCH: 50 PATCH CUTANEOUS at 09:08

## 2025-08-02 RX ADMIN — NAPROXEN 500 MG: 500 TABLET ORAL at 09:08

## 2025-08-02 RX ADMIN — NITROGLYCERIN 0.5 INCH: 20 OINTMENT TOPICAL at 08:08

## 2025-08-02 RX ADMIN — ASPIRIN 325 MG: 325 TABLET ORAL at 02:08

## 2025-08-02 RX ADMIN — DONEPEZIL HYDROCHLORIDE 10 MG: 5 TABLET, FILM COATED ORAL at 08:08

## 2025-08-02 RX ADMIN — HYDRALAZINE HYDROCHLORIDE 5 MG: 20 INJECTION INTRAMUSCULAR; INTRAVENOUS at 07:08

## 2025-08-02 NOTE — HPI
Ms. 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 She reports she was overall near her usual state of health thought has been out of her amlodipine in the recent past. The morning of presentation she felt L-sided chest discomfort along her lateral chest wall and associated palpitations with SOB. She notes intermittent shortness of breath over the past several months which she has attributed to her asthma. She had been evaluated with her pulmonologist for persistent SOB in May and recently completed V/Q, CXR and CT Chest which showed several areas of V/Q mismatch and Denies fevers, chills, cough, sick contacts. On chart review she has reported various arthralgias but denies them at time of evaluation. With her significant symptoms she presented to ED for further evaluation this morning. ED workup was notable for unremarkable CBC, renal function at baseline, negative D-dimer. Troponin was minimally elevated at 25 with repeat 42. Cardiology was consulted and hospital medicine was contacted for observation placement.

## 2025-08-02 NOTE — ASSESSMENT & PLAN NOTE
- Continue controller with fluticasone-vilanterol 200-25mcg inhaled daily, albuterol-ipratropium 2.5-0.5mg inhaled q4hr PRN.

## 2025-08-02 NOTE — SUBJECTIVE & OBJECTIVE
Past Medical History:   Diagnosis Date    Allergic rhinitis     Arthritis     Asthma     Bilateral pulmonary embolism 04/27/2019    Cataract     Chronic anticoagulation 09/28/2020    Chronic pulmonary embolism, unspecified pulmonary embolism type, unspecified whether acute cor pulmonale present 04/27/2019    On Eliquis daily      Depression     Diabetes mellitus     Fibromyalgia 07/02/2012    GERD (gastroesophageal reflux disease)     Hypercholesterolemia 09/28/2020    Hypercholesterolemia 09/28/2020    Hypertension 07/02/2012    Stage 3 chronic kidney disease 10/10/2021    Thoracic or lumbosacral neuritis or radiculitis, unspecified     Thyroid disease     Type 2 diabetes mellitus with kidney complication, with long-term current use of insulin 10/08/2021    Ulcer        Past Surgical History:   Procedure Laterality Date    CARPAL TUNNEL RELEASE Left 11/29/2023    Procedure: RELEASE, CARPAL TUNNEL,LEFT;  Surgeon: Avril Hutchison MD;  Location: [facility];  Service: Orthopedics;  Laterality: Left;    CATARACT EXTRACTION Bilateral     ESOPHAGOGASTRODUODENOSCOPY N/A 3/8/2022    Procedure: EGD (ESOPHAGOGASTRODUODENOSCOPY) with dilation-either hospital ok with Dr. Meza;  Surgeon: Rebeca Meza MD;  Location: [facility];  Service: Endoscopy;  Laterality: N/A;  1/11-eliquis hold ok see te-tb    ESOPHAGOGASTRODUODENOSCOPY N/A 2/28/2022    Procedure: EGD (ESOPHAGOGASTRODUODENOSCOPY) with dilation-either hospital ok with Dr. Meza;  Surgeon: Rebeca Meza MD;  Location: [address]);  Service: Endoscopy;  Laterality: N/A;  1/11-eliquis hold ok see te-tb  COVID test on 2/25/22 at [facility]  2/22-confirmed appt arrival time with pt-Kpvt    FOOT NEUROMA SURGERY  1985    HYSTERECTOMY         Review of patient's allergies indicates:   Allergen Reactions    Melatonin      Other reaction(s): Other (See Comments)  Sleep Walks and has unnatural dreams    Talwin compound Hives    Talwin [pentazocine lactate]  Hallucinations    Trazodone Other (See Comments)     Nightmares/sleep walk      Bentyl [dicyclomine] Anxiety       Current Facility-Administered Medications on File Prior to Encounter   Medication    midazolam (VERSED) 1 mg/mL injection 0.5 mg     Current Outpatient Medications on File Prior to Encounter   Medication Sig    acetaminophen (TYLENOL) 650 MG TbSR Take 1 tablet (650 mg total) by mouth every 8 (eight) hours as needed (headaches).    albuterol (PROVENTIL) 2.5 mg /3 mL (0.083 %) nebulizer solution Take 3 mLs (2.5 mg total) by nebulization every 6 (six) hours as needed for Wheezing. Rescue    amLODIPine (NORVASC) 5 MG tablet Take 1 tablet (5 mg total) by mouth once daily.    atorvastatin (LIPITOR) 40 MG tablet Take 1 tablet (40 mg total) by mouth once daily.    benzonatate (TESSALON) 100 MG capsule TAKE 1 CAPSULE BY MOUTH THREE TIMES DAILY AS NEEDED FOR COUGH (Patient taking differently: Take 100 mg by mouth as needed.)    ciclopirox 1 % shampoo Apply topically.    dextromethorphan-guaiFENesin  mg/5 ml (ROBITUSSIN-DM)  mg/5 mL liquid Take 10 mLs by mouth every 6 (six) hours as needed (Cough). (Patient taking differently: Take 10 mLs by mouth as needed (Cough).)    diclofenac sodium (VOLTAREN ARTHRITIS PAIN) 1 % Gel Apply 2 g topically once daily.    donepeziL (ARICEPT) 10 MG tablet Take 1 tablet by mouth every evening.    doxepin (SINEQUAN) 10 MG capsule TAKE 1 CAPSULE AT BEDTIME  FOR ITCHING    ELIQUIS 2.5 mg Tab TAKE 1 TABLET DAILY (Patient taking differently: Take 2.5 mg by mouth 2 (two) times daily.)    esomeprazole (NEXIUM) 40 MG capsule TAKE 1 CAPSULE TWICE DAILY    fluocinonide (LIDEX) 0.05 % external solution AAA scalp qday - bid prn pruritus    fluticasone-salmeterol 230-21 mcg/dose (ADVAIR HFA) 230-21 mcg/actuation HFAA inhaler Inhale 2 puffs into the lungs 2 (two) times daily - Controller    GEMTESA 75 mg Tab Take 1 tablet by mouth twice a week.    glycopyrrolate (ROBINUL) 2 MG Tab  TAKE 1 TABLET TWICE A DAY    hydrocortisone 1 % cream Apply topically 2 (two) times daily.    levothyroxine (SYNTHROID) 50 MCG tablet Take 1 tablet (50 mcg total) by mouth before breakfast.    LIDOcaine 3 % Crea Apply 1 application  topically 2 (two) times a day.    meclizine (ANTIVERT) 25 mg tablet Take 1 tablet (25 mg total) by mouth 2 (two) times daily as needed for Dizziness.    memantine (NAMENDA) 10 MG Tab Take 1 tablet (10 mg total) by mouth once daily.    simethicone (MYLICON) 125 mg Cap capsule Take 1 capsule (125 mg total) by mouth 4 (four) times daily as needed for Flatulence (abdominal pain).    [DISCONTINUED] albuterol (PROVENTIL/VENTOLIN HFA) 90 mcg/actuation inhaler Inhale 2 puffs into the lungs every 6 (six) hours as needed.     Family History       Problem Relation (Age of Onset)    Diabetes Mother, Brother    Emphysema Maternal Aunt          Tobacco Use    Smoking status: Never    Smokeless tobacco: Never   Substance and Sexual Activity    Alcohol use: No    Drug use: No    Sexual activity: Not Currently     Review of Systems   Constitutional:  Negative for chills and fever.   HENT:  Negative for sore throat and trouble swallowing.    Eyes:  Negative for pain and visual disturbance.   Respiratory:  Positive for shortness of breath. Negative for cough.    Cardiovascular:  Positive for chest pain and palpitations. Negative for leg swelling.   Gastrointestinal:  Negative for abdominal pain, nausea and vomiting.   Genitourinary:  Negative for difficulty urinating and dysuria.   Musculoskeletal:  Positive for arthralgias and myalgias.   Skin:  Negative for rash and wound.   Neurological:  Negative for weakness and numbness.     Objective:     Vital Signs (Most Recent):  Temp: 97.6 °F (36.4 °C) (08/02/25 0945)  Pulse: 76 (08/02/25 1500)  Resp: 17 (08/02/25 1327)  BP: (!) 169/106 (08/02/25 1500)  SpO2: 99 % (08/02/25 1500) Vital Signs (24h Range):  Temp:  [97.6 °F (36.4 °C)] 97.6 °F (36.4 °C)  Pulse:   [70-88] 76  Resp:  [10-19] 17  SpO2:  [97 %-99 %] 99 %  BP: (143-194)/() 169/106        There is no height or weight on file to calculate BMI.     Physical Exam  Vitals and nursing note reviewed.   Constitutional:       General: She is not in acute distress.     Appearance: She is well-developed.   HENT:      Head: Normocephalic and atraumatic.   Eyes:      General:         Right eye: No discharge.         Left eye: No discharge.      Conjunctiva/sclera: Conjunctivae normal.   Cardiovascular:      Rate and Rhythm: Normal rate.      Pulses: Normal pulses.   Pulmonary:      Effort: Pulmonary effort is normal. No respiratory distress.   Chest:      Comments: Tender to palpation at lower L axillary chest wall. No sternal, costochondral tenderness on palpation.  Abdominal:      Palpations: Abdomen is soft.      Tenderness: There is no abdominal tenderness.   Musculoskeletal:         General: Normal range of motion.      Right lower leg: No edema.      Left lower leg: No edema.   Skin:     General: Skin is warm and dry.   Neurological:      Mental Status: She is alert and oriented to person, place, and time.        Significant Labs:   CBC:  Recent Labs   Lab 08/02/25  0701   WBC 4.89   HGB 13.0   HCT 39.5      NEUTROAUTO 51.3   LYMPH 1.78  36.4   MONO 9.0  0.44   EOS 2.5  0.12   CMP:  Recent Labs   Lab 08/02/25  0701      K 4.0      CO2 27   BUN 11   CREATININE 1.0   *   CALCIUM 9.7   ALKPHOS 64   AST 19   ALT 10   BILITOT 0.7   PROT 7.2   ALBUMIN 3.9   ANIONGAP 7*     Recent Labs   Lab 08/02/25  0701   DDIMER 0.42      08/02/25 07:01 08/02/25 12:56   Troponin I High Sensitivity 25 (H) 42 (H)     Significant Imaging: I have reviewed and interpreted all pertinent imaging results/findings within the past 24 hours.  Imaging Results              X-Ray Chest 1 View (Final result)  Result time 08/02/25 07:55:32      Final result by Yayo Lopez MD (08/02/25 07:55:32)                    "Impression:      Suspect mild pulmonary emphysema.  No confluent area of consolidation identified on this single view.      Electronically signed by: Yayo Lopez MD  Date:    08/02/2025  Time:    07:55               Narrative:    EXAMINATION:  XR CHEST 1 VIEW    CLINICAL HISTORY:  Provided history is "  Dyspnea, unspecified".    TECHNIQUE:  One view of the chest.    COMPARISON:  12/29/2023.    FINDINGS:  Cardiac wires overlie the chest.  Cardiomediastinal silhouette is not enlarged.  There are coarse interstitial lung markings and possible mild pulmonary emphysema.  No confluent area of consolidation.  No large pleural effusion.  No pneumothorax.                                    "

## 2025-08-02 NOTE — ASSESSMENT & PLAN NOTE
- Chest pain in setting of elevated blood pressures, reported being out of her amlodipine for several weeks; pain is left lower axillary chest wall, however, less consistent with expected location for hypertensive urgency versus cardiac etiology. Elevated troponin is suggestive of demand ischemia with her blood pressures.  - On chart review had V/Q scan, CT Chest on 07/29 by her pulmonologist, Dr. Suazo. V/Q showed mismatches to L mid zone, KATIE, and RULs with intermediate probability of PE suggested. In discussing with patient she has been taking her apixaban as 2.5mg PO daily, though from review of cardiology notes from 05/2025 it appears this was intended to be BID. Her D-dimer is WNL, however, which is reassuring. Continue apixaban 2.5mg PO daily.  - Symptomatic management with lidocaine 5% transdermal daily, acetaminophen 650mg PO q4hr PRN.  - Trend troponin. Appreciate cardiology assistance.

## 2025-08-02 NOTE — ED NOTES
VSS, currently resting quietly without complaints, given warm blanket, updated on plan of care, agreeable at present

## 2025-08-02 NOTE — DISCHARGE INSTRUCTIONS

## 2025-08-02 NOTE — H&P
[facility]  [facility] & Physical    Patient Name: Debbie Ding  MRN: [id number]  Patient Class: OP- Observation  Admission Date: 8/2/2025  Attending Physician: MARCO A Moise MD  Primary Care Provider: Andrews Mir MD    Patient information was obtained from patient, past medical records, and ER records.     Subjective:     Principal Problem:Chest pain    Chief Complaint:   Chief Complaint   Patient presents with    Shortness of Breath     SOB when woke up at 0400 with activity. EKG WNL, BP elevated but compliant with meds- has been out of meds for a few days. No CP, fever cough        HPI: Ms. 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 She reports she was overall near her usual state of health thought has been out of her amlodipine in the recent past. The morning of presentation she felt L-sided chest discomfort along her lateral chest wall and associated palpitations with SOB. She notes intermittent shortness of breath over the past several months which she has attributed to her asthma. She had been evaluated with her pulmonologist for persistent SOB in May and recently completed V/Q, CXR and CT Chest which showed several areas of V/Q mismatch and Denies fevers, chills, cough, sick contacts. On chart review she has reported various arthralgias but denies them at time of evaluation. With her significant symptoms she presented to ED for further evaluation this morning. ED workup was notable for unremarkable CBC, renal function at baseline, negative D-dimer. Troponin was minimally elevated at 25 with repeat 42. Cardiology was consulted and hospital medicine was contacted for observation placement.    Past Medical History:   Diagnosis Date    Allergic rhinitis     Arthritis     Asthma      Bilateral pulmonary embolism 04/27/2019    Cataract     Chronic anticoagulation 09/28/2020    Chronic pulmonary embolism, unspecified pulmonary embolism type, unspecified whether acute cor pulmonale present 04/27/2019    On Eliquis daily      Depression     Diabetes mellitus     Fibromyalgia 07/02/2012    GERD (gastroesophageal reflux disease)     Hypercholesterolemia 09/28/2020    Hypercholesterolemia 09/28/2020    Hypertension 07/02/2012    Stage 3 chronic kidney disease 10/10/2021    Thoracic or lumbosacral neuritis or radiculitis, unspecified     Thyroid disease     Type 2 diabetes mellitus with kidney complication, with long-term current use of insulin 10/08/2021    Ulcer        Past Surgical History:   Procedure Laterality Date    CARPAL TUNNEL RELEASE Left 11/29/2023    Procedure: RELEASE, CARPAL TUNNEL,LEFT;  Surgeon: Avril Hutchison MD;  Location: [facility];  Service: Orthopedics;  Laterality: Left;    CATARACT EXTRACTION Bilateral     ESOPHAGOGASTRODUODENOSCOPY N/A 3/8/2022    Procedure: EGD (ESOPHAGOGASTRODUODENOSCOPY) with dilation-either hospital ok with Dr. Meza;  Surgeon: Rebeca Meza MD;  Location: [facility];  Service: Endoscopy;  Laterality: N/A;  1/11-eliquis hold ok see te-tb    ESOPHAGOGASTRODUODENOSCOPY N/A 2/28/2022    Procedure: EGD (ESOPHAGOGASTRODUODENOSCOPY) with dilation-either [facility] ok with Dr. Meza;  Surgeon: Rebeca Meza MD;  Location: [address]);  Service: Endoscopy;  Laterality: N/A;  1/11-eliquis hold ok see te-tb  COVID test on 2/25/22 at [facility]  2/22-confirmed appt arrival time with pt-Kpvt    FOOT NEUROMA SURGERY  1985    HYSTERECTOMY         Review of patient's allergies indicates:   Allergen Reactions    Melatonin      Other reaction(s): Other (See Comments)  Sleep Walks and has unnatural dreams    Talwin compound Hives    Talwin [pentazocine lactate] Hallucinations    Trazodone Other (See Comments)     Nightmares/sleep walk      Bentyl  [dicyclomine] Anxiety       Current Facility-Administered Medications on File Prior to Encounter   Medication    midazolam (VERSED) 1 mg/mL injection 0.5 mg     Current Outpatient Medications on File Prior to Encounter   Medication Sig    acetaminophen (TYLENOL) 650 MG TbSR Take 1 tablet (650 mg total) by mouth every 8 (eight) hours as needed (headaches).    albuterol (PROVENTIL) 2.5 mg /3 mL (0.083 %) nebulizer solution Take 3 mLs (2.5 mg total) by nebulization every 6 (six) hours as needed for Wheezing. Rescue    amLODIPine (NORVASC) 5 MG tablet Take 1 tablet (5 mg total) by mouth once daily.    atorvastatin (LIPITOR) 40 MG tablet Take 1 tablet (40 mg total) by mouth once daily.    benzonatate (TESSALON) 100 MG capsule TAKE 1 CAPSULE BY MOUTH THREE TIMES DAILY AS NEEDED FOR COUGH (Patient taking differently: Take 100 mg by mouth as needed.)    ciclopirox 1 % shampoo Apply topically.    dextromethorphan-guaiFENesin  mg/5 ml (ROBITUSSIN-DM)  mg/5 mL liquid Take 10 mLs by mouth every 6 (six) hours as needed (Cough). (Patient taking differently: Take 10 mLs by mouth as needed (Cough).)    diclofenac sodium (VOLTAREN ARTHRITIS PAIN) 1 % Gel Apply 2 g topically once daily.    donepeziL (ARICEPT) 10 MG tablet Take 1 tablet by mouth every evening.    doxepin (SINEQUAN) 10 MG capsule TAKE 1 CAPSULE AT BEDTIME  FOR ITCHING    ELIQUIS 2.5 mg Tab TAKE 1 TABLET DAILY (Patient taking differently: Take 2.5 mg by mouth 2 (two) times daily.)    esomeprazole (NEXIUM) 40 MG capsule TAKE 1 CAPSULE TWICE DAILY    fluocinonide (LIDEX) 0.05 % external solution AAA scalp qday - bid prn pruritus    fluticasone-salmeterol 230-21 mcg/dose (ADVAIR HFA) 230-21 mcg/actuation HFAA inhaler Inhale 2 puffs into the lungs 2 (two) times daily - Controller    GEMTESA 75 mg Tab Take 1 tablet by mouth twice a week.    glycopyrrolate (ROBINUL) 2 MG Tab TAKE 1 TABLET TWICE A DAY    hydrocortisone 1 % cream Apply topically 2 (two) times daily.     levothyroxine (SYNTHROID) 50 MCG tablet Take 1 tablet (50 mcg total) by mouth before breakfast.    LIDOcaine 3 % Crea Apply 1 application  topically 2 (two) times a day.    meclizine (ANTIVERT) 25 mg tablet Take 1 tablet (25 mg total) by mouth 2 (two) times daily as needed for Dizziness.    memantine (NAMENDA) 10 MG Tab Take 1 tablet (10 mg total) by mouth once daily.    simethicone (MYLICON) 125 mg Cap capsule Take 1 capsule (125 mg total) by mouth 4 (four) times daily as needed for Flatulence (abdominal pain).    [DISCONTINUED] albuterol (PROVENTIL/VENTOLIN HFA) 90 mcg/actuation inhaler Inhale 2 puffs into the lungs every 6 (six) hours as needed.     Family History       Problem Relation (Age of Onset)    Diabetes Mother, Brother    Emphysema Maternal Aunt          Tobacco Use    Smoking status: Never    Smokeless tobacco: Never   Substance and Sexual Activity    Alcohol use: No    Drug use: No    Sexual activity: Not Currently     Review of Systems   Constitutional:  Negative for chills and fever.   HENT:  Negative for sore throat and trouble swallowing.    Eyes:  Negative for pain and visual disturbance.   Respiratory:  Positive for shortness of breath. Negative for cough.    Cardiovascular:  Positive for chest pain and palpitations. Negative for leg swelling.   Gastrointestinal:  Negative for abdominal pain, nausea and vomiting.   Genitourinary:  Negative for difficulty urinating and dysuria.   Musculoskeletal:  Positive for arthralgias and myalgias.   Skin:  Negative for rash and wound.   Neurological:  Negative for weakness and numbness.     Objective:     Vital Signs (Most Recent):  Temp: 97.6 °F (36.4 °C) (08/02/25 0945)  Pulse: 76 (08/02/25 1500)  Resp: 17 (08/02/25 1327)  BP: (!) 169/106 (08/02/25 1500)  SpO2: 99 % (08/02/25 1500) Vital Signs (24h Range):  Temp:  [97.6 °F (36.4 °C)] 97.6 °F (36.4 °C)  Pulse:  [70-88] 76  Resp:  [10-19] 17  SpO2:  [97 %-99 %] 99 %  BP: (143-194)/() 169/106         There is no height or weight on file to calculate BMI.     Physical Exam  Vitals and nursing note reviewed.   Constitutional:       General: She is not in acute distress.     Appearance: She is well-developed.   HENT:      Head: Normocephalic and atraumatic.   Eyes:      General:         Right eye: No discharge.         Left eye: No discharge.      Conjunctiva/sclera: Conjunctivae normal.   Cardiovascular:      Rate and Rhythm: Normal rate.      Pulses: Normal pulses.   Pulmonary:      Effort: Pulmonary effort is normal. No respiratory distress.   Chest:      Comments: Tender to palpation at lower L axillary chest wall. No sternal, costochondral tenderness on palpation.  Abdominal:      Palpations: Abdomen is soft.      Tenderness: There is no abdominal tenderness.   Musculoskeletal:         General: Normal range of motion.      Right lower leg: No edema.      Left lower leg: No edema.   Skin:     General: Skin is warm and dry.   Neurological:      Mental Status: She is alert and oriented to person, place, and time.        Significant Labs:   CBC:  Recent Labs   Lab 08/02/25  0701   WBC 4.89   HGB 13.0   HCT 39.5      NEUTROAUTO 51.3   LYMPH 1.78  36.4   MONO 9.0  0.44   EOS 2.5  0.12   CMP:  Recent Labs   Lab 08/02/25  0701      K 4.0      CO2 27   BUN 11   CREATININE 1.0   *   CALCIUM 9.7   ALKPHOS 64   AST 19   ALT 10   BILITOT 0.7   PROT 7.2   ALBUMIN 3.9   ANIONGAP 7*     Recent Labs   Lab 08/02/25  0701   DDIMER 0.42      08/02/25 07:01 08/02/25 12:56   Troponin I High Sensitivity 25 (H) 42 (H)     Significant Imaging: I have reviewed and interpreted all pertinent imaging results/findings within the past 24 hours.  Imaging Results              X-Ray Chest 1 View (Final result)  Result time 08/02/25 07:55:32      Final result by Yayo Lopez MD (08/02/25 07:55:32)                   Impression:      Suspect mild pulmonary emphysema.  No confluent area of consolidation  "identified on this single view.      Electronically signed by: Yayo Lopez MD  Date:    08/02/2025  Time:    07:55               Narrative:    EXAMINATION:  XR CHEST 1 VIEW    CLINICAL HISTORY:  Provided history is "  Dyspnea, unspecified".    TECHNIQUE:  One view of the chest.    COMPARISON:  12/29/2023.    FINDINGS:  Cardiac wires overlie the chest.  Cardiomediastinal silhouette is not enlarged.  There are coarse interstitial lung markings and possible mild pulmonary emphysema.  No confluent area of consolidation.  No large pleural effusion.  No pneumothorax.                                    Assessment/Plan:     Assessment & Plan  Chest pain  History of pulmonary embolism  History of deep venous thrombosis  Primary hypertension  Hyperlipidemia associated with type 2 diabetes mellitus  Pulmonary hypertension  - Chest pain in setting of elevated blood pressures, reported being out of her amlodipine for several weeks; pain is left lower axillary chest wall, however, less consistent with expected location for hypertensive urgency versus cardiac etiology. Elevated troponin is suggestive of demand ischemia with her blood pressures.  - On chart review had V/Q scan, CT Chest on 07/29 by her pulmonologist, Dr. Suazo. V/Q showed mismatches to L mid zone, KATIE, and RULs with intermediate probability of PE suggested. In discussing with patient she has been taking her apixaban as 2.5mg PO daily, though from review of cardiology notes from 05/2025 it appears this was intended to be BID. Her D-dimer is WNL, however, which is reassuring. Continue apixaban 2.5mg PO daily.  - Symptomatic management with lidocaine 5% transdermal daily, acetaminophen 650mg PO q4hr PRN.  - Trend troponin. Appreciate cardiology assistance.  Moderate persistent asthma without complication  - Continue controller with fluticasone-vilanterol 200-25mcg inhaled daily, albuterol-ipratropium 2.5-0.5mg inhaled q4hr PRN.  Hypothyroidism  - Continue " levothyroxine 50mcg PO daily.  Type 2 diabetes mellitus with kidney complication, with long-term current use of insulin  - Last HgbA1c 6.4 03/2025. No current oral antihyperglycemics.  - Start low-dose sliding scale insulin aspart 0-5U subQ TIDWM PRN; monitor requirements, discontinue if glucoses   Lewy body dementia  Depression, recurrent  - Continue donepezil 10mg PO qHS, memantine 10mg PO daily.  Stage 3 chronic kidney disease  - Chronic; stable.  - Avoid nephrotoxins, renally dose medications.  Overactive bladder  - On Gemtesa outpatient; not on formulary.  - With dementia, defer substitution with oxybutynin unless symptoms prominent during hospitalization.  GERD (gastroesophageal reflux disease)  - Substitute esomeprazole with pantoprazole 40mg PO daily while in hospital.  ACP (advance care planning)  - Discussed with patient regarding advance care planning. We discussed that, in the event she were to be unable to make decisions herself, she would prefer her daughter Ezequiel Farr ([phone]) to be her surrogate decision maker. She reports she has not thought in detail about what her wishes would be in the event her heart were to stop or breathing were to stop, but at this point would wish for full resuscitative efforts to be made. She notes that she and her daughter have not previously discussed either of these in detail. Recommended that they discuss these formally to ensure appropriate communication and care in the future consistent with her wishes. ACP time: 16min.    VTE Risk Mitigation (From admission, onward)           Ordered     apixaban tablet 2.5 mg  Daily         08/02/25 1558     IP VTE HIGH RISK PATIENT  Once         08/02/25 1608                          D Maurizio Moise MD  Department of Hospital Medicine  [facility] Emergency Dept

## 2025-08-02 NOTE — ASSESSMENT & PLAN NOTE
- On Gemtesa outpatient; not on formulary.  - With dementia, defer substitution with oxybutynin unless symptoms prominent during hospitalization.

## 2025-08-02 NOTE — ASSESSMENT & PLAN NOTE
- Discussed with patient regarding advance care planning. We discussed that, in the event she were to be unable to make decisions herself, she would prefer her daughter Ezequiel Farr ([phone]) to be her surrogate decision maker. She reports she has not thought in detail about what her wishes would be in the event her heart were to stop or breathing were to stop, but at this point would wish for full resuscitative efforts to be made. She notes that she and her daughter have not previously discussed either of these in detail. Recommended that they discuss these formally to ensure appropriate communication and care in the future consistent with her wishes. ACP time: 16min.

## 2025-08-02 NOTE — ASSESSMENT & PLAN NOTE
- Last HgbA1c 6.4 03/2025. No current oral antihyperglycemics.  - Start low-dose sliding scale insulin aspart 0-5U subQ TIDWM PRN; monitor requirements, discontinue if glucoses

## 2025-08-02 NOTE — ED NOTES
Pt was called back by Dr. Tellez to return to ED. Pt arrived, placed in room 1. Pt placed on continuous cardiac, bp and pulse ox monitoring     Dr. Tellez notified of pt return

## 2025-08-02 NOTE — ED PROVIDER NOTES
"  Source of History:  Medical record, patient, EMS.    Chief complaint:  Per triage note: "Shortness of Breath (SOB when woke up at 0400 with activity. EKG WNL, BP elevated but compliant with meds- has been out of meds for a few days. No CP, fever cough)  "    HPI:    Patient presents for evaluation of dyspnea soon after awakening with minimal activity.  She denies any associated chest pain.  She denies any recent fevers.  She denies any sick contacts.  She denies any lower extremity edema.  She denies any other associated complaints.    ROS:   See HPI for pertinent review of systems      Review of patient's allergies indicates:   Allergen Reactions    Melatonin      Other reaction(s): Other (See Comments)  Sleep Walks and has unnatural dreams    Talwin compound Hives    Talwin [pentazocine lactate] Hallucinations    Trazodone Other (See Comments)     Nightmares/sleep walk      Bentyl [dicyclomine] Anxiety       PMH:  As per HPI and below:  Past Medical History:   Diagnosis Date    Allergic rhinitis     Arthritis     Asthma     Bilateral pulmonary embolism 04/27/2019    Cataract     Chronic anticoagulation 09/28/2020    Chronic pulmonary embolism, unspecified pulmonary embolism type, unspecified whether acute cor pulmonale present 04/27/2019    On Eliquis daily      Depression     Diabetes mellitus     Fibromyalgia 07/02/2012    GERD (gastroesophageal reflux disease)     Hypercholesterolemia 09/28/2020    Hypertension 07/02/2012    Stage 3 chronic kidney disease 10/10/2021    Thoracic or lumbosacral neuritis or radiculitis, unspecified     Thyroid disease     Type 2 diabetes mellitus with kidney complication, with long-term current use of insulin 10/08/2021    Ulcer        Past Surgical History:   Procedure Laterality Date    CARPAL TUNNEL RELEASE Left 11/29/2023    Procedure: RELEASE, CARPAL TUNNEL,LEFT;  Surgeon: Avril Hutchison MD;  Location: [facility];  Service: Orthopedics;  Laterality: Left;    CATARACT " EXTRACTION Bilateral     ESOPHAGOGASTRODUODENOSCOPY N/A 3/8/2022    Procedure: EGD (ESOPHAGOGASTRODUODENOSCOPY) with dilation-either hospital ok with Dr. Meza;  Surgeon: Rebeca Meza MD;  Location: [facility];  Service: Endoscopy;  Laterality: N/A;  1/11-eliquis hold ok see te-tb    ESOPHAGOGASTRODUODENOSCOPY N/A 2/28/2022    Procedure: EGD (ESOPHAGOGASTRODUODENOSCOPY) with dilation-either hospital ok with Dr. Meza;  Surgeon: Rebeca Meza MD;  Location: [facility] (2ND FLR);  Service: Endoscopy;  Laterality: N/A;  1/11-eliquis hold ok see te-tb  COVID test on 2/25/22 at [facility]  2/22-confirmed appt arrival time with pt-Kpvt    FOOT NEUROMA SURGERY  1985    HYSTERECTOMY         Social History[1]    Physical Exam:      Nursing note and vitals reviewed.  BP (!) 166/73 (BP Location: Left arm, Patient Position: Lying)   Pulse 79   Temp 98.8 °F (37.1 °C) (Oral)   Resp 18   SpO2 96%     Constitutional: No distress.  Eyes: EOMI. No discharge. Anicteric.  HENT:   Neck: Normal range of motion. Neck supple.  Cardiovascular: Normal rate. No murmur, no gallop and no friction rub heard.   Pulmonary/Chest: No respiratory distress. Effort normal. No wheezes, no rales, no rhonchi.  No crepitus.  No rash.  Abdominal: Bowel sounds normal. Soft. No distension and no mass. There is no tenderness. There is no rebound, no guarding, no tenderness at McBurney's point.  Neurological: GCS 15. Alert and oriented to person, place, and time. No gross cranial nerve, light touch or strength deficit. Coordination normal.   Skin: Skin is warm and dry.   EXT: 2+ radial pulses.         Medical Decision Making / Independent Interpretations / External Records Reviewed:      Patient is a 89-year-old female with history of asthma, PE, on chronic anticoagulation, CKD 3, diabetes, fibromyalgia who presents for evaluation of dyspnea without any other associated symptoms since approximately 0400 hours this morning after she awoke.     Physical exam is nonfocal.    The initial differential included dehydration, gross metabolic abnormality, pneumonia, acute PE, acute viral syndrome, asthma exacerbation.    ED Course as of 08/02/25 1828   Sat Aug 02, 2025   0705 --  EKG Interpretation: normal sinus rhythm at 80   beats per minute, no STEMI, no significant acute ST/T abnormalities, normal intervals.  No acute change compared to prior tracing.  --   [RC]   0904 I independently interpreted labs which are unremarkable / unrevealing.   I independently interpreted CXR which shows no pneumothorax, no focal consolidation, no cardiomegaly, no acute process.   [RC]   0931 Patient states that she feels at her baseline other than left lower chest wall pain and tenderness she states has been present for quite some time.  She denies any recent heavy lifting, unusual activity.  She states that she discussed this pain with her pulmonologist who recommended treatment as musculoskeletal pain.  She denies any recent rash.  On exam, patient has focal tenderness, no associated skin changes, no crepitus.  We will treated as musculoskeletal pain pending pulmonology follow up.  Steroids were considered but risk exceeds benefit given her history of diabetes.  --  I discussed with pt and/or guardian/caretaker that this evaluation in the ED does not suggest any emergent or life threatening medical condition requiring admission or further immediate intervention or diagnostics. Regardless, an unremarkable evaluation in the ED does not preclude the development or presence of a serious or life threatening condition. Pt was instructed to return for any worsening, new, changed, or concerning symptoms.     I had a detailed discussion with patient and/or guardian/caretaker regarding findings, plan, return precautions, importance of medication adherence, need to follow-up with a PCP and specialist. All questions answered.     Note was created using voice recognition software. It may  have occasional typographical errors not identified and edited despite initial review prior to signing.   [RC]   1014 On chart review, I now see that patient's CMP and troponins never resulted.  I was just notified by lab that the instruments just became non operational within the last 15 minutes or so.  Unclear why these were not run previously when the instrument was functioning.  [RC]   1118 Initial troponin 25.   I called the patient at her listed number.  She reports improved, but persistent dyspnea.  She was counseled to return for re-evaluation. She states understanding and her intention to return.  [RC]   1253 Patient has returned to the emergency department.  She states that her dyspnea has improved since we last spoke on the phone.   She denies any associated chest pain.  She denies any history of MI.  We will repeat troponin.  [RC]   1433 Initial high sensitivity troponin 20 at approx 4 hrs after onset; repeat at about 9 hrs after onset was 42.     Pt discussed with ED observation unit non-physician provider however we subsequently were notified that observation unit is not available due to staffing.    Patient has a true medical need for observation/admission.   I have discussed the patient history, exam, findings with hospitalist Dr. Moran, who accepts the patient.   Patient history, findings, results, patient management discussed with cardiologist Dr. Nelson.       [RC]   4487 --  EKG Interpretation: normal sinus rhythm at 88 beats per minute, no STEMI, no significant acute ST/T abnormalities, normal intervals.  No acute change compared to prior tracing.  --   [RC]      ED Course User Index  [RC] Rush Tellez MD              Procedures      Medications   LIDOcaine 5 % patch 1 patch (1 patch Transdermal Patch Applied 8/2/25 0946)   memantine tablet 10 mg (has no administration in time range)   levothyroxine tablet 50 mcg (has no administration in time range)   donepeziL tablet 10 mg (has no  administration in time range)   amLODIPine tablet 5 mg (has no administration in time range)   fluticasone furoate-vilanteroL 200-25 mcg/dose diskus inhaler 1 puff (has no administration in time range)   albuterol-ipratropium 2.5 mg-0.5 mg/3 mL nebulizer solution 3 mL (has no administration in time range)   sodium chloride 0.9% flush 10 mL (has no administration in time range)   ondansetron disintegrating tablet 4 mg (has no administration in time range)   ondansetron injection 4 mg (has no administration in time range)   acetaminophen tablet 650 mg (has no administration in time range)   glucose chewable tablet 16 g (has no administration in time range)   glucose chewable tablet 24 g (has no administration in time range)   dextrose 50% injection 12.5 g (has no administration in time range)   dextrose 50% injection 25 g (has no administration in time range)   glucagon (human recombinant) injection 1 mg (has no administration in time range)   insulin aspart U-100 pen 0-5 Units (has no administration in time range)   hydrALAZINE injection 5 mg (has no administration in time range)   pantoprazole EC tablet 40 mg (has no administration in time range)   apixaban tablet 2.5 mg (has no administration in time range)   albuterol-ipratropium 2.5 mg-0.5 mg/3 mL nebulizer solution 3 mL (3 mLs Nebulization Given 8/2/25 0915)   naproxen tablet 500 mg (500 mg Oral Given 8/2/25 0946)   aspirin tablet 325 mg (325 mg Oral Given 8/2/25 5575)       --  I decided to obtain the patient's medical records. I reviewed patient's prior external notes / results:  specialist documentation   and pharmacy / prescription records.   --  Additional Medical Decision Making: Prescription drug management and Decision regarding advanced imaging      Launch MDCalc MDM  MDCalc MDM Module  Aug 02 2025 10:08 AM [Rush Tellez]  Data:  - Independent interpretation: I independently reviewed the XR Chest 1V. See MDM section and/or ED Course for my  interpretation. [Rush Tellez]  - Test/documents/historian: 3+ tests ordered  Problems:  acute coronary syndrome, acute pulmonary embolus (high)  Additional encounter diagnoses: Dyspnea, SOB (shortness of breath), Chest wall pain  Risk: LIDOcaine patch + 1 more (Rx drug management)           Future Appointments   Date Time Provider Department Center   9/8/2025  8:45 AM Pancho Mensah PA-C [facility] DERM [address]   9/11/2025  9:30 AM Arturo Teresa DPM [facility] POD [address]   9/15/2025  9:30 AM Pancho Mensah PA-C [facility] DERM [address]   10/8/2025  2:20 PM Kadie Ngo MD [facility] PHYSMED [address] [facility]          Diagnostic Impression:    1. Elevated troponin    2. Dyspnea    3. SOB (shortness of breath)    4. Chest wall pain    5. Chest pain    6. Anginal equivalent             ED Disposition Condition    Observation                       Rush Tellez MD  08/02/25 1010         Rush Tellez MD  08/02/25 1436         [1]   Social History  Tobacco Use    Smoking status: Never    Smokeless tobacco: Never   Substance Use Topics    Alcohol use: No    Drug use: No        Rush Tellez MD  08/02/25 5793

## 2025-08-02 NOTE — Clinical Note
Diagnosis: Chest wall pain [513139]   Future Attending Provider: LUIS RODAS [[id number]]   Is the patient being sent to ED Observation?: Yes   Special Needs:: No Special Needs [1]

## 2025-08-03 VITALS
HEIGHT: 65 IN | TEMPERATURE: 98 F | HEART RATE: 82 BPM | RESPIRATION RATE: 16 BRPM | OXYGEN SATURATION: 99 % | BODY MASS INDEX: 21.86 KG/M2 | WEIGHT: 131.19 LBS | SYSTOLIC BLOOD PRESSURE: 137 MMHG | DIASTOLIC BLOOD PRESSURE: 73 MMHG

## 2025-08-03 LAB
ABSOLUTE EOSINOPHIL (OHS): 0.11 K/UL
ABSOLUTE MONOCYTE (OHS): 0.51 K/UL (ref 0.3–1)
ABSOLUTE NEUTROPHIL COUNT (OHS): 2.79 K/UL (ref 1.8–7.7)
ANION GAP (OHS): 7 MMOL/L (ref 8–16)
BASOPHILS # BLD AUTO: 0.02 K/UL
BASOPHILS NFR BLD AUTO: 0.4 %
BUN SERPL-MCNC: 15 MG/DL (ref 10–30)
CALCIUM SERPL-MCNC: 9.4 MG/DL (ref 8.7–10.5)
CHLORIDE SERPL-SCNC: 111 MMOL/L (ref 95–110)
CO2 SERPL-SCNC: 25 MMOL/L (ref 23–29)
CREAT SERPL-MCNC: 1.1 MG/DL (ref 0.5–1.4)
ERYTHROCYTE [DISTWIDTH] IN BLOOD BY AUTOMATED COUNT: 16.6 % (ref 11.5–14.5)
GFR SERPLBLD CREATININE-BSD FMLA CKD-EPI: 47 ML/MIN/1.73/M2
GLUCOSE SERPL-MCNC: 117 MG/DL (ref 70–110)
HCT VFR BLD AUTO: 35.9 % (ref 37–48.5)
HGB BLD-MCNC: 11.7 GM/DL (ref 12–16)
IMM GRANULOCYTES # BLD AUTO: 0.01 K/UL (ref 0–0.04)
IMM GRANULOCYTES NFR BLD AUTO: 0.2 % (ref 0–0.5)
LYMPHOCYTES # BLD AUTO: 1.49 K/UL (ref 1–4.8)
MAGNESIUM SERPL-MCNC: 2.2 MG/DL (ref 1.6–2.6)
MCH RBC QN AUTO: 29 PG (ref 27–31)
MCHC RBC AUTO-ENTMCNC: 32.6 G/DL (ref 32–36)
MCV RBC AUTO: 89 FL (ref 82–98)
NUCLEATED RBC (/100WBC) (OHS): 0 /100 WBC
OHS QRS DURATION: 104 MS
OHS QRS DURATION: 108 MS
OHS QTC CALCULATION: 463 MS
OHS QTC CALCULATION: 476 MS
PHOSPHATE SERPL-MCNC: 3.7 MG/DL (ref 2.7–4.5)
PLATELET # BLD AUTO: 179 K/UL (ref 150–450)
PMV BLD AUTO: 10.6 FL (ref 9.2–12.9)
POCT GLUCOSE: 114 MG/DL (ref 70–110)
POCT GLUCOSE: 128 MG/DL (ref 70–110)
POTASSIUM SERPL-SCNC: 4 MMOL/L (ref 3.5–5.1)
RBC # BLD AUTO: 4.03 M/UL (ref 4–5.4)
RELATIVE EOSINOPHIL (OHS): 2.2 %
RELATIVE LYMPHOCYTE (OHS): 30.2 % (ref 18–48)
RELATIVE MONOCYTE (OHS): 10.3 % (ref 4–15)
RELATIVE NEUTROPHIL (OHS): 56.7 % (ref 38–73)
SODIUM SERPL-SCNC: 143 MMOL/L (ref 136–145)
WBC # BLD AUTO: 4.93 K/UL (ref 3.9–12.7)

## 2025-08-03 PROCEDURE — 83735 ASSAY OF MAGNESIUM: CPT | Performed by: INTERNAL MEDICINE

## 2025-08-03 PROCEDURE — 36415 COLL VENOUS BLD VENIPUNCTURE: CPT | Performed by: INTERNAL MEDICINE

## 2025-08-03 PROCEDURE — 27000221 HC OXYGEN, UP TO 24 HOURS

## 2025-08-03 PROCEDURE — 25000242 PHARM REV CODE 250 ALT 637 W/ HCPCS: Performed by: INTERNAL MEDICINE

## 2025-08-03 PROCEDURE — 94640 AIRWAY INHALATION TREATMENT: CPT | Mod: XB

## 2025-08-03 PROCEDURE — 99900035 HC TECH TIME PER 15 MIN (STAT)

## 2025-08-03 PROCEDURE — 85025 COMPLETE CBC W/AUTO DIFF WBC: CPT | Performed by: INTERNAL MEDICINE

## 2025-08-03 PROCEDURE — 25000003 PHARM REV CODE 250: Performed by: EMERGENCY MEDICINE

## 2025-08-03 PROCEDURE — 94760 N-INVAS EAR/PLS OXIMETRY 1: CPT

## 2025-08-03 PROCEDURE — G0378 HOSPITAL OBSERVATION PER HR: HCPCS

## 2025-08-03 PROCEDURE — 84100 ASSAY OF PHOSPHORUS: CPT | Performed by: INTERNAL MEDICINE

## 2025-08-03 PROCEDURE — 25000003 PHARM REV CODE 250: Performed by: PHYSICIAN ASSISTANT

## 2025-08-03 PROCEDURE — 25000003 PHARM REV CODE 250: Performed by: INTERNAL MEDICINE

## 2025-08-03 PROCEDURE — 80048 BASIC METABOLIC PNL TOTAL CA: CPT | Performed by: INTERNAL MEDICINE

## 2025-08-03 RX ORDER — SIMETHICONE 80 MG
1 TABLET,CHEWABLE ORAL ONCE
Status: COMPLETED | OUTPATIENT
Start: 2025-08-03 | End: 2025-08-03

## 2025-08-03 RX ORDER — NITROGLYCERIN 20 MG/G
0.5 OINTMENT TOPICAL ONCE
Status: COMPLETED | OUTPATIENT
Start: 2025-08-03 | End: 2025-08-03

## 2025-08-03 RX ADMIN — FLUTICASONE FUROATE AND VILANTEROL TRIFENATATE 1 PUFF: 200; 25 POWDER RESPIRATORY (INHALATION) at 09:08

## 2025-08-03 RX ADMIN — MEMANTINE HYDROCHLORIDE 10 MG: 10 TABLET ORAL at 09:08

## 2025-08-03 RX ADMIN — SIMETHICONE 80 MG: 80 TABLET, CHEWABLE ORAL at 02:08

## 2025-08-03 RX ADMIN — APIXABAN 2.5 MG: 2.5 TABLET, FILM COATED ORAL at 09:08

## 2025-08-03 RX ADMIN — NITROGLYCERIN 0.5 INCH: 20 OINTMENT TOPICAL at 02:08

## 2025-08-03 RX ADMIN — LEVOTHYROXINE SODIUM 50 MCG: 0.05 TABLET ORAL at 05:08

## 2025-08-03 RX ADMIN — IPRATROPIUM BROMIDE AND ALBUTEROL SULFATE 3 ML: 2.5; .5 SOLUTION RESPIRATORY (INHALATION) at 09:08

## 2025-08-03 RX ADMIN — PANTOPRAZOLE SODIUM 40 MG: 40 TABLET, DELAYED RELEASE ORAL at 09:08

## 2025-08-03 RX ADMIN — LIDOCAINE 1 PATCH: 50 PATCH CUTANEOUS at 10:08

## 2025-08-03 RX ADMIN — AMLODIPINE BESYLATE 5 MG: 5 TABLET ORAL at 09:08

## 2025-08-03 NOTE — ASSESSMENT & PLAN NOTE
- Discussed with patient regarding advance care planning. We discussed that, in the event she were to be unable to make decisions herself, she would prefer her daughter Ezequiel Farr ([phone]) to be her surrogate decision maker. She reports she has not thought in detail about what her wishes would be in the event her heart were to stop or breathing were to stop, but at this point would wish for full resuscitative efforts to be made. She notes that she and her daughter have not previously discussed either of these in detail. Recommended that they discuss these formally to ensure appropriate communication and care in the future consistent with her wishes. ACP time: 16min.

## 2025-08-03 NOTE — DISCHARGE SUMMARY
[facility] Surg ([address] Medicine  Discharge Summary      Patient Name: Debbie Ding  MRN: [id number]  [facility]: [id number]  Patient Class: OP- Observation  Admission Date: 8/2/2025  Hospital Length of Stay: 0 days  Discharge Date and Time: No discharge date for patient encounter.  Attending Physician: CHRISTELLE Moise MD   Discharging Provider: MARCO A Moise MD  Primary Care Provider: Andrews Mir MD    Primary Care Team: Networked reference to record PCT     HPI:   Ms. Ding is a 89/F with PMH including HTN, HLD, DMII, asthma, CKDIII, h/o DVT/PE (on apixaban), JASE (not on CPAP), dementia, hypothyroidism who presented to [facility] 08/02 with a one day history of chest pain and shortness of breath. She reports she was overall near her usual state of health thought has been out of her amlodipine in the recent past. The morning of presentation she felt L-sided chest discomfort along her lateral chest wall and associated palpitations with SOB. She notes intermittent shortness of breath over the past several months which she has attributed to her asthma. She had been evaluated with her pulmonologist for persistent SOB in May and recently completed V/Q, CXR and CT Chest which showed several areas of V/Q mismatch and Denies fevers, chills, cough, sick contacts. On chart review she has reported various arthralgias but denies them at time of evaluation. With her significant symptoms she presented to ED for further evaluation this morning. ED workup was notable for unremarkable CBC, renal function at baseline, negative D-dimer. Troponin was minimally elevated at 25 with repeat 42. Cardiology was consulted and hospital medicine was contacted for observation placement.    * No surgery found *      Hospital Course:   Admitted with chest pain and mildly elevated troponin. Cardiology consulted, troponin trended. Had been out of her amlodipine recently and just resumed. Suspect troponin elevation likely  demand from elevated blood pressures. Reported taking her apixaban as once daily rather than twice daily; discussed with cardiology, continue as BID. Referred for pulmonary rehab. With clinical improvement and vital stability, she was prepared for discharge home.     Goals of Care Treatment Preferences:  Code Status: Full Code         Consults:   Consults (From admission, onward)          Status Ordering Provider     Inpatient consult to Cardiology  Once        Provider:  (Not yet assigned)    Completed CHRISTELLE BALDWIN            Assessment & Plan  Chest pain  History of pulmonary embolism  History of deep venous thrombosis  Primary hypertension  Hyperlipidemia associated with type 2 diabetes mellitus  Pulmonary hypertension  - Chest pain in setting of elevated blood pressures, reported being out of her amlodipine for several weeks; pain is left lower axillary chest wall, however, less consistent with expected location for hypertensive urgency versus cardiac etiology. Elevated troponin is suggestive of demand ischemia with her blood pressures.  - On chart review had V/Q scan, CT Chest on 07/29 by her pulmonologist, Dr. Suazo. V/Q showed mismatches to L mid zone, KATIE, and RULs with intermediate probability of PE suggested. In discussing with patient she has been taking her apixaban as 2.5mg PO daily, though from review of cardiology notes from 05/2025 it appears this was intended to be BID. Her D-dimer is WNL, however, which is reassuring. Continue apixaban 2.5mg PO daily.  - Symptomatic management with lidocaine 5% transdermal daily, acetaminophen 650mg PO q4hr PRN.  - Trend troponin. Appreciate cardiology assistance.  Moderate persistent asthma without complication  - Continue controller with fluticasone-vilanterol 200-25mcg inhaled daily, albuterol-ipratropium 2.5-0.5mg inhaled q4hr PRN.  Hypothyroidism  - Continue levothyroxine 50mcg PO daily.  Type 2 diabetes mellitus with kidney complication, with long-term  current use of insulin  - Last HgbA1c 6.4 03/2025. No current oral antihyperglycemics.  - Start low-dose sliding scale insulin aspart 0-5U subQ TIDWM PRN; monitor requirements, discontinue if glucoses   Lewy body dementia  Depression, recurrent  - Continue donepezil 10mg PO qHS, memantine 10mg PO daily.  Stage 3 chronic kidney disease  - Chronic; stable.  - Avoid nephrotoxins, renally dose medications.  Overactive bladder  - On Gemtesa outpatient; not on formulary.  - With dementia, defer substitution with oxybutynin unless symptoms prominent during hospitalization.  GERD (gastroesophageal reflux disease)  - Substitute esomeprazole with pantoprazole 40mg PO daily while in hospital.  ACP (advance care planning)  - Discussed with patient regarding advance care planning. We discussed that, in the event she were to be unable to make decisions herself, she would prefer her daughter Ezequiel Farr ([phone]) to be her surrogate decision maker. She reports she has not thought in detail about what her wishes would be in the event her heart were to stop or breathing were to stop, but at this point would wish for full resuscitative efforts to be made. She notes that she and her daughter have not previously discussed either of these in detail. Recommended that they discuss these formally to ensure appropriate communication and care in the future consistent with her wishes. ACP time: 16min.    Final Active Diagnoses:    Diagnosis Date Noted POA    PRINCIPAL PROBLEM:  Chest pain [R07.9] 08/02/2025 Yes    ACP (advance care planning) [Z71.89] 08/02/2025 Not Applicable    Pulmonary hypertension [I27.20] 10/12/2022 Yes     Chronic    Lewy body dementia [G31.83, F02.80] 10/28/2021 Yes     Chronic    Stage 3 chronic kidney disease [N18.30] 10/10/2021 Yes     Chronic    Type 2 diabetes mellitus with kidney complication, with long-term current use of insulin [E11.29, Z79.4] 10/08/2021 Not Applicable     Chronic    Hyperlipidemia  associated with type 2 diabetes mellitus [E11.69, E78.5] 09/28/2020 Yes     Chronic    History of deep venous thrombosis [Z86.718] 09/28/2020 Not Applicable     Chronic    Overactive bladder [N32.81] 12/03/2019 Yes     Chronic    History of pulmonary embolism [Z86.711] 04/27/2019 Yes     Chronic    Hypothyroidism [E03.9] 03/06/2019 Yes     Chronic    Depression, recurrent [F33.9] 04/25/2018 Yes     Chronic    GERD (gastroesophageal reflux disease) [K21.9] 09/21/2015 Yes     Chronic    Primary hypertension [I10] 07/02/2012 Yes     Chronic    Moderate persistent asthma without complication [J45.40] 06/10/2008 Yes     Chronic      Problems Resolved During this Admission:       Discharged Condition: good    Disposition: Home or Self Care    Follow Up:   Follow-up Information       Andrews Mir MD Follow up in 2 week(s).    Specialty: Internal Medicine  Why: post-hospital follow-up  Contact information:  [address] [place] [id number]  [phone]                           Patient Instructions:      Ambulatory referral/consult to Pulmonary Rehab   Standing Status: Future   Referral Priority: Routine Referral Type: Consultation   Referral Reason: Specialty Services Required   Requested Specialty: Pulmonary Disease   Number of Visits Requested: 1     Diet Cardiac     Notify your health care provider if you experience any of the following:  increased confusion or weakness     Notify your health care provider if you experience any of the following:  persistent dizziness, light-headedness, or visual disturbances     Notify your health care provider if you experience any of the following:  difficulty breathing or increased cough     Notify your health care provider if you experience any of the following:  severe uncontrolled pain     Activity as tolerated       Significant Diagnostic Studies:   CBC:  Recent Labs   Lab 08/02/25  0701 08/03/25  0732   WBC 4.89 4.93   HGB 13.0 11.7*   HCT 39.5 35.9*    179  "  NEUTROAUTO 51.3 56.7   LYMPH 1.78  36.4 1.49  30.2   MONO 9.0  0.44 10.3  0.51   EOS 2.5  0.12 2.2  0.11     BMP:  Recent Labs   Lab 08/02/25 0701 08/02/25 2052 08/03/25 0732 08/03/25  0734    141 143  --    K 4.0 3.9 4.0  --     110 111*  --    CO2 27 21* 25  --    BUN 11 12 15  --    CREATININE 1.0 1.3 1.1  --    * 113* 117*  --    CALCIUM 9.7 9.6 9.4  --    MG  --  2.2  --  2.2   PHOS  --   --  3.7  --      CMP:  Recent Labs   Lab 08/02/25 0701 08/02/25 2052 08/03/25 0732 08/03/25  0734    141 143  --    K 4.0 3.9 4.0  --     110 111*  --    CO2 27 21* 25  --    BUN 11 12 15  --    CREATININE 1.0 1.3 1.1  --    * 113* 117*  --    CALCIUM 9.7 9.6 9.4  --    MG  --  2.2  --  2.2   PHOS  --   --  3.7  --    ALKPHOS 64  --   --   --    AST 19  --   --   --    ALT 10  --   --   --    BILITOT 0.7  --   --   --    PROT 7.2  --   --   --    ALBUMIN 3.9  --   --   --    ANIONGAP 7* 10 7*  --      Imaging Results              X-Ray Chest 1 View (Final result)  Result time 08/02/25 07:55:32      Final result by Yayo Lopez MD (08/02/25 07:55:32)                   Impression:      Suspect mild pulmonary emphysema.  No confluent area of consolidation identified on this single view.      Electronically signed by: Yayo Lopez MD  Date:    08/02/2025  Time:    07:55               Narrative:    EXAMINATION:  XR CHEST 1 VIEW    CLINICAL HISTORY:  Provided history is "  Dyspnea, unspecified".    TECHNIQUE:  One view of the chest.    COMPARISON:  12/29/2023.    FINDINGS:  Cardiac wires overlie the chest.  Cardiomediastinal silhouette is not enlarged.  There are coarse interstitial lung markings and possible mild pulmonary emphysema.  No confluent area of consolidation.  No large pleural effusion.  No pneumothorax.                                      Pending Diagnostic Studies:       Procedure Component Value Units Date/Time    HCV Virus Hold Specimen [[id number]] " Collected: 08/02/25 0701    Order Status: Sent Lab Status: In process Updated: 08/02/25 0720    Specimen: Blood            Medications:  Reconciled Home Medications:      Medication List        START taking these medications      naproxen 500 MG tablet  Commonly known as: NAPROSYN  Take 1 tablet (500 mg total) by mouth 2 (two) times daily as needed (pain).            CHANGE how you take these medications      apixaban 2.5 mg Tab  Commonly known as: ELIQUIS  Take 1 tablet (2.5 mg total) by mouth 2 (two) times daily.  What changed: when to take this            CONTINUE taking these medications      acetaminophen 650 MG Tbsr  Commonly known as: TYLENOL  Take 1 tablet (650 mg total) by mouth every 8 (eight) hours as needed (headaches).     albuterol 2.5 mg /3 mL (0.083 %) nebulizer solution  Commonly known as: PROVENTIL  Take 3 mLs (2.5 mg total) by nebulization every 6 (six) hours as needed for Wheezing. Rescue     amLODIPine 5 MG tablet  Commonly known as: NORVASC  Take 1 tablet (5 mg total) by mouth once daily.     atorvastatin 40 MG tablet  Commonly known as: LIPITOR  Take 1 tablet (40 mg total) by mouth once daily.     benzonatate 100 MG capsule  Commonly known as: TESSALON  TAKE 1 CAPSULE BY MOUTH THREE TIMES DAILY AS NEEDED FOR COUGH     ciclopirox 1 % shampoo  Apply topically.     dextromethorphan-guaiFENesin  mg/5 ml  mg/5 mL liquid  Commonly known as: ROBITUSSIN-DM  Take 10 mLs by mouth every 6 (six) hours as needed (Cough).     diclofenac sodium 1 % Gel  Commonly known as: VOLTAREN ARTHRITIS PAIN  Apply 2 g topically once daily.     donepeziL 10 MG tablet  Commonly known as: ARICEPT  Take 1 tablet by mouth every evening.     doxepin 10 MG capsule  Commonly known as: SINEQUAN  TAKE 1 CAPSULE AT BEDTIME  FOR ITCHING     esomeprazole 40 MG capsule  Commonly known as: NEXIUM  TAKE 1 CAPSULE TWICE DAILY     fluocinonide 0.05 % external solution  Commonly known as: LIDEX  AAA scalp qday - bid prn  pruritus     fluticasone-salmeterol 230-21 mcg/dose 230-21 mcg/actuation Hfaa inhaler  Commonly known as: ADVAIR HFA  Inhale 2 puffs into the lungs 2 (two) times daily - Controller     GEMTESA 75 mg Tab  Generic drug: vibegron  Take 1 tablet by mouth twice a week.     glycopyrrolate 2 MG Tab  Commonly known as: ROBINUL  TAKE 1 TABLET TWICE A DAY     hydrocortisone 1 % cream  Apply topically 2 (two) times daily.     levothyroxine 50 MCG tablet  Commonly known as: SYNTHROID  Take 1 tablet (50 mcg total) by mouth before breakfast.     LIDOcaine 3 % Crea  Apply 1 application  topically 2 (two) times a day.     meclizine 25 mg tablet  Commonly known as: ANTIVERT  Take 1 tablet (25 mg total) by mouth 2 (two) times daily as needed for Dizziness.     memantine 10 MG Tab  Commonly known as: NAMENDA  Take 1 tablet (10 mg total) by mouth once daily.     simethicone 125 mg Cap capsule  Commonly known as: MYLICON  Take 1 capsule (125 mg total) by mouth 4 (four) times daily as needed for Flatulence (abdominal pain).              Indwelling Lines/Drains at time of discharge:   Lines/Drains/Airways       None                       Time spent on the discharge of patient: 35 minutes         MARCO A Moise MD  Department of Hospital Medicine  [facility] Surg ([address])

## 2025-08-03 NOTE — HOSPITAL COURSE
Admitted with chest pain and mildly elevated troponin. Cardiology consulted, troponin trended. Had been out of her amlodipine recently and just resumed. Suspect troponin elevation likely demand from elevated blood pressures. Reported taking her apixaban as once daily rather than twice daily; discussed with cardiology, continue as BID. Referred for pulmonary rehab. With clinical improvement and vital stability, she was prepared for discharge home.

## 2025-08-03 NOTE — CONSULTS
[facility] [place] [facility] ([address])  Cardiology  Consult Note    Patient Name: Debbie Ding  MRN: [id number]  Admission Date: 8/2/2025  Hospital Length of Stay: 0 days  Code Status: Full Code   Attending Provider: CHRISTELLE Moise MD   Consulting Provider: Poppy Nelson MD  Primary Care Physician: Andrews Mir MD  Principal Problem:Chest pain    Patient information was obtained from patient, past medical records, ER records, and primary team.     Inpatient consult to Cardiology  Consult performed by: Poppy Nelson MD  Consult ordered by: CHRISTELLE Moise MD  Reason for consult: Chest pain        Subjective:     Chief Complaint:  Shortness of breath and chest pain.    HPI:     Debbie Ding is a 89 y.o.female with hypertension, hypercholesterolemia and diabetes mellitus type 2. She has asthma for which she is on inhalers. She hs deep venous trombosis and pulmonary embolism. She is anticoagulated with apixiban but takes 2.5 mg Q24. She also has sleep apnea. She ran out of amlodipine. She presented to the emergency room after being increasingly short of  breath for a few days with coughing and pains in the left anterior chest wall. She has had undergone extensive cardiac testing as well as tests to evaluate her lung disease.      Past Medical History:   Diagnosis Date    Allergic rhinitis     Arthritis     Asthma     Bilateral pulmonary embolism 04/27/2019    Cataract     Chronic anticoagulation 09/28/2020    Chronic pulmonary embolism, unspecified pulmonary embolism type, unspecified whether acute cor pulmonale present 04/27/2019    On Eliquis daily      Depression     Diabetes mellitus     Fibromyalgia 07/02/2012    GERD (gastroesophageal reflux disease)     Hypercholesterolemia 09/28/2020    Hypertension 07/02/2012    Stage 3 chronic kidney disease 10/10/2021    Thoracic or lumbosacral neuritis or radiculitis, unspecified     Thyroid disease     Type 2 diabetes mellitus with kidney complication, with  long-term current use of insulin 10/08/2021    Ulcer        Past Surgical History:   Procedure Laterality Date    CARPAL TUNNEL RELEASE Left 11/29/2023    Procedure: RELEASE, CARPAL TUNNEL,LEFT;  Surgeon: Avril Hutchison MD;  Location: [facility];  Service: Orthopedics;  Laterality: Left;    CATARACT EXTRACTION Bilateral     ESOPHAGOGASTRODUODENOSCOPY N/A 3/8/2022    Procedure: EGD (ESOPHAGOGASTRODUODENOSCOPY) with dilation-either hospital ok with Dr. Meza;  Surgeon: Rebeca Meza MD;  Location: [facility];  Service: Endoscopy;  Laterality: N/A;  1/11-eliquis hold ok see te-tb    ESOPHAGOGASTRODUODENOSCOPY N/A 2/28/2022    Procedure: EGD (ESOPHAGOGASTRODUODENOSCOPY) with dilation-either [facility] with Dr. Meza;  Surgeon: Rebeca Meza MD;  Location: [facility] ([address] [place] [address]);  Service: Endoscopy;  Laterality: N/A;  1/11-eliquis [facility] ok see te-tb  COVID test on 2/25/22 at [facility]  2/22-confirmed appt arrival time with [id number]    FOOT NEUROMA SURGERY  1985    HYSTERECTOMY         Review of patient's allergies indicates:   Allergen Reactions    Melatonin      Other reaction(s): Other (See Comments)  Sleep Walks and has unnatural dreams    Talwin compound Hives    Talwin [pentazocine lactate] Hallucinations    Trazodone Other (See Comments)     Nightmares/sleep walk      Bentyl [dicyclomine] Anxiety       Current Facility-Administered Medications on File Prior to Encounter   Medication    midazolam (VERSED) 1 mg/mL injection 0.5 mg     Current Outpatient Medications on File Prior to Encounter   Medication Sig    albuterol (PROVENTIL) 2.5 mg /3 mL (0.083 %) nebulizer solution Take 3 mLs (2.5 mg total) by nebulization every 6 (six) hours as needed for Wheezing. Rescue    amLODIPine (NORVASC) 5 MG tablet Take 1 tablet (5 mg total) by mouth once daily.    donepeziL (ARICEPT) 10 MG tablet Take 1 tablet by mouth every evening.    ELIQUIS 2.5 mg Tab TAKE 1 TABLET DAILY    esomeprazole (NEXIUM) 40 MG capsule TAKE  1 CAPSULE TWICE DAILY    fluticasone-salmeterol 230-21 mcg/dose (ADVAIR HFA) 230-21 mcg/actuation HFAA inhaler Inhale 2 puffs into the lungs 2 (two) times daily - Controller    GEMTESA 75 mg Tab Take 1 tablet by mouth twice a week.    levothyroxine (SYNTHROID) 50 MCG tablet Take 1 tablet (50 mcg total) by mouth before breakfast.    memantine (NAMENDA) 10 MG Tab Take 1 tablet (10 mg total) by mouth once daily.    acetaminophen (TYLENOL) 650 MG TbSR Take 1 tablet (650 mg total) by mouth every 8 (eight) hours as needed (headaches).    atorvastatin (LIPITOR) 40 MG tablet Take 1 tablet (40 mg total) by mouth once daily.    benzonatate (TESSALON) 100 MG capsule TAKE 1 CAPSULE BY MOUTH THREE TIMES DAILY AS NEEDED FOR COUGH (Patient taking differently: Take 100 mg by mouth as needed.)    ciclopirox 1 % shampoo Apply topically.    dextromethorphan-guaiFENesin  mg/5 ml (ROBITUSSIN-DM)  mg/5 mL liquid Take 10 mLs by mouth every 6 (six) hours as needed (Cough). (Patient taking differently: Take 10 mLs by mouth as needed (Cough).)    diclofenac sodium (VOLTAREN ARTHRITIS PAIN) 1 % Gel Apply 2 g topically once daily.    doxepin (SINEQUAN) 10 MG capsule TAKE 1 CAPSULE AT BEDTIME  FOR ITCHING    fluocinonide (LIDEX) 0.05 % external solution AAA scalp qday - bid prn pruritus    glycopyrrolate (ROBINUL) 2 MG Tab TAKE 1 TABLET TWICE A DAY    hydrocortisone 1 % cream Apply topically 2 (two) times daily.    LIDOcaine 3 % Crea Apply 1 application  topically 2 (two) times a day.    meclizine (ANTIVERT) 25 mg tablet Take 1 tablet (25 mg total) by mouth 2 (two) times daily as needed for Dizziness.    simethicone (MYLICON) 125 mg Cap capsule Take 1 capsule (125 mg total) by mouth 4 (four) times daily as needed for Flatulence (abdominal pain).     Family History       Problem Relation (Age of Onset)    Diabetes Mother, Brother    Emphysema Maternal Aunt          Tobacco Use    Smoking status: Never    Smokeless tobacco: Never    Substance and Sexual Activity    Alcohol use: No    Drug use: No    Sexual activity: Not Currently     Review of Systems   Constitutional: Negative for chills, fever and malaise/fatigue.   HENT:  Negative for nosebleeds and tinnitus.    Eyes:  Negative for double vision, vision loss in left eye and vision loss in right eye.   Cardiovascular:  Positive for chest pain. Negative for claudication, dyspnea on exertion, irregular heartbeat, leg swelling, near-syncope, orthopnea, palpitations, paroxysmal nocturnal dyspnea and syncope.   Respiratory:  Positive for cough, shortness of breath and sputum production. Negative for hemoptysis and wheezing.    Endocrine: Negative for cold intolerance and heat intolerance.   Hematologic/Lymphatic: Negative for bleeding problem. Does not bruise/bleed easily.   Skin:  Negative for color change and rash.   Musculoskeletal:  Negative for back pain, falls, muscle weakness and myalgias.   Gastrointestinal:  Negative for abdominal pain, diarrhea, dysphagia, heartburn, hematemesis, hematochezia, hemorrhoids, jaundice, melena, nausea and vomiting.   Genitourinary:  Negative for dysuria and hematuria.   Neurological:  Negative for dizziness, focal weakness, headaches, light-headedness, loss of balance, numbness, tremors, vertigo and weakness.   Psychiatric/Behavioral:  Negative for altered mental status, depression and memory loss. The patient is not nervous/anxious.    Allergic/Immunologic: Negative for hives and persistent infections.     Objective:     Vital Signs (Most Recent):  Temp: 98.1 °F (36.7 °C) (08/03/25 1151)  Pulse: 82 (08/03/25 1151)  Resp: 16 (08/03/25 1151)  BP: 137/73 (08/03/25 1151)  SpO2: 99 % (08/03/25 1151) Vital Signs (24h Range):  Temp:  [98 °F (36.7 °C)-98.9 °F (37.2 °C)] 98.1 °F (36.7 °C)  Pulse:  [64-89] 82  Resp:  [14-20] 16  SpO2:  [96 %-99 %] 99 %  BP: (129-203)/() 137/73     Weight: 59.5 kg (131 lb 2.8 oz)  Body mass index is 21.83 kg/m².    SpO2: 99  %         Intake/Output Summary (Last 24 hours) at 8/3/2025 1259  Last data filed at 8/3/2025 0750  Gross per 24 hour   Intake --   Output 300 ml   Net -300 ml       Lines/Drains/Airways       None                   Physical Exam  Constitutional:       General: She is not in acute distress.     Appearance: Normal appearance. She is well-developed. She is not toxic-appearing or diaphoretic.   HENT:      Head: Normocephalic and atraumatic.      Nose: Nose normal.   Eyes:      General:         Right eye: No discharge.         Left eye: No discharge.      Conjunctiva/sclera:      Right eye: Right conjunctiva is not injected.      Left eye: Left conjunctiva is not injected.      Pupils: Pupils are equal.      Right eye: Pupil is round.      Left eye: Pupil is round.   Neck:      Thyroid: No thyromegaly.      Vascular: No carotid bruit or JVD.   Cardiovascular:      Rate and Rhythm: Normal rate and regular rhythm. No extrasystoles are present.     Chest Wall: PMI is not displaced.      Pulses:           Radial pulses are 2+ on the right side and 2+ on the left side.        Femoral pulses are 2+ on the right side and 2+ on the left side.       Dorsalis pedis pulses are 1+ on the right side and 1+ on the left side.        Posterior tibial pulses are 1+ on the right side and 1+ on the left side.      Heart sounds: S1 normal and S2 normal. Murmur heard.      Systolic murmur is present.      Gallop present. S4 sounds present.   Pulmonary:      Effort: Pulmonary effort is normal.      Breath sounds: Normal breath sounds.   Chest:      Chest wall: Tenderness present.   Abdominal:      Palpations: Abdomen is soft.      Tenderness: There is no abdominal tenderness.   Musculoskeletal:      Cervical back: Neck supple.      Right lower leg: Normal. No swelling. No edema.      Left lower leg: Normal. No swelling. No edema.   Lymphadenopathy:      Head:      Right side of head: No submandibular adenopathy.      Left side of head: No  submandibular adenopathy.      Cervical: No cervical adenopathy.   Skin:     General: Skin is warm and dry.      Findings: No rash.      Nails: There is no clubbing.   Neurological:      General: No focal deficit present.      Mental Status: She is alert and oriented to person, place, and time. She is not disoriented.      Cranial Nerves: No cranial nerve deficit.   Psychiatric:         Attention and Perception: Attention and perception normal.         Mood and Affect: Mood and affect normal.         Speech: Speech normal.         Behavior: Behavior normal.         Thought Content: Thought content normal.         Cognition and Memory: Cognition and memory normal.         Judgment: Judgment normal.         Current Medications:     amLODIPine  5 mg Oral Daily    apixaban  2.5 mg Oral Daily    donepeziL  10 mg Oral QHS    fluticasone furoate-vilanteroL  1 puff Inhalation Daily    levothyroxine  50 mcg Oral Before breakfast    LIDOcaine  1 patch Transdermal Q24H    memantine  10 mg Oral Daily    pantoprazole  40 mg Oral Daily     Current Laboratory Results:    Recent Results (from the past 24 hours)   Hemoglobin A1c if not done in past 3 months    Collection Time: 08/02/25  3:10 PM   Result Value Ref Range    Hemoglobin A1c 6.4 (H) 4.0 - 5.6 %    Estimated Average Glucose 137 (H) 68 - 131 mg/dL   POCT glucose    Collection Time: 08/02/25  5:57 PM   Result Value Ref Range    POCT Glucose 140 (H) 70 - 110 mg/dL   Troponin I High Sensitivity    Collection Time: 08/02/25  7:43 PM   Result Value Ref Range    Troponin High Sensitive 42 (H) <=14 ng/L   POCT glucose    Collection Time: 08/02/25  8:15 PM   Result Value Ref Range    POCT Glucose 119 (H) 70 - 110 mg/dL   Troponin I High Sensitivity    Collection Time: 08/02/25  8:52 PM   Result Value Ref Range    Troponin High Sensitive 42 (H) <=14 ng/L   Basic Metabolic Panel    Collection Time: 08/02/25  8:52 PM   Result Value Ref Range    Sodium 141 136 - 145 mmol/L     Potassium 3.9 3.5 - 5.1 mmol/L    Chloride 110 95 - 110 mmol/L    CO2 21 (L) 23 - 29 mmol/L    Glucose 113 (H) 70 - 110 mg/dL    BUN 12 10 - 30 mg/dL    Creatinine 1.3 0.5 - 1.4 mg/dL    Calcium 9.6 8.7 - 10.5 mg/dL    Anion Gap 10 8 - 16 mmol/L    eGFR 39 (L) >60 mL/min/1.73/m2   Magnesium    Collection Time: 08/02/25  8:52 PM   Result Value Ref Range    Magnesium  2.2 1.6 - 2.6 mg/dL   Basic Metabolic Panel (BMP)    Collection Time: 08/03/25  7:32 AM   Result Value Ref Range    Sodium 143 136 - 145 mmol/L    Potassium 4.0 3.5 - 5.1 mmol/L    Chloride 111 (H) 95 - 110 mmol/L    CO2 25 23 - 29 mmol/L    Glucose 117 (H) 70 - 110 mg/dL    BUN 15 10 - 30 mg/dL    Creatinine 1.1 0.5 - 1.4 mg/dL    Calcium 9.4 8.7 - 10.5 mg/dL    Anion Gap 7 (L) 8 - 16 mmol/L    eGFR 47 (L) >60 mL/min/1.73/m2   Phosphorus    Collection Time: 08/03/25  7:32 AM   Result Value Ref Range    Phosphorus Level 3.7 2.7 - 4.5 mg/dL   CBC with Differential    Collection Time: 08/03/25  7:32 AM   Result Value Ref Range    WBC 4.93 3.90 - 12.70 K/uL    RBC 4.03 4.00 - 5.40 M/uL    HGB 11.7 (L) 12.0 - 16.0 gm/dL    HCT 35.9 (L) 37.0 - 48.5 %    MCV 89 82 - 98 fL    MCH 29.0 27.0 - 31.0 pg    MCHC 32.6 32.0 - 36.0 g/dL    RDW 16.6 (H) 11.5 - 14.5 %    Platelet Count 179 150 - 450 K/uL    MPV 10.6 9.2 - 12.9 fL    Nucleated RBC 0 <=0 /100 WBC    Neut % 56.7 38 - 73 %    Lymph % 30.2 18 - 48 %    Mono % 10.3 4 - 15 %    Eos % 2.2 <=8 %    Basophil % 0.4 <=1.9 %    Imm Grans % 0.2 0.0 - 0.5 %    Neut # 2.79 1.8 - 7.7 K/uL    Lymph # 1.49 1 - 4.8 K/uL    Mono # 0.51 0.3 - 1 K/uL    Eos # 0.11 <=0.5 K/uL    Baso # 0.02 <=0.2 K/uL    Imm Grans # 0.01 0.00 - 0.04 K/uL   Magnesium    Collection Time: 08/03/25  7:34 AM   Result Value Ref Range    Magnesium  2.2 1.6 - 2.6 mg/dL   POCT glucose    Collection Time: 08/03/25  8:23 AM   Result Value Ref Range    POCT Glucose 114 (H) 70 - 110 mg/dL   POCT glucose    Collection Time: 08/03/25 11:21 AM   Result Value  Ref Range    POCT Glucose 128 (H) 70 - 110 mg/dL     Current Imaging Results:    X-Ray Chest 1 View   Final Result      Suspect mild pulmonary emphysema.  No confluent area of consolidation identified on this single view.         Electronically signed by: Yayo Lopez MD   Date:    08/02/2025   Time:    07:55        6/20/2019: BLE Venous Ultrasound:  Positive DVT study with thrombus seen within the right popliteal and peroneal veins     12/14/2021: LLE Venous Ultrasound:   No evidence of deep venous thrombosis in the left lower extremity.     7/19/2022: Echo:   The left ventricle is normal in size with normal systolic function. The estimated ejection fraction is 65%.  Normal right ventricular size with normal right ventricular systolic function.  Indeterminate left ventricular diastolic function.  Mild tricuspid regurgitation.  The estimated PA systolic pressure is 40 mmHg.  Normal central venous pressure (3 mmHg).    7/25/2022: CTA Chest:  No CT evidence of pulmonary embolus to the subsegmental branches.    Dependent small airways disease, consider aspiration versus noninfectious small airways disease; findings appears stable since prior exam.  Mild scarring within the right middle lobe and lingula, possibly related to previous atypical infection.     7/29/2022: BLE Venous Ultrasound:   There is no evidence of a right lower extremity DVT.  The right superficial femoral middle vein is normal.  A Baker's cyst measuring 4.55 cm x 0.92 cm was seen in the right extremity.  There is no evidence of a left lower extremity DVT.      8/11/2022: Regadenoson MPI:   Normal myocardial perfusion scan. There is no evidence of myocardial ischemia or infarction.  There is a  mild intensity fixed perfusion abnormality in the inferior wall of the left ventricle secondary to diaphragm attenuation.  The gated perfusion images showed an ejection fraction of 60% at rest. The gated perfusion images showed an ejection fraction of 68%  post stress. Normal ejection fraction is greater than 53%.  There is normal wall motion at rest and post stress.  LV cavity size is normal at rest and normal at stress.  The EKG portion of this study is abnormal but not diagnostic.  The patient reported no chest pain during the stress test.  There were no arrhythmias during stress.  There are no prior studies for comparison.    12/21/2023: CT Chest:   Scattered heterogeneous patchy and consolidative ground-glass opacities throughout both lungs with upper lung zone predominance.  Findings are nonspecific, with considerations including infectious or non infectious inflammatory process, noting sequela of aspiration could present similarly.   Atelectasis versus scarring involving the lung bases.   Scattered pulmonary micro nodules as detailed above.  For multiple solid nodules all <6 mm, Fleischner Society 2017 guidelines recommend no routine follow up for a low risk patient, or follow up with non-contrast chest CT at 12 months after discovery in a high risk patient.   Multifocal areas of retained secretions within the distal bronchials, likely representing sequela of chronic small airways disease.    1/2/2024: Echo:  Left Ventricle: The left ventricle is normal in size. Normal wall thickness. There is concentric remodeling. There is normal systolic function with a visually estimated ejection fraction of 60 - 65%. Global longitudinal strain is -16.6%. Grade I diastolic dysfunction.  Right Ventricle: Normal right ventricular cavity size. Wall thickness is normal. Systolic function is normal.  Aortic Valve: There is mild aortic valve sclerosis.  Pulmonary Artery: There is mild pulmonary hypertension. The estimated pulmonary artery systolic pressure is 46 mmHg.    7/29/2025: CT Chest:  Tiny groundglass micronodular pattern at the lung bases, unchanged since 6/25/2025.  Favor chronic small airways process.  No detrimental change.    7/29/2025: V/Q Scan:  There are 2  mismatched perfusion ventilation defects at the left mid lung and left upper lobe.  There is a single mismatch perfusion defect at the right upper lung field.  No radiographic correlates.    Assessment and Plan:     Active Diagnoses:    Diagnosis Date Noted POA    PRINCIPAL PROBLEM:  Chest pain [R07.9] 08/02/2025 Yes    History of pulmonary embolism [Z86.711] 04/27/2019 Yes     Chronic    History of deep venous thrombosis [Z86.718] 09/28/2020 Not Applicable     Chronic    Primary hypertension [I10] 07/02/2012 Yes     Chronic    Hyperlipidemia associated with type 2 diabetes mellitus [E11.69, E78.5] 09/28/2020 Yes     Chronic    Hypothyroidism [E03.9] 03/06/2019 Yes     Chronic    Moderate persistent asthma without complication [J45.40] 06/10/2008 Yes     Chronic    ACP (advance care planning) [Z71.89] 08/02/2025 Not Applicable    Pulmonary hypertension [I27.20] 10/12/2022 Yes     Chronic    Lewy body dementia [G31.83, F02.80] 10/28/2021 Yes     Chronic    Stage 3 chronic kidney disease [N18.30] 10/10/2021 Yes     Chronic    Type 2 diabetes mellitus with kidney complication, with long-term current use of insulin [E11.29, Z79.4] 10/08/2021 Not Applicable     Chronic    Overactive bladder [N32.81] 12/03/2019 Yes     Chronic    Depression, recurrent [F33.9] 04/25/2018 Yes     Chronic    GERD (gastroesophageal reflux disease) [K21.9] 09/21/2015 Yes     Chronic      Problems Resolved During this Admission:     Assessment and Plan:    Shortness of Breath   Seem to have a pulmonary cause.   She is on inhalers and has intermittently required steroids.    2. Chest Pain   CP in setting of dyspnea.   Tender over chest wall.   Suspect pain is musculoskeletal in origin.    3. Chronic Anticoagulation   Due to history of DVT and PE.   Takes apixiban 2.5 mg Q24.   Would favor the FDA advised dosing of apixiban 2.5 mg Q12 in this setting.       VTE Risk Mitigation (From admission, onward)           Ordered     apixaban tablet 2.5 mg   Daily         08/02/25 1558     IP VTE HIGH RISK PATIENT  Once         08/02/25 1608                    Thank you for your consult.     I will follow-up with patient. Please contact us if you have any additional questions.    Poppy Nelson MD  Cardiology   [facility] - Med S[facility] ([address])

## 2025-08-03 NOTE — PLAN OF CARE
Case Management Assessment     PCP: Andrews Mir MD  Pharmacy: [facility]    Patient Arrived From: home  Existing Help at Home: Ezequiel Farr (Daughter)  [phone] (Mobile) and grandson    Barriers to Discharge: none    Discharge Plan:    A. Home with family   B. Home with family      Patient is AAOx3 and uses cane to ambulate. Patient also owns nebulizer as DME. Patient lives with daughter Rebekah and grandson who are able to assist in care if needed. Patient lives on the second floor. Patient drives self to appointments and on of patient daughters Ezequiel to [place] will transport daughter home.       [facility] ([address])  Initial Discharge Assessment       Primary Care Provider: Andrews Mir MD    Admission Diagnosis: Chest wall pain [R07.89]  Dyspnea [R06.00]  SOB (shortness of breath) [R06.02]  Elevated troponin [R79.89]  Chest pain [R07.9]  Anginal equivalent [I20.89]    Admission Date: 8/2/2025  Expected Discharge Date:     Transition of Care Barriers: (P) None    Payor: MEDICARE / Plan: MEDICARE PART A & B / Product Type: Government /     Extended Emergency Contact Information  Primary Emergency Contact: Ezequiel Farr  Address: [address] [place] [address]  Home Phone: [phone]  Mobile Phone: [phone]  Relation: Daughter  Secondary Emergency Contact: Madina Gilliam  Mobile Phone: [phone]  Relation: Daughter    Discharge Plan A: (P) Home with family  Discharge Plan B: (P) Home with family      [facility] - LEATHA Moore - [address] [facility]  [address] [facility] [place] [address]  Phone: [phone] Fax: [phone]    [facility] - GUIDO Webster - [address] [facility] AT Portal to Registered [facility] Sites  [facility]  [place] [address]  Phone: [phone] Fax: [phone]    [facility]  [address]  [place] [address]  Phone: [phone]  Fax: [phone]    [facility] - LEATHA Moore - [address] [facility]  [address] [facility]  [place] [address]  Phone: [phone] Fax: [phone]    [facility]  [address]  [place] [address]  Phone: [phone] Fax: [phone]      Initial Assessment (most recent)       Adult Discharge Assessment - 08/03/25 1150          Discharge Assessment    Assessment Type Discharge Planning Assessment (P)      Confirmed/corrected address, phone number and insurance Yes (P)      Confirmed Demographics Correct on Facesheet (P)      Source of Information patient (P)      Communicated NATE with patient/caregiver Date not available/Unable to determine (P)      People in Home child(nataliia), adult;grandchild(nataliia) (P)      Name(s) of People in Home Ezequiel Farr (Daughter)  [phone] (Mobile) (P)      Facility Arrived From: home (P)      Do you expect to return to your current living situation? Yes (P)      Do you have help at home or someone to help you manage your care at home? Yes (P)      Who are your caregiver(s) and their phone number(s)? Ezequiel Farr (Daughter)  [phone] (Mobile) (P)      Prior to hospitilization cognitive status: Alert/Oriented (P)      Current cognitive status: Alert/Oriented (P)      Walking or Climbing Stairs Difficulty no (P)      Dressing/Bathing Difficulty no (P)      Home Accessibility stairs within home;stairs to enter home (P)      Equipment Currently Used at Home cane, quad;nebulizer (P)      Readmission within 30 days? No (P)      Do you currently have service(s) that help you manage your care at home? No (P)      Do you take prescription medications? Yes (P)      Do you have prescription coverage? Yes (P)      Do you have any problems affording any of your prescribed medications? No (P)      Is the patient taking medications as prescribed? yes (P)      Who is going to help you get home at discharge? FarrArnelEzequiel (Daughter)   [phone] (Mobile) or other daughter (P)      How do you get to doctors appointments? car, drives self (P)      Are you on dialysis? No (P)      Do you take coumadin? No (P)      Discharge Plan A Home with family (P)      Discharge Plan B Home with family (P)      DME Needed Upon Discharge  none (P)      Discharge Plan discussed with: Patient (P)      Transition of Care Barriers None (P)

## 2025-08-03 NOTE — PLAN OF CARE
Case Management Final Discharge Note    Discharge Disposition: home     New DME ordered / company name: none    Relevant SDOH / Transition of Care Barriers:  none    Person available to provide assistance at home when needed and their contact information: Ezequiel Farr (Daughter)  [phone] (Mobile)     Scheduled followup appointment: as scheduled    Referrals placed: pulmonary rehab    Transportation: patient family to transport patient home        Patient and family educated on discharge services and updated on DC plan. Bedside RN notified, patient clear to discharge from Case Management Perspective.       [facility] ([address])  Discharge Final Note    Primary Care Provider: Andrews Mir MD    Expected Discharge Date: 8/3/2025    Final Discharge Note (most recent)       Final Note - 08/03/25 1350          Final Note    Assessment Type Final Discharge Note     Anticipated Discharge Disposition Home or Self Care     Hospital Resources/Appts/Education Provided Provided patient/caregiver with written discharge plan information;Appointments scheduled and added to AVS        Post-Acute Status    Discharge Delays None known at this time                     Important Message from Medicare             Contact Info       Andrews Mir MD   Specialty: Internal Medicine   Relationship: PCP - General    [facility] [address]  [facility] [address]   Phone: [phone]       Next Steps: Follow up in 2 week(s)    Instructions: post-hospital follow-up             Pupils equal, round and reactive to light, Extra-ocular movement intact, eyes are clear b/l

## 2025-08-04 ENCOUNTER — TELEPHONE (OUTPATIENT)
Dept: CARDIOLOGY | Facility: CLINIC | Age: OVER 89
End: 2025-08-04
Payer: MEDICARE

## 2025-08-04 LAB — HOLD SPECIMEN: YES

## 2025-08-04 NOTE — TELEPHONE ENCOUNTER
Pt reported that she was admitted to [facility] over the weekend and then discharged. Her blood pressure on Friday was 212/90 and has been fluctuating since. She would like to speak with the doctor about her medication possibly needing to be adjusted.

## 2025-08-06 ENCOUNTER — PATIENT OUTREACH (OUTPATIENT)
Dept: ADMINISTRATIVE | Facility: CLINIC | Age: OVER 89
End: 2025-08-06
Payer: MEDICARE

## 2025-08-06 ENCOUNTER — TELEPHONE (OUTPATIENT)
Dept: CARDIOLOGY | Facility: CLINIC | Age: OVER 89
End: 2025-08-06
Payer: MEDICARE

## 2025-08-06 NOTE — TELEPHONE ENCOUNTER
Spoke with pt and advised pt to report to the ER for evaluation of leg pain per Dr. Love. Pt stated she went to [facility] over the weekend and they didn't do anything. Advised pt to go to the [facility] if pain continues. Pt verbalized understanding.

## 2025-08-06 NOTE — PROGRESS NOTES
C3 nurse spoke with Debbie Ding  for a TCC post hospital discharge follow up call. The patient has a scheduled HOSFU appointment with Kendra Nevarez on 8/7/25 @ 9613.

## 2025-08-07 ENCOUNTER — OFFICE VISIT (OUTPATIENT)
Dept: INTERNAL MEDICINE | Facility: CLINIC | Age: OVER 89
End: 2025-08-07
Payer: MEDICARE

## 2025-08-07 VITALS
HEIGHT: 65 IN | SYSTOLIC BLOOD PRESSURE: 140 MMHG | OXYGEN SATURATION: 97 % | DIASTOLIC BLOOD PRESSURE: 60 MMHG | WEIGHT: 129.44 LBS | BODY MASS INDEX: 21.56 KG/M2 | HEART RATE: 98 BPM

## 2025-08-07 DIAGNOSIS — E11.22 CONTROLLED TYPE 2 DIABETES MELLITUS WITH STAGE 3 CHRONIC KIDNEY DISEASE, WITHOUT LONG-TERM CURRENT USE OF INSULIN: ICD-10-CM

## 2025-08-07 DIAGNOSIS — I26.99 PE (PULMONARY THROMBOEMBOLISM): ICD-10-CM

## 2025-08-07 DIAGNOSIS — N18.32 STAGE 3B CHRONIC KIDNEY DISEASE: ICD-10-CM

## 2025-08-07 DIAGNOSIS — M54.9 BACK PAIN, UNSPECIFIED BACK LOCATION, UNSPECIFIED BACK PAIN LATERALITY, UNSPECIFIED CHRONICITY: ICD-10-CM

## 2025-08-07 DIAGNOSIS — J45.40 MODERATE PERSISTENT ASTHMA WITHOUT COMPLICATION: ICD-10-CM

## 2025-08-07 DIAGNOSIS — N18.30 CONTROLLED TYPE 2 DIABETES MELLITUS WITH STAGE 3 CHRONIC KIDNEY DISEASE, WITHOUT LONG-TERM CURRENT USE OF INSULIN: ICD-10-CM

## 2025-08-07 DIAGNOSIS — I10 HYPERTENSION, ESSENTIAL: ICD-10-CM

## 2025-08-07 DIAGNOSIS — E46 MALNUTRITION, UNSPECIFIED TYPE: ICD-10-CM

## 2025-08-07 DIAGNOSIS — Z09 HOSPITAL DISCHARGE FOLLOW-UP: Primary | ICD-10-CM

## 2025-08-07 DIAGNOSIS — L21.0 DANDRUFF: ICD-10-CM

## 2025-08-07 DIAGNOSIS — E78.5 HYPERLIPIDEMIA, UNSPECIFIED HYPERLIPIDEMIA TYPE: ICD-10-CM

## 2025-08-07 DIAGNOSIS — R54 AGE-RELATED PHYSICAL DEBILITY: ICD-10-CM

## 2025-08-07 PROBLEM — N39.3 URINARY, INCONTINENCE, STRESS FEMALE: Status: RESOLVED | Noted: 2018-04-25 | Resolved: 2025-08-07

## 2025-08-07 PROBLEM — F33.9 DEPRESSION, RECURRENT: Chronic | Status: RESOLVED | Noted: 2018-04-25 | Resolved: 2025-08-07

## 2025-08-07 PROCEDURE — 99999 PR PBB SHADOW E&M-EST. PATIENT-LVL V: CPT | Mod: PBBFAC,GC,,

## 2025-08-07 PROCEDURE — 99215 OFFICE O/P EST HI 40 MIN: CPT | Mod: PBBFAC

## 2025-08-07 RX ORDER — BENZONATATE 100 MG/1
100 CAPSULE ORAL
Qty: 30 CAPSULE | Refills: 1 | Status: SHIPPED | OUTPATIENT
Start: 2025-08-07

## 2025-08-07 RX ORDER — DONEPEZIL HYDROCHLORIDE 10 MG/1
10 TABLET, FILM COATED ORAL NIGHTLY
Qty: 90 TABLET | Refills: 3 | Status: CANCELLED | OUTPATIENT
Start: 2025-08-07

## 2025-08-07 RX ORDER — CICLOPIROX 1 G/100ML
SHAMPOO TOPICAL WEEKLY
Qty: 1 EACH | Refills: 3 | Status: SHIPPED | OUTPATIENT
Start: 2025-08-07

## 2025-08-07 RX ORDER — BUDESONIDE AND FORMOTEROL FUMARATE DIHYDRATE 160; 4.5 UG/1; UG/1
2 AEROSOL RESPIRATORY (INHALATION) DAILY
COMMUNITY

## 2025-08-07 RX ORDER — LIDOCAINE 30 MG/G
1 CREAM TOPICAL 2 TIMES DAILY
Qty: 85 G | Refills: 3 | Status: SHIPPED | OUTPATIENT
Start: 2025-08-07

## 2025-08-07 RX ORDER — GUAIFENESIN AND DEXTROMETHORPHAN HYDROBROMIDE 10; 100 MG/5ML; MG/5ML
10 SYRUP ORAL EVERY 6 HOURS PRN
Qty: 354 ML | Refills: 0 | Status: CANCELLED | OUTPATIENT
Start: 2025-08-07

## 2025-08-07 NOTE — PROGRESS NOTES
[facility]NAL MEDICINE RESIDENT CLINIC  CLINIC NOTE  Patient Name: Debbie Ding  YOB: 1932  Chief Complaint: Establish care    PRESENTING HISTORY     History of Present Illness:  Ms. Debbie Ding is a 89 y.o. female w/ history of HTN, HLD, T2DM controlled w/o medications (A1c 6.4), moderate persistent asthma, CKD, PE/unprovoked DVT on eliquis, R.knee OA, dementia, GERD, hypothyroidism, fibromyalgia, overactive bladder, vertigo, chronic constipation, itching, cough, and poor appetite on CPAP here for hospital follow up. She is a Taoism and currently uses a walker.    She presented to [facility] on 8/2/25 w/complaints of L. lower axillary chest wall pain, SOB, headache and dizziness since 7/31. She was instructed to go to the ER if symptoms worsen. Of note, pt had been off her Amlodipine for about a month b/c she ran out and her BP was 212/98 as per home health nurse. Amlodipine was resumed w/improvement of BP at home. In hospital workup for chest pain/sob included V/Q scan, Chest x-ray, CT chest, with findings of V/Q mismatch on several areas of her L.lung (L mid zone, KATIE, RUL) w/high prob of PE as well as evidence of some chronic lung disease. Troponin level mildly elevated at 42. D dimer neg. EKG wnl. Denies recent sick contacts. She was referred to pulm rehab and was sent home upon improvement and was rec to present here for f/u. She has been in contact w/ pulmonologist Dr. Suazo.    SOB   Reports having mild SOB at rest, worse w/mild activity. Presented to ED w worsening SOB and associated dizziness most likely attributed to a PE based off recent imaging but given her co morbidities, it is also likely a multifactorial etiology. Pt presented w/high BP readings found by home health (200/90's) d/t running out of amlodipine. Chronic use of CPAP at home but unable to use in past 6-8 months bc of leak. Pt called [facility] on Monday & was told that they will try to replace it. Currently  "not on o2 at home. Does not report worsening breathing at night, but feels drowsy during the day.      Rib cage pain   Possible ddx chronic cough 2/2 asthma vs chronic lung disease (atelectasis/bronchiectasis/emphysema found on recent imaging)   Pt states having pain in L. rib cage, feels 'hot' along w/pain described as tight/squeezing, worsened past few months, with SOB. No recent falls/injuries. Not aggravated by physical activity, hurts a little more on palpation (lateral ribs 1-7). Takes otc robitussin, prescribed topical creams as well as her inhalers which she states provides relief.     New onset Headache   Left frontal intermittent headache, feels "achy", reports started few weeks ago. No aggravating/alleviating factors. No associated weakness, dizziness, but does have mild LH. Takes tylenol for relief. May be d/t lack of CPAP use, as well as her non-adherence to her amlodipine causing a hypertension related headache. Recommended to increase PO intake, take analgesics as needed, and to get back on CPAP as soon as able to. In the case that her headaches are uncontrolled and worsen w/associated symptoms, go to the ER for management & further workup.    HTN  On amlodipine, recently reported was not taking it for several weeks. BP at home ran high for unknown time, but is now controlled. Rec daily vital signs check by home health as well as pt to follow DASH diet.    Chronic unprovoked DVT  Takes apixiban 2.5mg once daily instead of twice daily d/t misunderstanding of dosing by patient. Pt was counseled on importance of medication adherence and verbalized understanding of this.     CKD  2/2 likely to age related causes as well as her co morbidities DM, HLD, HTN. Last eGFR 47, creat 1.1, BUN 15.  Weighing in her age/BMI, will not begin diabetes tx but will continue managing her HTN and monitoring her symptoms.    Diabetes type 2 (controlled w/o meds)  A1C last 6.4. Poor PO intake, was referred to dietician prior " but was denied in the past bc she was told she cannot see a dietician without a diagnosis of diabetes. Pt is debilitated and has chronic loss of appetite now worsened as reported by pt, diet mostly consistent w/3-4 cups coffee with one meal/day of eggs/toast. Will place o/p Nutrition referral this visit.     Dementia  Home health referral for her mental condition & debility. On a walker. PT/OT recommended.     Health Maintenance:  - Lipid panel: on 7/2 w/elevated total cholesterol 226, ,    - Colon cancer screen (Average risk: 45-75): colonoscopy 2019 polyps w/ no evidence of malignancy   - DEXA (Women 65 and older): last normal   - Flu vaccine: recommended   - COVID vaccine: completed   - Shingles vaccine: not interested   - Pneumococcal vaccine: completed       Review of Systems   Constitutional:  Positive for malaise/fatigue. Negative for chills, diaphoresis, fever and weight loss.   HENT:  Positive for ear pain (L. ear) and hearing loss. Negative for sore throat.    Eyes:  Negative for blurred vision.   Respiratory:  Positive for shortness of breath. Negative for cough.    Gastrointestinal:  Positive for constipation. Negative for abdominal pain, diarrhea, heartburn, nausea and vomiting.   Genitourinary:  Negative for dysuria, frequency and urgency.   Musculoskeletal:  Negative for back pain, falls and neck pain.   Skin:  Negative for itching and rash.   Neurological:  Positive for headaches (l side). Negative for dizziness, speech change and weakness.   Psychiatric/Behavioral:  Negative for depression.        PAST HISTORY:     Past Medical History:   Diagnosis Date    Allergic rhinitis     Arthritis     Asthma     Bilateral pulmonary embolism 04/27/2019    Cataract     Chronic anticoagulation 09/28/2020    Chronic pulmonary embolism, unspecified pulmonary embolism type, unspecified whether acute cor pulmonale present 04/27/2019    On Eliquis daily      Depression     Depression, recurrent  04/25/2018    Formatting of this note might be different from the original.   Previously in mental health facility after death of her    Previously seeing psychiatry      Diabetes mellitus     Fibromyalgia 07/02/2012    GERD (gastroesophageal reflux disease)     Hypercholesterolemia 09/28/2020    Hypertension 07/02/2012    Stage 3 chronic kidney disease 10/10/2021    Thoracic or lumbosacral neuritis or radiculitis, unspecified     Thyroid disease     Type 2 diabetes mellitus with kidney complication, with long-term current use of insulin 10/08/2021    Ulcer        Past Surgical History:   Procedure Laterality Date    CARPAL TUNNEL RELEASE Left 11/29/2023    Procedure: RELEASE, CARPAL TUNNEL,LEFT;  Surgeon: Avril Hutchison MD;  Location: [facility];  Service: Orthopedics;  Laterality: Left;    CATARACT EXTRACTION Bilateral     ESOPHAGOGASTRODUODENOSCOPY N/A 3/8/2022    Procedure: EGD (ESOPHAGOGASTRODUODENOSCOPY) with dilation-either hospital ok with Dr. Meza;  Surgeon: Rebeca Meza MD;  Location: [facility];  Service: Endoscopy;  Laterality: N/A;  1/11-eliquis hold ok see te-tb    ESOPHAGOGASTRODUODENOSCOPY N/A 2/28/2022    Procedure: EGD (ESOPHAGOGASTRODUODENOSCOPY) with dilation-either hospital ok with Dr. Meza;  Surgeon: Rebeca Meza MD;  Location: [address]);  Service: Endoscopy;  Laterality: N/A;  1/11-eliquis hold ok see te-tb  COVID test on 2/25/22 at [facility]  2/22-confirmed appt arrival time with pt-Kpvt    FOOT NEUROMA SURGERY  1985    HYSTERECTOMY         Family History   Problem Relation Name Age of Onset    Diabetes Mother      Diabetes Brother      Emphysema Maternal Aunt      Melanoma Neg Hx      Asthma Neg Hx      Colon cancer Neg Hx      Esophageal cancer Neg Hx         Social History     Socioeconomic History    Marital status:    Occupational History     Comment:  - school    Tobacco Use    Smoking status: Never    Smokeless tobacco: Never    Substance and Sexual Activity    Alcohol use: No    Drug use: No    Sexual activity: Not Currently   Social History Narrative    Lives with daughter, Benjie.        Other daughter, Madina, drives her around.        She lives independently, except for driving.          Stretches in bed.  Walks in neighborhood, and in house several times a day.     Social Drivers of Health     Financial Resource Strain: Medium Risk (8/3/2025)    Overall Financial Resource Strain (CARDIA)     Difficulty of Paying Living Expenses: Somewhat hard   Food Insecurity: No Food Insecurity (8/3/2025)    Hunger Vital Sign     Worried About Running Out of Food in the Last Year: Never true     Ran Out of Food in the Last Year: Never true   Transportation Needs: No Transportation Needs (8/3/2025)    PRAPARE - Transportation     Lack of Transportation (Medical): No     Lack of Transportation (Non-Medical): No   Physical Activity: Insufficiently Active (7/2/2024)    Exercise Vital Sign     Days of Exercise per Week: 2 days     Minutes of Exercise per Session: 10 min   Stress: No Stress Concern Present (8/3/2025)    [employer] [place] [employer] - Occupational Stress Questionnaire     Feeling of Stress : Only a little   Housing Stability: Low Risk  (8/3/2025)    Housing Stability Vital Sign     Unable to Pay for Housing in the Last Year: No     Number of Times Moved in the Last Year: 0     Homeless in the Last Year: No       MEDICATIONS & ALLERGIES:     Current Outpatient Medications on File Prior to Visit   Medication Sig    acetaminophen (TYLENOL) 650 MG TbSR Take 1 tablet (650 mg total) by mouth every 8 (eight) hours as needed (headaches).    atorvastatin (LIPITOR) 40 MG tablet Take 1 tablet (40 mg total) by mouth once daily. (Patient taking differently: Take 1 tablet (40 mg total) by mouth once daily.)    budesonide-formoterol 160-4.5 mcg (SYMBICORT) 160-4.5 mcg/actuation HFAA Inhale 2 puffs into the lungs once daily. Controller     dextromethorphan-guaiFENesin  mg/5 ml (ROBITUSSIN-DM)  mg/5 mL liquid Take 10 mLs by mouth every 6 (six) hours as needed (Cough).    diclofenac sodium (VOLTAREN ARTHRITIS PAIN) 1 % Gel Apply 2 g topically once daily.    naproxen (NAPROSYN) 500 MG tablet Take 1 tablet (500 mg total) by mouth 2 (two) times daily as needed (pain).    albuterol (PROVENTIL) 2.5 mg /3 mL (0.083 %) nebulizer solution Take 3 mLs (2.5 mg total) by nebulization every 6 (six) hours as needed for Wheezing. Rescue    amLODIPine (NORVASC) 5 MG tablet Take 1 tablet (5 mg total) by mouth once daily.    donepeziL (ARICEPT) 10 MG tablet Take 1 tablet by mouth every evening. (Patient not taking: Reported on 8/6/2025)    doxepin (SINEQUAN) 10 MG capsule TAKE 1 CAPSULE AT BEDTIME  FOR ITCHING    esomeprazole (NEXIUM) 40 MG capsule TAKE 1 CAPSULE TWICE DAILY    GEMTESA 75 mg Tab Take 1 tablet by mouth twice a week. (Patient taking differently: Take 1 tablet by mouth once a week.)    glycopyrrolate (ROBINUL) 2 MG Tab TAKE 1 TABLET TWICE A DAY    hydrocortisone 1 % cream Apply topically 2 (two) times daily.    levothyroxine (SYNTHROID) 50 MCG tablet Take 1 tablet (50 mcg total) by mouth before breakfast.    meclizine (ANTIVERT) 25 mg tablet Take 1 tablet (25 mg total) by mouth 2 (two) times daily as needed for Dizziness.    memantine (NAMENDA) 10 MG Tab Take 1 tablet (10 mg total) by mouth once daily.    simethicone (MYLICON) 125 mg Cap capsule Take 1 capsule (125 mg total) by mouth 4 (four) times daily as needed for Flatulence (abdominal pain). (Patient not taking: Reported on 8/6/2025)    [DISCONTINUED] apixaban (ELIQUIS) 2.5 mg Tab Take 1 tablet (2.5 mg total) by mouth 2 (two) times daily.    [DISCONTINUED] benzonatate (TESSALON) 100 MG capsule TAKE 1 CAPSULE BY MOUTH THREE TIMES DAILY AS NEEDED FOR COUGH (Patient not taking: Reported on 8/6/2025)    [DISCONTINUED] ciclopirox 1 % shampoo Apply topically.    [DISCONTINUED] fluocinonide  (LIDEX) 0.05 % external solution AAA scalp qday - bid prn pruritus (Patient not taking: Reported on 8/6/2025)    [DISCONTINUED] fluticasone-salmeterol 230-21 mcg/dose (ADVAIR HFA) 230-21 mcg/actuation HFAA inhaler Inhale 2 puffs into the lungs 2 (two) times daily - Controller (Patient not taking: Reported on 8/7/2025)    [DISCONTINUED] LIDOcaine 3 % Crea Apply 1 application  topically 2 (two) times a day. (Patient not taking: Reported on 8/6/2025)     Current Facility-Administered Medications on File Prior to Visit   Medication    midazolam (VERSED) 1 mg/mL injection 0.5 mg       Review of patient's allergies indicates:   Allergen Reactions    Melatonin      Other reaction(s): Other (See Comments)  Sleep Walks and has unnatural dreams    Talwin compound Hives    Talwin [pentazocine lactate] Hallucinations    Trazodone Other (See Comments)     Nightmares/sleep walk      Bentyl [dicyclomine] Anxiety       OBJECTIVE:   Vital Signs:  140/60 on repeat       Physical Exam  Constitutional:       Appearance: Normal appearance.   HENT:      Head: Normocephalic and atraumatic.      Comments: Headache left frontal      Mouth/Throat:      Mouth: Mucous membranes are moist.   Eyes:      Pupils: Pupils are equal, round, and reactive to light.   Cardiovascular:      Rate and Rhythm: Normal rate and regular rhythm.      Pulses: Normal pulses.      Heart sounds:      Gallop present.   Pulmonary:      Effort: Pulmonary effort is normal.      Comments: Some decreased L.side breath sounds   Abdominal:      General: Bowel sounds are normal.      Tenderness: There is no abdominal tenderness. There is no guarding.   Musculoskeletal:         General: Tenderness (ribs 1-7 pain on palpation & bilateral legs) present. No swelling or deformity.      Cervical back: Normal range of motion.   Skin:     General: Skin is dry.   Neurological:      General: No focal deficit present.      Mental Status: She is alert and oriented to person, place, and  time.      Sensory: No sensory deficit.      Motor: No weakness.         ASSESSMENT & PLAN:     Debbie was seen today for hospital f/u.     Diagnoses and all orders for this visit:    Hospital discharge follow-up    Hypertension, essential  Pt taking amlodipine. Rec home health to take her vitals.     PE (pulmonary thromboembolism)  -     apixaban (ELIQUIS) 2.5 mg Tab; Take 1 tablet (2.5 mg total) by mouth 2 (two) times daily.  Added 3 refills.     Hyperlipidemia, unspecified hyperlipidemia type  Not compliant w/lipitor. Given her age, discussed w/pt risk vs benefit of taking lipitor.     Back pain, unspecified back location, unspecified back pain laterality, unspecified chronicity  -     LIDOcaine 3 % Crea; Apply 1 application  topically 2 (two) times a day.    Moderate persistent asthma without complication  -     benzonatate (TESSALON) 100 MG capsule; Take 1 capsule (100 mg total) by mouth as needed for Cough.    Dandruff  -     ciclopirox 1 % shampoo; Apply topically once a week.    Controlled type 2 diabetes mellitus with stage 3 chronic kidney disease, without long-term current use of insulin  See HPI.     Malnutrition, unspecified type  -     Ambulatory referral/consult to Nutrition Services; Future  -     Ambulatory referral/consult to Home Health; Future    Age-related physical debility  -     Ambulatory referral/consult to Home Health; Future    Stage 3b chronic kidney disease  -     Ambulatory referral/consult to Nutrition Services; Future    Other orders  The following orders have not been finalized:  -     Cancel: dextromethorphan-guaiFENesin  mg/5 ml (ROBITUSSIN-DM)  mg/5 mL liquid  -     Cancel: donepeziL (ARICEPT) 10 MG tablet    Takes OTC robitussin  Does not take Aricept     Health Maintenance         Date Due Completion Date    Shingles Vaccine (1 of 2) Never done ---    Diabetes Urine Screening 01/23/2025 1/23/2024    Diabetic Eye Exam 07/30/2025 7/30/2024    Influenza Vaccine (1)  09/01/2025 10/2/2024    Hemoglobin A1c 02/02/2026 8/2/2025    Lipid Panel 07/02/2026 7/2/2025    TETANUS VACCINE 09/05/2029 9/5/2019            Discussed with Dr. Gusman- staff attestation to follow    RTC in 3 months.     Audra Flores DO  Internal Medicine PGY-1  [facility]  [address] [place] [address]

## 2025-08-07 NOTE — PATIENT INSTRUCTIONS
-OP dietician referral   -OP home health referral     Please read DASH diet document   Use over the counter robitussin as needed

## 2025-08-11 PROBLEM — Z09 HOSPITAL DISCHARGE FOLLOW-UP: Status: RESOLVED | Noted: 2025-08-07 | Resolved: 2025-08-11

## 2025-09-02 RX ORDER — GLYCOPYRROLATE 2 MG/1
2 TABLET ORAL 2 TIMES DAILY
Qty: 180 TABLET | Refills: 3 | Status: SHIPPED | OUTPATIENT
Start: 2025-09-02

## 2025-09-04 DIAGNOSIS — E03.9 HYPOTHYROIDISM, ADULT: ICD-10-CM

## 2025-09-05 RX ORDER — LEVOTHYROXINE SODIUM 75 UG/1
75 TABLET ORAL
Qty: 90 TABLET | Refills: 1 | Status: SHIPPED | OUTPATIENT
Start: 2025-09-05

## (undated) DEVICE — TRAY MINOR GEN SURG OMC

## (undated) DEVICE — SOL IRR WATER STRL 3000 ML

## (undated) DEVICE — NDL HYPO REG 25G X 1 1/2

## (undated) DEVICE — GLOVE BIOGEL ECLIPSE SZ 7

## (undated) DEVICE — PACK UPPER EXTREMITY BAPTIST

## (undated) DEVICE — GOWN POLY REINF BRTH SLV XL

## (undated) DEVICE — SPONGE COTTON TRAY 4X4IN

## (undated) DEVICE — SPONGE DERMA 8PLY 2X2

## (undated) DEVICE — DRAPE CORETEMP FLD WRM 56X62IN

## (undated) DEVICE — ADHESIVE DERMABOND ADVANCED

## (undated) DEVICE — STRAP SECURE 5MM

## (undated) DEVICE — DRESSING TRANS 4X4 TEGADERM

## (undated) DEVICE — TOURNIQUET SB QC DP 18X4IN

## (undated) DEVICE — GOWN SURGICAL X-LARGE

## (undated) DEVICE — CORD BIPOLAR 12 FOOT

## (undated) DEVICE — UNDERPAD ULTRASORB 300LB 30X36

## (undated) DEVICE — TRAY CYSTO BASIN

## (undated) DEVICE — DRESSING N ADH OIL EMUL 3X3

## (undated) DEVICE — NDL 25GA 5FR 35MM

## (undated) DEVICE — SUT 0 VICRYL / UR6 (J603)

## (undated) DEVICE — TRAY CATH FOL SIL URIMTR 16FR

## (undated) DEVICE — SET CYSTO IRRIGATION UNIV SPIK

## (undated) DEVICE — TROCAR ENDOPATH XCEL 11MM 10CM

## (undated) DEVICE — SYR 0.9% NACL 10ML STERILE

## (undated) DEVICE — SYR SLIP TIP 1CC

## (undated) DEVICE — FORCEP STRAIGHT DISP

## (undated) DEVICE — SYR 30CC LUER LOCK

## (undated) DEVICE — BANDAGE BULKEE LITE 3INX4.1YD

## (undated) DEVICE — SUT GORE-TEX CV-5 TTC-13 36

## (undated) DEVICE — BANDAGE MATRIX HK LOOP 2IN 5YD

## (undated) DEVICE — SOL POVIDONE SCRUB IODINE 4 OZ

## (undated) DEVICE — Device

## (undated) DEVICE — DRAPE STERI-DRAPE 1000 17X11IN

## (undated) DEVICE — NDL ECLIPSE SAFETY 23G 1.5IN

## (undated) DEVICE — BLADE SURG CARBON STEEL SZ11

## (undated) DEVICE — GOWN SMARTGOWN LVL4 X-LONG XL

## (undated) DEVICE — APPLICATOR CHLORAPREP ORN 26ML

## (undated) DEVICE — TROCAR ENDOPATH XCEL 5X75MM

## (undated) DEVICE — TROCAR ENDOPATH XCEL 5MM 7.5CM

## (undated) DEVICE — NDL SPINAL SPINOCAN 22GX3.5

## (undated) DEVICE — MARKER SKIN STND TIP BLUE BARR

## (undated) DEVICE — TUBING HF INSUFFLATION W/ FLTR

## (undated) DEVICE — GLOVE BIOGEL PI MICRO INDIC 7

## (undated) DEVICE — SUT GUT PL. 4-0 27 FS-2

## (undated) DEVICE — SYR B-D DISP CONTROL 10CC100/C

## (undated) DEVICE — SYR 10CC LUER LOCK

## (undated) DEVICE — ELECTRODE REM PLYHSV RETURN 9

## (undated) DEVICE — NDL HYPO STD REG BVL 18GX1.5IN

## (undated) DEVICE — PACK CYSTO